# Patient Record
Sex: MALE | Race: WHITE | Employment: FULL TIME | URBAN - METROPOLITAN AREA
[De-identification: names, ages, dates, MRNs, and addresses within clinical notes are randomized per-mention and may not be internally consistent; named-entity substitution may affect disease eponyms.]

---

## 2017-02-07 ENCOUNTER — OFFICE VISIT (OUTPATIENT)
Dept: FAMILY MEDICINE | Facility: CLINIC | Age: 57
End: 2017-02-07
Payer: COMMERCIAL

## 2017-02-07 ENCOUNTER — MYC MEDICAL ADVICE (OUTPATIENT)
Dept: FAMILY MEDICINE | Facility: CLINIC | Age: 57
End: 2017-02-07

## 2017-02-07 VITALS
SYSTOLIC BLOOD PRESSURE: 138 MMHG | HEIGHT: 67 IN | HEART RATE: 76 BPM | BODY MASS INDEX: 21.35 KG/M2 | WEIGHT: 136 LBS | DIASTOLIC BLOOD PRESSURE: 76 MMHG | TEMPERATURE: 98.3 F

## 2017-02-07 DIAGNOSIS — G89.29 CHRONIC NECK AND BACK PAIN: Primary | ICD-10-CM

## 2017-02-07 DIAGNOSIS — M54.2 CHRONIC NECK AND BACK PAIN: Primary | ICD-10-CM

## 2017-02-07 DIAGNOSIS — M54.12 CERVICAL RADICULOPATHY: ICD-10-CM

## 2017-02-07 DIAGNOSIS — Z23 NEED FOR PROPHYLACTIC VACCINATION WITH TETANUS-DIPHTHERIA (TD): ICD-10-CM

## 2017-02-07 DIAGNOSIS — M54.9 CHRONIC NECK AND BACK PAIN: Primary | ICD-10-CM

## 2017-02-07 DIAGNOSIS — Z23 NEED FOR PROPHYLACTIC VACCINATION AND INOCULATION AGAINST INFLUENZA: ICD-10-CM

## 2017-02-07 PROCEDURE — 90686 IIV4 VACC NO PRSV 0.5 ML IM: CPT | Performed by: PHYSICIAN ASSISTANT

## 2017-02-07 PROCEDURE — 90472 IMMUNIZATION ADMIN EACH ADD: CPT | Performed by: PHYSICIAN ASSISTANT

## 2017-02-07 PROCEDURE — 90471 IMMUNIZATION ADMIN: CPT | Performed by: PHYSICIAN ASSISTANT

## 2017-02-07 PROCEDURE — 99213 OFFICE O/P EST LOW 20 MIN: CPT | Mod: 25 | Performed by: PHYSICIAN ASSISTANT

## 2017-02-07 PROCEDURE — 90715 TDAP VACCINE 7 YRS/> IM: CPT | Performed by: PHYSICIAN ASSISTANT

## 2017-02-07 NOTE — PATIENT INSTRUCTIONS
[place] [phone]  -- For colonoscopy.     [facility]   Discharged by : Yoon GE CMA ([facility])    Paper scripts provided to patient : none      If you have any questions regarding your visit please contact your care team:     Team Gold Clinic Hours Telephone Number   Dr. Liana Moulton, LIN   7am-7pm Monday - Thursday   7am-5pm Fridays  [phone]   (Appointment scheduling available 24/7)   RN Line   [phone] option 2       For a Price Quote for your services, please call our Consumer Price Line at [phone].     What options do I have for visits at the clinic other than the traditional office visit?     To expand how we care for you, many of our providers are utilizing electronic visits (e-visits) and telephone visits, when medically appropriate, for interactions with their patients rather than a visit in the clinic. We also offer nurse visits for many medical concerns. Just like any other service, we will bill your insurance company for this type of visit based on time spent on the phone with your provider. Not all insurance companies cover these visits. Please check with your medical insurance if this type of visit is covered. You will be responsible for any charges that are not paid by your insurance.   E-visits via [email or web address]: generally incur a $35.00 fee.     Telephone visits:   Time spent on the phone: *charged based on time that is spent on the phone in increments of 10 minutes. Estimated cost:   5-10 mins $30.00   11-20 mins. $59.00   21-30 mins. $85.00     Use Mobentot (secure email communication and access to your chart) to send your primary care provider a message or make an appointment. Ask someone on your Team how to sign up for [email or web address].     As always, Thank you for trusting us with your health care needs!      [facility]:    Scheduling Appointments  [facility]  Call:  [phone]    [facility]  Call: [phone]    [facility]  Call: [phone]      WHERE TO GO FOR CARE?    Clinic    Make an appointment if you:       Are sick (cold, cough, flu, sore throat, earache or in pain).       Have a small injury (sprain, small cut, burn or broken bone).       Need a physical exam, Pap smear, vaccine or prescription refill.       Have questions about your health or medicines.    To reach us:      Call [phone] ([phone]). Open 24 hours every day. (For counseling services, call [phone].)    Log into HealthSpot at [employer]. (Visit [employer] to create an account.) Hospital emergency room    An emergency is a serious or life- threatening problem that must be treated right away.    Call 107 or get to the hospital if you have:      Very bad or sudden:            - Chest pain or pressure         - Bleeding         - Head or belly pain         - Dizziness or trouble seeing, walking or                          Speaking      Problems breathing      Blood in your vomit or you are coughing up blood      A major injury (knocked out, loss of a finger or limb, rape, broken bone protruding from skin)    A mental health crisis. (Or call the Mental Health Crisis line at 1-270.436.1757 or Suicide Prevention Hotline at 1-661.379.7602.)    Open 24 hours every day. You don't need an appointment.     Urgent care    Visit urgent care for sickness or small injuries when the clinic is closed. You don't need an appointment. To check hours or find an urgent care near you, visit [email or web address]. Online care    Get online care from [employer] for more than 70 common problems, like colds, allergies and infections. Open 24 hours every day at: [email or web address]   Need help deciding?    For advice about where to be seen, you may call your clinic and ask to speak with a nurse. We're here for you 24 hours every day.          If you are deaf or hard of hearing, please let us know. We provide many free services including sign language interpreters, oral interpreters, TTYs, telephone amplifiers, note takers and written materials.

## 2017-02-07 NOTE — MR AVS SNAPSHOT
After Visit Summary   2/7/2017    Truman Suero    MRN: [id number]           Patient Information     Date Of Birth          1960        Visit Information        Provider Department      2/7/2017 11:00 AM Tiffanie Moulton PA-C [facility]        Today's Diagnoses     Screen for colon cancer    -  1     Need for prophylactic vaccination with tetanus-diphtheria (TD)         Needs smoking cessation education         Need for prophylactic vaccination and inoculation against influenza         Chronic neck and back pain         Cervical radiculopathy           Care Instructions    [place] [phone]  -- For colonoscopy.     [facility]   Discharged by : Yoon GE CMA ([facility])    Paper scripts provided to patient : none      If you have any questions regarding your visit please contact your care team:     Team Gold Clinic Hours Telephone Number   Dr. Liana Moulton PA-C   7am-7pm Monday - Thursday   7am-5pm Fridays  [phone]   (Appointment scheduling available 24/7)   RN Line   [phone] option 2       For a Price Quote for your services, please call our Consumer Price Line at [phone].     What options do I have for visits at the clinic other than the traditional office visit?     To expand how we care for you, many of our providers are utilizing electronic visits (e-visits) and telephone visits, when medically appropriate, for interactions with their patients rather than a visit in the clinic. We also offer nurse visits for many medical concerns. Just like any other service, we will bill your insurance company for this type of visit based on time spent on the phone with your provider. Not all insurance companies cover these visits. Please check with your medical insurance if this type of visit is covered. You will be responsible for any charges that are not paid by your insurance.   E-visits via  [employer]: generally incur a $35.00 fee.     Telephone visits:   Time spent on the phone: *charged based on time that is spent on the phone in increments of 10 minutes. Estimated cost:   5-10 mins $30.00   11-20 mins. $59.00   21-30 mins. $85.00     Use [employer] (secure email communication and access to your chart) to send your primary care provider a message or make an appointment. Ask someone on your Team how to sign up for [email or web address].     As always, Thank you for trusting us with your health care needs!      [facility]:    Scheduling Appointments  Jac Malhotra [facility]  Call: [phone]    [facility]  Call: [phone]    [facility]  Call: [phone]      WHERE TO GO FOR CARE?    Clinic    Make an appointment if you:       Are sick (cold, cough, flu, sore throat, earache or in pain).       Have a small injury (sprain, small cut, burn or broken bone).       Need a physical exam, Pap smear, vaccine or prescription refill.       Have questions about your health or medicines.    To reach us:      Call [phone] ([phone]). Open 24 hours every day. (For counseling services, call [phone].)    Log into [employer] at [email or web address]. (Visit [email or web address] to create an account.) Hospital emergency room    An emergency is a serious or life- threatening problem that must be treated right away.    Call 051 or get to the hospital if you have:      Very bad or sudden:            - Chest pain or pressure         - Bleeding         - Head or belly pain         - Dizziness or trouble seeing, walking or                          Speaking      Problems breathing      Blood in your vomit or you are coughing up blood      A major injury (knocked out, loss of a finger or limb, rape, broken bone protruding from skin)    A mental health crisis. (Or call the Mental Health Crisis line at 1-554.147.4632 or Suicide Prevention Hotline  at 1-725.473.3727.)    Open 24 hours every day. You don't need an appointment.     Urgent care    Visit urgent care for sickness or small injuries when the clinic is closed. You don't need an appointment. To check hours or find an urgent care near you, visit [email or web address]. Online care    Get online care from [facility] for more than 70 common problems, like colds, allergies and infections. Open 24 hours every day at: [email or web address]   Need help deciding?    For advice about where to be seen, you may call your clinic and ask to speak with a nurse. We're here for you 24 hours every day.         If you are deaf or hard of hearing, please let us know. We provide many free services including sign language interpreters, oral interpreters, TTYs, telephone amplifiers, note takers and written materials.                       Follow-ups after your visit        Additional Services     ORTHO  REFERRAL       [facility] is referring you to the Orthopedic  Services at [facility].       The  Representative will assist you in the coordination of your Orthopedic and Musculoskeletal Care as prescribed by your physician.    The  Representative will call you within 1 business day to help schedule your appointment, or you may contact the  Representative at:    All areas ~ [phone]     Type of Referral : Spine: Cervical / Thoracic: Cervical / Thoracic Spine Surgeon  (see note from 12/8/16)      Timeframe requested: Routine    Coverage of these services is subject to the terms and limitations of your health insurance plan.  Please call member services at your health plan with any benefit or coverage questions.      If X-rays, CT or MRI's have been performed, please contact the facility where they were done to arrange for , prior to your scheduled appointment.  Please bring this referral request to your appointment and  present it to your specialist.            PAIN MANAGEMENT CENTER ([place]) REFERRAL       Your provider has referred you to the [facility].    Reason for Referral: Comprehensive Evaluation and Management    Please complete the following questions:    What is your diagnosis for the patient's pain? DDD of cervical spine    Do you have any specific questions for the pain specialist? No    Are there any red flags that may impact the assessment or management of the patient? None    **ANY DIAGNOSTIC TESTS THAT ARE NOT IN EPIC SHOULD BE SENT TO THE PAIN CENTER**    Please note the Pre-Op Pain Consult must be scheduled 2-3 weeks prior to the patient's surgery.  Patient's trying to schedule within 2 weeks of surgery may not be accommodated.     Pre-Op Pain Consults are only good for 30 days.    REGARDING OPIOID MEDICATIONS:  We will always address appropriateness of opioid pain medications, but we generally will not automatically take on a prescribing role. When we do take on prescribing of opioids for chronic pain, it is in collaboration with the referring physician for an intermediate period of time (months), with an expectation that the primary physician or provider will assume the prescribing role if medications are effective at stable doses with demonstrated compliance.  Therefore, please do not assume that your prescribing responsibilities end on the day of pain clinic consultation.  Is this agreeable to you? YES    For any questions, contact the [facility] at [phone].    Please be aware that coverage of these services is subject to the terms and limitations of your health insurance plan.  Call member services at your health plan with any benefit or coverage questions.      Please bring the following with you to your appointment:    (1) Any X-Rays, CTs or MRIs which have been performed.  Contact the facility where they were done to arrange for  prior to your  "scheduled appointment.    (2) List of current medications   (3) This referral request   (4) Any documents/labs given to you for this referral                  Who to contact     If you have questions or need follow up information about today's clinic visit or your schedule please contact [facility] directly at [phone].  Normal or non-critical lab and imaging results will be communicated to you by MyChart, letter or phone within 4 business days after the clinic has received the results. If you do not hear from us within 7 days, please contact the clinic through MobStachart or phone. If you have a critical or abnormal lab result, we will notify you by phone as soon as possible.  Submit refill requests through [employer] or call your pharmacy and they will forward the refill request to us. Please allow 3 business days for your refill to be completed.          Additional Information About Your Visit        MyChart Information     [employer] gives you secure access to your electronic health record. If you see a primary care provider, you can also send messages to your care team and make appointments. If you have questions, please call your primary care clinic.  If you do not have a primary care provider, please call [phone] and they will assist you.        Care EveryWhere ID     This is your Care EveryWhere ID. This could be used by other organizations to access your [place] medical records  [id number]        Your Vitals Were     Pulse Temperature Height BMI (Body Mass Index)          76 98.3  F (36.8  C) (Oral) 5' 7\" (1.702 m) 21.30 kg/m2         Blood Pressure from Last 3 Encounters:   02/07/17 138/76   12/08/16 144/90   11/30/16 134/70    Weight from Last 3 Encounters:   02/07/17 136 lb (61.689 kg)   12/08/16 139 lb (63.05 kg)   11/30/16 140 lb 9.6 oz (63.776 kg)              We Performed the Following     FLU VAC, SPLIT VIRUS IM > 3 YO (QUADRIVALENT) [60628]     ORTHO  REFERRAL     PAIN " MANAGEMENT CENTER ([place]) REFERRAL     TDAP (ADACEL AGES 11-64)     Vaccine Administration, Initial [78278]          Today's Medication Changes          These changes are accurate as of: 2/7/17 11:40 AM.  If you have any questions, ask your nurse or doctor.               Stop taking these medicines if you haven't already. Please contact your care team if you have questions.     cyclobenzaprine 10 MG tablet   Commonly known as:  FLEXERIL   Stopped by:  Tiffanie Moulton PA-C                    Primary Care Provider    Physician No Ref-Primary       No address on file        Thank you!     Thank you for choosing [facility]  for your care. Our goal is always to provide you with excellent care. Hearing back from our patients is one way we can continue to improve our services. Please take a few minutes to complete the written survey that you may receive in the mail after your visit with us. Thank you!             Your Updated Medication List - Protect others around you: Learn how to safely use, store and throw away your medicines at www.disposemymeds.org.          This list is accurate as of: 2/7/17 11:40 AM.  Always use your most recent med list.                   Brand Name Dispense Instructions for use    gabapentin 300 MG capsule    NEURONTIN    270 capsule    Take 1 capsule (300 mg) by mouth 3 times daily       ibuprofen 800 MG tablet    ADVIL/MOTRIN         traMADol 50 MG tablet    ULTRAM    15 tablet    1-2 tabs every 4-6 hours prn pain

## 2017-02-07 NOTE — NURSING NOTE
"Chief Complaint   Patient presents with     Pain     chronic neck and back pain follow up     Other     due for health maintenance     Flu Shot       Initial /76 mmHg  Pulse 76  Temp(Src) 98.3  F (36.8  C) (Oral)  Ht 5' 7\" (1.702 m)  Wt 136 lb (61.689 kg)  BMI 21.30 kg/m2 Estimated body mass index is 21.3 kg/(m^2) as calculated from the following:    Height as of this encounter: 5' 7\" (1.702 m).    Weight as of this encounter: 136 lb (61.689 kg).  Medication Reconciliation: complete   Shila Olson MA      "

## 2017-02-07 NOTE — NURSING NOTE
Patient is covered under this program for the following reason:  Not MNVFC Eligible: Insured - Has insurance that covers the cost of all vaccines (Not MnVFC elligible because insurance already covers all vaccines)        Screening Questionnaire for Adult Immunization   Are you sick today?   No    Do you have allergies to medications, food, a vaccine component or latex?   No    Have you ever had a serious reaction after receiving a vaccination?   No    Do you have a long-term health problem with heart disease, lung disease,  asthma, kidney disease, diabetes, anemia, metabolic or blood disease?   No    Do you have cancer, leukemia, AIDS, or any immune system problem?   No    Do you take cortisone, prednisone, other steroids, or anticancer drugs, or  have you had any x-ray (radiation) treatments?   No    Have you had a seizure, brain, or other nervous system problem?   No    During the past year, have you received a transfusion of blood or blood       products, or been given a medicine called immune (gamma) globulin?   No    For women: Are you pregnant or is there a chance you could become         pregnant during the next month?   No    Have you received any vaccinations in the past 4 weeks?   No     Immunization questionnaire answers were all negative.     Vaccination given by Yoon Lopze CMA ([facility])    Prior to injection verified patient identity using patient's name and date of birth.

## 2017-02-07 NOTE — PROGRESS NOTES
"  SUBJECTIVE:                                                    Truman Suero is a 56 year old male who presents to clinic today for the following health issues:        Chronic Pain Follow-Up       Type / Location of Pain: neck and back  Analgesia/pain control:       Recent changes:  Worse, miserable. Had nerve radio frequency, and was done at clinic he wasn't supposed to go to. Caused insurance issues. Certain things are a bit better and certain things are a bit worse.       Overall control: i\"just getting through the day\"  Activity level/function:      Daily activities:  \"pushing through\"    Work:  Does and doesn't, mentally go through the day. On feet all day  Adverse effects:  No  Adherance    Taking medication as directed?  Yes    Participating in other treatments: 12/8/16 saw neurosurgery   Risk Factors:    Sleep: not going well, does sleep cause he is tired    Mood/anxiety:  controlled    Recent family or social stressors:  none noted    Other aggravating factors: on feet at work, is a   No flowsheet data found.  No flowsheet data found.  Encounter-Level CSA:     There are no encounter-level csa.               Problems taking medications regularly: No    Medication side effects: none    Diet: regular (no restrictions)      -------------------------------------    Problem list, Medication list, Allergies, and Medical/Social/Surgical histories reviewed in [facility] and updated as appropriate.    ROS:  A pertinent ROS of the General  Musculoskeletal   Neurological  Integumentary systems is otherwise unremarkable.      OBJECTIVE:                                                    /76 mmHg  Pulse 76  Temp(Src) 98.3  F (36.8  C) (Oral)  Ht 5' 7\" (1.702 m)  Wt 136 lb (61.689 kg)  BMI 21.30 kg/m2 .  GENERAL:: healthy, alert and no distress  PSYCH: Alert and oriented times 3; speech- coherent , normal rate and volume; able to articulate logical thoughts, able to abstract reason, no tangential thoughts, no " hallucinations or delusions, affect- normal    Diagnostic test results:  Diagnostic Test Results:  none      ASSESSMENT/PLAN:                                                          ICD-10-CM    1. Chronic neck and back pain M54.2 ORTHO  REFERRAL    M54.9 PAIN MANAGEMENT CENTER ([place]) REFERRAL   2. Cervical radiculopathy M54.12 ORTHO  REFERRAL     PA[facility]EMENT CENTER ([place]) REFERRAL   3. Need for prophylactic vaccination with tetanus-diphtheria (TD) Z23 TDAP (ADACEL AGES 11-64)   4. Need for prophylactic vaccination and inoculation against influenza Z23 FLU VAC, SPLIT VIRUS IM > 3 YO (QUADRIVALENT) [35225]     Vaccine Administration, Initial [96655]       Unfortunately patient has not seen great improvement with interventions thus far. I recommend that her return to see Dr. Junior per recommendation of last note from Melanie Vincent, [facility] neurosurgery on 12/8/16. In addition, gave patient option of seeing pain management group to discuss multidisciplinary approach to his daily pain. Explained what all they would do at this visit. At this time he is not interested in more medication, he states he has tramadol at home still.     Follow up with Provider - PRN.      Patient plans to get his colonoscopy.      Tiffanie Moulton PA-C  [facility]     Injectable Influenza Immunization Documentation    1.  Is the person to be vaccinated sick today?  No    2. Does the person to be vaccinated have an allergy to eggs or to a component of the vaccine?  No    3. Has the person to be vaccinated today ever had a serious reaction to influenza vaccine in the past?  No    4. Has the person to be vaccinated ever had Guillain-[place] syndrome?  No     Form completed by patient

## 2017-02-08 ENCOUNTER — TELEPHONE (OUTPATIENT)
Dept: PALLIATIVE MEDICINE | Facility: CLINIC | Age: 57
End: 2017-02-08

## 2017-02-08 NOTE — TELEPHONE ENCOUNTER
Patient called back and I scheduled him for a phone visit on Monday 2/13/17.    Brittaney Ramirez

## 2017-02-08 NOTE — TELEPHONE ENCOUNTER
Pain Management Center Referral      1. Confirmed address with patient? Yes  2. Confirmed phone number with patient? Yes  3. Confirmed referring provider? Yes  4. Is the PCP the same as the referring provider? Yes  5. Has the patient been to any previous pain clinics? No  (If yes, send DACIA with welcome letter)  6. Which insurance are we to bill for this appointment?  [employer]    7. Informed pt of cancellation (48 hour) policy? Yes    REGARDING OPIOID MEDICATIONS: We will always address appropriateness of opioid pain medications, but we generally will not automatically take on a prescribing role. When we do take on prescribing of opioids for chronic pain, it is in collaboration with the referring physician for an intermediate period of time (months), with an expectation that the primary physician or provider will assume the prescribing role if medications are effective at stable doses with demonstrated compliance. Therefore, please do not assume that your prescribing responsibilities end on the day of pain clinic consultation.  7. Informed pt of prescribing policy? Yes      8. Referring Provider: ALEXEY Bro    9. Criteria for Triage Eval:   -Missed/Failed 1st DUAL appointment? N/A   -Medication Focused? N/A   -Mental Health Concerns? (e.g. Recent psych hospitalization/snap shot)? N/A   -Active substance abuse? N/A   -Patient behaviors (e.g. Offensive language/raised voice)? N/A

## 2017-02-08 NOTE — TELEPHONE ENCOUNTER
Left VM for patient to schedule a new evaluation.        Monalisa LUZ    [facility]

## 2017-02-08 NOTE — Clinical Note
February 8, 2017    Truman Suero  [address] [facility] [address]    Dear Katay Laughlin to the [facility].  We are located on the 2nd floor ([address]) of the [facility], located at [address] [place], [place] [address], [place] [address].    Your appointment at the [facility] has been scheduled on Tuesday MARCH 28, 2017 at 2:30 PM with Melanie Thomas NP.    At your first visit, you will meet your team of caregivers who will help you to develop pain management strategies that will last a lifetime. You will meet with our support staff to review your insurance information, and collect your co-payment if required by your insurance company. You will also meet with a medical pain specialist and care coordinator who will assess your pain and develop a plan of care for your successful pain rehabilitation. You should expect to spend 1-2 hours at your first visit with us. Usually, patients work with us for a period of 6-12 months, and eventually return to their primary doctor once their pain management has stabilized.      To help us make your visit go as smoothly as possible, please bring the following items with you on your visit:     Completed Pain Questionnaire enclosed in this packet.  If you do not bring the completed questionnaire, we may have to reschedule your appointment.  List of any medicines that you are currently taking or have been prescribed  Important NON-[place] medical information such as medical records or tests results (X-rays, or laboratory tests)  Your health insurance card  Financial resources to cover your co-payment or balance due at the time of service (cash, personal check, Visa, and MasterCard are acceptable methods of payment)     Due to the demand for new patient evaluations, you must notify the scheduling department 48 hours in advance if you are not able to keep this  appointment. Failure to do so could affect your ability to reschedule with our clinic. Please be aware that we will not prescribe any medications at your first visit.     Please call [phone] with any questions regarding your appointment. We look forward to meeting you and working to address your health care needs.     Sincerely,    [facility]

## 2017-02-13 ENCOUNTER — VIRTUAL VISIT (OUTPATIENT)
Dept: FAMILY MEDICINE | Facility: CLINIC | Age: 57
End: 2017-02-13
Payer: COMMERCIAL

## 2017-02-13 ENCOUNTER — TELEPHONE (OUTPATIENT)
Dept: FAMILY MEDICINE | Facility: CLINIC | Age: 57
End: 2017-02-13

## 2017-02-13 DIAGNOSIS — M54.2 CHRONIC NECK AND BACK PAIN: Primary | ICD-10-CM

## 2017-02-13 DIAGNOSIS — G89.4 CHRONIC PAIN SYNDROME: ICD-10-CM

## 2017-02-13 DIAGNOSIS — M54.12 CERVICAL RADICULOPATHY: ICD-10-CM

## 2017-02-13 DIAGNOSIS — G89.29 CHRONIC NECK AND BACK PAIN: Primary | ICD-10-CM

## 2017-02-13 DIAGNOSIS — M54.9 CHRONIC NECK AND BACK PAIN: Primary | ICD-10-CM

## 2017-02-13 PROCEDURE — 98967 PH1 ASSMT&MGMT NQHP 11-20: CPT | Performed by: PHYSICIAN ASSISTANT

## 2017-02-13 RX ORDER — GABAPENTIN 600 MG/1
600 TABLET ORAL 3 TIMES DAILY
Qty: 90 TABLET | Refills: 5 | Status: SHIPPED | OUTPATIENT
Start: 2017-02-13 | End: 2017-08-07

## 2017-02-13 RX ORDER — TRAMADOL HYDROCHLORIDE 50 MG/1
100 TABLET ORAL EVERY MORNING
Qty: 60 TABLET | Refills: 0 | Status: SHIPPED | OUTPATIENT
Start: 2017-02-13 | End: 2017-02-15

## 2017-02-13 NOTE — MR AVS SNAPSHOT
After Visit Summary   2/13/2017    Truman Suero    MRN: [id number]           Patient Information     Date Of Birth          1960        Visit Information        Provider Department      2/13/2017 2:00 PM Tiffanie Moulton PA-C [facility]        Today's Diagnoses     Chronic neck and back pain    -  1    Cervical radiculopathy        Chronic pain syndrome           Follow-ups after your visit        Your next 10 appointments already scheduled     Feb 14, 2017  1:20 PM CST   [facility] Visit with Melanie Zacarias NP   [facility]    [address] [place] [address]  [place] [address]   [phone]            Feb 15, 2017  3:00 PM CST   LAB with NE LAB   [facility] ([facility]    [address] [place] [id number] [phone]           Patient must bring picture ID.  Patient should be prepared to give a urine specimen  Please do not eat 10-12 hours before your appointment if you are coming in fasting for labs on lipids, cholesterol, or glucose (sugar).  Pregnant women should follow their Care Team instructions. Water with medications is okay. Do not drink coffee or other fluids.   If you have concerns about taking  your medications, please ask at office or if scheduling via [employer], send a message by clicking on Secure Messaging, Message Your Care Team.            Mar 28, 2017  2:30 PM CDT   New Visit with RODNEY Vargas CNP   [facility] [place] ([facility] [place])    [address] [address] [facility]  [place] [id number] [phone]              Future tests that were ordered for you today     Open Future Orders        Priority Expected Expires Ordered    Drug Screen Comprehensive , Urine with Reported Meds (Pain Care Package) Routine  2/20/2017 2/13/2017            Who to contact     If you have questions or need follow up information about today's clinic visit or your  schedule please contact [facility] directly at [phone].  Normal or non-critical lab and imaging results will be communicated to you by MyChart, letter or phone within 4 business days after the clinic has received the results. If you do not hear from us within 7 days, please contact the clinic through [employer] or phone. If you have a critical or abnormal lab result, we will notify you by phone as soon as possible.  Submit refill requests through [employer] or call your pharmacy and they will forward the refill request to us. Please allow 3 business days for your refill to be completed.          Additional Information About Your Visit        [employer] Information     [employer] gives you secure access to your electronic health record. If you see a primary care provider, you can also send messages to your care team and make appointments. If you have questions, please call your primary care clinic.  If you do not have a primary care provider, please call [phone] and they will assist you.        Care EveryWhere ID     This is your Care EveryWhere ID. This could be used by other organizations to access your [facility] medical records  [id number]         Blood Pressure from Last 3 Encounters:   02/07/17 138/76   12/08/16 144/90   11/30/16 134/70    Weight from Last 3 Encounters:   02/07/17 136 lb (61.7 kg)   12/08/16 139 lb (63 kg)   11/30/16 140 lb 9.6 oz (63.8 kg)                 Today's Medication Changes          These changes are accurate as of: 2/13/17  8:04 PM.  If you have any questions, ask your nurse or doctor.               These medicines have changed or have updated prescriptions.        Dose/Directions    * gabapentin 300 MG capsule   Commonly known as:  NEURONTIN   This may have changed:  Another medication with the same name was added. Make sure you understand how and when to take each.   Used for:  Cervical radiculopathy   Changed by:  Tiffanie Moulton PA-C        Dose:  300 mg   Take  1 capsule (300 mg) by mouth 3 times daily   Quantity:  270 capsule   Refills:  1       * gabapentin 600 MG tablet   Commonly known as:  NEURONTIN   This may have changed:  You were already taking a medication with the same name, and this prescription was added. Make sure you understand how and when to take each.   Used for:  Chronic neck and back pain, Cervical radiculopathy, Chronic pain syndrome   Changed by:  Tiffanie Moulton PA-C        Dose:  600 mg   Take 1 tablet (600 mg) by mouth 3 times daily   Quantity:  90 tablet   Refills:  5       * traMADol 50 MG tablet   Commonly known as:  ULTRAM   This may have changed:  Another medication with the same name was added. Make sure you understand how and when to take each.   Used for:  Sprain of lumbar region, initial encounter   Changed by:  Carole Suazo PA-C        1-2 tabs every 4-6 hours prn pain   Quantity:  15 tablet   Refills:  0       * traMADol 50 MG tablet   Commonly known as:  ULTRAM   This may have changed:  You were already taking a medication with the same name, and this prescription was added. Make sure you understand how and when to take each.   Used for:  Chronic neck and back pain, Cervical radiculopathy   Changed by:  Tiffanie Moulton PA-C        Dose:  100 mg   Take 2 tablets (100 mg) by mouth every morning maximum 2 tablet(s) per day   Quantity:  60 tablet   Refills:  0       * Notice:  This list has 4 medication(s) that are the same as other medications prescribed for you. Read the directions carefully, and ask your doctor or other care provider to review them with you.         Where to get your medicines      These medications were sent to [facility] [address] [place] [id number]     Phone:  [phone]     gabapentin 600 MG tablet         Some of these will need a paper prescription and others can be bought over the counter.  Ask your nurse if you have questions.     Bring  a paper prescription for each of these medications     traMADol 50 MG tablet                Primary Care Provider    Physician No Ref-Primary       No address on file        Thank you!     Thank you for choosing [facility]  for your care. Our goal is always to provide you with excellent care. Hearing back from our patients is one way we can continue to improve our services. Please take a few minutes to complete the written survey that you may receive in the mail after your visit with us. Thank you!             Your Updated Medication List - Protect others around you: Learn how to safely use, store and throw away your medicines at www.disposemymeds.org.          This list is accurate as of: 2/13/17  8:04 PM.  Always use your most recent med list.                   Brand Name Dispense Instructions for use    * gabapentin 300 MG capsule    NEURONTIN    270 capsule    Take 1 capsule (300 mg) by mouth 3 times daily       * gabapentin 600 MG tablet    NEURONTIN    90 tablet    Take 1 tablet (600 mg) by mouth 3 times daily       ibuprofen 800 MG tablet    ADVIL/MOTRIN         * traMADol 50 MG tablet    ULTRAM    15 tablet    1-2 tabs every 4-6 hours prn pain       * traMADol 50 MG tablet    ULTRAM    60 tablet    Take 2 tablets (100 mg) by mouth every morning maximum 2 tablet(s) per day       * Notice:  This list has 4 medication(s) that are the same as other medications prescribed for you. Read the directions carefully, and ask your doctor or other care provider to review them with you.

## 2017-02-13 NOTE — TELEPHONE ENCOUNTER
Tramadol Rx is in Tiffanie's completed folder. Patient is scheduled for Wednesday to come in for lab only appt to leave a urine. Per Tiffanie, once he leaves the urine sample we can give him the prescription.    Renae Martins MA

## 2017-02-13 NOTE — PROGRESS NOTES
"Truman Suero is a 56 year old male who is being evaluated via a telephone visit.      The patient has been notified of following:     \"This telephone visit will be conducted via a call between you and your physician/provider. We have found that certain health care needs can be provided without the need for a physical exam.  This service lets us provide the care you need with a short phone conversation.  If a prescription is necessary we can send it directly to your pharmacy.  If lab work is needed we can place an order for that and you can then stop by our lab to have the test done at a later time.    We will bill your insurance company for this service.  Please check with your medical insurance if this type of visit is covered. You may be responsible for the cost of this type of visit if insurance coverage is denied.  The typical cost is $30 (10min), $59(11-20min) and $85 (21-30min).  Most often these visits are shorter than 10 minutes.    If during the course of the call the physician/provider feels a telephone visit is not appropriate, you will not be charged for this service. \"     Consent has been obtained for this service by 1 care team member:yes. See the scanned image in the medical record.    Preferred patient phone number to be used for this call: [phone]     Truman Suero complains of  Pain, looking for options      Past Medical History   Diagnosis Date     Neck pain      MRI positive on cervical vertebrea.      Social History     Social History     Marital status:      Spouse name: N/A     Number of children: N/A     Years of education: N/A     Occupational History     Not on file.     Social History Main Topics     Smoking status: Current Every Day Smoker     Smokeless tobacco: Never Used     Alcohol use No     Drug use: No     Sexual activity: Yes     Partners: Female     Birth control/ protection: None     Other Topics Concern     Not on file     Social History Narrative " "    ALLERGIES  Review of patient's allergies indicates no known allergies.        Shila Olson MA   (MA signature)    Additional provider notes:  Patient has advanced cervical spondylosis seen on MRI on 16 (sc[facility] into [facility]). Patient has been doing injections and working with Dr. Mondragon  but has found minimal relief with this process. He most recently had nerve radio frequency and symptoms did not improve, in fact it feels they worsened.  He states mornings are the worst time for him, he states \"mornings are close to impossible\". He wakes and takes gabapentin (just started with 600 mg last week after visit) and 1 tramadol. This does not seem to help like it used to. In the beginning was giving him 75% relief with tramadol and now after an hour feels like it is back, so maybe 10-25% relief.   He takes advil and tylenol sometimes, but hasn't noticed a great deal of improvement with this.   He was also given Norco by Dr. Mondragon and states this did help a little more.     Dr. Mcintyre office -- orthopedic.     Assessment/Plan:    ICD-10-CM    1. Chronic neck and back pain M54.2 traMADol (ULTRAM) 50 MG tablet    M54.9 Drug Abuse Scr  Ur w Qnt Reflx   2. Cervical radiculopathy M54.12 traMADol (ULTRAM) 50 MG tablet     Drug Abuse Scr  Ur w Qnt Reflx      Discussed options for patient. He already had follow up scheduled with neurosurgery and pain clinic.  Recommend he start more regular use of Ibuprofen 600 mg up to 4 times daily. Increase gabapentin to 600 mg TID.  Tramadol 2 tablets in AM with ibuprofen and gabapentin. Use topical lidocaine.   Will come in for UDS and okay to  Rx at that time.   Follow up in 3 weeks if needed to see how pain is doing.     Total time spent on this phone visit with the patient = 19 minutes     I have reviewed the note as documented above.  This accurately captures the substance of my conversation with the patient,    Tiffanie Moulton, [facility], PA-C  [facility] " [place]

## 2017-02-14 ENCOUNTER — MYC MEDICAL ADVICE (OUTPATIENT)
Dept: FAMILY MEDICINE | Facility: CLINIC | Age: 57
End: 2017-02-14

## 2017-02-14 ENCOUNTER — OFFICE VISIT (OUTPATIENT)
Dept: NEUROSURGERY | Facility: CLINIC | Age: 57
End: 2017-02-14
Payer: COMMERCIAL

## 2017-02-14 VITALS
BODY MASS INDEX: 21.97 KG/M2 | HEART RATE: 71 BPM | DIASTOLIC BLOOD PRESSURE: 83 MMHG | SYSTOLIC BLOOD PRESSURE: 134 MMHG | HEIGHT: 67 IN | TEMPERATURE: 97.1 F | WEIGHT: 140 LBS | OXYGEN SATURATION: 95 %

## 2017-02-14 DIAGNOSIS — M50.30 DEGENERATIVE DISC DISEASE, CERVICAL: Primary | ICD-10-CM

## 2017-02-14 PROCEDURE — 99213 OFFICE O/P EST LOW 20 MIN: CPT | Performed by: NURSE PRACTITIONER

## 2017-02-14 NOTE — PROGRESS NOTES
[facility]  Neurosurgery followup:    HPI: Truman Suero is a 56 year old male with a 2-3 year history of neck pain with no precipitating cause.He was initially seen at our office on 12/8/16 for evaluation of his neck pain and a recommendation for cervical RF after undergoing diagnostic and confirmatory MBB which provided significant relief.   Since his last visit cervical RF was completed and although he feels some relief wo the upper back and shoulder region he continues to have daily neck pain.  He rates it 7.  He denies pain in the arms, however, notes numbness and tingling to the hands bilaterally.  He has a diagnosis of carpal tunnel.  He denies weakness to the upper extremities.    Exam:  Constitutional:  Alert, well nourished, NAD.  HEENT: Normocephalic, atraumatic.   Pulm:  Without shortness of breath   CV:  No pitting edema of BLE.     Neurological:  Awake  Alert  Oriented x 3  Motor exam:     Shoulder Abduction:  Right:  5/5    Left:  5/5  Biceps:                      Right:  5/5    Left:  5/5  Triceps:                     Right:  5/5    Left:  5/5  Wrist Extensors:       Right:  5/5    Left:  5/5  Wrist Flexors:           Right:  5/5    Left:  5/5  Intrinsics:                  Right:  5/5    Left:  5/5     Sensation intact throughout all U/E dermatomes    Decreased ROM of cervical spine with discomfort.  Tenderness throughout cervical paraspinous muscles.    A/P:   Cervical DDD  Cervicalgias    Considering Truman has ongoing pain in the neck and throughout the upper back, even after cervical RF, we discussed getting an updated cervical MRI.  We will follow up with the results.    Patient Instructions   Schedule cervical MRI  We will call you with results.        Melanie Zacarias [facility]  [facility]  [address] [address]    Tel [phone]  Pager [phone]

## 2017-02-14 NOTE — NURSING NOTE
"Truman Suero is a 56 year old male who presents for:  Chief Complaint   Patient presents with     Neurologic Problem     Neck and back pain        Initial Vitals:  There were no vitals taken for this visit. Estimated body mass index is 21.3 kg/(m^2) as calculated from the following:    Height as of 2/7/17: 5' 7\" (1.702 m).    Weight as of 2/7/17: 136 lb (61.7 kg).. There is no height or weight on file to calculate BSA. BP completed using cuff size: regular  Data Unavailable    Do you feel safe in your environment?  Yes  Do you need any refills today? No    Nursing Comments: chronic neck and back pain.  Patient rates his pain today as 7 neck and low back pain, numbness, tingling, weakness going into hands and feet      5 min. nursing intake time  Guerda Krishna CMA, AAS      Discharge plan: neck MRI  2 min. nursing discharge time  Guerda Krishna CMA, AAS       "

## 2017-02-14 NOTE — MR AVS SNAPSHOT
After Visit Summary   2/14/2017    Truman Suero    MRN: [id number]           Patient Information     Date Of Birth          1960        Visit Information        Provider Department      2/14/2017 1:20 PM Melanie Zacarias, NP [facility]        Today's Diagnoses     Degenerative disc disease, cervical    -  1      Care Instructions    Schedule cervical MRI  We will call you with results.        Follow-ups after your visit        Your next 10 appointments already scheduled     Feb 15, 2017  3:00 PM CST   [facility] with [facility] LAB   [facility]    [address] [place] [id number] [phone]           Patient must bring picture ID.  Patient should be prepared to give a urine specimen  Please do not eat 10-12 hours before your appointment if you are coming in fasting for labs on lipids, cholesterol, or glucose (sugar).  Pregnant women should follow their Care Team instructions. Water with medications is okay. Do not drink coffee or other fluids.   If you have concerns about taking  your medications, please ask at office or if scheduling via [employer], send a message by clicking on Secure Messaging, Message Your Care Team.            Mar 28, 2017  2:30 PM CDT   New Visit with RODNEY Vargas CNP   [facility] [place] ([facility] [place])    [id number] [place] [address]  [place] [id number] [phone]              Future tests that were ordered for you today     Open Future Orders        Priority Expected Expires Ordered    MR Cervical Spine w/o Contrast Routine  2/14/2018 2/14/2017    Drug Screen Comprehensive , Urine with Reported Meds (Pain Care Package) Routine  2/20/2017 2/13/2017            Who to contact     If you have questions or need follow up information about today's clinic visit or your schedule please contact [facility] directly at [phone].  Normal or non-critical  "lab and imaging results will be communicated to you by [employer], letter or phone within 4 business days after the clinic has received the results. If you do not hear from us within 7 days, please contact the clinic through [employer] or phone. If you have a critical or abnormal lab result, we will notify you by phone as soon as possible.  Submit refill requests through [employer] or call your pharmacy and they will forward the refill request to us. Please allow 3 business days for your refill to be completed.          Additional Information About Your Visit        [employer] Information     [employer] gives you secure access to your electronic health record. If you see a primary care provider, you can also send messages to your care team and make appointments. If you have questions, please call your primary care clinic.  If you do not have a primary care provider, please call [phone] and they will assist you.        Care EveryWhere ID     This is your Care EveryWhere ID. This could be used by other organizations to access your [facility] medical records  [id number]        Your Vitals Were     Pulse Temperature Height Pulse Oximetry BMI (Body Mass Index)       71 97.1  F (36.2  C) (Oral) 5' 7\" (1.702 m) 95% 21.93 kg/m2        Blood Pressure from Last 3 Encounters:   02/14/17 134/83   02/07/17 138/76   12/08/16 144/90    Weight from Last 3 Encounters:   02/14/17 140 lb (63.5 kg)   02/07/17 136 lb (61.7 kg)   12/08/16 139 lb (63 kg)               Primary Care Provider    Physician No Ref-Primary       No address on file        Thank you!     Thank you for choosing [facility]  for your care. Our goal is always to provide you with excellent care. Hearing back from our patients is one way we can continue to improve our services. Please take a few minutes to complete the written survey that you may receive in the mail after your visit with us. Thank you!             Your Updated Medication List - Protect others " around you: Learn how to safely use, store and throw away your medicines at www.disposemymeds.org.          This list is accurate as of: 2/14/17  2:18 PM.  Always use your most recent med list.                   Brand Name Dispense Instructions for use    * gabapentin 300 MG capsule    NEURONTIN    270 capsule    Take 1 capsule (300 mg) by mouth 3 times daily       * gabapentin 600 MG tablet    NEURONTIN    90 tablet    Take 1 tablet (600 mg) by mouth 3 times daily       ibuprofen 800 MG tablet    ADVIL/MOTRIN         * traMADol 50 MG tablet    ULTRAM    15 tablet    1-2 tabs every 4-6 hours prn pain       * traMADol 50 MG tablet    ULTRAM    60 tablet    Take 2 tablets (100 mg) by mouth every morning maximum 2 tablet(s) per day       * Notice:  This list has 4 medication(s) that are the same as other medications prescribed for you. Read the directions carefully, and ask your doctor or other care provider to review them with you.

## 2017-02-15 DIAGNOSIS — M54.12 CERVICAL RADICULOPATHY: ICD-10-CM

## 2017-02-15 DIAGNOSIS — G89.4 CHRONIC PAIN SYNDROME: ICD-10-CM

## 2017-02-15 DIAGNOSIS — M54.9 CHRONIC NECK AND BACK PAIN: ICD-10-CM

## 2017-02-15 DIAGNOSIS — M54.2 CHRONIC NECK AND BACK PAIN: ICD-10-CM

## 2017-02-15 DIAGNOSIS — G89.29 CHRONIC NECK AND BACK PAIN: ICD-10-CM

## 2017-02-15 PROCEDURE — 80307 DRUG TEST PRSMV CHEM ANLYZR: CPT | Mod: 90 | Performed by: PHYSICIAN ASSISTANT

## 2017-02-15 PROCEDURE — 99000 SPECIMEN HANDLING OFFICE-LAB: CPT | Performed by: PHYSICIAN ASSISTANT

## 2017-02-15 RX ORDER — TRAMADOL HYDROCHLORIDE 50 MG/1
100 TABLET ORAL EVERY MORNING
Qty: 60 TABLET | Refills: 0 | Status: SHIPPED | OUTPATIENT
Start: 2017-02-15 | End: 2017-03-17

## 2017-02-16 ENCOUNTER — MYC MEDICAL ADVICE (OUTPATIENT)
Dept: FAMILY MEDICINE | Facility: CLINIC | Age: 57
End: 2017-02-16

## 2017-02-19 LAB — PAIN DRUG SCR UR W RPTD MEDS: NORMAL

## 2017-02-22 ENCOUNTER — OFFICE VISIT (OUTPATIENT)
Dept: URGENT CARE | Facility: URGENT CARE | Age: 57
End: 2017-02-22
Payer: COMMERCIAL

## 2017-02-22 ENCOUNTER — RADIANT APPOINTMENT (OUTPATIENT)
Dept: MRI IMAGING | Facility: CLINIC | Age: 57
End: 2017-02-22
Attending: NURSE PRACTITIONER
Payer: COMMERCIAL

## 2017-02-22 VITALS
SYSTOLIC BLOOD PRESSURE: 156 MMHG | DIASTOLIC BLOOD PRESSURE: 92 MMHG | WEIGHT: 136.2 LBS | HEART RATE: 71 BPM | TEMPERATURE: 97.8 F | BODY MASS INDEX: 21.33 KG/M2 | OXYGEN SATURATION: 96 %

## 2017-02-22 DIAGNOSIS — M50.30 DEGENERATIVE DISC DISEASE, CERVICAL: ICD-10-CM

## 2017-02-22 DIAGNOSIS — L03.012 PARONYCHIA OF LEFT MIDDLE FINGER: Primary | ICD-10-CM

## 2017-02-22 PROCEDURE — 72141 MRI NECK SPINE W/O DYE: CPT | Mod: TC

## 2017-02-22 PROCEDURE — 99213 OFFICE O/P EST LOW 20 MIN: CPT | Performed by: FAMILY MEDICINE

## 2017-02-22 NOTE — MR AVS SNAPSHOT
After Visit Summary   2/22/2017    Truman Suero    MRN: [id number]           Patient Information     Date Of Birth          1960        Visit Information        Provider Department      2/22/2017 11:15 AM Jonathan Nicholas MD [facility]        Today's Diagnoses     Paronychia of left middle finger    -  1       Follow-ups after your visit        Your next 10 appointments already scheduled     Mar 28, 2017  2:30 PM CDT   New Visit with RODNEY Vargas CNP   [facility] ([facility])    [address] [place] [address] [phone]              Who to contact     If you have questions or need follow up information about today's clinic visit or your schedule please contact [facility] directly at [phone].  Normal or non-critical lab and imaging results will be communicated to you by MyChart, letter or phone within 4 business days after the clinic has received the results. If you do not hear from us within 7 days, please contact the clinic through MyChart or phone. If you have a critical or abnormal lab result, we will notify you by phone as soon as possible.  Submit refill requests through [employer] or call your pharmacy and they will forward the refill request to us. Please allow 3 business days for your refill to be completed.          Additional Information About Your Visit        MyChart Information     [employer] gives you secure access to your electronic health record. If you see a primary care provider, you can also send messages to your care team and make appointments. If you have questions, please call your primary care clinic.  If you do not have a primary care provider, please call [phone] and they will assist you.        Care EveryWhere ID     This is your Care EveryWhere ID. This could be used by other organizations to access your [facility] medical records  [id number]        Your  Vitals Were     Pulse Temperature Pulse Oximetry BMI (Body Mass Index)          71 97.8  F (36.6  C) (Oral) 96% 21.33 kg/m2         Blood Pressure from Last 3 Encounters:   02/22/17 (!) 156/92   02/14/17 134/83   02/07/17 138/76    Weight from Last 3 Encounters:   02/22/17 136 lb 3.2 oz (61.8 kg)   02/14/17 140 lb (63.5 kg)   02/07/17 136 lb (61.7 kg)              Today, you had the following     No orders found for display         Today's Medication Changes          These changes are accurate as of: 2/22/17 11:40 AM.  If you have any questions, ask your nurse or doctor.               Start taking these medicines.        Dose/Directions    amoxicillin-clavulanate 875-125 MG per tablet   Commonly known as:  AUGMENTIN   Used for:  Paronychia of left middle finger   Started by:  Jonathan Nicholas MD        Dose:  1 tablet   Take 1 tablet by mouth 2 times daily for 8 days   Quantity:  16 tablet   Refills:  0            Where to get your medicines      These medications were sent to [facility] [address] [place] [address]     Phone:  [phone]     amoxicillin-clavulanate 875-125 MG per tablet                Primary Care Provider    Physician No Ref-Primary       No address on file        Thank you!     Thank you for choosing [facility]  for your care. Our goal is always to provide you with excellent care. Hearing back from our patients is one way we can continue to improve our services. Please take a few minutes to complete the written survey that you may receive in the mail after your visit with us. Thank you!             Your Updated Medication List - Protect others around you: Learn how to safely use, store and throw away your medicines at www.disposemymeds.org.          This list is accurate as of: 2/22/17 11:40 AM.  Always use your most recent med list.                   Brand Name Dispense Instructions for use     amoxicillin-clavulanate 875-125 MG per tablet    AUGMENTIN    16 tablet    Take 1 tablet by mouth 2 times daily for 8 days       * gabapentin 300 MG capsule    NEURONTIN    270 capsule    Take 1 capsule (300 mg) by mouth 3 times daily       * gabapentin 600 MG tablet    NEURONTIN    90 tablet    Take 1 tablet (600 mg) by mouth 3 times daily       ibuprofen 800 MG tablet    ADVIL/MOTRIN         * traMADol 50 MG tablet    ULTRAM    15 tablet    1-2 tabs every 4-6 hours prn pain       * traMADol 50 MG tablet    ULTRAM    60 tablet    Take 2 tablets (100 mg) by mouth every morning maximum 2 tablet(s) per day       * Notice:  This list has 4 medication(s) that are the same as other medications prescribed for you. Read the directions carefully, and ask your doctor or other care provider to review them with you.

## 2017-02-22 NOTE — PROGRESS NOTES
Some of this note was populated by a medical assistant.      SUBJECTIVE:                                                    Truman Suero is a 56 year old male who presents to clinic today for the following health issues:      Finger      Duration: A week    Description (location/character/radiation): Left middle finger.     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 using the tips of his fingers regularly    Problem list and histories reviewed & adjusted, as indicated.  Additional history: as documented    Patient Active Problem List   Diagnosis     Chronic neck and back pain     Cervical radiculopathy     No past surgical history on file.    Social History   Substance Use Topics     Smoking status: Current Every Day Smoker     Smokeless tobacco: Never Used     Alcohol use No     Family History   Problem Relation Age of Onset     Prostate Cancer Father      Breast Cancer Maternal Grandmother      Prostate Cancer Other          Current Outpatient Prescriptions   Medication Sig Dispense Refill     traMADol (ULTRAM) 50 MG tablet Take 2 tablets (100 mg) by mouth every morning maximum 2 tablet(s) per day 60 tablet 0     gabapentin (NEURONTIN) 600 MG tablet Take 1 tablet (600 mg) by mouth 3 times daily 90 tablet 5     gabapentin (NEURONTIN) 300 MG capsule Take 1 capsule (300 mg) by mouth 3 times daily 270 capsule 1     traMADol (ULTRAM) 50 MG tablet 1-2 tabs every 4-6 hours prn pain 15 tablet 0     ibuprofen (ADVIL,MOTRIN) 800 MG tablet   0     No Known Allergies    ROS:  Constitutional, HEENT, cardiovascular, pulmonary, gi and gu systems are negative, except as otherwise noted.    OBJECTIVE:                                                    BP (!) 156/92  Pulse 71  Temp 97.8  F (36.6   C) (Oral)  Wt 136 lb 3.2 oz (61.8 kg)  SpO2 96%  BMI 21.33 kg/m2  Body mass index is 21.33 kg/(m^2).  GENERAL: healthy, alert and no distress  NECK: no adenopathy, no asymmetry, masses, or scars and thyroid normal to palpation  RESP: lungs clear to auscultation - no rales, rhonchi or wheezes  CV: regular rate and rhythm, normal S1 S2, no S3 or S4, no murmur, click or rub, no peripheral edema SKIN: left long finger noted mild paronychia along proximal cuticle. Noted minimal distal tuft swelling, compartment not appreciable tense and without abscess or visible fluctuance. Full ROM without proximal swelling.     Diagnostic Test Results:  none      ASSESSMENT/PLAN:                                                        ICD-10-CM    1. Paronychia of left middle finger L03.012 amoxicillin-clavulanate (AUGMENTIN) 875-125 MG per tablet        PLAN  Patient educational/instructional material provided including reasons for follow-up   The patient indicates understanding of these issues and agrees with the plan.  Jonathan Nicholas MD      [facility]

## 2017-03-09 ENCOUNTER — MYC MEDICAL ADVICE (OUTPATIENT)
Dept: FAMILY MEDICINE | Facility: CLINIC | Age: 57
End: 2017-03-09

## 2017-03-09 NOTE — TELEPHONE ENCOUNTER
Route for arthritis next steps. Reviewed chart- seen here 2/7 (denied seeing pain team), saw neurosurg 2/14 & had a MRI on 2/22.  Jaylene Paulino RN

## 2017-03-10 ENCOUNTER — E-VISIT (OUTPATIENT)
Dept: FAMILY MEDICINE | Facility: CLINIC | Age: 57
End: 2017-03-10

## 2017-03-10 DIAGNOSIS — M54.12 CERVICAL RADICULOPATHY: ICD-10-CM

## 2017-03-10 DIAGNOSIS — G89.29 CHRONIC NECK AND BACK PAIN: Primary | ICD-10-CM

## 2017-03-10 DIAGNOSIS — M54.9 CHRONIC NECK AND BACK PAIN: Primary | ICD-10-CM

## 2017-03-10 DIAGNOSIS — M54.2 CHRONIC NECK AND BACK PAIN: Primary | ICD-10-CM

## 2017-03-10 NOTE — TELEPHONE ENCOUNTER
Needs office visit or e-visit.   I don't have a specific arthritis specialist, but would be willing to talk about options, but would require more time.   Tiffanie Moulton, [facility], PA-C  [facility]

## 2017-03-17 ENCOUNTER — OFFICE VISIT (OUTPATIENT)
Dept: FAMILY MEDICINE | Facility: CLINIC | Age: 57
End: 2017-03-17
Payer: COMMERCIAL

## 2017-03-17 VITALS
HEIGHT: 67 IN | BODY MASS INDEX: 21.5 KG/M2 | HEART RATE: 72 BPM | DIASTOLIC BLOOD PRESSURE: 74 MMHG | SYSTOLIC BLOOD PRESSURE: 130 MMHG | TEMPERATURE: 98.2 F | WEIGHT: 137 LBS

## 2017-03-17 DIAGNOSIS — M54.12 CERVICAL RADICULOPATHY: ICD-10-CM

## 2017-03-17 DIAGNOSIS — Z72.0 TOBACCO ABUSE: ICD-10-CM

## 2017-03-17 DIAGNOSIS — M54.9 CHRONIC NECK AND BACK PAIN: Primary | ICD-10-CM

## 2017-03-17 DIAGNOSIS — G89.29 CHRONIC NECK AND BACK PAIN: Primary | ICD-10-CM

## 2017-03-17 DIAGNOSIS — M54.2 CHRONIC NECK AND BACK PAIN: Primary | ICD-10-CM

## 2017-03-17 PROCEDURE — 99214 OFFICE O/P EST MOD 30 MIN: CPT | Performed by: PHYSICIAN ASSISTANT

## 2017-03-17 RX ORDER — TRAMADOL HYDROCHLORIDE 50 MG/1
100 TABLET ORAL EVERY MORNING
Qty: 60 TABLET | Refills: 0 | Status: SHIPPED | OUTPATIENT
Start: 2017-03-17 | End: 2017-04-24

## 2017-03-17 RX ORDER — TRAMADOL HYDROCHLORIDE 50 MG/1
TABLET ORAL
Qty: 15 TABLET | Refills: 0 | Status: CANCELLED | OUTPATIENT
Start: 2017-03-17

## 2017-03-17 NOTE — NURSING NOTE
"Chief Complaint   Patient presents with     Pain     follow up     Referral     Refill Request     tramadol       Initial /74 (BP Location: Right arm, Patient Position: Chair, Cuff Size: Adult Regular)  Pulse 72  Temp 98.2  F (36.8  C) (Oral)  Ht 5' 7\" (1.702 m)  Wt 137 lb (62.1 kg)  BMI 21.46 kg/m2 Estimated body mass index is 21.46 kg/(m^2) as calculated from the following:    Height as of this encounter: 5' 7\" (1.702 m).    Weight as of this encounter: 137 lb (62.1 kg).  Medication Reconciliation: complete   Shila Olson MA      "

## 2017-03-17 NOTE — PROGRESS NOTES
"  SUBJECTIVE:                                                    Truman Suero is a 56 year old male who presents to clinic today for the following health issues:    Patient is requesting a refill of his tramadol. Reports that it helps quite a bit.    Chronic Pain Follow-Up       Type / Location of Pain: neck and back  Analgesia/pain control:       Recent changes:  same      Overall control: Tolerable with discomfort  Activity level/function:      Daily activities:  \"pushing through\"    Work:  \"Pushing through\"  Adverse effects:  No  Adherance    Taking medication as directed?  Yes    Participating in other treatments: no, wants to discuss this today, possible referrals  Risk Factors:    Sleep:  \"ok\"    Mood/anxiety:  No problems noted    Recent family or social stressors:  none noted    Other aggravating factors: working  No flowsheet data found.  No flowsheet data found.  Encounter-Level CSA:     There are no encounter-level csa.           Wakes up in the 3  AM, starts work at 4:30 AM  Pain in AM is at  8 or 9/ 10. Takes 2 tramadol and 3-4/10 after the tramadol and then sometimes needs another tramadol at 11 AM.   Generic Ibuprofen 200 mg throughout the day. 3, 200 mg throughout the day.  Takes the gabapentin 600 mg TID.     August was seen for Physical Therapy and was helpful. [employer]. Two times per week, maybe for 10-12 visits. Became difficult to get out there due to location.         -------------------------------------    Problem list, Medication list, Allergies, and Medical/Social/Surgical histories reviewed in [facility] and updated as appropriate.    ROS:  A pertinent ROS of the General  Musculoskeletal   Neurological  Psychological systems is otherwise unremarkable.      OBJECTIVE:                                                    /74 (BP Location: Right arm, Patient Position: Chair, Cuff Size: Adult Regular)  Pulse 72  Temp 98.2  F (36.8  C) (Oral)  Ht 5' 7\" (1.702 m)  Wt 137 lb (62.1 " kg)  BMI 21.46 kg/m2 .  GENERAL:: healthy, alert and no distress  PSYCH: Alert and oriented times 3; speech- coherent , normal rate and volume; able to articulate logical thoughts, able to abstract reason, no tangential thoughts, no hallucinations or delusions, affect- normal    Diagnostic test results:  Diagnostic Test Results:  none      ASSESSMENT/PLAN:                                                        1. Chronic neck and back pain  2. Cervical radiculopathy  Lengthy discussion regarding pain plan for patient. He is going to see the pain clinic later this month. I had him sign an agreement and he has done a UDS.   Encouraged patient to try 1 tramadol in the AM with Ibuprofen 600 mg with food.   Continue gabapentin as he is already taking.   Then repeat Ibuprofen midday if starting to feel pain return.  Use 2nd tramadol only if needed.   Start in Physical Therapy.   Patient is in agreement with this plan.    - traMADol (ULTRAM) 50 MG tablet; Take 2 tablets (100 mg) by mouth every morning maximum 2 tablet(s) per day  Dispense: 60 tablet; Refill: 0  - [facility]FERR[facility]    3. Tobacco abuse  Patient agrees to try chantix. Reviewed how to use  Side effects, risks and benefits of medication were discussed with patient.   - Tobacco Cessation - for Health Maintenance  - varenicline (CHANTIX STARTING MONTH PAK) 0.5 MG X 11 & 1 MG X 42 tablet; Take 0.5 mg tab daily for 3 days, then 0.5 mg tab twice daily for 4 days, then 1 mg twice daily.  Dispense: 53 tablet; Refill: 0    Follow up with Provider - 1 month if needed after pain visit.        Tiffanie Moulton PA-C  [facility]

## 2017-03-17 NOTE — MR AVS SNAPSHOT
"              After Visit Summary   3/17/2017    Truman Suero    MRN: [id number]           Patient Information     Date Of Birth          1960        Visit Information        Provider Department      3/17/2017 3:00 PM Tiffanie Moulton PA-C [facility]        Today's Diagnoses     Tobacco abuse    -  1    Sprain of lumbar region, initial encounter        Chronic neck and back pain        Cervical radiculopathy          Care Instructions      Varenicline Tartrate (Chantix):  Chantix tablets Days                Dosage  0.5 mg                     1 through 3 0.5 mg once daily  0.5 mg                      4 through 7 0.5 mg twice daily  1.0 mg                     Day 8 to end  1 mg twice daily    Renal dosing:  For severe renal impairment, start at a dose of 0.5 mg daily and then titrate as needed to a maximum dose of 0.5 mg twice daily.  For patients with end-stage renal disease (ESRD) undergoing hemodialysis, a maximum dose of 0.5 mg daily may be administered if tolerated well.    How to take:  You will start taking Chantix one week before your quit date while you are still smoking. At first you will use the Chantix starter pack. Once you are finished with that, you will be on a \"maintenance dose\" that will probably stay the same for the rest of your treatment.  After a week of slowly increasing your dose, you will take Chantix twice a day. Take each dose after eating and with a full glass of water. Taking Chantix with food and water decreases nausea. That is also why you begin on a lower dose and slowly increase it. Once you begin the twice a day dosage, your 2 doses should be at least 8-10 hours apart and at least 4-5 hours before bedtime.      Duration of therapy: 12 weeks.  An additional course of 12 weeks of treatment is recommended if the first 12 weeks were successful to improve long term abstinence.    Adverse reactions:  The most common adverse reaction associated with varenicline " treatment is nausea. Other common adverse effects include sleep disturbance, constipation, flatulence and vomiting.    There have been warnings from the FDA to be on the lookout for erratic behavior, suicidal ideation, or suicidal behavior.   There is one case report of a death, though it appears alcohol consumption may have played a part in that case.    Please report any behavior or mood changes as well as being aware of drowsiness.   Be cautious when operating motor vehicles or dangerous machinery until the medication's effect on you is clear.    HOW TO QUIT SMOKING  Smoking is one of the hardest habits to break. About half of all those who have ever smoked have been able to quit, and most of those (about 70%) who still smoke want to quit. Here are some of the best ways to stop smoking.     KEEP TRYING:  It takes most smokers about 8 tries before they are finally able to fully quit. So, the more often you try and fail, the better your chance of quitting the next time! So, don't give up!    GO COLD TURKEY:  Most ex-smokers quit cold turkey. Trying to cut back gradually doesn't seem to work as well, perhaps because it continues the smoking habit. Also, it is possible to fool yourself by inhaling more while smoking fewer cigarettes. This results in the same amount of nicotine in your body!    GET SUPPORT:  Support programs can make an important difference, especially for the heavy smoker. These groups offer lectures, methods to change your behavior and peer support. Call the free national Quitline for more information. 800-QUIT-NOW (881-708-3602). Low-cost or free programs are offered by many hospitals, local chapters of the American Lung Association (796-651-2191) and the American Cancer Society (721-767-1642). Support at home is important too. Non-smokers can help by offering praise and encouragement. If the smoker fails to quit, encourage them to try again!    OVER-THE-COUNTER MEDICINES:  For those who can't quit  on their own, Nicotine Replacement Therapy (NRT) may make quitting much easier. Certain aids such as the nicotine patch, gum and lozenge are available without a prescription. However, it is best to use these under the guidance of your doctor. The skin patch provides a steady supply of nicotine to the body. Nicotine gum and lozenge gives temporary bursts of low levels of nicotine. Both methods take the edge off the craving for cigarettes. WARNING: If you feel symptoms of nicotine overdose, such as nausea, vomiting, dizziness, weakness, or fast heartbeat, stop using these and see your doctor.    PRESCRIPTION MEDICINES:  After evaluating your smoking patterns and prior attempts at quitting, your doctor may offer a prescription medicine such as bupropion (Zyban, Wellbutrin), varenicline (Chantix, Champix), a niocotine inhaler or nasal spray. Each has its unique advantage and side effects which your doctor can review with you.    HEALTH BENEFITS OF QUITTING:  The benefits of quitting start right away and keep improving the longer you go without smokin minutes: blood pressure and pulse return to normal  8 hours: oxygen levels return to normal  2 days: ability to smell and taste begins to improve as damaged nerves start to regrow  2-3 weeks: circulation and lung function improves  1-9 months: decreased cough, congestion and shortness of breath; less tired  1 year: risk of heart attack decreases by half  5 years: risk of lung cancer decreases by half; risk of stroke becomes the same as a non-smoker  For information about how to quit smoking, visit the following links:  National Cancer [place] ,   Clearing the Air, Quit Smoking Today   - an online booklet. http://www.smokefree.gov/pubs/clearing_the_air.pdf  Smokefree.gov http://smokefree.gov/  QuitNet http://www.quitnet.com/    4723-4181 Rachel Mott, [address] [place] [address], [place] [address]. All rights reserved. This information is not intended as a substitute  for professional medical care. Always follow your healthcare professional's instructions.    The Benefits of Living Smoke Free  What do you want to gain from quitting? Check off some reasons to quit.  Health Benefits  ___ Reduce my risk of lung cancer, heart disease, chronic lung disease  ___ Have fewer wrinkles and softer skin  ___ Improve my sense of taste and smell  ___ For pregnant women--reduce the risk of having a miscarriage, stillbirth, premature birth, or low-birth-weight baby  Personal Benefits  ___ Feel more in control of my life  ___ Have better-smelling hair, breath, clothes, home, and car  ___ Save time by not having to take smoke breaks, buy cigarettes, or hunt for a light  ___ Have whiter teeth  Family Benefits  ___ Reduce my children s respiratory tract infections  ___ Set a good example for my children  ___ Reduce my family s cancer risk  Financial Benefits  ___ Save hundreds of dollars each year that would be spent on cigarettes  ___ Save money on medical bills  ___ Save on life, health, and car insurance premiums    Those Dollars Add Up!  Cigarettes are expensive, and getting more expensive all the time. Do you realize how much money you are spending on cigarettes per year? What is the average amount you spend on a pack of cigarettes? What is the average number of packs that you smoke per day? Using your answers to these questions, fill in this formula to help you find out:  ($ _____ per pack) ×  ( _____ number of packs per day) × (365 days) =  $ _____ yearly cost of smoking  Besides tobacco, there are other costs, including extra cleaning bills and replacement costs for clothing and furniture; medical expenses for smoking-related illnesses; and higher health, life, and car insurance premiums.    Cigars and Pipes Count Too!  Cigars and pipes are also dangerous. So are smokeless (chewing) tobacco and snuff. All of these products contain nicotine, a highly addictive substance that has harmful  effects on your body. Quitting smoking means giving up all tobacco products.      1604-8131 [facility], [address] [place] [address], [place] [address]. All rights reserved. This information is not intended as a substitute for professional medical care. Always follow your healthcare professional's instructions.    [facility]   Discharged by : Liana MINAYA Certified Medical Assistant (AAMA)March 17, 2017 4:05 PM    Paper scripts provided to patient :   traMADol (ULTRAM) 50 MG tablet 60 tablet 0 3/17/2017  No      Sig: Take 2 tablets (100 mg) by mouth every morning maximum 2 tablet(s) per day        If you have any questions regarding to your visit please contact your care team:   [facility] Clinic Hours Telephone Number   Dr. Liana Moulton, LIN   7am-7pm Monday - Thursday   7am-5pm Fridays  [phone]   (Appointment scheduling available 24/7)   RN Line   [phone] option 2     What options do I have for visits at the clinic other than the traditional office visit?   To expand how we care for you, many of our providers are utilizing electronic visits (e-visits) and telephone visits, when medically appropriate, for interactions with their patients rather than a visit in the clinic. We also offer nurse visits for many medical concerns. Just like any other service, we will bill your insurance company for this type of visit based on time spent on the phone with your provider. Not all insurance companies cover these visits. Please check with your medical insurance if this type of visit is covered. You will be responsible for any charges that are not paid by your insurance.   E-visits via OneID: generally incur a $35.00 fee.   Telephone visits:   Time spent on the phone: *charged based on time that is spent on the phone in increments of 10 minutes. Estimated cost:   5-10 mins $30.00   11-20 mins. $59.00   21-30 mins. $85.00   Use OneID (secure  email communication and access to your chart) to send your primary care provider a message or make an appointment. Ask someone on your Team how to sign up for [employer].   For a Price Quote for your services, please call our Consumer Price Line at [phone].   As always, Thank you for trusting us with your health care needs!                    [facility]:    Scheduling Appointments  [facility]  Call: [phone]    [facility]  Call: [phone]    [facility]  Call: [phone]    WHERE TO GO FOR CARE?    Clinic    Make an appointment if you:       Are sick (cold, cough, flu, sore throat, earache or in pain).       Have a small injury (sprain, small cut, burn or broken bone).       Need a physical exam, Pap smear, vaccine or prescription refill.       Have questions about your health or medicines.    To reach us:      Call [phone] ([phone]). Open 24 hours every day. (For counseling services, call [phone].)    Log into [employer] at [email or web address]. (Visit [email or web address] to create an account.) Hospital emergency room    An emergency is a serious or life- threatening problem that must be treated right away.    Call 182 or get to the hospital if you have:      Very bad or sudden:            - Chest pain or pressure         - Bleeding         - Head or belly pain         - Dizziness or trouble seeing, walking or                          Speaking      Problems breathing      Blood in your vomit or you are coughing up blood      A major injury (knocked out, loss of a finger or limb, rape, broken bone protruding from skin)    A mental health crisis. (Or call the Mental Health Crisis line at 1-189.717.7730 or Suicide Prevention Hotline at 1-505.476.9157.)    Open 24 hours every day. You don't need an appointment.     Urgent care    Visit urgent care for sickness or small injuries when the clinic is  closed. You don't need an appointment. To check hours or find an urgent care near you, visit [email or web address]. Online care    Get online care from [facility] for more than 70 common problems, like colds, allergies and infections. Open 24 hours every day at: [email or web address]   Need help deciding?    For advice about where to be seen, you may call your clinic and ask to speak with a nurse. We're here for you 24 hours every day.         If you are deaf or hard of hearing, please let us know. We provide many free services including sign language interpreters, oral interpreters, TTYs, telephone amplifiers, note takers and written materials.               Follow-ups after your visit        Additional Services     [facility] PT, HAND, AND CHIROPRACTIC REFERRAL       **This order will print in the [facility] Scheduling Office**    Physical Therapy, Hand Therapy and Chiropractic Care are available through:    *[facility]  *[facility]  *[facility]    Call one number to schedule at any of the above locations: [phone].    Your provider has referred you to: Physical Therapy at [facility] or [facility]    Indication/Reason for Referral: Neck Pain  Onset of Illness: 1 year  Therapy Orders: Evaluate and Treat  Special Programs: None  Special Request: None    Chandler Arteaga      Additional Comments for the Therapist or Chiropractor:     Please be aware that coverage of these services is subject to the terms and limitations of your health insurance plan.  Call member services at your health plan with any benefit or coverage questions.      Please bring the following to your appointment:    *Your personal calendar for scheduling future appointments  *Comfortable clothing                  Your next 10 appointments already scheduled     Mar 28, 2017  2:30 PM CDT   New Visit with RODNEY Vargas CNP   [facility] ([facility])    [address] [facility]  "[place] [id number] [phone]              Who to contact     If you have questions or need follow up information about today's clinic visit or your schedule please contact [facility] directly at [phone].  Normal or non-critical lab and imaging results will be communicated to you by MyChart, letter or phone within 4 business days after the clinic has received the results. If you do not hear from us within 7 days, please contact the clinic through [employer] or phone. If you have a critical or abnormal lab result, we will notify you by phone as soon as possible.  Submit refill requests through [employer] or call your pharmacy and they will forward the refill request to us. Please allow 3 business days for your refill to be completed.          Additional Information About Your Visit        [employer] Information     [employer] gives you secure access to your electronic health record. If you see a primary care provider, you can also send messages to your care team and make appointments. If you have questions, please call your primary care clinic.  If you do not have a primary care provider, please call [phone] and they will assist you.        Care EveryWhere ID     This is your Care EveryWhere ID. This could be used by other organizations to access your [place] medical records  [id number]        Your Vitals Were     Pulse Temperature Height BMI (Body Mass Index)          72 98.2  F (36.8  C) (Oral) 5' 7\" (1.702 m) 21.46 kg/m2         Blood Pressure from Last 3 Encounters:   03/17/17 130/74   02/22/17 (!) 156/92   02/14/17 134/83    Weight from Last 3 Encounters:   03/17/17 137 lb (62.1 kg)   02/22/17 136 lb 3.2 oz (61.8 kg)   02/14/17 140 lb (63.5 kg)              We Performed the Following     [facility] PT, HAND, AND CHIROPRACTIC REFERRAL     Tobacco Cessation - for Health Maintenance          Today's Medication Changes          These changes are accurate as of: 3/17/17  4:05 PM.  If " you have any questions, ask your nurse or doctor.               Start taking these medicines.        Dose/Directions    varenicline 0.5 MG X 11 & 1 MG X 42 tablet   Commonly known as:  CHANTIX STARTING MONTH PAK   Used for:  Tobacco abuse   Started by:  Tiffanie Moulton PA-C        Take 0.5 mg tab daily for 3 days, then 0.5 mg tab twice daily for 4 days, then 1 mg twice daily.   Quantity:  53 tablet   Refills:  0            Where to get your medicines      These medications were sent to [facility] [address] [place], [place] [address] [facility]  [address] [place] [address]     Phone:  [phone]     varenicline 0.5 MG X 11 & 1 MG X 42 tablet         Some of these will need a paper prescription and others can be bought over the counter.  Ask your nurse if you have questions.     Bring a paper prescription for each of these medications     traMADol 50 MG tablet                Primary Care Provider Office Phone # Fax #    Tiffanie Moulton PA-C [phone]       [facility] [address] [place] [address]        Thank you!     Thank you for choosing [facility]  for your care. Our goal is always to provide you with excellent care. Hearing back from our patients is one way we can continue to improve our services. Please take a few minutes to complete the written survey that you may receive in the mail after your visit with us. Thank you!             Your Updated Medication List - Protect others around you: Learn how to safely use, store and throw away your medicines at www.disposemymeds.org.          This list is accurate as of: 3/17/17  4:05 PM.  Always use your most recent med list.                   Brand Name Dispense Instructions for use    * gabapentin 300 MG capsule    NEURONTIN    270 capsule    Take 1 capsule (300 mg) by mouth 3 times daily       * gabapentin 600 MG tablet    NEURONTIN    90 tablet    Take 1 tablet (600 mg) by mouth  3 times daily       ibuprofen 800 MG tablet    ADVIL/MOTRIN         * traMADol 50 MG tablet    ULTRAM    15 tablet    1-2 tabs every 4-6 hours prn pain       * traMADol 50 MG tablet    ULTRAM    60 tablet    Take 2 tablets (100 mg) by mouth every morning maximum 2 tablet(s) per day       varenicline 0.5 MG X 11 & 1 MG X 42 tablet    CHANTIX STARTING MONTH GIO    53 tablet    Take 0.5 mg tab daily for 3 days, then 0.5 mg tab twice daily for 4 days, then 1 mg twice daily.       * Notice:  This list has 4 medication(s) that are the same as other medications prescribed for you. Read the directions carefully, and ask your doctor or other care provider to review them with you.

## 2017-03-17 NOTE — PATIENT INSTRUCTIONS
"  Varenicline Tartrate (Chantix):  Chantix tablets Days                Dosage  0.5 mg                     1 through 3 0.5 mg once daily  0.5 mg                      4 through 7 0.5 mg twice daily  1.0 mg                     Day 8 to end  1 mg twice daily    Renal dosing:  For severe renal impairment, start at a dose of 0.5 mg daily and then titrate as needed to a maximum dose of 0.5 mg twice daily.  For patients with end-stage renal disease (ESRD) undergoing hemodialysis, a maximum dose of 0.5 mg daily may be administered if tolerated well.    How to take:  You will start taking Chantix one week before your quit date while you are still smoking. At first you will use the Chantix starter pack. Once you are finished with that, you will be on a \"maintenance dose\" that will probably stay the same for the rest of your treatment.  After a week of slowly increasing your dose, you will take Chantix twice a day. Take each dose after eating and with a full glass of water. Taking Chantix with food and water decreases nausea. That is also why you begin on a lower dose and slowly increase it. Once you begin the twice a day dosage, your 2 doses should be at least 8-10 hours apart and at least 4-5 hours before bedtime.      Duration of therapy: 12 weeks.  An additional course of 12 weeks of treatment is recommended if the first 12 weeks were successful to improve long term abstinence.    Adverse reactions:  The most common adverse reaction associated with varenicline treatment is nausea. Other common adverse effects include sleep disturbance, constipation, flatulence and vomiting.    There have been warnings from the FDA to be on the lookout for erratic behavior, suicidal ideation, or suicidal behavior.   There is one case report of a death, though it appears alcohol consumption may have played a part in that case.    Please report any behavior or mood changes as well as being aware of drowsiness.   Be cautious when operating " motor vehicles or dangerous machinery until the medication's effect on you is clear.    HOW TO QUIT SMOKING  Smoking is one of the hardest habits to break. About half of all those who have ever smoked have been able to quit, and most of those (about 70%) who still smoke want to quit. Here are some of the best ways to stop smoking.     KEEP TRYING:  It takes most smokers about 8 tries before they are finally able to fully quit. So, the more often you try and fail, the better your chance of quitting the next time! So, don't give up!    GO COLD TURKEY:  Most ex-smokers quit cold turkey. Trying to cut back gradually doesn't seem to work as well, perhaps because it continues the smoking habit. Also, it is possible to fool yourself by inhaling more while smoking fewer cigarettes. This results in the same amount of nicotine in your body!    GET SUPPORT:  Support programs can make an important difference, especially for the heavy smoker. These groups offer lectures, methods to change your behavior and peer support. Call the free national Quitline for more information. 800-QUIT-NOW (876-905-0008). Low-cost or free programs are offered by many hospitals, local chapters of the American Lung Association (249-960-2666) and the American Cancer Society (499-232-7673). Support at home is important too. Non-smokers can help by offering praise and encouragement. If the smoker fails to quit, encourage them to try again!    OVER-THE-COUNTER MEDICINES:  For those who can't quit on their own, Nicotine Replacement Therapy (NRT) may make quitting much easier. Certain aids such as the nicotine patch, gum and lozenge are available without a prescription. However, it is best to use these under the guidance of your doctor. The skin patch provides a steady supply of nicotine to the body. Nicotine gum and lozenge gives temporary bursts of low levels of nicotine. Both methods take the edge off the craving for cigarettes. WARNING: If you feel  symptoms of nicotine overdose, such as nausea, vomiting, dizziness, weakness, or fast heartbeat, stop using these and see your doctor.    PRESCRIPTION MEDICINES:  After evaluating your smoking patterns and prior attempts at quitting, your doctor may offer a prescription medicine such as bupropion (Zyban, Wellbutrin), varenicline (Chantix, Champix), a niocotine inhaler or nasal spray. Each has its unique advantage and side effects which your doctor can review with you.    HEALTH BENEFITS OF QUITTING:  The benefits of quitting start right away and keep improving the longer you go without smokin minutes: blood pressure and pulse return to normal  8 hours: oxygen levels return to normal  2 days: ability to smell and taste begins to improve as damaged nerves start to regrow  2-3 weeks: circulation and lung function improves  1-9 months: decreased cough, congestion and shortness of breath; less tired  1 year: risk of heart attack decreases by half  5 years: risk of lung cancer decreases by half; risk of stroke becomes the same as a non-smoker  For information about how to quit smoking, visit the following links:  [employer] ,   Clearing the Air, Quit Smoking Today   - an online booklet. http://www.smokefree.gov/pubs/clearing_the_air.pdf  Smokefree.gov http://smokefree.gov/  QuitNet http://www.quitnet.com/    0201-3025 Rachel Tiera, [address] [place] [address]. All rights reserved. This information is not intended as a substitute for professional medical care. Always follow your healthcare professional's instructions.    The Benefits of Living Smoke Free  What do you want to gain from quitting? Check off some reasons to quit.  Health Benefits  ___ Reduce my risk of lung cancer, heart disease, chronic lung disease  ___ Have fewer wrinkles and softer skin  ___ Improve my sense of taste and smell  ___ For pregnant women--reduce the risk of having a miscarriage, stillbirth, premature  birth, or low-birth-weight baby  Personal Benefits  ___ Feel more in control of my life  ___ Have better-smelling hair, breath, clothes, home, and car  ___ Save time by not having to take smoke breaks, buy cigarettes, or hunt for a light  ___ Have whiter teeth  Family Benefits  ___ Reduce my children s respiratory tract infections  ___ Set a good example for my children  ___ Reduce my family s cancer risk  Financial Benefits  ___ Save hundreds of dollars each year that would be spent on cigarettes  ___ Save money on medical bills  ___ Save on life, health, and car insurance premiums    Those Dollars Add Up!  Cigarettes are expensive, and getting more expensive all the time. Do you realize how much money you are spending on cigarettes per year? What is the average amount you spend on a pack of cigarettes? What is the average number of packs that you smoke per day? Using your answers to these questions, fill in this formula to help you find out:  ($ _____ per pack) ×  ( _____ number of packs per day) × (365 days) =  $ _____ yearly cost of smoking  Besides tobacco, there are other costs, including extra cleaning bills and replacement costs for clothing and furniture; medical expenses for smoking-related illnesses; and higher health, life, and car insurance premiums.    Cigars and Pipes Count Too!  Cigars and pipes are also dangerous. So are smokeless (chewing) tobacco and snuff. All of these products contain nicotine, a highly addictive substance that has harmful effects on your body. Quitting smoking means giving up all tobacco products.      6515-7286 [facility], [address] [place] [address]. All rights reserved. This information is not intended as a substitute for professional medical care. Always follow your healthcare professional's instructions.    [facility]   Discharged by : Liana MINAYA, Certified Medical Assistant (AAMA)March 17, 2017 4:05 PM    Paper scripts provided to patient :    traMADol (ULTRAM) 50 MG tablet 60 tablet 0 3/17/2017  No      Sig: Take 2 tablets (100 mg) by mouth every morning maximum 2 tablet(s) per day        If you have any questions regarding to your visit please contact your care team:   [facility] Hours Telephone Number   Dr. Liana Moulton, LIN   7am-7pm Monday - Thursday   7am-5pm Fridays  [phone]   (Appointment scheduling available 24/7)   RN Line   [phone] option 2     What options do I have for visits at the clinic other than the traditional office visit?   To expand how we care for you, many of our providers are utilizing electronic visits (e-visits) and telephone visits, when medically appropriate, for interactions with their patients rather than a visit in the clinic. We also offer nurse visits for many medical concerns. Just like any other service, we will bill your insurance company for this type of visit based on time spent on the phone with your provider. Not all insurance companies cover these visits. Please check with your medical insurance if this type of visit is covered. You will be responsible for any charges that are not paid by your insurance.   E-visits via [employer]: generally incur a $35.00 fee.   Telephone visits:   Time spent on the phone: *charged based on time that is spent on the phone in increments of 10 minutes. Estimated cost:   5-10 mins $30.00   11-20 mins. $59.00   21-30 mins. $85.00   Use [employer] (secure email communication and access to your chart) to send your primary care provider a message or make an appointment. Ask someone on your Team how to sign up for [employer].   For a Price Quote for your services, please call our Consumer Price Line at [phone].   As always, Thank you for trusting us with your health care needs!                    [facility]:    Scheduling Appointments  [facility]  Call:  [phone]    [facility]  Call: [phone]    [facility]  Call: [phone]    WHERE TO GO FOR CARE?    Clinic    Make an appointment if you:       Are sick (cold, cough, flu, sore throat, earache or in pain).       Have a small injury (sprain, small cut, burn or broken bone).       Need a physical exam, Pap smear, vaccine or prescription refill.       Have questions about your health or medicines.    To reach us:      Call [phone] ([phone]). Open 24 hours every day. (For counseling services, call [phone].)    Log into [employer] at [employer]. (Visit [email or web address] to create an account.) Hospital emergency room    An emergency is a serious or life- threatening problem that must be treated right away.    Call 464 or get to the hospital if you have:      Very bad or sudden:            - Chest pain or pressure         - Bleeding         - Head or belly pain         - Dizziness or trouble seeing, walking or                          Speaking      Problems breathing      Blood in your vomit or you are coughing up blood      A major injury (knocked out, loss of a finger or limb, rape, broken bone protruding from skin)    A mental health crisis. (Or call the Mental Health Crisis line at 1-374.985.2025 or Suicide Prevention Hotline at 1-515.205.3334.)    Open 24 hours every day. You don't need an appointment.     Urgent care    Visit urgent care for sickness or small injuries when the clinic is closed. You don't need an appointment. To check hours or find an urgent care near you, visit [email or web address]. Online care    Get online care from [employer] for more than 70 common problems, like colds, allergies and infections. Open 24 hours every day at: [email or web address]   Need help deciding?    For advice about where to be seen, you may call your clinic and ask to speak with a nurse. We're here for you 24 hours every day.          If you are deaf or hard of hearing, please let us know. We provide many free services including sign language interpreters, oral interpreters, TTYs, telephone amplifiers, note takers and written materials.

## 2017-03-17 NOTE — LETTER
[facility]    03/17/17    Patient: Truman Suero  YOB: 1960  Medical Record Number: [id number]                                                                  Controlled Substance Agreement  I understand that my care provider has prescribed controlled substances (narcotics, tranquilizers, and/or stimulants) to help manage my condition(s).  I am taking this medicine to help me function or work.  I know that this is strong medicine.  It could have serious side effects and even cause a dependency on the drug.  If I stop these medicines suddenly, I could have severe withdrawal symptoms.    The risks, benefits, and side effects of these medication(s) were explained to me.  I agree that:  1. I will take part in other treatments as advised by my provider.  This may be psychiatry or counseling, physical therapy, behavioral therapy, group treatment, or a referral to a pain clinic.  I will reduce or stop my medicine when my provider tells me to do so.   2. I will take my medicines as prescribed.  I will not change the dose or schedule unless my provider tells me to.  There will be no refills if I  run out early.   I may be contacted at any time without warning and asked to complete a drug test or pill count.   3. I will keep all my appointments at the clinic.  If I miss appointments or fail to follow instructions, my provider may stop my medicine.  4. I will not ask other providers to prescribe controlled substances. And I will not accept controlled substances from other people. If I need another prescribed controlled substance for a new reason, I will notify my provider within one business day.  5. If I enroll in the [place] Medical Marijuana program, I will tell my provider.  I will also sign an agreement to share my medical records with my provider.  6. I will use one pharmacy to fill all of my controlled substance prescriptions.  If my prescription is mailed to my pharmacy, it may take 5  to 7 days for my medicine to be ready. Asher Cintron  7. I understand that my provider, clinic care team, and pharmacy can track controlled substance prescriptions from other providers through a central database (prescription monitoring program).  8. I will bring in my list of medications (or my medicine bottles) each time I come to the clinic.  REV-  04/2016                                                                                                                                            Page 1 of 2      [facility]    03/17/17    Patient: Truman Suero  YOB: 1960  Medical Record Number: [id number]    9. Refills of controlled substances will be made only during office hours.  It is up to me to make sure that I do not run out of my medicines on weekends or holidays.    10. I am responsible for my prescriptions.  If the medicine is lost or stolen, it will not be replaced.   I also agree not to share these medicines with anyone.  11. I agree to not use ANY illegal or recreational drugs.  This includes marijuana, cocaine, bath salts or other drugs.  I agree not to use alcohol unless my provider says I may.  I agree to give urine samples whenever asked.  If I fail to give a urine sample, the provider may stop my medicine.     12. I will tell my nurse or provider right away if I become pregnant or have a new medical problem treated outside of [facility].  13. I understand that this medicine can affect my thinking and judgment.  It may be unsafe for me to drive, use machinery and do dangerous tasks.  I will not do any of these things until I know how the medicine affects me.  If my dose changes, I will wait to see how it affects me.  I will contact my provider if I have concerns about medicine side effects.  I understand that if I do not follow any of the conditions above, my prescriptions or treatment may be stopped.    I agree that my provider, clinic care team, and pharmacy  may work with any city, state or federal law enforcement agency that investigates the misuse, sale, or other diversion of my controlled medicine. I will allow my provider to discuss my care with or share a copy of this agreement with any other treating provider, pharmacy or emergency room where I receive care.  I agree to give up (waive) any right of privacy or confidentiality with respect to these authorizations.   I have read this agreement and have asked questions about anything I did not understand.   ___________________________________    ___________________________  Patient Signature                                                           Date and Time  ___________________________________     ____________________________  Witness                                                                            Date and Time  ___________________________________  Tiffanie Moulton PA-C  REV-  04/2016                                                                                                                                                                 Page 2 of 2

## 2017-03-18 ENCOUNTER — MYC MEDICAL ADVICE (OUTPATIENT)
Dept: FAMILY MEDICINE | Facility: CLINIC | Age: 57
End: 2017-03-18

## 2017-03-18 DIAGNOSIS — G89.29 CHRONIC NECK AND BACK PAIN: Primary | ICD-10-CM

## 2017-03-18 DIAGNOSIS — M54.2 CHRONIC NECK AND BACK PAIN: Primary | ICD-10-CM

## 2017-03-18 DIAGNOSIS — M54.9 CHRONIC NECK AND BACK PAIN: Primary | ICD-10-CM

## 2017-03-20 NOTE — TELEPHONE ENCOUNTER
Will route to provider to advise.  See message below.  I see an ortho  referral in Epic but did you want to do a separate spine referral?     Faiza Mcmahon RN   March 20, 2017 3:55 PM  [facility] Triage   [phone]

## 2017-03-22 ENCOUNTER — THERAPY VISIT (OUTPATIENT)
Dept: PHYSICAL THERAPY | Facility: CLINIC | Age: 57
End: 2017-03-22
Payer: COMMERCIAL

## 2017-03-22 DIAGNOSIS — M54.9 CHRONIC NECK AND BACK PAIN: Primary | ICD-10-CM

## 2017-03-22 DIAGNOSIS — M54.2 CHRONIC NECK AND BACK PAIN: Primary | ICD-10-CM

## 2017-03-22 DIAGNOSIS — M54.12 CERVICAL RADICULOPATHY: ICD-10-CM

## 2017-03-22 DIAGNOSIS — G89.29 CHRONIC NECK AND BACK PAIN: Primary | ICD-10-CM

## 2017-03-22 PROCEDURE — 97161 PT EVAL LOW COMPLEX 20 MIN: CPT | Mod: GP | Performed by: PHYSICAL THERAPIST

## 2017-03-22 PROCEDURE — 97112 NEUROMUSCULAR REEDUCATION: CPT | Mod: GP | Performed by: PHYSICAL THERAPIST

## 2017-03-22 NOTE — PROGRESS NOTES
"Physical Therapy Initial Examination/Evaluation    Precautions/Restrictions/MD instructions  ET neck pain     Therapist Impression:   Palmer is a friendly and motivated 56 yr old pasta  who has had a 3 yr hx of cervical pain and L>R radicular sx.  Scans have shown spondylolisthesis and bone spurs.  Has had 2 epidurals without relief, nerve ablation with some success and PT with MedX machines with some success.  Also sees chiropractor and has [employer] home cervical traction unit at home which he uses twice a day.  Palmer has a core strengthening program he does daily.  Full ext and flexion cervical ROM, limited SB and rotation to 50% B.  He reports L side \"feels tighter\" and is L handed. Good extension mobility through thoracic spine, rib flare noted with all extension positions.  Corrects with verbal cues.  Able to engage transverse abdominis well but decreased endurance which leads to rib flare.     Subjective:  DOI/onset:  3 year hx neck pain and radiating sx B  DOS:   Acute Injury or Gradual Onset?: gradual  Mechanism of Injury: cumulative  Related PMH:  Previous Treatment:nerve ablation with moderate success, PT with some success, 2 epidurals without relief  Imaging: MRI stenosis and cervical bone spurs  Chief Complaint/Functional Limitations: prolonged positions , rotating to look over shoulder in car  Pain: 8/10 Location: L>R pain and radic (L handed) Frequency: constant Described as: ache, sharp Alleviated by: stretching, home traction, change of position  Progression of Symptoms:  Staying same  Time of day when pain is worse: AM  Sleeping: does not wake   Occupation:   Job duties:  Standing, bending, lifting  Current HEP/exercise regimen: none  Patient's goals are decrease and manage pain    Other pertinent PMH/Red Flags: smoker, dizziness, weakness, night pain and numbness  Barriers at home/work: none  Pertinent Surgical History: cervical nerve block/ablation  Medications: tramadol, " ibuprofen  General health as reported by patient: fair  Return to MD: prn    HPI                    Objective:    Standing Alignment:    Cervical/thoracic deviations alignment: mild FHP and rounded shoulders.                    Flexibility/Screens:   Positive screens:  CervicalNegative screens: Thoracic or Shoulder   Upper Extremity:    Decreased left upper extremity flexibility at:  Pectoralis Minor      Spine:  Decreased left spine flexibility:  Sternocleidomastoid; Scalenes; Upper Trap and Levator    Decreased right spine flexibility:  Sternocleidomastoid; Scalenes; Upper Trap and Levator                  Cervical/Thoracic Evaluation  Arom wnl cervical: repeated flex/extension WNL, B rot and SB to 50% no radicular sx.   Arom wnl thoracic: mild limit B rotation, WNL flexion/extension t spine.    Strength: mild scapular dyskinesis and decreased scap stability endurance, decr abdominal mm endurace leading to rib flare with trunk extension postures   Headaches: none  Cervical Myotomes:  normal                  DTR's:  not assessed          Cervical Dermatomes:  Cervical dermatomes: normal today but radicular sx come and go L>R also + for carpal tunnel B.                    Cervical Palpation:  not assessed      Functional Tests:  Core strength and proprioception spine wnl: standing and seated extension of trunk leads to decr abdominal mm engagement/rib flaring.    Cervical Stability/Joint Clearing:  not assessed        Cord Sign:  not assessed                                            General     ROS    Assessment/Plan:      Patient is a 56 year old male with cervical complaints.    Patient has the following significant findings with corresponding treatment plan.                Diagnosis 1:  Cervicalgia with radiculopathy  Pain -  hot/cold therapy, mechanical traction, manual therapy, self management, education, directional preference exercise and home program  Decreased ROM/flexibility - manual therapy and  therapeutic exercise  Decreased joint mobility - manual therapy and therapeutic exercise  Decreased strength - therapeutic exercise and therapeutic activities  Impaired muscle performance - neuro re-education  Decreased function - therapeutic activities  Impaired posture - neuro re-education    Therapy Evaluation Codes:   1) History comprised of:   Personal factors that impact the plan of care:      None.    Comorbidity factors that impact the plan of care are:      Dizziness, Numbness/tingling, Pain at night/rest and Smoking.     Medications impacting care: Anti-inflammatory and Pain.  2) Examination of Body Systems comprised of:   Body structures and functions that impact the plan of care:      Cervical spine and Thoracic Spine.   Activity limitations that impact the plan of care are:      Bending, Lifting, Reading/Computer work and Working.  3) Clinical presentation characteristics are:   Stable/Uncomplicated.  4) Decision-Making    Low complexity using standardized patient assessment instrument and/or measureable assessment of functional outcome.  Cumulative Therapy Evaluation is: Low complexity.    Previous and current functional limitations:  (See Goal Flow Sheet for this information)    Short term and Long term goals: (See Goal Flow Sheet for this information)     Communication ability:  Patient appears to be able to clearly communicate and understand verbal and written communication and follow directions correctly.  Treatment Explanation - The following has been discussed with the patient:   RX ordered/plan of care  Anticipated outcomes  Possible risks and side effects  This patient would benefit from PT intervention to resume normal activities.   Rehab potential is good.    Frequency:  1 X week, once daily  Duration:  for 4 weeks tapering to 1 X a month over 8 weeks  Discharge Plan:  Achieve all LTG.  Independent in home treatment program.  Reach maximal therapeutic benefit.    Please refer to the daily  flowsheet for treatment today, total treatment time and time spent performing 1:1 timed codes.

## 2017-03-28 ENCOUNTER — RADIANT APPOINTMENT (OUTPATIENT)
Dept: GENERAL RADIOLOGY | Facility: CLINIC | Age: 57
End: 2017-03-28
Attending: ORTHOPAEDIC SURGERY
Payer: COMMERCIAL

## 2017-03-28 ENCOUNTER — MYC MEDICAL ADVICE (OUTPATIENT)
Dept: FAMILY MEDICINE | Facility: CLINIC | Age: 57
End: 2017-03-28

## 2017-03-28 ENCOUNTER — OFFICE VISIT (OUTPATIENT)
Dept: PALLIATIVE MEDICINE | Facility: CLINIC | Age: 57
End: 2017-03-28
Payer: COMMERCIAL

## 2017-03-28 VITALS
BODY MASS INDEX: 21.46 KG/M2 | HEART RATE: 67 BPM | SYSTOLIC BLOOD PRESSURE: 150 MMHG | DIASTOLIC BLOOD PRESSURE: 86 MMHG | WEIGHT: 137 LBS

## 2017-03-28 DIAGNOSIS — M50.30 DDD (DEGENERATIVE DISC DISEASE), CERVICAL: ICD-10-CM

## 2017-03-28 DIAGNOSIS — M47.892 OTHER SPONDYLOSIS, CERVICAL REGION: ICD-10-CM

## 2017-03-28 DIAGNOSIS — M79.18 MYOFASCIAL PAIN: ICD-10-CM

## 2017-03-28 DIAGNOSIS — M54.2 CHRONIC NECK PAIN: Primary | ICD-10-CM

## 2017-03-28 DIAGNOSIS — M54.50 CHRONIC BILATERAL LOW BACK PAIN WITHOUT SCIATICA: ICD-10-CM

## 2017-03-28 DIAGNOSIS — M54.2 CERVICALGIA: ICD-10-CM

## 2017-03-28 DIAGNOSIS — M47.812 CERVICAL SPONDYLOSIS WITHOUT MYELOPATHY: ICD-10-CM

## 2017-03-28 DIAGNOSIS — M50.321 OTHER CERVICAL DISC DEGENERATION AT C4-C5 LEVEL: ICD-10-CM

## 2017-03-28 DIAGNOSIS — G89.29 CHRONIC BILATERAL LOW BACK PAIN WITHOUT SCIATICA: ICD-10-CM

## 2017-03-28 DIAGNOSIS — G89.29 CHRONIC NECK PAIN: Primary | ICD-10-CM

## 2017-03-28 DIAGNOSIS — M54.10 RADICULOPATHY, UNSPECIFIED SPINAL REGION: ICD-10-CM

## 2017-03-28 PROCEDURE — 72050 X-RAY EXAM NECK SPINE 4/5VWS: CPT

## 2017-03-28 PROCEDURE — 99244 OFF/OP CNSLTJ NEW/EST MOD 40: CPT | Performed by: NURSE PRACTITIONER

## 2017-03-28 RX ORDER — METHOCARBAMOL 500 MG/1
500-1000 TABLET, FILM COATED ORAL 3 TIMES DAILY PRN
Qty: 150 TABLET | Refills: 1 | Status: SHIPPED | OUTPATIENT
Start: 2017-03-28 | End: 2017-05-25

## 2017-03-28 ASSESSMENT — PAIN SCALES - GENERAL: PAINLEVEL: SEVERE PAIN (7)

## 2017-03-28 NOTE — PROGRESS NOTES
"  [facility] Consultation    Date of visit: 3/28/2017    Reason for consultation:    Truman Suero is a 56 year old male who is seen in consultation today at the request of his provider, Tiffanie Moulton PA-C re: patient's cervical spine DDD    Primary Care Provider is Tiffanie Moulton.  Pain medications are being prescribed by Tiffanie Moulton PA-C     Please see the [facility] health questionnaire which the patient completed and reviewed with me in detail.    Chief Complaint:  Chronic neck and low back pain  Chief Complaint   Patient presents with     Pain       Pain history:  Truman Suero is a 56 year old male who first started having problems with pain as follows:     -Chronic neck pain this is worst  He has had ongoing neck pain for about 2-3 years. He started by seeing a chiropractor for issues in his arms (numbness and tingling in his arms). He has been to a neurologist and has been diagnosed with carpal tunnel syndrome bilaterally and he does wear night braces. He has decided against surgery for this at the present time.   He has been told he has spinal stenosis. He did have cervical RFA done at [facility] in December 2016. He is unsure if was really effective.   Pain is located posterior neck and will occasionally have left upper shoulder girdle pain. He notes he has numbness in his hands and below his knees bilaterally Left > right. This will get better once he is up and about during the day.   Ice packs are helpful.     -Chronic low back pain  He has had low back pain for 2-3 years. He does stay active at home. Works full time at [employer] in [place] [facility] as a . He does not generally have pain that radiates into his legs. He has mostly axial low back pain and lateral hip pain bilaterally.           Pain rating: intensity ranges from 4/10 to 10/10, and Averages 8/10 on a 0-10 scale.    Describes pain as \"varies from aches, sharp and shooting, his pain is " "very intense in the morning when he rises.\"  Pain is constant.    Home self care includes: medications, mentally try to re-focus attention, deep breathing and stretching    Aggravating factors include: standing, looking down or sideways, sitting too long, pretty much any physical activity    Relieving factors include: lying down flat on his back    Any bowel or bladder incontinence: none    Current pain-related medication treatments include:  -Tramadol 50mg take 1 tablet twice daily (helpful)  -ibuprofen 600mg PRN (helpful)  -gabapentin 600mg TID (takes 600mg in the morning consistently, he forgets the other 2 doses consistently)    Other pertinent medications:  None    Previous medication treatments included:  OPIATES: Tramdol (somewhat helpful)  NSAIDS: ibuprofen (helpful), Aleve (helpful)  MUSCLE RELAXANTS: none  ANTI-MIGRAINE MEDS: none  ANTI-DEPRESSANTS: none  SLEEP AIDS: none  ANTI-CONVULSANTS: gabapentin (helpful)  TOPICALS: lidocaine ointment (somewhat helpful)  Other meds: Tylenol (not helpful)      Other treatments have included:  Truman Suero has not been seen at a pain clinic in the past.    PT: tried, somewhat helpful  Chiropractic: helpful  Acupuncture: none  TENs Unit: none    Injections: cervical radiofrequency nerve ablation at [facility] in December 2016 (did get good relief, got 70% relief of his typical neck pain)    Past Medical History:  Past Medical History:   Diagnosis Date     Neck pain     MRI positive on cervical vertebrea.     Past Surgical History:  No past surgical history on file.  Medications:  Current Outpatient Prescriptions   Medication Sig Dispense Refill     methocarbamol (ROBAXIN) 500 MG tablet Take 1-2 tablets (500-1,000 mg) by mouth 3 times daily as needed for muscle spasms Start with lower dose and caution sedation 150 tablet 1     gabapentin (NEURONTIN) 600 MG tablet Take 1 tablet (600 mg) by mouth 3 times daily 90 tablet 5     gabapentin (NEURONTIN) 300 MG capsule Take " 1 capsule (300 mg) by mouth 3 times daily 270 capsule 1     traMADol (ULTRAM) 50 MG tablet 1-2 tabs every 4-6 hours prn pain 15 tablet 0     ibuprofen (ADVIL,MOTRIN) 800 MG tablet   0     traMADol (ULTRAM) 50 MG tablet Take 2 tablets (100 mg) by mouth every morning maximum 2 tablet(s) per day 60 tablet 0     varenicline (CHANTIX STARTING MONTH PAK) 0.5 MG X 11 & 1 MG X 42 tablet Take 0.5 mg tab daily for 3 days, then 0.5 mg tab twice daily for 4 days, then 1 mg twice daily. (Patient not taking: Reported on 3/28/2017) 53 tablet 0     Allergies:   No Known Allergies  Social History:  Home situation: , has 4 kids, 2 at home, one in college and one launched.   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 He used to work a sobriety program, used to go to   Drug use: none  History of chemical dependency treatment: no formal treatment, did AA    Family history:  Family History   Problem Relation Age of Onset     Prostate Cancer Father      Breast Cancer Maternal Grandmother      Prostate Cancer Other          Review of Systems:  Skin: itching  Eyes: Headache, dizziness  Ears/Nose/Throat: Sinus infections, earache, tinnitus  Respiratory: Shortness of breath  Cardiovascular: negative  Gastrointestinal: negative  Genitourinary: frequency  Musculoskeletal: back pain, neck pain, joint pain and joint stiffness  Neurologic: weakness, numbness and tingling  Psychiatric: excessive stress, anxiety and depression  Hematologic/Lymphatic/Immunologic: negative  Endocrine: negative    Physical Exam:  Vitals:    03/28/17 1426   BP: 150/86   Pulse: 67   Weight: 62.1 kg (137 lb)     Exam:  Constitutional: healthy, alert and no distress  Head: normocephalic. Atraumatic.   Eyes: no redness or jaundice noted   ENT: oropharnx normal.  MMM.  Neck supple.    Cardiovascular: RRR no m/g/r   Respiratory: clear to auscultation A/P.  Respirations using unlabored. Patient able to speak in full sense is without cough or shortness of breath noted  Gastrointestinal: soft, non-tender, normoactive bowel sounds   : deferred  Skin: no suspicious lesions or rashes  Psychiatric: mentation appears normal and affect normal/bright    Musculoskeletal exam:  Gait/Station/Posture: Forward head, rounded shoulders.  Normal gait. Patient able to rise on his toes and heels. Patient able to perform tandem gait    Cervical spine:    Flex:  40 degrees   Ext: 20 degrees   Rotation to right: 80 degrees   Rotation to left: 80 degrees       Thoracic spine:  Normal     Lumbar spine:    Flex:  40 degrees   Ext: 20 degrees   Rotation/ext to right: pain free   Rotation/ext to left: pain free   SI joints: non-tender   Greater trochanters: non-tender    Myofascial tenderness:  Upper shoulder girdle  Straight leg exam: negative  Claudio's maneuver: negative    Neurologic exam:  CN:  Cranial nerves 2-12 are  Grossly normal  Motor:  5/5 UE and LE strength.1+  Reflexes:     Biceps:     R:  1+/4 L: 1+/4   Brachioradialis   R:  1+/4 L: 1+/4      Patella:  R:  1+/4 L: 1+/4   Achilles:  R:  1+/4 L: 1+/4  Other reflexes:  Toes downgoing. Garrido's negativ   Sensory:  (upper and lower extremities):   Light touch: normal    Vibration: normal    Pin prick: normal    Allodynia: absent    Dysethesia: absent    Hyperalgesia: absent     Diagnostic tests:  XR CERVICAL SPINE G/E 4 VW 3/28/2017 2:00 PM     COMPARISON: MRI dated 2/22/2017     HISTORY: Radiculopathy.         IMPRESSION: Vertebral heights are preserved without evidence of  fracture. Moderate disc space loss at C3-4, C4-5, C5-6 and C6-7. Trace  anterolisthesis at C2-3 on flexion position images, this corrects on  neutral and extension position images. No other listhesis is noted on  neutral, flexion or extension position images. No prevertebral soft  tissue swelling.      MR CERVICAL SPINE WITHOUT CONTRAST 2/22/2017 9:59  AM     HISTORY: Bilateral neck and arm pain for three years.     COMPARISON: None.     TECHNIQUE: Routine MR cervical spine extended through T2.     FINDINGS: There is some degenerative bone marrow signal change C3-C6.  No malignant or destructive lesions. Normal alignment through T2.  Reversed cervical adenosis C3-C6.     The cervical and upper thoracic spinal cord appear intrinsically  normal. The craniocervical junction region is normal. No paraspinous  soft tissue abnormality.     Findings by level as follows:     C2-C3: Negative. No disc protrusion. No central or lateral stenosis.     C3-C4: Mild disc space narrowing. Mild diffuse annular bulge. Small  uncinate spur on the right. No significant central or left-sided  stenosis. Mild right-sided foraminal stenosis.     C4-C5: Moderate degenerative narrowing of the interspace. Mild ventral  disc osteophyte complex with minimal central stenosis. Small uncinate  spurs bilaterally with mild bilateral foraminal stenosis.     C6-C7: Moderate disc space narrowing. Mild broad-based disc osteophyte  complex with minimal central stenosis. Minimal uncinate spurs with  mild bilateral foraminal stenosis.     C6-C7: Moderate disc space narrowing. Mild ventral disc osteophyte  complex. No significant central or lateral stenosis.     C7-T1: No disc protrusion. No central or lateral stenosis. Moderate  bilateral facet joint disease.         IMPRESSION:  1. Degenerative changes as described C3-C4 through C7-T1. There is  only mild central and foraminal stenosis as described.  2. No intrinsic spinal cord abnormality through T2.        Other testing (labs, diagnostics):  7/3/2016  Cr. 0.69  Est GFR >90      Screening tools:    PHQ-9 score today is 13. Patient DENIES any suicidal ideation.     DIRE Score for ongoing opioid management is calculated as follows:    Diagnosis = 2    Intractability = 2    Risk: Psych = 2  Chem Hlth = 2  Reliability = 2  Social = 3    Efficacy = 2    Total  DIRE Score = 15 (14 or higher predicts good candidate for ongoing opioid management; 13 or lower predicts poor candidate for opioid management)     Assessment:  1. Chronic neck pain  2. Cervical DDD  3. Cervical spondylosis (pain worse with extension/rotation indicating facetogenic component to pain)  4. Axial low back pain  5. Myofascial pain/muscle spasms  6. Remote history of ETOH overuse, attended AA for awhile. Sober for 10 years  7. PMHx includes: neck pain  8. PSHx includes: none listed        Plan:  Diagnosis reviewed, treatment option addressed, and risk/benefits discussed.  Self-care instructions given.  I am recommending a multidisciplinary treatment plan to help this patient better manage his pain.      1. Physical Therapy: continue outside PHYSICAL THERAPY, already attending  2. Clinical Health Psychologist to address issues of relaxation, behavioral change, coping style, and other factors important to improvement: coping well at present. Consider in future if needed  3. Introductory groups: not ordered, works full time odd hours  4. Diagnostic Studies: none at present.   5. Medication Management:   1. Slowly increase gabapentin to 600mg TID (he is only taking this once per day)  2. Start methocarbamol as needed for muscle spasms  3. I will take over his Tramadol, he is to call 7 days before he needs a refill and then will need to come in to meet with nurses and sign opiate agreement  6. Further procedures recommended: none at present  7. Acupuncture: consider, he is to call me if covered by insurance  8. Urine toxicology screen today: none needed, done in February 2017 and as expected   9. Recommendations/follow-up for PCP:  See above  10. Release of information: may need DACIA from [facility] in future for lumbar imaging  11. Follow up: 8 weeks    Total time spent was 70 minutes, and more than 50% of face to face time was spent in counseling and/or coordination of care regarding principles of  multidisciplinary care, medication management      Melanie STEVENS, RN CNP, FNP  [facility]

## 2017-03-28 NOTE — LETTER
[address] [id number] [phone]      April 4, 2017      Truman Suero  [facility] [address]              Dear Truman,    In order to ensure we are providing the best quality care, we have reviewed your chart and see that you are due for:    1 Colonoscopy    Please call the clinic at your earliest convenience to schedule an appointment with [facility] [phone].    Thank you for trusting us with your health care.    Sincerely,    Tiffanie Moulton PA-C/luís

## 2017-03-28 NOTE — MR AVS SNAPSHOT
After Visit Summary   3/28/2017    Truman Suero    MRN: [id number]           Patient Information     Date Of Birth          1960        Visit Information        Provider Department      3/28/2017 2:30 PM Melanie Thomas APRN [facility]        Today's Diagnoses     Chronic neck pain    -  1    DDD (degenerative disc disease), cervical        Cervical spondylosis without myelopathy        Chronic bilateral low back pain without sciatica        Myofascial pain          Care Instructions    PLAN:  1. Continue your home activity  2. Continue outside PHYSICAL THERAPY  3. Medications:   1. Slowly increase the gabapentin to 600mg three times daily  2. Start methocarbamol for muscle spasms as needed  3. I will take over your Tramadol, you had a recent UDS. Call me shantel 7 days prior to needing a refill. You will need to see our nurses and sign an opiate agreement with me.   4. Procedures: none at present  5. Consider acupuncture, call me if you need a referral   6. Follow-up with me in 8 weeks.        ----------------------------------------------------------------  Nurse Triage line:  [phone]   Call this number with any questions or concerns. You may leave a detailed message anytime. Calls are typically returned Monday through Friday between 8 AM and 4:30 PM. We usually get back to you within 2 business days depending on the issue/request.       Medication refills:    For non-narcotic medications, call your pharmacy directly to request a refill. The pharmacy will contact the [facility] Management Center for authorization. Please allow 3-4 days for these refills to be processed.     For narcotic refills, call the nurse triage line or send a [employer] message. Please contact us 7-10 days before your refill is due. The message MUST include the name of the specific medication(s) requested and how you would like to receive the prescription(s). The options are as follows:    Pain Clinic  staff can mail the prescription to your pharmacy. Please tell us the name of the pharmacy.    You may pick the prescription up at the Pain Clinic (tell us the location) or during a clinic visit with your pain provider    Pain Clinic staff can deliver the prescription to the [facility] pharmacy in the clinic building. Please tell us the location.      Scheduling number: [phone].  Call this number to schedule or change appointments.    We believe regular attendance is key to your success in our program.    Any time you are unable to keep your appointment we ask that you call us at least 24 hours in advance to let us know. This will allow us to offer the appointment time to another patient.             Follow-ups after your visit        Your next 10 appointments already scheduled     Mar 31, 2017  3:20 PM CDT   [facility] with Dali Avila PT   [facility]  [facility] ([facility])    [address] [place] [address] [address]   [place] [address]   [phone]            Apr 04, 2017  1:40 PM CDT   New Visit with Melanie Zacarias NP   [facility] ([facility])    [address] [place] [address]   [phone]              Who to contact     If you have questions or need follow up information about today's clinic visit or your schedule please contact [facility] directly at [phone].  Normal or non-critical lab and imaging results will be communicated to you by MyChart, letter or phone within 4 business days after the clinic has received the results. If you do not hear from us within 7 days, please contact the clinic through MyChart or phone. If you have a critical or abnormal lab result, we will notify you by phone as soon as possible.  Submit refill requests through [employer] or call your pharmacy and they will forward the refill request to us. Please allow 3 business days for your refill to be completed.          Additional  Information About Your Visit        [email or web address] Information     [employer] gives you secure access to your electronic health record. If you see a primary care provider, you can also send messages to your care team and make appointments. If you have questions, please call your primary care clinic.  If you do not have a primary care provider, please call [phone] and they will assist you.        Care EveryWhere ID     This is your Care EveryWhere ID. This could be used by other organizations to access your [facility] medical records  [id number]        Your Vitals Were     Pulse BMI (Body Mass Index)                67 21.46 kg/m2           Blood Pressure from Last 3 Encounters:   03/28/17 150/86   03/17/17 130/74   02/22/17 (!) 156/92    Weight from Last 3 Encounters:   03/28/17 62.1 kg (137 lb)   03/17/17 62.1 kg (137 lb)   02/22/17 61.8 kg (136 lb 3.2 oz)              Today, you had the following     No orders found for display         Today's Medication Changes          These changes are accurate as of: 3/28/17  3:39 PM.  If you have any questions, ask your nurse or doctor.               Start taking these medicines.        Dose/Directions    methocarbamol 500 MG tablet   Commonly known as:  ROBAXIN   Used for:  Chronic neck pain, DDD (degenerative disc disease), cervical, Cervical spondylosis without myelopathy, Chronic bilateral low back pain without sciatica, Myofascial pain   Started by:  Melanie Thomas APRN CNP        Dose:  500-1000 mg   Take 1-2 tablets (500-1,000 mg) by mouth 3 times daily as needed for muscle spasms Start with lower dose and caution sedation   Quantity:  150 tablet   Refills:  1            Where to get your medicines      These medications were sent to [facility] [address] [place] [address]     Phone:  [phone]     methocarbamol 500 MG tablet                Primary Care Provider Office Phone # Fax #    Tiffanie Moulton,  [phone]       [facility] [address] [place] [address]        Thank you!     Thank you for choosing [facility]  for your care. Our goal is always to provide you with excellent care. Hearing back from our patients is one way we can continue to improve our services. Please take a few minutes to complete the written survey that you may receive in the mail after your visit with us. Thank you!             Your Updated Medication List - Protect others around you: Learn how to safely use, store and throw away your medicines at www.disposemymeds.org.          This list is accurate as of: 3/28/17  3:39 PM.  Always use your most recent med list.                   Brand Name Dispense Instructions for use    * gabapentin 300 MG capsule    NEURONTIN    270 capsule    Take 1 capsule (300 mg) by mouth 3 times daily       * gabapentin 600 MG tablet    NEURONTIN    90 tablet    Take 1 tablet (600 mg) by mouth 3 times daily       ibuprofen 800 MG tablet    ADVIL/MOTRIN         methocarbamol 500 MG tablet    ROBAXIN    150 tablet    Take 1-2 tablets (500-1,000 mg) by mouth 3 times daily as needed for muscle spasms Start with lower dose and caution sedation       * traMADol 50 MG tablet    ULTRAM    15 tablet    1-2 tabs every 4-6 hours prn pain       * traMADol 50 MG tablet    ULTRAM    60 tablet    Take 2 tablets (100 mg) by mouth every morning maximum 2 tablet(s) per day       varenicline 0.5 MG X 11 & 1 MG X 42 tablet    CHANTIX STARTING MONTH GIO    53 tablet    Take 0.5 mg tab daily for 3 days, then 0.5 mg tab twice daily for 4 days, then 1 mg twice daily.       * Notice:  This list has 4 medication(s) that are the same as other medications prescribed for you. Read the directions carefully, and ask your doctor or other care provider to review them with you.

## 2017-03-28 NOTE — PATIENT INSTRUCTIONS
PLAN:  1. Continue your home activity  2. Continue outside PHYSICAL THERAPY  3. Medications:   1. Slowly increase the gabapentin to 600mg three times daily  2. Start methocarbamol for muscle spasms as needed  3. I will take over your Tramadol, you had a recent UDS. Call me shantel 7 days prior to needing a refill. You will need to see our nurses and sign an opiate agreement with me.   4. Procedures: none at present  5. Consider acupuncture, call me if you need a referral   6. Follow-up with me in 8 weeks.        ----------------------------------------------------------------  Nurse Triage line:  [phone]   Call this number with any questions or concerns. You may leave a detailed message anytime. Calls are typically returned Monday through Friday between 8 AM and 4:30 PM. We usually get back to you within 2 business days depending on the issue/request.       Medication refills:    For non-narcotic medications, call your pharmacy directly to request a refill. The pharmacy will contact the [facility] for authorization. Please allow 3-4 days for these refills to be processed.     For narcotic refills, call the nurse triage line or send a [employer] message. Please contact us 7-10 days before your refill is due. The message MUST include the name of the specific medication(s) requested and how you would like to receive the prescription(s). The options are as follows:    Pain Clinic staff can mail the prescription to your pharmacy. Please tell us the name of the pharmacy.    You may pick the prescription up at the Pain Clinic (tell us the location) or during a clinic visit with your pain provider    Pain Clinic staff can deliver the prescription to the [facility] pharmacy in the clinic building. Please tell us the location.      Scheduling number: [phone].  Call this number to schedule or change appointments.    We believe regular attendance is key to your success in our program.    Any time you are unable  to keep your appointment we ask that you call us at least 24 hours in advance to let us know. This will allow us to offer the appointment time to another patient.

## 2017-03-28 NOTE — NURSING NOTE
"Chief Complaint   Patient presents with     Pain       Initial /86  Pulse 67  Wt 62.1 kg (137 lb)  BMI 21.46 kg/m2 Estimated body mass index is 21.46 kg/(m^2) as calculated from the following:    Height as of 3/17/17: 1.702 m (5' 7\").    Weight as of this encounter: 62.1 kg (137 lb).  Medication Reconciliation: benedict Pahm CMA (AAMA)        "

## 2017-03-29 ASSESSMENT — PATIENT HEALTH QUESTIONNAIRE - PHQ9: SUM OF ALL RESPONSES TO PHQ QUESTIONS 1-9: 13

## 2017-03-30 ENCOUNTER — MYC MEDICAL ADVICE (OUTPATIENT)
Dept: PALLIATIVE MEDICINE | Facility: CLINIC | Age: 57
End: 2017-03-30

## 2017-03-31 ENCOUNTER — THERAPY VISIT (OUTPATIENT)
Dept: PHYSICAL THERAPY | Facility: CLINIC | Age: 57
End: 2017-03-31
Payer: COMMERCIAL

## 2017-03-31 DIAGNOSIS — M54.12 CERVICAL RADICULOPATHY: ICD-10-CM

## 2017-03-31 DIAGNOSIS — M54.9 CHRONIC NECK AND BACK PAIN: ICD-10-CM

## 2017-03-31 DIAGNOSIS — M54.2 CHRONIC NECK AND BACK PAIN: ICD-10-CM

## 2017-03-31 DIAGNOSIS — G89.29 CHRONIC NECK AND BACK PAIN: ICD-10-CM

## 2017-03-31 PROCEDURE — 97110 THERAPEUTIC EXERCISES: CPT | Mod: GP | Performed by: PHYSICAL THERAPIST

## 2017-03-31 PROCEDURE — 97112 NEUROMUSCULAR REEDUCATION: CPT | Mod: GP | Performed by: PHYSICAL THERAPIST

## 2017-03-31 NOTE — TELEPHONE ENCOUNTER
My chart message from pt:    Yes, thank-you. Just wanted to make sure to communicate that with you. Want to stay within the rules/guidelines for medication.    Closing encounter.     Jessika Flannery RN, [facility]  Pain Clinic Care Coordinator

## 2017-03-31 NOTE — TELEPHONE ENCOUNTER
Truman    It is OK that you use the Tramadol refill you have and contact me when you have about one week left. Does that make sense?    Thanks.  Melanie STEVENS RN CNP, MELISSAP  [facility]      I sent the patient the above [employer] message.   Melanie STEVENS RN CNP, MELISSAP  [facility] [place]

## 2017-04-12 ENCOUNTER — MYC MEDICAL ADVICE (OUTPATIENT)
Dept: PALLIATIVE MEDICINE | Facility: CLINIC | Age: 57
End: 2017-04-12

## 2017-04-12 DIAGNOSIS — M54.2 CHRONIC NECK PAIN: Primary | ICD-10-CM

## 2017-04-12 DIAGNOSIS — G89.29 CHRONIC NECK PAIN: Primary | ICD-10-CM

## 2017-04-12 DIAGNOSIS — M50.30 DDD (DEGENERATIVE DISC DISEASE), CERVICAL: ICD-10-CM

## 2017-04-12 NOTE — TELEPHONE ENCOUNTER
My chart message from pt:    Melanie- At our first visit you mentioned the chiropractor that works in your building. You were recommending possibly seeing him, my question is: Do I need a referral for that.     Thank-you     Palmer Chakraborty    Yes you do an order which I can place right now for you. His name his Dr. Hitesh Cuba. You can call [phone] to schedule.     Jessika Flannery RN, [facility]  Pain Clinic Care Coordinator

## 2017-04-14 NOTE — TELEPHONE ENCOUNTER
Does the patient want to see Hitesh Cuba DC for chiropractic or acupuncture or for both?    Thanks.  Melanie STEVENS, RN CNP, FNP  [facility] [place]

## 2017-04-17 NOTE — TELEPHONE ENCOUNTER
My chart message from pt:    Melanie- I'm sorry forgot to respond to last message. Thank-you for the referral, Yes I made an appointment with Dr. Cuba for next Tues. 4/25. Am looking forward to any help he may be able to suggest. I know [employer]  Does Not cover acupuncture,  but am going to talk to him about cost and any other methods he may offer.     Regards   Palmer Suero     Sending to provider for FYI.     Jessika Flannery RN, [facility]  Pain Clinic Care Coordinator

## 2017-04-17 NOTE — TELEPHONE ENCOUNTER
Melanie Laughlin wants to know if you want to see Dr. Cuba for chiropractic or acupuncture or both?  Maria Del Carmen white rn

## 2017-04-18 NOTE — TELEPHONE ENCOUNTER
Information noted. Thank you.  Melanie STEVENS RN CNP, FNP  [facility] [place]

## 2017-04-19 ENCOUNTER — THERAPY VISIT (OUTPATIENT)
Dept: PHYSICAL THERAPY | Facility: CLINIC | Age: 57
End: 2017-04-19
Payer: COMMERCIAL

## 2017-04-19 DIAGNOSIS — M54.2 CHRONIC NECK AND BACK PAIN: ICD-10-CM

## 2017-04-19 DIAGNOSIS — M54.12 CERVICAL RADICULOPATHY: ICD-10-CM

## 2017-04-19 DIAGNOSIS — M54.9 CHRONIC NECK AND BACK PAIN: ICD-10-CM

## 2017-04-19 DIAGNOSIS — G89.29 CHRONIC NECK AND BACK PAIN: ICD-10-CM

## 2017-04-19 PROCEDURE — 97112 NEUROMUSCULAR REEDUCATION: CPT | Mod: GP | Performed by: PHYSICAL THERAPIST

## 2017-04-19 PROCEDURE — 97110 THERAPEUTIC EXERCISES: CPT | Mod: GP | Performed by: PHYSICAL THERAPIST

## 2017-04-19 PROCEDURE — 97140 MANUAL THERAPY 1/> REGIONS: CPT | Mod: GP | Performed by: PHYSICAL THERAPIST

## 2017-04-24 ENCOUNTER — MYC MEDICAL ADVICE (OUTPATIENT)
Dept: PALLIATIVE MEDICINE | Facility: CLINIC | Age: 57
End: 2017-04-24

## 2017-04-24 DIAGNOSIS — G89.29 CHRONIC NECK AND BACK PAIN: ICD-10-CM

## 2017-04-24 DIAGNOSIS — M54.9 CHRONIC NECK AND BACK PAIN: ICD-10-CM

## 2017-04-24 DIAGNOSIS — M54.2 CHRONIC NECK AND BACK PAIN: ICD-10-CM

## 2017-04-24 DIAGNOSIS — M54.12 CERVICAL RADICULOPATHY: ICD-10-CM

## 2017-04-24 NOTE — TELEPHONE ENCOUNTER
Anctut sent to pt:  Created: 4/24/2017 1:17 PM        Hi Palmer.  We did get your message from 4/22/17 and are processing the refill for you.  Sure you can come in after your appointment with Dr. Cuba.  I put you in for a nurse visit tomorrow at 240pm.    Let me know if you have any questions.    Estelita Wetzel RN-BSN  [place] [facility]       Medication refill information reviewed.     Per MPMP:      Tramadol 60 tabs:  1/27/17, 3/3/17 & 4/8/17 prescribed by Dr. Mary Kate Rizzo.    Tramadol 60 tabs:  2/16/17,  & 3/20/17 prescribed by Tiffanie Whaley PA-C.  Per Melanie Thomas, NP's eval notes:  Current pain-related medication treatments include:  -Tramadol 50mg take 1 tablet twice daily (helpful)    Called pt and left message asking to call back/TimePadhart how many tramadol tabs he is taking per day.    Due date:  TBD.  Pt should have enough med remaining.    Weaning instructions:  N/A    Prescriptions prepped for review.     Will wait pt call back before processing further.    Estelita Wetzel RN-BSN  [facility]

## 2017-04-24 NOTE — TELEPHONE ENCOUNTER
Per patient Shraddhahart message:  Created: 4/24/2017 4:09 PM      Dr. Tiffanie Moulton from the [facility], then after meeting with Melanie, she said she would take over the prescription.  Prior to that [facility] had prescribed for the Nerve Burn procedure.

## 2017-04-24 NOTE — TELEPHONE ENCOUNTER
Alejandrina    Created: 4/24/2017 3:50 PM      Estelita- I received your message and left message on the nurse line. Will double message you. I average 3-4 tabs per day. 3 on a good day, the most I have ever taken is 5. If that doesn't do the trick then I just go to bed                  Estelita Wetzel RN-BSN  [facility]

## 2017-04-24 NOTE — TELEPHONE ENCOUNTER
Alejandrina sent to pt:  Created: 4/24/2017 4:01 PM        Who is prescribing your tramadol now?    Estelita Wetzel RN-BSN  [facility]

## 2017-04-24 NOTE — TELEPHONE ENCOUNTER
There are some red flags.  Pt never mentions that he is receiving med from ALEXEY Pantoja from [facility].  He informed primary care provider and Melanie that he was only taking up to 2 tabs/day.    Routed to Melanie Thomas NP to review.    Estelita Wetzel RN-BSN  [facility]

## 2017-04-24 NOTE — TELEPHONE ENCOUNTER
VM left on 4/24/2017 at 3:26pm    Reason for call: Other   Patient called regarding (reason for call): returning call  Additional comments: States message is for Estelita. Patient returned a call about Tramadol refill request. Stated Estelita had inquired how many tabs per day - patient stated he limits himself to 4. If clinic has any questions, please call the number listed below.    Phone Number Pt can be reached at: Cell number on file:    Telephone Information:   Mobile [phone]     [facility]    [place] [facility] [place]

## 2017-04-24 NOTE — TELEPHONE ENCOUNTER
4/22 521pm    Patient LM stating Melanie Martha had told him she would take over his Tramadol. Needs to sign Contract. Has an SAVITA appt 4/25. Can he do it then? [phone] or [phone]    Aidee Croft    Pain Management Clinic

## 2017-04-24 NOTE — TELEPHONE ENCOUNTER
See the other mychart encounter from today for more information.    Estelita Wetzel RN-BSN  [facility]

## 2017-04-24 NOTE — TELEPHONE ENCOUNTER
Medication refill information reviewed. Nurse visit scheduled for 2:40 on 4/25 after SAVITA appt.     Due date for tramadol is anytime after 5/8 per last  date     Prescription prepped for review.     Will route to provider.     CAMILLE Artis, RN-BC  Patient Care Supervisor/Care Coordinator  [place] [facility] [place]

## 2017-04-24 NOTE — TELEPHONE ENCOUNTER
Received call from patient requesting refill(s) of traMADol (ULTRAM) 50 MG tablet    Last picked up from pharmacy on 04/08/17    Pt last seen by prescribing provider on 03/28/17  Next appt scheduled for 05/25/17    Last urine drug screen date 02/15/17  Current opioid agreement on file (completed within the last year) Yes Date of opioid agreement: 03/17/17 - With another provider    Processing (pick one and delete the others):  Patient would like to sign Narcotics agreement when he is in clinic for SAVITA appt on 04/25/17 (1:30).  Will need nurse visit.    Fax to [facility] pharmacy     Will route to nursing pool for review and preparation of prescription(s).

## 2017-04-25 ENCOUNTER — THERAPY VISIT (OUTPATIENT)
Dept: CHIROPRACTIC MEDICINE | Facility: CLINIC | Age: 57
End: 2017-04-25
Payer: COMMERCIAL

## 2017-04-25 ENCOUNTER — MYC MEDICAL ADVICE (OUTPATIENT)
Dept: PALLIATIVE MEDICINE | Facility: CLINIC | Age: 57
End: 2017-04-25

## 2017-04-25 ENCOUNTER — ALLIED HEALTH/NURSE VISIT (OUTPATIENT)
Dept: PALLIATIVE MEDICINE | Facility: CLINIC | Age: 57
End: 2017-04-25
Payer: COMMERCIAL

## 2017-04-25 DIAGNOSIS — M99.02 THORACIC SEGMENT DYSFUNCTION: ICD-10-CM

## 2017-04-25 DIAGNOSIS — M54.2 CERVICALGIA: ICD-10-CM

## 2017-04-25 DIAGNOSIS — Z79.891 ENCOUNTER FOR LONG-TERM OPIATE ANALGESIC USE: Primary | ICD-10-CM

## 2017-04-25 DIAGNOSIS — M62.838 SPASM OF MUSCLE: ICD-10-CM

## 2017-04-25 DIAGNOSIS — M99.01 CERVICAL SEGMENT DYSFUNCTION: Primary | ICD-10-CM

## 2017-04-25 PROCEDURE — 98940 CHIROPRACT MANJ 1-2 REGIONS: CPT | Mod: AT | Performed by: CHIROPRACTOR

## 2017-04-25 PROCEDURE — 97813 ACUP 1/> W/ESTIM 1ST 15 MIN: CPT | Performed by: CHIROPRACTOR

## 2017-04-25 PROCEDURE — 99207 ZZC NO CHARGE NURSE ONLY: CPT

## 2017-04-25 PROCEDURE — 99203 OFFICE O/P NEW LOW 30 MIN: CPT | Mod: 25 | Performed by: CHIROPRACTOR

## 2017-04-25 NOTE — TELEPHONE ENCOUNTER
See the 4/24/17 telephone encounter for more information.    Estelita Wetzel RN-BSN  [facility]

## 2017-04-25 NOTE — TELEPHONE ENCOUNTER
Pt was here for nurse visit.    Did NOT have him sign the opioid agreement.  Will have Rosalina review first.    Spoke w/ pt about his refills.  He states that his primary care provider and Rosalina knew he was getting tramadol from [facility] and his primary care provider.  He states he had an radiofrequency ablation at [facility].  He says he told Rosalina that he had a remaining refill of the tramadol from [facility] and Rosalina told him to fill it and just let her know when he was due for a fill.  He states he take up to 4 tabs/day but has taken 5.      He will mychart the # of tramadol tabs he has remaining.  Will have Rosalina review.      Pt is aware that he would have to come back to sign an opioid agreemnt if she decides to prescribe for him.  He is okay with this plan stating he has an upcoming appointment on 5/2/17 here and could do it then.    Will wait pt call back w/ # of tabs remaining then will prep refill for Rosalina to review.    Estelita Wetzel RN-BSN  [facility]

## 2017-04-25 NOTE — PROGRESS NOTES
Chiropractic Clinic Visit    PCP: Tiffanie Moulton    Truman Suero is a 56 year old male who is seen  in consultation at the request of  Melanie STEVENS presenting with chronic neck pain . Patient reports that the onset was 3+ years ago.  There was no trauma.  The onset was insidious and gradually intensified over time.  The pain is located around the mid spine region and is worse after use.When asked, patient denies:, falling, slipping, bending and reaching or sleeping awkwardly. The pain is currently rated at a 7 ouf o10 and is described as a dull ache with periods of sharpness depending on movement and position.  Truman is a  and is constantly looking down to do his work.      Injury: nonoe    Location of Pain: bilaterally mid cervical at the following level(s) C4 , C5  and C6   Duration of Pain: 3+ year(s)  Rating of Pain at worst: 9/10  Rating of Pain Currently: 7/10  Symptoms are better with: Rest and and lying flat  Symptoms are worse with: work  Additional Features:        Health History  as reported by the patient:    How does the patient rate their own health:   Fair    Current or past medical history:   Calf pain, swelling or warmth, Numbness/tingling, Pain at night/rest, Persistent feve/chils, Smoking and Weakness    Medical allergies  None    Past Traumas/Surgeries  None    Family History  The family history includes Breast Cancer in his maternal grandmother; Prostate Cancer in his father and another family member.    Medications:  Muscle relaxants and Pain    Occupation:      Primary job tasks:   Lifting/carrying, Prolonged standing and Repetitive tasks    Barriers as home/work:   none    Additional health Issues:           Review of Systems  Musculoskeletal: as above  Remainder of review of systems is negative including constitutional, CV, pulmonary, GI, Skin and Neurologic except as noted in HPI or medical history.    Past Medical History:   Diagnosis Date     Neck pain     MRI  positive on cervical vertebrea.     No past surgical history on file.    Objective  There were no vitals taken for this visit.    GENERAL APPEARANCE: healthy, alert and no distress   GAIT: NORMAL  SKIN: no suspicious lesions or rashes  NEURO: Normal strength and tone, mentation intact and speech normal  PSYCH:  mentation appears normal and affect normal/bright    Truman was asked to complete the Neck Disability Index, today in the office. NDI Disability score: 36%; pain severity scale: 7/10..    Cervical Spine Exam    Range of Motion:         forward flexion full no pain         extension mildly limited with pain        lateral rotation moderately limited with pain        lateral flexion moderately limited with pain    Inspection:         No visible deformity        normal lordotic curvature maintained    Tender:        upper border of trapezius    Non-Tender:        remainder of cervical spine area    Muscle strength:       C4 (shoulder shrug)  symmetric 5/5       C5 (shoulder abduction) symmetric 5/5       C6 (elbow flexion) symmetric 5/5       C7 (elbow extension) symmetric 5/5       C8 (finger abduction, thumb flexion) symmetric 5/5    Reflexes:        C5 (biceps) symmetric normal    Sensation:       grossly intact througout bilateral upper extremities    Special Tests:       positive (+) Spurling  Malik's- positive, Distraction - positive and Shoulder depression - Right negative and Left negative    Lymphatics:        no edema noted in the upper extremities       Segmental spinal dysfunction/restrictions found at:  :  Occiput  C1 Left rotation restricted  C5 Right rotation restricted  C6 Right rotation restricted  C7 Right rotation restricted  T1 Left rotation restricted  T2 Left rotation restricted  T3 Left rotation restricted.      The following soft tissue hypotonicities were observed:Traps: ache, dull pain and stiff, referred pain: no    Trigger points were found in:Traps    Muscle spasm found  in:Traps      Radiology:    MR cervical spine 2/22/17  Vertebral heights are preserved without evidence of  fracture. Moderate disc space loss at C3-4, C4-5, C5-6 and C6-7. Trace  anterolisthesis at C2-3 on flexion position images, this corrects on  neutral and extension position images. No other listhesis is noted on  neutral, flexion or extension position images. No prevertebral soft  tissue swelling.    Assessment:    1. Cervical segment dysfunction    2. Cervicalgia    3. Thoracic segment dysfunction    4. Spasm of muscle        RX ordered/plan of care  Anticipated outcomes  Possible risks and side effects    After discussing the risk and benefits of care, patient consented to treatment    Patient's condition:  Patient had restrictions pre-manipulation    Treatment effectiveness:  Post manipulation there is better intersegmental movement and Patient claims to feel looser post manipulation    Plan:    Procedures:    Evaluation and Management:  47020 Moderate level exam 30 min    CMT:  81310 Chiropractic manipulative treatment 3-4 regions performed   Occiput: Activator, R post, , Prone  Cervical: Activator, C1 , C6, C7 , Prone  Thoracic: Activator, T1, T2, Prone    Modalities:  62393: Acupuncture with estim for 15 minutes: Points: Matias Points in cervical spine  Ahsi point in Traps  For 15 minutes    Therapeutic procedures:  None    Response to Treatment  Reduction in symptoms as reported by patient    Prognosis: Good      Treatment plan and goals:  Goals:  Being work without pain for 4 hours    Frequency of care  Duration of care is estimated to be 6 weeks, from the initial treatment.  It is estimated that the patient will need a total of 6 visits to resolve this episode.  For the initial therapeutic trial of care, the frequency is recommended at 1 X week, once daily.  A reevaluation would be clinically appropriate in 6 visits, to determine progress and further course of care.    In-Office  Treatment  Evaluation  Spinal Chiropractic Manipulative Therapy:    Acupuncture:        Recommendations:    Instructions:ice 20 minutes every other hour as needed    Follow-up:  Return to care in one week.     Disclaimer: This note consists of symbols derived from keyboarding, dictation and/or voice recognition software. As a result, there may be errors in the script that have gone undetected. Please consider this when interpreting information found in this chart.

## 2017-04-25 NOTE — TELEPHONE ENCOUNTER
Per patient Dahlia message:  Created: 4/25/2017 3:39 PM        Estelita- Did a tramadol count, there are 37 left from the refill prescription. I know I refilled that before the previous had run-out. So there is no rush, as I said just thought would sign agreement that Dr Navarro mentioned as long as I was out there    Zac Chakraborty        Will process this refill tomorrow.    Estelita Wetzel RN-BSN  [facility]

## 2017-04-26 RX ORDER — TRAMADOL HYDROCHLORIDE 50 MG/1
TABLET ORAL
Qty: 120 TABLET | Refills: 0 | Status: SHIPPED | OUTPATIENT
Start: 2017-04-26 | End: 2017-05-25

## 2017-04-26 NOTE — TELEPHONE ENCOUNTER
Signed Rx given to [place] [facility]. Patient needs nurse visit.  Even if able to do UDS at appt next week, his appt is not with the pain clinic.

## 2017-04-26 NOTE — TELEPHONE ENCOUNTER
Called pt and L/M and stated have a prescription for him. Now we can make a nurse visit to sign the contract. Asked him to call the scheduling line to make the appointment.     Script placed in trigger point cart.     Please leave encounter open under RX response- do not close or done until after nurse visit is done and completed.     Jessika Flannery RN, [facility]  Pain Clinic Care Coordinator

## 2017-04-26 NOTE — TELEPHONE ENCOUNTER
Signed Prescriptions:                        Disp   Refills    traMADol (ULTRAM) 50 MG tablet             120 ta*0        Sig: May take 1-2 tablets every 6 hours as needed for           pain, max of 4 tabs per day. OK to fill on           5/3/2017 and start on 5/4/2017 to last 30 days.           Reduce as able.  Authorizing Provider: MELANIE BENSON    Signed script placed in touchdown bin at [facility].  Needs nurse visit for opiate agreement. May be able to do this at his appt next week?    Melanie Benson APRN CNP FNP RN  [facility]

## 2017-04-27 ENCOUNTER — MYC MEDICAL ADVICE (OUTPATIENT)
Dept: PALLIATIVE MEDICINE | Facility: CLINIC | Age: 57
End: 2017-04-27

## 2017-04-27 NOTE — TELEPHONE ENCOUNTER
Alejandrina sent to pt:  Xavier Chakraborty   That works for us. You are scheduled on 5/2/17 around 150 pm for the nurse visit.    Keep encounter open for nurse visit.    Estelita Wetzel, RN-BSN  [facility]

## 2017-04-27 NOTE — TELEPHONE ENCOUNTER
See the 4/24/17 refill encounter for more information.    Estelita Wetzel RN-BSN  [facility]

## 2017-04-27 NOTE — TELEPHONE ENCOUNTER
Per patient Dahlia message:  Created: 4/27/2017 12:58 PM      I received a message from Jessika last night regarding setting appointment to sign agreement and pick-up prescription. I left message on nurse line to see if next Tues 5/2 would be alright?. I have another appointment with Dr. Cuba at 2:15, so I could come a little early to see nurse if that works.    Thank-you  Palmer

## 2017-04-28 ENCOUNTER — THERAPY VISIT (OUTPATIENT)
Dept: PHYSICAL THERAPY | Facility: CLINIC | Age: 57
End: 2017-04-28
Payer: COMMERCIAL

## 2017-04-28 DIAGNOSIS — M54.2 CHRONIC NECK AND BACK PAIN: ICD-10-CM

## 2017-04-28 DIAGNOSIS — M54.9 CHRONIC NECK AND BACK PAIN: ICD-10-CM

## 2017-04-28 DIAGNOSIS — G89.29 CHRONIC NECK AND BACK PAIN: ICD-10-CM

## 2017-04-28 DIAGNOSIS — M54.12 CERVICAL RADICULOPATHY: ICD-10-CM

## 2017-04-28 PROCEDURE — 97140 MANUAL THERAPY 1/> REGIONS: CPT | Mod: GP | Performed by: PHYSICAL THERAPIST

## 2017-04-28 PROCEDURE — 97110 THERAPEUTIC EXERCISES: CPT | Mod: GP | Performed by: PHYSICAL THERAPIST

## 2017-05-02 ENCOUNTER — ALLIED HEALTH/NURSE VISIT (OUTPATIENT)
Dept: PALLIATIVE MEDICINE | Facility: CLINIC | Age: 57
End: 2017-05-02
Payer: COMMERCIAL

## 2017-05-02 ENCOUNTER — THERAPY VISIT (OUTPATIENT)
Dept: CHIROPRACTIC MEDICINE | Facility: CLINIC | Age: 57
End: 2017-05-02
Payer: COMMERCIAL

## 2017-05-02 DIAGNOSIS — M99.02 THORACIC SEGMENT DYSFUNCTION: ICD-10-CM

## 2017-05-02 DIAGNOSIS — Z79.891 ENCOUNTER FOR LONG-TERM OPIATE ANALGESIC USE: Primary | ICD-10-CM

## 2017-05-02 DIAGNOSIS — M54.2 CERVICALGIA: ICD-10-CM

## 2017-05-02 DIAGNOSIS — M99.01 CERVICAL SEGMENT DYSFUNCTION: Primary | ICD-10-CM

## 2017-05-02 DIAGNOSIS — M62.838 SPASM OF MUSCLE: ICD-10-CM

## 2017-05-02 PROCEDURE — 97813 ACUP 1/> W/ESTIM 1ST 15 MIN: CPT | Performed by: CHIROPRACTOR

## 2017-05-02 PROCEDURE — 99207 ZZC NO CHARGE NURSE ONLY: CPT

## 2017-05-02 PROCEDURE — 98940 CHIROPRACT MANJ 1-2 REGIONS: CPT | Mod: AT | Performed by: CHIROPRACTOR

## 2017-05-02 NOTE — MR AVS SNAPSHOT
After Visit Summary   5/2/2017    Truman Suero    MRN: [id number]           Patient Information     Date Of Birth          1960        Visit Information        Provider Department      5/2/2017 1:50 PM Nurse,  Pain [facility]        Today's Diagnoses     Encounter for long-term opiate analgesic use    -  1       Follow-ups after your visit        Your next 10 appointments already scheduled     May 02, 2017  2:15 PM CDT   [facility] Chiropractor with Hitesh Cuba DC   [facility] [place] [facility] ([facility] [place])    [address]  [place] [address]   [phone]            May 10, 2017  2:40 PM CDT   [facility] Spine with Dali Avila PT   [facility] ([facility] [place])    [address]  [place] [address]   [phone]            May 25, 2017  3:30 PM CDT   Return Visit with RODNEY Vargas CNP   [facility] ([facility])    [address] [place] [address] [phone]              Who to contact     If you have questions or need follow up information about today's clinic visit or your schedule please contact [facility] directly at [phone].  Normal or non-critical lab and imaging results will be communicated to you by [employer], letter or phone within 4 business days after the clinic has received the results. If you do not hear from us within 7 days, please contact the clinic through MyChart or phone. If you have a critical or abnormal lab result, we will notify you by phone as soon as possible.  Submit refill requests through iYogi or call your pharmacy and they will forward the refill request to us. Please allow 3 business days for your refill to be completed.          Additional Information About Your Visit        [facility] [employer] Information     iYogi gives you secure access to your electronic health record. If you see a primary  care provider, you can also send messages to your care team and make appointments. If you have questions, please call your primary care clinic.  If you do not have a primary care provider, please call [phone] and they will assist you.        Care EveryWhere ID     This is your Care EveryWhere ID. This could be used by other organizations to access your [place] medical records  [id number]         Blood Pressure from Last 3 Encounters:   03/28/17 150/86   03/17/17 130/74   02/22/17 (!) 156/92    Weight from Last 3 Encounters:   03/28/17 62.1 kg (137 lb)   03/17/17 62.1 kg (137 lb)   02/22/17 61.8 kg (136 lb 3.2 oz)              Today, you had the following     No orders found for display       Primary Care Provider Office Phone # Fax #    Tiffanie Moulton PA-C [phone]       [address] [address]        Thank you!     Thank you for choosing [facility]  for your care. Our goal is always to provide you with excellent care. Hearing back from our patients is one way we can continue to improve our services. Please take a few minutes to complete the written survey that you may receive in the mail after your visit with us. Thank you!             Your Updated Medication List - Protect others around you: Learn how to safely use, store and throw away your medicines at www.disposemymeds.org.          This list is accurate as of: 5/2/17  2:08 PM.  Always use your most recent med list.                   Brand Name Dispense Instructions for use    * gabapentin 300 MG capsule    NEURONTIN    270 capsule    Take 1 capsule (300 mg) by mouth 3 times daily       * gabapentin 600 MG tablet    NEURONTIN    90 tablet    Take 1 tablet (600 mg) by mouth 3 times daily       ibuprofen 800 MG tablet    ADVIL/MOTRIN         methocarbamol 500 MG tablet    ROBAXIN    150 tablet    Take 1-2 tablets (500-1,000 mg) by mouth 3 times daily as needed for muscle  spasms Start with lower dose and caution sedation       traMADol 50 MG tablet    ULTRAM    120 tablet    May take 1-2 tablets every 6 hours as needed for pain, max of 4 tabs per day. OK to fill on 5/3/2017 and start on 5/4/2017 to last 30 days. Reduce as able.       varenicline 0.5 MG X 11 & 1 MG X 42 tablet    CHANTIX STARTING MONTH GIO    53 tablet    Take 0.5 mg tab daily for 3 days, then 0.5 mg tab twice daily for 4 days, then 1 mg twice daily.       * Notice:  This list has 2 medication(s) that are the same as other medications prescribed for you. Read the directions carefully, and ask your doctor or other care provider to review them with you.

## 2017-05-02 NOTE — LETTER
[facility]    05/02/17    Patient: Truman Suero  YOB: 1960  Medical Record Number: [id number]                                                                  Controlled Substance Agreement  I understand that my care provider has prescribed controlled substances (narcotics, tranquilizers, and/or stimulants) to help manage my condition(s).  I am taking this medicine to help me function or work.  I know that this is strong medicine.  It could have serious side effects and even cause a dependency on the drug.  If I stop these medicines suddenly, I could have severe withdrawal symptoms.    The risks, benefits, and side effects of these medication(s) were explained to me.  I agree that:  1. I will take part in other treatments as advised by my provider.  This may be psychiatry or counseling, physical therapy, behavioral therapy, group treatment, or a referral to a pain clinic.  I will reduce or stop my medicine when my provider tells me to do so.   2. I will take my medicines as prescribed.  I will not change the dose or schedule unless my provider tells me to.  There will be no refills if I  run out early.   I may be contacted at any time without warning and asked to complete a drug test or pill count.   3. I will keep all my appointments at the clinic.  If I miss appointments or fail to follow instructions, my provider may stop my medicine.  4. I will not ask other providers to prescribe controlled substances. And I will not accept controlled substances from other people. If I need another prescribed controlled substance for a new reason, I will notify my provider within one business day.  5. If I enroll in the [place] Medical Marijuana program, I will tell my provider.  I will also sign an agreement to share my medical records with my provider.  6. I will use one pharmacy to fill all of my controlled substance prescriptions.  If my prescription is mailed to my pharmacy, it  may take 5 to 7 days for my medicine to be ready.  7. I understand that my provider, clinic care team, and pharmacy can track controlled substance prescriptions from other providers through a central database (prescription monitoring program).  8. I will bring in my list of medications (or my medicine bottles) each time I come to the clinic.  REV- 04/2016                                                                                                                                            Page 1 of 2      [facility]    05/02/17    Patient: Truman Suero  YOB: 1960  Medical Record Number: [id number]    9. Refills of controlled substances will be made only during office hours.  It is up to me to make sure that I do not run out of my medicines on weekends or holidays.    10. I am responsible for my prescriptions.  If the medicine is lost or stolen, it will not be replaced.   I also agree not to share these medicines with anyone.  11. I agree to not use ANY illegal or recreational drugs.  This includes marijuana, cocaine, bath salts or other drugs.  I agree not to use alcohol unless my provider says I may.  I agree to give urine samples whenever asked.  If I fail to give a urine sample, the provider may stop my medicine.     12. I will tell my nurse or provider right away if I become pregnant or have a new medical problem treated outside of [facility].  13. I understand that this medicine can affect my thinking and judgment.  It may be unsafe for me to drive, use machinery and do dangerous tasks.  I will not do any of these things until I know how the medicine affects me.  If my dose changes, I will wait to see how it affects me.  I will contact my provider if I have concerns about medicine side effects.  I understand that if I do not follow any of the conditions above, my prescriptions or treatment may be stopped.    I agree that my provider, clinic care team, and  pharmacy may work with any city, state or federal law enforcement agency that investigates the misuse, sale, or other diversion of my controlled medicine. I will allow my provider to discuss my care with or share a copy of this agreement with any other treating provider, pharmacy or emergency room where I receive care.  I agree to give up (waive) any right of privacy or confidentiality with respect to these authorizations.   I have read this agreement and have asked questions about anything I did not understand.   ___________________________________    ___________________________  Patient Signature                                                           Date and Time  ___________________________________     ____________________________  Witness                                                                            Date and Time  ___________________________________  NewcomerEv Navarro  REV-  04/2016                                                                                                                                                                 Page 2 of 2  Opioid Pain Medicines (also known as Narcotics)  What You Need to Know      What are opioids?   Opioids are pain medicines that must be prescribed by a doctor. Examples are:     morphine (MS Contin, Alyce)    oxycodone (Oxycontin)    oxycodone and acetaminophen (Percocet)    hydrocodone and acetaminophen (Vicodin, Norco)     fentanyl patch (Duragesic)     hydromorphone (Dilaudid)     methadone     What do opioids do well?   Opioids are best for short-term pain after a surgery or injury. They also work well for cancer pain. Unlike other pain medicines, they do not cause liver or kidney failure or ulcers. They may help some people with long-lasting (chronic) pain.     What do opioids NOT do well?   Opioids never get rid of pain entirely, and they do not work well for most patients with chronic pain. Opioids do not reduce swelling, one of the causes of  pain. They also don t work well for nerve pain.     Side effects  Talk to your doctor before you start or decide to keep taking one of these medicines. Side effects include:    Lowers your breathing rate enough that it could cause death    Death due to taking more than the prescribed dose    Serious lifelong opioid use      Dependence is not the same as addiction. Addiction is when people keep using a substance that harms their body, their mind or their relations with others. If you have a history of drug or alcohol abuse, taking opioids can cause a relapse.  Over time, opioids don t work as well. Most people will need higher and higher doses. The higher the dose, the more serious the side effects. We don t know the long-term effects of opioids.   People who have used opioids for a long time have a lower quality of life, worse depression, higher levels of pain and more visits to doctors.  Overdose from prescription drugs is the second leading cause of death in the [place] The risk of overdose rises when opioids are taken with other drugs such as:    Medicines used for anxiety and panic attacks (such as lorazepam, alprazolam, clonazepam    Other sedatives    Alcohol    Illegal drugs such as heroin  Never share your opioids with others. Be sure to store opioids in a secure place, locked if possible.Young children can easily swallow them and overdose.     Are there other ways to manage pain?  Ways to help reduce pain:    Exercise every day.    Treat health problems that may be causing pain.    Treat mental health problems like depression and anxiety.     Worse depression symptoms; Less pleasure in things you usually enjoy    Feeling tired or sluggish    Slower thoughts or cloudy thinking    Being more sensitive to pain over time; Pain is harder to control.    Trouble sleeping or restless sleep    Changes in hormone levels (for example, less testosterone).     Changes in sex drive or ability to have sex    Long lasting  nausea and constipation    Trouble breathing while asleep; This is worse with lung problems like COPD or sleep apnea.    Unsafe driving    Getting sick more often    Itching    Feeling dizzy    Dry mouth    Sweating    Trouble emptying the bladder (peeing). This is worse if you have an enlarged prostate or get urinary tract infections (UTIs).    What else should I know about opioids?  When someone takes opioids for too long or too often, they become dependent. This means that if you stop or reduce the medicine too quickly, you will have withdrawal symptoms.          Practice good sleep habits.  Try to go to bed and get up at the same time every day.    Stop smoking.  Tobacco use can make pain worse.    Do things that you enjoy.    Find a way to work through pain without drugs.  Try deep breathing, meditation, visual imagery and aromatherapy.    Ask your doctor to help you create a plan to manage your pain.

## 2017-05-02 NOTE — NURSING NOTE
Reviewed opioid agreement with patient and the patient stated understanding.  Patient signed agreement and a copy was given to pt.    Pt states that he has 4 tabs left and understands that the new supply is scheduled to start on 5/4/17.      CAMILLE Artis, RN-BC  Patient Care Supervisor/Care Coordinator  [facility]

## 2017-05-02 NOTE — PROGRESS NOTES
Visit #:  2 of 6, based on treatment plan    Subjective:  Truman Suero is a 56 year old male who is seen in f/u up for:        Cervical segment dysfunction  Cervicalgia  Thoracic segment dysfunction  Spasm of muscle.     Since last visit on 4/25/2017,  Truman Suero reports the following changes: Pain immediately after last treatment: 3/10 and their pain level today 7/10.  Truman reports that he had relief for about a day after last visit and then the pain started to come back and now he feels about the same.    Area of chief complaint:  Cervical :  Symptoms are graded at 7/10. The quality is described as stiff, achey.  Motion has remained about the same, no improvement. Patient feels that they have not improved and feel the same.        Objective:  The following was observed:    P: pain elicited on palpation, upper traps    A: static palpation demonstrates intersegmental asymmetry , cervical and thoracic spine    R: motion palpation notes restricted motion, :  C6 Right rotation restricted  C7 Right rotation restricted  T1 Left rotation restricted  T2 Left rotation restricted    T: hypertonicity at: Traps Bilaterally      Assessment:    Segmental spinal dysfunction/restrictions found at:  C6   C7   T1   T2     Diagnoses:      1. Cervical segment dysfunction    2. Cervicalgia    3. Thoracic segment dysfunction    4. Spasm of muscle        Patient's condition:  Patient had restrictions pre-manipulation    Treatment effectiveness:  Post manipulation there is better intersegmental movement and Patient claims to feel looser post manipulation      Procedures:  CMT:  51595 Chiropractic manipulative treatment 1-2 regions performed   Cervical: Activator, C6, C7 , Prone  Thoracic: Activator, T1, T2, Prone    Modalities:  71802: Acupuncture with estim for 15 minutes: Points: [place]so[place]ina Points in cervical spine  Ahsi point in upper traps  For 15 minutes    Therapeutic procedures:  None      Prognosis: Good    Progress  towards Goals: Patient is making progress towards the goal     Response to Treatment:   Reduction in symptoms as reported by patient      Recommendations:    Instructions:ice 20 minutes every other hour as needed    Follow-up:  Return to care in one week

## 2017-05-03 NOTE — TELEPHONE ENCOUNTER
Chart review shows patient attended nurse only appointment as scheduled and completed all requirements.    Mini Manuel, MSN, RN-BC  Care Coordinator  [facility]

## 2017-05-09 ENCOUNTER — THERAPY VISIT (OUTPATIENT)
Dept: CHIROPRACTIC MEDICINE | Facility: CLINIC | Age: 57
End: 2017-05-09
Payer: COMMERCIAL

## 2017-05-09 DIAGNOSIS — M62.838 SPASM OF MUSCLE: ICD-10-CM

## 2017-05-09 DIAGNOSIS — M99.02 THORACIC SEGMENT DYSFUNCTION: ICD-10-CM

## 2017-05-09 DIAGNOSIS — M54.2 CERVICALGIA: ICD-10-CM

## 2017-05-09 DIAGNOSIS — M99.01 CERVICAL SEGMENT DYSFUNCTION: Primary | ICD-10-CM

## 2017-05-09 PROCEDURE — 97813 ACUP 1/> W/ESTIM 1ST 15 MIN: CPT | Performed by: CHIROPRACTOR

## 2017-05-09 PROCEDURE — 98940 CHIROPRACT MANJ 1-2 REGIONS: CPT | Mod: AT | Performed by: CHIROPRACTOR

## 2017-05-09 NOTE — PROGRESS NOTES
Visit #:  3 of 6, based on treatment plan    Subjective:  Truman Suero is a 56 year old male who is seen in f/u up for:        Cervical segment dysfunction  Cervicalgia  Thoracic segment dysfunction  Spasm of muscle.     Since last visit on 4/25/2017,  Truman Suero reports the following changes: Pain immediately after last treatment: 3/10 and their pain level today 9/10.  Truman reports that he had relief for about a day and a half after last visit and then the pain started to come back.  Today has been a very busy and stressful day and he is feeling very sore..    Area of chief complaint:  Cervical :  Symptoms are graded at 9/10. The quality is described as stiff, achey.  Motion has remained about the same, no improvement. Patient feels that they have not improved and feel the same.        Objective:  The following was observed:    P: pain elicited on palpation, upper traps    A: static palpation demonstrates intersegmental asymmetry , cervical and thoracic spine    R: motion palpation notes restricted motion, :  C6 Right rotation restricted  C7 Right rotation restricted  T1 Left rotation restricted  T2 Left rotation restricted    T: hypertonicity at: Traps Bilaterally      Assessment:    Segmental spinal dysfunction/restrictions found at:  C6   C7   T1   T2     Diagnoses:      1. Cervical segment dysfunction    2. Cervicalgia    3. Thoracic segment dysfunction    4. Spasm of muscle        Patient's condition:  Patient had restrictions pre-manipulation    Treatment effectiveness:  Post manipulation there is better intersegmental movement and Patient claims to feel looser post manipulation      Procedures:  CMT:  69732 Chiropractic manipulative treatment 1-2 regions performed   Cervical: Activator, C6, C7 , Prone  Thoracic: Activator, T1, T2, Prone    Modalities:  59398: Acupuncture with estim for 15 minutes: Points: Matias Points in cervical spine  Ahsi point in upper traps  For 15  minutes    Therapeutic procedures:  None      Prognosis: Good    Progress towards Goals: Patient is making progress towards the goal     Response to Treatment:   Reduction in symptoms as reported by patient      Recommendations:    Instructions:ice 20 minutes every other hour as needed    Follow-up:  Return to care in one week

## 2017-05-10 ENCOUNTER — THERAPY VISIT (OUTPATIENT)
Dept: PHYSICAL THERAPY | Facility: CLINIC | Age: 57
End: 2017-05-10
Payer: COMMERCIAL

## 2017-05-10 DIAGNOSIS — M54.9 CHRONIC NECK AND BACK PAIN: ICD-10-CM

## 2017-05-10 DIAGNOSIS — M54.2 CHRONIC NECK AND BACK PAIN: ICD-10-CM

## 2017-05-10 DIAGNOSIS — G89.29 CHRONIC NECK AND BACK PAIN: ICD-10-CM

## 2017-05-10 DIAGNOSIS — M54.12 CERVICAL RADICULOPATHY: ICD-10-CM

## 2017-05-10 PROCEDURE — 97110 THERAPEUTIC EXERCISES: CPT | Mod: GP | Performed by: PHYSICAL THERAPIST

## 2017-05-17 ENCOUNTER — THERAPY VISIT (OUTPATIENT)
Dept: PHYSICAL THERAPY | Facility: CLINIC | Age: 57
End: 2017-05-17
Payer: COMMERCIAL

## 2017-05-17 DIAGNOSIS — M54.2 CHRONIC NECK AND BACK PAIN: ICD-10-CM

## 2017-05-17 DIAGNOSIS — G89.29 CHRONIC NECK AND BACK PAIN: ICD-10-CM

## 2017-05-17 DIAGNOSIS — M54.12 CERVICAL RADICULOPATHY: ICD-10-CM

## 2017-05-17 DIAGNOSIS — M54.9 CHRONIC NECK AND BACK PAIN: ICD-10-CM

## 2017-05-17 PROCEDURE — 97112 NEUROMUSCULAR REEDUCATION: CPT | Mod: GP | Performed by: PHYSICAL THERAPIST

## 2017-05-17 PROCEDURE — 97110 THERAPEUTIC EXERCISES: CPT | Mod: GP | Performed by: PHYSICAL THERAPIST

## 2017-05-17 NOTE — PROGRESS NOTES
"Subjective:    HPI                    Objective:    System    Physical Exam    General     ROS    Assessment/Plan:      PROGRESS  REPORT    Progress reporting period is from 05/09/17 to 05/17/17.       SUBJECTIVE  Subjective changes noted by patient:  Patient feels overall he has progressed 60-70% with therapy.   Patient reports \"mornings are still awful\"  and by the end of the day after work he is in more pain as well.  Diligent with HEP, performing ini the morning, while at work and in the evenings.     Current Pain level: 6/10.     Initial Pain level: 8/10 (3-8/10 range).   Changes in function:  Yes (See Goal flowsheet attached for changes in current functional level)  Adverse reaction to treatment or activity: None    OBJECTIVE  Changes noted in objective findings:  Yes, minimal improvement in cervical AROM  C/S AROM:  flex: wnl (ERP), ext: wnl (-),  rot (B): min loss (stiff), SB (B):  wnl (-).    Pelvic alignment standing:  wnl,  supine:  (L) ASIS down, (L) PSIS high      ASSESSMENT/PLAN  Updated problem list and treatment plan: Diagnosis 1:  Cervicalgia  Pain -  manual therapy, self management, education, directional preference exercise and home program  Decreased ROM/flexibility - manual therapy, therapeutic exercise and home program  Decreased function - therapeutic activities and home program  STG/LTGs have been met or progress has been made towards goals:  Yes (See Goal flow sheet completed today.)  Assessment of Progress: The patient's condition has potential to improve.  Patient is meeting short term goals and is progressing towards long term goals.  Self Management Plans:  Patient has been instructed in a home treatment program.  Patient is independent in a home treatment program.  Patient  has been instructed in self management of symptoms.  Patient is independent in self management of symptoms.  I have re-evaluated this patient and find that the nature, scope, duration and intensity of the therapy is " appropriate for the medical condition of the patient.  Truman continues to require the following intervention to meet STG and LTG's:  PT    Recommendations:  This patient would benefit from continued therapy.     Frequency:  1 X a month, once daily  Duration:  for 2 months        Please refer to the daily flowsheet for treatment today, total treatment time and time spent performing 1:1 timed codes.

## 2017-05-22 ENCOUNTER — TELEPHONE (OUTPATIENT)
Dept: FAMILY MEDICINE | Facility: CLINIC | Age: 57
End: 2017-05-22

## 2017-05-22 NOTE — TELEPHONE ENCOUNTER
Forms received from [employer] for Tiffanie Moulton PA-C.  Forms placed in provider 'sign me' folder.  Please fax forms to [phone] after completion.    Brittaney Ramirez  Patient Representative

## 2017-05-23 ENCOUNTER — THERAPY VISIT (OUTPATIENT)
Dept: CHIROPRACTIC MEDICINE | Facility: CLINIC | Age: 57
End: 2017-05-23
Payer: COMMERCIAL

## 2017-05-23 DIAGNOSIS — M62.838 SPASM OF MUSCLE: ICD-10-CM

## 2017-05-23 DIAGNOSIS — M99.02 THORACIC SEGMENT DYSFUNCTION: ICD-10-CM

## 2017-05-23 DIAGNOSIS — M99.01 CERVICAL SEGMENT DYSFUNCTION: Primary | ICD-10-CM

## 2017-05-23 DIAGNOSIS — M54.2 CERVICALGIA: ICD-10-CM

## 2017-05-23 PROCEDURE — 97810 ACUP 1/> WO ESTIM 1ST 15 MIN: CPT | Performed by: CHIROPRACTOR

## 2017-05-23 PROCEDURE — 98940 CHIROPRACT MANJ 1-2 REGIONS: CPT | Mod: AT | Performed by: CHIROPRACTOR

## 2017-05-23 NOTE — PROGRESS NOTES
Visit #:  3 of 6, based on treatment plan    Subjective:  Truman Suero is a 56 year old male who is seen in f/u up for:        Cervical segment dysfunction  Cervicalgia  Thoracic segment dysfunction  Spasm of muscle.     Since last visit on 5/9/2017,  Truman Suero reports the following changes: Pain immediately after last treatment: 3/10 and their pain level today 5/10.  Truman reports that his neck feels about the same.  He has been going to pt and has received some stretches that are semingly helping him out.  Through he still feels stiff overall he is doing a little better.    Area of chief complaint:  Cervical :  Symptoms are graded at 5/10. The quality is described as stiff, achey.  Motion has remained about the same, no improvement. Patient feels that they have not improved and feel the same.        Objective:  The following was observed:    P: pain elicited on palpation, upper traps    A: static palpation demonstrates intersegmental asymmetry , cervical and thoracic spine    R: motion palpation notes restricted motion, :  C6 Right rotation restricted  C7 Right rotation restricted  T1 Left rotation restricted  T2 Left rotation restricted    T: hypertonicity at: Traps Bilaterally      Assessment:    Segmental spinal dysfunction/restrictions found at:  C6   C7   T1   T2     Diagnoses:      1. Cervical segment dysfunction    2. Cervicalgia    3. Thoracic segment dysfunction    4. Spasm of muscle        Patient's condition:  Patient had restrictions pre-manipulation    Treatment effectiveness:  Post manipulation there is better intersegmental movement and Patient claims to feel looser post manipulation      Procedures:  CMT:  84042 Chiropractic manipulative treatment 1-2 regions performed   Cervical: Activator, C6, C7 , Prone  Thoracic: Activator, T1, T2, Prone    Modalities:  18933: Acupuncture without estim for 15 minutes: Points: Matias Points in cervical spine  Ahsi point in upper traps  For 15  minutes    Therapeutic procedures:  None      Prognosis: Good    Progress towards Goals: Patient is making progress towards the goal     Response to Treatment:   Reduction in symptoms as reported by patient      Recommendations:    Instructions:ice 20 minutes every other hour as needed    Follow-up:  Return to care in one week

## 2017-05-25 ENCOUNTER — OFFICE VISIT (OUTPATIENT)
Dept: PALLIATIVE MEDICINE | Facility: CLINIC | Age: 57
End: 2017-05-25
Payer: COMMERCIAL

## 2017-05-25 VITALS
WEIGHT: 140 LBS | DIASTOLIC BLOOD PRESSURE: 84 MMHG | BODY MASS INDEX: 21.93 KG/M2 | SYSTOLIC BLOOD PRESSURE: 150 MMHG | HEART RATE: 62 BPM

## 2017-05-25 DIAGNOSIS — M54.9 CHRONIC NECK AND BACK PAIN: ICD-10-CM

## 2017-05-25 DIAGNOSIS — G89.29 CHRONIC NECK AND BACK PAIN: ICD-10-CM

## 2017-05-25 DIAGNOSIS — M47.812 CERVICAL SPONDYLOSIS WITHOUT MYELOPATHY: Primary | ICD-10-CM

## 2017-05-25 DIAGNOSIS — M54.50 CHRONIC BILATERAL LOW BACK PAIN WITHOUT SCIATICA: ICD-10-CM

## 2017-05-25 DIAGNOSIS — M54.2 CHRONIC NECK AND BACK PAIN: ICD-10-CM

## 2017-05-25 DIAGNOSIS — M79.18 MYOFASCIAL PAIN: ICD-10-CM

## 2017-05-25 DIAGNOSIS — M50.30 DDD (DEGENERATIVE DISC DISEASE), CERVICAL: ICD-10-CM

## 2017-05-25 DIAGNOSIS — M54.2 CHRONIC NECK PAIN: ICD-10-CM

## 2017-05-25 DIAGNOSIS — G89.29 CHRONIC BILATERAL LOW BACK PAIN WITHOUT SCIATICA: ICD-10-CM

## 2017-05-25 DIAGNOSIS — G89.29 CHRONIC NECK PAIN: ICD-10-CM

## 2017-05-25 DIAGNOSIS — M54.12 CERVICAL RADICULOPATHY: ICD-10-CM

## 2017-05-25 PROCEDURE — 99214 OFFICE O/P EST MOD 30 MIN: CPT | Performed by: NURSE PRACTITIONER

## 2017-05-25 RX ORDER — TRAMADOL HYDROCHLORIDE 100 MG/1
100 TABLET, EXTENDED RELEASE ORAL DAILY
Qty: 30 TABLET | Refills: 0 | Status: SHIPPED | OUTPATIENT
Start: 2017-05-25 | End: 2017-07-05

## 2017-05-25 RX ORDER — TRAMADOL HYDROCHLORIDE 50 MG/1
TABLET ORAL
Qty: 120 TABLET | Refills: 0 | Status: SHIPPED | OUTPATIENT
Start: 2017-05-25 | End: 2017-07-05

## 2017-05-25 RX ORDER — METHOCARBAMOL 500 MG/1
500-1000 TABLET, FILM COATED ORAL 3 TIMES DAILY PRN
Qty: 150 TABLET | Refills: 1 | Status: SHIPPED | OUTPATIENT
Start: 2017-05-25 | End: 2017-10-12

## 2017-05-25 ASSESSMENT — PAIN SCALES - GENERAL: PAINLEVEL: EXTREME PAIN (8)

## 2017-05-25 NOTE — NURSING NOTE
"Chief Complaint   Patient presents with     Pain       Initial /84  Pulse 62  Wt 63.5 kg (140 lb)  BMI 21.93 kg/m2 Estimated body mass index is 21.93 kg/(m^2) as calculated from the following:    Height as of 3/17/17: 1.702 m (5' 7\").    Weight as of this encounter: 63.5 kg (140 lb).  Medication Reconciliation: benedict Pham CMA (AAMA)      "

## 2017-05-25 NOTE — PROGRESS NOTES
[facility]    5/25/2017    Chief complaint: neck and back pain    Interval history:  Truman Suero is a 56 year old male is known to me for   Chronic neck pain  Cervical DDD  Cervical spondylosis (pain worse with extension/rotation indicating facetogenic component to pain)  Axial low back pain  Myofascial pain/muscle spasms  Remote history of ETOH overuse, attended AA for awhile. Sober for 10 years  ---PMHx includes: neck pain  ---PSHx includes: none listed      Recommendations/plan at the last visit on 3/28/2017 included:  1. Physical Therapy: continue outside PHYSICAL THERAPY, already attending  2. Clinical Health Psychologist to address issues of relaxation, behavioral change, coping style, and other factors important to improvement: coping well at present. Consider in future if needed  3. Introductory groups: not ordered, works full time odd hours  4. Diagnostic Studies: none at present.   5. Medication Management:   1. Slowly increase gabapentin to 600mg TID (he is only taking this once per day)  2. Start methocarbamol as needed for muscle spasms  3. I will take over his Tramadol, he is to call 7 days before he needs a refill and then will need to come in to meet with nurses and sign opiate agreement  6. Further procedures recommended: none at present  7. Acupuncture: consider, he is to call me if covered by insurance  8. Urine toxicology screen today: none needed, done in February 2017 and as expected   9. Recommendations/follow-up for PCP:  See above  10. Release of information: may need DAC[facility] from [facility] in future for lumbar imaging  11. Follow up: 8 weeks        Since his last visit, Truman Suero reports:  - neck pain remains the most bothersome  -also has ongoing axial low back pain  -he is working with Dr. Hitesh Cuba for acupuncture  -he is also doing PHYSICAL THERAPY   - interested in injections to see if we can impact his neck pain    At this point, the patient's participation  "with our multidisciplinary team includes:  The patient has been compliant with the program.  Pain Group - not ordered  PT - attending non-pain management PHYSICAL THERAPY   Health Psych - not ordered  Acupuncture: working with Dr. Bereket LAY      Pain scores:  Pain intensity on average is 8 on a scale of 0-10.    Range is 4-10/10.   Pain right now is 8/10.  Pain is described as \"aching, numb, unbearable, burning, shooting, exhausting, throbbing, sharp, stabbing, miserable, nagging.\"    Pain is constant in nature    Current pain relevant medications:   -Tramadol 50mg take 1 tablet twice daily (helpful)  -ibuprofen 600mg PRN (helpful)  -gabapentin 600mg TID (trying to take TID)  -methocarbamol 500-1000mg TID PRN muscle spasms (somewhat helpful)      Other pertinent medications:  None    Previous Medications:  OPIATES: Tramdol (somewhat helpful)  NSAIDS: ibuprofen (helpful), Aleve (helpful)  MUSCLE RELAXANTS: none  ANTI-MIGRAINE MEDS: none  ANTI-DEPRESSANTS: none  SLEEP AIDS: none  ANTI-CONVULSANTS: gabapentin (helpful)  TOPICALS: lidocaine ointment (somewhat helpful)  Other meds: Tylenol (not helpful)        Other treatments have included:  Truman REI Suero has not been seen at a pain clinic in the past.    PT: tried, somewhat helpful  Chiropractic: helpful  Acupuncture: none  TENs Unit: none     Injections: cervical radiofrequency nerve ablation at [facility] in December 2016 (did get good relief, got 70% relief of his typical neck pain)           THE 4 A's OF OPIOID MAINTENANCE ANALGESIA    Analgesia: tramadol helps some but does not last very long, using short acting only    Activity: ADLS and PHYSICAL THERAPY exercises    Adverse effects: none    Adherence to Rx protocol: yes      Side Effects: none  Patient is using the medication as prescribed: yes    Medications:  Current Outpatient Prescriptions   Medication Sig Dispense Refill     traMADol (ULTRAM) 50 MG tablet May take 1-2 tablets every 6 hours as needed " for pain, max of 4 tabs per day. OK to fill on 5/3/2017 and start on 5/4/2017 to last 30 days. Reduce as able. 120 tablet 0     methocarbamol (ROBAXIN) 500 MG tablet Take 1-2 tablets (500-1,000 mg) by mouth 3 times daily as needed for muscle spasms Start with lower dose and caution sedation 150 tablet 1     gabapentin (NEURONTIN) 600 MG tablet Take 1 tablet (600 mg) by mouth 3 times daily 90 tablet 5     ibuprofen (ADVIL,MOTRIN) 800 MG tablet   0     varenicline (CHANTIX STARTING MONTH GIO) 0.5 MG X 11 & 1 MG X 42 tablet Take 0.5 mg tab daily for 3 days, then 0.5 mg tab twice daily for 4 days, then 1 mg twice daily. (Patient not taking: Reported on 3/28/2017) 53 tablet 0     gabapentin (NEURONTIN) 300 MG capsule Take 1 capsule (300 mg) by mouth 3 times daily (Patient not taking: Reported on 5/25/2017) 270 capsule 1       Medical History: any changes in medical history since they were last seen? none    Social History:   Home situation: , has 4 kids, 2 at home, one in college and one launched.   Occupation/Schooling:  full time in [employer]  Tobacco use: smoking, planning on quitting smoking  Alcohol use: sober for nearly 10 years. He used to work a sobriety program, used to go to   Drug use: none  History of chemical dependency treatment: no formal treatment, did AA    Review of Systems:  ROS is positive as per HPI as well as for chills, fatigue, headaches, shortness of breath, nausea, joint pain, stiffness, back pain and neck pain, weakness, numbness/tingling, anxiety, stress and is negative for fevers, chills, sweats, or constipation, diarrhea.    Physical Exam:  Vital signs: Blood pressure 150/84, pulse 62, weight 63.5 kg (140 lb).    Behavioral observations:  Awake, alert, cooperative    Gait:  normal    Musculoskeletal exam:    Cervical flexion to 40 degrees  Cervical extension to 20 degrees  Cervical extension/rotation to the right is painful  Cervical extension/rotation to the left is  painful  Cervical rotation to the right to 80 degrees  Cervical rotation to the left to 80 degrees  Strength grossly equal throughout    Neuro exam:  SILT in all extremities     Skin/vascular/autonomic:  No suspicious lesions on exposed skin.      Other:  NA    IMAGING:  MR CERVICAL SPINE WITHOUT CONTRAST  2/22/2017 9:59 AM     HISTORY:  Bilateral neck and arm pain for three years.     COMPARISON: None.     TECHNIQUE: Routine MR cervical spine extended through T2.     FINDINGS: There is some degenerative bone marrow signal change C3-C6.  No malignant or destructive lesions. Normal alignment through T2.  Reversed cervical adenosis C3-C6.     The cervical and upper thoracic spinal cord appear intrinsically  normal. The craniocervical junction region is normal. No paraspinous  soft tissue abnormality.     Findings by level as follows:     C2-C3: Negative. No disc protrusion. No central or lateral stenosis.     C3-C4: Mild disc space narrowing. Mild diffuse annular bulge. Small  uncinate spur on the right. No significant central or left-sided  stenosis. Mild right-sided foraminal stenosis.     C4-C5: Moderate degenerative narrowing of the interspace. Mild ventral  disc osteophyte complex with minimal central stenosis. Small uncinate  spurs bilaterally with mild bilateral foraminal stenosis.     C6-C7: Moderate disc space narrowing. Mild broad-based disc osteophyte  complex with minimal central stenosis. Minimal uncinate spurs with  mild bilateral foraminal stenosis.     C6-C7: Moderate disc space narrowing. Mild ventral disc osteophyte  complex. No significant central or lateral stenosis.     C7-T1: No disc protrusion. No central or lateral stenosis. Moderate  bilateral facet joint disease.         IMPRESSION:  1. Degenerative changes as described C3-C4 through C7-T1. There is  only mild central and foraminal stenosis as described.  2. No intrinsic spinal cord abnormality through T2    [place] Prescription Monitoring  Program:  reviewed    DIRE Score for selecting candidates for long term opioid analgesia for chronic pain:  Diagnosis  2  Intractablility  2  Risk    Psychological health  2    Chemical health  2    Reliability  2    Social support  3  Efficacy  2    Total DIRE Score = 15. Note that   7-13 predicts poor outcome (compliance and efficacy) from opioid prescribing; 14-21 predicts good outcome (compliance and efficacy)  from opioid prescribing.      Assessment:   1. Cervical spondylosis (pain worse with extension/rotation indicating facetogenic component to pain)  2. Cervical DDD  3. Axial low back pain  4. Chronic neck and back pain  5. Cervical radiculopathy  6. Myofascial pain/muscle spasms  7. Remote history of ETOH overuse, attended AA for awhile. Sober for 10 years  8. PMHx includes: neck pain  9. PSHx includes: none listed      Plan:  1. Physical Therapy: none at present  2. Patient is to continue his home exercise program and exercises learned in PHYSICAL THERAPY  3. Clinical Health Psychologist: not at present  4. Diagnostic Studies:  none  5. Medication Management:    1. Continue Tramadol short-acting  2. Add long-acting Tramadol 100mg once daily, start initially at bedtime to see if this helps in the morning  3. Continue methocarbamol  4. I agree with ongoing gabapentin, take regularly for best results  6. Further procedures recommended: schedule cervical facet joint steroid injections, our office to contact patient to schedule  7. Recommendations to PCP: see above  8. Follow up: 8 weeks    Total time spent face to face was 35 minutes and more than 50% of face to face time was spent in counseling and/or coordination of care regarding the diagnosis and recommendations above.      Melanie STEVENS, RN CNP, FNP  [facility] [place]

## 2017-05-25 NOTE — PATIENT INSTRUCTIONS
PLAN:  1. Continue home activities and doing exercises learned in PHYSICAL THERAPY  2. Medications:   1. Continue Tramadol short acting  2. Add long-acting Tramadol 100mg once daily, start initially at bedtime to see if this helps in the morning  3. Continue methocarbamol  4. I agree with ongoing gabapentin, take regularly for best results  3. Procedures: schedule cervical facet joint steroid injections, our office will call you to schedule  4. Follow-up with me in 8 weeks.       ----------------------------------------------------------------  Nurse Triage line:  [phone]   Call this number with any questions or concerns. You may leave a detailed message anytime. Calls are typically returned Monday through Friday between 8 AM and 4:30 PM. We usually get back to you within 2 business days depending on the issue/request.       Medication refills:    For non-narcotic medications, call your pharmacy directly to request a refill. The pharmacy will contact the [facility] for authorization. Please allow 3-4 days for these refills to be processed.     For narcotic refills, call the nurse triage line or send a [employer] message. Please contact us 7-10 days before your refill is due. The message MUST include the name of the specific medication(s) requested and how you would like to receive the prescription(s). The options are as follows:    Pain Clinic staff can mail the prescription to your pharmacy. Please tell us the name of the pharmacy.    You may pick the prescription up at the Pain Clinic (tell us the location) or during a clinic visit with your pain provider    Pain Clinic staff can deliver the prescription to the [place] pharmacy in the clinic building. Please tell us the location.      Scheduling number: [phone].  Call this number to schedule or change appointments.    We believe regular attendance is key to your success in our program.    Any time you are unable to keep your appointment we ask  that you call us at least 24 hours in advance to let us know. This will allow us to offer the appointment time to another patient.

## 2017-05-25 NOTE — MR AVS SNAPSHOT
After Visit Summary   5/25/2017    Truman Suero    MRN: [id number]           Patient Information     Date Of Birth          1960        Visit Information        Provider Department      5/25/2017 3:30 PM Melanie Thomas APRN [facility]        Today's Diagnoses     Cervical spondylosis without myelopathy    -  1    DDD (degenerative disc disease), cervical        Chronic bilateral low back pain without sciatica        Chronic neck and back pain        Cervical radiculopathy        Chronic neck pain        Myofascial pain          Care Instructions    PLAN:  1. Continue home activities and doing exercises learned in PHYSICAL THERAPY  2. Medications:   1. Continue Tramadol short acting  2. Add long-acting Tramadol 100mg once daily, start initially at bedtime to see if this helps in the morning  3. Continue methocarbamol  4. I agree with ongoing gabapentin, take regularly for best results  3. Procedures: schedule cervical facet joint steroid injections, our office will call you to schedule  4. Follow-up with me in 8 weeks.       ----------------------------------------------------------------  Nurse Triage line:  [phone]   Call this number with any questions or concerns. You may leave a detailed message anytime. Calls are typically returned Monday through Friday between 8 AM and 4:30 PM. We usually get back to you within 2 business days depending on the issue/request.       Medication refills:    For non-narcotic medications, call your pharmacy directly to request a refill. The pharmacy will contact the [facility] for authorization. Please allow 3-4 days for these refills to be processed.     For narcotic refills, call the nurse triage line or send a [employer] message. Please contact us 7-10 days before your refill is due. The message MUST include the name of the specific medication(s) requested and how you would like to receive the prescription(s). The options  are as follows:    Pain Clinic staff can mail the prescription to your pharmacy. Please tell us the name of the pharmacy.    You may pick the prescription up at the Pain Clinic (tell us the location) or during a clinic visit with your pain provider    Pain Clinic staff can deliver the prescription to the [place] pharmacy in the clinic building. Please tell us the location.      Scheduling number: [phone].  Call this number to schedule or change appointments.    We believe regular attendance is key to your success in our program.    Any time you are unable to keep your appointment we ask that you call us at least 24 hours in advance to let us know. This will allow us to offer the appointment time to another patient.               Follow-ups after your visit        Additional Services     PAIN INJECTION EVAL/TREAT/FOLLOW UP                 Your next 10 appointments already scheduled     May 30, 2017  2:00 PM CDT   [facility] Acupuncture Follow Up with ALIREZA Olivier [facility] [place] [facility] ([facility] [place])    [address]  [place] [address]   [phone]            Jun 07, 2017  2:00 PM CDT   [facility] Spine with Dali Avila PT   [facility] ([facility]    [address]  [facility] [id number] [phone]              Who to contact     If you have questions or need follow up information about today's clinic visit or your schedule please contact [facility] directly at [phone].  Normal or non-critical lab and imaging results will be communicated to you by MyChart, letter or phone within 4 business days after the clinic has received the results. If you do not hear from us within 7 days, please contact the clinic through MyChart or phone. If you have a critical or abnormal lab result, we will notify you by phone as soon as possible.  Submit refill requests through [employer] or call your pharmacy and they  will forward the refill request to us. Please allow 3 business days for your refill to be completed.          Additional Information About Your Visit        [employer] Information     [employer] gives you secure access to your electronic health record. If you see a primary care provider, you can also send messages to your care team and make appointments. If you have questions, please call your primary care clinic.  If you do not have a primary care provider, please call [phone] and they will assist you.        Care EveryWhere ID     This is your Care EveryWhere ID. This could be used by other organizations to access your [place] medical records  [id number]        Your Vitals Were     Pulse BMI (Body Mass Index)                62 21.93 kg/m2           Blood Pressure from Last 3 Encounters:   05/25/17 150/84   03/28/17 150/86   03/17/17 130/74    Weight from Last 3 Encounters:   05/25/17 63.5 kg (140 lb)   03/28/17 62.1 kg (137 lb)   03/17/17 62.1 kg (137 lb)              We Performed the Following     PAIN INJECTION EVAL/TREAT/FOLLOW UP          Today's Medication Changes          These changes are accurate as of: 5/25/17  4:26 PM.  If you have any questions, ask your nurse or doctor.               These medicines have changed or have updated prescriptions.        Dose/Directions    * traMADol 50 MG tablet   Commonly known as:  ULTRAM   This may have changed:  additional instructions   Used for:  Chronic neck and back pain, Cervical radiculopathy        May take 1-2 tablets every 6 hours as needed for pain, max of 4 tabs per day. OK to fill on 6/1/2017 and start on 6/3/2017 to last 30 days. Reduce as able.   Quantity:  120 tablet   Refills:  0       * traMADol 100 MG 24 hr tablet   Commonly known as:  ULTRAM-ER   This may have changed:  You were already taking a medication with the same name, and this prescription was added. Make sure you understand how and when to take each.   Used for:  Cervical spondylosis  without myelopathy, DDD (degenerative disc disease), cervical, Chronic bilateral low back pain without sciatica, Chronic neck and back pain        Dose:  100 mg   Take 1 tablet (100 mg) by mouth daily Take one tablet daily. First try this at bedtime to see if it helps you in the morning, if not, then switch and try taking it during the day. It lasts for 12 hours. Fill on 6/1/2017 and start on 6/3/2017 and should last 30 days   Quantity:  30 tablet   Refills:  0       * Notice:  This list has 2 medication(s) that are the same as other medications prescribed for you. Read the directions carefully, and ask your doctor or other care provider to review them with you.         Where to get your medicines      These medications were sent to [facility] [address] [place], [place] [address] [address] [address]     Phone:  [phone]     methocarbamol 500 MG tablet         Some of these will need a paper prescription and others can be bought over the counter.  Ask your nurse if you have questions.     Bring a paper prescription for each of these medications     traMADol 100 MG 24 hr tablet    traMADol 50 MG tablet                Primary Care Provider Office Phone # Fax #    Tiffanie Moulton PA-C [phone]       [address] [address]        Thank you!     Thank you for choosing [facility]  for your care. Our goal is always to provide you with excellent care. Hearing back from our patients is one way we can continue to improve our services. Please take a few minutes to complete the written survey that you may receive in the mail after your visit with us. Thank you!             Your Updated Medication List - Protect others around you: Learn how to safely use, store and throw away your medicines at www.disposemymeds.org.          This list is accurate as of: 5/25/17  4:26 PM.  Always use your most recent med list.                    Brand Name Dispense Instructions for use    gabapentin 600 MG tablet    NEURONTIN    90 tablet    Take 1 tablet (600 mg) by mouth 3 times daily       ibuprofen 800 MG tablet    ADVIL/MOTRIN         methocarbamol 500 MG tablet    ROBAXIN    150 tablet    Take 1-2 tablets (500-1,000 mg) by mouth 3 times daily as needed for muscle spasms Start with lower dose and caution sedation       * traMADol 50 MG tablet    ULTRAM    120 tablet    May take 1-2 tablets every 6 hours as needed for pain, max of 4 tabs per day. OK to fill on 6/1/2017 and start on 6/3/2017 to last 30 days. Reduce as able.       * traMADol 100 MG 24 hr tablet    ULTRAM-ER    30 tablet    Take 1 tablet (100 mg) by mouth daily Take one tablet daily. First try this at bedtime to see if it helps you in the morning, if not, then switch and try taking it during the day. It lasts for 12 hours. Fill on 6/1/2017 and start on 6/3/2017 and should last 30 days       varenicline 0.5 MG X 11 & 1 MG X 42 tablet    CHANTIX STARTING MONTH GIO    53 tablet    Take 0.5 mg tab daily for 3 days, then 0.5 mg tab twice daily for 4 days, then 1 mg twice daily.       * Notice:  This list has 2 medication(s) that are the same as other medications prescribed for you. Read the directions carefully, and ask your doctor or other care provider to review them with you.

## 2017-05-26 ENCOUNTER — TELEPHONE (OUTPATIENT)
Dept: PALLIATIVE MEDICINE | Facility: CLINIC | Age: 57
End: 2017-05-26

## 2017-05-26 NOTE — TELEPHONE ENCOUNTER
Called patient to schedule Cervical Facet Joint Injection. CATHERINE Croft    [facility] Management Clinic

## 2017-05-30 NOTE — TELEPHONE ENCOUNTER
Pre-screening Questions for Radiology Injections:    Injection to be done at which interventional clinic site? [facility]    Procedure ordered by Melanie Thomas    Procedure ordered? Thoracic Facet Joint Injection    What insurance would patient like us to bill for this procedure? medica      Worker's comp-Any injection DO NOT SCHEDULE and route to Monalisa Dominguez.      [employer] insurance - For SI joint injections, DO NOT SCHEDULE and route Monalisa Dominguez.      [employer]- MBB's must be scheduled at LEAST two weeks apart      [employer] - Any injection besides hip/shoulder/knee joint DO NOT SCHEDULE and route to Monalisa Dominguez. She will obtain PA and call pt back to schedule procedure or notify pt of denial.     HP [employer]-PA REQUIRED FOR NON-DEMARCUS OR Joint injections    Any chance of pregnancy? Not Applicable   If YES, do NOT schedule and route to RN pool    Is an  needed? No     Patient has a drive home? (mandatory) YES:     Is patient taking any blood thinners (plavix, coumadin, jantoven, warfarin, heparin, pradaxa or dabigatran )? No   If hold needed, do NOT schedule, route to RN pool     Is patient taking any aspirin products? No     If more than 325mg/day do NOT schedule; route to RN pool     For CERVICAL procedures, hold all aspirin products for 6 days.      Does the patient have a bleeding or clotting disorder? No     If yes, okay to schedule AND route to RN nurse pool    **For any patients with platelet count <100, must be forwarded to provider**    Is patient diabetic?  No  If YES, have them bring their glucometer.    Does patient have an active infection or treated for one within the past week? No     Is patient currently taking any antibiotics?  No     For patients on chronic, preventative, or prophylactic antibiotics, procedures may be scheduled.     For patients on antibiotics for active or recent infection:    Silvana Melgar [place], [place]-antibiotic  course must have been completed for 4 days    Nerissa Ovalles-antibiotic course must have been completed for 7 days    Is patient currently taking any steroid medications? (i.e. Prednisone, Medrol)  No     For patients on steroid medications:    Silvana Melgar [place], [place]-steroid course must have been completed for 4 days    Nerissa Ovalles-steroid course must have been completed for 7 days    Reviewed with patient:  If you are started on any steroids or antibiotics between now and your appointment, you must contact us because it may affect our ability to perform your procedure.  Yes    Is patient actively being treated for cancer or immunocompromised, including the spleen having been removed? No    If YES, do NOT schedule and route to RN pool     **For Dr. Escobar patients without spleens should have the chart sent to her**    Are you able to get on and off an exam table with minimal or no assistance? Yes  If NO, do NOT schedule and route to RN pool    Are you able to roll over and lay on your stomach with minimal or no assistance? Yes  If NO, do NOT schedule and route to RN pool     Any allergies to contrast dye, iodine, shellfish, or numbing and steroid medications? No  If YES, route to RN pool AND add allergy information to appointment notes    Allergies: Review of patient's allergies indicates no known allergies.        Has the patient had a flu shot or any other vaccinations within 7 days before or after the procedure.  No       Does patient have an MRI/CT?  YES:   (SI joint, hip injections, lumbar sympathetic blocks, and stellate ganglion blocks do not require an MRI)    Was the MRI done w/in the last 3 years?  Yes    Was MRI done at [facility]? Yes      If not, where was it done? N/A       If MRI was not done at [facility], [facility] or [facility] do NOT schedule and route to nursing.  If pt has an imaging disc, the injection may be scheduled but pt has to bring disc to appt. If they show  up w/out disc the injection cannot be done    Reminders (please tell patient if applicable):       Instructed pt to arrive 30 minutes early for IV start if this is for a cervical procedure, ALL sympathetic (stellate ganglion, hypogastric, or lumbar sympathetic block) and all sedation procedures (RFA, spinal cord stimulation trials).  YES:     -IVs are not routinely placed for Pina and [place]azi cervical case       If NPO for sedation, informed patient that it is okay to take medications with sips of water (except if they are to hold blood thinners).  Not Applicable   *DO take blood pressure medication if it is prescribed*      If this is for a cervical DEMARCUS, informed patient that aspirin needs to be held for 6 days.   Not Applicable      For all patients not having spinal cord stimulator (SCS) trials or radiofrequency ablations (RFAs), informed patient:  IV sedation is not provided for this procedure.  If you feel that an oral anti-anxiety medication is needed, you can discuss this further with your referring provider or primary care provider.  The Pain Clinic provider will discuss specifics of what the procedure includes at your appointment.  Most procedures last 10-20 minutes.  We use numbing medications to help with any discomfort during the procedure.  Not Applicable      Do not schedule procedures requiring IV placement in the first appointment of the day or first appointment after lunch.       For patients 85 or older we recommend having an adult stay w/ them for the remainder of the day.       Does the patient have any questions?  NO  Arely Kemp  [facility]

## 2017-06-07 ENCOUNTER — THERAPY VISIT (OUTPATIENT)
Dept: PHYSICAL THERAPY | Facility: CLINIC | Age: 57
End: 2017-06-07
Payer: COMMERCIAL

## 2017-06-07 DIAGNOSIS — M54.12 CERVICAL RADICULOPATHY: ICD-10-CM

## 2017-06-07 DIAGNOSIS — G89.29 CHRONIC NECK AND BACK PAIN: ICD-10-CM

## 2017-06-07 DIAGNOSIS — M54.2 CHRONIC NECK AND BACK PAIN: ICD-10-CM

## 2017-06-07 DIAGNOSIS — M54.9 CHRONIC NECK AND BACK PAIN: ICD-10-CM

## 2017-06-07 PROCEDURE — 97112 NEUROMUSCULAR REEDUCATION: CPT | Mod: GP | Performed by: PHYSICAL THERAPIST

## 2017-06-07 PROCEDURE — 97110 THERAPEUTIC EXERCISES: CPT | Mod: GP | Performed by: PHYSICAL THERAPIST

## 2017-06-07 PROCEDURE — 97140 MANUAL THERAPY 1/> REGIONS: CPT | Mod: GP | Performed by: PHYSICAL THERAPIST

## 2017-06-07 NOTE — TELEPHONE ENCOUNTER
Medication Detail      Disp Refills Start End JM   gabapentin (NEURONTIN) 600 MG tablet 90 tablet 5 2/13/2017  --   Sig: Take 1 tablet (600 mg) by mouth 3 times daily   Class: E-Prescribe   Route: Oral   Order: [id number]       Last Office Visit with [facility] or [facility] prescribing provider: 3/17/2017  Future Office visit:       Routing refill request to provider for review/approval because:  Drug not on the [facility], [facility] or [facility] refill protocol or controlled substance

## 2017-06-08 NOTE — TELEPHONE ENCOUNTER
This refill indicates that he should be good with refills until August.  Why is this being requested early?

## 2017-06-09 RX ORDER — GABAPENTIN 300 MG/1
CAPSULE ORAL
Qty: 270 CAPSULE | Refills: 0 | OUTPATIENT
Start: 2017-06-09

## 2017-06-29 ENCOUNTER — RADIANT APPOINTMENT (OUTPATIENT)
Dept: RADIOLOGY | Facility: CLINIC | Age: 57
End: 2017-06-29
Attending: ANESTHESIOLOGY

## 2017-06-29 ENCOUNTER — RADIOLOGY INJECTION OFFICE VISIT (OUTPATIENT)
Dept: PALLIATIVE MEDICINE | Facility: CLINIC | Age: 57
End: 2017-06-29
Payer: COMMERCIAL

## 2017-06-29 VITALS — HEART RATE: 57 BPM | OXYGEN SATURATION: 98 % | SYSTOLIC BLOOD PRESSURE: 151 MMHG | DIASTOLIC BLOOD PRESSURE: 91 MMHG

## 2017-06-29 DIAGNOSIS — M47.812 CERVICAL FACET JOINT SYNDROME: Primary | ICD-10-CM

## 2017-06-29 DIAGNOSIS — M47.812 CERVICAL SPONDYLOSIS WITHOUT MYELOPATHY: ICD-10-CM

## 2017-06-29 DIAGNOSIS — M47.812 SPONDYLOSIS OF CERVICAL REGION WITHOUT MYELOPATHY OR RADICULOPATHY: ICD-10-CM

## 2017-06-29 PROCEDURE — 64491 INJ PARAVERT F JNT C/T 2 LEV: CPT | Mod: RT | Performed by: ANESTHESIOLOGY

## 2017-06-29 PROCEDURE — 64490 INJ PARAVERT F JNT C/T 1 LEV: CPT | Mod: RT | Performed by: ANESTHESIOLOGY

## 2017-06-29 ASSESSMENT — PAIN SCALES - GENERAL
PAINLEVEL: MILD PAIN (3)
PAINLEVEL: SEVERE PAIN (7)

## 2017-06-29 NOTE — PATIENT INSTRUCTIONS
[facility]   Procedure Discharge Instructions    Today you saw:  Dr. Terrance Harding     You had an:  Cervical  facet joint injection      Medications used:  Lidocaine   Lidocaine 2%  Depo-medrol  Omnipaque           Be cautious when walking. Numbness and/or weakness in the lower extremities may occur up to 6-8 hours after the procedure due to effect of the local anesthetic    Do not drive for 6 hours. The effect of the local anesthetic could slow your reflexes.     You may resume your regular activities after 24 hours    Avoid strenuous activity for the first 24 hours    You may shower, however avoid swimming, tub baths or hot tubs for 24 hours following your procedure    You may have a mild to moderate increase in pain for several days following the injection.    It may take up to 14 days for the steroid medication to start working although you may feel the effect as early as a few days after the procedure.       You may use ice packs for 10-15 minutes, 3 to 4 times a day at the injection site for comfort    Do not use heat to painful areas for 6 to 8 hours. This will give the local anesthetic time to wear off and prevent you from accidentally burning your skin.     You may use anti-inflammatory medications (such as Ibuprofen or Aleve or Advil) or Tylenol for pain control if necessary    If you were fasting, you may resume your normal diet and medications after the procedure    If you have diabetes, check your blood sugar more frequently than usual as your blood sugar may be higher than normal for 10-14 days following a steroid injection. Contact your doctor who manages your diabetes if your blood sugar is higher than usual    If you experience any of the following, call the pain center nursing line during work hours at [phone] or the after hours provider line at [phone]:  -Fever over 100 degree F  -Swelling, bleeding, redness, drainage, warmth at the injection site  -Progressive  weakness or numbness in your arms  -Unusual headache that is not relieved by Tylenol  -Unusual new onset of pain that is not improving      Phone #s:  Appointment line: [phone];  Nurse line: [phone]

## 2017-06-29 NOTE — NURSING NOTE
Discharge Information    IV Discontiued Time:  1654 catheter intact    Amount of Fluid Infused:  NA    Discharge Criteria = When patient returns to baseline or as per MD order    Consciousness:  Pt is fully awake    Circulation:  BP +/- 20% of pre-procedure level    Respiration:  Patient is able to breathe deeply    O2 Sat:  Patient is able to maintain O2 Sat >92% on room air    Activity:  Moves 4 extremities on command    Ambulation:  Patient is able to stand and walk or stand and pivot into wheelchair    Dressing:  Clean/dry or No Dressing    Notes:   Discharge instructions and AVS given to patient    Patient meets criteria for discharge?  YES    Admitted to PCU?  No    Responsible adult present to accompany patient home?  Yes    Signature/Title:    Lianne Flannery RN Care Coordinator  [facility]

## 2017-06-29 NOTE — PROGRESS NOTES
Pre procedure Diagnosis: cervical facet arthropathy, cervical spondylosis   Post procedure Diagnosis: Same  Procedure performed: cervical facet joint injections at C4-5 and C5-6 on the right, fluoroscopically guided, contrast controlled  Indication:  Therapeutic for pain  Anesthesia: none  Complication:  none  Operators: Terrance Harding MD    Indications:   Truman Suero is a 56 year old male was sent by RODNEY Carpenter for cervical facet procedures.  The patient has a history of chronic neck pain that is worse on the right without upper extremity pain.  Exam shows tenderness along the right articular pillars and paraspinal muscles and reproduction of pain with extension and lateral rotation of the cervical spine to the right.  They have tried conservative treatment including physical therapy, cervical traction, medications, and previous interventional procedures.    Options/alternatives, benefits and risks were discussed with the patient including bleeding, infection, flared pain, tissue trauma, exposure to radiation, reaction to medications including seizure, spinal cord injury, paralysis, weakness, numbness and headache.     Questions were answered to his satisfaction and he wishes to proceed. Voluntary informed consent was obtained and signed.     Vitals were reviewed: Yes  BP (!) 151/91  Pulse 57  SpO2 98%  Allergies were reviewed:  Yes   Medications were reviewed:  Yes   Pre-procedure pain score: 7/10    Procedure:  After obtaining signed informed consent, the patient was brought into the procedure suite and was placed in a prone position on the procedure table.   A Pause for the Cause was performed.  The patient was prepped and draped in the usual sterile fashion.     Under AP fluoroscopic guidance the C4-5 and C5-6 facet joints on the right side were identified, and the C-arm was angled slightly caudally to open the joint space. A total of 2 ml of 1% lidocaine was injected at the needle entry  points and needle tracts. Then 27 gauge 3.5 inch spinal needles were inserted and advanced under AP and Lateral fluoroscopic guidance into the C4-5 and C5-6 facet joints with positive pain reproduction.  Then,  Omnipaque 300 contrast dye was injected after negative aspiration for heme and CSF in each joint, confirming appropriate placement.  A total of 0.5 ml of Omnipaque was used.  Omnipaque wasted:  9.5 ml.    The injection was then accomplished using a solution containing 2 ml of 2% Lidocaine mixed with 40 mg of depomedrol, divided between the two joints. The needles were flushed with lidocaine as they were removed.     Hemostasis was achieved, the area was cleaned, and bandaids were placed when appropriate.  The patient tolerated the procedure well, and was taken to the recovery room.    Images were saved to PACS.    Post-procedure pain score: 3/10  Follow-up includes:   -f/u phone call in one week  -f/u with referring provider    Terrance Harding MD  [facility]

## 2017-06-29 NOTE — MR AVS SNAPSHOT
After Visit Summary   6/29/2017    Truman Suero    MRN: [id number]           Patient Information     Date Of Birth          1960        Visit Information        Provider Department      6/29/2017 3:45 PM Terrance Fine MD [facility]        Care Instructions    [facility]   Procedure Discharge Instructions    Today you saw:  Dr. Terrance Harding     You had an:  Cervical  facet joint injection      Medications used:  Lidocaine   Lidocaine 2%  Depo-medrol  Omnipaque           Be cautious when walking. Numbness and/or weakness in the lower extremities may occur up to 6-8 hours after the procedure due to effect of the local anesthetic    Do not drive for 6 hours. The effect of the local anesthetic could slow your reflexes.     You may resume your regular activities after 24 hours    Avoid strenuous activity for the first 24 hours    You may shower, however avoid swimming, tub baths or hot tubs for 24 hours following your procedure    You may have a mild to moderate increase in pain for several days following the injection.    It may take up to 14 days for the steroid medication to start working although you may feel the effect as early as a few days after the procedure.       You may use ice packs for 10-15 minutes, 3 to 4 times a day at the injection site for comfort    Do not use heat to painful areas for 6 to 8 hours. This will give the local anesthetic time to wear off and prevent you from accidentally burning your skin.     You may use anti-inflammatory medications (such as Ibuprofen or Aleve or Advil) or Tylenol for pain control if necessary    If you were fasting, you may resume your normal diet and medications after the procedure    If you have diabetes, check your blood sugar more frequently than usual as your blood sugar may be higher than normal for 10-14 days following a steroid injection. Contact your doctor who manages your diabetes if your  blood sugar is higher than usual    If you experience any of the following, call the pain center nursing line during work hours at [phone] or the after hours provider line at [phone]:  -Fever over 100 degree F  -Swelling, bleeding, redness, drainage, warmth at the injection site  -Progressive weakness or numbness in your arms  -Unusual headache that is not relieved by Tylenol  -Unusual new onset of pain that is not improving      Phone #s:  Appointment line: [phone];  Nurse line: [phone]              Follow-ups after your visit        Your next 10 appointments already scheduled     Jul 14, 2017  2:00 PM CDT   [facility] with Dali Avila PT   [facility] ([facility])    [address]   [place] [address] [phone]              Who to contact     If you have questions or need follow up information about today's clinic visit or your schedule please contact [facility] directly at [phone].  Normal or non-critical lab and imaging results will be communicated to you by [employer], letter or phone within 4 business days after the clinic has received the results. If you do not hear from us within 7 days, please contact the clinic through [employer] or phone. If you have a critical or abnormal lab result, we will notify you by phone as soon as possible.  Submit refill requests through [employer] or call your pharmacy and they will forward the refill request to us. Please allow 3 business days for your refill to be completed.          Additional Information About Your Visit        [email or web address] Information     [employer] gives you secure access to your electronic health record. If you see a primary care provider, you can also send messages to your care team and make appointments. If you have questions, please call your primary care clinic.  If you do not have a primary care provider, please call [phone] and they will assist you.         Care EveryWhere ID     This is your Care EveryWhere ID. This could be used by other organizations to access your [facility] medical records  [id number]        Your Vitals Were     Pulse                   65            Blood Pressure from Last 3 Encounters:   06/29/17 145/77   05/25/17 150/84   03/28/17 150/86    Weight from Last 3 Encounters:   05/25/17 63.5 kg (140 lb)   03/28/17 62.1 kg (137 lb)   03/17/17 62.1 kg (137 lb)              Today, you had the following     No orders found for display       Primary Care Provider Office Phone # Fax #    Tiffanie Moulton PA-C [phone]       [address] [address]        Equal Access to Services     ELIEZER VALENTE : Hadii cece talley hadasho Soomaali, waaxda luqadaha, qaybta kaalmada adeegyada, waxkiel tongin hayjaleel garduno . So [facility] [phone].    ATENCIÓN: Si habla español, tiene a simmons disposición servicios gratuitos de asistencia lingüística. Llame al [phone].    We comply with applicable federal civil rights laws and [place] laws. We do not discriminate on the basis of race, color, national origin, age, disability sex, sexual orientation or gender identity.            Thank you!     Thank you for choosing [facility]  for your care. Our goal is always to provide you with excellent care. Hearing back from our patients is one way we can continue to improve our services. Please take a few minutes to complete the written survey that you may receive in the mail after your visit with us. Thank you!             Your Updated Medication List - Protect others around you: Learn how to safely use, store and throw away your medicines at www.disposemymeds.org.          This list is accurate as of: 6/29/17  4:44 PM.  Always use your most recent med list.                   Brand Name Dispense Instructions for use Diagnosis    gabapentin 600 MG tablet    NEURONTIN    90 tablet    Take 1  tablet (600 mg) by mouth 3 times daily    Chronic neck and back pain, Cervical radiculopathy, Chronic pain syndrome       ibuprofen 800 MG tablet    ADVIL/MOTRIN          methocarbamol 500 MG tablet    ROBAXIN    150 tablet    Take 1-2 tablets (500-1,000 mg) by mouth 3 times daily as needed for muscle spasms Start with lower dose and caution sedation    Chronic neck pain, DDD (degenerative disc disease), cervical, Cervical spondylosis without myelopathy, Chronic bilateral low back pain without sciatica, Myofascial pain       * traMADol 50 MG tablet    ULTRAM    120 tablet    May take 1-2 tablets every 6 hours as needed for pain, max of 4 tabs per day. OK to fill on 6/1/2017 and start on 6/3/2017 to last 30 days. Reduce as able.    Chronic neck and back pain, Cervical radiculopathy       * traMADol 100 MG 24 hr tablet    ULTRAM-ER    30 tablet    Take 1 tablet (100 mg) by mouth daily Take one tablet daily. First try this at bedtime to see if it helps you in the morning, if not, then switch and try taking it during the day. It lasts for 12 hours. Fill on 6/1/2017 and start on 6/3/2017 and should last 30 days    Cervical spondylosis without myelopathy, DDD (degenerative disc disease), cervical, Chronic bilateral low back pain without sciatica, Chronic neck and back pain       varenicline 0.5 MG X 11 & 1 MG X 42 tablet    CHANTIX STARTING MONTH PAK    53 tablet    Take 0.5 mg tab daily for 3 days, then 0.5 mg tab twice daily for 4 days, then 1 mg twice daily.    Tobacco abuse       * Notice:  This list has 2 medication(s) that are the same as other medications prescribed for you. Read the directions carefully, and ask your doctor or other care provider to review them with you.

## 2017-06-29 NOTE — NURSING NOTE
"Chief Complaint   Patient presents with     Pain     Neck pain        Initial /77  Pulse 65 Estimated body mass index is 21.93 kg/(m^2) as calculated from the following:    Height as of 3/17/17: 1.702 m (5' 7\").    Weight as of 5/25/17: 63.5 kg (140 lb).  Medication Reconciliation: complete     Injection intake:    If this procedure is requiring IV sedation has patient been NPO for 6  Hours? NA    Is patient on coumadin, plavix or other prescribed blood thinner?   No    If patient is on coumadin was it held for 5 days?   NA    If patient is on plavix was it held for 7 days?    NA     Does patient take aspirin?  No    If this is for a cervical procedure and patient is on aspirin has it been held for 6 days?   NA    Any allergies to contrast dye, iodine, steroid and/or numbing medications?  NO    Is patient currently taking antibiotics or have an active infection?  NO    Does patient have a ? Yes       Is patient pregnant or breastfeeding?  Not applicable     Are the vital signs normal?  Yes    "

## 2017-07-05 ENCOUNTER — MYC MEDICAL ADVICE (OUTPATIENT)
Dept: FAMILY MEDICINE | Facility: CLINIC | Age: 57
End: 2017-07-05

## 2017-07-05 ENCOUNTER — MYC REFILL (OUTPATIENT)
Dept: PALLIATIVE MEDICINE | Facility: CLINIC | Age: 57
End: 2017-07-05

## 2017-07-05 DIAGNOSIS — M54.9 CHRONIC NECK AND BACK PAIN: ICD-10-CM

## 2017-07-05 DIAGNOSIS — G89.29 CHRONIC NECK AND BACK PAIN: ICD-10-CM

## 2017-07-05 DIAGNOSIS — M54.2 CHRONIC NECK AND BACK PAIN: ICD-10-CM

## 2017-07-05 DIAGNOSIS — G89.29 CHRONIC BILATERAL LOW BACK PAIN WITHOUT SCIATICA: ICD-10-CM

## 2017-07-05 DIAGNOSIS — Z12.11 SCREEN FOR COLON CANCER: Primary | ICD-10-CM

## 2017-07-05 DIAGNOSIS — M47.812 CERVICAL SPONDYLOSIS WITHOUT MYELOPATHY: ICD-10-CM

## 2017-07-05 DIAGNOSIS — M54.50 CHRONIC BILATERAL LOW BACK PAIN WITHOUT SCIATICA: ICD-10-CM

## 2017-07-05 DIAGNOSIS — M54.12 CERVICAL RADICULOPATHY: ICD-10-CM

## 2017-07-05 DIAGNOSIS — M50.30 DDD (DEGENERATIVE DISC DISEASE), CERVICAL: ICD-10-CM

## 2017-07-05 RX ORDER — TRAMADOL HYDROCHLORIDE 100 MG/1
100 TABLET, EXTENDED RELEASE ORAL DAILY
Qty: 30 TABLET | Refills: 0 | Status: SHIPPED | OUTPATIENT
Start: 2017-07-05 | End: 2017-08-07

## 2017-07-05 RX ORDER — TRAMADOL HYDROCHLORIDE 50 MG/1
TABLET ORAL
Qty: 120 TABLET | Refills: 0 | Status: SHIPPED | OUTPATIENT
Start: 2017-07-05 | End: 2017-08-07

## 2017-07-05 NOTE — TELEPHONE ENCOUNTER
Signed Prescriptions:                        Disp   Refills    traMADol (ULTRAM) 50 MG tablet             120 ta*0        Sig: May take 1-2 tablets every 6 hours as needed for           pain, max of 4 tabs per day. To last 30 days.           Reduce as able. OK to fill on 7.6/2017 and to           last 30 days until 8/5/2017  Authorizing Provider: MELANIE BENSON    traMADol (ULTRAM-ER) 100 MG 24 hr tablet   30 tab*0        Sig: Take 1 tablet (100 mg) by mouth daily Take one tablet           daily. First try this at bedtime to see if it           helps you in the morning, if not, then switch and           try taking it during the day. It lasts for 12           hours. OK to fill on 7/6/2017 and to last 30 days           until 8/5/2017  Authorizing Provider: MELANIE BENSON    Signed script placed in locked in top drawer of trigger point cart at [facility]    Melanie STEVENS RN CNP, FNP  [facility]

## 2017-07-05 NOTE — TELEPHONE ENCOUNTER
Routed to the nursing pool and to the MA pool to process refill(s).    Estelita Wetzel, RN-BSN  [facility]

## 2017-07-05 NOTE — TELEPHONE ENCOUNTER
Medication refill information reviewed.     Last due for both meds:  OK to fill on 6/1/2017 and start on 6/3/2017 to last 30 days. Reduce as able.    Due date:  7/3/17    Weaning instructions:  N/A    Prescriptions prepped for review.     Estelita Wetzel RN-BSN  [facility]

## 2017-07-05 NOTE — TELEPHONE ENCOUNTER
Received call from patient requesting refill(s) of traMADol (ULTRAM) 50 MG tablet  Last picked up from pharmacy on 6/1/17    traMADol (ULTRAM-ER) 100 MG 24 hr tablet  Last picked up from pharmacy on 6/1/17    Pt last seen by prescribing provider on 5/25/17  Next appt scheduled for 8/7/17    Last urine drug screen date 2/15/17  Current opioid agreement on file (completed within the last year) Yes Date of opioid agreement: 5/17/17    Processing (pick one)     Fax it to [facility] [address] [place] [address]    Will route to nursing [place] for review and preparation of prescription(s).

## 2017-07-05 NOTE — TELEPHONE ENCOUNTER
Order printed, please fax to [facility].  Notify pt he will be contacted by them to schedule.  Thanks  Lavinia Rucker, MPAS, PA-C

## 2017-07-05 NOTE — TELEPHONE ENCOUNTER
Referral in chart over 1 year old. Routing to covering pool to sign referral that is generated.   Lorna Last RN

## 2017-07-05 NOTE — TELEPHONE ENCOUNTER
Message from [employer]:  Original authorizing provider: RODNEY Vargas CNP would like a refill of the following medications:  traMADol (ULTRAM) 50 MG tablet [RODNEY Vargas CNP]  traMADol (ULTRAM-ER) 100 MG 24 hr tablet [RODNEY Vargas CNP]    Preferred pharmacy: [address]    Comment:  Sesar GAVIRIA was not able to schedule an appointment with you until 8/7. Should have called sooner but forgot. I know I will be out of medication before that so am submitting refill request. Palmer

## 2017-07-06 ENCOUNTER — TELEPHONE (OUTPATIENT)
Dept: PALLIATIVE MEDICINE | Facility: CLINIC | Age: 57
End: 2017-07-06

## 2017-07-06 NOTE — TELEPHONE ENCOUNTER
[facility] RightFax confirmed. Voicemail left for patient.

## 2017-07-06 NOTE — TELEPHONE ENCOUNTER
Patient had a cervical facet joing injection on 6/29/17.  Called patient for an update.    Unable to leave voicemail.    Karley Porras RT (R)

## 2017-07-07 ENCOUNTER — MYC MEDICAL ADVICE (OUTPATIENT)
Dept: PALLIATIVE MEDICINE | Facility: CLINIC | Age: 57
End: 2017-07-07

## 2017-07-07 NOTE — TELEPHONE ENCOUNTER
VM left today at: 1:58 pm    Patient was in pharmacy today and they have no records of below mediations being faxed. Please advise.    Ph. [phone]     Karen JORDAN    [facility]

## 2017-07-10 ENCOUNTER — MYC MEDICAL ADVICE (OUTPATIENT)
Dept: PALLIATIVE MEDICINE | Facility: CLINIC | Age: 57
End: 2017-07-10

## 2017-07-10 NOTE — TELEPHONE ENCOUNTER
My chart message from pt:    The [facility] said they have no record of the prescription faxed over. I received message that Melanie had approved and it had been sent over to [facility]. No big deal just wondering if it has to be re-sent or I could come out and get it at some point. Thank-you   Palmer Suero   I     Called both prescriptions into pharmacy directly. Left on voice mail.     Truman    I called both of those in to the pharmacy again this morning so they should have them now.     Jessika Flannery RN, [facility]  Pain Clinic Care Coordinator

## 2017-07-10 NOTE — TELEPHONE ENCOUNTER
My chart message from pt:    Thank you for follow up with pharmacy, for the record I picked up the prescription today 7/10/17     Palmer Suero    Noted.     Jessika Flannery RN, [facility]  Pain Clinic Care Coordinator

## 2017-07-14 ENCOUNTER — THERAPY VISIT (OUTPATIENT)
Dept: PHYSICAL THERAPY | Facility: CLINIC | Age: 57
End: 2017-07-14
Payer: COMMERCIAL

## 2017-07-14 DIAGNOSIS — G89.29 CHRONIC NECK AND BACK PAIN: ICD-10-CM

## 2017-07-14 DIAGNOSIS — M54.12 CERVICAL RADICULOPATHY: ICD-10-CM

## 2017-07-14 DIAGNOSIS — M54.2 CHRONIC NECK AND BACK PAIN: ICD-10-CM

## 2017-07-14 DIAGNOSIS — M54.9 CHRONIC NECK AND BACK PAIN: ICD-10-CM

## 2017-07-14 PROCEDURE — 97140 MANUAL THERAPY 1/> REGIONS: CPT | Mod: GP | Performed by: PHYSICAL THERAPIST

## 2017-07-14 PROCEDURE — 97530 THERAPEUTIC ACTIVITIES: CPT | Mod: GP | Performed by: PHYSICAL THERAPIST

## 2017-07-14 PROCEDURE — 97110 THERAPEUTIC EXERCISES: CPT | Mod: GP | Performed by: PHYSICAL THERAPIST

## 2017-07-14 NOTE — PROGRESS NOTES
Subjective:    HPI                    Objective:    System    Physical Exam    General     ROS    Assessment/Plan:      DISCHARGE REPORT    Progress reporting period is from 05/17/17 to 07/14/17.       SUBJECTIVE  Subjective changes noted by patient:  Patient states he saw MD two weeks ago and had another injection - no relief.  Also still seeing Chiro and does not feel any change.  Patient states he does get relief from PT exercises and stretches and that it helps him get out of bed in the morning.  Continues to feel out of alignment however. States he does his neck stretch at work frequently - however, not able to hold it as long as needed     Current Pain level: 6/10.     Initial Pain level: 8/10 (3-8/10 range).   Changes in function:  None  Adverse reaction to treatment or activity: None    OBJECTIVE  Changes noted in objective findings:  Yes; improved pelvic alignment but no change in ROM or tightness with cervical ROM.  c/s AROM:  flex: wnl (-), ext: wnl (tight), rot (B); wnl (tight), SB (B): wnl (-)     Correct pelvic alignment today     ASSESSMENT/PLAN  Updated problem list and treatment plan: Diagnosis 1:  Cervicalgia  Pain -  self management and home program  Decreased ROM/flexibility - home program  Decreased function - home program  STG/LTGs have been met or progress has been made towards goals:  None  Assessment of Progress: The patient's condition is unchanged.  Self Management Plans:  Patient has been instructed in a home treatment program.  Patient is independent in a home treatment program.  Patient  has been instructed in self management of symptoms.  Patient is independent in self management of symptoms.  I have re-evaluated this patient and find that the nature, scope, duration and intensity of the therapy is appropriate for the medical condition of the patient.  Truman continues to require the following intervention to meet STG and LTG's:  PT intervention is no longer required to meet  STG/LTG.    Recommendations:  This patient is ready to be discharged from therapy and continue their home treatment program.  He is going to seek other forms of treatment due to plateau in PT.     Please refer to the daily flowsheet for treatment today, total treatment time and time spent performing 1:1 timed codes.

## 2017-08-02 ENCOUNTER — TRANSFERRED RECORDS (OUTPATIENT)
Dept: HEALTH INFORMATION MANAGEMENT | Facility: CLINIC | Age: 57
End: 2017-08-02

## 2017-08-07 ENCOUNTER — OFFICE VISIT (OUTPATIENT)
Dept: PALLIATIVE MEDICINE | Facility: CLINIC | Age: 57
End: 2017-08-07
Payer: COMMERCIAL

## 2017-08-07 VITALS
OXYGEN SATURATION: 97 % | RESPIRATION RATE: 16 BRPM | BODY MASS INDEX: 21.46 KG/M2 | SYSTOLIC BLOOD PRESSURE: 139 MMHG | WEIGHT: 137 LBS | DIASTOLIC BLOOD PRESSURE: 77 MMHG | HEART RATE: 60 BPM

## 2017-08-07 DIAGNOSIS — M54.12 CERVICAL RADICULOPATHY: ICD-10-CM

## 2017-08-07 DIAGNOSIS — M79.18 MYOFASCIAL PAIN: ICD-10-CM

## 2017-08-07 DIAGNOSIS — G89.4 CHRONIC PAIN SYNDROME: ICD-10-CM

## 2017-08-07 DIAGNOSIS — M47.812 CERVICAL SPONDYLOSIS WITHOUT MYELOPATHY: ICD-10-CM

## 2017-08-07 DIAGNOSIS — M54.9 CHRONIC NECK AND BACK PAIN: Primary | ICD-10-CM

## 2017-08-07 DIAGNOSIS — G89.29 CHRONIC NECK AND BACK PAIN: Primary | ICD-10-CM

## 2017-08-07 DIAGNOSIS — M50.30 DDD (DEGENERATIVE DISC DISEASE), CERVICAL: ICD-10-CM

## 2017-08-07 DIAGNOSIS — G89.29 CHRONIC BILATERAL LOW BACK PAIN WITHOUT SCIATICA: ICD-10-CM

## 2017-08-07 DIAGNOSIS — M54.50 CHRONIC BILATERAL LOW BACK PAIN WITHOUT SCIATICA: ICD-10-CM

## 2017-08-07 DIAGNOSIS — M54.2 CHRONIC NECK AND BACK PAIN: Primary | ICD-10-CM

## 2017-08-07 PROCEDURE — 99214 OFFICE O/P EST MOD 30 MIN: CPT | Performed by: NURSE PRACTITIONER

## 2017-08-07 RX ORDER — TRAMADOL HYDROCHLORIDE 50 MG/1
TABLET ORAL
Qty: 120 TABLET | Refills: 0 | Status: SHIPPED | OUTPATIENT
Start: 2017-08-07 | End: 2017-09-25

## 2017-08-07 RX ORDER — TRAMADOL HYDROCHLORIDE 200 MG/1
200 TABLET, EXTENDED RELEASE ORAL DAILY
Qty: 30 TABLET | Refills: 0 | Status: SHIPPED | OUTPATIENT
Start: 2017-08-07 | End: 2017-09-11

## 2017-08-07 RX ORDER — GABAPENTIN 600 MG/1
600 TABLET ORAL 3 TIMES DAILY
Qty: 90 TABLET | Refills: 5 | Status: SHIPPED | OUTPATIENT
Start: 2017-08-07 | End: 2017-10-02

## 2017-08-07 ASSESSMENT — PAIN SCALES - GENERAL: PAINLEVEL: EXTREME PAIN (8)

## 2017-08-07 NOTE — PATIENT INSTRUCTIONS
PLAN:  1. Continue your home activities and exercises learned in PHYSICAL THERAPY   2. Medications:   1. Continue gabapentin  2. Increase Tramadol ER to 200mg/day  3. Continue Tramadol 50mg max of 4 per day, reduce as able.   3. Procedures: none  4. Sign DACIA for chart notes and EMG results from [facility] Nicole  5. Follow-up with me in October. ]    ----------------------------------------------------------------  Nurse Triage line:  [phone]   Call this number with any questions or concerns. You may leave a detailed message anytime. Calls are typically returned Monday through Friday between 8 AM and 4:30 PM. We usually get back to you within 2 business days depending on the issue/request.       Medication refills:    For non-narcotic medications, call your pharmacy directly to request a refill. The pharmacy will contact the [facility] for authorization. Please allow 3-4 days for these refills to be processed.     For narcotic refills, call the nurse triage line or send a [employer] message. Please contact us 7-10 days before your refill is due. The message MUST include the name of the specific medication(s) requested and how you would like to receive the prescription(s). The options are as follows:    Pain Clinic staff can mail the prescription to your pharmacy. Please tell us the name of the pharmacy.    You may pick the prescription up at the Pain Clinic (tell us the location) or during a clinic visit with your pain provider    Pain Clinic staff can deliver the prescription to the [facility] pharmacy in the clinic building. Please tell us the location.      Scheduling number: [phone].  Call this number to schedule or change appointments.    We believe regular attendance is key to your success in our program.    Any time you are unable to keep your appointment we ask that you call us at least 24 hours in advance to let us know. This will allow us to offer the appointment time to another patient.

## 2017-08-07 NOTE — NURSING NOTE
"Chief Complaint   Patient presents with     Pain       Initial /77 (BP Location: Right arm, Patient Position: Chair, Cuff Size: Adult Regular)  Pulse 60  Resp 16  Wt 62.1 kg (137 lb)  SpO2 97%  BMI 21.46 kg/m2 Estimated body mass index is 21.46 kg/(m^2) as calculated from the following:    Height as of 3/17/17: 1.702 m (5' 7\").    Weight as of this encounter: 62.1 kg (137 lb).  Medication Reconciliation: complete     Kailyn Hope MA  [facility] Center      "

## 2017-08-07 NOTE — MR AVS SNAPSHOT
After Visit Summary   8/7/2017    Truman Suero    MRN: [id number]           Patient Information     Date Of Birth          1960        Visit Information        Provider Department      8/7/2017 2:00 PM Melanie Thomas APRN CNP [facility]        Today's Diagnoses     Chronic neck and back pain        Cervical radiculopathy        Chronic pain syndrome        Cervical spondylosis without myelopathy        DDD (degenerative disc disease), cervical        Chronic bilateral low back pain without sciatica          Care Instructions    PLAN:  1. Continue your home activities and exercises learned in PHYSICAL THERAPY   2. Medications:   1. Continue gabapentin  2. Increase Tramadol ER to 200mg/day  3. Continue Tramadol 50mg max of 4 per day, reduce as able.   3. Procedures: none  4. Sign DACIA for chart notes and EMG results from [facility] Nicole  5. Follow-up with me in October. ]    ----------------------------------------------------------------  Nurse Triage line:  [phone]   Call this number with any questions or concerns. You may leave a detailed message anytime. Calls are typically returned Monday through Friday between 8 AM and 4:30 PM. We usually get back to you within 2 business days depending on the issue/request.       Medication refills:    For non-narcotic medications, call your pharmacy directly to request a refill. The pharmacy will contact the [facility] for authorization. Please allow 3-4 days for these refills to be processed.     For narcotic refills, call the nurse triage line or send a [employer] message. Please contact us 7-10 days before your refill is due. The message MUST include the name of the specific medication(s) requested and how you would like to receive the prescription(s). The options are as follows:    Pain Clinic staff can mail the prescription to your pharmacy. Please tell us the name of the pharmacy.    You may pick the  prescription up at the Pain Clinic (tell us the location) or during a clinic visit with your pain provider    Pain Clinic staff can deliver the prescription to the [facility] pharmacy in the clinic building. Please tell us the location.      Scheduling number: [phone].  Call this number to schedule or change appointments.    We believe regular attendance is key to your success in our program.    Any time you are unable to keep your appointment we ask that you call us at least 24 hours in advance to let us know. This will allow us to offer the appointment time to another patient.               Follow-ups after your visit        Who to contact     If you have questions or need follow up information about today's clinic visit or your schedule please contact [facility] directly at [phone].  Normal or non-critical lab and imaging results will be communicated to you by [employer], letter or phone within 4 business days after the clinic has received the results. If you do not hear from us within 7 days, please contact the clinic through [employer] or phone. If you have a critical or abnormal lab result, we will notify you by phone as soon as possible.  Submit refill requests through "[employer] or call your pharmacy and they will forward the refill request to us. Please allow 3 business days for your refill to be completed.          Additional Information About Your Visit        "[employer] Information     "[employer] gives you secure access to your electronic health record. If you see a primary care provider, you can also send messages to your care team and make appointments. If you have questions, please call your primary care clinic.  If you do not have a primary care provider, please call [phone] and they will assist you.        Care EveryWhere ID     This is your Care EveryWhere ID. This could be used by other organizations to access your [facility] medical records  [id number]        Your Vitals Were      Pulse Respirations Pulse Oximetry BMI (Body Mass Index)          60 16 97% 21.46 kg/m2         Blood Pressure from Last 3 Encounters:   08/07/17 139/77   06/29/17 (!) 151/91   05/25/17 150/84    Weight from Last 3 Encounters:   08/07/17 62.1 kg (137 lb)   05/25/17 63.5 kg (140 lb)   03/28/17 62.1 kg (137 lb)              Today, you had the following     No orders found for display         Today's Medication Changes          These changes are accurate as of: 8/7/17  2:40 PM.  If you have any questions, ask your nurse or doctor.               These medicines have changed or have updated prescriptions.        Dose/Directions    * traMADol 200 MG ER-tablet   Commonly known as:  ULTRAM-ER   This may have changed:    - medication strength  - how much to take  - additional instructions   Used for:  Cervical spondylosis without myelopathy, DDD (degenerative disc disease), cervical, Chronic bilateral low back pain without sciatica, Chronic neck and back pain   Changed by:  Melanie Thomas APRN CNP        Dose:  200 mg   Take 1 tablet (200 mg) by mouth daily OK to fill and begin on 8/7/2017 to last 30 days.   Quantity:  30 tablet   Refills:  0       * traMADol 50 MG tablet   Commonly known as:  ULTRAM   This may have changed:  additional instructions   Used for:  Chronic neck and back pain, Cervical radiculopathy   Changed by:  Melanie Thomas APRN CNP        May take 1-2 tablets every 6 hours as needed for pain, max of 4 tabs per day. To last 30 days. Reduce as able. OK to fill and begin on 8/7/2017 to last 30 days   Quantity:  120 tablet   Refills:  0       * Notice:  This list has 2 medication(s) that are the same as other medications prescribed for you. Read the directions carefully, and ask your doctor or other care provider to review them with you.         Where to get your medicines      These medications were sent to [facility] [address] [place] [address] [address] [place] [address]  [place] [address]     Phone:  [phone]     gabapentin 600 MG tablet         Some of these will need a paper prescription and others can be bought over the counter.  Ask your nurse if you have questions.     Bring a paper prescription for each of these medications     traMADol 200 MG ER-tablet    traMADol 50 MG tablet                Primary Care Provider Office Phone # Fax #    Tiffanie Moulton PA-C [phone]       [address] [address]        Equal Access to Services     ELIEZER VALENTE : Hadii aad ku hadasho Soomaali, waaxda luqadaha, qaybta kaalmada adeegyada, waxkiel martinez hayjaleel garduno . So [facility] [phone].    ATENCIÓN: Si habla español, tiene a simmons disposición servicios gratuitos de asistencia lingüística. [place] al [phone].    We comply with applicable federal civil rights laws and [place] laws. We do not discriminate on the basis of race, color, national origin, age, disability sex, sexual orientation or gender identity.            Thank you!     Thank you for choosing [facility]  for your care. Our goal is always to provide you with excellent care. Hearing back from our patients is one way we can continue to improve our services. Please take a few minutes to complete the written survey that you may receive in the mail after your visit with us. Thank you!             Your Updated Medication List - Protect others around you: Learn how to safely use, store and throw away your medicines at www.disposemymeds.org.          This list is accurate as of: 8/7/17  2:40 PM.  Always use your most recent med list.                   Brand Name Dispense Instructions for use Diagnosis    gabapentin 600 MG tablet    NEURONTIN    90 tablet    Take 1 tablet (600 mg) by mouth 3 times daily    Chronic neck and back pain, Cervical radiculopathy, Chronic pain syndrome       ibuprofen 800 MG tablet    ADVIL/MOTRIN           methocarbamol 500 MG tablet    ROBAXIN    150 tablet    Take 1-2 tablets (500-1,000 mg) by mouth 3 times daily as needed for muscle spasms Start with lower dose and caution sedation    Chronic neck pain, DDD (degenerative disc disease), cervical, Cervical spondylosis without myelopathy, Chronic bilateral low back pain without sciatica, Myofascial pain       * traMADol 200 MG ER-tablet    ULTRAM-ER    30 tablet    Take 1 tablet (200 mg) by mouth daily OK to fill and begin on 8/7/2017 to last 30 days.    Cervical spondylosis without myelopathy, DDD (degenerative disc disease), cervical, Chronic bilateral low back pain without sciatica, Chronic neck and back pain       * traMADol 50 MG tablet    ULTRAM    120 tablet    May take 1-2 tablets every 6 hours as needed for pain, max of 4 tabs per day. To last 30 days. Reduce as able. OK to fill and begin on 8/7/2017 to last 30 days    Chronic neck and back pain, Cervical radiculopathy       varenicline 0.5 MG X 11 & 1 MG X 42 tablet    CHANTIX STARTING MONTH GIO    53 tablet    Take 0.5 mg tab daily for 3 days, then 0.5 mg tab twice daily for 4 days, then 1 mg twice daily.    Tobacco abuse       * Notice:  This list has 2 medication(s) that are the same as other medications prescribed for you. Read the directions carefully, and ask your doctor or other care provider to review them with you.

## 2017-08-07 NOTE — PROGRESS NOTES
[facility]    8/7/2017    Chief complaint: neck and back pain    Interval history:  Truman Suero is a 56 year old male is known to me for   Chronic neck pain  Cervical DDD  Cervical spondylosis (pain worse with extension/rotation indicating facetogenic component to pain)  Axial low back pain  Myofascial pain/muscle spasms  Remote history of ETOH overuse, attended AA for awhile. Sober for 10 years  ---PMHx includes: neck pain  ---PSHx includes: none listed      Recommendations/plan at the last visit on 5/25/2017 included:  1. Physical Therapy: none at present  2. Patient is to continue his home exercise program and exercises learned in PHYSICAL THERAPY  3. Clinical Health Psychologist: not at present  4. Diagnostic Studies:  none  5. Medication Management:    1. Continue Tramadol short-acting  2. Add long-acting Tramadol 100mg once daily, start initially at bedtime to see if this helps in the morning  3. Continue methocarbamol  4. I agree with ongoing gabapentin, take regularly for best results  6. Further procedures recommended: schedule cervical facet joint steroid injections, our office to contact patient to schedule  7. Recommendations to PCP: see above  8. Follow up: 8 weeks      Since his last visit, Truman Suero reports:  -cervical facet joint injections were not helpful  -continued posterior neck pain, has weakness in the hands.   Has had EMG of the arms done at [facility] about a year ago, symptoms stable since then, was told he has carpal tunnel syndrome. He does wear wrist braces at night.   -his Primary Care Provider is not returning after maternity leave, need new Primary Care Provider  -wants to work on smoking cessation    At this point, the patient's participation with our multidisciplinary team includes:  The patient has been compliant with the program.  Pain Group - not ordered  PT - attending non-pain management PHYSICAL THERAPY   Health Psych - not  "ordered  Acupuncture: working with Dr. Bereket LAY      Pain scores:  Pain intensity on average is 8 on a scale of 0-10.    Range is 5-10/10.   Pain right now is 9/10.  Pain is described as \"aching, numb, unbearable, burning, shooting, exhausting, throbbing, sharp, stabbing, miserable, nagging.\"    Pain is constant in nature    Current pain relevant medications:   -Tramadol 50mg take 1 tablet twice daily (helpful)  -ibuprofen 600mg PRN (helpful)  -gabapentin 600mg TID (trying to take TID)  -methocarbamol 500-1000mg TID PRN muscle spasms (somewhat helpful)      Other pertinent medications:  None    Previous Medications:  OPIATES: Tramdol (somewhat helpful)  NSAIDS: ibuprofen (helpful), Aleve (helpful)  MUSCLE RELAXANTS: none  ANTI-MIGRAINE MEDS: none  ANTI-DEPRESSANTS: none  SLEEP AIDS: none  ANTI-CONVULSANTS: gabapentin (helpful)  TOPICALS: lidocaine ointment (somewhat helpful)  Other meds: Tylenol (not helpful)        Other treatments have included:  Truman Suero has not been seen at a pain clinic in the past.    PT: tried, somewhat helpful  Chiropractic: helpful  Acupuncture: none  TENs Unit: none     Injections: cervical radiofrequency nerve ablation at [facility] in December 2016 (did get good relief, got 70% relief of his typical neck pain)  -6/29/2017 Cervical facet joint steroid injections at C4-5 and C5-6 on the right with Dr. Nicole Harding (not helpful)           THE 4 A's OF OPIOID MAINTENANCE ANALGESIA    Analgesia: tramadol helps some but does not last very long, using short acting only    Activity: ADLS and PHYSICAL THERAPY exercises    Adverse effects: none    Adherence to Rx protocol: yes      Side Effects: none  Patient is using the medication as prescribed: yes    Medications:  Current Outpatient Prescriptions   Medication Sig Dispense Refill     traMADol (ULTRAM) 50 MG tablet May take 1-2 tablets every 6 hours as needed for pain, max of 4 tabs per day. To last 30 days. Reduce as able. OK to fill " on 7.6/2017 and to last 30 days until 8/5/2017 120 tablet 0     traMADol (ULTRAM-ER) 100 MG 24 hr tablet Take 1 tablet (100 mg) by mouth daily Take one tablet daily. First try this at bedtime to see if it helps you in the morning, if not, then switch and try taking it during the day. It lasts for 12 hours. OK to fill on 7/6/2017 and to last 30 days until 8/5/2017 30 tablet 0     methocarbamol (ROBAXIN) 500 MG tablet Take 1-2 tablets (500-1,000 mg) by mouth 3 times daily as needed for muscle spasms Start with lower dose and caution sedation 150 tablet 1     varenicline (CHANTIX STARTING MONTH GIO) 0.5 MG X 11 & 1 MG X 42 tablet Take 0.5 mg tab daily for 3 days, then 0.5 mg tab twice daily for 4 days, then 1 mg twice daily. (Patient not taking: Reported on 6/29/2017) 53 tablet 0     gabapentin (NEURONTIN) 600 MG tablet Take 1 tablet (600 mg) by mouth 3 times daily 90 tablet 5     ibuprofen (ADVIL,MOTRIN) 800 MG tablet   0       Medical History: any changes in medical history since they were last seen? None    Social History:   Home situation: , has 4 kids, 2 at home, one in college and one launched.   Occupation/Schooling:  full time in [employer]  Tobacco use: smoking, planning on quitting smoking  Alcohol use: sober for nearly 10 years. He used to work a sobriety program, used to go to AA  Drug use: none  History of chemical dependency treatment: no formal treatment, did AA    Review of Systems:  ROS is positive as per HPI as well as for fatigue, headache, dizziness, easy bruising, shortness of breath, nausea, joint pain, stiffness, back pain, neck pain, weakness, numbness and tingling, depression, anxiety, stress and is negative for fevers, chills, sweats, or constipation, diarrhea.    Physical Exam:  Vital signs: Blood pressure 139/77, pulse 60, resp. rate 16, weight 62.1 kg (137 lb), SpO2 97 %.    Behavioral observations:  Awake, alert, cooperative    Gait:  normal    Musculoskeletal exam:     Moves well in exam room  Strength grossly equal throughout    Neuro exam:  SILT in all extremities     Skin/vascular/autonomic:  No suspicious lesions on exposed skin.      Other:  NA    IMAGING:  MR CERVICAL SPINE WITHOUT CONTRAST  2/22/2017 9:59 AM     HISTORY:  Bilateral neck and arm pain for three years.     COMPARISON: None.     TECHNIQUE: Routine MR cervical spine extended through T2.     FINDINGS: There is some degenerative bone marrow signal change C3-C6.  No malignant or destructive lesions. Normal alignment through T2.  Reversed cervical adenosis C3-C6.     The cervical and upper thoracic spinal cord appear intrinsically  normal. The craniocervical junction region is normal. No paraspinous  soft tissue abnormality.     Findings by level as follows:     C2-C3: Negative. No disc protrusion. No central or lateral stenosis.     C3-C4: Mild disc space narrowing. Mild diffuse annular bulge. Small  uncinate spur on the right. No significant central or left-sided  stenosis. Mild right-sided foraminal stenosis.     C4-C5: Moderate degenerative narrowing of the interspace. Mild ventral  disc osteophyte complex with minimal central stenosis. Small uncinate  spurs bilaterally with mild bilateral foraminal stenosis.     C6-C7: Moderate disc space narrowing. Mild broad-based disc osteophyte  complex with minimal central stenosis. Minimal uncinate spurs with  mild bilateral foraminal stenosis.     C6-C7: Moderate disc space narrowing. Mild ventral disc osteophyte  complex. No significant central or lateral stenosis.     C7-T1: No disc protrusion. No central or lateral stenosis. Moderate  bilateral facet joint disease.         IMPRESSION:  1. Degenerative changes as described C3-C4 through C7-T1. There is  only mild central and foraminal stenosis as described.  2. No intrinsic spinal cord abnormality through T2    [place] Prescription Monitoring Program:  Reviewed    DIRE Score for selecting candidates for long term  opioid analgesia for chronic pain:  Diagnosis  2  Intractablility  2  Risk    Psychological health  2    Chemical health  2    Reliability  2    Social support  3  Efficacy  2    Total DIRE Score = 15. Note that   7-13 predicts poor outcome (compliance and efficacy) from opioid prescribing; 14-21 predicts good outcome (compliance and efficacy)  from opioid prescribing.      Assessment:   1. Chronic neck and back pain  2. Cervical radicular pain  3. Cervical spondylosis (pain worse with extension/rotation indicating facetogenic component to pain)  4. Cervical DDD  5. Axial low back pain  6. Myofascial pain/muscle spasms  7. Chronic pain syndrome  8. Remote history of ETOH overuse, attended AA for awhile. Sober for 10 years  9. PMHx includes: neck pain  10. PSHx includes: none listed      Plan:   1. Physical Therapy: none at present  2. Patient is to continue his home exercise program and exercises learned in PHYSICAL THERAPY  3. Clinical Health Psychologist: not at present  4. Diagnostic Studies:  none  5. Medication Management:    1. Continue Tramadol short-acting  2. Increase Tramadol ER to 200mg/day  3. Continue methocarbamol  4. I agree with ongoing gabapentin, take regularly for best results  6. Further procedures recommended: none  7. Sign DACIA for chart notes and EMG from [facility]  8. Recommendations to PCP: see above  9. Follow up: 8 weeks    Total time spent face to face was 30 minutes and more than 50% of face to face time was spent in counseling and/or coordination of care regarding the diagnosis and recommendations above.      Melanie STEVENS, RN CNP, FNP  [facility]

## 2017-08-15 ENCOUNTER — MYC MEDICAL ADVICE (OUTPATIENT)
Dept: PALLIATIVE MEDICINE | Facility: CLINIC | Age: 57
End: 2017-08-15

## 2017-08-16 ENCOUNTER — OFFICE VISIT (OUTPATIENT)
Dept: FAMILY MEDICINE | Facility: CLINIC | Age: 57
End: 2017-08-16
Payer: COMMERCIAL

## 2017-08-16 VITALS
OXYGEN SATURATION: 96 % | HEIGHT: 66 IN | TEMPERATURE: 98.3 F | WEIGHT: 138 LBS | SYSTOLIC BLOOD PRESSURE: 131 MMHG | HEART RATE: 61 BPM | BODY MASS INDEX: 22.18 KG/M2 | DIASTOLIC BLOOD PRESSURE: 72 MMHG

## 2017-08-16 DIAGNOSIS — Z13.220 LIPID SCREENING: ICD-10-CM

## 2017-08-16 DIAGNOSIS — Z13.1 SCREENING FOR DIABETES MELLITUS: ICD-10-CM

## 2017-08-16 DIAGNOSIS — Z00.01 ENCOUNTER FOR ROUTINE ADULT HEALTH EXAMINATION WITH ABNORMAL FINDINGS: Primary | ICD-10-CM

## 2017-08-16 DIAGNOSIS — Z72.0 TOBACCO ABUSE: ICD-10-CM

## 2017-08-16 DIAGNOSIS — Z87.891 HISTORY OF TOBACCO USE: ICD-10-CM

## 2017-08-16 PROCEDURE — G0296 VISIT TO DETERM LDCT ELIG: HCPCS | Performed by: FAMILY MEDICINE

## 2017-08-16 PROCEDURE — 99396 PREV VISIT EST AGE 40-64: CPT | Performed by: FAMILY MEDICINE

## 2017-08-16 NOTE — PATIENT INSTRUCTIONS
Follow up for CT  Follow up for fasting labs  Preventive Health Recommendations  Male Ages 50 - 64    Yearly exam:             See your health care provider every year in order to  o   Review health changes.   o   Discuss preventive care.    o   Review your medicines if your doctor has prescribed any.     Have a cholesterol test every 5 years, or more frequently if you are at risk for high cholesterol/heart disease.     Have a diabetes test (fasting glucose) every three years. If you are at risk for diabetes, you should have this test more often.     Have a colonoscopy at age 50, or have a yearly FIT test (stool test). These exams will check for colon cancer.      Talk with your health care provider about whether or not a prostate cancer screening test (PSA) is right for you.    You should be tested each year for STDs (sexually transmitted diseases), if you re at risk.     Shots: Get a flu shot each year. Get a tetanus shot every 10 years.     Nutrition:    Eat at least 5 servings of fruits and vegetables daily.     Eat whole-grain bread, whole-wheat pasta and brown rice instead of white grains and rice.     Talk to your provider about Calcium and Vitamin D.     Lifestyle    Exercise for at least 150 minutes a week (30 minutes a day, 5 days a week). This will help you control your weight and prevent disease.     Limit alcohol to one drink per day.     No smoking.     Wear sunscreen to prevent skin cancer.     See your dentist every six months for an exam and cleaning.     See your eye doctor every 1 to 2 years.    Lung Cancer Screening   Frequently Asked Questions  If you are at high-risk for lung cancer, getting screened with low-dose computed tomography (LDCT) every year can help save your life. This handout offers answers to some of the most common questions about lung cancer screening. If you have other questions, please call [phone] ([phone]).     What is it?  Lung cancer screening uses special  X-ray technology to create an image of your lung tissue. The exam is quick and easy and takes less than 10 seconds. We don t give you any medicine or use any needles. You can eat before and after the exam. You don t need to change your clothes as long as the clothing on your chest doesn t contain metal. But, you do need to be able to hold your breath for at least 6 seconds during the exam.    What is the goal of lung cancer screening?  The goal of lung cancer screening is to save lives. Many times, lung cancer is not found until a person starts having physical symptoms. Lung cancer screening can help detect lung cancer in the earliest stages when it may be easier to treat.    Who should be screened for lung cancer?  We suggest lung cancer screening for anyone who is at high-risk for lung cancer. You are in the high-risk group if you:      are between the ages of 55 and 79, and    have smoked at least 1 pack of cigarettes a day for 30 or more years, and    still smoke or have quit within the past 15 years.    However, if you have a new cough or shortness of breath, you should talk to your doctor before being screened.    Some national lung health advocacy groups also recommend screening for people ages 50 to 79 who have smoked an average of 1 pack of cigarettes a day for 20 years. They must also have at least 1 other risk factor for lung cancer, not including exposure to secondhand smoke. Other risk factors are having had cancer in the past, emphysema, pulmonary fibrosis, COPD, a family history of lung cancer, or exposure to certain materials such as arsenic, asbestos, beryllium, cadmium, chromium, diesel fumes, nickel, radon or silica. Your care team can help you know if you have one of these risk factors.     Why does it matter if I have symptoms?  Certain symptoms can be a sign that you have a condition in your lungs that should be checked and treated by your doctor. These symptoms include fever, chest pain, a new  or changing cough, shortness of breath that you have never felt before, coughing up blood or unexplained weight loss. Having any of these symptoms can greatly affect the results of lung cancer screening.       Should all smokers get an LDCT lung cancer screening exam?  It depends. Lung cancer screening is for a very specific group of men and women who have a history of heavy smoking over a long period of time (see  Who should be screened for lung cancer  above).  I am in the high-risk group, but have been diagnosed with cancer in the past. Is LDCT lung cancer screening right for me?  In some cases, you should not have LDCT lung screening, such as when your doctor is already following your cancer with CT scan studies. Your doctor will help you decide if LDCT lung screening is right for you.  Do I need to have a screening exam every year?  Yes. If you are in the high-risk group described earlier, you should get an LDCT lung cancer screening exam every year until you are 79, or are no longer willing or able to undergo screening and possible procedures to diagnose and treat lung cancer.  How effective is LDCT at preventing death from lung cancer?  Studies have shown that LDCT lung cancer screening can lower the risk of death from lung cancer by 20 percent in people who are at high-risk.  What are the risks?  There are some risks and limitations of LDCT lung cancer screening. We want to make sure you understand the risks and benefits, so please let us know if you have any questions. Your doctor may want to talk with you more about these risks.    Radiation exposure: As with any exam that uses radiation, there is a very small increased risk of cancer. The amount of radiation in LDCT is small--about the same amount a person would get from a mammogram. Your doctor orders the exam when he or she feels the potential benefits outweigh the risks.    False negatives: No test is perfect, including LDCT. It is possible that you  may have a medical condition, including lung cancer, that is not found during your exam. This is called a false negative result.    False positives and more testing: LDCT very often finds something in the lung that could be cancer, but in fact is not. This is called a false positive result. False positive tests often cause anxiety. To make sure these findings are not cancer, you may need to have more tests. These tests will be done only if you give us permission. Sometimes patients need a treatment that can have side effects, such as a biopsy. For more information on false positives, see  What can I expect from the results?     Findings not related to lung cancer: Your LDCT exam also takes pictures of areas of your body next to your lungs. In a very small number of cases, the CT scan will show an abnormal finding in one of these areas, such as your kidneys, adrenal glands, liver or thyroid. This finding may not be serious, but you may need more tests. Your doctor can help you decide what other tests you may need, if any.  What can I expect from the results?  About 1 out of 4 LDCT exams will find something that may need more tests. Most of the time, these findings are lung nodules. Lung nodules are very small collections of tissue in the lung. These nodules are very common, and the vast majority--more than 97 percent--are not cancer (benign). Most are normal lymph nodes or small areas of scarring from past infections.  But, if a small lung nodule is found to be cancer, the cancer can be cured more than 90 percent of the time. To know if the nodule is cancer, we may need to get more images before your next yearly screening exam. If the nodule has suspicious features (for example, it is large, has an odd shape or grows over time), we will refer you to a specialist for further testing.  Will my doctor also get the results?  Yes. Your doctor will get a copy of your results.  Is it okay to keep smoking now that there s a  cancer screening exam?  No. Tobacco is one of the strongest cancer-causing agents. It causes not only lung cancer, but other cancers and cardiovascular (heart) diseases as well. The damage caused by smoking builds over time. This means that the longer you smoke, the higher your risk of disease. While it is never too late to quit, the sooner you quit, the better.  Where can I find help to quit smoking?  The best way to prevent lung cancer is to stop smoking. If you have already quit smoking, congratulations and keep it up! For help on quitting smoking, please call [employer] at 7-221-457-YNNE ([phone]) or the American Cancer Society at 1-557.112.8468 to find local resources near you.  One-on-one health coaching:  If you d prefer to work individually with a health care provider on tobacco cessation, we offer:      Medication Therapy Management:  Our specially trained pharmacists work closely with you and your doctor to help you quit smoking.  Call [phone] or [phone] (toll free).     Can Do: Health coaching offered by [facility].  [email or web address]    [facility]   Discharged by : Shila Olson MA    Paper scripts provided to patient : no     If you have any questions regarding your visit please contact your care team:     [facility] Hours Telephone Number   [address] Dr.   7am-7pm Monday - Thursday   7am-5pm Fridays  [phone]   (Appointment scheduling available 24/7)   RN Line   [phone] option 2       For a Price Quote for your services, please call our Consumer Price Line at [phone].     What options do I have for visits at the clinic other than the traditional office visit?     To expand how we care for you, many of our providers are utilizing electronic visits (e-visits) and telephone visits, when medically appropriate, for interactions with their patients  rather than a visit in the clinic. We also offer nurse visits for many medical concerns. Just like any other service, we will bill your insurance company for this type of visit based on time spent on the phone with your provider. Not all insurance companies cover these visits. Please check with your medical insurance if this type of visit is covered. You will be responsible for any charges that are not paid by your insurance.   E-visits via [employer]: generally incur a $35.00 fee.     Telephone visits:   Time spent on the phone: *charged based on time that is spent on the phone in increments of 10 minutes. Estimated cost:   5-10 mins $30.00   11-20 mins. $59.00   21-30 mins. $85.00     Use [employer] (secure email communication and access to your chart) to send your primary care provider a message or make an appointment. Ask someone on your Team how to sign up for [employer].     As always, Thank you for trusting us with your health care needs!      [facility]:    Scheduling Appointments  [facility]  Call: [phone]    [facility]  Call: [phone]    [facility]  Call: [phone]      WHERE TO GO FOR CARE?    Clinic    Make an appointment if you:       Are sick (cold, cough, flu, sore throat, earache or in pain).       Have a small injury (sprain, small cut, burn or broken bone).       Need a physical exam, Pap smear, vaccine or prescription refill.       Have questions about your health or medicines.    To reach us:      Call [phone] ([phone]). Open 24 hours every day. (For counseling services, call [phone].)    Log into [facility] at [email or web address]. (Visit [email or web address] to create an account.) Hospital emergency room    An emergency is a serious or life- threatening problem that must be treated right away.    Call [phone] or get to the hospital if you have:      Very bad or sudden:            -  Chest pain or pressure         - Bleeding         - Head or belly pain         - Dizziness or trouble seeing, walking or                          Speaking      Problems breathing      Blood in your vomit or you are coughing up blood      A major injury (knocked out, loss of a finger or limb, rape, broken bone protruding from skin)    A mental health crisis. (Or call the Mental Health Crisis line at 1-633.767.9605 or Suicide Prevention Hotline at 1-707.864.4360.)    Open 24 hours every day. You don't need an appointment.     Urgent care    Visit urgent care for sickness or small injuries when the clinic is closed. You don't need an appointment. To check hours or find an urgent care near you, visit [email or web address]. Online care    Get online care from [facility] for more than 70 common problems, like colds, allergies and infections. Open 24 hours every day at:   [email or web address]   Need help deciding?    For advice about where to be seen, you may call your clinic and ask to speak with a nurse. We're here for you 24 hours every day.         If you are deaf or hard of hearing, please let us know. We provide many free services including sign language interpreters, oral interpreters, TTYs, telephone amplifiers, note takers and written materials.

## 2017-08-16 NOTE — MR AVS SNAPSHOT
After Visit Summary   8/16/2017    Truman Suero    MRN: [id number]           Patient Information     Date Of Birth          1960        Visit Information        Provider Department      8/16/2017 2:40 PM Humberto Trinidad DO [facility]        Today's Diagnoses     Tobacco abuse    -  1    History of tobacco use          Care Instructions    Follow up for CT  Follow up for fasting labs  Preventive Health Recommendations  Male Ages 50 - 64    Yearly exam:             See your health care provider every year in order to  o   Review health changes.   o   Discuss preventive care.    o   Review your medicines if your doctor has prescribed any.     Have a cholesterol test every 5 years, or more frequently if you are at risk for high cholesterol/heart disease.     Have a diabetes test (fasting glucose) every three years. If you are at risk for diabetes, you should have this test more often.     Have a colonoscopy at age 50, or have a yearly FIT test (stool test). These exams will check for colon cancer.      Talk with your health care provider about whether or not a prostate cancer screening test (PSA) is right for you.    You should be tested each year for STDs (sexually transmitted diseases), if you re at risk.     Shots: Get a flu shot each year. Get a tetanus shot every 10 years.     Nutrition:    Eat at least 5 servings of fruits and vegetables daily.     Eat whole-grain bread, whole-wheat pasta and brown rice instead of white grains and rice.     Talk to your provider about Calcium and Vitamin D.     Lifestyle    Exercise for at least 150 minutes a week (30 minutes a day, 5 days a week). This will help you control your weight and prevent disease.     Limit alcohol to one drink per day.     No smoking.     Wear sunscreen to prevent skin cancer.     See your dentist every six months for an exam and cleaning.     See your eye doctor every 1 to 2 years.    Lung Cancer  Screening   Frequently Asked Questions  If you are at high-risk for lung cancer, getting screened with low-dose computed tomography (LDCT) every year can help save your life. This handout offers answers to some of the most common questions about lung cancer screening. If you have other questions, please call [phone] ([phone]).     What is it?  Lung cancer screening uses special X-ray technology to create an image of your lung tissue. The exam is quick and easy and takes less than 10 seconds. We don t give you any medicine or use any needles. You can eat before and after the exam. You don t need to change your clothes as long as the clothing on your chest doesn t contain metal. But, you do need to be able to hold your breath for at least 6 seconds during the exam.    What is the goal of lung cancer screening?  The goal of lung cancer screening is to save lives. Many times, lung cancer is not found until a person starts having physical symptoms. Lung cancer screening can help detect lung cancer in the earliest stages when it may be easier to treat.    Who should be screened for lung cancer?  We suggest lung cancer screening for anyone who is at high-risk for lung cancer. You are in the high-risk group if you:      are between the ages of 55 and 79, and    have smoked at least 1 pack of cigarettes a day for 30 or more years, and    still smoke or have quit within the past 15 years.    However, if you have a new cough or shortness of breath, you should talk to your doctor before being screened.    Some national lung health advocacy groups also recommend screening for people ages 50 to 79 who have smoked an average of 1 pack of cigarettes a day for 20 years. They must also have at least 1 other risk factor for lung cancer, not including exposure to secondhand smoke. Other risk factors are having had cancer in the past, emphysema, pulmonary fibrosis, COPD, a family history of lung cancer, or exposure to  certain materials such as arsenic, asbestos, beryllium, cadmium, chromium, diesel fumes, nickel, radon or silica. Your care team can help you know if you have one of these risk factors.     Why does it matter if I have symptoms?  Certain symptoms can be a sign that you have a condition in your lungs that should be checked and treated by your doctor. These symptoms include fever, chest pain, a new or changing cough, shortness of breath that you have never felt before, coughing up blood or unexplained weight loss. Having any of these symptoms can greatly affect the results of lung cancer screening.       Should all smokers get an LDCT lung cancer screening exam?  It depends. Lung cancer screening is for a very specific group of men and women who have a history of heavy smoking over a long period of time (see  Who should be screened for lung cancer  above).  I am in the high-risk group, but have been diagnosed with cancer in the past. Is LDCT lung cancer screening right for me?  In some cases, you should not have LDCT lung screening, such as when your doctor is already following your cancer with CT scan studies. Your doctor will help you decide if LDCT lung screening is right for you.  Do I need to have a screening exam every year?  Yes. If you are in the high-risk group described earlier, you should get an LDCT lung cancer screening exam every year until you are 79, or are no longer willing or able to undergo screening and possible procedures to diagnose and treat lung cancer.  How effective is LDCT at preventing death from lung cancer?  Studies have shown that LDCT lung cancer screening can lower the risk of death from lung cancer by 20 percent in people who are at high-risk.  What are the risks?  There are some risks and limitations of LDCT lung cancer screening. We want to make sure you understand the risks and benefits, so please let us know if you have any questions. Your doctor may want to talk with you more  about these risks.    Radiation exposure: As with any exam that uses radiation, there is a very small increased risk of cancer. The amount of radiation in LDCT is small--about the same amount a person would get from a mammogram. Your doctor orders the exam when he or she feels the potential benefits outweigh the risks.    False negatives: No test is perfect, including LDCT. It is possible that you may have a medical condition, including lung cancer, that is not found during your exam. This is called a false negative result.    False positives and more testing: LDCT very often finds something in the lung that could be cancer, but in fact is not. This is called a false positive result. False positive tests often cause anxiety. To make sure these findings are not cancer, you may need to have more tests. These tests will be done only if you give us permission. Sometimes patients need a treatment that can have side effects, such as a biopsy. For more information on false positives, see  What can I expect from the results?     Findings not related to lung cancer: Your LDCT exam also takes pictures of areas of your body next to your lungs. In a very small number of cases, the CT scan will show an abnormal finding in one of these areas, such as your kidneys, adrenal glands, liver or thyroid. This finding may not be serious, but you may need more tests. Your doctor can help you decide what other tests you may need, if any.  What can I expect from the results?  About 1 out of 4 LDCT exams will find something that may need more tests. Most of the time, these findings are lung nodules. Lung nodules are very small collections of tissue in the lung. These nodules are very common, and the vast majority--more than 97 percent--are not cancer (benign). Most are normal lymph nodes or small areas of scarring from past infections.  But, if a small lung nodule is found to be cancer, the cancer can be cured more than 90 percent of the  time. To know if the nodule is cancer, we may need to get more images before your next yearly screening exam. If the nodule has suspicious features (for example, it is large, has an odd shape or grows over time), we will refer you to a specialist for further testing.  Will my doctor also get the results?  Yes. Your doctor will get a copy of your results.  Is it okay to keep smoking now that there s a cancer screening exam?  No. Tobacco is one of the strongest cancer-causing agents. It causes not only lung cancer, but other cancers and cardiovascular (heart) diseases as well. The damage caused by smoking builds over time. This means that the longer you smoke, the higher your risk of disease. While it is never too late to quit, the sooner you quit, the better.  Where can I find help to quit smoking?  The best way to prevent lung cancer is to stop smoking. If you have already quit smoking, congratulations and keep it up! For help on quitting smoking, please call [employer] at 0-279-307-ONMO ([phone]) or the American Cancer Society at 1-326.652.3206 to find local resources near you.  One-on-one health coaching:  If you d prefer to work individually with a health care provider on tobacco cessation, we offer:      Medication Therapy Management:  Our specially trained pharmacists work closely with you and your doctor to help you quit smoking.  Call [phone] or [phone] (toll free).     Can Do: Health coaching offered by [facility].  [email or web address]    [facility]   Discharged by : Shila Olson MA    Paper scripts provided to patient : no     If you have any questions regarding your visit please contact your care team:     [facility] Hours Telephone Number   [address] Dr.   7am-7pm Monday - Thursday   7am-5pm Fridays  [phone]   (Appointment scheduling available 24/7)   RN Line    [phone] option 2       For a Price Quote for your services, please call our Consumer Price Line at [phone].     What options do I have for visits at the clinic other than the traditional office visit?     To expand how we care for you, many of our providers are utilizing electronic visits (e-visits) and telephone visits, when medically appropriate, for interactions with their patients rather than a visit in the clinic. We also offer nurse visits for many medical concerns. Just like any other service, we will bill your insurance company for this type of visit based on time spent on the phone with your provider. Not all insurance companies cover these visits. Please check with your medical insurance if this type of visit is covered. You will be responsible for any charges that are not paid by your insurance.   E-visits via [employer]: generally incur a $35.00 fee.     Telephone visits:   Time spent on the phone: *charged based on time that is spent on the phone in increments of 10 minutes. Estimated cost:   5-10 mins $30.00   11-20 mins. $59.00   21-30 mins. $85.00     Use [employer] (secure email communication and access to your chart) to send your primary care provider a message or make an appointment. Ask someone on your Team how to sign up for [employer].     As always, Thank you for trusting us with your health care needs!      [facility]:    Scheduling Appointments  [facility]  Call: [phone]    [facility]  Call: [phone]    [facility]  Call: [phone]      WHERE TO GO FOR CARE?    Clinic    Make an appointment if you:       Are sick (cold, cough, flu, sore throat, earache or in pain).       Have a small injury (sprain, small cut, burn or broken bone).       Need a physical exam, Pap smear, vaccine or prescription refill.       Have questions about your health or medicines.    To reach us:      Call  [phone] ([phone]). Open 24 hours every day. (For counseling services, call [phone].)    Log into [employer] at [email or web address]. (Visit [email or web address] to create an account.) Hospital emergency room    An emergency is a serious or life- threatening problem that must be treated right away.    Call 911 or get to the hospital if you have:      Very bad or sudden:            - Chest pain or pressure         - Bleeding         - Head or belly pain         - Dizziness or trouble seeing, walking or                          Speaking      Problems breathing      Blood in your vomit or you are coughing up blood      A major injury (knocked out, loss of a finger or limb, rape, broken bone protruding from skin)    A mental health crisis. (Or call the Mental Health Crisis line at 1-492.692.8760 or Suicide Prevention Hotline at 1-643.906.3848.)    Open 24 hours every day. You don't need an appointment.     Urgent care    Visit urgent care for sickness or small injuries when the clinic is closed. You don't need an appointment. To check hours or find an urgent care near you, visit [email or web address]. Online care    Get online care from [facility] for more than 70 common problems, like colds, allergies and infections. Open 24 hours every day at:   [email or web address]   Need help deciding?    For advice about where to be seen, you may call your clinic and ask to speak with a nurse. We're here for you 24 hours every day.         If you are deaf or hard of hearing, please let us know. We provide many free services including sign language interpreters, oral interpreters, TTYs, telephone amplifiers, note takers and written materials.                         Follow-ups after your visit        Your next 10 appointments already scheduled     Oct 02, 2017  3:00 PM CDT   Return Visit with RODNEY Vargas CNP   [facility] ([facility])    [address] [place] [address] [address]  [facility]  [id number] [phone]              Future tests that were ordered for you today     Open Future Orders        Priority Expected Expires Ordered    CT Chest Lung Cancer Scrn Low Dose wo Routine  8/16/2018 8/16/2017            Who to contact     If you have questions or need follow up information about today's clinic visit or your schedule please contact [facility] directly at [phone].  Normal or non-critical lab and imaging results will be communicated to you by [employer], letter or phone within 4 business days after the clinic has received the results. If you do not hear from us within 7 days, please contact the clinic through [employer] or phone. If you have a critical or abnormal lab result, we will notify you by phone as soon as possible.  Submit refill requests through [email or web address] or call your pharmacy and they will forward the refill request to us. Please allow 3 business days for your refill to be completed.          Additional Information About Your Visit        Go [employer] and [employer] Information     [email or web address] gives you secure access to your electronic health record. If you see a primary care provider, you can also send messages to your care team and make appointments. If you have questions, please call your primary care clinic.  If you do not have a primary care provider, please call [phone] and they will assist you.        Care EveryWhere ID     This is your Care EveryWhere ID. This could be used by other organizations to access your [facility] medical records  [id number]        Your Vitals Were     Pulse Temperature Height Pulse Oximetry BMI (Body Mass Index)       61 98.3  F (36.8  C) (Oral) 5' 6.46\" (1.688 m) 96% 21.97 kg/m2        Blood Pressure from Last 3 Encounters:   08/16/17 131/72   08/07/17 139/77   06/29/17 (!) 151/91    Weight from Last 3 Encounters:   08/16/17 138 lb (62.6 kg)   08/07/17 137 lb (62.1 kg)   05/25/17 140 lb (63.5 kg)              We Performed the Following     Okay for " Smoking Cessation Study (PLUTO) to Contact Patient     Prof fee: Shared Decisionmaking for Lung Cancer Screening        Primary Care Provider Office Phone # Fax #    [facility] [phone]       [address] [facility] [address]        Equal Access to Services     ELIEZER VALENTE : Hadii aad ku haddylanella Soomaali, waaxda luqadaha, qaybta kaalmada adeegyada, waxkiel oakleydainalize carter. So [facility] [phone].    ATENCIÓN: Si habla español, tiene a simmons disposición servicios gratuitos de asistencia lingüística. Caseydeshawn al [phone].    We comply with applicable federal civil rights laws and [place] laws. We do not discriminate on the basis of race, color, national origin, age, disability sex, sexual orientation or gender identity.            Thank you!     Thank you for choosing [facility]  for your care. Our goal is always to provide you with excellent care. Hearing back from our patients is one way we can continue to improve our services. Please take a few minutes to complete the written survey that you may receive in the mail after your visit with us. Thank you!             Your Updated Medication List - Protect others around you: Learn how to safely use, store and throw away your medicines at www.disposemymeds.org.          This list is accurate as of: 8/16/17  3:29 PM.  Always use your most recent med list.                   Brand Name Dispense Instructions for use Diagnosis    gabapentin 600 MG tablet    NEURONTIN    90 tablet    Take 1 tablet (600 mg) by mouth 3 times daily    Chronic neck and back pain, Cervical radiculopathy, Chronic pain syndrome       ibuprofen 800 MG tablet    ADVIL/MOTRIN          methocarbamol 500 MG tablet    ROBAXIN    150 tablet    Take 1-2 tablets (500-1,000 mg) by mouth 3 times daily as needed for muscle spasms Start with lower dose and caution sedation    Chronic neck pain, DDD (degenerative disc disease),  cervical, Cervical spondylosis without myelopathy, Chronic bilateral low back pain without sciatica, Myofascial pain       * traMADol 200 MG ER-tablet    ULTRAM-ER    30 tablet    Take 1 tablet (200 mg) by mouth daily OK to fill and begin on 8/7/2017 to last 30 days.    Cervical spondylosis without myelopathy, DDD (degenerative disc disease), cervical, Chronic bilateral low back pain without sciatica, Chronic neck and back pain       * traMADol 50 MG tablet    ULTRAM    120 tablet    May take 1-2 tablets every 6 hours as needed for pain, max of 4 tabs per day. To last 30 days. Reduce as able. OK to fill and begin on 8/7/2017 to last 30 days    Chronic neck and back pain, Cervical radiculopathy       varenicline 0.5 MG X 11 & 1 MG X 42 tablet    CHANTIX STARTING MONTH GIO    53 tablet    Take 0.5 mg tab daily for 3 days, then 0.5 mg tab twice daily for 4 days, then 1 mg twice daily.    Tobacco abuse       * Notice:  This list has 2 medication(s) that are the same as other medications prescribed for you. Read the directions carefully, and ask your doctor or other care provider to review them with you.
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
## 2017-08-16 NOTE — NURSING NOTE
"Chief Complaint   Patient presents with     Physical     lung cancer sceening       Initial /72  Pulse 61  Temp 98.3  F (36.8  C) (Oral)  Ht 5' 6.46\" (1.688 m)  Wt 138 lb (62.6 kg)  SpO2 96%  BMI 21.97 kg/m2 Estimated body mass index is 21.97 kg/(m^2) as calculated from the following:    Height as of this encounter: 5' 6.46\" (1.688 m).    Weight as of this encounter: 138 lb (62.6 kg).  Medication Reconciliation: complete     Richard Proctor      "

## 2017-08-18 ENCOUNTER — MYC MEDICAL ADVICE (OUTPATIENT)
Dept: PALLIATIVE MEDICINE | Facility: CLINIC | Age: 57
End: 2017-08-18

## 2017-08-18 DIAGNOSIS — M54.12 CERVICAL RADICULOPATHY: Primary | ICD-10-CM

## 2017-08-21 NOTE — TELEPHONE ENCOUNTER
My chart message from pt:    Dr. Navarro- Just touching base again. Just curious if you can recommend/refer  anyone or anything. I have to do something at this point as day to day life is getting unbearable. The simple things just seem to aggravate. Any advice would be appreciated. Thank you for your time     Palmer Suero     Previous my chart message from pt:    Melanie- Iknow it was just a week since our visit, but need your advice/help.  Guess I just need a plan. Last few days have been worse than I have ever experienced. Not sure if aggravated at work/sleep or whatever, but neck around throat area severe to point of can't look down. Feels like someone constantly stabbing me. Starting to lose feeling in more areas than before in hips and lower legs.trying to self stretch etc. but cannot seem to get it to open up. Any thoughts/ideas that may help would be appreciated. Thank you for your time  Palmer Nguyen somehow your previous message to Melanie was not caught. Melanie is on vacation all week but we do need to know more about this feeling that you are loosing. Where is it and how long does it last? We can send this to her covering providers and they can help with a referral.     Jessika Flannery RN, [facility]  Pain Clinic Care Coordinator

## 2017-08-21 NOTE — TELEPHONE ENCOUNTER
Per patient Pharnex[employer]art message:  From: Truman Suero      Created: 8/21/2017 4:59 PM      Thanks for update- pain will start in by throat area every time I look down, feels like someone is stabbing me with a knife. It tends to radiate down by back and into hip/back area. The numbness/loss of feeling tends to follow the area of pain.  So can be throat,arms and mid to lower back and hips. Occasionally will spread to legs and feet. Can last for a few minutes or  hours at a time.  Can make simple things like tying shoes difficult. I know this is kind of  a general description but it is getting a little more severe day by day. Hope this helps    Thanks  Palmer Suero        Routed to the nursing pool to triage.    Estelita Wetzel, RN-BSN  [facility]

## 2017-08-23 DIAGNOSIS — Z72.0 TOBACCO ABUSE: ICD-10-CM

## 2017-08-23 DIAGNOSIS — Z00.01 ENCOUNTER FOR ROUTINE ADULT HEALTH EXAMINATION WITH ABNORMAL FINDINGS: ICD-10-CM

## 2017-08-23 DIAGNOSIS — Z13.1 SCREENING FOR DIABETES MELLITUS: ICD-10-CM

## 2017-08-23 DIAGNOSIS — Z13.220 LIPID SCREENING: ICD-10-CM

## 2017-08-23 LAB — HGB BLD-MCNC: 13.8 G/DL (ref 13.3–17.7)

## 2017-08-23 PROCEDURE — 85018 HEMOGLOBIN: CPT | Performed by: FAMILY MEDICINE

## 2017-08-23 PROCEDURE — 80053 COMPREHEN METABOLIC PANEL: CPT | Performed by: FAMILY MEDICINE

## 2017-08-23 PROCEDURE — 36415 COLL VENOUS BLD VENIPUNCTURE: CPT | Performed by: FAMILY MEDICINE

## 2017-08-23 PROCEDURE — 80061 LIPID PANEL: CPT | Performed by: FAMILY MEDICINE

## 2017-08-23 NOTE — LETTER
[facility]  [address] [place] [id number] [phone]                                                                                                September 5, 2017    Truman MINAYA Pio  [address] [address]        Dear Mr. Suero,    Your recent lab results were within normal limits.      You may contact the clinic at [phone] if you have any questions or concerns about this request.      Sincerely,      Humberto Trinidad DO/hl    Results for orders placed or performed in visit on 08/23/17   Lipid panel reflex to direct LDL   Result Value Ref Range    Cholesterol 165 <200 mg/dL    Triglycerides 65 <150 mg/dL    HDL Cholesterol 55 >39 mg/dL    LDL Cholesterol Calculated 97 <100 mg/dL    Non HDL Cholesterol 110 <130 mg/dL   Comprehensive metabolic panel   Result Value Ref Range    Sodium 141 133 - 144 mmol/L    Potassium 4.4 3.4 - 5.3 mmol/L    Chloride 107 94 - 109 mmol/L    Carbon Dioxide 26 20 - 32 mmol/L    Anion Gap 8 3 - 14 mmol/L    Glucose 69 (L) 70 - 99 mg/dL    Urea Nitrogen 18 7 - 30 mg/dL    Creatinine 0.82 0.66 - 1.25 mg/dL    GFR Estimate >90 >60 mL/min/1.7m2    GFR Estimate If Black >90 >60 mL/min/1.7m2    Calcium 9.4 8.5 - 10.1 mg/dL    Bilirubin Total 0.4 0.2 - 1.3 mg/dL    Albumin 4.1 3.4 - 5.0 g/dL    Protein Total 7.1 6.8 - 8.8 g/dL    Alkaline Phosphatase 60 40 - 150 U/L    ALT 29 0 - 70 U/L    AST 24 0 - 45 U/L   Hemoglobin   Result Value Ref Range    Hemoglobin 13.8 13.3 - 17.7 g/dL

## 2017-08-24 ENCOUNTER — MYC MEDICAL ADVICE (OUTPATIENT)
Dept: PALLIATIVE MEDICINE | Facility: CLINIC | Age: 57
End: 2017-08-24

## 2017-08-24 LAB
ALBUMIN SERPL-MCNC: 4.1 G/DL (ref 3.4–5)
ALP SERPL-CCNC: 60 U/L (ref 40–150)
ALT SERPL W P-5'-P-CCNC: 29 U/L (ref 0–70)
ANION GAP SERPL CALCULATED.3IONS-SCNC: 8 MMOL/L (ref 3–14)
AST SERPL W P-5'-P-CCNC: 24 U/L (ref 0–45)
BILIRUB SERPL-MCNC: 0.4 MG/DL (ref 0.2–1.3)
BUN SERPL-MCNC: 18 MG/DL (ref 7–30)
CALCIUM SERPL-MCNC: 9.4 MG/DL (ref 8.5–10.1)
CHLORIDE SERPL-SCNC: 107 MMOL/L (ref 94–109)
CHOLEST SERPL-MCNC: 165 MG/DL
CO2 SERPL-SCNC: 26 MMOL/L (ref 20–32)
CREAT SERPL-MCNC: 0.82 MG/DL (ref 0.66–1.25)
GFR SERPL CREATININE-BSD FRML MDRD: >90 ML/MIN/1.7M2
GLUCOSE SERPL-MCNC: 69 MG/DL (ref 70–99)
HDLC SERPL-MCNC: 55 MG/DL
LDLC SERPL CALC-MCNC: 97 MG/DL
NONHDLC SERPL-MCNC: 110 MG/DL
POTASSIUM SERPL-SCNC: 4.4 MMOL/L (ref 3.4–5.3)
PROT SERPL-MCNC: 7.1 G/DL (ref 6.8–8.8)
SODIUM SERPL-SCNC: 141 MMOL/L (ref 133–144)
TRIGL SERPL-MCNC: 65 MG/DL

## 2017-08-24 NOTE — TELEPHONE ENCOUNTER
My chart message from pt:    Jessika- Got both messages. Called back but  could not find any record of you needing to talk to me so she said she would leave you a message.  Thanks,     Palmer Meraz my chart message sent by patient:    I know  Melanie is on vacation and until I hear any follow up on condition, do you think it would be ok to take 1 more gabapentin per day?. Right now the dosage is 1 600 mg 3 times a day. Just wondering if that may help. It is getting to the exhausting phase     Thanks   Palmer Suero     Called and spoke to pt at length. Gave him the phone number for imaging and he stated that he was good with that plan being at [place]. He liked the increase in the Gabapentin and went over that in depth. Went over signs that he would have that would bring him to the ER and he stated that he understood. He took the opening on 9/11/2017 with Melanie at [place] and stated he had no other questions.     Jessika Flannery RN, [facility]  Pain Clinic Care Coordinator

## 2017-08-24 NOTE — TELEPHONE ENCOUNTER
Look under call from 8/18/2017 all documentation is under there.     Jessika Flannery, RN, [facility]  Pain Clinic Care Coordinator

## 2017-08-24 NOTE — TELEPHONE ENCOUNTER
Contact Attempt      Outreach attempted x 1 on home and cell phone.  Left message on voicemail with call back information and requested return call. Also sent my chart message.     Plan:  Will await for CB.     Jessika Flannery RN, [facility]  Pain Clinic Care Coordinator

## 2017-08-24 NOTE — TELEPHONE ENCOUNTER
Last MRI Cervical spine was on 2/22/2017. Mo MRI Lumbar spine.    Pt currently on Gabapentin, Tramadol and Robaxin. Will send to provider pool to determine next course of action.     Jessika Flannery RN, [facility]  Pain Clinic Care Coordinator

## 2017-08-24 NOTE — TELEPHONE ENCOUNTER
Patient returning call. No documentation of call from Nursing staff.     Aidee Croft    Pain Management Clinic

## 2017-08-24 NOTE — TELEPHONE ENCOUNTER
"Given worsening of symptoms and no explanation to this level of symptoms on MRI from February, it appears he needs another MRI.  Patient lives in [place], so order placed for MRI at [place] location.  Please ask about claustrophobia, as question was left to \"no\" as this was put on last imaging.  He should call imaging department at [phone] to set up his imaging appointment. Let me know if he wants that in [place] instead but not sure how quickly they have openings.    He is on gabapentin 600mg TID. Noted inconsistent use at last office visit.  First step is making sure he is taking this TID as directed.  If he is, and no concerns for side effects (not mentioned in note), then he could increase to 900mg TID (1.5 of the 600mg tabs) TID, would recommend starting with nighttime dose and increasing others as able.    He needs to get in for another appointment for evaluation, exam for signs of myelopathy.  If red flags like loss of bowel/bladder function or significant changes in weakness/numbness, then he should go to ER for more urgent evaluation.    First opening I see for Melanie Thomas NP is 9/11, and it is at [place].  When she returns next week, she could look at her schedule and see if any opportunities for adding him on.      He is at max dose of the tramadol.    Mariaelena Osorio MD  [facility]    "

## 2017-08-25 ENCOUNTER — MYC MEDICAL ADVICE (OUTPATIENT)
Dept: PALLIATIVE MEDICINE | Facility: CLINIC | Age: 57
End: 2017-08-25

## 2017-08-25 NOTE — TELEPHONE ENCOUNTER
My chart message from pt:    Jessika- Sorry to bother you, but dumb question: Can I split the 600 mg Gabapentin in half to get 900mg dosage?     Palmer Chakraborty    Yes sorry, you can split the 600 mg in half to get to the 900 mg dosage.     Jessika Flannery RN, [facility]  Pain Clinic Care Coordinator

## 2017-08-28 ENCOUNTER — MYC MEDICAL ADVICE (OUTPATIENT)
Dept: PALLIATIVE MEDICINE | Facility: CLINIC | Age: 57
End: 2017-08-28

## 2017-08-28 NOTE — TELEPHONE ENCOUNTER
My chart message from pt:    Jessika/Melanie:              Per our phone conversation, imaging clinic needs an order/referral for MRI. Not sure when Melanie returns if you want her input. Thanks have a great weekend     Palmer Osorio placed the order and I called over to the scheduling number and they confirmed the order is in there. Let me know if you have any further problems.     Jessika Flannery RN, [facility]  Pain Clinic Care Coordinator

## 2017-09-01 ENCOUNTER — MYC MEDICAL ADVICE (OUTPATIENT)
Dept: PALLIATIVE MEDICINE | Facility: CLINIC | Age: 57
End: 2017-09-01

## 2017-09-01 NOTE — TELEPHONE ENCOUNTER
My chart message from pt:    Dr. Navarro and /or Staff-        Just thought of quick question about recommended increased dosage of Gabapentin. Does that have to be documented in the system as I will be using more than the original prescription called for.     Thanks     Palmer Chakraborty    Already done so when you need an early refill just let us know.     Jessika Flannery RN, [facility]  Pain Clinic Care Coordinator

## 2017-09-05 ENCOUNTER — HOSPITAL ENCOUNTER (OUTPATIENT)
Dept: MRI IMAGING | Facility: CLINIC | Age: 57
Discharge: HOME | End: 2017-09-05
Attending: PSYCHIATRY & NEUROLOGY | Admitting: PSYCHIATRY & NEUROLOGY
Payer: COMMERCIAL

## 2017-09-05 DIAGNOSIS — M54.12 CERVICAL RADICULOPATHY: ICD-10-CM

## 2017-09-05 PROCEDURE — 72141 MRI NECK SPINE W/O DYE: CPT

## 2017-09-06 ENCOUNTER — MYC MEDICAL ADVICE (OUTPATIENT)
Dept: FAMILY MEDICINE | Facility: CLINIC | Age: 57
End: 2017-09-06

## 2017-09-08 ENCOUNTER — MYC MEDICAL ADVICE (OUTPATIENT)
Dept: FAMILY MEDICINE | Facility: CLINIC | Age: 57
End: 2017-09-08

## 2017-09-11 ENCOUNTER — OFFICE VISIT (OUTPATIENT)
Dept: PALLIATIVE MEDICINE | Facility: CLINIC | Age: 57
End: 2017-09-11
Payer: COMMERCIAL

## 2017-09-11 ENCOUNTER — MYC MEDICAL ADVICE (OUTPATIENT)
Dept: PALLIATIVE MEDICINE | Facility: CLINIC | Age: 57
End: 2017-09-11

## 2017-09-11 VITALS — SYSTOLIC BLOOD PRESSURE: 122 MMHG | DIASTOLIC BLOOD PRESSURE: 80 MMHG | HEART RATE: 70 BPM | RESPIRATION RATE: 16 BRPM

## 2017-09-11 DIAGNOSIS — G89.29 CHRONIC NECK AND BACK PAIN: ICD-10-CM

## 2017-09-11 DIAGNOSIS — M47.812 CERVICAL SPONDYLOSIS WITHOUT MYELOPATHY: ICD-10-CM

## 2017-09-11 DIAGNOSIS — M50.30 DDD (DEGENERATIVE DISC DISEASE), CERVICAL: Primary | ICD-10-CM

## 2017-09-11 DIAGNOSIS — M54.9 CHRONIC NECK AND BACK PAIN: ICD-10-CM

## 2017-09-11 DIAGNOSIS — M54.2 CHRONIC NECK AND BACK PAIN: ICD-10-CM

## 2017-09-11 DIAGNOSIS — G89.29 CHRONIC NECK PAIN: ICD-10-CM

## 2017-09-11 DIAGNOSIS — M79.18 MYOFASCIAL PAIN: ICD-10-CM

## 2017-09-11 DIAGNOSIS — M54.2 CHRONIC NECK PAIN: ICD-10-CM

## 2017-09-11 DIAGNOSIS — M54.50 CHRONIC BILATERAL LOW BACK PAIN WITHOUT SCIATICA: ICD-10-CM

## 2017-09-11 DIAGNOSIS — G89.29 CHRONIC BILATERAL LOW BACK PAIN WITHOUT SCIATICA: ICD-10-CM

## 2017-09-11 DIAGNOSIS — M50.30 DDD (DEGENERATIVE DISC DISEASE), CERVICAL: ICD-10-CM

## 2017-09-11 DIAGNOSIS — M48.02 CERVICAL STENOSIS OF SPINAL CANAL: Primary | ICD-10-CM

## 2017-09-11 DIAGNOSIS — M48.02 CERVICAL STENOSIS OF SPINAL CANAL: ICD-10-CM

## 2017-09-11 PROCEDURE — 99214 OFFICE O/P EST MOD 30 MIN: CPT | Performed by: NURSE PRACTITIONER

## 2017-09-11 RX ORDER — TRAMADOL HYDROCHLORIDE 200 MG/1
200 TABLET, EXTENDED RELEASE ORAL DAILY
Qty: 30 TABLET | Refills: 0 | Status: SHIPPED | OUTPATIENT
Start: 2017-09-11 | End: 2017-10-02

## 2017-09-11 RX ORDER — GABAPENTIN 600 MG/1
900 TABLET ORAL 3 TIMES DAILY
Qty: 135 TABLET | Refills: 1 | Status: SHIPPED | OUTPATIENT
Start: 2017-09-11 | End: 2017-11-06

## 2017-09-11 ASSESSMENT — PAIN SCALES - GENERAL: PAINLEVEL: EXTREME PAIN (8)

## 2017-09-11 NOTE — TELEPHONE ENCOUNTER
Dr. Trinidad,    Does he need to schedule for here or is there anything we need to do?    Liana MINAYA, Certified Medical Assistant (AAMA)September 11, 2017 7:19 AM

## 2017-09-11 NOTE — MR AVS SNAPSHOT
After Visit Summary   9/11/2017    Truman Suero    MRN: [id number]           Patient Information     Date Of Birth          1960        Visit Information        Provider Department      9/11/2017 11:30 AM Melanie Thomas APRN [facility]        Today's Diagnoses     DDD (degenerative disc disease), cervical    -  1    Cervical stenosis of spinal canal        Cervical spondylosis without myelopathy        Chronic neck and back pain        Chronic bilateral low back pain without sciatica        Myofascial pain          Care Instructions    PLAN:  1. Continue home activities as able, pace yourself  2. Medications:   1. Continue Tramadol ER once daily  2. Continue Tramadol IR as needed  3. Continue gabapentin 900mg three times daily  4. Continue methocarbamol as needed  3. Procedures: none  4. Check with your insurance to see which neurosurgeons are covered for you to see. I particularly like [facility] [place]. I will place an order for you to be seen for a 2nd opinion  5. Follow-up with me as scheduled.      ----------------------------------------------------------------  Nurse Triage line:  [phone]   Call this number with any questions or concerns. You may leave a detailed message anytime. Calls are typically returned Monday through Friday between 8 AM and 4:30 PM. We usually get back to you within 2 business days depending on the issue/request.       Medication refills:    For non-narcotic medications, call your pharmacy directly to request a refill. The pharmacy will contact the [facility] for authorization. Please allow 3-4 days for these refills to be processed.     For narcotic refills, call the nurse triage line or send a GoBeMe message. Please contact us 7-10 days before your refill is due. The message MUST include the name of the specific medication(s) requested and how you would like to receive the prescription(s). The  options are as follows:    Pain Clinic staff can mail the prescription to your pharmacy. Please tell us the name of the pharmacy.    You may pick the prescription up at the Pain Clinic (tell us the location) or during a clinic visit with your pain provider    Pain Clinic staff can deliver the prescription to the [facility] pharmacy in the clinic building. Please tell us the location.      Scheduling number: [phone].  Call this number to schedule or change appointments.    We believe regular attendance is key to your success in our program.    Any time you are unable to keep your appointment we ask that you call us at least 24 hours in advance to let us know. This will allow us to offer the appointment time to another patient.               Follow-ups after your visit        Your next 10 appointments already scheduled     Oct 02, 2017  3:00 PM CDT   Return Visit with RODNEY Vargas CNP   [facility] [place] ([facility])    [address] [place] [address] [address]  [facility] [id number] [phone]              Who to contact     If you have questions or need follow up information about today's clinic visit or your schedule please contact [facility] directly at [phone].  Normal or non-critical lab and imaging results will be communicated to you by MyChart, letter or phone within 4 business days after the clinic has received the results. If you do not hear from us within 7 days, please contact the clinic through MyChart or phone. If you have a critical or abnormal lab result, we will notify you by phone as soon as possible.  Submit refill requests through [employer] or call your pharmacy and they will forward the refill request to us. Please allow 3 business days for your refill to be completed.          Additional Information About Your Visit        MyChart Information     [employer] gives you secure access to your electronic health record. If you see a primary care  provider, you can also send messages to your care team and make appointments. If you have questions, please call your primary care clinic.  If you do not have a primary care provider, please call [phone] and they will assist you.        Care EveryWhere ID     This is your Care EveryWhere ID. This could be used by other organizations to access your [facility] medical records  [id number]        Your Vitals Were     Pulse Respirations                70 16           Blood Pressure from Last 3 Encounters:   09/11/17 122/80   08/16/17 131/72   08/07/17 139/77    Weight from Last 3 Encounters:   08/16/17 62.6 kg (138 lb)   08/07/17 62.1 kg (137 lb)   05/25/17 63.5 kg (140 lb)              Today, you had the following     No orders found for display         Today's Medication Changes          These changes are accurate as of: 9/11/17 12:32 PM.  If you have any questions, ask your nurse or doctor.               These medicines have changed or have updated prescriptions.        Dose/Directions    * gabapentin 600 MG tablet   Commonly known as:  NEURONTIN   This may have changed:  Another medication with the same name was added. Make sure you understand how and when to take each.   Used for:  Chronic neck and back pain, Cervical radiculopathy, Chronic pain syndrome   Changed by:  Melanie Thomas APRN CNP        Dose:  600 mg   Take 1 tablet (600 mg) by mouth 3 times daily   Quantity:  90 tablet   Refills:  5       * gabapentin 600 MG tablet   Commonly known as:  NEURONTIN   This may have changed:  You were already taking a medication with the same name, and this prescription was added. Make sure you understand how and when to take each.   Used for:  Cervical spondylosis without myelopathy, DDD (degenerative disc disease), cervical, Chronic bilateral low back pain without sciatica, Chronic neck and back pain, Cervical stenosis of spinal canal   Changed by:  Melanie Thomas APRN CNP        Dose:  900 mg   Take 1.5  tablets (900 mg) by mouth 3 times daily   Quantity:  135 tablet   Refills:  1       * traMADol 50 MG tablet   Commonly known as:  ULTRAM   This may have changed:  Another medication with the same name was changed. Make sure you understand how and when to take each.   Used for:  Chronic neck and back pain, Cervical radiculopathy   Changed by:  Melanie Thomas APRN CNP        May take 1-2 tablets every 6 hours as needed for pain, max of 4 tabs per day. To last 30 days. Reduce as able. OK to fill and begin on 8/7/2017 to last 30 days   Quantity:  120 tablet   Refills:  0       * traMADol 200 MG ER-tablet   Commonly known as:  ULTRAM-ER   This may have changed:  additional instructions   Used for:  Cervical spondylosis without myelopathy, DDD (degenerative disc disease), cervical, Chronic bilateral low back pain without sciatica, Chronic neck and back pain   Changed by:  Melanie Thomas APRN CNP        Dose:  200 mg   Take 1 tablet (200 mg) by mouth daily OK to fill and begin on 9/11/2017 to last 30 days.   Quantity:  30 tablet   Refills:  0       * Notice:  This list has 4 medication(s) that are the same as other medications prescribed for you. Read the directions carefully, and ask your doctor or other care provider to review them with you.         Where to get your medicines      These medications were sent to [facility] [address] [place] [id number]     Phone:  [phone]     gabapentin 600 MG tablet         Some of these will need a paper prescription and others can be bought over the counter.  Ask your nurse if you have questions.     Bring a paper prescription for each of these medications     traMADol 200 MG ER-tablet                Primary Care Provider Office Phone # Fax #    Avinger [facility] [phone] [address] [facility] [address]        Equal Access to Services     ELIEZER GOODMAN: Colt  cece Maldonado, wasvetlanada luqadaha, qaybta kaalmada abelardojay, waxkiel michelle oakleydainalize carter. So [facility] [phone].    ATENCIÓN: Si habla elizabeth, tiene a simmons disposición servicios gratuitos de asistencia lingüística. Roosevelt al [phone].    We comply with applicable federal civil rights laws and [place] laws. We do not discriminate on the basis of race, color, national origin, age, disability sex, sexual orientation or gender identity.            Thank you!     Thank you for choosing [facility]  for your care. Our goal is always to provide you with excellent care. Hearing back from our patients is one way we can continue to improve our services. Please take a few minutes to complete the written survey that you may receive in the mail after your visit with us. Thank you!             Your Updated Medication List - Protect others around you: Learn how to safely use, store and throw away your medicines at www.disposemymeds.org.          This list is accurate as of: 9/11/17 12:32 PM.  Always use your most recent med list.                   Brand Name Dispense Instructions for use Diagnosis    * gabapentin 600 MG tablet    NEURONTIN    90 tablet    Take 1 tablet (600 mg) by mouth 3 times daily    Chronic neck and back pain, Cervical radiculopathy, Chronic pain syndrome       * gabapentin 600 MG tablet    NEURONTIN    135 tablet    Take 1.5 tablets (900 mg) by mouth 3 times daily    Cervical spondylosis without myelopathy, DDD (degenerative disc disease), cervical, Chronic bilateral low back pain without sciatica, Chronic neck and back pain, Cervical stenosis of spinal canal       ibuprofen 800 MG tablet    ADVIL/MOTRIN          methocarbamol 500 MG tablet    ROBAXIN    150 tablet    Take 1-2 tablets (500-1,000 mg) by mouth 3 times daily as needed for muscle spasms Start with lower dose and caution sedation    Chronic neck pain, DDD (degenerative disc disease), cervical, Cervical  spondylosis without myelopathy, Chronic bilateral low back pain without sciatica, Myofascial pain       * traMADol 50 MG tablet    ULTRAM    120 tablet    May take 1-2 tablets every 6 hours as needed for pain, max of 4 tabs per day. To last 30 days. Reduce as able. OK to fill and begin on 8/7/2017 to last 30 days    Chronic neck and back pain, Cervical radiculopathy       * traMADol 200 MG ER-tablet    ULTRAM-ER    30 tablet    Take 1 tablet (200 mg) by mouth daily OK to fill and begin on 9/11/2017 to last 30 days.    Cervical spondylosis without myelopathy, DDD (degenerative disc disease), cervical, Chronic bilateral low back pain without sciatica, Chronic neck and back pain       varenicline 0.5 MG X 11 & 1 MG X 42 tablet    CHANTIX STARTING MONTH GIO    53 tablet    Take 0.5 mg tab daily for 3 days, then 0.5 mg tab twice daily for 4 days, then 1 mg twice daily.    Tobacco abuse       * Notice:  This list has 4 medication(s) that are the same as other medications prescribed for you. Read the directions carefully, and ask your doctor or other care provider to review them with you.

## 2017-09-11 NOTE — PATIENT INSTRUCTIONS
PLAN:  1. Continue home activities as able, pace yourself  2. Medications:   1. Continue Tramadol ER once daily  2. Continue Tramadol IR as needed  3. Continue gabapentin 900mg three times daily  4. Continue methocarbamol as needed  3. Procedures: none  4. Check with your insurance to see which neurosurgeons are covered for you to see. I particularly like [facility] [place]. I will place an order for you to be seen for a 2nd opinion  5. Follow-up with me as scheduled.      ----------------------------------------------------------------  Nurse Triage line:  [phone]   Call this number with any questions or concerns. You may leave a detailed message anytime. Calls are typically returned Monday through Friday between 8 AM and 4:30 PM. We usually get back to you within 2 business days depending on the issue/request.       Medication refills:    For non-narcotic medications, call your pharmacy directly to request a refill. The pharmacy will contact the [facility] for authorization. Please allow 3-4 days for these refills to be processed.     For narcotic refills, call the nurse triage line or send a A[employer] message. Please contact us 7-10 days before your refill is due. The message MUST include the name of the specific medication(s) requested and how you would like to receive the prescription(s). The options are as follows:    Pain Clinic staff can mail the prescription to your pharmacy. Please tell us the name of the pharmacy.    You may pick the prescription up at the Pain Clinic (tell us the location) or during a clinic visit with your pain provider    Pain Clinic staff can deliver the prescription to the [facility] pharmacy in the clinic building. Please tell us the location.      Scheduling number: [phone].  Call this number to schedule or change appointments.    We believe regular attendance is key to your success in our program.    Any time you are unable to keep your  appointment we ask that you call us at least 24 hours in advance to let us know. This will allow us to offer the appointment time to another patient.

## 2017-09-11 NOTE — PROGRESS NOTES
[facility]    9/11/2017    Chief complaint: neck and lower back and lateral hip pain    Interval history:  Truman Suero is a 57 year old male is known to me for   Chronic neck pain  Cervical DDD  Cervical spondylosis (pain worse with extension/rotation indicating facetogenic component to pain)  Axial low back pain  Myofascial pain/muscle spasms  Remote history of ETOH overuse, attended AA for awhile. Sober for 10 years  ---PMHx includes: neck pain  ---PSHx includes: none listed      Recommendations/plan at the last visit on 8/7/2017 included:  1. Physical Therapy: none at present  2. Patient is to continue his home exercise program and exercises learned in PHYSICAL THERAPY  3. Clinical Health Psychologist: not at present  4. Diagnostic Studies:  none  5. Medication Management:    1. Continue Tramadol short-acting  2. Increase Tramadol ER to 200mg/day  3. Continue methocarbamol  4. I agree with ongoing gabapentin, take regularly for best results  6. Further procedures recommended: none  7. Sign DACIA [facility]  8. Recommendations to PCP: see above  9. Follow up: 8 weeks    Since his last visit, Truman Suero reports:  -struggling with neck pain, worse in certain positions. Has anterior neck pain and sometimes feels swallowing feels weird.   -wants to see neurosurgery  -has not had relief with facet joint injections with our clinic. Has had previous ISMAEL done at [facility] twice in the past without relief.  Does also have ongoing bilateral hand pain.    At this point, the patient's participation with our multidisciplinary team includes:  The patient has been compliant with the program.  Pain Group - not ordered  PT - attending non-pain management PHYSICAL THERAPY   Health Psych - not ordered  Acupuncture: working with Dr. Bereket LAY      Pain scores:  Pain intensity on average is 8 on a scale of 0-10.    Range is 5-10/10.   Pain right now is 8/10.  Pain is  "described as \"aching, numb, unbearable, tiring, sharp, stabbing, gnawing, miserable, nagging.\"    Pain is constant in nature    Current pain relevant medications:   -Tramadol 200mg ER in the evening (helpful)  -Tramadol 50mg take 1 tablet  Q 6 hours PRN (using 1-2 tabs per day)  -ibuprofen 600mg PRN (helpful)  -gabapentin 900mg TID (somewhat helpful)  -methocarbamol 500-1000mg TID PRN muscle spasms (somewhat helpful)      Other pertinent medications:  None    Previous Medications:  OPIATES: Tramdol (somewhat helpful)  NSAIDS: ibuprofen (helpful), Aleve (helpful)  MUSCLE RELAXANTS: none  ANTI-MIGRAINE MEDS: none  ANTI-DEPRESSANTS: none  SLEEP AIDS: none  ANTI-CONVULSANTS: gabapentin (helpful)  TOPICALS: lidocaine ointment (somewhat helpful)  Other meds: Tylenol (not helpful)        Other treatments have included:  Truman REI Suero has not been seen at a pain clinic in the past.    PT: tried, somewhat helpful  Chiropractic: helpful  Acupuncture: none  TENs Unit: none     Injections: cervical radiofrequency nerve ablation at [facility] in December 2016 (did get good relief, got 70% relief of his typical neck pain)  -6/29/2017 Cervical facet joint steroid injections at C4-5 and C5-6 on the right with Dr. Nicole Harding (not helpful)           THE 4 A's OF OPIOID MAINTENANCE ANALGESIA    Analgesia: long acting Tramadol with some short acting tramadol does give some relief    Activity: ADLS and PHYSICAL THERAPY exercises    Adverse effects: none    Adherence to Rx protocol: yes      Side Effects: none  Patient is using the medication as prescribed: yes    Medications:  Current Outpatient Prescriptions   Medication Sig Dispense Refill     traMADol (ULTRAM-ER) 200 MG ER-tablet Take 1 tablet (200 mg) by mouth daily OK to fill and begin on 9/11/2017 to last 30 days. 30 tablet 0     gabapentin (NEURONTIN) 600 MG tablet Take 1.5 tablets (900 mg) by mouth 3 times daily 135 tablet 1     gabapentin (NEURONTIN) 600 MG tablet Take 1 " tablet (600 mg) by mouth 3 times daily 90 tablet 5     traMADol (ULTRAM) 50 MG tablet May take 1-2 tablets every 6 hours as needed for pain, max of 4 tabs per day. To last 30 days. Reduce as able. OK to fill and begin on 8/7/2017 to last 30 days 120 tablet 0     methocarbamol (ROBAXIN) 500 MG tablet Take 1-2 tablets (500-1,000 mg) by mouth 3 times daily as needed for muscle spasms Start with lower dose and caution sedation 150 tablet 1     ibuprofen (ADVIL,MOTRIN) 800 MG tablet   0     varenicline (CHANTIX STARTING MONTH PAK) 0.5 MG X 11 & 1 MG X 42 tablet Take 0.5 mg tab daily for 3 days, then 0.5 mg tab twice daily for 4 days, then 1 mg twice daily. (Patient not taking: Reported on 9/11/2017) 53 tablet 0       Medical History: any changes in medical history since they were last seen? None    Social History:   Home situation: , has 4 kids, 2 at home, one in college and one launched.   Occupation/Schooling:  full time in [employer]  Tobacco use: smoking, planning on quitting smoking  Alcohol use: sober for nearly 10 years. He used to work a sobriety program, used to go to AA  Drug use: none  History of chemical dependency treatment: no formal treatment, did AA    Review of Systems:  ROS is positive as per HPI as well as for chills, dizziness, sinus infection, tinnitus, easy bruising, shortness of breath, nausea, joint pain, back pain, neck pain, weakness, numbness and tingling in the arms, anxiety, stress and is negative for fevers, sweats, or constipation, diarrhea.    Physical Exam:  Vital signs: Blood pressure 122/80, pulse 70, resp. rate 16.    Behavioral observations:  Awake, alert, cooperative    Gait:  normal    Musculoskeletal exam:    Moves well in exam room  Strength grossly equal throughout    Neuro exam:  SILT in all extremities     Skin/vascular/autonomic:  No suspicious lesions on exposed skin.      Other:  NA    IMAGING:    MR CERVICAL SPINE WITHOUT CONTRAST 9/5/2017 2:32 PM       HISTORY: Neck pain, worsening numbness in arms and lower extremities.  Radiculopathy, cervical region.     TECHNIQUE: Multiplanar multisequence images were obtained through the  cervical spine without contrast.     COMPARISON: 2/22/2017.     FINDINGS: Sagittal images demonstrate some minimal gentle cervical  kyphosis, otherwise normal posterior alignment. There is no evidence  for craniovertebral or cervicomedullary junction abnormality. The  cervical cord is minimally indented anteriorly at C4-C5 and C5-C6,  otherwise is normal in morphology and signal characteristics. Disc  space narrowing and discogenic marrow changes are present C3-C4,  C4-C5, C5-C6 and C6-C7.     C2-C3: Minimal facet hypertrophy. No stenosis.     C3-C4: Broad-based posterior osteophyte formation is present causing  some mild bilateral neural foraminal stenosis, but no central canal  stenosis.     C4-C5: Broad-based posterior osteophyte formation and mild facet  hypertrophy is present causing some mild to moderate central canal  stenosis, mild cord deformity, and mild-to-moderate bilateral neural  foraminal stenosis.     C5-C6: Broad-based posterior osteophyte formation and disc bulging is  present along with some facet hypertrophy. There is moderate central  canal stenosis and moderate bilateral neural foraminal stenosis.  Findings have progressed since the prior exam.     C6-C7: Broad-based disc bulging is present along with some minimal  posterior osteophyte formation. There is borderline to mild central  canal stenosis, but no neural foraminal stenosis.     C7-T1: Moderate facet hypertrophy. No significant stenosis.         IMPRESSION: Moderate multilevel degenerative disc and facet disease  with some progression at C5-C6 where there is moderate central canal  and bilateral neural foraminal stenosis along with cord deformity.          MR CERVICAL SPINE WITHOUT CONTRAST  2/22/2017 9:59 AM     HISTORY:  Bilateral neck and arm pain for  three years.     COMPARISON: None.     TECHNIQUE: Routine MR cervical spine extended through T2.     FINDINGS: There is some degenerative bone marrow signal change C3-C6.  No malignant or destructive lesions. Normal alignment through T2.  Reversed cervical adenosis C3-C6.     The cervical and upper thoracic spinal cord appear intrinsically  normal. The craniocervical junction region is normal. No paraspinous  soft tissue abnormality.     Findings by level as follows:     C2-C3: Negative. No disc protrusion. No central or lateral stenosis.     C3-C4: Mild disc space narrowing. Mild diffuse annular bulge. Small  uncinate spur on the right. No significant central or left-sided  stenosis. Mild right-sided foraminal stenosis.     C4-C5: Moderate degenerative narrowing of the interspace. Mild ventral  disc osteophyte complex with minimal central stenosis. Small uncinate  spurs bilaterally with mild bilateral foraminal stenosis.     C6-C7: Moderate disc space narrowing. Mild broad-based disc osteophyte  complex with minimal central stenosis. Minimal uncinate spurs with  mild bilateral foraminal stenosis.     C6-C7: Moderate disc space narrowing. Mild ventral disc osteophyte  complex. No significant central or lateral stenosis.     C7-T1: No disc protrusion. No central or lateral stenosis. Moderate  bilateral facet joint disease.         IMPRESSION:  1. Degenerative changes as described C3-C4 through C7-T1. There is  only mild central and foraminal stenosis as described.  2. No intrinsic spinal cord abnormality through T2    [place] Prescription Monitoring Program:  Reviewed, as expected.     DIRE Score for selecting candidates for long term opioid analgesia for chronic pain:  Diagnosis  2  Intractablility  2  Risk    Psychological health  2    Chemical health  2    Reliability  2    Social support  3  Efficacy  2    Total DIRE Score = 15. Note that   7-13 predicts poor outcome (compliance and efficacy) from opioid  prescribing; 14-21 predicts good outcome (compliance and efficacy)  from opioid prescribing.      Assessment:   1. Cervical DDD  2. Cervical spinal stenosis  3. Cervical spondylosis (pain worse with extension/rotation indicating facetogenic component to pain)  4. Axial low back pain  5. Myofascial pain/muscle spasms  6. Chronic pain syndrome  7. Remote history of ETOH overuse, attended AA for awhile. Sober for 10 years  8. PMHx includes: neck pain  9. PSHx includes: none listed      Plan:   1. Physical Therapy: none at present- has done PHYSICAL THERAPY   2. Patient is to continue his home exercise program and exercises learned in PHYSICAL THERAPY  3. Clinical Health Psychologist: not at present  4. Diagnostic Studies:  none  5. Medication Management:    1. Continue Tramadol short-acting  2. Increase Tramadol ER to 200mg/day  3. Continue methocarbamol  4. I agree with ongoing gabapentin, take regularly for best results  6. Further procedures recommended: none  7. Patient to check with his insurance with which neurosurgeons are covered by his insurer. He is potentially interested in seeing [facility] in [place]. I will place a referral once I know where he would like to be seen for a 2nd opininon  8. Recommendations to PCP: see above  9. Follow up: as scheduled    Total time spent face to face was 35 minutes and more than 50% of face to face time was spent in counseling and/or coordination of care regarding the diagnosis and recommendations above.      Melanie STEVENS, RN CNP, FNP  [facility]

## 2017-09-12 ENCOUNTER — MYC MEDICAL ADVICE (OUTPATIENT)
Dept: FAMILY MEDICINE | Facility: CLINIC | Age: 57
End: 2017-09-12

## 2017-09-12 ENCOUNTER — MYC MEDICAL ADVICE (OUTPATIENT)
Dept: PALLIATIVE MEDICINE | Facility: CLINIC | Age: 57
End: 2017-09-12

## 2017-09-12 NOTE — TELEPHONE ENCOUNTER
Per patient myChart message:  From: Truman Suero      Created: 9/11/2017 1:55 PM      Dr. Navarro- Long time no talk. I spoke with [employer] per our converation and [facility] is within my network. She said I just need the referral on record and then name of Dr. I have appointment with. Thanks for that recommendation. Other quick question: Would it help to take a few more Motrin, etc. I know they can help with inflammation. Just trying to be proactive. Like i said the more active I am the more it seems to get aggravated. Thanks for your time    Palmer Suero

## 2017-09-12 NOTE — TELEPHONE ENCOUNTER
See the 9/11/17 mychart encounter for more information.    Estelita Wetzel RN-BSN  [facility]

## 2017-09-12 NOTE — TELEPHONE ENCOUNTER
Signed order.   Placed in bin by MA touchdown.    Melanie STEVENS RN CNP, FNP  [facility] [place]

## 2017-09-12 NOTE — TELEPHONE ENCOUNTER
Dr. Trinidad,     Can you find your incomplete note and close.    Liana MINAYA, Certified Medical Assistant (AAMA)September 14, 2017 1:43 PM

## 2017-09-12 NOTE — TELEPHONE ENCOUNTER
Per Melanie Thomas NP's office visit AVS from yesterday:  1. Check with your insurance to see which neurosurgeons are covered for you to see. I particularly like [facility] in [place]. I will place an order for you to be seen for a 2nd opinion  --------------------------  Routed to Melanie Thomas NP to review referral.  It is prepped to review an sign.    Address pt's questions about the motrin.    Estelita Wetzel, RN-BSN  [facility]

## 2017-09-12 NOTE — TELEPHONE ENCOUNTER
Reviewed previous encounter. Here are the lung screening findings: centrilobular emphysema. Mild aortic dilation of the ascending aorta measuring 3.9 cm. Mild atherosclerotic calcifications of the coronary arteries.  Lipids & labs are normal.   Huddled & route to PCP.     Jaylene Paulino RN

## 2017-09-12 NOTE — TELEPHONE ENCOUNTER
Called to discuss results, left message on his machine    There are some atherosclerosis changes on arteries which can be managed conservatively  I would recommend stopping smoking asap to reduce risk and start low dose Asprin  He also has some findings of COPD(emphysema)-best thing is to stop smoking then manage symptoms such as shortness of breath , wheezes.   Follow up in 3-6 months to discuss options and  rechecking symptoms.  He also may need US of aorta due to smoking history   Humberto Trinidad D.O.

## 2017-09-12 NOTE — TELEPHONE ENCOUNTER
Per patient [employer] message:    Created: 9/12/2017 1:15 PM      Just checking to see if my past message went thru to Melanie regarding referral to Neur- Surgeon. [employer] rachel.        [employer] sent to pt:  Xavier Chakraborty.  Yes your message has been received.  Melanie Thomas NP needs to review.  We will let you know her plan for you.    Take care,    Estelita Wetzel RN-BSN  [facility]

## 2017-09-13 ENCOUNTER — MYC MEDICAL ADVICE (OUTPATIENT)
Dept: FAMILY MEDICINE | Facility: CLINIC | Age: 57
End: 2017-09-13

## 2017-09-13 NOTE — TELEPHONE ENCOUNTER
Message left at 0926 by Gege from [facility]. She states that they received the referral and demographics sheet from us, but needs more information before they can schedule the patient. Please send most recent radiology report from within the last 6 months and recent clinic notes. After they receive that information they will pass on the referral to the first available doctor unless there is someone specific you want the patient to see.  Gege phone - [phone]  -- FAX - [phone]     Marleny Rojas    [facility]

## 2017-09-13 NOTE — TELEPHONE ENCOUNTER
[employer] message sent to patient with Dr. Trinidad's recommendations. Will keep chart open in case he replies.  Adelina Art RN

## 2017-09-14 ENCOUNTER — MYC MEDICAL ADVICE (OUTPATIENT)
Dept: PALLIATIVE MEDICINE | Facility: CLINIC | Age: 57
End: 2017-09-14

## 2017-09-14 NOTE — TELEPHONE ENCOUNTER
Per patient myChart message:  From: Estelita Wetzel RN        Created: 9/14/2017 4:44 PM       Xavier Chakraborty.  The referral has been faxed.  Looks like we received a message from them that they require more information before proceeding-records and such.  We will let you know once this is done.    Estelita Wetzel RN-BSN  [facility]

## 2017-09-14 NOTE — TELEPHONE ENCOUNTER
Per patient myChart message:  From: Truman Suero        Created: 9/14/2017 12:30 PM       Hi- Just wondering if Dr. Navarro has had a chance to process referral to [facility], per our conversation as appt 9/11. Sorry, not trying to be a pest, but I know sometimes those s are back-logged and I need to do something in the near future. Condition is making day to day life miserable. Thank you for your time    Palmer Suero

## 2017-09-15 NOTE — TELEPHONE ENCOUNTER
"Most recent visit note from 9/11 still in process of being completed. Cervical MRI reports from 2/2017 and 9/2017 printed along with the visit note from 8/7/17.  Once 9/11 appointment notes are finished, will fax to [facility].   Printed notes placed in \"pending forms\" basket.      Message sent to patient:  Elmer Chakraborty,     In regard to the Neurosurgery referral, Melanie needs to complete the last visit note and then we will fax that to [facility].  Once they receive this information, they will call you to set up an appointment.      Thank you for your patience,     CAMILLE Artis, RN-BC  Patient Care Supervisor/Care Coordinator  [facility]    Will route to Melanie for FYI.    CAMILLE Artis, RN-BC  Patient Care Supervisor/Care Coordinator  [facility]        "

## 2017-09-20 NOTE — TELEPHONE ENCOUNTER
Note is completed. Please fax to [facility] as appropriate.   Melanie STEVENS, TIMBO CNP, FNP  [facility] [place]

## 2017-09-21 ENCOUNTER — MYC REFILL (OUTPATIENT)
Dept: PALLIATIVE MEDICINE | Facility: CLINIC | Age: 57
End: 2017-09-21

## 2017-09-21 ENCOUNTER — MYC REFILL (OUTPATIENT)
Dept: FAMILY MEDICINE | Facility: CLINIC | Age: 57
End: 2017-09-21

## 2017-09-21 DIAGNOSIS — G89.29 CHRONIC NECK AND BACK PAIN: ICD-10-CM

## 2017-09-21 DIAGNOSIS — M54.2 CHRONIC NECK AND BACK PAIN: ICD-10-CM

## 2017-09-21 DIAGNOSIS — M54.9 CHRONIC NECK AND BACK PAIN: ICD-10-CM

## 2017-09-21 DIAGNOSIS — Z72.0 TOBACCO ABUSE: ICD-10-CM

## 2017-09-21 DIAGNOSIS — M54.12 CERVICAL RADICULOPATHY: ICD-10-CM

## 2017-09-21 RX ORDER — TRAMADOL HYDROCHLORIDE 50 MG/1
TABLET ORAL
Qty: 120 TABLET | Refills: 0 | Status: CANCELLED | OUTPATIENT
Start: 2017-09-21

## 2017-09-21 NOTE — TELEPHONE ENCOUNTER
Message from [employer]:  Original authorizing provider: RODNEY Vargas CNP would like a refill of the following medications:  traMADol (ULTRAM) 50 MG tablet [RODNEY Vargas CNP]    Preferred pharmacy: [facility] [address] [place] [address]    Comment:  Still have about 2 weeks left, but know this takes awhile

## 2017-09-21 NOTE — TELEPHONE ENCOUNTER
Routed to the nursing pool and to the MA pool to process refill(s).    Estelita Wetzel, RN-BSN  [facility]

## 2017-09-21 NOTE — TELEPHONE ENCOUNTER
Message from [employer]:  Original authorizing provider: LIN Parson would like a refill of the following medications:  varenicline (CHANTIX STARTING MONTH PAK) 0.5 MG X 11 & 1 MG X 42 tablet [Tiffanie Moulton PA-C]    Preferred pharmacy: [facility] [address] [place] [address]    Comment:  Would like to try something. Need to stop smoking

## 2017-09-21 NOTE — TELEPHONE ENCOUNTER
varenicline (CHANTIX STARTING MONTH PAK) 0.5 MG X 11 & 1 MG X 42 tablet       Last Written Prescription Date: 3/17/17  Last Fill Quantity: 53, # refills: 0  Last Office Visit with [facility], P or [facility] prescribing provider: 3/17/17   Next 5 appointments (look out 90 days)     Oct 02, 2017  3:00 PM CDT   Return Visit with RODNEY Vargas CNP   [facility]    [address]  [facility] [address]  [facility] [address] [phone]                   BP Readings from Last 3 Encounters:   09/11/17 122/80   08/16/17 131/72   08/07/17 139/77

## 2017-09-21 NOTE — TELEPHONE ENCOUNTER
[employer] message from patient on 9/21 at 1144:    Thank you Shanita-   Still have not heard from them  ([facility])- Is it worth calling them or continue to wait to hear from them     Zac Suero  ------------------  Message sent to patient.       Xavier Chakraborty,     I am glad that you checked in with us and your timing is good!  Melanie finished the note last night and I will have all of the information faxed this afternoon.  I would wait a few more days for a call from [facility].  Hopefully you will be scheduled soon.     Thank you for your patience!    CAMILLE Artis, RN-BC  Patient Care Supervisor/Care Coordinator  [facility] [place]  -------------  Requested notes faxed to [facility] and received confirmation of successful transmission.     CAMILLE Artis, RN-BC  Patient Care Supervisor/Care Coordinator  [facility]

## 2017-09-22 NOTE — TELEPHONE ENCOUNTER
Medication refill information reviewed. Pt requested tramadol 50 mg, not 200 mg tabs.      Called [facility] pharmacy. Last  date of tramadol 50 mg is: 8/7/17    Due date for tramadol 50 mg is anytime after 9/6/17     Message sent to pt:    Xavier Chakraborty,     In review of your chart, the directions for the tramadol 50 mg was:  May take 1-2 tablets every 6 hours as needed for pain, max of 4 tabs per day. Reduce as able. OK to fill and begin on 8/7/2017 to last 30 days. It has been almost 6 weeks since this medication has been filled so I suspect that you have been able to reduce your dose of this medication, and that the increase in the long acting tramadol has been helpful.  How many of these tablets are you taking each day on average of the tramadol 50 mg tabs?     We look forward to hearing from you.     CAMILLE Artis, RN-BC  Patient Care Supervisor/Care Coordinator  [facility]    RX NOT PREPPED. Will await response from pt.     CAMILLE Artis, RN-BC  Patient Care Supervisor/Care Coordinator  [facility]

## 2017-09-22 NOTE — TELEPHONE ENCOUNTER
Received call from patient requesting refill(s) of traMADol (ULTRAM-ER) 200 MG ER-tablet    Last picked up from pharmacy on 9/11/17    Pt last seen by prescribing provider on 9/11/17  Next appt scheduled for 10/2/17    Last urine drug screen date 2/15/17  Current opioid agreement on file (completed within the last year) Yes Date of opioid agreement: 5/17/17    Processing (pick one)    Fax it to [facility] @ [phone].     Will route to nursing pool for review and preparation of prescription(s).

## 2017-09-25 NOTE — TELEPHONE ENCOUNTER
Will route to last provider seen, he had a physical in August. Are you willing to prescribe the Chantix?    Arsen Dutta RN

## 2017-09-25 NOTE — TELEPHONE ENCOUNTER
Per patient myChart message:  From: Truman Suero      Created: 9/25/2017 12:11 PM      Estelita- Maybe 3/day would be ok, then see where that leaves me at the end of the month. Then possibly 60/month would be ok    Palmer

## 2017-09-25 NOTE — TELEPHONE ENCOUNTER
Per patient myChart message:  From: Truman Suero      Created: 9/25/2017 1:20 PM      as of today 24 remaining        If pt is taking 3 tabs/day he has 8 days of tramadol remaining.  Routed to Melanie Thomas NP to review.    Estelita Wetzel RN-BSN  [facility]

## 2017-09-25 NOTE — TELEPHONE ENCOUNTER
Mychart to pt:  Good to hear.  So would you want to decrease to 60 tabs/month of the tramadol 50mg tabs then or do you feel a max of 3/day would be more ideal?    Will await pt response before further processing.    Estelita Wetzel, RN-BSN  [facility]

## 2017-09-25 NOTE — TELEPHONE ENCOUNTER
Alejandrina sent to pt:  So sorry I forgot to ask;  how many tabs do you have remaining?    Estelita Wetzel RN-BSN  [facility]

## 2017-09-25 NOTE — TELEPHONE ENCOUNTER
Per patient myChart message:  From: Truman Suero      Created: 9/22/2017 4:42 PM      Shanita- Average of 1-2 per day. 2 would be max. Usually 1 in the morning to get to work. Sometimes another in the late morning/early afternoon if work is demanding that day. Unfortunately, my job requires all the physical activities that aggravate.

## 2017-09-26 ENCOUNTER — TELEPHONE (OUTPATIENT)
Dept: PALLIATIVE MEDICINE | Facility: CLINIC | Age: 57
End: 2017-09-26

## 2017-09-26 ENCOUNTER — MYC MEDICAL ADVICE (OUTPATIENT)
Dept: PALLIATIVE MEDICINE | Facility: CLINIC | Age: 57
End: 2017-09-26

## 2017-09-26 DIAGNOSIS — M47.892 OTHER OSTEOARTHRITIS OF SPINE, CERVICAL REGION: Primary | ICD-10-CM

## 2017-09-26 RX ORDER — TRAMADOL HYDROCHLORIDE 50 MG/1
TABLET ORAL
Qty: 90 TABLET | Refills: 0 | Status: SHIPPED | OUTPATIENT
Start: 2017-09-26 | End: 2017-11-01

## 2017-09-26 NOTE — TELEPHONE ENCOUNTER
Dr Trinidad,  Can you please place referral for [facility] for a consultation regarding his recent MRI results and options going forward. He was referred to another [facility] but they turned out to not be covered by his plan and he has a managed care plan so referrals must be placed by primary which is [facility]. After placing please have your nurse mail him a copy and call him with the number to call and set his appointment.                   Ramandeep Almeida Referral Dept.

## 2017-09-26 NOTE — TELEPHONE ENCOUNTER
Signed Prescriptions:                        Disp   Refills    traMADol (ULTRAM) 50 MG tablet             90 tab*0        Sig: May take 1-2 tablets every 6 hours as needed for           pain, max of 3 tabs per day. To last 30 days.           Reduce as able.  Dispense on/after 10/2/17.  To           start on 10/3/17  Authorizing Provider: MELANIE BENSON    Signed script placed in touchdown bin at [facility].    Melanie Benson APRN CNP FNP RN  [facility]

## 2017-09-26 NOTE — TELEPHONE ENCOUNTER
Pt has an appointment tomorrow with neurosurgery.    Per Melanie Thomas, NP's 9/11/17 clinic notes:  Since his last visit, Truman Suero reports:  -struggling with neck pain, worse in certain positions. Has anterior neck pain and sometimes feels swallowing feels weird.   -wants to see neurosurgery  -has not had relief with facet joint injections with our clinic. Has had previous ISMAEL done at [facility] twice in the past without relief.  Does also have ongoing bilateral hand pain.    Injections: cervical radiofrequency nerve ablation at [facility] in December 2016 (did get good relief, got 70% relief of his typical neck pain)  -6/29/2017 Cervical facet joint steroid injections at C4-5 and C5-6 on the right with Dr. Nicole Harding (not helpful)     Called Ramandeep back and left message to call the shared line in [place].    Estelita Wetzel, RN-BSN  [facility]

## 2017-09-26 NOTE — TELEPHONE ENCOUNTER
Ramandeep called back.    Pt's insurance is not covered at the neurosurgical associates.    He needs to be redirected to a covered provider.    His primary care provider, Dr. Venegas should make this referral for him.    Pt to call Ramandeep to discuss further.    Called pt and left message informing him that his appointment with neurosurgery which is scheduled for tomorrow is not covered by his insurance  Informed him to call that clinic and look into it.  Advised him to call back if he needs a referral elsewhere.    Estelita Wetzel RN-BSN  [facility]

## 2017-09-26 NOTE — TELEPHONE ENCOUNTER
Left voice message. Script for Tramadol was signed and faxed to [facility]. RightFax confirmed.       Mike Bergeron MA

## 2017-09-26 NOTE — TELEPHONE ENCOUNTER
Ramandeep from [facility] calling, she is needing to speak to care team regarding referrals and orders needed for patient. Please call her back today as patient has an appointment tomorrow. Ramandeep's number is [phone].      Monalisa LUZ    [facility]

## 2017-09-27 ENCOUNTER — MYC MEDICAL ADVICE (OUTPATIENT)
Dept: FAMILY MEDICINE | Facility: CLINIC | Age: 57
End: 2017-09-27

## 2017-09-27 DIAGNOSIS — M47.892 OTHER OSTEOARTHRITIS OF SPINE, CERVICAL REGION: ICD-10-CM

## 2017-09-27 DIAGNOSIS — M50.30 DDD (DEGENERATIVE DISC DISEASE), CERVICAL: Primary | ICD-10-CM

## 2017-09-27 NOTE — TELEPHONE ENCOUNTER
Patient is requesting a referral to [facility] for a surgery consult as requested by the Pain Clinic.   Please see MyChart message below.  Thank you,    Adelina Art RN

## 2017-09-27 NOTE — TELEPHONE ENCOUNTER
Should I refer to neurosurgery in [place]?  Or kelly she have another specific location he would go to?  Humberto Trinidad D.O.

## 2017-09-27 NOTE — TELEPHONE ENCOUNTER
Wm has agreered to go to see [facility] at [facility] so if you could place referral order and have your nurse call him with number and then mail him the referral.    Thanks    Ramandeep Almeida Referral Dept

## 2017-09-29 ENCOUNTER — MYC MEDICAL ADVICE (OUTPATIENT)
Dept: PALLIATIVE MEDICINE | Facility: CLINIC | Age: 57
End: 2017-09-29

## 2017-09-29 DIAGNOSIS — M48.02 CERVICAL STENOSIS OF SPINAL CANAL: Primary | ICD-10-CM

## 2017-09-29 DIAGNOSIS — R26.9 GAIT DISTURBANCE: ICD-10-CM

## 2017-09-29 NOTE — TELEPHONE ENCOUNTER
"Please pend this order as I can not find this referral in [facility], we have neurosurgery teams not \"spine and Brain\".  I have placed a referral to neurosurgery, again I have not seen him for this problem, I am assuming it is for neck pain.   Please clarify the diagnosis and exact location as I am not finding this referral in epic  thanks'    "

## 2017-10-02 ENCOUNTER — OFFICE VISIT (OUTPATIENT)
Dept: PALLIATIVE MEDICINE | Facility: CLINIC | Age: 57
End: 2017-10-02
Payer: COMMERCIAL

## 2017-10-02 VITALS
SYSTOLIC BLOOD PRESSURE: 132 MMHG | HEART RATE: 93 BPM | DIASTOLIC BLOOD PRESSURE: 80 MMHG | OXYGEN SATURATION: 98 % | RESPIRATION RATE: 16 BRPM

## 2017-10-02 DIAGNOSIS — M47.812 CERVICAL SPONDYLOSIS WITHOUT MYELOPATHY: ICD-10-CM

## 2017-10-02 DIAGNOSIS — M50.30 DDD (DEGENERATIVE DISC DISEASE), CERVICAL: ICD-10-CM

## 2017-10-02 DIAGNOSIS — M54.50 CHRONIC BILATERAL LOW BACK PAIN WITHOUT SCIATICA: ICD-10-CM

## 2017-10-02 DIAGNOSIS — G89.29 CHRONIC BILATERAL LOW BACK PAIN WITHOUT SCIATICA: ICD-10-CM

## 2017-10-02 DIAGNOSIS — M48.02 CERVICAL STENOSIS OF SPINAL CANAL: Primary | ICD-10-CM

## 2017-10-02 DIAGNOSIS — M79.18 MYOFASCIAL PAIN: ICD-10-CM

## 2017-10-02 PROCEDURE — 99214 OFFICE O/P EST MOD 30 MIN: CPT | Performed by: NURSE PRACTITIONER

## 2017-10-02 RX ORDER — TRAMADOL HYDROCHLORIDE 200 MG/1
200 TABLET, EXTENDED RELEASE ORAL DAILY
Qty: 23 TABLET | Refills: 0 | Status: SHIPPED | OUTPATIENT
Start: 2017-10-02 | End: 2017-11-01

## 2017-10-02 ASSESSMENT — PAIN SCALES - GENERAL: PAINLEVEL: EXTREME PAIN (8)

## 2017-10-02 NOTE — PROGRESS NOTES
[facility]    10/2/2017    Chief complaint: neck and lower back and lateral hip pain    Interval history:  Truman Suero is a 57 year old male is known to me for   Chronic neck pain  Cervical DDD  Cervical spondylosis (pain worse with extension/rotation indicating facetogenic component to pain)  Axial low back pain  Myofascial pain/muscle spasms  Remote history of ETOH overuse, attended AA for awhile. Sober for 10 years  ---PMHx includes: neck pain  ---PSHx includes: none listed      Recommendations/plan at the last visit on 9/11/2017 included:  1. Physical Therapy: none at present- has done PHYSICAL THERAPY   2. Patient is to continue his home exercise program and exercises learned in PHYSICAL THERAPY  3. Clinical Health Psychologist: not at present  4. Diagnostic Studies:  none  5. Medication Management:    1. Continue Tramadol short-acting  2. Increase Tramadol ER to 200mg/day  3. Continue methocarbamol  4. I agree with ongoing gabapentin, take regularly for best results  6. Further procedures recommended: none  7. Patient to check with his insurance with which neurosurgeons are covered by his insurer. He is potentially interested in seeing [facility] in [place]. I will place a referral once I know where he would like to be seen for a 2nd opininon  8. Recommendations to PCP: see above  9. Follow up: as scheduled      Since his last visit, Truman Suero reports:  -having ongoing neck pain, has numbness and tingling in both arms and hands. He sometimes has numbness in his hips and legs.   No bowel or bladder symptoms.   He does feel that he has some unsteadiness on his feet from time to time.     At this point, the patient's participation with our multidisciplinary team includes:  The patient has been compliant with the program.  Pain Group - not ordered  PT - attending non-pain management PHYSICAL THERAPY   Health Psych - not ordered  Acupuncture: working with   "[place] [facility]      Pain scores:  Pain intensity on average is 7-8 on a scale of 0-10.    Range is 4-10/10.   Pain right now is 9/10.  Pain is described as \"numb, unbearable, burning, tiring, shooting, exhausting, sharp, stabbing, gnawing, miserable, nagging  .\"    Pain is constant in nature    Current pain relevant medications:   -Tramadol 200mg ER in the evening (helpful)  -Tramadol 50mg take 1 tablet  Q 6 hours PRN (using 1-2 tabs per day)  -ibuprofen 600mg PRN (helpful)  -gabapentin 900mg TID (somewhat helpful)  -methocarbamol 500-1000mg TID PRN muscle spasms (somewhat helpful)      Other pertinent medications:  None    Previous Medications:  OPIATES: Tramdol (somewhat helpful)  NSAIDS: ibuprofen (helpful), Aleve (helpful)  MUSCLE RELAXANTS: none  ANTI-MIGRAINE MEDS: none  ANTI-DEPRESSANTS: none  SLEEP AIDS: none  ANTI-CONVULSANTS: gabapentin (helpful)  TOPICALS: lidocaine ointment (somewhat helpful)  Other meds: Tylenol (not helpful)        Other treatments have included:  Truman Suero has not been seen at a pain clinic in the past.    PT: tried, somewhat helpful  Chiropractic: helpful  Acupuncture: none  TENs Unit: none     Injections: cervical radiofrequency nerve ablation at [facility] in December 2016 (did get good relief, got 70% relief of his typical neck pain)  -6/29/2017 Cervical facet joint steroid injections at C4-5 and C5-6 on the right with Dr. Nicole Harding (not helpful)           THE 4 A's OF OPIOID MAINTENANCE ANALGESIA    Analgesia: long acting Tramadol with some short acting tramadol does give some relief    Activity: ADLS and PHYSICAL THERAPY exercises    Adverse effects: none    Adherence to Rx protocol: yes      Side Effects: none  Patient is using the medication as prescribed: yes    Medications:  Current Outpatient Prescriptions   Medication Sig Dispense Refill     varenicline (CHANTIX STARTING MONTH PAK) 0.5 MG X 11 & 1 MG X 42 tablet Take 0.5 mg tab daily for 3 days, then 0.5 mg tab twice " daily for 4 days, then 1 mg twice daily. 53 tablet 0     traMADol (ULTRAM) 50 MG tablet May take 1-2 tablets every 6 hours as needed for pain, max of 3 tabs per day. To last 30 days. Reduce as able.  Dispense on/after 10/2/17.  To start on 10/3/17 90 tablet 0     traMADol (ULTRAM-ER) 200 MG ER-tablet Take 1 tablet (200 mg) by mouth daily OK to fill and begin on 9/11/2017 to last 30 days. 30 tablet 0     gabapentin (NEURONTIN) 600 MG tablet Take 1.5 tablets (900 mg) by mouth 3 times daily 135 tablet 1     [DISCONTINUED] gabapentin (NEURONTIN) 600 MG tablet Take 1 tablet (600 mg) by mouth 3 times daily 90 tablet 5     methocarbamol (ROBAXIN) 500 MG tablet Take 1-2 tablets (500-1,000 mg) by mouth 3 times daily as needed for muscle spasms Start with lower dose and caution sedation 150 tablet 1     ibuprofen (ADVIL,MOTRIN) 800 MG tablet   0       Medical History: any changes in medical history since they were last seen? None    Social History:   Home situation: , has 4 kids, 2 at home, one in college and one launched.   Occupation/Schooling:  full time in [employer]  Tobacco use: smoking, planning on quitting smoking  Alcohol use: sober for nearly 10 years. He used to work a sobriety program, used to go to   Drug use: none  History of chemical dependency treatment: no formal treatment, did AA    Review of Systems:  ROS is positive as per HPI as well as for fatigue, headache, dizziness, easy bruising, shortness of breath, nausea, joint pain, numbness and tingling, depression, anxiety, stress and is negative for fevers, sweats, or constipation, diarrhea.    Physical Exam:  Vital signs: Blood pressure 132/80, pulse 93, resp. rate 16, SpO2 98 %.    Behavioral observations:  Awake, alert, cooperative    Gait:  normal    Musculoskeletal exam:    Cervical flexion to about 35-50 degrees  Cervical extension to about 30 degrees  Cervical rotation to the right is 90 degrees  Cervical rotation to the left is 90  degrees  Full power with bilateral shoulder abduction, elbow flexion, elbow extension, wrist extension, , and finger abduction.      Neuro exam:  SILT in all extremities     Skin/vascular/autonomic:  No suspicious lesions on exposed skin.      Other:  NA    IMAGING:    MR CERVICAL SPINE WITHOUT CONTRAST 9/5/2017 2:32 PM      HISTORY: Neck pain, worsening numbness in arms and lower extremities.  Radiculopathy, cervical region.     TECHNIQUE: Multiplanar multisequence images were obtained through the  cervical spine without contrast.     COMPARISON: 2/22/2017.     FINDINGS: Sagittal images demonstrate some minimal gentle cervical  kyphosis, otherwise normal posterior alignment. There is no evidence  for craniovertebral or cervicomedullary junction abnormality. The  cervical cord is minimally indented anteriorly at C4-C5 and C5-C6,  otherwise is normal in morphology and signal characteristics. Disc  space narrowing and discogenic marrow changes are present C3-C4,  C4-C5, C5-C6 and C6-C7.     C2-C3: Minimal facet hypertrophy. No stenosis.     C3-C4: Broad-based posterior osteophyte formation is present causing  some mild bilateral neural foraminal stenosis, but no central canal  stenosis.     C4-C5: Broad-based posterior osteophyte formation and mild facet  hypertrophy is present causing some mild to moderate central canal  stenosis, mild cord deformity, and mild-to-moderate bilateral neural  foraminal stenosis.     C5-C6: Broad-based posterior osteophyte formation and disc bulging is  present along with some facet hypertrophy. There is moderate central  canal stenosis and moderate bilateral neural foraminal stenosis.  Findings have progressed since the prior exam.     C6-C7: Broad-based disc bulging is present along with some minimal  posterior osteophyte formation. There is borderline to mild central  canal stenosis, but no neural foraminal stenosis.     C7-T1: Moderate facet hypertrophy. No significant  stenosis.         IMPRESSION: Moderate multilevel degenerative disc and facet disease  with some progression at C5-C6 where there is moderate central canal  and bilateral neural foraminal stenosis along with cord deformity.          MR CERVICAL SPINE WITHOUT CONTRAST  2/22/2017 9:59 AM     HISTORY:  Bilateral neck and arm pain for three years.     COMPARISON: None.     TECHNIQUE: Routine MR cervical spine extended through T2.     FINDINGS: There is some degenerative bone marrow signal change C3-C6.  No malignant or destructive lesions. Normal alignment through T2.  Reversed cervical adenosis C3-C6.     The cervical and upper thoracic spinal cord appear intrinsically  normal. The craniocervical junction region is normal. No paraspinous  soft tissue abnormality.     Findings by level as follows:     C2-C3: Negative. No disc protrusion. No central or lateral stenosis.     C3-C4: Mild disc space narrowing. Mild diffuse annular bulge. Small  uncinate spur on the right. No significant central or left-sided  stenosis. Mild right-sided foraminal stenosis.     C4-C5: Moderate degenerative narrowing of the interspace. Mild ventral  disc osteophyte complex with minimal central stenosis. Small uncinate  spurs bilaterally with mild bilateral foraminal stenosis.     C6-C7: Moderate disc space narrowing. Mild broad-based disc osteophyte  complex with minimal central stenosis. Minimal uncinate spurs with  mild bilateral foraminal stenosis.     C6-C7: Moderate disc space narrowing. Mild ventral disc osteophyte  complex. No significant central or lateral stenosis.     C7-T1: No disc protrusion. No central or lateral stenosis. Moderate  bilateral facet joint disease.         IMPRESSION:  1. Degenerative changes as described C3-C4 through C7-T1. There is  only mild central and foraminal stenosis as described.  2. No intrinsic spinal cord abnormality through T2    [place] Prescription Monitoring Program:  Reviewed, as expected (there  are 2 scripts for tramadol 200mg ER filled on 9/11/2017-only one script generated by myself but pharmacy filled incorrectly, this was caught by the patient and he took the medication back and discussed issue with pharmacy and they issued correct tabs)     DIRE Score for selecting candidates for long term opioid analgesia for chronic pain:  Diagnosis  2  Intractablility  2  Risk    Psychological health  2    Chemical health  2    Reliability  2    Social support  3  Efficacy  2    Total DIRE Score = 15. Note that   7-13 predicts poor outcome (compliance and efficacy) from opioid prescribing; 14-21 predicts good outcome (compliance and efficacy)  from opioid prescribing.      Assessment:   1. Cervical spinal stenosis with bilateral numbness in the hands and occasionally unsteady gait  2. Cervical DDD  3. Cervical spondylosis (pain worse with extension/rotation indicating facetogenic component to pain)  4. Axial low back pain  5. Myofascial pain/muscle spasms  6. Chronic pain syndrome  7. Remote history of ETOH overuse, attended AA for awhile. Sober for 10 years  8. PMHx includes: neck pain  9. PSHx includes: none listed      Plan:   1. Physical Therapy: none at present- has done PHYSICAL THERAPY   2. Patient is to continue his home exercise program and exercises learned in PHYSICAL THERAPY  3. Clinical Health Psychologist: not at present  4. Diagnostic Studies:  none  5. Medication Management:    1. Continue Tramadol ER  2. Continue Tramadol immediate release  3. Continue gabapentin and methocarbamol  6. Further procedures recommended: none  7. I have reached out to Dr. Trinidad for her to place an order for patient to see [facility] [place] [facility] for a 2nd opinion. Patient has failed conservative treatments including Cervical epidural steroid injections, cervical radiofrequency ablations, and Physical therapy. He has moderate cervical spinal stenosis with cord deformity on most recent MRI. Patient also reports occasional  unsteadiness with his gait  8. Discussed smoking cessation, patient is working on this.   9. Recommendations to PCP: see above  10. Follow up: 8 weeks    Total time spent face to face was 35 minutes and more than 50% of face to face time was spent in counseling and/or coordination of care regarding the diagnosis and recommendations above.      Melanie STEVENS, RN CNP, FNP  [facility] [place]

## 2017-10-02 NOTE — TELEPHONE ENCOUNTER
Please see referral requested by Melanie Thomas on 9/12/2017. She is not his primary clinic provider so his insurance will not accept request and location referred to.  Please direct him to provider  for services needed so he will be covered by his plan and notify Truman.  Thank You  Ramandeep PURCELL Referral Dept

## 2017-10-02 NOTE — TELEPHONE ENCOUNTER
tramadol script was faxed to [facility] on 9/26/17.    Dispense on/after 10/2/17.  To start on 10/3/17    Called [facility].  They did not receive the fax.  Was then on hold for 10 minutes.  Hung up.  The tramadol was faxed to pharmacy.    Pt informed via Bplatshart.    Will keep encounter open for a couple of days in anticipation of patient call back.     Estelita Wetzel RN-BSN  [facility]

## 2017-10-02 NOTE — PATIENT INSTRUCTIONS
PLAN:  1. Continue home activities and exercises learned in PHYSICAL THERAPY as you are  2. Medications:   1. Continue gabapentin  2. Continue Tramadol ER (this is a 23 day script so this and Tramadol immediate release are due on same day in the future)  3. Continue Tramadol 50mg immediate release  3. Procedures: none  4. I have reached out to Dr. Trinidad to have [facility]  5. Follow-up with me in 8 weeks.       ----------------------------------------------------------------  Nurse Triage line:  [phone]   Call this number with any questions or concerns. You may leave a detailed message anytime. Calls are typically returned Monday through Friday between 8 AM and 4:30 PM. We usually get back to you within 2 business days depending on the issue/request.       Medication refills:    For non-narcotic medications, call your pharmacy directly to request a refill. The pharmacy will contact the [facility] Center for authorization. Please allow 3-4 days for these refills to be processed.     For narcotic refills, call the nurse triage line or send a iZotope message. Please contact us 7-10 days before your refill is due. The message MUST include the name of the specific medication(s) requested and how you would like to receive the prescription(s). The options are as follows:    Pain Clinic staff can mail the prescription to your pharmacy. Please tell us the name of the pharmacy.    You may pick the prescription up at the Pain Clinic (tell us the location) or during a clinic visit with your pain provider    Pain Clinic staff can deliver the prescription to the [facility] pharmacy in the clinic building. Please tell us the location.      Scheduling number: [phone].  Call this number to schedule or change appointments.    We believe regular attendance is key to your success in our program.    Any time you are unable to keep your appointment we ask that you call us at least 24 hours in advance  to let us know. This will allow us to offer the appointment time to another patient.

## 2017-10-02 NOTE — MR AVS SNAPSHOT
After Visit Summary   10/2/2017    Truman Suero    MRN: [id number]           Patient Information     Date Of Birth          1960        Visit Information        Provider Department      10/2/2017 3:00 PM Melanie Thomas APRN [facility]        Today's Diagnoses     Cervical spondylosis without myelopathy        DDD (degenerative disc disease), cervical        Chronic bilateral low back pain without sciatica        Chronic neck and back pain          Care Instructions    PLAN:  1. Continue home activities and exercises learned in PHYSICAL THERAPY as you are  2. Medications:   1. Continue gabapentin  2. Continue Tramadol ER (this is a 23 day script so this and Tramadol immediate release are due on same day in the future)  3. Continue Tramadol 50mg immediate release  3. Procedures: none  4. I have reached out to Dr. Trinidad to have her refer you to [facility]  5. Follow-up with me in 8 weeks.       ----------------------------------------------------------------  Nurse Triage line:  [phone]   Call this number with any questions or concerns. You may leave a detailed message anytime. Calls are typically returned Monday through Friday between 8 AM and 4:30 PM. We usually get back to you within 2 business days depending on the issue/request.       Medication refills:    For non-narcotic medications, call your pharmacy directly to request a refill. The pharmacy will contact the Pain Management Center for authorization. Please allow 3-4 days for these refills to be processed.     For narcotic refills, call the nurse triage line or send a [employer] message. Please contact us 7-10 days before your refill is due. The message MUST include the name of the specific medication(s) requested and how you would like to receive the prescription(s). The options are as follows:    Pain Clinic staff can mail the prescription to your pharmacy. Please tell us the name of the  pharmacy.    You may pick the prescription up at the Pain Clinic (tell us the location) or during a clinic visit with your pain provider    Pain Clinic staff can deliver the prescription to the [place] pharmacy in the clinic building. Please tell us the location.      Scheduling number: [phone].  Call this number to schedule or change appointments.    We believe regular attendance is key to your success in our program.    Any time you are unable to keep your appointment we ask that you call us at least 24 hours in advance to let us know. This will allow us to offer the appointment time to another patient.               Follow-ups after your visit        Your next 10 appointments already scheduled     Oct 10, 2017  1:00 PM CDT   New Visit with RODNEY Jang CNP   [facility] ([facility])    [address] [place] [id number] [phone]              Who to contact     If you have questions or need follow up information about today's clinic visit or your schedule please contact [facility] directly at [phone].  Normal or non-critical lab and imaging results will be communicated to you by SkillSlatehart, letter or phone within 4 business days after the clinic has received the results. If you do not hear from us within 7 days, please contact the clinic through [employer] or phone. If you have a critical or abnormal lab result, we will notify you by phone as soon as possible.  Submit refill requests through Shnergle or call your pharmacy and they will forward the refill request to us. Please allow 3 business days for your refill to be completed.          Additional Information About Your Visit        SkillSlateharNext Caller Information     Shnergle gives you secure access to your electronic health record. If you see a primary care provider, you can also send messages to your care team and make appointments. If you have questions, please  call your primary care clinic.  If you do not have a primary care provider, please call [phone] and they will assist you.        Care EveryWhere ID     This is your Care EveryWhere ID. This could be used by other organizations to access your [facility] medical records  [id number]        Your Vitals Were     Pulse Respirations Pulse Oximetry             93 16 98%          Blood Pressure from Last 3 Encounters:   10/02/17 132/80   09/11/17 122/80   08/16/17 131/72    Weight from Last 3 Encounters:   08/16/17 62.6 kg (138 lb)   08/07/17 62.1 kg (137 lb)   05/25/17 63.5 kg (140 lb)              Today, you had the following     No orders found for display         Today's Medication Changes          These changes are accurate as of: 10/2/17  3:46 PM.  If you have any questions, ask your nurse or doctor.               These medicines have changed or have updated prescriptions.        Dose/Directions    * traMADol 50 MG tablet   Commonly known as:  ULTRAM   This may have changed:  Another medication with the same name was changed. Make sure you understand how and when to take each.   Used for:  Chronic neck and back pain, Cervical radiculopathy   Changed by:  Melanie Thomas APRN CNP        May take 1-2 tablets every 6 hours as needed for pain, max of 3 tabs per day. To last 30 days. Reduce as able.  Dispense on/after 10/2/17.  To start on 10/3/17   Quantity:  90 tablet   Refills:  0       * traMADol 200 MG ER-tablet   Commonly known as:  ULTRAM-ER   This may have changed:  additional instructions   Used for:  Cervical spondylosis without myelopathy, DDD (degenerative disc disease), cervical, Chronic bilateral low back pain without sciatica, Chronic neck and back pain   Changed by:  Melanie Thomas APRN CNP        Dose:  200 mg   Take 1 tablet (200 mg) by mouth daily OK to fill and begin on 10/9/2017 and start on 10/11/2017 to until 11/2/2017 (23 day script so Tramadol ER and tramadol immediate release are  due on the same day in the future)   Quantity:  23 tablet   Refills:  0       * Notice:  This list has 2 medication(s) that are the same as other medications prescribed for you. Read the directions carefully, and ask your doctor or other care provider to review them with you.         Where to get your medicines      Some of these will need a paper prescription and others can be bought over the counter.  Ask your nurse if you have questions.     Bring a paper prescription for each of these medications     traMADol 200 MG ER-tablet                Primary Care Provider Office Phone # Fax #    Farhana [facility] [phone]       [address] [facility] [address]        Equal Access to Services     ELIEZER VALENTE : Colt Maldonado, bakari manzo, deanna buttsmaadrianna unger, mariela carter. So [facility] [phone].    ATENCIÓN: Si habla español, tiene a simmons disposición servicios gratuitos de asistencia lingüística. [facility] [phone].    We comply with applicable federal civil rights laws and [place] laws. We do not discriminate on the basis of race, color, national origin, age, disability, sex, sexual orientation, or gender identity.            Thank you!     Thank you for choosing [facility] [place]  for your care. Our goal is always to provide you with excellent care. Hearing back from our patients is one way we can continue to improve our services. Please take a few minutes to complete the written survey that you may receive in the mail after your visit with us. Thank you!             Your Updated Medication List - Protect others around you: Learn how to safely use, store and throw away your medicines at www.disposemymeds.org.          This list is accurate as of: 10/2/17  3:46 PM.  Always use your most recent med list.                   Brand Name Dispense Instructions for use Diagnosis    gabapentin 600 MG tablet    NEURONTIN     135 tablet    Take 1.5 tablets (900 mg) by mouth 3 times daily    Cervical spondylosis without myelopathy, DDD (degenerative disc disease), cervical, Chronic bilateral low back pain without sciatica, Chronic neck and back pain, Cervical stenosis of spinal canal       ibuprofen 800 MG tablet    ADVIL/MOTRIN          methocarbamol 500 MG tablet    ROBAXIN    150 tablet    Take 1-2 tablets (500-1,000 mg) by mouth 3 times daily as needed for muscle spasms Start with lower dose and caution sedation    Chronic neck pain, DDD (degenerative disc disease), cervical, Cervical spondylosis without myelopathy, Chronic bilateral low back pain without sciatica, Myofascial pain       * traMADol 50 MG tablet    ULTRAM    90 tablet    May take 1-2 tablets every 6 hours as needed for pain, max of 3 tabs per day. To last 30 days. Reduce as able.  Dispense on/after 10/2/17.  To start on 10/3/17    Chronic neck and back pain, Cervical radiculopathy       * traMADol 200 MG ER-tablet    ULTRAM-ER    23 tablet    Take 1 tablet (200 mg) by mouth daily OK to fill and begin on 10/9/2017 and start on 10/11/2017 to until 11/2/2017 (23 day script so Tramadol ER and tramadol immediate release are due on the same day in the future)    Cervical spondylosis without myelopathy, DDD (degenerative disc disease), cervical, Chronic bilateral low back pain without sciatica, Chronic neck and back pain       varenicline 0.5 MG X 11 & 1 MG X 42 tablet    CHANTIX STARTING MONTH GIO    53 tablet    Take 0.5 mg tab daily for 3 days, then 0.5 mg tab twice daily for 4 days, then 1 mg twice daily.    Tobacco abuse       * Notice:  This list has 2 medication(s) that are the same as other medications prescribed for you. Read the directions carefully, and ask your doctor or other care provider to review them with you.

## 2017-10-02 NOTE — TELEPHONE ENCOUNTER
Per patient [employer] message:  From: Truman Suero      Created: 9/29/2017 1:52 PM      Hi- I received a phone message Wednesday that Dr. Navarro has signed and faxed over prescription for the Tramadol. Went over to [facility] today and they do not have any record of it. Quite sure this is not your fault. This is the 3rd time have had issues getting medications from them. The last prescription Dr. Navarro gave me took 5 days to fill as they gave me the wrong pills to start with. I told them I would check with you just to make them happy. Thanks for your time    Palmer Suero

## 2017-10-02 NOTE — NURSING NOTE
"Chief Complaint   Patient presents with     Pain       Initial /80 (BP Location: Right arm, Patient Position: Chair, Cuff Size: Adult Small)  Pulse 93  Resp 16  SpO2 98% Estimated body mass index is 21.97 kg/(m^2) as calculated from the following:    Height as of 8/16/17: 1.688 m (5' 6.46\").    Weight as of 8/16/17: 62.6 kg (138 lb).  Medication Reconciliation: complete       Kailyn Hope MA  [facility]      "

## 2017-10-03 PROBLEM — M79.18 MYOFASCIAL PAIN: Status: ACTIVE | Noted: 2017-10-03

## 2017-10-03 PROBLEM — M48.02 CERVICAL STENOSIS OF SPINAL CANAL: Status: ACTIVE | Noted: 2017-10-03

## 2017-10-03 PROBLEM — M50.30 DDD (DEGENERATIVE DISC DISEASE), CERVICAL: Status: ACTIVE | Noted: 2017-10-03

## 2017-10-03 PROBLEM — M47.812 CERVICAL SPONDYLOSIS WITHOUT MYELOPATHY: Status: ACTIVE | Noted: 2017-10-03

## 2017-10-03 NOTE — TELEPHONE ENCOUNTER
Humberto Trinidad DO Physician Signed    Date of Service: 10/03/2017 1:53 PM Creation Time: 10/03/2017 1:53 PM    Addend Delete  Bookmark Copy               Melanie Thomas APRN CNP Dessie, Hellina Tegagne, DO                            He has been seen by the neurosurgery group that you referred him to previously. I was not impressed at all.     Please refer him to [facility] re: cervical spinal stenosis with odd feeling in throat and occasional gait issues. He has failed conservative treatments including Cervical epidural steroid injections. He has cervical spinal stenosis with cord deformity on MRI.     Thanks.   Melanie STEVENS, RN CNP, P   [facility]        Referral placed per patient and pain clinic recommendation,  Humberto Trinidad D.O.

## 2017-10-03 NOTE — TELEPHONE ENCOUNTER
Martha, RODNEY Robertson CNP, Humberto Lucas, DO                     He has been seen by the neurosurgery group that you referred him to previously. I was not impressed at all.     Please refer him to [facility] re: cervical spinal stenosis with odd feeling in throat and occasional gait issues. He has failed conservative treatments including Cervical epidural steroid injections. He has cervical spinal stenosis with cord deformity on MRI.     Thanks.   Melanie STEVENS, RN CNP, P   [facility]      Referral placed per patient and pain clinic recommendation,  Humberto Trinidad D.O.

## 2017-10-05 ENCOUNTER — MYC MEDICAL ADVICE (OUTPATIENT)
Dept: PALLIATIVE MEDICINE | Facility: CLINIC | Age: 57
End: 2017-10-05

## 2017-10-05 NOTE — TELEPHONE ENCOUNTER
Alejandrina message from patient on 10/5 at 1338:    Dr Navarro- Have not heard from Dr. Trinidad or the [facility] neuro, so as we discussed Monday I will keep the appt  Tues with Dr. Servin at the [facility]. Any thoughts otherwise let me know. Always thanks for help     Palmer   ---------------  Will route to Melanie Thomas CNP as NIKKI.    Shanita Briones, ANALISAN, RN-BC  Patient Care Supervisor/Care Coordinator  [facility]

## 2017-10-10 ENCOUNTER — OFFICE VISIT (OUTPATIENT)
Dept: NEUROSURGERY | Facility: CLINIC | Age: 57
End: 2017-10-10
Attending: FAMILY MEDICINE
Payer: COMMERCIAL

## 2017-10-10 ENCOUNTER — MYC MEDICAL ADVICE (OUTPATIENT)
Dept: FAMILY MEDICINE | Facility: CLINIC | Age: 57
End: 2017-10-10

## 2017-10-10 ENCOUNTER — MYC MEDICAL ADVICE (OUTPATIENT)
Dept: PALLIATIVE MEDICINE | Facility: CLINIC | Age: 57
End: 2017-10-10

## 2017-10-10 VITALS
HEIGHT: 66 IN | SYSTOLIC BLOOD PRESSURE: 136 MMHG | OXYGEN SATURATION: 99 % | HEART RATE: 53 BPM | DIASTOLIC BLOOD PRESSURE: 88 MMHG | BODY MASS INDEX: 22.98 KG/M2 | WEIGHT: 143 LBS

## 2017-10-10 DIAGNOSIS — M54.12 CERVICAL RADICULAR PAIN: Primary | ICD-10-CM

## 2017-10-10 PROCEDURE — 99211 OFF/OP EST MAY X REQ PHY/QHP: CPT | Performed by: NURSE PRACTITIONER

## 2017-10-10 PROCEDURE — 99214 OFFICE O/P EST MOD 30 MIN: CPT | Performed by: NURSE PRACTITIONER

## 2017-10-10 ASSESSMENT — PAIN SCALES - GENERAL: PAINLEVEL: EXTREME PAIN (9)

## 2017-10-10 NOTE — NURSING NOTE
"Truman Suero is a 57 year old male who presents for:  Chief Complaint   Patient presents with     Neurologic Problem     Cervical DDD, Neck pain the past year just getting worse, N/T in the hands and legs, muscle spasm shoulders and goes up to his head, stabbing feeling on his throat        Initial Vitals:  /88 (BP Location: Right arm, Patient Position: Sitting, Cuff Size: Adult Regular)  Pulse 53  Ht 5' 6.34\" (1.685 m)  Wt 143 lb (64.9 kg)  SpO2 99%  BMI 22.85 kg/m2 Estimated body mass index is 22.85 kg/(m^2) as calculated from the following:    Height as of this encounter: 5' 6.34\" (1.685 m).    Weight as of this encounter: 143 lb (64.9 kg).. Body surface area is 1.74 meters squared. BP completed using cuff size: regular  Extreme Pain (9)    Do you feel safe in your environment?  Yes  Do you need any refills today? No    Nursing Comments: Cervical DDD, Neck pain the past year just getting worse, N/T in the hands and legs, muscle spasm shoulders and goes up to his head, stabbing feeling on his throat.        5 min. nursing intake time  Graciela Aly MA       Discharge plan: 1.  EMG of both arms with Dr. Perez. I will call you with results.       2.  Please contact the clinic if pain persists at [phone].  2 min. nursing discharge time  Graciela Aly MA        "

## 2017-10-10 NOTE — PROGRESS NOTES
[facility]  Neurosurgery followup:    HPI: Mr. Suero is a 57 year old male that returns to discuss his ongoing cervical radicular neck pain. He has had injections in the past which only helped temporarily.  He had a RF and injections in June of 2017.  He states that his pain is progressing at this time. He notes severe neck pain with radicular arm pain.  He notes numbness and tingling to his legs as well.  He is currently working as a .  He is left handed as well.      Exam:  Constitutional:  Alert, well nourished, NAD.  HEENT: Normocephalic, atraumatic.   Pulm:  Without shortness of breath   CV:  No pitting edema of BLE.     Neurological:  Awake  Alert  Oriented x 3  Motor exam:     Shoulder Abduction:  Right:  5/5    Left:  5/5  Biceps:                      Right:  5/5    Left:  5/5  Triceps:                     Right:  5/5    Left:  5/5  Wrist Extensors:       Right:  5/5    Left:  5/5  Wrist Flexors:           Right:  5/5    Left:  5/5  Intrinsics:                  Right:  5/5    Left:  5/5     Able to spontaneously move U/E bilaterally  Sensation intact throughout all U/E dermatomes    Imaging:  IMPRESSION: Moderate multilevel degenerative disc and facet disease  with some progression at C5-C6 where there is moderate central canal  and bilateral neural foraminal stenosis along with cord deformity      IMPRESSION: Vertebral heights are preserved without evidence of  fracture. Moderate disc space loss at C3-4, C4-5, C5-6 and C6-7. Trace  anterolisthesis at C2-3 on flexion position images, this corrects on  neutral and extension position images. No other listhesis is noted on  neutral, flexion or extension position images. No prevertebral soft  tissue swelling.    A/P: Mr. Suero is a 57 year old male that returns to discuss his ongoing cervical radicular neck pain. He has had injections in the past which only helped temporarily.  He had a RF and injections in June of 2017.  He states that his pain  is progressing at this time. He notes severe neck pain with radicular arm pain.  He notes numbness and tingling to his legs as well.  He is currently working as a .  He is left handed as well.  The pts cervical MRI shows degeneration with moderate central canal stenosis.  At this time we will have him obtain an updated EMG and we will contact him.        Patient Instructions   1.  EMG of both arms with Dr. Perez. I will call you with results.       2.  Please contact the clinic if pain persists at [phone].        [facility]a [facility]  Spine and Brain Clinic  [address] [address]    Tel [phone]  Pager [phone]

## 2017-10-10 NOTE — PATIENT INSTRUCTIONS
1.  EMG of both arms with Dr. Perez. I will call you with results.       2.  Please contact the clinic if pain persists at [phone].

## 2017-10-11 NOTE — TELEPHONE ENCOUNTER
My chart message from pt:    Dr Navarro- Have not heard from Dr. Trinidad or the [facility] yet. Did have appointment with Dr Augusta Gale. Was quick visit and her recommendation is for EMG of arms with a Dr. Perez.  Any thoughts?     Palmer    According to the notes Dr. Trinidad put the referral in back on 9/27/2017. I would call [facility]: [facility] [phone]   [email or web address]     I will send the rest of the note to Melanie for her thoughts.     Jessika Flannery, RN, [facility]  Pain Clinic Care Coordinator

## 2017-10-12 ENCOUNTER — MYC REFILL (OUTPATIENT)
Dept: PALLIATIVE MEDICINE | Facility: CLINIC | Age: 57
End: 2017-10-12

## 2017-10-12 ENCOUNTER — TELEPHONE (OUTPATIENT)
Dept: PALLIATIVE MEDICINE | Facility: CLINIC | Age: 57
End: 2017-10-12

## 2017-10-12 DIAGNOSIS — M47.812 CERVICAL SPONDYLOSIS WITHOUT MYELOPATHY: ICD-10-CM

## 2017-10-12 DIAGNOSIS — M79.18 MYOFASCIAL PAIN: ICD-10-CM

## 2017-10-12 DIAGNOSIS — M50.30 DDD (DEGENERATIVE DISC DISEASE), CERVICAL: ICD-10-CM

## 2017-10-12 DIAGNOSIS — G89.29 CHRONIC BILATERAL LOW BACK PAIN WITHOUT SCIATICA: ICD-10-CM

## 2017-10-12 DIAGNOSIS — M54.2 CHRONIC NECK PAIN: ICD-10-CM

## 2017-10-12 DIAGNOSIS — M54.50 CHRONIC BILATERAL LOW BACK PAIN WITHOUT SCIATICA: ICD-10-CM

## 2017-10-12 DIAGNOSIS — G89.29 CHRONIC NECK PAIN: ICD-10-CM

## 2017-10-12 NOTE — TELEPHONE ENCOUNTER
My chart message from pt:    Thanks- Dr. Navarro was requesting Dr. Trinidad submit a different referral than that one. I have already had apt with [facility] and they said just get another EMG. Dr. Navarro wanted me to see [facility]. I have sent Dr Trinidad a couple of messages and have not got response, just checking if she had communicated with Dr. Melanie Chakraborty    Looks like Dr. Trinidad did this yesterday correct?    Jessika Flannery RN, [facility]  Pain Clinic Care Coordinator

## 2017-10-12 NOTE — TELEPHONE ENCOUNTER
Message from [employer]:  Original authorizing provider: RODNEY Vargas CNP would like a refill of the following medications:  methocarbamol (ROBAXIN) 500 MG tablet [RODNEY Vargas CNP]    Preferred pharmacy: [facility] [address] [place] [address]    Comment:  I have 14 left, but it takes my pharmacy about a week to process a prescription

## 2017-10-12 NOTE — TELEPHONE ENCOUNTER
This is being addressed in another open encounter as well- closing this encounter.     Jessika Flannery RN, [facility]  Pain Clinic Care Coordinator

## 2017-10-12 NOTE — TELEPHONE ENCOUNTER
Jessika- Yes, I got a message yesterday afternoon that she had submitted the referral. Thank you as always for your help     Palmer

## 2017-10-12 NOTE — TELEPHONE ENCOUNTER
VM left today at: 1:27 pm      [facility]ena [facility] calling.   Pt was referred on 9/13.  Requesting most recent office notes, cervical MRI done in the last 6 months, and a demographic page.   Cannot move forward with referral until info has been sent, this is second request.     FAX#: [phone]  PH: [phone]    Karen JORDAN    [facility] [place]

## 2017-10-13 RX ORDER — METHOCARBAMOL 500 MG/1
500-1000 TABLET, FILM COATED ORAL 3 TIMES DAILY PRN
Qty: 150 TABLET | Refills: 1 | Status: SHIPPED | OUTPATIENT
Start: 2017-10-13 | End: 2018-01-31

## 2017-10-13 NOTE — TELEPHONE ENCOUNTER
Recent OV notes, cervical MRI results, and demographic page faxed to Mariaelena at [facility].    Marleny Rojas    [facility]

## 2017-10-13 NOTE — TELEPHONE ENCOUNTER
Signed Prescriptions:                        Disp   Refills    methocarbamol (ROBAXIN) 500 MG tablet      150 ta*1        Sig: Take 1-2 tablets (500-1,000 mg) by mouth 3 times           daily as needed for muscle spasms Start with           lower dose and caution sedation  Authorizing Provider: CANDACE BENSON, RN CNP, FNP  [facility] [place]

## 2017-10-13 NOTE — TELEPHONE ENCOUNTER
Received fax request from [facility] pharmacy requesting refill(s) for methocarbamol (ROBAXIN) 500 MG tablet  Last refilled on 8/26/17    Pt last seen on 10/2/17  Next appt scheduled for 11/6/17    Kailyn Hope MA  [facility]      Will facilitate refill.

## 2017-10-18 ASSESSMENT — ENCOUNTER SYMPTOMS
HEADACHES: 1
DECREASED APPETITE: 0
VOMITING: 0
RESPIRATORY PAIN: 0
FATIGUE: 1
HALLUCINATIONS: 0
ALTERED TEMPERATURE REGULATION: 0
TASTE DISTURBANCE: 0
SHORTNESS OF BREATH: 1
HEARTBURN: 1
ORTHOPNEA: 0
HEMOPTYSIS: 0
PALPITATIONS: 1
ARTHRALGIAS: 1
EXERCISE INTOLERANCE: 1
NECK PAIN: 1
SINUS PAIN: 1
MEMORY LOSS: 0
HYPOTENSION: 0
NAUSEA: 1
NAIL CHANGES: 0
WEAKNESS: 1
BOWEL INCONTINENCE: 0
ABDOMINAL PAIN: 1
COUGH DISTURBING SLEEP: 0
NERVOUS/ANXIOUS: 1
RECTAL BLEEDING: 0
WHEEZING: 1
STIFFNESS: 1
TACHYCARDIA: 1
INSOMNIA: 0
MUSCLE CRAMPS: 1
POLYPHAGIA: 0
HEMATURIA: 0
CLAUDICATION: 1
SPEECH CHANGE: 0
JOINT SWELLING: 0
DYSPNEA ON EXERTION: 0
SYNCOPE: 0
BLOATING: 0
COUGH: 1
HYPERTENSION: 1
LOSS OF CONSCIOUSNESS: 0
NECK MASS: 0
DIARRHEA: 0
HOARSE VOICE: 1
BRUISES/BLEEDS EASILY: 1
CONSTIPATION: 0
CHILLS: 1
RECTAL PAIN: 0
INCREASED ENERGY: 1
SKIN CHANGES: 0
POSTURAL DYSPNEA: 0
JAUNDICE: 0
SORE THROAT: 0
LEG SWELLING: 0
DYSURIA: 0
PARALYSIS: 0
DIFFICULTY URINATING: 1
NUMBNESS: 1
BACK PAIN: 1
DISTURBANCES IN COORDINATION: 1
TREMORS: 0
SWOLLEN GLANDS: 0
SEIZURES: 0
TROUBLE SWALLOWING: 1
SMELL DISTURBANCE: 0
SINUS CONGESTION: 1
PANIC: 0
POLYDIPSIA: 0
DECREASED CONCENTRATION: 0
MYALGIAS: 1
MUSCLE WEAKNESS: 1
LIGHT-HEADEDNESS: 1
SPUTUM PRODUCTION: 1
FLANK PAIN: 0
FEVER: 0
POOR WOUND HEALING: 0
DEPRESSION: 1
WEIGHT GAIN: 0
DIZZINESS: 1
WEIGHT LOSS: 0
TINGLING: 1
BLOOD IN STOOL: 0
SLEEP DISTURBANCES DUE TO BREATHING: 0
SNORES LOUDLY: 0
LEG PAIN: 1

## 2017-10-20 ENCOUNTER — MYC MEDICAL ADVICE (OUTPATIENT)
Dept: PALLIATIVE MEDICINE | Facility: CLINIC | Age: 57
End: 2017-10-20

## 2017-10-20 NOTE — TELEPHONE ENCOUNTER
My chart message from pt:    Dr. Navarro/Staff- It is your favorite patient with a question for Dr. Navarro- The appt. I had with Dr. Baljinder Price a couple of weeks ago was very brief and her main suggestion was more EMG tests. The EMG DrEv's office has left numerous messages to set up an appointment. I trust your opinion and wonder if it is worth the time and money. [facility] per your suggestion     Palmer    Sending to provider.     Jessika Flannery RN, [facility]  Pain Clinic Care Coordinator

## 2017-10-24 NOTE — TELEPHONE ENCOUNTER
Xavier Chakraborty-    I think I would wait to see the [facility] neurosurgery clinic first before scheduling more EMG studies. You can ask them to weigh in on if they think it is necessary and then we can proceed from there. Hang in there!    Thanks.  Melanie STEVENS, RN CNP, MELISSAP  [facility]    I sent the above Clickslide message to the patient.  Melanie STEVENS RN CNP, MELISSAP  [facility] [place]

## 2017-11-01 ENCOUNTER — MYC REFILL (OUTPATIENT)
Dept: PALLIATIVE MEDICINE | Facility: CLINIC | Age: 57
End: 2017-11-01

## 2017-11-01 ENCOUNTER — OFFICE VISIT (OUTPATIENT)
Dept: NEUROSURGERY | Facility: CLINIC | Age: 57
End: 2017-11-01

## 2017-11-01 VITALS — HEIGHT: 66 IN | HEART RATE: 71 BPM | SYSTOLIC BLOOD PRESSURE: 136 MMHG | DIASTOLIC BLOOD PRESSURE: 88 MMHG

## 2017-11-01 DIAGNOSIS — M54.9 CHRONIC NECK AND BACK PAIN: ICD-10-CM

## 2017-11-01 DIAGNOSIS — M54.2 CHRONIC NECK AND BACK PAIN: ICD-10-CM

## 2017-11-01 DIAGNOSIS — M54.12 CERVICAL RADICULAR PAIN: Primary | ICD-10-CM

## 2017-11-01 DIAGNOSIS — G89.29 CHRONIC NECK AND BACK PAIN: ICD-10-CM

## 2017-11-01 DIAGNOSIS — M54.50 CHRONIC BILATERAL LOW BACK PAIN WITHOUT SCIATICA: ICD-10-CM

## 2017-11-01 DIAGNOSIS — G89.29 CHRONIC BILATERAL LOW BACK PAIN WITHOUT SCIATICA: ICD-10-CM

## 2017-11-01 DIAGNOSIS — M47.812 CERVICAL SPONDYLOSIS WITHOUT MYELOPATHY: ICD-10-CM

## 2017-11-01 DIAGNOSIS — M50.30 DEGENERATIVE DISC DISEASE, CERVICAL: ICD-10-CM

## 2017-11-01 DIAGNOSIS — M54.12 CERVICAL RADICULOPATHY: ICD-10-CM

## 2017-11-01 DIAGNOSIS — M50.30 DDD (DEGENERATIVE DISC DISEASE), CERVICAL: ICD-10-CM

## 2017-11-01 ASSESSMENT — PAIN SCALES - GENERAL: PAINLEVEL: SEVERE PAIN (7)

## 2017-11-01 NOTE — MR AVS SNAPSHOT
After Visit Summary   11/1/2017    Truman Suero    MRN: [id number]           Patient Information     Date Of Birth          1960        Visit Information        Provider Department      11/1/2017 4:00 PM Madeline Gordon PA-C [facility]        Today's Diagnoses     Cervical radicular pain    -  1    Degenerative disc disease, cervical           Follow-ups after your visit        Your next 10 appointments already scheduled     Nov 06, 2017  3:00 PM CST   Return Visit with RODNEY Vargas CNP   [facility] ([facility])    [address] [place] [address] [address]  [facility] [address] [phone]            Nov 07, 2017  1:45 PM CST   (Arrive by 1:30 PM)   EMG with Ke Peña MD   [facility] ([facility])    [address] [address] [phone]           Do not use lotions or creams on the area to be tested. If you are on blood thinners (Warfarin, Coumadin, Lovenox, etc), please contact your primary care physician to check if it is safe to stop them 3 days prior to testing. If you have anxiety, please check with your referring physician to obtain anti-anxiety medication for the procedure.              Who to contact     Please call your clinic at [phone] to:    Ask questions about your health    Make or cancel appointments    Discuss your medicines    Learn about your test results    Speak to your doctor   If you have compliments or concerns about an experience at your clinic, or if you wish to file a complaint, please contact [facility] Patient Relations at [phone] or email us at [email or web address]         Additional Information About Your Visit        MyChart Information     [employer] gives you secure access to your electronic health record. If you see a primary care provider, you can also send messages to your care team and  "make appointments. If you have questions, please call your primary care clinic.  If you do not have a primary care provider, please call [phone] and they will assist you.      [employer] is an electronic gateway that provides easy, online access to your medical records. With [employer], you can request a clinic appointment, read your test results, renew a prescription or communicate with your care team.     To access your existing account, please contact your [facility] or call [phone] for assistance.        Care EveryWhere ID     This is your Care EveryWhere ID. This could be used by other organizations to access your [place] medical records  [id number]        Your Vitals Were     Pulse Height                71 1.676 m (5' 6\")           Blood Pressure from Last 3 Encounters:   11/01/17 136/88   10/10/17 136/88   10/02/17 132/80    Weight from Last 3 Encounters:   10/10/17 64.9 kg (143 lb)   08/16/17 62.6 kg (138 lb)   08/07/17 62.1 kg (137 lb)              Today, you had the following     No orders found for display       Primary Care Provider Office Phone # Fax #    Humberto Trinidad [phone]       [address] [place] [address]        Equal Access to Services     ELIEZER VALENTE AH: Hadii cece ku hadasho Soomaali, waaxda luqadaha, qaybta kaalmada adeegyada, mariela tongin hayjaleel carter. So [facility] [phone].    ATENCIÓN: Si habla español, tiene a simmons disposición servicios gratuitos de asistencia lingüística. Llame al [phone].    We comply with applicable federal civil rights laws and [place] laws. We do not discriminate on the basis of race, color, national origin, age, disability, sex, sexual orientation, or gender identity.            Thank you!     Thank you for choosing [facility]  for your care. Our goal is always to provide you with excellent care. Hearing back from our patients is one way we can continue to " improve our services. Please take a few minutes to complete the written survey that you may receive in the mail after your visit with us. Thank you!             Your Updated Medication List - Protect others around you: Learn how to safely use, store and throw away your medicines at www.disposemymeds.org.          This list is accurate as of: 11/1/17 11:59 PM.  Always use your most recent med list.                   Brand Name Dispense Instructions for use Diagnosis    gabapentin 600 MG tablet    NEURONTIN    135 tablet    Take 1.5 tablets (900 mg) by mouth 3 times daily    Cervical spondylosis without myelopathy, DDD (degenerative disc disease), cervical, Chronic bilateral low back pain without sciatica, Chronic neck and back pain, Cervical stenosis of spinal canal       ibuprofen 800 MG tablet    ADVIL/MOTRIN          methocarbamol 500 MG tablet    ROBAXIN    150 tablet    Take 1-2 tablets (500-1,000 mg) by mouth 3 times daily as needed for muscle spasms Start with lower dose and caution sedation    Chronic neck pain, DDD (degenerative disc disease), cervical, Cervical spondylosis without myelopathy, Chronic bilateral low back pain without sciatica, Myofascial pain       * traMADol 50 MG tablet    ULTRAM    90 tablet    May take 1-2 tablets every 6 hours as needed for pain, max of 3 tabs per day. To last 30 days. Reduce as able.  Dispense on/after 10/2/17.  To start on 10/3/17    Chronic neck and back pain, Cervical radiculopathy       * traMADol 200 MG ER-tablet    ULTRAM-ER    23 tablet    Take 1 tablet (200 mg) by mouth daily OK to fill and begin on 10/9/2017 and start on 10/11/2017 to until 11/2/2017 (23 day script so Tramadol ER and tramadol immediate release are due on the same day in the future)    Cervical spondylosis without myelopathy, DDD (degenerative disc disease), cervical, Chronic bilateral low back pain without sciatica       varenicline 0.5 MG X 11 & 1 MG X 42 tablet    CHANTIX STARTING MONTH GIO     53 tablet    Take 0.5 mg tab daily for 3 days, then 0.5 mg tab twice daily for 4 days, then 1 mg twice daily.    Tobacco abuse       * Notice:  This list has 2 medication(s) that are the same as other medications prescribed for you. Read the directions carefully, and ask your doctor or other care provider to review them with you.

## 2017-11-01 NOTE — NURSING NOTE
Chief Complaint   Patient presents with     Consult     Cervical Stenosis of spinal canal gait disturbance     Ezekiel Arce, ROBBIN

## 2017-11-01 NOTE — TELEPHONE ENCOUNTER
Message from Shakilat:  Original authorizing provider: RODNEY Vargas CNP would like a refill of the following medications:  traMADol (ULTRAM) 50 MG tablet [RODNEY Vargas CNP]  traMADol (ULTRAM-ER) 200 MG ER-tablet [RODNEY Vargas CNP]    Preferred pharmacy: [facility] [address] [place] [address]    Comment:  Have a couple days left so wanted to submit referral to start process

## 2017-11-01 NOTE — PROGRESS NOTES
Neurosurgery Clinic Consult  Date of Visit: 11/1/2017  Referred for:   Referring Provider: Melanie Thomas CNP      Dear Ms Thomas,    We were happy to see Truman Suero, a pleasant 57 year old year old male for neck pain.    HPI: As you may recall is nice gentleman has had cervical pain for many years. He works as a , and has had pain in the back of the neck ongoing, and exacerbated by having to keep his head down much of the time for years. In the past he has been treated at [facility], in fact as recently as a year ago, where he was treated with bilateral medial branch blocks from C3 to C7, and with a positive response to that, (please see treatment notes dated 11/15/16) had radiofrequency ablations which gave him many months of very good relief of neck pain. As an aside he also has low back pain, and had bilateral SI joint injections at around this same time with a 50% therapeutic, and 75% diagnostic benefit.    Since that time he has discovered that [place] is not in his current insurance network, his pain is progressing, the ablations have worn off, and he now has left arm numbness, some left arm pain, mostly trapezius type pain. An numbness and tingling in his legs. On his exams he has been noted to have good strength, 5/5. And in your notes there is reference to an EMG from 2016 at [facility] which indicated he had carpal tunnel syndrome. I been unable to locate that EMG in the system. Recently he had another injection for cervical facet joint, in this case C4/5 and C5/6 right-sided joints on 6/29/17. Pre-and post pain scores were 6/3. Because his symptoms have been persistent he was referred to neurosurgery. He was first seen at [facility]. Their notes indicate a request for updated EMGs. He was then referred for a second opinion here. His imaging has been updated. A current symptom list is attached below.    Symptoms  [facility] -  Patient Entered Questionnaire/Answers 10/18/2017    What number best describes your pain right now:  0 = No pain  to  10 = Worst pain imaginable 9   How would you describe the pain? burning, sharp, numbness, cutting, dull, aching, throbbing   Which of the following worsen your pain? standing, sitting, walking, exercise, coughing / sneezing   Which of the following improve or reduce your pain?  lying down, medication, relaxation   What number best describes your average pain for the past week:  0 = No pain  to  10 = Worst pain imaginable 8   What number best describes your LOWEST pain in past 24 hours:  0 = No pain  to  10 = Worst pain imaginable 7   What number best describes your WORST pain in past 24 hours:  0 = No pain  to  10 = Worst pain imaginable 10   When is your pain worst? Constant   What non-medicine treatments have you already had for your pain? pain clinic, physical therapy, acupuncture, chiropractic care, spine injections (shots), other nerve blocks   Have you tried treating your pain with medication?  Yes   Are you currently taking medications for your pain? Yes     *  PAIN: Location:  Posterior axial neck, bilateral trapezius to shoulders.  Exacerbated by:  Looking down, moving around, long work day,  Alleviated by:   Rest, lying down, meds  *  NUMBNESS: Location:  Mostly thumb and first finger left hand, especially in a.m. However all the fingers of both hands at other times. If he flexes his neck down all the fingers go numb except the thumbs. If he looks up all the fingers feel normal. Tingling in the backs of the legs after a long day at work. His legs are not affected by moving his head around.  *  QUALITY OF NUMBNESS:  Tingling,   *  WEAKNESS:  No  *  BOWEL/BLADDER FUNCTION:  Intact.    *  OTHER SYMPTOMS:  None (Coordination, Balance, Vision, Hearing, Scent, Speech.)  He presents for evaluation, and treatment recommendations with the understanding that we are a surgical team.    Current Outpatient Prescriptions:      methocarbamol (ROBAXIN)  500 MG tablet, Take 1-2 tablets (500-1,000 mg) by mouth 3 times daily as needed for muscle spasms Start with lower dose and caution sedation, Disp: 150 tablet, Rfl: 1     traMADol (ULTRAM-ER) 200 MG ER-tablet, Take 1 tablet (200 mg) by mouth daily OK to fill and begin on 10/9/2017 and start on 10/11/2017 to until 11/2/2017 (23 day script so Tramadol ER and tramadol immediate release are due on the same day in the future), Disp: 23 tablet, Rfl: 0     varenicline (CHANTIX STARTING MONTH PAK) 0.5 MG X 11 & 1 MG X 42 tablet, Take 0.5 mg tab daily for 3 days, then 0.5 mg tab twice daily for 4 days, then 1 mg twice daily., Disp: 53 tablet, Rfl: 0     traMADol (ULTRAM) 50 MG tablet, May take 1-2 tablets every 6 hours as needed for pain, max of 3 tabs per day. To last 30 days. Reduce as able.  Dispense on/after 10/2/17.  To start on 10/3/17, Disp: 90 tablet, Rfl: 0     gabapentin (NEURONTIN) 600 MG tablet, Take 1.5 tablets (900 mg) by mouth 3 times daily, Disp: 135 tablet, Rfl: 1     ibuprofen (ADVIL,MOTRIN) 800 MG tablet, , Disp: , Rfl: 0    No Known Allergies    Past Medical History:   Diagnosis Date     Neck pain     MRI positive on cervical vertebrea.       History reviewed. No pertinent surgical history.    Family History   Problem Relation Age of Onset     Prostate Cancer Father      Breast Cancer Maternal Grandmother      Prostate Cancer Other        Social History     Social History     Marital status:      Spouse name: N/A     Number of children: N/A     Years of education: N/A     Occupational History     Not on file.     Social History Main Topics     Smoking status: Current Every Day Smoker     Smokeless tobacco: Never Used      Comment: Smoking survery given 3/17/17     Alcohol use No     Drug use: No     Sexual activity: Yes     Partners: Female     Birth control/ protection: None     Other Topics Concern     Not on file     Social History Narrative     Problem list and 13 point review of systems: is  "reviewed in Epic and is negative with the exception of those symptoms associated with HPI and PMH.      OBJECTIVE:   /88  Pulse 71  Ht 1.676 m (5' 6\")    Imaging:  These are the pertinent radiologist's findings from:  MRI cervical 9/5/17 WO @ [facility] .   C3-C4: Broad-based posterior osteophyte formation is present causing  some mild bilateral neural foraminal stenosis, but no central canal  stenosis.     C4-C5: Broad-based posterior osteophyte formation and mild facet  hypertrophy is present causing some mild to moderate central canal  stenosis, mild cord deformity, and mild-to-moderate bilateral neural  foraminal stenosis.     C5-C6: Broad-based posterior osteophyte formation and disc bulging is  present along with some facet hypertrophy. There is moderate central  canal stenosis and moderate bilateral neural foraminal stenosis.  Findings have progressed since the prior exam.     C6-C7: Broad-based disc bulging is present along with some minimal  posterior osteophyte formation. There is borderline to mild central  canal stenosis, but no neural foraminal stenosis    See the full report in EPIC/Media tab.  I personally reviewed the images with the patient.      Exam:  Pertinent positives:  Negative Phalen's and Tinel's at the wrists. Question possible L'Hermitte's, all fingers go numb with flexion of the neck.  *   Well developed, well nourished male found seated comfortably in exam room chair.  He is able to sit and rise independently.  He is unaccompanied.    *  Mental Status  A&O X3.  Bright, alert, affable, interactive. Language fluid, fund of knowledge intact. Good historian.  Mood and affect congruent and WNL.  *  Cranial Nerves  II: Able to read printed forms, VF full to gross confrontation.  III: IV, VI:  PERRLA, EOMI, No nystagmus, no ptosis.  V: Sensation intact in bilateral V1, V2, and V3. Jaw clench symmetric.    VII:  Intact to voice bilaterally.  IX:  Pushes tongue against bilateral cheeks.  X:  " Palate elevates, uvula midline, phonation intact.  XI: Elevates shoulders, head turn intact.  XII: Tongue midline. No fasciculations.  *  Cervical Spine:  DTR's at Biceps, Triceps, and Brachioradialis 2/4 and symmetric.  Sensation intact.    No Garrido's. No Phalen's.  No Tinel's. No fasciculations.   Muscle bulk and tone WNL.  Upper Extremity Strength.              RIGHT                LEFT     Deltoid              5/5                   5/5       Biceps              5/5                   5/5        Triceps              5/5                   5/5       Wrist Extensor              5/5                   5/5                     5/5                    5/5       Interossei              5/5                   5/5       EPL              5/5                    5/5       Pinch              5/5                  5/5          *  Lumbar Spine:    Able to heel and toe stand/walk.   DTR's Patellar and Achilles 1/4 and symmetric.   No fasciculations.  Muscle bulk and tone WNL.  No Clonus.  Sensation intact.    Lower Extremity Strength                   RIGHT                   LEFT     Iliopsoas                    5/5                      5/5       Quad                    5/5                        5/5       Hamstring                      5/5                        5/5         Gastrocs                    5/5                        5/5       Tib. Anterior                     5/5                        5/5       EHL                      5/5                        5/5       Babinski                 Down                                      Down                   *  Structural Exam  Inspection of the spine reveals no scoliosis, exaggerated kyphosis or lordosis.Head is a little forward of the pelvis in the sagittal plane.    Cervical spine ROM , quite good actually. No spasming. No tenderness to palpation.  No Spurling's, all fingers both hands tingle with full flexion of the neck. Thumbs are spared.  Lumbar ROM full.   Gait narrow,  smooth, no scissoring. No antalgia.     ASSESSMENT/PLAN:  1. Cervical radicular pain    2. Degenerative disc disease, cervical      He has multiple levels of rather pronounced advanced degenerative disease in the cervical spine, more advanced than I would've expected for age.  Symptom wise he has Left hand tingling, particularly in the left thumb and first finger, in a C6 distribution which fits with either his C5/6 stenosis, or median neuropathy. There are verbal reports of EMGs done at [facility] and indicating carpal tunnel syndrome.  He has morning cramping in the thumb and first finger of the left hand, but he does not have a positive Tinel's or Phalen's. Instead all of the fingers except the thumb tingle on his Phalen's test, all of the fingers except the thumb tingle with flexion of the neck, all of the fingers improved in sensation with extension of the neck, indicating to me that the central stenosis at C5/6 may be tightening down with motion.    He has tingling in the legs after a long day at work, but I find no long tract signs on his exam, so I'm not putting much stock in that at the moment.    Overall I think his exam is suggestive of either left median neuropathy or C5/6 stenosis, but I could not reliably say which one he has. I do believe EMGs should be repeated at this point.  Hand tingling is present bilaterally. So I am asking for EMGs to be done in both arms.    Fortunately he does not have a lot of arm pain, and I get no weakness on his exam at this point.    Regarding neck pain which is his primary presenting symptom; he does respond to facet injections, and that may be a reasonable temporizing measure for some period of time.  I'd like to find out if his arm symptoms are radicular. If they are and surgery is proposed in some of his neck pain will be addressed simultaneously. If not, and his arm symptoms are all median nerve then I would propose continuing with another complete set of rhizotomies in  the posterior cervical spine, since he did very well with that the last time around having several months of neck pain relief.    I suspect he is going to come to neurosurgical intervention eventually.     No lumbar spine images are submitted for review.     I answered all of their questions, which indicated good understanding of the situation.  They are willing to move forward with the recommendations. I supplied them with business cards and telephone numbers and urged them to call should they have questions, comments, or concerns. It's been a pleasure participating in the care of this nice patient. We thank you for your confidence in the [facility], Department of Neurosurgery.      Best Regards,    Madeline Gordon PA-C  [facility]  Department of Neurosurgery  Phone: [phone]  Fax: [phone]        Total time: 60 minutes with more than 30 minutes spent in direct face to face contact reviewing films, providing education, counseling, non-operative therapies,and indications for surgery as well as further follow up.    This note was generated using voice recognition software. While edited for content some inaccurate phrasing may be found.

## 2017-11-01 NOTE — LETTER
11/1/2017       RE: Truman Suero  [address] [facility] [address]     Dear Colleague,    Thank you for referring your patient, Truman Suero, to the [facility] NEUROSURGERY at [facility]. Please see a copy of my visit note below.      Neurosurgery Clinic Consult  Date of Visit: 11/1/2017  Referred for:   Referring Provider: Melanie Thomas CNP      Dear Ms Thomas,    We were happy to see Truman Suero, a pleasant 57 year old year old male for neck pain.    HPI: As you may recall is nice gentleman has had cervical pain for many years. He works as a , and has had pain in the back of the neck ongoing, and exacerbated by having to keep his head down much of the time for years. In the past he has been treated at [facility], in fact as recently as a year ago, where he was treated with bilateral medial branch blocks from C3 to C7, and with a positive response to that, (please see treatment notes dated 11/15/16) had radiofrequency ablations which gave him many months of very good relief of neck pain. As an aside he also has low back pain, and had bilateral SI joint injections at around this same time with a 50% therapeutic, and 75% diagnostic benefit.    Since that time he has discovered that [facility] is not in his current insurance network, his pain is progressing, the ablations have worn off, and he now has left arm numbness, some left arm pain, mostly trapezius type pain. An numbness and tingling in his legs. On his exams he has been noted to have good strength, 5/5. And in your notes there is reference to an EMG from 2016 at [facility] which indicated he had carpal tunnel syndrome. I been unable to locate that EMG in the system. Recently he had another injection for cervical facet joint, in this case C4/5 and C5/6 right-sided joints on 6/29/17. Pre-and post pain scores were 6/3. Because his symptoms have been persistent he was referred to neurosurgery. He  was first seen at [facility]. Their notes indicate a request for updated EMGs. He was then referred for a second opinion here. His imaging has been updated. A current symptom list is attached below.    Symptoms  [facility] -  Patient Entered Questionnaire/Answers 10/18/2017   What number best describes your pain right now:  0 = No pain  to  10 = Worst pain imaginable 9   How would you describe the pain? burning, sharp, numbness, cutting, dull, aching, throbbing   Which of the following worsen your pain? standing, sitting, walking, exercise, coughing / sneezing   Which of the following improve or reduce your pain?  lying down, medication, relaxation   What number best describes your average pain for the past week:  0 = No pain  to  10 = Worst pain imaginable 8   What number best describes your LOWEST pain in past 24 hours:  0 = No pain  to  10 = Worst pain imaginable 7   What number best describes your WORST pain in past 24 hours:  0 = No pain  to  10 = Worst pain imaginable 10   When is your pain worst? Constant   What non-medicine treatments have you already had for your pain? pain clinic, physical therapy, acupuncture, chiropractic care, spine injections (shots), other nerve blocks   Have you tried treating your pain with medication?  Yes   Are you currently taking medications for your pain? Yes     *  PAIN: Location:  Posterior axial neck, bilateral trapezius to shoulders.  Exacerbated by:  Looking down, moving around, long work day,  Alleviated by:   Rest, lying down, meds  *  NUMBNESS: Location:  Mostly thumb and first finger left hand, especially in a.m. However all the fingers of both hands at other times. If he flexes his neck down all the fingers go numb except the thumbs. If he looks up all the fingers feel normal. Tingling in the backs of the legs after a long day at work. His legs are not affected by moving his head around.  *  QUALITY OF NUMBNESS:  Tingling,   *  WEAKNESS:  No  *   BOWEL/BLADDER FUNCTION:  Intact.    *  OTHER SYMPTOMS:  None (Coordination, Balance, Vision, Hearing, Scent, Speech.)  He presents for evaluation, and treatment recommendations with the understanding that we are a surgical team.    Current Outpatient Prescriptions:      methocarbamol (ROBAXIN) 500 MG tablet, Take 1-2 tablets (500-1,000 mg) by mouth 3 times daily as needed for muscle spasms Start with lower dose and caution sedation, Disp: 150 tablet, Rfl: 1     traMADol (ULTRAM-ER) 200 MG ER-tablet, Take 1 tablet (200 mg) by mouth daily OK to fill and begin on 10/9/2017 and start on 10/11/2017 to until 11/2/2017 (23 day script so Tramadol ER and tramadol immediate release are due on the same day in the future), Disp: 23 tablet, Rfl: 0     varenicline (CHANTIX STARTING MONTH PAK) 0.5 MG X 11 & 1 MG X 42 tablet, Take 0.5 mg tab daily for 3 days, then 0.5 mg tab twice daily for 4 days, then 1 mg twice daily., Disp: 53 tablet, Rfl: 0     traMADol (ULTRAM) 50 MG tablet, May take 1-2 tablets every 6 hours as needed for pain, max of 3 tabs per day. To last 30 days. Reduce as able.  Dispense on/after 10/2/17.  To start on 10/3/17, Disp: 90 tablet, Rfl: 0     gabapentin (NEURONTIN) 600 MG tablet, Take 1.5 tablets (900 mg) by mouth 3 times daily, Disp: 135 tablet, Rfl: 1     ibuprofen (ADVIL,MOTRIN) 800 MG tablet, , Disp: , Rfl: 0    No Known Allergies    Past Medical History:   Diagnosis Date     Neck pain     MRI positive on cervical vertebrea.       History reviewed. No pertinent surgical history.    Family History   Problem Relation Age of Onset     Prostate Cancer Father      Breast Cancer Maternal Grandmother      Prostate Cancer Other        Social History     Social History     Marital status:      Spouse name: N/A     Number of children: N/A     Years of education: N/A     Occupational History     Not on file.     Social History Main Topics     Smoking status: Current Every Day Smoker     Smokeless tobacco:  "Never Used      Comment: Smoking survery given 3/17/17     Alcohol use No     Drug use: No     Sexual activity: Yes     Partners: Female     Birth control/ protection: None     Other Topics Concern     Not on file     Social History Narrative     Problem list and 13 point review of systems: is reviewed in Epic and is negative with the exception of those symptoms associated with HPI and PMH.      OBJECTIVE:   /88  Pulse 71  Ht 1.676 m (5' 6\")    Imaging:  These are the pertinent radiologist's findings from:  MRI cervical 9/5/17 WO @ [facility] .   C3-C4: Broad-based posterior osteophyte formation is present causing  some mild bilateral neural foraminal stenosis, but no central canal  stenosis.     C4-C5: Broad-based posterior osteophyte formation and mild facet  hypertrophy is present causing some mild to moderate central canal  stenosis, mild cord deformity, and mild-to-moderate bilateral neural  foraminal stenosis.     C5-C6: Broad-based posterior osteophyte formation and disc bulging is  present along with some facet hypertrophy. There is moderate central  canal stenosis and moderate bilateral neural foraminal stenosis.  Findings have progressed since the prior exam.     C6-C7: Broad-based disc bulging is present along with some minimal  posterior osteophyte formation. There is borderline to mild central  canal stenosis, but no neural foraminal stenosis    See the full report in EPIC/Media tab.  I personally reviewed the images with the patient.      Exam:  Pertinent positives:  Negative Phalen's and Tinel's at the wrists. Question possible L'Hermitte's, all fingers go numb with flexion of the neck.  *   Well developed, well nourished male found seated comfortably in exam room chair.  He is able to sit and rise independently.  He is unaccompanied.    *  Mental Status  A&O X3.  Bright, alert, affable, interactive. Language fluid, fund of knowledge intact. Good historian.  Mood and affect congruent and WNL.  *  " Cranial Nerves  II: Able to read printed forms, VF full to gross confrontation.  III: IV, VI:  PERRLA, EOMI, No nystagmus, no ptosis.  V: Sensation intact in bilateral V1, V2, and V3. Jaw clench symmetric.    VII:  Intact to voice bilaterally.  IX:  Pushes tongue against bilateral cheeks.  X:  Palate elevates, uvula midline, phonation intact.  XI: Elevates shoulders, head turn intact.  XII: Tongue midline. No fasciculations.  *  Cervical Spine:  DTR's at Biceps, Triceps, and Brachioradialis 2/4 and symmetric.  Sensation intact.    No Garrido's. No Phalen's.  No Tinel's. No fasciculations.   Muscle bulk and tone WNL.  Upper Extremity Strength.              RIGHT                LEFT     Deltoid              5/5                   5/5       Biceps              5/5                   5/5        Triceps              5/5                   5/5       Wrist Extensor              5/5                   5/5                     5/5                    5/5       Interossei              5/5                   5/5       EPL              5/5                    5/5       Pinch              5/5                  5/5          *  Lumbar Spine:    Able to heel and toe stand/walk.   DTR's Patellar and Achilles 1/4 and symmetric.   No fasciculations.  Muscle bulk and tone WNL.  No Clonus.  Sensation intact.    Lower Extremity Strength                   RIGHT                   LEFT     Iliopsoas                    5/5                      5/5       Quad                    5/5                        5/5       Hamstring                      5/5                        5/5         Gastrocs                    5/5                        5/5       Tib. Anterior                     5/5                        5/5       EHL                      5/5                        5/5       Babinski                 Down                                      Down                   *  Structural Exam  Inspection of the spine reveals no scoliosis, exaggerated  kyphosis or lordosis.Head is a little forward of the pelvis in the sagittal plane.    Cervical spine ROM , quite good actually. No spasming. No tenderness to palpation.  No Spurling's, all fingers both hands tingle with full flexion of the neck. Thumbs are spared.  Lumbar ROM full.   Gait narrow, smooth, no scissoring. No antalgia.     ASSESSMENT/PLAN:  1. Cervical radicular pain    2. Degenerative disc disease, cervical      He has multiple levels of rather pronounced advanced degenerative disease in the cervical spine, more advanced than I would've expected for age.  Symptom wise he has Left hand tingling, particularly in the left thumb and first finger, in a C6 distribution which fits with either his C5/6 stenosis, or median neuropathy. There are verbal reports of EMGs done at [facility] and indicating carpal tunnel syndrome.  He has morning cramping in the thumb and first finger of the left hand, but he does not have a positive Tinel's or Phalen's. Instead all of the fingers except the thumb tingle on his Phalen's test, all of the fingers except the thumb tingle with flexion of the neck, all of the fingers improved in sensation with extension of the neck, indicating to me that the central stenosis at C5/6 may be tightening down with motion.    He has tingling in the legs after a long day at work, but I find no long tract signs on his exam, so I'm not putting much stock in that at the moment.    Overall I think his exam is suggestive of either left median neuropathy or C5/6 stenosis, but I could not reliably say which one he has. I do believe EMGs should be repeated at this point.  Hand tingling is present bilaterally. So I am asking for EMGs to be done in both arms.    Fortunately he does not have a lot of arm pain, and I get no weakness on his exam at this point.    Regarding neck pain which is his primary presenting symptom; he does respond to facet injections, and that may be a reasonable temporizing measure for  some period of time.  I'd like to find out if his arm symptoms are radicular. If they are and surgery is proposed in some of his neck pain will be addressed simultaneously. If not, and his arm symptoms are all median nerve then I would propose continuing with another complete set of rhizotomies in the posterior cervical spine, since he did very well with that the last time around having several months of neck pain relief.    I suspect he is going to come to neurosurgical intervention eventually.     No lumbar spine images are submitted for review.     I answered all of their questions, which indicated good understanding of the situation.  They are willing to move forward with the recommendations. I supplied them with business cards and telephone numbers and urged them to call should they have questions, comments, or concerns. It's been a pleasure participating in the care of this nice patient. We thank you for your confidence in the [facility], Department of Neurosurgery.      Best Regards,    Madeline Gordon PA-C  [facility]  Department of Neurosurgery  Phone: [phone]  Fax: [phone]        Total time: 60 minutes with more than 30 minutes spent in direct face to face contact reviewing films, providing education, counseling, non-operative therapies,and indications for surgery as well as further follow up.    This note was generated using voice recognition software. While edited for content some inaccurate phrasing may be found.    Again, thank you for allowing me to participate in the care of your patient.      Sincerely,    Madeline Gordon PA-C

## 2017-11-03 RX ORDER — TRAMADOL HYDROCHLORIDE 50 MG/1
TABLET ORAL
Qty: 90 TABLET | Refills: 0 | Status: SHIPPED | OUTPATIENT
Start: 2017-11-03 | End: 2017-11-28

## 2017-11-03 RX ORDER — TRAMADOL HYDROCHLORIDE 200 MG/1
200 TABLET, EXTENDED RELEASE ORAL DAILY
Qty: 30 TABLET | Refills: 0 | Status: SHIPPED | OUTPATIENT
Start: 2017-11-03 | End: 2017-11-28

## 2017-11-03 NOTE — TELEPHONE ENCOUNTER
Signed Prescriptions:                        Disp   Refills    traMADol (ULTRAM) 50 MG tablet             90 tab*0        Sig: May take 1-2 tablets every 6 hours as needed for           pain, max of 3 tabs per day. To last 30 days.           Reduce as able.  Okay to fill and begin on           11/3/2017  Authorizing Provider: MELANIE BENSON    traMADol (ULTRAM-ER) 200 MG ER-tablet      30 tab*0        Sig: Take 1 tablet (200 mg) by mouth daily OK to fill and           begin on 11/3/2017 to last 30 days  Authorizing Provider: MELANIE BENSON    Signed script placed in touchdown bin at [facility].    Melanie Benson APRN CNP FNP RN  [facility]

## 2017-11-03 NOTE — TELEPHONE ENCOUNTER
Received call from patient requesting refill(s) of utram ER and tramadol     Last picked up from pharmacy on: Pharmacy closed at this time  Last due:  Tramadol ER:  OK to fill and begin on 10/9/2017 and start on 10/11/2017 to until 11/2/2017 (23 day script so Tramadol ER and tramadol immediate release are due on the same day in the future)  tramadol iR:    Due date for both meds:  anytime    Pt last seen on 10/2/17  Next appt scheduled for 11/6/17    Last urine drug screen 2/2017  Current opioid agreement on file Yes Date: 5/2017    Processing (pick one):      Fax to [facility] pharmacy     Estelita Wetzel RN-BSN  [facility]

## 2017-11-03 NOTE — TELEPHONE ENCOUNTER
Script for Tramadol was signed and faxed to [facility] pharmacy. Rightfax confirmed. Left voice message.      Mike Bergeron MA

## 2017-11-06 ENCOUNTER — OFFICE VISIT (OUTPATIENT)
Dept: PALLIATIVE MEDICINE | Facility: CLINIC | Age: 57
End: 2017-11-06
Payer: COMMERCIAL

## 2017-11-06 VITALS
WEIGHT: 148.6 LBS | BODY MASS INDEX: 23.98 KG/M2 | HEART RATE: 63 BPM | DIASTOLIC BLOOD PRESSURE: 95 MMHG | SYSTOLIC BLOOD PRESSURE: 137 MMHG | OXYGEN SATURATION: 100 %

## 2017-11-06 DIAGNOSIS — M79.18 MYOFASCIAL PAIN: ICD-10-CM

## 2017-11-06 DIAGNOSIS — G89.29 CHRONIC NECK AND BACK PAIN: ICD-10-CM

## 2017-11-06 DIAGNOSIS — G89.4 CHRONIC PAIN SYNDROME: ICD-10-CM

## 2017-11-06 DIAGNOSIS — M54.9 CHRONIC NECK AND BACK PAIN: ICD-10-CM

## 2017-11-06 DIAGNOSIS — M54.2 CHRONIC NECK AND BACK PAIN: ICD-10-CM

## 2017-11-06 DIAGNOSIS — G89.29 CHRONIC BILATERAL LOW BACK PAIN WITHOUT SCIATICA: ICD-10-CM

## 2017-11-06 DIAGNOSIS — M50.30 DDD (DEGENERATIVE DISC DISEASE), CERVICAL: ICD-10-CM

## 2017-11-06 DIAGNOSIS — M48.02 CERVICAL STENOSIS OF SPINAL CANAL: ICD-10-CM

## 2017-11-06 DIAGNOSIS — M47.812 CERVICAL SPONDYLOSIS WITHOUT MYELOPATHY: Primary | ICD-10-CM

## 2017-11-06 DIAGNOSIS — M54.50 CHRONIC BILATERAL LOW BACK PAIN WITHOUT SCIATICA: ICD-10-CM

## 2017-11-06 PROCEDURE — 99215 OFFICE O/P EST HI 40 MIN: CPT | Performed by: NURSE PRACTITIONER

## 2017-11-06 RX ORDER — GABAPENTIN 600 MG/1
900 TABLET ORAL 3 TIMES DAILY
Qty: 135 TABLET | Refills: 3 | Status: SHIPPED | OUTPATIENT
Start: 2017-11-06 | End: 2018-03-08

## 2017-11-06 RX ORDER — TOPIRAMATE 25 MG/1
TABLET, FILM COATED ORAL
Qty: 120 TABLET | Refills: 0 | Status: SHIPPED | OUTPATIENT
Start: 2017-11-06 | End: 2018-01-08

## 2017-11-06 ASSESSMENT — PAIN SCALES - GENERAL: PAINLEVEL: EXTREME PAIN (9)

## 2017-11-06 NOTE — PATIENT INSTRUCTIONS
PLAN:  1. Continue home activities, pace yourself  2. Schedule with Kentrell Castañeda in HEALTH PSYCHOLOGY   3. Medications:   1. Continue Tramadol ER   2. Continue Tramadol immediate release  3. Continue gabapentin and methocarbamol  4. Start Topamax as directed  4. Procedures: none right now, will review the EMG. May recommend repeat cervical RFA  5. Follow-up with me in January 2018.

## 2017-11-06 NOTE — MR AVS SNAPSHOT
After Visit Summary   11/6/2017    Truman Suero    MRN: [id number]           Patient Information     Date Of Birth          1960        Visit Information        Provider Department      11/6/2017 3:00 PM Melanie Thomas APRN [facility]        Today's Diagnoses     Cervical spondylosis without myelopathy        DDD (degenerative disc disease), cervical        Chronic bilateral low back pain without sciatica        Chronic neck and back pain        Cervical stenosis of spinal canal          Care Instructions    PLAN:  1. Continue home activities, pace yourself  2. Schedule with Kentrell Castañeda in HEALTH PSYCHOLOGY   3. Medications:   1. Continue Tramadol ER   2. Continue Tramadol immediate release  3. Continue gabapentin and methocarbamol  4. Start Topamax as directed  4. Procedures: none right now, will review the EMG. May recommend repeat cervical RFA  5. Follow-up with me in January 2018.             Follow-ups after your visit        Additional Services     PAIN PHD EVAL/TREAT/FOLLOW UP                 Your next 10 appointments already scheduled     Nov 07, 2017  1:45 PM CST   (Arrive by 1:30 PM)   EMG with Ke Peña MD   [facility] ([facility])    [address] [place] [id number] [phone]           Do not use lotions or creams on the area to be tested. If you are on blood thinners (Warfarin, Coumadin, Lovenox, etc), please contact your primary care physician to check if it is safe to stop them 3 days prior to testing. If you have anxiety, please check with your referring physician to obtain anti-anxiety medication for the procedure.            Nov 17, 2017  1:00 PM CST   (Arrive by 12:45 PM)   Return Visit with Madeline Gordon PA-C   [facility] ([facility])    [address] [place] [id number] [phone]               Who to contact     If you have questions or need follow up information about today's clinic visit or your schedule please contact [facility] directly at [phone].  Normal or non-critical lab and imaging results will be communicated to you by [employer], letter or phone within 4 business days after the clinic has received the results. If you do not hear from us within 7 days, please contact the clinic through [employer] or phone. If you have a critical or abnormal lab result, we will notify you by phone as soon as possible.  Submit refill requests through [employer] or call your pharmacy and they will forward the refill request to us. Please allow 3 business days for your refill to be completed.          Additional Information About Your Visit        [employer] Information     [employer] gives you secure access to your electronic health record. If you see a primary care provider, you can also send messages to your care team and make appointments. If you have questions, please call your primary care clinic.  If you do not have a primary care provider, please call [phone] and they will assist you.        Care EveryWhere ID     This is your Care EveryWhere ID. This could be used by other organizations to access your [facility] medical records  [id number]        Your Vitals Were     Pulse Pulse Oximetry BMI (Body Mass Index)             63 100% 23.98 kg/m2          Blood Pressure from Last 3 Encounters:   11/06/17 (!) 137/95   11/01/17 136/88   10/10/17 136/88    Weight from Last 3 Encounters:   11/06/17 67.4 kg (148 lb 9.6 oz)   10/10/17 64.9 kg (143 lb)   08/16/17 62.6 kg (138 lb)              We Performed the Following     PAIN PHD EVAL/TREAT/FOLLOW UP          Today's Medication Changes          These changes are accurate as of: 11/6/17  4:03 PM.  If you have any questions, ask your nurse or doctor.               Start taking these medicines.        Dose/Directions    topiramate 25 MG tablet    Commonly known as:  TOPAMAX   Used for:  Cervical spondylosis without myelopathy, DDD (degenerative disc disease), cervical, Chronic bilateral low back pain without sciatica, Chronic neck and back pain, Cervical stenosis of spinal canal        Take 25mg at bedtime for 7 days, then take 25mg twice daily for 7 days, then take 25mg in the morning and 50mg (2 tabs) at bedtime for 7 days, then take 50mg twice daily. Drink plenty of water and no alcohol. If side effects, then reduce to last tolerable dosage.   Quantity:  120 tablet   Refills:  0            Where to get your medicines      These medications were sent to [facility] [address] [facility] [address] [facility]  [address] [facility] [address]     Phone:  [phone]     gabapentin 600 MG tablet         Some of these will need a paper prescription and others can be bought over the counter.  Ask your nurse if you have questions.     Bring a paper prescription for each of these medications     topiramate 25 MG tablet                Primary Care Provider Office Phone # Fax #    Humberto Boltonmichaeljessica DO [facility] [phone]       [address] [place] [address]        Equal Access to Services     ELIEZER VALENTE AH: Hadii cece talley hadasho Soomaali, waaxda luqadaha, qaybta kaalmada adeegyada, mariela carter. So [facility] [phone].    ATENCIÓN: Si habla español, tiene a simmons disposición servicios gratuitos de asistencia lingüística. Llame al [phone].    We comply with applicable federal civil rights laws and [place] laws. We do not discriminate on the basis of race, color, national origin, age, disability, sex, sexual orientation, or gender identity.            Thank you!     Thank you for choosing [facility]  for your care. Our goal is always to provide you with excellent care. Hearing back from our patients is one way we can continue to improve our services. Please take a few  minutes to complete the written survey that you may receive in the mail after your visit with us. Thank you!             Your Updated Medication List - Protect others around you: Learn how to safely use, store and throw away your medicines at www.disposemymeds.org.          This list is accurate as of: 11/6/17  4:03 PM.  Always use your most recent med list.                   Brand Name Dispense Instructions for use Diagnosis    gabapentin 600 MG tablet    NEURONTIN    135 tablet    Take 1.5 tablets (900 mg) by mouth 3 times daily    Cervical spondylosis without myelopathy, DDD (degenerative disc disease), cervical, Chronic bilateral low back pain without sciatica, Chronic neck and back pain, Cervical stenosis of spinal canal       ibuprofen 800 MG tablet    ADVIL/MOTRIN          methocarbamol 500 MG tablet    ROBAXIN    150 tablet    Take 1-2 tablets (500-1,000 mg) by mouth 3 times daily as needed for muscle spasms Start with lower dose and caution sedation    Chronic neck pain, DDD (degenerative disc disease), cervical, Cervical spondylosis without myelopathy, Chronic bilateral low back pain without sciatica, Myofascial pain       topiramate 25 MG tablet    TOPAMAX    120 tablet    Take 25mg at bedtime for 7 days, then take 25mg twice daily for 7 days, then take 25mg in the morning and 50mg (2 tabs) at bedtime for 7 days, then take 50mg twice daily. Drink plenty of water and no alcohol. If side effects, then reduce to last tolerable dosage.    Cervical spondylosis without myelopathy, DDD (degenerative disc disease), cervical, Chronic bilateral low back pain without sciatica, Chronic neck and back pain, Cervical stenosis of spinal canal       * traMADol 50 MG tablet    ULTRAM    90 tablet    May take 1-2 tablets every 6 hours as needed for pain, max of 3 tabs per day. To last 30 days. Reduce as able.  Okay to fill and begin on 11/3/2017    Chronic neck and back pain, Cervical radiculopathy       * traMADol 200 MG  ER-tablet    ULTRAM-ER    30 tablet    Take 1 tablet (200 mg) by mouth daily OK to fill and begin on 11/3/2017 to last 30 days    Cervical spondylosis without myelopathy, DDD (degenerative disc disease), cervical, Chronic bilateral low back pain without sciatica       varenicline 0.5 MG X 11 & 1 MG X 42 tablet    CHANTIX STARTING MONTH GIO    53 tablet    Take 0.5 mg tab daily for 3 days, then 0.5 mg tab twice daily for 4 days, then 1 mg twice daily.    Tobacco abuse       * Notice:  This list has 2 medication(s) that are the same as other medications prescribed for you. Read the directions carefully, and ask your doctor or other care provider to review them with you.

## 2017-11-06 NOTE — PROGRESS NOTES
[facility]    11/6/2017      Chief complaint: neck and lower back     Interval history:  Truman Suero is a 57 year old male is known to me for   Chronic neck pain  Cervical DDD  Cervical spondylosis (pain worse with extension/rotation indicating facetogenic component to pain)  Axial low back pain  Myofascial pain/muscle spasms  Remote history of ETOH overuse, attended AA for awhile. Sober for 10 years  ---PMHx includes: neck pain  ---PSHx includes: none listed      Recommendations/plan at the last visit on 10/2/2017 included:  1. Physical Therapy: none at present- has done PHYSICAL THERAPY   2. Patient is to continue his home exercise program and exercises learned in PHYSICAL THERAPY  3. Clinical Health Psychologist: not at present  4. Diagnostic Studies:  none  5. Medication Management:    1. Continue Tramadol ER  2. Continue Tramadol immediate release  3. Continue gabapentin and methocarbamol  6. Further procedures recommended: none  7. I have reached out to Dr. Trinidad for her to place an order for patient to see [facility] for a 2nd opinion. Patient has failed conservative treatments including Cervical epidural steroid injections, cervical radiofrequency ablations, and Physical therapy. He has moderate cervical spinal stenosis with cord deformity on most recent MRI. Patient also reports occasional unsteadiness with his gait  8. Discussed smoking cessation, patient is working on this.   9. Recommendations to PCP: see above  10. Follow up: 8 weeks      Since his last visit, Truman Suero reports:  -recently saw Ms. Gordon in neurosurgery, he will be having a EMG tomorrow  -will wait until EMG results and then determine where to go, will likely recommend repeat Cervical RFA      At this point, the patient's participation with our multidisciplinary team includes:  The patient has been compliant with the program.  Pain Group - not ordered  PT - attending non-pain management PHYSICAL  "THERAPY   Health Psych - not ordered  Acupuncture: working with Dr. Bereket LAY      Pain scores:  Pain intensity on average is 9 on a scale of 0-10.    Range is 7-10/10.   Pain right now is 9/10.  Pain is described as \"aching, numb, unbearable, burning, shooting, throbbing, penetrating, sharp, stabbing, gnawing, miserable, nagging.\"    Pain is constant in nature    Current pain relevant medications:   -Tramadol 200mg ER in the evening (helpful)  -Tramadol 50mg take 1 tablet  Q 6 hours PRN (using 1-2 tabs per day)  -ibuprofen 600mg PRN (helpful)  -gabapentin 900mg TID (somewhat helpful)  -methocarbamol 500-1000mg TID PRN muscle spasms (somewhat helpful)      Other pertinent medications:  None    Previous Medications:  OPIATES: Tramdol (somewhat helpful)  NSAIDS: ibuprofen (helpful), Aleve (helpful)  MUSCLE RELAXANTS: none  ANTI-MIGRAINE MEDS: none  ANTI-DEPRESSANTS: none  SLEEP AIDS: none  ANTI-CONVULSANTS: gabapentin (helpful)  TOPICALS: lidocaine ointment (somewhat helpful)  Other meds: Tylenol (not helpful)        Other treatments have included:  Truman Suero has not been seen at a pain clinic in the past.    PT: tried, somewhat helpful  Chiropractic: helpful  Acupuncture: none  TENs Unit: none     Injections: cervical radiofrequency nerve ablation at [facility] in December 2016 (did get good relief, got 70% relief of his typical neck pain)  -6/29/2017 Cervical facet joint steroid injections at C4-5 and C5-6 on the right with Dr. Nicole Harding (not helpful)           THE 4 A's OF OPIOID MAINTENANCE ANALGESIA    Analgesia: long acting Tramadol with some short acting tramadol does give some relief    Activity: ADLS and PHYSICAL THERAPY exercises. Working full time as a     Adverse effects: none    Adherence to Rx protocol: yes      Side Effects: none  Patient is using the medication as prescribed: yes    Medications:  Current Outpatient Prescriptions   Medication Sig Dispense Refill     gabapentin (NEURONTIN) " 600 MG tablet Take 1.5 tablets (900 mg) by mouth 3 times daily 135 tablet 3     topiramate (TOPAMAX) 25 MG tablet Take 25mg at bedtime for 7 days, then take 25mg twice daily for 7 days, then take 25mg in the morning and 50mg (2 tabs) at bedtime for 7 days, then take 50mg twice daily. Drink plenty of water and no alcohol. If side effects, then reduce to last tolerable dosage. 120 tablet 0     traMADol (ULTRAM) 50 MG tablet May take 1-2 tablets every 6 hours as needed for pain, max of 3 tabs per day. To last 30 days. Reduce as able.  Okay to fill and begin on 11/3/2017 90 tablet 0     traMADol (ULTRAM-ER) 200 MG ER-tablet Take 1 tablet (200 mg) by mouth daily OK to fill and begin on 11/3/2017 to last 30 days 30 tablet 0     methocarbamol (ROBAXIN) 500 MG tablet Take 1-2 tablets (500-1,000 mg) by mouth 3 times daily as needed for muscle spasms Start with lower dose and caution sedation 150 tablet 1     varenicline (CHANTIX STARTING MONTH PAK) 0.5 MG X 11 & 1 MG X 42 tablet Take 0.5 mg tab daily for 3 days, then 0.5 mg tab twice daily for 4 days, then 1 mg twice daily. 53 tablet 0     ibuprofen (ADVIL,MOTRIN) 800 MG tablet   0     [DISCONTINUED] gabapentin (NEURONTIN) 600 MG tablet Take 1.5 tablets (900 mg) by mouth 3 times daily 135 tablet 1       Medical History: any changes in medical history since they were last seen? None    Social History:   Home situation: , has 4 kids, 2 at home, one in college and one launched.   Occupation/Schooling:  full time in [employer]  Tobacco use: smoking, planning on quitting smoking  Alcohol use: sober for nearly 10 years. He used to work a sobriety program, used to go to   Drug use: none  History of chemical dependency treatment: no formal treatment, did AA    Review of Systems:  ROS is positive as per HPI as well as for fatigue, headaches, tinnitus, wheezing, shortness of breath, joint pain, arthritis, back pain, neck pain, weakness, numbness and tingling,  depression, anxiety, stress and is negative for fevers, sweats, or constipation, diarrhea.    Physical Exam:  Vital signs: Blood pressure (!) 137/95, pulse 63, weight 67.4 kg (148 lb 9.6 oz), SpO2 100 %.    Behavioral observations:  Awake, alert, cooperative    Gait:  normal    Musculoskeletal exam:    Moves well in exam room  Strength is grossly equal    Neuro exam:  SILT in all extremities     Skin/vascular/autonomic:  No suspicious lesions on exposed skin.      Other:  NA    IMAGING:    MR CERVICAL SPINE WITHOUT CONTRAST 9/5/2017 2:32 PM      HISTORY: Neck pain, worsening numbness in arms and lower extremities.  Radiculopathy, cervical region.     TECHNIQUE: Multiplanar multisequence images were obtained through the  cervical spine without contrast.     COMPARISON: 2/22/2017.     FINDINGS: Sagittal images demonstrate some minimal gentle cervical  kyphosis, otherwise normal posterior alignment. There is no evidence  for craniovertebral or cervicomedullary junction abnormality. The  cervical cord is minimally indented anteriorly at C4-C5 and C5-C6,  otherwise is normal in morphology and signal characteristics. Disc  space narrowing and discogenic marrow changes are present C3-C4,  C4-C5, C5-C6 and C6-C7.     C2-C3: Minimal facet hypertrophy. No stenosis.     C3-C4: Broad-based posterior osteophyte formation is present causing  some mild bilateral neural foraminal stenosis, but no central canal  stenosis.     C4-C5: Broad-based posterior osteophyte formation and mild facet  hypertrophy is present causing some mild to moderate central canal  stenosis, mild cord deformity, and mild-to-moderate bilateral neural  foraminal stenosis.     C5-C6: Broad-based posterior osteophyte formation and disc bulging is  present along with some facet hypertrophy. There is moderate central  canal stenosis and moderate bilateral neural foraminal stenosis.  Findings have progressed since the prior exam.     C6-C7: Broad-based disc  bulging is present along with some minimal  posterior osteophyte formation. There is borderline to mild central  canal stenosis, but no neural foraminal stenosis.     C7-T1: Moderate facet hypertrophy. No significant stenosis.         IMPRESSION: Moderate multilevel degenerative disc and facet disease  with some progression at C5-C6 where there is moderate central canal  and bilateral neural foraminal stenosis along with cord deformity.          MR CERVICAL SPINE WITHOUT CONTRAST  2/22/2017 9:59 AM     HISTORY:  Bilateral neck and arm pain for three years.     COMPARISON: None.     TECHNIQUE: Routine MR cervical spine extended through T2.     FINDINGS: There is some degenerative bone marrow signal change C3-C6.  No malignant or destructive lesions. Normal alignment through T2.  Reversed cervical adenosis C3-C6.     The cervical and upper thoracic spinal cord appear intrinsically  normal. The craniocervical junction region is normal. No paraspinous  soft tissue abnormality.     Findings by level as follows:     C2-C3: Negative. No disc protrusion. No central or lateral stenosis.     C3-C4: Mild disc space narrowing. Mild diffuse annular bulge. Small  uncinate spur on the right. No significant central or left-sided  stenosis. Mild right-sided foraminal stenosis.     C4-C5: Moderate degenerative narrowing of the interspace. Mild ventral  disc osteophyte complex with minimal central stenosis. Small uncinate  spurs bilaterally with mild bilateral foraminal stenosis.     C6-C7: Moderate disc space narrowing. Mild broad-based disc osteophyte  complex with minimal central stenosis. Minimal uncinate spurs with  mild bilateral foraminal stenosis.     C6-C7: Moderate disc space narrowing. Mild ventral disc osteophyte  complex. No significant central or lateral stenosis.     C7-T1: No disc protrusion. No central or lateral stenosis. Moderate  bilateral facet joint disease.         IMPRESSION:  1. Degenerative changes as  described C3-C4 through C7-T1. There is  only mild central and foraminal stenosis as described.  2. No intrinsic spinal cord abnormality through T2    [place] Prescription Monitoring Program:  Reviewed, as expected       DIRE Score for selecting candidates for long term opioid analgesia for chronic pain:  Diagnosis  2  Intractablility  2  Risk    Psychological health  2    Chemical health  2    Reliability  2    Social support  3  Efficacy  2    Total DIRE Score = 15. Note that   7-13 predicts poor outcome (compliance and efficacy) from opioid prescribing; 14-21 predicts good outcome (compliance and efficacy)  from opioid prescribing.      Assessment:   1. Cervical spondylosis  2. Cervical DDD  3. Cervical spinal stenosis with bilateral numbness in the hands and occasionally unsteady gait  4. Axial low back pain  5. Myofascial pain/muscle spasms  6. Chronic pain syndrome  7. Remote history of ETOH overuse, attended AA for awhile. Sober for 10 years  8. PMHx includes: neck pain  9. PSHx includes: none listed      Plan:   1. Physical Therapy: none at present- has done PHYSICAL THERAPY   2. Patient is to continue his home exercise program and exercises learned in PHYSICAL THERAPY  3. Clinical Health Psychologist: referral to see Kentrell Castañeda in HEALTH PSYCHOLOGY   4. Diagnostic Studies:  none  5. Medication Management:    1. Continue Tramadol ER  2. Continue Tramadol immediate release 50mg tabs  3. Continue gabapentin  4. Continue methocarbamol  5. Start low dose Topamax as directed  6. Further procedures recommended: none at present, I will also review upcoming EMG results, will likely recommend repeat Cervical RFA  7. Discussed smoking cessation, patient is working on this.   8. Recommendations to PCP: see above  9. Follow up: January 2018    Total time spent face to face was 40 minutes and more than 50% of face to face time was spent in counseling and/or coordination of care regarding the diagnosis and  recommendations above.      Melanie STEVENS, RN CNP, FNP  [facility] [place]

## 2017-11-07 ENCOUNTER — OFFICE VISIT (OUTPATIENT)
Dept: NEUROLOGY | Facility: CLINIC | Age: 57
End: 2017-11-07

## 2017-11-07 DIAGNOSIS — G56.03 BILATERAL CARPAL TUNNEL SYNDROME: ICD-10-CM

## 2017-11-07 DIAGNOSIS — M54.12 C6 RADICULOPATHY: Primary | ICD-10-CM

## 2017-11-07 NOTE — PROGRESS NOTES
[facility]  Electrodiagnostic Laboratory    Nerve Conduction & EMG Report          Patient: Truman Suero  YOB: 1960  Patient ID: [id number]  Age: 57 Years 1 Months  Gender: Male      Referring Provider: Madeline Gordon PA-C    History & Examination:    57 year old man with neck pain and bilateral numbness/tingling mostly at digits 1-3 of both hands. Query upper extremity entrapment neuropathies vs cervical radiculopathies.    Techniques: Motor and sensory conduction studies were done with surface recording electrodes. Temperature was monitored and recorded throughout the study. Upper extremities were maintained at a temperature of 32 degrees Centigrade or higher. EMG was done with a concentric needle electrode.     Results:    Bilateral median antidromic sensory NCSs showed attenuated conduction velocities and normal SNAP amplitudes. Bilateral ulnar and the left radial antidromic sensory NCSs were normal. Left median (recorded from APB) and bilateral ulnar (recorded from ADM) motor NCSs were normal. Right median motor NCS showed mildly prolonged distal latency, normal CMAP amplitudes and conduction velocity. Bilateral median and ulnar second lumbrical/palmar interossei comparison motor NCSs showed prolonged median and normal ulnar motor distal latencies. Left median F-wave latencies were normal.   EMG of bilateral pronators teres showed 2+ positive waves and mildly (right) or moderately (left) reduced recruitment of large, long duration, stable MUPs. EMG of left biceps, brachioradialis, and triceps, showed no abnormal spontaneous activity, and mildly reduced recruitment of either long duration, polyphasic (bices, triceps), or normal appearing (brachioradialis) MUPs. EMG of right triceps showed a few large, long duration, stable MUPs, with normal recruitment patterns, and no abnormal spontaneous activity. EMG of right cervical paraspinal muscles (about C6 level) showed myokymia and 1+  fibs/PSWs. EMG of bilateral FDI, deltoids, right biceps brachii, right EDC and right brachioradialis muscles was normal.     Interpretation:    Abnormal study. There is electrodiagnostic evidence of 1) Mild left, and moderate right, median neuropathies at the wrist, as seen in carpal tunnel syndrome, and 2) Bilateral C6 and possibly C7 radiculopathies.    EMG Physician:    Ke Peña MD           Sensory NCS      Nerve / Sites Rec. Site Onset Peak NP Amp Ref. PP Amp Dist Kirill Ref. Temp     ms ms  V  V  V cm m/s m/s  C   L MEDIAN - Dig II Anti      Wrist Dig II 3.39 4.38 12.0 10.0 24.6 14 41.4 48.0 31.6   R MEDIAN - Dig II Anti      Wrist Dig II 3.59 4.58 10.1 10.0 15.8 14 39.0 48.0 32.5   L ULNAR - Dig V Anti      Wrist Dig V 2.40 3.18 13.7 8.0 17.7 12.5 52.2 48.0 32   R ULNAR - Dig V Anti      Wrist Dig V 2.50 3.33 10.3 8.0 13.9 12.5 50.0 48.0 32.4   L RADIAL - Snuff      Forearm Snuff 1.93 2.45 20.2 15.0 26.4 12.5 64.9 48.0 31.7       Motor NCS      Nerve / Sites Rec. Site Lat Ref. Amp Ref. Rel Amp Dist Kirill Ref. Dur. Area Temp.     ms ms mV mV % cm m/s m/s ms %  C   L MEDIAN - APB      Wrist APB 4.27 4.40 10.7 5.0 100 8   6.82 100 31.5      Elbow APB 8.54  9.5  89.2 22 51.5 48.0 7.45 92.9 31.6   R MEDIAN - APB      Wrist APB 4.64 4.40 11.4 5.0 100 8   6.77 100 31.9      Elbow APB 8.54  11.0  96.6 22 56.3 48.0 7.24 106 32.1   L ULNAR - ADM      Wrist ADM 2.97 3.50 13.0 5.0 100 8   8.28 100 31.7      B.Elbow ADM 6.25  11.6  89.2 20 61.0 48.0 7.71 96.1 31.7      A.Elbow ADM 7.86  10.8  83.6 10 61.9 48.0 7.24 88.4 31.9   R ULNAR - ADM      Wrist ADM 3.28 3.50 13.7 5.0 100 8   6.77 100 32.4      B.Elbow ADM 6.15  13.5  98.8 18 62.8 48.0 6.61 95.7 32.2      A.Elbow ADM 7.50  12.7  92.7 9 66.5 48.0 7.03 89.9 32.2   L MEDIAN - II Lumb      Median II Lumb 4.32  0.8  100 10   6.51 100 29.9      Ulnar Palm Int 3.18  1.7  218 10   5.68 165 29.9   R MEDIAN - II Lumb      Median II Lumb 5.10  1.0  100 10   8.18 100  32.5      Ulnar Palm Int 3.44  2.1  205 10   5.83 198 32.5       F  Wave      Nerve Min F Lat Max F Lat Mean FLat Temp.    ms ms ms  C   L MEDIAN 24.79 36.67 27.91 31.7       EMG Summary Table     Spontaneous MUAP Recruitment    IA Fib Fasc H.F. Amp Dur. PPP Pattern   R. FIRST D INTEROSS Normal None None None N N None Normal   R. PRON TERES Normal 2+ None None 1+ 1+ None Mild Reduced   R. BICEPS Normal None None None N N None Normal   R. TRICEPS Normal None None None 1+ 1+ None Normal   R. DELTOID Normal None None None N N None Normal   R. EXT DIG COMM Normal None None None N N None Normal   R. BRACHIORADIALIS Normal None None None N N None Normal   R. C6 PSP Normal 1+ None Myokymia N N None Normal   L. FIRST D INTEROSS Normal None None None N N None Normal   L. TRICEPS Normal None None None N 1+ None Mild Reduced   L. PRON TERES Normal 2+ None None 1+ 2+ None Moderate Reduced   L. BICEPS Normal None None None N 1+ 1+ Mild Reduced   L. DELTOID Normal None None None N N None Normal   L. BRACHIORADIALIS Normal None None None N N None Mild Reduced

## 2017-11-07 NOTE — LETTER
11/7/2017     RE: Truman Suero  [address] [facility] [address]     Dear Colleague,    Thank you for referring your patient, Truman Suero, to the [facility] EMG at [facility]. Please see a copy of my visit note below.        [facility]  Electrodiagnostic Laboratory    Nerve Conduction & EMG Report          Patient: Truman Suero  YOB: 1960  Patient ID: [id number]  Age: 57 Years 1 Months  Gender: Male      Referring Provider: Madeline Gordon PA-C    History & Examination:    57 year old man with neck pain and bilateral numbness/tingling mostly at digits 1-3 of both hands. Query upper extremity entrapment neuropathies vs cervical radiculopathies.    Techniques: Motor and sensory conduction studies were done with surface recording electrodes. Temperature was monitored and recorded throughout the study. Upper extremities were maintained at a temperature of 32 degrees Centigrade or higher. EMG was done with a concentric needle electrode.     Results:    Bilateral median antidromic sensory NCSs showed attenuated conduction velocities and normal SNAP amplitudes. Bilateral ulnar and the left radial antidromic sensory NCSs were normal. Left median (recorded from APB) and bilateral ulnar (recorded from ADM) motor NCSs were normal. Right median motor NCS showed mildly prolonged distal latency, normal CMAP amplitudes and conduction velocity. Bilateral median and ulnar second lumbrical/palmar interossei comparison motor NCSs showed prolonged median and normal ulnar motor distal latencies. Left median F-wave latencies were normal.   EMG of bilateral pronators teres showed 2+ positive waves and mildly (right) or moderately (left) reduced recruitment of large, long duration, stable MUPs. EMG of left biceps, brachioradialis, and triceps, showed no abnormal spontaneous activity, and mildly reduced recruitment of either long duration, polyphasic (bices,  triceps), or normal appearing (brachioradialis) MUPs. EMG of right triceps showed a few large, long duration, stable MUPs, with normal recruitment patterns, and no abnormal spontaneous activity. EMG of right cervical paraspinal muscles (about C6 level) showed myokymia and 1+ fibs/PSWs. EMG of bilateral FDI, deltoids, right biceps brachii, right EDC and right brachioradialis muscles was normal.     Interpretation:    Abnormal study. There is electrodiagnostic evidence of 1) Mild left, and moderate right, median neuropathies at the wrist, as seen in carpal tunnel syndrome, and 2) Bilateral C6 and possibly C7 radiculopathies.    EMG Physician:    Ke Peña MD           Sensory NCS      Nerve / Sites Rec. Site Onset Peak NP Amp Ref. PP Amp Dist Kirill Ref. Temp     ms ms  V  V  V cm m/s m/s  C   L MEDIAN - Dig II Anti      Wrist Dig II 3.39 4.38 12.0 10.0 24.6 14 41.4 48.0 31.6   R MEDIAN - Dig II Anti      Wrist Dig II 3.59 4.58 10.1 10.0 15.8 14 39.0 48.0 32.5   L ULNAR - Dig V Anti      Wrist Dig V 2.40 3.18 13.7 8.0 17.7 12.5 52.2 48.0 32   R ULNAR - Dig V Anti      Wrist Dig V 2.50 3.33 10.3 8.0 13.9 12.5 50.0 48.0 32.4   L RADIAL - Snuff      Forearm Snuff 1.93 2.45 20.2 15.0 26.4 12.5 64.9 48.0 31.7       Motor NCS      Nerve / Sites Rec. Site Lat Ref. Amp Ref. Rel Amp Dist Kirill Ref. Dur. Area Temp.     ms ms mV mV % cm m/s m/s ms %  C   L MEDIAN - APB      Wrist APB 4.27 4.40 10.7 5.0 100 8   6.82 100 31.5      Elbow APB 8.54  9.5  89.2 22 51.5 48.0 7.45 92.9 31.6   R MEDIAN - APB      Wrist APB 4.64 4.40 11.4 5.0 100 8   6.77 100 31.9      Elbow APB 8.54  11.0  96.6 22 56.3 48.0 7.24 106 32.1   L ULNAR - ADM      Wrist ADM 2.97 3.50 13.0 5.0 100 8   8.28 100 31.7      B.Elbow ADM 6.25  11.6  89.2 20 61.0 48.0 7.71 96.1 31.7      A.Elbow ADM 7.86  10.8  83.6 10 61.9 48.0 7.24 88.4 31.9   R ULNAR - ADM      Wrist ADM 3.28 3.50 13.7 5.0 100 8   6.77 100 32.4      B.Elbow ADM 6.15  13.5  98.8 18 62.8 48.0  6.61 95.7 32.2      A.Elbow ADM 7.50  12.7  92.7 9 66.5 48.0 7.03 89.9 32.2   L MEDIAN - II Lumb      Median II Lumb 4.32  0.8  100 10   6.51 100 29.9      Ulnar Palm Int 3.18  1.7  218 10   5.68 165 29.9   R MEDIAN - II Lumb      Median II Lumb 5.10  1.0  100 10   8.18 100 32.5      Ulnar Palm Int 3.44  2.1  205 10   5.83 198 32.5       F  Wave      Nerve Min F Lat Max F Lat Mean FLat Temp.    ms ms ms  C   L MEDIAN 24.79 36.67 27.91 31.7       EMG Summary Table     Spontaneous MUAP Recruitment    IA Fib Fasc H.F. Amp Dur. PPP Pattern   R. FIRST D INTEROSS Normal None None None N N None Normal   R. PRON TERES Normal 2+ None None 1+ 1+ None Mild Reduced   R. BICEPS Normal None None None N N None Normal   R. TRICEPS Normal None None None 1+ 1+ None Normal   R. DELTOID Normal None None None N N None Normal   R. EXT DIG COMM Normal None None None N N None Normal   R. BRACHIORADIALIS Normal None None None N N None Normal   R. C6 PSP Normal 1+ None Myokymia N N None Normal   L. FIRST D INTEROSS Normal None None None N N None Normal   L. TRICEPS Normal None None None N 1+ None Mild Reduced   L. PRON TERES Normal 2+ None None 1+ 2+ None Moderate Reduced   L. BICEPS Normal None None None N 1+ 1+ Mild Reduced   L. DELTOID Normal None None None N N None Normal   L. BRACHIORADIALIS Normal None None None N N None Mild Reduced                                Again, thank you for allowing me to participate in the care of your patient.      Sincerely,    Ke Peña MD

## 2017-11-07 NOTE — MR AVS SNAPSHOT
After Visit Summary   11/7/2017    Truman Suero    MRN: [id number]           Patient Information     Date Of Birth          1960        Visit Information        Provider Department      11/7/2017 1:45 PM Ke Peña MD [facility] EMG        Today's Diagnoses     C6 radiculopathy    -  1    Bilateral carpal tunnel syndrome           Follow-ups after your visit        Your next 10 appointments already scheduled     Nov 17, 2017  1:00 PM CST   (Arrive by 12:45 PM)   Return Visit with LIN Cantu [facility] ([facility])    [address]  3rd Floor  [facility] [id number] [phone]            Jan 08, 2018  3:00 PM CST   Return Visit with RODNEY Vargas [facility] ([facility])    [address] [place] [address]  [facility] [address] [phone]              Future tests that were ordered for you today     Open Future Orders        Priority Expected Expires Ordered    Needle EMG Each Extremity w/Paraspinal Area Complete (40274) Routine  11/7/2018 11/7/2017            Who to contact     Please call your clinic at [phone] to:    Ask questions about your health    Make or cancel appointments    Discuss your medicines    Learn about your test results    Speak to your doctor   If you have compliments or concerns about an experience at your clinic, or if you wish to file a complaint, please contact [facility] Patient Relations at [phone] or email us at [email or web address]         Additional Information About Your Visit        MyChart Information     [employer] gives you secure access to your electronic health record. If you see a primary care provider, you can also send messages to your care team and make appointments. If you have questions, please call your primary care clinic.  If you do not have a primary care provider, please call  [phone] and they will assist you.      [employer] is an electronic gateway that provides easy, online access to your medical records. With [employer], you can request a clinic appointment, read your test results, renew a prescription or communicate with your care team.     To access your existing account, please contact your [facility] or call [phone] for assistance.        Care EveryWhere ID     This is your Care EveryWhere ID. This could be used by other organizations to access your [place] medical records  [id number]         Blood Pressure from Last 3 Encounters:   11/06/17 (!) 137/95   11/01/17 136/88   10/10/17 136/88    Weight from Last 3 Encounters:   11/06/17 67.4 kg (148 lb 9.6 oz)   10/10/17 64.9 kg (143 lb)   08/16/17 62.6 kg (138 lb)              We Performed the Following     HC NCS MOTOR W OR W/O F-WAVE, 9 OR 10        Primary Care Provider Office Phone # Fax #    Humberto Lucas Amos, [phone]       [address] [facility] [address]        Equal Access to Services     ELIEZER VALENTE : Hadii aad ku hadasho Soomaali, waaxda luqadaha, qaybta kaalmada adeegyada, mariela carter. So [facility] [phone].    ATENCIÓN: Si habla español, tiene a simmons disposición servicios gratuitos de asistencia lingüística. Llame al [phone].    We comply with applicable federal civil rights laws and [place] laws. We do not discriminate on the basis of race, color, national origin, age, disability, sex, sexual orientation, or gender identity.            Thank you!     Thank you for choosing [facility]  for your care. Our goal is always to provide you with excellent care. Hearing back from our patients is one way we can continue to improve our services. Please take a few minutes to complete the written survey that you may receive in the mail after your visit with us. Thank you!             Your Updated Medication List - Protect  others around you: Learn how to safely use, store and throw away your medicines at www.disposemymeds.org.          This list is accurate as of: 11/7/17  3:34 PM.  Always use your most recent med list.                   Brand Name Dispense Instructions for use Diagnosis    gabapentin 600 MG tablet    NEURONTIN    135 tablet    Take 1.5 tablets (900 mg) by mouth 3 times daily    Cervical spondylosis without myelopathy, DDD (degenerative disc disease), cervical, Chronic bilateral low back pain without sciatica, Chronic neck and back pain, Cervical stenosis of spinal canal       ibuprofen 800 MG tablet    ADVIL/MOTRIN          methocarbamol 500 MG tablet    ROBAXIN    150 tablet    Take 1-2 tablets (500-1,000 mg) by mouth 3 times daily as needed for muscle spasms Start with lower dose and caution sedation    Chronic neck pain, DDD (degenerative disc disease), cervical, Cervical spondylosis without myelopathy, Chronic bilateral low back pain without sciatica, Myofascial pain       topiramate 25 MG tablet    TOPAMAX    120 tablet    Take 25mg at bedtime for 7 days, then take 25mg twice daily for 7 days, then take 25mg in the morning and 50mg (2 tabs) at bedtime for 7 days, then take 50mg twice daily. Drink plenty of water and no alcohol. If side effects, then reduce to last tolerable dosage.    Cervical spondylosis without myelopathy, DDD (degenerative disc disease), cervical, Chronic bilateral low back pain without sciatica, Chronic neck and back pain, Cervical stenosis of spinal canal       * traMADol 50 MG tablet    ULTRAM    90 tablet    May take 1-2 tablets every 6 hours as needed for pain, max of 3 tabs per day. To last 30 days. Reduce as able.  Okay to fill and begin on 11/3/2017    Chronic neck and back pain, Cervical radiculopathy       * traMADol 200 MG ER-tablet    ULTRAM-ER    30 tablet    Take 1 tablet (200 mg) by mouth daily OK to fill and begin on 11/3/2017 to last 30 days    Cervical spondylosis without  myelopathy, DDD (degenerative disc disease), cervical, Chronic bilateral low back pain without sciatica       varenicline 0.5 MG X 11 & 1 MG X 42 tablet    CHANTIX STARTING MONTH GIO    53 tablet    Take 0.5 mg tab daily for 3 days, then 0.5 mg tab twice daily for 4 days, then 1 mg twice daily.    Tobacco abuse       * Notice:  This list has 2 medication(s) that are the same as other medications prescribed for you. Read the directions carefully, and ask your doctor or other care provider to review them with you.

## 2017-11-08 ENCOUNTER — TELEPHONE (OUTPATIENT)
Dept: NEUROSURGERY | Facility: CLINIC | Age: 57
End: 2017-11-08

## 2017-11-08 NOTE — TELEPHONE ENCOUNTER
EMG shows bilateral carpel tunnel.  If pt has true radicular pain then return to see Dr. Richard Junior.  If mostly lower arm pain then refer for carpel tunnel release.

## 2017-11-08 NOTE — TELEPHONE ENCOUNTER
Pt returned my call.  Reviewed EMG. He feels his worse pain is in his cervical spine and numbness in his hands. His EMG shows carpel tunnel.  It was recommended that he pursue ortho for carpel tunnel.  If they do not feel this is cause of pain then recommend pt return to see Dr. Richard Junior. [facility]  He currently works as a .

## 2017-11-11 ENCOUNTER — MYC MEDICAL ADVICE (OUTPATIENT)
Dept: PALLIATIVE MEDICINE | Facility: CLINIC | Age: 57
End: 2017-11-11

## 2017-11-13 ENCOUNTER — MYC MEDICAL ADVICE (OUTPATIENT)
Dept: PALLIATIVE MEDICINE | Facility: CLINIC | Age: 57
End: 2017-11-13

## 2017-11-13 ENCOUNTER — MYC MEDICAL ADVICE (OUTPATIENT)
Dept: FAMILY MEDICINE | Facility: CLINIC | Age: 57
End: 2017-11-13

## 2017-11-13 DIAGNOSIS — M79.18 MYOFASCIAL PAIN: Primary | ICD-10-CM

## 2017-11-13 NOTE — TELEPHONE ENCOUNTER
Route for behavioral health referral. Seen 8/16 for CPE (including quitting smoking).  Jaylene Paulino RN

## 2017-11-13 NOTE — TELEPHONE ENCOUNTER
My chart message from pt:    Dr Navarro- Forgot to message you last week. How do I get ahold of Dr Kentrell Castañeda? I remember you said that he works out of a couple o different clinics. That sounds interesting to me. You were right about DR. Peña- Fantastic. Sooo Glad you were able to get [facility] into the [facility]. Thanks again     Palmer Chakraborty    Call the main line- [phone] they will be able to get him a message or schedule an appointment if needed.     Jessika Flannery, RN, [facility]  Pain Clinic Care Coordinator

## 2017-11-14 NOTE — TELEPHONE ENCOUNTER
My chart message from pt:    Jessika- Got your message, thank-you for the info. By now I know the insurance rules so I sent message to primary care clinic for a referral.     Palmer Chakraborty    You are welcome but there was no need for the order from your primary. Melanie put an order in on 11/6/2017. I hope you can schedule soon.     Jessika Flannery RN, [facility]  Pain Clinic Care Coordinator

## 2017-11-16 ENCOUNTER — VIRTUAL VISIT (OUTPATIENT)
Dept: FAMILY MEDICINE | Facility: OTHER | Age: 57
End: 2017-11-16

## 2017-11-16 NOTE — PROGRESS NOTES
"Date:   Clinician: Joe Burkett  Clinician NPI: [id number]  Patient: Turman Suero  Patient : 1960  Patient Address: [address] [place] [address]  Patient Phone: [phone]  Visit Protocol: URI  Patient Summary:  Truman is a 57 year old ( : 1960 ) male who initiated a Visit for cold, sinus infection, or influenza. When asked the question \"Please sign me up to receive news, health information and promotions from [employer].\", Truman responded \"No\".    Truman states his symptoms started gradually 2-3 weeks ago.   His symptoms consist of a sore throat, nasal congestion, tooth pain, a headache, malaise, chills, facial pain or pressure, rhinitis, and myalgia. Truman also feels feverish.   Symptom Details     Nasal secretions: The color of his mucus is green, yellow, and white.    Sore throat: Truman reports having moderate throat pain, does not have exudate on his tonsils, and is able to swallow liquids. The lymph nodes in his neck He is not sure if the lymph nodes in his neck are enlarged. He states that rashes have not appeared on the skin since the sore throat started.     Temperature: His current temperature is 96.5 degrees Fahrenheit.     Facial pain or pressure: The facial pain or pressure feels worse when bending over or leaning forward.     Headache: He states the headache is moderate.     Tooth pain: The tooth pain is not caused by a cavity, recent dental work, or other mouth problems.      Truman denies having wheezing, cough, dyspnea, and ear pain. He also denies taking antibiotic medication for the symptoms, double sickening, and having recent facial or sinus surgery in the past 60 days.   Within the past week, Truman has not been exposed to someone with strep throat. He has not recently been exposed to someone with influenza. Truman has not been in close contact with any high risk individuals.   Weight: 145 lbs   Truman smokes or uses smokeless " tobacco.   Additional information as reported by the patient (free text): I have been diagnosed with cervical stenosis and neck arthritis, hence the pain medications. The majority of the pain and pressure is on the left sinus and nostril. Always plugged and pressure seems to go u to eye. Have tried over the counter and home methods but nothing has helped. Has got worse over the last few days.   MEDICATIONS:  Ibuprofen (Advil, Motrin), pseudoephedrine (Sudafed, Dimetapp), and saline nose drops/spray (SeaMist, Ocean Nasal [place])   Patient free text response: gabapentin,tramadol,methocarbamol,topomol  , ALLERGIES:  NKDA   Clinician Response:  Dear Truman,  Based on the information you have provided, you likely have  acute bacterial sinusitis, otherwise known as a sinus infection.  I am prescribing amoxicillin-Pot Clavulanate [Augmentin] 875-125mg. Take one tablet by mouth two times a day for 10 days. There are no refills with this prescription.   Sinus pressure occurs when the tissues lining your sinuses become swollen and inflamed. Afrin nasal spray decreases the swelling to provide the quickest and most effective relief from sinus pressure.  Use oxymetazoline (Afrin or store brand) nasal spray. Spray once in each nostril twice per day for a maximum of 3 days. Using this medication more frequently or longer than recommended may cause nasal congestion to reoccur or worsen. This is an over-the-counter medication you can find at most pharmacies.   Unless your are allergic to the over-the-counter medication(s) below, I recommend using:   A sinus irrigation kit such as Sinus Rinse, Neti Pot, SinuCleanse (or store brand). Be sure to use sterile or previously boiled water to prevent unwanted infections.   Some people develop allergies to antibiotics. If you have significant swelling or difficulty breathing, stop the medication immediately and call 911 or go to an emergency room. If you notice a new rash, be seen at a clinic  or urgent care.  You will feel better faster if you take care of yourself by getting more rest and drinking plenty of liquids, especially water.  Remember to wash your hands often and stay home while you are sick to decrease the chance you will spread your infection to others.  Try the following to help with your throat pain and discomfort:     Use throat lozenges    Gargle with warm salt water (1/4 teaspoon of salt per 8 ounce glass of water)    Suck on frozen items such as popsicles or ice cubes     Call 911 or go to the emergency room if you feel that your throat is closing off, you suddenly develop a rash, you are unable to swallow fluids, you are drooling, or you are having difficulty breathing.  Finally, as your provider, I need you to know that becoming tobacco-free is the most important thing you can do to protect your current and future health.   Diagnosis: Acute bacterial sinusitis  Diagnosis ICD: J01.90  Additional Clinician Notes: The issues in your previous visit attempts should be follow-up with your provider.  As those issues could be more serious.  Prescription: amoxicillin-Pot Clavulanate (Augmentin) 875-125 mg oral tablet 20 tablets, 10 days supply. Take one tablet by mouth two times a day for 10 days. Refills: 0, Refill as needed: no, Allow substitutions: yes  Pharmacy: [id number] - [phone] - [address] [place] [address]

## 2017-11-17 ENCOUNTER — OFFICE VISIT (OUTPATIENT)
Dept: NEUROSURGERY | Facility: CLINIC | Age: 57
End: 2017-11-17

## 2017-11-17 VITALS
WEIGHT: 146 LBS | SYSTOLIC BLOOD PRESSURE: 143 MMHG | DIASTOLIC BLOOD PRESSURE: 95 MMHG | HEART RATE: 64 BPM | HEIGHT: 66 IN | BODY MASS INDEX: 23.46 KG/M2

## 2017-11-17 DIAGNOSIS — G56.10 MEDIAN NERVE NEUROPATHY, UNSPECIFIED LATERALITY: ICD-10-CM

## 2017-11-17 DIAGNOSIS — M54.12 CERVICAL RADICULAR PAIN: Primary | ICD-10-CM

## 2017-11-17 DIAGNOSIS — M50.30 DEGENERATIVE DISC DISEASE, CERVICAL: ICD-10-CM

## 2017-11-17 ASSESSMENT — PAIN SCALES - GENERAL: PAINLEVEL: NO PAIN (0)

## 2017-11-17 NOTE — LETTER
11/17/2017       RE: Truman Suero  [address] [facility] [address]     Dear Colleague,    Thank you for referring your patient, Truman Suero, to the [facility] NEUROSURGERY at [facility]. Please see a copy of my visit note below.      Neurosurgery Clinic Consult  Date of Visit: 11/1/2017  Referred for: Neck pain  Referring Provider: Melanie Thomas, CNP      Dear Ms Thomas,    We were happy to see Truman Suero, a pleasant 57 year old year old male for neck pain.    HPI: As you may recall is nice gentleman has had cervical pain for many years. He works as a , and has had pain in the back of the neck ongoing, and exacerbated by having to keep his head down much of the time for years. In the past he has been treated at [facility], in fact as recently as a year ago, where he was treated with bilateral medial branch blocks from C3 to C7, and with a positive response to that, (please see treatment notes dated 11/15/16) had radiofrequency ablations which gave him many months of very good relief of neck pain.    Since that time he has discovered that [facility] is not in his current insurance network, his neck pain is progressing, the ablations have worn off, and he now has left arm numbness radiating down to the thumb and first finger, some (minimal) left arm pain, mostly trapezius type pain and tingling in his legs. All his fingers except the thumb go numb when he looks down, but he has no clumsiness or difficulty with fine motor. On his exams he has been noted to have intact arm and hand strength, no myelopathy. No loss of bowel or bladder control. In your notes there is reference to an EMG from 2016 at [facility] which indicated he had carpal tunnel syndrome. I been unable to locate that EMG in our system. Recently he had another injection for cervical facet joint, in this case C4/5 and C5/6 right-sided joints on 6/29/17. Pre/post pain scores were 6/3  respectively. Because his symptoms have been persistent he was referred to neurosurgery.  His imaging has been updated.  He had multiple levels of cervical spondylosis, C5/6 especially. And this fit with his symptom of thumb and first finger tingling. However this appeared mostly in the morning which is more in keeping with carpal tunnel syndrome which he reports he had on an EMG previously. Left was worse than right although the right certainly is symptomatic. I requested repeat EMGs and a return visit.    Otherwise his symptoms were primarily neck pain, he has very little arm pain. Historically his neck pain has responded well to facet injections.    He is a daily smoker.  At last visit we discussed that elective surgical fusion is not offered to people using nicotine products because of high rates of fusion failure.         Current Outpatient Prescriptions:      AMOXICILLIN PO, , Disp: , Rfl:      gabapentin (NEURONTIN) 600 MG tablet, Take 1.5 tablets (900 mg) by mouth 3 times daily, Disp: 135 tablet, Rfl: 3     topiramate (TOPAMAX) 25 MG tablet, Take 25mg at bedtime for 7 days, then take 25mg twice daily for 7 days, then take 25mg in the morning and 50mg (2 tabs) at bedtime for 7 days, then take 50mg twice daily. Drink plenty of water and no alcohol. If side effects, then reduce to last tolerable dosage., Disp: 120 tablet, Rfl: 0     traMADol (ULTRAM) 50 MG tablet, May take 1-2 tablets every 6 hours as needed for pain, max of 3 tabs per day. To last 30 days. Reduce as able.  Okay to fill and begin on 11/3/2017, Disp: 90 tablet, Rfl: 0     traMADol (ULTRAM-ER) 200 MG ER-tablet, Take 1 tablet (200 mg) by mouth daily OK to fill and begin on 11/3/2017 to last 30 days, Disp: 30 tablet, Rfl: 0     methocarbamol (ROBAXIN) 500 MG tablet, Take 1-2 tablets (500-1,000 mg) by mouth 3 times daily as needed for muscle spasms Start with lower dose and caution sedation, Disp: 150 tablet, Rfl: 1     varenicline (CHANTIX STARTING  "MONTH GIO) 0.5 MG X 11 & 1 MG X 42 tablet, Take 0.5 mg tab daily for 3 days, then 0.5 mg tab twice daily for 4 days, then 1 mg twice daily., Disp: 53 tablet, Rfl: 0     ibuprofen (ADVIL,MOTRIN) 800 MG tablet, , Disp: , Rfl: 0    No Known Allergies    Problem list, past medical history, family, social: is reviewed in Epic.   OBJECTIVE:   BP (!) 143/95  Pulse 64  Ht 1.664 m (5' 5.5\")  Wt 66.2 kg (146 lb)  BMI 23.93 kg/m2    Imaging and other studies:  These are the pertinent radiologist's findings from:  MRI cervical 9/5/17 WO @ [facility] .   C3-C4: Broad-based posterior osteophyte formation is present causing  some mild bilateral neural foraminal stenosis, but no central canal  stenosis.     C4-C5: Broad-based posterior osteophyte formation and mild facet  hypertrophy is present causing some mild to moderate central canal  stenosis, mild cord deformity, and mild-to-moderate bilateral neural  foraminal stenosis.     C5-C6: Broad-based posterior osteophyte formation and disc bulging is  present along with some facet hypertrophy. There is moderate central  canal stenosis and moderate bilateral neural foraminal stenosis.  Findings have progressed since the prior exam.     C6-C7: Broad-based disc bulging is present along with some minimal  posterior osteophyte formation. There is borderline to mild central  canal stenosis, but no neural foraminal stenosis    EMGs 11/7/17  Abnormal study. There is electrodiagnostic evidence of 1) Mild left, and moderate right, median neuropathies at the wrist, as seen in carpal tunnel syndrome, and 2) Bilateral C6 and possibly C7 radiculopathies    See the full report in EPIC/Media tab.  I personally reviewed the images with the patient.      Exam:  Pertinent positives:  Negative Phalen's and Tinel's at the wrists. Question possible L'Hermitte's, all fingers go numb with flexion of the neck.  *   Well developed, well nourished male found seated comfortably in exam room chair.  He is able to " sit and rise independently.  He is unaccompanied.    *  Mental Status  A&O X3.  Bright, alert, affable, interactive. Language fluid, fund of knowledge intact. Good historian.  Mood and affect congruent and WNL.  *  Cranial Nerves  II: Able to read printed forms, VF full to gross confrontation.  III: IV, VI:  PERRLA, EOMI, No nystagmus, no ptosis.  V: Sensation intact in bilateral V1, V2, and V3. Jaw clench symmetric.    VII:  Intact to voice bilaterally.  IX:  Pushes tongue against bilateral cheeks.  X:  Palate elevates, uvula midline, phonation intact.  XI: Elevates shoulders, head turn intact.  XII: Tongue midline. No fasciculations.  *  Cervical Spine:  DTR's at Biceps, Triceps, and Brachioradialis 2/4 and symmetric.  Sensation intact.    No Garrido's.    Muscle bulk and tone WNL.  Upper Extremity Strength.              RIGHT                LEFT     Deltoid              5/5                   5/5       Biceps              5/5                   5/5        Triceps              5/5                   5/5       Wrist Extensor              5/5                   5/5                     5/5                    5/5       Interossei              5/5                   5/5       EPL              5/5                    5/5       Pinch              5/5                  5/5          *  Lumbar Spine:    Able to heel and toe stand/walk.   DTR's Patellar and Achilles 1/4 and symmetric.   No fasciculations.  Muscle bulk and tone WNL.  No Clonus.  Sensation intact.    Lower Extremity Strength                   RIGHT                   LEFT     Iliopsoas                    5/5                      5/5       Quad                    5/5                        5/5       Hamstring                      5/5                        5/5         Gastrocs                    5/5                        5/5       Tib. Anterior                     5/5                        5/5       EHL                      5/5                        5/5        Babinski                 Down                                      Down                   *  Structural Exam  Inspection of the spine reveals no scoliosis, exaggerated kyphosis or lordosis.Head is a little forward of the pelvis in the sagittal plane.    Cervical spine ROM , quite good actually. No spasming. No tenderness to palpation.  No Spurling's, all fingers both hands tingle with full flexion of the neck. Thumbs are spared.  Lumbar ROM full.   Gait narrow, smooth, no scissoring. No antalgia.     ASSESSMENT/PLAN:  1. Cervical radicular pain    2. Degenerative disc disease, cervical    3. Median nerve neuropathy, unspecified laterality      He has multiple levels of rather pronounced advanced degenerative disease in the cervical spine, more advanced than I would've expected for age.    The symptom for which he presented is neck pain. He has known arthropathy at multiple levels and his pain was reasonably well-controlled for several months with facet rhizotomies from C3 to C7.  More recently he had partial neck pain relief with injections of the facets at C3-4 and C 4-5.    The EMGs reveal bilateral cervical radiculopathy in the C6 and possible C7 distributions, as well as bilateral median neuropathy worse on the right than the left. His hand symptoms bother him very little and he can live with it.    Ultimately then if surgery were to be considered fusion would have to be the procedure of choice; artificial disks would continue to allow motion, thus doing nothing to mitigate his primary presenting complaint of neck pain. Since he is a smoker we again discussed that he must stop nicotine in order for fusion surgery to be considered    Additionally we discussed he has multiple levels of cervical spondylosis and fusion at only 1 or 2 levels may only partially address his neck pain. He is aware of this and aware that the surgeon will discuss how many levels he or she would propose to fuse for axial neck pain. He is  always a candidate for repeat facet rhizotomy    All of the fingers except the thumb tingle with flexion of the neck, all of the fingers improved in sensation with extension of the neck. There is a trace of listhesis present at C2-3 which moves on dynamic views, but he is not particularly tight at that level on MRI . He has tingling in the legs after a long day at work, but I find no long tract signs on his exam, so I'm not putting much stock in that at the moment.    I've referred him to a neurosurgeon. He like to have time to get a quit smoking plan in place. He thinks he'll probably want to see a surgeon around mid summer. That seems to work pretty well for a time line.  I answered his questions and made sure he has our contact information in case anything else comes up. It's been a pleasure participating in the care of this nice patient. We thank you for your confidence in the [facility], Department of Neurosurgery.      Best Regards,    Madeline Gordon PA-C  [facility]  Department of Neurosurgery  Phone: [phone]  Fax: [phone]        This note was generated using voice recognition software. While edited for content some inaccurate phrasing may be found.    Again, thank you for allowing me to participate in the care of your patient.      Sincerely,    Madeline Gordon PA-C

## 2017-11-17 NOTE — MR AVS SNAPSHOT
After Visit Summary   11/17/2017    Truman Suero    MRN: [id number]           Patient Information     Date Of Birth          1960        Visit Information        Provider Department      11/17/2017 1:00 PM Madeline Gordon PA-C [facility] Neurosurgery        Today's Diagnoses     Cervical radicular pain    -  1    Degenerative disc disease, cervical        Median nerve neuropathy, unspecified laterality           Follow-ups after your visit        Your next 10 appointments already scheduled     Jan 08, 2018  3:00 PM CST   Return Visit with RODNEY Vargas CNP   [facility]    [address]  [facility] [address]  [facility] [address] [phone]              Who to contact     Please call your clinic at [phone] to:    Ask questions about your health    Make or cancel appointments    Discuss your medicines    Learn about your test results    Speak to your doctor   If you have compliments or concerns about an experience at your clinic, or if you wish to file a complaint, please contact [facility] Patient Relations at [phone] or email us at [email or web address]         Additional Information About Your Visit        [employer] Information     [employer] gives you secure access to your electronic health record. If you see a primary care provider, you can also send messages to your care team and make appointments. If you have questions, please call your primary care clinic.  If you do not have a primary care provider, please call [phone] and they will assist you.      [employer] is an electronic gateway that provides easy, online access to your medical records. With [employer], you can request a clinic appointment, read your test results, renew a prescription or communicate with your care team.     To access your existing account, please contact your [facility] or call [phone]  "for assistance.        Care EveryWhere ID     This is your Care EveryWhere ID. This could be used by other organizations to access your [facility] medical records  [id number]        Your Vitals Were     Pulse Height BMI (Body Mass Index)             64 1.664 m (5' 5.5\") 23.93 kg/m2          Blood Pressure from Last 3 Encounters:   11/17/17 (!) 143/95   11/06/17 (!) 137/95   11/01/17 136/88    Weight from Last 3 Encounters:   11/17/17 66.2 kg (146 lb)   11/06/17 67.4 kg (148 lb 9.6 oz)   10/10/17 64.9 kg (143 lb)              Today, you had the following     No orders found for display       Primary Care Provider Office Phone # Fax #    Humberto Trinidad [phone]       [address] [facility] [address]        Equal Access to Services     ELIEZER VALENTE : Hadii cece drakeo Soalireza, waaxda luqadaha, qaybta kaalmada adeegyada, mariela garduno . So [facility] [phone].    ATENCIÓN: Si nikkyla español, tiene a simmons disposición servicios gratuitos de asistencia lingüística. Llame al [phone].    We comply with applicable federal civil rights laws and [place] laws. We do not discriminate on the basis of race, color, national origin, age, disability, sex, sexual orientation, or gender identity.            Thank you!     Thank you for choosing [facility]  for your care. Our goal is always to provide you with excellent care. Hearing back from our patients is one way we can continue to improve our services. Please take a few minutes to complete the written survey that you may receive in the mail after your visit with us. Thank you!             Your Updated Medication List - Protect others around you: Learn how to safely use, store and throw away your medicines at www.disposemymeds.org.          This list is accurate as of: 11/17/17  3:52 PM.  Always use your most recent med list.                   Brand Name Dispense Instructions for use Diagnosis    " AMOXICILLIN PO           gabapentin 600 MG tablet    NEURONTIN    135 tablet    Take 1.5 tablets (900 mg) by mouth 3 times daily    Cervical spondylosis without myelopathy, DDD (degenerative disc disease), cervical, Chronic bilateral low back pain without sciatica, Chronic neck and back pain, Cervical stenosis of spinal canal       ibuprofen 800 MG tablet    ADVIL/MOTRIN          methocarbamol 500 MG tablet    ROBAXIN    150 tablet    Take 1-2 tablets (500-1,000 mg) by mouth 3 times daily as needed for muscle spasms Start with lower dose and caution sedation    Chronic neck pain, DDD (degenerative disc disease), cervical, Cervical spondylosis without myelopathy, Chronic bilateral low back pain without sciatica, Myofascial pain       topiramate 25 MG tablet    TOPAMAX    120 tablet    Take 25mg at bedtime for 7 days, then take 25mg twice daily for 7 days, then take 25mg in the morning and 50mg (2 tabs) at bedtime for 7 days, then take 50mg twice daily. Drink plenty of water and no alcohol. If side effects, then reduce to last tolerable dosage.    Cervical spondylosis without myelopathy, DDD (degenerative disc disease), cervical, Chronic bilateral low back pain without sciatica, Chronic neck and back pain, Cervical stenosis of spinal canal       * traMADol 50 MG tablet    ULTRAM    90 tablet    May take 1-2 tablets every 6 hours as needed for pain, max of 3 tabs per day. To last 30 days. Reduce as able.  Okay to fill and begin on 11/3/2017    Chronic neck and back pain, Cervical radiculopathy       * traMADol 200 MG ER-tablet    ULTRAM-ER    30 tablet    Take 1 tablet (200 mg) by mouth daily OK to fill and begin on 11/3/2017 to last 30 days    Cervical spondylosis without myelopathy, DDD (degenerative disc disease), cervical, Chronic bilateral low back pain without sciatica       varenicline 0.5 MG X 11 & 1 MG X 42 tablet    CHANTIX STARTING MONTH GIO    53 tablet    Take 0.5 mg tab daily for 3 days, then 0.5 mg tab  twice daily for 4 days, then 1 mg twice daily.    Tobacco abuse       * Notice:  This list has 2 medication(s) that are the same as other medications prescribed for you. Read the directions carefully, and ask your doctor or other care provider to review them with you.

## 2017-11-17 NOTE — PROGRESS NOTES
Neurosurgery Clinic   Date of Visit: 11/17/2017  Referred for: Neck pain  Referring Provider: Melanie Thomas CNP      Dear Ms Thomas,    We were happy to see Truman Suero, a pleasant 57 year old year old male for neck pain.    HPI: As you may recall is nice gentleman has had cervical pain for many years. He works as a , and has had pain in the back of the neck ongoing, and exacerbated by having to keep his head down much of the time for years. In the past he has been treated at [facility], in fact as recently as a year ago, where he was treated with bilateral medial branch blocks from C3 to C7, and with a positive response to that, (please see treatment notes dated 11/15/16) had radiofrequency ablations which gave him many months of very good relief of neck pain.    Since that time he has discovered that [facility] is not in his current insurance network, his neck pain is progressing, the ablations have worn off, and he now has left arm numbness radiating down to the thumb and first finger, some (minimal) left arm pain, mostly trapezius type pain and tingling in his legs. All his fingers except the thumb go numb when he looks down, but he has no clumsiness or difficulty with fine motor. On his exams he has been noted to have intact arm and hand strength, no myelopathy. No loss of bowel or bladder control. In your notes there is reference to an EMG from 2016 at [facility] which indicated he had carpal tunnel syndrome. I been unable to locate that EMG in our system. Recently he had another injection for cervical facet joint, in this case C4/5 and C5/6 right-sided joints on 6/29/17. Pre/post pain scores were 6/3 respectively. Because his symptoms have been persistent he was referred to neurosurgery.  His imaging has been updated.  He had multiple levels of cervical spondylosis, C5/6 especially. And this fit with his symptom of thumb and first finger tingling. However this appeared mostly in the morning which  is more in keeping with carpal tunnel syndrome which he reports he had on an EMG previously. Left was worse than right although the right certainly is symptomatic. I requested repeat EMGs and a return visit.    Otherwise his symptoms were primarily neck pain, he has very little arm pain. Historically his neck pain has responded well to facet injections.    He is a daily smoker.  At last visit we discussed that elective surgical fusion is not offered to people using nicotine products because of high rates of fusion failure.         Current Outpatient Prescriptions:      AMOXICILLIN PO, , Disp: , Rfl:      gabapentin (NEURONTIN) 600 MG tablet, Take 1.5 tablets (900 mg) by mouth 3 times daily, Disp: 135 tablet, Rfl: 3     topiramate (TOPAMAX) 25 MG tablet, Take 25mg at bedtime for 7 days, then take 25mg twice daily for 7 days, then take 25mg in the morning and 50mg (2 tabs) at bedtime for 7 days, then take 50mg twice daily. Drink plenty of water and no alcohol. If side effects, then reduce to last tolerable dosage., Disp: 120 tablet, Rfl: 0     traMADol (ULTRAM) 50 MG tablet, May take 1-2 tablets every 6 hours as needed for pain, max of 3 tabs per day. To last 30 days. Reduce as able.  Okay to fill and begin on 11/3/2017, Disp: 90 tablet, Rfl: 0     traMADol (ULTRAM-ER) 200 MG ER-tablet, Take 1 tablet (200 mg) by mouth daily OK to fill and begin on 11/3/2017 to last 30 days, Disp: 30 tablet, Rfl: 0     methocarbamol (ROBAXIN) 500 MG tablet, Take 1-2 tablets (500-1,000 mg) by mouth 3 times daily as needed for muscle spasms Start with lower dose and caution sedation, Disp: 150 tablet, Rfl: 1     varenicline (CHANTIX STARTING MONTH PAK) 0.5 MG X 11 & 1 MG X 42 tablet, Take 0.5 mg tab daily for 3 days, then 0.5 mg tab twice daily for 4 days, then 1 mg twice daily., Disp: 53 tablet, Rfl: 0     ibuprofen (ADVIL,MOTRIN) 800 MG tablet, , Disp: , Rfl: 0    No Known Allergies    Problem list, past medical history, family,  "social: is reviewed in Epic.   OBJECTIVE:   BP (!) 143/95  Pulse 64  Ht 1.664 m (5' 5.5\")  Wt 66.2 kg (146 lb)  BMI 23.93 kg/m2    Imaging and other studies:  These are the pertinent radiologist's findings from:  MRI cervical 9/5/17 WO @ [facility] .   C3-C4: Broad-based posterior osteophyte formation is present causing  some mild bilateral neural foraminal stenosis, but no central canal  stenosis.     C4-C5: Broad-based posterior osteophyte formation and mild facet  hypertrophy is present causing some mild to moderate central canal  stenosis, mild cord deformity, and mild-to-moderate bilateral neural  foraminal stenosis.     C5-C6: Broad-based posterior osteophyte formation and disc bulging is  present along with some facet hypertrophy. There is moderate central  canal stenosis and moderate bilateral neural foraminal stenosis.  Findings have progressed since the prior exam.     C6-C7: Broad-based disc bulging is present along with some minimal  posterior osteophyte formation. There is borderline to mild central  canal stenosis, but no neural foraminal stenosis    EMGs 11/7/17  Abnormal study. There is electrodiagnostic evidence of 1) Mild left, and moderate right, median neuropathies at the wrist, as seen in carpal tunnel syndrome, and 2) Bilateral C6 and possibly C7 radiculopathies    See the full report in EPIC/Media tab.  I personally reviewed the images with the patient.      Exam:  Pertinent positives:  Negative Phalen's and Tinel's at the wrists. Question possible L'Hermitte's, all fingers go numb with flexion of the neck.  *   Well developed, well nourished male found seated comfortably in exam room chair.  He is able to sit and rise independently.  He is unaccompanied.    *  Mental Status  A&O X3.  Bright, alert, affable, interactive. Language fluid, fund of knowledge intact. Good historian.  Mood and affect congruent and WNL.  *  Cranial Nerves  II: Able to read printed forms, VF full to gross " confrontation.  III: IV, VI:  PERRLA, EOMI, No nystagmus, no ptosis.  V: Sensation intact in bilateral V1, V2, and V3. Jaw clench symmetric.    VII:  Intact to voice bilaterally.  IX:  Pushes tongue against bilateral cheeks.  X:  Palate elevates, uvula midline, phonation intact.  XI: Elevates shoulders, head turn intact.  XII: Tongue midline. No fasciculations.  *  Cervical Spine:  DTR's at Biceps, Triceps, and Brachioradialis 2/4 and symmetric.  Sensation intact.    No Garrido's.    Muscle bulk and tone WNL.  Upper Extremity Strength.              RIGHT                LEFT     Deltoid              5/5                   5/5       Biceps              5/5                   5/5        Triceps              5/5                   5/5       Wrist Extensor              5/5                   5/5                     5/5                    5/5       Interossei              5/5                   5/5       EPL              5/5                    5/5       Pinch              5/5                  5/5          *  Lumbar Spine:    Able to heel and toe stand/walk.   DTR's Patellar and Achilles 1/4 and symmetric.   No fasciculations.  Muscle bulk and tone WNL.  No Clonus.  Sensation intact.    Lower Extremity Strength                   RIGHT                   LEFT     Iliopsoas                    5/5                      5/5       Quad                    5/5                        5/5       Hamstring                      5/5                        5/5         Gastrocs                    5/5                        5/5       Tib. Anterior                     5/5                        5/5       EHL                      5/5                        5/5       Babinski                 Down                                      Down                   *  Structural Exam  Inspection of the spine reveals no scoliosis, exaggerated kyphosis or lordosis.Head is a little forward of the pelvis in the sagittal plane.    Cervical spine ROM , quite  good actually. No spasming. No tenderness to palpation.  No Spurling's, all fingers both hands tingle with full flexion of the neck. Thumbs are spared.  Lumbar ROM full.   Gait narrow, smooth, no scissoring. No antalgia.     ASSESSMENT/PLAN:  1. Cervical radicular pain    2. Degenerative disc disease, cervical    3. Median nerve neuropathy, unspecified laterality      He has multiple levels of rather pronounced advanced degenerative disease in the cervical spine, more advanced than I would've expected for age.    The symptom for which he presented is neck pain. He has known arthropathy at multiple levels and his pain was reasonably well-controlled for several months with facet rhizotomies from C3 to C7.  More recently he had partial neck pain relief with injections of the facets at C3-4 and C 4-5.    The EMGs reveal bilateral cervical radiculopathy in the C6 and possible C7 distributions, as well as bilateral median neuropathy worse on the right than the left. His hand symptoms bother him very little and he can live with it.    Ultimately then if surgery were to be considered fusion would have to be the procedure of choice; artificial disks would continue to allow motion, thus doing nothing to mitigate his primary presenting complaint of neck pain. Since he is a smoker we again discussed that he must stop nicotine in order for fusion surgery to be considered    Additionally we discussed he has multiple levels of cervical spondylosis and fusion at only 1 or 2 levels may only partially address his neck pain. He is aware of this and aware that the surgeon will discuss how many levels he or she would propose to fuse for axial neck pain. He is always a candidate for repeat facet rhizotomy    All of the fingers except the thumb tingle with flexion of the neck, all of the fingers improved in sensation with extension of the neck. There is a trace of listhesis present at C2-3 which moves on dynamic views, but he is not  particularly tight at that level on MRI . He has tingling in the legs after a long day at work, but I find no long tract signs on his exam, so I'm not putting much stock in that at the moment.    I've referred him to a neurosurgeon. He like to have time to get a quit smoking plan in place. He thinks he'll probably want to see a surgeon around mid summer. That seems to work pretty well for a time line.  I answered his questions and made sure he has our contact information in case anything else comes up. It's been a pleasure participating in the care of this nice patient. We thank you for your confidence in the [facility], Department of Neurosurgery.      Best Regards,    Madeline Gordon PA-C  [facility]  Department of Neurosurgery  Phone: [phone]  Fax: [phone]        This note was generated using voice recognition software. While edited for content some inaccurate phrasing may be found.

## 2017-11-27 ENCOUNTER — OFFICE VISIT (OUTPATIENT)
Dept: PALLIATIVE MEDICINE | Facility: CLINIC | Age: 57
End: 2017-11-27
Payer: COMMERCIAL

## 2017-11-27 DIAGNOSIS — G89.4 CHRONIC PAIN SYNDROME: Primary | ICD-10-CM

## 2017-11-27 DIAGNOSIS — M79.18 MYOFASCIAL PAIN: ICD-10-CM

## 2017-11-27 DIAGNOSIS — M54.2 CHRONIC NECK AND BACK PAIN: ICD-10-CM

## 2017-11-27 DIAGNOSIS — G89.29 CHRONIC NECK AND BACK PAIN: ICD-10-CM

## 2017-11-27 DIAGNOSIS — M50.30 DDD (DEGENERATIVE DISC DISEASE), CERVICAL: ICD-10-CM

## 2017-11-27 DIAGNOSIS — M54.9 CHRONIC NECK AND BACK PAIN: ICD-10-CM

## 2017-11-27 PROCEDURE — 96150 HC HEALTH & BEHAVIOR ASSESS INITIAL, EA 15MIN: CPT | Performed by: PSYCHOLOGIST

## 2017-11-28 ENCOUNTER — MYC REFILL (OUTPATIENT)
Dept: PALLIATIVE MEDICINE | Facility: CLINIC | Age: 57
End: 2017-11-28

## 2017-11-28 DIAGNOSIS — M54.50 CHRONIC BILATERAL LOW BACK PAIN WITHOUT SCIATICA: ICD-10-CM

## 2017-11-28 DIAGNOSIS — G89.29 CHRONIC NECK AND BACK PAIN: ICD-10-CM

## 2017-11-28 DIAGNOSIS — M47.812 CERVICAL SPONDYLOSIS WITHOUT MYELOPATHY: ICD-10-CM

## 2017-11-28 DIAGNOSIS — M54.12 CERVICAL RADICULOPATHY: ICD-10-CM

## 2017-11-28 DIAGNOSIS — M50.30 DDD (DEGENERATIVE DISC DISEASE), CERVICAL: ICD-10-CM

## 2017-11-28 DIAGNOSIS — M54.2 CHRONIC NECK AND BACK PAIN: ICD-10-CM

## 2017-11-28 DIAGNOSIS — G89.29 CHRONIC BILATERAL LOW BACK PAIN WITHOUT SCIATICA: ICD-10-CM

## 2017-11-28 DIAGNOSIS — M54.9 CHRONIC NECK AND BACK PAIN: ICD-10-CM

## 2017-11-28 NOTE — PROGRESS NOTES
[facility]     BEHAVIORAL DIAGNOSTIC ASSESSMENT      IDENTIFYING INFORMATION:IDENTIFYING INFORMATION: The patient is a 57 year old year old, , CaucasianCaucasian,  Individual, who was seen today for a behavioral assessment as part of a dual evaluation process at the [facility].              CONSULTING PAIN PHYSICIAN:  Melanie STEVENS CNP       REFERRAL SOURCE:  ALEXEY Bro       PRESENTING CONCERNS OF REFERRING PROVIDER:  Chronic pain    GOES BY:  Palmer    PRESENTING CONCERNS OF PATIENT:  Chronic pain, neck    CURRENT PAIN SYMPTOMS:  Please see  Melanie STEVENS CNP notes for more details of his pain symptoms.       DURATION:   3 years       DIAGNOSES:  Chronic pain syndrome, degenerative disc disease, cervical, chronic neck and back pain, myofascial pain       PRECIPITATING EVENT:   Patient believes neck pain as a result of an accumulation of work done and a kitchen environment for employment       LOCATION:   Neck       PAIN PATTERN:  Constant        PAIN PROGRESSION:  Pills with the day       PAIN LEVEL : Current: 8.  Range: 5 to 10.             PAIN QUALITY: aching, burning, squeezing and stabbing      FACTORS THAT AFFECT PAIN:       Increase pain:  looking down, turning head side to side, standing or sitting for extended periods of time       Decrease  pain:  Laying down, meditation, ice        PREVIOUS TREATMENT AT A PAIN CLINIC:  No      PT'S BELIEFS ABOUT THE CAUSE OF PAIN:  Degenerative disks      CURRENT MEDICATIONS:    Current Outpatient Prescriptions   Medication Sig Dispense Refill     gabapentin (NEURONTIN) 600 MG tablet Take 1.5 tablets (900 mg) by mouth 3 times daily 135 tablet 3     topiramate (TOPAMAX) 25 MG tablet Take 25mg at  bedtime for 7 days, then take 25mg twice daily for 7 days, then take 25mg in the morning and 50mg (2 tabs) at bedtime for 7 days, then take 50mg twice daily. Drink plenty of water and no alcohol. If side effects, then reduce to last tolerable dosage. 120 tablet 0     traMADol (ULTRAM) 50 MG tablet May take 1-2 tablets every 6 hours as needed for pain, max of 3 tabs per day. To last 30 days. Reduce as able.  Okay to fill and begin on 11/3/2017 90 tablet 0     traMADol (ULTRAM-ER) 200 MG ER-tablet Take 1 tablet (200 mg) by mouth daily OK to fill and begin on 11/3/2017 to last 30 days 30 tablet 0     methocarbamol (ROBAXIN) 500 MG tablet Take 1-2 tablets (500-1,000 mg) by mouth 3 times daily as needed for muscle spasms Start with lower dose and caution sedation 150 tablet 1     varenicline (CHANTIX STARTING MONTH PAK) 0.5 MG X 11 & 1 MG X 42 tablet Take 0.5 mg tab daily for 3 days, then 0.5 mg tab twice daily for 4 days, then 1 mg twice daily. 53 tablet 0     ibuprofen (ADVIL,MOTRIN) 800 MG tablet   0   .      ATTITUDES TOWARDS USING OPIOIDS:   Diseases and necessary part of his treatment       EMOTIONAL DISTRESS FROM PAIN:  High        LEVEL OF SELF-MANAGEMENT:  Moderate      METHODS OF COPING  work, sleep, attentional focus shifting      VIGILANCE TO PAIN:  Moderate.       LEVEL OF TENSION:  High       GUARDING RESPONSE:  Some      HEADACHES:  No      BRUXISM:  No      ABILITY TO RELAX: Low moderate      ABILITY TO PACE ACTIVITY:  Low moderate      OBEYS LIMITATIONS:  Yes    PSYCHOLOGY SYMPTOMS:     DEPRESSION: Denied          Total PHQ-9 = 6 (5-9 mild, 10-14 mod, 15-19 mod sev,          >20 Sev). Note: The full PHQ-9 has been scanned - see media tab.      ANXIETY: Denied          Total DONOVAN-7= 10{ (5-9 mild, 10-14 mod, 15-21 severe)          Note the full DONOVAN-7 has been scanned-see media tab    SLEEP        Previous sleep study or treatment::  No        Difficulty falling sleep:  No        Problems with  mid-awakenings:  No       Sleep phase concerns:  No       Non-restorative sleep:  No          Daytime Sleepiness:    No           Daytime Fatigue:  Some       Sleep affected by pain:  No           Anger/Irritability  no       Resentment or blame regarding cause of condition or treatment:  No       Resentment regarding other major life issues:   No            PROBLEMS AND IMPAIRMENTS DUE TO PAIN:        Walking:   Yes        Standing:    Yes      Sitting: Yes      Family: Relationships:  Feels as though he's been unable to do as much actively with his family      Occupational:  None though work as a main source of pain aggravation      Overall Quality of Life:  Good      Stress Level  moderate               Sources of stress:   Pain                 STRENGTHS INCLUDE:    Thoughtful, committed, willing to work hard        .    MENTAL HEALTH HISTORY:            Depression or Anxiety:   Denies      ADD:    Denies        OCD:   Denies       Bipolar Disorder:  Denies         Schizophrenia:   Denies        Post Traumatic Stress Disorder   denies             Past Mental Health Treatment: :  Denies      Current psychotropic medications:  Denies      Currently seeing psychiatrist or therapist:  Denies        MINI MENTAL STATUS EXAM  Assessment: Current Emotional / Mental Status    Appearance:   Appropriate   Eye Contact:   Good   Psychomotor Behavior: Normal   Attitude:   Cooperative   Orientation:   All  Speech Rate              Normal   Speech Volume:                     Normal   Mood:    Normal  Affect:    Appropriate    Thought Content:  Clear   Thought Form:  Coherent  Logical   Insight:               Fair         HEALTH BEHAVIORS:        Alcohol Use:   Reports no alcohol use for the past 9 years. Reports prior history of heavy alcohol use           Recreational drugs:   None, reports no past history of problems        Tobacco Use:  Yes, cigarettes, daily, 5-10 per day, also on nicotine patch      Caffeine Use  2 cups of  coffee per day    CAGE/ AID QUESTIONNAIRE:             1. Have you ever felt you should cut down on your drinking of drug use?    No            2. Have people annoyed you by criticizing your drinking or drug use  no            3. Have you ever felt bad or guilty about your drinking of drug use  no            4. Have you ever had a drink of used drugs the first thing in the morning to steady    your nerves or get rid of a hangover?  No                Total Score:  0  Patient appears to have scored his responses based on the fact that he has been without alcohol for 9 years.      PREVIOUS TREATMENT FOR:                Alcohol:  No             Illegal Drugs:   No          Prescription Drugs: No          Other addictive behaviors: No      CURRENT MEDICAL CONCERNS:   Review medical record        Past medical problems:   Past Medical History:   Diagnosis Date     Neck pain     MRI positive on cervical vertebrea.           History of Head Trauma:   No        Evidence of Cognitive Impairment:   No     OCCUPATIONAL AND EDUCATIONAL HISTORY:       Current Employment:   Unemployed as a         Job Satisfaction:    Good           Education Level:   High school        History:    None       Disability status:   None         SOCIAL HISTORY:       Relationship Status:          Relationship Satisfaction:   31 years       Length of time with spouse/partner:   Positive         Current living situation:   Lives with spouse, 2 children       Children:   4       Social Support:   Good    FAMILY HISTORY:       Family Status: The patient was raised in  [place], [place]       Mother: Living:  Yes        Father: Living:  Yes        Siblings:  One sister       Family History of Behavioral Health Problems:  No  Who:  Not applicable       Family History of Substance Abuse:  No Who:  Not applicable       General Upbringing and Family Relationships:  Okay       History of Abuse/Trauma/Neglect:  Denies When  not applicable   Type:  Not applicable           PATIENT'S GOALS:   Patient is curious as to whether or not self hypnosis could be helpful for him with his pain management      SUMMARY AND IMPRESSIONS:                                   1.  PAIN:   This patient was referred for consultation by Melanie STEVENS CNP. This patient was referred to pain services by Tiffanie BLACKBURN. Patient's primary complaint is pain in his cervical region on the posterior side. He has had pain symptoms for approximately 3 years. To this point they have begun to affect his social world, especially interactions with family members. He is giving consideration to whether or not he needs to change his employment given his pain and the consistency with which she needs to bend his neck as a part of his employment. T the patient reports his pain score is 8 on a scale of 0-10 patient reports that his average pain ranges between 5 and 10 on a scale of 0-10. Patient has not had pain services previously. The patient has had multiple treatment interventions previously and is considering whether or not he should have surgery. At this point he has been seen for consultation about the provider has informed him that he has to be tobacco free for 90 days prior to the surgery. At this point he has been unsuccessful with that area of concern.    Patient is able to perform all aspects of his daily functioning only had a reduced level.                 2.  MENTAL HEALTH:  Patient denies any concerns with either anxiety or depression. He has no history of participation in any form of mental health services. He denies current suicidal ideation or past history of attempts. He has never developed a plan. Today the patient endorses an item on the pH Q9 regarding thoughts of being better off dead. Patient reports his answer and this response was related to the difficulties created by his pain and his worries about the future going forward.                 3.  SLEEP:  Patient  reports he sleeps well                 4.  SUBSTANCES:  Patient denies any current use of substances. The patient admits to currently being a tobacco and caffeine consumer. Presently he smokes approximately 5-10 cigarettes per day additionally he is on a nicotine patch via a research project he is participating in for smokers through the [facility]. To this point he has been unsuccessful in reducing his nicotine intake. The patient reports he wants to be able to quit smoking but he is also highly ambivalent that he would not be able to cope without smoking. Patient was formally an alcohol problem drinker and has been abstinent from any alcohol use for over 9 years. The patient denies any past history of illicit drug use or any current concerns.                 5.  PSYCHOSOCIAL:   Patient is  for 31 years and has 4 children. He is employed full-time. He was raised in [facility] and has 1 sibling. Both of his parents are living and live in the area he was raised.            MOTIVATIONAL LEVEL FOR TREATMENT:  Patient's motivation for treatment is to consider self hypnosis as a means of managing his pain and his tobacco use. I have informed him clearly that I am not trained to help with tobacco cessation via hypnosis though my substance abuse credentials and background might be of helpful with other forms of planning if he wanted to incorporate an aspect of that as a part of his pain treatment. Today we discussed the pros and cons associated with hypnosis and some of the stereotypes to clarify what clinical hypnosis actually was about. The patient was given a handout to review on self hypnosis with the plan to remained in approximately 1 month's time. At that time we'll introduce basic relaxation and guided imagery skills and evaluate the patient's response and appropriateness for self hypnosis. Patient is strongly encouraged to develop a more definitive plan around his tobacco cessation.           APPROPRIATENESS FOR PAIN MANAGEMENT PROGRAM:  Fair          PROGNOSIS:  Fair        DSM5    Diagnoses: No DSM-V diagnoses. The patient's reporting for anxiety warrants monitoring.    PLAN:  The importance of pain treatment planning was briefly discussed with the patient.                Recommend Psychological Therapy:   Yes    Frequency:  Every other week    The patient participated in a health and behavioral evaluation (billed 67553).  The limits of confidentiality were detailed.   Time spent with Patient: One hour in direct patient contact for a psychological  pain evaluation.        Kentrell Barker MA, LP, LADC ...............  November 28, 2017  Behavioral Pain Specialist

## 2017-11-28 NOTE — TELEPHONE ENCOUNTER
Routed to the nursing pool and to the MA pool to process refill(s).    Estelita Wetzel, RN-BSN  [facility]

## 2017-11-28 NOTE — TELEPHONE ENCOUNTER
Message from [facility]:  Original authorizing provider: RODNEY Vargas CNP would like a refill of the following medications:  traMADol (ULTRAM) 50 MG tablet [RODNEY Vargas CNP]  traMADol (ULTRAM-ER) 200 MG ER-tablet [RODNEY Vargas CNP]    Preferred pharmacy: [facility] [address] [place] [address]    Comment:  Not out but wanted to start the process. I have 8 of the 200 ER left and 32 of 50mg left. Thank-you Palmer Suero

## 2017-11-29 ENCOUNTER — MYC MEDICAL ADVICE (OUTPATIENT)
Dept: PALLIATIVE MEDICINE | Facility: CLINIC | Age: 57
End: 2017-11-29

## 2017-11-29 RX ORDER — TRAMADOL HYDROCHLORIDE 200 MG/1
200 TABLET, EXTENDED RELEASE ORAL DAILY
Qty: 30 TABLET | Refills: 0 | Status: SHIPPED | OUTPATIENT
Start: 2017-11-29 | End: 2017-12-27

## 2017-11-29 RX ORDER — TRAMADOL HYDROCHLORIDE 50 MG/1
TABLET ORAL
Qty: 90 TABLET | Refills: 0 | Status: SHIPPED | OUTPATIENT
Start: 2017-11-29 | End: 2017-12-27

## 2017-11-29 NOTE — TELEPHONE ENCOUNTER
The signed tramadol Nitish and tramadol 50mg script was faxed to [facility].    Estelita Wetzel RN-BSN  [facility]

## 2017-11-29 NOTE — TELEPHONE ENCOUNTER
Signed Prescriptions:                        Disp   Refills    traMADol (ULTRAM) 50 MG tablet             90 tab*0        Sig: May take 1-2 tablets every 6 hours as needed for           pain, max of 3 tabs per day. To last 30 days.           Reduce as able.  Okay to fill on/after 12/1/17           and begin on 12/3/2017.  Authorizing Provider: MELANIE BENSON    traMADol (ULTRAM-ER) 200 MG ER-tablet      30 tab*0        Sig: Take 1 tablet (200 mg) by mouth daily OK to fill           on/after 12/1/17and begin on 12/3/2017 to last 30           days  Authorizing Provider: MELANIE BENSON    Signed script placed in touchdown bin at [facility].    Melanie Benson APRN CNP FNP RN  [facility]

## 2017-11-29 NOTE — TELEPHONE ENCOUNTER
Received call from patient requesting refill(s) of traMADol (ULTRAM) 50 MG tablet            traMADol (ULTRAM-ER) 200 MG ER-tablet     Last picked up from pharmacy on : both on 11/03/17    Pt last seen by prescribing provider on 10/02/17  Next appt scheduled for 01/08/18    Last urine drug screen date 02/15/17- note added to next appt.  Current opioid agreement on file (completed within the last year) Yes Date of opioid agreement: 05/17/17    Processing (pick one and delete the others):      Fax to [facility] pharmacy       Will route to nursing pool for review and preparation of prescription(s).

## 2017-11-29 NOTE — TELEPHONE ENCOUNTER
Medication refill information reviewed.     Last due for both meds:  Okay to fill and begin on 11/3/2017    Due date:  12/3/17    Weaning instructions:  N/A    Prescriptions prepped for review.     Estelita Wetzel RN-BSN  [facility]

## 2017-12-11 ENCOUNTER — MYC MEDICAL ADVICE (OUTPATIENT)
Dept: PALLIATIVE MEDICINE | Facility: CLINIC | Age: 57
End: 2017-12-11

## 2017-12-11 NOTE — TELEPHONE ENCOUNTER
"My chart message from pt:    Curiosity question. October 13 Dr. Navarro  prescribed the methocarbamol. The dosage is 1-2 tabs 3 times daily. I use as needed. The prescription was 150 tablets and had 1 refill. I had 8 left so called in prescription to auto line and said would refill. When went to [facility] pharmacy to fill they said can't be filled until after December 16. Guess I don't under stand the math.  just said \"We can't fill it\". Just wondering if there are restrictions on this medicine as would use more than that in 2 months if used by prescribed amount     Thanks     Palmer Laughlin    I would go back to your pharmacy and ask to speak with the pharmacist directly as your math is correct and we have nothing to do with the dispensing of medications.     Jessika Flannery RN, [facility]  Pain Clinic Care Coordinator   "

## 2017-12-12 ENCOUNTER — MYC MEDICAL ADVICE (OUTPATIENT)
Dept: PALLIATIVE MEDICINE | Facility: CLINIC | Age: 57
End: 2017-12-12

## 2017-12-12 NOTE — TELEPHONE ENCOUNTER
See yesterday's telephone encounter for more information.    Estelita Wetzel RN-BSN  [facility]

## 2017-12-12 NOTE — TELEPHONE ENCOUNTER
Per patient myChart message:    From: Truman Suero      Created: 12/12/2017 12:40 PM            Jessika- Thanks  for follow up. Took your advice and spoke to pharmacist. They somehow entered original one wrong and had it at 75 days instead of 25 days    Palmer

## 2017-12-18 ENCOUNTER — OFFICE VISIT (OUTPATIENT)
Dept: PALLIATIVE MEDICINE | Facility: CLINIC | Age: 57
End: 2017-12-18
Payer: COMMERCIAL

## 2017-12-18 DIAGNOSIS — G89.4 CHRONIC PAIN SYNDROME: ICD-10-CM

## 2017-12-18 DIAGNOSIS — M79.18 MYOFASCIAL PAIN: ICD-10-CM

## 2017-12-18 DIAGNOSIS — M50.30 DDD (DEGENERATIVE DISC DISEASE), CERVICAL: Primary | ICD-10-CM

## 2017-12-18 PROCEDURE — 96152 HC HEALTH AND BEHAVIOR INTERVENTION, INDIVIDUAL, EACH 15 MINUTES: CPT | Performed by: PSYCHOLOGIST

## 2017-12-18 NOTE — MR AVS SNAPSHOT
After Visit Summary   12/18/2017    Truman Suero    MRN: [id number]           Patient Information     Date Of Birth          1960        Visit Information        Provider Department      12/18/2017 4:30 PM Kentrell Castañeda LP [facility]        Today's Diagnoses     DDD (degenerative disc disease), cervical    -  1    Myofascial pain        Chronic pain syndrome           Follow-ups after your visit        Your next 10 appointments already scheduled     Dec 19, 2017  1:00 PM CST   MyChart Short with Humberto Trinidad DO   [facility]    [address] [address]   [phone]            Jan 08, 2018  3:00 PM CST   Return Visit with RODNEY Vargas CNP   [facility]    [address] [place] [id number] [phone]            Jan 08, 2018  3:30 PM CST   Return Visit with Kentrell Castañeda LP   [facility] ([facility])    [id number] [place] [id number] [phone]            Jan 11, 2018  8:45 AM CST   (Arrive by 8:30 AM)   New Patient Visit with Jud Harkins MD   [facility] ([facility])    [address] [place] [address] [phone]              Who to contact     If you have questions or need follow up information about today's clinic visit or your schedule please contact [facility] directly at [phone].  Normal or non-critical lab and imaging results will be communicated to you by MyChart, letter or phone within 4 business days after the clinic has received the results. If you do not hear from us within 7 days, please contact the clinic through MyChart or phone. If you have a critical or abnormal lab result, we will notify you by phone as soon as possible.  Submit refill requests  through [employer] or call your pharmacy and they will forward the refill request to us. Please allow 3 business days for your refill to be completed.          Additional Information About Your Visit        [employer] Information     [employer] gives you secure access to your electronic health record. If you see a primary care provider, you can also send messages to your care team and make appointments. If you have questions, please call your primary care clinic.  If you do not have a primary care provider, please call [phone] and they will assist you.        Care EveryWhere ID     This is your Care EveryWhere ID. This could be used by other organizations to access your [place] medical records  [id number]         Blood Pressure from Last 3 Encounters:   11/17/17 (!) 143/95   11/06/17 (!) 137/95   11/01/17 136/88    Weight from Last 3 Encounters:   11/17/17 66.2 kg (146 lb)   11/06/17 67.4 kg (148 lb 9.6 oz)   10/10/17 64.9 kg (143 lb)              We Performed the Following     HEALTH & BEHAVIOR ASSESS, INITIAL, EA 15MIN PHD        Primary Care Provider Office Phone # Fax #    Humberto Lucas AmosDO [place] [phone]       [address] [place] [address]        Equal Access to Services     ELIEZER VALENTE : Hadii cece ku hadasho Soomaali, waaxda luqadaha, qaybta kaalmada adeegyada, waxay michelle carter. So [facility] [phone].    ATENCIÓN: Si habla español, tiene a simmons disposición servicios gratuitos de asistencia lingüística. Llame al [phone].    We comply with applicable federal civil rights laws and [place] laws. We do not discriminate on the basis of race, color, national origin, age, disability, sex, sexual orientation, or gender identity.            Thank you!     Thank you for choosing [facility]  for your care. Our goal is always to provide you with excellent care. Hearing back from our patients is one way we can continue to improve our services. Please  take a few minutes to complete the written survey that you may receive in the mail after your visit with us. Thank you!             Your Updated Medication List - Protect others around you: Learn how to safely use, store and throw away your medicines at www.disposemymeds.org.          This list is accurate as of: 12/18/17  6:02 PM.  Always use your most recent med list.                   Brand Name Dispense Instructions for use Diagnosis    gabapentin 600 MG tablet    NEURONTIN    135 tablet    Take 1.5 tablets (900 mg) by mouth 3 times daily    Cervical spondylosis without myelopathy, DDD (degenerative disc disease), cervical, Chronic bilateral low back pain without sciatica, Chronic neck and back pain, Cervical stenosis of spinal canal       ibuprofen 800 MG tablet    ADVIL/MOTRIN          methocarbamol 500 MG tablet    ROBAXIN    150 tablet    Take 1-2 tablets (500-1,000 mg) by mouth 3 times daily as needed for muscle spasms Start with lower dose and caution sedation    Chronic neck pain, DDD (degenerative disc disease), cervical, Cervical spondylosis without myelopathy, Chronic bilateral low back pain without sciatica, Myofascial pain       topiramate 25 MG tablet    TOPAMAX    120 tablet    Take 25mg at bedtime for 7 days, then take 25mg twice daily for 7 days, then take 25mg in the morning and 50mg (2 tabs) at bedtime for 7 days, then take 50mg twice daily. Drink plenty of water and no alcohol. If side effects, then reduce to last tolerable dosage.    Cervical spondylosis without myelopathy, DDD (degenerative disc disease), cervical, Chronic bilateral low back pain without sciatica, Chronic neck and back pain, Cervical stenosis of spinal canal       * traMADol 50 MG tablet    ULTRAM    90 tablet    May take 1-2 tablets every 6 hours as needed for pain, max of 3 tabs per day. To last 30 days. Reduce as able.  Okay to fill on/after 12/1/17 and begin on 12/3/2017.    Chronic neck and back pain, Cervical  radiculopathy       * traMADol 200 MG ER-tablet    ULTRAM-ER    30 tablet    Take 1 tablet (200 mg) by mouth daily OK to fill on/after 12/1/17and begin on 12/3/2017 to last 30 days    Cervical spondylosis without myelopathy, DDD (degenerative disc disease), cervical, Chronic bilateral low back pain without sciatica       varenicline 0.5 MG X 11 & 1 MG X 42 tablet    CHANTIX STARTING MONTH GIO    53 tablet    Take 0.5 mg tab daily for 3 days, then 0.5 mg tab twice daily for 4 days, then 1 mg twice daily.    Tobacco abuse       * Notice:  This list has 2 medication(s) that are the same as other medications prescribed for you. Read the directions carefully, and ask your doctor or other care provider to review them with you.

## 2017-12-18 NOTE — PROGRESS NOTES
[facility]   [facility]  Behavioral Medicine Visit    Patient Name: Truman Suero    YOB: 1960   Medical Record Number: [id number]  Date: 12/18/2017                SUBJECTIVE: Today the focus of our session was on establishing and practicing tools for self hypnosis. The patient had a guided imagery exercise attending to his persistent nagging pains in his neck, his occasional sharp sensation in the front throat area and his muscle spasms on the right and left side. The patient also described nagging pain in his back and hip area.     The patient was able to be calmed quite relaxed and respond to suggestions associated with being in a weightless environment and having no distress on his body as well as being able to envision a  passing through his vertebra helping them remain aligned. The patient reports his pain level went from a level of 6 to a pain level of 0. Patient appeared enthused by his positive response and motivated to continue to work further on it. He agrees to return to his handout on self hypnosis and begin to do the assigned work around writing an initial set of prescriptions for his own self-guided hypnosis. In addition the patient will bring with him a recording device so that we can create an audio recording of our next visit.          OBJECTIVE: Met with the patient on this date for an elective return appointment. This is our first visit post initial assessment. Today the patient reports the following: His level of pain is mildly worse, his mood is the same, his activity level has remained the same, his stress level has been mildly worse and his sleep has been same. The patient estimates that he is doing self-care 2-3 times per day and that since beginning at [facility] he has seen slight improvement in his overall progress.   Length of Visit: 60 minutes      Assessment: Current  Emotional / Mental Status    Appearance:   Appropriate   Eye Contact:   Fair   Psychomotor Behavior: Normal   Attitude:   Cooperative   Orientation:   All  Speech  Rate / Production:             Normal   Volume:              Normal   Mood:    Normal  Affect:    Appropriate   Thought Content:  Clear   Thought Form:  Coherent  Logical   Insight:    Fair     ASSESSMENT:   Chronic pain syndrome, degenerative disc disease, cervical, myofascial pain    Progress toward goals: fair.    Pain Status: improved    Emotional Status: unchanged              Medication / chemical use concerns:  None    PLAN:   Next Appointment: Truman Suero will schedule a follow-up appointment in 2-3 weeks.  Assignment: See above  Objectives / interventions for next session: See above    Kentrell Castañeda MA LP, [facility]  Behavioral Pain Specialist  [facility]

## 2017-12-19 ENCOUNTER — OFFICE VISIT (OUTPATIENT)
Dept: FAMILY MEDICINE | Facility: CLINIC | Age: 57
End: 2017-12-19
Payer: COMMERCIAL

## 2017-12-19 VITALS
RESPIRATION RATE: 18 BRPM | TEMPERATURE: 98.3 F | BODY MASS INDEX: 23.3 KG/M2 | SYSTOLIC BLOOD PRESSURE: 147 MMHG | OXYGEN SATURATION: 96 % | DIASTOLIC BLOOD PRESSURE: 81 MMHG | HEART RATE: 74 BPM | WEIGHT: 145 LBS | HEIGHT: 66 IN

## 2017-12-19 DIAGNOSIS — R09.81 NASAL CONGESTION: ICD-10-CM

## 2017-12-19 DIAGNOSIS — J01.11 ACUTE RECURRENT FRONTAL SINUSITIS: Primary | ICD-10-CM

## 2017-12-19 DIAGNOSIS — M50.30 DDD (DEGENERATIVE DISC DISEASE), CERVICAL: ICD-10-CM

## 2017-12-19 DIAGNOSIS — Z72.0 TOBACCO ABUSE: ICD-10-CM

## 2017-12-19 DIAGNOSIS — J34.89 SINUS PRESSURE: ICD-10-CM

## 2017-12-19 PROCEDURE — 99214 OFFICE O/P EST MOD 30 MIN: CPT | Performed by: FAMILY MEDICINE

## 2017-12-19 RX ORDER — FLUTICASONE PROPIONATE 50 MCG
1-2 SPRAY, SUSPENSION (ML) NASAL DAILY
Qty: 1 BOTTLE | Refills: 1 | Status: SHIPPED | OUTPATIENT
Start: 2017-12-19 | End: 2018-01-22

## 2017-12-19 ASSESSMENT — ANXIETY QUESTIONNAIRES
7. FEELING AFRAID AS IF SOMETHING AWFUL MIGHT HAPPEN: NOT AT ALL
2. NOT BEING ABLE TO STOP OR CONTROL WORRYING: SEVERAL DAYS
GAD7 TOTAL SCORE: 5
6. BECOMING EASILY ANNOYED OR IRRITABLE: SEVERAL DAYS
3. WORRYING TOO MUCH ABOUT DIFFERENT THINGS: SEVERAL DAYS
5. BEING SO RESTLESS THAT IT IS HARD TO SIT STILL: NOT AT ALL
1. FEELING NERVOUS, ANXIOUS, OR ON EDGE: SEVERAL DAYS

## 2017-12-19 ASSESSMENT — PATIENT HEALTH QUESTIONNAIRE - PHQ9
SUM OF ALL RESPONSES TO PHQ QUESTIONS 1-9: 2
5. POOR APPETITE OR OVEREATING: SEVERAL DAYS

## 2017-12-19 NOTE — MR AVS SNAPSHOT
After Visit Summary   12/19/2017    Truman Suero    MRN: [id number]           Patient Information     Date Of Birth          1960        Visit Information        Provider Department      12/19/2017 1:00 PM Humberto Trinidad DO [facility]        Today's Diagnoses     Acute recurrent frontal sinusitis    -  1    Nasal congestion        Sinus pressure          Care Instructions    Take antibiotics  flonase as advised  Follow up with ENT if not resolving  Humberto Trinidad D.O.            Follow-ups after your visit        Your next 10 appointments already scheduled     Jan 08, 2018  3:00 PM CST   Return Visit with RODNEY Vargas CNP   [facility])    [address]  [address]  [facility] [address] [phone]            Jan 08, 2018  3:30 PM CST   Return Visit with Kentrell Castañeda LP   [facility] ([facility])    [address] [facility] [address] [phone]            Jan 11, 2018  8:45 AM CST   (Arrive by 8:30 AM)   New Patient Visit with Jud Harkins MD   [facility]surgery ([facility])    [address]  3rd [address]  [facility] [address] [phone]              Who to contact     If you have questions or need follow up information about today's clinic visit or your schedule please contact [facility] directly at [phone].  Normal or non-critical lab and imaging results will be communicated to you by MyChart, letter or phone within 4 business days after the clinic has received the results. If you do not hear from us within 7 days, please contact the clinic through MyChart or phone. If you have a critical or abnormal lab result, we will notify you by phone as soon as possible.  Submit refill requests through [employer] or call your pharmacy and they will forward the refill request to us. Please  "allow 3 business days for your refill to be completed.          Additional Information About Your Visit        [employer] Information     [employer] gives you secure access to your electronic health record. If you see a primary care provider, you can also send messages to your care team and make appointments. If you have questions, please call your primary care clinic.  If you do not have a primary care provider, please call [phone] and they will assist you.        Care EveryWhere ID     This is your Care EveryWhere ID. This could be used by other organizations to access your [place] medical records  [id number]        Your Vitals Were     Pulse Temperature Respirations Height Pulse Oximetry BMI (Body Mass Index)    74 98.3  F (36.8  C) (Oral) 18 5' 5.5\" (1.664 m) 96% 23.76 kg/m2       Blood Pressure from Last 3 Encounters:   12/19/17 147/81   11/17/17 (!) 143/95   11/06/17 (!) 137/95    Weight from Last 3 Encounters:   12/19/17 145 lb (65.8 kg)   11/17/17 146 lb (66.2 kg)   11/06/17 148 lb 9.6 oz (67.4 kg)              Today, you had the following     No orders found for display         Today's Medication Changes          These changes are accurate as of: 12/19/17  1:35 PM.  If you have any questions, ask your nurse or doctor.               Start taking these medicines.        Dose/Directions    amoxicillin-clavulanate 875-125 MG per tablet   Commonly known as:  AUGMENTIN   Used for:  Sinus pressure, Acute recurrent frontal sinusitis   Started by:  Humberto Trinidad DO        Dose:  1 tablet   Take 1 tablet by mouth 2 times daily   Quantity:  20 tablet   Refills:  0       fluticasone 50 MCG/ACT spray   Commonly known as:  FLONASE   Used for:  Nasal congestion, Sinus pressure   Started by:  Humberto Trinidad DO        Dose:  1-2 spray   Spray 1-2 sprays into both nostrils daily   Quantity:  1 Bottle   Refills:  1            Where to get your medicines      These medications were sent to [facility] " [address] - [place], [place] - [address]  [address] [place] [address]     Phone:  [phone]     amoxicillin-clavulanate 875-125 MG per tablet    fluticasone 50 MCG/ACT spray                Primary Care Provider Office Phone # Fax #    Humberto Trinidad [phone] [address] [address]        Equal Access to Services     ELIEZER VALENTE : Hadii aad ku hadasho Soomaali, waaxda luqadaha, qaybta kaalmada adeegyada, waxay idiin hayaan adeeg kharaalize laluisa carter. So [facility] [phone].    ATENCIÓN: Si habla español, tiene a simmons disposición servicios gratuitos de asistencia lingüística. [facility] [phone].    We comply with applicable federal civil rights laws and [place] laws. We do not discriminate on the basis of race, color, national origin, age, disability, sex, sexual orientation, or gender identity.            Thank you!     Thank you for choosing [facility]  for your care. Our goal is always to provide you with excellent care. Hearing back from our patients is one way we can continue to improve our services. Please take a few minutes to complete the written survey that you may receive in the mail after your visit with us. Thank you!             Your Updated Medication List - Protect others around you: Learn how to safely use, store and throw away your medicines at www.disposemymeds.org.          This list is accurate as of: 12/19/17  1:35 PM.  Always use your most recent med list.                   Brand Name Dispense Instructions for use Diagnosis    amoxicillin-clavulanate 875-125 MG per tablet    AUGMENTIN    20 tablet    Take 1 tablet by mouth 2 times daily    Sinus pressure, Acute recurrent frontal sinusitis       fluticasone 50 MCG/ACT spray    FLONASE    1 Bottle    Spray 1-2 sprays into both nostrils daily    Nasal congestion, Sinus pressure       gabapentin 600 MG tablet    NEURONTIN    135 tablet    Take 1.5 tablets  (900 mg) by mouth 3 times daily    Cervical spondylosis without myelopathy, DDD (degenerative disc disease), cervical, Chronic bilateral low back pain without sciatica, Chronic neck and back pain, Cervical stenosis of spinal canal       ibuprofen 800 MG tablet    ADVIL/MOTRIN          methocarbamol 500 MG tablet    ROBAXIN    150 tablet    Take 1-2 tablets (500-1,000 mg) by mouth 3 times daily as needed for muscle spasms Start with lower dose and caution sedation    Chronic neck pain, DDD (degenerative disc disease), cervical, Cervical spondylosis without myelopathy, Chronic bilateral low back pain without sciatica, Myofascial pain       topiramate 25 MG tablet    TOPAMAX    120 tablet    Take 25mg at bedtime for 7 days, then take 25mg twice daily for 7 days, then take 25mg in the morning and 50mg (2 tabs) at bedtime for 7 days, then take 50mg twice daily. Drink plenty of water and no alcohol. If side effects, then reduce to last tolerable dosage.    Cervical spondylosis without myelopathy, DDD (degenerative disc disease), cervical, Chronic bilateral low back pain without sciatica, Chronic neck and back pain, Cervical stenosis of spinal canal       * traMADol 50 MG tablet    ULTRAM    90 tablet    May take 1-2 tablets every 6 hours as needed for pain, max of 3 tabs per day. To last 30 days. Reduce as able.  Okay to fill on/after 12/1/17 and begin on 12/3/2017.    Chronic neck and back pain, Cervical radiculopathy       * traMADol 200 MG ER-tablet    ULTRAM-ER    30 tablet    Take 1 tablet (200 mg) by mouth daily OK to fill on/after 12/1/17and begin on 12/3/2017 to last 30 days    Cervical spondylosis without myelopathy, DDD (degenerative disc disease), cervical, Chronic bilateral low back pain without sciatica       varenicline 0.5 MG X 11 & 1 MG X 42 tablet    CHANTIX STARTING MONTH GIO    53 tablet    Take 0.5 mg tab daily for 3 days, then 0.5 mg tab twice daily for 4 days, then 1 mg twice daily.    Tobacco abuse        * Notice:  This list has 2 medication(s) that are the same as other medications prescribed for you. Read the directions carefully, and ask your doctor or other care provider to review them with you.

## 2017-12-19 NOTE — PROGRESS NOTES
SUBJECTIVE:   Truman Suero is a 57 year old male who presents to clinic today for the following health issues:      ENT Symptoms             Symptoms: cc Present Absent Comment   Fever/Chills   x    Fatigue   x    Muscle Aches   x    Eye Irritation   x    Sneezing   x    Nasal Kevin/Drg  x     Sinus Pressure/Pain x      Loss of smell   x    Dental pain  x  A little    Sore Throat   x    Swollen Glands   x    Ear Pain/Fullness   x    Cough  x     Wheeze  x     Chest Pain   x    Shortness of breath  x     Rash   x    Other   x      Symptom duration:  3-4 months   Symptom severity:  moderate   Treatments tried:  sanjay carbone    Contacts:  none        He is with pain clinic and was referred to the [facility] for neurosurgery and doing emg and awaiting results, he has never had surgery, he may need surgery    Still smoking, hyppno therapist for tob cessation  chantix he did not start        Problem list and histories reviewed & adjusted, as indicated.  Additional history: as documented    Patient Active Problem List   Diagnosis     Chronic neck pain     Tobacco abuse     Cervical stenosis of spinal canal     DDD (degenerative disc disease), cervical     Cervical spondylosis without myelopathy     Chronic bilateral low back pain without sciatica     Myofascial pain     History reviewed. No pertinent surgical history.    Social History   Substance Use Topics     Smoking status: Current Every Day Smoker     Smokeless tobacco: Never Used      Comment: Smoking survery given 3/17/17     Alcohol use No     Family History   Problem Relation Age of Onset     Prostate Cancer Father      Breast Cancer Maternal Grandmother      Prostate Cancer Other              Reviewed and updated as needed this visit by clinical staffTobacco  Allergies  Meds  Med Hx  Surg Hx  Fam Hx  Soc Hx      Reviewed and updated as needed this visit by Provider         ROS:  Constitutional, HEENT-see HPI, cardiovascular, pulmonary, GI, ,  "musculoskeletal, neuro, skin, endocrine and psych systems are negative, except as otherwise noted.      OBJECTIVE:   /81  Pulse 74  Temp 98.3  F (36.8  C) (Oral)  Resp 18  Ht 5' 5.5\" (1.664 m)  Wt 145 lb (65.8 kg)  SpO2 96%  BMI 23.76 kg/m2  Body mass index is 23.76 kg/(m^2).  GENERAL: healthy, alert and no distress  EYES: Eyes grossly normal to inspection, PERRL and conjunctivae and sclerae normal  HENT: ear canals and TM's normal, nasal passage is blocked, inflamed inferior turbinate,  and mouth without ulcers or lesions, sinus tenderness  NECK: no adenopathy, no asymmetry, masses, or scars and thyroid normal to palpation  RESP: lungs clear to auscultation - no rales, rhonchi or wheezes  CV: regular rate and rhythm, normal S1 S2, no S3 or S4, no murmur, click or rub, no peripheral edema and peripheral pulses strong  ABDOMEN: soft, nontender, no hepatosplenomegaly, no masses and bowel sounds normal  MS: no gross musculoskeletal defects noted, no edema    Diagnostic Test Results:  No results found for this or any previous visit (from the past 24 hour(s)).    ASSESSMENT/PLAN:       ICD-10-CM    1. Acute recurrent frontal sinusitis J01.11 amoxicillin-clavulanate (AUGMENTIN) 875-125 MG per tablet   2. Nasal congestion R09.81 fluticasone (FLONASE) 50 MCG/ACT spray   3. Sinus pressure J34.89 amoxicillin-clavulanate (AUGMENTIN) 875-125 MG per tablet     fluticasone (FLONASE) 50 MCG/ACT spray   4. Tobacco abuse Z72.0    5. DDD (degenerative disc disease), cervical M50.30    sinus tenderness-treat with abs   Take antibiotics  flonase as advised  Tobacco abuse-cessation advised  Neck pain-per pain clinic and neurosurgery team  Follow up with ENT if not resolving    See Patient Instructions    Humberto Trinidad DO  [facility]"

## 2017-12-19 NOTE — PATIENT INSTRUCTIONS
Take antibiotics  flonase as advised  Follow up with ENT if not resolving  Dantea LEONEL Trinidad

## 2017-12-20 ASSESSMENT — ANXIETY QUESTIONNAIRES: GAD7 TOTAL SCORE: 5

## 2017-12-27 ENCOUNTER — MYC REFILL (OUTPATIENT)
Dept: PALLIATIVE MEDICINE | Facility: CLINIC | Age: 57
End: 2017-12-27

## 2017-12-27 DIAGNOSIS — G89.29 CHRONIC NECK AND BACK PAIN: ICD-10-CM

## 2017-12-27 DIAGNOSIS — M54.2 CHRONIC NECK AND BACK PAIN: ICD-10-CM

## 2017-12-27 DIAGNOSIS — M47.812 CERVICAL SPONDYLOSIS WITHOUT MYELOPATHY: ICD-10-CM

## 2017-12-27 DIAGNOSIS — M54.9 CHRONIC NECK AND BACK PAIN: ICD-10-CM

## 2017-12-27 DIAGNOSIS — G89.29 CHRONIC BILATERAL LOW BACK PAIN WITHOUT SCIATICA: ICD-10-CM

## 2017-12-27 DIAGNOSIS — M54.12 CERVICAL RADICULOPATHY: ICD-10-CM

## 2017-12-27 DIAGNOSIS — M50.30 DDD (DEGENERATIVE DISC DISEASE), CERVICAL: ICD-10-CM

## 2017-12-27 DIAGNOSIS — M54.50 CHRONIC BILATERAL LOW BACK PAIN WITHOUT SCIATICA: ICD-10-CM

## 2017-12-27 NOTE — TELEPHONE ENCOUNTER
Medication refill information reviewed.     Last due for both:   Okay to fill on/after 12/1/17 and begin on 12/3/2017.    Due date:  1/2/18    Weaning instructions:  N/A    Prescriptions prepped for review.     Estelita Wetzel RN-BSN  [facility]

## 2017-12-27 NOTE — TELEPHONE ENCOUNTER
Routed to the nursing pool and to the MA pool to process refill(s) for tramadol 200mg IR and tramadol 50mg.    Mychart sent to pt:  From: Estelita Wetzel RN        Created: 12/27/2017 12:02 PM       Do you need the tramadol 50mg tabs refilled also?    Estelita Wetzel RN-BSN  [facility] Wetzel RN-BSN  [facility]

## 2017-12-27 NOTE — TELEPHONE ENCOUNTER
Received call from patient requesting refill(s) of traMADol (ULTRAM) 50 MG tablet             traMADol (ULTRAM-ER) 200 MG ER-tablet    Last picked up from pharmacy on 12/03/17 for both    Pt last seen by prescribing provider on 11/06/17  Next appt scheduled for 01/08/17    Last urine drug screen date 02/15/17  Current opioid agreement on file (completed within the last year) Yes Date of opioid agreement: 05/02/17    Processing (pick one and delete the others):      Fax to [facility] pharmacy       Will route to nursing pool for review and preparation of prescription(s).

## 2017-12-27 NOTE — TELEPHONE ENCOUNTER
Message from [employer]:  Original authorizing provider: RODNEY Vargas CNP would like a refill of the following medications:  traMADol (ULTRAM-ER) 200 MG ER-tablet [RODNEY Vargas CNP]    Preferred pharmacy: [facility] [address] [place] [address]    Comment:  I have 8 left. Was not sure with the holidays upon us what the schedules are like so planning ahead Palmer

## 2017-12-28 ASSESSMENT — ENCOUNTER SYMPTOMS
BOWEL INCONTINENCE: 0
EXERCISE INTOLERANCE: 0
LEG PAIN: 1
POLYPHAGIA: 0
NAUSEA: 0
TROUBLE SWALLOWING: 1
PARALYSIS: 0
FATIGUE: 1
FEVER: 0
HEMOPTYSIS: 0
TASTE DISTURBANCE: 0
HEARTBURN: 1
DISTURBANCES IN COORDINATION: 0
ABDOMINAL PAIN: 0
DIARRHEA: 0
BACK PAIN: 1
ALTERED TEMPERATURE REGULATION: 0
CHILLS: 1
HOARSE VOICE: 1
PALPITATIONS: 1
SMELL DISTURBANCE: 0
NECK MASS: 0
WHEEZING: 1
LIGHT-HEADEDNESS: 1
POSTURAL DYSPNEA: 0
HEADACHES: 1
COUGH: 1
ARTHRALGIAS: 1
SLEEP DISTURBANCES DUE TO BREATHING: 0
BLOATING: 0
WEIGHT GAIN: 0
WEIGHT LOSS: 1
SINUS CONGESTION: 1
RECTAL PAIN: 0
NIGHT SWEATS: 0
DIZZINESS: 1
POLYDIPSIA: 1
TREMORS: 0
JAUNDICE: 0
NERVOUS/ANXIOUS: 0
MUSCLE WEAKNESS: 1
STIFFNESS: 1
PANIC: 0
NECK PAIN: 1
NUMBNESS: 1
BLOOD IN STOOL: 0
COUGH DISTURBING SLEEP: 0
DYSPNEA ON EXERTION: 1
WEAKNESS: 1
DEPRESSION: 1
SNORES LOUDLY: 0
SYNCOPE: 0
ORTHOPNEA: 0
INCREASED ENERGY: 0
MEMORY LOSS: 1
HYPERTENSION: 1
DECREASED CONCENTRATION: 0
JOINT SWELLING: 1
HYPOTENSION: 0
HALLUCINATIONS: 0
SORE THROAT: 0
LOSS OF CONSCIOUSNESS: 0
VOMITING: 0
SEIZURES: 0
SINUS PAIN: 1
CONSTIPATION: 0
TINGLING: 1
INSOMNIA: 0
DECREASED APPETITE: 0
SHORTNESS OF BREATH: 1
SPEECH CHANGE: 0
SPUTUM PRODUCTION: 1
MYALGIAS: 1

## 2017-12-29 ENCOUNTER — MYC MEDICAL ADVICE (OUTPATIENT)
Dept: FAMILY MEDICINE | Facility: CLINIC | Age: 57
End: 2017-12-29

## 2017-12-29 RX ORDER — TRAMADOL HYDROCHLORIDE 200 MG/1
200 TABLET, EXTENDED RELEASE ORAL DAILY
Qty: 30 TABLET | Refills: 0 | Status: SHIPPED | OUTPATIENT
Start: 2017-12-29 | End: 2018-01-31

## 2017-12-29 RX ORDER — TRAMADOL HYDROCHLORIDE 50 MG/1
TABLET ORAL
Qty: 90 TABLET | Refills: 0 | Status: SHIPPED | OUTPATIENT
Start: 2017-12-29 | End: 2018-01-31

## 2017-12-29 NOTE — TELEPHONE ENCOUNTER
Signed Rx's faxed to [facility] pharmacy. RightFax confirmed. Patient was informed via [employer].

## 2017-12-29 NOTE — TELEPHONE ENCOUNTER
Signed Prescriptions:                        Disp   Refills    traMADol (ULTRAM-ER) 200 MG ER-tablet      30 tab*0        Sig: Take 1 tablet (200 mg) by mouth daily OK to fill           on/after 12/31/17and begin on 1/2/2018 to last 30           days  Authorizing Provider: MELANIE BENSON    traMADol (ULTRAM) 50 MG tablet             90 tab*0        Sig: May take 1-2 tablets every 6 hours as needed for           pain, max of 3 tabs per day. To last 30 days.           Reduce as able.  Okay to fill on/after 12/31/17           and begin on 1/2/2018.  Authorizing Provider: MELANIE BENSON    Signed script placed in touchdown bin at [facility].    Melanie Benson APRN CNP FNP RN  [facility]

## 2017-12-31 ENCOUNTER — MYC MEDICAL ADVICE (OUTPATIENT)
Dept: FAMILY MEDICINE | Facility: CLINIC | Age: 57
End: 2017-12-31

## 2017-12-31 DIAGNOSIS — J34.89 SINUS PRESSURE: Primary | ICD-10-CM

## 2018-01-02 ENCOUNTER — MYC MEDICAL ADVICE (OUTPATIENT)
Dept: FAMILY MEDICINE | Facility: CLINIC | Age: 58
End: 2018-01-02

## 2018-01-08 ENCOUNTER — HOSPITAL ENCOUNTER (OUTPATIENT)
Facility: CLINIC | Age: 58
Setting detail: SPECIMEN
Discharge: HOME | End: 2018-01-08
Attending: NURSE PRACTITIONER | Admitting: NURSE PRACTITIONER
Payer: COMMERCIAL

## 2018-01-08 ENCOUNTER — OFFICE VISIT (OUTPATIENT)
Dept: PALLIATIVE MEDICINE | Facility: CLINIC | Age: 58
End: 2018-01-08
Payer: COMMERCIAL

## 2018-01-08 VITALS
WEIGHT: 141 LBS | SYSTOLIC BLOOD PRESSURE: 128 MMHG | BODY MASS INDEX: 23.11 KG/M2 | RESPIRATION RATE: 16 BRPM | HEART RATE: 56 BPM | OXYGEN SATURATION: 99 % | DIASTOLIC BLOOD PRESSURE: 88 MMHG

## 2018-01-08 DIAGNOSIS — G89.29 CHRONIC BILATERAL LOW BACK PAIN WITHOUT SCIATICA: ICD-10-CM

## 2018-01-08 DIAGNOSIS — M48.02 CERVICAL STENOSIS OF SPINAL CANAL: Primary | ICD-10-CM

## 2018-01-08 DIAGNOSIS — M54.50 CHRONIC BILATERAL LOW BACK PAIN WITHOUT SCIATICA: ICD-10-CM

## 2018-01-08 DIAGNOSIS — M79.18 MYOFASCIAL PAIN: ICD-10-CM

## 2018-01-08 DIAGNOSIS — M50.30 DDD (DEGENERATIVE DISC DISEASE), CERVICAL: ICD-10-CM

## 2018-01-08 DIAGNOSIS — Z79.891 ENCOUNTER FOR LONG-TERM OPIATE ANALGESIC USE: ICD-10-CM

## 2018-01-08 DIAGNOSIS — M47.812 CERVICAL SPONDYLOSIS WITHOUT MYELOPATHY: ICD-10-CM

## 2018-01-08 PROCEDURE — 99214 OFFICE O/P EST MOD 30 MIN: CPT | Performed by: NURSE PRACTITIONER

## 2018-01-08 PROCEDURE — 80307 DRUG TEST PRSMV CHEM ANLYZR: CPT | Performed by: NURSE PRACTITIONER

## 2018-01-08 RX ORDER — TOPIRAMATE 50 MG/1
TABLET, FILM COATED ORAL
Qty: 30 TABLET | Refills: 1 | Status: SHIPPED | OUTPATIENT
Start: 2018-01-08 | End: 2018-03-22

## 2018-01-08 ASSESSMENT — PAIN SCALES - GENERAL: PAINLEVEL: EXTREME PAIN (8)

## 2018-01-08 NOTE — PATIENT INSTRUCTIONS
PLAN  1. Medications:   1. Continue Tramadol short-acting and the long-acting ER  2. Continue Gabapentin  3. Start Topamax 50mg at bedtime.  2. Procedures: Ask surgeons if you have time to schedule cervical epidural steroid injection before surgery. If they do recommend injections call me and I will place the order.   3. Follow up with Raman Castañeda for health psychology.  4. Follow-up with me in 8 weeks  5. UDS today, last took Tramadol ER at noon and short-acting this morning  6. Re-sign opiate agreement today        ----------------------------------------------------------------  Nurse Triage line:  [phone]   Call this number with any questions or concerns. You may leave a detailed message anytime. Calls are typically returned Monday through Friday between 8 AM and 4:30 PM. We usually get back to you within 2 business days depending on the issue/request.       Medication refills:    For non-narcotic medications, call your pharmacy directly to request a refill. The pharmacy will contact the [facility] for authorization. Please allow 3-4 days for these refills to be processed.     For narcotic refills, call the nurse triage line or send a [email or web address] message. Please contact us 7-10 days before your refill is due. The message MUST include the name of the specific medication(s) requested and how you would like to receive the prescription(s). The options are as follows:    Pain Clinic staff can mail the prescription to your pharmacy. Please tell us the name of the pharmacy.    You may pick the prescription up at the Pain Clinic (tell us the location) or during a clinic visit with your pain provider    Pain Clinic staff can deliver the prescription to the [facility] pharmacy in the clinic building. Please tell us the location.      Scheduling number: [phone].  Call this number to schedule or change appointments.    We believe regular attendance is key to your success in our program.    Any time you  are unable to keep your appointment we ask that you call us at least 24 hours in advance to let us know. This will allow us to offer the appointment time to another patient.

## 2018-01-08 NOTE — MR AVS SNAPSHOT
After Visit Summary   1/8/2018    Truman Suero    MRN: [id number]           Patient Information     Date Of Birth          1960        Visit Information        Provider Department      1/8/2018 3:00 PM Melanie Thomas APRN [facility]        Today's Diagnoses     Cervical stenosis of spinal canal    -  1    DDD (degenerative disc disease), cervical        Cervical spondylosis without myelopathy        Chronic bilateral low back pain without sciatica        Myofascial pain        Chronic neck and back pain        Encounter for long-term opiate analgesic use          Care Instructions    PLAN  1. Medications:   1. Continue Tramadol short-acting and the long-acting ER  2. Continue Gabapentin  3. Start Topamax 50mg at bedtime.  2. Procedures: Ask surgeons if you have time to schedule cervical epidural steroid injection before surgery. If they do recommend injections call me and I will place the order.   3. Follow up with Raman Castañeda for health psychology.  4. Follow-up with me in 8 weeks  5. UDS today, last took Tramadol ER at noon and short-acting this morning  6. Re-sign opiate agreement today        ----------------------------------------------------------------  Nurse Triage line:  [phone]   Call this number with any questions or concerns. You may leave a detailed message anytime. Calls are typically returned Monday through Friday between 8 AM and 4:30 PM. We usually get back to you within 2 business days depending on the issue/request.       Medication refills:    For non-narcotic medications, call your pharmacy directly to request a refill. The pharmacy will contact the [facility] for authorization. Please allow 3-4 days for these refills to be processed.     For narcotic refills, call the nurse triage line or send a [employer] message. Please contact us 7-10 days before your refill is due. The message MUST include the name of the specific  medication(s) requested and how you would like to receive the prescription(s). The options are as follows:    Pain Clinic staff can mail the prescription to your pharmacy. Please tell us the name of the pharmacy.    You may pick the prescription up at the Pain Clinic (tell us the location) or during a clinic visit with your pain provider    Pain Clinic staff can deliver the prescription to the [facility] pharmacy in the clinic building. Please tell us the location.      Scheduling number: [phone].  Call this number to schedule or change appointments.    We believe regular attendance is key to your success in our program.    Any time you are unable to keep your appointment we ask that you call us at least 24 hours in advance to let us know. This will allow us to offer the appointment time to another patient.               Follow-ups after your visit        Your next 10 appointments already scheduled     Jan 09, 2018  4:30 PM CST   Return Visit with Kentrell Castañeda LP   [facility] ([facility])    [address] [address] [phone]            Jan 11, 2018  8:45 AM CST   (Arrive by 8:30 AM)   New Patient Visit with Jud Harkins MD   [facility] ([facility])    [address] [address] [phone]              Who to contact     If you have questions or need follow up information about today's clinic visit or your schedule please contact [facility] directly at [phone].  Normal or non-critical lab and imaging results will be communicated to you by MyChart, letter or phone within 4 business days after the clinic has received the results. If you do not hear from us within 7 days, please contact the clinic through MyChart or phone. If you have a critical or abnormal lab result, we will notify you by phone as soon as possible.  Submit refill requests  through [employer] or call your pharmacy and they will forward the refill request to us. Please allow 3 business days for your refill to be completed.          Additional Information About Your Visit        [employer] Information     [employer] gives you secure access to your electronic health record. If you see a primary care provider, you can also send messages to your care team and make appointments. If you have questions, please call your primary care clinic.  If you do not have a primary care provider, please call [phone] and they will assist you.        Care EveryWhere ID     This is your Care EveryWhere ID. This could be used by other organizations to access your [facility] medical records  [id number]        Your Vitals Were     Pulse Respirations Pulse Oximetry BMI (Body Mass Index)          56 16 99% 23.11 kg/m2         Blood Pressure from Last 3 Encounters:   01/08/18 128/88   12/19/17 147/81   11/17/17 (!) 143/95    Weight from Last 3 Encounters:   01/08/18 64 kg (141 lb)   12/19/17 65.8 kg (145 lb)   11/17/17 66.2 kg (146 lb)              We Performed the Following     Drug Screen Comprehensive , Urine with Reported Meds (Pain Care Package)          Today's Medication Changes          These changes are accurate as of: 1/8/18  3:37 PM.  If you have any questions, ask your nurse or doctor.               These medicines have changed or have updated prescriptions.        Dose/Directions    topiramate 50 MG tablet   Commonly known as:  TOPAMAX   This may have changed:    - medication strength  - additional instructions   Used for:  Cervical spondylosis without myelopathy, DDD (degenerative disc disease), cervical, Chronic bilateral low back pain without sciatica, Chronic neck and back pain, Cervical stenosis of spinal canal        Take 50mg at bedtime.  Drink plenty of water and no alcohol. If side effects, then reduce to last tolerable dosage.   Quantity:  30 tablet   Refills:  1            Where to get your  medicines      These medications [facility] [address] [facility] [place] [place] [facility] [address]  [address] [facility] [address]     Phone:  [phone]     topiramate 50 MG tablet                Primary Care Provider Office Phone # Fax #    Humberto Trinidad [phone]       [address] [facility] [address]        Equal Access to Services     ELIEZER VALENTE : Hadii aad ku hadasho Soomaali, waaxda luqadaha, qaybta kaalmada adeegyada, waxay idiin hayaan adeeg kharash laluisa carter. [facility] [phone].    ATENCIÓN: Si bob johnson, tiene a simmons disposición servicios gratuitos de asistencia lingüística. [facility] [phone].    We comply with applicable federal civil rights laws and [place] laws. We do not discriminate on the basis of race, color, national origin, age, disability, sex, sexual orientation, or gender identity.            Thank you!     Thank you for choosing [facility]  for your care. Our goal is always to provide you with excellent care. Hearing back from our patients is one way we can continue to improve our services. Please take a few minutes to complete the written survey that you may receive in the mail after your visit with us. Thank you!             Your Updated Medication List - Protect others around you: Learn how to safely use, store and throw away your medicines at www.disposemymeds.org.          This list is accurate as of: 1/8/18  3:37 PM.  Always use your most recent med list.                   Brand Name Dispense Instructions for use Diagnosis    amoxicillin-clavulanate 875-125 MG per tablet    AUGMENTIN    20 tablet    Take 1 tablet by mouth 2 times daily    Sinus pressure, Acute recurrent frontal sinusitis       fluticasone 50 MCG/ACT spray    FLONASE    1 Bottle    Spray 1-2 sprays into both nostrils daily    Nasal congestion, Sinus pressure       gabapentin 600 MG tablet    NEURONTIN    135 tablet    Take 1.5  tablets (900 mg) by mouth 3 times daily    Cervical spondylosis without myelopathy, DDD (degenerative disc disease), cervical, Chronic bilateral low back pain without sciatica, Chronic neck and back pain, Cervical stenosis of spinal canal       ibuprofen 800 MG tablet    ADVIL/MOTRIN          methocarbamol 500 MG tablet    ROBAXIN    150 tablet    Take 1-2 tablets (500-1,000 mg) by mouth 3 times daily as needed for muscle spasms Start with lower dose and caution sedation    Chronic neck pain, DDD (degenerative disc disease), cervical, Cervical spondylosis without myelopathy, Chronic bilateral low back pain without sciatica, Myofascial pain       topiramate 50 MG tablet    TOPAMAX    30 tablet    Take 50mg at bedtime.  Drink plenty of water and no alcohol. If side effects, then reduce to last tolerable dosage.    Cervical spondylosis without myelopathy, DDD (degenerative disc disease), cervical, Chronic bilateral low back pain without sciatica, Chronic neck and back pain, Cervical stenosis of spinal canal       * traMADol 200 MG ER-tablet    ULTRAM-ER    30 tablet    Take 1 tablet (200 mg) by mouth daily OK to fill on/after 12/31/17and begin on 1/2/2018 to last 30 days    Cervical spondylosis without myelopathy, DDD (degenerative disc disease), cervical, Chronic bilateral low back pain without sciatica       * traMADol 50 MG tablet    ULTRAM    90 tablet    May take 1-2 tablets every 6 hours as needed for pain, max of 3 tabs per day. To last 30 days. Reduce as able.  Okay to fill on/after 12/31/17 and begin on 1/2/2018.    Chronic neck and back pain, Cervical radiculopathy       varenicline 0.5 MG X 11 & 1 MG X 42 tablet    CHANTIX STARTING MONTH PAK    53 tablet    Take 0.5 mg tab daily for 3 days, then 0.5 mg tab twice daily for 4 days, then 1 mg twice daily.    Tobacco abuse       * Notice:  This list has 2 medication(s) that are the same as other medications prescribed for you. Read the directions carefully, and  ask your doctor or other care provider to review them with you.

## 2018-01-08 NOTE — LETTER
[facility]    01/08/18    Patient: Truman Suero  YOB: 1960  Medical Record Number: [id number]                                                                  Controlled Substance Agreement  I understand that my care provider has prescribed controlled substances (narcotics, tranquilizers, and/or stimulants) to help manage my condition(s).  I am taking this medicine to help me function or work.  I know that this is strong medicine.  It could have serious side effects and even cause a dependency on the drug.  If I stop these medicines suddenly, I could have severe withdrawal symptoms.    The risks, benefits, and side effects of these medication(s) were explained to me.  I agree that:  1. I will take part in other treatments as advised by my provider.  This may be psychiatry or counseling, physical therapy, behavioral therapy, group treatment, or a referral to a pain clinic.  I will reduce or stop my medicine when my provider tells me to do so.   2. I will take my medicines as prescribed.  I will not change the dose or schedule unless my provider tells me to.  There will be no refills if I  run out early.   I may be contacted at any time without warning and asked to complete a drug test or pill count.   3. I will keep all my appointments at the clinic.  If I miss appointments or fail to follow instructions, my provider may stop my medicine.  4. I will not ask other providers to prescribe controlled substances. And I will not accept controlled substances from other people. If I need another prescribed controlled substance for a new reason, I will notify my provider within one business day.  5. If I enroll in the [place] Medical Marijuana program, I will tell my provider.  I will also sign an agreement to share my medical records with my provider.  6. I will use one pharmacy to fill all of my controlled substance prescriptions.  If my prescription is mailed to my pharmacy, it may  take 5 to 7 days for my medicine to be ready.  7. I understand that my provider, clinic care team, and pharmacy can track controlled substance prescriptions from other providers through a central database (prescription monitoring program).  8. I will bring in my list of medications (or my medicine bottles) each time I come to the clinic.  REV- 04/2016                                                                                                                                            Page 1 of 2      [facility]    01/08/18    Patient: Truman Suero  YOB: 1960  Medical Record Number: [id number]    9. Refills of controlled substances will be made only during office hours.  It is up to me to make sure that I do not run out of my medicines on weekends or holidays.    10. I am responsible for my prescriptions.  If the medicine is lost or stolen, it will not be replaced.   I also agree not to share these medicines with anyone.  11. I agree to not use ANY illegal or recreational drugs.  This includes marijuana, cocaine, bath salts or other drugs.  I agree not to use alcohol unless my provider says I may.  I agree to give urine samples whenever asked.  If I fail to give a urine sample, the provider may stop my medicine.     12. I will tell my nurse or provider right away if I become pregnant or have a new medical problem treated outside of [facility].  13. I understand that this medicine can affect my thinking and judgment.  It may be unsafe for me to drive, use machinery and do dangerous tasks.  I will not do any of these things until I know how the medicine affects me.  If my dose changes, I will wait to see how it affects me.  I will contact my provider if I have concerns about medicine side effects.  I understand that if I do not follow any of the conditions above, my prescriptions or treatment may be stopped.    I agree that my provider, clinic care team, and pharmacy may  work with any city, state or federal law enforcement agency that investigates the misuse, sale, or other diversion of my controlled medicine. I will allow my provider to discuss my care with or share a copy of this agreement with any other treating provider, pharmacy or emergency room where I receive care.  I agree to give up (waive) any right of privacy or confidentiality with respect to these authorizations.   I have read this agreement and have asked questions about anything I did not understand.   ___________________________________    ___________________________  Patient Signature                                                           Date and Time  ___________________________________     ____________________________  Witness                                                                            Date and Time  ___________________________________  RODNEY Vargas CNP  REV-  04/2016                                                                                                                                                                 Page 2 of 2

## 2018-01-08 NOTE — NURSING NOTE
"Chief Complaint   Patient presents with     Pain       Initial /88 (BP Location: Right arm, Patient Position: Chair, Cuff Size: Adult Small)  Pulse 56  Resp 16  Wt 64 kg (141 lb)  SpO2 99%  BMI 23.11 kg/m2 Estimated body mass index is 23.11 kg/(m^2) as calculated from the following:    Height as of 12/19/17: 1.664 m (5' 5.5\").    Weight as of this encounter: 64 kg (141 lb).  Medication Reconciliation: complete       Kailyn Hope MA  [facility]      "

## 2018-01-08 NOTE — PROGRESS NOTES
[facility]    1/8/2018      Chief complaint: neck and lower back     Interval history:  Truman Suero is a 57 year old male is known to me for   Chronic neck pain  Cervical DDD  Cervical spondylosis (pain worse with extension/rotation indicating facetogenic component to pain)  Axial low back pain  Myofascial pain/muscle spasms  Remote history of ETOH overuse, attended AA for awhile. Sober for 10 years  ---PMHx includes: neck pain  ---PSHx includes: none listed      Recommendations/plan at the last visit on 11/6/2017 included:  1. Physical Therapy: none at present- has done PHYSICAL THERAPY   2. Patient is to continue his home exercise program and exercises learned in PHYSICAL THERAPY  3. Clinical Health Psychologist: referral to see Kentrell Castañeda in HEALTH PSYCHOLOGY   4. Diagnostic Studies:  none  5. Medication Management:    1. Continue Tramadol ER  2. Continue Tramadol immediate release 50mg tabs  3. Continue gabapentin  4. Continue methocarbamol  5. Start low dose Topamax as directed  6. Further procedures recommended: none at present, I will also review upcoming EMG results, will likely recommend repeat Cervical RFA  7. Discussed smoking cessation, patient is working on this.   8. Recommendations to PCP: see above  9. Follow up: January 2018      Since his last visit, Truman Suero reports:  -The pt had a recent  UPPER EXTREMITY EMG and he has been recommended to have cervical  spinal fusion surgery. He must be nicotine free prior to surgery. He is working hard on smoking cessation.   -The pt reports tingling pain in both arms that improves throughout the day.  -EMG also shows bilateral carpal tunnel syndrome.      At this point, the patient's participation with our multidisciplinary team includes:  The patient has been compliant with the program.  Pain Group - not ordered  PT - attending non-pain management PHYSICAL THERAPY   Health Psych - not ordered  Acupuncture: working with  "Dr. Cuba DC      Pain scores:  Pain intensity on average is 7 on a scale of 0-10.    Range is 5-10/10.   Pain right now is 8/10.  Pain is described as \"aching, unbearable, burning, tiring, shooting, exhausting, throbbing, penetrating, sharp, stabbing, miserable, nagging.\"    Pain is constant in nature    Current pain relevant medications:   -Tramadol 200mg ER in the evening (helpful)  -Tramadol 50mg take 1 tablet  Q 6 hours PRN (using 1-2 tabs per day)  -ibuprofen 600mg PRN (helpful)  -gabapentin 900mg TID (somewhat helpful)  -methocarbamol 500-1000mg TID PRN muscle spasms (somewhat helpful)      Other pertinent medications:  None    Previous Medications:  OPIATES: Tramdol (somewhat helpful)  NSAIDS: ibuprofen (helpful), Aleve (helpful)  MUSCLE RELAXANTS: none  ANTI-MIGRAINE MEDS: none  ANTI-DEPRESSANTS: none  SLEEP AIDS: none  ANTI-CONVULSANTS: gabapentin (helpful)  TOPICALS: lidocaine ointment (somewhat helpful)  Other meds: Tylenol (not helpful)        Other treatments have included:  Truman Suero has not been seen at a pain clinic in the past.    PT: tried, somewhat helpful  Chiropractic: helpful  Acupuncture: none  TENs Unit: none     Injections:   cervical radiofrequency nerve ablation at [facility] in December 2016 (did get good relief, got 70% relief of his typical neck pain)  -6/29/2017 Cervical facet joint steroid injections at C4-5 and C5-6 on the right with Dr. Nicole Hardign (not helpful)           THE 4 A's OF OPIOID MAINTENANCE ANALGESIA    Analgesia: long acting Tramadol with some short acting tramadol does give some relief    Activity: ADLS and PHYSICAL THERAPY exercises. Working full time as a     Adverse effects: none    Adherence to Rx protocol: yes      Side Effects: none  Patient is using the medication as prescribed: yes    Medications:  Current Outpatient Prescriptions   Medication Sig Dispense Refill     topiramate (TOPAMAX) 50 MG tablet Take 50mg at bedtime.  Drink plenty of water " and no alcohol. If side effects, then reduce to last tolerable dosage. 30 tablet 1     traMADol (ULTRAM-ER) 200 MG ER-tablet Take 1 tablet (200 mg) by mouth daily OK to fill on/after 12/31/17and begin on 1/2/2018 to last 30 days 30 tablet 0     traMADol (ULTRAM) 50 MG tablet May take 1-2 tablets every 6 hours as needed for pain, max of 3 tabs per day. To last 30 days. Reduce as able.  Okay to fill on/after 12/31/17 and begin on 1/2/2018. 90 tablet 0     amoxicillin-clavulanate (AUGMENTIN) 875-125 MG per tablet Take 1 tablet by mouth 2 times daily 20 tablet 0     fluticasone (FLONASE) 50 MCG/ACT spray Spray 1-2 sprays into both nostrils daily 1 Bottle 1     gabapentin (NEURONTIN) 600 MG tablet Take 1.5 tablets (900 mg) by mouth 3 times daily 135 tablet 3     [DISCONTINUED] topiramate (TOPAMAX) 25 MG tablet Take 25mg at bedtime for 7 days, then take 25mg twice daily for 7 days, then take 25mg in the morning and 50mg (2 tabs) at bedtime for 7 days, then take 50mg twice daily. Drink plenty of water and no alcohol. If side effects, then reduce to last tolerable dosage. 120 tablet 0     methocarbamol (ROBAXIN) 500 MG tablet Take 1-2 tablets (500-1,000 mg) by mouth 3 times daily as needed for muscle spasms Start with lower dose and caution sedation 150 tablet 1     varenicline (CHANTIX STARTING MONTH PAK) 0.5 MG X 11 & 1 MG X 42 tablet Take 0.5 mg tab daily for 3 days, then 0.5 mg tab twice daily for 4 days, then 1 mg twice daily. (Patient not taking: Reported on 12/19/2017) 53 tablet 0     ibuprofen (ADVIL,MOTRIN) 800 MG tablet   0       Medical History: any changes in medical history since they were last seen? None    Social History:   Home situation: , has 4 kids, 2 at home, one in college and one launched.   Occupation/Schooling:  full time in [employer]  Tobacco use: smoking, planning on quitting smoking  Alcohol use: sober for nearly 10 years. He used to work a sobriety program, used to go to  [employer]  Drug use: none  History of chemical dependency treatment: no formal treatment, did AA    Review of Systems:  ROS is positive as per HPI as well as for headache,sinus infection, wheezing, joint pain, weakness, numbness/tingling, depression, anxiety, stress and is negative for fevers, sweats, or constipation, diarrhea.    This document serves as a record of the services and decisions personally performed and made by Melanie STEVENS. It was created on her behalf by Moncho Tariq, a trained medical scribe. The creation of this document is based on the provider's statements to the medical scribe.  Moncho Tariq 3:18 PM January 8, 2018    Physical Exam:  Vital signs: Blood pressure 128/88, pulse 56, resp. rate 16, weight 64 kg (141 lb), SpO2 99 %.    Behavioral observations:  Awake, alert, cooperative    Gait:  normal    Musculoskeletal exam:    Moves well in exam room  Strength is grossly equal    Neuro exam:  SILT in all extremities     Skin/vascular/autonomic:  No suspicious lesions on exposed skin.      Other:  NA    IMAGING:    MR CERVICAL SPINE WITHOUT CONTRAST 9/5/2017 2:32 PM      HISTORY: Neck pain, worsening numbness in arms and lower extremities.  Radiculopathy, cervical region.     TECHNIQUE: Multiplanar multisequence images were obtained through the  cervical spine without contrast.     COMPARISON: 2/22/2017.     FINDINGS: Sagittal images demonstrate some minimal gentle cervical  kyphosis, otherwise normal posterior alignment. There is no evidence  for craniovertebral or cervicomedullary junction abnormality. The  cervical cord is minimally indented anteriorly at C4-C5 and C5-C6,  otherwise is normal in morphology and signal characteristics. Disc  space narrowing and discogenic marrow changes are present C3-C4,  C4-C5, C5-C6 and C6-C7.     C2-C3: Minimal facet hypertrophy. No stenosis.     C3-C4: Broad-based posterior osteophyte formation is present causing  some mild bilateral neural foraminal stenosis,  but no central canal  stenosis.     C4-C5: Broad-based posterior osteophyte formation and mild facet  hypertrophy is present causing some mild to moderate central canal  stenosis, mild cord deformity, and mild-to-moderate bilateral neural  foraminal stenosis.     C5-C6: Broad-based posterior osteophyte formation and disc bulging is  present along with some facet hypertrophy. There is moderate central  canal stenosis and moderate bilateral neural foraminal stenosis.  Findings have progressed since the prior exam.     C6-C7: Broad-based disc bulging is present along with some minimal  posterior osteophyte formation. There is borderline to mild central  canal stenosis, but no neural foraminal stenosis.     C7-T1: Moderate facet hypertrophy. No significant stenosis.         IMPRESSION: Moderate multilevel degenerative disc and facet disease  with some progression at C5-C6 where there is moderate central canal  and bilateral neural foraminal stenosis along with cord deformity.          MR CERVICAL SPINE WITHOUT CONTRAST  2/22/2017 9:59 AM     HISTORY:  Bilateral neck and arm pain for three years.     COMPARISON: None.     TECHNIQUE: Routine MR cervical spine extended through T2.     FINDINGS: There is some degenerative bone marrow signal change C3-C6.  No malignant or destructive lesions. Normal alignment through T2.  Reversed cervical adenosis C3-C6.     The cervical and upper thoracic spinal cord appear intrinsically  normal. The craniocervical junction region is normal. No paraspinous  soft tissue abnormality.     Findings by level as follows:     C2-C3: Negative. No disc protrusion. No central or lateral stenosis.     C3-C4: Mild disc space narrowing. Mild diffuse annular bulge. Small  uncinate spur on the right. No significant central or left-sided  stenosis. Mild right-sided foraminal stenosis.     C4-C5: Moderate degenerative narrowing of the interspace. Mild ventral  disc osteophyte complex with minimal central  stenosis. Small uncinate  spurs bilaterally with mild bilateral foraminal stenosis.     C6-C7: Moderate disc space narrowing. Mild broad-based disc osteophyte  complex with minimal central stenosis. Minimal uncinate spurs with  mild bilateral foraminal stenosis.     C6-C7: Moderate disc space narrowing. Mild ventral disc osteophyte  complex. No significant central or lateral stenosis.     C7-T1: No disc protrusion. No central or lateral stenosis. Moderate  bilateral facet joint disease.         IMPRESSION:  1. Degenerative changes as described C3-C4 through C7-T1. There is  only mild central and foraminal stenosis as described.  2. No intrinsic spinal cord abnormality through T2    [place] Prescription Monitoring Program:  Reviewed, as expected       DIRE Score for selecting candidates for long term opioid analgesia for chronic pain:  Diagnosis  2  Intractablility  2  Risk    Psychological health  2    Chemical health  2    Reliability  2    Social support  3  Efficacy  2    Total DIRE Score = 15. Note that   7-13 predicts poor outcome (compliance and efficacy) from opioid prescribing; 14-21 predicts good outcome (compliance and efficacy)  from opioid prescribing.      Assessment:   1. Cervical spinal stenosis with bilateral numbness in the hands and occasionally unsteady gait  2. Cervical DDD  3. Cervical spondylosis  4. Axial low back pain  5. Myofascial pain/muscle spasms  6. Chronic pain syndrome  7. Remote history of ETOH overuse, attended AA for awhile. Sober for 10 years  8. PMHx includes: neck pain  9. PSHx includes: none listed        Plan:   1. Physical Therapy: not at present time  2. Patient is to continue his home exercise program and exercises learned in PHYSICAL THERAPY  3. Clinical Health Psychologist: continue work with  Kentrell Castañeda in HEALTH PSYCHOLOGY   4. Diagnostic Studies:  None  5. Medication Management:    1. Continue Tramadol ER  2. Continue Tramadol immediate release 50mg  tabs  3. Continue gabapentin  4. Continue methocarbamol  5. Re-start Topamax as directed, 50mg at bedtime  6. Further procedures recommended: patient to discuss possible ISMAEL with his neurosurgical team. Patient is to call me if they are OK with proceeding and I will place an order for such  7. UDS today, states last took Tramadol ER at noon and short-acting Tramadol this morning  8. Re-signed opiate agreement today  9. Recommendations to PCP: see above  10. Follow up: 8 weeks      Total time spent face to face was 35 minutes and more than 50% of face to face time was spent in counseling and/or coordination of care regarding the diagnosis and recommendations above.    The information in this document, created by the medical scribe for me, accurately reflects the services I personally performed and the decisions made by me. I have reviewed and approved this document for accuracy.  January 8, 2018 10:38 PM    Melanie STEVENS RN CNP, FNP  [facility]

## 2018-01-09 ENCOUNTER — OFFICE VISIT (OUTPATIENT)
Dept: PALLIATIVE MEDICINE | Facility: CLINIC | Age: 58
End: 2018-01-09
Payer: COMMERCIAL

## 2018-01-09 DIAGNOSIS — G89.29 CHRONIC NECK PAIN: ICD-10-CM

## 2018-01-09 DIAGNOSIS — G89.4 CHRONIC PAIN SYNDROME: ICD-10-CM

## 2018-01-09 DIAGNOSIS — M50.30 DDD (DEGENERATIVE DISC DISEASE), CERVICAL: Primary | ICD-10-CM

## 2018-01-09 DIAGNOSIS — M54.12 CERVICAL RADICULOPATHY: ICD-10-CM

## 2018-01-09 DIAGNOSIS — M54.2 CHRONIC NECK PAIN: ICD-10-CM

## 2018-01-09 DIAGNOSIS — G89.29 CHRONIC BILATERAL LOW BACK PAIN WITHOUT SCIATICA: ICD-10-CM

## 2018-01-09 DIAGNOSIS — M54.50 CHRONIC BILATERAL LOW BACK PAIN WITHOUT SCIATICA: ICD-10-CM

## 2018-01-09 PROCEDURE — 96152 HC HEALTH AND BEHAVIOR INTERVENTION, INDIVIDUAL, EACH 15 MINUTES: CPT | Performed by: PSYCHOLOGIST

## 2018-01-09 NOTE — MR AVS SNAPSHOT
After Visit Summary   1/9/2018    Truman Suero    MRN: [id number]           Patient Information     Date Of Birth          1960        Visit Information        Provider Department      1/9/2018 4:30 PM Kentrell Castañeda LP [facility]        Today's Diagnoses     DDD (degenerative disc disease), cervical    -  1    Chronic pain syndrome        Cervical radiculopathy        Chronic bilateral low back pain without sciatica        Chronic neck pain           Follow-ups after your visit        Your next 10 appointments already scheduled     Jan 16, 2018  1:45 PM CST   [facility]    [address] [place] [id number]   [phone]            Jan 23, 2018  3:30 PM CST   Return Visit with Kentrell Castañeda LP   [facility] ([facility])    [id number] [place] [id number] [phone]            Mar 05, 2018  2:00 PM CST   Return Visit with RODNEY Vargas CNP   [facility])    [address] [place] [id number] [phone]              Who to contact     If you have questions or need follow up information about today's clinic visit or your schedule please contact [facility] directly at [phone].  Normal or non-critical lab and imaging results will be communicated to you by MyChart, letter or phone within 4 business days after the clinic has received the results. If you do not hear from us within 7 days, please contact the clinic through MyChart or phone. If you have a critical or abnormal lab result, we will notify you by phone as soon as possible.  Submit refill requests through [employer] or call your pharmacy and they will forward the refill request to us. Please allow 3 business days for your refill to be completed.          Additional Information About  Your Visit        y primehart Information     [employer] gives you secure access to your electronic health record. If you see a primary care provider, you can also send messages to your care team and make appointments. If you have questions, please call your primary care clinic.  If you do not have a primary care provider, please call [phone] and they will assist you.        Care EveryWhere ID     This is your Care EveryWhere ID. This could be used by other organizations to access your [place] medical records  [id number]         Blood Pressure from Last 3 Encounters:   01/11/18 135/86   01/08/18 128/88   12/19/17 147/81    Weight from Last 3 Encounters:   01/11/18 64 kg (141 lb)   01/08/18 64 kg (141 lb)   12/19/17 65.8 kg (145 lb)              We Performed the Following     HEALTH & BEHAVIOR ASSESS, INITIAL, EA 15MIN PHD        Primary Care Provider Office Phone # Fax #    Humberto TrinidadDO [phone]       [address] [place] [id number]        Equal Access to Services     [facility]: Hadii aad ku hadasho Soomaali, waaxda luqadaha, qaybta kaalmada adeegyada, waxkiel garduno . So [facility] [phone].    ATENCIÓN: Si habla español, tiene a simmons disposición servicios gratuitos de asistencia lingüística. Llame al [phone].    We comply with applicable federal civil rights laws and [place] laws. We do not discriminate on the basis of race, color, national origin, age, disability, sex, sexual orientation, or gender identity.            Thank you!     Thank you for choosing [facility]  for your care. Our goal is always to provide you with excellent care. Hearing back from our patients is one way we can continue to improve our services. Please take a few minutes to complete the written survey that you may receive in the mail after your visit with us. Thank you!             Your Updated Medication List - Protect others around you: Learn how to  safely use, store and throw away your medicines at www.disposemymeds.org.          This list is accurate as of: 1/9/18 11:59 PM.  Always use your most recent med list.                   Brand Name Dispense Instructions for use Diagnosis    amoxicillin-clavulanate 875-125 MG per tablet    AUGMENTIN    20 tablet    Take 1 tablet by mouth 2 times daily    Sinus pressure, Acute recurrent frontal sinusitis       fluticasone 50 MCG/ACT spray    FLONASE    1 Bottle    Spray 1-2 sprays into both nostrils daily    Nasal congestion, Sinus pressure       gabapentin 600 MG tablet    NEURONTIN    135 tablet    Take 1.5 tablets (900 mg) by mouth 3 times daily    Cervical spondylosis without myelopathy, DDD (degenerative disc disease), cervical, Chronic bilateral low back pain without sciatica, Chronic neck and back pain, Cervical stenosis of spinal canal       ibuprofen 800 MG tablet    ADVIL/MOTRIN          methocarbamol 500 MG tablet    ROBAXIN    150 tablet    Take 1-2 tablets (500-1,000 mg) by mouth 3 times daily as needed for muscle spasms Start with lower dose and caution sedation    Chronic neck pain, DDD (degenerative disc disease), cervical, Cervical spondylosis without myelopathy, Chronic bilateral low back pain without sciatica, Myofascial pain       topiramate 50 MG tablet    TOPAMAX    30 tablet    Take 50mg at bedtime.  Drink plenty of water and no alcohol. If side effects, then reduce to last tolerable dosage.    Cervical spondylosis without myelopathy, DDD (degenerative disc disease), cervical, Chronic bilateral low back pain without sciatica, Cervical stenosis of spinal canal       * traMADol 200 MG ER-tablet    ULTRAM-ER    30 tablet    Take 1 tablet (200 mg) by mouth daily OK to fill on/after 12/31/17and begin on 1/2/2018 to last 30 days    Cervical spondylosis without myelopathy, DDD (degenerative disc disease), cervical, Chronic bilateral low back pain without sciatica       * traMADol 50 MG tablet    ULTRAM     90 tablet    May take 1-2 tablets every 6 hours as needed for pain, max of 3 tabs per day. To last 30 days. Reduce as able.  Okay to fill on/after 12/31/17 and begin on 1/2/2018.    Chronic neck and back pain, Cervical radiculopathy       varenicline 0.5 MG X 11 & 1 MG X 42 tablet    CHANTIX STARTING MONTH GIO    53 tablet    Take 0.5 mg tab daily for 3 days, then 0.5 mg tab twice daily for 4 days, then 1 mg twice daily.    Tobacco abuse       * Notice:  This list has 2 medication(s) that are the same as other medications prescribed for you. Read the directions carefully, and ask your doctor or other care provider to review them with you.

## 2018-01-11 ENCOUNTER — MYC MEDICAL ADVICE (OUTPATIENT)
Dept: PALLIATIVE MEDICINE | Facility: CLINIC | Age: 58
End: 2018-01-11

## 2018-01-11 ENCOUNTER — OFFICE VISIT (OUTPATIENT)
Dept: NEUROSURGERY | Facility: CLINIC | Age: 58
End: 2018-01-11
Payer: COMMERCIAL

## 2018-01-11 VITALS
BODY MASS INDEX: 22.66 KG/M2 | WEIGHT: 141 LBS | HEIGHT: 66 IN | DIASTOLIC BLOOD PRESSURE: 86 MMHG | SYSTOLIC BLOOD PRESSURE: 135 MMHG | HEART RATE: 63 BPM

## 2018-01-11 DIAGNOSIS — M48.02 CERVICAL STENOSIS OF SPINAL CANAL: Primary | ICD-10-CM

## 2018-01-11 DIAGNOSIS — M54.12 CERVICAL RADICULAR PAIN: Primary | ICD-10-CM

## 2018-01-11 ASSESSMENT — PAIN SCALES - GENERAL: PAINLEVEL: NO PAIN (0)

## 2018-01-11 NOTE — PROGRESS NOTES
[facility]   [facility] [place]  Behavioral Medicine Visit    Patient Name: Truman Suero    YOB: 1960   Medical Record Number: [id number]  Date: 1/11/2018                SUBJECTIVE: The patient reports that he practice the relaxation strategies discussed in the previous meeting and found them to be somewhat helpful.  He admits that he did not utilize all of his opportunities to practice.  Today the patient reports he is continuously thinking about cigarette smoking and quitting.  He thinks that if he were to successfully quit for 60 days which is the expectation of his surgeon then he could proceed with having the surgical procedure done for his neck.    When discussing the patient's cigarette use pattern it is apparent that he looks naively at the steps necessary to make a quit attempt.  He has obtained nicotine replacement therapy but done little else to provide assistance to his goal of quitting smoking at least long enough so he can have the surgical procedure.  The patient is ambivalent about quitting smoking to a high degree and we discussed the possibility of him working on a reasons for/reasons against quitting smoking.  The patient and I also discussed some of the behavioral steps that might be of assistance to him were he to decide to make an attempt.  Patient reports that he has not gone a day without smoking for almost 40 years.    Due to time constraints and our discussion regarding the above we spent no time on the previous weeks focus of relaxation other than to discuss his experience.  We discussed returning to this practice at his follow-up appointment.          OBJECTIVE:    Met with the patient on this date for an elective return appointment.  Today the patient reports the following: This is our second visit postinitial assessment.  His pain level is mildly worse, his mood remains the same, his  activity level has been the same, his stress level remains the same and his sleep remains the same.  Today the patient reports that he is practicing self-care 2-3 times per day.   since the beginning of services with [facility] the patient reports slight improvement.         Length of Visit: 60 minutes      Assessment: Current Emotional / Mental Status    Appearance:   Appropriate   Eye Contact:   Good   Psychomotor Behavior: Normal   Attitude:   Cooperative   Orientation:   All  Speech  Rate / Production:             Normal   Volume:              Normal   Mood:    Anxious   Affect:    Worrisome   Thought Content:  Clear   Thought Form:  Coherent  Logical   Insight:    Fair     ASSESSMENT:   Degenerative disc disease, cervical, chronic pain syndrome, cervical radiculopathy, chronic bilateral low back pain without sciatica, chronic neck pain    Progress toward goals: fair.    Pain Status: worsened    Emotional Status: unchanged              Medication / chemical use concerns: None with the exception of tobacco, see above    PLAN:   Next Appointment: Truman Suero will schedule a follow-up appointment in 2 weeks.  Assignment: Focus on practicing relaxation exercises, consider making proton list for a cigarette smoking quit attempt.  Objectives / interventions for next session: See above    Kentrell Castañeda MA, LP, LADC  Behavioral Pain Specialist  [facility]

## 2018-01-11 NOTE — MR AVS SNAPSHOT
After Visit Summary   1/11/2018    Truman Suero    MRN: [id number]           Patient Information     Date Of Birth          1960        Visit Information        Provider Department      1/11/2018 8:45 AM Jud Harkins MD [facility] Neurosurgery        Today's Diagnoses     Cervical radicular pain    -  1       Follow-ups after your visit        Your next 10 appointments already scheduled     Jan 16, 2018  1:45 PM CST   New Visit with Sheldon Shultz MD   [facility]    [address] [facility] [address] [phone]            Jan 23, 2018  3:30 PM CST   Return Visit with Kentrell Castañeda LP   [facility] ([facility])    [id number] [place] [id number] [phone]            Mar 05, 2018  2:00 PM CST   Return Visit with RODNEY Vargas CNP   [facility] ([facility])    [address] [place] [address] [phone]              Who to contact     Please call your clinic at [phone] to:    Ask questions about your health    Make or cancel appointments    Discuss your medicines    Learn about your test results    Speak to your doctor   If you have compliments or concerns about an experience at your clinic, or if you wish to file a complaint, please contact [facility] Patient Relations at [phone] or email us at [email or web address]         Additional Information About Your Visit        MyChart Information     [employer] gives you secure access to your electronic health record. If you see a primary care provider, you can also send messages to your care team and make appointments. If you have questions, please call your primary care clinic.  If you do not have a primary care provider, please call [phone] and they will assist you.      [employer] is an electronic gateway that  "provides easy, online access to your medical records. With [employer], you can request a clinic appointment, read your test results, renew a prescription or communicate with your care team.     To access your existing account, please contact your [facility] or call [phone] for assistance.        Care EveryWhere ID     This is your Care EveryWhere ID. This could be used by other organizations to access your [facility] medical records  [id number]        Your Vitals Were     Pulse Height BMI (Body Mass Index)             63 1.664 m (5' 5.5\") 23.11 kg/m2          Blood Pressure from Last 3 Encounters:   01/11/18 135/86   01/08/18 128/88   12/19/17 147/81    Weight from Last 3 Encounters:   01/11/18 64 kg (141 lb)   01/08/18 64 kg (141 lb)   12/19/17 65.8 kg (145 lb)              Today, you had the following     No orders found for display       Primary Care Provider Office Phone # Fax #    Humberto Boltonmichaeljessica DO [place] [phone]       [facility] [address]        Equal Access to Services     [facility] California[facility]ACKY : Hadii aad ku hadasho Soomaali, waaxda luqadaha, qaybta kaalmada adeegyada, mariela garduno . So [facility] [phone].    ATENCIÓN: Si habla español, tiene a simmons disposición servicios gratuitos de asistencia lingüística. Llame al [phone].    We comply with applicable federal civil rights laws and [place] laws. We do not discriminate on the basis of race, color, national origin, age, disability, sex, sexual orientation, or gender identity.            Thank you!     Thank you for choosing [facility]  for your care. Our goal is always to provide you with excellent care. Hearing back from our patients is one way we can continue to improve our services. Please take a few minutes to complete the written survey that you may receive in the mail after your visit with us. Thank you!             Your Updated Medication List - " Protect others around you: Learn how to safely use, store and throw away your medicines at www.disposemymeds.org.          This list is accurate as of: 1/11/18 11:59 PM.  Always use your most recent med list.                   Brand Name Dispense Instructions for use Diagnosis    amoxicillin-clavulanate 875-125 MG per tablet    AUGMENTIN    20 tablet    Take 1 tablet by mouth 2 times daily    Sinus pressure, Acute recurrent frontal sinusitis       fluticasone 50 MCG/ACT spray    FLONASE    1 Bottle    Spray 1-2 sprays into both nostrils daily    Nasal congestion, Sinus pressure       gabapentin 600 MG tablet    NEURONTIN    135 tablet    Take 1.5 tablets (900 mg) by mouth 3 times daily    Cervical spondylosis without myelopathy, DDD (degenerative disc disease), cervical, Chronic bilateral low back pain without sciatica, Chronic neck and back pain, Cervical stenosis of spinal canal       ibuprofen 800 MG tablet    ADVIL/MOTRIN          methocarbamol 500 MG tablet    ROBAXIN    150 tablet    Take 1-2 tablets (500-1,000 mg) by mouth 3 times daily as needed for muscle spasms Start with lower dose and caution sedation    Chronic neck pain, DDD (degenerative disc disease), cervical, Cervical spondylosis without myelopathy, Chronic bilateral low back pain without sciatica, Myofascial pain       topiramate 50 MG tablet    TOPAMAX    30 tablet    Take 50mg at bedtime.  Drink plenty of water and no alcohol. If side effects, then reduce to last tolerable dosage.    Cervical spondylosis without myelopathy, DDD (degenerative disc disease), cervical, Chronic bilateral low back pain without sciatica, Cervical stenosis of spinal canal       * traMADol 200 MG ER-tablet    ULTRAM-ER    30 tablet    Take 1 tablet (200 mg) by mouth daily OK to fill on/after 12/31/17and begin on 1/2/2018 to last 30 days    Cervical spondylosis without myelopathy, DDD (degenerative disc disease), cervical, Chronic bilateral low back pain without sciatica        * traMADol 50 MG tablet    ULTRAM    90 tablet    May take 1-2 tablets every 6 hours as needed for pain, max of 3 tabs per day. To last 30 days. Reduce as able.  Okay to fill on/after 12/31/17 and begin on 1/2/2018.    Chronic neck and back pain, Cervical radiculopathy       varenicline 0.5 MG X 11 & 1 MG X 42 tablet    CHANTIX STARTING MONTH GIO    53 tablet    Take 0.5 mg tab daily for 3 days, then 0.5 mg tab twice daily for 4 days, then 1 mg twice daily.    Tobacco abuse       * Notice:  This list has 2 medication(s) that are the same as other medications prescribed for you. Read the directions carefully, and ask your doctor or other care provider to review them with you.

## 2018-01-11 NOTE — LETTER
1/11/2018       RE: Truman Suero  [address] [facility] [address]     Dear Colleague,    Thank you for referring your patient, Truman Suero, to the [facility] NEUROSURGERY at [facility]. Please see a copy of my visit note below.    Neurosurgery Clinic     01/11/18    HPI:   Truman Suero is a 57 year old male who presents in clinic for cervical pain for many years.    He has undergone a number of treatments which include rhizotomies, radiofrequency ablations, and epidural steroid injections. He has had relief with these interventions, but these effects have not been durable.     He has tried physical therapy, chiropractors, and is currently on a regimen of tramadol, gabapentin, methocarbamol, and some NSAIDs. He hopes to return to  as he did have some benefit. The patient does state that he has some difficulty with  and coordination occasionally. The patient has intermittent tingling in his hand, which he cannot localize to specific fingers.     EMG conducted on 11/7/2017 by Dr. Peña demonstrated mild-moderate bilateral carpal tunnel syndrome as well as bilateral C6 and possible C7 radiculopathy.     The patient does state that he has a history of carpal tunnel syndrome bilaterally. However, his neck symptoms are of greater concern to him. The patient denies any bowel or bladder symptoms. Patient denies any issues with gait. He has no lower extremity symptoms.    SurgHx: none    PMH: none     SocHx: He is attempting to quit smoking. He is down to one quarter pack per day. Works as a .     Medications:  Current Outpatient Prescriptions   Medication     topiramate (TOPAMAX) 50 MG tablet     traMADol (ULTRAM-ER) 200 MG ER-tablet     traMADol (ULTRAM) 50 MG tablet     amoxicillin-clavulanate (AUGMENTIN) 875-125 MG per tablet     fluticasone (FLONASE) 50 MCG/ACT spray     gabapentin (NEURONTIN) 600 MG tablet     methocarbamol (ROBAXIN) 500 MG  "tablet     varenicline (CHANTIX STARTING MONTH PAK) 0.5 MG X 11 & 1 MG X 42 tablet     ibuprofen (ADVIL,MOTRIN) 800 MG tablet     No current facility-administered medications for this visit.      FamHx: breast cancer in females of his family     ROS: as detailed below     Physical exam:  /86  Pulse 63  Ht 1.664 m (5' 5.5\")  Wt 64 kg (141 lb)  BMI 23.11 kg/m2    General: appears comfortable, in no acute distress   HEENT: normocephalic/atraumatic. Negative spurling's.   NEURO:  Mental: Alert and oriented x 3.   CN: PERRL bilaterally. EOMI. Face symmetric. No aphasia or dysarthria.   Motor:    Deltoid Triceps Biceps Wrist Ext Wrist Flex    R 5 5 5 5 5 5   L 5 5 5 5 5 5    Hip Flex. Knee Ext. Knee Flex. Dorsiflex. Plantarflex.    R 5 5 5 5 5    L 5 5 5 5 5      Sensory: Intact to light touch bilaterally in upper and lower extremities   Reflexes: No Garrido's reflex. 1+ bilaterally in the biceps, brachioradialis, and triceps tendons   Gait: intact normal walking. Intact tandem gait.     Imaging:    MR c-spine 9/5/17: multilevel degenerative disc disease. Disease most severe at C5-6 with central canal stenosis and bilateral neural foraminal stenosis    Studies:  EMG 11/7/2017 (Dr. Peña): mild-moderate bilateral carpal tunnel syndrome as well as bilateral C6 and possible C7 radiculopathy    Assessment:  Truman Suero is a 57 year old male who presents in clinic for symptoms of neck pain for many years with radiculopathy based on diagnostic EMG which showed a C6 and C7 radiculopathy. We discussed that he would likely need an anterior cervical discectomy and fusion in the future based on the degree of his degenerative disc disease. However, we discussed that the patient's smoking history will be prohibitive. With respect to his carpal tunnel, we did discuss that carpal tunnel release could be considered if he so chooses to proceed. We did discuss that treatment with fusion for neck pain is 50% " "effective. His finger tingling may represent early myelopathy but there is no imaging or exam finding to support this.      Plan:   --would offer future anterior cervical disectomy and fusion should patient quit smoking; specific level(s) to be re-assessed upon clinical exam and possibly with re-imaging   --smoking cessation     Fisher \"Bahman\" MD [facility]   Neurosurgery, PGY-1        Again, thank you for allowing me to participate in the care of your patient.      Sincerely,    Jud Harkins MD      "

## 2018-01-11 NOTE — PROGRESS NOTES
Neurosurgery Clinic     01/11/18    HPI:   Truman Suero is a 57 year old male who presents in clinic for cervical pain for many years.    He has undergone a number of treatments which include rhizotomies, radiofrequency ablations, and epidural steroid injections. He has had relief with these interventions, but these effects have not been durable.     He has tried physical therapy, chiropractors, and is currently on a regimen of tramadol, gabapentin, methocarbamol, and some NSAIDs. He hopes to return to  as he did have some benefit. The patient does state that he has some difficulty with  and coordination occasionally. The patient has intermittent tingling in his hand, which he cannot localize to specific fingers.     EMG conducted on 11/7/2017 by Dr. Peña demonstrated mild-moderate bilateral carpal tunnel syndrome as well as bilateral C6 and possible C7 radiculopathy.     The patient does state that he has a history of carpal tunnel syndrome bilaterally. However, his neck symptoms are of greater concern to him. The patient denies any bowel or bladder symptoms. Patient denies any issues with gait. He has no lower extremity symptoms.    SurgHx: none    PMH: none     SocHx: He is attempting to quit smoking. He is down to one quarter pack per day. Works as a .     Medications:  Current Outpatient Prescriptions   Medication     topiramate (TOPAMAX) 50 MG tablet     traMADol (ULTRAM-ER) 200 MG ER-tablet     traMADol (ULTRAM) 50 MG tablet     amoxicillin-clavulanate (AUGMENTIN) 875-125 MG per tablet     fluticasone (FLONASE) 50 MCG/ACT spray     gabapentin (NEURONTIN) 600 MG tablet     methocarbamol (ROBAXIN) 500 MG tablet     varenicline (CHANTIX STARTING MONTH PAK) 0.5 MG X 11 & 1 MG X 42 tablet     ibuprofen (ADVIL,MOTRIN) 800 MG tablet     No current facility-administered medications for this visit.      FamHx: breast cancer in females of his family     ROS: as detailed below     Physical exam:  BP  "135/86  Pulse 63  Ht 1.664 m (5' 5.5\")  Wt 64 kg (141 lb)  BMI 23.11 kg/m2    General: appears comfortable, in no acute distress   HEENT: normocephalic/atraumatic. Negative spurling's.   NEURO:  Mental: Alert and oriented x 3.   CN: PERRL bilaterally. EOMI. Face symmetric. No aphasia or dysarthria.   Motor:    Deltoid Triceps Biceps Wrist Ext Wrist Flex    R 5 5 5 5 5 5   L 5 5 5 5 5 5    Hip Flex. Knee Ext. Knee Flex. Dorsiflex. Plantarflex.    R 5 5 5 5 5    L 5 5 5 5 5      Sensory: Intact to light touch bilaterally in upper and lower extremities   Reflexes: No Garrido's reflex. 1+ bilaterally in the biceps, brachioradialis, and triceps tendons   Gait: intact normal walking. Intact tandem gait.     Imaging:    MR c-spine 9/5/17: multilevel degenerative disc disease. Disease most severe at C5-6 with central canal stenosis and bilateral neural foraminal stenosis    Studies:  EMG 11/7/2017 (Dr. Peña): mild-moderate bilateral carpal tunnel syndrome as well as bilateral C6 and possible C7 radiculopathy    Assessment:  Truman Suero is a 57 year old male who presents in clinic for symptoms of neck pain for many years with radiculopathy based on diagnostic EMG which showed a C6 and C7 radiculopathy. We discussed that he would likely need an anterior cervical discectomy and fusion in the future based on the degree of his degenerative disc disease. However, we discussed that the patient's smoking history will be prohibitive. With respect to his carpal tunnel, we did discuss that carpal tunnel release could be considered if he so chooses to proceed. We did discuss that treatment with fusion for neck pain is 50% effective. His finger tingling may represent early myelopathy but there is no imaging or exam finding to support this.      Plan:   --would offer future anterior cervical disectomy and fusion should patient quit smoking; specific level(s) to be re-assessed upon clinical exam and possibly with " "re-imaging   --smoking cessation     Fisher \"Bahman\" MD [facility]   Neurosurgery, PGY-1    Answers for HPI/ROS submitted by the patient on 12/28/2017   General Symptoms: Yes  Skin Symptoms: No  HENT Symptoms: Yes  EYE SYMPTOMS: No  HEART SYMPTOMS: Yes  LUNG SYMPTOMS: Yes  INTESTINAL SYMPTOMS: Yes  URINARY SYMPTOMS: No  REPRODUCTIVE SYMPTOMS: No  SKELETAL SYMPTOMS: Yes  BLOOD SYMPTOMS: No  NERVOUS SYSTEM SYMPTOMS: Yes  MENTAL HEALTH SYMPTOMS: Yes  Fever: No  Loss of appetite: No  Weight loss: Yes  Weight gain: No  Fatigue: Yes  Night sweats: No  Chills: Yes  Excessive hunger: No  Excessive thirst: Yes  Feeling hot or cold when others believe the temperature is normal: No  Loss of height: No  Post-operative complications: No  Surgical site pain: No  Hallucinations: No  Change in or Loss of Energy: No  Hyperactivity: No  Confusion: No  Ear pain: No  Ear discharge: No  Hearing loss: No  Tinnitus: No  Nosebleeds: No  Congestion: Yes  Sinus pain: Yes  Trouble swallowing: Yes   Voice hoarseness: Yes  Mouth sores: No  Sore throat: No  Tooth pain: Yes  Gum tenderness: Yes  Bleeding gums: No  Change in taste: No  Change in sense of smell: No  Dry mouth: Yes  Hearing aid used: No  Neck lump: No  Cough: Yes  Sputum or phlegm: Yes  Coughing up blood: No  Difficulty breating or shortness of breath: Yes  Snoring: No  Wheezing: Yes  Difficulty breathing on exertion: Yes  Nighttime Cough: No  Difficulty breathing when lying flat: No  Chest pain or pressure: No  Fast or irregular heartbeat: Yes  Pain in legs with walking: Yes  Trouble breathing while lying down: No  Fingers or toes appear blue: No  High blood pressure: Yes  Low blood pressure: No  Fainting: No  Murmurs: No  Pacemaker: No  Varicose veins: No  Edema or swelling: No  Wake up at night with shortness of breath: No  Light-headedness: Yes  Exercise intolerance: No  Heart burn or indigestion: Yes  Nausea: No  Vomiting: No  Abdominal pain: No  Bloating: No  Constipation: No  Diarrhea: " No  Blood in stool: No  Black stools: No  Rectal or Anal pain: No  Fecal incontinence: No  Yellowing of skin or eyes: No  Vomit with blood: No  Change in stools: No  Back pain: Yes  Muscle aches: Yes  Neck pain: Yes  Swollen joints: Yes  Joint pain: Yes  Bone pain: Yes  Muscle weakness: Yes  Joint stiffness: Yes  Bone fracture: No  Trouble with coordination: No  Dizziness or trouble with balance: Yes  Fainting or black-out spells: No  Memory loss: Yes  Headache: Yes  Seizures: No  Speech problems: No  Tingling: Yes  Tremor: No  Weakness: Yes  Difficulty walking: Yes  Paralysis: No  Numbness: Yes  Nervous or Anxious: No  Depression: Yes  Trouble sleeping: No  Trouble thinking or concentrating: No  Mood changes: Yes  Panic attacks: No

## 2018-01-12 NOTE — TELEPHONE ENCOUNTER
Per Melanie Thomas, RODNEY CNP's 1/8/18 AVS:  1. Further procedures recommended: patient to discuss possible ISMAEL with his neurosurgical team. Patient is to call me if they are OK with proceeding and I will place an order for such  ----------------  Pt saw Dr. Harkins yesterday.  Notes are not done. Nothing stating an injection is okay.     Routed to provider.    Estelita Wetzel, RN-BSN  [facility]

## 2018-01-12 NOTE — TELEPHONE ENCOUNTER
Per patient Dahlia message:  From: Truman Suero      Created: 1/11/2018 10:49 AM      Dr Navarro- I had the appointment with the surgeon today. I spoke with her about injections as we talked at last Monday's appointment. She said it cannot hurt anything. No promise that it will help anything but cannot hurt either. Until I am two months nicotine free they won't touch me. As you know I am willing to try anything that promotes relief. Physical therapy is ok also. Not sure, but again think these may have to come from primary for Insurance purposes. What should I do?    [place]

## 2018-01-16 ENCOUNTER — MYC MEDICAL ADVICE (OUTPATIENT)
Dept: PALLIATIVE MEDICINE | Facility: CLINIC | Age: 58
End: 2018-01-16

## 2018-01-16 ENCOUNTER — OFFICE VISIT (OUTPATIENT)
Dept: OTOLARYNGOLOGY | Facility: CLINIC | Age: 58
End: 2018-01-16
Payer: COMMERCIAL

## 2018-01-16 VITALS — WEIGHT: 142.8 LBS | BODY MASS INDEX: 22.95 KG/M2 | HEIGHT: 66 IN | TEMPERATURE: 97.5 F

## 2018-01-16 DIAGNOSIS — J30.2 CHRONIC SEASONAL ALLERGIC RHINITIS DUE TO OTHER ALLERGEN: Primary | ICD-10-CM

## 2018-01-16 LAB — PAIN DRUG SCR UR W RPTD MEDS: NORMAL

## 2018-01-16 PROCEDURE — 99243 OFF/OP CNSLTJ NEW/EST LOW 30: CPT | Performed by: OTOLARYNGOLOGY

## 2018-01-16 NOTE — MR AVS SNAPSHOT
After Visit Summary   1/16/2018    Truman Suero    MRN: [id number]           Patient Information     Date Of Birth          1960        Visit Information        Provider Department      1/16/2018 1:45 PM Sheldon Shultz MD [facility]        Today's Diagnoses     Chronic seasonal allergic rhinitis due to other allergen    -  1      Care Instructions    General Scheduling Information  To schedule your CT/MRI scan, please contact [facility] at [phone] [address]  [place] [address]    To schedule your Surgery, please contact our Specialty Schedulers at [phone]    ENT Clinic Locations Clinic Hours Telephone Number     [facility]  [address]  [place] [address]   Tuesday:       8:00am -- 4:00pm    Wednesday:  8:00am - 4:00pm   To schedule an appointment with   Dr. Shultz,   please contact our   Specialty Scheduling Department at:     [phone]       [facility]  [address] [place] [address]   Friday:          8:00am - 4:00pm         Urgent Care Locations Clinic Hours Telephone Numbers     [facility]  [address] [place] [address]     Monday-Friday:     11:00pm - 9:00pm    Saturday-Sunday:  9:00am - 5:00pm   [phone]     [facility]  [address] [place] [address]     Monday-Friday:      5:00pm - 9:00pm     Saturday-Sunday:  9:00am - 5:00pm   [phone]                 Follow-ups after your visit        Additional Services     ALLERGY/ASTHMA ADULT REFERRAL       Your provider has referred you to: [facility]: [facility] [phone]  [email or web address]    Please be aware that coverage of these services is subject to the terms and limitations of your health insurance plan.  Call member services at your health plan with any benefit or coverage questions.      Please bring the following with you to your appointment:    (1) Any X-Rays, CTs  or MRIs which have been performed.  Contact the facility where they were done to arrange for  prior to your scheduled appointment.    (2) List of current medications  (3) This referral request   (4) Any documents/labs given to you for this referral                  Your next 10 appointments already scheduled     Jan 23, 2018  3:30 PM CST   Return Visit with Kentrell Castañeda LP   [facility] [place] ([facility] [place])    [address] [address] [phone]            Mar 05, 2018  2:00 PM CST   Return Visit with RODNEY Vargas CNP   [facility] ([facility])    [address]  [facility] [address] [phone]              Who to contact     If you have questions or need follow up information about today's clinic visit or your schedule please contact [facility] directly at [phone].  Normal or non-critical lab and imaging results will be communicated to you by DeepDyvehart, letter or phone within 4 business days after the clinic has received the results. If you do not hear from us within 7 days, please contact the clinic through DeepDyvehart or phone. If you have a critical or abnormal lab result, we will notify you by phone as soon as possible.  Submit refill requests through Fashfix or call your pharmacy and they will forward the refill request to us. Please allow 3 business days for your refill to be completed.          Additional Information About Your Visit        DeepDyvehart Information     Fashfix gives you secure access to your electronic health record. If you see a primary care provider, you can also send messages to your care team and make appointments. If you have questions, please call your primary care clinic.  If you do not have a primary care provider, please call [phone] and they will assist you.        Care EveryWhere ID     This is your Care EveryWhere ID. This could be used by  "other organizations to access your [facility] medical records  [id number]        Your Vitals Were     Temperature Height BMI (Body Mass Index)             97.5  F (36.4  C) (Oral) 1.664 m (5' 5.5\") 23.4 kg/m2          Blood Pressure from Last 3 Encounters:   01/11/18 135/86   01/08/18 128/88   12/19/17 147/81    Weight from Last 3 Encounters:   01/16/18 64.8 kg (142 lb 12.8 oz)   01/11/18 64 kg (141 lb)   01/08/18 64 kg (141 lb)              We Performed the Following     ALLERGY/ASTHMA ADULT REFERRAL        Primary Care Provider Office Phone # Fax #    Humberto Trinidad [phone]       [address] [place] [address]        Equal Access to Services     ELIEZER VALENTE : Hadii aad ku hadasho Soalireza, waaxda luqadaha, qaybta kaalmada adeaustinyaadrianna, mariela garduno . So [facility] [phone].    ATENCIÓN: Si habla español, tiene a simmons disposición servicios gratuitos de asistencia lingüística. Caseyame al [phone].    We comply with applicable federal civil rights laws and [place] laws. We do not discriminate on the basis of race, color, national origin, age, disability, sex, sexual orientation, or gender identity.            Thank you!     Thank you for choosing [facility]  for your care. Our goal is always to provide you with excellent care. Hearing back from our patients is one way we can continue to improve our services. Please take a few minutes to complete the written survey that you may receive in the mail after your visit with us. Thank you!             Your Updated Medication List - Protect others around you: Learn how to safely use, store and throw away your medicines at www.disposemymeds.org.          This list is accurate as of: 1/16/18  2:23 PM.  Always use your most recent med list.                   Brand Name Dispense Instructions for use Diagnosis    amoxicillin-clavulanate 875-125 MG per tablet    AUGMENTIN    20 tablet    Take 1 " tablet by mouth 2 times daily    Sinus pressure, Acute recurrent frontal sinusitis       fluticasone 50 MCG/ACT spray    FLONASE    1 Bottle    Spray 1-2 sprays into both nostrils daily    Nasal congestion, Sinus pressure       gabapentin 600 MG tablet    NEURONTIN    135 tablet    Take 1.5 tablets (900 mg) by mouth 3 times daily    Cervical spondylosis without myelopathy, DDD (degenerative disc disease), cervical, Chronic bilateral low back pain without sciatica, Chronic neck and back pain, Cervical stenosis of spinal canal       ibuprofen 800 MG tablet    ADVIL/MOTRIN          methocarbamol 500 MG tablet    ROBAXIN    150 tablet    Take 1-2 tablets (500-1,000 mg) by mouth 3 times daily as needed for muscle spasms Start with lower dose and caution sedation    Chronic neck pain, DDD (degenerative disc disease), cervical, Cervical spondylosis without myelopathy, Chronic bilateral low back pain without sciatica, Myofascial pain       topiramate 50 MG tablet    TOPAMAX    30 tablet    Take 50mg at bedtime.  Drink plenty of water and no alcohol. If side effects, then reduce to last tolerable dosage.    Cervical spondylosis without myelopathy, DDD (degenerative disc disease), cervical, Chronic bilateral low back pain without sciatica, Cervical stenosis of spinal canal       * traMADol 200 MG ER-tablet    ULTRAM-ER    30 tablet    Take 1 tablet (200 mg) by mouth daily OK to fill on/after 12/31/17and begin on 1/2/2018 to last 30 days    Cervical spondylosis without myelopathy, DDD (degenerative disc disease), cervical, Chronic bilateral low back pain without sciatica       * traMADol 50 MG tablet    ULTRAM    90 tablet    May take 1-2 tablets every 6 hours as needed for pain, max of 3 tabs per day. To last 30 days. Reduce as able.  Okay to fill on/after 12/31/17 and begin on 1/2/2018.    Chronic neck and back pain, Cervical radiculopathy       varenicline 0.5 MG X 11 & 1 MG X 42 tablet    CHANTIX STARTING MONTH PAK    53  tablet    Take 0.5 mg tab daily for 3 days, then 0.5 mg tab twice daily for 4 days, then 1 mg twice daily.    Tobacco abuse       * Notice:  This list has 2 medication(s) that are the same as other medications prescribed for you. Read the directions carefully, and ask your doctor or other care provider to review them with you.

## 2018-01-16 NOTE — PROGRESS NOTES
I am seeing this patient in consultation for nasal obstruction at the request of the provider Dr. Humberto Trinidad.      Chief Complaint - Nasal obstruction    History of Present Illness - Truman Suero is a 57 year old male who presents for evaluation of nasal obstruction. The patient describes symptoms of nasal obstruction and congestion for the past 6 months. The patient notes symptoms most predominately on the left side. Not much for drainage. The patient notes some spring allergies, but not much for symptoms. The patient feels antibiotics and flonase haven't helped breathing. No history of nasal trauma. No prior history of nasal surgery, sinus surgery. Current smoker. Cats as pets in the house.  as an occupation. Mild epistaxis.    Past Medical History -   Patient Active Problem List   Diagnosis     Chronic neck pain     Tobacco abuse     Cervical stenosis of spinal canal     DDD (degenerative disc disease), cervical     Cervical spondylosis without myelopathy     Chronic bilateral low back pain without sciatica     Myofascial pain       Current Medications -   Current Outpatient Prescriptions:      topiramate (TOPAMAX) 50 MG tablet, Take 50mg at bedtime.  Drink plenty of water and no alcohol. If side effects, then reduce to last tolerable dosage., Disp: 30 tablet, Rfl: 1     traMADol (ULTRAM-ER) 200 MG ER-tablet, Take 1 tablet (200 mg) by mouth daily OK to fill on/after 12/31/17and begin on 1/2/2018 to last 30 days, Disp: 30 tablet, Rfl: 0     traMADol (ULTRAM) 50 MG tablet, May take 1-2 tablets every 6 hours as needed for pain, max of 3 tabs per day. To last 30 days. Reduce as able.  Okay to fill on/after 12/31/17 and begin on 1/2/2018., Disp: 90 tablet, Rfl: 0     fluticasone (FLONASE) 50 MCG/ACT spray, Spray 1-2 sprays into both nostrils daily, Disp: 1 Bottle, Rfl: 1     gabapentin (NEURONTIN) 600 MG tablet, Take 1.5 tablets (900 mg) by mouth 3 times daily, Disp: 135 tablet, Rfl: 3     methocarbamol  "(ROBAXIN) 500 MG tablet, Take 1-2 tablets (500-1,000 mg) by mouth 3 times daily as needed for muscle spasms Start with lower dose and caution sedation, Disp: 150 tablet, Rfl: 1     ibuprofen (ADVIL,MOTRIN) 800 MG tablet, , Disp: , Rfl: 0     amoxicillin-clavulanate (AUGMENTIN) 875-125 MG per tablet, Take 1 tablet by mouth 2 times daily (Patient not taking: Reported on 1/16/2018), Disp: 20 tablet, Rfl: 0     varenicline (CHANTIX STARTING MONTH PAK) 0.5 MG X 11 & 1 MG X 42 tablet, Take 0.5 mg tab daily for 3 days, then 0.5 mg tab twice daily for 4 days, then 1 mg twice daily. (Patient not taking: Reported on 12/19/2017), Disp: 53 tablet, Rfl: 0    Allergies - No Known Allergies    Social History -   Social History     Social History     Marital status:      Spouse name: N/A     Number of children: N/A     Years of education: N/A     Social History Main Topics     Smoking status: Current Every Day Smoker     Smokeless tobacco: Never Used      Comment: Smoking survery given 3/17/17     Alcohol use No     Drug use: No     Sexual activity: Yes     Partners: Female     Birth control/ protection: None     Other Topics Concern     None     Social History Narrative       Family History -   Family History   Problem Relation Age of Onset     Prostate Cancer Father      Breast Cancer Maternal Grandmother      Prostate Cancer Other        Review of Systems - As per HPI and PMHx, otherwise 10+ comprehensive system review is negative.    Physical Exam  Temp 97.5  F (36.4  C) (Oral)  Ht 1.664 m (5' 5.5\")  Wt 64.8 kg (142 lb 12.8 oz)  BMI 23.4 kg/m2  General - The patient is in no distress. Alert and oriented to person and place, answers questions and cooperates with examination appropriately.   Neurologic - CN II-XII are grossly intact. No focal neurologic deficits.   Voice and Breathing - The patient was breathing comfortably without the use of accessory muscles. There was no wheezing, stridor, or stertor.  The patients " voice was clear and strong.  Eyes - Extraocular movements intact. Sclera were not icteric or injected, conjunctiva were pink and moist.  Mouth - Examination of the oral cavity showed pink, healthy oral mucosa. No lesions or ulcerations noted.  The tongue was mobile and midline, and the dentition were in good condition.    Throat - The walls of the oropharynx were smooth, pink, moist, symmetric, and had no lesions or ulcerations.  The tonsillar pillars and soft palate were symmetric.  The uvula was midline on elevation.  Nose - External contour is symmetric, no gross deflection or scars.  Nasal mucosa is pink and moist with clear mucus. However the turbinates are hypertrophic. The septum was deviated.  No polyps, masses, or purulence noted on examination.  Neck -  Palpation of the occipital, submental, submandibular, internal jugular chain, and supraclavicular nodes did not demonstrate any abnormal lymph nodes or masses. No parotid masses. Palpation of the thyroid was soft and smooth, with no nodules or goiter appreciated.  The trachea was mobile and midline.      A/P - Truman Suero is a 57 year old male with nasal obstruction due to turbinate hypertrophy and septal deviation. I recommend evaluation by allergy and allergy testing. He has cats and smokes, these maybe playing a role. If medical management fails the next steps may include nasal surgery in the form of septoplasty with inferior turbinate reduction and/or out-fracture. He will return if no symptoms improvement.     Sheldon Shultz MD  Otolaryngology  [facility]

## 2018-01-16 NOTE — LETTER
1/16/2018         RE: Truman Suero  [address] [address]        Dear Colleague,    Thank you for referring your patient, Truman Suero, to the [facility]. Please see a copy of my visit note below.    I am seeing this patient in consultation for nasal obstruction at the request of the provider Dr. Humberto Trinidad.      Chief Complaint - Nasal obstruction    History of Present Illness - Truman Suero is a 57 year old male who presents for evaluation of nasal obstruction. The patient describes symptoms of nasal obstruction and congestion for the past 6 months. The patient notes symptoms most predominately on the left side. Not much for drainage. The patient notes some spring allergies, but not much for symptoms. The patient feels antibiotics and flonase haven't helped breathing. No history of nasal trauma. No prior history of nasal surgery, sinus surgery. Current smoker. Cats as pets in the house.  as an occupation. Mild epistaxis.    Past Medical History -   Patient Active Problem List   Diagnosis     Chronic neck pain     Tobacco abuse     Cervical stenosis of spinal canal     DDD (degenerative disc disease), cervical     Cervical spondylosis without myelopathy     Chronic bilateral low back pain without sciatica     Myofascial pain       Current Medications -   Current Outpatient Prescriptions:      topiramate (TOPAMAX) 50 MG tablet, Take 50mg at bedtime.  Drink plenty of water and no alcohol. If side effects, then reduce to last tolerable dosage., Disp: 30 tablet, Rfl: 1     traMADol (ULTRAM-ER) 200 MG ER-tablet, Take 1 tablet (200 mg) by mouth daily OK to fill on/after 12/31/17and begin on 1/2/2018 to last 30 days, Disp: 30 tablet, Rfl: 0     traMADol (ULTRAM) 50 MG tablet, May take 1-2 tablets every 6 hours as needed for pain, max of 3 tabs per day. To last 30 days. Reduce as able.  Okay to fill on/after 12/31/17 and begin on 1/2/2018., Disp: 90 tablet, Rfl:  "0     fluticasone (FLONASE) 50 MCG/ACT spray, Spray 1-2 sprays into both nostrils daily, Disp: 1 Bottle, Rfl: 1     gabapentin (NEURONTIN) 600 MG tablet, Take 1.5 tablets (900 mg) by mouth 3 times daily, Disp: 135 tablet, Rfl: 3     methocarbamol (ROBAXIN) 500 MG tablet, Take 1-2 tablets (500-1,000 mg) by mouth 3 times daily as needed for muscle spasms Start with lower dose and caution sedation, Disp: 150 tablet, Rfl: 1     ibuprofen (ADVIL,MOTRIN) 800 MG tablet, , Disp: , Rfl: 0     amoxicillin-clavulanate (AUGMENTIN) 875-125 MG per tablet, Take 1 tablet by mouth 2 times daily (Patient not taking: Reported on 1/16/2018), Disp: 20 tablet, Rfl: 0     varenicline (CHANTIX STARTING MONTH PAK) 0.5 MG X 11 & 1 MG X 42 tablet, Take 0.5 mg tab daily for 3 days, then 0.5 mg tab twice daily for 4 days, then 1 mg twice daily. (Patient not taking: Reported on 12/19/2017), Disp: 53 tablet, Rfl: 0    Allergies - No Known Allergies    Social History -   Social History     Social History     Marital status:      Spouse name: N/A     Number of children: N/A     Years of education: N/A     Social History Main Topics     Smoking status: Current Every Day Smoker     Smokeless tobacco: Never Used      Comment: Smoking survery given 3/17/17     Alcohol use No     Drug use: No     Sexual activity: Yes     Partners: Female     Birth control/ protection: None     Other Topics Concern     None     Social History Narrative       Family History -   Family History   Problem Relation Age of Onset     Prostate Cancer Father      Breast Cancer Maternal Grandmother      Prostate Cancer Other        Review of Systems - As per HPI and PMHx, otherwise 10+ comprehensive system review is negative.    Physical Exam  Temp 97.5  F (36.4  C) (Oral)  Ht 1.664 m (5' 5.5\")  Wt 64.8 kg (142 lb 12.8 oz)  BMI 23.4 kg/m2  General - The patient is in no distress. Alert and oriented to person and place, answers questions and cooperates with examination " appropriately.   Neurologic - CN II-XII are grossly intact. No focal neurologic deficits.   Voice and Breathing - The patient was breathing comfortably without the use of accessory muscles. There was no wheezing, stridor, or stertor.  The patients voice was clear and strong.  Eyes - Extraocular movements intact. Sclera were not icteric or injected, conjunctiva were pink and moist.  Mouth - Examination of the oral cavity showed pink, healthy oral mucosa. No lesions or ulcerations noted.  The tongue was mobile and midline, and the dentition were in good condition.    Throat - The walls of the oropharynx were smooth, pink, moist, symmetric, and had no lesions or ulcerations.  The tonsillar pillars and soft palate were symmetric.  The uvula was midline on elevation.  Nose - External contour is symmetric, no gross deflection or scars.  Nasal mucosa is pink and moist with clear mucus. However the turbinates are hypertrophic. The septum was deviated.  No polyps, masses, or purulence noted on examination.  Neck -  Palpation of the occipital, submental, submandibular, internal jugular chain, and supraclavicular nodes did not demonstrate any abnormal lymph nodes or masses. No parotid masses. Palpation of the thyroid was soft and smooth, with no nodules or goiter appreciated.  The trachea was mobile and midline.      A/P - Truman Suero is a 57 year old male with nasal obstruction due to turbinate hypertrophy and septal deviation. I recommend evaluation by allergy and allergy testing. He has cats and smokes, these maybe playing a role. If medical management fails the next steps may include nasal surgery in the form of septoplasty with inferior turbinate reduction and/or out-fracture. He will return if no symptoms improvement.     Sheldon Shultz MD  Otolaryngology  [facility]      Again, thank you for allowing me to participate in the care of your patient.        Sincerely,        Sheldon Shultz MD

## 2018-01-16 NOTE — PATIENT INSTRUCTIONS
General Scheduling Information  To schedule your CT/MRI scan, please contact [place] [facility] at [phone]   [address] [place] [address]    To schedule your Surgery, please contact our Specialty Schedulers at [phone]    ENT Clinic Locations Clinic Hours Telephone Number     [facility]  [address] [place] [address]   Tuesday:       8:00am -- 4:00pm    Wednesday:  8:00am - 4:00pm   To schedule an appointment with   Dr. Shultz,   please contact our   Specialty Scheduling Department at:     [phone]       [facility]  [address] [place] [address]   Friday:          8:00am - 4:00pm         Urgent Care Locations Clinic Hours Telephone Numbers     [facility]  [address] [place] [address]     Monday-Friday:     11:00pm - 9:00pm    Saturday-Sunday:  9:00am - 5:00pm   [phone]     [facility]  [address] [place] [address]     Monday-Friday:      5:00pm - 9:00pm     Saturday-Sunday:  9:00am - 5:00pm   [phone]

## 2018-01-17 NOTE — TELEPHONE ENCOUNTER
Placed in already open encounter.     Jessika Flannery RN, [facility]  Pain Clinic Care Coordinator

## 2018-01-17 NOTE — TELEPHONE ENCOUNTER
Second my chart sent by pt:    Dr Navarro- Not sure if you got last message, but surgeon gave the ok on the injections you had recommended. Just guessing will have to get Dr. Trinidad involved for ins. purposes. Just a heads up as well. Had to make a earlier appt. My employer wants to pursue this as worker's comp. Guess I didn't think they would. She said after 20 years they would like to help get me better and transition other type of job.     [place]    Sending to provider to respond.     Jessika Flannery, RN, [facility]  Pain Clinic Care Coordinator

## 2018-01-17 NOTE — TELEPHONE ENCOUNTER
My chart message from pt:    Dr Navarro- Not sure if you got last message, but surgeon gave the ok on the injections you had recommended. Just guessing will have to get Dr. Trinidad involved for ins. purposes. Just a heads up as well. Had to make a earlier appt. My employer wants to pursue this as worker's comp. Guess I didn't think they would. She said after 20 years they would like to help get me better and transition other type of job.     [place]

## 2018-01-18 ENCOUNTER — MYC MEDICAL ADVICE (OUTPATIENT)
Dept: FAMILY MEDICINE | Facility: CLINIC | Age: 58
End: 2018-01-18

## 2018-01-18 DIAGNOSIS — R09.81 NASAL CONGESTION: ICD-10-CM

## 2018-01-18 DIAGNOSIS — J34.89 NASAL OBSTRUCTION: Primary | ICD-10-CM

## 2018-01-19 NOTE — TELEPHONE ENCOUNTER
Xavier Chakraborty-    Would you like me to place an order for a cervical epidural steroid injection to see if that would give you any relief? Continue your work on smoking cessation.    Thanks.  Melanie STEVENS RN CNP, [facility]ELISS[facility]  [facility]    I have sent the above Ajungot message to the patient.     Melanie STEVENS RN CNP, [facility]  [facility]

## 2018-01-22 ENCOUNTER — OFFICE VISIT (OUTPATIENT)
Dept: ALLERGY | Facility: CLINIC | Age: 58
End: 2018-01-22
Payer: COMMERCIAL

## 2018-01-22 VITALS
HEIGHT: 65 IN | OXYGEN SATURATION: 96 % | DIASTOLIC BLOOD PRESSURE: 81 MMHG | WEIGHT: 142.2 LBS | BODY MASS INDEX: 23.69 KG/M2 | SYSTOLIC BLOOD PRESSURE: 137 MMHG | HEART RATE: 60 BPM | TEMPERATURE: 97.8 F

## 2018-01-22 DIAGNOSIS — H10.13 ALLERGIC CONJUNCTIVITIS OF BOTH EYES: ICD-10-CM

## 2018-01-22 DIAGNOSIS — J30.89 ALLERGIC RHINITIS DUE TO DUST MITE: ICD-10-CM

## 2018-01-22 DIAGNOSIS — J30.81 CHRONIC ALLERGIC RHINITIS DUE TO ANIMAL HAIR AND DANDER: Primary | ICD-10-CM

## 2018-01-22 DIAGNOSIS — J30.1 CHRONIC SEASONAL ALLERGIC RHINITIS DUE TO POLLEN: ICD-10-CM

## 2018-01-22 PROCEDURE — 95004 PERQ TESTS W/ALRGNC XTRCS: CPT | Performed by: ALLERGY & IMMUNOLOGY

## 2018-01-22 PROCEDURE — 99243 OFF/OP CNSLTJ NEW/EST LOW 30: CPT | Mod: 25 | Performed by: ALLERGY & IMMUNOLOGY

## 2018-01-22 RX ORDER — FLUTICASONE PROPIONATE 50 MCG
2 SPRAY, SUSPENSION (ML) NASAL 2 TIMES DAILY
Qty: 2 BOTTLE | Refills: 11 | Status: SHIPPED | OUTPATIENT
Start: 2018-01-22 | End: 2019-02-19

## 2018-01-22 NOTE — MR AVS SNAPSHOT
After Visit Summary   1/22/2018    Truman Suero    MRN: [id number]           Patient Information     Date Of Birth          1960        Visit Information        Provider Department      1/22/2018 2:20 PM Dontrell Tilley MD [facility]        Today's Diagnoses     Chronic allergic rhinitis due to animal hair and dander    -  1    Chronic seasonal allergic rhinitis due to pollen        Allergic rhinitis due to dust mite        Allergic conjunctivitis of both eyes          Care Instructions    If you have any questions regarding your allergies, asthma, or what we discussed during your visit today please call the allergy clinic or contact us via Drik.    [facility] Allergy: [phone]    I recommend that you start taking medications on a daily basis:  1. Zyrtec (cetirizine) 10mg - Take 1 tablet daily  2. Flonase (fluticasone) nasal spray - 2 sprays in each nostril once or twice daily  3. Use the neti pot once a day - do this about 30 minutes before using the flonase  4. Use the saline nasal spray as needed  5. Follow-up in about 3 months      Allergy shots may be a good treatment option for you. Contact your insurance company to see if these are covered. The diagnosis codes that they may ask for are J30.1, J30.81, J30.89, and H10.13 (allergic rhinitis due to animals, dust mite, pollens, and allergic conjunctivitis)    If you were to get allergy shots you would need 2 separate injections (1 in each arm) and a total of 40mL of serum to get started.    You can also ask your insurance company if they cover accelerated building schedules. The code for this is 24395 (rapid desensitization)      Controlling Allergens: Dust Mites  Constant exposure to allergens means constant allergy symptoms. That s why controlling or avoiding the allergens that cause your symptoms is an important part of your treatment. If you are allergic to dust mites, the tips below can help to lessen your  exposure to dust mites.     Wash all bedding in hot water.   Dust mite allergy  Dust mites are a common cause of nasal allergies. These mites are tiny organisms that live in bedding, upholstered furniture, and carpet. They live in warm, humid conditions. House-dust mites are almost impossible to get rid of. But you can keep them under control.  Make changes to your home  Some furniture, like sofas and chairs, hold dust mites. To lessen the problem:    Choose nonfabric upholstery, like leather or vinyl.    Replace horizontal blinds with pull-down shades or vertical blinds.    Use washable curtains instead of heavy drapes.    Have as little carpeting as possible.    Cover your mattress, box spring, and pillows in allergy-proof casings.  Housecleaning  Here are some tips:    Wash sheets, blankets, and mattress pads every 1 to 2 weeks in hot water (at least 130 F).    Remove stuffed animals and other things that collect dust, such as wall hangings, knickknacks, and books--especially in the bedroom.    Dust your home every week with a damp cloth. Vacuum once a week. Use HEPA (high efficiency particulate air) filters or double-ply bags in the vacuum . Or, use a vacuum designed to lessen allergens.    If someone else can t dust and vacuum for you, wearing a filter mask may help.  Reduce indoor humidity  Dust mites need moist air to live. Use a dehumidifier to reduce air moisture. Don t use humidifiers, or vaporizers.  Talk with your healthcare provider about other ways to reduce dust in your home. Ask about medicines that can help with your allergy symptoms.  Date Last Reviewed: 9/1/2016 2000-2017 [employer]. [address] [place] [address], [place] [address]. All rights reserved. This information is not intended as a substitute for professional medical care. Always follow your healthcare professional's instructions.        Controlling Allergens: Dust Mites in the Bedroom    Many people with asthma are  allergic to dust mites. Dust mites are tiny bugs that live in warm, damp places. They are too small to see, but they live in mattresses, pillows, upholstered furniture, and house dust. Dust mite allergy can cause asthma flare-ups. If you have this allergy, there are many steps you can take to control dust mites at home.  Take these steps to control dust mites in your bed and bedroom:  1. Keep all clothing in a closet, with the door shut.  2. Make sure the room is not too humid. It should be below 50% humidity.  3. Choose wood, leather, or vinyl for furniture instead of upholstery.  4. Wash all bedding, pillows, and stuffed toys in hot water (130 F) every week. Remove any items that cannot be washed.  5. Use special covers on pillows, mattresses, and box springs. These covers are made for people with allergies.  6. If you can, replace carpeting with tile or hardwood dakota. Use washable throw rugs, or don't use rugs at all.  7. Dust furniture with a damp cloth at least once a week.  8. Use an air conditioner or a dehumidifier to reduce humidity and filter the air. Clean the filter regularly.  9. Use pull-down shades or vertical window blinds that can be easily cleaned. Use these instead of curtains or drapes.  10. Use filters over heater vents.  Date Last Reviewed: 10/1/2016    4334-4592 [employer] ? [employer]. [address] [place] [address]. All rights reserved. This information is not intended as a substitute for professional medical care. Always follow your healthcare professional's instructions.                Follow-ups after your visit        Follow-up notes from your care team     Return in about 3 months (around 4/22/2018).      Your next 10 appointments already scheduled     Jan 23, 2018  3:30 PM CST   Return Visit with Kentrell Castañeda LP   [facility] [place] ([facility])    [address] [address]   [phone]            Jan 31, 2018   "1:30 PM CST   Return Visit with RODNEY Vargas CNP   [facility] [place] ([facility] [place])    [address]  [place] [address] [phone]              Who to contact     If you have questions or need follow up information about today's clinic visit or your schedule please contact [facility] JOSE MIGUEL directly at [phone].  Normal or non-critical lab and imaging results will be communicated to you by [email or web address], letter or phone within 4 business days after the clinic has received the results. If you do not hear from us within 7 days, please contact the clinic through [employer] or phone. If you have a critical or abnormal lab result, we will notify you by phone as soon as possible.  Submit refill requests through [employer] or call your pharmacy and they will forward the refill request to us. Please allow 3 business days for your refill to be completed.          Additional Information About Your Visit        [email or web address] Information     [employer] gives you secure access to your electronic health record. If you see a primary care provider, you can also send messages to your care team and make appointments. If you have questions, please call your primary care clinic.  If you do not have a primary care provider, please call [phone] and they will assist you.        Care EveryWhere ID     This is your Care EveryWhere ID. This could be used by other organizations to access your [place] medical records  [id number]        Your Vitals Were     Pulse Temperature Height Pulse Oximetry BMI (Body Mass Index)       60 97.8  F (36.6  C) (Oral) 1.657 m (5' 5.25\") 96% 23.48 kg/m2        Blood Pressure from Last 3 Encounters:   01/22/18 137/81   01/11/18 135/86   01/08/18 128/88    Weight from Last 3 Encounters:   01/22/18 64.5 kg (142 lb 3.2 oz)   01/16/18 64.8 kg (142 lb 12.8 oz)   01/11/18 64 kg (141 lb)              We Performed the Following     ALLERGY SKIN TESTS,ALLERGENS     "      Today's Medication Changes          These changes are accurate as of: 1/22/18  3:34 PM.  If you have any questions, ask your nurse or doctor.               These medicines have changed or have updated prescriptions.        Dose/Directions    fluticasone 50 MCG/ACT spray   Commonly known as:  FLONASE   This may have changed:    - how much to take  - when to take this   Used for:  Chronic allergic rhinitis due to animal hair and dander, Chronic seasonal allergic rhinitis due to pollen, Allergic rhinitis due to dust mite, Allergic conjunctivitis of both eyes   Changed by:  Dontrell Tilley MD        Dose:  2 spray   Spray 2 sprays into both nostrils 2 times daily   Quantity:  2 Bottle   Refills:  11            Where to get your medicines      These medications were sent to [facility] [address] [place] [address]     Phone:  [phone]     fluticasone 50 MCG/ACT spray                Primary Care Provider Office Phone # Fax #    Humberto Trinidad  [phone]       [address] [place] [address]        Equal Access to Services     [facility] AH: Hadii cece talley hadtoni Soalireza, waaxda luqadaha, qaybta kaalmada cornel, mariela garduno . So [facility] [phone].    ATENCIÓN: Si habla español, tiene a simmons disposición servicios gratuitos de asistencia lingüística. [facility] [phone].    We comply with applicable federal civil rights laws and [place] laws. We do not discriminate on the basis of race, color, national origin, age, disability, sex, sexual orientation, or gender identity.            Thank you!     Thank you for choosing [facility]  for your care. Our goal is always to provide you with excellent care. Hearing back from our patients is one way we can continue to improve our services. Please take a few minutes to complete the written survey that you may receive in the mail  after your visit with us. Thank you!             Your Updated Medication List - Protect others around you: Learn how to safely use, store and throw away your medicines at www.disposemymeds.org.          This list is accurate as of: 1/22/18  3:34 PM.  Always use your most recent med list.                   Brand Name Dispense Instructions for use Diagnosis    fluticasone 50 MCG/ACT spray    FLONASE    2 Bottle    Spray 2 sprays into both nostrils 2 times daily    Chronic allergic rhinitis due to animal hair and dander, Chronic seasonal allergic rhinitis due to pollen, Allergic rhinitis due to dust mite, Allergic conjunctivitis of both eyes       gabapentin 600 MG tablet    NEURONTIN    135 tablet    Take 1.5 tablets (900 mg) by mouth 3 times daily    Cervical spondylosis without myelopathy, DDD (degenerative disc disease), cervical, Chronic bilateral low back pain without sciatica, Chronic neck and back pain, Cervical stenosis of spinal canal       ibuprofen 800 MG tablet    ADVIL/MOTRIN          methocarbamol 500 MG tablet    ROBAXIN    150 tablet    Take 1-2 tablets (500-1,000 mg) by mouth 3 times daily as needed for muscle spasms Start with lower dose and caution sedation    Chronic neck pain, DDD (degenerative disc disease), cervical, Cervical spondylosis without myelopathy, Chronic bilateral low back pain without sciatica, Myofascial pain       topiramate 50 MG tablet    TOPAMAX    30 tablet    Take 50mg at bedtime.  Drink plenty of water and no alcohol. If side effects, then reduce to last tolerable dosage.    Cervical spondylosis without myelopathy, DDD (degenerative disc disease), cervical, Chronic bilateral low back pain without sciatica, Cervical stenosis of spinal canal       * traMADol 200 MG ER-tablet    ULTRAM-ER    30 tablet    Take 1 tablet (200 mg) by mouth daily OK to fill on/after 12/31/17and begin on 1/2/2018 to last 30 days    Cervical spondylosis without myelopathy, DDD (degenerative disc  disease), cervical, Chronic bilateral low back pain without sciatica       * traMADol 50 MG tablet    ULTRAM    90 tablet    May take 1-2 tablets every 6 hours as needed for pain, max of 3 tabs per day. To last 30 days. Reduce as able.  Okay to fill on/after 12/31/17 and begin on 1/2/2018.    Chronic neck and back pain, Cervical radiculopathy       varenicline 0.5 MG X 11 & 1 MG X 42 tablet    CHANTIX STARTING MONTH PAK    53 tablet    Take 0.5 mg tab daily for 3 days, then 0.5 mg tab twice daily for 4 days, then 1 mg twice daily.    Tobacco abuse       * Notice:  This list has 2 medication(s) that are the same as other medications prescribed for you. Read the directions carefully, and ask your doctor or other care provider to review them with you.

## 2018-01-22 NOTE — PATIENT INSTRUCTIONS
If you have any questions regarding your allergies, asthma, or what we discussed during your visit today please call the allergy clinic or contact us via [employer].    [facility] All[facility]: [phone]    I recommend that you start taking medications on a daily basis:  1. Zyrtec (cetirizine) 10mg - Take 1 tablet daily  2. Flonase (fluticasone) nasal spray - 2 sprays in each nostril once or twice daily  3. Use the neti pot once a day - do this about 30 minutes before using the flonase  4. Use the saline nasal spray as needed  5. Follow-up in about 3 months      Allergy shots may be a good treatment option for you. Contact your insurance company to see if these are covered. The diagnosis codes that they may ask for are J30.1, J30.81, J30.89, and H10.13 (allergic rhinitis due to animals, dust mite, pollens, and allergic conjunctivitis)    If you were to get allergy shots you would need 2 separate injections (1 in each arm) and a total of 40mL of serum to get started.    You can also ask your insurance company if they cover accelerated building schedules. The code for this is 61375 (rapid desensitization)      Controlling Allergens: Dust Mites  Constant exposure to allergens means constant allergy symptoms. That s why controlling or avoiding the allergens that cause your symptoms is an important part of your treatment. If you are allergic to dust mites, the tips below can help to lessen your exposure to dust mites.     Wash all bedding in hot water.   Dust mite allergy  Dust mites are a common cause of nasal allergies. These mites are tiny organisms that live in bedding, upholstered furniture, and carpet. They live in warm, humid conditions. House-dust mites are almost impossible to get rid of. But you can keep them under control.  Make changes to your home  Some furniture, like sofas and chairs, hold dust mites. To lessen the problem:    Choose nonfabric upholstery, like leather or vinyl.    Replace horizontal  blinds with pull-down shades or vertical blinds.    Use washable curtains instead of heavy drapes.    Have as little carpeting as possible.    Cover your mattress, box spring, and pillows in allergy-proof casings.  Housecleaning  Here are some tips:    Wash sheets, blankets, and mattress pads every 1 to 2 weeks in hot water (at least 130 F).    Remove stuffed animals and other things that collect dust, such as wall hangings, knickknacks, and books--especially in the bedroom.    Dust your home every week with a damp cloth. Vacuum once a week. Use HEPA (high efficiency particulate air) filters or double-ply bags in the vacuum . Or, use a vacuum designed to lessen allergens.    If someone else can t dust and vacuum for you, wearing a filter mask may help.  Reduce indoor humidity  Dust mites need moist air to live. Use a dehumidifier to reduce air moisture. Don t use humidifiers, or vaporizers.  Talk with your healthcare provider about other ways to reduce dust in your home. Ask about medicines that can help with your allergy symptoms.  Date Last Reviewed: 9/1/2016 2000-2017 [employer]. [address] [place] [address]. All rights reserved. This information is not intended as a substitute for professional medical care. Always follow your healthcare professional's instructions.        Controlling Allergens: Dust Mites in the Bedroom    Many people with asthma are allergic to dust mites. Dust mites are tiny bugs that live in warm, damp places. They are too small to see, but they live in mattresses, pillows, upholstered furniture, and house dust. Dust mite allergy can cause asthma flare-ups. If you have this allergy, there are many steps you can take to control dust mites at home.  Take these steps to control dust mites in your bed and bedroom:  1. Keep all clothing in a closet, with the door shut.  2. Make sure the room is not too humid. It should be below 50% humidity.  3. Choose wood,  leather, or vinyl for furniture instead of upholstery.  4. Wash all bedding, pillows, and stuffed toys in hot water (130 F) every week. Remove any items that cannot be washed.  5. Use special covers on pillows, mattresses, and box springs. These covers are made for people with allergies.  6. If you can, replace carpeting with tile or hardwood dakota. Use washable throw rugs, or don't use rugs at all.  7. Dust furniture with a damp cloth at least once a week.  8. Use an air conditioner or a dehumidifier to reduce humidity and filter the air. Clean the filter regularly.  9. Use pull-down shades or vertical window blinds that can be easily cleaned. Use these instead of curtains or drapes.  10. Use filters over heater vents.  Date Last Reviewed: 10/1/2016    1806-6859 [employer]. [address] [place] [address], [place] [address]. All rights reserved. This information is not intended as a substitute for professional medical care. Always follow your healthcare professional's instructions.

## 2018-01-22 NOTE — NURSING NOTE
"Chief Complaint   Patient presents with     Consult     chronic seasonal allergies.  Left side inability to breath started with Primary thought it was an infection, referred to Dr Shultz stated he has a deviated septum. Dr Shultz thought it was a good idea to have allergy work up.       Initial /81 (BP Location: Left arm, Patient Position: Sitting, Cuff Size: Adult Regular)  Pulse 60  Temp 97.8  F (36.6  C) (Oral)  Ht 1.657 m (5' 5.25\")  Wt 64.5 kg (142 lb 3.2 oz)  SpO2 96%  BMI 23.48 kg/m2 Estimated body mass index is 23.48 kg/(m^2) as calculated from the following:    Height as of this encounter: 1.657 m (5' 5.25\").    Weight as of this encounter: 64.5 kg (142 lb 3.2 oz).  Medication Reconciliation: complete     Rama Gan, CMA        "

## 2018-01-22 NOTE — LETTER
1/22/2018         RE: Truman Suero  [address] [address]        Dear Colleague,    Thank you for referring your patient, Truman Suero, to the [facility]. Please see a copy of my visit note below.    Dear Sheldon Shultz MD    Thank you for referring your patient Truman Suero to the Allergy/Immunology Clinic. Truman Suero was seen in the Allergy Clinic at [facility]. The following are my recommendations regarding his Allergic Rhinitis Due to Animals, Allergic Rhinitis Due to Pollen, Allergic Rhinitis Due to Dust Mites and Allergic Conjunctivitis    1. Continue fluticasone nasal spray, 2 sprays in each nostril twice daily - appropriate nasal spray technique reviewed  2. Begin cetirizine 10mg daily  3. Use sinus irrigation rinse/neti pot daily  4. Recommend consideration of allergen immunotherapy treatment  5. Counseling provided regarding allergen avoidance measures to animals and dust mite  6. Follow-up in 3 months      Truman Suero is a 57 year old White male being seen today in consultation for nasal congestion. He states that for years he has had difficulty breathing through his nose but it acutely worsened in the past month. Palmer feels it is more difficult to breathe through the left side of his nose than the right. He denies associated sneezing, rhinorrhea, post-nasal drainage, throat clearing, cough, or shortness of breath. His sense of taste and smell have been normal. He saw his PCP and was treated for a sinus infection with augmentin and flonase but his symptoms did not resolve. Last week Palmer was seen in the ENT clinic and noted to have a deviated septum. Surgical repair was discussed but he elected to proceed with allergy evaluation first. Palmer states that he does occasionally get itchy eyes and skin after being around his cats but does not otherwise not seasonal allergy symptoms. He has not previously been tested for  allergies in the past. He is a current smoker and is not sure if his symptoms are due to his ongoing tobacco use, allergies, or his deviated septum.    Over the last month Palmer has been using flonase and recently increased to 2 sprays in each nostril daily. In the past he has occasionally taken antihistamines to help with skin or eye itching but has not taken medications on a consistent basis. He has also used saline nasal spray and recently purchased a neti pot and finds this to be helpful.    Palmer has 2 pet cats and 2 pet dogs. The dogs sleep in his bed and the cats also spend time in the bed and bedroom. He feels the cats bother him more than the dogs and will develop itchy eyes or skin when around them.      Past Medical History:   Diagnosis Date     Neck pain     MRI positive on cervical vertebrea.     Family History   Problem Relation Age of Onset     Prostate Cancer Father      Breast Cancer Maternal Grandmother      Prostate Cancer Other      History reviewed. No pertinent surgical history.    ENVIRONMENTAL HISTORY: [place] The home is heated with a forced air. They does have central air conditioning. The patient's bedroom is furnished with Indoor plants and hard dakota in bedroom.  Pets inside the house include 2 cat(s), and Dog(s). There is not history of cockroach or mice infestation. There is/are 1 smokers in the house.  The house does not have a damp basement.     SOCIAL HISTORY:   Truman is employed as . He has missed 0 days of work. He lives with his spouse and son.  His spouse works as a Human Resource Executive.    REVIEW OF SYSTEMS:  General: negative for weight gain. negative for weight loss. positive  for changes in sleep.   Eyes: positive  for itching. positive  for redness. negative for tearing/watering.  Ears: positive  for fullness. negative for hearing loss. negative for dizziness.   Nose: positive  for snoring.negative for changes in  smell. positive  for drainage.   Throat: positive  for hoarseness. positive  for sore throat. positive  for trouble swallowing.   Lungs: positive  for shortness of breath.positive  for wheezing. positive  for sputum production.   Cardiovascular: negative for chest pain. negative for swelling of ankles. negative for fast or irregular heartbeat.   Gastrointestinal: negative for nausea. positive  for heartburn. positive  for acid reflux.   Musculoskeletal: positive  for joint pain. positive  for joint stiffness. negative for joint swelling.   Neurologic: negative for seizures. negative for fainting. negative for weakness.   Psychiatric: negative for changes in mood. negative for anxiety.   Endocrine: positive  for cold intolerance. negative for heat intolerance. negative for tremors.   Hematologic: positive  for easy bruising. negative for easy bleeding.  Integumentary: positive  for rash. negative for scaling. negative for nail changes.       Current Outpatient Prescriptions:      topiramate (TOPAMAX) 50 MG tablet, Take 50mg at bedtime.  Drink plenty of water and no alcohol. If side effects, then reduce to last tolerable dosage., Disp: 30 tablet, Rfl: 1     traMADol (ULTRAM-ER) 200 MG ER-tablet, Take 1 tablet (200 mg) by mouth daily OK to fill on/after 12/31/17and begin on 1/2/2018 to last 30 days, Disp: 30 tablet, Rfl: 0     traMADol (ULTRAM) 50 MG tablet, May take 1-2 tablets every 6 hours as needed for pain, max of 3 tabs per day. To last 30 days. Reduce as able.  Okay to fill on/after 12/31/17 and begin on 1/2/2018., Disp: 90 tablet, Rfl: 0     fluticasone (FLONASE) 50 MCG/ACT spray, Spray 1-2 sprays into both nostrils daily, Disp: 1 Bottle, Rfl: 1     gabapentin (NEURONTIN) 600 MG tablet, Take 1.5 tablets (900 mg) by mouth 3 times daily, Disp: 135 tablet, Rfl: 3     methocarbamol (ROBAXIN) 500 MG tablet, Take 1-2 tablets (500-1,000 mg) by mouth 3 times daily as needed for muscle spasms Start with lower dose and  "caution sedation, Disp: 150 tablet, Rfl: 1     ibuprofen (ADVIL,MOTRIN) 800 MG tablet, , Disp: , Rfl: 0     amoxicillin-clavulanate (AUGMENTIN) 875-125 MG per tablet, Take 1 tablet by mouth 2 times daily (Patient not taking: Reported on 1/16/2018), Disp: 20 tablet, Rfl: 0     varenicline (CHANTIX STARTING MONTH PAK) 0.5 MG X 11 & 1 MG X 42 tablet, Take 0.5 mg tab daily for 3 days, then 0.5 mg tab twice daily for 4 days, then 1 mg twice daily. (Patient not taking: Reported on 12/19/2017), Disp: 53 tablet, Rfl: 0  Immunization History   Administered Date(s) Administered     Influenza Vaccine IM 3yrs+ 4 Valent IIV4 02/07/2017     TD (ADULT, 7+) 12/18/2006     TDAP Vaccine (Adacel) 02/07/2017     No Known Allergies      EXAM:   /81 (BP Location: Left arm, Patient Position: Sitting, Cuff Size: Adult Regular)  Pulse 60  Temp 97.8  F (36.6  C) (Oral)  Ht 1.657 m (5' 5.25\")  Wt 64.5 kg (142 lb 3.2 oz)  SpO2 96%  BMI 23.48 kg/m2  GENERAL APPEARANCE: alert, cooperative and not in distress  SKIN: no rashes, no lesions  HEAD: atraumatic, normocephalic  EYES: lids and lashes normal, conjunctivae and sclerae clear, pupils equal, round, reactive to light, EOM full and intact  ENT: no scars or lesions, nasal exam showed no discharge, swelling or lesions noted and nasal septal deviation, otoscopy showed external auditory canals clear, tympanic membranes normal, tongue midline and normal, soft palate, uvula, and tonsils normal  NECK: no asymmetry, masses, or scars, supple without significant adenopathy  LUNGS: unlabored respirations, no intercostal retractions or accessory muscle use, clear to auscultation without rales or wheezes  HEART: regular rate and rhythm without murmurs and normal S1 and S2  MUSCULOSKELETAL: no musculoskeletal defects are noted  NEURO: no focal deficits noted  PSYCH: does not appear depressed or anxious    WORKUP: Skin testing    Skin prick testing was performed to our adult aeroallergen panel. He " had positive reactions to cat, dog, dust mite (dp), Kyler grass, grass mix, oak, and nettle. All others were negative with appropriate responses to controls.    ASSESSMENT/PLAN:  Truman Suero is a 57 year old male here for evaluation of possible allergies. Skin prick testing was positive for sensitization to cat, dog, dust mite and a few pollens. Truman was counseled regarding allergen avoidance measures, medication management and allergen immunotherapy treatment. Given his likely continued exposure to cats and dogs he would be a good candidate for immunotherapy. We discussed the risks, benefits, and anticipated duration of treatment. Truman would like to begin medication management and contact his insurance company before pursuing immunotherapy treatment.    1. Continue fluticasone nasal spray, 2 sprays in each nostril twice daily - appropriate nasal spray technique reviewed  2. Begin cetirizine 10mg daily  3. Use sinus irrigation rinse/neti pot daily  4. Recommend consideration of allergen immunotherapy treatment  5. Counseling provided regarding allergen avoidance measures to animals and dust mite  6. Follow-up in 3 months      Dontrell Tilley MD  Allergy/Immunology  [place], [place]      Chart documentation done in part with Dragon Voice Recognition Software. Although reviewed after completion, some word and grammatical errors may remain.      Again, thank you for allowing me to participate in the care of your patient.        Sincerely,        Dontrell Tilley MD

## 2018-01-22 NOTE — PROGRESS NOTES
Dear Sheldon Shultz MD    Thank you for referring your patient Truman Suero to the Allergy/Immunology Clinic. Truman Suero was seen in the Allergy Clinic at [facility]. The following are my recommendations regarding his Allergic Rhinitis Due to Animals, Allergic Rhinitis Due to Pollen, Allergic Rhinitis Due to Dust Mites and Allergic Conjunctivitis    1. Continue fluticasone nasal spray, 2 sprays in each nostril twice daily - appropriate nasal spray technique reviewed  2. Begin cetirizine 10mg daily  3. Use sinus irrigation rinse/neti pot daily  4. Recommend consideration of allergen immunotherapy treatment  5. Counseling provided regarding allergen avoidance measures to animals and dust mite  6. Follow-up in 3 months      Truman Suero is a 57 year old White male being seen today in consultation for nasal congestion. He states that for years he has had difficulty breathing through his nose but it acutely worsened in the past month. Palmer feels it is more difficult to breathe through the left side of his nose than the right. He denies associated sneezing, rhinorrhea, post-nasal drainage, throat clearing, cough, or shortness of breath. His sense of taste and smell have been normal. He saw his PCP and was treated for a sinus infection with augmentin and flonase but his symptoms did not resolve. Last week Palmer was seen in the ENT clinic and noted to have a deviated septum. Surgical repair was discussed but he elected to proceed with allergy evaluation first. Palmer states that he does occasionally get itchy eyes and skin after being around his cats but does not otherwise not seasonal allergy symptoms. He has not previously been tested for allergies in the past. He is a current smoker and is not sure if his symptoms are due to his ongoing tobacco use, allergies, or his deviated septum.    Over the last month Palmer has been using flonase and recently increased to 2 sprays in each nostril  daily. In the past he has occasionally taken antihistamines to help with skin or eye itching but has not taken medications on a consistent basis. He has also used saline nasal spray and recently purchased a neti pot and finds this to be helpful.    Palmer has 2 pet cats and 2 pet dogs. The dogs sleep in his bed and the cats also spend time in the bed and bedroom. He feels the cats bother him more than the dogs and will develop itchy eyes or skin when around them.      Past Medical History:   Diagnosis Date     Neck pain     MRI positive on cervical vertebrea.     Family History   Problem Relation Age of Onset     Prostate Cancer Father      Breast Cancer Maternal Grandmother      Prostate Cancer Other      History reviewed. No pertinent surgical history.    ENVIRONMENTAL HISTORY: [place] The home is heated with a forced air. They does have central air conditioning. The patient's bedroom is furnished with Indoor plants and hard dakota in bedroom.  Pets inside the house include 2 cat(s), and Dog(s). There is not history of cockroach or mice infestation. There is/are 1 smokers in the house.  The house does not have a damp basement.     SOCIAL HISTORY:   Truman is employed as . He has missed 0 days of work. He lives with his spouse and son.  His spouse works as a Human Resource Executive.    REVIEW OF SYSTEMS:  General: negative for weight gain. negative for weight loss. positive  for changes in sleep.   Eyes: positive  for itching. positive  for redness. negative for tearing/watering.  Ears: positive  for fullness. negative for hearing loss. negative for dizziness.   Nose: positive  for snoring.negative for changes in smell. positive  for drainage.   Throat: positive  for hoarseness. positive  for sore throat. positive  for trouble swallowing.   Lungs: positive  for shortness of breath.positive  for wheezing. positive  for sputum production.   Cardiovascular: negative  for chest pain. negative for swelling of ankles. negative for fast or irregular heartbeat.   Gastrointestinal: negative for nausea. positive  for heartburn. positive  for acid reflux.   Musculoskeletal: positive  for joint pain. positive  for joint stiffness. negative for joint swelling.   Neurologic: negative for seizures. negative for fainting. negative for weakness.   Psychiatric: negative for changes in mood. negative for anxiety.   Endocrine: positive  for cold intolerance. negative for heat intolerance. negative for tremors.   Hematologic: positive  for easy bruising. negative for easy bleeding.  Integumentary: positive  for rash. negative for scaling. negative for nail changes.       Current Outpatient Prescriptions:      topiramate (TOPAMAX) 50 MG tablet, Take 50mg at bedtime.  Drink plenty of water and no alcohol. If side effects, then reduce to last tolerable dosage., Disp: 30 tablet, Rfl: 1     traMADol (ULTRAM-ER) 200 MG ER-tablet, Take 1 tablet (200 mg) by mouth daily OK to fill on/after 12/31/17and begin on 1/2/2018 to last 30 days, Disp: 30 tablet, Rfl: 0     traMADol (ULTRAM) 50 MG tablet, May take 1-2 tablets every 6 hours as needed for pain, max of 3 tabs per day. To last 30 days. Reduce as able.  Okay to fill on/after 12/31/17 and begin on 1/2/2018., Disp: 90 tablet, Rfl: 0     fluticasone (FLONASE) 50 MCG/ACT spray, Spray 1-2 sprays into both nostrils daily, Disp: 1 Bottle, Rfl: 1     gabapentin (NEURONTIN) 600 MG tablet, Take 1.5 tablets (900 mg) by mouth 3 times daily, Disp: 135 tablet, Rfl: 3     methocarbamol (ROBAXIN) 500 MG tablet, Take 1-2 tablets (500-1,000 mg) by mouth 3 times daily as needed for muscle spasms Start with lower dose and caution sedation, Disp: 150 tablet, Rfl: 1     ibuprofen (ADVIL,MOTRIN) 800 MG tablet, , Disp: , Rfl: 0     amoxicillin-clavulanate (AUGMENTIN) 875-125 MG per tablet, Take 1 tablet by mouth 2 times daily (Patient not taking: Reported on 1/16/2018), Disp:  "20 tablet, Rfl: 0     varenicline (CHANTIX STARTING MONTH GIO) 0.5 MG X 11 & 1 MG X 42 tablet, Take 0.5 mg tab daily for 3 days, then 0.5 mg tab twice daily for 4 days, then 1 mg twice daily. (Patient not taking: Reported on 12/19/2017), Disp: 53 tablet, Rfl: 0  Immunization History   Administered Date(s) Administered     Influenza Vaccine IM 3yrs+ 4 Valent IIV4 02/07/2017     TD (ADULT, 7+) 12/18/2006     TDAP Vaccine (Adacel) 02/07/2017     No Known Allergies      EXAM:   /81 (BP Location: Left arm, Patient Position: Sitting, Cuff Size: Adult Regular)  Pulse 60  Temp 97.8  F (36.6  C) (Oral)  Ht 1.657 m (5' 5.25\")  Wt 64.5 kg (142 lb 3.2 oz)  SpO2 96%  BMI 23.48 kg/m2  GENERAL APPEARANCE: alert, cooperative and not in distress  SKIN: no rashes, no lesions  HEAD: atraumatic, normocephalic  EYES: lids and lashes normal, conjunctivae and sclerae clear, pupils equal, round, reactive to light, EOM full and intact  ENT: no scars or lesions, nasal exam showed no discharge, swelling or lesions noted and nasal septal deviation, otoscopy showed external auditory canals clear, tympanic membranes normal, tongue midline and normal, soft palate, uvula, and tonsils normal  NECK: no asymmetry, masses, or scars, supple without significant adenopathy  LUNGS: unlabored respirations, no intercostal retractions or accessory muscle use, clear to auscultation without rales or wheezes  HEART: regular rate and rhythm without murmurs and normal S1 and S2  MUSCULOSKELETAL: no musculoskeletal defects are noted  NEURO: no focal deficits noted  PSYCH: does not appear depressed or anxious    WORKUP: Skin testing    Skin prick testing was performed to our adult aeroallergen panel. He had positive reactions to cat, dog, dust mite (dp), Kyler grass, grass mix, oak, and nettle. All others were negative with appropriate responses to controls.    ASSESSMENT/PLAN:  Truman Suero is a 57 year old male here for evaluation of possible " allergies. Skin prick testing was positive for sensitization to cat, dog, dust mite and a few pollens. Truman was counseled regarding allergen avoidance measures, medication management and allergen immunotherapy treatment. Given his likely continued exposure to cats and dogs he would be a good candidate for immunotherapy. We discussed the risks, benefits, and anticipated duration of treatment. Truman would like to begin medication management and contact his insurance company before pursuing immunotherapy treatment.    1. Continue fluticasone nasal spray, 2 sprays in each nostril twice daily - appropriate nasal spray technique reviewed  2. Begin cetirizine 10mg daily  3. Use sinus irrigation rinse/neti pot daily  4. Recommend consideration of allergen immunotherapy treatment  5. Counseling provided regarding allergen avoidance measures to animals and dust mite  6. Follow-up in 3 months      Dontrell Tilley MD  Allergy/Immunology  [place], [place]      Chart documentation done in part with Dragon Voice Recognition Software. Although reviewed after completion, some word and grammatical errors may remain.

## 2018-01-22 NOTE — TELEPHONE ENCOUNTER
My chart response from pt:    I would be willing to give it a try. I'm guessing Dr. Trinidad will have to be involved, as [facility] wants primary care  to refer also. Just a heads up as well. My employer wants to work on worker's comp. issues and work restriction as well. I will discuss at next appt. That came as complete surprise to me. As always thanks.     Palmer    Sending to provider to place order for injections.     Jessika Flannery RN, [facility]  Pain Clinic Care Coordinator

## 2018-01-23 ENCOUNTER — MYC MEDICAL ADVICE (OUTPATIENT)
Dept: PALLIATIVE MEDICINE | Facility: CLINIC | Age: 58
End: 2018-01-23

## 2018-01-23 ENCOUNTER — MYC MEDICAL ADVICE (OUTPATIENT)
Dept: FAMILY MEDICINE | Facility: CLINIC | Age: 58
End: 2018-01-23

## 2018-01-23 ENCOUNTER — TELEPHONE (OUTPATIENT)
Dept: PALLIATIVE MEDICINE | Facility: CLINIC | Age: 58
End: 2018-01-23

## 2018-01-23 NOTE — TELEPHONE ENCOUNTER
Aidee Croft  Signed    Date of Service: 01/23/2018 12:36 PM Creation Time: 01/23/2018 12:36 PM    Addend Delete  Bookmark Copy       Called patient to schedule ISMAEL. CATHERINE Croft    Pain Management Clinic

## 2018-01-23 NOTE — TELEPHONE ENCOUNTER
Per patient myChart message:  From: Truman Suero      Created: 1/23/2018 1:16 PM      Dr Navarro: Thanks you for submitting order for injection, they called to schedule appt.  As you know with my version of [facility] Dr. Trinidad, (primary) will have submit referral also. Do you have Dr name? I know it will be at the [facility]    Thanks  Palmer

## 2018-01-23 NOTE — TELEPHONE ENCOUNTER
See the 1/11/18 mychart encounter for more information.    Estelita Wetzel RN-BSN  [facility]

## 2018-01-23 NOTE — TELEPHONE ENCOUNTER
Signed order for provider- pt can schedule.     Jessika Flannery RN, [facility]  Pain Clinic Care Coordinator

## 2018-01-23 NOTE — TELEPHONE ENCOUNTER
raphael sent to pt:    From: Estelita Wetzel RN        Created: 1/23/2018 1:30 PM       Xavier Chakraborty.  We have 2 physicians here that do neck injections.  Dr. Hugo Mcgraw.    Dr. Mcgraw was the provider that did your last neck injections (cervical facet joint injection)      Estelita Wetzel RN-BSN  [facility]

## 2018-01-24 NOTE — TELEPHONE ENCOUNTER
Information noted. Looks like Primary Care Provider has to place the order?  Melanie STEVENS, RN CNP, FNP  [facility] [place]

## 2018-01-30 ENCOUNTER — OFFICE VISIT (OUTPATIENT)
Dept: PALLIATIVE MEDICINE | Facility: CLINIC | Age: 58
End: 2018-01-30
Payer: COMMERCIAL

## 2018-01-30 DIAGNOSIS — M54.2 CHRONIC NECK AND BACK PAIN: ICD-10-CM

## 2018-01-30 DIAGNOSIS — G89.29 CHRONIC NECK AND BACK PAIN: ICD-10-CM

## 2018-01-30 DIAGNOSIS — M50.30 DDD (DEGENERATIVE DISC DISEASE), CERVICAL: ICD-10-CM

## 2018-01-30 DIAGNOSIS — G89.4 CHRONIC PAIN SYNDROME: Primary | ICD-10-CM

## 2018-01-30 DIAGNOSIS — M54.9 CHRONIC NECK AND BACK PAIN: ICD-10-CM

## 2018-01-30 PROCEDURE — 96152 HC HEALTH AND BEHAVIOR INTERVENTION, INDIVIDUAL, EACH 15 MINUTES: CPT | Performed by: PSYCHOLOGIST

## 2018-01-30 NOTE — MR AVS SNAPSHOT
After Visit Summary   1/30/2018    Truman Suero    MRN: [id number]           Patient Information     Date Of Birth          1960        Visit Information        Provider Department      1/30/2018 2:30 PM Kentrell Castañeda, KADE [facility]        Today's Diagnoses     Chronic pain syndrome    -  1    DDD (degenerative disc disease), cervical        Chronic neck and back pain           Follow-ups after your visit        Your next 10 appointments already scheduled     Jan 31, 2018  1:30 PM CST   Return Visit with RODNEY Vargas CNP   [facility] ([facility])    [address] [id number] [phone]            Feb 13, 2018  2:30 PM CST   Return Visit with Kentrell Castañeda LP   [facility] ([facility])    [address] [id number] [phone]              Who to contact     If you have questions or need follow up information about today's clinic visit or your schedule please contact [facility] directly at [phone].  Normal or non-critical lab and imaging results will be communicated to you by PICS Auditinghart, letter or phone within 4 business days after the clinic has received the results. If you do not hear from us within 7 days, please contact the clinic through PICS Auditinghart or phone. If you have a critical or abnormal lab result, we will notify you by phone as soon as possible.  Submit refill requests through [email or web address] or call your pharmacy and they will forward the refill request to us. Please allow 3 business days for your refill to be completed.          Additional Information About Your Visit        PICS AuditingharVaprema Information     [email or web address] gives you secure access to your electronic health record. If you see a primary care provider, you can also send messages to your care team and make appointments. If you have questions, please call your primary care clinic.   If you do not have a primary care provider, please call [phone] and they will assist you.        Care EveryWhere ID     This is your Care EveryWhere ID. This could be used by other organizations to access your [place] medical records  [id number]         Blood Pressure from Last 3 Encounters:   01/22/18 137/81   01/11/18 135/86   01/08/18 128/88    Weight from Last 3 Encounters:   01/22/18 64.5 kg (142 lb 3.2 oz)   01/16/18 64.8 kg (142 lb 12.8 oz)   01/11/18 64 kg (141 lb)              We Performed the Following     HEALTH & BEHAVIOR ASSESS, INITIAL, EA 15MIN PHD        Primary Care Provider Office Phone # Fax #    Humberto Trinidad  [phone]       [facility] [place] [address]        Equal Access to Services     [facility] : Hadii aad ku hadasho Soomaali, waaxda luqadaha, qaybta kaalmada adeegyada, mariela martinez hayjaleel garduno . So [facility] [phone].    ATENCIÓN: Si habla español, tiene a simmons disposición servicios gratuitos de asistencia lingüística. Llame al [phone].    We comply with applicable federal civil rights laws and [place] laws. We do not discriminate on the basis of race, color, national origin, age, disability, sex, sexual orientation, or gender identity.            Thank you!     Thank you for choosing [facility]  for your care. Our goal is always to provide you with excellent care. Hearing back from our patients is one way we can continue to improve our services. Please take a few minutes to complete the written survey that you may receive in the mail after your visit with us. Thank you!             Your Updated Medication List - Protect others around you: Learn how to safely use, store and throw away your medicines at www.disposemymeds.org.          This list is accurate as of 1/30/18  5:00 PM.  Always use your most recent med list.                   Brand Name Dispense Instructions for use Diagnosis    fluticasone 50  MCG/ACT spray    FLONASE    2 Bottle    Spray 2 sprays into both nostrils 2 times daily    Chronic allergic rhinitis due to animal hair and dander, Chronic seasonal allergic rhinitis due to pollen, Allergic rhinitis due to dust mite, Allergic conjunctivitis of both eyes       gabapentin 600 MG tablet    NEURONTIN    135 tablet    Take 1.5 tablets (900 mg) by mouth 3 times daily    Cervical spondylosis without myelopathy, DDD (degenerative disc disease), cervical, Chronic bilateral low back pain without sciatica, Chronic neck and back pain, Cervical stenosis of spinal canal       ibuprofen 800 MG tablet    ADVIL/MOTRIN          methocarbamol 500 MG tablet    ROBAXIN    150 tablet    Take 1-2 tablets (500-1,000 mg) by mouth 3 times daily as needed for muscle spasms Start with lower dose and caution sedation    Chronic neck pain, DDD (degenerative disc disease), cervical, Cervical spondylosis without myelopathy, Chronic bilateral low back pain without sciatica, Myofascial pain       topiramate 50 MG tablet    TOPAMAX    30 tablet    Take 50mg at bedtime.  Drink plenty of water and no alcohol. If side effects, then reduce to last tolerable dosage.    Cervical spondylosis without myelopathy, DDD (degenerative disc disease), cervical, Chronic bilateral low back pain without sciatica, Cervical stenosis of spinal canal       * traMADol 200 MG ER-tablet    ULTRAM-ER    30 tablet    Take 1 tablet (200 mg) by mouth daily OK to fill on/after 12/31/17and begin on 1/2/2018 to last 30 days    Cervical spondylosis without myelopathy, DDD (degenerative disc disease), cervical, Chronic bilateral low back pain without sciatica       * traMADol 50 MG tablet    ULTRAM    90 tablet    May take 1-2 tablets every 6 hours as needed for pain, max of 3 tabs per day. To last 30 days. Reduce as able.  Okay to fill on/after 12/31/17 and begin on 1/2/2018.    Chronic neck and back pain, Cervical radiculopathy       varenicline 0.5 MG X 11 & 1 MG  X 42 tablet    CHANTIX STARTING MONTH GIO    53 tablet    Take 0.5 mg tab daily for 3 days, then 0.5 mg tab twice daily for 4 days, then 1 mg twice daily.    Tobacco abuse       * Notice:  This list has 2 medication(s) that are the same as other medications prescribed for you. Read the directions carefully, and ask your doctor or other care provider to review them with you.

## 2018-01-30 NOTE — PROGRESS NOTES
[facility]   [facility] [place]  Behavioral Medicine Visit    Patient Name: Truman uSero    YOB: 1960   Medical Record Number: [id number]  Date: 1/30/2018                SUBJECTIVE: Patient is given the choice to discuss steps he might take to move towards his goal of reducing or quitting smoking and subsequently being able to have the neck procedure that he wants to have versus working on relaxation skills initiated intermitting 2 prior appointments ago.  Patient opts for the former rather than the latter.  Today's session was spent with motivational interviewing exercises including creation of a pro-convalesced.  The patient was able to come up with a few ideas why he wanted to continue to smoke and many many more ideas about why he wanted to stop.  Due to time restraint I asked him to continue the list with his spouse as it appears as though he could continue to generate ideas about why he might want to discontinue smoking.    During the remainder of the visit today we talked about ways that the patient would know he was working on quitting smoking besides stopping to put the cigarette in his mouth.  Those include coming up with markers that help him know where he is set in his process of change.  For example the patient reports that he is 2-1/2 on a scale of 0-10 towards his willingness to try and make a smoking quit.  When asked why he is not a lower score the patient reports that he has been giving smoking cessation considerable amount of consideration and in talking about it with his wife.  So he knows that it is something he has made some willingness to work on.          OBJECTIVE: Met with the patient on this date for an elective return appointment.  Today is our third meeting post initial assessment.  Today the patient reports the following: His pain remains the same, his mood remains the same, his  activity level remains the same, his stress level is mildly worse and his sleep remains the same.  Patient reports that he continues to do some form of self-care 2-3 times per day.  Patient reports since beginning services at [facility] his overall progress is slightly improved.     Length of Visit: 60 minutes      Assessment: Current Emotional / Mental Status    Appearance:   Appropriate   Eye Contact:   Good   Psychomotor Behavior: Normal   Attitude:   Cooperative   Orientation:   All  Speech  Rate / Production:             Normal   Volume:              Normal   Mood:    Anxious   Affect:    Appropriate  Worrisome   Thought Content:  Clear   Thought Form:  Coherent  Logical   Insight:    Fair     ASSESSMENT:   Chronic pain syndrome, degenerative disc disease, cervical, chronic neck and back pain    Progress toward goals: fair.    Pain Status: unchanged    Emotional Status: unchanged              Medication / chemical use concerns: None    PLAN:   Next Appointment: Truman Suero will schedule a follow-up appointment in 2 weeks.  Assignment: Complete proton list with spouse; continue to identify examples of behaviors that are indicative of an attempt to move toward a quit.  Objectives / interventions for next session: See above    Kentrell Castañeda MA, LP, LADC  Behavioral Pain Specialist  [facility] [address]

## 2018-01-31 ENCOUNTER — TELEPHONE (OUTPATIENT)
Dept: PALLIATIVE MEDICINE | Facility: CLINIC | Age: 58
End: 2018-01-31

## 2018-01-31 ENCOUNTER — OFFICE VISIT (OUTPATIENT)
Dept: PALLIATIVE MEDICINE | Facility: CLINIC | Age: 58
End: 2018-01-31
Payer: COMMERCIAL

## 2018-01-31 VITALS
HEART RATE: 66 BPM | SYSTOLIC BLOOD PRESSURE: 167 MMHG | BODY MASS INDEX: 24.16 KG/M2 | DIASTOLIC BLOOD PRESSURE: 83 MMHG | HEIGHT: 65 IN | WEIGHT: 145 LBS

## 2018-01-31 DIAGNOSIS — G89.29 CHRONIC NECK PAIN: Primary | ICD-10-CM

## 2018-01-31 DIAGNOSIS — M54.2 CHRONIC NECK AND BACK PAIN: ICD-10-CM

## 2018-01-31 DIAGNOSIS — M54.50 CHRONIC BILATERAL LOW BACK PAIN WITHOUT SCIATICA: ICD-10-CM

## 2018-01-31 DIAGNOSIS — M47.812 CERVICAL SPONDYLOSIS WITHOUT MYELOPATHY: ICD-10-CM

## 2018-01-31 DIAGNOSIS — G89.29 CHRONIC NECK AND BACK PAIN: ICD-10-CM

## 2018-01-31 DIAGNOSIS — G89.29 CHRONIC BILATERAL LOW BACK PAIN WITHOUT SCIATICA: ICD-10-CM

## 2018-01-31 DIAGNOSIS — M50.30 DDD (DEGENERATIVE DISC DISEASE), CERVICAL: ICD-10-CM

## 2018-01-31 DIAGNOSIS — M50.30 DDD (DEGENERATIVE DISC DISEASE), CERVICAL: Primary | ICD-10-CM

## 2018-01-31 DIAGNOSIS — M54.2 CHRONIC NECK PAIN: Primary | ICD-10-CM

## 2018-01-31 DIAGNOSIS — M79.18 MYOFASCIAL PAIN: ICD-10-CM

## 2018-01-31 DIAGNOSIS — M54.12 CERVICAL RADICULOPATHY: ICD-10-CM

## 2018-01-31 DIAGNOSIS — M48.02 CERVICAL STENOSIS OF SPINAL CANAL: ICD-10-CM

## 2018-01-31 DIAGNOSIS — M54.9 CHRONIC NECK AND BACK PAIN: ICD-10-CM

## 2018-01-31 PROCEDURE — 99214 OFFICE O/P EST MOD 30 MIN: CPT | Performed by: NURSE PRACTITIONER

## 2018-01-31 RX ORDER — TRAMADOL HYDROCHLORIDE 50 MG/1
TABLET ORAL
Qty: 90 TABLET | Refills: 0 | Status: SHIPPED | OUTPATIENT
Start: 2018-01-31 | End: 2018-03-02

## 2018-01-31 RX ORDER — TRAMADOL HYDROCHLORIDE 200 MG/1
200 TABLET, EXTENDED RELEASE ORAL DAILY
Qty: 30 TABLET | Refills: 0 | Status: SHIPPED | OUTPATIENT
Start: 2018-01-31 | End: 2018-03-02

## 2018-01-31 RX ORDER — METHOCARBAMOL 750 MG/1
750-1500 TABLET, FILM COATED ORAL 3 TIMES DAILY PRN
Qty: 130 TABLET | Refills: 1 | Status: SHIPPED | OUTPATIENT
Start: 2018-01-31 | End: 2018-04-20

## 2018-01-31 ASSESSMENT — PAIN SCALES - GENERAL: PAINLEVEL: EXTREME PAIN (8)

## 2018-01-31 NOTE — NURSING NOTE
"Chief Complaint   Patient presents with     Pain     Neck pain        Initial /83  Pulse 66  Ht 1.651 m (5' 5\")  Wt 65.8 kg (145 lb)  BMI 24.13 kg/m2 Estimated body mass index is 24.13 kg/(m^2) as calculated from the following:    Height as of this encounter: 1.651 m (5' 5\").    Weight as of this encounter: 65.8 kg (145 lb).  Medication Reconciliation: complete     Mike Bergeron MA      "

## 2018-01-31 NOTE — PATIENT INSTRUCTIONS
PLAN:   1. Medications:   1. Increase methocarbamol to 750 mg tabs, take 1-2 tabs (750-1500 mg) three times daily as needed. Caution sedation.   2. Continue Tramadol  mg in the evening  3. Continue Tramadol immediate release 50 mg, max of 4 tabs daily  4. Continue gabapentin 900 mg three times daily   5. Continue Topamax 50 mg at bedtime   2. Procedures: None at present   3. Follow-up with me in 10-12 weeks        ----------------------------------------------------------------  Nurse Triage line:  [phone]   Call this number with any questions or concerns. You may leave a detailed message anytime. Calls are typically returned Monday through Friday between 8 AM and 4:30 PM. We usually get back to you within 2 business days depending on the issue/request.       Medication refills:    For non-narcotic medications, call your pharmacy directly to request a refill. The pharmacy will contact the [facility] for authorization. Please allow 3-4 days for these refills to be processed.     For narcotic refills, call the nurse triage line or send a [employer] message. Please contact us 7-10 days before your refill is due. The message MUST include the name of the specific medication(s) requested and how you would like to receive the prescription(s). The options are as follows:    Pain Clinic staff can mail the prescription to your pharmacy. Please tell us the name of the pharmacy.    You may pick the prescription up at the Pain Clinic (tell us the location) or during a clinic visit with your pain provider    Pain Clinic staff can deliver the prescription to the [place] pharmacy in the clinic building. Please tell us the location.      Scheduling number: [phone].  Call this number to schedule or change appointments.    We believe regular attendance is key to your success in our program.    Any time you are unable to keep your appointment we ask that you call us at least 24 hours in advance to let us know.  This will allow us to offer the appointment time to another patient.

## 2018-01-31 NOTE — TELEPHONE ENCOUNTER
Dr. Trinidad-  Please place order for a Cervical Epidural steroid injection. You can review my order placed on 1/11/2018 to copy my instructions. Palmer's insurance requires that you write the order for the injection or he has to foot the bill.    Thanks for your attention to this matter.    Melanie STEVENS, RN CNP, FNP  [facility] [place]

## 2018-01-31 NOTE — MR AVS SNAPSHOT
After Visit Summary   1/31/2018    Truman Suero    MRN: [id number]           Patient Information     Date Of Birth          1960        Visit Information        Provider Department      1/31/2018 1:30 PM Melanie Thomas APRN [facility]        Today's Diagnoses     Chronic neck pain        DDD (degenerative disc disease), cervical        Cervical spondylosis without myelopathy        Chronic bilateral low back pain without sciatica        Myofascial pain        Chronic neck and back pain        Cervical radiculopathy          Care Instructions    PLAN:   1. Medications:   1. Increase methocarbamol to 750 mg tabs, take 1-2 tabs (750-1500 mg) three times daily as needed. Caution sedation.   2. Continue Tramadol  mg in the evening  3. Continue Tramadol immediate release 50 mg, max of 4 tabs daily  4. Continue gabapentin 900 mg three times daily   5. Continue Topamax 50 mg at bedtime   2. Procedures: None at present   3. Follow-up with me in 10-12 weeks        ----------------------------------------------------------------  Nurse Triage line:  [phone]   Call this number with any questions or concerns. You may leave a detailed message anytime. Calls are typically returned Monday through Friday between 8 AM and 4:30 PM. We usually get back to you within 2 business days depending on the issue/request.       Medication refills:    For non-narcotic medications, call your pharmacy directly to request a refill. The pharmacy will contact the [facility] Management [facility] for authorization. Please allow 3-4 days for these refills to be processed.     For narcotic refills, call the nurse triage line or send a [employer] message. Please contact us 7-10 days before your refill is due. The message MUST include the name of the specific medication(s) requested and how you would like to receive the prescription(s). The options are as follows:    Pain Clinic staff can mail the prescription to  your pharmacy. Please tell us the name of the pharmacy.    You may pick the prescription up at the Pain Clinic (tell us the location) or during a clinic visit with your pain provider    Pain Clinic staff can deliver the prescription to the [place] pharmacy in the clinic building. Please tell us the location.      Scheduling number: [phone].  Call this number to schedule or change appointments.    We believe regular attendance is key to your success in our program.    Any time you are unable to keep your appointment we ask that you call us at least 24 hours in advance to let us know. This will allow us to offer the appointment time to another patient.               Follow-ups after your visit        Your next 10 appointments already scheduled     Feb 13, 2018  2:30 PM CST   Return Visit with Kentrell Castañeda LP   [facility] [place] ([facility] [place])    [address]  [place] [id number] [phone]              Who to contact     If you have questions or need follow up information about today's clinic visit or your schedule please contact [facility] directly at [phone].  Normal or non-critical lab and imaging results will be communicated to you by MyChart, letter or phone within 4 business days after the clinic has received the results. If you do not hear from us within 7 days, please contact the clinic through KeyedIn Solutionshart or phone. If you have a critical or abnormal lab result, we will notify you by phone as soon as possible.  Submit refill requests through Sensorion or call your pharmacy and they will forward the refill request to us. Please allow 3 business days for your refill to be completed.          Additional Information About Your Visit        [employer] Information     S[employer]ion gives you secure access to your electronic health record. If you see a primary care provider, you can also send messages to your care team and make appointments. If you have  "questions, please call your primary care clinic.  If you do not have a primary care provider, please call [phone] and they will assist you.        Care EveryWhere ID     This is your Care EveryWhere ID. This could be used by other organizations to access your [place] medical records  [id number]        Your Vitals Were     Pulse Height BMI (Body Mass Index)             66 1.651 m (5' 5\") 24.13 kg/m2          Blood Pressure from Last 3 Encounters:   01/31/18 167/83   01/22/18 137/81   01/11/18 135/86    Weight from Last 3 Encounters:   01/31/18 65.8 kg (145 lb)   01/22/18 64.5 kg (142 lb 3.2 oz)   01/16/18 64.8 kg (142 lb 12.8 oz)              Today, you had the following     No orders found for display         Today's Medication Changes          These changes are accurate as of 1/31/18  2:20 PM.  If you have any questions, ask your nurse or doctor.               These medicines have changed or have updated prescriptions.        Dose/Directions    methocarbamol 750 MG tablet   Commonly known as:  ROBAXIN   This may have changed:    - medication strength  - how much to take  - additional instructions   Used for:  Chronic neck pain, DDD (degenerative disc disease), cervical, Cervical spondylosis without myelopathy, Chronic bilateral low back pain without sciatica, Myofascial pain   Changed by:  Melanie Thomas APRN CNP        Dose:  750-1500 mg   Take 1-2 tablets (750-1,500 mg) by mouth 3 times daily as needed for muscle spasms caution sedation   Quantity:  130 tablet   Refills:  1       * traMADol 200 MG ER-tablet   Commonly known as:  ULTRAM-ER   This may have changed:  additional instructions   Used for:  Cervical spondylosis without myelopathy, DDD (degenerative disc disease), cervical, Chronic bilateral low back pain without sciatica   Changed by:  Melanie Thomas APRN CNP        Dose:  200 mg   Take 1 tablet (200 mg) by mouth daily OK to fill on/after 1/31/2018 and begin on 2/1/2018 to last 30 " days   Quantity:  30 tablet   Refills:  0       * traMADol 50 MG tablet   Commonly known as:  ULTRAM   This may have changed:  additional instructions   Used for:  Chronic neck and back pain, Cervical radiculopathy   Changed by:  Melanie Thomas APRN CNP        May take 1-2 tablets every 6 hours as needed for pain, max of 3 tabs per day. To last 30 days. Reduce as able.  Okay to fill on/after 1/31/2018 and start on 2/1/2018   Quantity:  90 tablet   Refills:  0       * Notice:  This list has 2 medication(s) that are the same as other medications prescribed for you. Read the directions carefully, and ask your doctor or other care provider to review them with you.         Where to get your medicines      These medications were sent to [facility] [address] [place] [address]     Phone:  [phone]     methocarbamol 750 MG tablet         Some of these will need a paper prescription and others can be bought over the counter.  Ask your nurse if you have questions.     Bring a paper prescription for each of these medications     traMADol 200 MG ER-tablet    traMADol 50 MG tablet                Primary Care Provider Office Phone # Fax #    Humberto Trinidad [phone]       [address] [place] [address]        Equal Access to Services     ELIEZER VALENTE AH: Hadii cece talley hadasho Soomaali, waaxda luqadaha, qaybta kaalmada adeegyada, waxay michelle carter. So [facility] [phone].    ATENCIÓN: Si habla español, tiene a simmons disposición servicios gratuitos de asistencia lingüística. Llame al [phone].    We comply with applicable federal civil rights laws and [place] laws. We do not discriminate on the basis of race, color, national origin, age, disability, sex, sexual orientation, or gender identity.            Thank you!     Thank you for choosing [facility]  for your care. Our goal is  always to provide you with excellent care. Hearing back from our patients is one way we can continue to improve our services. Please take a few minutes to complete the written survey that you may receive in the mail after your visit with us. Thank you!             Your Updated Medication List - Protect others around you: Learn how to safely use, store and throw away your medicines at www.disposemymeds.org.          This list is accurate as of 1/31/18  2:20 PM.  Always use your most recent med list.                   Brand Name Dispense Instructions for use Diagnosis    fluticasone 50 MCG/ACT spray    FLONASE    2 Bottle    Spray 2 sprays into both nostrils 2 times daily    Chronic allergic rhinitis due to animal hair and dander, Chronic seasonal allergic rhinitis due to pollen, Allergic rhinitis due to dust mite, Allergic conjunctivitis of both eyes       gabapentin 600 MG tablet    NEURONTIN    135 tablet    Take 1.5 tablets (900 mg) by mouth 3 times daily    Cervical spondylosis without myelopathy, DDD (degenerative disc disease), cervical, Chronic bilateral low back pain without sciatica, Chronic neck and back pain, Cervical stenosis of spinal canal       ibuprofen 800 MG tablet    ADVIL/MOTRIN          methocarbamol 750 MG tablet    ROBAXIN    130 tablet    Take 1-2 tablets (750-1,500 mg) by mouth 3 times daily as needed for muscle spasms caution sedation    Chronic neck pain, DDD (degenerative disc disease), cervical, Cervical spondylosis without myelopathy, Chronic bilateral low back pain without sciatica, Myofascial pain       topiramate 50 MG tablet    TOPAMAX    30 tablet    Take 50mg at bedtime.  Drink plenty of water and no alcohol. If side effects, then reduce to last tolerable dosage.    Cervical spondylosis without myelopathy, DDD (degenerative disc disease), cervical, Chronic bilateral low back pain without sciatica, Cervical stenosis of spinal canal       * traMADol 200 MG ER-tablet    ULTRAM-ER     30 tablet    Take 1 tablet (200 mg) by mouth daily OK to fill on/after 1/31/2018 and begin on 2/1/2018 to last 30 days    Cervical spondylosis without myelopathy, DDD (degenerative disc disease), cervical, Chronic bilateral low back pain without sciatica       * traMADol 50 MG tablet    ULTRAM    90 tablet    May take 1-2 tablets every 6 hours as needed for pain, max of 3 tabs per day. To last 30 days. Reduce as able.  Okay to fill on/after 1/31/2018 and start on 2/1/2018    Chronic neck and back pain, Cervical radiculopathy       varenicline 0.5 MG X 11 & 1 MG X 42 tablet    CHANTIX STARTING MONTH GIO    53 tablet    Take 0.5 mg tab daily for 3 days, then 0.5 mg tab twice daily for 4 days, then 1 mg twice daily.    Tobacco abuse       * Notice:  This list has 2 medication(s) that are the same as other medications prescribed for you. Read the directions carefully, and ask your doctor or other care provider to review them with you.

## 2018-01-31 NOTE — PROGRESS NOTES
[facility]    1/31/2018       Chief complaint: neck and lower back  Neck pain is most bothersome    Interval history:  Truman Suero is a 57 year old male is known to me for   Chronic neck pain  Cervical DDD  Cervical spondylosis (pain worse with extension/rotation indicating facetogenic component to pain)  Axial low back pain  Myofascial pain/muscle spasms  Remote history of ETOH overuse, attended AA for awhile. Sober for 10 years  ---PMHx includes: neck pain  ---PSHx includes: none listed      Recommendations/plan at the last visit on 1/8/2018 included:  1. Physical Therapy: not at present time  2. Patient is to continue his home exercise program and exercises learned in PHYSICAL THERAPY  3. Clinical Health Psychologist: continue work with  Kentrell Castañeda in HEALTH PSYCHOLOGY   4. Diagnostic Studies:  None  5. Medication Management:    1. Continue Tramadol ER  2. Continue Tramadol immediate release 50mg tabs  3. Continue gabapentin  4. Continue methocarbamol  5. Re-start Topamax as directed, 50mg at bedtime  6. Further procedures recommended: patient to discuss possible ISMAEL with his neurosurgical team. Patient is to call me if they are OK with proceeding and I will place an order for such  7. UDS today, states last took Tramadol ER at noon and short-acting Tramadol this morning  8. Re-signed opiate agreement today  9. Recommendations to PCP: see above  10. Follow up: 8 weeks      Since his last visit, Truman Suero reports:  -he has noticed improvement in pain with restart of Topamax.   -takes up to 4 tabs of Tramadol immediate release on bad days (very occasionally), but generally taking 2-3 tabs.   -working with Kentrell Castañeda on tobacco cessation; he wears nicotine patches at work as he is not allowed to smoke, when he gets home he smokes 5-6 cigarettes   -his toes turn white, are cold and tingly on the right foot at the end of the day for the last 1-2 weeks  -he would like to  "proceed with a ISMAEL, but his insurance requires his Primary Care Provider to write the order for interventional options.   -his work has offered to potentially have this be a workman's compensation claim, he is looking into this. Wondering about work restrictions. I explained we do not do work restrictions unless patient's have a Functional Capacity Exam done. Patient verbalized understanding.     At this point, the patient's participation with our multidisciplinary team includes:  The patient has been compliant with the program.  Pain Group - not ordered  PT - attending non-pain management PHYSICAL THERAPY   Health Psych - has seen Kentrell Castañeda x4  Acupuncture: working with Dr. Bereket LAY      Pain scores:  Pain intensity on average is 8 on a scale of 0-10.    Range is 5-10/10.   Pain right now is 8/10.  Pain is described as \"aching, numb, unbearable, burning, tiring, shooting, exhausting, throbbing, penetrating, sharp, stabbing, miserable, nagging.\"    Pain is constant in nature    Current pain relevant medications:   -Tramadol 200mg ER in the evening (helpful)  -Tramadol 50mg take 1 tablet  Q 6 hours PRN (using 2-3 tabs per day, occasionally using 4 tabs with exacerbated pain)  -ibuprofen 600mg PRN (helpful)  -gabapentin 900mg TID (somewhat helpful)  -methocarbamol 500-1000mg TID PRN muscle spasms (takes 1000 mg BID, somewhat helpful)  -Topamax 50 mg HS (helpful)    Other pertinent medications:  None    Previous Medications:  OPIATES: Tramdol (somewhat helpful)  NSAIDS: ibuprofen (helpful), Aleve (helpful)  MUSCLE RELAXANTS: none  ANTI-MIGRAINE MEDS: none  ANTI-DEPRESSANTS: none  SLEEP AIDS: none  ANTI-CONVULSANTS: gabapentin (helpful)  TOPICALS: lidocaine ointment (somewhat helpful)  Other meds: Tylenol (not helpful)        Other treatments have included:  Truman Suero has not been seen at a pain clinic in the past.    PT: tried, somewhat helpful  Chiropractic: helpful  Acupuncture: none  TENs Unit: " none     Injections:   cervical radiofrequency nerve ablation at [facility] in December 2016 (did get good relief, got 70% relief of his typical neck pain)  -6/29/2017 Cervical facet joint steroid injections at C4-5 and C5-6 on the right with Dr. Nicole Harding (not helpful)           THE 4 A's OF OPIOID MAINTENANCE ANALGESIA    Analgesia: long acting Tramadol with some short acting tramadol does give some relief    Activity: ADLS and PHYSICAL THERAPY exercises. Working full time as a     Adverse effects: none    Adherence to Rx protocol: yes      Side Effects: none  Patient is using the medication as prescribed: yes    Medications:  Current Outpatient Prescriptions   Medication Sig Dispense Refill     fluticasone (FLONASE) 50 MCG/ACT spray Spray 2 sprays into both nostrils 2 times daily 2 Bottle 11     topiramate (TOPAMAX) 50 MG tablet Take 50mg at bedtime.  Drink plenty of water and no alcohol. If side effects, then reduce to last tolerable dosage. 30 tablet 1     traMADol (ULTRAM-ER) 200 MG ER-tablet Take 1 tablet (200 mg) by mouth daily OK to fill on/after 12/31/17and begin on 1/2/2018 to last 30 days 30 tablet 0     traMADol (ULTRAM) 50 MG tablet May take 1-2 tablets every 6 hours as needed for pain, max of 3 tabs per day. To last 30 days. Reduce as able.  Okay to fill on/after 12/31/17 and begin on 1/2/2018. 90 tablet 0     gabapentin (NEURONTIN) 600 MG tablet Take 1.5 tablets (900 mg) by mouth 3 times daily 135 tablet 3     methocarbamol (ROBAXIN) 500 MG tablet Take 1-2 tablets (500-1,000 mg) by mouth 3 times daily as needed for muscle spasms Start with lower dose and caution sedation 150 tablet 1     ibuprofen (ADVIL,MOTRIN) 800 MG tablet   0     varenicline (CHANTIX STARTING MONTH PAK) 0.5 MG X 11 & 1 MG X 42 tablet Take 0.5 mg tab daily for 3 days, then 0.5 mg tab twice daily for 4 days, then 1 mg twice daily. (Patient not taking: Reported on 12/19/2017) 53 tablet 0       Medical History: any changes in  "medical history since they were last seen? None    Social History:   Home situation: , has 4 kids, 2 at home, one in college and one launched.   Occupation/Schooling:  full time in [employer]  Tobacco use: smoking, planning on quitting smoking  Alcohol use: sober for nearly 10 years. He used to work a sobriety program, used to go to AA  Drug use: none  History of chemical dependency treatment: no formal treatment, did AA    Review of Systems:  ROS is positive as per HPI as well as for fatigue,  headache,sinus infection, allergies, itching, wheezing, shortness of breath, peripheral edema, joint pain, arthritis, stiffness, back pain, neck pain, weakness, numbness/tingling, stress and is negative for fevers, sweats, or constipation, diarrhea.    This document serves as a record of the services and decisions personally performed and made by Melanie STEVENS. It was created on her behalf by Alena Lorenz, a trained medical scribe. The creation of this document is based on the provider's statements to the medical scribe.  Alena Lorenz 1:51 PM January 31, 2018    Physical Exam:  Vital signs: /83  Pulse 66  Ht 1.651 m (5' 5\")  Wt 65.8 kg (145 lb)  BMI 24.13 kg/m2     Behavioral observations:  Awake, alert, cooperative    Gait:  normal    Musculoskeletal exam:    Moves well in exam room  Strength is grossly equal    Neuro exam:  SILT in all extremities     Skin/vascular/autonomic:  No suspicious lesions on exposed skin.      Other:  NA    IMAGING:    MR CERVICAL SPINE WITHOUT CONTRAST 9/5/2017 2:32 PM      HISTORY: Neck pain, worsening numbness in arms and lower extremities.  Radiculopathy, cervical region.     TECHNIQUE: Multiplanar multisequence images were obtained through the  cervical spine without contrast.     COMPARISON: 2/22/2017.     FINDINGS: Sagittal images demonstrate some minimal gentle cervical  kyphosis, otherwise normal posterior alignment. There is no evidence  for " craniovertebral or cervicomedullary junction abnormality. The  cervical cord is minimally indented anteriorly at C4-C5 and C5-C6,  otherwise is normal in morphology and signal characteristics. Disc  space narrowing and discogenic marrow changes are present C3-C4,  C4-C5, C5-C6 and C6-C7.     C2-C3: Minimal facet hypertrophy. No stenosis.     C3-C4: Broad-based posterior osteophyte formation is present causing  some mild bilateral neural foraminal stenosis, but no central canal  stenosis.     C4-C5: Broad-based posterior osteophyte formation and mild facet  hypertrophy is present causing some mild to moderate central canal  stenosis, mild cord deformity, and mild-to-moderate bilateral neural  foraminal stenosis.     C5-C6: Broad-based posterior osteophyte formation and disc bulging is  present along with some facet hypertrophy. There is moderate central  canal stenosis and moderate bilateral neural foraminal stenosis.  Findings have progressed since the prior exam.     C6-C7: Broad-based disc bulging is present along with some minimal  posterior osteophyte formation. There is borderline to mild central  canal stenosis, but no neural foraminal stenosis.     C7-T1: Moderate facet hypertrophy. No significant stenosis.         IMPRESSION: Moderate multilevel degenerative disc and facet disease  with some progression at C5-C6 where there is moderate central canal  and bilateral neural foraminal stenosis along with cord deformity.          MR CERVICAL SPINE WITHOUT CONTRAST  2/22/2017 9:59 AM     HISTORY:  Bilateral neck and arm pain for three years.     COMPARISON: None.     TECHNIQUE: Routine MR cervical spine extended through T2.     FINDINGS: There is some degenerative bone marrow signal change C3-C6.  No malignant or destructive lesions. Normal alignment through T2.  Reversed cervical adenosis C3-C6.     The cervical and upper thoracic spinal cord appear intrinsically  normal. The craniocervical junction region is  normal. No paraspinous  soft tissue abnormality.     Findings by level as follows:     C2-C3: Negative. No disc protrusion. No central or lateral stenosis.     C3-C4: Mild disc space narrowing. Mild diffuse annular bulge. Small  uncinate spur on the right. No significant central or left-sided  stenosis. Mild right-sided foraminal stenosis.     C4-C5: Moderate degenerative narrowing of the interspace. Mild ventral  disc osteophyte complex with minimal central stenosis. Small uncinate  spurs bilaterally with mild bilateral foraminal stenosis.     C6-C7: Moderate disc space narrowing. Mild broad-based disc osteophyte  complex with minimal central stenosis. Minimal uncinate spurs with  mild bilateral foraminal stenosis.     C6-C7: Moderate disc space narrowing. Mild ventral disc osteophyte  complex. No significant central or lateral stenosis.     C7-T1: No disc protrusion. No central or lateral stenosis. Moderate  bilateral facet joint disease.         IMPRESSION:  1. Degenerative changes as described C3-C4 through C7-T1. There is  only mild central and foraminal stenosis as described.  2. No intrinsic spinal cord abnormality through T2    [place] Prescription Monitoring Program:  Reviewed, as expected       DIRE Score for selecting candidates for long term opioid analgesia for chronic pain:  Diagnosis  2  Intractablility  2  Risk    Psychological health  2    Chemical health  2    Reliability  2    Social support  3  Efficacy  2    Total DIRE Score = 15. Note that   7-13 predicts poor outcome (compliance and efficacy) from opioid prescribing; 14-21 predicts good outcome (compliance and efficacy)  from opioid prescribing.      Assessment:   1. Chronic neck pain  2. Cervical DDD  3. Cervical spinal stenosis with bilateral numbness in the hands and occasionally unsteady gait  4. Cervical spondylosis  5. Axial low back pain  6. Myofascial pain/muscle spasms  7. Cervical radiculopathy  8. Chronic pain syndrome  9. Remote  history of ETOH overuse, attended AA for awhile. Sober for 10 years  10. PMHx includes: neck pain  11. PSHx includes: none listed        Plan:   1. Physical Therapy: not at present time  2. Patient is to continue his home exercise program and exercises learned in PHYSICAL THERAPY  3. Clinical Health Psychologist: continue work with  Kentrell Castañeda in health psychology  4. Diagnostic Studies:  None  5. Medication Management:    1. Continue Tramadol ER   2. Continue Tramadol immediate release 50mg tabs, max of 4 tabs daily   3. Continue gabapentin 900 mg TID  4. Continue Topamax 50 mg at bedtime  5. Increase methocarbamol to 750-1500 mg TID PRN muscle spasms. Caution sedation.   6. Further procedures recommended: cervical epidural steroid injection our office will contact patient   7. Monitor tingling in toes of right foot, if not improving follow up with PCP   8. Recommendations to PCP: see above  9. Follow up: 10-12 weeks      Total time spent face to face was 35 minutes and more than 50% of face to face time was spent in counseling and/or coordination of care regarding the diagnosis and recommendations above.    The information in this document, created by the medical scribe for me, accurately reflects the services I personally performed and the decisions made by me. I have reviewed and approved this document for accuracy prior to leaving the patient care area.  January 31, 2018 5:22 PM    Melanie STEVENS, RN CNP, FNP  [facility]

## 2018-01-31 NOTE — TELEPHONE ENCOUNTER
I could not see your complete order, but did my best at ordered an order, if this is not write please see if you can place it in this encounter and I can sign it.  Thanks  Humberto Trinidad D.O.

## 2018-02-02 ENCOUNTER — MYC MEDICAL ADVICE (OUTPATIENT)
Dept: PALLIATIVE MEDICINE | Facility: CLINIC | Age: 58
End: 2018-02-02

## 2018-02-02 NOTE — TELEPHONE ENCOUNTER
My chart message from pt:    Dr. Navarro- Have not heard anything back regarding referral for Cervical Injection. Not sure if Dr. Trinidad contacted you directly or not.  I took your advice and made appt to have toe looked at. If you have not heard from her, guess I can mention to her in couple weeks. Thanks     Palmer Chakraborty    I know the order from Dr. Trinidad is in there cause I put it in there for her myself (a nurse can do that)- her name is on it. So go ahead and call the  to schedule.     Jessika Flannery RN, [facility]  Pain Clinic Care Coordinator

## 2018-02-05 NOTE — TELEPHONE ENCOUNTER
My chart message from pt:    Jessika- Thank you for doing that. I will call and set up appointment. I also received a my chart message with test results Dr Navarro ordered at one of our visits. Assume everything is ok.     Palmer    Will send to provider to review test results.     Jessika Flannery RN, [facility]  Pain Clinic Care Coordinator

## 2018-02-05 NOTE — TELEPHONE ENCOUNTER
Xavier Chakraborty-    Your Urine Drug Screen results were as expected. No issues.     Thanks.  Melanie STEVENS RN CNP, FNP  [facility]    I have sent the above Pannat message to the patient.     Melanie STEVENS RN CNP, FNP  [facility]

## 2018-02-07 ENCOUNTER — TELEPHONE (OUTPATIENT)
Dept: PALLIATIVE MEDICINE | Facility: CLINIC | Age: 58
End: 2018-02-07

## 2018-02-07 NOTE — TELEPHONE ENCOUNTER
Pre-screening Questions for Radiology Injections:    Injection to be done at which interventional clinic site? [facility] [place]    Procedure ordered by Humberto Trinidad     Procedure ordered? Cervical Epidural Steroid Injection    What insurance would patient like us to bill for this procedure? MEDICA      Worker's comp or MVA (motor vehicle accident) -Any injection DO NOT SCHEDULE and route to Aidee Croft.      [employer] insurance - For SI joint injections, DO NOT SCHEDULE and route Monalisa Dominguez.      [employer]- MBB's must be scheduled at LEAST two weeks apart      [employer] - Any injection besides hip/shoulder/knee joint DO NOT SCHEDULE and route to Monalisa Dominguez. She will obtain PA and call pt back to schedule procedure or notify pt of denial.       [employer]-PA REQUIRED FOR NON-DEMARCUS OR Joint injections    Any chance of pregnancy? Not Applicable   If YES, do NOT schedule and route to RN pool    Is an  needed? No     Patient has a drive home? (mandatory) YES:     Is patient taking any blood thinners (plavix, coumadin, jantoven, warfarin, heparin, pradaxa or dabigatran )? No   If hold needed, do NOT schedule, route to RN pool     Is patient taking any aspirin products? No     If more than 325mg/day do NOT schedule; route to RN pool     For CERVICAL procedures, hold all aspirin products for 6 days.      Does the patient have a bleeding or clotting disorder? No     If YES, okay to schedule AND route to RN nurse pool    **For any patients with platelet count <100, must be forwarded to provider**    Is patient diabetic?  No  If YES, have them bring their glucometer.    Does patient have an active infection or treated for one within the past week? No     Is patient currently taking any antibiotics?  No     For patients on chronic, preventative, or prophylactic antibiotics, procedures may be scheduled.     For patients on antibiotics for active or recent infection:    Nerissa Rivera  Jo Harding Burton, Snitzer-antibiotic course must have been completed for 4 days    Dr. Curiel-antibiotic course must have been completed for 7 days    Is patient currently taking any steroid medications? (i.e. Prednisone, Medrol)  No     For patients on steroid medications:    Jo Melgar Burton, Snitzer-steroid course must have been completed for 4 days    -steroid course must have been completed for 7 days    Reviewed with patient:  If you are started on any steroids or antibiotics between now and your appointment, you must contact us because it may affect our ability to perform your procedure.  Yes    Is patient actively being treated for cancer or immunocompromised? No  If YES, do NOT schedule and route to RN pool     Are you able to get on and off an exam table with minimal or no assistance? Yes  If NO, do NOT schedule and route to RN pool    Are you able to roll over and lay on your stomach with minimal or no assistance? Yes  If NO, do NOT schedule and route to RN pool     Any allergies to contrast dye, iodine, shellfish, or numbing and steroid medications? No  If YES, route to RN pool AND add allergy information to appointment notes    Allergies: Review of patient's allergies indicates no known allergies.      Has the patient had a flu shot or any other vaccinations within 7 days before or after the procedure.  No     Does patient have an MRI/CT?  YES:   (SI joint, hip injections, lumbar sympathetic blocks, and stellate ganglion blocks do not require an MRI)    Was the MRI done w/in the last 3 years?  Yes    Was MRI done at [place]? Yes      If not, where was it done? N/A       If MRI was not done at [place], [facility] or [facility] do NOT schedule and route to nursing.  If pt has an imaging disc, the injection may be scheduled but pt has to bring disc to appt. If they show up w/out disc the injection cannot be done    Reminders (please tell patient if applicable):       Instructed pt  to arrive 30 minutes early for IV start if this is for a cervical procedure, ALL sympathetic (stellate ganglion, hypogastric, or lumbar sympathetic block) and all sedation procedures (RFA, spinal cord stimulation trials).  YES:    -IVs are not routinely placed for Dr. Perales cervical cases   -Dr. Corrigan: IVs for cervical ESIs and cervical TBDs (not CMBBs/facet inj)      If NPO for sedation, informed patient that it is okay to take medications with sips of water (except if they are to hold blood thinners).  YES:    *DO take blood pressure medication if it is prescribed*      If this is for a cervical DEMARCUS, informed patient that aspirin needs to be held for 6 days.   Not Applicable      For all patients not having spinal cord stimulator (SCS) trials or radiofrequency ablations (RFAs), informed patient:    IV sedation is not provided for this procedure.  If you feel that an oral anti-anxiety medication is needed, you can discuss this further with your referring provider or primary care provider.  The Pain Clinic provider will discuss specifics of what the procedure includes at your appointment.  Most procedures last 10-20 minutes.  We use numbing medications to help with any discomfort during the procedure.  Not Applicable      Do not schedule procedures requiring IV placement in the first appointment of the day or first appointment after lunch.       For patients 85 or older we recommend having an adult stay w/ them for the remainder of the day.       Does the patient have any questions?    Monalisa Dominguez  [facility]

## 2018-02-12 ENCOUNTER — OFFICE VISIT (OUTPATIENT)
Dept: PALLIATIVE MEDICINE | Facility: CLINIC | Age: 58
End: 2018-02-12
Payer: COMMERCIAL

## 2018-02-12 DIAGNOSIS — M50.30 DDD (DEGENERATIVE DISC DISEASE), CERVICAL: Primary | ICD-10-CM

## 2018-02-12 DIAGNOSIS — G89.4 CHRONIC PAIN SYNDROME: ICD-10-CM

## 2018-02-12 PROCEDURE — 96152 HC HEALTH AND BEHAVIOR INTERVENTION, INDIVIDUAL, EACH 15 MINUTES: CPT | Performed by: PSYCHOLOGIST

## 2018-02-12 NOTE — MR AVS SNAPSHOT
After Visit Summary   2/12/2018    Truman Suero    MRN: [id number]           Patient Information     Date Of Birth          1960        Visit Information        Provider Department      2/12/2018 2:30 PM Kentrell Castañeda LP [facility]        Today's Diagnoses     DDD (degenerative disc disease), cervical    -  1    Chronic pain syndrome           Follow-ups after your visit        Your next 10 appointments already scheduled     Feb 13, 2018  2:00 PM CST   MyChart Short with Humberto Trinidad DO   [facility]    [address] [place] [id number] [phone]            Mar 08, 2018  2:45 PM CST   Radiology Injections with Hugo Corrigan MD   [facility] ([facility])    [id number] [place] [address]  [place] [id number] [phone]              Who to contact     If you have questions or need follow up information about today's clinic visit or your schedule please contact [facility] directly at [phone].  Normal or non-critical lab and imaging results will be communicated to you by MyChart, letter or phone within 4 business days after the clinic has received the results. If you do not hear from us within 7 days, please contact the clinic through bencheehart or phone. If you have a critical or abnormal lab result, we will notify you by phone as soon as possible.  Submit refill requests through JournalDoc or call your pharmacy and they will forward the refill request to us. Please allow 3 business days for your refill to be completed.          Additional Information About Your Visit        MyChart Information     JournalDoc gives you secure access to your electronic health record. If you see a primary care provider, you can also send messages to your care team and make appointments. If you have questions, please call your primary care clinic.  If you do not  have a primary care provider, please call [phone] and they will assist you.        Care EveryWhere ID     This is your Care EveryWhere ID. This could be used by other organizations to access your [place] medical records  [id number]         Blood Pressure from Last 3 Encounters:   01/31/18 167/83   01/22/18 137/81   01/11/18 135/86    Weight from Last 3 Encounters:   01/31/18 65.8 kg (145 lb)   01/22/18 64.5 kg (142 lb 3.2 oz)   01/16/18 64.8 kg (142 lb 12.8 oz)              We Performed the Following     HEALTH & BEHAVIOR ASSESS, INITIAL, EA 15MIN PHD        Primary Care Provider Office Phone # Fax #    Humberto Trinidad [phone]       [facility] [address]        Equal Access to Services     ELIEZER VALENTE : Hadii aad ku hadasho Soomaali, waaxda luqadaha, qaybta kaalmada adeegyada, mariela martinez hayjaleel garduno . So [facility] [phone].    ATENCIÓN: Si habla español, tiene a simmons disposición servicios gratuitos de asistencia lingüística. Llame al [phone].    We comply with applicable federal civil rights laws and [place] laws. We do not discriminate on the basis of race, color, national origin, age, disability, sex, sexual orientation, or gender identity.            Thank you!     Thank you for choosing [facility]  for your care. Our goal is always to provide you with excellent care. Hearing back from our patients is one way we can continue to improve our services. Please take a few minutes to complete the written survey that you may receive in the mail after your visit with us. Thank you!             Your Updated Medication List - Protect others around you: Learn how to safely use, store and throw away your medicines at www.disposemymeds.org.          This list is accurate as of 2/12/18  4:49 PM.  Always use your most recent med list.                   Brand Name Dispense Instructions for use Diagnosis    fluticasone 50 MCG/ACT spray     FLONASE    2 Bottle    Spray 2 sprays into both nostrils 2 times daily    Chronic allergic rhinitis due to animal hair and dander, Chronic seasonal allergic rhinitis due to pollen, Allergic rhinitis due to dust mite, Allergic conjunctivitis of both eyes       gabapentin 600 MG tablet    NEURONTIN    135 tablet    Take 1.5 tablets (900 mg) by mouth 3 times daily    Cervical spondylosis without myelopathy, DDD (degenerative disc disease), cervical, Chronic bilateral low back pain without sciatica, Chronic neck and back pain, Cervical stenosis of spinal canal       ibuprofen 800 MG tablet    ADVIL/MOTRIN          methocarbamol 750 MG tablet    ROBAXIN    130 tablet    Take 1-2 tablets (750-1,500 mg) by mouth 3 times daily as needed for muscle spasms caution sedation    Chronic neck pain, DDD (degenerative disc disease), cervical, Cervical spondylosis without myelopathy, Chronic bilateral low back pain without sciatica, Myofascial pain       topiramate 50 MG tablet    TOPAMAX    30 tablet    Take 50mg at bedtime.  Drink plenty of water and no alcohol. If side effects, then reduce to last tolerable dosage.    Cervical spondylosis without myelopathy, DDD (degenerative disc disease), cervical, Chronic bilateral low back pain without sciatica, Cervical stenosis of spinal canal       * traMADol 200 MG ER-tablet    ULTRAM-ER    30 tablet    Take 1 tablet (200 mg) by mouth daily OK to fill on/after 1/31/2018 and begin on 2/1/2018 to last 30 days    Cervical spondylosis without myelopathy, DDD (degenerative disc disease), cervical, Chronic bilateral low back pain without sciatica       * traMADol 50 MG tablet    ULTRAM    90 tablet    May take 1-2 tablets every 6 hours as needed for pain, max of 3 tabs per day. To last 30 days. Reduce as able.  Okay to fill on/after 1/31/2018 and start on 2/1/2018    Chronic neck and back pain, Cervical radiculopathy       varenicline 0.5 MG X 11 & 1 MG X 42 tablet    CHANTIX STARTING MONTH  GIO    53 tablet    Take 0.5 mg tab daily for 3 days, then 0.5 mg tab twice daily for 4 days, then 1 mg twice daily.    Tobacco abuse       * Notice:  This list has 2 medication(s) that are the same as other medications prescribed for you. Read the directions carefully, and ask your doctor or other care provider to review them with you.

## 2018-02-12 NOTE — PROGRESS NOTES
"                                  [facility]   [facility] [place]  Behavioral Medicine Visit    Patient Name: Truman Suero    YOB: 1960   Medical Record Number: [id number]  Date: 2/12/2018                SUBJECTIVE: The patient reports that work has been busy including his needing to work 7 days in a row to address the demand for the pending holiday.  He reports that this extra work has resulted in extra pain and the extra pain has increased his stress level and that generally speaking though \"not awful\" things have been more difficult.  Patient reports that he did very limited amounts of effort towards the focus of our prior session which included discussion of making some shifts in his behavioral patterns associated with smoking.  The patient did report doing them and that it was helpful in delaying his decision to smoke but noted that regardless of the length of delay he would still smoke.  We discussed the patient's difficulty in the context of him being able to see where he has come up short rather than what he has done or created new.    For the second part of our visit today we did an extended relaxation exercise.  The patient reports some difficulty getting into the activity due to his sense of feeling overall tension.  He notes that once he did become relaxed however he was more able to participate in the process.  It appears that this particular instance was less effective than the initial time we tried this activity 2 prior sessions ago.  Patient reported he is interested in continuing along the lines of relaxation and self hypnosis.  He will review the packet and again consideration of the script for self hypnosis.          OBJECTIVE: Met with the patient on this date for an elective return appointment.  This is our fourth visit post initial assessment.  Today the patient reports the following:  his level of pain remains the same, his " mood remains the same, his activity level has been mildly increased, his stress level has been mildly worse and his sleep has remained the same.  Patient reports today that he is performing self-care techniques 2-3 times per day.  Since beginning services with [facility] the patient reports slight improvement.   Length of Visit: 60 minutes      Assessment: Current Emotional / Mental Status    Appearance:   Appropriate   Eye Contact:   Good   Psychomotor Behavior: Normal   Attitude:   Cooperative   Orientation:   All  Speech  Rate / Production:             Normal   Volume:              Normal   Mood:    Normal  Affect:    Appropriate   Thought Content:  Clear   Thought Form:  Coherent  Logical   Insight:    Fair     ASSESSMENT:   Degenerative disc disease, cervical, chronic pain syndrome    Progress toward goals: fair.    Pain Status: unchanged    Emotional Status: unchanged              Medication / chemical use concerns: None    PLAN:   Next Appointment: Truman Suero will schedule a follow-up appointment in 3 weeks.  Assignment: Continue to work on relaxation/self hypnosis skills.  Work on audio recording  Objectives / interventions for next session: Continue to work on basic nervous system modulation    Kentrell Castañeda MA, LP, [facility]  Behavioral Pain Specialist  [facility]

## 2018-02-13 ENCOUNTER — OFFICE VISIT (OUTPATIENT)
Dept: FAMILY MEDICINE | Facility: CLINIC | Age: 58
End: 2018-02-13
Payer: COMMERCIAL

## 2018-02-13 ENCOUNTER — TELEPHONE (OUTPATIENT)
Dept: OTHER | Facility: CLINIC | Age: 58
End: 2018-02-13

## 2018-02-13 VITALS
BODY MASS INDEX: 24.16 KG/M2 | WEIGHT: 145 LBS | HEIGHT: 65 IN | TEMPERATURE: 98.1 F | SYSTOLIC BLOOD PRESSURE: 138 MMHG | DIASTOLIC BLOOD PRESSURE: 84 MMHG | HEART RATE: 78 BPM

## 2018-02-13 DIAGNOSIS — Z23 NEED FOR PROPHYLACTIC VACCINATION AND INOCULATION AGAINST INFLUENZA: ICD-10-CM

## 2018-02-13 DIAGNOSIS — I25.10 MILD CAD: ICD-10-CM

## 2018-02-13 DIAGNOSIS — M79.661 PAIN OF RIGHT LOWER LEG: ICD-10-CM

## 2018-02-13 DIAGNOSIS — I77.810 ASCENDING AORTA DILATATION (H): Primary | ICD-10-CM

## 2018-02-13 DIAGNOSIS — F43.22 ADJUSTMENT DISORDER WITH ANXIOUS MOOD: ICD-10-CM

## 2018-02-13 DIAGNOSIS — Z72.0 TOBACCO ABUSE: ICD-10-CM

## 2018-02-13 PROCEDURE — 99214 OFFICE O/P EST MOD 30 MIN: CPT | Mod: 25 | Performed by: FAMILY MEDICINE

## 2018-02-13 PROCEDURE — 90471 IMMUNIZATION ADMIN: CPT | Performed by: FAMILY MEDICINE

## 2018-02-13 PROCEDURE — 90686 IIV4 VACC NO PRSV 0.5 ML IM: CPT | Performed by: FAMILY MEDICINE

## 2018-02-13 RX ORDER — BUPROPION HYDROCHLORIDE 150 MG/1
TABLET, EXTENDED RELEASE ORAL
Qty: 60 TABLET | Refills: 0 | Status: SHIPPED | OUTPATIENT
Start: 2018-02-13 | End: 2018-03-16

## 2018-02-13 NOTE — MR AVS SNAPSHOT
After Visit Summary   2/13/2018    Truman Suero    MRN: [id number]           Patient Information     Date Of Birth          1960        Visit Information        Provider Department      2/13/2018 2:00 PM Humberto Trinidad DO [facility]        Today's Diagnoses     Need for prophylactic vaccination and inoculation against influenza    -  1    Ascending aorta dilatation (H)        Mild CAD        Tobacco abuse        Pain of right lower leg        Adjustment disorder with anxious mood          Care Instructions    Follow up with vascular specialist  Take wellbutrin and follow up in 2 -3 weeks  Humberto Trinidad D.O.      [facility]   Discharged by : Yoon GE CMA (AAMA)    Paper scripts provided to patient : none      If you have any questions regarding your visit please contact your care team:     Team Gold                Clinic Hours Telephone Number     Dr. Liana Lora 7am-7pm  Monday - Thursday   7am-5pm  Fridays  [phone]   (Appointment scheduling available 24/7)     RN Line  [phone] option 2     Urgent Care - [place] [facility] - 11am-9pm Monday-Friday Saturday-Sunday- 9am-5pm     [place] -   5pm-9pm Monday-Friday Saturday-Sunday- 9am-5pm    [phone] - [place]    [phone] - [place]       For a Price Quote for your services, please call our Consumer Price Line at [phone].     What options do I have for visits at the clinic other than the traditional office visit?     To expand how we care for you, many of our providers are utilizing electronic visits (e-visits) and telephone visits, when medically appropriate, for interactions with their patients rather than a visit in the clinic. We also offer nurse visits for many medical concerns. Just like any other service, we will bill your insurance company for this  type of visit based on time spent on the phone with your provider. Not all insurance companies cover these visits. Please check with your medical insurance if this type of visit is covered. You will be responsible for any charges that are not paid by your insurance.   E-visits via Carbon[employer]: generally incur a $35.00 fee.     Telephone visits:   Time spent on the phone: *charged based on time that is spent on the phone in increments of 10 minutes. Estimated cost:   5-10 mins $30.00   11-20 mins. $59.00   21-30 mins. $85.00     Use 3BaysOver (secure email communication and access to your chart) to send your primary care provider a message or make an appointment. Ask someone on your Team how to sign up for 3BaysOver.     As always, Thank you for trusting us with your health care needs!      [facility]:    Scheduling Appointments  Jac Malhotra [facility] [place] [facility]  Call: [phone]    [facility]  Call: [phone]    [facility]  Call: [phone]    For Gastroenterology referrals   [facility]   Clinics and Surgery Center, 4th Floor   [address] [place] [address]   Appointments: [phone]    WHERE TO GO FOR CARE?  Clinic    Make an appointment if you:       Are sick (cold, cough, flu, sore throat, earache or in pain).       Have a small injury (sprain, small cut, burn or broken bone).       Need a physical exam, Pap smear, vaccine or prescription refill.       Have questions about your health or medicines.    To reach us:      Call [phone] ([phone]). Open 24 hours every day. (For counseling services, call [phone].)    Log into 3BaysOver at [email or web address]. (Visit [email or web address] to create an account.) Hospital emergency room    An emergency is a serious or life- threatening problem that must be treated right away.    Call 560 or get to the hospital if you have:      Very bad or sudden:             - Chest pain or pressure         - Bleeding         - Head or belly pain         - Dizziness or trouble seeing, walking or                          Speaking      Problems breathing      Blood in your vomit or you are coughing up blood      A major injury (knocked out, loss of a finger or limb, rape, broken bone protruding from skin)    A mental health crisis. (Or call the Mental Health Crisis line at 1-805.150.6270 or Suicide Prevention Hotline at 1-414.584.5034.)    Open 24 hours every day. You don't need an appointment.     Urgent care    Visit urgent care for sickness or small injuries when the clinic is closed. You don't need an appointment. To check hours or find an urgent care near you, visit [email or web address]. Online care    Get online care from OnCAdcast for more than 70 common problems, like colds, allergies and infections. Open 24 hours every day at:   [email or web address]   Need help deciding?    For advice about where to be seen, you may call your clinic and ask to speak with a nurse. We're here for you 24 hours every day.         If you are deaf or hard of hearing, please let us know. We provide many free services including sign language interpreters, oral interpreters, TTYs, telephone amplifiers, note takers and written materials.                   Follow-ups after your visit        Additional Services     VASCULAR REFERRAL       Your provider has referred you to the V[facility] Health Center at [facility].    Reason for Referral: Vascular Medicine    Urgency of Referral: Next available    A referral has been sent to our Vascular  Services. If you do not receive a call from them within 24-48 hours you may reach them at [phone].    Please be aware that coverage of these services is subject to the terms and limitations of your health insurance plan.  Call member services at your health plan with any benefit or coverage questions.      Please bring the following with you to your appointment:  (1) Any  X-Rays, CTs or MRIs which have been performed.  Contact the facility where they were done to arrange for  prior to your scheduled appointment.  Any new CT, MRI or other procedures ordered by your specialist must be performed at a [place] facility or coordinated by your clinic's referral office.    (2) List of current medications   (3) This referral request   (4) Any documents/labs given to you for this referral                  Your next 10 appointments already scheduled     Mar 08, 2018  2:45 PM CST   Radiology Injections with Hugo Corrigan MD   [facility] [place] ([facility])    [address]  [place] [address] [phone]              Who to contact     If you have questions or need follow up information about today's clinic visit or your schedule please contact [facility] directly at [phone].  Normal or non-critical lab and imaging results will be communicated to you by MyChart, letter or phone within 4 business days after the clinic has received the results. If you do not hear from us within 7 days, please contact the clinic through MyChart or phone. If you have a critical or abnormal lab result, we will notify you by phone as soon as possible.  Submit refill requests through Windcentrale or call your pharmacy and they will forward the refill request to us. Please allow 3 business days for your refill to be completed.          Additional Information About Your Visit        MyChart Information     Windcentrale gives you secure access to your electronic health record. If you see a primary care provider, you can also send messages to your care team and make appointments. If you have questions, please call your primary care clinic.  If you do not have a primary care provider, please call [phone] and they will assist you.        Care EveryWhere ID     This is your Care EveryWhere ID. This could be used by other organizations to  "access your [place] medical records  [id number]        Your Vitals Were     Pulse Temperature Height BMI (Body Mass Index)          78 98.1  F (36.7  C) (Oral) 5' 5\" (1.651 m) 24.13 kg/m2         Blood Pressure from Last 3 Encounters:   02/13/18 138/84   01/31/18 167/83   01/22/18 137/81    Weight from Last 3 Encounters:   02/13/18 145 lb (65.8 kg)   01/31/18 145 lb (65.8 kg)   01/22/18 142 lb 3.2 oz (64.5 kg)              We Performed the Following     FLU VAC, SPLIT VIRUS IM > 3 YO (QUADRIVALENT) [84020]     Vaccine Administration, Initial [07504]     VASCULAR REFERRAL          Today's Medication Changes          These changes are accurate as of 2/13/18  2:45 PM.  If you have any questions, ask your nurse or doctor.               Start taking these medicines.        Dose/Directions    buPROPion 150 MG 12 hr tablet   Commonly known as:  WELLBUTRIN SR   Used for:  Tobacco abuse, Adjustment disorder with anxious mood   Started by:  Humberto Trinidad DO        Take 1 tablet once daily and increase to 1 tablet twice daily after 4 to 7 days   Quantity:  60 tablet   Refills:  0         Stop taking these medicines if you haven't already. Please contact your care team if you have questions.     varenicline 0.5 MG X 11 & 1 MG X 42 tablet   Commonly known as:  CHANTIX STARTING MONTH GIO   Stopped by:  Humberto Trinidad DO                Where to get your medicines      These medications were sent to [facility] [address] [place] [address]     Phone:  [phone]     buPROPion 150 MG 12 hr tablet                Primary Care Provider Office Phone # Fax #    Humberto Trinidad [phone] [address] [facility] [address]        Equal Access to Services     ELIEZER VALENTE AH: Colt Maldonado, bakari manzo, mariela huynh. So [facility] " [phone].    ATENCIÓN: Si bob johnson, tiene a simmons disposición servicios gratuitos de asistencia lingüística. [place] [phone].    We comply with applicable federal civil rights laws and [place] laws. We do not discriminate on the basis of race, color, national origin, age, disability, sex, sexual orientation, or gender identity.            Thank you!     Thank you for choosing [facility]  for your care. Our goal is always to provide you with excellent care. Hearing back from our patients is one way we can continue to improve our services. Please take a few minutes to complete the written survey that you may receive in the mail after your visit with us. Thank you!             Your Updated Medication List - Protect others around you: Learn how to safely use, store and throw away your medicines at www.disposemymeds.org.          This list is accurate as of 2/13/18  2:45 PM.  Always use your most recent med list.                   Brand Name Dispense Instructions for use Diagnosis    buPROPion 150 MG 12 hr tablet    WELLBUTRIN SR    60 tablet    Take 1 tablet once daily and increase to 1 tablet twice daily after 4 to 7 days    Tobacco abuse, Adjustment disorder with anxious mood       fluticasone 50 MCG/ACT spray    FLONASE    2 Bottle    Spray 2 sprays into both nostrils 2 times daily    Chronic allergic rhinitis due to animal hair and dander, Chronic seasonal allergic rhinitis due to pollen, Allergic rhinitis due to dust mite, Allergic conjunctivitis of both eyes       gabapentin 600 MG tablet    NEURONTIN    135 tablet    Take 1.5 tablets (900 mg) by mouth 3 times daily    Cervical spondylosis without myelopathy, DDD (degenerative disc disease), cervical, Chronic bilateral low back pain without sciatica, Chronic neck and back pain, Cervical stenosis of spinal canal       ibuprofen 800 MG tablet    ADVIL/MOTRIN          methocarbamol 750 MG tablet    ROBAXIN    130 tablet    Take 1-2  tablets (750-1,500 mg) by mouth 3 times daily as needed for muscle spasms caution sedation    Chronic neck pain, DDD (degenerative disc disease), cervical, Cervical spondylosis without myelopathy, Chronic bilateral low back pain without sciatica, Myofascial pain       topiramate 50 MG tablet    TOPAMAX    30 tablet    Take 50mg at bedtime.  Drink plenty of water and no alcohol. If side effects, then reduce to last tolerable dosage.    Cervical spondylosis without myelopathy, DDD (degenerative disc disease), cervical, Chronic bilateral low back pain without sciatica, Cervical stenosis of spinal canal       * traMADol 200 MG ER-tablet    ULTRAM-ER    30 tablet    Take 1 tablet (200 mg) by mouth daily OK to fill on/after 1/31/2018 and begin on 2/1/2018 to last 30 days    Cervical spondylosis without myelopathy, DDD (degenerative disc disease), cervical, Chronic bilateral low back pain without sciatica       * traMADol 50 MG tablet    ULTRAM    90 tablet    May take 1-2 tablets every 6 hours as needed for pain, max of 3 tabs per day. To last 30 days. Reduce as able.  Okay to fill on/after 1/31/2018 and start on 2/1/2018    Chronic neck and back pain, Cervical radiculopathy       * Notice:  This list has 2 medication(s) that are the same as other medications prescribed for you. Read the directions carefully, and ask your doctor or other care provider to review them with you.

## 2018-02-13 NOTE — TELEPHONE ENCOUNTER
"Pt referred to [facility] by Humberto Trinidad DO for hx of pain of right lower extremity/PAD.     Hx of ascending aorta dilatation, mild CAD, Tobacco abuse.    2-13-18 Dr. Trinidad notes  \"right foot. - Toes are turning white after periods of standing and losing feeling in feet.  May need a referral today.      Chronic leg pain  Right toe sometimes turns white  Numb feeling off and on   With walking hip leg, pain and with rest it gets better     chantix did not work, has not tried   No tobacco for 2 months  Cervical fusion-surgeon told him he may needs to be off  5-8 cifg a day now, 1 pack a day begfore\"    Pt needs to be scheduled for THA with exercise and consult with vascular medicine (unless its with Dr. Trejo, then no imaging prior).  Will route to scheduling to coordinate an appointment at next available.    Tressa Flynn, ANALISAN, RN    "

## 2018-02-13 NOTE — PROGRESS NOTES
"  SUBJECTIVE:   Truman Suero is a 57 year old male who presents to clinic today for the following health issues:    right foot. - Toes are turning white after periods of standing and losing feeling in feet.  May need a referral today.     Chronic leg pain  Right toe sometimes turns white  Numb feeling off and on   With walking hip leg, pain and with rest it gets better    chantix did not work, has not tried   No tobacco for 2 months  Cervical fusion-surgeon told him he may needs to be off  5-8 cifg a day now, 1 pack a day begfore      Problem list and histories reviewed & adjusted, as indicated.  Additional history: as documented    Patient Active Problem List   Diagnosis     Chronic neck pain     Tobacco abuse     Cervical stenosis of spinal canal     DDD (degenerative disc disease), cervical     Cervical spondylosis without myelopathy     Chronic bilateral low back pain without sciatica     Myofascial pain     Mild CAD     Ascending aorta dilatation (H)     History reviewed. No pertinent surgical history.    Social History   Substance Use Topics     Smoking status: Current Every Day Smoker     Smokeless tobacco: Never Used      Comment: tried Chantix, did not work well 2.13.18      Alcohol use No     Family History   Problem Relation Age of Onset     Prostate Cancer Father      Breast Cancer Maternal Grandmother      Prostate Cancer Other            Reviewed and updated as needed this visit by clinical staff  Tobacco  Allergies  Meds  Med Hx  Surg Hx  Fam Hx  Soc Hx      Reviewed and updated as needed this visit by Provider         ROS:  Constitutional, HEENT, cardiovascular, pulmonary, GI, , musculoskeletal, neuro, skin, endocrine and psych systems are negative, except as otherwise noted.    OBJECTIVE:     /84  Pulse 78  Temp 98.1  F (36.7  C) (Oral)  Ht 5' 5\" (1.651 m)  Wt 145 lb (65.8 kg)  BMI 24.13 kg/m2  Body mass index is 24.13 kg/(m^2).  GENERAL: healthy, alert and no distress  NECK: " no adenopathy, no asymmetry, masses, or scars and thyroid normal to palpation  RESP: lungs clear to auscultation - no rales, rhonchi or wheezes  CV: regular rate and rhythm, normal S1 S2, no S3 or S4, no murmur, click or rub, no peripheral edema and peripheral pulses strong  ABDOMEN: soft, nontender, no hepatosplenomegaly, no masses and bowel sounds normal  MS: no gross musculoskeletal defects noted, no edema    Diagnostic Test Results:  none     ASSESSMENT/PLAN:       ICD-10-CM    1. Ascending aorta dilatation (H) I77.810 VASCULAR REFERRAL   2. Mild CAD I25.10 VASCULAR REFERRAL   3. Tobacco abuse Z72.0 VASCULAR REFERRAL     buPROPion (WELLBUTRIN SR) 150 MG 12 hr tablet   4. Pain of right lower leg M79.661 VASCULAR REFERRAL   5. Need for prophylactic vaccination and inoculation against influenza Z23 FLU VAC, SPLIT VIRUS IM > 3 YO (QUADRIVALENT) [22273]     Vaccine Administration, Initial [39125]   6. Adjustment disorder with anxious mood F43.22 buPROPion (WELLBUTRIN SR) 150 MG 12 hr tablet   mild cad/aortic dilation/leg pain--Follow up with vascular specialist  Adjustment disorder/anxiety-Take wellbutrin and follow up in 2 -3 weeks  Tobacco use-advised cessation-advised ewllbutrin, did not well with chantix  See Patient Instructions    Humberto Trinidad DO  [facility]      Injectable Influenza Immunization Documentation    1.  Is the person to be vaccinated sick today?   No    2. Does the person to be vaccinated have an allergy to a component   of the vaccine?   No  Egg Allergy Algorithm Link    3. Has the person to be vaccinated ever had a serious reaction   to influenza vaccine in the past?   No    4. Has the person to be vaccinated ever had Guillain-Barré syndrome?   No    Form completed by patient

## 2018-02-13 NOTE — NURSING NOTE
"Chief Complaint   Patient presents with     Foot Problems     right foot     Flu Shot       Initial /84  Pulse 78  Temp 98.1  F (36.7  C) (Oral)  Ht 5' 5\" (1.651 m)  Wt 145 lb (65.8 kg)  BMI 24.13 kg/m2 Estimated body mass index is 24.13 kg/(m^2) as calculated from the following:    Height as of this encounter: 5' 5\" (1.651 m).    Weight as of this encounter: 145 lb (65.8 kg).  Medication Reconciliation: complete   Shila Olson MA    "

## 2018-02-13 NOTE — PATIENT INSTRUCTIONS
Follow up with vascular specialist  Take wellbutrin and follow up in 2 -3 weeks  Humberto Trinidad D.O.      [facility]   Discharged by : Yoon GE CMA ([facility])    Paper scripts provided to patient : none      If you have any questions regarding your visit please contact your care team:     Team Gold                Clinic Hours Telephone Number     Dr. Liana Lora 7am-7pm  Monday - Thursday   7am-5pm  Fridays  [phone]   (Appointment scheduling available 24/7)     RN Line  [phone] option 2     Urgent [place] - [place] - 11am-9pm Monday-Friday Saturday-Sunday- 9am-5pm     [place] -   5pm-9pm Monday-Friday Saturday-Sunday- 9am-5pm    [phone] - [place]    [phone] - [place]       For a Price Quote for your services, please call our Consumer Price Line at [phone].     What options do I have for visits at the clinic other than the traditional office visit?     To expand how we care for you, many of our providers are utilizing electronic visits (e-visits) and telephone visits, when medically appropriate, for interactions with their patients rather than a visit in the clinic. We also offer nurse visits for many medical concerns. Just like any other service, we will bill your insurance company for this type of visit based on time spent on the phone with your provider. Not all insurance companies cover these visits. Please check with your medical insurance if this type of visit is covered. You will be responsible for any charges that are not paid by your insurance.   E-visits via [employer]: generally incur a $35.00 fee.     Telephone visits:   Time spent on the phone: *charged based on time that is spent on the phone in increments of 10 minutes. Estimated cost:   5-10 mins $30.00   11-20 mins. $59.00   21-30 mins. $85.00     Use [facility] (secure email  communication and access to your chart) to send your primary care provider a message or make an appointment. Ask someone on your Team how to sign up for [employer].     As always, Thank you for trusting us with your health care needs!      [facility]:    Scheduling Appointments  [place] [place]  Call: [phone]    [place] [facility]  Call: [phone]    [facility]  Call: [phone]    For Gastroenterology referrals   [facility]   Clinics and Surgery Center, 4th Floor   [address] [place] [address]   Appointments: [phone]    WHERE TO GO FOR CARE?  Clinic    Make an appointment if you:       Are sick (cold, cough, flu, sore throat, earache or in pain).       Have a small injury (sprain, small cut, burn or broken bone).       Need a physical exam, Pap smear, vaccine or prescription refill.       Have questions about your health or medicines.    To reach us:      Call [phone] ([phone]). Open 24 hours every day. (For counseling services, call [phone].)    Log into [employer] at [email or web address]. (Visit [email or web address] to create an account.) Hospital emergency room    An emergency is a serious or life- threatening problem that must be treated right away.    Call [phone] or get to the hospital if you have:      Very bad or sudden:            - Chest pain or pressure         - Bleeding         - Head or belly pain         - Dizziness or trouble seeing, walking or                          Speaking      Problems breathing      Blood in your vomit or you are coughing up blood      A major injury (knocked out, loss of a finger or limb, rape, broken bone protruding from skin)    A mental health crisis. (Or call the Mental Health Crisis line at 1-662.581.8509 or Suicide Prevention Hotline at 1-556.632.5238.)    Open 24 hours every day. You don't need an appointment.     Urgent care    Visit  urgent care for sickness or small injuries when the clinic is closed. You don't need an appointment. To check hours or find an urgent care near you, visit [email or web address]. Online care    Get online care from [facility] for more than 70 common problems, like colds, allergies and infections. Open 24 hours every day at:   [email or web address]   Need help deciding?    For advice about where to be seen, you may call your clinic and ask to speak with a nurse. We're here for you 24 hours every day.         If you are deaf or hard of hearing, please let us know. We provide many free services including sign language interpreters, oral interpreters, TTYs, telephone amplifiers, note takers and written materials.

## 2018-02-14 NOTE — TELEPHONE ENCOUNTER
Called and left a message for the patient to call us back to get scheduled for a consult with vascular medicine and US THA (unless scheduled with Dr. Trejo then no imaging prior).

## 2018-02-23 ENCOUNTER — MYC MEDICAL ADVICE (OUTPATIENT)
Dept: PALLIATIVE MEDICINE | Facility: CLINIC | Age: 58
End: 2018-02-23

## 2018-02-23 NOTE — TELEPHONE ENCOUNTER
Thanks for the update, Palmer.  I agree with ceasing smoking. You CAN do it!!!    Best-  Melanie STEVENS RN CNP, MELISSAP  [facility]    I have sent the above myChart message to the patient.     Melanie STEVENS RN CNP, MELISSAP  [facility]

## 2018-02-26 ENCOUNTER — MYC MEDICAL ADVICE (OUTPATIENT)
Dept: PALLIATIVE MEDICINE | Facility: CLINIC | Age: 58
End: 2018-02-26

## 2018-02-26 ENCOUNTER — OFFICE VISIT (OUTPATIENT)
Dept: OTHER | Facility: CLINIC | Age: 58
End: 2018-02-26
Attending: INTERNAL MEDICINE
Payer: COMMERCIAL

## 2018-02-26 ENCOUNTER — MYC MEDICAL ADVICE (OUTPATIENT)
Dept: FAMILY MEDICINE | Facility: CLINIC | Age: 58
End: 2018-02-26

## 2018-02-26 VITALS
BODY MASS INDEX: 22.07 KG/M2 | SYSTOLIC BLOOD PRESSURE: 135 MMHG | HEIGHT: 67 IN | WEIGHT: 140.6 LBS | OXYGEN SATURATION: 98 % | HEART RATE: 63 BPM | DIASTOLIC BLOOD PRESSURE: 96 MMHG

## 2018-02-26 DIAGNOSIS — M77.9 TENDONITIS: ICD-10-CM

## 2018-02-26 DIAGNOSIS — I10 BENIGN ESSENTIAL HYPERTENSION: Primary | ICD-10-CM

## 2018-02-26 DIAGNOSIS — I87.2 VENOUS (PERIPHERAL) INSUFFICIENCY: ICD-10-CM

## 2018-02-26 DIAGNOSIS — I73.9 PAD (PERIPHERAL ARTERY DISEASE) (H): Primary | ICD-10-CM

## 2018-02-26 DIAGNOSIS — I73.9 PAD (PERIPHERAL ARTERY DISEASE) (H): ICD-10-CM

## 2018-02-26 PROCEDURE — G0463 HOSPITAL OUTPT CLINIC VISIT: HCPCS

## 2018-02-26 PROCEDURE — 99244 OFF/OP CNSLTJ NEW/EST MOD 40: CPT | Mod: ZP | Performed by: INTERNAL MEDICINE

## 2018-02-26 NOTE — PROGRESS NOTES
Vascular Medicine Consultation     Chief Complaint   Bilateral leg pain    Date of Admission:  (Not on file)    Truman Suero is a 57 year old male who was admitted on (Not on file). I was asked to see the patient for bilateral leg pain.    Code Status    Diane    Reason for Consult   Reason for consult: I was asked by PCP to evaluate this patient for bilateral leg pain.    Primary Care Physician   Humberto Trinidad      History is obtained from the patient    History of Present Illness   Truman Suero is a 57 year old male who presents with bilateral leg pain, is not clear the nature of the leg pain but the patient did mention that he has leg pain starting at the hip area and both buttocks and upper thighs posteriorly in addition to calf pain pain is related to exercise, when he walks pain is worse if he rest pain is better, he described the pain as aching pain 5/10, nonradiating affecting the calf muscles occasionally with cold right feet and skin discoloration, pain can be worse at the end of the day and may be accompanied sometimes with restless leg, no history of open sores, ulcers, spontaneous bleeding or spontaneous ulceration  Patient denies history of claudication but he did mention that he has night pain and sometimes rest pain  Patient has been a smoker for the past 30 years, his blood pressure is on the high side and he does complain of trigger left thumb and index finger    Past Medical History   I have reviewed this patient's medical history and updated it with pertinent information if needed.   Past Medical History:   Diagnosis Date     Neck pain     MRI positive on cervical vertebrea.       Past Surgical History   I have reviewed this patient's surgical history and updated it with pertinent information if needed.  History reviewed. No pertinent surgical history.    Prior to Admission Medications   Cannot display prior to admission medications because the patient has not been admitted in  this contact.     Allergies   No Known Allergies    Social History   I have reviewed this patient's social history and updated it with pertinent information if needed. Truman Suero  reports that he has been smoking.  He has never used smokeless tobacco. He reports that he does not drink alcohol or use illicit drugs.    Family History   I have reviewed this patient's family history and updated it with pertinent information if needed.   Family History   Problem Relation Age of Onset     Prostate Cancer Father      Breast Cancer Maternal Grandmother      Prostate Cancer Other        Review of Systems   The 10 point Review of Systems is negative other than noted in the HPI or here.     Physical Exam       BP: (!) 135/96 Pulse: 63     SpO2: 98 %      Vital Signs with Ranges  Pulse:  [63] 63  BP: (135-136)/(95-96) 135/96  SpO2:  [98 %] 98 %  140 lbs 9.6 oz    Constitutional: awake, alert, cooperative, no apparent distress, and appears stated age  Eyes: Lids and lashes normal, pupils equal, round and reactive to light, extra ocular muscles intact, sclera clear, conjunctiva normal  ENT: normocepalic, without obvious abnormality, oropharynx pink and moist  Hematologic / Lymphatic: no lymphadenopathy  Respiratory: No increased work of breathing, good air exchange, clear to auscultation bilaterally, no crackles or wheezing  Cardiovascular: regular rate and rhythm, normal S1 and S2 and no murmur noted  GI: Normal bowel sounds, soft, non-distended, non-tender  Skin: no redness, warmth, or swelling, no rashes and no lesions  Musculoskeletal: There is no redness, warmth, or swelling of the joints.  Full range of motion noted.  Motor strength is 5 out of 5 all extremities bilaterally.  Tone is normal.  Neurologic: Awake, alert, oriented to name, place and time.  Cranial nerves II-XII are grossly intact.  Motor is 5 out of 5 bilaterally.    Neuropsychiatric:  Normal affect, memory, insight.  Pulses: Good and appreciate femoral,  popliteal, DP and PT pulses bilaterally  Monophasic Doppler signal of DP and PT pulses bilaterally  . No carotid bruits appreciated.     Data   Most Recent 3 CBC's:  Recent Labs   Lab Test  08/23/17   1404  07/03/16   1151  06/13/16   1339   WBC   --   7.8  9.8   HGB  13.8  15.3  14.6   MCV   --   90  93   PLT   --   252  242     Most Recent 3 BMP's:  Recent Labs   Lab Test  08/23/17   1404  07/03/16   1151  06/13/16   1339   NA  141  142  140   POTASSIUM  4.4  3.8  4.2   CHLORIDE  107  111*  110*   CO2  26  26  27   BUN  18  13  13   CR  0.82  0.69  0.83   ANIONGAP  8  5  3   SRAVAN  9.4  8.9  8.9   GLC  69*  94  73     Most Recent 2 LFT's:  Recent Labs   Lab Test  08/23/17   1404  06/13/16   1339   AST  24  13   ALT  29  28   ALKPHOS  60  52   BILITOTAL  0.4  0.4     Most Recent 3 INR's:No lab results found.  Most Recent 3 BNP's:No lab results found.  Most Recent D-dimer:No lab results found.  Most Recent Cholesterol Panel:  Recent Labs   Lab Test  08/23/17   1404   CHOL  165   LDL  97   HDL  55   TRIG  65     Most Recent Hemoglobin A1c:No lab results found.  Most Recent Urinalysis:No lab results found.  Most Recent ABG:No lab results found.  Most Recent ESR & CRP:No lab results found.      Assessment & Plan    Nature of the pain is not clearly evident, pain can be secondary to ischemia and/or venous insufficiency especially in the presence of long-standing history of smoking for 30 years, the inability to appreciate femoral popliteal DP and PT pulses bilaterally, monophasic Doppler signal, in addition to signs and symptoms of venous insufficiency  Pain can be secondary to PAD, venous insufficiency,  Will obtain 1-venous incompetency testing  2-THA with exercise  3-will monitor blood pressure most probably patient will need blood pressure medication  4-patient has evidence of Tenosynovitis of both left thumb and index finger causing trigger finger symptoms patient might need local steroid injections  No diagnosis  found.    Summary: We'll see the patient once test results are available  Please cc  primary care physician    Devonte Trejo MD

## 2018-02-26 NOTE — TELEPHONE ENCOUNTER
My chart message from pt:    Dr Navarro-  I had a vascular appointment that Dr. Trinidad had ordered concerning some issues with leg and foot. I was seen by Dr. Trejo at [facility]. He has ordered more test for later this week. His one concern was blood pressure. He wanted to prescribe some medication but asked that I contact my Doctors to check their opinions. I have also messaged Dr. Trinidad. As always thank you     Palmer Suero     Per Dr Trejo's notes: 3-will monitor blood pressure most probably patient will need blood pressure medication    Sending to provider to respond.     Jessika Flannery RN, [facility]  Pain Clinic Care Coordinator

## 2018-02-26 NOTE — NURSING NOTE
"Chief Complaint   Patient presents with     Consult     New consult for leg pain, ref by Dr. Trinidad, records in Epic       Initial BP (!) 135/96 (BP Location: Left arm, Patient Position: Chair, Cuff Size: Adult Large)  Pulse 63  Ht 5' 7\" (1.702 m)  Wt 140 lb 9.6 oz (63.8 kg)  SpO2 98%  BMI 22.02 kg/m2 Estimated body mass index is 22.02 kg/(m^2) as calculated from the following:    Height as of this encounter: 5' 7\" (1.702 m).    Weight as of this encounter: 140 lb 9.6 oz (63.8 kg).  Medication Reconciliation: complete    Tennille Batres CMA on 2/26/2018 at 10:05 AM    "

## 2018-02-27 ENCOUNTER — MYC MEDICAL ADVICE (OUTPATIENT)
Dept: FAMILY MEDICINE | Facility: CLINIC | Age: 58
End: 2018-02-27

## 2018-02-27 PROBLEM — I73.9 PAD (PERIPHERAL ARTERY DISEASE) (H): Status: ACTIVE | Noted: 2018-02-27

## 2018-02-27 NOTE — TELEPHONE ENCOUNTER
Xavier Chakraborty-    I am fine if you need to be placed on blood pressure medication. I would leave this decision to your Primary Care Provider (Dr. Trinidad) and your vascular surgeon.   Thanks for keeping me in the loop.    Best-  Melanie STEVENS, TIMBO KNOTT, MELISSAP  [facility]    I have sent the above Benbriat message to the patient.     Melanie STEVENS RN CNP, MELISSAP  [facility] [place]

## 2018-02-27 NOTE — TELEPHONE ENCOUNTER
If he wants me to prescribe med for blood pressure, it would mean a visit with us.  Reviewed his charting and he states to monitor and may be add bp meds  Lets have you buy bp machine and have you monitor your pressures for a week and follow up.  I think Beta blocker would be a good option  Hope this helps  Take care  Humberto Trinidad D.O.

## 2018-02-27 NOTE — TELEPHONE ENCOUNTER
Route for a referral per patient request for two tests that vascular ordered per insurance requirement.  Jaylene Paulino RN

## 2018-02-28 ENCOUNTER — OFFICE VISIT (OUTPATIENT)
Dept: VASCULAR SURGERY | Facility: CLINIC | Age: 58
End: 2018-02-28
Payer: COMMERCIAL

## 2018-02-28 ENCOUNTER — HOSPITAL ENCOUNTER (OUTPATIENT)
Dept: ULTRASOUND IMAGING | Facility: CLINIC | Age: 58
Discharge: HOME | End: 2018-02-28
Attending: FAMILY MEDICINE | Admitting: FAMILY MEDICINE
Payer: COMMERCIAL

## 2018-02-28 ENCOUNTER — MYC REFILL (OUTPATIENT)
Dept: PALLIATIVE MEDICINE | Facility: CLINIC | Age: 58
End: 2018-02-28

## 2018-02-28 DIAGNOSIS — I73.9 PAD (PERIPHERAL ARTERY DISEASE) (H): ICD-10-CM

## 2018-02-28 DIAGNOSIS — M54.9 CHRONIC NECK AND BACK PAIN: ICD-10-CM

## 2018-02-28 DIAGNOSIS — M54.2 CHRONIC NECK AND BACK PAIN: ICD-10-CM

## 2018-02-28 DIAGNOSIS — M54.50 CHRONIC BILATERAL LOW BACK PAIN WITHOUT SCIATICA: ICD-10-CM

## 2018-02-28 DIAGNOSIS — G89.29 CHRONIC NECK AND BACK PAIN: ICD-10-CM

## 2018-02-28 DIAGNOSIS — G89.29 CHRONIC BILATERAL LOW BACK PAIN WITHOUT SCIATICA: ICD-10-CM

## 2018-02-28 DIAGNOSIS — M50.30 DDD (DEGENERATIVE DISC DISEASE), CERVICAL: ICD-10-CM

## 2018-02-28 DIAGNOSIS — M54.12 CERVICAL RADICULOPATHY: ICD-10-CM

## 2018-02-28 DIAGNOSIS — M47.812 CERVICAL SPONDYLOSIS WITHOUT MYELOPATHY: ICD-10-CM

## 2018-02-28 PROCEDURE — 93924 LWR XTR VASC STDY BILAT: CPT

## 2018-02-28 PROCEDURE — 93970 EXTREMITY STUDY: CPT | Performed by: SURGERY

## 2018-02-28 RX ORDER — TRAMADOL HYDROCHLORIDE 50 MG/1
TABLET ORAL
Qty: 90 TABLET | Refills: 0 | Status: CANCELLED | OUTPATIENT
Start: 2018-02-28

## 2018-02-28 RX ORDER — TRAMADOL HYDROCHLORIDE 200 MG/1
200 TABLET, EXTENDED RELEASE ORAL DAILY
Qty: 30 TABLET | Refills: 0 | Status: CANCELLED | OUTPATIENT
Start: 2018-02-28

## 2018-03-01 NOTE — TELEPHONE ENCOUNTER
Routed to the nursing pool and to the MA pool to process refill(s).    Estelita Wetzel, RN-BSN  [facility]

## 2018-03-01 NOTE — TELEPHONE ENCOUNTER
Received call from patient requesting refill(s) of traMADol (ULTRAM-ER) 200 MG ER-tablet             traMADol (ULTRAM) 50 MG tablet    Last picked up from pharmacy on 01/31/18 for both    Pt last seen by prescribing provider on 01/31/18  Next appt scheduled for : no office visit scheduled - injection only    Last urine drug screen date 01/08/18  Current opioid agreement on file (completed within the last year) Yes Date of opioid agreement: 01/08/18    Processing (pick one and delete the others):      Fax to [facility] pharmacy     Will route to nursing pool for review and preparation of prescription(s).

## 2018-03-01 NOTE — TELEPHONE ENCOUNTER
Message from [employer]:  Original authorizing provider: RODNEY Vargas CNP would like a refill of the following medications:  traMADol (ULTRAM-ER) 200 MG ER-tablet [RODNEY Vargas CNP]  traMADol (ULTRAM) 50 MG tablet [RODNEY Vargas CNP]    Preferred pharmacy: [facility] [address] [place] [address]    Comment:  Have about a week left. so just wanted to start the paper work. Thanks Palmer

## 2018-03-02 ENCOUNTER — TRANSFERRED RECORDS (OUTPATIENT)
Dept: OTHER | Facility: CLINIC | Age: 58
End: 2018-03-02

## 2018-03-02 RX ORDER — TRAMADOL HYDROCHLORIDE 200 MG/1
200 TABLET, EXTENDED RELEASE ORAL DAILY
Qty: 30 TABLET | Refills: 0 | Status: SHIPPED | OUTPATIENT
Start: 2018-03-02 | End: 2018-03-29

## 2018-03-02 RX ORDER — TRAMADOL HYDROCHLORIDE 50 MG/1
TABLET ORAL
Qty: 90 TABLET | Refills: 0 | Status: SHIPPED | OUTPATIENT
Start: 2018-03-02 | End: 2018-03-29

## 2018-03-02 NOTE — TELEPHONE ENCOUNTER
Script was signed and faxed to [facility] pharmacy. Left voice message.      Mike Bergeron MA

## 2018-03-02 NOTE — TELEPHONE ENCOUNTER
Message sent to pt:    Palmer,     I see that a voice mail message was left about your prescriptions being completed but I wanted to send this [place] message as well since this is how you have been corresponding with us. The prescriptions have been faxed to [facility] pharmacy.  You may fill them as early as 3/4 with an estimated start date of 3/6 which is based on your report of having about a week left of the medication.     Take careShanita BSN, RN-BC  Patient Care Supervisor/Care Coordinator  [facility] [place]

## 2018-03-02 NOTE — TELEPHONE ENCOUNTER
Signed Prescriptions:                        Disp   Refills    traMADol (ULTRAM-ER) 200 MG ER-tablet      30 tab*0        Sig: Take 1 tablet (200 mg) by mouth daily Okay to fill           on/after 3/4/2018 and start on/after 3/6/2018; to           last 30 days  Authorizing Provider: MELANIE BENSON    traMADol (ULTRAM) 50 MG tablet             90 tab*0        Sig: May take 1-2 tablets every 6 hours as needed for           pain, max of 3 tabs per day. Reduce as able. Okay           to fill on/after 3/4/2018 and start on/after           3/6/2018; To last 30 days.  Authorizing Provider: MELANIE BENSON    Signed script placed in touchdown bin at [facility].    Melanie Benson APRN CNP FNP RN  [facility]

## 2018-03-02 NOTE — TELEPHONE ENCOUNTER
Medication refill information reviewed.     Due date for tramadol 200 mg and tramadol 50 mg is anytime after 3/3/18;  As of 2/28, pt reported that he has about a week left. Will prep Rx for start date of 3/6/18.     Prescriptions prepped for review.     Will route to provider.    Message sent to pt:    Elmer Chakraborty,     We are getting your prescriptions ready and will let you know when they have been processed.  When you last saw Melanie Thomas CNP on 1/31/18, she wanted you to follow up in 10-12 weeks. Because she typically books out quite far, I would suggest that you call [phone]) to get an appointment scheduled sooner than later in order to stay on track with the timing of a return appointment.      Take care,     ANALISA ArtisN, RN-BC  Patient Care Supervisor/Care Coordinator  [facility]

## 2018-03-05 ENCOUNTER — MYC MEDICAL ADVICE (OUTPATIENT)
Dept: PALLIATIVE MEDICINE | Facility: CLINIC | Age: 58
End: 2018-03-05

## 2018-03-06 NOTE — TELEPHONE ENCOUNTER
My chart message from pt:    Not sure if this matters or not, but want to keep things documented as far as the medications go. My pharmacy has a very difficult time getting prescription requests correct. Your office faxed over the renewal on Friday 3/2/ I went over Today 3/5 and they had not filled it yet. It was in cyber space somewhere and they would process it she said. I came back later and they had only filled half. They had not seen the request for the 50mg tablets. That she said would they could do later in the day. Due to the weather I don't feel like running back and forth so those I will not  until tomorrow 3/6. I may discuss changing pharmacies at our next appt. This happens at every visit. Thanks     Palmer Frank for the update Palmer    It does sound like it may be time for another pharmacy.     Jessika Flannery RN, [facility]  Pain Clinic Care Coordinator

## 2018-03-08 ENCOUNTER — RADIOLOGY INJECTION OFFICE VISIT (OUTPATIENT)
Dept: PALLIATIVE MEDICINE | Facility: CLINIC | Age: 58
End: 2018-03-08
Payer: COMMERCIAL

## 2018-03-08 ENCOUNTER — RADIANT APPOINTMENT (OUTPATIENT)
Dept: RADIOLOGY | Facility: CLINIC | Age: 58
End: 2018-03-08
Attending: PAIN MEDICINE
Payer: COMMERCIAL

## 2018-03-08 VITALS — DIASTOLIC BLOOD PRESSURE: 90 MMHG | HEART RATE: 62 BPM | OXYGEN SATURATION: 98 % | SYSTOLIC BLOOD PRESSURE: 137 MMHG

## 2018-03-08 DIAGNOSIS — M54.9 CHRONIC NECK AND BACK PAIN: ICD-10-CM

## 2018-03-08 DIAGNOSIS — G89.29 CHRONIC BILATERAL LOW BACK PAIN WITHOUT SCIATICA: ICD-10-CM

## 2018-03-08 DIAGNOSIS — M54.2 CHRONIC NECK AND BACK PAIN: ICD-10-CM

## 2018-03-08 DIAGNOSIS — M48.02 CERVICAL STENOSIS OF SPINAL CANAL: ICD-10-CM

## 2018-03-08 DIAGNOSIS — M50.30 DDD (DEGENERATIVE DISC DISEASE), CERVICAL: ICD-10-CM

## 2018-03-08 DIAGNOSIS — G89.29 CHRONIC NECK AND BACK PAIN: ICD-10-CM

## 2018-03-08 DIAGNOSIS — M54.12 CERVICAL RADICULOPATHY: Primary | ICD-10-CM

## 2018-03-08 DIAGNOSIS — M54.50 CHRONIC BILATERAL LOW BACK PAIN WITHOUT SCIATICA: ICD-10-CM

## 2018-03-08 DIAGNOSIS — M47.812 CERVICAL SPONDYLOSIS WITHOUT MYELOPATHY: ICD-10-CM

## 2018-03-08 PROCEDURE — 62321 NJX INTERLAMINAR CRV/THRC: CPT | Performed by: PAIN MEDICINE

## 2018-03-08 ASSESSMENT — PAIN SCALES - GENERAL: PAINLEVEL: EXTREME PAIN (9)

## 2018-03-08 NOTE — MR AVS SNAPSHOT
After Visit Summary   3/8/2018    Truman Suero    MRN: [id number]           Patient Information     Date Of Birth          1960        Visit Information        Provider Department      3/8/2018 2:45 PM Hugo Corrigan MD [facility]        Care Instructions    [facility]   Procedure Discharge Instructions    Today you saw: Dr. Hugo Corrigan    You had an:  Epidural steroid injection   -cervical  Medications used:  Lidocaine   Bupivacaine   Dexamethasone Omnipaque    Normal saline          Be cautious when walking. Numbness and/or weakness in the lower extremities may occur for up to 6-8 hours after the procedure due to effect of the local anesthetic    Do not drive for 6 hours. The effect of the local anesthetic could slow your reflexes.     You may resume your regular activities after 24 hours    Avoid strenuous activity for the first 24 hours    You may shower, however avoid swimming, tub baths or hot tubs for 24 hours following your procedure    You may have a mild to moderate increase in pain for several days following the injection.    It may take up to 14 days for the steroid medication to start working although you may feel the effect as early as a few days after the procedure.       You may use ice packs for 10-15 minutes, 3 to 4 times a day at the injection site for comfort    Do not use heat to painful areas for 6 to 8 hours. This will give the local anesthetic time to wear off and prevent you from accidentally burning your skin.     You may use anti-inflammatory medications (such as Ibuprofen or Aleve or Advil) or Tylenol for pain control if necessary    If you were fasting, you may resume your normal diet and medications after the procedure    If you have diabetes, check your blood sugar more frequently than usual as your blood sugar may be higher than normal for 10-14 days following a steroid injection. Contact your doctor who manages  your diabetes if your blood sugar is higher than usual    If you experience any of the following, call the pain center nursing line during work hours at [phone] or the after hours provider line at [phone]:  -Fever over 100 degree F  -Swelling, bleeding, redness, drainage, warmth at the injection site  -Progressive weakness or numbness in your legs or arms  -Loss of bowel or bladder function  -Unusual headache that is not relieved by Tylenol or other pain reliever  -Unusual new onset of pain that is not improving      Phone #s:  Appointment line: [phone];  Nurse line: [phone]              Follow-ups after your visit        Your next 10 appointments already scheduled     Mar 08, 2018  2:45 PM CST   Radiology Injections with Hugo Corrigan MD   [facility] [place] ([facility] [place])    [address] [facility]  [place] [id number] [phone]            Mar 08, 2018  2:45 PM CST   XR CERVICAL/THORACIC EPIDURAL INJECTION with BEPAIN1   [facility] ([facility] [place])    [address] [facility]  [address]  [place] [address] [phone]           For nerve root injection, please send or bring copies of any MRIs or other scans you have had.  Bring a list of your current medicines to your exam. (Include vitamins, minerals and over-the-counter medicines.) Leave your valuables at home.  Plan to have someone drive you home afterward.  Stop taking the following medicines (but talk to your doctor first):   If you take blood thinners, you may need to stop taking them a few days before treatment. Talk to your doctor before stopping these medicines.Stop taking Coumadin (warfarin) 3 days before treatment. Restart the day after treatment.   If you take Plavix, Ticlid, Pletal or Persantine, please ask your doctor if you should stop these medicines. You may need extra tests on the morning of your scan.   If you take Xarelto (Rivaroxaban), you may need to  stop taking it 24 hours before treatment. Talk to your doctor before stopping this medicine. Restart the day after treatment.  You may take your other medicines as normal.  Stop all food and drink (including water) 3 hours before your test or treatment.  Please tell the doctor:   If you are allergic to X-ray dye (contrast fluid).   If you may be pregnant.  Injections take about 30 to 45 minutes. Most people spend up to 2 hours in the clinic or hospital.  Please call the Imaging Department at your exam site with any questions.            Mar 21, 2018  1:30 PM CDT   Return Visit with Devonte Trejo MD   [facility] ([facility])    [address]  [place] [address]   [phone]            Apr 20, 2018  2:00 PM CDT   Return Visit with RODNEY Vargas CNP   [facility] ([facility])    [address] [facility] [address] [phone]              Who to contact     If you have questions or need follow up information about today's clinic visit or your schedule please contact [facility] directly at [phone].  Normal or non-critical lab and imaging results will be communicated to you by Vital Systemshart, letter or phone within 4 business days after the clinic has received the results. If you do not hear from us within 7 days, please contact the clinic through Vital Systemshart or phone. If you have a critical or abnormal lab result, we will notify you by phone as soon as possible.  Submit refill requests through Exiles or call your pharmacy and they will forward the refill request to us. Please allow 3 business days for your refill to be completed.          Additional Information About Your Visit        Exiles Information     Exiles gives you secure access to your electronic health record. If you see a primary care provider, you can also send messages to your care team and  make appointments. If you have questions, please call your primary care clinic.  If you do not have a primary care provider, please call [phone] and they will assist you.        Care EveryWhere ID     This is your Care EveryWhere ID. This could be used by other organizations to access your [facility] medical records  [id number]        Your Vitals Were     Pulse                   67            Blood Pressure from Last 3 Encounters:   03/08/18 (!) 151/91   02/26/18 (!) 135/96   02/13/18 138/84    Weight from Last 3 Encounters:   02/26/18 63.8 kg (140 lb 9.6 oz)   02/13/18 65.8 kg (145 lb)   01/31/18 65.8 kg (145 lb)              Today, you had the following     No orders found for display       Primary Care Provider Office Phone # Fax Tristan Trinidad [phone]       [address] [facility] [address]        Equal Access to Services     ELIEZER VALENTE : Hadii aad ku hadasho Soomaali, waaxda luqadaha, qaybta kaalmada adeegyada, waxay idiin haymariuszn mark garduno . So [facility] [phone].    ATENCIÓN: Si habla español, tiene a simmons disposición servicios gratuitos de asistencia lingüística. Llame al [phone].    We comply with applicable federal civil rights laws and [place] laws. We do not discriminate on the basis of race, color, national origin, age, disability, sex, sexual orientation, or gender identity.            Thank you!     Thank you for choosing [facility]  for your care. Our goal is always to provide you with excellent care. Hearing back from our patients is one way we can continue to improve our services. Please take a few minutes to complete the written survey that you may receive in the mail after your visit with us. Thank you!             Your Updated Medication List - Protect others around you: Learn how to safely use, store and throw away your medicines at www.disposemymeds.org.          This list is accurate as of 3/8/18  2:33 PM.  Always  use your most recent med list.                   Brand Name Dispense Instructions for use Diagnosis    buPROPion 150 MG 12 hr tablet    WELLBUTRIN SR    60 tablet    Take 1 tablet once daily and increase to 1 tablet twice daily after 4 to 7 days    Tobacco abuse, Adjustment disorder with anxious mood       fluticasone 50 MCG/ACT spray    FLONASE    2 Bottle    Spray 2 sprays into both nostrils 2 times daily    Chronic allergic rhinitis due to animal hair and dander, Chronic seasonal allergic rhinitis due to pollen, Allergic rhinitis due to dust mite, Allergic conjunctivitis of both eyes       gabapentin 600 MG tablet    NEURONTIN    135 tablet    Take 1.5 tablets (900 mg) by mouth 3 times daily    Cervical spondylosis without myelopathy, DDD (degenerative disc disease), cervical, Chronic bilateral low back pain without sciatica, Chronic neck and back pain, Cervical stenosis of spinal canal       ibuprofen 800 MG tablet    ADVIL/MOTRIN          methocarbamol 750 MG tablet    ROBAXIN    130 tablet    Take 1-2 tablets (750-1,500 mg) by mouth 3 times daily as needed for muscle spasms caution sedation    Chronic neck pain, DDD (degenerative disc disease), cervical, Cervical spondylosis without myelopathy, Chronic bilateral low back pain without sciatica, Myofascial pain       topiramate 50 MG tablet    TOPAMAX    30 tablet    Take 50mg at bedtime.  Drink plenty of water and no alcohol. If side effects, then reduce to last tolerable dosage.    Cervical spondylosis without myelopathy, DDD (degenerative disc disease), cervical, Chronic bilateral low back pain without sciatica, Cervical stenosis of spinal canal       * traMADol 200 MG ER-tablet    ULTRAM-ER    30 tablet    Take 1 tablet (200 mg) by mouth daily Okay to fill on/after 3/4/2018 and start on/after 3/6/2018; to last 30 days    Cervical spondylosis without myelopathy, DDD (degenerative disc disease), cervical, Chronic bilateral low back pain without sciatica        * traMADol 50 MG tablet    ULTRAM    90 tablet    May take 1-2 tablets every 6 hours as needed for pain, max of 3 tabs per day. Reduce as able. Okay to fill on/after 3/4/2018 and start on/after 3/6/2018; To last 30 days.    Chronic neck and back pain, Cervical radiculopathy       * Notice:  This list has 2 medication(s) that are the same as other medications prescribed for you. Read the directions carefully, and ask your doctor or other care provider to review them with you.

## 2018-03-08 NOTE — PATIENT INSTRUCTIONS
[facility]   Procedure Discharge Instructions    Today you saw: Dr. Hugo Corrigan    You had an:  Epidural steroid injection   -cervical  Medications used:  Lidocaine   Bupivacaine   Dexamethasone Omnipaque    Normal saline          Be cautious when walking. Numbness and/or weakness in the lower extremities may occur for up to 6-8 hours after the procedure due to effect of the local anesthetic    Do not drive for 6 hours. The effect of the local anesthetic could slow your reflexes.     You may resume your regular activities after 24 hours    Avoid strenuous activity for the first 24 hours    You may shower, however avoid swimming, tub baths or hot tubs for 24 hours following your procedure    You may have a mild to moderate increase in pain for several days following the injection.    It may take up to 14 days for the steroid medication to start working although you may feel the effect as early as a few days after the procedure.       You may use ice packs for 10-15 minutes, 3 to 4 times a day at the injection site for comfort    Do not use heat to painful areas for 6 to 8 hours. This will give the local anesthetic time to wear off and prevent you from accidentally burning your skin.     You may use anti-inflammatory medications (such as Ibuprofen or Aleve or Advil) or Tylenol for pain control if necessary    If you were fasting, you may resume your normal diet and medications after the procedure    If you have diabetes, check your blood sugar more frequently than usual as your blood sugar may be higher than normal for 10-14 days following a steroid injection. Contact your doctor who manages your diabetes if your blood sugar is higher than usual    If you experience any of the following, call the pain center nursing line during work hours at [phone] or the after hours provider line at [phone]:  -Fever over 100 degree F  -Swelling, bleeding, redness, drainage, warmth at the injection  site  -Progressive weakness or numbness in your legs or arms  -Loss of bowel or bladder function  -Unusual headache that is not relieved by Tylenol or other pain reliever  -Unusual new onset of pain that is not improving      Phone #s:  Appointment line: [phone];  Nurse line: [phone]

## 2018-03-08 NOTE — PROGRESS NOTES
[facility] - Procedure Note    Date of Visit: 3/8/2018    Procedure performed: C7-T1 interlaminar epidural steroid injection with fluoroscopic guidance  Diagnosis: Cervical spondylosis; Cervical radiculitis/radiculopathy  : Hugo Corrigan MD  Anesthesia: none    Indications: Truman Suero is a 57 year old male who is seen for cervical epidural steroid injection. The patient describes  bilateral neck pain radiating radiating to his shoulder . The patient has been exhibiting symptoms consistent with cervical intraspinal inflammation  and radiculopathy. Symptoms have been persistent, disabling, and intermittently severe. The patient reports minimal improvement with conservative treatment, including  PT/meds.  Of note patient has had a cervical MBB in the past  Cervical MRI   C2-C3: Negative. No disc protrusion. No central or lateral stenosis.     C3-C4: Mild disc space narrowing. Mild diffuse annular bulge. Small  uncinate spur on the right. No significant central or left-sided  stenosis. Mild right-sided foraminal stenosis.     C4-C5: Moderate degenerative narrowing of the interspace. Mild ventral  disc osteophyte complex with minimal central stenosis. Small uncinate  spurs bilaterally with mild bilateral foraminal stenosis.     C6-C7: Moderate disc space narrowing. Mild broad-based disc osteophyte  complex with minimal central stenosis. Minimal uncinate spurs with  mild bilateral foraminal stenosis.     C6-C7: Moderate disc space narrowing. Mild ventral disc osteophyte  complex. No significant central or lateral stenosis.     C7-T1: No disc protrusion. No central or lateral stenosis. Moderate  bilateral facet joint disease.         IMPRESSION:  1. Degenerative changes as described C3-C4 through C7-T1. There is  only mild central and foraminal stenosis as described.  2. No intrinsic spinal cord abnormality through T2.  Allergies:    No Known Allergies     Vitals:  BP (!) 151/91   Pulse 67    Review of Systems: The patient denies recent fever, chills, illness, use of antibiotics or anticoagulants. All other 10-point review of systems negative.     Procedure: The procedure and risks were explained, and informed written consent was obtained from the patient. Risks include but are not limited to: infection, bleeding, increased pain, and damage to soft tissue, nerve, muscle, and vasculature structures. After getting informed consent, patient was brought into the procedure suite and was placed in a prone position on the procedure table. A Pause for the Cause was performed. Patient was prepped and draped in sterile fashion.     The C7-T1 interspace was identified with use of fluoroscopy in AP view. A 25-gauge, 1.5 inch needle was used to anesthetize the skin and subcutaneous tissue entry site with a total of 2 ml of 1% lidocaine. Under fluoroscopic visualization, a 22-gauge, 3.5 inch Tuohy epidural needle was slowly advanced towards the epidural space a few millimeters midline of midline. The latter part of the needle advancement was guided with fluoroscopy in the lateral view. The epidural space was identified using loss of resistance technique. After negative aspiration for heme and cerebrospinal fluid, a total of 1 mL of non-ionic contrast was injected to confirm needle placement. 9 mL of contrast was wasted. Epidurogram confirmed spread within the posterior epidural space. 2 ml of 10mg/ml of dexamethasone and 1 ml of preservative free 1% lidocaine was injected. The needle was removed.  Images were saved to PACS.    The patient tolerated the procedure well, and there was no evidence of procedural complications. No new sensory or motor deficits were noted following the procedure. The patient was stable and able to ambulate on discharge home. Post-procedure instructions were provided.     Pre-procedure pain score: 9/10 in the neck, 9/10 in the arm  Post-procedure pain scor is more than 1 e: 7/10  in the neck, 7/10 in the arm    Assessment/Plan: Truman Suero is a 57 year old male s/p cervical interlaminar epidural steroid injection today for cervical spondylosis and radiculitis/radiculopathy.     1. Following today's procedure, the patient was advised to contact the [facility] for any of the following:   Fever, chills, or night sweats   New onset of pain, numbness, or weakness   Any questions/concerns regarding the procedure  If unable to contact the Pain Center, the patient was instructed to go to a local Emergency Room for any complications.   2. The patient will receive a follow-up call in 1 week.   3. Follow-up with referring provider in 2 weeks for post-procedure evaluation.    [facility]

## 2018-03-08 NOTE — NURSING NOTE
"Chief Complaint   Patient presents with     Pain     Neck pain        Initial BP (!) 151/91  Pulse 67 Estimated body mass index is 22.02 kg/(m^2) as calculated from the following:    Height as of 2/26/18: 1.702 m (5' 7\").    Weight as of 2/26/18: 63.8 kg (140 lb 9.6 oz).  Medication Reconciliation: complete     Pre-procedure Intake    Have you been fasting? Yes    If yes, for how long? 2 hrs     Are you taking a prescribed blood thinner such as coumadin, Plavix, Xarelto?    No    If yes, when did you take your last dose? No     Do you take aspirin?  No    If cervical procedure, have you held aspirin for 6 days?   NA    Do you have any allergies to contrast dye, iodine, steroid and/or numbing medications?  NO    Are you currently taking antibiotics or have an active infection?  NO    Have you had a fever/elevated temperature within the past week? NO    Are you currently taking oral steroids? NO    Do you have a ? Yes       Are you pregnant or breastfeeding?  Not Applicable    Are the vital signs normal?  No     Mike Bergeron MA        '    "

## 2018-03-08 NOTE — NURSING NOTE
Discharge Information    IV Discontiued Time:  1450 catheter intact    Amount of Fluid Infused:  Saline locked    Discharge Criteria = When patient returns to baseline or as per MD order    Consciousness:  Pt is fully awake    Circulation:  BP +/- 20% of pre-procedure level    Respiration:  Patient is able to breathe deeply    O2 Sat:  Patient is able to maintain O2 Sat >92% on room air    Activity:  Moves 4 extremities on command    Ambulation:  Patient is able to stand and walk or stand and pivot into wheelchair    Dressing:  Clean/dry or No Dressing    Notes:   Discharge instructions and AVS given to patient    Patient meets criteria for discharge?  YES    Admitted to PCU?  No    Responsible adult present to accompany patient home?  Yes    Signature/Title:    leslie peter RN Care Coordinator  [facility]

## 2018-03-08 NOTE — TELEPHONE ENCOUNTER
Pending Prescriptions:                       Disp   Refills    gabapentin (NEURONTIN) 600 MG tablet      135 ta*3            Sig: Take 1.5 tablets (900 mg) by mouth 3 times daily    Last refill: 11/6/17   Last seen: 2/12/18  Next appointment: 6/8/18    Mike Bergeron MA

## 2018-03-12 ENCOUNTER — MYC REFILL (OUTPATIENT)
Dept: PALLIATIVE MEDICINE | Facility: CLINIC | Age: 58
End: 2018-03-12

## 2018-03-12 DIAGNOSIS — G89.29 CHRONIC BILATERAL LOW BACK PAIN WITHOUT SCIATICA: ICD-10-CM

## 2018-03-12 DIAGNOSIS — G89.29 CHRONIC NECK AND BACK PAIN: ICD-10-CM

## 2018-03-12 DIAGNOSIS — M54.2 CHRONIC NECK AND BACK PAIN: ICD-10-CM

## 2018-03-12 DIAGNOSIS — M48.02 CERVICAL STENOSIS OF SPINAL CANAL: ICD-10-CM

## 2018-03-12 DIAGNOSIS — M54.50 CHRONIC BILATERAL LOW BACK PAIN WITHOUT SCIATICA: ICD-10-CM

## 2018-03-12 DIAGNOSIS — M47.812 CERVICAL SPONDYLOSIS WITHOUT MYELOPATHY: ICD-10-CM

## 2018-03-12 DIAGNOSIS — M54.9 CHRONIC NECK AND BACK PAIN: ICD-10-CM

## 2018-03-12 DIAGNOSIS — M50.30 DDD (DEGENERATIVE DISC DISEASE), CERVICAL: ICD-10-CM

## 2018-03-12 RX ORDER — GABAPENTIN 600 MG/1
900 TABLET ORAL 3 TIMES DAILY
Qty: 135 TABLET | Refills: 3 | Status: SHIPPED | OUTPATIENT
Start: 2018-03-12 | End: 2018-06-29

## 2018-03-12 RX ORDER — GABAPENTIN 600 MG/1
900 TABLET ORAL 3 TIMES DAILY
Qty: 135 TABLET | Refills: 3 | Status: CANCELLED | OUTPATIENT
Start: 2018-03-12

## 2018-03-12 NOTE — TELEPHONE ENCOUNTER
Routing to provider pool to review refill request    Mychart below from patient.   Original authorizing provider: RODNEY Vargas CNP would like a refill of the following medications:   gabapentin (NEURONTIN) 600 MG tablet [RODNEY Vargas CNP]     Preferred pharmacy: [facility] [address] [place] [address]     Comment:   I called this in to pharmacy system last Wed. I never know with them if their system follows thru. It just says that it has not been filled. So I just wanted to follow up and see if they have sent in a request.     Thanks   Palmer

## 2018-03-12 NOTE — TELEPHONE ENCOUNTER
Mychart below sent to patient. Copied documentation into already open refill encounter that is now routed to provider pool for sign off.     Hello,     I received your message regarding the Gabapentin refill. It seems that previous request may have been missed.  I have forwarded your request to the providers that are covering your pain provider during her time off. This should be taken care of shortly. Thank you and I apologize for the confusion.     Jagruti Breen  RN-BSN Care Coordinator  [facility]

## 2018-03-12 NOTE — TELEPHONE ENCOUNTER
Script signed  Note sent to patient      Your script was signed.  If you have been out of medication, we would probably want to start you at a lower dose, so let us know if you need recommendations on how to restart.    Mariaelena Gillis MD  [facility]     Signed Prescriptions:                        Disp   Refills    gabapentin (NEURONTIN) 600 MG tablet       135 ta*3        Sig: Take 1.5 tablets (900 mg) by mouth 3 times daily  Authorizing Provider: JENNIFER GILLIS MD  [facility]

## 2018-03-12 NOTE — TELEPHONE ENCOUNTER
Message from [place]:  Original authorizing provider: RODNEY Vargas CNP would like a refill of the following medications:  gabapentin (NEURONTIN) 600 MG tablet [RODNEY Vargas CNP]    Preferred pharmacy: [facility] [address] [place] [address]    Comment:  I called this in to pharmacy system last Wed. I never know with them if their system follows thru. It just says that it has not been filled. So I just wanted to follow up and see if they have sent in a request. Thanks Palmer

## 2018-03-14 ENCOUNTER — E-VISIT (OUTPATIENT)
Dept: FAMILY MEDICINE | Facility: CLINIC | Age: 58
End: 2018-03-14
Payer: COMMERCIAL

## 2018-03-14 ENCOUNTER — MYC REFILL (OUTPATIENT)
Dept: FAMILY MEDICINE | Facility: CLINIC | Age: 58
End: 2018-03-14

## 2018-03-14 DIAGNOSIS — F43.22 ADJUSTMENT DISORDER WITH ANXIOUS MOOD: ICD-10-CM

## 2018-03-14 DIAGNOSIS — Z72.0 TOBACCO ABUSE: ICD-10-CM

## 2018-03-14 PROCEDURE — 99444 ZZC PHYSICIAN ONLINE EVALUATION & MANAGEMENT SERVICE: CPT | Performed by: FAMILY MEDICINE

## 2018-03-14 RX ORDER — BUPROPION HYDROCHLORIDE 150 MG/1
TABLET, EXTENDED RELEASE ORAL
Qty: 60 TABLET | Refills: 0 | Status: CANCELLED | OUTPATIENT
Start: 2018-03-14

## 2018-03-16 ENCOUNTER — MYC MEDICAL ADVICE (OUTPATIENT)
Dept: FAMILY MEDICINE | Facility: CLINIC | Age: 58
End: 2018-03-16

## 2018-03-16 ENCOUNTER — TELEPHONE (OUTPATIENT)
Dept: PALLIATIVE MEDICINE | Facility: CLINIC | Age: 58
End: 2018-03-16

## 2018-03-16 RX ORDER — BUPROPION HYDROCHLORIDE 150 MG/1
TABLET, EXTENDED RELEASE ORAL
Qty: 60 TABLET | Refills: 0 | Status: SHIPPED | OUTPATIENT
Start: 2018-03-16 | End: 2018-04-11

## 2018-03-16 NOTE — TELEPHONE ENCOUNTER
Route MyChart f/u question from e-visit on 3/14/18 to PCP. Please advise.  MyChart sent.      Ary Hayes RN

## 2018-03-16 NOTE — TELEPHONE ENCOUNTER
Patient had a cervical injection on 3/8/18.  Called patient for an update.      Left message that we were calling for an update about how s/he was doing after the injection.  LM that if s/he has any problems or questions to call the clinic at [phone].    Karley Porras RT (R)

## 2018-03-21 ENCOUNTER — OFFICE VISIT (OUTPATIENT)
Dept: OTHER | Facility: CLINIC | Age: 58
End: 2018-03-21
Attending: INTERNAL MEDICINE
Payer: COMMERCIAL

## 2018-03-21 VITALS
BODY MASS INDEX: 22.87 KG/M2 | HEART RATE: 66 BPM | DIASTOLIC BLOOD PRESSURE: 97 MMHG | SYSTOLIC BLOOD PRESSURE: 139 MMHG | WEIGHT: 146 LBS

## 2018-03-21 DIAGNOSIS — M54.50 CHRONIC BILATERAL LOW BACK PAIN WITHOUT SCIATICA: Primary | ICD-10-CM

## 2018-03-21 DIAGNOSIS — G89.29 CHRONIC BILATERAL LOW BACK PAIN WITHOUT SCIATICA: Primary | ICD-10-CM

## 2018-03-21 PROCEDURE — G0463 HOSPITAL OUTPT CLINIC VISIT: HCPCS

## 2018-03-21 PROCEDURE — 99214 OFFICE O/P EST MOD 30 MIN: CPT | Mod: 25 | Performed by: INTERNAL MEDICINE

## 2018-03-21 NOTE — NURSING NOTE
"Chief Complaint   Patient presents with     RECHECK     ultrasounds       Initial BP (!) 139/97 (BP Location: Left arm, Patient Position: Chair, Cuff Size: Adult Large)  Pulse 66  Wt 146 lb (66.2 kg)  BMI 22.87 kg/m2 Estimated body mass index is 22.87 kg/(m^2) as calculated from the following:    Height as of 2/26/18: 5' 7\" (1.702 m).    Weight as of this encounter: 146 lb (66.2 kg).  Medication Reconciliation: complete     Alysa Hernandez MA    "

## 2018-03-21 NOTE — MR AVS SNAPSHOT
After Visit Summary   3/21/2018    Truman Suero    MRN: [id number]           Patient Information     Date Of Birth          1960        Visit Information        Provider Department      3/21/2018 1:30 PM Devonte Trejo MD [facility] [place]      Today's Diagnoses     Chronic bilateral low back pain without sciatica    -  1       Follow-ups after your visit        Additional Services     Follow-Up with Vascular Medicine         These are internal orders to be used by  [facility] [[email or web address]] providers only.                  Your next 10 appointments already scheduled     Apr 20, 2018  2:00 PM CDT   Return Visit with RODNEY Vargas CNP   [facility] ([facility])    [address] [address] [phone]              Future tests that were ordered for you today     Open Future Orders        Priority Expected Expires Ordered    MR Lumbar Spine w/o Contrast Routine 3/21/2018 3/21/2019 3/21/2018    Follow-Up with Vascular Medicine Routine 4/18/2018 3/21/2019 3/21/2018            Who to contact     If you have questions or need follow up information about today's clinic visit or your schedule please contact [facility] directly at [phone].  Normal or non-critical lab and imaging results will be communicated to you by MyChart, letter or phone within 4 business days after the clinic has received the results. If you do not hear from us within 7 days, please contact the clinic through MyChart or phone. If you have a critical or abnormal lab result, we will notify you by phone as soon as possible.  Submit refill requests through [employer] or call your pharmacy and they will forward the refill request to us. Please allow 3 business days for your refill to be completed.          Additional Information About Your Visit        MyChart Information      [employer] gives you secure access to your electronic health record. If you see a primary care provider, you can also send messages to your care team and make appointments. If you have questions, please call your primary care clinic.  If you do not have a primary care provider, please call [phone] and they will assist you.        Care EveryWhere ID     This is your Care EveryWhere ID. This could be used by other organizations to access your [facility] medical records  [id number]        Your Vitals Were     Pulse BMI (Body Mass Index)                66 22.87 kg/m2           Blood Pressure from Last 3 Encounters:   03/21/18 (!) 139/97   03/08/18 137/90   02/26/18 (!) 135/96    Weight from Last 3 Encounters:   03/21/18 146 lb (66.2 kg)   02/26/18 140 lb 9.6 oz (63.8 kg)   02/13/18 145 lb (65.8 kg)               Primary Care Provider Office Phone # Fax #    Humberto Trinidad  [phone]       [address] [place] [id number]        Equal Access to Services     ELIEZER VALENTE AH: Hadii aad ku hadasho Soomaali, waaxda luqadaha, qaybta kaalmada adeaustinyada, mariela carter. So [facility] [phone].    ATENCIÓN: Si habla español, tiene a simmons disposición servicios gratuitos de asistencia lingüística. Llame al [phone].    We comply with applicable federal civil rights laws and [place] laws. We do not discriminate on the basis of race, color, national origin, age, disability, sex, sexual orientation, or gender identity.            Thank you!     Thank you for choosing [facility] [place]  for your care. Our goal is always to provide you with excellent care. Hearing back from our patients is one way we can continue to improve our services. Please take a few minutes to complete the written survey that you may receive in the mail after your visit with us. Thank you!             Your Updated Medication List - Protect others around you: Learn how to safely  use, store and throw away your medicines at www.disposemymeds.org.          This list is accurate as of 3/21/18  2:23 PM.  Always use your most recent med list.                   Brand Name Dispense Instructions for use Diagnosis    buPROPion 150 MG 12 hr tablet    WELLBUTRIN SR    60 tablet    Take 1 tablet once daily and increase to 1 tablet twice daily after 4 to 7 days    Tobacco abuse, Adjustment disorder with anxious mood       fluticasone 50 MCG/ACT spray    FLONASE    2 Bottle    Spray 2 sprays into both nostrils 2 times daily    Chronic allergic rhinitis due to animal hair and dander, Chronic seasonal allergic rhinitis due to pollen, Allergic rhinitis due to dust mite, Allergic conjunctivitis of both eyes       gabapentin 600 MG tablet    NEURONTIN    135 tablet    Take 1.5 tablets (900 mg) by mouth 3 times daily    Cervical spondylosis without myelopathy, DDD (degenerative disc disease), cervical, Chronic bilateral low back pain without sciatica, Chronic neck and back pain, Cervical stenosis of spinal canal       ibuprofen 800 MG tablet    ADVIL/MOTRIN          methocarbamol 750 MG tablet    ROBAXIN    130 tablet    Take 1-2 tablets (750-1,500 mg) by mouth 3 times daily as needed for muscle spasms caution sedation    Chronic neck pain, DDD (degenerative disc disease), cervical, Cervical spondylosis without myelopathy, Chronic bilateral low back pain without sciatica, Myofascial pain       topiramate 50 MG tablet    TOPAMAX    30 tablet    Take 50mg at bedtime.  Drink plenty of water and no alcohol. If side effects, then reduce to last tolerable dosage.    Cervical spondylosis without myelopathy, DDD (degenerative disc disease), cervical, Chronic bilateral low back pain without sciatica, Cervical stenosis of spinal canal       * traMADol 200 MG ER-tablet    ULTRAM-ER    30 tablet    Take 1 tablet (200 mg) by mouth daily Okay to fill on/after 3/4/2018 and start on/after 3/6/2018; to last 30 days    Cervical  spondylosis without myelopathy, DDD (degenerative disc disease), cervical, Chronic bilateral low back pain without sciatica       * traMADol 50 MG tablet    ULTRAM    90 tablet    May take 1-2 tablets every 6 hours as needed for pain, max of 3 tabs per day. Reduce as able. Okay to fill on/after 3/4/2018 and start on/after 3/6/2018; To last 30 days.    Chronic neck and back pain, Cervical radiculopathy       * Notice:  This list has 2 medication(s) that are the same as other medications prescribed for you. Read the directions carefully, and ask your doctor or other care provider to review them with you.

## 2018-03-21 NOTE — PROGRESS NOTES
Vascular Medicine Progress Note     Truman Suero is a 57 year old male who was admitted on (Not on file).  Follow-up on THA with exercise and venous incompetency testing    Interval History   Patient is still having pain in both lower lower extremities right lower extremity more than left, pain is described as dull aching pain/burning and it is at the dorsum of the right foot mainly at the L5 dermatome, pain is present all the time has no relation to exercise and it is worse when the patient is standing  Pain is present all the time  THA with exercise was normal resting, right leg was 0.89 left leg was 0.95 post exercise so it is nothing really significant post exercise and normal during rest  Doppler venous insufficiency study showed no evidence of venous incompetency in both superficial and deep system, no evidence of DVT    Physical Exam       BP: (!) 139/97 Pulse: 66            Vitals:    03/21/18 1341   Weight: 146 lb (66.2 kg)     Vital Signs with Ranges  Pulse:  [66] 66  BP: (139)/(97) 139/97  [unfilled]    Constitutional: awake, alert, cooperative, no apparent distress, and appears stated age  Eyes: Lids and lashes normal, pupils equal, round and reactive to light, extra ocular muscles intact, sclera clear, conjunctiva normal  ENT: normocepalic, without obvious abnormality, oropharynx pink and moist  Hematologic / Lymphatic: no lymphadenopathy  Respiratory: No increased work of breathing, good air exchange, clear to auscultation bilaterally, no crackles or wheezing  Cardiovascular: regular rate and rhythm, normal S1 and S2 and no murmur noted  GI: Normal bowel sounds, soft, non-distended, non-tender  Skin: no redness, warmth, or swelling, no rashes and no lesions  Musculoskeletal: There is no redness, warmth, or swelling of the joints.  Full range of motion noted.  Motor strength is 5 out of 5 all extremities bilaterally.  Tone is normal.  Neurologic: Awake, alert, oriented to name, place and  time.  Cranial nerves II-XII are grossly intact.  Motor is 5 out of 5 bilaterally.    Neuropsychiatric:  Normal affect, memory, insight.  Pulses: Intact pulses. No carotid bruits appreciated.     Medications         Data   No results found for this or any previous visit (from the past 24 hour(s)).    Assessment & Plan   (M54.5,  G89.29) Chronic bilateral low back pain without sciatica  (primary encounter diagnosis)  Comment: Pain is most probably secondary to radicular pain  Plan: MR Lumbar Spine w/o Contrast, Follow-Up with         Vascular Medicine                Summary: We will see the patient once test results are available    Devonte Trejo MD

## 2018-03-22 ENCOUNTER — MYC REFILL (OUTPATIENT)
Dept: PALLIATIVE MEDICINE | Facility: CLINIC | Age: 58
End: 2018-03-22

## 2018-03-22 DIAGNOSIS — G89.29 CHRONIC BILATERAL LOW BACK PAIN WITHOUT SCIATICA: ICD-10-CM

## 2018-03-22 DIAGNOSIS — M50.30 DDD (DEGENERATIVE DISC DISEASE), CERVICAL: ICD-10-CM

## 2018-03-22 DIAGNOSIS — M47.812 CERVICAL SPONDYLOSIS WITHOUT MYELOPATHY: ICD-10-CM

## 2018-03-22 DIAGNOSIS — M48.02 CERVICAL STENOSIS OF SPINAL CANAL: ICD-10-CM

## 2018-03-22 DIAGNOSIS — M54.50 CHRONIC BILATERAL LOW BACK PAIN WITHOUT SCIATICA: ICD-10-CM

## 2018-03-22 RX ORDER — TOPIRAMATE 50 MG/1
TABLET, FILM COATED ORAL
Qty: 30 TABLET | Refills: 1 | Status: SHIPPED | OUTPATIENT
Start: 2018-03-22 | End: 2018-06-29

## 2018-03-22 NOTE — TELEPHONE ENCOUNTER
Signed Prescriptions:                        Disp   Refills    topiramate (TOPAMAX) 50 MG tablet          30 tab*1        Sig: Take 50mg at bedtime.  Drink plenty of water and no           alcohol. If side effects, then reduce to last           tolerable dosage.  Authorizing Provider: CANDACE BENSON, RN CNP, FNP  [facility]

## 2018-03-22 NOTE — TELEPHONE ENCOUNTER
Received MyChart request from patient requesting refill(s) for topiramate (TOPAMAX) 50 MG tablet      Pt last seen on 01/31/18 - has had injection since  Next appt scheduled for 04/20/18    Will facilitate refill.

## 2018-03-22 NOTE — TELEPHONE ENCOUNTER
Sending to DONTE arteaga.     Jessika Flannery RN, [facility]  Pain Clinic Care Coordinator

## 2018-03-22 NOTE — TELEPHONE ENCOUNTER
Message from Club Pointhart:  Original authorizing provider: RODNEY Vargas CNP would like a refill of the following medications:  topiramate (TOPAMAX) 50 MG tablet [RODNEY Vargas CNP]    Preferred pharmacy: [facility] [address] [place] [address]    Comment:  I have 6 left Thanks Palmer

## 2018-03-23 ENCOUNTER — MYC MEDICAL ADVICE (OUTPATIENT)
Dept: PALLIATIVE MEDICINE | Facility: CLINIC | Age: 58
End: 2018-03-23

## 2018-03-23 NOTE — TELEPHONE ENCOUNTER
Per patient myChart message:  From: Truman Suero      Created: 3/23/2018 11:54 AM      Just checking to see if original message went thru concerning renewal renewal request. Thanks    Palmer

## 2018-03-23 NOTE — TELEPHONE ENCOUNTER
It was sent to your pharmacy yesterday please contact them thank you.     Jessika Flannery, RN, [facility]  Pain Clinic Care Coordinator     ----- Message -----     From: Palmer Suero     Sent: 3/23/2018  2:03 PM CDT       To: Melanie PEREZ  Subject: RE: Question about medications    Estelita- I put a renewal request for topiramate- (not sure if that's how it is spelled). I have 5 left so I was just starting the process. Thanks  If you see or talk to Dr. Navarro tell her I'm on day 4 of no nicotine.

## 2018-03-23 NOTE — TELEPHONE ENCOUNTER
Kandist sent to pt:  From: Estelita Wetzel RN        Created: 3/23/2018 1:48 PM       Xavier Chakraborty.  What medication are you referring to?  More information is needed.    Estelita Wetzel RN-BSN  [facility]

## 2018-03-29 ENCOUNTER — MYC REFILL (OUTPATIENT)
Dept: PALLIATIVE MEDICINE | Facility: CLINIC | Age: 58
End: 2018-03-29

## 2018-03-29 DIAGNOSIS — M54.9 CHRONIC NECK AND BACK PAIN: ICD-10-CM

## 2018-03-29 DIAGNOSIS — M54.12 CERVICAL RADICULOPATHY: ICD-10-CM

## 2018-03-29 DIAGNOSIS — M50.30 DDD (DEGENERATIVE DISC DISEASE), CERVICAL: ICD-10-CM

## 2018-03-29 DIAGNOSIS — G89.29 CHRONIC NECK AND BACK PAIN: ICD-10-CM

## 2018-03-29 DIAGNOSIS — M54.2 CHRONIC NECK AND BACK PAIN: ICD-10-CM

## 2018-03-29 DIAGNOSIS — M54.50 CHRONIC BILATERAL LOW BACK PAIN WITHOUT SCIATICA: ICD-10-CM

## 2018-03-29 DIAGNOSIS — G89.29 CHRONIC BILATERAL LOW BACK PAIN WITHOUT SCIATICA: ICD-10-CM

## 2018-03-29 DIAGNOSIS — M47.812 CERVICAL SPONDYLOSIS WITHOUT MYELOPATHY: ICD-10-CM

## 2018-03-29 NOTE — TELEPHONE ENCOUNTER
Message from Vinitahart:  Original authorizing provider: RODNEY Vargas CNP would like a refill of the following medications:  traMADol (ULTRAM-ER) 200 MG ER-tablet [RODNEY Vargas CNP]  traMADol (ULTRAM) 50 MG tablet [RODNEY Vargas CNP]    Preferred pharmacy: [facility] [address] [place] [address]    Comment:  8 left

## 2018-03-29 NOTE — TELEPHONE ENCOUNTER
Sending to DONTE arteaga.     Jessika Flannery RN, [facility]  Pain Clinic Care Coordinator

## 2018-03-30 RX ORDER — TRAMADOL HYDROCHLORIDE 50 MG/1
TABLET ORAL
Qty: 90 TABLET | Refills: 0 | Status: SHIPPED | OUTPATIENT
Start: 2018-03-30 | End: 2018-04-20

## 2018-03-30 RX ORDER — TRAMADOL HYDROCHLORIDE 200 MG/1
200 TABLET, EXTENDED RELEASE ORAL DAILY
Qty: 30 TABLET | Refills: 0 | Status: SHIPPED | OUTPATIENT
Start: 2018-03-30 | End: 2018-04-20

## 2018-03-30 NOTE — TELEPHONE ENCOUNTER
Rx faxed to:   Tramadol     [facility] - [place], [place] - [address]  [address] [address]  Phone: [phone] Fax: [phone]      Fax confirmation received. Hardcopy destroyed.     Linda TOURE  [facility]

## 2018-03-30 NOTE — TELEPHONE ENCOUNTER
Patient requesting refill(s) of traMADol (ULTRAM-ER) 200 MG ER-tablet   Last picked up from pharmacy on 03/05/18     traMADol (ULTRAM) 50 MG tablet  Pt last seen by prescribing provider on 03/05/18  Next appt scheduled for 4/11/18     checked in the past 6 months? Yes If no, print current report and give to RN    Last urine drug screen date 1/8/18  Current opioid agreement on file (completed within the last year) Yes Date of opioid agreement: 1/8/18    Processing (pick one)     Fax to [facility]     Will route to nursing pool for review and preparation of prescription(s).

## 2018-03-30 NOTE — TELEPHONE ENCOUNTER
Signed Prescriptions:                        Disp   Refills    traMADol (ULTRAM-ER) 200 MG ER-tablet      30 tab*0        Sig: Take 1 tablet (200 mg) by mouth daily Okay to fill           on/after 4/3/2018 and start on/after 4/5/2018; to           last 30 days  Authorizing Provider: FATMATA ROMERO    traMADol (ULTRAM) 50 MG tablet             90 tab*0        Sig: May take 1-2 tablets every 6 hours as needed for           pain, max of 3 tabs per day. Reduce as able. Okay           to fill on/after 4/3/2018 and start on/after           4/5/2018; To last 30 days.  Authorizing Provider: FATMATA ROMERO    Prescriptions signed and placed in MA tower for further facilitation.     RODNEY Torres [facility] [place]

## 2018-03-30 NOTE — TELEPHONE ENCOUNTER
Medication refill information reviewed.     Due date for Tramadol and Tramadol ER is 4/5/2018     Prescriptions prepped for review.     Will route to provider.     Jessika Flannery RN, [facility]  Pain Clinic Care Coordinator

## 2018-04-11 ENCOUNTER — MYC REFILL (OUTPATIENT)
Dept: FAMILY MEDICINE | Facility: CLINIC | Age: 58
End: 2018-04-11

## 2018-04-11 ENCOUNTER — RADIANT APPOINTMENT (OUTPATIENT)
Dept: MRI IMAGING | Facility: CLINIC | Age: 58
End: 2018-04-11
Attending: FAMILY MEDICINE
Payer: COMMERCIAL

## 2018-04-11 DIAGNOSIS — F43.22 ADJUSTMENT DISORDER WITH ANXIOUS MOOD: ICD-10-CM

## 2018-04-11 DIAGNOSIS — Z72.0 TOBACCO ABUSE: ICD-10-CM

## 2018-04-11 DIAGNOSIS — G89.29 CHRONIC BILATERAL LOW BACK PAIN WITHOUT SCIATICA: ICD-10-CM

## 2018-04-11 DIAGNOSIS — M54.50 CHRONIC BILATERAL LOW BACK PAIN WITHOUT SCIATICA: ICD-10-CM

## 2018-04-11 PROCEDURE — 72148 MRI LUMBAR SPINE W/O DYE: CPT | Mod: TC

## 2018-04-13 RX ORDER — BUPROPION HYDROCHLORIDE 150 MG/1
TABLET, EXTENDED RELEASE ORAL
Qty: 60 TABLET | Refills: 0 | Status: SHIPPED | OUTPATIENT
Start: 2018-04-13 | End: 2018-04-30

## 2018-04-16 ENCOUNTER — MYC MEDICAL ADVICE (OUTPATIENT)
Dept: OTHER | Facility: CLINIC | Age: 58
End: 2018-04-16

## 2018-04-16 DIAGNOSIS — M48.062 SPINAL STENOSIS OF LUMBAR REGION WITH NEUROGENIC CLAUDICATION: Primary | ICD-10-CM

## 2018-04-16 NOTE — TELEPHONE ENCOUNTER
Patient sending Informa[employer]art message and would like to know results, OK to release them to patient?  Would you like to have patient referred to Ortho for Lumbar spine?  Please advise  Lance Wiggins, RN, BSN

## 2018-04-20 ENCOUNTER — OFFICE VISIT (OUTPATIENT)
Dept: PALLIATIVE MEDICINE | Facility: CLINIC | Age: 58
End: 2018-04-20
Payer: COMMERCIAL

## 2018-04-20 VITALS
BODY MASS INDEX: 22.87 KG/M2 | SYSTOLIC BLOOD PRESSURE: 153 MMHG | HEART RATE: 70 BPM | WEIGHT: 146 LBS | DIASTOLIC BLOOD PRESSURE: 96 MMHG

## 2018-04-20 DIAGNOSIS — G89.29 CHRONIC NECK PAIN: Primary | ICD-10-CM

## 2018-04-20 DIAGNOSIS — M54.50 CHRONIC BILATERAL LOW BACK PAIN WITHOUT SCIATICA: ICD-10-CM

## 2018-04-20 DIAGNOSIS — M79.18 MYOFASCIAL PAIN: ICD-10-CM

## 2018-04-20 DIAGNOSIS — M47.812 CERVICAL SPONDYLOSIS WITHOUT MYELOPATHY: ICD-10-CM

## 2018-04-20 DIAGNOSIS — G89.29 CHRONIC BILATERAL LOW BACK PAIN WITHOUT SCIATICA: ICD-10-CM

## 2018-04-20 DIAGNOSIS — M48.02 SPINAL STENOSIS OF CERVICAL REGION: ICD-10-CM

## 2018-04-20 DIAGNOSIS — M54.12 CERVICAL RADICULOPATHY: ICD-10-CM

## 2018-04-20 DIAGNOSIS — M50.30 DDD (DEGENERATIVE DISC DISEASE), CERVICAL: ICD-10-CM

## 2018-04-20 DIAGNOSIS — M54.2 CHRONIC NECK PAIN: Primary | ICD-10-CM

## 2018-04-20 PROCEDURE — 99214 OFFICE O/P EST MOD 30 MIN: CPT | Performed by: NURSE PRACTITIONER

## 2018-04-20 RX ORDER — METHOCARBAMOL 750 MG/1
750-1500 TABLET, FILM COATED ORAL 3 TIMES DAILY PRN
Qty: 130 TABLET | Refills: 1 | Status: SHIPPED | OUTPATIENT
Start: 2018-04-20 | End: 2018-06-29

## 2018-04-20 RX ORDER — TRAMADOL HYDROCHLORIDE 50 MG/1
TABLET ORAL
Qty: 90 TABLET | Refills: 0 | Status: SHIPPED | OUTPATIENT
Start: 2018-04-20 | End: 2018-06-29

## 2018-04-20 RX ORDER — TRAMADOL HYDROCHLORIDE 200 MG/1
200 TABLET, EXTENDED RELEASE ORAL DAILY
Qty: 30 TABLET | Refills: 0 | Status: SHIPPED | OUTPATIENT
Start: 2018-04-20 | End: 2018-05-30

## 2018-04-20 ASSESSMENT — PAIN SCALES - GENERAL: PAINLEVEL: EXTREME PAIN (9)

## 2018-04-20 NOTE — PATIENT INSTRUCTIONS
PLAN:  1. I recommend that you make a follow up appt with Dr. Harkins in May since you have ceased smoking  2. Agree with you seeing medspine for your low back, you may benefit from a lumbar epidural steroid injection  3. Medications:   1. Continue Tramdol ER   2. Continue Tramadol as needed  3. Continue gabapentin  4. Continue Topamax  4. Procedures: none  5. Follow-up with me in 10-12 weeks.   ----------------------------------------------------------------  Nurse Triage line:  [phone]   Call this number with any questions or concerns. You may leave a detailed message anytime. Calls are typically returned Monday through Friday between 8 AM and 4:30 PM. We usually get back to you within 2 business days depending on the issue/request.       Medication refills:    For non-narcotic medications, call your pharmacy directly to request a refill. The pharmacy will contact the Pain Management Center for authorization. Please allow 3-4 days for these refills to be processed.     For narcotic refills, call the nurse triage line or send a [employer] message. Please contact us 7-10 days before your refill is due. The message MUST include the name of the specific medication(s) requested and how you would like to receive the prescription(s). The options are as follows:    Pain Clinic staff can mail the prescription to your pharmacy. Please tell us the name of the pharmacy.    You may pick the prescription up at the Pain Clinic (tell us the location) or during a clinic visit with your pain provider    Pain Clinic staff can deliver the prescription to the [place] pharmacy in the clinic building. Please tell us the location.      Scheduling number: [phone].  Call this number to schedule or change appointments.    We believe regular attendance is key to your success in our program.    Any time you are unable to keep your appointment we ask that you call us at least 24 hours in advance to let us know. This will allow us to offer  the appointment time to another patient.

## 2018-04-20 NOTE — PROGRESS NOTES
[facility]    4/20/2018       Chief complaint: neck and lower back  Neck pain is most bothersome    Interval history:  Truman Suero is a 57 year old male is known to me for   Chronic neck pain  Cervical DDD  Cervical spondylosis (pain worse with extension/rotation indicating facetogenic component to pain)  Axial low back pain  Myofascial pain/muscle spasms  Remote history of ETOH overuse, attended AA for awhile. Sober for 10 years  ---PMHx includes: neck pain  ---PSHx includes: none listed      Recommendations/plan at the last visit on 1/31/2018 included:  1. Physical Therapy: not at present time  2. Patient is to continue his home exercise program and exercises learned in PHYSICAL THERAPY  3. Clinical Health Psychologist: continue work with  Kentrell Castañeda in health psychology  4. Diagnostic Studies:  None  5. Medication Management:    1. Continue Tramadol ER   2. Continue Tramadol immediate release 50mg tabs, max of 4 tabs daily   3. Continue gabapentin 900 mg TID  4. Continue Topamax 50 mg at bedtime  5. Increase methocarbamol to 750-1500 mg TID PRN muscle spasms. Caution sedation.   6. Further procedures recommended: cervical epidural steroid injection our office will contact patient   7. Monitor tingling in toes of right foot, if not improving follow up with PCP   8. Recommendations to PCP: see above  9. Follow up: 10-12 weeks      Since his last visit, Truman Suero reports:  -he quit smoking on 3/20/2018  -he is continuing to have neck pain, ISMAEL in March was not beneficial  -has seen vascular recently, has had an updated lumbar MRI scan and has been recommended to see Medical Spine provider next week. He has degenerative changes in his low back that may explain his low back and leg pain/weakness right >>> Left   He is working toward likely cervical surgery with Dr. Jud Harkins at the [facility]    At this point, the patient's participation with our multidisciplinary team includes:  The  "patient has been compliant with the program.  Pain Group - not ordered  PT - attending non-pain management PHYSICAL THERAPY   Health Psych - has seen Kentrell Sierra Dony x4  Acupuncture: working with Dr. Bereket LAY      Pain scores:  Pain intensity on average is 8 on a scale of 0-10.    Range is 8-10/10.   Pain right now is 10/10.  Pain is described as \"aching, numb, unbearable, burning, tiring, shooting, exhausting, throbbing, penetrating, sharp, stabbing, miserable, nagging.\"    Pain is constant in nature    Current pain relevant medications:   -Tramadol 200mg ER in the evening (helpful)  -Tramadol 50mg take 1 tablet  Q 6 hours PRN (using 2-3 tabs per day, occasionally using 4 tabs with exacerbated pain)  -ibuprofen 600mg PRN (helpful)  -gabapentin 900mg TID (somewhat helpful)  -methocarbamol 500-1000mg TID PRN muscle spasms (takes 1000 mg BID, somewhat helpful)  -Topamax 50 mg HS (helpful)    Other pertinent medications:  None    Previous Medications:  OPIATES: Tramdol (somewhat helpful)  NSAIDS: ibuprofen (helpful), Aleve (helpful)  MUSCLE RELAXANTS: none  ANTI-MIGRAINE MEDS: none  ANTI-DEPRESSANTS: none  SLEEP AIDS: none  ANTI-CONVULSANTS: gabapentin (helpful)  TOPICALS: lidocaine ointment (somewhat helpful)  Other meds: Tylenol (not helpful)        Other treatments have included:  Truman Suero has not been seen at a pain clinic in the past.    PT: tried, somewhat helpful  Chiropractic: helpful  Acupuncture: none  TENs Unit: none     Injections:   cervical radiofrequency nerve ablation at [facility] in December 2016 (did get good relief, got 70% relief of his typical neck pain)  -6/29/2017 Cervical facet joint steroid injections at C4-5 and C5-6 on the right with Dr. Nicole Harding (not helpful)  -3/8/2018 C7-T1 interlaminar DEMARCUS with Dr. Hugo Corrigan (not helpful)           THE 4 A's OF OPIOID MAINTENANCE ANALGESIA    Analgesia: long acting Tramadol with some short acting tramadol does give some " relief    Activity: ADLS and PHYSICAL THERAPY exercises. Working full time as a     Adverse effects: none    Adherence to Rx protocol: yes      Side Effects: none  Patient is using the medication as prescribed: yes    Medications:  Current Outpatient Prescriptions   Medication Sig Dispense Refill     buPROPion (WELLBUTRIN SR) 150 MG 12 hr tablet Take 1 tablet once daily and increase to 1 tablet twice daily after 4 to 7 days 60 tablet 0     fluticasone (FLONASE) 50 MCG/ACT spray Spray 2 sprays into both nostrils 2 times daily 2 Bottle 11     gabapentin (NEURONTIN) 600 MG tablet Take 1.5 tablets (900 mg) by mouth 3 times daily 135 tablet 3     ibuprofen (ADVIL,MOTRIN) 800 MG tablet   0     methocarbamol (ROBAXIN) 750 MG tablet Take 1-2 tablets (750-1,500 mg) by mouth 3 times daily as needed for muscle spasms caution sedation 130 tablet 1     topiramate (TOPAMAX) 50 MG tablet Take 50mg at bedtime.  Drink plenty of water and no alcohol. If side effects, then reduce to last tolerable dosage. 30 tablet 1     traMADol (ULTRAM) 50 MG tablet May take 1-2 tablets every 6 hours as needed for pain, max of 3 tabs per day. Reduce as able. Okay to fill on/after 4/3/2018 and start on/after 4/5/2018; To last 30 days. 90 tablet 0     traMADol (ULTRAM-ER) 200 MG ER-tablet Take 1 tablet (200 mg) by mouth daily Okay to fill on/after 4/3/2018 and start on/after 4/5/2018; to last 30 days 30 tablet 0       Medical History: any changes in medical history since they were last seen? None    Social History:   Home situation: , has 4 kids, 2 at home, one in college and one launched.   Occupation/Schooling:  full time in [employer]  Tobacco use: smoking, planning on quitting smoking  Alcohol use: sober for nearly 10 years. He used to work a sobriety program, used to go to   Drug use: none  History of chemical dependency treatment: no formal treatment, did AA    Is patient a current smoker or tobacco user?  QUIT on  3/20/2018  If yes, was cessation counseling offered?  no        Review of Systems:  ROS is positive as per HPI as well as for headache, ringing in ears, easy bruising, HTN, joint pain, arthritis, stiffness, back pain, neck pain, frequency, urgency, weakness, numbness, anxiety, stress, and is negative for fevers, sweats, or constipation, diarrhea.    This document serves as a record of the services and decisions personally performed and made by Melanie STEVENS. It was created on her behalf by Moncho Tariq, a trained medical scribe. The creation of this document is based on the provider's statements to the medical scribe.  Moncho Tariq 2:30 PM April 20, 2018      Physical Exam:  Vital signs: BP (!) 153/96  Pulse 70  Wt 66.2 kg (146 lb)  BMI 22.87 kg/m2     Behavioral observations:  Awake, alert, cooperative    Gait:  normal    Musculoskeletal exam:    Moves well in exam room  Strength is grossly equal    Neuro exam:  SILT in all extremities     Skin/vascular/autonomic:  No suspicious lesions on exposed skin.      Other:  NA    Is the patient hypertensive today? yes  Hypertensive on recheck of BP?   yes  If yes, was patient recommended to see Primary Care Provider in follow up for management of HTN?  yes      IMAGING:    MR CERVICAL SPINE WITHOUT CONTRAST 9/5/2017 2:32 PM      HISTORY: Neck pain, worsening numbness in arms and lower extremities.  Radiculopathy, cervical region.     TECHNIQUE: Multiplanar multisequence images were obtained through the  cervical spine without contrast.     COMPARISON: 2/22/2017.     FINDINGS: Sagittal images demonstrate some minimal gentle cervical  kyphosis, otherwise normal posterior alignment. There is no evidence  for craniovertebral or cervicomedullary junction abnormality. The  cervical cord is minimally indented anteriorly at C4-C5 and C5-C6,  otherwise is normal in morphology and signal characteristics. Disc  space narrowing and discogenic marrow changes are present  C3-C4,  C4-C5, C5-C6 and C6-C7.     C2-C3: Minimal facet hypertrophy. No stenosis.     C3-C4: Broad-based posterior osteophyte formation is present causing  some mild bilateral neural foraminal stenosis, but no central canal  stenosis.     C4-C5: Broad-based posterior osteophyte formation and mild facet  hypertrophy is present causing some mild to moderate central canal  stenosis, mild cord deformity, and mild-to-moderate bilateral neural  foraminal stenosis.     C5-C6: Broad-based posterior osteophyte formation and disc bulging is  present along with some facet hypertrophy. There is moderate central  canal stenosis and moderate bilateral neural foraminal stenosis.  Findings have progressed since the prior exam.     C6-C7: Broad-based disc bulging is present along with some minimal  posterior osteophyte formation. There is borderline to mild central  canal stenosis, but no neural foraminal stenosis.     C7-T1: Moderate facet hypertrophy. No significant stenosis.         IMPRESSION: Moderate multilevel degenerative disc and facet disease  with some progression at C5-C6 where there is moderate central canal  and bilateral neural foraminal stenosis along with cord deformity.          MR CERVICAL SPINE WITHOUT CONTRAST  2/22/2017 9:59 AM     HISTORY:  Bilateral neck and arm pain for three years.     COMPARISON: None.     TECHNIQUE: Routine MR cervical spine extended through T2.     FINDINGS: There is some degenerative bone marrow signal change C3-C6.  No malignant or destructive lesions. Normal alignment through T2.  Reversed cervical adenosis C3-C6.     The cervical and upper thoracic spinal cord appear intrinsically  normal. The craniocervical junction region is normal. No paraspinous  soft tissue abnormality.     Findings by level as follows:     C2-C3: Negative. No disc protrusion. No central or lateral stenosis.     C3-C4: Mild disc space narrowing. Mild diffuse annular bulge. Small  uncinate spur on the right. No  significant central or left-sided  stenosis. Mild right-sided foraminal stenosis.     C4-C5: Moderate degenerative narrowing of the interspace. Mild ventral  disc osteophyte complex with minimal central stenosis. Small uncinate  spurs bilaterally with mild bilateral foraminal stenosis.     C6-C7: Moderate disc space narrowing. Mild broad-based disc osteophyte  complex with minimal central stenosis. Minimal uncinate spurs with  mild bilateral foraminal stenosis.     C6-C7: Moderate disc space narrowing. Mild ventral disc osteophyte  complex. No significant central or lateral stenosis.     C7-T1: No disc protrusion. No central or lateral stenosis. Moderate  bilateral facet joint disease.         IMPRESSION:  1. Degenerative changes as described C3-C4 through C7-T1. There is  only mild central and foraminal stenosis as described.  2. No intrinsic spinal cord abnormality through T2    [place] Prescription Monitoring Program:  Reviewed, as expected       DIRE Score for selecting candidates for long term opioid analgesia for chronic pain:  Diagnosis  2  Intractablility  2  Risk    Psychological health  2    Chemical health  2    Reliability  2    Social support  3  Efficacy  2    Total DIRE Score = 15. Note that   7-13 predicts poor outcome (compliance and efficacy) from opioid prescribing; 14-21 predicts good outcome (compliance and efficacy)  from opioid prescribing.      Assessment:   1. Chronic neck pain  2. Cervical DDD  3. Cervical spinal stenosis with bilateral numbness in the hands and occasionally unsteady gait  4. Cervical spondylosis  5. Cervical radiculopathy  6. Axial low back pain  7. Myofascial pain/muscle spasms  8. Chronic pain syndrome  9. Remote history of ETOH overuse, attended AA for awhile. Sober for 10 years  10. PMHx includes: neck pain  11. PSHx includes: none listed        Plan:   1. Physical Therapy: not at present time  2. Patient is to continue his home exercise program and exercises learned  in PHYSICAL THERAPY  3. Clinical Health Psychologist: continue work with  Kentrell Castañeda in health psychology  4. Diagnostic Studies:  None  5. Medication Management:    1. Continue Tramadol ER 200mg QD  2. Continue Tramadol immediate release 50mg tabs, max of 4 tabs daily   3. Continue gabapentin 900 mg TID  4. Continue Topamax 50 mg at bedtime  5. Continue Methocarbamol 750-1500 mg TID PRN   6. Further procedures recommended: none, patient may benefit from LESI  7. The patient will follow-up with [facility] for further evaluation of low back pain. The patient may benefit from an epidural steroid injection.  8. I recommend that the patient make a follow up appointment with Dr. Harkins in May since he quit smoking.  9. Recommendations to PCP: see above  10. Follow up: 10-12 weeks      Total time spent face to face was 35 minutes and more than 50% of face to face time was spent in counseling and/or coordination of care regarding the diagnosis and recommendations above.    The information in this document, created by the medical scribe for me, accurately reflects the services I personally performed and the decisions made by me. I have reviewed and approved this document for accuracy.      Melanie STEVENS, TIMBO CNP, FNP  [facility] [place]

## 2018-04-20 NOTE — MR AVS SNAPSHOT
After Visit Summary   4/20/2018    Truman Suero    MRN: [id number]           Patient Information     Date Of Birth          1960        Visit Information        Provider Department      4/20/2018 2:00 PM Melanie Thomas APRN [facility]        Today's Diagnoses     Chronic neck pain        DDD (degenerative disc disease), cervical        Cervical spondylosis without myelopathy        Chronic bilateral low back pain without sciatica        Myofascial pain        Chronic neck and back pain        Cervical radiculopathy          Care Instructions    PLAN:  1. I recommend that you make a follow up appt with Dr. Harkins in May since you have ceased smoking  2. Agree with you seeing [facility] for your low back, you may benefit from a lumbar epidural steroid injection  3. Medications:   1. Continue Tramdol ER   2. Continue Tramadol as needed  3. Continue gabapentin  4. Continue Topamax  4. Procedures: none  5. Follow-up with me in 10-12 weeks.   ----------------------------------------------------------------  Nurse Triage line:  [phone]   Call this number with any questions or concerns. You may leave a detailed message anytime. Calls are typically returned Monday through Friday between 8 AM and 4:30 PM. We usually get back to you within 2 business days depending on the issue/request.       Medication refills:    For non-narcotic medications, call your pharmacy directly to request a refill. The pharmacy will contact the [facility] for authorization. Please allow 3-4 days for these refills to be processed.     For narcotic refills, call the nurse triage line or send a ProHatch message. Please contact us 7-10 days before your refill is due. The message MUST include the name of the specific medication(s) requested and how you would like to receive the prescription(s). The options are as follows:    Pain Clinic staff can mail the prescription to your pharmacy. Please tell  us the name of the pharmacy.    You may pick the prescription up at the Pain Clinic (tell us the location) or during a clinic visit with your pain provider    Pain Clinic staff can deliver the prescription to the [facility] in the clinic building. Please tell us the location.      Scheduling number: [phone].  Call this number to schedule or change appointments.    We believe regular attendance is key to your success in our program.    Any time you are unable to keep your appointment we ask that you call us at least 24 hours in advance to let us know. This will allow us to offer the appointment time to another patient.               Follow-ups after your visit        Your next 10 appointments already scheduled     Apr 24, 2018  3:40 PM CDT   MEDKIEL LY with Annette Lopes PA-C   [facility] [place] ([facility] [place])    [address]  [place] [address]   [phone]            Apr 30, 2018  1:20 PM CDT   Emigdio Moses with Humberto Trinidad,    [facility]    [address] [place] [address] [phone]              Who to contact     If you have questions or need follow up information about today's clinic visit or your schedule please contact [facility] [place] directly at [phone].  Normal or non-critical lab and imaging results will be communicated to you by MyChart, letter or phone within 4 business days after the clinic has received the results. If you do not hear from us within 7 days, please contact the clinic through MyChart or phone. If you have a critical or abnormal lab result, we will notify you by phone as soon as possible.  Submit refill requests through Simbionixt or call your pharmacy and they will forward the refill request to us. Please allow 3 business days for your refill to be completed.          Additional Information About Your  Visit        [employer] Information     [employer] gives you secure access to your electronic health record. If you see a primary care provider, you can also send messages to your care team and make appointments. If you have questions, please call your primary care clinic.  If you do not have a primary care provider, please call [phone] and they will assist you.        Care EveryWhere ID     This is your Care EveryWhere ID. This could be used by other organizations to access your [facility] medical records  [id number]        Your Vitals Were     Pulse BMI (Body Mass Index)                70 22.87 kg/m2           Blood Pressure from Last 3 Encounters:   04/20/18 (!) 153/96   03/21/18 (!) 139/97   03/08/18 137/90    Weight from Last 3 Encounters:   04/20/18 66.2 kg (146 lb)   03/21/18 66.2 kg (146 lb)   02/26/18 63.8 kg (140 lb 9.6 oz)              Today, you had the following     No orders found for display         Today's Medication Changes          These changes are accurate as of 4/20/18  3:00 PM.  If you have any questions, ask your nurse or doctor.               These medicines have changed or have updated prescriptions.        Dose/Directions    * traMADol 50 MG tablet   Commonly known as:  ULTRAM   This may have changed:  additional instructions   Used for:  Chronic neck and back pain, Cervical radiculopathy   Changed by:  Melanie Thomas APRN CNP        May take 1-2 tablets every 6 hours as needed for pain, max of 3 tabs per day. Reduce as able. Okay to fill on/after 5/3/2018 and start on/after 5/5/2018; To last 30 days.   Quantity:  90 tablet   Refills:  0       * traMADol 200 MG ER-tablet   Commonly known as:  ULTRAM-ER   This may have changed:  additional instructions   Used for:  Cervical spondylosis without myelopathy, DDD (degenerative disc disease), cervical, Chronic bilateral low back pain without sciatica   Changed by:  Melanie Thomas APRN CNP        Dose:  200 mg   Take 1 tablet (200 mg)  by mouth daily Okay to fill on/after 5/3/2018 and start on/after 5/5/2018; to last 30 days   Quantity:  30 tablet   Refills:  0       * Notice:  This list has 2 medication(s) that are the same as other medications prescribed for you. Read the directions carefully, and ask your doctor or other care provider to review them with you.         Where to get your medicines      These medications were sent to [facility] [address] [place], [place] [address] [facility]  [address] [place] [address] [facility] [address]     Phone:  [phone]     methocarbamol 750 MG tablet         Some of these will need a paper prescription and others can be bought over the counter.  Ask your nurse if you have questions.     Bring a paper prescription for each of these medications     traMADol 200 MG ER-tablet    traMADol 50 MG tablet               Information about OPIOIDS     PRESCRIPTION OPIOIDS: WHAT YOU NEED TO KNOW   You have a prescription for an opioid (narcotic) pain medicine. Opioids can cause addiction. If you have a history of chemical dependency of any type, you are at a higher risk of becoming addicted to opioids. Only take this medicine after all other options have been tried. Take it for as short a time and as few doses as possible.     Do not:    Drive. If you drive while taking these medicines, you could be arrested for driving under the influence (DUI).    Operate heavy machinery    Do any other dangerous activities while taking these medicines.     Drink any alcohol while taking these medicines.      Take with any other medicines that contain acetaminophen. Read all labels carefully. Look for the word  acetaminophen  or  Tylenol.  Ask your pharmacist if you have questions or are unsure.    Store your pills in a secure place, locked if possible. We will not replace any lost or stolen medicine. If you don t finish your medicine, please throw away (dispose) as directed by your pharmacist. The [place] Pollution Control Agency  has more information about safe disposal: https://www.pca.[email or web address].[place]/living-green/managing-unwanted-medications    All opioids tend to cause constipation. Drink plenty of water and eat foods that have a lot of fiber, such as fruits, vegetables, prune juice, apple juice and high-fiber cereal. Take a laxative (Miralax, milk of magnesia, Colace, Senna) if you don t move your bowels at least every other day.          Primary Care Provider Office Phone # Fax #    Humberto Trinidad [phone]       [address] [address]        Equal Access to Services     ELIEZER VALENTE : Colt Maldonado, wahans manzo, qachuy kaalmaadrianna unger, mariela carter. So [facility] [phone].    ATENCIÓN: Si habla español, tiene a simmons disposición servicios gratuitos de asistencia lingüística. Llame al [phone].    We comply with applicable federal civil rights laws and [place] laws. We do not discriminate on the basis of race, color, national origin, age, disability, sex, sexual orientation, or gender identity.            Thank you!     Thank you for choosing [facility]  for your care. Our goal is always to provide you with excellent care. Hearing back from our patients is one way we can continue to improve our services. Please take a few minutes to complete the written survey that you may receive in the mail after your visit with us. Thank you!             Your Updated Medication List - Protect others around you: Learn how to safely use, store and throw away your medicines at www.disposemymeds.org.          This list is accurate as of 4/20/18  3:00 PM.  Always use your most recent med list.                   Brand Name Dispense Instructions for use Diagnosis    buPROPion 150 MG 12 hr tablet    WELLBUTRIN SR    60 tablet    Take 1 tablet once daily and increase to 1 tablet twice daily after 4 to 7 days    Tobacco abuse, Adjustment disorder with  anxious mood       fluticasone 50 MCG/ACT spray    FLONASE    2 Bottle    Spray 2 sprays into both nostrils 2 times daily    Chronic allergic rhinitis due to animal hair and dander, Chronic seasonal allergic rhinitis due to pollen, Allergic rhinitis due to dust mite, Allergic conjunctivitis of both eyes       gabapentin 600 MG tablet    NEURONTIN    135 tablet    Take 1.5 tablets (900 mg) by mouth 3 times daily    Cervical spondylosis without myelopathy, DDD (degenerative disc disease), cervical, Chronic bilateral low back pain without sciatica, Chronic neck and back pain, Cervical stenosis of spinal canal       ibuprofen 800 MG tablet    ADVIL/MOTRIN          methocarbamol 750 MG tablet    ROBAXIN    130 tablet    Take 1-2 tablets (750-1,500 mg) by mouth 3 times daily as needed for muscle spasms caution sedation    Chronic neck pain, DDD (degenerative disc disease), cervical, Cervical spondylosis without myelopathy, Chronic bilateral low back pain without sciatica, Myofascial pain       topiramate 50 MG tablet    TOPAMAX    30 tablet    Take 50mg at bedtime.  Drink plenty of water and no alcohol. If side effects, then reduce to last tolerable dosage.    Cervical spondylosis without myelopathy, DDD (degenerative disc disease), cervical, Chronic bilateral low back pain without sciatica, Cervical stenosis of spinal canal       * traMADol 50 MG tablet    ULTRAM    90 tablet    May take 1-2 tablets every 6 hours as needed for pain, max of 3 tabs per day. Reduce as able. Okay to fill on/after 5/3/2018 and start on/after 5/5/2018; To last 30 days.    Chronic neck and back pain, Cervical radiculopathy       * traMADol 200 MG ER-tablet    ULTRAM-ER    30 tablet    Take 1 tablet (200 mg) by mouth daily Okay to fill on/after 5/3/2018 and start on/after 5/5/2018; to last 30 days    Cervical spondylosis without myelopathy, DDD (degenerative disc disease), cervical, Chronic bilateral low back pain without sciatica       *  Notice:  This list has 2 medication(s) that are the same as other medications prescribed for you. Read the directions carefully, and ask your doctor or other care provider to review them with you.

## 2018-04-30 ENCOUNTER — OFFICE VISIT (OUTPATIENT)
Dept: FAMILY MEDICINE | Facility: CLINIC | Age: 58
End: 2018-04-30
Payer: COMMERCIAL

## 2018-04-30 VITALS
WEIGHT: 146 LBS | RESPIRATION RATE: 16 BRPM | BODY MASS INDEX: 22.91 KG/M2 | HEART RATE: 68 BPM | TEMPERATURE: 97.6 F | DIASTOLIC BLOOD PRESSURE: 80 MMHG | SYSTOLIC BLOOD PRESSURE: 130 MMHG | OXYGEN SATURATION: 97 % | HEIGHT: 67 IN

## 2018-04-30 DIAGNOSIS — Z12.5 SPECIAL SCREENING FOR MALIGNANT NEOPLASM OF PROSTATE: ICD-10-CM

## 2018-04-30 DIAGNOSIS — Z72.0 TOBACCO ABUSE: Primary | ICD-10-CM

## 2018-04-30 DIAGNOSIS — M50.30 DDD (DEGENERATIVE DISC DISEASE), CERVICAL: ICD-10-CM

## 2018-04-30 DIAGNOSIS — F43.22 ADJUSTMENT DISORDER WITH ANXIOUS MOOD: ICD-10-CM

## 2018-04-30 DIAGNOSIS — I25.10 MILD CAD: ICD-10-CM

## 2018-04-30 DIAGNOSIS — M54.50 CHRONIC BILATERAL LOW BACK PAIN WITHOUT SCIATICA: ICD-10-CM

## 2018-04-30 DIAGNOSIS — I77.810 ASCENDING AORTA DILATATION (H): ICD-10-CM

## 2018-04-30 DIAGNOSIS — Z11.4 SCREENING FOR HUMAN IMMUNODEFICIENCY VIRUS: ICD-10-CM

## 2018-04-30 DIAGNOSIS — G89.29 CHRONIC BILATERAL LOW BACK PAIN WITHOUT SCIATICA: ICD-10-CM

## 2018-04-30 DIAGNOSIS — K57.32 DIVERTICULITIS OF COLON: ICD-10-CM

## 2018-04-30 PROCEDURE — 87389 HIV-1 AG W/HIV-1&-2 AB AG IA: CPT | Performed by: FAMILY MEDICINE

## 2018-04-30 PROCEDURE — 99214 OFFICE O/P EST MOD 30 MIN: CPT | Performed by: FAMILY MEDICINE

## 2018-04-30 PROCEDURE — 36415 COLL VENOUS BLD VENIPUNCTURE: CPT | Performed by: FAMILY MEDICINE

## 2018-04-30 PROCEDURE — G0103 PSA SCREENING: HCPCS | Performed by: FAMILY MEDICINE

## 2018-04-30 RX ORDER — BUPROPION HYDROCHLORIDE 150 MG/1
TABLET, EXTENDED RELEASE ORAL
Qty: 60 TABLET | Refills: 0 | Status: SHIPPED | OUTPATIENT
Start: 2018-04-30 | End: 2018-06-10

## 2018-04-30 ASSESSMENT — PAIN SCALES - GENERAL: PAINLEVEL: NO PAIN (0)

## 2018-04-30 NOTE — MR AVS SNAPSHOT
After Visit Summary   4/30/2018    Truman Suero    MRN: [id number]           Patient Information     Date Of Birth          1960        Visit Information        Provider Department      4/30/2018 1:20 PM Humberto Trinidad DO [facility]        Today's Diagnoses     Tobacco abuse    -  1    Adjustment disorder with anxious mood        Ascending aorta dilatation (H)        Diverticulitis of colon        Chronic bilateral low back pain without sciatica        DDD (degenerative disc disease), cervical        Mild CAD        Special screening for malignant neoplasm of prostate        Screening for human immunodeficiency virus          Care Instructions    Continue taking Wellbutrin. In 3 to 6 months we may begin to wean down.     Labs today. I will notify patient of results when I receive them.     ECHO referral made for ascending aortic check.     Follow up in September for annual exam.     HIV screening today.   [facility]   Discharged by : Shila Olson MA    Paper scripts provided to patient : no     If you have any questions regarding your visit please contact your care team:     Team Gold                Clinic Hours Telephone Number     Dr. Liana Cortes NP 7am-7pm  Monday - Thursday   7am-5pm  Fridays  [phone]   (Appointment scheduling available 24/7)     RN Line  [phone] option 2     Urgent Care - [place] - 11am-9pm Monday-Friday Saturday-Sunday- 9am-5pm     [place] -   5pm-9pm Monday-Friday Saturday-Sunday- 9am-5pm    [phone] - [place]    [phone] - [place]       For a Price Quote for your services, please call our Consumer Price Line at [phone].     What options do I have for visits at the clinic other than the traditional office visit?     To expand how we care for you, many of our providers are  utilizing electronic visits (e-visits) and telephone visits, when medically appropriate, for interactions with their patients rather than a visit in the clinic. We also offer nurse visits for many medical concerns. Just like any other service, we will bill your insurance company for this type of visit based on time spent on the phone with your provider. Not all insurance companies cover these visits. Please check with your medical insurance if this type of visit is covered. You will be responsible for any charges that are not paid by your insurance.   E-visits via [employer]: generally incur a $35.00 fee.     Telephone visits:  Time spent on the phone: *charged based on time that is spent on the phone in increments of 10 minutes. Estimated cost:   5-10 mins $30.00   11-20 mins. $59.00   21-30 mins. $85.00       Use [employer] (secure email communication and access to your chart) to send your primary care provider a message or make an appointment. Ask someone on your Team how to sign up for [employer].     As always, Thank you for trusting us with your health care needs!      [facility]:    Scheduling Appointments  [facility]  Call: [phone]    [facility]  Call: [phone]    [facility]  Call: [phone]    For Gastroenterology referrals   [facility]   Cl[facility] and Surgery Center, 4th Floor   [address] [place] [address]   Appointments: [phone]    WHERE TO GO FOR CARE?  Clinic    Make an appointment if you:       Are sick (cold, cough, flu, sore throat, earache or in pain).       Have a small injury (sprain, small cut, burn or broken bone).       Need a physical exam, Pap smear, vaccine or prescription refill.       Have questions about your health or medicines.    To reach us:      Call [phone] ([phone]). Open 24 hours every day. (For counseling services, call  [phone].)    Log into XO1 at [email or web address]. (Visit [email or web address] to create an account.) Hospital emergency room    An emergency is a serious or life- threatening problem that must be treated right away.    Call 911 or get to the hospital if you have:      Very bad or sudden:            - Chest pain or pressure         - Bleeding         - Head or belly pain         - Dizziness or trouble seeing, walking or                          Speaking      Problems breathing      Blood in your vomit or you are coughing up blood      A major injury (knocked out, loss of a finger or limb, rape, broken bone protruding from skin)    A mental health crisis. (Or call the Mental Health Crisis line at 1-597.417.3391 or Suicide Prevention Hotline at 1-150.405.1718.)    Open 24 hours every day. You don't need an appointment.     Urgent care    Visit urgent care for sickness or small injuries when the clinic is closed. You don't need an appointment. To check hours or find an urgent care near you, visit [email or web address]. Online care    Get online care from [employer] for more than 70 common problems, like colds, allergies and infections. Open 24 hours every day at:   [email or web address]   Need help deciding?    For advice about where to be seen, you may call your clinic and ask to speak with a nurse. We're here for you 24 hours every day.         If you are deaf or hard of hearing, please let us know. We provide many free services including sign language interpreters, oral interpreters, TTYs, telephone amplifiers, note takers and written materials.                   Follow-ups after your visit        Your next 10 appointments already scheduled     May 01, 2018 12:20 PM CDT   GUERLINE LY with Van Junior,    [facility] [place] ([facility] [place])    [address] [address]  [place] [address]   [phone]            Jun 29, 2018  1:30 PM CDT   Return Visit with  "RODNEY Vargas CNP   [facility] ([facility])    [address]  [place] [address] [phone]              Future tests that were ordered for you today     Open Future Orders        Priority Expected Expires Ordered    Echocardiogram Complete Routine  4/30/2019 4/30/2018            Who to contact     If you have questions or need follow up information about today's clinic visit or your schedule please contact [facility] directly at [phone].  Normal or non-critical lab and imaging results will be communicated to you by [employer] Microelectronicshart, letter or phone within 4 business days after the clinic has received the results. If you do not hear from us within 7 days, please contact the clinic through Glassbeamt or phone. If you have a critical or abnormal lab result, we will notify you by phone as soon as possible.  Submit refill requests through WellDoc or call your pharmacy and they will forward the refill request to us. Please allow 3 business days for your refill to be completed.          Additional Information About Your Visit        [employer] Information     [employer] gives you secure access to your electronic health record. If you see a primary care provider, you can also send messages to your care team and make appointments. If you have questions, please call your primary care clinic.  If you do not have a primary care provider, please call [phone] and they will assist you.        Care EveryWhere ID     This is your Care EveryWhere ID. This could be used by other organizations to access your [facility] medical records  [id number]        Your Vitals Were     Pulse Temperature Respirations Height Pulse Oximetry BMI (Body Mass Index)    68 97.6  F (36.4  C) (Oral) 16 5' 7\" (1.702 m) 97% 22.87 kg/m2       Blood Pressure from Last 3 Encounters:   04/30/18 130/80   04/20/18 (!) 153/96   03/21/18 (!) 139/97    Weight from Last 3 Encounters: "   04/30/18 146 lb (66.2 kg)   04/20/18 146 lb (66.2 kg)   03/21/18 146 lb (66.2 kg)              We Performed the Following     HIV Antigen Antibody Combo     PSA, screen          Where to get your medicines      These [facility] [address] [facility] [place] [facility] [address]  [address] [place] [address]     Phone:  [phone]     buPROPion 150 MG 12 hr tablet          Primary Care Provider Office Phone # Fax #    Humberto Trinidad [phone]       [address] [facility] [address]        Equal Access to Services     ELIEZER VALENTE : Colt Maldonado, wahans manzo, qachuy kaalmada cornel, mariela carter. So [facility] [phone].    ATENCIÓN: Si habla español, tiene a simmons disposición servicios gratuitos de asistencia lingüística. Llame al [phone].    We comply with applicable federal civil rights laws and [place] laws. We do not discriminate on the basis of race, color, national origin, age, disability, sex, sexual orientation, or gender identity.            Thank you!     Thank you for choosing [facility]  for your care. Our goal is always to provide you with excellent care. Hearing back from our patients is one way we can continue to improve our services. Please take a few minutes to complete the written survey that you may receive in the mail after your visit with us. Thank you!             Your Updated Medication List - Protect others around you: Learn how to safely use, store and throw away your medicines at www.disposemymeds.org.          This list is accurate as of 4/30/18  2:00 PM.  Always use your most recent med list.                   Brand Name Dispense Instructions for use Diagnosis    buPROPion 150 MG 12 hr tablet    WELLBUTRIN SR    60 tablet    Take 1 tablet once daily and increase to 1 tablet twice daily after 4 to 7 days    Tobacco abuse, Adjustment disorder  with anxious mood       fluticasone 50 MCG/ACT spray    FLONASE    2 Bottle    Spray 2 sprays into both nostrils 2 times daily    Chronic allergic rhinitis due to animal hair and dander, Chronic seasonal allergic rhinitis due to pollen, Allergic rhinitis due to dust mite, Allergic conjunctivitis of both eyes       gabapentin 600 MG tablet    NEURONTIN    135 tablet    Take 1.5 tablets (900 mg) by mouth 3 times daily    Cervical spondylosis without myelopathy, DDD (degenerative disc disease), cervical, Chronic bilateral low back pain without sciatica, Chronic neck and back pain, Cervical stenosis of spinal canal       ibuprofen 800 MG tablet    ADVIL/MOTRIN          methocarbamol 750 MG tablet    ROBAXIN    130 tablet    Take 1-2 tablets (750-1,500 mg) by mouth 3 times daily as needed for muscle spasms caution sedation    Chronic neck pain, DDD (degenerative disc disease), cervical, Cervical spondylosis without myelopathy, Chronic bilateral low back pain without sciatica, Myofascial pain       topiramate 50 MG tablet    TOPAMAX    30 tablet    Take 50mg at bedtime.  Drink plenty of water and no alcohol. If side effects, then reduce to last tolerable dosage.    Cervical spondylosis without myelopathy, DDD (degenerative disc disease), cervical, Chronic bilateral low back pain without sciatica, Cervical stenosis of spinal canal       * traMADol 50 MG tablet    ULTRAM    90 tablet    May take 1-2 tablets every 6 hours as needed for pain, max of 3 tabs per day. Reduce as able. Okay to fill on/after 5/3/2018 and start on/after 5/5/2018; To last 30 days.    Cervical radiculopathy       * traMADol 200 MG ER-tablet    ULTRAM-ER    30 tablet    Take 1 tablet (200 mg) by mouth daily Okay to fill on/after 5/3/2018 and start on/after 5/5/2018; to last 30 days    Cervical spondylosis without myelopathy, DDD (degenerative disc disease), cervical, Chronic bilateral low back pain without sciatica       * Notice:  This list has 2  medication(s) that are the same as other medications prescribed for you. Read the directions carefully, and ask your doctor or other care provider to review them with you.

## 2018-04-30 NOTE — PATIENT INSTRUCTIONS
Continue taking Wellbutrin. In 3 to 6 months we may begin to wean down.     Labs today. I will notify patient of results when I receive them.     ECHO referral made for ascending aortic check.     Follow up in September for annual exam.     HIV screening today.   [facility]   Discharged by : Shila Olson MA    Paper scripts provided to patient : no     If you have any questions regarding your visit please contact your care team:     Team Gold                Clinic Hours Telephone Number     Dr. Liana Cortes, NP 7am-7pm  Monday - Thursday   7am-5pm  Fridays  [phone]   (Appointment scheduling available 24/7)     RN Line  [phone] option 2     Urgent Care - [place] - 11am-9pm Monday-Friday Saturday-Sunday- 9am-5pm     [place] -   5pm-9pm Monday-Friday Saturday-Sunday- 9am-5pm    [phone] - [place]    [phone] - [place]       For a Price Quote for your services, please call our Consumer Price Line at [phone].     What options do I have for visits at the clinic other than the traditional office visit?     To expand how we care for you, many of our providers are utilizing electronic visits (e-visits) and telephone visits, when medically appropriate, for interactions with their patients rather than a visit in the clinic. We also offer nurse visits for many medical concerns. Just like any other service, we will bill your insurance company for this type of visit based on time spent on the phone with your provider. Not all insurance companies cover these visits. Please check with your medical insurance if this type of visit is covered. You will be responsible for any charges that are not paid by your insurance.   E-visits via [employer]: generally incur a $35.00 fee.     Telephone visits:  Time spent on the phone: *charged based on time that is spent on the  phone in increments of 10 minutes. Estimated cost:   5-10 mins $30.00   11-20 mins. $59.00   21-30 mins. $85.00       Use [employer] (secure email communication and access to your chart) to send your primary care provider a message or make an appointment. Ask someone on your Team how to sign up for Customcells.     As always, Thank you for trusting us with your health care needs!      [facility]:    Scheduling Appointments  [place]  Call: [phone]    [facility]  Call: [phone]    [facility]  Call: [phone]    For Gastroenterology referrals   [facility]   Clinics and Surgery [place], 4th Floor   [address] [place] [address]   Appointments: [phone]    WHERE TO GO FOR CARE?  Clinic    Make an appointment if you:       Are sick (cold, cough, flu, sore throat, earache or in pain).       Have a small injury (sprain, small cut, burn or broken bone).       Need a physical exam, Pap smear, vaccine or prescription refill.       Have questions about your health or medicines.    To reach us:      Call [phone] ([phone]). Open 24 hours every day. (For counseling services, call [phone].)    Log into Customcells at [email or web address]. (Visit [email or web address] to create an account.) Hospital emergency room    An emergency is a serious or life- threatening problem that must be treated right away.    Call [phone] or get to the hospital if you have:      Very bad or sudden:            - Chest pain or pressure         - Bleeding         - Head or belly pain         - Dizziness or trouble seeing, walking or                          Speaking      Problems breathing      Blood in your vomit or you are coughing up blood      A major injury (knocked out, loss of a finger or limb, rape, broken bone protruding from skin)    A mental health crisis. (Or call the Mental Health Crisis line at  1-242.228.2232 or Suicide Prevention Hotline at 1-373.918.8958.)    Open 24 hours every day. You don't need an appointment.     Urgent care    Visit urgent care for sickness or small injuries when the clinic is closed. You don't need an appointment. To check hours or find an urgent care near you, visit [email or web address]. Online care    Get online care from [facility] for more than 70 common problems, like colds, allergies and infections. Open 24 hours every day at:   [email or web address]   Need help deciding?    For advice about where to be seen, you may call your clinic and ask to speak with a nurse. We're here for you 24 hours every day.         If you are deaf or hard of hearing, please let us know. We provide many free services including sign language interpreters, oral interpreters, TTYs, telephone amplifiers, note takers and written materials.

## 2018-04-30 NOTE — NURSING NOTE
"Chief Complaint   Patient presents with     RECHECK     per MD      Blood Draw     for Prostate       Initial /80 (BP Location: Right arm, Patient Position: Chair, Cuff Size: Adult Regular)  Pulse 68  Temp 97.6  F (36.4  C) (Oral)  Resp 16  Ht 5' 7\" (1.702 m)  Wt 146 lb (66.2 kg)  SpO2 97%  BMI 22.87 kg/m2 Estimated body mass index is 22.87 kg/(m^2) as calculated from the following:    Height as of this encounter: 5' 7\" (1.702 m).    Weight as of this encounter: 146 lb (66.2 kg).  Medication Reconciliation: complete     Heather Cabrera CMA        "

## 2018-05-01 ENCOUNTER — OFFICE VISIT (OUTPATIENT)
Dept: ORTHOPEDICS | Facility: CLINIC | Age: 58
End: 2018-05-01
Payer: COMMERCIAL

## 2018-05-01 VITALS
BODY MASS INDEX: 22.91 KG/M2 | DIASTOLIC BLOOD PRESSURE: 82 MMHG | SYSTOLIC BLOOD PRESSURE: 128 MMHG | WEIGHT: 146 LBS | HEIGHT: 67 IN

## 2018-05-01 DIAGNOSIS — G89.29 CHRONIC BILATERAL LOW BACK PAIN WITH BILATERAL SCIATICA: Primary | ICD-10-CM

## 2018-05-01 DIAGNOSIS — M54.42 CHRONIC BILATERAL LOW BACK PAIN WITH BILATERAL SCIATICA: Primary | ICD-10-CM

## 2018-05-01 DIAGNOSIS — M54.41 CHRONIC BILATERAL LOW BACK PAIN WITH BILATERAL SCIATICA: Primary | ICD-10-CM

## 2018-05-01 DIAGNOSIS — M51.369 LUMBAR DEGENERATIVE DISC DISEASE: ICD-10-CM

## 2018-05-01 LAB
HIV 1+2 AB+HIV1 P24 AG SERPL QL IA: NONREACTIVE
PSA SERPL-ACNC: 1.07 UG/L (ref 0–4)

## 2018-05-01 PROCEDURE — 99203 OFFICE O/P NEW LOW 30 MIN: CPT | Performed by: PEDIATRICS

## 2018-05-01 NOTE — MR AVS SNAPSHOT
After Visit Summary   5/1/2018    Truman Suero    MRN: [id number]           Patient Information     Date Of Birth          1960        Visit Information        Provider Department      5/1/2018 12:20 PM Van Junior,  [facility] [place]        Today's Diagnoses     Chronic bilateral low back pain with bilateral sciatica    -  1      Care Instructions    Ice or heat for soreness as needed     Referred to physical therapy     Schedule corticosteroid injection               Follow-ups after your visit        Additional Services     [facility] PT, HAND, AND CHIROPRACTIC REFERRAL       **This order will print in the [facility] Scheduling Office**    Physical Therapy, Hand Therapy and Chiropractic Care are available through:    *[facility]  *[facility]  *[facility]    Call one number to schedule at any of the above locations: [phone].    Your provider has referred you to: Physical Therapy at [facility] or [facility]    Indication/Reason for Referral: Low Back Pain  Onset of Illness:   Therapy Orders: Evaluate and Treat  Special Programs: None  Special Request: None    Chandler Arteaga      Additional Comments for the Therapist or Chiropractor: Review HEP, additional visits per PT recommendation      Please be aware that coverage of these services is subject to the terms and limitations of your health insurance plan.  Call member services at your health plan with any benefit or coverage questions.      Please bring the following to your appointment:    *Your personal calendar for scheduling future appointments  *Comfortable clothing            PAIN MANAGEMENT REFERRAL       Your provider has referred you to: [facility]: [facility] -    Reason for Referral: Procedure Order TBD by pain provider - Location: possible [facility] with DDD and lumbar radiating symptoms - document symptoms in note      What is your diagnosis for the  patient's pain? Bilateral low back      For any questions, contact the [facility] at [phone].     **ANY DIAGNOSTIC TESTS THAT ARE NOT IN EPIC SHOULD BE SENT TO THE PAIN CENTER**    REGARDING OPIOID MEDICATIONS:  The discussion of opioids management, appropriateness of therapy, and dosing will be discussed in patients being seen for evaluation.  The pain management clinics are not long-term prescribing clinics, with transition of prescribing of medications ultimately going back to the referring provider/PCP.  If prescribing is taken over at the pain clinic, it is in actively involved patients whom are appropriate for opioids, urine drug screening is completed, and long-term prescribing plan has been determined.  Therefore, we will not be automatically taking over prescribing at the patient's first visit.  Is this agreeable to you? Agrees, but patient is currently already an established pain management patient, medications are prescribed thru pain managment     Please be aware that coverage of these services is subject to the terms and limitations of your health insurance plan.  Call member services at your health plan with any benefit or coverage questions.      Please bring the following with you to your appointment:    (1) Any X-Rays, CTs or MRIs which have been performed.  Contact the facility where they were done to arrange for  prior to your scheduled appointment.    (2) List of current medications   (3) This referral request   (4) Any documents/labs given to you for this referral                  Follow-up notes from your care team     Return in about 4 weeks (around 5/29/2018).      Your next 10 appointments already scheduled     Jun 29, 2018  1:30 PM CDT   Return Visit with RODNEY Vargas CNP   [facility] [place] ([facility] [place])    [address] [address] [facility]  [place] [id number] [phone]              Future tests that were ordered for  "you today     Open Future Orders        Priority Expected Expires Ordered    Echocardiogram Complete Routine  4/30/2019 4/30/2018            Who to contact     If you have questions or need follow up information about today's clinic visit or your schedule please contact [facility] directly at [phone].  Normal or non-critical lab and imaging results will be communicated to you by MyChart, letter or phone within 4 business days after the clinic has received the results. If you do not hear from us within 7 days, please contact the clinic through Pipefishhart or phone. If you have a critical or abnormal lab result, we will notify you by phone as soon as possible.  Submit refill requests through [employer] or call your pharmacy and they will forward the refill request to us. Please allow 3 business days for your refill to be completed.          Additional Information About Your Visit        MyChart Information     [employer] gives you secure access to your electronic health record. If you see a primary care provider, you can also send messages to your care team and make appointments. If you have questions, please call your primary care clinic.  If you do not have a primary care provider, please call [phone] and they will assist you.        Care EveryWhere ID     This is your Care EveryWhere ID. This could be used by other organizations to access your [facility] medical records  [id number]        Your Vitals Were     Height BMI (Body Mass Index)                5' 7\" (1.702 m) 22.87 kg/m2           Blood Pressure from Last 3 Encounters:   05/01/18 128/82   04/30/18 130/80   04/20/18 (!) 153/96    Weight from Last 3 Encounters:   05/01/18 146 lb (66.2 kg)   04/30/18 146 lb (66.2 kg)   04/20/18 146 lb (66.2 kg)              We Performed the Following     [facility] PT, HAND, AND CHIROPRACTIC REFERRAL     PAIN MANAGEMENT REFERRAL        Primary Care Provider Office Phone # Fax #    Humberto Lucas " DO [place] [phone]       [address] [facility] [address]        Equal Access to Services     ELIEZER VALENTE : Hadii aad ku haddylanella Maldonado, wasvetlanada victorinogriseldaha, breanneta kaerlindada cornel, mariela alyciain hayaan abelardoaustin constantino laJayjaleel carter. So [facility] [phone].    ATENCIÓN: Si habla español, tiene a simmons disposición servicios gratuitos de asistencia lingüística. Llame al [phone].    We comply with applicable federal civil rights laws and [place] laws. We do not discriminate on the basis of race, color, national origin, age, disability, sex, sexual orientation, or gender identity.            Thank you!     Thank you for choosing [facility] [place]  for your care. Our goal is always to provide you with excellent care. Hearing back from our patients is one way we can continue to improve our services. Please take a few minutes to complete the written survey that you may receive in the mail after your visit with us. Thank you!             Your Updated Medication List - Protect others around you: Learn how to safely use, store and throw away your medicines at www.disposemymeds.org.          This list is accurate as of 5/1/18  1:56 PM.  Always use your most recent med list.                   Brand Name Dispense Instructions for use Diagnosis    buPROPion 150 MG 12 hr tablet    WELLBUTRIN SR    60 tablet    Take 1 tablet once daily and increase to 1 tablet twice daily after 4 to 7 days    Tobacco abuse, Adjustment disorder with anxious mood       fluticasone 50 MCG/ACT spray    FLONASE    2 Bottle    Spray 2 sprays into both nostrils 2 times daily    Chronic allergic rhinitis due to animal hair and dander, Chronic seasonal allergic rhinitis due to pollen, Allergic rhinitis due to dust mite, Allergic conjunctivitis of both eyes       gabapentin 600 MG tablet    NEURONTIN    135 tablet    Take 1.5 tablets (900 mg) by mouth 3 times daily    Cervical spondylosis without myelopathy,  DDD (degenerative disc disease), cervical, Chronic bilateral low back pain without sciatica, Chronic neck and back pain, Cervical stenosis of spinal canal       ibuprofen 800 MG tablet    ADVIL/MOTRIN          methocarbamol 750 MG tablet    ROBAXIN    130 tablet    Take 1-2 tablets (750-1,500 mg) by mouth 3 times daily as needed for muscle spasms caution sedation    Chronic neck pain, DDD (degenerative disc disease), cervical, Cervical spondylosis without myelopathy, Chronic bilateral low back pain without sciatica, Myofascial pain       topiramate 50 MG tablet    TOPAMAX    30 tablet    Take 50mg at bedtime.  Drink plenty of water and no alcohol. If side effects, then reduce to last tolerable dosage.    Cervical spondylosis without myelopathy, DDD (degenerative disc disease), cervical, Chronic bilateral low back pain without sciatica, Cervical stenosis of spinal canal       * traMADol 50 MG tablet    ULTRAM    90 tablet    May take 1-2 tablets every 6 hours as needed for pain, max of 3 tabs per day. Reduce as able. Okay to fill on/after 5/3/2018 and start on/after 5/5/2018; To last 30 days.    Cervical radiculopathy       * traMADol 200 MG ER-tablet    ULTRAM-ER    30 tablet    Take 1 tablet (200 mg) by mouth daily Okay to fill on/after 5/3/2018 and start on/after 5/5/2018; to last 30 days    Cervical spondylosis without myelopathy, DDD (degenerative disc disease), cervical, Chronic bilateral low back pain without sciatica       * Notice:  This list has 2 medication(s) that are the same as other medications prescribed for you. Read the directions carefully, and ask your doctor or other care provider to review them with you.

## 2018-05-01 NOTE — PATIENT INSTRUCTIONS
Ice or heat for soreness as needed     Referred to physical therapy     Schedule corticosteroid injection

## 2018-05-01 NOTE — PROGRESS NOTES
"Sports Medicine Clinic Visit    PCP: Humberto Trinidad    Truman Suero is a 57 year old male who is seen  in consultation at the request of  Devonte Trejo M.D. presenting with low back pain.  Patient states he has pain thru his while spine.  No known injury.  Feels taht due to his job as a  he has some extra wear and tear on his spine.  Does see Melanie Thomas for his neck and there has been discussion of a fusion.    No treatment for his back in the past.  Did see a vascular surgeon for some color change in his lower leg.  Did have some testing, including a lumbar MRI.  Pain travels down his legs, right>Left. Pain begins mostly in his low back posterior hip and down the back of his legs.  Denies any groin pain.  Noticed that when his neck has flared, he has an increase in low back pain due to his posture.   They have addressed his low back (and neck) during PT and this has been helpful.    May have had an injection thru [facility] 10/2016 (chart review shows bilateral SI joint injections). States that he was unable to walk after the procedure.   Denies any issues with bowel or bladder control.     **  Low back: pain prominent in the lower lumbar area. Radiating symptoms worse in the right lower extremity compared to left, with symptoms into the right foot. Patient states that he always feels \"crooked\" in his low back. No relief with position change. Pain with prolonged walking.  Patient states that he did have a MBB or a nerve burn.     Has tried Medx for both the low back and cervical spine. Has tried chiropractic care. Also taking muscle relaxant.     Injury: ongoing     Location of Pain: bilateral low back and legs   Duration of Pain: 3-4 year(s)  Rating of Pain at worst: 10/10  Rating of Pain Currently: 7/10  Symptoms are better with: physical therapy, Tramadol, gabapentin, methocarbamol   Symptoms are worse with: standing, sitting, walking  Additional Features:   Positive: " "paresthesias and numbness   Negative: swelling, bruising, popping, grinding, catching, locking, instability, pain in other joints and systemic symptoms  Other evaluation and/or treatments so far consists of: PT, medications   Prior History of related problems: ongoing     Social History: irma Laughlin was asked to complete the Oswestry Low Back Disability Index  today in the office.  Disability score: 40%.     Review of Systems  Musculoskeletal: as above  Remainder of review of systems is negative including constitutional, CV, pulmonary, GI, Skin and Neurologic except as noted in HPI or medical history.    This document serves as a record of the services and decisions personally performed and made by Van Junior DO, CAQ. It was created on his behalf by Hugo Coats, a trained medical scribe. The creation of this document is based the provider's statements to the medical scribe.  Hugo Coats May 1, 2018, 1:21 PM      Past Medical History:   Diagnosis Date     Neck pain     MRI positive on cervical vertebrea.     No past surgical history on file.  Family History   Problem Relation Age of Onset     Prostate Cancer Father      Breast Cancer Maternal Grandmother      Prostate Cancer Other      Social History     Social History     Marital status:      Spouse name: N/A     Number of children: N/A     Years of education: N/A     Occupational History     Not on file.     Social History Main Topics     Smoking status: Former Smoker     Start date: 3/20/2018     Quit date: 3/20/2018     Smokeless tobacco: Never Used      Comment: tried Chantix, did not work well 2.13.18      Alcohol use No     Drug use: No     Sexual activity: Yes     Partners: Female     Birth control/ protection: None     Other Topics Concern     Not on file     Social History Narrative       Objective  /82  Ht 5' 7\" (1.702 m)  Wt 146 lb (66.2 kg)  BMI 22.87 kg/m2    GENERAL APPEARANCE: healthy, alert and no distress "   GAIT: NORMAL  SKIN: no suspicious lesions or rashes  NEURO: Normal strength and tone, mentation intact and speech normal  PSYCH:  mentation appears normal and affect normal/bright  HEENT: no scleral icterus  CV: no extremity edema   RESP: nonlabored breathing    Low back exam:    Inspection:       no visible deformity in the low back       normal skin       normal vascular       normal lymphatic    ROM:       full flexion, hands to feet        full extension increased pain from the cervical spine down to the lumbar area       Lateral flexion to the right causes pain on the right, no pain with full left flexion        Pelvic shift to right causes increased pain to right, stretching on left with left motion     Tender:       paraspinal muscles, right        SI joint bilaterally     Non Tender:       remainder of lumbar spine    Strength:       hip flexion 5/5       knee extension 5/5       ankle dorsiflexion 5/5       ankle plantarflexion 5/5       dorsiflexion of the great toe 5/5       Knee flexion 5/5       No pain with above      Reflexes:       patellar (L3, L4) symmetric normal       achilles tendons (S1) symmetric normal    Sensation:      grossly intact throughout lower extremities    Skin:       well perfused       capillary refill brisk    Special tests:       straight leg raise left neg (-)         straight leg raise right positive (+), some tingling in the lower leg        positive (+) SAMARA on the right, normal left         slump test neg (-) bilaterally (some tingling after motion)    Radiology:  Visualized MRI of the low back from 4/11/2018, and reviewed images and report with patient.  MRI demonstrates multilevel degenerative change, including degenerative disc disease and facet arthrosis.    Results for orders placed or performed in visit on 04/11/18   MR Lumbar Spine w/o Contrast    Narrative    MR LUMBAR SPINE WITHOUT CONTRAST  4/11/2018 2:14 PM    HISTORY:  Rule out spinal stenosis. Chronic  bilateral low back pain  without sciatica.    TECHNIQUE: Sagittal T1 and T2, sagittal IR, and transverse proton  density and T2-weighted pulse sequences.    FINDINGS: Five lumbar vertebrae are assumed. Degenerative loss of disc  signal and moderate loss of disc height is noted at each level from  L2-L3 through L4-L5. Mild apophyseal joint degenerative arthrosis is  noted on the right at L4-L5 without periarticular marrow edema or  degenerative spondylolisthesis. Vertebral body heights are normal. The  conus medullaris is unremarkable in appearance on the sagittal images.      L1-L2: Normal.    L2-L3: Diffuse annular bulge and mild bilateral foraminal stenosis,  right greater than left. No central stenosis.    L3-L4: Lateral annular bulging or foraminal disc protrusion with  moderate left foraminal stenosis. Mild right foraminal stenosis is  also noted. No central stenosis.    L4-L5: Annular bulge and lateral endplate spurring with moderate right  and mild left foraminal stenosis. No central stenosis.    L5-S1: Normal.      Impression    IMPRESSION:  1. Mild to moderate degenerative disc disease from L2-L3 through  L4-L5.  2. Multilevel foraminal stenosis, greatest on the right at L4-L5 and  on the left at L3-L4.  3. Mild apophyseal joint degenerative arthrosis on the right at L4-L5  without periarticular marrow edema.    DARRYL COLEMAN MD     Assessment:  1. Chronic bilateral low back pain with bilateral sciatica    2. Lumbar degenerative disc disease        Plan:  Discussed the assessment with the patient.    Pertinent imaging of the area reviewed with the patient.    Discussed:  *Symptom Treatment  *Activity Modification   *Imaging - EMG  *Rehab - update referral   *Injection - corticosteroid injection: SI joint or DEMARCUS   *Acupuncture, Massage Therapy, or Chiropractic care     Topical Treatments: Ice or Heat as needed   Over the counter medication: Patient's preferred OTC medication as directed on  packaging.  Activity Modification: as discussed   Rehab: updated referral to Physical Therapy  Referred to Pain Management for corticosteroid injection: TBD, anticipate DEMARCUS.  He is particularly interested in considering injection, would anticipate DEMARCUS with degenerative disc disease and radiating symptoms.  Follow up: 3-4 weeks, sooner if needed.  Questions answered. The patient indicates understanding of these issues and agrees with the plan.     Van Junior DO, CAQ    CC: Devonte Trejo      Disclaimer: This note consists of symbols derived from keyboarding, dictation and/or voice recognition software. As a result, there may be errors in the script that have gone undetected. Please consider this when interpreting information found in this chart.    The information in this document, created by the medical scribe for me, accurately reflects the services I personally performed and the decisions made by me. I have reviewed and approved this document for accuracy.   Van Junior DO, CAQ

## 2018-05-02 ENCOUNTER — TELEPHONE (OUTPATIENT)
Dept: PALLIATIVE MEDICINE | Facility: CLINIC | Age: 58
End: 2018-05-02

## 2018-05-02 NOTE — TELEPHONE ENCOUNTER
Pre-screening questions for MBB Injections:    Injection to be done at which interventional clinic site? [facility] [place]   If [place], instruct patient to arrive 30 minutes before the scheduled appointment time.     Procedure ordered by Van Heredia     Procedure ordered? Lumbar TBD    What insurance would patient like us to bill for this procedure? [employer]      [employer] comp- Any injection DO NOT SCHEDULE and route to Aidee Croft.      [employer] insurance - If scheduling an SI joint injection DO NOT SCHEDULE and route to Monalisa Dominguez.     [employer]- MBB's must be scheduled at LEAST two weeks apart      [employer] - Any injection besides hip/shoulder/knee joint DO NOT SCHEDULE and route to Monalisa Alberto. She will obtain PA and call pt back to schedule procedure or notify pt of denial.      [employer]- PA required for all MBB's    Any chance of pregnancy? Not Applicable   If YES, do NOT schedule and route to RN pool    Is an  needed? No     Patient has a drive home? (mandatory) Yes     Is patient taking any blood thinners (plavix, coumadin, jantoven, warfarin, heparin, pradaxa or dabigatran )? No    If hold needed, do NOT schedule, route to RN pool     Is patient taking any aspirin products? No     If more than 325mg/day do NOT schedule; route to RN pool     For CERVICAL procedures, hold all aspirin products for 6 days.      Does the patient have a bleeding or clotting disorder? No    If YES, okay to schedule AND route to RN nurse pool    **For any patients with platelet count <100, must be forwarded to provider**    Is patient diabetic? no If YES, have them bring their glucometer.    Does patient have an active infection or treated for one within the past week? No    Is patient currently taking any antibiotics?  No    For patients on chronic, preventative, or prophylacti antibiotics, procedures can be scheduled.     For patients on antibiotics for active or recent  infection:    Silvana Melgar [place], [place], Snitzer-antibiotic course must have been completed for 4 days     Is patient currently taking any steroid medications? (i.e. Prednisone, Medrol)  No     For patients on steroid medications:    Jo Melgar [place], Snitzer-steroid course must have been completed for 4 days    Review with patient:  If you are started on any steroids or antibiotics between now and your appointment, you must contact us because it may affect our ability to perform your procedure     Is patient actively being treated for cancer or immunocompromised? No   If YES, do NOT schedule and route to RN    Are you able to get on and off an exam table with minimal or no assistance? Yes   If NO, do NOT schedule and route to RN    Are you able to roll over and lay on your stomach with minimal or no assistance? Yes   If NO, do NOT schedule and route to RN    Any allergies to contrast dye, iodine, shellfish, or numbing and steroid medications? No  (If so, inform nursing and note in scheduling comments.)    Allergies: Review of patient's allergies indicates no known allergies.     Has the patient had a flu shot or any other vaccinations within 7 days before or after the procedure.  No     Does patient have an MRI/CT?  YES:   (SI joint, hip injections, lumbar sympathetic blocks, and stellate ganglion blocks do not require an MRI)    Was the MRI done w/in the last 3 years?  Yes    Was MRI done at [place]? Yes      If not, where was it done? N/A       If MRI was not done at [place], [facility] or [facility] do NOT schedule and route to nursing.  If pt has an imaging disc, the injection may be scheduled but pt has to bring disc to appt. If they show up w/out disc the injection cannot be done    **Must be scheduled with elapsed time interval of at least 2 weeks and not more than 6 months between the First MBB and the Second MBB**       Medial Branch Block Pre-Procedure Instructions    It is  okay to take long acting pain medications (if you are on them) the day of the procedure but try not to take any short acting medications unless absolutely necessary.         Long acting meds would include: Gabapentin (Neurontin), MS Contin, Oxycontin        Short acting meds would include:  Percocet, Oxycodone, Vicodin, Ibuprofen     The day of the procedure, you should try to do things that provoke your pain, since the injection is being done to see if it will relieve your pain .     If your pain level is a 4 out of 10 or less on the day of the procedure, please call [phone] to reschedule.    Reminders (please tell patient if applicable):      Instructed pt to arrive 30 minutes early for IV start if this is for a cervical procedure, ALL sympathetic (stellate ganglion, hypogastric, or lumbar sympathetic block) and all sedation procedures (RFA, spinal cord stimulation trials).         -IVs are not routinely placed for Dr. Perales cervical cases        -Dr. Corrigan: no IV needed for CMBB       If NPO for sedation, it is okay to take medications with sips of water (except if they are to hold blood thinners).    *DO take blood pressure medication if it is prescribed*      If this is for a cervical MBB aspirin needs to be held for 6 days.           Do not schedule procedures requiring IV placement in the first appointment of the day or first appointment after lunch. Do NOT schedule at 0745, 0815 or 1245.            For patients 85 or older we recommend having an adult stay w/ them for the remainder of the day.      Does the patient have any questions? no

## 2018-05-02 NOTE — PROGRESS NOTES
All your results are essentially celina. Please contact me if you have any questions.  Take care,  Humberto Trinidad D.O.

## 2018-05-07 ENCOUNTER — RADIANT APPOINTMENT (OUTPATIENT)
Dept: CARDIOLOGY | Facility: CLINIC | Age: 58
End: 2018-05-07
Attending: FAMILY MEDICINE
Payer: COMMERCIAL

## 2018-05-07 DIAGNOSIS — I77.810 ASCENDING AORTA DILATATION (H): ICD-10-CM

## 2018-05-07 PROCEDURE — 93306 TTE W/DOPPLER COMPLETE: CPT | Performed by: INTERNAL MEDICINE

## 2018-05-08 NOTE — NURSING NOTE
Signed Rx's for Tramadol 50 mg and 200 mg faxed to [facility] pharmacy, [place] RightFax confirmed.

## 2018-05-16 ENCOUNTER — THERAPY VISIT (OUTPATIENT)
Dept: PHYSICAL THERAPY | Facility: CLINIC | Age: 58
End: 2018-05-16
Payer: COMMERCIAL

## 2018-05-16 DIAGNOSIS — G89.29 CHRONIC BILATERAL LOW BACK PAIN WITH RIGHT-SIDED SCIATICA: Primary | ICD-10-CM

## 2018-05-16 DIAGNOSIS — M54.41 CHRONIC BILATERAL LOW BACK PAIN WITH RIGHT-SIDED SCIATICA: Primary | ICD-10-CM

## 2018-05-16 PROCEDURE — 97140 MANUAL THERAPY 1/> REGIONS: CPT | Mod: GP | Performed by: PHYSICAL THERAPIST

## 2018-05-16 PROCEDURE — 97110 THERAPEUTIC EXERCISES: CPT | Mod: GP | Performed by: PHYSICAL THERAPIST

## 2018-05-16 PROCEDURE — 97161 PT EVAL LOW COMPLEX 20 MIN: CPT | Mod: GP | Performed by: PHYSICAL THERAPIST

## 2018-05-16 NOTE — MR AVS SNAPSHOT
After Visit Summary   5/16/2018    Truman Suero    MRN: [id number]           Patient Information     Date Of Birth          1960        Visit Information        Provider Department      5/16/2018 2:10 PM Nicho Mota, PT [facility]        Today's Diagnoses     Chronic bilateral low back pain with right-sided sciatica    -  1       Follow-ups after your visit        Your next 10 appointments already scheduled     May 23, 2018  8:45 AM CDT   Radiology Injections with Jessica Osorio MD   [facility] ([facility])    [address] [facility] [address]   [phone]            May 30, 2018  2:10 PM CDT   SAVITA Spine with Nicho Mota PT   [facility] ([facility])    [address] [facility] [address] [phone]            Jun 29, 2018  1:30 PM CDT   Return Visit with RODNEY Vargas CNP   [facility] ([facility])    [address] [facility] [id number] [phone]              Who to contact     If you have questions or need follow up information about today's clinic visit or your schedule please contact [facility] directly at [phone].  Normal or non-critical lab and imaging results will be communicated to you by MyChart, letter or phone within 4 business days after the clinic has received the results. If you do not hear from us within 7 days, please contact the clinic through MyChart or phone. If you have a critical or abnormal lab result, we will notify you by phone as soon as possible.  Submit refill requests through [employer] or call your pharmacy and they will forward the refill request to us. Please allow 3 business days for your refill to be completed.          Additional Information About Your Visit        [employer]  Information     [employer] gives you secure access to your electronic health record. If you see a primary care provider, you can also send messages to your care team and make appointments. If you have questions, please call your primary care clinic.  If you do not have a primary care provider, please call [phone] and they will assist you.        Care EveryWhere ID     This is your Care EveryWhere ID. This could be used by other organizations to access your [place] medical records  [id number]         Blood Pressure from Last 3 Encounters:   05/01/18 128/82   04/30/18 130/80   04/20/18 (!) 153/96    Weight from Last 3 Encounters:   05/01/18 66.2 kg (146 lb)   04/30/18 66.2 kg (146 lb)   04/20/18 66.2 kg (146 lb)              We Performed the Following     HC PT EVAL, LOW COMPLEXITY     SAVITA INITIAL EVAL REPORT     MANUAL THER TECH,1+REGIONS,EA 15 MIN     THERAPEUTIC EXERCISES        Primary Care Provider Office Phone # Fax Tristan Boltonmichaeljessica DO Amos [phone]       [facility] [address]        Equal Access to Services     ELIEZER VALENTE : Hadii aad ku hadasho Soomaali, waaxda luqadaha, qaybta kaalmada adeegyada, mariela garduno . So [facility] [phone].    ATENCIÓN: Si habla español, tiene a simmons disposición servicios gratuitos de asistencia lingüística. Llame al [phone].    We comply with applicable federal civil rights laws and [place] laws. We do not discriminate on the basis of race, color, national origin, age, disability, sex, sexual orientation, or gender identity.            Thank you!     Thank you for choosing [facility]  for your care. Our goal is always to provide you with excellent care. Hearing back from our patients is one way we can continue to improve our services. Please take a few minutes to complete the written survey that you may receive in the mail after your visit with us. Thank  you!             Your Updated Medication List - Protect others around you: Learn how to safely use, store and throw away your medicines at www.disposemymeds.org.          This list is accurate as of 5/16/18  3:02 PM.  Always use your most recent med list.                   Brand Name Dispense Instructions for use Diagnosis    buPROPion 150 MG 12 hr tablet    WELLBUTRIN SR    60 tablet    Take 1 tablet once daily and increase to 1 tablet twice daily after 4 to 7 days    Tobacco abuse, Adjustment disorder with anxious mood       fluticasone 50 MCG/ACT spray    FLONASE    2 Bottle    Spray 2 sprays into both nostrils 2 times daily    Chronic allergic rhinitis due to animal hair and dander, Chronic seasonal allergic rhinitis due to pollen, Allergic rhinitis due to dust mite, Allergic conjunctivitis of both eyes       gabapentin 600 MG tablet    NEURONTIN    135 tablet    Take 1.5 tablets (900 mg) by mouth 3 times daily    Cervical spondylosis without myelopathy, DDD (degenerative disc disease), cervical, Chronic bilateral low back pain without sciatica, Chronic neck and back pain, Cervical stenosis of spinal canal       ibuprofen 800 MG tablet    ADVIL/MOTRIN          methocarbamol 750 MG tablet    ROBAXIN    130 tablet    Take 1-2 tablets (750-1,500 mg) by mouth 3 times daily as needed for muscle spasms caution sedation    Chronic neck pain, DDD (degenerative disc disease), cervical, Cervical spondylosis without myelopathy, Chronic bilateral low back pain without sciatica, Myofascial pain       topiramate 50 MG tablet    TOPAMAX    30 tablet    Take 50mg at bedtime.  Drink plenty of water and no alcohol. If side effects, then reduce to last tolerable dosage.    Cervical spondylosis without myelopathy, DDD (degenerative disc disease), cervical, Chronic bilateral low back pain without sciatica, Cervical stenosis of spinal canal       * traMADol 50 MG tablet    ULTRAM    90 tablet    May take 1-2 tablets every 6 hours as  needed for pain, max of 3 tabs per day. Reduce as able. Okay to fill on/after 5/3/2018 and start on/after 5/5/2018; To last 30 days.    Cervical radiculopathy       * traMADol 200 MG ER-tablet    ULTRAM-ER    30 tablet    Take 1 tablet (200 mg) by mouth daily Okay to fill on/after 5/3/2018 and start on/after 5/5/2018; to last 30 days    Cervical spondylosis without myelopathy, DDD (degenerative disc disease), cervical, Chronic bilateral low back pain without sciatica       * Notice:  This list has 2 medication(s) that are the same as other medications prescribed for you. Read the directions carefully, and ask your doctor or other care provider to review them with you.

## 2018-05-16 NOTE — PROGRESS NOTES
[facility] Initial Evaluation  Subjective:  Patient is a 57 year old male presenting with rehab back hpi. The history is provided by the patient.   Truman Suero is a 57 year old male with a lumbar condition.  Condition occurred with:  Degenerative joint disease and insidious onset.  Condition occurred: for unknown reasons.  This is a chronic condition  3 - 5 years overall.  MD referral 5/1/2018..    Patient reports pain:  Lumbar spine right, lumbar spine left, central lumbar spine and lower lumbar spine (R > L ).  Radiates to:  Thigh right, lower leg right, gluteals right, foot right and knee right.  Pain is described as aching and shooting and is constant and reported as 8/10.  Associated symptoms:  Loss of motion/stiffness, loss of motion, loss of strength and tingling. Pain is worse in the P.M..  Symptoms are exacerbated by standing and walking (stand x 1 hour;  sit x 30 min) and relieved by rest.  Since onset symptoms are unchanged.  Special tests:  MRI (leg US - negative;  Degeneration in LB).  Previous treatment includes physical therapy (injection next week).  There was mild improvement following previous treatment.  General health as reported by patient is good.  Pertinent medical history includes:  Chemical dependency, high blood pressure, emphysema and smoking.  Medical allergies: no.  Other surgeries include:  No.  Current medications:  Muscle relaxants and pain medication (nerve medication).  Current occupation is ..  Patient is working in normal job without restrictions.  Primary job tasks include:  Prolonged standing, repetitive tasks, operating a machine and lifting.    Barriers include:  None as reported by the patient.    Red flags:  None as reported by the patient.                        Objective:  System         Lumbar/SI Evaluation  ROM:    AROM Lumbar:   Flexion:          Decreased 75%  Ext:                    Decreased 75%   Side Bend:        Left:  Decreased 25%     Right:  Decreased 25%  Rotation:           Left:     Right:   Side Glide:        Left:     Right:           Lumbar Myotomes:  normal            Lumbar DTR's:  normal          Neural Tension/Mobility:  Neural tension wnl lumbar: tight HS bilat.        Lumbar Palpation:    Tenderness present at Left:    Quadratus Lumborum and Erector Spinae  Tenderness present at Right: Quadratus Lumborum and Erector Spinae      Spinal Segmental Conclusions:     Level: Hypo noted at L5, S1, L4, L3, L2 and L1                                                   General     ROS    Assessment/Plan:    Patient is a 57 year old male with lumbar complaints.    Patient has the following significant findings with corresponding treatment plan.                Diagnosis 1:  LBP  Pain -  manual therapy, self management, education, directional preference exercise and home program  Decreased ROM/flexibility - manual therapy and therapeutic exercise  Decreased strength - therapeutic exercise and therapeutic activities  Decreased function - therapeutic activities    Therapy Evaluation Codes:   1) History comprised of:   Personal factors that impact the plan of care:      None.    Comorbidity factors that impact the plan of care are:      Chemical Dependency, Emphysema, High blood pressure, Numbness/tingling, Pain at night/rest and Smoking.     Medications impacting care: Muscle relaxant and Pain.  2) Examination of Body Systems comprised of:   Body structures and functions that impact the plan of care:      Lumbar spine.   Activity limitations that impact the plan of care are:      Sitting, Standing and Working.  3) Clinical presentation characteristics are:   Stable/Uncomplicated.  4) Decision-Making    Low complexity using standardized patient assessment instrument and/or measureable assessment of functional outcome.  Cumulative Therapy Evaluation is: Low complexity.    Previous and current functional limitations:  (See Goal Flow Sheet for this  information)    Short term and Long term goals: (See Goal Flow Sheet for this information)     Communication ability:  Patient appears to be able to clearly communicate and understand verbal and written communication and follow directions correctly.  Treatment Explanation - The following has been discussed with the patient:   RX ordered/plan of care  Anticipated outcomes  Possible risks and side effects  This patient would benefit from PT intervention to resume normal activities.   Rehab potential is good.    Frequency:  1 X week, once daily  Duration:  for 8 weeks  Discharge Plan:  Achieve all LTG.  Independent in home treatment program.  Reach maximal therapeutic benefit.    Please refer to the daily flowsheet for treatment today, total treatment time and time spent performing 1:1 timed codes.

## 2018-05-17 ENCOUNTER — MYC MEDICAL ADVICE (OUTPATIENT)
Dept: PALLIATIVE MEDICINE | Facility: CLINIC | Age: 58
End: 2018-05-17

## 2018-05-18 NOTE — TELEPHONE ENCOUNTER
Per patient Shraddhahart message:  From: Truman Suero        Created: 5/17/2018 4:19 PM       Dr Navarro- I went in to my [facility] to pick-up a refill of the Gabapentin. Paid and left without looking. When I got home there was the Gabapentin but also refill of Tramadol 50mg. Not sure how that got in system. Has different prescription number than last one but same origination date (4/20) fill date is yesterday 5/16/18. is the 90 count  50mg. Should I keep this and skip next refill or return it. Don't want to get the system out of sync if that is a problem.  FYI- Moving forward with seeing Neuro Surg    Thanks   Palmer        Routed to nursing to triage.    Estelita Wetzel, RN-BSN  [facility]

## 2018-05-21 NOTE — TELEPHONE ENCOUNTER
Spoke to pharmacy and the prescription was dropped off by the patient on 4/20/18 and filled on 5/4/18. An identical copy of the prescription was faxed from the [facility] to [facility] on 5/8/18 and filled again by the pharmacy on 5/17/18.    Patient will not need to start this second prescription until 6/4/18.    Next fill dates are to fill on 7/2/18 to start on 7/4/18. A med note has been placed with this information.    ANALISA WangN, RN  Care Coordinator  [facility]

## 2018-05-23 ENCOUNTER — RADIANT APPOINTMENT (OUTPATIENT)
Dept: RADIOLOGY | Facility: CLINIC | Age: 58
End: 2018-05-23
Attending: PSYCHIATRY & NEUROLOGY
Payer: COMMERCIAL

## 2018-05-23 ENCOUNTER — RADIOLOGY INJECTION OFFICE VISIT (OUTPATIENT)
Dept: PALLIATIVE MEDICINE | Facility: CLINIC | Age: 58
End: 2018-05-23
Attending: PEDIATRICS
Payer: COMMERCIAL

## 2018-05-23 VITALS — OXYGEN SATURATION: 97 % | DIASTOLIC BLOOD PRESSURE: 92 MMHG | HEART RATE: 53 BPM | SYSTOLIC BLOOD PRESSURE: 139 MMHG

## 2018-05-23 DIAGNOSIS — M54.16 LUMBAR RADICULOPATHY: ICD-10-CM

## 2018-05-23 DIAGNOSIS — M54.16 LUMBAR RADICULOPATHY: Primary | ICD-10-CM

## 2018-05-23 PROCEDURE — 64483 NJX AA&/STRD TFRM EPI L/S 1: CPT | Mod: RT | Performed by: PSYCHIATRY & NEUROLOGY

## 2018-05-23 ASSESSMENT — PAIN SCALES - GENERAL: PAINLEVEL: SEVERE PAIN (7)

## 2018-05-23 NOTE — NURSING NOTE
Discharge Information    IV Discontiued Time:  NA    Amount of Fluid Infused:  NA    Discharge Criteria = When patient returns to baseline or as per MD order    Consciousness:  Pt is fully awake    Circulation:  BP +/- 20% of pre-procedure level    Respiration:  Patient is able to breathe deeply    O2 Sat:  Patient is able to maintain O2 Sat >92% on room air    Activity:  Moves 4 extremities on command    Ambulation:  Patient is able to stand and walk or stand and pivot into wheelchair    Dressing:  Clean/dry or No Dressing    Notes:   Discharge instructions and AVS given to patient    Patient meets criteria for discharge?  YES    Admitted to PCU?  No    Responsible adult present to accompany patient home?  Yes    Signature/Title:    leslie peter RN Care Coordinator  [facility]

## 2018-05-23 NOTE — PATIENT INSTRUCTIONS
[facility]   Procedure Discharge Instructions    Today you saw: Dr. Jessica Osorio     You had an:  Epidural steroid injection -lumbar     Medications used:  Lidocaine   Bupivacaine   Dexamethasone Omnipaque  Ropivicaine             Be cautious when walking. Numbness and/or weakness in the lower extremities may occur for up to 6-8 hours after the procedure due to effect of the local anesthetic    Do not drive for 6 hours. The effect of the local anesthetic could slow your reflexes.     You may resume your regular activities after 24 hours    Avoid strenuous activity for the first 24 hours    You may shower, however avoid swimming, tub baths or hot tubs for 24 hours following your procedure    You may have a mild to moderate increase in pain for several days following the injection.    It may take up to 14 days for the steroid medication to start working although you may feel the effect as early as a few days after the procedure.       You may use ice packs for 10-15 minutes, 3 to 4 times a day at the injection site for comfort    Do not use heat to painful areas for 6 to 8 hours. This will give the local anesthetic time to wear off and prevent you from accidentally burning your skin.     You may use anti-inflammatory medications (such as Ibuprofen or Aleve or Advil) or Tylenol for pain control if necessary    If you were fasting, you may resume your normal diet and medications after the procedure    If you have diabetes, check your blood sugar more frequently than usual as your blood sugar may be higher than normal for 10-14 days following a steroid injection. Contact your doctor who manages your diabetes if your blood sugar is higher than usual    If you experience any of the following, call the Pain Clinic during work hours at [phone] or the Provider Line after hours at [phone]:  -Fever over 100 degree F  -Swelling, bleeding, redness, drainage, warmth at the injection  site  -Progressive weakness or numbness in your legs or arms  -Loss of bowel or bladder function  -Unusual headache that is not relieved by Tylenol or other pain reliever  -Unusual new onset of pain that is not improving

## 2018-05-23 NOTE — MR AVS SNAPSHOT
After Visit Summary   5/23/2018    Truman Suero    MRN: [id number]           Patient Information     Date Of Birth          1960        Visit Information        Provider Department      5/23/2018 8:45 AM Jessica Osorio MD [facility]        Today's Diagnoses     Cervical stenosis of spinal canal        DDD (degenerative disc disease), cervical        Cervical spondylosis without myelopathy        Chronic bilateral low back pain without sciatica        Myofascial pain          Care Instructions    [facility]   Procedure Discharge Instructions    Today you saw: Dr. Jessica Osorio     You had an:  Epidural steroid injection -lumbar     Medications used:  Lidocaine   Bupivacaine   Dexamethasone Omnipaque  Ropivicaine             Be cautious when walking. Numbness and/or weakness in the lower extremities may occur for up to 6-8 hours after the procedure due to effect of the local anesthetic    Do not drive for 6 hours. The effect of the local anesthetic could slow your reflexes.     You may resume your regular activities after 24 hours    Avoid strenuous activity for the first 24 hours    You may shower, however avoid swimming, tub baths or hot tubs for 24 hours following your procedure    You may have a mild to moderate increase in pain for several days following the injection.    It may take up to 14 days for the steroid medication to start working although you may feel the effect as early as a few days after the procedure.       You may use ice packs for 10-15 minutes, 3 to 4 times a day at the injection site for comfort    Do not use heat to painful areas for 6 to 8 hours. This will give the local anesthetic time to wear off and prevent you from accidentally burning your skin.     You may use anti-inflammatory medications (such as Ibuprofen or Aleve or Advil) or Tylenol for pain control if necessary    If you were fasting, you may resume your normal diet  and medications after the procedure    If you have diabetes, check your blood sugar more frequently than usual as your blood sugar may be higher than normal for 10-14 days following a steroid injection. Contact your doctor who manages your diabetes if your blood sugar is higher than usual    If you experience any of the following, call the Pain Clinic during work hours at [phone] or the Provider Line after hours at [phone]:  -Fever over 100 degree F  -Swelling, bleeding, redness, drainage, warmth at the injection site  -Progressive weakness or numbness in your legs or arms  -Loss of bowel or bladder function  -Unusual headache that is not relieved by Tylenol or other pain reliever  -Unusual new onset of pain that is not improving                Follow-ups after your visit        Your next 10 appointments already scheduled     May 30, 2018  2:10 PM CDT   [facility] Spine with Nicho Mota PT   [facility]    [address] [facility] [address] [phone]            May 31, 2018  2:00 PM CDT   (Arrive by 1:45 PM)   Return Visit with Jud Harkins MD   [facility] ([facility])    [address] [place] [address] [phone]            Jun 29, 2018  1:30 PM CDT   Return Visit with RODNEY Vargas CNP   [facility] ([facility])    [address] [facility] [address] [phone]              Who to contact     If you have questions or need follow up information about today's clinic visit or your schedule please contact [facility] directly at [phone].  Normal or non-critical lab and imaging results will be communicated to you by MyChart, letter or phone within 4 business days after the clinic has received the results. If you do not hear from us within 7 days, please contact the clinic through  [employer] or phone. If you have a critical or abnormal lab result, we will notify you by phone as soon as possible.  Submit refill requests through [employer] or call your pharmacy and they will forward the refill request to us. Please allow 3 business days for your refill to be completed.          Additional Information About Your Visit        [employer] Information     [employer] gives you secure access to your electronic health record. If you see a primary care provider, you can also send messages to your care team and make appointments. If you have questions, please call your primary care clinic.  If you do not have a primary care provider, please call [phone] and they will assist you.        Care EveryWhere ID     This is your Care EveryWhere ID. This could be used by other organizations to access your [facility] medical records  [id number]        Your Vitals Were     Pulse Pulse Oximetry                53 97%           Blood Pressure from Last 3 Encounters:   05/23/18 (!) 139/92   05/01/18 128/82   04/30/18 130/80    Weight from Last 3 Encounters:   05/01/18 66.2 kg (146 lb)   04/30/18 66.2 kg (146 lb)   04/20/18 66.2 kg (146 lb)              Today, you had the following     No orders found for display       Primary Care Provider Office Phone # Fax #    Humberto Trinidad  [phone]       [facility] [facility] [address]        Equal Access to Services     [facility] : Hadii cece talley hadasho Sogermanali, waaxda luqadaha, qaybta kaalmada adeaustinyada, mariela garduno . So [facility] [phone].    ATENCIÓN: Si habla español, tiene a simmons disposición servicios gratuitos de asistencia lingüística. Llame al [phone].    We comply with applicable federal civil rights laws and [place] laws. We do not discriminate on the basis of race, color, national origin, age, disability, sex, sexual orientation, or gender identity.            Thank you!     Thank you for choosing  [facility]  [facility] your [facility]. Our goal is always to provide you with excellent care. Hearing back from our patients is one way we can continue to improve our services. Please take a few minutes to complete the written survey that you may receive in the mail after your visit with us. Thank you!             Your Updated Medication List - Protect others around you: Learn how to safely use, store and throw away your medicines at www.disposemymeds.org.          This list is accurate as of 5/23/18  9:09 AM.  Always use your most recent med list.                   Brand Name Dispense Instructions for use Diagnosis    buPROPion 150 MG 12 hr tablet    WELLBUTRIN SR    60 tablet    Take 1 tablet once daily and increase to 1 tablet twice daily after 4 to 7 days    Tobacco abuse, Adjustment disorder with anxious mood       fluticasone 50 MCG/ACT spray    FLONASE    2 Bottle    Spray 2 sprays into both nostrils 2 times daily    Chronic allergic rhinitis due to animal hair and dander, Chronic seasonal allergic rhinitis due to pollen, Allergic rhinitis due to dust mite, Allergic conjunctivitis of both eyes       gabapentin 600 MG tablet    NEURONTIN    135 tablet    Take 1.5 tablets (900 mg) by mouth 3 times daily    Cervical spondylosis without myelopathy, DDD (degenerative disc disease), cervical, Chronic bilateral low back pain without sciatica, Chronic neck and back pain, Cervical stenosis of spinal canal       ibuprofen 800 MG tablet    ADVIL/MOTRIN          methocarbamol 750 MG tablet    ROBAXIN    130 tablet    Take 1-2 tablets (750-1,500 mg) by mouth 3 times daily as needed for muscle spasms caution sedation    Chronic neck pain, DDD (degenerative disc disease), cervical, Cervical spondylosis without myelopathy, Chronic bilateral low back pain without sciatica, Myofascial pain       topiramate 50 MG tablet    TOPAMAX    30 tablet    Take 50mg at bedtime.  Drink plenty of water and no alcohol. If side effects,  then reduce to last tolerable dosage.    Cervical spondylosis without myelopathy, DDD (degenerative disc disease), cervical, Chronic bilateral low back pain without sciatica, Cervical stenosis of spinal canal       * traMADol 50 MG tablet    ULTRAM    90 tablet    May take 1-2 tablets every 6 hours as needed for pain, max of 3 tabs per day. Reduce as able. Okay to fill on/after 5/3/2018 and start on/after 5/5/2018; To last 30 days.    Cervical radiculopathy       * traMADol 200 MG ER-tablet    ULTRAM-ER    30 tablet    Take 1 tablet (200 mg) by mouth daily Okay to fill on/after 5/3/2018 and start on/after 5/5/2018; to last 30 days    Cervical spondylosis without myelopathy, DDD (degenerative disc disease), cervical, Chronic bilateral low back pain without sciatica       * Notice:  This list has 2 medication(s) that are the same as other medications prescribed for you. Read the directions carefully, and ask your doctor or other care provider to review them with you.

## 2018-05-23 NOTE — NURSING NOTE
Pre-procedure Intake    Have you been fasting? NA    If yes, for how long? No     Are you taking a prescribed blood thinner such as coumadin, Plavix, Xarelto?    No    If yes, when did you take your last dose? No     Do you take aspirin?  No    If cervical procedure, have you held aspirin for 6 days?   NA    Do you have any allergies to contrast dye, iodine, steroid and/or numbing medications?  NO    Are you currently taking antibiotics or have an active infection?  NO    Have you had a fever/elevated temperature within the past week? NO    Are you currently taking oral steroids? NO    Do you have a ? Yes       Are you pregnant or breastfeeding?  Not Applicable    Are the vital signs normal?  Yes    Mike Bergeron MA

## 2018-05-23 NOTE — PROGRESS NOTES
Pre procedure Diagnosis: lumbar radiculopathy    Post procedure Diagnosis: Same  Procedure performed: right L4-5 transforaminal epidural steroid injection   Anesthesia: none  Complications: none  Operators: Mariaelena Osorio MD     Indications:   Truman Suero is a 57 year old male was sent by Dr. Junior for lumbar injection.  They have a history of low back pain, going more into the right hip and thigh.  He had pain in the right foot with whitening of the foot.  He had vascular workup and did not find a cause, but he also had improvement with stopping smoking.  Exam shows pain with extension  and extension/rotation but clear as cause of symptoms. Negative straight leg, Mild SI joint tenderness.  They have tried conservative treatment including medications.    MRI was done on 4/11/18 which showed   1. Mild to moderate degenerative disc disease from L2-L3 through  L4-L5.  2. Multilevel foraminal stenosis, greatest on the right at L4-L5 and  on the left at L3-L4.  3. Mild apophyseal joint degenerative arthrosis on the right at L4-L5  without periarticular marrow edema.    Options/alternatives, benefits and risks were discussed with the patient including bleeding, infection, tissue trauma, numbness, weakness, paralysis, spinal cord injury, radiation exposure, headache and reaction to medications.   Questions were answered to his satisfaction and he agrees to proceed. Voluntary informed consent was obtained and signed.     Vitals were reviewed: Yes  Allergies were reviewed:  Yes   Medications were reviewed:  Yes   Pre-procedure pain score: 7/10    Procedure:  After getting informed consent, patient was brought into the procedure suite and was placed in a prone position on the procedure table.   A Pause for the Cause was performed.  Patient was prepped and draped in sterile fashion.     After identifying the right L4-5 neuroforamen, the C-arm was rotated to a right lateral oblique angle.  A total of 3ml of Lidocaine 1%  was used to anesthetize the skin and the needle track at a skin entry site coaxial with the fluoroscopy beam, and overriding the superior aspect of the neuroforamen.  A 22 gauge 5 inch spinal needle was advanced under intermittent fluoroscopy until it entered the foramen superiorly.    The position was then inspected from anteroposterior and lateral views, and the needle adjusted appropriately.  A total of 2.5ml of Omnipaque-300 was injected, confirming appropriate position, with spread into the nerve root sheath and the epidural space, with no intravascular uptake.   7.5ml was wasted.    1.5 ml of 0.5% bupivacaine with 10mg of dexamethasone  was injected.  The needle was flushed with lidocaine and removed.    During the procedure, there was not a paresthesia.     Hemostasis was achieved, the area was cleaned, and bandaids were placed when appropriate.  The patient tolerated the procedure well, and was taken to the recovery room.    Images were saved to PACS.    Post-procedure pain score: 7/10  Follow-up includes:   -f/u phone call in one week  -f/u with referring provider  -if not fully helpful, consider medial branch block in the back.  Would need to do PT in last 6 months to have radiofrequency ablation approved.    Mariaelena Osorio MD  [facility]

## 2018-05-30 ENCOUNTER — MYC REFILL (OUTPATIENT)
Dept: PALLIATIVE MEDICINE | Facility: CLINIC | Age: 58
End: 2018-05-30

## 2018-05-30 ENCOUNTER — THERAPY VISIT (OUTPATIENT)
Dept: PHYSICAL THERAPY | Facility: CLINIC | Age: 58
End: 2018-05-30
Payer: COMMERCIAL

## 2018-05-30 DIAGNOSIS — G89.29 CHRONIC BILATERAL LOW BACK PAIN WITH RIGHT-SIDED SCIATICA: ICD-10-CM

## 2018-05-30 DIAGNOSIS — M50.30 DDD (DEGENERATIVE DISC DISEASE), CERVICAL: ICD-10-CM

## 2018-05-30 DIAGNOSIS — M47.812 CERVICAL SPONDYLOSIS WITHOUT MYELOPATHY: ICD-10-CM

## 2018-05-30 DIAGNOSIS — M54.50 CHRONIC BILATERAL LOW BACK PAIN WITHOUT SCIATICA: ICD-10-CM

## 2018-05-30 DIAGNOSIS — G89.29 CHRONIC BILATERAL LOW BACK PAIN WITHOUT SCIATICA: ICD-10-CM

## 2018-05-30 DIAGNOSIS — M54.41 CHRONIC BILATERAL LOW BACK PAIN WITH RIGHT-SIDED SCIATICA: ICD-10-CM

## 2018-05-30 PROCEDURE — 97140 MANUAL THERAPY 1/> REGIONS: CPT | Mod: GP | Performed by: PHYSICAL THERAPIST

## 2018-05-30 PROCEDURE — 97110 THERAPEUTIC EXERCISES: CPT | Mod: GP | Performed by: PHYSICAL THERAPIST

## 2018-05-31 ENCOUNTER — OFFICE VISIT (OUTPATIENT)
Dept: NEUROSURGERY | Facility: CLINIC | Age: 58
End: 2018-05-31
Payer: COMMERCIAL

## 2018-05-31 ENCOUNTER — MYC MEDICAL ADVICE (OUTPATIENT)
Dept: PALLIATIVE MEDICINE | Facility: CLINIC | Age: 58
End: 2018-05-31

## 2018-05-31 VITALS
HEART RATE: 74 BPM | DIASTOLIC BLOOD PRESSURE: 90 MMHG | SYSTOLIC BLOOD PRESSURE: 142 MMHG | BODY MASS INDEX: 23.07 KG/M2 | WEIGHT: 147 LBS | HEIGHT: 67 IN

## 2018-05-31 DIAGNOSIS — M54.12 CERVICAL RADICULAR PAIN: Primary | ICD-10-CM

## 2018-05-31 NOTE — MR AVS SNAPSHOT
After Visit Summary   5/31/2018    Truman Suero    MRN: [id number]           Patient Information     Date Of Birth          1960        Visit Information        Provider Department      5/31/2018 2:00 PM Jud Harkins MD [facility] Neurosurgery        Today's Diagnoses     Cervical radicular pain    -  1       Follow-ups after your visit        Your next 10 appointments already scheduled     Jun 07, 2018  8:50 AM CDT   SAVITA Spine with Nicho Mota PT   [facility] ([facility])    [address] [facility] [address] [phone]            Jun 29, 2018  1:30 PM CDT   Return Visit with RODNEY Vargas CNP   [facility] ([facility])    [address] [facility] [address] [phone]              Who to contact     Please call your clinic at [phone] to:    Ask questions about your health    Make or cancel appointments    Discuss your medicines    Learn about your test results    Speak to your doctor            Additional Information About Your Visit        MyChart Information     Etece gives you secure access to your electronic health record. If you see a primary care provider, you can also send messages to your care team and make appointments. If you have questions, please call your primary care clinic.  If you do not have a primary care provider, please call [phone] and they will assist you.      Etece is an electronic gateway that provides easy, online access to your medical records. With Etece, you can request a clinic appointment, read your test results, renew a prescription or communicate with your care team.     To access your existing account, please contact your [facility] or call [phone] for assistance.        Care EveryWhere ID     This is your Care EveryWhere ID. This could be used by other organizations to  "access your [facility] medical records  [id number]        Your Vitals Were     Pulse Height BMI (Body Mass Index)             74 1.702 m (5' 7\") 23.02 kg/m2          Blood Pressure from Last 3 Encounters:   05/31/18 142/90   05/23/18 (!) 139/92   05/01/18 128/82    Weight from Last 3 Encounters:   05/31/18 66.7 kg (147 lb)   05/01/18 66.2 kg (146 lb)   04/30/18 66.2 kg (146 lb)              We Performed the Following     Samira-Operative Worksheet        Primary Care Provider Office Phone # Fax #    Humberto Trinidad,  [phone]       [address] [place] [address]        Equal Access to Services     ELIEZER VLAENTE : Colt hough Soalireza, waaxda luqadaha, qaybta kaalmada adeegyaadrianna, mariela garduno . So [facility] [phone].    ATENCIÓN: Si habla español, tiene a simmons disposición servicios gratuitos de asistencia lingüística. Llame al [phone].    We comply with applicable federal civil rights laws and [place] laws. We do not discriminate on the basis of race, color, national origin, age, disability, sex, sexual orientation, or gender identity.            Thank you!     Thank you for choosing [facility]  for your care. Our goal is always to provide you with excellent care. Hearing back from our patients is one way we can continue to improve our services. Please take a few minutes to complete the written survey that you may receive in the mail after your visit with us. Thank you!             Your Updated Medication List - Protect others around you: Learn how to safely use, store and throw away your medicines at www.disposemymeds.org.          This list is accurate as of 5/31/18 11:59 PM.  Always use your most recent med list.                   Brand Name Dispense Instructions for use Diagnosis    buPROPion 150 MG 12 hr tablet    WELLBUTRIN SR    60 tablet    Take 1 tablet once daily and increase to 1 tablet twice daily after 4 to 7 days    " Tobacco abuse, Adjustment disorder with anxious mood       fluticasone 50 MCG/ACT spray    FLONASE    2 Bottle    Spray 2 sprays into both nostrils 2 times daily    Chronic allergic rhinitis due to animal hair and dander, Chronic seasonal allergic rhinitis due to pollen, Allergic rhinitis due to dust mite, Allergic conjunctivitis of both eyes       gabapentin 600 MG tablet    NEURONTIN    135 tablet    Take 1.5 tablets (900 mg) by mouth 3 times daily    Cervical spondylosis without myelopathy, DDD (degenerative disc disease), cervical, Chronic bilateral low back pain without sciatica, Chronic neck and back pain, Cervical stenosis of spinal canal       ibuprofen 800 MG tablet    ADVIL/MOTRIN          methocarbamol 750 MG tablet    ROBAXIN    130 tablet    Take 1-2 tablets (750-1,500 mg) by mouth 3 times daily as needed for muscle spasms caution sedation    Chronic neck pain, DDD (degenerative disc disease), cervical, Cervical spondylosis without myelopathy, Chronic bilateral low back pain without sciatica, Myofascial pain       topiramate 50 MG tablet    TOPAMAX    30 tablet    Take 50mg at bedtime.  Drink plenty of water and no alcohol. If side effects, then reduce to last tolerable dosage.    Cervical spondylosis without myelopathy, DDD (degenerative disc disease), cervical, Chronic bilateral low back pain without sciatica, Cervical stenosis of spinal canal       * traMADol 50 MG tablet    ULTRAM    90 tablet    May take 1-2 tablets every 6 hours as needed for pain, max of 3 tabs per day. Reduce as able. Okay to fill on/after 5/3/2018 and start on/after 5/5/2018; To last 30 days.    Cervical radiculopathy       * traMADol 200 MG ER-tablet    ULTRAM-ER    30 tablet    Take 1 tablet (200 mg) by mouth daily Okay to fill on/after 5/3/2018 and start on/after 5/5/2018; to last 30 days    Cervical spondylosis without myelopathy, DDD (degenerative disc disease), cervical, Chronic bilateral low back pain without sciatica        * Notice:  This list has 2 medication(s) that are the same as other medications prescribed for you. Read the directions carefully, and ask your doctor or other care provider to review them with you.

## 2018-05-31 NOTE — TELEPHONE ENCOUNTER
Message from Savisionhart:  Original authorizing provider: RODNEY Vargas CNP would like a refill of the following medications:  traMADol (ULTRAM-ER) 200 MG ER-tablet [RODNEY Vargas CNP]    Preferred pharmacy: [facility] [address] [place] [address]    Comment:  I do not need the 50mg that was sent in early so already have this months prescription. See the previous notes Thanks Palmer

## 2018-05-31 NOTE — LETTER
5/31/2018     RE: Truman Suero  [address] [facility] [facility] [address]     Dear Colleague,    Thank you for referring your patient, Truman Suero, to the [facility] NEUROSURGERY at [facility]. Please see a copy of my visit note below.    Service Date: 05/31/2018      CHIEF COMPLAINT:  Neck pain.      HISTORY OF PRESENT ILLNESS:  Mr. Suero is a 57-year-old gentleman who was seen previously by Dr. Harkins in 01/2018 for neck pain and bilateral arm and hand numbness.  At the time, he had EMGs done by Dr. Peña in 11/2017 which revealed bilateral C6 radiculopathy, carpal tunnel,  and possible radiculopathy at C7. He had an MRI in 09/2017 which revealed multilevel stenosis mainly at C5-C6, left worse than right.  At the time, he was offered surgery if he stops smoking.  He comes back today for surgical reconsideration.  He reports that he has stopped smoking since.  His symptoms have overall been the same since January. He also complains of back pain for which he had an MRI of the lumbar spine. He works as a .     PAST MEDICAL HISTORY:   Past Medical History:   Diagnosis Date     Neck pain     MRI positive on cervical vertebrea.     PAST SURGICAL HISTORY:  No significant surgeries      PHYSICAL EXAMINATION: The patient is awake, alert and oriented x3. His cranial nerves are grossly intact.  He has 5/5 in bilateral finger abduction, hand , biceps, triceps and deltoids. He has 5/5 in bilateral hip flexion, extension, knee flexion and extension and plantar and ankle dorsi and plantarflexion. He has no Nir.  His reflexes are physiologic and symmetric. He has no clonus.  His gait is within normal limits.      IMAGING: MRI of the cervical spine done in 09/2017 reveals multilevel stenosis mainly at C3-C4, C4-C5, C5-C6 and C6-C7.  The most significant level is at C5-C6, left worse than right foraminal stenosis.      ASSESSMENT: Mr. Suero is a 57-year-old gentleman  presenting with significant neck pain and bilateral hand numbness.     PLAN:  I staffed this patient with Dr. Montero.  We discussed imaging and clinical findings.  We believe that the most significant level is at the C5-C6 level.  We offered a C5-C6 ACDF.  We discussed that the surgery may not really help his neck pain, but it would help decompress his C6 nerve roots.  If the surgery does not provide significant relief of his symptoms then we could do potentially C3-C7 posterior fusion.  However, because of the nature of his work as a , we discussed that the posterior fusion surgery would limit his neck motion and his ability to work.  At this time, we will consider the smaller surgery C5-C6 ACDF on the right side. The patient agrees with the plan.  He would like to proceed.  This will be scheduled in the next few weeks.      I have seen this patient with the resident and formulated a plan and agree with this note.    RAYNA MONTERO MD       As dictated by RADHA WEEKS MD       D: 2018   T: 2018   MT: RICHARDSON    Name:     ANISH ZHONG   MRN:      [id number]        Account:      [id number]   :      1960           Service Date: 2018   Document: [id number]

## 2018-06-01 RX ORDER — TRAMADOL HYDROCHLORIDE 200 MG/1
200 TABLET, EXTENDED RELEASE ORAL DAILY
Qty: 30 TABLET | Refills: 0 | Status: SHIPPED | OUTPATIENT
Start: 2018-06-01 | End: 2018-06-29

## 2018-06-01 NOTE — PROGRESS NOTES
Service Date: 05/31/2018      CHIEF COMPLAINT:  Neck pain.      HISTORY OF PRESENT ILLNESS:  Mr. Suero is a 57-year-old gentleman who was seen previously by Dr. Harkins in 01/2018 for neck pain and bilateral arm and hand numbness.  At the time, he had EMGs done by Dr. Peña in 11/2017 which revealed bilateral C6 radiculopathy, carpal tunnel,  and possible radiculopathy at C7. He had an MRI in 09/2017 which revealed multilevel stenosis mainly at C5-C6, left worse than right.  At the time, he was offered surgery if he stops smoking.  He comes back today for surgical reconsideration.  He reports that he has stopped smoking since.  His symptoms have overall been the same since January. He also complains of back pain for which he had an MRI of the lumbar spine. He works as a .     PAST MEDICAL HISTORY:   Past Medical History:   Diagnosis Date     Neck pain     MRI positive on cervical vertebrea.     PAST SURGICAL HISTORY:  No significant surgeries      PHYSICAL EXAMINATION: The patient is awake, alert and oriented x3. His cranial nerves are grossly intact.  He has 5/5 in bilateral finger abduction, hand , biceps, triceps and deltoids. He has 5/5 in bilateral hip flexion, extension, knee flexion and extension and plantar and ankle dorsi and plantarflexion. He has no Nir.  His reflexes are physiologic and symmetric. He has no clonus.  His gait is within normal limits.      IMAGING: MRI of the cervical spine done in 09/2017 reveals multilevel stenosis mainly at C3-C4, C4-C5, C5-C6 and C6-C7.  The most significant level is at C5-C6, left worse than right foraminal stenosis.      ASSESSMENT: Mr. Suero is a 57-year-old gentleman presenting with significant neck pain and bilateral hand numbness.     PLAN:  I staffed this patient with Dr. Harkins.  We discussed imaging and clinical findings.  We believe that the most significant level is at the C5-C6 level.  We offered a C5-C6 ACDF.  We discussed that the surgery  may not really help his neck pain, but it would help decompress his C6 nerve roots.  If the surgery does not provide significant relief of his symptoms then we could do potentially C3-C7 posterior fusion.  However, because of the nature of his work as a , we discussed that the posterior fusion surgery would limit his neck motion and his ability to work.  At this time, we will consider the smaller surgery C5-C6 ACDF on the right side. The patient agrees with the plan.  He would like to proceed.  This will be scheduled in the next few weeks.      I have seen this patient with the resident and formulated a plan and agree with this note.  RAYNA MONTERO MD       As dictated by RADHA WEEKS MD            D: 2018   T: 2018   MT: RICHARDSON      Name:     ANISH ZHONG   MRN:      [id number]        Account:      [id number]   :      1960           Service Date: 2018      Document: [id number]

## 2018-06-01 NOTE — TELEPHONE ENCOUNTER
Received call from patient requesting refill(s) of Tramadol     Last picked up from pharmacy on   5/4/18.  [facility]   [address] [facility] [address]  Phone: [phone] Fax: [phone]    Pt last seen by prescribing provider on 4/20/2018  Next appt scheduled for 6/29/2018     checked in the past 6 months? Yes If no, print current report and give to RN    Last urine drug screen date 1/8/2018  Current opioid agreement on file (completed within the last year) Yes Date of opioid agreement: 1/8/2018    Processing (pick one and delete the others):      Fax to   [facility]   [facility] [facility] [address]  Phone: [phone] Fax: [phone]    Will route to nursing pool for review and preparation of prescription(s).

## 2018-06-01 NOTE — TELEPHONE ENCOUNTER
Medication refill information reviewed.     Last due:  Okay to fill on/after 5/3/2018 and start on/after 5/5/2018; to last 30 days    Due date:  6/4/18    Weaning instructions:  N/A    Prescriptions prepped for review.     Estelita Wetzel, RN-BSN  [facility]

## 2018-06-01 NOTE — TELEPHONE ENCOUNTER
Signed Prescriptions:                        Disp   Refills    traMADol (ULTRAM-ER) 200 MG ER-tablet      30 tab*0        Sig: Take 1 tablet (200 mg) by mouth daily Okay to fill           on/after 6/2/18/2018 and start on/after 6/4/2018;           to last 30 days  Authorizing Provider: MELANIE BENSON    Signed script placed in touchdown bin at [facility].    Melanie Benson APRN CNP FNP RN  [facility]

## 2018-06-04 NOTE — TELEPHONE ENCOUNTER
Left voice mail. Signed Rx Faxed to [facility]. RightFax confirmed.     Mike Bergeron MA

## 2018-06-07 ENCOUNTER — THERAPY VISIT (OUTPATIENT)
Dept: PHYSICAL THERAPY | Facility: CLINIC | Age: 58
End: 2018-06-07
Payer: COMMERCIAL

## 2018-06-07 DIAGNOSIS — M54.41 CHRONIC BILATERAL LOW BACK PAIN WITH RIGHT-SIDED SCIATICA: ICD-10-CM

## 2018-06-07 DIAGNOSIS — G89.29 CHRONIC BILATERAL LOW BACK PAIN WITH RIGHT-SIDED SCIATICA: ICD-10-CM

## 2018-06-07 PROCEDURE — 97140 MANUAL THERAPY 1/> REGIONS: CPT | Mod: GP | Performed by: PHYSICAL THERAPIST

## 2018-06-07 PROCEDURE — 97110 THERAPEUTIC EXERCISES: CPT | Mod: GP | Performed by: PHYSICAL THERAPIST

## 2018-06-10 ENCOUNTER — MYC REFILL (OUTPATIENT)
Dept: FAMILY MEDICINE | Facility: CLINIC | Age: 58
End: 2018-06-10

## 2018-06-10 DIAGNOSIS — F43.22 ADJUSTMENT DISORDER WITH ANXIOUS MOOD: ICD-10-CM

## 2018-06-10 DIAGNOSIS — Z72.0 TOBACCO ABUSE: ICD-10-CM

## 2018-06-11 RX ORDER — BUPROPION HYDROCHLORIDE 150 MG/1
TABLET, EXTENDED RELEASE ORAL
Qty: 60 TABLET | Refills: 0 | Status: SHIPPED | OUTPATIENT
Start: 2018-06-11 | End: 2018-07-18

## 2018-06-11 NOTE — TELEPHONE ENCOUNTER
Prescription approved per [facility] Refill Protocol.  Mya Bowman,Clinic Rn  [facility]

## 2018-06-22 ENCOUNTER — THERAPY VISIT (OUTPATIENT)
Dept: PHYSICAL THERAPY | Facility: CLINIC | Age: 58
End: 2018-06-22
Payer: COMMERCIAL

## 2018-06-22 DIAGNOSIS — G89.29 CHRONIC BILATERAL LOW BACK PAIN WITH RIGHT-SIDED SCIATICA: ICD-10-CM

## 2018-06-22 DIAGNOSIS — M54.41 CHRONIC BILATERAL LOW BACK PAIN WITH RIGHT-SIDED SCIATICA: ICD-10-CM

## 2018-06-22 PROCEDURE — 97140 MANUAL THERAPY 1/> REGIONS: CPT | Mod: GP | Performed by: PHYSICAL THERAPIST

## 2018-06-22 PROCEDURE — 97110 THERAPEUTIC EXERCISES: CPT | Mod: GP | Performed by: PHYSICAL THERAPIST

## 2018-06-22 NOTE — MR AVS SNAPSHOT
After Visit Summary   6/22/2018    Truman Suero    MRN: [id number]           Patient Information     Date Of Birth          1960        Visit Information        Provider Department      6/22/2018 1:20 PM Nicho Mota, PT [facility]        Today's Diagnoses     Chronic bilateral low back pain with right-sided sciatica           Follow-ups after your visit        Your next 10 appointments already scheduled     Jun 29, 2018  1:30 PM CDT   Return Visit with RODNEY Vargas [facility] ([facility])    [address] [facility] [address]   [phone]            Jul 06, 2018  1:20 PM CDT   SAVITA Spine with Nicho Mota PT   [facility] ([facility])    [address] [facility] [address] [phone]            Jul 13, 2018  1:20 PM CDT   SAVITA Spine with Nicho Mota PT   [facility] ([facility])    [address] [facility] [address] [phone]              Who to contact     If you have questions or need follow up information about today's clinic visit or your schedule please contact [facility] directly at [phone].  Normal or non-critical lab and imaging results will be communicated to you by MyChart, letter or phone within 4 business days after the clinic has received the results. If you do not hear from us within 7 days, please contact the clinic through MyChart or phone. If you have a critical or abnormal lab result, we will notify you by phone as soon as possible.  Submit refill requests through [employer] or call your pharmacy and they will forward the refill request to us. Please allow 3 business days for your refill to be completed.          Additional Information About Your Visit        MyChart Information      [employer] gives you secure access to your electronic health record. If you see a primary care provider, you can also send messages to your care team and make appointments. If you have questions, please call your primary care clinic.  If you do not have a primary care provider, please call [phone] and they will assist you.        Care EveryWhere ID     This is your Care EveryWhere ID. This could be used by other organizations to access your [facility] medical records  [id number]         Blood Pressure from Last 3 Encounters:   05/31/18 142/90   05/23/18 (!) 139/92   05/01/18 128/82    Weight from Last 3 Encounters:   05/31/18 66.7 kg (147 lb)   05/01/18 66.2 kg (146 lb)   04/30/18 66.2 kg (146 lb)              We Performed the Following     MANUAL THER TECH,1+REGIONS,EA 15 MIN     THERAPEUTIC EXERCISES        Primary Care Provider Office Phone # Fax #    Humberto Trinidad  [phone]       [address] [place] [id number]        Equal Access to Services     BRYN VALENTE : Hadii aad ku hadasho Soomaali, waaxda luqadaha, qaybta kaalmada adeegyaadrianna, mariela garduno . So [facility] [phone].    ATENCIÓN: Si habla español, tiene a simmons disposición servicios gratLovelace Medical Centeros de asistencia lingüística. [place] al [phone].    We comply with applicable federal civil rights laws and [place] laws. We do not discriminate on the basis of race, color, national origin, age, disability, sex, sexual orientation, or gender identity.            Thank you!     Thank you for choosing [facility]  for your care. Our goal is always to provide you with excellent care. Hearing back from our patients is one way we can continue to improve our services. Please take a few minutes to complete the written survey that you may receive in the mail after your visit with us. Thank you!             Your Updated Medication List - Protect others around you:  Learn how to safely use, store and throw away your medicines at www.disposemymeds.org.          This list is accurate as of 6/22/18 11:59 PM.  Always use your most recent med list.                   Brand Name Dispense Instructions for use Diagnosis    buPROPion 150 MG 12 hr tablet    WELLBUTRIN SR    60 tablet    Take 1 tablet once daily and increase to 1 tablet twice daily after 4 to 7 days    Tobacco abuse, Adjustment disorder with anxious mood       fluticasone 50 MCG/ACT spray    FLONASE    2 Bottle    Spray 2 sprays into both nostrils 2 times daily    Chronic allergic rhinitis due to animal hair and dander, Chronic seasonal allergic rhinitis due to pollen, Allergic rhinitis due to dust mite, Allergic conjunctivitis of both eyes       gabapentin 600 MG tablet    NEURONTIN    135 tablet    Take 1.5 tablets (900 mg) by mouth 3 times daily    Cervical spondylosis without myelopathy, DDD (degenerative disc disease), cervical, Chronic bilateral low back pain without sciatica, Chronic neck and back pain, Cervical stenosis of spinal canal       ibuprofen 800 MG tablet    ADVIL/MOTRIN          methocarbamol 750 MG tablet    ROBAXIN    130 tablet    Take 1-2 tablets (750-1,500 mg) by mouth 3 times daily as needed for muscle spasms caution sedation    Chronic neck pain, DDD (degenerative disc disease), cervical, Cervical spondylosis without myelopathy, Chronic bilateral low back pain without sciatica, Myofascial pain       topiramate 50 MG tablet    TOPAMAX    30 tablet    Take 50mg at bedtime.  Drink plenty of water and no alcohol. If side effects, then reduce to last tolerable dosage.    Cervical spondylosis without myelopathy, DDD (degenerative disc disease), cervical, Chronic bilateral low back pain without sciatica, Cervical stenosis of spinal canal       * traMADol 50 MG tablet    ULTRAM    90 tablet    May take 1-2 tablets every 6 hours as needed for pain, max of 3 tabs per day. Reduce as able. Okay to fill  on/after 5/3/2018 and start on/after 5/5/2018; To last 30 days.    Cervical radiculopathy       * traMADol 200 MG ER-tablet    ULTRAM-ER    30 tablet    Take 1 tablet (200 mg) by mouth daily Okay to fill on/after 6/2/18/2018 and start on/after 6/4/2018; to last 30 days    Cervical spondylosis without myelopathy, DDD (degenerative disc disease), cervical, Chronic bilateral low back pain without sciatica       * Notice:  This list has 2 medication(s) that are the same as other medications prescribed for you. Read the directions carefully, and ask your doctor or other care provider to review them with you.

## 2018-06-25 ENCOUNTER — MYC MEDICAL ADVICE (OUTPATIENT)
Dept: PALLIATIVE MEDICINE | Facility: CLINIC | Age: 58
End: 2018-06-25

## 2018-06-26 NOTE — TELEPHONE ENCOUNTER
Per patient [employer] message:  From: Truman Suero      Created: 6/25/2018 1:47 PM      Dr Modesta Navarro I just wanted to give you a heads up. We have our regular appt this Friday, so I have some new stuff to throw at you. We talked a few visits ago that my bosses suggested worker's comp. You mentioned that you were not worker's comp Dr. since then and with surgery in the mix, We have got the ball rolling so to speak. Just wanted to let you know I just may have some generic stuff for you as far as records,who to request if needed etc etc. As always thanks and look forward to visit.  Palmer

## 2018-06-26 NOTE — TELEPHONE ENCOUNTER
Alejandrina sent to pt:  From: Estelita Wetzel RN        Created: 6/26/2018 8:36 AM       Xavier Chakraborty.  This information will be sent on to Melanie to review.    See you Friday    Estelita Wetzel RN-BSN  [facility] shin for RODNEY Carpenter CNP.    Estelita Wetzel RN-BSN  [facility]

## 2018-06-29 ENCOUNTER — OFFICE VISIT (OUTPATIENT)
Dept: PALLIATIVE MEDICINE | Facility: CLINIC | Age: 58
End: 2018-06-29
Payer: OTHER MISCELLANEOUS

## 2018-06-29 VITALS
BODY MASS INDEX: 23.65 KG/M2 | WEIGHT: 151 LBS | SYSTOLIC BLOOD PRESSURE: 141 MMHG | HEART RATE: 74 BPM | DIASTOLIC BLOOD PRESSURE: 89 MMHG

## 2018-06-29 DIAGNOSIS — M54.9 CHRONIC NECK AND BACK PAIN: ICD-10-CM

## 2018-06-29 DIAGNOSIS — M54.2 CHRONIC NECK AND BACK PAIN: ICD-10-CM

## 2018-06-29 DIAGNOSIS — G57.03 PIRIFORMIS SYNDROME OF BOTH SIDES: ICD-10-CM

## 2018-06-29 DIAGNOSIS — M51.369 DDD (DEGENERATIVE DISC DISEASE), LUMBAR: ICD-10-CM

## 2018-06-29 DIAGNOSIS — M79.18 MYOFASCIAL PAIN: ICD-10-CM

## 2018-06-29 DIAGNOSIS — M48.02 CERVICAL STENOSIS OF SPINAL CANAL: ICD-10-CM

## 2018-06-29 DIAGNOSIS — M47.817 LUMBOSACRAL SPONDYLOSIS WITHOUT MYELOPATHY: ICD-10-CM

## 2018-06-29 DIAGNOSIS — M54.12 CERVICAL RADICULOPATHY: ICD-10-CM

## 2018-06-29 DIAGNOSIS — G89.29 CHRONIC NECK PAIN: ICD-10-CM

## 2018-06-29 DIAGNOSIS — G89.29 CHRONIC NECK AND BACK PAIN: ICD-10-CM

## 2018-06-29 DIAGNOSIS — M47.812 CERVICAL SPONDYLOSIS WITHOUT MYELOPATHY: ICD-10-CM

## 2018-06-29 DIAGNOSIS — M50.30 DDD (DEGENERATIVE DISC DISEASE), CERVICAL: ICD-10-CM

## 2018-06-29 DIAGNOSIS — M54.2 CHRONIC NECK PAIN: ICD-10-CM

## 2018-06-29 DIAGNOSIS — M53.3 SACROILIAC JOINT DYSFUNCTION OF BOTH SIDES: Primary | ICD-10-CM

## 2018-06-29 PROCEDURE — 99214 OFFICE O/P EST MOD 30 MIN: CPT | Performed by: NURSE PRACTITIONER

## 2018-06-29 RX ORDER — GABAPENTIN 600 MG/1
900 TABLET ORAL 3 TIMES DAILY
Qty: 135 TABLET | Refills: 3 | Status: SHIPPED | OUTPATIENT
Start: 2018-06-29 | End: 2018-11-16

## 2018-06-29 RX ORDER — TRAMADOL HYDROCHLORIDE 50 MG/1
TABLET ORAL
Qty: 90 TABLET | Refills: 0 | Status: SHIPPED | OUTPATIENT
Start: 2018-06-29 | End: 2018-07-30

## 2018-06-29 RX ORDER — TOPIRAMATE 50 MG/1
TABLET, FILM COATED ORAL
Qty: 30 TABLET | Refills: 2 | Status: SHIPPED | OUTPATIENT
Start: 2018-06-29 | End: 2018-09-05

## 2018-06-29 RX ORDER — METHOCARBAMOL 750 MG/1
750-1500 TABLET, FILM COATED ORAL 3 TIMES DAILY PRN
Qty: 130 TABLET | Refills: 2 | Status: ON HOLD | OUTPATIENT
Start: 2018-06-29 | End: 2018-10-30

## 2018-06-29 RX ORDER — TRAMADOL HYDROCHLORIDE 200 MG/1
200 TABLET, EXTENDED RELEASE ORAL DAILY
Qty: 30 TABLET | Refills: 0 | Status: SHIPPED | OUTPATIENT
Start: 2018-06-29 | End: 2018-07-30

## 2018-06-29 ASSESSMENT — PAIN SCALES - GENERAL: PAINLEVEL: EXTREME PAIN (8)

## 2018-06-29 NOTE — MR AVS SNAPSHOT
After Visit Summary   6/29/2018    Truman Suero    MRN: [id number]           Patient Information     Date Of Birth          1960        Visit Information        Provider Department      6/29/2018 1:30 PM Melanie Thomas APRN [facility]        Today's Diagnoses     Sacroiliac joint dysfunction of both sides    -  1    Piriformis syndrome of both sides        Lumbosacral spondylosis without myelopathy        DDD (degenerative disc disease), lumbar        Chronic neck pain        Cervical radiculopathy        DDD (degenerative disc disease), cervical        Myofascial pain        Cervical spondylosis without myelopathy        Chronic bilateral low back pain without sciatica        Cervical stenosis of spinal canal        Chronic neck and back pain          Care Instructions    PLAN  1. Medications:   1. Continue Tramadol ER 200mg take once per day.  2. Continue Tramadol immediate release 50mg take every 6 hours as needed, max of 4 tablets.  3. Continue Gabapentin 900mg take 3 times daily.  4. Continue Topamax 50mg at bedtime.  5. Continue Methocarbamol 500-1000mg take 3 times daily as needed for muscle spasms.  2. Procedures: Schedule bilateral SI joint injections, the office will contact you with further instructions. If the SI joint injections don't work, we can consider facet joint injections.  3. Follow-up with me in 8-12 weeks        ----------------------------------------------------------------  Nurse Triage line:  [phone]   Call this number with any questions or concerns. You may leave a detailed message anytime. Calls are typically returned Monday through Friday between 8 AM and 4:30 PM. We usually get back to you within 2 business days depending on the issue/request.       Medication refills:    For non-narcotic medications, call your pharmacy directly to request a refill. The pharmacy will contact the [facility] for authorization. Please allow 3-4  days for these refills to be processed.     For narcotic refills, call the nurse triage line or send a [employer] message. Please contact us 7-10 days before your refill is due. The message MUST include the name of the specific medication(s) requested and how you would like to receive the prescription(s). The options are as follows:    Pain Clinic staff can mail the prescription to your pharmacy. Please tell us the name of the pharmacy.    You may pick the prescription up at the Pain Clinic (tell us the location) or during a clinic visit with your pain provider    Pain Clinic staff can deliver the prescription to the [place] pharmacy in the clinic building. Please tell us the location.      Scheduling number: [phone].  Call this number to schedule or change appointments.    We believe regular attendance is key to your success in our program.    Any time you are unable to keep your appointment we ask that you call us at least 24 hours in advance to let us know. This will allow us to offer the appointment time to another patient.               Follow-ups after your visit        Additional Services     PAIN INJECTION EVAL/TREAT/FOLLOW UP                 Your next 10 appointments already scheduled     Jul 13, 2018  1:20 PM CDT   [facility] Nicho Mota [facility]   [facility] ([facility])    [address] [place]. \[facility].\"" [id number] [phone]              Who to contact     If you have questions or need follow up information about today's clinic visit or your schedule please contact [facility] directly at [phone].  Normal or non-critical lab and imaging results will be communicated to you by MyChart, letter or phone within 4 business days after the clinic has received the results. If you do not hear from us within 7 days, please contact the clinic through MyChart or phone. If you have a critical or abnormal lab result, we will notify  you by phone as soon as possible.  Submit refill requests through [employer] or call your pharmacy and they will forward the refill request to us. Please allow 3 business days for your refill to be completed.          Additional Information About Your Visit        [employer] Information     [employer] gives you secure access to your electronic health record. If you see a primary care provider, you can also send messages to your care team and make appointments. If you have questions, please call your primary care clinic.  If you do not have a primary care provider, please call [phone] and they will assist you.        Care EveryWhere ID     This is your Care EveryWhere ID. This could be used by other organizations to access your [place] medical records  [id number]        Your Vitals Were     Pulse BMI (Body Mass Index)                74 23.65 kg/m2           Blood Pressure from Last 3 Encounters:   06/29/18 141/89   05/31/18 142/90   05/23/18 (!) 139/92    Weight from Last 3 Encounters:   06/29/18 68.5 kg (151 lb)   05/31/18 66.7 kg (147 lb)   05/01/18 66.2 kg (146 lb)              We Performed the Following     PAIN INJECTION EVAL/TREAT/FOLLOW UP          Today's Medication Changes          These changes are accurate as of 6/29/18  1:57 PM.  If you have any questions, ask your nurse or doctor.               These medicines have changed or have updated prescriptions.        Dose/Directions    * traMADol 200 MG ER-tablet   Commonly known as:  ULTRAM-ER   This may have changed:  additional instructions   Used for:  Cervical spondylosis without myelopathy, DDD (degenerative disc disease), cervical, Chronic bilateral low back pain without sciatica   Changed by:  Melanie Thomas, RODNEY CNP        Dose:  200 mg   Take 1 tablet (200 mg) by mouth daily Okay to fill on/after 6/29/18/2018 and start on/after 7/3/2018; to last 30 days   Quantity:  30 tablet   Refills:  0       * traMADol 50 MG tablet   Commonly known as:   ULTRAM   This may have changed:  additional instructions   Used for:  Cervical radiculopathy   Changed by:  Melanie Thomas APRN CNP        May take 1-2 tablets every 6 hours as needed for pain, max of 3 tabs per day. Reduce as able. Okay to fill on/after 6/29/2018 and start on/after 7/3/2018; To last 30 days.   Quantity:  90 tablet   Refills:  0       * Notice:  This list has 2 medication(s) that are the same as other medications prescribed for you. Read the directions carefully, and ask your doctor or other care provider to review them with you.         Where to get your medicines      These medications were sent to [facility] [address] [place], [place] [address] [facility]  [address] [place] [address]     Phone:  [phone]     gabapentin 600 MG tablet    methocarbamol 750 MG tablet    topiramate 50 MG tablet         Some of these will need a paper prescription and others can be bought over the counter.  Ask your nurse if you have questions.     Bring a paper prescription for each of these medications     traMADol 200 MG ER-tablet    traMADol 50 MG tablet               Information about OPIOIDS     PRESCRIPTION OPIOIDS: WHAT YOU NEED TO KNOW   We gave you an opioid (narcotic) pain medicine. It is important to manage your pain, but opioids are not always the best choice. You should first try all the other options your care team gave you. Take this medicine for as short a time (and as few doses) as possible.     These medicines have risks:    DO NOT drive when on new or higher doses of pain medicine. These medicines can affect your alertness and reaction times, and you could be arrested for driving under the influence (DUI). If you need to use opioids long-term, talk to your care team about driving.    DO NOT operate heave machinery    DO NOT do any other dangerous activities while taking these medicines.     DO NOT drink any alcohol while taking these medicines.      If the opioid  prescribed includes acetaminophen, DO NOT take with any other medicines that contain acetaminophen. Read all labels carefully. Look for the word  acetaminophen  or  Tylenol.  Ask your pharmacist if you have questions or are unsure.    You can get addicted to pain medicines, especially if you have a history of addiction (chemical, alcohol or substance dependence). Talk to your care team about ways to reduce this risk.    Store your pills in a secure place, locked if possible. We will not replace any lost or stolen medicine. If you don t finish your medicine, please throw away (dispose) as directed by your pharmacist. The [place] [employer]ution [employer] has more information about safe disposal: https://www.pca.[facility].us/living-green/managing-unwanted-medications.     All opioids tend to cause constipation. Drink plenty of water and eat foods that have a lot of fiber, such as fruits, vegetables, prune juice, apple juice and high-fiber cereal. Take a laxative (Miralax, milk of magnesia, Colace, Senna) if you don t move your bowels at least every other day.          Primary Care Provider Office Phone # Fax #    Humberto Trinidad  [phone]       [address] [place], [place] [id number]        Equal Access to Services     ELIEZER VALENTE : Hadii aad ku hadasho Sogermanali, waaxda luqadaha, qaybta kaalmada adeegyada, mariela carter. So [facility] [phone].    ATENCIÓN: Si habla español, tiene a simmons disposición servicios gratuitos de asistencia lingüística. Llame al [phone].    We comply with applicable federal civil rights laws and [place] laws. We do not discriminate on the basis of race, color, national origin, age, disability, sex, sexual orientation, or gender identity.            Thank you!     Thank you for choosing [facility]  for your care. Our goal is always to provide you with excellent care. Hearing back from our patients is one way we can  continue to improve our services. Please take a few minutes to complete the written survey that you may receive in the mail after your visit with us. Thank you!             Your Updated Medication List - Protect others around you: Learn how to safely use, store and throw away your medicines at www.disposemymeds.org.          This list is accurate as of 6/29/18  1:57 PM.  Always use your most recent med list.                   Brand Name Dispense Instructions for use Diagnosis    buPROPion 150 MG 12 hr tablet    WELLBUTRIN SR    60 tablet    Take 1 tablet once daily and increase to 1 tablet twice daily after 4 to 7 days    Tobacco abuse, Adjustment disorder with anxious mood       fluticasone 50 MCG/ACT spray    FLONASE    2 Bottle    Spray 2 sprays into both nostrils 2 times daily    Chronic allergic rhinitis due to animal hair and dander, Chronic seasonal allergic rhinitis due to pollen, Allergic rhinitis due to dust mite, Allergic conjunctivitis of both eyes       gabapentin 600 MG tablet    NEURONTIN    135 tablet    Take 1.5 tablets (900 mg) by mouth 3 times daily    Cervical spondylosis without myelopathy, DDD (degenerative disc disease), cervical, Chronic bilateral low back pain without sciatica, Chronic neck and back pain, Cervical stenosis of spinal canal       ibuprofen 800 MG tablet    ADVIL/MOTRIN          methocarbamol 750 MG tablet    ROBAXIN    130 tablet    Take 1-2 tablets (750-1,500 mg) by mouth 3 times daily as needed for muscle spasms caution sedation    Chronic neck pain, DDD (degenerative disc disease), cervical, Cervical spondylosis without myelopathy, Chronic bilateral low back pain without sciatica, Myofascial pain       topiramate 50 MG tablet    TOPAMAX    30 tablet    Take 50mg at bedtime.  Drink plenty of water and no alcohol. If side effects, then reduce to last tolerable dosage.    Cervical spondylosis without myelopathy, DDD (degenerative disc disease), cervical, Chronic bilateral low  back pain without sciatica, Cervical stenosis of spinal canal       * traMADol 200 MG ER-tablet    ULTRAM-ER    30 tablet    Take 1 tablet (200 mg) by mouth daily Okay to fill on/after 6/29/18/2018 and start on/after 7/3/2018; to last 30 days    Cervical spondylosis without myelopathy, DDD (degenerative disc disease), cervical, Chronic bilateral low back pain without sciatica       * traMADol 50 MG tablet    ULTRAM    90 tablet    May take 1-2 tablets every 6 hours as needed for pain, max of 3 tabs per day. Reduce as able. Okay to fill on/after 6/29/2018 and start on/after 7/3/2018; To last 30 days.    Cervical radiculopathy       * Notice:  This list has 2 medication(s) that are the same as other medications prescribed for you. Read the directions carefully, and ask your doctor or other care provider to review them with you.

## 2018-06-29 NOTE — PATIENT INSTRUCTIONS
PLAN  1. Medications:   1. Continue Tramadol ER 200mg take once per day.  2. Continue Tramadol immediate release 50mg take every 6 hours as needed, max of 4 tablets/day.  3. Continue Gabapentin 900mg take 3 times daily.  4. Continue Topamax 50mg at bedtime.  5. Continue Methocarbamol 500-1000mg take 3 times daily as needed for muscle spasms.  2. Procedures: Schedule bilateral SI joint injections, the office will contact you with further instructions. If the SI joint injections don't work, we can consider facet joint injections.  3. Follow-up with me in 8-12 weeks        ----------------------------------------------------------------  Nurse Triage line:  [phone]   Call this number with any questions or concerns. You may leave a detailed message anytime. Calls are typically returned Monday through Friday between 8 AM and 4:30 PM. We usually get back to you within 2 business days depending on the issue/request.       Medication refills:    For non-narcotic medications, call your pharmacy directly to request a refill. The pharmacy will contact the [facility] for authorization. Please allow 3-4 days for these refills to be processed.     For narcotic refills, call the nurse triage line or send a [employer] message. Please contact us 7-10 days before your refill is due. The message MUST include the name of the specific medication(s) requested and how you would like to receive the prescription(s). The options are as follows:    Pain Clinic staff can mail the prescription to your pharmacy. Please tell us the name of the pharmacy.    You may pick the prescription up at the Pain Clinic (tell us the location) or during a clinic visit with your pain provider    Pain Clinic staff can deliver the prescription to the [place] pharmacy in the clinic building. Please tell us the location.      Scheduling number: [phone].  Call this number to schedule or change appointments.    We believe regular attendance is key  to your success in our program.    Any time you are unable to keep your appointment we ask that you call us at least 24 hours in advance to let us know. This will allow us to offer the appointment time to another patient.

## 2018-06-29 NOTE — PROGRESS NOTES
[facility]    6/29/2018       Chief complaint: neck and lower back  Neck pain is most bothersome    Interval history:  Truman Suero is a 57 year old male is known to me for   Chronic neck pain  Cervical DDD  Cervical spondylosis (pain worse with extension/rotation indicating facetogenic component to pain)  Axial low back pain  Myofascial pain/muscle spasms  Remote history of ETOH overuse, attended AA for awhile. Sober for 10 years  ---PMHx includes: neck pain  ---PSHx includes: none listed      Recommendations/plan at the last visit on 4/20/2018 included:  1. Physical Therapy: not at present time  2. Patient is to continue his home exercise program and exercises learned in PHYSICAL THERAPY  3. Clinical Health Psychologist: continue work with  Kentrell Castañeda in health psychology  4. Diagnostic Studies:  None  5. Medication Management:    1. Continue Tramadol ER 200mg QD  2. Continue Tramadol immediate release 50mg tabs, max of 4 tabs daily   3. Continue gabapentin 900 mg TID  4. Continue Topamax 50 mg at bedtime  5. Continue Methocarbamol 750-1500 mg TID PRN   6. Further procedures recommended: none, patient may benefit from LESI  7. The patient will follow-up with [facility] for further evaluation of low back pain. The patient may benefit from an epidural steroid injection.  8. I recommend that the patient make a follow up appointment with Dr. Harkins in May since he quit smoking.  9. Recommendations to PCP: see above  10. Follow up: 10-12 weeks      Since his last visit, Truman Suero reports:  -The patient has recently filed for workers compensation re: his neck and low back pain.   It is highly likely that his chronic neck and low back pain are related to his long term job as a .   -His neck pain is about the same as the last visit, he reports that the pain is constant. Also, he has lower back pain that is located in his hips. This pain is aggravated by walking.  -His previous  "injection on 5/23/2018 was not helpful for lower back pain, however, the injection did alleviate leg pain.    At this point, the patient's participation with our multidisciplinary team includes:  The patient has been compliant with the program.  Pain Group - not ordered  PT - attending non-pain management PHYSICAL THERAPY   Health Psych - has seen Kentrlel Rigo Mathisve x4  Acupuncture: working with Dr. Bereket LAY      Pain scores:  Pain intensity on average is 8 on a scale of 0-10.    Range is 6-10/10.   Pain right now is 9/10.  Pain is described as \"aching, numb, unbearable, burning, tiring, shooting, exhausting, throbbing, penetrating, sharp, tender, stabbing, gnawing, miserable, nagging.\"    Pain is constant in nature    Current pain relevant medications:   -Tramadol 200mg ER in the evening (helpful)  -Tramadol 50mg take 1 tablet  Q 6 hours PRN (using 2-3 tabs per day, occasionally using 4 tabs with exacerbated pain)  -ibuprofen 600mg PRN (helpful)  -gabapentin 900mg TID (somewhat helpful)  -methocarbamol 500-1000mg TID PRN muscle spasms (takes 1000 mg BID, somewhat helpful)  -Topamax 50 mg HS (helpful)    Other pertinent medications:  None    Previous Medications:  OPIATES: Tramdol (somewhat helpful)  NSAIDS: ibuprofen (helpful), Aleve (helpful)  MUSCLE RELAXANTS: none  ANTI-MIGRAINE MEDS: none  ANTI-DEPRESSANTS: none  SLEEP AIDS: none  ANTI-CONVULSANTS: gabapentin (helpful)  TOPICALS: lidocaine ointment (somewhat helpful)  Other meds: Tylenol (not helpful)        Other treatments have included:  Truman MINAYA Steviedavid has not been seen at a pain clinic in the past.    PT: tried, somewhat helpful  Chiropractic: helpful  Acupuncture: none  TENs Unit: none     Injections:   cervical radiofrequency nerve ablation at [facility] in December 2016 (did get good relief, got 70% relief of his typical neck pain)  -6/29/2017 Cervical facet joint steroid injections at C4-5 and C5-6 on the right with Dr. Nicole Hardign (not " helpful)  -3/8/2018 C7-T1 interlaminar DEMARCUS with Dr. Hugo Corrigan (not helpful)  -5/23/2018 right L4-5 transforaminal epidural steroid injection with Dr. Osorio (not helpful for back pain but did help leg pain)        THE 4 A's OF OPIOID MAINTENANCE ANALGESIA    Analgesia: long acting Tramadol with some short acting tramadol does give some relief    Activity: ADLS and PHYSICAL THERAPY exercises. Working full time as a     Adverse effects: none    Adherence to Rx protocol: yes      Side Effects: none  Patient is using the medication as prescribed: yes    Medications:  Current Outpatient Prescriptions   Medication Sig Dispense Refill     buPROPion (WELLBUTRIN SR) 150 MG 12 hr tablet Take 1 tablet once daily and increase to 1 tablet twice daily after 4 to 7 days 60 tablet 0     fluticasone (FLONASE) 50 MCG/ACT spray Spray 2 sprays into both nostrils 2 times daily 2 Bottle 11     gabapentin (NEURONTIN) 600 MG tablet Take 1.5 tablets (900 mg) by mouth 3 times daily 135 tablet 3     ibuprofen (ADVIL,MOTRIN) 800 MG tablet   0     methocarbamol (ROBAXIN) 750 MG tablet Take 1-2 tablets (750-1,500 mg) by mouth 3 times daily as needed for muscle spasms caution sedation 130 tablet 1     topiramate (TOPAMAX) 50 MG tablet Take 50mg at bedtime.  Drink plenty of water and no alcohol. If side effects, then reduce to last tolerable dosage. 30 tablet 1     traMADol (ULTRAM) 50 MG tablet May take 1-2 tablets every 6 hours as needed for pain, max of 3 tabs per day. Reduce as able. Okay to fill on/after 5/3/2018 and start on/after 5/5/2018; To last 30 days. 90 tablet 0     traMADol (ULTRAM-ER) 200 MG ER-tablet Take 1 tablet (200 mg) by mouth daily Okay to fill on/after 6/2/18/2018 and start on/after 6/4/2018; to last 30 days 30 tablet 0       Medical History: any changes in medical history since they were last seen? None    Social History:   Home situation: , has 4 kids, 2 at home, one in college and one launched.    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 He used to work a sobriety program, used to go to   Drug use: none  History of chemical dependency treatment: no formal treatment, did AA    Is patient a current smoker or tobacco user?  QUIT on 3/20/2018  If yes, was cessation counseling offered?  no        Review of Systems:  ROS is positive as per HPI as well as for headache, joint pain, stiffness, back pain, neck pain, weakness, numbness/tingling, anxiety, stress, and is negative for fevers, sweats, or constipation, diarrhea.    This document serves as a record of the services and decisions personally performed and made by Melanie STEVENS. It was created on her behalf by Moncho Tariq, a trained medical scribe. The creation of this document is based on the provider's statements to the medical scribe.  Moncho Tariq 1:30 PM June 29, 2018        Physical Exam:  Vital signs: /89  Pulse 74  Wt 68.5 kg (151 lb)  BMI 23.65 kg/m2     Behavioral observations:  Awake, alert, cooperative    Gait:  normal    Musculoskeletal exam:    Moves well in exam room  Strength is grossly equal    Lumbar flexion to 110 degrees  Lumbar extension to 30 degrees painful  Lumbar extension/rotation to the right is painful  Lumbar extension/rotation to the left is somewhat painful  SI joints are tender right sided  Piriforms are tender R>L  Greater trochanters are none tender  Strength grossly equal throughout  Claudio's test positive on the right      Neuro exam:  SILT in all extremities     Skin/vascular/autonomic:  No suspicious lesions on exposed skin.      Other:  NA    Is the patient hypertensive today? yes  Hypertensive on recheck of BP?   yes  If yes, was patient recommended to see Primary Care Provider in follow up for management of HTN?  yes      IMAGING:    MR CERVICAL SPINE WITHOUT CONTRAST 9/5/2017 2:32 PM       HISTORY: Neck pain, worsening numbness in arms and lower extremities.  Radiculopathy, cervical region.     TECHNIQUE: Multiplanar multisequence images were obtained through the  cervical spine without contrast.     COMPARISON: 2/22/2017.     FINDINGS: Sagittal images demonstrate some minimal gentle cervical  kyphosis, otherwise normal posterior alignment. There is no evidence  for craniovertebral or cervicomedullary junction abnormality. The  cervical cord is minimally indented anteriorly at C4-C5 and C5-C6,  otherwise is normal in morphology and signal characteristics. Disc  space narrowing and discogenic marrow changes are present C3-C4,  C4-C5, C5-C6 and C6-C7.     C2-C3: Minimal facet hypertrophy. No stenosis.     C3-C4: Broad-based posterior osteophyte formation is present causing  some mild bilateral neural foraminal stenosis, but no central canal  stenosis.     C4-C5: Broad-based posterior osteophyte formation and mild facet  hypertrophy is present causing some mild to moderate central canal  stenosis, mild cord deformity, and mild-to-moderate bilateral neural  foraminal stenosis.     C5-C6: Broad-based posterior osteophyte formation and disc bulging is  present along with some facet hypertrophy. There is moderate central  canal stenosis and moderate bilateral neural foraminal stenosis.  Findings have progressed since the prior exam.     C6-C7: Broad-based disc bulging is present along with some minimal  posterior osteophyte formation. There is borderline to mild central  canal stenosis, but no neural foraminal stenosis.     C7-T1: Moderate facet hypertrophy. No significant stenosis.         IMPRESSION: Moderate multilevel degenerative disc and facet disease  with some progression at C5-C6 where there is moderate central canal  and bilateral neural foraminal stenosis along with cord deformity.          MR CERVICAL SPINE WITHOUT CONTRAST  2/22/2017 9:59 AM     HISTORY:  Bilateral neck and arm pain for  three years.     COMPARISON: None.     TECHNIQUE: Routine MR cervical spine extended through T2.     FINDINGS: There is some degenerative bone marrow signal change C3-C6.  No malignant or destructive lesions. Normal alignment through T2.  Reversed cervical adenosis C3-C6.     The cervical and upper thoracic spinal cord appear intrinsically  normal. The craniocervical junction region is normal. No paraspinous  soft tissue abnormality.     Findings by level as follows:     C2-C3: Negative. No disc protrusion. No central or lateral stenosis.     C3-C4: Mild disc space narrowing. Mild diffuse annular bulge. Small  uncinate spur on the right. No significant central or left-sided  stenosis. Mild right-sided foraminal stenosis.     C4-C5: Moderate degenerative narrowing of the interspace. Mild ventral  disc osteophyte complex with minimal central stenosis. Small uncinate  spurs bilaterally with mild bilateral foraminal stenosis.     C6-C7: Moderate disc space narrowing. Mild broad-based disc osteophyte  complex with minimal central stenosis. Minimal uncinate spurs with  mild bilateral foraminal stenosis.     C6-C7: Moderate disc space narrowing. Mild ventral disc osteophyte  complex. No significant central or lateral stenosis.     C7-T1: No disc protrusion. No central or lateral stenosis. Moderate  bilateral facet joint disease.         IMPRESSION:  1. Degenerative changes as described C3-C4 through C7-T1. There is  only mild central and foraminal stenosis as described.  2. No intrinsic spinal cord abnormality through T2    [place] Prescription Monitoring Program:  Reviewed, as expected       DIRE Score for selecting candidates for long term opioid analgesia for chronic pain:  Diagnosis  2  Intractablility  2  Risk    Psychological health  2    Chemical health  2    Reliability  2    Social support  3  Efficacy  2    Total DIRE Score = 15. Note that   7-13 predicts poor outcome (compliance and efficacy) from opioid  prescribing; 14-21 predicts good outcome (compliance and efficacy)  from opioid prescribing.      Assessment:   1. Bilateral SI joint dysfunction  2. Bilateral piriformis syndrome  3. Lumbar spondylosis  4. Lumbar DDD  5. Chronic neck pain   6. Cervical DDD  7. Cervical spinal stenosis with bilateral numbness in the hands and occasionally unsteady gait   8. Cervical radiculopathy  9. Cervical spondylosis  10. Chronic bilateral low back pain without sciatica  11. Myofascial pain/muscle spasms  12. Chronic neck and back pain  13. Remote history of ETOH overuse, attended AA for awhile. Sober for 10 years  14. PMHx includes: neck pain  15. PSHx includes: none listed        Plan:   1. Physical Therapy: not at present time  2. Patient is to continue his home exercise program and exercises learned in PHYSICAL THERAPY  3. Clinical Health Psychologist: continue work with  Kentrell Castañeda in health psychology  4. Diagnostic Studies:  None  5. Medication Management:    1. Continue Tramadol ER 200mg QD  2. Continue Tramadol immediate release 50mg tabs, max of 4 tabs daily   3. Continue gabapentin 900 mg TID  4. Continue Topamax 50 mg at bedtime  5. Continue Methocarbamol 750-1500 mg TID PRN   6. Further procedures recommended: pt will schedule bilateral SI joint injections, office to contact patient. If not effective, then consider for lumbar facet joint injections  7. Recommendations to PCP: see above  8. Follow up: 10-12 weeks      Total time spent face to face was 35 minutes and more than 50% of face to face time was spent in counseling and/or coordination of care regarding the diagnosis and recommendations above.    The information in this document, created by the medical scribe for me, accurately reflects the services I personally performed and the decisions made by me. I have reviewed and approved this document for accuracy.    Melanie STEVENS, RN CNP, FNP  [facility] [place]

## 2018-06-30 ENCOUNTER — MYC MEDICAL ADVICE (OUTPATIENT)
Dept: PALLIATIVE MEDICINE | Facility: CLINIC | Age: 58
End: 2018-06-30

## 2018-06-30 DIAGNOSIS — M79.18 MYOFASCIAL PAIN: Primary | ICD-10-CM

## 2018-07-02 NOTE — TELEPHONE ENCOUNTER
Per patient myChart message:  From: Truman Suero      Created: 6/30/2018 3:20 PM      Dr. Navarro- My [facility]oved [facility] pharmacy informed me that their supplier for the methocarbamol is on back order, and could possibly be a month before they get any in stock. They suggested possibly reaching out to  to see if there are any possible short term substitutions. If not it certainly is not the end of the world. Hope your grad party and blueberries went well    Palmer

## 2018-07-02 NOTE — TELEPHONE ENCOUNTER
Pt has tried flexeril in the past.     He is on topamax and tramadol.      Routed to RODNEY Carpenter CNP to review.  Pharmacy:  [facility]

## 2018-07-02 NOTE — TELEPHONE ENCOUNTER
Xavier Chakraborty--  It is true, there is a shortage on methocarbamol.  It appears you have tried Flexeril (cyclobenzaprine) in the past. Was that helpful? Did it make you sleepy? If you had side effects with that one, there are a few other options as well.     Yes, the party and the blueberries were all fine, thanks for checking!    Melanie STEVENS RN CNP, MELISSAP  [facility]    I have sent the above [employer] message to the patient.     Melanie STEVENS RN CNP, MELISSAP  [facility]

## 2018-07-03 RX ORDER — CYCLOBENZAPRINE HCL 10 MG
5-10 TABLET ORAL 3 TIMES DAILY PRN
Qty: 45 TABLET | Refills: 1 | Status: SHIPPED | OUTPATIENT
Start: 2018-07-03 | End: 2018-09-05

## 2018-07-03 NOTE — TELEPHONE ENCOUNTER
Alejandrina message from patient on 7/2 at 1228:      Dr Navarro- I don't recall any adverse issues, but you are the Dr.  if you think something different is worth a try I am willing to try     Palmer   -------------  Will route to Melanie Thomas CNP for review.     Shanita Briones, ANALISAN, RN-BC  Patient Care Supervisor/Care Coordinator  [facility] [place]

## 2018-07-03 NOTE — TELEPHONE ENCOUNTER
Xavier Chakraborty-    Let's have you re-try the cyclobenzaprine. It can cause sleepiness, so start with lower dose first. I will give you 45 tabs to try. You can use 5-10mg (so one-half to one tablet) up to three times daily.    I sent the script to your pharmacy.    Melanie STEVENS RN CNP, ABRAM  [facility]    I have sent the above [employer] message to the patient.     Melanie STEVENS RN CNP, MELISSAP  [facility] [place]

## 2018-07-05 ENCOUNTER — TELEPHONE (OUTPATIENT)
Dept: PALLIATIVE MEDICINE | Facility: CLINIC | Age: 58
End: 2018-07-05

## 2018-07-05 NOTE — TELEPHONE ENCOUNTER
Alejandrina sent to pt:  From: Truman Suero      Created: 7/3/2018 12:12 PM      Dr Navarro- Sounds good. I still have about dozen or so of the metho left so will use those when done. Thank-you. Have a great independence day celebration. How long should I give office to contact about injections? or should I just call them. Thanks again. Just an FYI also. The workers comp person was telling me I should inquire if  feels that job is worsening condition.     WB

## 2018-07-05 NOTE — TELEPHONE ENCOUNTER
Alejandrina sent to pt:  From: Estelita Wetzel RN        Created: 7/5/2018 9:47 AM       Xavier Chakraborty.  You can go ahead and call the appointment line ([phone]) to schedule the sacroiliac joint injection or you can wait and you will be called.  Due to the holiday it may take them longer to reach out.    Estelita Wetzel RN-BSN  [facility]

## 2018-07-05 NOTE — TELEPHONE ENCOUNTER
SI joint injection requires a PA per     Routing to submit PA        Monalisa LUZ    [facility]

## 2018-07-06 NOTE — TELEPHONE ENCOUNTER
PA pending additional information - office note to be completed and signed by Melanie LUZ    [facility]

## 2018-07-09 NOTE — TELEPHONE ENCOUNTER
Routed to Monalisa.    Estelita Wetzel, RN-BSN  [facility] [place]

## 2018-07-09 NOTE — TELEPHONE ENCOUNTER
Pre-screening Questions for Radiology Injections:    Injection to be done at which interventional clinic site? [facility] [place]   [facility], instruct patient to arrive 30 minutes before the scheduled appointment time.     Procedure ordered by Melanie Thomas    Procedure ordered?  SI Joint Injection    What insurance would patient like us to bill for this procedure? PAK      Worker's comp or MVA (motor vehicle accident) -Any injection DO NOT SCHEDULE and route to Karen Mancilla.      PAK insurance - For SI joint injections, DO NOT SCHEDULE and route Monalisa Dominguez. [employer] F[employer] NO PA REQUIRED EFFECTIVE 11/1/2017      [employer]- MBB's must be scheduled at LEAST two weeks apart      [employer] - Any injection besides hip/shoulder/knee joint DO NOT SCHEDULE and route to Monalisa Dominguez. She will obtain PA and call pt back to schedule procedure or notify pt of denial.       [employer]-Route to Monalisa for review    Any chance of pregnancy? NO   If YES, do NOT schedule and route to RN pool    Is an  needed? No     Patient has a drive home? (mandatory) YES: INFORMED    Is patient taking any blood thinners (plavix, coumadin, jantoven, warfarin, heparin, pradaxa or dabigatran )? No   If hold needed, do NOT schedule, route to RN pool     Is patient taking any aspirin products? No     If more than 325mg/day do NOT schedule; route to RN pool     For CERVICAL procedures, hold all aspirin products for 6 days.      Does the patient have a bleeding or clotting disorder? No     If YES, okay to schedule AND route to RN nurse pool    **For any patients with platelet count <100, must be forwarded to provider**    Is patient diabetic?  No  If YES, have them bring their glucometer.    Does patient have an active infection or treated for one within the past week? No     Is patient currently taking any antibiotics?  No     For patients on chronic, preventative, or prophylactic  antibiotics, procedures may be scheduled.     For patients on antibiotics for active or recent infection:    Jo Melgar Burton, [place]itzer-antibiotic course must have been completed for 4 days    Is patient currently taking any steroid medications? (i.e. Prednisone, Medrol)  No     For patients on steroid medications:    Jo Melgar Burton, [place]itzer-steroid course must have been completed for 4 days    Reviewed with patient:  If you are started on any steroids or antibiotics between now and your appointment, you must contact us because it may affect our ability to perform your procedure.  Yes    Is patient actively being treated for cancer or immunocompromised? No  If YES, do NOT schedule and route to RN pool     Are you able to get on and off an exam table with minimal or no assistance? Yes  If NO, do NOT schedule and route to RN pool    Are you able to roll over and lay on your stomach with minimal or no assistance? Yes  If NO, do NOT schedule and route to RN pool     Any allergies to contrast dye, iodine, shellfish, or numbing and steroid medications? No  If YES, route to RN pool AND add allergy information to appointment notes    Allergies: Review of patient's allergies indicates no known allergies.      Has the patient had a flu shot or any other vaccinations within 7 days before or after the procedure.  No     Does patient have an MRI/CT?  Not Applicable  (SI joint, hip injections, lumbar sympathetic blocks, and stellate ganglion blocks do not require an MRI)    Was the MRI done w/in the last 3 years?  NA    Was MRI done at [place]? No      If not, where was it done? N/A       If MRI was not done at [facility], [facility] do NOT schedule and route to nursing.  If pt has an imaging disc, the injection may be scheduled but pt has to bring disc to appt. If they show up w/out disc the injection cannot be done    Reminders (please tell patient if applicable):       Instructed pt  to arrive 30 minutes early for IV start if this is for a cervical procedure, ALL sympathetic (stellate ganglion, hypogastric, or lumbar sympathetic block) and all sedation procedures (RFA, spinal cord stimulation trials).  Not Applicable   -IVs are not routinely placed for Dr. Perales cervical cases   -Dr. Corrigan: IVs for cervical ESIs and cervical TBDs (not CMBBs/facet inj)      If NPO for sedation, informed patient that it is okay to take medications with sips of water (except if they are to hold blood thinners).  Not Applicable   *DO take blood pressure medication if it is prescribed*      If this is for a cervical DEMARCUS, informed patient that aspirin needs to be held for 6 days.   Not Applicable      For all patients not having spinal cord stimulator (SCS) trials or radiofrequency ablations (RFAs), informed patient:    IV sedation is not provided for this procedure.  If you feel that an oral anti-anxiety medication is needed, you can discuss this further with your referring provider or primary care provider.  The Pain Clinic provider will discuss specifics of what the procedure includes at your appointment.  Most procedures last 10-20 minutes.  We use numbing medications to help with any discomfort during the procedure.  Not Applicable      Do not schedule procedures requiring IV placement in the first appointment of the day or first appointment after lunch. Do NOT schedule at 0745, 0815 or 1245. N/A      For patients 85 or older we recommend having an adult stay w/ them for the remainder of the day.   N/A    Does the patient have any questions?  NO  Marleny Rojas  [facility]

## 2018-07-10 NOTE — TELEPHONE ENCOUNTER
Faxed completed PA form and supporting documentation for SI JOINT INJ to [employer].  Right fax confirmation          Monalisa LUZ    [facility]

## 2018-07-17 NOTE — TELEPHONE ENCOUNTER
Noted that PA was approved and Pt notified.    Van Oquendo, RN  Care Coordinator   [facility] [place]

## 2018-07-17 NOTE — TELEPHONE ENCOUNTER
PA approved.  Effective date: 7/10/18-1/9/19  PA reference #: [id number]  Pt. notified:   Yes   Pt. informed directly. Left VM for patient to schedule      Monalisa LUZ    [facility] [facility]

## 2018-07-18 ENCOUNTER — MYC REFILL (OUTPATIENT)
Dept: FAMILY MEDICINE | Facility: CLINIC | Age: 58
End: 2018-07-18

## 2018-07-18 DIAGNOSIS — F43.22 ADJUSTMENT DISORDER WITH ANXIOUS MOOD: ICD-10-CM

## 2018-07-18 DIAGNOSIS — Z72.0 TOBACCO ABUSE: ICD-10-CM

## 2018-07-18 NOTE — TELEPHONE ENCOUNTER
Message from [employer]:  Original authorizing provider: DO Palmer Cohn would like a refill of the following medications:  buPROPion (WELLBUTRIN SR) 150 MG 12 hr tablet [Humberto Trinidad DO]    Preferred pharmacy: [facility] [address]    Comment:

## 2018-07-19 RX ORDER — BUPROPION HYDROCHLORIDE 150 MG/1
TABLET, EXTENDED RELEASE ORAL
Qty: 60 TABLET | Refills: 0 | Status: SHIPPED | OUTPATIENT
Start: 2018-07-19 | End: 2018-08-30

## 2018-07-19 NOTE — TELEPHONE ENCOUNTER
Left my chart message this was ment short term use.   Mya Bowman,Clinic Rn  [place]

## 2018-07-20 ENCOUNTER — TELEPHONE (OUTPATIENT)
Dept: PALLIATIVE MEDICINE | Facility: CLINIC | Age: 58
End: 2018-07-20

## 2018-07-20 NOTE — TELEPHONE ENCOUNTER
Pharmacy called.  They got there shipment.  Will disregard call.    Van Oquendo, RN  Care Coordinator   [facility] [place]

## 2018-07-20 NOTE — TELEPHONE ENCOUNTER
Patient should have a refill for Flexeril 5-10mg TID PRN muscle spasms available. That would be my alternative at present time.     Melanie STEVENS RN CNP, FNP  [facility] [place]

## 2018-07-20 NOTE — TELEPHONE ENCOUNTER
Received fax from [facility] pharmacy. Robaxin-750mg is on backorder.  Asking for an alternative, or possibly 500mg?

## 2018-07-30 ENCOUNTER — MYC REFILL (OUTPATIENT)
Dept: PALLIATIVE MEDICINE | Facility: CLINIC | Age: 58
End: 2018-07-30

## 2018-07-30 DIAGNOSIS — M47.812 CERVICAL SPONDYLOSIS WITHOUT MYELOPATHY: ICD-10-CM

## 2018-07-30 DIAGNOSIS — M54.12 CERVICAL RADICULOPATHY: ICD-10-CM

## 2018-07-30 DIAGNOSIS — M50.30 DDD (DEGENERATIVE DISC DISEASE), CERVICAL: ICD-10-CM

## 2018-07-31 NOTE — TELEPHONE ENCOUNTER
Received call from patient requesting refill(s) of traMADol (ULTRAM) 50 MG tablet and traMADol (ULTRAM-ER) 200 MG ER-tablet    Last picked up from pharmacy on 50mg - 06/29/18   200mg - 07/04/18    Pt last seen by prescribing provider on 06/29/18  Next appt scheduled for 09/05/18 - does have injection prior     checked in the past 6 months? Yes If no, print current report and give to RN    Last urine drug screen date 01/08/18  Current opioid agreement on file (completed within the last year) Yes Date of opioid agreement: 01/08/18    Processing (pick one and delete the others):      Fax to [facility] pharmacy       Will route to nursing pool for review and preparation of prescription(s).

## 2018-07-31 NOTE — TELEPHONE ENCOUNTER
Message from [employer]:  Original authorizing provider: RODNEY Vargas CNP    Palmer MINAYAEv Suero would like a refill of the following medications:  traMADol (ULTRAM-ER) 200 MG ER-tablet [RODNEY Vargas CNP]  traMADol (ULTRAM) 50 MG tablet [RODNEY Vargas CNP]    Preferred pharmacy: [facility] [address] [place] [address]    Comment:  I have 7 left, just planning ahead. My [facility] tends to lose script in cyberspace and when they do find they may be out, so giving some time.

## 2018-07-31 NOTE — TELEPHONE ENCOUNTER
Spoke to patient. Confirmed that he generally does take max of 3, he reports that current quantity is sufficient for 30 days. Routing to provider to review. Patient requests MA call him once faxed as [facility] pharmacy has lost scripts

## 2018-07-31 NOTE — TELEPHONE ENCOUNTER
"Routing to provider to review script prepped per below.   Did call patient to clarify how he is taking this, AVS states 4x daily and script sig states max 3 daily.  Current script reordered and prepped for max 3. Left message for him to call triage or scheduling to connect with an RN to clarify before this is signed.     Per 06/29/18 OV:  1. Medication Management:    1. Continue Tramadol ER 200mg QD  2. Continue Tramadol immediate release 50mg tabs, max of 4 tabs daily     Last Tramadol script written for #90, \"May take 1-2 tablets every 6 hours as needed for pain, max of 3 tabs per day. Reduce as able. Okay to fill on/after 6/29/2018 and start on/after 7/3/2018; To last 30 days\"    Tramadol 50mg tab,#90, Refill:No  Sig:May take 1-2 tablets every 6 hours as needed for pain, max of 3 tabs per day. Reduce as able. Okay to fill on/after 07/31/2018 and start on/after 08/02/2018; To last 30 days.  Last picked up 06/29/18 with start on 07/03/18  Due:08/02/18      Tramadol ER 200mg, #30, Refill:No  Sig:Okay to fill on/after 08/01/18 and start on/after 8/3/2018; to last 30 days  Last picked up 07/04/18 with start 07/03/18  Due:08/03/18  "

## 2018-08-01 RX ORDER — TRAMADOL HYDROCHLORIDE 50 MG/1
TABLET ORAL
Qty: 90 TABLET | Refills: 0 | Status: SHIPPED | OUTPATIENT
Start: 2018-08-01 | End: 2018-08-29

## 2018-08-01 RX ORDER — TRAMADOL HYDROCHLORIDE 200 MG/1
200 TABLET, EXTENDED RELEASE ORAL DAILY
Qty: 30 TABLET | Refills: 0 | Status: SHIPPED | OUTPATIENT
Start: 2018-08-03 | End: 2018-08-29

## 2018-08-01 NOTE — TELEPHONE ENCOUNTER
Signed Prescriptions:                        Disp   Refills    traMADol (ULTRAM-ER) 200 MG ER-tablet      30 tab*0        Sig: Take 1 tablet (200 mg) by mouth daily Okay to fill           on/after 08/01/18 and start on/after 8/3/2018; to           last 30 days  Authorizing Provider: MELANIE BENSON    traMADol (ULTRAM) 50 MG tablet             90 tab*0        Sig: May take 1-2 tablets every 6 hours as needed for           pain, max of 3 tabs per day. Reduce as able. Okay           to fill on/after 07/31/2018 and start on/after           08/02/2018; To last 30 days.  Authorizing Provider: MELANIE BENSON    [place]  reviewed, no concerning fills.   OA and UDS are up to date.     Signed script placed in touchdown bin at [facility].    Melanie Benson APRN CNP FNP RN  [facility]

## 2018-08-01 NOTE — TELEPHONE ENCOUNTER
Signed Rx faxed to [facility] pharmacy. RightFax confirmed. Left voice message.       Mike Bergeron MA

## 2018-08-07 ENCOUNTER — RADIANT APPOINTMENT (OUTPATIENT)
Dept: RADIOLOGY | Facility: CLINIC | Age: 58
End: 2018-08-07
Attending: PAIN MEDICINE
Payer: OTHER MISCELLANEOUS

## 2018-08-07 ENCOUNTER — RADIOLOGY INJECTION OFFICE VISIT (OUTPATIENT)
Dept: PALLIATIVE MEDICINE | Facility: CLINIC | Age: 58
End: 2018-08-07
Payer: OTHER MISCELLANEOUS

## 2018-08-07 VITALS — DIASTOLIC BLOOD PRESSURE: 90 MMHG | SYSTOLIC BLOOD PRESSURE: 147 MMHG | HEART RATE: 67 BPM

## 2018-08-07 DIAGNOSIS — M54.16 LUMBAR RADICULOPATHY: Primary | ICD-10-CM

## 2018-08-07 DIAGNOSIS — M53.3 SI (SACROILIAC) JOINT DYSFUNCTION: ICD-10-CM

## 2018-08-07 PROCEDURE — 27096 INJECT SACROILIAC JOINT: CPT | Mod: 50 | Performed by: PAIN MEDICINE

## 2018-08-07 ASSESSMENT — PAIN SCALES - GENERAL: PAINLEVEL: EXTREME PAIN (8)

## 2018-08-07 NOTE — PATIENT INSTRUCTIONS
[facility]   Procedure Discharge Instructions    Today you saw:    Dr. Hugo Corrigan    You had an:    sacro-iliac joint injection        Medications used:  Lidocaine   Bupivacaine   Dexamethasone  Omnipaque Normal saline            Be cautious when walking. Numbness and/or weakness in the lower extremities may occur for up to 6-8 hours after the procedure due to effect of the local anesthetic    Do not drive for 6 hours. The effect of the local anesthetic could slow your reflexes.     You may resume your regular activities after 24 hours    Avoid strenuous activity for the first 24 hours    You may shower, however avoid swimming, tub baths or hot tubs for 24 hours following your procedure    You may have a mild to moderate increase in pain for several days following the injection.    It may take up to 14 days for the steroid medication to start working although you may feel the effect as early as a few days after the procedure.       You may use ice packs for 10-15 minutes, 3 to 4 times a day at the injection site for comfort    Do not use heat to painful areas for 6 to 8 hours. This will give the local anesthetic time to wear off and prevent you from accidentally burning your skin.     You may use anti-inflammatory medications (such as Ibuprofen or Aleve or Advil) or Tylenol for pain control if necessary    If you experience any of the following, call the Pain Clinic during work hours at [phone] or the Provider Line after hours at [phone]:  -Fever over 100 degree F  -Swelling, bleeding, redness, drainage, warmth at the injection site  -Progressive weakness or numbness in your legs  -Unusual new onset of pain that is not improving

## 2018-08-07 NOTE — NURSING NOTE
Discharge Information    IV Discontiued Time:  NA    Amount of Fluid Infused:  NA    Discharge Criteria = When patient returns to baseline or as per MD order    Consciousness:  Pt is fully awake    Circulation:  BP +/- 20% of pre-procedure level    Respiration:  Patient is able to breathe deeply    O2 Sat:  Patient is able to maintain O2 Sat >92% on room air    Activity:  Moves 4 extremities on command    Ambulation:  Patient is able to stand and walk or stand and pivot into wheelchair    Dressing:  Clean/dry or No Dressing    Notes:   Discharge instructions and AVS given to patient    Patient meets criteria for discharge?  YES    Admitted to PCU?  No    Responsible adult present to accompany patient home?  Yes    Signature/Title:    Van Oquendo RN Care Coordinator  [facility] [place]

## 2018-08-07 NOTE — NURSING NOTE
Pre-procedure Intake    Have you been fasting? NA    If yes, for how long? NA    Are you taking a prescribed blood thinner such as coumadin, Plavix, Xarelto?    No    If yes, when did you take your last dose? NA    Do you take aspirin?  No    If cervical procedure, have you held aspirin for 6 days?   NA    Do you have any allergies to contrast dye, iodine, steroid and/or numbing medications?  NO    Are you currently taking antibiotics or have an active infection?  NO    Have you had a fever/elevated temperature within the past week? NO    Are you currently taking oral steroids? NO    Do you have a ? Yes       Are you pregnant or breastfeeding?  Not Applicable    Are the vital signs normal?  No: Elevated BP

## 2018-08-07 NOTE — NURSING NOTE
Discharge Information    IV Discontiued Time:  NA    Amount of Fluid Infused:  NA    Discharge Criteria = When patient returns to baseline or as per MD order    Consciousness:  Pt is fully awake    Circulation:  BP +/- 20% of pre-procedure level    Respiration:  Patient is able to breathe deeply    O2 Sat:  Patient is able to maintain O2 Sat >92% on room air    Activity:  Moves 4 extremities on command    Ambulation:  Patient is able to stand and walk or stand and pivot into wheelchair    Dressing:  Clean/dry or No Dressing    Notes:   Discharge instructions and AVS given to patient    Patient meets criteria for discharge?  YES    Admitted to PCU?  No    Responsible adult present to accompany patient home?  Yes    Signature/Title:    leslie peter RN Care Coordinator  [facility] [place]

## 2018-08-07 NOTE — PROGRESS NOTES
Pre procedure Diagnosis: SI joint dysfunction    Post procedure Diagnosis: Same  Procedure performed: Bilateral SI joint injection  Anesthesia: none  Complications: None  Operators: Hugo Corrigan MD     Indications:   Truman Suero is a 57 year old male. The patient has a history of bilateral buttocks pain.  He denies any radiation to his lower extremity. Other conservative treatments prior to injection include medications/physical therapy.    Options/alternatives, benefits and risks were discussed with the patient including bleeding, infection, tissue trauma, exposure to radiation, reaction to medications including seizure, nerve injury, weakness, and numbness.  Questions were answered to his satisfaction and he agrees to proceed. Voluntary informed consent was obtained and signed.     Vitals were reviewed: Yes  Allergies were reviewed:  Yes   Medications were reviewed:  Yes   Pre-procedure pain score: 9/10    Procedure:  After obtaining signed informed consent, the patient was brought into the procedure suite and was placed in a prone position on the procedure table.   A Pause for the Cause was performed.  The patient was prepped and draped in the usual sterile fashion.     After identifying the bilateral SI joint, the C-arm was rotated obliquely to obtain the best view of the inferior angle of the joint.  Then 5 ml of Lidocaine 1%  was used to anesthetize the skin at a skin entry site coaxial with the fluoroscopy beam at this location.  A 22 gauge 3.5 inch needle was advanced under intermittent fluoroscopy until it was felt to enter the SI joint.  Aspiration was negative.    A total of 1ml of Omnipaque-300 was injected, confirming appropriate position, with spread into the intraarticular space, with no intravascular uptake noted.  9ml of Omnipaque was wasted. Location was verified in lateral view.    2 ml of 0.5% bupivacaine with 40mg of Kenalog was injected.  The needle was removed.     Hemostasis was  achieved, the area was cleaned, and bandaids were placed when appropriate.  The patient tolerated the procedure well, and was taken to the recovery room.  Images were saved to PACS.    Post-procedure pain score: 7/10  Follow-up includes:   -f/u phone call in one week  -f/u with referring Physician     Hguo Corrigan MD  [facility]    Bilateral

## 2018-08-07 NOTE — MR AVS SNAPSHOT
After Visit Summary   8/7/2018    Truman Suero    MRN: [id number]           Patient Information     Date Of Birth          1960        Visit Information        Provider Department      8/7/2018 8:15 AM Hugo Corrigan MD [facility] [place]        Care Instructions    [facility]   Procedure Discharge Instructions    Today you saw:    Dr. Terrance Harding      You had an:    sacro-iliac joint injection        Medications used:  Lidocaine   Bupivacaine   Dexamethasone  Omnipaque Normal saline            Be cautious when walking. Numbness and/or weakness in the lower extremities may occur for up to 6-8 hours after the procedure due to effect of the local anesthetic    Do not drive for 6 hours. The effect of the local anesthetic could slow your reflexes.     You may resume your regular activities after 24 hours    Avoid strenuous activity for the first 24 hours    You may shower, however avoid swimming, tub baths or hot tubs for 24 hours following your procedure    You may have a mild to moderate increase in pain for several days following the injection.    It may take up to 14 days for the steroid medication to start working although you may feel the effect as early as a few days after the procedure.       You may use ice packs for 10-15 minutes, 3 to 4 times a day at the injection site for comfort    Do not use heat to painful areas for 6 to 8 hours. This will give the local anesthetic time to wear off and prevent you from accidentally burning your skin.     You may use anti-inflammatory medications (such as Ibuprofen or Aleve or Advil) or Tylenol for pain control if necessary    If you experience any of the following, call the Pain Clinic during work hours at [phone] or the Provider Line after hours at [phone]:  -Fever over 100 degree F  -Swelling, bleeding, redness, drainage, warmth at the injection site  -Progressive weakness or numbness in your  legs  -Unusual new onset of pain that is not improving                Follow-ups after your visit        Your next 10 appointments already scheduled     Aug 07, 2018  8:15 AM CDT   Radiology Injections with Hugo Corrigan MD   [facility] ([facility])    [address]  [place] [address] [phone]            Aug 07, 2018  8:15 AM CDT   XR SACROILIAC THERAPEUTIC INJECTION BILATERAL with BEPAIN1   [facility] [place] [facility] ([facility])    [address]  [place] [id number] [phone]           Stop drinking 1 hour before the exam.  You may take your medicines as usual, except for blood thinners (Coumadin, Plavix, Ticlid, Persantine, Aggrenox, Pletal, Effient, Brilliant). Talk to your doctor if you take these.  Tell your doctor if:   You have ever had an allergic reaction to X-ray dye (contrast fluid).   There is a chance you may be pregnant.  Please bring a list of your current medicines to your exam. Include vitamins, minerals and over-the-counter medicines.  Please call the Imaging Department at your exam site with any questions.            Sep 05, 2018  9:00 AM CDT   Return Visit with RODNEY Vargas CNP   [facility] [place] ([facility])    [address]  [place] [id number] [phone]              Who to contact     If you have questions or need follow up information about today's clinic visit or your schedule please contact [facility] directly at [phone].  Normal or non-critical lab and imaging results will be communicated to you by MyChart, letter or phone within 4 business days after the clinic has received the results. If you do not hear from us within 7 days, please contact the clinic through MyChart or phone. If you have a critical or abnormal lab result, we will notify you by phone as soon as possible.  Submit refill requests through  [employer] or call your pharmacy and they will forward the refill request to us. Please allow 3 business days for your refill to be completed.          Additional Information About Your Visit        MyChart Information     [employer] gives you secure access to your electronic health record. If you see a primary care provider, you can also send messages to your care team and make appointments. If you have questions, please call your primary care clinic.  If you do not have a primary care provider, please call [phone] and they will assist you.        Care EveryWhere ID     This is your Care EveryWhere ID. This could be used by other organizations to access your [place] medical records  [id number]         Blood Pressure from Last 3 Encounters:   06/29/18 141/89   05/31/18 142/90   05/23/18 (!) 139/92    Weight from Last 3 Encounters:   06/29/18 68.5 kg (151 lb)   05/31/18 66.7 kg (147 lb)   05/01/18 66.2 kg (146 lb)              Today, you had the following     No orders found for display       Primary Care Provider Office Phone # Fax #    Humberto Trinidad [phone]       [facility] [address]        Equal Access to Services     ELIEZER VALENTE : Hadii aad ku hadasho Soomaali, waaxda luqadaha, qaybta kaalmada adeegyada, mariela carter. So [facility] [phone].    ATENCIÓN: Si habla español, tiene a simmons disposición servicios gratuitos de asistencia lingüística. Llame al [phone].    We comply with applicable federal civil rights laws and [place] laws. We do not discriminate on the basis of race, color, national origin, age, disability, sex, sexual orientation, or gender identity.            Thank you!     Thank you for choosing [facility]  for your care. Our goal is always to provide you with excellent care. Hearing back from our patients is one way we can continue to improve our services. Please take a few minutes to complete the  written survey that you may receive in the mail after your visit with us. Thank you!             Your Updated Medication List - Protect others around you: Learn how to safely use, store and throw away your medicines at www.disposemymeds.org.          This list is accurate as of 8/7/18  8:09 AM.  Always use your most recent med list.                   Brand Name Dispense Instructions for use Diagnosis    buPROPion 150 MG 12 hr tablet    WELLBUTRIN SR    60 tablet    Take 1 tablet once daily and increase to 1 tablet twice daily after 4 to 7 days    Tobacco abuse, Adjustment disorder with anxious mood       cyclobenzaprine 10 MG tablet    FLEXERIL    45 tablet    Take 0.5-1 tablets (5-10 mg) by mouth 3 times daily as needed for muscle spasms Caution sedation    Myofascial pain       fluticasone 50 MCG/ACT spray    FLONASE    2 Bottle    Spray 2 sprays into both nostrils 2 times daily    Chronic allergic rhinitis due to animal hair and dander, Chronic seasonal allergic rhinitis due to pollen, Allergic rhinitis due to dust mite, Allergic conjunctivitis of both eyes       gabapentin 600 MG tablet    NEURONTIN    135 tablet    Take 1.5 tablets (900 mg) by mouth 3 times daily    Cervical spondylosis without myelopathy, DDD (degenerative disc disease), cervical, Chronic neck and back pain, Cervical stenosis of spinal canal       ibuprofen 800 MG tablet    ADVIL/MOTRIN          methocarbamol 750 MG tablet    ROBAXIN    130 tablet    Take 1-2 tablets (750-1,500 mg) by mouth 3 times daily as needed for muscle spasms caution sedation    Chronic neck pain, DDD (degenerative disc disease), cervical, Cervical spondylosis without myelopathy, Myofascial pain       topiramate 50 MG tablet    TOPAMAX    30 tablet    Take 50mg at bedtime.  Drink plenty of water and no alcohol. If side effects, then reduce to last tolerable dosage.    Cervical spondylosis without myelopathy, DDD (degenerative disc disease), cervical, Cervical stenosis of  spinal canal       * traMADol 50 MG tablet    ULTRAM    90 tablet    May take 1-2 tablets every 6 hours as needed for pain, max of 3 tabs per day. Reduce as able. Okay to fill on/after 07/31/2018 and start on/after 08/02/2018; To last 30 days.    Cervical radiculopathy       * traMADol 200 MG ER-tablet    ULTRAM-ER    30 tablet    Take 1 tablet (200 mg) by mouth daily Okay to fill on/after 08/01/18 and start on/after 8/3/2018; to last 30 days    Cervical spondylosis without myelopathy, DDD (degenerative disc disease), cervical       * Notice:  This list has 2 medication(s) that are the same as other medications prescribed for you. Read the directions carefully, and ask your doctor or other care provider to review them with you.

## 2018-08-10 DIAGNOSIS — M54.12 CERVICAL RADICULOPATHY: Primary | ICD-10-CM

## 2018-08-26 ENCOUNTER — MYC MEDICAL ADVICE (OUTPATIENT)
Dept: PALLIATIVE MEDICINE | Facility: CLINIC | Age: 58
End: 2018-08-26

## 2018-08-27 NOTE — TELEPHONE ENCOUNTER
Per patient myChart message:    From: Truman Suero      Created: 8/27/2018 3:51 PM        *-*-*This message has not been handled.*-*-*    Ok got it thanks as always. I am not a big fan of  ibuprofen anyway. Too much bad press on that. I will let you know how that goes. Otherwise I will be seeing you 9/5, look forward to that    Palmer

## 2018-08-27 NOTE — TELEPHONE ENCOUNTER
Per patient myChart message:  From: Truman Suero      Created: 8/26/2018 11:36 AM      Dr Navarro Hope this finds you well.  I know we have appointment is couple of weeks but wanted to pick your brain sooner. Lately, especially during the day at work, I have been getting bit stronger headaches than I have ever had in the past. Didn't think much about it, but getting more frequent and annoying. Question: The topiramate dosage is 1 at night. Can a person take 1-2 during the day. I don't want to mess around with the dosage if that is issue. Just the ibupro- alleve etc. just not cutting it all day long. Take care. P.s. I have the surgery scheduled end of October    Regards  Palmer        Routed to provider.    Estelita Wetzel, RN-BSN  [facility]

## 2018-08-27 NOTE — TELEPHONE ENCOUNTER
Xavier Chakraborty-    Thanks for reaching out. Regarding Topamax dosing, yes, you can dose this medication twice per day, about 10-12 hours apart.   Try taking Topamax 50mg twice daily and see if that doesn't help your headaches. Also, really try NOT to use ibuprofen , Aleve every day for headaches, as doing so can actually make headaches worse and cause rebound headaches. Try to use the ibuprofen/Aleve no more than 2-3 times per week if using for headaches...    Let me know if you need a refill of the topamax and if you decide to take it twice daily.     Thanks.  Melanie STEVENS RN CNP, P  [facility]    I have sent the above getupp message to the patient.     Melanie STEVENS RN CNP, P  [facility]

## 2018-08-29 ENCOUNTER — MYC REFILL (OUTPATIENT)
Dept: PALLIATIVE MEDICINE | Facility: CLINIC | Age: 58
End: 2018-08-29

## 2018-08-29 DIAGNOSIS — M47.812 CERVICAL SPONDYLOSIS WITHOUT MYELOPATHY: ICD-10-CM

## 2018-08-29 DIAGNOSIS — M50.30 DDD (DEGENERATIVE DISC DISEASE), CERVICAL: ICD-10-CM

## 2018-08-29 DIAGNOSIS — M54.12 CERVICAL RADICULOPATHY: ICD-10-CM

## 2018-08-29 RX ORDER — TRAMADOL HYDROCHLORIDE 200 MG/1
200 TABLET, EXTENDED RELEASE ORAL DAILY
Qty: 30 TABLET | Refills: 0 | Status: SHIPPED | OUTPATIENT
Start: 2018-08-29 | End: 2018-09-27

## 2018-08-29 RX ORDER — TRAMADOL HYDROCHLORIDE 50 MG/1
TABLET ORAL
Qty: 90 TABLET | Refills: 0 | Status: SHIPPED | OUTPATIENT
Start: 2018-08-29 | End: 2018-09-27

## 2018-08-29 NOTE — TELEPHONE ENCOUNTER
Message from [employer]:  Original authorizing provider: RODNEY Vargas CNP would like a refill of the following medications:  traMADol (ULTRAM-ER) 200 MG ER-tablet [RODNEY Vargas CNP]  traMADol (ULTRAM) 50 MG tablet [RODNEY Vargas CNP]    Preferred pharmacy: [facility] [address] [place] [address]    Comment:  I have 6 left, so wanted to start the process Thanks Palmer

## 2018-08-29 NOTE — TELEPHONE ENCOUNTER
Received call from patient requesting refill(s) of traMADol (ULTRAM) 50 MG tablet  And  traMADol (ULTRAM-ER) 200 MG ER-tablet    Last picked up from pharmacy on : both on 08/01/18    Pt last seen by prescribing provider on 06/29/18, has had injection since  Next appt scheduled for 09/05/18     checked in the past 6 months? Yes If no, print current report and give to RN    Last urine drug screen date 01/08/18  Current opioid agreement on file (completed within the last year) Yes Date of opioid agreement: 01/08/18    Processing (pick one and delete the others):      Fax to [facility] pharmacy       Will route to nursing pool for review and preparation of prescription(s).

## 2018-08-29 NOTE — TELEPHONE ENCOUNTER
Medication refill information reviewed.     Last due  Tramadol ER: Okay to fill on/after 08/01/18 and start on/after 8/3/2018  Tramadol:  Okay to fill on/after 07/31/2018 and start on/after 08/02/2018    Due date:    Tramadol ER: 9/2/18  Tramadol:  9/1/18    Weaning instructions:  N/A    Prescriptions prepped for review.     Estelita Wetzel RN-BSN  [facility]

## 2018-08-29 NOTE — TELEPHONE ENCOUNTER
Routed to the nursing pool and to the MA pool to process refill(s).    Estelita Wetzel, RN-BSN  [facility]

## 2018-08-29 NOTE — TELEPHONE ENCOUNTER
Signed Prescriptions:                        Disp   Refills    traMADol (ULTRAM-ER) 200 MG ER-tablet      30 tab*0        Sig: Take 1 tablet (200 mg) by mouth daily Okay to fill           on/after 08/31/18 and start on/after 9/2/2018; to           last 30 days  Authorizing Provider: MELANIE BENSON    traMADol (ULTRAM) 50 MG tablet             90 tab*0        Sig: May take 1-2 tablets every 6 hours as needed for           pain, max of 3 tabs per day. Reduce as able. Okay           to fill on/after 08/31/2018 and start on/after           09/1/2018; To last 30 days.  Authorizing Provider: MELANIE BENSON    Reviewed [place]  08/29/18--no concerning fills    Signed script placed in locked in top drawer of trigger point cart at [facility]    Melanie STEVENS, RN CNP, FNP  [facility] [place]

## 2018-08-30 ENCOUNTER — MYC REFILL (OUTPATIENT)
Dept: FAMILY MEDICINE | Facility: CLINIC | Age: 58
End: 2018-08-30

## 2018-08-30 DIAGNOSIS — F43.22 ADJUSTMENT DISORDER WITH ANXIOUS MOOD: ICD-10-CM

## 2018-08-30 DIAGNOSIS — Z72.0 TOBACCO ABUSE: ICD-10-CM

## 2018-08-30 NOTE — TELEPHONE ENCOUNTER
Message from [employer]:  Original authorizing provider: DO Palmer Cohn would like a refill of the following medications:  buPROPion (WELLBUTRIN SR) 150 MG 12 hr tablet [Humberto Trinidad DO]    Preferred pharmacy: [facility] [address]    Comment:  I have appointment scheduled 10/2 for physical. Have surgery scheduled 10/29

## 2018-08-30 NOTE — TELEPHONE ENCOUNTER
Signed Rx's faxed to [facility] [place] RightFax confirmed. Patient was informed via MyChart.

## 2018-08-31 RX ORDER — BUPROPION HYDROCHLORIDE 150 MG/1
TABLET, EXTENDED RELEASE ORAL
Qty: 60 TABLET | Refills: 0 | Status: SHIPPED | OUTPATIENT
Start: 2018-08-31 | End: 2018-10-01

## 2018-08-31 NOTE — TELEPHONE ENCOUNTER
"Requested Prescriptions   Pending Prescriptions Disp Refills     buPROPion (WELLBUTRIN SR) 150 MG 12 hr tablet  Last Written Prescription Date:  07/19/18  Last Fill Quantity: 60,  # refills: 0   Last office visit: 4/30/2018 with prescribing provider:  Amos   Future Office Visit:   Next 5 appointments (look out 90 days)     Sep 05, 2018  9:00 AM CDT   Return Visit with RODNEY Vargas CNP   [facility] ([facility])    [address]  [place] [address]   [phone]            Oct 02, 2018  2:00 PM CDT   Pre-Op physical with Humberto Trinidad DO   [facility] ([facility])    [address] [place] [id number] [phone]                    60 tablet 0     Sig: Take 1 tablet once daily and increase to 1 tablet twice daily after 4 to 7 days    SSRIs Protocol Passed    8/30/2018  5:51 PM       Passed - Recent (12 mo) or future (30 days) visit within the authorizing provider's specialty    Patient had office visit in the last 12 months or has a visit in the next 30 days with authorizing provider or within the authorizing provider's specialty.  See \"Patient Info\" tab in inbasket, or \"Choose Columns\" in Meds & Orders section of the refill encounter.           Passed - Medication is Bupropion    If the medication is Bupropion (Wellbutrin), and the patient is taking for smoking cessation; OK to refill.         Passed - Patient is age 18 or older          "

## 2018-08-31 NOTE — TELEPHONE ENCOUNTER
"Pooja given, as last note from provider on 4/30/18 states patient would take for 3-6 months before weaning, had refilled in July, and patient has upcoming appointment in about a month.  Note as follows:    \"1. Tobacco abuse  Patient will continue taking as directed for tobacco abuse in the past.  In 3 to 6 months we may begin to wean down.   - buPROPion (WELLBUTRIN SR) 150 MG 12 hr tablet; Take 1 tablet once daily and increase to 1 tablet twice daily after 4 to 7 days  Dispense: 60 tablet; Refill: 0\"      MyChart sent.   Ary Hayes RN    "

## 2018-09-05 ENCOUNTER — OFFICE VISIT (OUTPATIENT)
Dept: PALLIATIVE MEDICINE | Facility: CLINIC | Age: 58
End: 2018-09-05
Payer: OTHER MISCELLANEOUS

## 2018-09-05 VITALS
DIASTOLIC BLOOD PRESSURE: 90 MMHG | SYSTOLIC BLOOD PRESSURE: 136 MMHG | BODY MASS INDEX: 23.81 KG/M2 | HEART RATE: 67 BPM | WEIGHT: 152 LBS

## 2018-09-05 DIAGNOSIS — M54.59 LUMBAR FACET JOINT PAIN: ICD-10-CM

## 2018-09-05 DIAGNOSIS — M50.30 DDD (DEGENERATIVE DISC DISEASE), CERVICAL: ICD-10-CM

## 2018-09-05 DIAGNOSIS — G89.29 CHRONIC BILATERAL LOW BACK PAIN WITHOUT SCIATICA: Primary | ICD-10-CM

## 2018-09-05 DIAGNOSIS — M48.02 CERVICAL STENOSIS OF SPINAL CANAL: ICD-10-CM

## 2018-09-05 DIAGNOSIS — M47.812 CERVICAL SPONDYLOSIS WITHOUT MYELOPATHY: ICD-10-CM

## 2018-09-05 DIAGNOSIS — M54.50 CHRONIC BILATERAL LOW BACK PAIN WITHOUT SCIATICA: Primary | ICD-10-CM

## 2018-09-05 DIAGNOSIS — M79.18 MYOFASCIAL PAIN: ICD-10-CM

## 2018-09-05 PROCEDURE — 99214 OFFICE O/P EST MOD 30 MIN: CPT | Performed by: NURSE PRACTITIONER

## 2018-09-05 RX ORDER — CYCLOBENZAPRINE HCL 10 MG
5-10 TABLET ORAL 3 TIMES DAILY PRN
Qty: 45 TABLET | Refills: 1 | Status: ON HOLD | OUTPATIENT
Start: 2018-09-05 | End: 2018-10-30

## 2018-09-05 RX ORDER — TOPIRAMATE 50 MG/1
TABLET, FILM COATED ORAL
Qty: 30 TABLET | Refills: 2 | Status: SHIPPED | OUTPATIENT
Start: 2018-09-05 | End: 2018-09-19

## 2018-09-05 ASSESSMENT — PAIN SCALES - GENERAL: PAINLEVEL: EXTREME PAIN (8)

## 2018-09-05 NOTE — PATIENT INSTRUCTIONS
"PLAN:  1. Continue home exercises   2. Medications:   1. Continue Tramadol  mg once daily   2. Continue Tramadol immediate release 50 mg, max of 4 tabs per day   3. Continue gabapentin 900 mg three times daily   4. Continue Topamax 50 mg at bedtime   5. Continue methocarbamol 750-1500 mg three times daily as needed for muscle spasms   3. Procedures: Lumbar facet joint injections vs medial branch blocks (\"test injections\")/radiofrequency nerve ablation (\"treatment phase\"). I am leaning towards lumbar facet steroid joint injection, but I have sent a message to Dr Harkins to ensure this is indicated prior to neck surgery. I will send you a [employer] message once I hear back from Dr Harkins.   4. Follow-up with me in 12 weeks        ----------------------------------------------------------------  Clinic Number:  [phone]   Call this number with any questions about your care and for scheduling assistance. Calls are returned Monday through Friday between 8 AM and 4:30 PM. We usually get back to you within 2 business days depending on the issue/request.       Medication refills:    For non-narcotic medications, call your pharmacy directly to request a refill. The pharmacy will contact the [facility] for authorization. Please allow 3-4 days for these refills to be processed.     For narcotic refills, call the nurse triage line or send a [employer] message. Please contact us 7-10 days before your refill is due. The message MUST include the name of the specific medication(s) requested and how you would like to receive the prescription(s). The options are as follows:    Pain Clinic staff can mail the prescription to your pharmacy. Please tell us the name of the pharmacy.    You may pick the prescription up at the Pain Clinic (tell us the location) or during a clinic visit with your pain provider    Pain Clinic staff can deliver the prescription to the [place] pharmacy in the clinic building. Please tell us the " location.      We believe regular attendance is key to your success in our program.    Any time you are unable to keep your appointment we ask that you call us at least 24 hours in advance to let us know. This will allow us to offer the appointment time to another patient.

## 2018-09-05 NOTE — PROGRESS NOTES
[facility]    [id number]       Chief complaint: neck and lower back  Neck pain is most bothersome    Interval history:  Truman Suero is a 57 year old male is known to me for   Chronic neck pain  Cervical DDD  Cervical spondylosis (pain worse with extension/rotation indicating facetogenic component to pain)  Axial low back pain  Myofascial pain/muscle spasms  Remote history of ETOH overuse, attended AA for awhile. Sober for 10 years  ---PMHx includes: neck pain  ---PSHx includes: none listed      Recommendations/plan at the last visit on 6/29/2018 included:  1. Physical Therapy: not at present time  2. Patient is to continue his home exercise program and exercises learned in PHYSICAL THERAPY  3. Clinical Health Psychologist: continue work with  Kentrell Castañeda in health psychology  4. Diagnostic Studies:  None  5. Medication Management:    1. Continue Tramadol ER 200mg QD  2. Continue Tramadol immediate release 50mg tabs, max of 4 tabs daily   3. Continue gabapentin 900 mg TID  4. Continue Topamax 50 mg at bedtime  5. Continue Methocarbamol 750-1500 mg TID PRN   6. Further procedures recommended: pt will schedule bilateral SI joint injections, office to contact patient. If not effective, then consider for lumbar facet joint injections  7. Recommendations to PCP: see above  8. Follow up: 10-12 weeks      Since his last visit, Truman Suero reports:  -Low back pain remains, no radiculopathy to lower extremities.   -Bilateral SI joint injection was more helpful than previous injections have been.   -Pain improved with lying down and exacerbated with standing, walking and sitting for extended time periods.  -Scheduled for right anterior C5-6 discectomy and fusion on 10/29/18 with Dr Harkins.   -He remains tobacco free. He feels much better following tobacco cessation.     At this point, the patient's participation with our multidisciplinary team includes:  The patient has been compliant with the  "program.  Pain Group - not ordered  PT - attending non-pain management PHYSICAL THERAPY   Health Psych - has seen Kentrell Castañeda x4  Acupuncture: working with Dr. Bereket LAY      Pain scores:  Pain intensity on average is 9 on a scale of 0-10.    Range is 8-10/10.   Pain right now is 8/10.  Pain is described as \"aching, numb, unbearable, burning, tiring, shooting, exhausting, throbbing, penetrating, sharp, tender, stabbing, gnawing, miserable and nagging\"    Pain is constant in nature    Current pain relevant medications:   -Tramadol 200mg ER in the evening (helpful)  -Tramadol 50mg take 1 tablet  Q 6 hours PRN (using 2-3 tabs per day, occasionally using 4 tabs with exacerbated pain)  -ibuprofen 600mg PRN (helpful)  -gabapentin 900mg TID (somewhat helpful)  -methocarbamol 500-1000mg TID PRN muscle spasms (takes 1000 mg BID, somewhat helpful)  -Topamax 50 mg HS (helpful)    Other pertinent medications:  None    Previous Medications:  OPIATES: Tramdol (somewhat helpful)  NSAIDS: ibuprofen (helpful), Aleve (helpful)  MUSCLE RELAXANTS: none  ANTI-MIGRAINE MEDS: none  ANTI-DEPRESSANTS: none  SLEEP AIDS: none  ANTI-CONVULSANTS: gabapentin (helpful)  TOPICALS: lidocaine ointment (somewhat helpful)  Other meds: Tylenol (not helpful)        Other treatments have included:  Truman Suero has not been seen at a pain clinic in the past.    PT: tried, somewhat helpful  Chiropractic: helpful  Acupuncture: none  TENs Unit: none     Injections:   cervical radiofrequency nerve ablation at [facility] in December 2016 (did get good relief, got 70% relief of his typical neck pain)  -6/29/2017 Cervical facet joint steroid injections at C4-5 and C5-6 on the right with Dr. Nicole Harding (not helpful)  -3/8/2018 C7-T1 interlaminar DEMARCUS with Dr. Hugo Corrigan (not helpful)  -5/23/2018 right L4-5 transforaminal epidural steroid injection with Dr. Osorio (not helpful for back pain but did help leg pain)  -8/7/2018 bilateral SI joint " injection with Dr Hugo Corrigan (helpful for one month)        THE 4 A's OF OPIOID MAINTENANCE ANALGESIA    Analgesia: long acting Tramadol with some short acting tramadol does give some relief    Activity: ADLS and PHYSICAL THERAPY exercises. Working full time as a     Adverse effects: none    Adherence to Rx protocol: yes      Side Effects: none  Patient is using the medication as prescribed: yes    Medications:  Current Outpatient Prescriptions   Medication Sig Dispense Refill     buPROPion (WELLBUTRIN SR) 150 MG 12 hr tablet Take 1 tablet once daily and increase to 1 tablet twice daily after 4 to 7 days 60 tablet 0     cyclobenzaprine (FLEXERIL) 10 MG tablet Take 0.5-1 tablets (5-10 mg) by mouth 3 times daily as needed for muscle spasms Caution sedation 45 tablet 1     fluticasone (FLONASE) 50 MCG/ACT spray Spray 2 sprays into both nostrils 2 times daily 2 Bottle 11     gabapentin (NEURONTIN) 600 MG tablet Take 1.5 tablets (900 mg) by mouth 3 times daily 135 tablet 3     ibuprofen (ADVIL,MOTRIN) 800 MG tablet   0     methocarbamol (ROBAXIN) 750 MG tablet Take 1-2 tablets (750-1,500 mg) by mouth 3 times daily as needed for muscle spasms caution sedation 130 tablet 2     topiramate (TOPAMAX) 50 MG tablet Take 50mg at bedtime.  Drink plenty of water and no alcohol. If side effects, then reduce to last tolerable dosage. 30 tablet 2     traMADol (ULTRAM) 50 MG tablet May take 1-2 tablets every 6 hours as needed for pain, max of 3 tabs per day. Reduce as able. Okay to fill on/after 08/31/2018 and start on/after 09/1/2018; To last 30 days. 90 tablet 0     traMADol (ULTRAM-ER) 200 MG ER-tablet Take 1 tablet (200 mg) by mouth daily Okay to fill on/after 08/31/18 and start on/after 9/2/2018; to last 30 days 30 tablet 0       Medical History: any changes in medical history since they were last seen? None    Social History:   Home situation: , has 4 kids, 2 at home, one in college and one launched.    Occupation/Schooling:  full time in [employer]  Tobacco use: former smoke, quit on 3/20/2018  Alcohol use: sober for nearly 10 years. He used to work a sobriety program, used to go to   Drug use: none  History of chemical dependency treatment: no formal treatment, did AA    Is patient a current smoker or tobacco user?  QUIT on 3/20/2018  If yes, was cessation counseling offered?  no        Review of Systems:  ROS is positive as per HPI as well as for headache, joint pain, stiffness, back pain, neck pain, weakness, numbness/tingling, anxiety, stress, and is negative for fevers, sweats, or constipation, diarrhea.    This document serves as a record of the services and decisions personally performed and made by Melanie STEVENS. It was created on her behalf by Alena Lorenz, a trained medical scribe. The creation of this document is based on the provider's statements to the medical scribe.  Alena Lorenz 9:12 AM September 5, 2018      Physical Exam:  Vital signs: /90  Pulse 67  Wt 68.9 kg (152 lb)  BMI 23.81 kg/m2     Behavioral observations:  Awake, alert, cooperative    Gait:  normal    Musculoskeletal exam:    Moves well in exam room  Lumbar flexion to 90 degrees  Lumbar extension to 30 degrees, painful  Lumbar extension/rotation is painful bilaterally, R>L  Mild tenderness over SI joint bilaterally   Mild tenderness over piriformis bilaterally  Greater trochanters are nontender  Strength grossly equal throughout    Neuro exam:  SILT in all extremities     Skin/vascular/autonomic:  No suspicious lesions on exposed skin.      Other:  NA    Is the patient hypertensive today? yes  Hypertensive on recheck of BP?   n/a  If yes, was patient recommended to see Primary Care Provider in follow up for management of HTN?  yes      IMAGING:    MR CERVICAL SPINE WITHOUT CONTRAST 9/5/2017 2:32 PM      HISTORY: Neck pain, worsening numbness in arms and lower extremities.  Radiculopathy, cervical  region.     TECHNIQUE: Multiplanar multisequence images were obtained through the  cervical spine without contrast.     COMPARISON: 2/22/2017.     FINDINGS: Sagittal images demonstrate some minimal gentle cervical  kyphosis, otherwise normal posterior alignment. There is no evidence  for craniovertebral or cervicomedullary junction abnormality. The  cervical cord is minimally indented anteriorly at C4-C5 and C5-C6,  otherwise is normal in morphology and signal characteristics. Disc  space narrowing and discogenic marrow changes are present C3-C4,  C4-C5, C5-C6 and C6-C7.     C2-C3: Minimal facet hypertrophy. No stenosis.     C3-C4: Broad-based posterior osteophyte formation is present causing  some mild bilateral neural foraminal stenosis, but no central canal  stenosis.     C4-C5: Broad-based posterior osteophyte formation and mild facet  hypertrophy is present causing some mild to moderate central canal  stenosis, mild cord deformity, and mild-to-moderate bilateral neural  foraminal stenosis.     C5-C6: Broad-based posterior osteophyte formation and disc bulging is  present along with some facet hypertrophy. There is moderate central  canal stenosis and moderate bilateral neural foraminal stenosis.  Findings have progressed since the prior exam.     C6-C7: Broad-based disc bulging is present along with some minimal  posterior osteophyte formation. There is borderline to mild central  canal stenosis, but no neural foraminal stenosis.     C7-T1: Moderate facet hypertrophy. No significant stenosis.         IMPRESSION: Moderate multilevel degenerative disc and facet disease  with some progression at C5-C6 where there is moderate central canal  and bilateral neural foraminal stenosis along with cord deformity.          MR CERVICAL SPINE WITHOUT CONTRAST  2/22/2017 9:59 AM     HISTORY:  Bilateral neck and arm pain for three years.     COMPARISON: None.     TECHNIQUE: Routine MR cervical spine extended through  T2.     FINDINGS: There is some degenerative bone marrow signal change C3-C6.  No malignant or destructive lesions. Normal alignment through T2.  Reversed cervical adenosis C3-C6.     The cervical and upper thoracic spinal cord appear intrinsically  normal. The craniocervical junction region is normal. No paraspinous  soft tissue abnormality.     Findings by level as follows:     C2-C3: Negative. No disc protrusion. No central or lateral stenosis.     C3-C4: Mild disc space narrowing. Mild diffuse annular bulge. Small  uncinate spur on the right. No significant central or left-sided  stenosis. Mild right-sided foraminal stenosis.     C4-C5: Moderate degenerative narrowing of the interspace. Mild ventral  disc osteophyte complex with minimal central stenosis. Small uncinate  spurs bilaterally with mild bilateral foraminal stenosis.     C6-C7: Moderate disc space narrowing. Mild broad-based disc osteophyte  complex with minimal central stenosis. Minimal uncinate spurs with  mild bilateral foraminal stenosis.     C6-C7: Moderate disc space narrowing. Mild ventral disc osteophyte  complex. No significant central or lateral stenosis.     C7-T1: No disc protrusion. No central or lateral stenosis. Moderate  bilateral facet joint disease.         IMPRESSION:  1. Degenerative changes as described C3-C4 through C7-T1. There is  only mild central and foraminal stenosis as described.  2. No intrinsic spinal cord abnormality through T2    [place] Prescription Monitoring Program:  Reviewed, as expected       DIRE Score for selecting candidates for long term opioid analgesia for chronic pain:  Diagnosis  2  Intractablility  2  Risk    Psychological health  2    Chemical health  2    Reliability  2    Social support  3  Efficacy  2    Total DIRE Score = 15. Note that   7-13 predicts poor outcome (compliance and efficacy) from opioid prescribing; 14-21 predicts good outcome (compliance and efficacy)  from opioid  prescribing.      Assessment:   1. Chronic low back pain  2. Lumbar facet joint pain  3. Myofascial pain/muscle spasms  4. Cervical spondylosis  5. Cervical DDD  6. Cervical spinal stenosis  7. Remote history of ETOH overuse, attended AA for awhile. Sober for 10 years  8. PMHx includes: neck pain  9. PSHx includes: none listed        Plan:   1. Physical Therapy: not at present time  2. Patient is to continue his home exercise program and exercises learned in PHYSICAL THERAPY  3. Clinical Health Psychologist: continue work with  Kentrell Castañeda in health psychology  4. Diagnostic Studies:  None  5. Medication Management:    1. Continue Tramadol ER 200mg QD  2. Continue Tramadol immediate release 50mg tabs, max of 4 tabs daily   3. Continue gabapentin 900 mg TID  4. Continue Topamax 50 mg at bedtime  5. Continue Methocarbamol 750-1500 mg TID PRN   6. Further procedures recommended: Discussed lumbar MBB/RFA vs lumbar facet joint injection. Consulting with Dr Harkins re: steroid injections prior to upcoming neck surgery.   7. Recommendations to PCP: see above  8. Follow up: 12 weeks      Total time spent face to face was 35 minutes and more than 50% of face to face time was spent in counseling and/or coordination of care regarding the diagnosis and recommendations above.    The information in this document, created by the medical scribe for me, accurately reflects the services I personally performed and the decisions made by me. I have reviewed and approved this document for accuracy.    Melanie STEVENS, RN CNP, FNP  [facility]

## 2018-09-05 NOTE — MR AVS SNAPSHOT
"              After Visit Summary   9/5/2018    Truman Suero    MRN: [id number]           Patient Information     Date Of Birth          1960        Visit Information        Provider Department      9/5/2018 9:00 AM Melanie Thomas APRN [facility]        Today's Diagnoses     Myofascial pain        Cervical spondylosis without myelopathy        DDD (degenerative disc disease), cervical        Cervical stenosis of spinal canal          Care Instructions    PLAN:  1. Continue home exercises   2. Medications:   1. Continue Tramadol  mg once daily   2. Continue Tramadol immediate release 50 mg, max of 4 tabs per day   3. Continue gabapentin 900 mg three times daily   4. Continue Topamax 50 mg at bedtime   5. Continue methocarbamol 750-1500 mg three times daily as needed for muscle spasms   3. Procedures: Lumbar facet joint injections vs medial branch blocks (\"test injections\")/radiofrequency nerve ablation (\"treatment phase\"). I am leaning towards lumbar facet steroid joint injection, but I have sent a message to Dr Harkins to ensure this is indicated prior to neck surgery. I will send you a [employer] message once I hear back from Dr Harkins.   4. Follow-up with me in 12 weeks        ----------------------------------------------------------------  Clinic Number:  [phone]   Call this number with any questions about your care and for scheduling assistance. Calls are returned Monday through Friday between 8 AM and 4:30 PM. We usually get back to you within 2 business days depending on the issue/request.       Medication refills:    For non-narcotic medications, call your pharmacy directly to request a refill. The pharmacy will contact the [facility] for authorization. Please allow 3-4 days for these refills to be processed.     For narcotic refills, call the nurse triage line or send a [employer] message. Please contact us 7-10 days before your refill is due. The message MUST " include the name of the specific medication(s) requested and how you would like to receive the prescription(s). The options are as follows:    Pain Clinic staff can mail the prescription to your pharmacy. Please tell us the name of the pharmacy.    You may pick the prescription up at the Pain Clinic (tell us the location) or during a clinic visit with your pain provider    Pain Clinic staff can deliver the prescription to the [place] pharmacy in the clinic building. Please tell us the location.      We believe regular attendance is key to your success in our program.    Any time you are unable to keep your appointment we ask that you call us at least 24 hours in advance to let us know. This will allow us to offer the appointment time to another patient.               Follow-ups after your visit        Your next 10 appointments already scheduled     Oct 02, 2018  2:00 PM CDT   Pre-Op physical with Humberto Trinidad DO   [facility]    [address] [place] [address]   [phone]            Oct 22, 2018 10:00 AM CDT   (Arrive by 9:45 AM)   PAC EVALUATION with RODNEY Valenzuela   [facility] [place] ([facility])    [address] [place] [address]   [phone]            Oct 22, 2018 11:00 AM CDT   (Arrive by 10:45 AM)   PAC RN ASSESSMENT with Dar Pac Rn   [facility] [place] ([facility])    [address] [place] [address]   [phone]            Oct 22, 2018 11:20 AM CDT   (Arrive by 11:05 AM)   PAC Anesthesia Consult with Dar Pac Anesthesiologist   [facility] [place] ([facility])    [address] [place] [address]   [phone]            Oct 29, 2018   Procedure with Jud Harkins MD   [facility], [place], Same Day  Surgery (--)    [address]  [place] [address]   [phone]            Nov 12, 2018 10:30 AM CST   (Arrive by 10:15 AM)   Return Visit with LIN Cantu [facility] ([facility])    [address] [facility]  3rd Floor  [facility] [address] [phone]              Who to contact     If you have questions or need follow up information about today's clinic visit or your schedule please contact [facility] directly at [phone].  Normal or non-critical lab and imaging results will be communicated to you by MyChart, letter or phone within 4 business days after the clinic has received the results. If you do not hear from us within 7 days, please contact the clinic through [employer] or phone. If you have a critical or abnormal lab result, we will notify you by phone as soon as possible.  Submit refill requests through [employer] or call your pharmacy and they will forward the refill request to us. Please allow 3 business days for your refill to be completed.          Additional Information About Your Visit        MyChart Information     [employer] gives you secure access to your electronic health record. If you see a primary care provider, you can also send messages to your care team and make appointments. If you have questions, please call your primary care clinic.  If you do not have a primary care provider, please call [phone] and they will assist you.        Care EveryWhere ID     This is your Care EveryWhere ID. This could be used by other organizations to access your [facility] records  [id number]        Your Vitals Were     Pulse BMI (Body Mass Index)                67 23.81 kg/m2           Blood Pressure from Last 3 Encounters:   09/05/18 136/90   08/07/18 147/90   06/29/18 141/89    Weight from Last 3 Encounters:   09/05/18 68.9 kg (152 lb)   06/29/18 68.5 kg (151 lb)   05/31/18 66.7 kg (147 lb)              Today, you had the following      No orders found for display         Where to get your medicines      These medications [facility] [address] [facility] [place] [facility] [address] [facility]  [address] [facility] [address]     Phone:  [phone]     cyclobenzaprine 10 MG tablet    topiramate 50 MG tablet          Primary Care Provider Office Phone # Fax #    Humberto Trinidad [phone]       [address] [place] [address]        Equal Access to Services     ELIEZER VALENTE : Hadii aad ku hadasho Soomaali, waaxda luqadaha, qaybta kaalmada adeegyada, waxay idiin hayaan adeeg khkhushboo garduno . So [facility] [phone].    ATENCIÓN: Si habla español, tiene a simmons disposición servicios gratuitos de asistencia lingüística. Llame al [phone].    We comply with applicable federal civil rights laws and [place] laws. We do not discriminate on the basis of race, color, national origin, age, disability, sex, sexual orientation, or gender identity.            Thank you!     Thank you for choosing [facility]  for your care. Our goal is always to provide you with excellent care. Hearing back from our patients is one way we can continue to improve our services. Please take a few minutes to complete the written survey that you may receive in the mail after your visit with us. Thank you!             Your Updated Medication List - Protect others around you: Learn how to safely use, store and throw away your medicines at www.disposemymeds.org.          This list is accurate as of 9/5/18  9:41 AM.  Always use your most recent med list.                   Brand Name Dispense Instructions for use Diagnosis    buPROPion 150 MG 12 hr tablet    WELLBUTRIN SR    60 tablet    Take 1 tablet once daily and increase to 1 tablet twice daily after 4 to 7 days    Tobacco abuse, Adjustment disorder with anxious mood       cyclobenzaprine 10 MG tablet    FLEXERIL    45 tablet    Take 0.5-1 tablets (5-10 mg) by mouth 3  times daily as needed for muscle spasms Caution sedation    Myofascial pain       fluticasone 50 MCG/ACT spray    FLONASE    2 Bottle    Spray 2 sprays into both nostrils 2 times daily    Chronic allergic rhinitis due to animal hair and dander, Chronic seasonal allergic rhinitis due to pollen, Allergic rhinitis due to dust mite, Allergic conjunctivitis of both eyes       gabapentin 600 MG tablet    NEURONTIN    135 tablet    Take 1.5 tablets (900 mg) by mouth 3 times daily    Cervical spondylosis without myelopathy, DDD (degenerative disc disease), cervical, Chronic neck and back pain, Cervical stenosis of spinal canal       ibuprofen 800 MG tablet    ADVIL/MOTRIN          methocarbamol 750 MG tablet    ROBAXIN    130 tablet    Take 1-2 tablets (750-1,500 mg) by mouth 3 times daily as needed for muscle spasms caution sedation    Chronic neck pain, DDD (degenerative disc disease), cervical, Cervical spondylosis without myelopathy, Myofascial pain       topiramate 50 MG tablet    TOPAMAX    30 tablet    Take 50mg at bedtime.  Drink plenty of water and no alcohol. If side effects, then reduce to last tolerable dosage.    Cervical spondylosis without myelopathy, DDD (degenerative disc disease), cervical, Cervical stenosis of spinal canal       * traMADol 200 MG ER-tablet    ULTRAM-ER    30 tablet    Take 1 tablet (200 mg) by mouth daily Okay to fill on/after 08/31/18 and start on/after 9/2/2018; to last 30 days    Cervical spondylosis without myelopathy, DDD (degenerative disc disease), cervical       * traMADol 50 MG tablet    ULTRAM    90 tablet    May take 1-2 tablets every 6 hours as needed for pain, max of 3 tabs per day. Reduce as able. Okay to fill on/after 08/31/2018 and start on/after 09/1/2018; To last 30 days.    Cervical radiculopathy       * Notice:  This list has 2 medication(s) that are the same as other medications prescribed for you. Read the directions carefully, and ask your doctor or other care  provider to review them with you.

## 2018-09-20 ENCOUNTER — TELEPHONE (OUTPATIENT)
Dept: PALLIATIVE MEDICINE | Facility: CLINIC | Age: 58
End: 2018-09-20

## 2018-09-20 NOTE — TELEPHONE ENCOUNTER
Pre-screening Questions for Radiology Injections:    Injection to be done at which interventional clinic site? [facility] [place]    Instruct patient to arrive as directed prior to the scheduled appointment time:    [place]: 30 minutes before      Procedure ordered by Melanie Thomas    Procedure ordered? Lumbar Facet Joint Injection    What insurance would patient like us to bill for this procedure? [employer]      Worker's comp or MVA (motor vehicle accident) -Any injection DO NOT SCHEDULE and route to Karen Mancilla.      [employer] insurance - For SI joint injections, DO NOT SCHEDULE and route Monalisa Dominguez. [employer] NO PA REQUIRED EFFECTIVE 11/1/2017      HEALTH [employer]- MBB's must be scheduled at LEAST two weeks apart      [employer] - Any injection besides hip/shoulder/knee joint DO NOT SCHEDULE and route to Monalisa Dominguez. She will obtain PA and call pt back to schedule procedure or notify pt of denial.        [employer]-Route to Monalisa for review    Any chance of pregnancy? NO   If YES, do NOT schedule and route to RN pool    Is an  needed? No     Patient has a drive home? (mandatory) YES: informed    Is patient taking any blood thinners (plavix, coumadin, jantoven, warfarin, heparin, pradaxa or dabigatran )? No   If hold needed, do NOT schedule, route to RN pool     Is patient taking any aspirin products? No     If more than 325mg/day do NOT schedule; route to RN pool     For CERVICAL procedures, hold all aspirin products for 6 days.      Does the patient have a bleeding or clotting disorder? No     If YES, okay to schedule AND route to RN nurse pool    **For any patients with platelet count <100, must be forwarded to provider**    Is patient diabetic?  No  If YES, have them bring their glucometer.    Does patient have an active infection or treated for one within the past week? No     Is patient currently taking any antibiotics?  No     For patients on chronic,  preventative, or prophylactic antibiotics, procedures may be scheduled.     For patients on antibiotics for active or recent infection:    Jo Melgar Burton, [place]-antibiotic course must have been completed for 4 days    Is patient currently taking any steroid medications? (i.e. Prednisone, Medrol)  No     For patients on steroid medications:    Jo Melgar Burton, [place]-steroid course must have been completed for 4 days    Reviewed with patient:  If you are started on any steroids or antibiotics between now and your appointment, you must contact us because it may affect our ability to perform your procedure.  Yes    Is patient actively being treated for cancer or immunocompromised? No  If YES, do NOT schedule and route to RN pool     Are you able to get on and off an exam table with minimal or no assistance? Yes  If NO, do NOT schedule and route to RN pool    Are you able to roll over and lay on your stomach with minimal or no assistance? Yes  If NO, do NOT schedule and route to RN pool     Any allergies to contrast dye, iodine, shellfish, or numbing and steroid medications? No  If YES, route to RN pool AND add allergy information to appointment notes    Allergies: Review of patient's allergies indicates no known allergies.      Has the patient had a flu shot or any other vaccinations within 7 days before or after the procedure.  No     Does patient have an MRI/CT?  YES: informed  (SI joint, hip injections, lumbar sympathetic blocks, and stellate ganglion blocks do not require an MRI)    Was the MRI done w/in the last 3 years?  Yes    Was MRI done at [facility]? Yes      If not, where was it done? N/A       If MRI was not done at [facility], [facility] or [facility] do NOT schedule and route to nursing.  If pt has an imaging disc, the injection may be scheduled but pt has to bring disc to appt. If they show up w/out disc the injection cannot be done    Reminders (please tell patient if  applicable):       Instructed pt to arrive 30 minutes early for IV start if this is for a cervical procedure, ALL sympathetic (stellate ganglion, hypogastric, or lumbar sympathetic block) and all sedation procedures (RFA, spinal cord stimulation trials).  Not Applicable   -IVs are not routinely placed for Dr. Perales cervical cases   -Dr. Corrigan: IVs for cervical ESIs and cervical TBDs (not CMBBs/facet inj)      If NPO for sedation, informed patient that it is okay to take medications with sips of water (except if they are to hold blood thinners).  Not Applicable   *DO take blood pressure medication if it is prescribed*      If this is for a cervical DEMARCUS, informed patient that aspirin needs to be held for 6 days.   Not Applicable      For all patients not having spinal cord stimulator (SCS) trials or radiofrequency ablations (RFAs), informed patient:    IV sedation is not provided for this procedure.  If you feel that an oral anti-anxiety medication is needed, you can discuss this further with your referring provider or primary care provider.  The Pain Clinic provider will discuss specifics of what the procedure includes at your appointment.  Most procedures last 10-20 minutes.  We use numbing medications to help with any discomfort during the procedure.  Not Applicable      Do not schedule procedures requiring IV placement in the first appointment of the day or first appointment after lunch. Do NOT schedule at 0745, 0815 or 1245.       For patients 85 or older we recommend having an adult stay w/ them for the remainder of the day.       Does the patient have any questions?  Not Applicable  Ariadne Blankenship  [facility]

## 2018-09-27 ENCOUNTER — MYC REFILL (OUTPATIENT)
Dept: PALLIATIVE MEDICINE | Facility: CLINIC | Age: 58
End: 2018-09-27

## 2018-09-27 DIAGNOSIS — M54.12 CERVICAL RADICULOPATHY: ICD-10-CM

## 2018-09-27 DIAGNOSIS — M50.30 DDD (DEGENERATIVE DISC DISEASE), CERVICAL: ICD-10-CM

## 2018-09-27 DIAGNOSIS — M47.812 CERVICAL SPONDYLOSIS WITHOUT MYELOPATHY: ICD-10-CM

## 2018-09-28 RX ORDER — TRAMADOL HYDROCHLORIDE 200 MG/1
200 TABLET, EXTENDED RELEASE ORAL DAILY
Qty: 30 TABLET | Refills: 0 | Status: SHIPPED | OUTPATIENT
Start: 2018-09-28 | End: 2018-11-28

## 2018-09-28 RX ORDER — TRAMADOL HYDROCHLORIDE 50 MG/1
TABLET ORAL
Qty: 90 TABLET | Refills: 0 | Status: ON HOLD | OUTPATIENT
Start: 2018-09-28 | End: 2018-10-30

## 2018-09-28 NOTE — TELEPHONE ENCOUNTER
Message from [employer]:  Original authorizing provider: RODNEY Vargas CNP would like a refill of the following medications:  traMADol (ULTRAM-ER) 200 MG ER-tablet [RODNEY Vargas CNP]  traMADol (ULTRAM) 50 MG tablet [RODNEY Vargas CNP]    Preferred pharmacy: [facility] [address] [place] [address]    Comment:  I have 6 left, so just submitting paper work Palmer

## 2018-09-28 NOTE — TELEPHONE ENCOUNTER
Signed Prescriptions:                        Disp   Refills    traMADol (ULTRAM-ER) 200 MG ER-tablet      30 tab*0        Sig: Take 1 tablet (200 mg) by mouth daily Okay to fill           on/after 9/29/18 and start on/after 10/2/2018; to           last 30 days  Authorizing Provider: MELANIE BENSON    traMADol (ULTRAM) 50 MG tablet             90 tab*0        Sig: May take 1-2 tablets every 6 hours as needed for           pain, max of 3 tabs per day. Reduce as able. Okay           to fill on/after 9/29/2018 and start on/after           10/1/2018; To last 30 days.  Authorizing Provider: MELANIE BENSON    Reviewed [place]  September 28, 2018- no concerning fills.    Signed script placed in touchdown bin at [facility].    Melanie Benson APRN CNP FNP RN  [facility]

## 2018-09-28 NOTE — TELEPHONE ENCOUNTER
Medication refill information reviewed.     Due date for traMADol (ULTRAM) 50 MG tablet and traMADol (ULTRAM-ER) 200 MG ER-tablet is 10/1/18     Prescriptions prepped for review.     Will route to provider.     Fax to [facility] pharmacy     Van Oquendo, RN  Care Coordinator   [facility] [place]

## 2018-09-28 NOTE — TELEPHONE ENCOUNTER
Signed Rx for Tramadol was signed and faxed to [facility]. RightFax confirmed. Left voicemail.    Mike Bergeron MA

## 2018-09-28 NOTE — TELEPHONE ENCOUNTER
Received call from patient requesting refill(s) of  traMADol (ULTRAM) 50 MG tablet and traMADol (ULTRAM-ER) 200 MG ER-tablet    Last picked up from pharmacy on 08/31/18 for both    Pt last seen by prescribing provider on 09/05/18  Next appt scheduled for 11/28/18 - has injection earlier date     checked in the past 6 months? Yes If no, print current report and give to RN    Last urine drug screen date 01/08/18  Current opioid agreement on file (completed within the last year) Yes Date of opioid agreement: 01/08/18    Processing (pick one and delete the others):      Fax to [facility] pharmacy       Will route to nursing pool for review and preparation of prescription(s).

## 2018-09-28 NOTE — TELEPHONE ENCOUNTER
Will route to MA pool for assistance with gathering opioid refill information.    Shanita Briones, BSN, RN-BC  Patient Care Supervisor/Care Coordinator  [facility] [place]

## 2018-09-30 ENCOUNTER — MYC MEDICAL ADVICE (OUTPATIENT)
Dept: PALLIATIVE MEDICINE | Facility: CLINIC | Age: 58
End: 2018-09-30

## 2018-10-01 ENCOUNTER — MYC REFILL (OUTPATIENT)
Dept: FAMILY MEDICINE | Facility: CLINIC | Age: 58
End: 2018-10-01

## 2018-10-01 DIAGNOSIS — F43.22 ADJUSTMENT DISORDER WITH ANXIOUS MOOD: ICD-10-CM

## 2018-10-01 DIAGNOSIS — Z72.0 TOBACCO ABUSE: ICD-10-CM

## 2018-10-01 RX ORDER — BUPROPION HYDROCHLORIDE 150 MG/1
TABLET, EXTENDED RELEASE ORAL
Qty: 60 TABLET | Refills: 0 | Status: SHIPPED | OUTPATIENT
Start: 2018-10-01 | End: 2018-11-13

## 2018-10-01 NOTE — TELEPHONE ENCOUNTER
Rx re faxed to pharmacy, verified fax transmission.      Writer called pharmacy to verifiy they have received the Rx.    Writer called pt and notified.      Van Oquendo, RN  Care Coordinator   [facility] [place]

## 2018-10-01 NOTE — TELEPHONE ENCOUNTER
"Requested Prescriptions   Pending Prescriptions Disp Refills     buPROPion (WELLBUTRIN SR) 150 MG 12 hr tablet  Last Written Prescription Date:  8/31/2018  Last Fill Quantity: 60 tablet,  # refills: 0   Last office visit: 4/30/2018 with prescribing provider:  CHAD Trinidad   Future Office Visit:   Next 5 appointments (look out 90 days)     Oct 02, 2018  2:00 PM CDT   Pre-Op physical with Humberto Trinidad DO   [facility]    [address] [facility] [address]   [phone]            Nov 28, 2018  1:00 PM CST   Return Visit with RODNEY Vargas CNP   [facility] ([facility])    [address] [place], [place] [address] [place] [address] [phone]                  60 tablet 0     Sig: Take 1 tablet once daily and increase to 1 tablet twice daily after 4 to 7 days    SSRIs Protocol Passed    10/1/2018 12:05 PM    PHQ-9 SCORE 3/28/2017 12/19/2017   Total Score 13 2     DONOVAN-7 SCORE 12/19/2017   Total Score 5          Passed - Recent (12 mo) or future (30 days) visit within the authorizing provider's specialty    Patient had office visit in the last 12 months or has a visit in the next 30 days with authorizing provider or within the authorizing provider's specialty.  See \"Patient Info\" tab in inbasket, or \"Choose Columns\" in Meds & Orders section of the refill encounter.           Passed - Medication is Bupropion    If the medication is Bupropion (Wellbutrin), and the patient is taking for smoking cessation; OK to refill.         Passed - Patient is age 18 or older          "

## 2018-10-01 NOTE — TELEPHONE ENCOUNTER
Message from [employer]:  Original authorizing provider: DO Palmer Cohn would like a refill of the following medications:  buPROPion (WELLBUTRIN SR) 150 MG 12 hr tablet [Humberto Trinidad DO]    Preferred pharmacy: [facility] [address]    Comment:  I have an appointment with Dr. Trinidad on 10/2, can discuss with her at that time- Thanks Palmer

## 2018-10-01 NOTE — TELEPHONE ENCOUNTER
Will forward to MA pool.      Can we resend the Tramadol 200mg ER tab.    Thanks,    Van Oquendo, RN  Care Coordinator   [facility] [place]

## 2018-10-02 ENCOUNTER — OFFICE VISIT (OUTPATIENT)
Dept: FAMILY MEDICINE | Facility: CLINIC | Age: 58
End: 2018-10-02
Payer: OTHER MISCELLANEOUS

## 2018-10-02 ENCOUNTER — PATIENT OUTREACH (OUTPATIENT)
Dept: NEUROSURGERY | Facility: CLINIC | Age: 58
End: 2018-10-02

## 2018-10-02 VITALS
DIASTOLIC BLOOD PRESSURE: 82 MMHG | SYSTOLIC BLOOD PRESSURE: 136 MMHG | HEART RATE: 73 BPM | TEMPERATURE: 98.2 F | WEIGHT: 152.9 LBS | OXYGEN SATURATION: 96 % | HEIGHT: 67 IN | RESPIRATION RATE: 16 BRPM | BODY MASS INDEX: 24 KG/M2

## 2018-10-02 DIAGNOSIS — M54.50 CHRONIC BILATERAL LOW BACK PAIN WITHOUT SCIATICA: ICD-10-CM

## 2018-10-02 DIAGNOSIS — G89.29 CHRONIC BILATERAL LOW BACK PAIN WITHOUT SCIATICA: ICD-10-CM

## 2018-10-02 DIAGNOSIS — I25.10 MILD CAD: ICD-10-CM

## 2018-10-02 DIAGNOSIS — M50.30 DDD (DEGENERATIVE DISC DISEASE), CERVICAL: ICD-10-CM

## 2018-10-02 DIAGNOSIS — I77.810 ASCENDING AORTA DILATATION (H): ICD-10-CM

## 2018-10-02 DIAGNOSIS — Z01.818 PREOP GENERAL PHYSICAL EXAM: Primary | ICD-10-CM

## 2018-10-02 LAB — HGB BLD-MCNC: 14.2 G/DL (ref 13.3–17.7)

## 2018-10-02 PROCEDURE — 85018 HEMOGLOBIN: CPT | Performed by: FAMILY MEDICINE

## 2018-10-02 PROCEDURE — 80048 BASIC METABOLIC PNL TOTAL CA: CPT | Performed by: FAMILY MEDICINE

## 2018-10-02 PROCEDURE — 36415 COLL VENOUS BLD VENIPUNCTURE: CPT | Performed by: FAMILY MEDICINE

## 2018-10-02 PROCEDURE — 99214 OFFICE O/P EST MOD 30 MIN: CPT | Performed by: FAMILY MEDICINE

## 2018-10-02 PROCEDURE — 93000 ELECTROCARDIOGRAM COMPLETE: CPT | Performed by: FAMILY MEDICINE

## 2018-10-02 RX ORDER — METOPROLOL SUCCINATE 25 MG/1
25 TABLET, EXTENDED RELEASE ORAL DAILY
Qty: 30 TABLET | Refills: 1 | Status: SHIPPED | OUTPATIENT
Start: 2018-10-02 | End: 2019-12-09

## 2018-10-02 ASSESSMENT — PAIN SCALES - GENERAL: PAINLEVEL: EXTREME PAIN (8)

## 2018-10-02 NOTE — PATIENT INSTRUCTIONS
Normal EKG,  Start toprol  Please recheck bp at pharmacy and here in one week to make sure it is not too low with bp med  Before Your Surgery      Call your surgeon if there is any change in your health. This includes signs of a cold or flu (such as a sore throat, runny nose, cough, rash or fever).    Do not smoke, drink alcohol or take over the counter medicine (unless your surgeon or primary care doctor tells you to) for the 24 hours before and after surgery.    If you take prescribed drugs: Follow your doctor s orders about which medicines to take and which to stop until after surgery.    Eating and drinking prior to surgery: follow the instructions from your surgeon    Take a shower or bath the night before surgery. Use the soap your surgeon gave you to gently clean your skin. If you do not have soap from your surgeon, use your regular soap. Do not shave or scrub the surgery site.  Wear clean pajamas and have clean sheets on your bed.

## 2018-10-02 NOTE — MR AVS SNAPSHOT
After Visit Summary   10/2/2018    Truman Suero    MRN: [id number]           Patient Information     Date Of Birth          1960        Visit Information        Provider Department      10/2/2018 2:00 PM Humberto Trinidad DO [facility]        Today's Diagnoses     Preop general physical exam    -  1    Mild CAD        Ascending aorta dilatation (H)        Chronic bilateral low back pain without sciatica        DDD (degenerative disc disease), cervical          Care Instructions    Normal EKG,  Start toprol  Please recheck bp at pharmacy and here in one week to make sure it is not too low with bp med  Before Your Surgery      Call your surgeon if there is any change in your health. This includes signs of a cold or flu (such as a sore throat, runny nose, cough, rash or fever).    Do not smoke, drink alcohol or take over the counter medicine (unless your surgeon or primary care doctor tells you to) for the 24 hours before and after surgery.    If you take prescribed drugs: Follow your doctor s orders about which medicines to take and which to stop until after surgery.    Eating and drinking prior to surgery: follow the instructions from your surgeon    Take a shower or bath the night before surgery. Use the soap your surgeon gave you to gently clean your skin. If you do not have soap from your surgeon, use your regular soap. Do not shave or scrub the surgery site.  Wear clean pajamas and have clean sheets on your bed.           Follow-ups after your visit        Your next 10 appointments already scheduled     Oct 11, 2018  3:15 PM CDT   Radiology Injections with Hugo Corrigan MD   [facility] ([facility])    [address] [facility] [facility] [address]   [phone]            Oct 22, 2018 10:00 AM CDT   (Arrive by 9:45 AM)   PAC EVALUATION with RODNEY Valenzuela [facility] ([facility]  [facility] [place])    [address] [place] [address]   [phone]            Oct 22, 2018 11:00 AM CDT   (Arrive by 10:45 AM)   PAC RN ASSESSMENT with Dar Pac Rn   [facility] [place] ([facility])    [address] [place] [address]   [phone]            Oct 22, 2018 11:20 AM CDT   (Arrive by 11:05 AM)   PAC Anesthesia Consult with Dar Pac Anesthesiologist   [facility] [place] ([facility])    [address] [place] [address]   [phone]            Oct 29, 2018   Procedure with Jud Harkins MD   [facility] [place], Same Day Surgery (--)    [address] [facility] [place] [address]   [phone]            Nov 12, 2018 10:30 AM CST   (Arrive by 10:15 AM)   Return Visit with Madeline Gordon PA-C   [facility] Neurosurgery ([facility])    [address] [place] [address]   [phone]            Nov 28, 2018  1:00 PM CST   Return Visit with RODNEY Vargas [facility] ([place] [facility])    [address] [facility] [address] [id number] [phone]              Who to contact     If you have questions or need follow up information about today's clinic visit or your schedule please contact [facility] directly at [phone].  Normal or non-critical lab and imaging results will be communicated to you by MyChart, letter or phone within 4 business days after the clinic has received the results. If you do not hear from us within 7 days, please contact the clinic through MyChart or phone. If you have a critical or abnormal lab result, we will notify you by phone as soon as possible.  Submit refill requests through [employer] or call your pharmacy and they will forward the refill request to us. Please allow 3 business days for your  "refill to be completed.          Additional Information About Your Visit        [employer] Information     [employer] gives you secure access to your electronic health record. If you see a primary care provider, you can also send messages to your care team and make appointments. If you have questions, please call your primary care clinic.  If you do not have a primary care provider, please call [phone] and they will assist you.        Care EveryWhere ID     This is your Care EveryWhere ID. This could be used by other organizations to access your [facility] medical records  [id number]        Your Vitals Were     Pulse Temperature Respirations Height Pulse Oximetry BMI (Body Mass Index)    73 98.2  F (36.8  C) 16 5' 7\" (1.702 m) 96% 23.95 kg/m2       Blood Pressure from Last 3 Encounters:   10/02/18 136/82   09/05/18 136/90   08/07/18 147/90    Weight from Last 3 Encounters:   10/02/18 152 lb 14.4 oz (69.4 kg)   09/05/18 152 lb (68.9 kg)   06/29/18 151 lb (68.5 kg)              We Performed the Following     Basic metabolic panel     EKG 12-lead complete w/read - Clinics     Hemoglobin          Today's Medication Changes          These changes are accurate as of 10/2/18  2:49 PM.  If you have any questions, ask your nurse or doctor.               Start taking these medicines.        Dose/Directions    metoprolol succinate 25 MG 24 hr tablet   Commonly known as:  TOPROL-XL   Used for:  Preop general physical exam, Mild CAD, Ascending aorta dilatation (H)   Started by:  Humberto Trinidad DO        Dose:  25 mg   Take 1 tablet (25 mg) by mouth daily   Quantity:  30 tablet   Refills:  1            Where to get your medicines      These medications were sent to [facility] [address] [place] [address] [address] [place] [address]     Phone:  [phone]     metoprolol succinate 25 MG 24 hr tablet                Primary Care Provider Office Phone # Fax #    Humberto Trinidad" [phone]       [address] [facility] [address]        Equal Access to Services     ELIEZER VALENTE : Hadii aad ku haddylanella Joel, wasvetlanada luqlisa, qaybta kaalmada cornel, mariela alyciain hayaaurvashi zhouaustin constantino laureen carter. So [facility] [phone].    ATENCIÓN: Si habla español, tiene a simmons disposición servicios gratuitos de asistencia lingüística. Llame al [phone].    We comply with applicable federal civil rights laws and [place] laws. We do not discriminate on the basis of race, color, national origin, age, disability, sex, sexual orientation, or gender identity.            Thank you!     Thank you for choosing [facility]  for your care. Our goal is always to provide you with excellent care. Hearing back from our patients is one way we can continue to improve our services. Please take a few minutes to complete the written survey that you may receive in the mail after your visit with us. Thank you!             Your Updated Medication List - Protect others around you: Learn how to safely use, store and throw away your medicines at www.disposemymeds.org.          This list is accurate as of 10/2/18  2:49 PM.  Always use your most recent med list.                   Brand Name Dispense Instructions for use Diagnosis    buPROPion 150 MG 12 hr tablet    WELLBUTRIN SR    60 tablet    Take 1 tablet once daily and increase to 1 tablet twice daily after 4 to 7 days    Tobacco abuse, Adjustment disorder with anxious mood       cyclobenzaprine 10 MG tablet    FLEXERIL    45 tablet    Take 0.5-1 tablets (5-10 mg) by mouth 3 times daily as needed for muscle spasms Caution sedation    Myofascial pain       fluticasone 50 MCG/ACT spray    FLONASE    2 Bottle    Spray 2 sprays into both nostrils 2 times daily    Chronic allergic rhinitis due to animal hair and dander, Chronic seasonal allergic rhinitis due to pollen, Allergic rhinitis due to dust mite, Allergic conjunctivitis of both  eyes       gabapentin 600 MG tablet    NEURONTIN    135 tablet    Take 1.5 tablets (900 mg) by mouth 3 times daily    Cervical spondylosis without myelopathy, DDD (degenerative disc disease), cervical, Chronic neck and back pain, Cervical stenosis of spinal canal       ibuprofen 800 MG tablet    ADVIL/MOTRIN          methocarbamol 750 MG tablet    ROBAXIN    130 tablet    Take 1-2 tablets (750-1,500 mg) by mouth 3 times daily as needed for muscle spasms caution sedation    Chronic neck pain, DDD (degenerative disc disease), cervical, Cervical spondylosis without myelopathy, Myofascial pain       metoprolol succinate 25 MG 24 hr tablet    TOPROL-XL    30 tablet    Take 1 tablet (25 mg) by mouth daily    Preop general physical exam, Mild CAD, Ascending aorta dilatation (H)       topiramate 50 MG tablet    TOPAMAX    60 tablet    Take 1 tablet (50 mg) by mouth 2 times daily Drink plenty of water and no alcohol. If side effects, then reduce to last tolerable dosage.    Cervical spondylosis without myelopathy, DDD (degenerative disc disease), cervical, Cervical stenosis of spinal canal       * traMADol 200 MG ER-tablet    ULTRAM-ER    30 tablet    Take 1 tablet (200 mg) by mouth daily Okay to fill on/after 9/29/18 and start on/after 10/2/2018; to last 30 days    Cervical spondylosis without myelopathy, DDD (degenerative disc disease), cervical       * traMADol 50 MG tablet    ULTRAM    90 tablet    May take 1-2 tablets every 6 hours as needed for pain, max of 3 tabs per day. Reduce as able. Okay to fill on/after 9/29/2018 and start on/after 10/1/2018; To last 30 days.    Cervical radiculopathy       * Notice:  This list has 2 medication(s) that are the same as other medications prescribed for you. Read the directions carefully, and ask your doctor or other care provider to review them with you.

## 2018-10-02 NOTE — LETTER
[facility]  [address] [place] [address] [phone]                                                                                                October 5, 2018    Truman REI Suero  [facility] [address]        Dear Mr. Suero,    The results of your recent lab tests were within normal limits. Enclosed is a copy of these results.  If you have any further questions or problems, please contact our office.    Results for orders placed or performed in visit on 10/02/18   Hemoglobin   Result Value Ref Range    Hemoglobin 14.2 13.3 - 17.7 g/dL   Basic metabolic panel   Result Value Ref Range    Sodium 141 133 - 144 mmol/L    Potassium 4.5 3.4 - 5.3 mmol/L    Chloride 110 (H) 94 - 109 mmol/L    Carbon Dioxide 24 20 - 32 mmol/L    Anion Gap 7 3 - 14 mmol/L    Glucose 71 70 - 99 mg/dL    Urea Nitrogen 12 7 - 30 mg/dL    Creatinine 1.07 0.66 - 1.25 mg/dL    GFR Estimate 71 >60 mL/min/1.7m2    GFR Estimate If Black 86 >60 mL/min/1.7m2    Calcium 9.2 8.5 - 10.1 mg/dL       Sincerely,      Humberto Trinidad, DO/luís

## 2018-10-02 NOTE — PROGRESS NOTES
[address] [id number] [phone]  Dept: [phone]    PRE-OP EVALUATION:  Today's date: 10/2/2018    Truman Suero (: 1960) presents for pre-operative evaluation assessment as requested by Dr. Jud Harkins .  He requires evaluation and anesthesia risk assessment prior to undergoing surgery/procedure for treatment of Neck pain, disc replacement and fusion   .    Fax number for surgical facility:  Being done at [facility]   Primary Physician: Humberto Trinidad  Type of Anesthesia Anticipated: General    Patient has a Health Care Directive or Living Will:  NO    Preop Questions 2018   Who is doing your surgery? Dr Harkins   What are you having done? Neck   Date of Surgery/Procedure: 10/29/18   Facility or Hospital where procedure/surgery will be performed: [facility]   1.  Do you have a history of Heart attack, stroke, stent, coronary bypass surgery, or other heart surgery? No   2.  Do you ever have any pain or discomfort in your chest? YES - discomfort from bad poster     3.  Do you have a history of  Heart Failure? No   4.   Are you troubled by shortness of breath when:  walking on a level surface, or up a slight hill, or at night? No   5.  Do you currently have a cold, bronchitis or other respiratory infection? No   6.  Do you have a cough, shortness of breath, or wheezing? No   7.  Do you sometimes get pains in the calves of your legs when you walk? YES - on feet and get tried    8. Do you or anyone in your family have previous history of blood clots? No   9.  Do you or does anyone in your family have a serious bleeding problem such as prolonged bleeding following surgeries or cuts? No   10. Have you ever had problems with anemia or been told to take iron pills? No   11. Have you had any abnormal blood loss such as black, tarry or bloody stools? No   12. Have you ever had a blood transfusion? No   13. Have you or any of your  relatives ever had problems with anesthesia? No   14. Do you have sleep apnea, excessive snoring or daytime drowsiness? No   15. Do you have any prosthetic heart valves? No   16. Do you have prosthetic joints? No         HPI:     HPI related to upcoming procedure: patient with history of back and neck pain, he is going to get spinal fusion with discectomy c5-6. He has been in pain and has been limited in his activity and it has been progressively worse.  He have done  physical therapy , injections with pain clinic, oral meds, acupuncture, massage without relief.    No recent illness, no fever, bikes a lot , cardiovascular active, helps his back as well, no palpitations, no chest pain    Smoking for 38 years and has quit for last 6 months.    MEDICAL HISTORY:     Patient Active Problem List    Diagnosis Date Noted     Chronic bilateral low back pain with right-sided sciatica 05/16/2018     Priority: Medium     Diverticulitis of colon 04/30/2018     Priority: Medium     PAD (peripheral artery disease) (H) 02/27/2018     Priority: Medium     Mild CAD 02/13/2018     Priority: Medium     Ascending aorta dilatation (H) 02/13/2018     Priority: Medium     Cervical stenosis of spinal canal 10/03/2017     Priority: Medium     DDD (degenerative disc disease), cervical 10/03/2017     Priority: Medium     Cervical spondylosis without myelopathy 10/03/2017     Priority: Medium     Chronic bilateral low back pain without sciatica 10/03/2017     Priority: Medium     Myofascial pain 10/03/2017     Priority: Medium     Tobacco abuse 08/16/2017     Priority: Medium     Chronic neck pain 11/30/2016     Priority: Medium      Past Medical History:   Diagnosis Date     Neck pain     MRI positive on cervical vertebrea.     No past surgical history on file.  Current Outpatient Prescriptions   Medication Sig Dispense Refill     cyclobenzaprine (FLEXERIL) 10 MG tablet Take 0.5-1 tablets (5-10 mg) by mouth 3 times daily as needed for muscle  spasms Caution sedation 45 tablet 1     fluticasone (FLONASE) 50 MCG/ACT spray Spray 2 sprays into both nostrils 2 times daily 2 Bottle 11     gabapentin (NEURONTIN) 600 MG tablet Take 1.5 tablets (900 mg) by mouth 3 times daily 135 tablet 3     ibuprofen (ADVIL,MOTRIN) 800 MG tablet   0     methocarbamol (ROBAXIN) 750 MG tablet Take 1-2 tablets (750-1,500 mg) by mouth 3 times daily as needed for muscle spasms caution sedation 130 tablet 2     metoprolol succinate (TOPROL-XL) 25 MG 24 hr tablet Take 1 tablet (25 mg) by mouth daily 30 tablet 1     topiramate (TOPAMAX) 50 MG tablet Take 1 tablet (50 mg) by mouth 2 times daily Drink plenty of water and no alcohol. If side effects, then reduce to last tolerable dosage. 60 tablet 2     traMADol (ULTRAM) 50 MG tablet May take 1-2 tablets every 6 hours as needed for pain, max of 3 tabs per day. Reduce as able. Okay to fill on/after 9/29/2018 and start on/after 10/1/2018; To last 30 days. 90 tablet 0     traMADol (ULTRAM-ER) 200 MG ER-tablet Take 1 tablet (200 mg) by mouth daily Okay to fill on/after 9/29/18 and start on/after 10/2/2018; to last 30 days 30 tablet 0     buPROPion (WELLBUTRIN SR) 150 MG 12 hr tablet Take 1 tablet once daily and increase to 1 tablet twice daily after 4 to 7 days 60 tablet 0     OTC products: None, except as noted above    No Known Allergies   Latex Allergy: NO    Social History   Substance Use Topics     Smoking status: Former Smoker     Start date: 3/20/2018     Quit date: 3/20/2018     Smokeless tobacco: Never Used      Comment: tried Chantix, did not work well 2.13.18      Alcohol use No     History   Drug Use No       REVIEW OF SYSTEMS:   CONSTITUTIONAL: NEGATIVE for fever, chills, change in weight  ENT/MOUTH: NEGATIVE for ear, mouth and throat problems  RESP: NEGATIVE for significant cough or SOB  CV: NEGATIVE for chest pain, palpitations or peripheral edema    EXAM:   /82 (BP Location: Right arm, Patient Position: Chair, Cuff Size:  "Adult Large)  Pulse 73  Temp 98.2  F (36.8  C)  Resp 16  Ht 5' 7\" (1.702 m)  Wt 152 lb 14.4 oz (69.4 kg)  SpO2 96%  BMI 23.95 kg/m2  GENERAL APPEARANCE: healthy, alert and no distress  HENT: ear canals and TM's normal and nose and mouth without ulcers or lesions  RESP: lungs clear to auscultation - no rales, rhonchi or wheezes  CV: regular rate and rhythm, normal S1 S2, no S3 or S4 and no murmur, click or rub   ABDOMEN: soft, nontender, no HSM or masses and bowel sounds normal  NEURO: Normal strength and tone, sensory exam grossly normal, mentation intact and speech normal    DIAGNOSTICS:   EKG: appears normal, NSR, normal axis, normal intervals, no acute ST/T changes c/w ischemia, no LVH by voltage criteria, unchanged from previous tracings    Recent Labs   Lab Test  08/23/17   1404  07/03/16   1151  06/13/16   1339   HGB  13.8  15.3  14.6   PLT   --   252  242   NA  141  142  140   POTASSIUM  4.4  3.8  4.2   CR  0.82  0.69  0.83        IMPRESSION:   Reason for surgery/procedure: chronic neck/back pain  Diagnosis/reason for consult: preoperative clearance    The proposed surgical procedure is considered INTERMEDIATE risk.    REVISED CARDIAC RISK INDEX  The patient has the following serious cardiovascular risks for perioperative complications such as (MI, PE, VFib and 3  AV Block):  No serious cardiac risks  INTERPRETATION: 0 risks: Class I (very low risk - 0.4% complication rate)    The patient has the following additional risks for perioperative complications:  No identified additional risks  The 10-year ASCVD risk score (Jordyn ALIREZA Jr, et al., 2013) is: 7.2%    Values used to calculate the score:      Age: 58 years      Sex: Male      Is Non- : No      Diabetic: No      Tobacco smoker: No      Systolic Blood Pressure: 136 mmHg      Is BP treated: Yes      HDL Cholesterol: 55 mg/dL      Total Cholesterol: 165 mg/dL      ICD-10-CM    1. Preop general physical exam Z01.818 EKG 12-lead " complete w/read - Clinics     metoprolol succinate (TOPROL-XL) 25 MG 24 hr tablet     Hemoglobin     Basic metabolic panel   2. Mild CAD I25.10 metoprolol succinate (TOPROL-XL) 25 MG 24 hr tablet     Hemoglobin     Basic metabolic panel   3. Ascending aorta dilatation (H) I77.810 metoprolol succinate (TOPROL-XL) 25 MG 24 hr tablet   4. Chronic bilateral low back pain without sciatica M54.5     G89.29    5. DDD (degenerative disc disease), cervical M50.30      Stable chronic diseases  Mildly elevated bp's reviewed and due to his other risk factors( I.e mild cad, history of tobacco use and aorta dilation(stable) low dose BB.  Risks and benefits reviewed.  Recheck bp here in one week, check bp's at home as well.       RECOMMENDATIONS:     --Consult hospital rounder / IM to assist post-op medical management    Cardiovascular Risk  Performs 4 METs exercise without symptoms (Light housework (dusting, washing dishes), Climb a flight of stairs, Walk on level ground at 15 minutes per mile (4 miles/hour) and Bicycling at 8.5 minutes per mile (7 miles/hour)) .   Patient is already on a Beta Blocker. Continue Betablocker therapy after surgery, using Beta blocker order set as necessary for NPO status.      --Patient is to take all scheduled medications on the day of surgery EXCEPT for modifications listed below.    APPROVAL GIVEN to proceed with proposed procedure, without further diagnostic evaluation       Signed Electronically by: Humberto Trinidad DO    Copy of this evaluation report is provided to requesting physician.    [place] Preop Guidelines    Revised Cardiac Risk Index

## 2018-10-02 NOTE — PROGRESS NOTES
Phone call to patient in response to voice mail message requesting call back in regards to pre-op instructions.   No answer at given number.  Left VM with contact info and instructions to return call at patient's convenience.

## 2018-10-03 ENCOUNTER — PATIENT OUTREACH (OUTPATIENT)
Dept: NEUROSURGERY | Facility: CLINIC | Age: 58
End: 2018-10-03

## 2018-10-03 LAB
ANION GAP SERPL CALCULATED.3IONS-SCNC: 7 MMOL/L (ref 3–14)
BUN SERPL-MCNC: 12 MG/DL (ref 7–30)
CALCIUM SERPL-MCNC: 9.2 MG/DL (ref 8.5–10.1)
CHLORIDE SERPL-SCNC: 110 MMOL/L (ref 94–109)
CO2 SERPL-SCNC: 24 MMOL/L (ref 20–32)
CREAT SERPL-MCNC: 1.07 MG/DL (ref 0.66–1.25)
GFR SERPL CREATININE-BSD FRML MDRD: 71 ML/MIN/1.7M2
GLUCOSE SERPL-MCNC: 71 MG/DL (ref 70–99)
POTASSIUM SERPL-SCNC: 4.5 MMOL/L (ref 3.4–5.3)
SODIUM SERPL-SCNC: 141 MMOL/L (ref 133–144)

## 2018-10-03 NOTE — PROGRESS NOTES
Telephone Pre-op Teaching    Procedure: Right C5/6 anterior cervical discectomy and fusion    Planned Surgery Date: 10/29/2018  Surgeon: Shahana  PAC(if applicable): 10/22/2018                    Pre-op folder with specific/ written instructions mailed to patient on: 8/10/2018 for review.    Follow-up call to discuss pre-op instructions/routine and requirements to include:  surgical procedure, post-op recovery and expectations, need for H&P/PAC visit, NPO prior to OR, pre-op antibacterial showers, pain control and importance of follow-up visits.  Surgery scheduling will coordinate OR time/date and update patient as appropriate.  3C will call with more instructions 24-48 hour pre-op.  Ample time was provided for patient questions and in-depth discussion of topics of heightened interest.  Reviewed medication list and provided instructions regarding what medications to stop prior to surgery: Ibuprofen - hold one day prior to DOS.   Antibacterial soap solution will be provided at PAC visit as well as specific instructions for use pre-op.  Patient verbalized understanding of instructions.  Approximately 15 minutes spent on the telephone with patient discussing and reviewing specific instructions.      Patient was provided triage contact number for questions or concerns that may arise prior to surgery.

## 2018-10-11 ENCOUNTER — RADIANT APPOINTMENT (OUTPATIENT)
Dept: RADIOLOGY | Facility: CLINIC | Age: 58
End: 2018-10-11
Attending: PAIN MEDICINE
Payer: COMMERCIAL

## 2018-10-11 ENCOUNTER — RADIOLOGY INJECTION OFFICE VISIT (OUTPATIENT)
Dept: PALLIATIVE MEDICINE | Facility: CLINIC | Age: 58
End: 2018-10-11
Attending: NURSE PRACTITIONER
Payer: OTHER MISCELLANEOUS

## 2018-10-11 VITALS — HEART RATE: 72 BPM | SYSTOLIC BLOOD PRESSURE: 142 MMHG | OXYGEN SATURATION: 99 % | DIASTOLIC BLOOD PRESSURE: 90 MMHG

## 2018-10-11 DIAGNOSIS — M47.817 LUMBOSACRAL SPONDYLOSIS WITHOUT MYELOPATHY: Primary | ICD-10-CM

## 2018-10-11 DIAGNOSIS — M48.02 CERVICAL STENOSIS OF SPINAL CANAL: ICD-10-CM

## 2018-10-11 DIAGNOSIS — M54.50 CHRONIC BILATERAL LOW BACK PAIN WITHOUT SCIATICA: ICD-10-CM

## 2018-10-11 DIAGNOSIS — M47.816 SPONDYLOSIS OF LUMBAR REGION WITHOUT MYELOPATHY OR RADICULOPATHY: ICD-10-CM

## 2018-10-11 DIAGNOSIS — M79.18 MYOFASCIAL PAIN: ICD-10-CM

## 2018-10-11 DIAGNOSIS — M47.812 CERVICAL SPONDYLOSIS WITHOUT MYELOPATHY: ICD-10-CM

## 2018-10-11 DIAGNOSIS — M50.30 DDD (DEGENERATIVE DISC DISEASE), CERVICAL: ICD-10-CM

## 2018-10-11 DIAGNOSIS — G89.29 CHRONIC BILATERAL LOW BACK PAIN WITHOUT SCIATICA: ICD-10-CM

## 2018-10-11 PROCEDURE — 64494 INJ PARAVERT F JNT L/S 2 LEV: CPT | Mod: 50 | Performed by: PAIN MEDICINE

## 2018-10-11 PROCEDURE — 64493 INJ PARAVERT F JNT L/S 1 LEV: CPT | Mod: 50 | Performed by: PAIN MEDICINE

## 2018-10-11 ASSESSMENT — PAIN SCALES - GENERAL: PAINLEVEL: WORST PAIN (10)

## 2018-10-11 NOTE — NURSING NOTE
Discharge Information    IV Discontiued Time:  NA    Amount of Fluid Infused:  NA    Discharge Criteria = When patient returns to baseline or as per MD order    Consciousness:  Pt is fully awake    Circulation:  BP +/- 20% of pre-procedure level    Respiration:  Patient is able to breathe deeply    O2 Sat:  Patient is able to maintain O2 Sat >92% on room air    Activity:  Moves 4 extremities on command    Ambulation:  Patient is able to stand and walk or stand and pivot into wheelchair    Dressing:  Clean/dry or No Dressing    Notes:   Discharge instructions and AVS given to patient    Patient meets criteria for discharge?  YES    Admitted to PCU?  No    Responsible adult present to accompany patient home?  Yes    Signature/Title:    leslie peter RN Care Coordinator  [facility]

## 2018-10-11 NOTE — PROGRESS NOTES
Pre procedure Diagnosis: lumbar facet arthropathy, spondylosis   Post procedure Diagnosis: Same  Procedure performed: L3/L4 and L4/L5 facet joint injections BILATERALLY, fluoroscopically guided, contrast controlled  Indication:  Therapeutic for pain  Anesthesia: none  Complication:  None  Operators: Hugo Corrigan MD    Indications:   Truman Suero is a 58 year old male was sent by Melanie Thomas for bilateral lumbar facet joint injections.  The patient has a history of low back pain.  Exam shows lumbar paraspinal tenderness and reproduction of pain with extension and lateral rotation of the lumbar spine.  They have tried conservative treatment including medications, physical therapy, and other injections.    Options/alternatives, benefits and risks were discussed with the patient including bleeding, infection, flared pain, tissue trauma, exposure to radiation, reaction to medications including seizure, spinal cord injury, paralysis, weakness, numbness and headache.     Questions were answered to his satisfaction and he wishes to proceed. Voluntary informed consent was obtained and signed.     Vitals were reviewed: Yes  Allergies were reviewed:  Yes   Medications were reviewed:  Yes   Pre-procedure pain score: 10/10    Procedure:  After obtaining signed informed consent, the patient was brought into the procedure suite and was placed in a prone position on the procedure table.   A Pause for the Cause was performed.  The patient was prepped and draped in the usual sterile fashion.     Under AP fluoroscopic guidance of the RIGHT L3/L4 and L4/L5 facet joints on the LEFT side were identified, and the C-arm was rotated obliquely to the affected side to open the joint space. A total of 2 ml of 1% lidocaine was injected at the needle entry point and needle tract. Then a 22 gauge 3.5 inch spinal needle was inserted and advanced under fluoroscopic guidance targeting the superior articular pillar of each joint. Once the  needle made a contact with SAP, it was rotated and was then advanced into the joint.    AP fluoroscopic views were obtained to confirm the needle placement. Then,  Omnipaque 300 contrast dye was injected after negative aspiration for heme and CSF in each joint, confirming appropriate placement.  A total of 1ml of Omnipaque was used.      Then, each joint was injected with 1.5 ml of a combination of dexamethasone 10mg/mL and 0.25% bupivacaine 1 ml and the needles were flushed with local anesthetic as they were withdrawn.      The same technique was repeated on the LEFT side for the contralateral joints.  The same medications in the above amounts was used on the LEFT side.    Omnipaque wasted:  8 ml.    Hemostasis was achieved, the area was cleaned, and bandaids were placed when appropriate.  The patient tolerated the procedure well, and was taken to the recovery room.    Images were saved to PACS.    Post-procedure pain score: 8/10  Follow-up includes:   -f/u phone call in one week  -f/u with referring provider    Hugo Corrigan MD  [facility]

## 2018-10-11 NOTE — MR AVS SNAPSHOT
After Visit Summary   10/11/2018    Truman Suero    MRN: [id number]           Patient Information     Date Of Birth          1960        Visit Information        Provider Department      10/11/2018 3:15 PM Hugo Corrigan MD [facility]        Today's Diagnoses     Cervical stenosis of spinal canal        DDD (degenerative disc disease), cervical        Cervical spondylosis without myelopathy        Chronic bilateral low back pain without sciatica        Myofascial pain          Care Instructions    [facility]   Procedure Discharge Instructions    Today you saw: Dr. Hugo Corrigan      You had an:  lumbar  facet joint injection      Medications used:  Lidocaine   Bupivacaine   Dexamethasone omnipaque          Be cautious when walking. Numbness and/or weakness in the lower extremities may occur for up to 6-8 hours after the procedure due to effect of the local anesthetic    Do not drive for 6 hours. The effect of the local anesthetic could slow your reflexes.     You may resume your regular activities after 24 hours    Avoid strenuous activity for the first 24 hours    You may shower, however avoid swimming, tub baths or hot tubs for 24 hours following your procedure    You may have a mild to moderate increase in pain for several days following the injection.    It may take up to 14 days for the steroid medication to start working although you may feel the effect as early as a few days after the procedure.       You may use ice packs for 10-15 minutes, 3 to 4 times a day at the injection site for comfort    Do not use heat to painful areas for 6 to 8 hours. This will give the local anesthetic time to wear off and prevent you from accidentally burning your skin.     You may use anti-inflammatory medications (such as Ibuprofen or Aleve or Advil) or Tylenol for pain control if necessary    If you were fasting, you may resume your normal diet and  medications after the procedure    If you have diabetes, check your blood sugar more frequently than usual as your blood sugar may be higher than normal for 10-14 days following a steroid injection. Contact your doctor who manages your diabetes if your blood sugar is higher than usual    If you experience any of the following, call the Pain Clinic during work hours at [phone] or the Provider Line after hours at [phone]:  -Fever over 100 degree F  -Swelling, bleeding, redness, drainage, warmth at the injection site  -Progressive weakness or numbness in your legs or arms  -Loss of bowel or bladder function  -Unusual headache that is not relieved by Tylenol or other pain reliever  -Unusual new onset of pain that is not improving                Follow-ups after your visit        Your next 10 appointments already scheduled     Oct 22, 2018 10:00 AM CDT   (Arrive by 9:45 AM)   PAC EVALUATION with RODNEY Valenzuela   [facility] ([facility] [place])    [address]  [address] [place] [address]   [phone]            Oct 22, 2018 11:00 AM CDT   (Arrive by 10:45 AM)   PAC RN ASSESSMENT with  Pac Rn   [facility] ([facility] [place])    [address]  [address] [place] [address]   [phone]            Oct 22, 2018 11:20 AM CDT   (Arrive by 11:05 AM)   PAC Anesthesia Consult with  Pac Anesthesiologist   [facility] [place] ([facility] [place])    [address]  [address] [place] [address]   [phone]            Oct 29, 2018   Procedure with Jud Harkins MD   [facility] [place] Day [facility] (--)    [address] [address]   [phone]            Nov 12, 2018 10:30 AM CST   (Arrive by 10:15 AM)   Return Visit with Madeline Gordon PA-C   [facility] ([facility] [place])    [address] [facility] [place] [address] [phone]            Nov 28, 2018  1:00 PM CST   Return Visit with RODNEY Vargas CNP   [facility] [place] ([facility] [place])    [address]  [place] [id number] [phone]              Who to contact     If you have questions or need follow up information about today's clinic visit or your schedule please contact [facility] directly at [phone].  Normal or non-critical lab and imaging results will be communicated to you by [email or web address], letter or phone within 4 business days after the clinic has received the results. If you do not hear from us within 7 days, please contact the clinic through [email or web address] or phone. If you have a critical or abnormal lab result, we will notify you by phone as soon as possible.  Submit refill requests through [employer] or call your pharmacy and they will forward the refill request to us. Please allow 3 business days for your refill to be completed.          Additional Information About Your Visit        MyChart Information     [employer] gives you secure access to your electronic health record. If you see a primary care provider, you can also send messages to your care team and make appointments. If you have questions, please call your primary care clinic.  If you do not have a primary care provider, please call [phone] and they will assist you.        Care EveryWhere ID     This is your Care EveryWhere ID. This could be used by other organizations to access your [facility] medical records  [id number]        Your Vitals Were     Pulse                   73            Blood Pressure from Last 3 Encounters:   10/11/18 145/89   10/02/18 136/82   09/05/18 136/90    Weight from Last 3 Encounters:   10/02/18 69.4 kg (152 lb 14.4 oz)   09/05/18 68.9 kg (152 lb)   06/29/18 68.5 kg (151 lb)              Today, you had the following     No orders found for display       Primary  Care Provider Office Phone # Fax #    Humberto Trinidad [phone]       [facility] [facility] [address]        Equal Access to Services     ELIEZER VALENTE : Hadii aad ku haddylano Sogermanali, waaxda luqadaha, qaybta kaalmada adeegclaudeda, mariela constantino laureen carter. So [facility] [phone].    ATENCIÓN: Si habla español, tiene a simmons disposición servicios gratuitos de asistencia lingüística. Llame al [phone].    We comply with applicable federal civil rights laws and [place] laws. We do not discriminate on the basis of race, color, national origin, age, disability, sex, sexual orientation, or gender identity.            Thank you!     Thank you for choosing [facility]  for your care. Our goal is always to provide you with excellent care. Hearing back from our patients is one way we can continue to improve our services. Please take a few minutes to complete the written survey that you may receive in the mail after your visit with us. Thank you!             Your Updated Medication List - Protect others around you: Learn how to safely use, store and throw away your medicines at www.disposemymeds.org.          This list is accurate as of 10/11/18  3:38 PM.  Always use your most recent med list.                   Brand Name Dispense Instructions for use Diagnosis    buPROPion 150 MG 12 hr tablet    WELLBUTRIN SR    60 tablet    Take 1 tablet once daily and increase to 1 tablet twice daily after 4 to 7 days    Tobacco abuse, Adjustment disorder with anxious mood       cyclobenzaprine 10 MG tablet    FLEXERIL    45 tablet    Take 0.5-1 tablets (5-10 mg) by mouth 3 times daily as needed for muscle spasms Caution sedation    Myofascial pain       fluticasone 50 MCG/ACT spray    FLONASE    2 Bottle    Spray 2 sprays into both nostrils 2 times daily    Chronic allergic rhinitis due to animal hair and dander, Chronic seasonal allergic rhinitis due to pollen, Allergic  rhinitis due to dust mite, Allergic conjunctivitis of both eyes       gabapentin 600 MG tablet    NEURONTIN    135 tablet    Take 1.5 tablets (900 mg) by mouth 3 times daily    Cervical spondylosis without myelopathy, DDD (degenerative disc disease), cervical, Chronic neck and back pain, Cervical stenosis of spinal canal       ibuprofen 800 MG tablet    ADVIL/MOTRIN          methocarbamol 750 MG tablet    ROBAXIN    130 tablet    Take 1-2 tablets (750-1,500 mg) by mouth 3 times daily as needed for muscle spasms caution sedation    Chronic neck pain, DDD (degenerative disc disease), cervical, Cervical spondylosis without myelopathy, Myofascial pain       metoprolol succinate 25 MG 24 hr tablet    TOPROL-XL    30 tablet    Take 1 tablet (25 mg) by mouth daily    Preop general physical exam, Mild CAD, Ascending aorta dilatation (H)       topiramate 50 MG tablet    TOPAMAX    60 tablet    Take 1 tablet (50 mg) by mouth 2 times daily Drink plenty of water and no alcohol. If side effects, then reduce to last tolerable dosage.    Cervical spondylosis without myelopathy, DDD (degenerative disc disease), cervical, Cervical stenosis of spinal canal       * traMADol 200 MG ER-tablet    ULTRAM-ER    30 tablet    Take 1 tablet (200 mg) by mouth daily Okay to fill on/after 9/29/18 and start on/after 10/2/2018; to last 30 days    Cervical spondylosis without myelopathy, DDD (degenerative disc disease), cervical       * traMADol 50 MG tablet    ULTRAM    90 tablet    May take 1-2 tablets every 6 hours as needed for pain, max of 3 tabs per day. Reduce as able. Okay to fill on/after 9/29/2018 and start on/after 10/1/2018; To last 30 days.    Cervical radiculopathy       * Notice:  This list has 2 medication(s) that are the same as other medications prescribed for you. Read the directions carefully, and ask your doctor or other care provider to review them with you.

## 2018-10-11 NOTE — PATIENT INSTRUCTIONS
[facility]   Procedure Discharge Instructions    Today you saw: Dr. Hugo Corrigan      You had an:  lumbar  facet joint injection      Medications used:  Lidocaine   Bupivacaine   Dexamethasone omnipaque          Be cautious when walking. Numbness and/or weakness in the lower extremities may occur for up to 6-8 hours after the procedure due to effect of the local anesthetic    Do not drive for 6 hours. The effect of the local anesthetic could slow your reflexes.     You may resume your regular activities after 24 hours    Avoid strenuous activity for the first 24 hours    You may shower, however avoid swimming, tub baths or hot tubs for 24 hours following your procedure    You may have a mild to moderate increase in pain for several days following the injection.    It may take up to 14 days for the steroid medication to start working although you may feel the effect as early as a few days after the procedure.       You may use ice packs for 10-15 minutes, 3 to 4 times a day at the injection site for comfort    Do not use heat to painful areas for 6 to 8 hours. This will give the local anesthetic time to wear off and prevent you from accidentally burning your skin.     You may use anti-inflammatory medications (such as Ibuprofen or Aleve or Advil) or Tylenol for pain control if necessary    If you were fasting, you may resume your normal diet and medications after the procedure    If you have diabetes, check your blood sugar more frequently than usual as your blood sugar may be higher than normal for 10-14 days following a steroid injection. Contact your doctor who manages your diabetes if your blood sugar is higher than usual    If you experience any of the following, call the Pain Clinic during work hours at [phone] or the Provider Line after hours at [phone]:  -Fever over 100 degree F  -Swelling, bleeding, redness, drainage, warmth at the injection site  -Progressive weakness or  numbness in your legs or arms  -Loss of bowel or bladder function  -Unusual headache that is not relieved by Tylenol or other pain reliever  -Unusual new onset of pain that is not improving

## 2018-10-22 ENCOUNTER — APPOINTMENT (OUTPATIENT)
Dept: SURGERY | Facility: CLINIC | Age: 58
End: 2018-10-22
Payer: COMMERCIAL

## 2018-10-22 ENCOUNTER — ANESTHESIA EVENT (OUTPATIENT)
Dept: SURGERY | Facility: CLINIC | Age: 58
End: 2018-10-22
Payer: OTHER MISCELLANEOUS

## 2018-10-22 ENCOUNTER — OFFICE VISIT (OUTPATIENT)
Dept: SURGERY | Facility: CLINIC | Age: 58
End: 2018-10-22
Payer: COMMERCIAL

## 2018-10-22 VITALS
TEMPERATURE: 98 F | SYSTOLIC BLOOD PRESSURE: 150 MMHG | DIASTOLIC BLOOD PRESSURE: 96 MMHG | HEIGHT: 66 IN | WEIGHT: 153.9 LBS | OXYGEN SATURATION: 99 % | BODY MASS INDEX: 24.73 KG/M2 | RESPIRATION RATE: 18 BRPM | HEART RATE: 55 BPM

## 2018-10-22 DIAGNOSIS — Z01.818 PREOP EXAMINATION: Primary | ICD-10-CM

## 2018-10-22 ASSESSMENT — LIFESTYLE VARIABLES: TOBACCO_USE: 1

## 2018-10-22 NOTE — ANESTHESIA PREPROCEDURE EVALUATION
Anesthesia Evaluation     . Pt has had prior anesthetic. Type: MAC    No history of anesthetic complications          ROS/MED HX    ENT/Pulmonary:     (+)MBALE risk factors hypertension, tobacco use, Past use , . .    Neurologic: Comment: HAs    (+)other neuro Cervical stenosis    Cardiovascular: Comment: Ascending aorta dilatation 3.9 cm    (+) hypertension-range: 130-150/syst, Peripheral Vascular Disease-- Other, --. : . . . :. . Previous cardiac testing Echodate:5/7/18results:date: results:ECG reviewed date:10/2/18 results:SR date: results:         (-) taking anticoagulants/antiplatelets   METS/Exercise Tolerance:  >4 METS   Hematologic:  - neg hematologic  ROS       Musculoskeletal:   (+) , , other musculoskeletal- chronic back pain      GI/Hepatic:  - neg GI/hepatic ROS       Renal/Genitourinary:  - ROS Renal section negative       Endo:  - neg endo ROS       Psychiatric:  - neg psychiatric ROS       Infectious Disease:  - neg infectious disease ROS       Malignancy:      - no malignancy   Other:    (+) No chance of pregnancy C-spine cleared: N/A, H/O Chronic Pain,no other significant disability                    Physical Exam      Airway   Mallampati: I  TM distance: >3 FB  Neck ROM: full    Dental   (+) partials  Comment: Lower permanent bridge    Cardiovascular   Rhythm and rate: regular and normal      Pulmonary    breath sounds clear to auscultation    Other findings: For further details of assessment, testing, and physical exam please see H and P completed on same date.           PAC Discussion and Assessment    ASA Classification: 3  Case is suitable for: [place]  Anesthetic techniques and relevant risks discussed: GA  Invasive monitoring and risk discussed: No  Types:   Possibility and Risk of blood transfusion discussed: No  NPO instructions given:   Additional anesthetic preparation and risks discussed:   Needs early admission to pre-op area:   Other:     PAC Resident/NP Anesthesia  Assessment:  Truman Suero is a 58 year old male scheduled to undergo select anterior cervical discectomy and fusion C5/6, right with Dr. Harkins on 10/29/18. He has the following specific operative considerations:   - RCRI : No serious cardiac risks.   - VTE risk: 0.5%  - MABLE # of risks 3/8 = Intermediate risk  - Risk of PONV score = 2.  If > 2, anti-emetic intervention recommended.    No GA history. Colonoscopy only.     --Cervical radiculopathy with neck pain, bilateral arm and hand numbness. Above procedure now planned. Also has chronic low back pain. Flexeril and Methocarbamol prn. Will take Gabapentin and Tramadol on DOS. Will require careful positioning.    --HYPERTENSION. Metoprolol at HS. PAD.  Aortic dilatation 3.9 cm, followed. No cardiac symptoms or meds. EKG SR. Good exercise tolerance.    --Former smoker. 38 pack years. Quit in 3/2017.  Denies pulmonary symptoms. Will take Bupropion on DOS, weaning off.    --HAs. Will take Topamax on DOS.     Patient was reviewed by Dr Pruett.      Reviewed and Signed by PAC Mid-Level Provider/Resident  Mid-Level Provider/Resident: Luly STEVENS, CNS  Date: 10/22/18  Time: 9:43am    Attending Anesthesiologist Anesthesia Assessment:  58 year old for ACDF C5/6. Patient has no significant cardiac or pulmonary disease.    Patient/case discussed with SARABJIT. No need to see patient. Patient is appropriate for the planned procedure without further work-up or medical management.      Reviewed and Signed by PAC Anesthesiologist  Anesthesiologist: isaac  Date: 10/22/2018  Time:   Pass/Fail: Pass  Disposition:     PAC Pharmacist Assessment:  Pain Medication Clarification Note - PAC Pharmacist  Truman Suero was seen and interviewed during time of PAC Clinic appointment on October 22, 2018 in preparation for the planned procedure with Dr Harkins on 10/[facility] for Right Anterior Cervical 5-6 Discectomy And Fusion.      The current plan is for the procedure to be a same day  procedure with patient discharging home after the procedure.  The purpose of this note is to verify the patient's home pain regimen.  If the plan changes and the patient is to be admitted to inpatient status postoperatively please consult the inpatient pain management service for specific pain management recommendations (available at [phone] from 8 AM - 3 PM Mon - Fri and available via phone answering service 24/7 at [phone]).      Based on [place] Prescription Monitoring Profile and patient interview:      - OUTPATIENT MEDICATIONS (related to pain management):  -- Long-acting opioid:  Tramadol ER 200mg po daily  -- Short-acting opioid:  Tramadol 50-100mg po q6h prn, max of 3 tabs per day.  Patient usually takes 2 tabs per day.  -- Intrathecal pump: none  -- Oral adjuvant(s): Gabapentin 900mg po tid  -- Topicals: none  -- Bowel Regimen: none   -- Other relevant medications:  Cyclobenzaprine prn, methocarbamol prn     Average daily oral morphine equivalent (OME): 75 mg of OME/day      Verbal consent was given by patient to access pharmacy records and [place] Prescription Monitoring Profile: No      Pharmacist: Susana Ritter PharmD, MS  Date: October 22, 2018  Time:10:52a      Anesthesia Plan      History & Physical Review  History and physical reviewed and following examination; no interval change.    ASA Status:  2 .    NPO Status:  > 8 hours    Plan for General and ETT with Intravenous induction. Maintenance will be Other.    PONV prophylaxis:  Ondansetron (or other 5HT-3)  Additional equipment: 2nd IV      Postoperative Care  Postoperative pain management:  IV analgesics.      Consents  Anesthetic plan, risks, benefits and alternatives discussed with:  Patient..                          .

## 2018-10-22 NOTE — PATIENT INSTRUCTIONS
Preparing for Your Surgery      Name:  Truman Suero   MRN:  [id number]   :  1960   Today's Date:  10/22/2018     Arriving for surgery:  Surgery date:  10/29/18  Arrival time:  05:45 am  Please come to:       [facility] [address]  [address] [place]  [address]    -   parking is available in front of the hospital from 5:15 am to 8:00 pm    -  Stop at the Information Desk in the lobby    -   Inform the information person that you are here for surgery. An escort to 3c will be provided. If you would not like an escort, please proceed to 3C on the 3rd floor. [phone]     What can I eat or drink?  -  You may have solid food or milk products until 8 hours prior to your surgery.  -  You may have water, apple juice or 7up/Sprite until 2 hours prior to your surgery.    Which medicines can I take?  Stop Aspirin, vitamins and supplements one week prior to surgery.  Hold Ibuprofen and Naproxen for 24 hours prior to surgery.       -  Please take these medications the day of surgery:  Tylenol if needed, take all other scheduled medications normally taken in the morning    How do I prepare myself?  -  Take two showers: one the night before surgery; and one the morning of surgery.         Use Scrubcare or Hibiclens to wash from neck down.  You may use your own     shampoo and conditioner. No other hair products.   -  Do NOT use lotion, powder, deodorant, or antiperspirant the day of your surgery.  -  Do NOT wear any jewelry.    - Do not bring your own medications to the hospital, except for inhalers and eye   drops.  -  Bring your ID and insurance card.    Questions or Concerns:  -If you have questions or concerns regarding the day of surgery, please call [phone].     -If you are scheduled at the Ambulatory Surgery Center please call [phone].    -For questions after surgery please call your surgeons office.

## 2018-10-22 NOTE — MR AVS SNAPSHOT
After Visit Summary   10/22/2018    Truman Suero    MRN: [id number]           Patient Information     Date Of Birth          1960        Visit Information        Provider Department      10/22/2018 9:30 AM Pharmacist, [facility] Preoperative Assessment [place]        Today's Diagnoses     Preop examination    -  1       Follow-ups after your visit        Your next 10 appointments already scheduled     Oct 22, 2018 11:00 AM CDT   (Arrive by 10:45 AM)   PAC RN ASSESSMENT with Dar Pac Rn   [facility] Preoperative Assessment [place] ([facility])    [address] [place] [address]  4th [facility] [id number] [phone]            Oct 22, 2018 11:20 AM CDT   (Arrive by 11:05 AM)   PAC Anesthesia Consult with Dar Pac Anesthesiologist   [facility] Preoperative Assessment [place] ([facility])    [address] [place] [id number] [phone]            Oct 29, 2018   Procedure with Jud Harkins MD   [facility] [place], Same Day Surgery (--)    [address] [facility] [address] [phone]            Nov 12, 2018 10:30 AM CST   (Arrive by 10:15 AM)   Return Visit with Madeline Gordon PA-C   [facility] Neurosurgery ([facility])    [address] [place] [address] [phone]            Nov 28, 2018  1:00 PM CST   Return Visit with RODNEY Vargas [facility] [place] ([facility] [place])    [address] [facility]  [place] [id number] [phone]              Who to contact     Please call your clinic at [phone] to:    Ask questions about your health    Make or cancel appointments    Discuss your medicines    Learn about your test results    Speak to your doctor            Additional Information About Your Visit        MyChart Information     MyChart gives you secure access to your electronic health record. If you see a primary  care provider, you can also send messages to your care team and make appointments. If you have questions, please call your primary care clinic.  If you do not have a primary care provider, please call [phone] and they will assist you.      Star[employer] is an electronic gateway that provides easy, online access to your medical records. With Star[employer], you can request a clinic appointment, read your test results, renew a prescription or communicate with your care team.     To access your existing account, please contact your [facility] or call [phone] for assistance.        Care EveryWhere ID     This is your Care EveryWhere ID. This could be used by other organizations to access your [place] medical records  [id number]         Blood Pressure from Last 3 Encounters:   10/22/18 (!) 150/96   10/11/18 142/90   10/02/18 136/82    Weight from Last 3 Encounters:   10/22/18 69.8 kg (153 lb 14.4 oz)   10/02/18 69.4 kg (152 lb 14.4 oz)   09/05/18 68.9 kg (152 lb)              Today, you had the following     No orders found for display         Today's Medication Changes          These changes are accurate as of 10/22/18 10:53 AM.  If you have any questions, ask your nurse or doctor.               These medicines have changed or have updated prescriptions.        Dose/Directions    buPROPion 150 MG 12 hr tablet   Commonly known as:  WELLBUTRIN SR   This may have changed:    - how much to take  - how to take this  - when to take this  - additional instructions   Used for:  Tobacco abuse, Adjustment disorder with anxious mood        Take 1 tablet once daily and increase to 1 tablet twice daily after 4 to 7 days   Quantity:  60 tablet   Refills:  0       metoprolol succinate 25 MG 24 hr tablet   Commonly known as:  TOPROL-XL   This may have changed:  when to take this   Used for:  Preop general physical exam, Mild CAD, Ascending aorta dilatation (H)        Dose:  25 mg   Take 1 tablet (25 mg)  by mouth daily   Quantity:  30 tablet   Refills:  1         Stop taking these medicines if you haven't already. Please contact your care team if you have questions.     ibuprofen 800 MG tablet   Commonly known as:  ADVIL/MOTRIN   Stopped by:  Pharmacist, [facility]                    Primary Care Provider Office Phone # Fax Tristan Trinidad [phone]       [address] [place] [address]        Equal Access to Services     ELIEZER VALENTE : Hadii aad ku hadasho Soomaali, waaxda luqadaha, qaybta kaalmada adeegyada, waxay idiin hayaan adeeg vira garduno . So [facility] [phone].    ATENCIÓN: Si habla espheavenly, tiene a simmons disposición servicios gratuitos de asistencia lingüística. [facility] [phone].    We comply with applicable federal civil rights laws and [place] laws. We do not discriminate on the basis of race, color, national origin, age, disability, sex, sexual orientation, or gender identity.            Thank you!     Thank you for choosing [facility]OPER[facility]  for your care. Our goal is always to provide you with excellent care. Hearing back from our patients is one way we can continue to improve our services. Please take a few minutes to complete the written survey that you may receive in the mail after your visit with us. Thank you!             Your Updated Medication List - Protect others around you: Learn how to safely use, store and throw away your medicines at www.disposemymeds.org.          This list is accurate as of 10/22/18 10:53 AM.  Always use your most recent med list.                   Brand Name Dispense Instructions for use Diagnosis    buPROPion 150 MG 12 hr tablet    WELLBUTRIN SR    60 tablet    Take 1 tablet once daily and increase to 1 tablet twice daily after 4 to 7 days    Tobacco abuse, Adjustment disorder with anxious mood       cyclobenzaprine 10 MG tablet    FLEXERIL    45 tablet    Take 0.5-1 tablets (5-10 mg) by mouth 3  times daily as needed for muscle spasms Caution sedation    Myofascial pain       fluticasone 50 MCG/ACT spray    FLONASE    2 Bottle    Spray 2 sprays into both nostrils 2 times daily    Chronic allergic rhinitis due to animal hair and dander, Chronic seasonal allergic rhinitis due to pollen, Allergic rhinitis due to dust mite, Allergic conjunctivitis of both eyes       gabapentin 600 MG tablet    NEURONTIN    135 tablet    Take 1.5 tablets (900 mg) by mouth 3 times daily    Cervical spondylosis without myelopathy, DDD (degenerative disc disease), cervical, Chronic neck and back pain, Cervical stenosis of spinal canal       methocarbamol 750 MG tablet    ROBAXIN    130 tablet    Take 1-2 tablets (750-1,500 mg) by mouth 3 times daily as needed for muscle spasms caution sedation    Chronic neck pain, DDD (degenerative disc disease), cervical, Cervical spondylosis without myelopathy, Myofascial pain       metoprolol succinate 25 MG 24 hr tablet    TOPROL-XL    30 tablet    Take 1 tablet (25 mg) by mouth daily    Preop general physical exam, Mild CAD, Ascending aorta dilatation (H)       topiramate 50 MG tablet    TOPAMAX    60 tablet    Take 1 tablet (50 mg) by mouth 2 times daily Drink plenty of water and no alcohol. If side effects, then reduce to last tolerable dosage.    Cervical spondylosis without myelopathy, DDD (degenerative disc disease), cervical, Cervical stenosis of spinal canal       * traMADol 200 MG ER-tablet    ULTRAM-ER    30 tablet    Take 1 tablet (200 mg) by mouth daily Okay to fill on/after 9/29/18 and start on/after 10/2/2018; to last 30 days    Cervical spondylosis without myelopathy, DDD (degenerative disc disease), cervical       * traMADol 50 MG tablet    ULTRAM    90 tablet    May take 1-2 tablets every 6 hours as needed for pain, max of 3 tabs per day. Reduce as able. Okay to fill on/after 9/29/2018 and start on/after 10/1/2018; To last 30 days.    Cervical radiculopathy       * Notice:   This list has 2 medication(s) that are the same as other medications prescribed for you. Read the directions carefully, and ask your doctor or other care provider to review them with you.

## 2018-10-22 NOTE — MR AVS SNAPSHOT
After Visit Summary   10/22/2018    Truman Suero    MRN: [id number]           Patient Information     Date Of Birth          1960        Visit Information        Provider Department      10/22/2018 10:00 AM Luly Curiel APRN [facility]        Today's Diagnoses     Preop examination    -  1      Care Instructions    Preparing for Your Surgery      Name:  Truman Suero   MRN:  [id number]   :  1960   Today's Date:  10/22/2018     Arriving for surgery:  Surgery date:  10/29/18  Arrival time:  05:45 am  Please come to:       [facility] [address]  [address] [id number]    -   parking is available in front of the hospital from 5:15 am to 8:00 pm    -  Stop at the Information Desk in the lobby    -   Inform the information person that you are here for surgery. An escort to 3c will be provided. If you would not like an escort, please proceed to 3C on the 3rd floor. [phone]     What can I eat or drink?  -  You may have solid food or milk products until 8 hours prior to your surgery.  -  You may have water, apple juice or 7up/Sprite until 2 hours prior to your surgery.    Which medicines can I take?  Stop Aspirin, vitamins and supplements one week prior to surgery.  Hold Ibuprofen and Naproxen for 24 hours prior to surgery.       -  Please take these medications the day of surgery:  Tylenol if needed, take all other scheduled medications normally taken in the morning    How do I prepare myself?  -  Take two showers: one the night before surgery; and one the morning of surgery.         Use Scrubcare or Hibiclens to wash from neck down.  You may use your own     shampoo and conditioner. No other hair products.   -  Do NOT use lotion, powder, deodorant, or antiperspirant the day of your surgery.  -  Do NOT wear any jewelry.    - Do not bring your own medications to the hospital, except for inhalers  and eye   drops.  -  Bring your ID and insurance card.    Questions or Concerns:  -If you have questions or concerns regarding the day of surgery, please call [phone].     -If you are scheduled at the Ambulatory Surgery Center please call [phone].    -For questions after surgery please call your surgeons office.                     Follow-ups after your visit        Your next 10 appointments already scheduled     Oct 22, 2018 10:00 AM CDT   (Arrive by 9:45 AM)   PAC EVALUATION with RODNEY Valenzuela   [facility] Preoperative Assessment [place] ([facility])    [address] [place] [id number] [phone]            Oct 22, 2018 11:00 AM CDT   (Arrive by 10:45 AM)   PAC RN ASSESSMENT with  Pac Rn   [facility] Preoperative Assessment [place] ([facility])    [address] [place] [id number] [phone]            Oct 22, 2018 11:20 AM CDT   (Arrive by 11:05 AM)   PAC Anesthesia Consult with  Pac Anesthesiologist   [facility] Preoperative Assessment [place] ([facility])    [address] [place] [id number] [phone]            Oct 29, 2018   Procedure with Jud Harkins MD   [facility], [place], Same Day Surgery (--)    [address] [facility] [address] [phone]            Nov 12, 2018 10:30 AM CST   (Arrive by 10:15 AM)   Return Visit with Madeline Gordon PA-C   [facility] Neurosurgery ([facility])    [address] [place] [id number] [phone]            Nov 28, 2018  1:00 PM CST   Return Visit with RODNEY Vargas [facility] [place] ([place] [facility] [place])    [address] [facility]  [place] [address] [phone]              Who to contact     Please call your clinic at [phone] to:    Ask questions about your health    Make or cancel  "appointments    Discuss your medicines    Learn about your test results    Speak to your doctor            Additional Information About Your Visit        [email or web address] Information     [employer] gives you secure access to your electronic health record. If you see a primary care provider, you can also send messages to your care team and make appointments. If you have questions, please call your primary care clinic.  If you do not have a primary care provider, please call [phone] and they will assist you.      [employer] is an electronic gateway that provides easy, online access to your medical records. With [employer], you can request a clinic appointment, read your test results, renew a prescription or communicate with your care team.     To access your existing account, please contact your [facility] or call [phone] for assistance.        Care EveryWhere ID     This is your Care EveryWhere ID. This could be used by other organizations to access your [place] medical records  [id number]        Your Vitals Were     Pulse Temperature Respirations Height Pulse Oximetry BMI (Body Mass Index)    55 98  F (36.7  C) (Oral) 18 1.664 m (5' 5.5\") 99% 25.22 kg/m2       Blood Pressure from Last 3 Encounters:   10/22/18 (!) 150/96   10/11/18 142/90   10/02/18 136/82    Weight from Last 3 Encounters:   10/22/18 69.8 kg (153 lb 14.4 oz)   10/02/18 69.4 kg (152 lb 14.4 oz)   09/05/18 68.9 kg (152 lb)              Today, you had the following     No orders found for display         Today's Medication Changes          These changes are accurate as of 10/22/18  9:53 AM.  If you have any questions, ask your nurse or doctor.               Stop taking these medicines if you haven't already. Please contact your care team if you have questions.     ibuprofen 800 MG tablet   Commonly known as:  ADVIL/MOTRIN   Stopped by:  Pharmacist, [facility]                    Primary Care Provider Office Phone # Fax #    Helsamia " Shayy Trinidad -[phone]       [address] [facility] [address]        Equal Access to Services     ELIEZER VALENTE : Hadii aad ku haddylanella Maldonado, ishanadrianna gleasongriseldaha, deanna kacapri unger, mariela alyciain hayaaurvashi zhouaustin celedainalize carter. So [facility] [phone].    ATENCIÓN: Si habla español, tiene a simmons disposición servicios gratuitos de asistencia lingüística. Llame al [phone].    We comply with applicable federal civil rights laws and [place] laws. We do not discriminate on the basis of race, color, national origin, age, disability, sex, sexual orientation, or gender identity.            Thank you!     Thank you for choosing [facility] PREOPERATIVE ASSESSMENT [place]  for your care. Our goal is always to provide you with excellent care. Hearing back from our patients is one way we can continue to improve our services. Please take a few minutes to complete the written survey that you may receive in the mail after your visit with us. Thank you!             Your Updated Medication List - Protect others around you: Learn how to safely use, store and throw away your medicines at www.disposemymeds.org.          This list is accurate as of 10/22/18  9:53 AM.  Always use your most recent med list.                   Brand Name Dispense Instructions for use Diagnosis    buPROPion 150 MG 12 hr tablet    WELLBUTRIN SR    60 tablet    Take 1 tablet once daily and increase to 1 tablet twice daily after 4 to 7 days    Tobacco abuse, Adjustment disorder with anxious mood       cyclobenzaprine 10 MG tablet    FLEXERIL    45 tablet    Take 0.5-1 tablets (5-10 mg) by mouth 3 times daily as needed for muscle spasms Caution sedation    Myofascial pain       fluticasone 50 MCG/ACT spray    FLONASE    2 Bottle    Spray 2 sprays into both nostrils 2 times daily    Chronic allergic rhinitis due to animal hair and dander, Chronic seasonal allergic rhinitis due to pollen, Allergic rhinitis due to dust mite,  Allergic conjunctivitis of both eyes       gabapentin 600 MG tablet    NEURONTIN    135 tablet    Take 1.5 tablets (900 mg) by mouth 3 times daily    Cervical spondylosis without myelopathy, DDD (degenerative disc disease), cervical, Chronic neck and back pain, Cervical stenosis of spinal canal       methocarbamol 750 MG tablet    ROBAXIN    130 tablet    Take 1-2 tablets (750-1,500 mg) by mouth 3 times daily as needed for muscle spasms caution sedation    Chronic neck pain, DDD (degenerative disc disease), cervical, Cervical spondylosis without myelopathy, Myofascial pain       metoprolol succinate 25 MG 24 hr tablet    TOPROL-XL    30 tablet    Take 1 tablet (25 mg) by mouth daily    Preop general physical exam, Mild CAD, Ascending aorta dilatation (H)       topiramate 50 MG tablet    TOPAMAX    60 tablet    Take 1 tablet (50 mg) by mouth 2 times daily Drink plenty of water and no alcohol. If side effects, then reduce to last tolerable dosage.    Cervical spondylosis without myelopathy, DDD (degenerative disc disease), cervical, Cervical stenosis of spinal canal       * traMADol 200 MG ER-tablet    ULTRAM-ER    30 tablet    Take 1 tablet (200 mg) by mouth daily Okay to fill on/after 9/29/18 and start on/after 10/2/2018; to last 30 days    Cervical spondylosis without myelopathy, DDD (degenerative disc disease), cervical       * traMADol 50 MG tablet    ULTRAM    90 tablet    May take 1-2 tablets every 6 hours as needed for pain, max of 3 tabs per day. Reduce as able. Okay to fill on/after 9/29/2018 and start on/after 10/1/2018; To last 30 days.    Cervical radiculopathy       * Notice:  This list has 2 medication(s) that are the same as other medications prescribed for you. Read the directions carefully, and ask your doctor or other care provider to review them with you.

## 2018-10-22 NOTE — H&P
Pre-Operative H & P     CC:  Preoperative exam to assess for increased cardiopulmonary risk while undergoing surgery and anesthesia.    Date of Encounter: 10/22/2018  Primary Care Physician:  Humberto Trinidad  Reason for visit: Cervical radicular pain [M54.12]  - Primary     HPI  Truman Suero is a 58 year old male who presents for pre-operative H & P in preparation for select anterior cervical discectomy and fusion C5/6, right with Dr. Harkins on 10/29/18 at [facility]. History is obtained from the patient.   Patient who has been recently evaluated by Dr. Harkins for neck pain, bilateral arm and hand numbness which he has had for the past year. This is interfering with his work at a [employer]. His workup revealed bilateral radiculopathy and stenosis especially at C5-6. He was counseled for surgery, but was asked to quit smoking first. He has been able to do this and is now preparing for above procedure.  His history is otherwise significant for chronic low back pain, and ascending aorta dilatation, last echocardiogram 3.9 cm.    Past Medical History  Past Medical History:   Diagnosis Date     Ascending aorta dilatation (H) 2/13/2018     Cervical spondylosis without myelopathy 10/3/2017     Chronic bilateral low back pain with right-sided sciatica 5/16/2018     Hypertension      Mild CAD 2/13/2018     Neck pain     MRI positive on cervical vertebrea.     PAD (peripheral artery disease) (H) 2/27/2018     Tobacco abuse 8/16/2017       Past Surgical History  Past Surgical History:   Procedure Laterality Date     COLONOSCOPY         Hx of Blood transfusions/reactions: Denies.      Hx of abnormal bleeding or anti-platelet use: Denies.     Menstrual history: No LMP for male patient.    Steroid use in the last year: Denies.     Personal or FH with difficulty with Anesthesia:  Denies.     Prior to Admission Medications  Current Outpatient Prescriptions   Medication Sig Dispense  Refill     buPROPion (WELLBUTRIN SR) 150 MG 12 hr tablet Take 1 tablet once daily and increase to 1 tablet twice daily after 4 to 7 days (Patient taking differently: Take 150 mg by mouth daily ) 60 tablet 0     cyclobenzaprine (FLEXERIL) 10 MG tablet Take 0.5-1 tablets (5-10 mg) by mouth 3 times daily as needed for muscle spasms Caution sedation 45 tablet 1     fluticasone (FLONASE) 50 MCG/ACT spray Spray 2 sprays into both nostrils 2 times daily 2 Bottle 11     gabapentin (NEURONTIN) 600 MG tablet Take 1.5 tablets (900 mg) by mouth 3 times daily 135 tablet 3     methocarbamol (ROBAXIN) 750 MG tablet Take 1-2 tablets (750-1,500 mg) by mouth 3 times daily as needed for muscle spasms caution sedation 130 tablet 2     metoprolol succinate (TOPROL-XL) 25 MG 24 hr tablet Take 1 tablet (25 mg) by mouth daily (Patient taking differently: Take 25 mg by mouth At Bedtime ) 30 tablet 1     topiramate (TOPAMAX) 50 MG tablet Take 1 tablet (50 mg) by mouth 2 times daily Drink plenty of water and no alcohol. If side effects, then reduce to last tolerable dosage. 60 tablet 2     traMADol (ULTRAM) 50 MG tablet May take 1-2 tablets every 6 hours as needed for pain, max of 3 tabs per day. Reduce as able. Okay to fill on/after 9/29/2018 and start on/after 10/1/2018; To last 30 days. 90 tablet 0     traMADol (ULTRAM-ER) 200 MG ER-tablet Take 1 tablet (200 mg) by mouth daily Okay to fill on/after 9/29/18 and start on/after 10/2/2018; to last 30 days 30 tablet 0       Allergies  Allergies   Allergen Reactions     Pollen Extract      Ragweeds        Social History  Social History     Social History     Marital status:      Spouse name: N/A     Number of children: N/A     Years of education: N/A     Occupational History           Social History Main Topics     Smoking status: Former Smoker     Packs/day: 1.00     Years: 30.00     Types: Cigarettes     Quit date: 3/20/2018     Smokeless tobacco: Never Used     Alcohol use No       "Comment: Quit drinking 10 years ago     Drug use: No     Sexual activity: Yes     Partners: Female     Birth control/ protection: None     Other Topics Concern     Not on file     Social History Narrative       Family History  Family History   Problem Relation Age of Onset     Prostate Cancer Father      Breast Cancer Maternal Grandmother      Prostate Cancer Other        Review of Systems  ROS/MED HX    The complete review of systems is negative other than noted in the HPI or here.   ENT/Pulmonary:     (+)tobacco use, Past use , . .    Neurologic:     (+)other neuro Cervical stenosis    Cardiovascular: Comment: Ascending aorta dilatation 3.9 cm    (+) -Peripheral Vascular Disease-- Other, --. : . . . :. . Previous cardiac testing date:results:date: results:ECG reviewed date:10/2/18 results: date: results:         (-) taking anticoagulants/antiplatelets   METS/Exercise Tolerance:  >4   Hematologic:  - neg hematologic  ROS       Musculoskeletal:   (+) , , other musculoskeletal- chronic back pain      GI/Hepatic:  - neg GI/hepatic ROS       Renal/Genitourinary:  - ROS Renal section negative       Endo:  - neg endo ROS       Psychiatric:  - neg psychiatric ROS       Infectious Disease:  - neg infectious disease ROS       Malignancy:      - no malignancy   Other:    (+) No chance of pregnancy C-spine cleared: N/A, H/O Chronic Pain,no other significant disability      Physical Exam      Airway   Mallampati: II  TM distance: >3 FB  Neck ROM: limited    Temp: 98  F (36.7  C) Temp src: Oral BP: (!) 150/96 Pulse: 55   Resp: 18 SpO2: 99 %         153 lbs 14.4 oz  5' 5.5\"   Body mass index is 25.22 kg/(m^2).       Physical Exam  Constitutional: Awake, alert, cooperative, no apparent distress, and appears stated age.  Eyes: Pupils equal, round and reactive to light, extra ocular muscles intact, sclera clear, conjunctiva normal.  HENT: Normocephalic, oral pharynx with moist mucus membranes, good dentition. No goiter appreciated. "   Respiratory: Clear to auscultation bilaterally, no crackles or wheezing. No cough or obvious dyspnea.  Cardiovascular: Regular rate and rhythm, normal S1 and S2, and no murmur noted.  Carotids +2, no bruits. No edema. Palpable pulses to radial  DP and PT arteries.   GI: Normal bowel sounds, soft, non-distended, non-tender, no masses palpated, no hepatosplenomegaly.    Lymph/Hematologic: No cervical lymphadenopathy and no supraclavicular lymphadenopathy.  Genitourinary: Deferred.   Skin: Warm and dry.  No rashes at anticipated surgical site, but he does have a dry, pale pink rash scatter around his belt line. He reports this as chronic, it comes and goes,  and he has been putting lotion on it.    Musculoskeletal: Full ROM of neck. There is no redness, warmth, or swelling of the joints. Gross motor strength is normal.    Neurologic: Awake, alert, oriented to name, place and time. Cranial nerves II-XII are grossly intact. Gait is normal.   Neuropsychiatric: Mildly anxious, cooperative. Normal affect.     Labs: (personally reviewed)  10/2/18 Hgb 14.2  Last Comprehensive Metabolic Panel:  Sodium   Date Value Ref Range Status   10/02/2018 141 133 - 144 mmol/L Final     Potassium   Date Value Ref Range Status   10/02/2018 4.5 3.4 - 5.3 mmol/L Final     Chloride   Date Value Ref Range Status   10/02/2018 110 (H) 94 - 109 mmol/L Final     Carbon Dioxide   Date Value Ref Range Status   10/02/2018 24 20 - 32 mmol/L Final     Anion Gap   Date Value Ref Range Status   10/02/2018 7 3 - 14 mmol/L Final     Glucose   Date Value Ref Range Status   10/02/2018 71 70 - 99 mg/dL Final     Comment:     Non Fasting     Urea Nitrogen   Date Value Ref Range Status   10/02/2018 12 7 - 30 mg/dL Final     Creatinine   Date Value Ref Range Status   10/02/2018 1.07 0.66 - 1.25 mg/dL Final     GFR Estimate   Date Value Ref Range Status   10/02/2018 71 >60 mL/min/1.7m2 Final     Comment:     Non  GFR Calc     Calcium   Date Value  Ref Range Status   10/02/2018 9.2 8.5 - 10.1 mg/dL Final     EKG: Personally reviewed 10/2/18 Sinus rhythm  Cardiac echo: 5/7/18  Interpretation Summary  The aortic valve is tricuspid.  Ascending aorta 3.9 cm.  Sinuses of Valsalva 3.8 cm.  Previous study not available for comparison.  9/2017 MR Cervical spine  IMPRESSION: Moderate multilevel degenerative disc and facet disease  with some progression at C5-C6 where there is moderate central canal  and bilateral neural foraminal stenosis along with cord deformity  Imaging and cardiac testing reviewed by this provider    ASSESSMENT and PLAN  Truman Suero is a 58 year old male scheduled to undergo select anterior cervical discectomy and fusion C5/6, right with Dr. Harkins on 10/29/18. He has the following specific operative considerations:   - RCRI : No serious cardiac risks.   - Anesthesia considerations:  Refer to PAC assessment in anesthesia records  - VTE risk: 0.5%  - MABLE # of risks 3/8 = Intermediate risk  - Risk of PONV score = 2.  If > 2, anti-emetic intervention recommended.     --Cervical radiculopathy with neck pain, bilateral arm and hand numbness. Above procedure now planned. Also has chronic low back pain. Flexeril and Methocarbamol prn. Will take Gabapentin and Tramadol on DOS. Will require careful positioning.    --HYPERTENSION. Metoprolol at HS. PAD.  Aortic dilatation 3.9 cm, followed. No cardiac symptoms or meds. EKG SR. Good exercise tolerance.    --Former smoker. 38 pack years. Quit in 3/2017.  Denies pulmonary symptoms. Will take Bupropion on DOS, weaning off.    --HAs. Will take Topamax on DOS.   Arrival time, NPO, shower and medication instructions provided by nursing staff today. Preparing For Your Surgery handout given.  Patient was discussed with Dr Pruett.    RODNEY Valenzuela CNS  Preoperative Assessment Center  [facility]  Phone: [phone]  Fax: [phone]

## 2018-10-22 NOTE — PROGRESS NOTES
Preoperative Assessment Center medication history for October 22, 2018 is complete.    See Epic admission navigator for prior to admission medications.   Operating room staff will still need to confirm medications and last dose information on day of surgery.     Medication history interview sources:   Patient    Changes made to PTA medication list (reason)  Added: none  Deleted: Ibuoprofen  Changed: none    Additional medication history information (including reliability of information, actions taken by pharmacist): Patient takes both cyclobenzaprine and methocarbamol.  The patient's pharmacy was unable to get methocarbamol so patient received cyclobenzaprine.  Does not take both together.    -- No recent (within 30 days) course of antibiotics  -- No recent (within 30 days) chronic daily medications stopped   -- Patient declines being on any other prescription or over-the-counter medications    Prior to Admission medications    Medication Sig Last Dose Taking? Auth Provider   buPROPion (WELLBUTRIN SR) 150 MG 12 hr tablet Take 1 tablet once daily and increase to 1 tablet twice daily after 4 to 7 days  Patient taking differently: Take 150 mg by mouth daily  Taking Yes Humberto Trinidad DO   cyclobenzaprine (FLEXERIL) 10 MG tablet Take 0.5-1 tablets (5-10 mg) by mouth 3 times daily as needed for muscle spasms Caution sedation Taking Yes Melanie Thomas APRN CNP   fluticasone (FLONASE) 50 MCG/ACT spray Spray 2 sprays into both nostrils 2 times daily Taking Yes Dontrell Tilley MD   gabapentin (NEURONTIN) 600 MG tablet Take 1.5 tablets (900 mg) by mouth 3 times daily Taking Yes Melanie Thomas APRN CNP   methocarbamol (ROBAXIN) 750 MG tablet Take 1-2 tablets (750-1,500 mg) by mouth 3 times daily as needed for muscle spasms caution sedation Taking Yes Melanie Thomas APRN CNP   metoprolol succinate (TOPROL-XL) 25 MG 24 hr tablet Take 1 tablet (25 mg) by mouth daily  Patient taking differently: Take 25  mg by mouth At Bedtime  Taking Yes Humberto Trinidad DO   topiramate (TOPAMAX) 50 MG tablet Take 1 tablet (50 mg) by mouth 2 times daily Drink plenty of water and no alcohol. If side effects, then reduce to last tolerable dosage. Taking Yes Melanie Thomas APRN CNP   traMADol (ULTRAM) 50 MG tablet May take 1-2 tablets every 6 hours as needed for pain, max of 3 tabs per day. Reduce as able. Okay to fill on/after 9/29/2018 and start on/after 10/1/2018; To last 30 days. Taking Yes Melanie Thomas APRN CNP   traMADol (ULTRAM-ER) 200 MG ER-tablet Take 1 tablet (200 mg) by mouth daily Okay to fill on/after 9/29/18 and start on/after 10/2/2018; to last 30 days Taking Yes Melanie Thomas APRN CNP         Medication history completed by: Susana Ritter RPH

## 2018-10-22 NOTE — PHARMACY - PREOPERATIVE ASSESSMENT CENTER
Pain Medication Clarification Note - PAC Pharmacist  Truman MINAYA Steviedavid was seen and interviewed during time of PAC Clinic appointment on October 22, 2018 in preparation for the planned procedure with Dr Harkins on 10/29/19 at [facility] for Right Anterior Cervical 5-6 Discectomy And Fusion.     The current plan is for the procedure to be a same day procedure with patient discharging home after the procedure.  The purpose of this note is to verify the patient's home pain regimen.  If the plan changes and the patient is to be admitted to inpatient status postoperatively please consult the inpatient pain management service for specific pain management recommendations (available at [phone] from 8 AM - 3 PM Mon - Fri and available via phone answering service 24/7 at [phone]).     Based on [place] Prescription Monitoring Profile and patient interview:     - OUTPATIENT MEDICATIONS (related to pain management):  -- Long-acting opioid:  Tramadol ER 200mg po daily  -- Short-acting opioid:  Tramadol 50-100mg po q6h prn, max of 3 tabs per day.  Patient usually takes 2 tabs per day.  -- Intrathecal pump: none  -- Oral adjuvant(s): Gabapentin 900mg po tid  -- Topicals: none  -- Bowel Regimen: none   -- Other relevant medications:  Cyclobenzaprine prn, methocarbamol prn    Average daily oral morphine equivalent (OME): 75 mg of OME/day     Verbal consent was given by patient to access pharmacy records and [place] Prescription Monitoring Profile: No    If Truman Suero is admitted the inpatient pain service will follow up on patient after consult is placed and offer further recommendations for pain management.  If immediate assistance is needed please contact the Inpatient Pain Management Service: Call [phone] after hours, weekends and holidays or page [phone] from 8 AM - 3 PM Mon - Fri.    Susana Ritter, AnthonyD, MS  October 22, 2018  10:07 AM

## 2018-10-23 ENCOUNTER — MYC MEDICAL ADVICE (OUTPATIENT)
Dept: PALLIATIVE MEDICINE | Facility: CLINIC | Age: 58
End: 2018-10-23

## 2018-10-23 NOTE — TELEPHONE ENCOUNTER
Palmer-    Thanks for the update re: the back injections.     Good luck and speedy healing from upcoming neck surgery.    Melanie STEVENS RN CNP, MELISSAP  [facility]    I have sent the above myChart message to the patient.     Melanie STEVENS RN CNP, MELISSAP  [facility]

## 2018-10-23 NOTE — TELEPHONE ENCOUNTER
From: Truman Suero        Created: 10/23/2018 3:41 PM         *-*-*This message has not been handled.*-*-*    Dr Navarro- Before I forget. Just wanted to mention these last injections I had for the lower back have been helping up to this point. I wanted to message you as Monday is the big day for the neck.  Wanted to document the last injections with you in case we needed them for future reference. Back was pretty miserable prior, so possibly may need attention later.  We have an appointment the end of Nov. I will see you then. Guessing I will have to message you with some prescription renewal request prior.    Take Care  Vandana            Will forward to Melanie as FYI.    Van Oquendo, RN  Care Coordinator   [facility]

## 2018-10-27 ENCOUNTER — MYC REFILL (OUTPATIENT)
Dept: PALLIATIVE MEDICINE | Facility: CLINIC | Age: 58
End: 2018-10-27

## 2018-10-27 DIAGNOSIS — M47.812 CERVICAL SPONDYLOSIS WITHOUT MYELOPATHY: ICD-10-CM

## 2018-10-27 DIAGNOSIS — M54.12 CERVICAL RADICULOPATHY: ICD-10-CM

## 2018-10-27 DIAGNOSIS — M50.30 DDD (DEGENERATIVE DISC DISEASE), CERVICAL: ICD-10-CM

## 2018-10-27 RX ORDER — TRAMADOL HYDROCHLORIDE 50 MG/1
TABLET ORAL
Qty: 90 TABLET | Refills: 0 | Status: CANCELLED | OUTPATIENT
Start: 2018-10-27

## 2018-10-29 ENCOUNTER — APPOINTMENT (OUTPATIENT)
Dept: GENERAL RADIOLOGY | Facility: CLINIC | Age: 58
End: 2018-10-29
Attending: NEUROLOGICAL SURGERY
Payer: OTHER MISCELLANEOUS

## 2018-10-29 ENCOUNTER — ANESTHESIA (OUTPATIENT)
Dept: SURGERY | Facility: CLINIC | Age: 58
End: 2018-10-29
Payer: OTHER MISCELLANEOUS

## 2018-10-29 ENCOUNTER — APPOINTMENT (OUTPATIENT)
Dept: GENERAL RADIOLOGY | Facility: CLINIC | Age: 58
End: 2018-10-29
Attending: STUDENT IN AN ORGANIZED HEALTH CARE EDUCATION/TRAINING PROGRAM
Payer: OTHER MISCELLANEOUS

## 2018-10-29 ENCOUNTER — HOSPITAL ENCOUNTER (OUTPATIENT)
Facility: CLINIC | Age: 58
Discharge: HOME | End: 2018-10-30
Attending: NEUROLOGICAL SURGERY | Admitting: NEUROLOGICAL SURGERY
Payer: OTHER MISCELLANEOUS

## 2018-10-29 DIAGNOSIS — Z98.1 S/P CERVICAL SPINAL FUSION: Primary | ICD-10-CM

## 2018-10-29 LAB
GLUCOSE BLDC GLUCOMTR-MCNC: 97 MG/DL (ref 70–99)
POTASSIUM SERPL-SCNC: 4.1 MMOL/L (ref 3.4–5.3)

## 2018-10-29 PROCEDURE — 25000128 H RX IP 250 OP 636: Performed by: NURSE ANESTHETIST, CERTIFIED REGISTERED

## 2018-10-29 PROCEDURE — 25000125 ZZHC RX 250: Performed by: NURSE ANESTHETIST, CERTIFIED REGISTERED

## 2018-10-29 PROCEDURE — 95940 IONM IN OPERATNG ROOM 15 MIN: CPT | Performed by: NEUROLOGICAL SURGERY

## 2018-10-29 PROCEDURE — 40000986 XR CERVICAL SPINE 2/3 VWS

## 2018-10-29 PROCEDURE — 37000008 ZZH ANESTHESIA TECHNICAL FEE, 1ST 30 MIN: Performed by: NEUROLOGICAL SURGERY

## 2018-10-29 PROCEDURE — 25000132 ZZH RX MED GY IP 250 OP 250 PS 637: Performed by: STUDENT IN AN ORGANIZED HEALTH CARE EDUCATION/TRAINING PROGRAM

## 2018-10-29 PROCEDURE — 25000128 H RX IP 250 OP 636: Performed by: ANESTHESIOLOGY

## 2018-10-29 PROCEDURE — 25000128 H RX IP 250 OP 636: Performed by: STUDENT IN AN ORGANIZED HEALTH CARE EDUCATION/TRAINING PROGRAM

## 2018-10-29 PROCEDURE — 71000014 ZZH RECOVERY PHASE 1 LEVEL 2 FIRST HR: Performed by: NEUROLOGICAL SURGERY

## 2018-10-29 PROCEDURE — 27211024 ZZHC OR SUPPLY OTHER OPNP: Performed by: NEUROLOGICAL SURGERY

## 2018-10-29 PROCEDURE — 27211022 ZZHC OR IOM SUPPLIES OPNP: Performed by: NEUROLOGICAL SURGERY

## 2018-10-29 PROCEDURE — 36000070 ZZH SURGERY LEVEL 5 EA 15 ADDTL MIN - UMMC: Performed by: NEUROLOGICAL SURGERY

## 2018-10-29 PROCEDURE — 82962 GLUCOSE BLOOD TEST: CPT

## 2018-10-29 PROCEDURE — 40000170 ZZH STATISTIC PRE-PROCEDURE ASSESSMENT II: Performed by: NEUROLOGICAL SURGERY

## 2018-10-29 PROCEDURE — C1762 CONN TISS, HUMAN(INC FASCIA): HCPCS | Performed by: NEUROLOGICAL SURGERY

## 2018-10-29 PROCEDURE — 27210995 ZZH RX 272: Performed by: NEUROLOGICAL SURGERY

## 2018-10-29 PROCEDURE — 25000128 H RX IP 250 OP 636: Performed by: NEUROLOGICAL SURGERY

## 2018-10-29 PROCEDURE — 36415 COLL VENOUS BLD VENIPUNCTURE: CPT | Performed by: ANESTHESIOLOGY

## 2018-10-29 PROCEDURE — 27210794 ZZH OR GENERAL SUPPLY STERILE: Performed by: NEUROLOGICAL SURGERY

## 2018-10-29 PROCEDURE — 40000277 XR SURGERY CARM FLUORO LESS THAN 5 MIN W STILLS: Mod: TC

## 2018-10-29 PROCEDURE — C1713 ANCHOR/SCREW BN/BN,TIS/BN: HCPCS | Performed by: NEUROLOGICAL SURGERY

## 2018-10-29 PROCEDURE — 84132 ASSAY OF SERUM POTASSIUM: CPT | Performed by: ANESTHESIOLOGY

## 2018-10-29 PROCEDURE — 25000125 ZZHC RX 250: Performed by: NEUROLOGICAL SURGERY

## 2018-10-29 PROCEDURE — 25000566 ZZH SEVOFLURANE, EA 15 MIN: Performed by: NEUROLOGICAL SURGERY

## 2018-10-29 PROCEDURE — 36000072 ZZH SURGERY LEVEL 5 W FLUORO 1ST 30 MIN - UMMC: Performed by: NEUROLOGICAL SURGERY

## 2018-10-29 PROCEDURE — 37000009 ZZH ANESTHESIA TECHNICAL FEE, EACH ADDTL 15 MIN: Performed by: NEUROLOGICAL SURGERY

## 2018-10-29 DEVICE — GRAFT BONE SPACER LORDOTIC 07MM 017707: Type: IMPLANTABLE DEVICE | Site: SPINE CERVICAL | Status: FUNCTIONAL

## 2018-10-29 DEVICE — IMPLANTABLE DEVICE: Type: IMPLANTABLE DEVICE | Site: SPINE CERVICAL | Status: FUNCTIONAL

## 2018-10-29 DEVICE — GRAFT BONE PUTTY DBX 01ML 038010: Type: IMPLANTABLE DEVICE | Site: SPINE CERVICAL | Status: FUNCTIONAL

## 2018-10-29 RX ORDER — FENTANYL CITRATE 50 UG/ML
25-50 INJECTION, SOLUTION INTRAMUSCULAR; INTRAVENOUS
Status: DISCONTINUED | OUTPATIENT
Start: 2018-10-29 | End: 2018-10-29 | Stop reason: HOSPADM

## 2018-10-29 RX ORDER — FENTANYL CITRATE 50 UG/ML
INJECTION, SOLUTION INTRAMUSCULAR; INTRAVENOUS PRN
Status: DISCONTINUED | OUTPATIENT
Start: 2018-10-29 | End: 2018-10-29

## 2018-10-29 RX ORDER — ONDANSETRON 4 MG/1
4 TABLET, ORALLY DISINTEGRATING ORAL EVERY 30 MIN PRN
Status: DISCONTINUED | OUTPATIENT
Start: 2018-10-29 | End: 2018-10-29 | Stop reason: HOSPADM

## 2018-10-29 RX ORDER — PROPOFOL 10 MG/ML
INJECTION, EMULSION INTRAVENOUS CONTINUOUS PRN
Status: DISCONTINUED | OUTPATIENT
Start: 2018-10-29 | End: 2018-10-29

## 2018-10-29 RX ORDER — GABAPENTIN 300 MG/1
900 CAPSULE ORAL 3 TIMES DAILY
Status: DISCONTINUED | OUTPATIENT
Start: 2018-10-29 | End: 2018-10-30 | Stop reason: HOSPADM

## 2018-10-29 RX ORDER — PROPOFOL 10 MG/ML
INJECTION, EMULSION INTRAVENOUS PRN
Status: DISCONTINUED | OUTPATIENT
Start: 2018-10-29 | End: 2018-10-29

## 2018-10-29 RX ORDER — NALOXONE HYDROCHLORIDE 0.4 MG/ML
.1-.4 INJECTION, SOLUTION INTRAMUSCULAR; INTRAVENOUS; SUBCUTANEOUS
Status: DISCONTINUED | OUTPATIENT
Start: 2018-10-29 | End: 2018-10-30 | Stop reason: HOSPADM

## 2018-10-29 RX ORDER — SODIUM CHLORIDE, SODIUM LACTATE, POTASSIUM CHLORIDE, CALCIUM CHLORIDE 600; 310; 30; 20 MG/100ML; MG/100ML; MG/100ML; MG/100ML
INJECTION, SOLUTION INTRAVENOUS CONTINUOUS
Status: DISCONTINUED | OUTPATIENT
Start: 2018-10-29 | End: 2018-10-29 | Stop reason: HOSPADM

## 2018-10-29 RX ORDER — LABETALOL HYDROCHLORIDE 5 MG/ML
10 INJECTION, SOLUTION INTRAVENOUS
Status: COMPLETED | OUTPATIENT
Start: 2018-10-29 | End: 2018-10-29

## 2018-10-29 RX ORDER — ONDANSETRON 2 MG/ML
4 INJECTION INTRAMUSCULAR; INTRAVENOUS EVERY 30 MIN PRN
Status: DISCONTINUED | OUTPATIENT
Start: 2018-10-29 | End: 2018-10-29 | Stop reason: HOSPADM

## 2018-10-29 RX ORDER — ONDANSETRON 2 MG/ML
4-8 INJECTION INTRAMUSCULAR; INTRAVENOUS EVERY 6 HOURS PRN
Status: DISCONTINUED | OUTPATIENT
Start: 2018-10-29 | End: 2018-10-30 | Stop reason: HOSPADM

## 2018-10-29 RX ORDER — LIDOCAINE 40 MG/G
CREAM TOPICAL
Status: DISCONTINUED | OUTPATIENT
Start: 2018-10-29 | End: 2018-10-30 | Stop reason: HOSPADM

## 2018-10-29 RX ORDER — ONDANSETRON 4 MG/1
4-8 TABLET, ORALLY DISINTEGRATING ORAL EVERY 6 HOURS PRN
Status: DISCONTINUED | OUTPATIENT
Start: 2018-10-29 | End: 2018-10-30 | Stop reason: HOSPADM

## 2018-10-29 RX ORDER — TOPIRAMATE 50 MG/1
50 TABLET, FILM COATED ORAL 2 TIMES DAILY
Status: DISCONTINUED | OUTPATIENT
Start: 2018-10-29 | End: 2018-10-30 | Stop reason: HOSPADM

## 2018-10-29 RX ORDER — METOPROLOL SUCCINATE 25 MG/1
25 TABLET, EXTENDED RELEASE ORAL AT BEDTIME
Status: DISCONTINUED | OUTPATIENT
Start: 2018-10-29 | End: 2018-10-30 | Stop reason: HOSPADM

## 2018-10-29 RX ORDER — CYCLOBENZAPRINE HCL 5 MG
5-10 TABLET ORAL 3 TIMES DAILY PRN
Status: DISCONTINUED | OUTPATIENT
Start: 2018-10-29 | End: 2018-10-30 | Stop reason: HOSPADM

## 2018-10-29 RX ORDER — BUPROPION HYDROCHLORIDE 150 MG/1
150 TABLET, EXTENDED RELEASE ORAL DAILY
Status: DISCONTINUED | OUTPATIENT
Start: 2018-10-29 | End: 2018-10-30 | Stop reason: HOSPADM

## 2018-10-29 RX ORDER — CEFAZOLIN SODIUM 2 G/100ML
2 INJECTION, SOLUTION INTRAVENOUS
Status: COMPLETED | OUTPATIENT
Start: 2018-10-29 | End: 2018-10-29

## 2018-10-29 RX ORDER — HYDROMORPHONE HYDROCHLORIDE 1 MG/ML
.3-.5 INJECTION, SOLUTION INTRAMUSCULAR; INTRAVENOUS; SUBCUTANEOUS EVERY 10 MIN PRN
Status: DISCONTINUED | OUTPATIENT
Start: 2018-10-29 | End: 2018-10-29 | Stop reason: HOSPADM

## 2018-10-29 RX ORDER — MEPERIDINE HYDROCHLORIDE 50 MG/ML
12.5 INJECTION INTRAMUSCULAR; INTRAVENOUS; SUBCUTANEOUS
Status: DISCONTINUED | OUTPATIENT
Start: 2018-10-29 | End: 2018-10-29 | Stop reason: HOSPADM

## 2018-10-29 RX ORDER — HYDROMORPHONE HCL/0.9% NACL/PF 0.2MG/0.2
0.2 SYRINGE (ML) INTRAVENOUS
Status: DISCONTINUED | OUTPATIENT
Start: 2018-10-29 | End: 2018-10-30

## 2018-10-29 RX ORDER — OXYCODONE HYDROCHLORIDE 5 MG/1
5 TABLET ORAL
Status: DISCONTINUED | OUTPATIENT
Start: 2018-10-29 | End: 2018-10-30

## 2018-10-29 RX ORDER — CEFAZOLIN SODIUM 1 G/3ML
1 INJECTION, POWDER, FOR SOLUTION INTRAMUSCULAR; INTRAVENOUS SEE ADMIN INSTRUCTIONS
Status: DISCONTINUED | OUTPATIENT
Start: 2018-10-29 | End: 2018-10-29 | Stop reason: HOSPADM

## 2018-10-29 RX ORDER — LIDOCAINE 40 MG/G
CREAM TOPICAL
Status: DISCONTINUED | OUTPATIENT
Start: 2018-10-29 | End: 2018-10-29 | Stop reason: HOSPADM

## 2018-10-29 RX ORDER — METHOCARBAMOL 750 MG/1
750-1500 TABLET, FILM COATED ORAL 3 TIMES DAILY PRN
Status: DISCONTINUED | OUTPATIENT
Start: 2018-10-29 | End: 2018-10-30 | Stop reason: HOSPADM

## 2018-10-29 RX ORDER — ONDANSETRON 2 MG/ML
INJECTION INTRAMUSCULAR; INTRAVENOUS PRN
Status: DISCONTINUED | OUTPATIENT
Start: 2018-10-29 | End: 2018-10-29

## 2018-10-29 RX ORDER — SODIUM CHLORIDE, SODIUM LACTATE, POTASSIUM CHLORIDE, CALCIUM CHLORIDE 600; 310; 30; 20 MG/100ML; MG/100ML; MG/100ML; MG/100ML
INJECTION, SOLUTION INTRAVENOUS CONTINUOUS PRN
Status: DISCONTINUED | OUTPATIENT
Start: 2018-10-29 | End: 2018-10-29

## 2018-10-29 RX ORDER — LIDOCAINE HYDROCHLORIDE AND EPINEPHRINE 10; 10 MG/ML; UG/ML
INJECTION, SOLUTION INFILTRATION; PERINEURAL PRN
Status: DISCONTINUED | OUTPATIENT
Start: 2018-10-29 | End: 2018-10-29 | Stop reason: HOSPADM

## 2018-10-29 RX ORDER — LABETALOL HYDROCHLORIDE 5 MG/ML
INJECTION, SOLUTION INTRAVENOUS PRN
Status: DISCONTINUED | OUTPATIENT
Start: 2018-10-29 | End: 2018-10-29

## 2018-10-29 RX ORDER — TRAMADOL HYDROCHLORIDE 50 MG/1
50 TABLET ORAL EVERY 6 HOURS PRN
Status: DISCONTINUED | OUTPATIENT
Start: 2018-10-29 | End: 2018-10-30 | Stop reason: HOSPADM

## 2018-10-29 RX ORDER — NALOXONE HYDROCHLORIDE 0.4 MG/ML
.1-.4 INJECTION, SOLUTION INTRAMUSCULAR; INTRAVENOUS; SUBCUTANEOUS
Status: DISCONTINUED | OUTPATIENT
Start: 2018-10-29 | End: 2018-10-29 | Stop reason: HOSPADM

## 2018-10-29 RX ORDER — HYDRALAZINE HYDROCHLORIDE 20 MG/ML
2.5-5 INJECTION INTRAMUSCULAR; INTRAVENOUS EVERY 10 MIN PRN
Status: DISCONTINUED | OUTPATIENT
Start: 2018-10-29 | End: 2018-10-29 | Stop reason: HOSPADM

## 2018-10-29 RX ORDER — LIDOCAINE HYDROCHLORIDE 20 MG/ML
INJECTION, SOLUTION INFILTRATION; PERINEURAL PRN
Status: DISCONTINUED | OUTPATIENT
Start: 2018-10-29 | End: 2018-10-29

## 2018-10-29 RX ADMIN — METOPROLOL SUCCINATE 25 MG: 25 TABLET, EXTENDED RELEASE ORAL at 21:30

## 2018-10-29 RX ADMIN — FENTANYL CITRATE 50 MCG: 50 INJECTION, SOLUTION INTRAMUSCULAR; INTRAVENOUS at 11:00

## 2018-10-29 RX ADMIN — SUGAMMADEX 50 MG: 100 INJECTION, SOLUTION INTRAVENOUS at 08:54

## 2018-10-29 RX ADMIN — Medication 10 MG: at 12:37

## 2018-10-29 RX ADMIN — SODIUM CHLORIDE, POTASSIUM CHLORIDE, SODIUM LACTATE AND CALCIUM CHLORIDE: 600; 310; 30; 20 INJECTION, SOLUTION INTRAVENOUS at 08:20

## 2018-10-29 RX ADMIN — ROCURONIUM BROMIDE 40 MG: 10 INJECTION INTRAVENOUS at 08:11

## 2018-10-29 RX ADMIN — PROPOFOL 35 MCG/KG/MIN: 10 INJECTION, EMULSION INTRAVENOUS at 08:35

## 2018-10-29 RX ADMIN — FENTANYL CITRATE 50 MCG: 50 INJECTION, SOLUTION INTRAMUSCULAR; INTRAVENOUS at 09:13

## 2018-10-29 RX ADMIN — Medication 0.2 MG: at 12:48

## 2018-10-29 RX ADMIN — PROPOFOL 70 MG: 10 INJECTION, EMULSION INTRAVENOUS at 08:11

## 2018-10-29 RX ADMIN — SUGAMMADEX 50 MG: 100 INJECTION, SOLUTION INTRAVENOUS at 08:49

## 2018-10-29 RX ADMIN — METHOCARBAMOL 750 MG: 750 TABLET, FILM COATED ORAL at 23:29

## 2018-10-29 RX ADMIN — TOPIRAMATE 50 MG: 50 TABLET ORAL at 21:30

## 2018-10-29 RX ADMIN — ONDANSETRON 4 MG: 2 INJECTION INTRAMUSCULAR; INTRAVENOUS at 11:27

## 2018-10-29 RX ADMIN — OXYCODONE HYDROCHLORIDE 5 MG: 5 TABLET ORAL at 23:29

## 2018-10-29 RX ADMIN — GABAPENTIN 900 MG: 300 CAPSULE ORAL at 21:30

## 2018-10-29 RX ADMIN — Medication 0.3 MG: at 12:37

## 2018-10-29 RX ADMIN — FENTANYL CITRATE 50 MCG: 50 INJECTION, SOLUTION INTRAMUSCULAR; INTRAVENOUS at 12:05

## 2018-10-29 RX ADMIN — PROPOFOL 30 MG: 10 INJECTION, EMULSION INTRAVENOUS at 11:45

## 2018-10-29 RX ADMIN — Medication 0.5 MG: at 12:56

## 2018-10-29 RX ADMIN — OXYCODONE HYDROCHLORIDE 5 MG: 5 TABLET ORAL at 16:58

## 2018-10-29 RX ADMIN — MIDAZOLAM 2 MG: 1 INJECTION INTRAMUSCULAR; INTRAVENOUS at 07:52

## 2018-10-29 RX ADMIN — REMIFENTANIL HYDROCHLORIDE 0.2 MCG/KG/MIN: 1 INJECTION, POWDER, LYOPHILIZED, FOR SOLUTION INTRAVENOUS at 08:43

## 2018-10-29 RX ADMIN — LABETALOL HYDROCHLORIDE 5 MG: 5 INJECTION INTRAVENOUS at 11:59

## 2018-10-29 RX ADMIN — CEFAZOLIN SODIUM 2 G: 2 INJECTION, SOLUTION INTRAVENOUS at 08:50

## 2018-10-29 RX ADMIN — FENTANYL CITRATE 50 MCG: 50 INJECTION, SOLUTION INTRAMUSCULAR; INTRAVENOUS at 07:56

## 2018-10-29 RX ADMIN — LABETALOL HYDROCHLORIDE 10 MG: 5 INJECTION INTRAVENOUS at 12:06

## 2018-10-29 RX ADMIN — LIDOCAINE HYDROCHLORIDE 60 MG: 20 INJECTION, SOLUTION INFILTRATION; PERINEURAL at 08:11

## 2018-10-29 RX ADMIN — OXYCODONE HYDROCHLORIDE 5 MG: 5 TABLET ORAL at 14:04

## 2018-10-29 RX ADMIN — OXYCODONE HYDROCHLORIDE 5 MG: 5 TABLET ORAL at 20:26

## 2018-10-29 RX ADMIN — Medication 0.5 MG: at 13:32

## 2018-10-29 RX ADMIN — HYDRALAZINE HYDROCHLORIDE 5 MG: 20 INJECTION INTRAMUSCULAR; INTRAVENOUS at 12:55

## 2018-10-29 RX ADMIN — SODIUM CHLORIDE, POTASSIUM CHLORIDE, SODIUM LACTATE AND CALCIUM CHLORIDE: 600; 310; 30; 20 INJECTION, SOLUTION INTRAVENOUS at 07:45

## 2018-10-29 RX ADMIN — LABETALOL HYDROCHLORIDE 5 MG: 5 INJECTION INTRAVENOUS at 12:01

## 2018-10-29 RX ADMIN — CEFAZOLIN SODIUM 1 G: 2 INJECTION, SOLUTION INTRAVENOUS at 10:48

## 2018-10-29 ASSESSMENT — PAIN DESCRIPTION - DESCRIPTORS: DESCRIPTORS: SORE

## 2018-10-29 NOTE — ANESTHESIA POSTPROCEDURE EVALUATION
Anesthesia POST Procedure Evaluation    Patient: Truman Suero   MRN:     [id number] Gender:   male   Age:    58 year old :      1960        Preoperative Diagnosis: Cervical Radicular Pain    Procedure(s):  Right Anterior Cervical 5-6 Discectomy And Fusion   Postop Comments: No value filed.       Anesthesia Type:  Not documented    Reportable Event: NO     PAIN: Uncomplicated   Sign Out status: Comfortable, Well controlled pain     PONV: No PONV   Sign Out status:  No Nausea or Vomiting     Neuro/Psych: Uneventful perioperative course   Sign Out Status: Preoperative baseline; Age appropriate mentation     Airway/Resp.: Uneventful perioperative course   Sign Out Status: Non labored breathing, age appropriate RR; Resp. Status within EXPECTED Parameters     CV: Uneventful perioperative course   Sign Out status: Appropriate BP and perfusion indices; Appropriate HR/Rhythm     Disposition:   Sign Out in:  PACU  Disposition:  Phase II; Home  Recovery Course: Uneventful  Follow-Up: Not required           Last Anesthesia Record Vitals:  CRNA VITALS  10/29/2018 1133 - 10/29/2018 1233      10/29/2018             Pulse: 64    SpO2: 96 %          Last PACU/Preop Vitals:  Vitals:    10/29/18 0600 10/29/18 1205 10/29/18 1215   BP: (!) 136/100 (!) 168/110 (!) 169/110   Pulse: 68     Resp: 16 16 16   Temp: 36.6  C (97.9  F) 36.6  C (97.8  F)    SpO2: 98% 100% 98%         Electronically Signed By: Kentrell Castillo MD, 2018, 1:07 PM

## 2018-10-29 NOTE — ANESTHESIA CARE TRANSFER NOTE
Patient: Truman Suero    Procedure(s):  Right Anterior Cervical 5-6 Discectomy And Fusion    Diagnosis: Cervical Radicular Pain   Diagnosis Additional Information: No value filed.    Anesthesia Type:   General, ETT     Note:  Airway :Face Mask  Patient transferred to:PACU  Comments: Oropharynx suctioned. spont resp.  Extubated. Exchanging well. No visible swelling at neck.  To PACU. Report to RN at bedside. Pt awake and answering questions.  Handoff Report: Identifed the Patient, Identified the Reponsible Provider, Reviewed the pertinent medical history, Discussed the surgical course, Reviewed Intra-OP anesthesia mangement and issues during anesthesia, Set expectations for post-procedure period and Allowed opportunity for questions and acknowledgement of understanding      Vitals: (Last set prior to Anesthesia Care Transfer)    CRNA VITALS  10/29/2018 1133 - 10/29/2018 1213      10/29/2018             Pulse: 64    SpO2: 96 %                Electronically Signed By: RODNEY Álvarez CRNA  October 29, 2018  12:13 PM

## 2018-10-29 NOTE — OR NURSING
Writer spoke with neurosurgery resident Dr. Nair for clarification of orders. Orders in place for pt to go to observation unit. Writer asked Dr. Nair to also place transfer patient order. Dr. Nair stated she will place order. Awaiting transfer order at this time.

## 2018-10-29 NOTE — BRIEF OP NOTE
"   [facility], [place]    Brief Operative Note    Pre-operative diagnosis: Cervical Radiculopathy   Post-operative diagnosis Cervical radiculopathy   Procedure: Procedure(s):  Right Anterior Cervical 5-6 Discectomy And Fusion  Surgeon: Surgeon(s) and Role:     * Jud Harkins MD - Primary     * Phillip Adams MD - Resident - Assisting  Anesthesia: General   Estimated blood loss: 15 mL  Drains: None  Specimens: None   Findings:   None.  Complications: None.  Implants:   1) Cartup Commerce Advanced ACF Spacer Lordotic 7 mm Lot No. [id number] Part No [id number] Serial No [id number]  2) Depuy HOLLR Winder One Level Plate Item No [id number] Serial No [id number]  3) 4x Synthes Winder SFDR Screw 4.0 x 14 mm     Phillip \"Bahman\" MD Angie   Neurosurgery, PGY-2    Please contact neurosurgery resident on call with questions.    Dial * * [phone], enter [phone] when prompted.           "

## 2018-10-29 NOTE — IP AVS SNAPSHOT
MRN:[id number]                      After Visit Summary   10/29/2018    Truman Suero    MRN: [id number]           Thank you!     Thank you for choosing [facility] for your care. Our goal is always to provide you with excellent care. Hearing back from our patients is one way we can continue to improve our services. Please take a few minutes to complete the written survey that you may receive in the mail after you visit with us. Thank you!        Patient Information     Date Of Birth          1960        About your hospital stay     You were admitted on:  October 29, 2018 You last received care in the:  Unit 6D Observation [facility]    You were discharged on:  October 30, 2018        Reason for your hospital stay       You underwent cervical discectomy and fusion                  Who to Call     For medical emergencies, please call 911.  For non-urgent questions about your medical care, please call your primary care provider or clinic, [phone]  For questions related to your surgery, please call your surgery clinic        Attending Provider     Provider Jud Espinal MD Neurosurgery       Primary Care Provider Office Phone # Fax #    Humberto Tobyjessica DO [facility] [phone]      After Care Instructions     Activity       Your activity upon discharge:   Do not do any bending, twisting, strenuous exercise, or heavy lifting (greater than 10 pounds) for 4-6 weeks. Be careful and ask for assistance when walking or going up and down stairs. Avoid any activities that could result in trauma to the surgical wound. Do not drive within 3 months of having your last seizure or while using narcotics or other sedating medications, such as sleep aids, muscle relaxants, etc.            Diet       Follow this diet upon discharge: Orders Placed This Encounter      Regular Diet Adult            Discharge Instructions       You underwent surgery on your  cervical spine for  fusion by Jud Harkins MD, PhD.    - You will have follow in 2 weeks with either our nurse practitioner or your  primary care provider for a wound check, then at 6 weeks and 3 months with your surgeon. You will have follow up Xrays at 6 weeks and at 3 months you will have a CT scan prior to your appointment for the surgeon to review.      - If you have not heard from our clinic about your follow up visit by 3-4 days following your discharge, please call our clinic at [phone] to schedule an appointment with the Neurosurgery teams.     - Please avoid taking medications like Advil, Motrin, Ibuprofen, Celebrex, Aleve or aspirin for at least 3 months as these medications can slow the healing and fusion of your spine.       After discharge, your activity restrictions are:   -We encourage short frequent walks, increasing as tolerated.  - Avoid housework, vacuuming, laundry, leaf raking, lawn mowing and snow removal.   - No driving until you are seen in clinic and cleared by your neurosurgeon. You should not drive while on narcotic pain medication.   - No strenuous activity.  - No lifting more than 10 pounds until you are seen in clinic (a gallon of milk weighs approximately 8 pounds)  - You are ok to shower, but do not soak your incisions. Pat them dry if they get damp.   - Avoid coloring your hair or permanent styling until cleared by your surgeon  - No baths, hot tubs or pools for 4-6 weeks after surgery.   Cervical spine - You may have some restricted motion in your neck that prevents you from driving safely. Please exercise caution when deciding when to resume driving.       Wound cares after surgery  - You are ok to shower, but do not soak your incisions. Pat them dry if they get damp.   - Avoid coloring your hair or permanent styling until cleared by your surgeon  - No baths, hot tubs or pools for 4-6 weeks after surgery.       Call if you have any of the followin. Temperature greater than 101.5 F.   2. Any  redness, swelling or discharge from the wound.   3. Any new weakness, numbness or altered mental status.  4. Worsening pain that is not improving with the pain medications you were prescribed.     Call [phone] or after 5:00 pm or on weekends call [phone] and ask for the neurosurgery resident on call. Thank You.            No driving or operating machinery        Until not requiring narcotic medications            No lifting        No lifting over 10 lbs and no strenuous physical activity until seen in clinic            Wound care and dressings       Instructions to care for your wound at home:   You should remove your dressings and bandages on post-operative day #2 . You should then keep the wound undressed and open to air. You are allowed to take showers and get the wound wet starting on post-operative day #3  but you may not scrub or soak the wound or keep it submerged under water. If you do happen to get the wound wet, be sure to pat dry it rather than scrubbing it with a towel.                  Follow-up Appointments     [facility]ult [facility] Follow-up and recommended labs and tests       Follow up in 2 weeks for wound check     Appointments on [place] and/or [facility] (with [facility] or [facility] provider or service). Call [phone] if you haven't heard regarding these appointments within 7 days of discharge.                  Your next 10 appointments already scheduled     Nov 12, 2018 10:30 AM CST   (Arrive by 10:15 AM)   Return Visit with Madeline Gordon PA-C   [facility] Neurosurgery ([facility])    [address]  3rd [address]  [place] [address] [phone]            Nov 28, 2018  1:00 PM CST   Return Visit with RODNEY Vargas CNP   [facility] ([facility])    [address] [address] [phone]              Future tests that were ordered for you     XR Cervical Spine 2/3 Views       AP and  lateral in standing position                  Further instructions from your care team         Discharge Instructions for Cervical Disk Surgery  You had cervical disk surgery. This procedure can provide relief from neck and arm pain, numbness, and weakness. There are several types of cervical disk surgery. You and your healthcare provider decided on the type that was best for you. Below are tips to help speed your recovery from surgery.  Activity    Arrange your household to keep the items you need within reach.    Remove electrical cords, throw rugs, and anything else that may cause you to fall.    Follow your healthcare provider's instructions for wearing a cervical collar or brace. The neck collar or brace is important because it supports and correctly positions your neck after surgery. Be sure to follow instructions for its care, use, and the length of time you must wear it.    Don t drive until your healthcare provider says it s OK. This will most likely be when you can move your neck from side to side freely and without pain. Never drive while you are taking opioid pain medicine.    Walk as much as possible. You may also go up and down stairs as much as you can tolerate. Walking outside or walking on a treadmill at a slow speed with no incline is OK.    Don t lift anything heavier than 5 pounds.    Ask your healthcare provider when you can return to work.  Other home care    Take pain medicine exactly as directed. You should avoid taking NSAIDs, such as aspirin, ibuprofen, and naproxen, for 6 months because it blocks bone fusion.     Wait 5 to 7 days after your surgery to begin showering. Then shower as needed. You may be instructed to use a neck collar while you shower. If so, carefully remove it when you finish showering. Keep your neck correctly positioned as you gently pat dry your skin, the incision, and the neck collar. Then put the neck collar back on. Don t rub the incision, or apply creams or lotions  "to it.    Don t soak in bathtubs, hot tubs, or swimming pools until instructed by your healthcare provider.    Your incision may have been closed using sutures, staples, or strips of tape. If you have sutures or staples, they may need to be removed 2 to 3 weeks after surgery. You can allow strips of tape to fall off on their own.    If you smoke, quit. Smoking slows healing of bone and you may need more surgery. Enroll in a stop-smoking program to improve your chances of success.  Follow-up  Make a follow-up appointment.  Call 911  Call 911 right away if you have any of the following:    Chest pain    Shortness of breath    Trouble controlling your bowels or bladder    Painful calf that is warm to the touch and tender with pressure  When to call your healthcare provider  Call your healthcare provider right away if you have any of the following:    Drainage, redness, or warmth at the incision    Fever of 100.4 F (38 C) or higher, or as directed by your healthcare provider    Shaking chills    Weakness, numbness, or tingling in your arms or legs    Swelling of the foot, ankle, or calf that is not relieved by elevating your feet    Increased pain           Pending Results     Date and Time Order Name Status Description    10/29/2018 2132 XR Cervical Spine 2/3 Views In process             Statement of Approval     Ordered          10/30/18 0952  I have reviewed and agree with all the recommendations and orders detailed in this document.  EFFECTIVE NOW     Approved and electronically signed by:  Marleny Rowland APRN CNP             Admission Information     Date & Time Provider Department Dept. Phone    10/29/2018 Jud Harkins MD Unit 6D Observation [facility] [place] [phone]      Your Vitals Were     Blood Pressure Pulse Temperature Respirations Height Weight    170/116 (BP Location: Left arm) 73 97.8  F (36.6  C) (Oral) 16 1.665 m (5' 5.55\") 69.9 kg (154 lb 1.6 oz)    Pulse Oximetry BMI (Body Mass " Index)                96% 25.21 kg/m2          [employer] Information     [employer] gives you secure access to your electronic health record. If you see a primary care provider, you can also send messages to your care team and make appointments. If you have questions, please call your primary care clinic.  If you do not have a primary care provider, please call [phone] and they will assist you.        Care EveryWhere ID     This is your Care EveryWhere ID. This could be used by other organizations to access your [place] medical records  [id number]        Equal Access to Services     ELIEZER VALENTE : Hadcurtis talley haddylano Soomaali, waaxda luqadaha, qaybta kaalmada markyaadrianna, mariela garduno . So [facility] [phone].    ATENCIÓN: Si habla español, tiene a simmons disposición servicios gratuitos de asistencia lingüística. Llame al [phone].    We comply with applicable federal civil rights laws and [place] laws. We do not discriminate on the basis of race, color, national origin, age, disability, sex, sexual orientation, or gender identity.               Review of your medicines      UNREVIEWED medicines. Ask your doctor about these medicines        Dose / Directions    * traMADol 200 MG ER-tablet   Commonly known as:  ULTRAM-ER   This may have changed:    - Another medication with the same name was added. Make sure you understand how and when to take each.  - Another medication with the same name was removed. Continue taking this medication, and follow the directions you see here.   Used for:  Cervical spondylosis without myelopathy, DDD (degenerative disc disease), cervical   Ask about: Which instructions should I use?        Dose:  200 mg   Take 1 tablet (200 mg) by mouth daily Okay to fill on/after 9/29/18 and start on/after 10/2/2018; to last 30 days   Quantity:  30 tablet   Refills:  0       * traMADol 50 MG tablet   Commonly known as:  ULTRAM   Used for:  Cervical radiculopathy   Replaced  by:  traMADol 200 MG ER-tablet   Ask about: Which instructions should I use?        May take 1-2 tablets every 6 hours as needed for pain, max of 3 tabs per day. Reduce as able. Okay to fill on/after 9/29/2018 and start on/after 10/1/2018; To last 30 days.   Quantity:  90 tablet   Refills:  0       * traMADol 200 MG ER-tablet   Commonly known as:  ULTRAM-ER   This may have changed:  You were already taking a medication with the same name, and this prescription was added. Make sure you understand how and when to take each.   Used for:  Cervical spondylosis without myelopathy, DDD (degenerative disc disease), cervical   Replaces:  traMADol 50 MG tablet   Ask about: Which instructions should I use?        Dose:  200 mg   Take 1 tablet (200 mg) by mouth daily Okay to fill on/after 10/30/18 and start on/after 11/1/2018; to last 30 days   Quantity:  30 tablet   Refills:  0       * Notice:  This list has 3 medication(s) that are the same as other medications prescribed for you. Read the directions carefully, and ask your doctor or other care provider to review them with you.      START taking        Dose / Directions    oxyCODONE IR 5 MG tablet   Commonly known as:  ROXICODONE        Dose:  5-10 mg   Take 1-2 tablets (5-10 mg) by mouth every 3 hours as needed for moderate to severe pain   Quantity:  45 tablet   Refills:  0         CONTINUE these medicines which may have CHANGED, or have new prescriptions. If we are uncertain of the size of tablets/capsules you have at home, strength may be listed as something that might have changed.        Dose / Directions    buPROPion 150 MG 12 hr tablet   Commonly known as:  WELLBUTRIN SR   This may have changed:    - how much to take  - how to take this  - when to take this  - additional instructions   Used for:  Tobacco abuse, Adjustment disorder with anxious mood        Take 1 tablet once daily and increase to 1 tablet twice daily after 4 to 7 days   Quantity:  60 tablet   Refills:   0       cyclobenzaprine 5 MG tablet   Commonly known as:  FLEXERIL   This may have changed:    - medication strength  - additional instructions        Dose:  5-10 mg   Take 1-2 tablets (5-10 mg) by mouth 3 times daily as needed for muscle spasms   Quantity:  45 tablet   Refills:  0       metoprolol succinate 25 MG 24 hr tablet   Commonly known as:  TOPROL-XL   This may have changed:  when to take this   Used for:  Preop general physical exam, Mild CAD, Ascending aorta dilatation (H)        Dose:  25 mg   Take 1 tablet (25 mg) by mouth daily   Quantity:  30 tablet   Refills:  1         CONTINUE these medicines which have NOT CHANGED        Dose / Directions    fluticasone 50 MCG/ACT spray   Commonly known as:  FLONASE   Used for:  Chronic allergic rhinitis due to animal hair and dander, Chronic seasonal allergic rhinitis due to pollen, Allergic rhinitis due to dust mite, Allergic conjunctivitis of both eyes        Dose:  2 spray   Spray 2 sprays into both nostrils 2 times daily   Quantity:  2 Bottle   Refills:  11       gabapentin 600 MG tablet   Commonly known as:  NEURONTIN   Used for:  Cervical spondylosis without myelopathy, DDD (degenerative disc disease), cervical, Chronic neck and back pain, Cervical stenosis of spinal canal        Dose:  900 mg   Take 1.5 tablets (900 mg) by mouth 3 times daily   Quantity:  135 tablet   Refills:  3       topiramate 50 MG tablet   Commonly known as:  TOPAMAX   Used for:  Cervical spondylosis without myelopathy, DDD (degenerative disc disease), cervical, Cervical stenosis of spinal canal        Dose:  50 mg   Take 1 tablet (50 mg) by mouth 2 times daily Drink plenty of water and no alcohol. If side effects, then reduce to last tolerable dosage.   Quantity:  60 tablet   Refills:  2         STOP taking     methocarbamol 750 MG tablet   Commonly known as:  ROBAXIN                Where to get your medicines      These medications were sent to [facility] -  [place], [place] - [address]  [address] [address]     Phone:  [phone]     cyclobenzaprine 5 MG tablet         Some of these will need a paper prescription and others can be bought over the counter. Ask your nurse if you have questions.     Bring a paper prescription for each of these medications     oxyCODONE IR 5 MG tablet    traMADol 200 MG ER-tablet                Protect others around you: Learn how to safely use, store and throw away your medicines at www.disposemymeds.org.        Information about OPIOIDS     PRESCRIPTION OPIOIDS: WHAT YOU NEED TO KNOW   We gave you an opioid (narcotic) pain medicine. It is important to manage your pain, but opioids are not always the best choice. You should first try all the other options your care team gave you. Take this medicine for as short a time (and as few doses) as possible.    Some activities can increase your pain, such as bandage changes or therapy sessions. It may help to take your pain medicine 30 to 60 minutes before these activities. Reduce your stress by getting enough sleep, working on hobbies you enjoy and practicing relaxation or meditation. Talk to your care team about ways to manage your pain beyond prescription opioids.    These medicines have risks:    DO NOT drive when on new or higher doses of pain medicine. These medicines can affect your alertness and reaction times, and you could be arrested for driving under the influence (DUI). If you need to use opioids long-term, talk to your care team about driving.    DO NOT operate heavy machinery    DO NOT do any other dangerous activities while taking these medicines.    DO NOT drink any alcohol while taking these medicines.     If the opioid prescribed includes acetaminophen, DO NOT take with any other medicines that contain acetaminophen. Read all labels carefully. Look for the word  acetaminophen  or  Tylenol.  Ask your pharmacist if you have questions or are  unsure.    You can get addicted to pain medicines, especially if you have a history of addiction (chemical, alcohol or substance dependence). Talk to your care team about ways to reduce this risk.    All opioids tend to cause constipation. Drink plenty of water and eat foods that have a lot of fiber, such as fruits, vegetables, prune juice, apple juice and high-fiber cereal. Take a laxative (Miralax, milk of magnesia, Colace, Senna) if you don t move your bowels at least every other day. Other side effects include upset stomach, sleepiness, dizziness, throwing up, tolerance (needing more of the medicine to have the same effect), physical dependence and slowed breathing.    Store your pills in a secure place, locked if possible. We will not replace any lost or stolen medicine. If you don t finish your medicine, please throw away (dispose) as directed by your pharmacist. The [place] Pollution [employer] has more information about safe disposal: https://www.pca.[facility].[place].us/living-green/managing-unwanted-medications             Medication List: This is a list of all your medications and when to take them. Check marks below indicate your daily home schedule. Keep this list as a reference.      Medications           Morning Afternoon Evening Bedtime As Needed    buPROPion 150 MG 12 hr tablet   Commonly known as:  WELLBUTRIN SR   Take 1 tablet once daily and increase to 1 tablet twice daily after 4 to 7 days   Last time this was given:  150 mg on 10/30/2018  8:03 AM                                cyclobenzaprine 5 MG tablet   Commonly known as:  FLEXERIL   Take 1-2 tablets (5-10 mg) by mouth 3 times daily as needed for muscle spasms                                fluticasone 50 MCG/ACT spray   Commonly known as:  FLONASE   Spray 2 sprays into both nostrils 2 times daily                                gabapentin 600 MG tablet   Commonly known as:  NEURONTIN   Take 1.5 tablets (900 mg) by mouth 3 times daily                                 metoprolol succinate 25 MG 24 hr tablet   Commonly known as:  TOPROL-XL   Take 1 tablet (25 mg) by mouth daily   Last time this was given:  25 mg on 10/29/2018  9:30 PM                                oxyCODONE IR 5 MG tablet   Commonly known as:  ROXICODONE   Take 1-2 tablets (5-10 mg) by mouth every 3 hours as needed for moderate to severe pain   Last time this was given:  10 mg on 10/30/2018  9:52 AM                                topiramate 50 MG tablet   Commonly known as:  TOPAMAX   Take 1 tablet (50 mg) by mouth 2 times daily Drink plenty of water and no alcohol. If side effects, then reduce to last tolerable dosage.   Last time this was given:  50 mg on 10/30/2018  8:03 AM                                  ASK your doctor about these medications           Morning Afternoon Evening Bedtime As Needed    * traMADol 200 MG ER-tablet   Commonly known as:  ULTRAM-ER   Take 1 tablet (200 mg) by mouth daily Okay to fill on/after 9/29/18 and start on/after 10/2/2018; to last 30 days   Ask about: Which instructions should I use?                                * traMADol 50 MG tablet   Commonly known as:  ULTRAM   May take 1-2 tablets every 6 hours as needed for pain, max of 3 tabs per day. Reduce as able. Okay to fill on/after 9/29/2018 and start on/after 10/1/2018; To last 30 days.   Ask about: Which instructions should I use?                                * traMADol 200 MG ER-tablet   Commonly known as:  ULTRAM-ER   Take 1 tablet (200 mg) by mouth daily Okay to fill on/after 10/30/18 and start on/after 11/1/2018; to last 30 days   Ask about: Which instructions should I use?                                * Notice:  This list has 3 medication(s) that are the same as other medications prescribed for you. Read the directions carefully, and ask your doctor or other care provider to review them with you.

## 2018-10-29 NOTE — TELEPHONE ENCOUNTER
Routed to the nursing pool and to the MA pool to process refill(s).    Estelita Wetzel, RN-BSN  [facility]

## 2018-10-29 NOTE — OR NURSING
"Pt hypertensive at baseline. Dr. Castillo stated, \"I'm fine with his blood pressures being 150s/100s.\"   "

## 2018-10-29 NOTE — IP AVS SNAPSHOT
Unit [address]D Observation [address] [address]    Phone:  [phone]    Fax:  [phone]                                       After Visit Summary   10/29/2018    Truman Suero    MRN: [id number]           After Visit Summary Signature Page     I have received my discharge instructions, and my questions have been answered. I have discussed any challenges I see with this plan with the nurse or doctor.    ..........................................................................................................................................  Patient/Patient Representative Signature      ..........................................................................................................................................  Patient Representative Print Name and Relationship to Patient    ..................................................               ................................................  Date                                   Time    ..........................................................................................................................................  Reviewed by Signature/Title    ...................................................              ..............................................  Date                                               Time          22EPIC Rev 08/18

## 2018-10-29 NOTE — TELEPHONE ENCOUNTER
Medication refill information reviewed.     Last due:    Tramadol 200mg ER: Okay to fill on/after 9/29/18 and start on/after 10/2/2018; to last 30 days   Tramadol 50mg:  Reduce as able. Okay to fill on/after 9/29/2018 and start on/after 10/1/2018; To last 30 days.  Pt is having a spine fusion today.  He will most likely be on their med regimen for 6 weeks post-op therefore this refill may not be needed.    Due date:    Tramadol 200mg ER: 11/1/18  Tramadol 50mg:  10/31/18    Weaning instructions:  N/A    Prescriptions prepped for review.     Estelita Wetzel RN-BSN  [facility]

## 2018-10-29 NOTE — TELEPHONE ENCOUNTER
Message from [employer] [employer]:  Original authorizing provider: RODNEY Vargas CNP would like a refill of the following medications:  traMADol (ULTRAM-ER) 200 MG ER-tablet [RODNEY Vargas CNP]  traMADol (ULTRAM) 50 MG tablet [RODNEY Vargas CNP]    Preferred pharmacy: [facility] [address] [place] [address]    Comment:  Have 7 left, so just wanted to start the process.

## 2018-10-29 NOTE — PLAN OF CARE
Problem: Patient Care Overview  Goal: Plan of Care/Patient Progress Review  Outcome: No Change  ~ Patient able to ambulate as they were prior to admission or with assist devices provided by therapies during their stay.  - no  ~ Nurse to Notify Provider when Outpatient in a Bed discharge goals have been met and patient is ready for discharge.

## 2018-10-29 NOTE — TELEPHONE ENCOUNTER
Received call from patient requesting refill(s) of traMADol (ULTRAM) 50 MG tablet and traMADol (ULTRAM-ER) 200 MG ER-tablet    Last picked up from pharmacy on 09/30/18-50mg     10/02/18-200mg    Pt last seen by prescribing provider on 09/05/18 - has had injection since  Next appt scheduled for 11/28/18     checked in the past 6 months? Yes If no, print current report and give to RN    Last urine drug screen date 10/08/18  Current opioid agreement on file (completed within the last year) Yes Date of opioid agreement: 01/08/18    Processing (pick one and delete the others):      Fax to [facility] pharmacy       Will route to nursing pool for review and preparation of prescription(s).

## 2018-10-29 NOTE — PLAN OF CARE
Problem: Patient Care Overview  Goal: Plan of Care/Patient Progress Review  Outcome: No Change  ~ Patient able to ambulate as they were prior to admission or with assist devices provided by therapies during their stay. - yes  ~ Nurse to Notify Provider when Outpatient in a Bed discharge goals have been met and patient is ready for discharge.    ~ Adequate pain control using oral analgesics. - yes  ~ Tolerates oral intake. yes  ~ Cleared for discharge per provider. no  ~ Nurse to Notify Provider when Outpatient in a Bed discharge goals have been met and patient is ready for discharge.    ~ Vital signs at baseline. yes  ~ Able to tolerate oral intake. yes  ~ Nurse to Notify Provider when Outpatient in a Bed discharge goals have been met and patient is ready for discharge.    ~ Documented spontaneous urine output OR yes  ~ Placement of jarvis catheter after failed 2nd attempt at spontaneous voiding with documented retention of at least 150cc by bladder scan. no  ~ Nurse to Notify Provider when Outpatient in a Bed discharge goals have been met and patient is ready for discharge    ~ Return to baseline mental status. yes  ~ Hypercapnia, hypoventilation or hypoxia resolved for at least 2 hours without supplemental oxygen. no  ~ Deficits in sensation, mobility or coordination have resolved if spinal or regional anesthesia was used.   ~ Nurse to Notify Provider when Outpatient in a Bed discharge goals have been met and patient is ready for discharge.    Admit from PACU. A&O, VSS although BPs 140s-150s/90s, unchanged since admission. Tolerating water, awaiting dinner currently. Capnography in place. R anterior neck dressing CDI. CMS, neuros intact, baseline tingling in bilat fingers, reports slight improvement since surgery. Voiding spontaneously. Likely discontinue to home tomorrow

## 2018-10-30 VITALS
SYSTOLIC BLOOD PRESSURE: 170 MMHG | OXYGEN SATURATION: 96 % | BODY MASS INDEX: 24.77 KG/M2 | HEIGHT: 66 IN | RESPIRATION RATE: 16 BRPM | HEART RATE: 73 BPM | TEMPERATURE: 97.8 F | DIASTOLIC BLOOD PRESSURE: 116 MMHG | WEIGHT: 154.1 LBS

## 2018-10-30 LAB
ANION GAP SERPL CALCULATED.3IONS-SCNC: 10 MMOL/L (ref 3–14)
BUN SERPL-MCNC: 10 MG/DL (ref 7–30)
CALCIUM SERPL-MCNC: 8.8 MG/DL (ref 8.5–10.1)
CHLORIDE SERPL-SCNC: 107 MMOL/L (ref 94–109)
CO2 SERPL-SCNC: 22 MMOL/L (ref 20–32)
CREAT SERPL-MCNC: 0.91 MG/DL (ref 0.66–1.25)
ERYTHROCYTE [DISTWIDTH] IN BLOOD BY AUTOMATED COUNT: 13.5 % (ref 10–15)
GFR SERPL CREATININE-BSD FRML MDRD: 86 ML/MIN/1.7M2
GLUCOSE SERPL-MCNC: 111 MG/DL (ref 70–99)
HCT VFR BLD AUTO: 45 % (ref 40–53)
HGB BLD-MCNC: 14.8 G/DL (ref 13.3–17.7)
MAGNESIUM SERPL-MCNC: 2.1 MG/DL (ref 1.6–2.3)
MCH RBC QN AUTO: 30.8 PG (ref 26.5–33)
MCHC RBC AUTO-ENTMCNC: 32.9 G/DL (ref 31.5–36.5)
MCV RBC AUTO: 94 FL (ref 78–100)
PLATELET # BLD AUTO: 224 10E9/L (ref 150–450)
POTASSIUM SERPL-SCNC: 4 MMOL/L (ref 3.4–5.3)
RBC # BLD AUTO: 4.81 10E12/L (ref 4.4–5.9)
SODIUM SERPL-SCNC: 139 MMOL/L (ref 133–144)
WBC # BLD AUTO: 11.1 10E9/L (ref 4–11)

## 2018-10-30 PROCEDURE — 84132 ASSAY OF SERUM POTASSIUM: CPT | Performed by: STUDENT IN AN ORGANIZED HEALTH CARE EDUCATION/TRAINING PROGRAM

## 2018-10-30 PROCEDURE — 25000132 ZZH RX MED GY IP 250 OP 250 PS 637: Performed by: STUDENT IN AN ORGANIZED HEALTH CARE EDUCATION/TRAINING PROGRAM

## 2018-10-30 PROCEDURE — 85027 COMPLETE CBC AUTOMATED: CPT | Performed by: STUDENT IN AN ORGANIZED HEALTH CARE EDUCATION/TRAINING PROGRAM

## 2018-10-30 PROCEDURE — 36415 COLL VENOUS BLD VENIPUNCTURE: CPT | Performed by: STUDENT IN AN ORGANIZED HEALTH CARE EDUCATION/TRAINING PROGRAM

## 2018-10-30 PROCEDURE — 80048 BASIC METABOLIC PNL TOTAL CA: CPT | Performed by: STUDENT IN AN ORGANIZED HEALTH CARE EDUCATION/TRAINING PROGRAM

## 2018-10-30 PROCEDURE — 83735 ASSAY OF MAGNESIUM: CPT | Performed by: STUDENT IN AN ORGANIZED HEALTH CARE EDUCATION/TRAINING PROGRAM

## 2018-10-30 RX ORDER — CYCLOBENZAPRINE HCL 5 MG
5-10 TABLET ORAL 3 TIMES DAILY PRN
Qty: 45 TABLET | Refills: 0 | Status: SHIPPED | OUTPATIENT
Start: 2018-10-30 | End: 2019-02-06

## 2018-10-30 RX ORDER — TRAMADOL HYDROCHLORIDE 200 MG/1
200 TABLET, EXTENDED RELEASE ORAL DAILY
Qty: 30 TABLET | Refills: 0 | Status: SHIPPED | OUTPATIENT
Start: 2018-10-30 | End: 2018-11-28

## 2018-10-30 RX ORDER — HYDROMORPHONE HCL/0.9% NACL/PF 0.2MG/0.2
.2-.4 SYRINGE (ML) INTRAVENOUS
Status: DISCONTINUED | OUTPATIENT
Start: 2018-10-30 | End: 2018-10-30 | Stop reason: HOSPADM

## 2018-10-30 RX ORDER — OXYCODONE HYDROCHLORIDE 5 MG/1
5-10 TABLET ORAL
Status: DISCONTINUED | OUTPATIENT
Start: 2018-10-30 | End: 2018-10-30 | Stop reason: HOSPADM

## 2018-10-30 RX ORDER — OXYCODONE HYDROCHLORIDE 5 MG/1
5-10 TABLET ORAL
Qty: 45 TABLET | Refills: 0 | Status: SHIPPED | OUTPATIENT
Start: 2018-10-30 | End: 2018-11-28

## 2018-10-30 RX ADMIN — METHOCARBAMOL 750 MG: 750 TABLET, FILM COATED ORAL at 01:48

## 2018-10-30 RX ADMIN — BUPROPION HYDROCHLORIDE 150 MG: 150 TABLET, EXTENDED RELEASE ORAL at 08:03

## 2018-10-30 RX ADMIN — TOPIRAMATE 50 MG: 50 TABLET ORAL at 08:03

## 2018-10-30 RX ADMIN — GABAPENTIN 900 MG: 300 CAPSULE ORAL at 08:03

## 2018-10-30 RX ADMIN — OXYCODONE HYDROCHLORIDE 10 MG: 5 TABLET ORAL at 05:46

## 2018-10-30 RX ADMIN — OXYCODONE HYDROCHLORIDE 5 MG: 5 TABLET ORAL at 02:28

## 2018-10-30 RX ADMIN — OXYCODONE HYDROCHLORIDE 10 MG: 5 TABLET ORAL at 09:52

## 2018-10-30 RX ADMIN — OXYCODONE HYDROCHLORIDE 5 MG: 5 TABLET ORAL at 02:42

## 2018-10-30 NOTE — DISCHARGE INSTRUCTIONS
Discharge Instructions for Cervical Disk Surgery  You had cervical disk surgery. This procedure can provide relief from neck and arm pain, numbness, and weakness. There are several types of cervical disk surgery. You and your healthcare provider decided on the type that was best for you. Below are tips to help speed your recovery from surgery.  Activity    Arrange your household to keep the items you need within reach.    Remove electrical cords, throw rugs, and anything else that may cause you to fall.    Follow your healthcare provider's instructions for wearing a cervical collar or brace. The neck collar or brace is important because it supports and correctly positions your neck after surgery. Be sure to follow instructions for its care, use, and the length of time you must wear it.    Don t drive until your healthcare provider says it s OK. This will most likely be when you can move your neck from side to side freely and without pain. Never drive while you are taking opioid pain medicine.    Walk as much as possible. You may also go up and down stairs as much as you can tolerate. Walking outside or walking on a treadmill at a slow speed with no incline is OK.    Don t lift anything heavier than 5 pounds.    Ask your healthcare provider when you can return to work.  Other home care    Take pain medicine exactly as directed. You should avoid taking NSAIDs, such as aspirin, ibuprofen, and naproxen, for 6 months because it blocks bone fusion.     Wait 5 to 7 days after your surgery to begin showering. Then shower as needed. You may be instructed to use a neck collar while you shower. If so, carefully remove it when you finish showering. Keep your neck correctly positioned as you gently pat dry your skin, the incision, and the neck collar. Then put the neck collar back on. Don t rub the incision, or apply creams or lotions to it.    Don t soak in bathtubs, hot tubs, or swimming pools until instructed by your  healthcare provider.    Your incision may have been closed using sutures, staples, or strips of tape. If you have sutures or staples, they may need to be removed 2 to 3 weeks after surgery. You can allow strips of tape to fall off on their own.    If you smoke, quit. Smoking slows healing of bone and you may need more surgery. Enroll in a stop-smoking program to improve your chances of success.  Follow-up  Make a follow-up appointment.  Call 911  Call 911 right away if you have any of the following:    Chest pain    Shortness of breath    Trouble controlling your bowels or bladder    Painful calf that is warm to the touch and tender with pressure  When to call your healthcare provider  Call your healthcare provider right away if you have any of the following:    Drainage, redness, or warmth at the incision    Fever of 100.4 F (38 C) or higher, or as directed by your healthcare provider    Shaking chills    Weakness, numbness, or tingling in your arms or legs    Swelling of the foot, ankle, or calf that is not relieved by elevating your feet    Increased pain

## 2018-10-30 NOTE — PROGRESS NOTES
Pt alert and oriented for this nurse. Dressing has some small drainage but was still intact overnight. Pt's BP trended up but throughout shift pt was not reporting pain accurately. Ultimately the 10 mg of oxycodone was effective for patient's pain. Pt tolerating food and drink without any issues. Voiding with some burning but pt had jarvis during procedure and no blood is noted in urine. Able to make needs known. Call light within reach. Will continue to monitor.

## 2018-10-30 NOTE — TELEPHONE ENCOUNTER
"Left voicemail on patient cell phone stating, per Melanie \" IF HE IS PRESCRIBED A DIFFERENT LONG ACTING MEDICATION AFTER SURGERY, HE SHOULD NOT TAKE THE TRAMADOL ER.\" signed Rx faxed to [facility] pharmacy - [facility]. RightFax confirmed.     Mike Bergeron MA    "

## 2018-10-30 NOTE — PROGRESS NOTES
"S: complains of neck soreness    O:  Exam:  General: Awake;  Alert, In No Acute Distress  Pulm: Breathing Comfortably on room air   Mental status: Oriented x 3  Cranial Nerves: Cranial Nerves II-XII Intact Bilaterally  Strength:      Del Tr Bi WE WF Gr  R 5 5 5 5 5 5  L 5 5 5 5 5 5     HF KE KF DF PF EHL  R 5 5 5 5 5 5  L 5 5 5 5 5 5    Pronator Drift: Absent  Sensory: Intact to Light Touch  INCISION: covered by dermabond     Assessment:   #s/p cervical fusion, doing well    Plan:  C spine x-rays obtained and reviewed  Ok to discharge on 10/30     Phillip \"Bahman\" MD Angie   Neurosurgery, PGY-2    Please contact neurosurgery resident on call with questions.    Dial * * [phone], enter [phone] when prompted.     "

## 2018-10-30 NOTE — PROGRESS NOTES
"Operative report# [id number]    Fisher \"Bahman\" MD Angie   Neurosurgery, PGY-2    Please contact neurosurgery resident on call with questions.    Dial * * [phone], enter [phone] when prompted.       "

## 2018-10-30 NOTE — PLAN OF CARE
Problem: Patient Care Overview  Goal: Plan of Care/Patient Progress Review  Outcome: Adequate for Discharge Date Met: 10/30/18  List all Outpatient in a Bed discharge goals to be met before discharge home:   ~ Patient able to ambulate as they were prior to admission or with assist devices provided by therapies during their stay: YES  ~ Nurse to Notify Provider when Outpatient in a Bed discharge goals have been met and patient is ready for discharge.    AVSS, afebrile. Up ad medardo. Pt is stable and discharged home at 1050. Accompanied home by wife via wheelchair, sent all belongings with patient. Discharge instructions on incision care, meds and follow up appointments provided with patient verbalizing understanding. Will  meds from discharge pharmacy. PIV's removed.

## 2018-10-30 NOTE — DISCHARGE SUMMARY
[facility] Discharge Summary and Instructions    Truman Suero MRN# [id number]   Age: 58 year old YOB: 1960     Date of Admission:  10/29/2018  Date of Discharge::  10/30/2018 11:09 AM  Admitting Physician:  Jud Harkins MD  Discharge Physician:  Jud Harkins MD          Admission Diagnoses:   S/P cervical spinal fusion [Z98.1]          Discharge Diagnosis:   S/P cervical spinal fusion [Z98.1]          Procedures:   10/30/2018  Cervical 5 to 6 anterior cervical diskectomy and fusion.            Brief History of Illness:    Mr. Truman Suero is a 58-year-old gentleman with a history of neck pain and right sided arm pain, found to have bilateral cervical 6 radiculopathy based on EMG.  He was also found to have a corresponding disk protrusion at this level of C5-6.  For this reason, we discussed the risks, benefits and alternatives to completion of anterior cervical diskectomy and fusion.  After this discussion, he provided written and verbal consent to proceed.            Hospital Course:   Following surgery the patient was admitted for overnight observation.  The patient complained of posterior cervical pain, no arm pain.  The patient was ambulatory, tolerating diet without signs of dysphasia, voiding, passing flatus.  Patient remained medically and neurologically stable throughout his stay.    Post operative x-rays obtained and revealed proper positioning of surgical hardware. Patient's pain was controlled on oral analgesia. Patient is aware he is to avoid NSAIDs until Neurosurgery provider states its ok to take these medications.  Discharge instructions were discussed with patient who stated understanding.  Patient was able to discharge home with family POD #1.              Discharge Medications:     Current Discharge Medication List      START taking these medications    Details   oxyCODONE IR (ROXICODONE) 5 MG tablet Take 1-2 tablets (5-10 mg) by mouth every 3 hours as needed  for moderate to severe pain  Qty: 45 tablet, Refills: 0    Associated Diagnoses: S/P cervical spinal fusion      !! traMADol (ULTRAM-ER) 200 MG ER-tablet Take 1 tablet (200 mg) by mouth daily Okay to fill on/after 10/30/18 and start on/after 11/1/2018; to last 30 days  Qty: 30 tablet, Refills: 0    Associated Diagnoses: Cervical spondylosis without myelopathy; DDD (degenerative disc disease), cervical       !! - Potential duplicate medications found. Please discuss with provider.      CONTINUE these medications which have CHANGED    Details   cyclobenzaprine (FLEXERIL) 5 MG tablet Take 1-2 tablets (5-10 mg) by mouth 3 times daily as needed for muscle spasms  Qty: 45 tablet, Refills: 0    Associated Diagnoses: S/P cervical spinal fusion         CONTINUE these medications which have NOT CHANGED    Details   buPROPion (WELLBUTRIN SR) 150 MG 12 hr tablet Take 1 tablet once daily and increase to 1 tablet twice daily after 4 to 7 days  Qty: 60 tablet, Refills: 0    Associated Diagnoses: Tobacco abuse; Adjustment disorder with anxious mood      fluticasone (FLONASE) 50 MCG/ACT spray Spray 2 sprays into both nostrils 2 times daily  Qty: 2 Bottle, Refills: 11    Associated Diagnoses: Chronic allergic rhinitis due to animal hair and dander; Chronic seasonal allergic rhinitis due to pollen; Allergic rhinitis due to dust mite; Allergic conjunctivitis of both eyes      gabapentin (NEURONTIN) 600 MG tablet Take 1.5 tablets (900 mg) by mouth 3 times daily  Qty: 135 tablet, Refills: 3    Associated Diagnoses: Cervical spondylosis without myelopathy; DDD (degenerative disc disease), cervical; Chronic neck and back pain; Cervical stenosis of spinal canal      metoprolol succinate (TOPROL-XL) 25 MG 24 hr tablet Take 1 tablet (25 mg) by mouth daily  Qty: 30 tablet, Refills: 1    Associated Diagnoses: Preop general physical exam; Mild CAD; Ascending aorta dilatation (H)      topiramate (TOPAMAX) 50 MG tablet Take 1 tablet (50 mg) by  "mouth 2 times daily Drink plenty of water and no alcohol. If side effects, then reduce to last tolerable dosage.  Qty: 60 tablet, Refills: 2    Associated Diagnoses: Cervical spondylosis without myelopathy; DDD (degenerative disc disease), cervical; Cervical stenosis of spinal canal      !! traMADol (ULTRAM-ER) 200 MG ER-tablet Take 1 tablet (200 mg) by mouth daily Okay to fill on/after 9/29/18 and start on/after 10/2/2018; to last 30 days  Qty: 30 tablet, Refills: 0    Associated Diagnoses: Cervical spondylosis without myelopathy; DDD (degenerative disc disease), cervical       !! - Potential duplicate medications found. Please discuss with provider.      STOP taking these medications       methocarbamol (ROBAXIN) 750 MG tablet Comments:   Reason for Stopping:         traMADol (ULTRAM) 50 MG tablet Comments:   Reason for Stopping:           BP (!) 170/116 (BP Location: Left arm)  Pulse 73  Temp 97.8  F (36.6  C) (Oral)  Resp 16  Ht 1.665 m (5' 5.55\")  Wt 69.9 kg (154 lb 1.6 oz)  SpO2 96%  BMI 25.21 kg/m2  Exam:  General: Awake;  Alert, In No Acute Distress  Pulm: Breathing Comfortably on room air   Mental status: Oriented x 3  Cranial Nerves: Cranial Nerves II-XII Intact Bilaterally  Strength:                         Del                 Tr                   Bi                   WE                WF                 Gr  R                    5                    5                    5                    5                    5                    5  L                    5                    5                    5                    5                    5                    5                        HF                 KE                 KF                  DF                 PF                  EHL  R                    5                    5                    5                    5                    5                    5  L                    5                    5                    5                    5      "               5                    5     Pronator Drift: Absent  Sensory: Intact to Light Touch  INCISION: covered by dermabond             Discharge Instructions and Follow-Up:   Discharge diet: Regular   Discharge activity: You may advance activity as tolerated. No strenuous exercise or heay lifting greater than 10 lbs for 4 weeks or until seen and cleared in clinic.   Discharge follow-up: Follow-up with Madeline Gordon PA-C in 2 weeks   Wound care: Ok to shower,however no scrubbing of the wound and no soaking of the wound, meaning no bathtubs or swimming pools. Pat dry only. Leave wound open to air.  Sutures are not absorbable and need to be removed in 2 weeks. If patient still at rehab by this time, the sutures may be removed by the rehab physician if he or she considers that the wound has healed completely.     Please call if you have:  1. increased pain, redness, drainage, swelling at your incision  2. fevers > 101.5 F degrees  3. with any questions or concerns.  You may reach the Neurosurgery clinic at [phone] during regular work hours. ER at [phone].    and ask for the Neurosurgery Resident on call at [phone], during off hours or weekends.         Discharge Disposition:   Discharged to home        RODNEY Wei, CNP  Department of Neurosurgery  Pager: [phone]

## 2018-10-30 NOTE — TELEPHONE ENCOUNTER
Signed Prescriptions:                        Disp   Refills    traMADol (ULTRAM-ER) 200 MG ER-tablet      30 tab*0        Sig: Take 1 tablet (200 mg) by mouth daily Okay to fill           on/after 10/30/18 and start on/after 11/1/2018;           to last 30 days  Authorizing Provider: MELANIE BENSON    Reviewed [facility] October 30, 2018- no concerning fills.    IF HE IS PRESCRIBED A DIFFERENT LONG ACTING MEDICATION AFTER SURGERY, HE SHOULD NOT TAKE THE TRAMADOL ER    Signed script placed in touchdown bin at [facility].    Melanie Benson APRN CNP FNP RN  [facility]

## 2018-10-31 ENCOUNTER — MYC MEDICAL ADVICE (OUTPATIENT)
Dept: PALLIATIVE MEDICINE | Facility: CLINIC | Age: 58
End: 2018-10-31

## 2018-10-31 NOTE — OP NOTE
Procedure Date: 10/29/2018      PREOPERATIVE DIAGNOSIS:  Cervical 6 radiculopathy.      POSTOPERATIVE DIAGNOSIS:  Cervical 6 radiculopathy.      PROCEDURES PERFORMED:  Cervical 5 to 6 anterior cervical diskectomy and fusion.      STAFF SURGEON:  Jud Harkins MD, PhD      RESIDENT SURGEONS:  Phillip Adams MD      ANESTHESIA:  General endotracheal anesthesia.      ESTIMATED BLOOD LOSS:  20 mL.      IMPLANTS:     1) MTF [employer] Advanced ACF Spacer Lordotic 7 mm Lot No. [id number] Part No [id number] Serial No [id number]  2) [employer] [place] One Level Plate   3) 4x [employer] [place] SFDR Screw 4.0 x 14 mm      EXPLANTS:  None.      DRAINS:  None.      FINDINGS:  Good placement of a lordotic implant based on intraoperative x-ray.      INDICATIONS:  Mr. Truman Suero is a 58-year-old gentleman with a history of neck pain and right sided arm pain, found to have bilateral cervical 6 radiculopathy based on EMG.  He was also found to have a corresponding disk protrusion at this level of C5-6.  For this reason, we discussed the risks, benefits and alternatives to completion of anterior cervical diskectomy and fusion.  After this discussion, he provided written and verbal consent to proceed.      DESCRIPTION OF PROCEDURE:  Truman Suero was brought back to the operating room where 2 unique identifiers were used to verify  the patient.  General endotracheal anesthesia was obtained.  The patient was placed in a supine position.  Both his arms were tucked.  The patient then had a Contreras placed and gel roll placed under his shoulders for neck extension. Arms were tucked at the sides and all pressure points were padded.  Neuromonitoring was set up for recurrent laryngeal nerve monitoring as well as SSEP and MEPs. We then cleaned, prepared, and draped the right side of his neck in the usual sterile fashion and brought in the C-arm to localize to the level of C5-6.       After timeout, we infiltrated with 1% lidocaine with  epinephrine and then made an incision down through skin, fat and platysma. We dissected with blunt and sharp dissection as well as with bipolar cautery to identify the sternocleidomastoid. The sternocleidomastoid was retracted laterally.  The carotid and its pulse was palpable laterally.  Further dissection medially with bipolar cautery and sharp dissection provided access to the vertebral bodies.  C-arm was brought back into the field and used to verify the level of C5-C6 in our spinal timeout.  We used the monopolar cautery to dissect the longus colli muscles and displaced them laterally. A trimline retractor was placed. Distraction pins were placed into the vertebral bodies of C5 and C6.  Pin placement was verified by imaging.  Mild distraction was put on the disk space.      A #15 blade was used to cut into the disk space and pituitary was used to remove much of the disk material. We then went under the microscope where we completed the diskectomy with an upbiting curet, drill, #2 Kerrison and #3 Kerrison. Once we were down to the posterior longitudinal ligament, we very carefully lifted it up using a blunt nerve hook and removed it with a #2 Kerrison and a #1 Kerrison.  To prepare the endplates for fusion, we drilled the disk fragments away from the endplates.     After complete decompression bilaterally, we brought the C-arm back into the field and sized our parallel graft to 7 mm in size. This was carefully tamped down and then a plate was fit over the disk space. We then placed screws at the levels of C5 and C6 respesctively with two at each level. C-arm was used to verify placement laterally and AP. We then verified hemostasis with bipolar cautery.      We closed by reapproximating the platysma with 3-0 vicryl in running fashion, the subcutaneous tissue with 3-0 Vicryls and the skin with 4-0 Monocryl in a subcuticular fashion. The incision was cleaned, and then dressed with steri-strips and a sterile  Primapore.     All counts at the end of the procedure were correct x2.       Dr. Jud Harkins was present and scrubbed for the critical portions of the procedure.     As dictated by JOSH CHARLTON MD       D: 10/30/2018   T: 10/30/2018   MT: KELVIN      Name:     ANISH ZHONG   MRN:      [id number]        Account:        [id number]   :      1960           Procedure Date: 10/29/2018      Document: [id number]

## 2018-11-05 ENCOUNTER — MYC REFILL (OUTPATIENT)
Dept: PALLIATIVE MEDICINE | Facility: CLINIC | Age: 58
End: 2018-11-05

## 2018-11-05 ENCOUNTER — MYC MEDICAL ADVICE (OUTPATIENT)
Dept: PALLIATIVE MEDICINE | Facility: CLINIC | Age: 58
End: 2018-11-05

## 2018-11-05 DIAGNOSIS — M54.12 CERVICAL RADICULOPATHY: ICD-10-CM

## 2018-11-05 DIAGNOSIS — M48.02 CERVICAL STENOSIS OF SPINAL CANAL: ICD-10-CM

## 2018-11-05 DIAGNOSIS — M47.812 CERVICAL SPONDYLOSIS WITHOUT MYELOPATHY: ICD-10-CM

## 2018-11-05 DIAGNOSIS — M50.30 DDD (DEGENERATIVE DISC DISEASE), CERVICAL: ICD-10-CM

## 2018-11-05 RX ORDER — TRAMADOL HYDROCHLORIDE 50 MG/1
TABLET ORAL
Qty: 90 TABLET | Refills: 0 | Status: SHIPPED | OUTPATIENT
Start: 2018-11-05 | End: 2018-11-28

## 2018-11-05 RX ORDER — TOPIRAMATE 50 MG/1
50 TABLET, FILM COATED ORAL 2 TIMES DAILY
Qty: 60 TABLET | Refills: 2 | Status: CANCELLED | OUTPATIENT
Start: 2018-11-05

## 2018-11-05 RX ORDER — TRAMADOL HYDROCHLORIDE 200 MG/1
200 TABLET, EXTENDED RELEASE ORAL DAILY
Qty: 30 TABLET | Refills: 0 | Status: CANCELLED | OUTPATIENT
Start: 2018-11-05

## 2018-11-05 NOTE — TELEPHONE ENCOUNTER
Medication refill information reviewed.     Due date for Tramadol 50 mg tablet is anytime. Last filled on 9/30/18     Prescriptions prepped for review.     Will route to provider.

## 2018-11-05 NOTE — TELEPHONE ENCOUNTER
Message from [employer]:  Original authorizing provider: RODNEY Vargas CNP would like a refill of the following medications: Tramadol 50 mg    Preferred pharmacy: [facility] [address] [place] [address]    Comment:  Dr Sesar GAVIRIA was actually requesting renewal for the 50mg tabs, but they have been taken off the list. Dr. Harkins's nurse said it would be ok to take the 50 mg after the oxycodone are gone. So guess I'm not sure if there is a miscommunication or something

## 2018-11-05 NOTE — TELEPHONE ENCOUNTER
Signed Prescriptions:                        Disp   Refills    traMADol (ULTRAM) 50 MG tablet             90 tab*0        Sig: May take 1-2 tablets every 6 hours as needed for           pain, max of 3 tabs per day. Reduce as able. Okay           to fill on/after 11/5/2018 and start after           finished with oxycodone from surgeon to last 30           days.  Authorizing Provider: MELANIE BENSON    Reviewed [place]  November 5, 2018- no concerning fills.    He should not begin these while on short-acting oxycodone from surgeon post cervical fusion.     Signed [facility]    Melanie STEVENS, RN CNP, FNP  [facility]

## 2018-11-05 NOTE — TELEPHONE ENCOUNTER
Faxed Rx to the [facility] pharmacy. Pt was informed via vm, and relayed Martha's msg.     Kailyn Hope MA  [facility]

## 2018-11-05 NOTE — TELEPHONE ENCOUNTER
See the reneaefclaudia from today for more information.    Estelita Wetzel RN-BSN  [facility]

## 2018-11-05 NOTE — TELEPHONE ENCOUNTER
Patient requesting refill(s) of traMADol (ULTRAM-ER) 200 MG ER-tablet    Last picked up from pharmacy on 10/30/18    Pt last seen by prescribing provider on 9/5/18  Next appt scheduled for 11/28/18     checked in the past 6 months? Yes If no, print current report and give to RN    Last urine drug screen date 1/8/18  Current opioid agreement on file (completed within the last year) Yes Date of opioid agreement: 1/8/18    Processing (pick one and delete the others):      Fax it to [facility] [address] [place] [address]    Will route to nursing [place] for review and preparation of prescription(s).

## 2018-11-05 NOTE — TELEPHONE ENCOUNTER
Per patient myChart message:  From: Truman Suero        Created: 11/5/2018 3:24 PM        Dr. Navarro- got the message concerning the tramadol tabs. Thank you. Not to worry. The oxycodone were gone on Saturday. Still feel a bit like someone ran me over with a car. Dr. Harkins told my wife all went well so just have to go thru healing process

## 2018-11-12 ENCOUNTER — OFFICE VISIT (OUTPATIENT)
Dept: NEUROSURGERY | Facility: CLINIC | Age: 58
End: 2018-11-12
Payer: COMMERCIAL

## 2018-11-12 VITALS
TEMPERATURE: 97.5 F | BODY MASS INDEX: 23.95 KG/M2 | WEIGHT: 149 LBS | HEIGHT: 66 IN | OXYGEN SATURATION: 99 % | SYSTOLIC BLOOD PRESSURE: 135 MMHG | HEART RATE: 77 BPM | DIASTOLIC BLOOD PRESSURE: 95 MMHG

## 2018-11-12 DIAGNOSIS — M54.12 CERVICAL RADICULOPATHY: Primary | ICD-10-CM

## 2018-11-12 DIAGNOSIS — Z98.890 POST-OPERATIVE STATE: ICD-10-CM

## 2018-11-12 DIAGNOSIS — M50.30 DEGENERATIVE DISC DISEASE, CERVICAL: ICD-10-CM

## 2018-11-12 ASSESSMENT — PAIN SCALES - GENERAL: PAINLEVEL: SEVERE PAIN (7)

## 2018-11-12 NOTE — NURSING NOTE
Chief Complaint   Patient presents with     RECHECK     UMP RETURN 2WK POST-OP       Aruna Bautista, EMT

## 2018-11-12 NOTE — LETTER
"11/12/2018       RE: Truman Suero  [address] [facility] [address]     Dear Colleague,    Thank you for referring your patient, Truman Suero, to the [facility] NEUROSURGERY at [facility]. Please see a copy of my visit note below.      Neurosurgery clinic progress note  Date of visit: 11/12/2018      Attending: Shahana  Procedure: 10/29/18   Cervical 5 to 6 anterior cervical diskectomy and fusion.   IMPLANTS:     1) MTF [employer] Advanced ACF Spacer Lordotic 7 mm Lot No. 2310 Part No [id number] Serial No [id number]  2) [employer] Granjeno One Level Plate   3) 4x [employer]s Granjeno SFDR Screw 4.0 x 14 mm     INDICATIONS:  Mr. Truman Suero is a 58-year-old gentleman with a history of neck pain and right sided arm pain, found to have bilateral cervical 6 radiculopathy based on EMG.  He was also found to have a corresponding disk protrusion at this level of C5-6.  For this reason, we discussed the risks, benefits and alternatives to completion of anterior cervical diskectomy and fusion.  After this discussion, he provided written and verbal consent to proceed.       Clinic visit 5/31/18 \"we discussed that the surgery may not really help his neck pain, but it would help decompress his C6 nerve roots.  If the surgery does not provide significant relief of his symptoms then we could do potentially C3-C7 posterior fusion.  However, because of the nature of his work as a , we discussed that the posterior fusion surgery would limit his neck motion and his ability to work.  At this time, we will consider the smaller surgery C5-C6 ACDF on the right side. The patient agrees with the plan\"  HPI:  He is now 2 weeks postop, since surgery he has noticed a little improvement in his arm pain, not much change in his hands but he also has carpal tunnel syndrome, and not much change in his neck.  He has a little difficulty with swallowing, things get stuck, but no difficulty " with his voice, no whispering.  He was a little hoarse for the first few days.  He is off oxycodone, and takes only tramadol which is prescribed by his pain management provider, Melanie Thomas.     STATUS REPORT  WORK:          Is not back at work.  Position:   ACTIVITY:      Has been following activity restrictions.  Mostly, tried shoveling snow the other day.  MEDS:                   Pain   Tramadol, being prescribed by Melanie Thomas  COLLAR/BRACE PLAN:  NA  Patient Supplied Answers To the [facility] Pain Questionnaire  [facility] Pain -  Patient Entered Questionnaire/Answers 11/5/2018   What number best describes your pain right now:  0 = No pain  to  10 = Worst pain imaginable -   How would you describe the pain? burning, cramping, sharp, numbness, cutting, dull, aching   Which of the following worsen your pain? standing, sitting, walking, coughing / sneezing   Which of the following improve or reduce your pain?  lying down, medication, relaxation, thinking about something else   What number best describes your average pain for the past week:  0 = No pain  to  10 = Worst pain imaginable 10   What number best describes your LOWEST pain in past 24 hours:  0 = No pain  to  10 = Worst pain imaginable 6   What number best describes your WORST pain in past 24 hours:  0 = No pain  to  10 = Worst pain imaginable 10   When is your pain worst? Constant   What non-medicine treatments have you already had for your pain? pain clinic, physical therapy, relaxation training, acupuncture, chiropractic care, spine injections (shots), other nerve blocks, surgery, exercise   Have you tried treating your pain with medication?  Yes   Are you currently taking medications for your pain? Yes       Current Outpatient Prescriptions:      buPROPion (WELLBUTRIN SR) 150 MG 12 hr tablet, Take 1 tablet once daily and increase to 1 tablet twice daily after 4 to 7 days (Patient taking differently: Take 150 mg by mouth daily ), Disp: 60 tablet, Rfl: 0      "cyclobenzaprine (FLEXERIL) 5 MG tablet, Take 1-2 tablets (5-10 mg) by mouth 3 times daily as needed for muscle spasms, Disp: 45 tablet, Rfl: 0     fluticasone (FLONASE) 50 MCG/ACT spray, Spray 2 sprays into both nostrils 2 times daily, Disp: 2 Bottle, Rfl: 11     gabapentin (NEURONTIN) 600 MG tablet, Take 1.5 tablets (900 mg) by mouth 3 times daily, Disp: 135 tablet, Rfl: 3     metoprolol succinate (TOPROL-XL) 25 MG 24 hr tablet, Take 1 tablet (25 mg) by mouth daily (Patient taking differently: Take 25 mg by mouth At Bedtime ), Disp: 30 tablet, Rfl: 1     topiramate (TOPAMAX) 50 MG tablet, Take 1 tablet (50 mg) by mouth 2 times daily Drink plenty of water and no alcohol. If side effects, then reduce to last tolerable dosage., Disp: 60 tablet, Rfl: 2     traMADol (ULTRAM) 50 MG tablet, May take 1-2 tablets every 6 hours as needed for pain, max of 3 tabs per day. Reduce as able. Okay to fill on/after 11/5/2018 and start after finished with oxycodone from surgeon to last 30 days., Disp: 90 tablet, Rfl: 0     traMADol (ULTRAM-ER) 200 MG ER-tablet, Take 1 tablet (200 mg) by mouth daily Okay to fill on/after 10/30/18 and start on/after 11/1/2018; to last 30 days, Disp: 30 tablet, Rfl: 0     traMADol (ULTRAM-ER) 200 MG ER-tablet, Take 1 tablet (200 mg) by mouth daily Okay to fill on/after 9/29/18 and start on/after 10/2/2018; to last 30 days, Disp: 30 tablet, Rfl: 0     oxyCODONE IR (ROXICODONE) 5 MG tablet, Take 1-2 tablets (5-10 mg) by mouth every 3 hours as needed for moderate to severe pain (Patient not taking: Reported on 11/12/2018), Disp: 45 tablet, Rfl: 0  Allergies   Allergen Reactions     Pollen Extract      Ragweeds      PMH, FAM HIST, SOC HIST, PROBLEM LIST:  All reviewed in EPIC.    OBJECTIVE:  BP (!) 135/95 (BP Location: Left arm, Patient Position: Chair, Cuff Size: Adult Regular)  Pulse 77  Temp 97.5  F (36.4  C) (Oral)  Ht 1.676 m (5' 6\")  Wt 67.6 kg (149 lb)  SpO2 99%  BMI 24.05 " kg/m2    Imaging:  These are the pertinent findings from:      None new  Please see Epic for the bulk of the report.  I personally reviewed the images with the patient    EXAM:  Well developed well nourished male found seated comfortably in exam chair.  No apparent distress. He is accompanied.   A&O X3.  Mood and affect WNL. Language and fund of knowledge intact.  Is able to sit and rise independently.   Beautifully healed incision.  I removed tissue glue without difficulty.  There is some left so I provided him with some solvent to take off the residual when he gets home.    Exam at discharge:                         Del                 Tr                   Bi                   WE                WF                 Gr  R                    5                    5                    5                    5                    5                    5  L                    5                    5                    5                    5                    5                    5                        HF                 KE                 KF                  DF                 PF                  EHL  R                    5                    5                    5                    5                    5                    5  L                    5                    5                    5                    5                    5                    5  Exam today:  Upper Extremity Strength.                RIGHT                LEFT     Deltoid              5/5                   5/5       Biceps              5/5                   5/5        Triceps              5/5                   5/5       Wrist Extensor              5/5                   5/5                     5/5                    5/5       Interossei              5/5                   5/5       EPL              5/5                    5/5       Pinch              5/5                  5/5             Lower Extremity Strength                     RIGHT                    LEFT     Iliopsoas                    5/5                      5/5       Quad                    5/5                        5/5       Hamstring                      5/5                        5/5         Gastrocs                    5/5                        5/5       Tib. Anterior                     5/5                        5/5       EHL                      5/5                        5/5       Babinski                 Down                                      Down                       Assessment/Plan:  1. Cervical radiculopathy    2. Degenerative disc disease, cervical    3. Post-operative state        Truman Suero is doing well 2 weeks after his ACDF at C5/6.  He has multiple levels of spondylosis and his neck pain is not likely to be better this soon postop with only one level addressed even if it was the worst.  Regarding his arm symptoms we believe he will see some improvement, but how much is too early to tell.  I have asked him to stay on his gabapentin for now until we know how his nerve is going to respond.  It's still early days.  I have encouraged him to continue to stay off of the nicotine, continue to follow the activity restrictions, and I restricted him from return to work at this juncture.  I will not order PT as yet.  I will see him again in 4 weeks with x-rays at that visit.   The wound is healthy and healing without sign of infection.    Continue to avoid NSAIDs until 3 months post op.    *  All the patient's questions have been answered and they demonstrate good understanding of the above.   *  Truman Suero  has our contact information and is aware that he should call if he has questions comments or concerns.   We appreciate the opportunity to be of service in the care of this pleasant patient  Please do call if there is anything more we can do.    Madeline Gordon PA-C  [facility]  Department of Neurosurgery  Phone: [phone]  Fax: [phone]      Total time: 30  minutes: counseling time greater than 20 minutes for discussion of pain management, biomechanics of the spine, good spine health habits, importance of exercise, film review, medication use, further follow up and answering questions.      This note was generated using voice recognition software. While edited for content some inaccurate phrasing may be found.

## 2018-11-12 NOTE — MR AVS SNAPSHOT
After Visit Summary   11/12/2018    Truman Suero    MRN: [id number]           Patient Information     Date Of Birth          1960        Visit Information        Provider Department      11/12/2018 10:30 AM Madeline Gordon PA-C [facility] Neurosurgery        Today's Diagnoses     Cervical radiculopathy    -  1    Degenerative disc disease, cervical        Post-operative state           Follow-ups after your visit        Your next 10 appointments already scheduled     Nov 28, 2018  1:00 PM CST   Return Visit with RODNEY Vargas CNP   [facility] ([facility])    [address] [facility] [address]   [phone]            Dec 10, 2018  1:00 PM CST   (Arrive by 12:45 PM)   XR CERVICAL SPINE 2/3 VIEWS with UCXR1   [facility] ([facility])    [address] [id number] [phone]           How do I prepare for my exam? (Food and drink instructions) No Food and Drink Restrictions.  How do I prepare for my exam? (Other instructions) You do not need to do anything special for this exam.  What should I wear: Wear comfortable clothes.  How long does the exam take: Most scans take less than 5 minutes.  What should I bring: Bring a list of your medicines, including vitamins, minerals and over-the-counter drugs. It is safest to leave personal items at home.  Do I need a :  No  is needed.  What do I need to tell my doctor: Tell your doctor if there s any chance you are pregnant.  What should I do after the exam: No restrictions, You may resume normal activities.  What is this test: An image of a specific body part shown in shades of black and white.  Who should I call with questions: If you have any questions, please call the Imaging Department where you will have your exam. Directions, parking instructions, and other information is available on our website,  [email or web address].            Dec 10, 2018  1:30 PM CST   (Arrive by 1:15 PM)   Return Visit with Madeline Gordon PA-C   [facility] Neurosurgery ([facility])    [address] [place] [id number] [phone]            Jan 24, 2019  1:45 PM CST   (Arrive by 1:30 PM)   Return Visit with Jud Harkins MD   [facility] Neurosurgery ([facility])    [address] [place] [id number] [phone]              Future tests that were ordered for you today     Open Future Orders        Priority Expected Expires Ordered    X-ray Cervical spine 2-3 views (AP and lateral) [IMG56] Routine 11/12/2018 11/12/2019 11/12/2018            Who to contact     Please call your clinic at [phone] to:    Ask questions about your health    Make or cancel appointments    Discuss your medicines    Learn about your test results    Speak to your doctor            Additional Information About Your Visit        [employer] Information     [employer] gives you secure access to your electronic health record. If you see a primary care provider, you can also send messages to your care team and make appointments. If you have questions, please call your primary care clinic.  If you do not have a primary care provider, please call [phone] and they will assist you.      [employer] is an electronic gateway that provides easy, online access to your medical records. With [employer], you can request a clinic appointment, read your test results, renew a prescription or communicate with your care team.     To access your existing account, please contact your [facility] or call [phone] for assistance.        Care EveryWhere ID     This is your Care EveryWhere ID. This could be used by other organizations to access your [place] medical records  [id number]        Your Vitals Were     Pulse Temperature Height Pulse Oximetry  "BMI (Body Mass Index)       77 97.5  F (36.4  C) (Oral) 1.676 m (5' 6\") 99% 24.05 kg/m2        Blood Pressure from Last 3 Encounters:   11/12/18 (!) 135/95   10/30/18 (!) 170/116   10/22/18 (!) 150/96    Weight from Last 3 Encounters:   11/12/18 67.6 kg (149 lb)   10/29/18 69.9 kg (154 lb 1.6 oz)   10/22/18 69.8 kg (153 lb 14.4 oz)                 Today's Medication Changes          These changes are accurate as of 11/12/18 11:16 AM.  If you have any questions, ask your nurse or doctor.               These medicines have changed or have updated prescriptions.        Dose/Directions    buPROPion 150 MG 12 hr tablet   Commonly known as:  WELLBUTRIN SR   This may have changed:    - how much to take  - how to take this  - when to take this  - additional instructions   Used for:  Tobacco abuse, Adjustment disorder with anxious mood        Take 1 tablet once daily and increase to 1 tablet twice daily after 4 to 7 days   Quantity:  60 tablet   Refills:  0       metoprolol succinate 25 MG 24 hr tablet   Commonly known as:  TOPROL-XL   This may have changed:  when to take this   Used for:  Preop general physical exam, Mild CAD, Ascending aorta dilatation (H)        Dose:  25 mg   Take 1 tablet (25 mg) by mouth daily   Quantity:  30 tablet   Refills:  1                Primary Care Provider Office Phone # Fax #    Humberto Trinidad [phone]       [address] [place] [address]        Equal Access to Services     BRYN VALENTE AH: Hadcurtis hough Soalireza, waaxda luqadaha, qaybta kaalmada adeegyada, waxay idifrank carter. So [facility] [place] [phone].    ATENCIÓN: Si habla español, tiene a simmons disposición servicios gratuitos de asistencia lingüística. Llame al [phone].    We comply with applicable federal civil rights laws and [place] laws. We do not discriminate on the basis of race, color, national origin, age, disability, sex, sexual orientation, or gender " identity.            Thank you!     Thank you for choosing [facility] NEUROSURGERY  for your care. Our goal is always to provide you with excellent care. Hearing back from our patients is one way we can continue to improve our services. Please take a few minutes to complete the written survey that you may receive in the mail after your visit with us. Thank you!             Your Updated Medication List - Protect others around you: Learn how to safely use, store and throw away your medicines at www.disposemymeds.org.          This list is accurate as of 11/12/18 11:16 AM.  Always use your most recent med list.                   Brand Name Dispense Instructions for use Diagnosis    buPROPion 150 MG 12 hr tablet    WELLBUTRIN SR    60 tablet    Take 1 tablet once daily and increase to 1 tablet twice daily after 4 to 7 days    Tobacco abuse, Adjustment disorder with anxious mood       cyclobenzaprine 5 MG tablet    FLEXERIL    45 tablet    Take 1-2 tablets (5-10 mg) by mouth 3 times daily as needed for muscle spasms    S/P cervical spinal fusion       fluticasone 50 MCG/ACT spray    FLONASE    2 Bottle    Spray 2 sprays into both nostrils 2 times daily    Chronic allergic rhinitis due to animal hair and dander, Chronic seasonal allergic rhinitis due to pollen, Allergic rhinitis due to dust mite, Allergic conjunctivitis of both eyes       gabapentin 600 MG tablet    NEURONTIN    135 tablet    Take 1.5 tablets (900 mg) by mouth 3 times daily    Cervical spondylosis without myelopathy, DDD (degenerative disc disease), cervical, Chronic neck and back pain, Cervical stenosis of spinal canal       metoprolol succinate 25 MG 24 hr tablet    TOPROL-XL    30 tablet    Take 1 tablet (25 mg) by mouth daily    Preop general physical exam, Mild CAD, Ascending aorta dilatation (H)       oxyCODONE IR 5 MG tablet    ROXICODONE    45 tablet    Take 1-2 tablets (5-10 mg) by mouth every 3 hours as needed for moderate to severe pain    S/P  cervical spinal fusion       topiramate 50 MG tablet    TOPAMAX    60 tablet    Take 1 tablet (50 mg) by mouth 2 times daily Drink plenty of water and no alcohol. If side effects, then reduce to last tolerable dosage.    Cervical spondylosis without myelopathy, DDD (degenerative disc disease), cervical, Cervical stenosis of spinal canal       * traMADol 200 MG ER-tablet    ULTRAM-ER    30 tablet    Take 1 tablet (200 mg) by mouth daily Okay to fill on/after 9/29/18 and start on/after 10/2/2018; to last 30 days    Cervical spondylosis without myelopathy, DDD (degenerative disc disease), cervical       * traMADol 200 MG ER-tablet    ULTRAM-ER    30 tablet    Take 1 tablet (200 mg) by mouth daily Okay to fill on/after 10/30/18 and start on/after 11/1/2018; to last 30 days    Cervical spondylosis without myelopathy, DDD (degenerative disc disease), cervical       * traMADol 50 MG tablet    ULTRAM    90 tablet    May take 1-2 tablets every 6 hours as needed for pain, max of 3 tabs per day. Reduce as able. Okay to fill on/after 11/5/2018 and start after finished with oxycodone from surgeon to last 30 days.    Cervical radiculopathy       * Notice:  This list has 3 medication(s) that are the same as other medications prescribed for you. Read the directions carefully, and ask your doctor or other care provider to review them with you.

## 2018-11-12 NOTE — PROGRESS NOTES
"  Neurosurgery clinic progress note  Date of visit: 11/12/2018      Attending: Shahana  Procedure: 10/29/18   Cervical 5 to 6 anterior cervical diskectomy and fusion.   IMPLANTS:     1) [employer] Advanced ACF Spacer Lordotic 7 mm Lot No. [id number] Part No [id number] Serial No [id number]  2) Depuy [employer] [place] One Level Plate   3) 4x [employer] [place] SFDR Screw 4.0 x 14 mm     INDICATIONS:  Mr. Truman Suero is a 58-year-old gentleman with a history of neck pain and right sided arm pain, found to have bilateral cervical 6 radiculopathy based on EMG.  He was also found to have a corresponding disk protrusion at this level of C5-6.  For this reason, we discussed the risks, benefits and alternatives to completion of anterior cervical diskectomy and fusion.  After this discussion, he provided written and verbal consent to proceed.      Clinic visit 5/31/18 \"we discussed that the surgery may not really help his neck pain, but it would help decompress his C6 nerve roots.  If the surgery does not provide significant relief of his symptoms then we could do potentially C3-C7 posterior fusion.  However, because of the nature of his work as a , we discussed that the posterior fusion surgery would limit his neck motion and his ability to work.  At this time, we will consider the smaller surgery C5-C6 ACDF on the right side. The patient agrees with the plan\"  HPI:  He is now 2 weeks postop, since surgery he has noticed a little improvement in his arm pain, not much change in his hands but he also has carpal tunnel syndrome, and not much change in his neck.  He has a little difficulty with swallowing, things get stuck, but no difficulty with his voice, no whispering.  He was a little hoarse for the first few days.  He is off oxycodone, and takes only tramadol which is prescribed by his pain management provider, Melanie Thomas.     STATUS REPORT  WORK:          Is not back at work.  Position:   ACTIVITY:      Has been " following activity restrictions.  Mostly, tried shoveling snow the other day.  MEDS:                   Pain   Tramadol, being prescribed by Melanie Thomas  COLLAR/BRACE PLAN:  NA  Patient Supplied Answers To the [facility] Pain Questionnaire  [facility] Pain -  Patient Entered Questionnaire/Answers 11/5/2018   What number best describes your pain right now:  0 = No pain  to  10 = Worst pain imaginable -   How would you describe the pain? burning, cramping, sharp, numbness, cutting, dull, aching   Which of the following worsen your pain? standing, sitting, walking, coughing / sneezing   Which of the following improve or reduce your pain?  lying down, medication, relaxation, thinking about something else   What number best describes your average pain for the past week:  0 = No pain  to  10 = Worst pain imaginable 10   What number best describes your LOWEST pain in past 24 hours:  0 = No pain  to  10 = Worst pain imaginable 6   What number best describes your WORST pain in past 24 hours:  0 = No pain  to  10 = Worst pain imaginable 10   When is your pain worst? Constant   What non-medicine treatments have you already had for your pain? pain clinic, physical therapy, relaxation training, acupuncture, chiropractic care, spine injections (shots), other nerve blocks, surgery, exercise   Have you tried treating your pain with medication?  Yes   Are you currently taking medications for your pain? Yes       Current Outpatient Prescriptions:      buPROPion (WELLBUTRIN SR) 150 MG 12 hr tablet, Take 1 tablet once daily and increase to 1 tablet twice daily after 4 to 7 days (Patient taking differently: Take 150 mg by mouth daily ), Disp: 60 tablet, Rfl: 0     cyclobenzaprine (FLEXERIL) 5 MG tablet, Take 1-2 tablets (5-10 mg) by mouth 3 times daily as needed for muscle spasms, Disp: 45 tablet, Rfl: 0     fluticasone (FLONASE) 50 MCG/ACT spray, Spray 2 sprays into both nostrils 2 times daily, Disp: 2 Bottle, Rfl: 11     gabapentin (NEURONTIN) 600  "MG tablet, Take 1.5 tablets (900 mg) by mouth 3 times daily, Disp: 135 tablet, Rfl: 3     metoprolol succinate (TOPROL-XL) 25 MG 24 hr tablet, Take 1 tablet (25 mg) by mouth daily (Patient taking differently: Take 25 mg by mouth At Bedtime ), Disp: 30 tablet, Rfl: 1     topiramate (TOPAMAX) 50 MG tablet, Take 1 tablet (50 mg) by mouth 2 times daily Drink plenty of water and no alcohol. If side effects, then reduce to last tolerable dosage., Disp: 60 tablet, Rfl: 2     traMADol (ULTRAM) 50 MG tablet, May take 1-2 tablets every 6 hours as needed for pain, max of 3 tabs per day. Reduce as able. Okay to fill on/after 11/5/2018 and start after finished with oxycodone from surgeon to last 30 days., Disp: 90 tablet, Rfl: 0     traMADol (ULTRAM-ER) 200 MG ER-tablet, Take 1 tablet (200 mg) by mouth daily Okay to fill on/after 10/30/18 and start on/after 11/1/2018; to last 30 days, Disp: 30 tablet, Rfl: 0     traMADol (ULTRAM-ER) 200 MG ER-tablet, Take 1 tablet (200 mg) by mouth daily Okay to fill on/after 9/29/18 and start on/after 10/2/2018; to last 30 days, Disp: 30 tablet, Rfl: 0     oxyCODONE IR (ROXICODONE) 5 MG tablet, Take 1-2 tablets (5-10 mg) by mouth every 3 hours as needed for moderate to severe pain (Patient not taking: Reported on 11/12/2018), Disp: 45 tablet, Rfl: 0  Allergies   Allergen Reactions     Pollen Extract      Ragweeds      PMH, FAM HIST, SOC HIST, PROBLEM LIST:  All reviewed in EPIC.    OBJECTIVE:  BP (!) 135/95 (BP Location: Left arm, Patient Position: Chair, Cuff Size: Adult Regular)  Pulse 77  Temp 97.5  F (36.4  C) (Oral)  Ht 1.676 m (5' 6\")  Wt 67.6 kg (149 lb)  SpO2 99%  BMI 24.05 kg/m2    Imaging:  These are the pertinent findings from:      None new  Please see Epic for the bulk of the report.  I personally reviewed the images with the patient    EXAM:  Well developed well nourished male found seated comfortably in exam chair.  No apparent distress. He is accompanied.   A&O X3.  Mood " and affect WNL. Language and fund of knowledge intact.  Is able to sit and rise independently.   Beautifully healed incision.  I removed tissue glue without difficulty.  There is some left so I provided him with some solvent to take off the residual when he gets home.    Exam at discharge:                         Del                 Tr                   Bi                   WE                WF                 Gr  R                    5                    5                    5                    5                    5                    5  L                    5                    5                    5                    5                    5                    5                        HF                 KE                 KF                  DF                 PF                  EHL  R                    5                    5                    5                    5                    5                    5  L                    5                    5                    5                    5                    5                    5  Exam today:  Upper Extremity Strength.                RIGHT                LEFT     Deltoid              5/5                   5/5       Biceps              5/5                   5/5        Triceps              5/5                   5/5       Wrist Extensor              5/5                   5/5                     5/5                    5/5       Interossei              5/5                   5/5       EPL              5/5                    5/5       Pinch              5/5                  5/5             Lower Extremity Strength                     RIGHT                   LEFT     Iliopsoas                    5/5                      5/5       Quad                    5/5                        5/5       Hamstring                      5/5                        5/5         Gastrocs                    5/5                        5/5       Tib. Anterior                     5/5                         5/5       EHL                      5/5                        5/5       Babinski                 Down                                      Down                       Assessment/Plan:  1. Cervical radiculopathy    2. Degenerative disc disease, cervical    3. Post-operative state        Truman Suero is doing well 2 weeks after his ACDF at C5/6.  He has multiple levels of spondylosis and his neck pain is not likely to be better this soon postop with only one level addressed even if it was the worst.  Regarding his arm symptoms we believe he will see some improvement, but how much is too early to tell.  I have asked him to stay on his gabapentin for now until we know how his nerve is going to respond.  It's still early days.  I have encouraged him to continue to stay off of the nicotine, continue to follow the activity restrictions, and I restricted him from return to work at this juncture.  I will not order PT as yet.  I will see him again in 4 weeks with x-rays at that visit.   The wound is healthy and healing without sign of infection.    Continue to avoid NSAIDs until 3 months post op.    *  All the patient's questions have been answered and they demonstrate good understanding of the above.   *  Truman Suero  has our contact information and is aware that he should call if he has questions comments or concerns.   We appreciate the opportunity to be of service in the care of this pleasant patient  Please do call if there is anything more we can do.    Madeline Gordon PA-C  [facility]  Department of Neurosurgery  Phone: [phone]  Fax: [phone]      Total time: 30 minutes: counseling time greater than 20 minutes for discussion of pain management, biomechanics of the spine, good spine health habits, importance of exercise, film review, medication use, further follow up and answering questions.      This note was generated using voice recognition software. While edited for  content some inaccurate phrasing may be found.

## 2018-11-16 DIAGNOSIS — M54.9 CHRONIC NECK AND BACK PAIN: ICD-10-CM

## 2018-11-16 DIAGNOSIS — G89.29 CHRONIC NECK AND BACK PAIN: ICD-10-CM

## 2018-11-16 DIAGNOSIS — M47.812 CERVICAL SPONDYLOSIS WITHOUT MYELOPATHY: ICD-10-CM

## 2018-11-16 DIAGNOSIS — M50.30 DDD (DEGENERATIVE DISC DISEASE), CERVICAL: ICD-10-CM

## 2018-11-16 DIAGNOSIS — M48.02 CERVICAL STENOSIS OF SPINAL CANAL: ICD-10-CM

## 2018-11-16 DIAGNOSIS — M54.2 CHRONIC NECK AND BACK PAIN: ICD-10-CM

## 2018-11-16 RX ORDER — GABAPENTIN 600 MG/1
900 TABLET ORAL 3 TIMES DAILY
Qty: 135 TABLET | Refills: 3 | Status: SHIPPED | OUTPATIENT
Start: 2018-11-16 | End: 2019-03-15

## 2018-11-16 NOTE — TELEPHONE ENCOUNTER
Pending Prescriptions:                       Disp   Refills    gabapentin (NEURONTIN) 600 MG tablet      135 ta*3            Sig: Take 1.5 tablets (900 mg) by mouth 3 times daily    Last refill: 10/20/18  Last refill: 9/5/18  Next appointment: 11/28/18    Mike Bergeron MA

## 2018-11-16 NOTE — TELEPHONE ENCOUNTER
Signed Prescriptions:                        Disp   Refills    gabapentin (NEURONTIN) 600 MG tablet       135 ta*3        Sig: Take 1.5 tablets (900 mg) by mouth 3 times daily  Authorizing Provider: CANDACE BENSON RN CNP, FNP  [facility]

## 2018-11-28 ENCOUNTER — OFFICE VISIT (OUTPATIENT)
Dept: PALLIATIVE MEDICINE | Facility: CLINIC | Age: 58
End: 2018-11-28
Payer: COMMERCIAL

## 2018-11-28 VITALS
HEART RATE: 61 BPM | WEIGHT: 151 LBS | BODY MASS INDEX: 24.37 KG/M2 | SYSTOLIC BLOOD PRESSURE: 124 MMHG | DIASTOLIC BLOOD PRESSURE: 84 MMHG

## 2018-11-28 DIAGNOSIS — Z98.1 S/P CERVICAL SPINAL FUSION: ICD-10-CM

## 2018-11-28 DIAGNOSIS — M50.30 DDD (DEGENERATIVE DISC DISEASE), CERVICAL: ICD-10-CM

## 2018-11-28 DIAGNOSIS — M54.12 CERVICAL RADICULOPATHY: ICD-10-CM

## 2018-11-28 DIAGNOSIS — M79.18 MYOFASCIAL PAIN: ICD-10-CM

## 2018-11-28 DIAGNOSIS — M47.812 CERVICAL SPONDYLOSIS WITHOUT MYELOPATHY: ICD-10-CM

## 2018-11-28 DIAGNOSIS — M54.59 LUMBAR FACET JOINT PAIN: ICD-10-CM

## 2018-11-28 DIAGNOSIS — M47.817 LUMBOSACRAL SPONDYLOSIS WITHOUT MYELOPATHY: Primary | ICD-10-CM

## 2018-11-28 PROCEDURE — 99214 OFFICE O/P EST MOD 30 MIN: CPT | Performed by: NURSE PRACTITIONER

## 2018-11-28 RX ORDER — TRAMADOL HYDROCHLORIDE 50 MG/1
TABLET ORAL
Qty: 90 TABLET | Refills: 0 | Status: SHIPPED | OUTPATIENT
Start: 2018-11-28 | End: 2019-01-14

## 2018-11-28 RX ORDER — TRAMADOL HYDROCHLORIDE 200 MG/1
200 TABLET, EXTENDED RELEASE ORAL DAILY
Qty: 30 TABLET | Refills: 0 | Status: SHIPPED | OUTPATIENT
Start: 2018-11-28 | End: 2019-01-02

## 2018-11-28 ASSESSMENT — PAIN SCALES - GENERAL: PAINLEVEL: SEVERE PAIN (7)

## 2018-11-28 NOTE — PATIENT INSTRUCTIONS
PLAN:  1. PHYSICAL THERAPY deferred to neurosurgery  2. Medications:   1. Continue Tramadol 200mg ER once daily  2. Continue Tramadol 50mg every 6-8 hours as needed for pain, max of 3/day  3. Continue gabapentin  4. Continue Topamax  3. Procedures: I will check with my partners re: doing Lumbar medial branch blocks but I think this will be fine as no steroids are used and you are healing from Cervical Fusion  4. Follow-up with me in 10 weeks.       ----------------------------------------------------------------  Clinic Number:  [phone]   Call this number with any questions about your care and for scheduling assistance. Calls are returned Monday through Friday between 8 AM and 4:30 PM. We usually get back to you within 2 business days depending on the issue/request.       Medication refills:    For non-narcotic medications, call your pharmacy directly to request a refill. The pharmacy will contact the [facility] for authorization. Please allow 3-4 days for these refills to be processed.     For narcotic refills, call the clinic number or send a Mlog message. Please contact us 7-10 days before your refill is due. The message MUST include the name of the specific medication(s) requested and how you would like to receive the prescription(s). The options are as follows:    Pain Clinic staff can mail the prescription to your pharmacy. Please tell us the name of the pharmacy.    You may pick the prescription up at the Pain Clinic (tell us the location) or during a clinic visit with your pain provider    Pain Clinic staff can deliver the prescription to the [place] pharmacy in the clinic building. Please tell us the location.      We believe regular attendance is key to your success in our program.    Any time you are unable to keep your appointment we ask that you call us at least 24 hours in advance to let us know. This will allow us to offer the appointment time to another patient.

## 2018-11-28 NOTE — PROGRESS NOTES
[facility]  11/28/2018       Chief complaint: neck and low back pain    Interval history:  Truman Suero is a 58 year old male is known to me for   Chronic neck pain  Cervical DDD  Cervical spondylosis (pain worse with extension/rotation indicating facetogenic component to pain)  Axial low back pain  Myofascial pain/muscle spasms  Remote history of ETOH overuse, attended AA for awhile. Sober for 10 years  ---PMHx includes: neck pain  ---PSHx includes: none listed      Recommendations/plan at the last visit on 9/5/2018 included:  1. Physical Therapy: not at present time  2. Patient is to continue his home exercise program and exercises learned in PHYSICAL THERAPY  3. Clinical Health Psychologist: continue work with  Kentrell Castañeda in health psychology  4. Diagnostic Studies:  None  5. Medication Management:    1. Continue Tramadol ER 200mg QD  2. Continue Tramadol immediate release 50mg tabs, max of 4 tabs daily   3. Continue gabapentin 900 mg TID  4. Continue Topamax 50 mg at bedtime  5. Continue Methocarbamol 750-1500 mg TID PRN   6. Further procedures recommended: Discussed lumbar MBB/RFA vs lumbar facet joint injection. Consulting with Dr Harkins re: steroid injections prior to upcoming neck surgery.   7. Recommendations to PCP: see above  8. Follow up: 12 weeks    Since his last visit, Truman Suero reports:  -he is healing well from his anterior cervical fusion.  - he has ongoing low back pain, did feel that the 10/11/2018 lumbar facet joint injections were the most helpful injections he has had.   -discussed possible LMBBs, he would need to do PHYSICAL THERAPY prior to Lumbar RFA, currently not able to do so as on restrictions from neurosurg following cervical fusion    At this point, the patient's participation with our multidisciplinary team includes:  The patient has been compliant with the program.    PT - attending non-pain management PHYSICAL THERAPY   Health Psych - has seen  "Kentrell Castañeda x4  Acupuncture: working with Dr. Bereket LAY      Pain scores:  Pain intensity on average is 8 on a scale of 0-10.    Range is 5-10/10.   Pain right now is 8/10.  Pain is described as \"aching, tiring, exhausting, throbbing, penetrating, miserable, nagging\"    Pain is constant in nature    Current pain relevant medications:   -Tramadol 200mg ER in the evening (helpful)  -Tramadol 50mg take 1 tablet  Q 6 hours PRN (using 2-3 tabs per day, helpful)  -ibuprofen 600mg PRN (helpful)  -gabapentin 900mg TID (somewhat helpful)  -methocarbamol 500-1000mg TID PRN muscle spasms (takes 1000 mg BID, somewhat helpful)  -Topamax 50 mg HS (helpful)    Other pertinent medications:  None    Previous Medications:  OPIATES: Tramdol (somewhat helpful)  NSAIDS: ibuprofen (helpful), Aleve (helpful)  MUSCLE RELAXANTS: none  ANTI-MIGRAINE MEDS: none  ANTI-DEPRESSANTS: none  SLEEP AIDS: none  ANTI-CONVULSANTS: gabapentin (helpful)  TOPICALS: lidocaine ointment (somewhat helpful)  Other meds: Tylenol (not helpful)        Other treatments have included:  Truman Suero has not been seen at a pain clinic in the past.    PT: tried, somewhat helpful  Chiropractic: helpful  Acupuncture: none  TENs Unit: none     Injections:   cervical radiofrequency nerve ablation at [facility] in December 2016 (did get good relief, got 70% relief of his typical neck pain)  -6/29/2017 Cervical facet joint steroid injections at C4-5 and C5-6 on the right with Dr. Nicole Harding (not helpful)  -3/8/2018 C7-T1 interlaminar DEMARCUS with Dr. Hugo Corrigan (not helpful)  -5/23/2018 right L4-5 transforaminal epidural steroid injection with Dr. Osorio (not helpful for back pain but did help leg pain)  -8/7/2018 bilateral SI joint injection with Dr Hugo Corrigan (helpful for one month)  -10/11/2018 l3-4 and L4-5 facet joint injections bilaterally with Dr. Hugo Corrigan (this has been helpful, more helpful than any other lumbar injections thus " far)    Surgeries:  10/29/2018 right anterior cervical C5-6 discectomy and fusion by Dr. Jud Harkins (somewhat helpful)        THE 4 A's OF OPIOID MAINTENANCE ANALGESIA    Analgesia: long acting Tramadol with some short acting tramadol does give some relief    Activity: ADLs    Adverse effects: none    Adherence to Rx protocol: yes      Side Effects: none  Patient is using the medication as prescribed: yes    Medications:  Current Outpatient Prescriptions   Medication Sig Dispense Refill     buPROPion (WELLBUTRIN SR) 150 MG 12 hr tablet Take 1 tablet once daily and increase to 1 tablet twice daily after 4 to 7 days 60 tablet 0     fluticasone (FLONASE) 50 MCG/ACT spray Spray 2 sprays into both nostrils 2 times daily 2 Bottle 11     gabapentin (NEURONTIN) 600 MG tablet Take 1.5 tablets (900 mg) by mouth 3 times daily 135 tablet 3     metoprolol succinate (TOPROL-XL) 25 MG 24 hr tablet Take 1 tablet (25 mg) by mouth daily (Patient taking differently: Take 25 mg by mouth At Bedtime ) 30 tablet 1     topiramate (TOPAMAX) 50 MG tablet Take 1 tablet (50 mg) by mouth 2 times daily Drink plenty of water and no alcohol. If side effects, then reduce to last tolerable dosage. 60 tablet 2     traMADol (ULTRAM) 50 MG tablet May take 1-2 tablets every 6 hours as needed for pain, max of 3 tabs per day. Reduce as able. Okay to fill on 12/3/2018 and start on 12/5/2018  to last 30 days. 90 tablet 0     traMADol (ULTRAM-ER) 200 MG 24 hr tablet Take 1 tablet (200 mg) by mouth daily Okay to fill on/after 11/28/18 and start on/after 12/1/2018; to last 30 days 30 tablet 0     cyclobenzaprine (FLEXERIL) 5 MG tablet Take 1-2 tablets (5-10 mg) by mouth 3 times daily as needed for muscle spasms 45 tablet 0       Medical History: any changes in medical history since they were last seen? Cervical fusion, see above    Social History:   Home situation: , has 4 kids, 2 at home, one in college and one launched.   Occupation/Schooling:  full  time in [employer]  Tobacco use: former smoker, quit on 3/20/2018  Alcohol use: sober for nearly 10 years. He used to work a sobriety program, used to go to   Drug use: none  History of chemical dependency treatment: no formal treatment, did AA    Is patient a current smoker or tobacco user?  QUIT on 3/20/2018  If yes, was cessation counseling offered?  no        Review of Systems:  ROS is positive as per HPI as well as for fatigue, headache, itching, high blood pressure, joint pain, stiffness, back pain, neck pain, weakness, numbness and tingling, depression, anxiety and stress and is negative for fevers, sweats, or constipation, diarrhea.        Physical Exam:  Vital signs: /84  Pulse 61  Wt 68.5 kg (151 lb)  BMI 24.37 kg/m2     Behavioral observations:  Awake, alert, cooperative    Gait:  normal    Musculoskeletal exam:    Moves well in exam room  Strength grossly equal throughout  Lumbar flexion to 80 degrees  Lumbar extension to 20 degrees, painful  Lumbar extension/rotation to the right is painful  Lumbar extension/rotation to the left is painful  SI joints non-tender  Piriformis non-tender  GTs non-tender    Neuro exam:  SILT in all extremities     Skin/vascular/autonomic:  No suspicious lesions on exposed skin.      Other:  NA    Is the patient hypertensive today? no  Hypertensive on recheck of BP?   n/a  If yes, was patient recommended to see Primary Care Provider in follow up for management of HTN?  no      IMAGING:    MR CERVICAL SPINE WITHOUT CONTRAST 9/5/2017 2:32 PM      HISTORY: Neck pain, worsening numbness in arms and lower extremities.  Radiculopathy, cervical region.     TECHNIQUE: Multiplanar multisequence images were obtained through the  cervical spine without contrast.     COMPARISON: 2/22/2017.     FINDINGS: Sagittal images demonstrate some minimal gentle cervical  kyphosis, otherwise normal posterior alignment. There is no evidence  for craniovertebral or cervicomedullary  junction abnormality. The  cervical cord is minimally indented anteriorly at C4-C5 and C5-C6,  otherwise is normal in morphology and signal characteristics. Disc  space narrowing and discogenic marrow changes are present C3-C4,  C4-C5, C5-C6 and C6-C7.     C2-C3: Minimal facet hypertrophy. No stenosis.     C3-C4: Broad-based posterior osteophyte formation is present causing  some mild bilateral neural foraminal stenosis, but no central canal  stenosis.     C4-C5: Broad-based posterior osteophyte formation and mild facet  hypertrophy is present causing some mild to moderate central canal  stenosis, mild cord deformity, and mild-to-moderate bilateral neural  foraminal stenosis.     C5-C6: Broad-based posterior osteophyte formation and disc bulging is  present along with some facet hypertrophy. There is moderate central  canal stenosis and moderate bilateral neural foraminal stenosis.  Findings have progressed since the prior exam.     C6-C7: Broad-based disc bulging is present along with some minimal  posterior osteophyte formation. There is borderline to mild central  canal stenosis, but no neural foraminal stenosis.     C7-T1: Moderate facet hypertrophy. No significant stenosis.         IMPRESSION: Moderate multilevel degenerative disc and facet disease  with some progression at C5-C6 where there is moderate central canal  and bilateral neural foraminal stenosis along with cord deformity.          MR CERVICAL SPINE WITHOUT CONTRAST  2/22/2017 9:59 AM     HISTORY:  Bilateral neck and arm pain for three years.     COMPARISON: None.     TECHNIQUE: Routine MR cervical spine extended through T2.     FINDINGS: There is some degenerative bone marrow signal change C3-C6.  No malignant or destructive lesions. Normal alignment through T2.  Reversed cervical adenosis C3-C6.     The cervical and upper thoracic spinal cord appear intrinsically  normal. The craniocervical junction region is normal. No paraspinous  soft tissue  abnormality.     Findings by level as follows:     C2-C3: Negative. No disc protrusion. No central or lateral stenosis.     C3-C4: Mild disc space narrowing. Mild diffuse annular bulge. Small  uncinate spur on the right. No significant central or left-sided  stenosis. Mild right-sided foraminal stenosis.     C4-C5: Moderate degenerative narrowing of the interspace. Mild ventral  disc osteophyte complex with minimal central stenosis. Small uncinate  spurs bilaterally with mild bilateral foraminal stenosis.     C6-C7: Moderate disc space narrowing. Mild broad-based disc osteophyte  complex with minimal central stenosis. Minimal uncinate spurs with  mild bilateral foraminal stenosis.     C6-C7: Moderate disc space narrowing. Mild ventral disc osteophyte  complex. No significant central or lateral stenosis.     C7-T1: No disc protrusion. No central or lateral stenosis. Moderate  bilateral facet joint disease.         IMPRESSION:  1. Degenerative changes as described C3-C4 through C7-T1. There is  only mild central and foraminal stenosis as described.  2. No intrinsic spinal cord abnormality through T2    [place] Prescription Monitoring Program:  Reviewed [facility] on 11/28/2018, as expected. Short outside scripts for pain medication following surgery      DIRE Score for selecting candidates for long term opioid analgesia for chronic pain:  Diagnosis  2  Intractablility  2  Risk    Psychological health  2    Chemical health  2    Reliability  2    Social support  3  Efficacy  2    Total DIRE Score = 15. Note that   7-13 predicts poor outcome (compliance and efficacy) from opioid prescribing; 14-21 predicts good outcome (compliance and efficacy)  from opioid prescribing.      Assessment:   1. Lumbar spondylosis  2. Lumbar facet joint pain  3. S/p cervical fusion  4. Myofascial pain/muscle spasms  5. Cervical radiculopathy  6. Cervical spondylosis  7. Cervical DDD  8. Remote history of ETOH overuse, attended AA for awhile.  Sober for 10 years  9. PMHx includes: neck pain  10. PSHx includes: none listed        Plan:   1. Physical Therapy: not at present time--deferred to neurosurgery, patient healing from recent cervical fusion  2. Clinical Health Psychologist: continue work with  Kentrell Castañeda in health psychology  3. Diagnostic Studies:  None  4. Medication Management:    1. Continue tramadol ER 200mg once daily  2. Continue tramadol 50mg Q 6-8 hours PRN, max of 3/day  3. Continue gabapentin  4. Continue Topamax  5. Further procedures recommended: I will check with my partners, I think LMBBs would be OK since no steroids following cervical fusion, as long as patient can lay prone  6. Recommendations to PCP: see above  7. Follow up: 10 weeks      Total time spent face to face was 30 minutes and more than 50% of face to face time was spent in counseling and/or coordination of care regarding the diagnosis and recommendations above.    Melanie STEVENS, RN CNP, FNP  [facility]          Patient is a current opioid user with chronic pain. To follow patient safety pain recommendations, I have verified that Pain clinic has supported opioid use.

## 2018-11-28 NOTE — MR AVS SNAPSHOT
After Visit Summary   11/28/2018    Truman Suero    MRN: [id number]           Patient Information     Date Of Birth          1960        Visit Information        Provider Department      11/28/2018 1:00 PM Melanie Thomas APRN [facility]        Today's Diagnoses     Lumbosacral spondylosis without myelopathy    -  1    Lumbar facet joint pain        S/P cervical spinal fusion        Myofascial pain        Cervical radiculopathy        Cervical spondylosis without myelopathy        DDD (degenerative disc disease), cervical          Care Instructions    PLAN:  1. PHYSICAL THERAPY deferred to neurosurgery  2. Medications:   1. Continue Tramadol 200mg ER once daily  2. Continue Tramadol 50mg every 6-8 hours as needed for pain, max of 3/day  3. Continue gabapentin  4. Continue Topamax  3. Procedures: I will check with my partners re: doing Lumbar medial branch blocks but I think this will be fine as no steroids are used and you are healing from Cervical Fusion  4. Follow-up with me in 10 weeks.       ----------------------------------------------------------------  Clinic Number:  [phone]   Call this number with any questions about your care and for scheduling assistance. Calls are returned Monday through Friday between 8 AM and 4:30 PM. We usually get back to you within 2 business days depending on the issue/request.       Medication refills:    For non-narcotic medications, call your pharmacy directly to request a refill. The pharmacy will contact the [facility] for authorization. Please allow 3-4 days for these refills to be processed.     For narcotic refills, call the clinic number or send a [employer] message. Please contact us 7-10 days before your refill is due. The message MUST include the name of the specific medication(s) requested and how you would like to receive the prescription(s). The options are as follows:    Pain Clinic staff can mail the  prescription to your pharmacy. Please tell us the name of the pharmacy.    You may pick the prescription up at the Pain Clinic (tell us the location) or during a clinic visit with your pain provider    Pain Clinic staff can deliver the prescription to the [facility] pharmacy in the clinic building. Please tell us the location.      We believe regular attendance is key to your success in our program.    Any time you are unable to keep your appointment we ask that you call us at least 24 hours in advance to let us know. This will allow us to offer the appointment time to another patient.               Follow-ups after your visit        Your next 10 appointments already scheduled     Dec 10, 2018  1:00 PM CST   (Arrive by 12:45 PM)   XR CERVICAL SPINE 2/3 VIEWS with UCXR1   [facility] ([facility])    [address] [address] [phone]           How do I prepare for my exam? (Food and drink instructions) No Food and Drink Restrictions.  How do I prepare for my exam? (Other instructions) You do not need to do anything special for this exam.  What should I wear: Wear comfortable clothes.  How long does the exam take: Most scans take less than 5 minutes.  What should I bring: Bring a list of your medicines, including vitamins, minerals and over-the-counter drugs. It is safest to leave personal items at home.  Do I need a :  No  is needed.  What do I need to tell my doctor: Tell your doctor if there s any chance you are pregnant.  What should I do after the exam: No restrictions, You may resume normal activities.  What is this test: An image of a specific body part shown in shades of black and white.  Who should I call with questions: If you have any questions, please call the Imaging Department where you will have your exam. Directions, parking instructions, and other information is available on our website, [email or web address].             Dec 10, 2018  1:30 PM CST   (Arrive by 1:15 PM)   Return Visit with Madeline Gordon PA-C   [facility] ([facility])    [address] [facility]  [address] [facility] [address] [phone]            Jan 24, 2019  1:45 PM CST   (Arrive by 1:30 PM)   Return Visit with Jud Harkins MD   [facility] ([facility])    [address] [address] [phone]              Who to contact     If you have questions or need follow up information about today's clinic visit or your schedule please contact [facility] directly at [phone].  Normal or non-critical lab and imaging results will be communicated to you by MyChart, letter or phone within 4 business days after the clinic has received the results. If you do not hear from us within 7 days, please contact the clinic through MyChart or phone. If you have a critical or abnormal lab result, we will notify you by phone as soon as possible.  Submit refill requests through [employer] or call your pharmacy and they will forward the refill request to us. Please allow 3 business days for your refill to be completed.          Additional Information About Your Visit        Ref[employer] Information     [employer] gives you secure access to your electronic health record. If you see a primary care provider, you can also send messages to your care team and make appointments. If you have questions, please call your primary care clinic.  If you do not have a primary care provider, please call [phone] and they will assist you.        Care EveryWhere ID     This is your Care EveryWhere ID. This could be used by other organizations to access your [facility] medical records  [id number]        Your Vitals Were     Pulse BMI (Body Mass Index)                61 24.37 kg/m2           Blood Pressure from Last 3 Encounters:   11/28/18 124/84   11/12/18 (!) 135/95    10/30/18 (!) 170/116    Weight from Last 3 Encounters:   11/28/18 68.5 kg (151 lb)   11/12/18 67.6 kg (149 lb)   10/29/18 69.9 kg (154 lb 1.6 oz)              Today, you had the following     No orders found for display         Today's Medication Changes          These changes are accurate as of 11/28/18  1:41 PM.  If you have any questions, ask your nurse or doctor.               These medicines have changed or have updated prescriptions.        Dose/Directions    metoprolol succinate 25 MG 24 hr tablet   Commonly known as:  TOPROL-XL   This may have changed:  when to take this   Used for:  Preop general physical exam, Mild CAD, Ascending aorta dilatation (H)        Dose:  25 mg   Take 1 tablet (25 mg) by mouth daily   Quantity:  30 tablet   Refills:  1       * traMADol 50 MG tablet   Commonly known as:  ULTRAM   This may have changed:  additional instructions   Used for:  Cervical radiculopathy   Changed by:  Melanie Thomas APRN CNP        May take 1-2 tablets every 6 hours as needed for pain, max of 3 tabs per day. Reduce as able. Okay to fill on 12/3/2018 and start on 12/5/2018  to last 30 days.   Quantity:  90 tablet   Refills:  0       * traMADol 200 MG 24 hr tablet   Commonly known as:  ULTRAM-ER   This may have changed:  additional instructions   Used for:  Cervical spondylosis without myelopathy, DDD (degenerative disc disease), cervical   Changed by:  Melanie Thomas APRN CNP        Dose:  200 mg   Take 1 tablet (200 mg) by mouth daily Okay to fill on/after 11/28/18 and start on/after 12/1/2018; to last 30 days   Quantity:  30 tablet   Refills:  0       * Notice:  This list has 2 medication(s) that are the same as other medications prescribed for you. Read the directions carefully, and ask your doctor or other care provider to review them with you.         Where to get your medicines      Some of these will need a paper prescription and others can be bought over the counter.  Ask your nurse if  you have questions.     Bring a paper prescription for each of these medications     traMADol 200 MG 24 hr tablet    traMADol 50 MG tablet               Information about OPIOIDS     PRESCRIPTION OPIOIDS: WHAT YOU NEED TO KNOW   We gave you an opioid (narcotic) pain medicine. It is important to manage your pain, but opioids are not always the best choice. You should first try all the other options your care team gave you. Take this medicine for as short a time (and as few doses) as possible.    Some activities can increase your pain, such as bandage changes or therapy sessions. It may help to take your pain medicine 30 to 60 minutes before these activities. Reduce your stress by getting enough sleep, working on hobbies you enjoy and practicing relaxation or meditation. Talk to your care team about ways to manage your pain beyond prescription opioids.    These medicines have risks:    DO NOT drive when on new or higher doses of pain medicine. These medicines can affect your alertness and reaction times, and you could be arrested for driving under the influence (DUI). If you need to use opioids long-term, talk to your care team about driving.    DO NOT operate heavy machinery    DO NOT do any other dangerous activities while taking these medicines.    DO NOT drink any alcohol while taking these medicines.     If the opioid prescribed includes acetaminophen, DO NOT take with any other medicines that contain acetaminophen. Read all labels carefully. Look for the word  acetaminophen  or  Tylenol.  Ask your pharmacist if you have questions or are unsure.    You can get addicted to pain medicines, especially if you have a history of addiction (chemical, alcohol or substance dependence). Talk to your care team about ways to reduce this risk.    All opioids tend to cause constipation. Drink plenty of water and eat foods that have a lot of fiber, such as fruits, vegetables, prune juice, apple juice and high-fiber cereal. Take  a laxative (Miralax, milk of magnesia, Colace, Senna) if you don t move your bowels at least every other day. Other side effects include upset stomach, sleepiness, dizziness, throwing up, tolerance (needing more of the medicine to have the same effect), physical dependence and slowed breathing.    Store your pills in a secure place, locked if possible. We will not replace any lost or stolen medicine. If you don t finish your medicine, please throw away (dispose) as directed by your pharmacist. The [place] Pollution Control Agency has more information about safe disposal: [email or web address]         Primary Care Provider Office Phone # Fax #    Humberto Trinidad [phone]       [address] [place] [address]        Equal Access to Services     ELIEZER VALENTE : Colt Maldonado, waaxadrianna manzo, qabiancata alonsoalmaadrianna unger, mariela garduno . So [facility] [phone].    ATENCIÓN: Si habla español, tiene a simmons disposición servicios gratuitos de asistencia lingüística. Roosevelt al [phone].    We comply with applicable federal civil rights laws and [place] laws. We do not discriminate on the basis of race, color, national origin, age, disability, sex, sexual orientation, or gender identity.            Thank you!     Thank you for choosing [facility]  for your care. Our goal is always to provide you with excellent care. Hearing back from our patients is one way we can continue to improve our services. Please take a few minutes to complete the written survey that you may receive in the mail after your visit with us. Thank you!             Your Updated Medication List - Protect others around you: Learn how to safely use, store and throw away your medicines at www.disposemymeds.org.          This list is accurate as of 11/28/18  1:41 PM.  Always use your most recent med list.                    Brand Name Dispense Instructions for use Diagnosis    buPROPion 150 MG 12 hr tablet    WELLBUTRIN SR    60 tablet    Take 1 tablet once daily and increase to 1 tablet twice daily after 4 to 7 days    Tobacco abuse, Adjustment disorder with anxious mood       cyclobenzaprine 5 MG tablet    FLEXERIL    45 tablet    Take 1-2 tablets (5-10 mg) by mouth 3 times daily as needed for muscle spasms    S/P cervical spinal fusion       fluticasone 50 MCG/ACT nasal spray    FLONASE    2 Bottle    Spray 2 sprays into both nostrils 2 times daily    Chronic allergic rhinitis due to animal hair and dander, Chronic seasonal allergic rhinitis due to pollen, Allergic rhinitis due to dust mite, Allergic conjunctivitis of both eyes       gabapentin 600 MG tablet    NEURONTIN    135 tablet    Take 1.5 tablets (900 mg) by mouth 3 times daily    Cervical spondylosis without myelopathy, DDD (degenerative disc disease), cervical, Chronic neck and back pain, Cervical stenosis of spinal canal       metoprolol succinate 25 MG 24 hr tablet    TOPROL-XL    30 tablet    Take 1 tablet (25 mg) by mouth daily    Preop general physical exam, Mild CAD, Ascending aorta dilatation (H)       topiramate 50 MG tablet    TOPAMAX    60 tablet    Take 1 tablet (50 mg) by mouth 2 times daily Drink plenty of water and no alcohol. If side effects, then reduce to last tolerable dosage.    Cervical spondylosis without myelopathy, DDD (degenerative disc disease), cervical, Cervical stenosis of spinal canal       * traMADol 50 MG tablet    ULTRAM    90 tablet    May take 1-2 tablets every 6 hours as needed for pain, max of 3 tabs per day. Reduce as able. Okay to fill on 12/3/2018 and start on 12/5/2018  to last 30 days.    Cervical radiculopathy       * traMADol 200 MG 24 hr tablet    ULTRAM-ER    30 tablet    Take 1 tablet (200 mg) by mouth daily Okay to fill on/after 11/28/18 and start on/after 12/1/2018; to last 30 days    Cervical spondylosis without  myelopathy, DDD (degenerative disc disease), cervical       * Notice:  This list has 2 medication(s) that are the same as other medications prescribed for you. Read the directions carefully, and ask your doctor or other care provider to review them with you.

## 2018-12-10 ENCOUNTER — ANCILLARY PROCEDURE (OUTPATIENT)
Dept: GENERAL RADIOLOGY | Facility: CLINIC | Age: 58
End: 2018-12-10
Attending: PHYSICIAN ASSISTANT
Payer: OTHER MISCELLANEOUS

## 2018-12-10 ENCOUNTER — OFFICE VISIT (OUTPATIENT)
Dept: NEUROSURGERY | Facility: CLINIC | Age: 58
End: 2018-12-10
Payer: OTHER MISCELLANEOUS

## 2018-12-10 VITALS
DIASTOLIC BLOOD PRESSURE: 104 MMHG | HEART RATE: 85 BPM | SYSTOLIC BLOOD PRESSURE: 143 MMHG | WEIGHT: 148.9 LBS | HEIGHT: 66 IN | BODY MASS INDEX: 23.93 KG/M2

## 2018-12-10 DIAGNOSIS — M54.12 CERVICAL RADICULOPATHY: ICD-10-CM

## 2018-12-10 DIAGNOSIS — M47.12 CERVICAL SPONDYLOSIS WITH MYELOPATHY: Primary | ICD-10-CM

## 2018-12-10 DIAGNOSIS — Z98.890 POST-OPERATIVE STATE: ICD-10-CM

## 2018-12-10 DIAGNOSIS — M50.30 DEGENERATIVE DISC DISEASE, CERVICAL: ICD-10-CM

## 2018-12-10 ASSESSMENT — PAIN SCALES - GENERAL: PAINLEVEL: SEVERE PAIN (7)

## 2018-12-10 ASSESSMENT — MIFFLIN-ST. JEOR: SCORE: 1430.22

## 2018-12-10 NOTE — PROGRESS NOTES
"  Neurosurgery clinic progress note  Date of visit: 12/10/2018      Attending: Shahana  Procedure: 10/29/18   Cervical 5/6 anterior cervical diskectomy and fusion.   IMPLANTS:     1) [employer] Advanced ACF Spacer Lordotic 7 mm Lot No. [id number] Part No [id number] Serial No [id number]  2) Depuy Synthes Attalla One Level Plate   3) 4x Synthes Attalla SFDR Screw 4.0 x 14 mm     INDICATIONS:  Mr. Truman Suero is a 58-year-old gentleman with a history of neck pain and right sided arm pain, found to have bilateral cervical 6 radiculopathy based on EMG.  He was also found to have a corresponding disk protrusion at this level of C5-6.  For this reason, we discussed the risks, benefits and alternatives to completion of anterior cervical diskectomy and fusion.  After this discussion, he provided written and verbal consent to proceed.      Clinic visit 5/31/18 \"we discussed that the surgery may not really help his neck pain, but it would help decompress his C6 nerve roots.  If the surgery does not provide significant relief of his symptoms then we could do potentially C3-C7 posterior fusion.  However, because of the nature of his work as a , we discussed that the posterior fusion surgery would limit his neck motion and his ability to work.  At this time, we will consider the smaller surgery C5-C6 ACDF on the right side. The patient agrees with the plan\"  HPI:  He is now 6 weeks postop, since surgery he has noticed improvement in his arm pain, not much change in his hands but he also has carpal tunnel syndrome, and not much change in his neck. He notices if he has overdone things.   He has a little difficulty with swallowing, things get stuck, but this is improving. No difficulty with his voice, no whispering.  He is off oxycodone, and takes only tramadol which is prescribed by his pain management provider, Melanie Thomas.     STATUS REPORT  WORK:          Is not back at work. Will be off thru Holidays  Position:  Not " sure if he will be able to go back.  ACTIVITY:      Has been following activity restrictions.  Mostly, tried shoveling snow the other day.  MEDS:                   Pain   Tramadol, being prescribed by Melanie Thomas  COLLAR/BRACE PLAN:  NA  Patient Supplied Answers To the [facility] Pain Questionnaire  [facility] Pain -  Patient Entered Questionnaire/Answers 11/27/2018   What number best describes your pain right now:  0 = No pain  to  10 = Worst pain imaginable 9   How would you describe the pain? burning, cutting, dull, aching   Which of the following worsen your pain? standing, sitting, walking, exercise, coughing / sneezing   Which of the following improve or reduce your pain?  lying down, medication, relaxation, thinking about something else   What number best describes your average pain for the past week:  0 = No pain  to  10 = Worst pain imaginable 7   What number best describes your LOWEST pain in past 24 hours:  0 = No pain  to  10 = Worst pain imaginable 7   What number best describes your WORST pain in past 24 hours:  0 = No pain  to  10 = Worst pain imaginable 9   When is your pain worst? Constant   What non-medicine treatments have you already had for your pain? pain clinic, physical therapy, relaxation training, acupuncture, chiropractic care, spine injections (shots), other nerve blocks, counseling, surgery, exercise   Have you tried treating your pain with medication?  Yes   Are you currently taking medications for your pain? Yes       Current Outpatient Medications:      buPROPion (WELLBUTRIN SR) 150 MG 12 hr tablet, Take 1 tablet once daily and increase to 1 tablet twice daily after 4 to 7 days, Disp: 60 tablet, Rfl: 0     cyclobenzaprine (FLEXERIL) 5 MG tablet, Take 1-2 tablets (5-10 mg) by mouth 3 times daily as needed for muscle spasms, Disp: 45 tablet, Rfl: 0     fluticasone (FLONASE) 50 MCG/ACT spray, Spray 2 sprays into both nostrils 2 times daily, Disp: 2 Bottle, Rfl: 11     gabapentin (NEURONTIN) 600 MG  "tablet, Take 1.5 tablets (900 mg) by mouth 3 times daily, Disp: 135 tablet, Rfl: 3     metoprolol succinate (TOPROL-XL) 25 MG 24 hr tablet, Take 1 tablet (25 mg) by mouth daily (Patient taking differently: Take 25 mg by mouth At Bedtime ), Disp: 30 tablet, Rfl: 1     topiramate (TOPAMAX) 50 MG tablet, Take 1 tablet (50 mg) by mouth 2 times daily Drink plenty of water and no alcohol. If side effects, then reduce to last tolerable dosage., Disp: 60 tablet, Rfl: 2     traMADol (ULTRAM) 50 MG tablet, May take 1-2 tablets every 6 hours as needed for pain, max of 3 tabs per day. Reduce as able. Okay to fill on 12/3/2018 and start on 12/5/2018  to last 30 days., Disp: 90 tablet, Rfl: 0     traMADol (ULTRAM-ER) 200 MG 24 hr tablet, Take 1 tablet (200 mg) by mouth daily Okay to fill on/after 11/28/18 and start on/after 12/1/2018; to last 30 days, Disp: 30 tablet, Rfl: 0  Allergies   Allergen Reactions     Pollen Extract      Ragweeds      PMH, FAM HIST, SOC HIST, PROBLEM LIST:  All reviewed in EPIC.    OBJECTIVE:  BP (!) 143/104 (BP Location: Left arm, Patient Position: Chair, Cuff Size: Adult Regular)   Pulse 85   Ht 1.664 m (5' 5.5\")   Wt 67.5 kg (148 lb 14.4 oz)   BMI 24.40 kg/m      Imaging:  These are the pertinent findings from:      AP lateral cervical spine films taken today, 12/10/18 overall alignment of the spine is unchanged from time of surgery.  Hardware is intact.  Satisfactory appearance.  Radiologist's evaluation pending.  Please see Epic for the bulk of the report.  I personally reviewed the images with the patient    EXAM:  Well developed well nourished male found seated comfortably in exam chair.  No apparent distress. He is unaccompanied.   A&O X3.  Mood and affect WNL. Language and fund of knowledge intact.  Is able to sit and rise independently.   Beautifully healed incision.    Exam at discharge: "                         Del                 Tr                   Bi                   WE                WF                 Gr  R                    5                    5                    5                    5                    5                    5  L                    5                    5                    5                    5                    5                    5                        HF                 KE                 KF                  DF                 PF                  EHL  R                    5                    5                    5                    5                    5                    5  L                    5                    5                    5                    5                    5                    5  Exam today:  Upper Extremity Strength.                RIGHT                LEFT     Deltoid              5/5                   5/5       Biceps              5/5                   5/5        Triceps              5/5                   5/5       Wrist Extensor              5/5                   5/5                     5/5                    5/5       Interossei              5/5                   5/5       EPL              5/5                    5/5       Pinch              5/5                  5/5            Assessment/Plan:  1. Cervical spondylosis with myelopathy    2. Cervical radiculopathy    3. Post-operative state        Truman Suero is doing well 6 weeks after his ACDF at C5/6.  He has multiple levels of spondylosis.  So long as he does not have too busy a day, neck pain is still pretty severe even with activities of ordinary daily living, he did some cooking over Thanksgiving and his neck pain came back very quickly just with this one meal. I have asked him to stay on his gabapentin for now until we know how his nerve is going to respond.  It's still early days.  I have encouraged him to continue to stay off of the nicotine, continue to follow the  activity restrictions, and I restricted him from return to work at this juncture.     I think it is unreasonable to get him back to work when he is still has pain returning with ordinary daily activities.  I am going to put him in physical therapy to increase his stamina, and try to assess this a little better.  We will see him back in 6 weeks and Dr. Harkins's clinic.  I written orders for x-rays on that day.    *  All the patient's questions have been answered and they demonstrate good understanding of the above.   *  Truman REI Suero  has our contact information and is aware that he should call if he has questions comments or concerns.   We appreciate the opportunity to be of service in the care of this pleasant patient  Please do call if there is anything more we can do.    Madeline Gordon PA-C  [facility]  Department of Neurosurgery  Phone: [phone]  Fax: [phone]      Total time: 30 minutes: counseling time greater than 20 minutes for discussion of pain management, biomechanics of the spine, good spine health habits, importance of exercise, film review, medication use, further follow up and answering questions.      This note was generated using voice recognition software. While edited for content some inaccurate phrasing may be found.

## 2018-12-10 NOTE — LETTER
"12/10/2018       RE: Truman Suero  [address] [id number]     Dear Colleague,    Thank you for referring your patient, Truman Suero, to the [facility]UROS[facility]GERY at [facility]. Please see a copy of my visit note below.      Neurosurgery clinic progress note  Date of visit: 12/10/2018      Attending: Shahana  Procedure: 10/29/18   Cervical 5/6 anterior cervical diskectomy and fusion.   IMPLANTS:     1) MTF [employer] Tek Advanced ACF Spacer Lordotic 7 mm Lot No. 2310 Part No [id number] Serial No [id number]  2) FIGHTER Interactive [place] One Level Plate   3) 4x Synthes [place] SFDR Screw 4.0 x 14 mm     INDICATIONS:  Mr. Truman Suero is a 58-year-old gentleman with a history of neck pain and right sided arm pain, found to have bilateral cervical 6 radiculopathy based on EMG.  He was also found to have a corresponding disk protrusion at this level of C5-6.  For this reason, we discussed the risks, benefits and alternatives to completion of anterior cervical diskectomy and fusion.  After this discussion, he provided written and verbal consent to proceed.      Clinic visit 5/31/18 \"we discussed that the surgery may not really help his neck pain, but it would help decompress his C6 nerve roots.  If the surgery does not provide significant relief of his symptoms then we could do potentially C3-C7 posterior fusion.  However, because of the nature of his work as a , we discussed that the posterior fusion surgery would limit his neck motion and his ability to work.  At this time, we will consider the smaller surgery C5-C6 ACDF on the right side. The patient agrees with the plan\"  HPI:  He is now 6 weeks postop, since surgery he has noticed improvement in his arm pain, not much change in his hands but he also has carpal tunnel syndrome, and not much change in his neck. He notices if he has overdone things.   He has a little difficulty with swallowing, things get " stuck, but this is improving. No difficulty with his voice, no whispering.  He is off oxycodone, and takes only tramadol which is prescribed by his pain management provider, Melanie Thomas.     STATUS REPORT  WORK:          Is not back at work. Will be off thru Holidays  Position:  Not sure if he will be able to go back.  ACTIVITY:      Has been following activity restrictions.  Mostly, tried shoveling snow the other day.  MEDS:                   Pain   Tramadol, being prescribed by Melanie Thomas  COLLAR/BRACE PLAN:  NA  Patient Supplied Answers To the [facility] Pain Questionnaire  [facility] Pain -  Patient Entered Questionnaire/Answers 11/27/2018   What number best describes your pain right now:  0 = No pain  to  10 = Worst pain imaginable 9   How would you describe the pain? burning, cutting, dull, aching   Which of the following worsen your pain? standing, sitting, walking, exercise, coughing / sneezing   Which of the following improve or reduce your pain?  lying down, medication, relaxation, thinking about something else   What number best describes your average pain for the past week:  0 = No pain  to  10 = Worst pain imaginable 7   What number best describes your LOWEST pain in past 24 hours:  0 = No pain  to  10 = Worst pain imaginable 7   What number best describes your WORST pain in past 24 hours:  0 = No pain  to  10 = Worst pain imaginable 9   When is your pain worst? Constant   What non-medicine treatments have you already had for your pain? pain clinic, physical therapy, relaxation training, acupuncture, chiropractic care, spine injections (shots), other nerve blocks, counseling, surgery, exercise   Have you tried treating your pain with medication?  Yes   Are you currently taking medications for your pain? Yes       Current Outpatient Medications:      buPROPion (WELLBUTRIN SR) 150 MG 12 hr tablet, Take 1 tablet once daily and increase to 1 tablet twice daily after 4 to 7 days, Disp: 60 tablet, Rfl: 0      "cyclobenzaprine (FLEXERIL) 5 MG tablet, Take 1-2 tablets (5-10 mg) by mouth 3 times daily as needed for muscle spasms, Disp: 45 tablet, Rfl: 0     fluticasone (FLONASE) 50 MCG/ACT spray, Spray 2 sprays into both nostrils 2 times daily, Disp: 2 Bottle, Rfl: 11     gabapentin (NEURONTIN) 600 MG tablet, Take 1.5 tablets (900 mg) by mouth 3 times daily, Disp: 135 tablet, Rfl: 3     metoprolol succinate (TOPROL-XL) 25 MG 24 hr tablet, Take 1 tablet (25 mg) by mouth daily (Patient taking differently: Take 25 mg by mouth At Bedtime ), Disp: 30 tablet, Rfl: 1     topiramate (TOPAMAX) 50 MG tablet, Take 1 tablet (50 mg) by mouth 2 times daily Drink plenty of water and no alcohol. If side effects, then reduce to last tolerable dosage., Disp: 60 tablet, Rfl: 2     traMADol (ULTRAM) 50 MG tablet, May take 1-2 tablets every 6 hours as needed for pain, max of 3 tabs per day. Reduce as able. Okay to fill on 12/3/2018 and start on 12/5/2018  to last 30 days., Disp: 90 tablet, Rfl: 0     traMADol (ULTRAM-ER) 200 MG 24 hr tablet, Take 1 tablet (200 mg) by mouth daily Okay to fill on/after 11/28/18 and start on/after 12/1/2018; to last 30 days, Disp: 30 tablet, Rfl: 0  Allergies   Allergen Reactions     Pollen Extract      Ragweeds      PMH, FAM HIST, SOC HIST, PROBLEM LIST:  All reviewed in EPIC.    OBJECTIVE:  BP (!) 143/104 (BP Location: Left arm, Patient Position: Chair, Cuff Size: Adult Regular)   Pulse 85   Ht 1.664 m (5' 5.5\")   Wt 67.5 kg (148 lb 14.4 oz)   BMI 24.40 kg/m       Imaging:  These are the pertinent findings from:      AP lateral cervical spine films taken today, 12/10/18 overall alignment of the spine is unchanged from time of surgery.  Hardware is intact.  Satisfactory appearance.  Radiologist's evaluation pending.  Please see Epic for the bulk of the report.  I personally reviewed the images with the patient    EXAM:  Well developed well nourished male found seated comfortably in exam chair.  No apparent " distress. He is unaccompanied.   A&O X3.  Mood and affect WNL. Language and fund of knowledge intact.  Is able to sit and rise independently.   Beautifully healed incision.    Exam at discharge:                         Del                 Tr                   Bi                   WE                WF                 Gr  R                    5                    5                    5                    5                    5                    5  L                    5                    5                    5                    5                    5                    5                        HF                 KE                 KF                  DF                 PF                  EHL  R                    5                    5                    5                    5                    5                    5  L                    5                    5                    5                    5                    5                    5  Exam today:  Upper Extremity Strength.                RIGHT                LEFT     Deltoid              5/5                   5/5       Biceps              5/5                   5/5        Triceps              5/5                   5/5       Wrist Extensor              5/5                   5/5                     5/5                    5/5       Interossei              5/5                   5/5       EPL              5/5                    5/5       Pinch              5/5                  5/5            Assessment/Plan:  1. Cervical spondylosis with myelopathy    2. Cervical radiculopathy    3. Post-operative state      Truman Suero is doing well 6 weeks after his ACDF at C5/6.  He has multiple levels of spondylosis.  So long as he does not have too busy a day, neck pain is still pretty severe even with activities of ordinary daily living, he did some cooking over Thanksgiving and his neck pain came back very quickly just with this one meal. I have asked him to  stay on his gabapentin for now until we know how his nerve is going to respond.  It's still early days.  I have encouraged him to continue to stay off of the nicotine, continue to follow the activity restrictions, and I restricted him from return to work at this juncture.     I think it is unreasonable to get him back to work when he is still has pain returning with ordinary daily activities.  I am going to put him in physical therapy to increase his stamina, and try to assess this a little better.  We will see him back in 6 weeks and Dr. Harkins's clinic.  I written orders for x-rays on that day.    *  All the patient's questions have been answered and they demonstrate good understanding of the above.   *  Truman REI Suero  has our contact information and is aware that he should call if he has questions comments or concerns.   We appreciate the opportunity to be of service in the care of this pleasant patient  Please do call if there is anything more we can do.    Madeline Gordon PA-C  [facility]  Department of Neurosurgery  Phone: [phone]  Fax: [phone]    Total time: 30 minutes: counseling time greater than 20 minutes for discussion of pain management, biomechanics of the spine, good spine health habits, importance of exercise, film review, medication use, further follow up and answering questions.    This note was generated using voice recognition software. While edited for content some inaccurate phrasing may be found.

## 2018-12-14 DIAGNOSIS — M48.02 CERVICAL STENOSIS OF SPINAL CANAL: ICD-10-CM

## 2018-12-14 DIAGNOSIS — M47.812 CERVICAL SPONDYLOSIS WITHOUT MYELOPATHY: ICD-10-CM

## 2018-12-14 DIAGNOSIS — M50.30 DDD (DEGENERATIVE DISC DISEASE), CERVICAL: ICD-10-CM

## 2018-12-14 RX ORDER — TOPIRAMATE 50 MG/1
50 TABLET, FILM COATED ORAL 2 TIMES DAILY
Qty: 60 TABLET | Refills: 2 | Status: SHIPPED | OUTPATIENT
Start: 2018-12-14 | End: 2019-02-06

## 2018-12-14 NOTE — TELEPHONE ENCOUNTER
Received fax request from   [id number] - [place], [place] - [address] [facility]  [address] [place] [address]  Phone: [phone] Fax: [phone]     pharmacy requesting refill(s) for topiramate (TOPAMAX) 50 MG tablet    Last refilled on 11/13/2018    Pt last seen on 11/28/18  Next appt scheduled for 2/6/19    Will facilitate refill.

## 2018-12-14 NOTE — TELEPHONE ENCOUNTER
Signed Prescriptions:                        Disp   Refills    topiramate (TOPAMAX) 50 MG tablet          60 tab*2        Sig: Take 1 tablet (50 mg) by mouth 2 times daily Drink           plenty of water and no alcohol. If side effects,           then reduce to last tolerable dosage.  Authorizing Provider: CANDACE BENSON, RN CNP, FNP  [facility]

## 2018-12-17 ENCOUNTER — MYC MEDICAL ADVICE (OUTPATIENT)
Dept: PALLIATIVE MEDICINE | Facility: CLINIC | Age: 58
End: 2018-12-17

## 2018-12-17 DIAGNOSIS — M47.819 FACET ARTHROPATHY: Primary | ICD-10-CM

## 2018-12-17 NOTE — TELEPHONE ENCOUNTER
From: Palmer MINAYA Steviedavid      Created: 12/17/2018 8:51 AM        Dr. Navarro- Hope all is well and Happy Holidays. I have had follow ups with Dr. Gordon. Things progressing, just slowly. I am going to be starting PT after [place]. I hate to admit it, but I cannot remember what we discussed about the back injections. I know it was contingent about PT in there somewhere. Thanks        Plan from 11/28/18 OV pasted below. Routing to Melanie to review patient request for info.   ----------------------------------  Plan:   1. Physical Therapy: not at present time--deferred to neurosurgery, patient healing from recent cervical fusion  2. Clinical Health Psychologist: continue work with  Kentrell Castañeda in health psychology  3. Diagnostic Studies:  None  4. Medication Management:    1. Continue tramadol ER 200mg once daily  2. Continue tramadol 50mg Q 6-8 hours PRN, max of 3/day  3. Continue gabapentin  4. Continue Topamax  5. Further procedures recommended: I will check with my partners, I think LMBBs would be OK since no steroids following cervical fusion, as long as patient can lay prone  6. Recommendations to PCP: see above  7. Follow up: 10 weeks  -------------------------------------------

## 2018-12-20 NOTE — TELEPHONE ENCOUNTER
Injections ordered.    Routed to schedulers.    Estelita Wetzel, RN-BSN  [facility]

## 2018-12-20 NOTE — TELEPHONE ENCOUNTER
Pre-screening questions for MBB Injections:    Injection to be done at which interventional clinic site? [facility] [place]     Instruct patient to arrive as directed prior to the scheduled appointment time:    Wyyuliana and Rosangela: 30 minutes before        Procedure ordered by Dr. Thomas    Procedure ordered? Lumbar Medial Branch Block    What insurance would patient like us to bill for this procedure? [employer]      Worker's comp- Any injection DO NOT SCHEDULE and route to Monalisa Dominguez.      [employer] insurance - If scheduling an SI joint injection DO NOT SCHEDULE and route to Monalisa Dominguez.     [employer]- MBB's must be scheduled at LEAST two weeks apart      [employer] - Any injection besides hip/shoulder/knee joint DO NOT SCHEDULE and route to Monalisa Dominguez. She will obtain PA and call pt back to schedule procedure or notify pt of denial.      [employer]- PA required for all MBB's    Any chance of pregnancy? Not Applicable   If YES, do NOT schedule and route to RN pool    Is an  needed? No     Patient has a drive home? (mandatory) Yes     Is patient taking any blood thinners (plavix, coumadin, jantoven, warfarin, heparin, pradaxa or dabigatran )? No    If hold needed, do NOT schedule, route to RN pool     Is patient taking any aspirin products? No     If more than 325mg/day do NOT schedule; route to RN pool     For CERVICAL procedures, hold all aspirin products for 6 days.      Does the patient have a bleeding or clotting disorder? No    If YES, okay to schedule AND route to RN nurse pool    **For any patients with platelet count <100, must be forwarded to provider**    Is patient diabetic? NO If YES, have them bring their glucometer.    Does patient have an active infection or treated for one within the past week? No    Is patient currently taking any antibiotics?  No    For patients on chronic, preventative, or prophylacti antibiotics, procedures can be scheduled.     For patients  on antibiotics for active or recent infection:    Silvana Melgar [place], [place], Snitzer-antibiotic course must have been completed for 4 days     Is patient currently taking any steroid medications? (i.e. Prednisone, Medrol)  No     For patients on steroid medications:    Jo Melgar [place], Snitzer-steroid course must have been completed for 4 days    Review with patient:  If you are started on any steroids or antibiotics between now and your appointment, you must contact us because it may affect our ability to perform your procedure Yes    Is patient actively being treated for cancer or immunocompromised? No   If YES, do NOT schedule and route to RN    Are you able to get on and off an exam table with minimal or no assistance? Yes   If NO, do NOT schedule and route to RN    Are you able to roll over and lay on your stomach with minimal or no assistance? Yes   If NO, do NOT schedule and route to RN    Any allergies to contrast dye, iodine, shellfish, or numbing and steroid medications? No  (If so, inform nursing and note in scheduling comments.)    Allergies: Pollen extract and Ragweeds     Has the patient had a flu shot or any other vaccinations within 7 days before or after the procedure.  No     Does patient have an MRI/CT?  YES: mri  (SI joint, hip injections, lumbar sympathetic blocks, and stellate ganglion blocks do not require an MRI)    Was the MRI done w/in the last 3 years?  Yes    Was MRI done at [place]? Yes      If not, where was it done? N/A       If MRI was not done at [facility], [facility] or [facility] do NOT schedule and route to nursing.  If pt has an imaging disc, the injection may be scheduled but pt has to bring disc to appt. If they show up w/out disc the injection cannot be done    **Must be scheduled with elapsed time interval of at least 2 weeks and not more than 6 months between the First MBB and the Second MBB**       Medial Branch Block Pre-Procedure  Instructions    It is okay to take long acting pain medications (if you are on them) the day of the procedure but try not to take any short acting medications unless absolutely necessary. YEs        Long acting meds would include: Gabapentin (Neurontin), MS Contin, Oxycontin        Short acting meds would include:  Percocet, Oxycodone, Vicodin, Ibuprofen     The day of the procedure, you should try to do things that provoke your pain, since the injection is being done to see if it will relieve your pain . ok    If your pain level is a 4 out of 10 or less on the day of the procedure, please call [phone] to reschedule.  ok  Reminders (please tell patient if applicable):      Instructed pt to arrive 30 minutes early for IV start if this is for a cervical procedure, ALL sympathetic (stellate ganglion, hypogastric, or lumbar sympathetic block) and all sedation procedures (RFA, spinal cord stimulation trials). ok        -IVs are not routinely placed for Dr. Perales cervical cases        -Dr. Corrigan: no IV needed for CMBB       If NPO for sedation, it is okay to take medications with sips of water (except if they are to hold blood thinners). ok   *DO take blood pressure medication if it is prescribed*      If this is for a cervical MBB aspirin needs to be held for 6 days.  ok         Do not schedule procedures requiring IV placement in the first appointment of the day or first appointment after lunch. Do NOT schedule at 0745, 0815 or 1245.  okj          For patients 85 or older we recommend having an adult stay w/ them for the remainder of the day.      Does the patient have any questions? no

## 2018-12-20 NOTE — TELEPHONE ENCOUNTER
LM on vm for pt to schedule Lumbar MBB #1      Sumi LUCERO    [facility]

## 2018-12-20 NOTE — TELEPHONE ENCOUNTER
Alejandrina sent to pt:  From: Estelita Wetzel RN      Created: 12/20/2018 10:04 AM        In the meantime do you want us to order the lumbar medial branch block?    Estelita Wetzel RN-BSN  [facility]

## 2018-12-20 NOTE — TELEPHONE ENCOUNTER
Per patient myChart message:  From: Palmer Suero      Created: 12/20/2018 9:53 AM          Estelita- I will contact both of them and see if either of them would be interested in helping me out. This whole experience has been a real margaret.! Happy Holidays, You and Melanie's team have always been helpful.    Palmer

## 2018-12-20 NOTE — TELEPHONE ENCOUNTER
"Alejandrina sent to pt:  Sent: 12/19/2018  5:12 PM CST       To: Melanie PEREZ  Subject: RE: Updates about my health    Dr Navarro- As always thank you for your help- I do want to move forward with the injections but before you place the orders let me ask you ask your guidance. Now that I have PT for neck scheduled I'm guessing they will not \"double dip\" with the back at the same time since order was for the neck?- If not do I have to get order for back Plus worker's comp has stepped in also for neck PT since they were involved in the surgery. Not sure but back probably will be different issue.HAPPY HOLIDAYS as well    Palmer  "

## 2018-12-26 ENCOUNTER — MYC REFILL (OUTPATIENT)
Dept: PALLIATIVE MEDICINE | Facility: CLINIC | Age: 58
End: 2018-12-26

## 2018-12-26 DIAGNOSIS — M50.30 DDD (DEGENERATIVE DISC DISEASE), CERVICAL: ICD-10-CM

## 2018-12-26 DIAGNOSIS — M54.12 CERVICAL RADICULOPATHY: ICD-10-CM

## 2018-12-26 DIAGNOSIS — M47.812 CERVICAL SPONDYLOSIS WITHOUT MYELOPATHY: ICD-10-CM

## 2018-12-26 RX ORDER — TRAMADOL HYDROCHLORIDE 200 MG/1
200 TABLET, EXTENDED RELEASE ORAL DAILY
Qty: 30 TABLET | Refills: 0 | Status: CANCELLED | OUTPATIENT
Start: 2018-12-26

## 2018-12-26 NOTE — TELEPHONE ENCOUNTER
Received call from patient requesting refill(s) of traMADol (ULTRAM-ER) 200 MG 24 hr tablet    Last picked up from pharmacy on 12/2/18    Pt last seen by prescribing provider on 11/28/18  Next appt scheduled for 2/6/19     checked in the past 6 months? Yes If no, print current report and give to RN    Last urine drug screen date 1/8/18  Current opioid agreement on file (completed within the last year) Yes Date of opioid agreement: 1/8/18    Processing (pick one and delete the others):      Mail to [facility]  [address] [place] [id number]       Will route to nursing [place] for review and preparation of prescription(s).

## 2018-12-26 NOTE — TELEPHONE ENCOUNTER
Medication refill information reviewed.     Due date for traMADol (ULTRAM-ER) 200 MG 24 hr tablet is 1/4/18     Prescriptions prepped for review.     Will route to provider.     Mail to [facility]  [facility] [address]    Van Oquendo, RN  Care Coordinator   [facility]

## 2018-12-26 NOTE — TELEPHONE ENCOUNTER
I will sign on 1/2/2019 when I return to clinic  Melanie STEVENS RN CNP, FNP  [facility]

## 2018-12-27 ENCOUNTER — THERAPY VISIT (OUTPATIENT)
Dept: PHYSICAL THERAPY | Facility: CLINIC | Age: 58
End: 2018-12-27
Attending: PHYSICIAN ASSISTANT
Payer: OTHER MISCELLANEOUS

## 2018-12-27 ENCOUNTER — MYC REFILL (OUTPATIENT)
Dept: FAMILY MEDICINE | Facility: CLINIC | Age: 58
End: 2018-12-27

## 2018-12-27 DIAGNOSIS — M54.12 CERVICAL RADICULOPATHY: ICD-10-CM

## 2018-12-27 DIAGNOSIS — F43.22 ADJUSTMENT DISORDER WITH ANXIOUS MOOD: ICD-10-CM

## 2018-12-27 DIAGNOSIS — Z72.0 TOBACCO ABUSE: ICD-10-CM

## 2018-12-27 DIAGNOSIS — Z98.890 POST-OPERATIVE STATE: ICD-10-CM

## 2018-12-27 PROCEDURE — 97110 THERAPEUTIC EXERCISES: CPT | Mod: GP | Performed by: PHYSICAL THERAPIST

## 2018-12-27 PROCEDURE — 97161 PT EVAL LOW COMPLEX 20 MIN: CPT | Mod: GP | Performed by: PHYSICAL THERAPIST

## 2018-12-27 PROCEDURE — 97530 THERAPEUTIC ACTIVITIES: CPT | Mod: GP | Performed by: PHYSICAL THERAPIST

## 2018-12-27 NOTE — PROGRESS NOTES
[place] [facility] Initial Evaluation  Subjective:  Pt had CS ant fusion in October 2018.  He is not doing any prescribed exs but is doing some self ROM exs. He had been having neck and bilateral radicular pain for +5 years.  He attributes his problem to working as a .       The history is provided by the patient.   Truman Suero is a 58 year old male with a cervical spine condition.  Condition occurred with:  Degenerative joint disease.  Condition occurred: at work.  This is a chronic and new condition  October 28, 2018 CS fusion .    Patient reports pain:  Cervical right side, central cervical spine, other, lower cervical spine and cervical left side.  Radiates to:  Other, lower arm right, shoulder right, elbow left and upper arm left (bilat hand tingling/numbness , head aches, ).  Pain is described as aching and is intermittent (can have no pain in neutral ) and reported as 10/10, 8/10 and 7/10.  Associated symptoms:  Loss of motion/stiffness, headache, numbness and tingling. Pain is worse in the A.M..  Symptoms are exacerbated by other, looking up or down, carrying and lifting (bending forward ) and relieved by analgesics and rest.  Since onset symptoms are gradually improving.  Special tests:  X-ray and MRI.  Previous treatment includes physical therapy, chiropractic and surgery.  There was mild improvement following previous treatment.  General health as reported by patient is fair.  Pertinent medical history includes:  Chemical dependency, high blood pressure, numbness/tingling, smoking and migraines/headaches.      Current medications:  High blood pressure medication, muscle relaxants and pain medication.  Current occupation is  .            Red flags:  None as reported by the patient and significant weakness.                        Objective:  System              Cervical/Thoracic Evaluation    AROM:  AROM Cervical:    Flexion:          50% los   Extension:       NT , retrxn 50% loss    Rotation:         Left: 40% loss      Right: 30% loss   Side Bend:      Left:     Right:       Headaches: cervical  Cervical Myotomes:          C5 (Deltoid):  Left: 5      C6 (Biceps):  Left: 5      C7 (Triceps):  Left: 5    Right: 4  C8 (Thumb Ext): Left: 5    Right: 4  T1 (Intrinsics): Left: 5    Right: 4      Cervical Dermatomes:  normal                    Cervical Palpation:  not assessed      Functional Tests:  not assessed    Cervical Stability/Joint Clearing:    Left positive at:1st Rib; 1st Rib Expansion and TOS Screen  Right positive at:  1st Rib; 1st Rib Expansion and TOS Screen                                              General     ROS    Assessment/Plan:    Patient is a 58 year old male with cervical complaints.    Patient has the following significant findings with corresponding treatment plan.                Diagnosis 1:  S/p CS fusion   Pain -  hot/cold therapy, manual therapy, self management and home program  Decreased ROM/flexibility - manual therapy, therapeutic exercise and home program  Decreased joint mobility - manual therapy, therapeutic exercise and home program  Decreased strength - therapeutic exercise, therapeutic activities and home program  Decreased proprioception - neuro re-education, therapeutic activities and home program  Impaired muscle performance - neuro re-education and home program  Decreased function - therapeutic activities and home program  Impaired posture - neuro re-education and home program  Instability -  Therapeutic Activity  Therapeutic Exercise  home program    Therapy Evaluation Codes:   1) History comprised of:   Personal factors that impact the plan of care:      Coping style, Overall behavior pattern, Past/current experiences, Time since onset of symptoms and Work status.    Comorbidity factors that impact the plan of care are:      High blood pressure, Migraines/headaches, Smoking and Weakness.     Medications impacting care: Anti-inflammatory, Muscle  relaxant and Pain.  2) Examination of Body Systems comprised of:   Body structures and functions that impact the plan of care:      Cervical spine.   Activity limitations that impact the plan of care are:      Bathing, Cooking, Driving, Dressing, Lifting, Reading/Computer work, Working and Sleeping.  3) Clinical presentation characteristics are:   Stable/Uncomplicated.  4) Decision-Making    Low complexity using standardized patient assessment instrument and/or measureable assessment of functional outcome.  Cumulative Therapy Evaluation is: Low complexity.    Previous and current functional limitations:  (See Goal Flow Sheet for this information)    Short term and Long term goals: (See Goal Flow Sheet for this information)     Communication ability:  Patient appears to be able to clearly communicate and understand verbal and written communication and follow directions correctly.  Treatment Explanation - The following has been discussed with the patient:   RX ordered/plan of care  Anticipated outcomes  Possible risks and side effects  This patient would benefit from PT intervention to resume normal activities.   Rehab potential is good.    Frequency:  2 X week, once daily tapering as able to 1x per wk   Duration:  for 8 weeks  Discharge Plan:  Achieve all LTG.  Independent in home treatment program.  Reach maximal therapeutic benefit.    Please refer to the daily flowsheet for treatment today, total treatment time and time spent performing 1:1 timed codes.

## 2018-12-27 NOTE — LETTER
SAVITA BACH PT  [address]  [address]  [place] [address]  [phone]    2018    Re: Truman Suero   :   1960  MRN:  [id number]   REFERRING PHYSICIAN:   LIN Cantu FRIAC PT  Date of Initial Evaluation:  2018  Visits:  Rxs Used: 1  Reason for Referral:     Cervical radiculopathy  Post-operative state    EVALUATION SUMMARY    [place] [facility] Initial Evaluation  Subjective:  Pt had CS ant fusion in 2018.  He is not doing any prescribed exs but is doing some self ROM exs. He had been having neck and bilateral radicular pain for +5 years.  He attributes his problem to working as a .   The history is provided by the patient.   Truman Suero is a 58 year old male with a cervical spine condition.  Condition occurred with:  Degenerative joint disease.  Condition occurred: at work.  This is a chronic and new condition  2018 CS fusion .    Patient reports pain:  Cervical right side, central cervical spine, other, lower cervical spine and cervical left side.  Radiates to:  Other, lower arm right, shoulder right, elbow left and upper arm left (bilat hand tingling/numbness , head aches, ).  Pain is described as aching and is intermittent (can have no pain in neutral ) and reported as 10/10, 8/10 and 7/10.  Associated symptoms:  Loss of motion/stiffness, headache, numbness and tingling. Pain is worse in the A.M..  Symptoms are exacerbated by other, looking up or down, carrying and lifting (bending forward ) and relieved by analgesics and rest.  Since onset symptoms are gradually improving.  Special tests:  X-ray and MRI.  Previous treatment includes physical therapy, chiropractic and surgery.  There was mild improvement following previous treatment.  General health as reported by patient is fair.  Pertinent medical history includes:  Chemical dependency, high blood pressure, numbness/tingling, smoking and migraines/headaches.       Current medications:  High blood pressure medication, muscle relaxants and pain medication.  Current occupation is  .      Red flags:  None as reported by the patient and significant weakness.    Objective:  Cervical/Thoracic Evaluation  AROM:  AROM Cervical:  Flexion:          50% los   Extension:       NT , retrxn 50% loss   Rotation:         Left: 40% loss      Right: 30% loss   Side Bend:      Left:     Right:   Re: Truman Suero   :   1960    Headaches: cervical  Cervical Myotomes:    C5 (Deltoid):  Left: 5      C6 (Biceps):  Left: 5      C7 (Triceps):  Left: 5    Right: 4  C8 (Thumb Ext): Left: 5    Right: 4  T1 (Intrinsics): Left: 5    Right: 4    Cervical Dermatomes:  normal  Cervical Palpation:  not assessed  Functional Tests:  not assessed  Cervical Stability/Joint Clearing:    Left positive at:1st Rib; 1st Rib Expansion and TOS Screen  Right positive at:  1st Rib; 1st Rib Expansion and TOS Screen    Assessment/Plan:    Patient is a 58 year old male with cervical complaints.    Patient has the following significant findings with corresponding treatment plan.                Diagnosis 1:  S/p CS fusion   Pain -  hot/cold therapy, manual therapy, self management and home program  Decreased ROM/flexibility - manual therapy, therapeutic exercise and home program  Decreased joint mobility - manual therapy, therapeutic exercise and home program  Decreased strength - therapeutic exercise, therapeutic activities and home program  Decreased proprioception - neuro re-education, therapeutic activities and home program  Impaired muscle performance - neuro re-education and home program  Decreased function - therapeutic activities and home program  Impaired posture - neuro re-education and home program  Instability -  Therapeutic Activity  Therapeutic Exercise  home program    Therapy Evaluation Codes:   1) History comprised of:   Personal factors that impact the plan of care:      Coping style, Overall  behavior pattern, Past/current experiences, Time since onset of symptoms and Work status.    Comorbidity factors that impact the plan of care are:      High blood pressure, Migraines/headaches, Smoking and Weakness.     Medications impacting care: Anti-inflammatory, Muscle relaxant and Pain.  2) Examination of Body Systems comprised of:   Body structures and functions that impact the plan of care:      Cervical spine.   Activity limitations that impact the plan of care are:      Bathing, Cooking, Driving, Dressing, Lifting, Reading/Computer work, Working and Sleeping.  3) Clinical presentation characteristics are:   Stable/Uncomplicated.  4) Decision-Making    Low complexity using standardized patient assessment instrument and/or measureable assessment of functional outcome.  Re: Truman Suero   :   1960    Cumulative Therapy Evaluation is: Low complexity.    Previous and current functional limitations:  (See Goal Flow Sheet for this information)    Short term and Long term goals: (See Goal Flow Sheet for this information)     Communication ability:  Patient appears to be able to clearly communicate and understand verbal and written communication and follow directions correctly.  Treatment Explanation - The following has been discussed with the patient:   RX ordered/plan of care  Anticipated outcomes  Possible risks and side effects  This patient would benefit from PT intervention to resume normal activities.   Rehab potential is good.    Frequency:  2 X week, once daily tapering as able to 1x per wk   Duration:  for 8 weeks  Discharge Plan:  Achieve all LTG.  Independent in home treatment program.  Reach maximal therapeutic benefit.    Please refer to the daily flowsheet for treatment today, total treatment time and time spent performing 1:1 timed codes.         Thank you for your referral.    INQUIRIES  Therapist: Wesley Catherine, PT   SAVITA BACH PT  [address]  [address]  [place]  [address]  Phone: [phone]  Fax: [phone]

## 2018-12-28 ENCOUNTER — MYC MEDICAL ADVICE (OUTPATIENT)
Dept: FAMILY MEDICINE | Facility: CLINIC | Age: 58
End: 2018-12-28

## 2018-12-28 RX ORDER — BUPROPION HYDROCHLORIDE 150 MG/1
TABLET, EXTENDED RELEASE ORAL
Qty: 180 TABLET | Refills: 1 | Status: SHIPPED | OUTPATIENT
Start: 2018-12-28 | End: 2019-05-13

## 2018-12-28 RX ORDER — TRAMADOL HYDROCHLORIDE 50 MG/1
TABLET ORAL
Qty: 90 TABLET | Refills: 0 | Status: CANCELLED | OUTPATIENT
Start: 2018-12-28

## 2018-12-28 NOTE — TELEPHONE ENCOUNTER
Per Dr. Trinidad's visit note from April, plan was to start weaning off this in 6 months.  MyChart sent requesting more information and status update.  Will await response, then route to PCP.    Ary Hayes RN

## 2018-12-28 NOTE — TELEPHONE ENCOUNTER
Noted.  Another encounter already exists regarding this, so will copy response and close this encounter.  MyChart sent.    Ary Hayes RN

## 2018-12-28 NOTE — TELEPHONE ENCOUNTER
"Patient had responded with another MyChart, which is pasted below. RN had responded and will close encounter, as med has been refilled already.    Ary Hayes, RN      \"Ary- Thanks for checking back.        Our last visit I think was beginning of October. I had to have pre-op physical for Cervical fusion surgery.  We spoke briefly about it and Dr. Trinidad suggested same plan to continue and try and wean off. At the moment I am currently still not working, the recovery has been taking awhile. I have plenty of time on my hands so some times that is good sometimes not so good for former smokers. I have been trying to some days only take 1. Some days that seems to do just fine. Other days get to be a little long. Good thing is no one smokes any more, so little chance you are exposed to it.    Palmer\"  "

## 2018-12-28 NOTE — TELEPHONE ENCOUNTER
"LOV- 10/2. Anxiety. Prescription approved per [facility] Refill Protocol.    Jaylene Paulino RN    Requested Prescriptions   Pending Prescriptions Disp Refills     buPROPion (WELLBUTRIN SR) 150 MG 12 hr tablet 60 tablet 0     Sig: Take 1 tablet once daily and increase to 1 tablet twice daily after 4 to 7 days    SSRIs Protocol Passed - 12/27/2018 10:18 AM       Passed - Recent (12 mo) or future (30 days) visit within the authorizing provider's specialty    Patient had office visit in the last 12 months or has a visit in the next 30 days with authorizing provider or within the authorizing provider's specialty.  See \"Patient Info\" tab in inbasket, or \"Choose Columns\" in Meds & Orders section of the refill encounter.             Passed - Medication is Bupropion    If the medication is Bupropion (Wellbutrin), and the patient is taking for smoking cessation; OK to refill.         Passed - Patient is age 18 or older          "

## 2018-12-31 PROBLEM — M54.41 CHRONIC BILATERAL LOW BACK PAIN WITH RIGHT-SIDED SCIATICA: Status: RESOLVED | Noted: 2018-05-16 | Resolved: 2018-12-31

## 2018-12-31 PROBLEM — G89.29 CHRONIC BILATERAL LOW BACK PAIN WITH RIGHT-SIDED SCIATICA: Status: RESOLVED | Noted: 2018-05-16 | Resolved: 2018-12-31

## 2018-12-31 NOTE — PROGRESS NOTES
Subjective:  HPI                    Objective:  System    Physical Exam    General     ROS    Assessment/Plan:    DISCHARGE REPORT    Progress reporting period is from 5/16/2018 to 6/22/2018.     SUBJECTIVE  Subjective: Back gets worse during the week.     Current Pain level: 4/10   Initial Pain level: 8/10   Changes in function: Yes, see goal flow sheet for change in function   Adverse reactions: None;   ,     Patient has failed to return to therapy so current objective findings are unknown.  The subjective and objective information are from the last SOAP note on this patient.    OBJECTIVE  Objective: Moves without distress      ASSESSMENT/PLAN  Updated problem list and treatment plan:Diagnosis 1:  LBP  Pain -  manual therapy, self management, education, directional preference exercise and home program  Decreased ROM/flexibility - manual therapy and therapeutic exercise  Decreased strength - therapeutic exercise and therapeutic activities  Decreased function - therapeutic activities      STG/LTGs have been met or progress has been made towards goals:  unknown  Assessment of Progress: The patient has not returned to therapy. Current status is unknown.  Self Management Plans:  Patient has been instructed in a home treatment program.    Truman continues to require the following intervention to meet STG and LTG's: PT intervention is no longer required to meet STG/LTG.  The patient failed to complete scheduled/ordered appointments so current information is unknown.  We will discharge this patient from PT.    Recommendations:  Canceled last two visits and did not reschedule.  Discharge pt from PT.      Please refer to the daily flowsheet for treatment today, total treatment time and time spent performing 1:1 timed codes.

## 2019-01-01 ENCOUNTER — MYC MEDICAL ADVICE (OUTPATIENT)
Dept: PALLIATIVE MEDICINE | Facility: CLINIC | Age: 59
End: 2019-01-01

## 2019-01-02 RX ORDER — TRAMADOL HYDROCHLORIDE 200 MG/1
200 TABLET, EXTENDED RELEASE ORAL DAILY
Qty: 30 TABLET | Refills: 0 | Status: SHIPPED | OUTPATIENT
Start: 2019-01-02 | End: 2019-01-30

## 2019-01-02 NOTE — TELEPHONE ENCOUNTER
Signed Prescriptions:                        Disp   Refills    traMADol (ULTRAM-ER) 200 MG 24 hr tablet   30 tab*0        Sig: Take 1 tablet (200 mg) by mouth daily Okay to           fill/begin on 1/2/2019-to last 30 days  Authorizing Provider: MELANIE BENSON    Reviewed [place]  January 2, 2019- no concerning fills.    Signed script placed in touchdown bin at [facility].    Melanie Benson APRN CNP FNP RN  [facility]

## 2019-01-02 NOTE — TELEPHONE ENCOUNTER
Rx faxed to [facility]. RightFax confirmed. LVM.      Mike Bergeron MA

## 2019-01-03 ENCOUNTER — THERAPY VISIT (OUTPATIENT)
Dept: PHYSICAL THERAPY | Facility: CLINIC | Age: 59
End: 2019-01-03
Payer: OTHER MISCELLANEOUS

## 2019-01-03 DIAGNOSIS — M54.2 NECK PAIN: Primary | ICD-10-CM

## 2019-01-03 PROCEDURE — 97112 NEUROMUSCULAR REEDUCATION: CPT | Mod: GP | Performed by: PHYSICAL THERAPIST

## 2019-01-03 PROCEDURE — 97110 THERAPEUTIC EXERCISES: CPT | Mod: GP | Performed by: PHYSICAL THERAPIST

## 2019-01-03 PROCEDURE — 97140 MANUAL THERAPY 1/> REGIONS: CPT | Mod: GP | Performed by: PHYSICAL THERAPIST

## 2019-01-07 ENCOUNTER — THERAPY VISIT (OUTPATIENT)
Dept: PHYSICAL THERAPY | Facility: CLINIC | Age: 59
End: 2019-01-07
Payer: OTHER MISCELLANEOUS

## 2019-01-07 DIAGNOSIS — M54.2 NECK PAIN: Primary | ICD-10-CM

## 2019-01-07 PROCEDURE — 97112 NEUROMUSCULAR REEDUCATION: CPT | Mod: GP | Performed by: PHYSICAL THERAPIST

## 2019-01-07 PROCEDURE — 97140 MANUAL THERAPY 1/> REGIONS: CPT | Mod: GP | Performed by: PHYSICAL THERAPIST

## 2019-01-07 PROCEDURE — 97110 THERAPEUTIC EXERCISES: CPT | Mod: GP | Performed by: PHYSICAL THERAPIST

## 2019-01-10 ENCOUNTER — THERAPY VISIT (OUTPATIENT)
Dept: PHYSICAL THERAPY | Facility: CLINIC | Age: 59
End: 2019-01-10
Payer: OTHER MISCELLANEOUS

## 2019-01-10 DIAGNOSIS — G44.209 TENSION HEADACHE: ICD-10-CM

## 2019-01-10 DIAGNOSIS — M54.2 NECK PAIN: Primary | ICD-10-CM

## 2019-01-10 PROCEDURE — 97112 NEUROMUSCULAR REEDUCATION: CPT | Mod: GP | Performed by: PHYSICAL THERAPIST

## 2019-01-10 PROCEDURE — 97140 MANUAL THERAPY 1/> REGIONS: CPT | Mod: GP | Performed by: PHYSICAL THERAPIST

## 2019-01-10 PROCEDURE — 97110 THERAPEUTIC EXERCISES: CPT | Mod: GP | Performed by: PHYSICAL THERAPIST

## 2019-01-14 ENCOUNTER — MYC REFILL (OUTPATIENT)
Dept: PALLIATIVE MEDICINE | Facility: CLINIC | Age: 59
End: 2019-01-14

## 2019-01-14 ENCOUNTER — THERAPY VISIT (OUTPATIENT)
Dept: PHYSICAL THERAPY | Facility: CLINIC | Age: 59
End: 2019-01-14
Payer: OTHER MISCELLANEOUS

## 2019-01-14 DIAGNOSIS — M48.02 CERVICAL STENOSIS OF SPINAL CANAL: ICD-10-CM

## 2019-01-14 DIAGNOSIS — M47.812 CERVICAL SPONDYLOSIS WITHOUT MYELOPATHY: ICD-10-CM

## 2019-01-14 DIAGNOSIS — M50.30 DDD (DEGENERATIVE DISC DISEASE), CERVICAL: ICD-10-CM

## 2019-01-14 DIAGNOSIS — M54.12 CERVICAL RADICULOPATHY: ICD-10-CM

## 2019-01-14 DIAGNOSIS — M54.2 NECK PAIN: Primary | ICD-10-CM

## 2019-01-14 PROCEDURE — 97110 THERAPEUTIC EXERCISES: CPT | Mod: GP | Performed by: PHYSICAL THERAPIST

## 2019-01-14 RX ORDER — TRAMADOL HYDROCHLORIDE 50 MG/1
TABLET ORAL
Qty: 90 TABLET | Refills: 0 | Status: CANCELLED | OUTPATIENT
Start: 2019-01-14

## 2019-01-14 NOTE — TELEPHONE ENCOUNTER
Medication refill information reviewed.     Due date for traMADol 50 MG  tablet is 1/14/19     Prescriptions prepped for review.     Will route to provider.     E-prescribe to [facility] [place] [facility]    Van Oquendo, RN  Care Coordinator   [facility] [place]

## 2019-01-14 NOTE — TELEPHONE ENCOUNTER
Patient requesting refill(s) of traMADol (ULTRAM-ER) 200 MG 24 hr tablet    Last picked up from pharmacy on 01/02/19     Pt last seen by prescribing provider on 11/28/18  Next appt scheduled for 2/6/19     checked in the past 6 months? Yes If no, print current report and give to RN    Last urine drug screen date 1/8/18  Current opioid agreement on file (completed within the last year) Yes Date of opioid agreement: 1/8/18    Processing (pick one and delete the others):      E-prescribe to [facility] [address] [place], [place] Pharmacy    Will route to nursing pool for review and preparation of prescription(s).

## 2019-01-16 RX ORDER — TRAMADOL HYDROCHLORIDE 50 MG/1
50-100 TABLET ORAL EVERY 6 HOURS PRN
Qty: 90 TABLET | Refills: 0 | Status: SHIPPED | OUTPATIENT
Start: 2019-01-16 | End: 2019-02-06

## 2019-01-16 NOTE — TELEPHONE ENCOUNTER
Signed Prescriptions:                        Disp   Refills    traMADol (ULTRAM) 50 MG tablet             90 tab*0        Sig: Take 1-2 tablets ( mg) by mouth every 6 hours           as needed for severe pain max 3 tabs/day. fill           1/14/19; start 1/14/19  Authorizing Provider: MELANIE BENSON    Reviewed [facility] January 16, 2019- no concerning fills.    E-prescribed, please notify patient.     Melanie Benson APRN, RN CNP, FNP  [facility] [place]

## 2019-01-17 ENCOUNTER — THERAPY VISIT (OUTPATIENT)
Dept: PHYSICAL THERAPY | Facility: CLINIC | Age: 59
End: 2019-01-17
Payer: OTHER MISCELLANEOUS

## 2019-01-17 DIAGNOSIS — M54.2 NECK PAIN: Primary | ICD-10-CM

## 2019-01-17 PROCEDURE — 97110 THERAPEUTIC EXERCISES: CPT | Mod: GP

## 2019-01-17 PROCEDURE — 97140 MANUAL THERAPY 1/> REGIONS: CPT | Mod: GP

## 2019-01-17 NOTE — LETTER
SAVITA BACH PT  [address]  [address]  [place] [address]  [phone]    2019    Re: Truman Suero   :   1960  MRN:  [id number]   REFERRING PHYSICIAN:   MD SAVITA Jackson PT  Date of Initial Evaluation:  2019  Visits:  Rxs Used: 6  Reason for Referral:  Neck pain    EVALUATION SUMMARY      PROGRESS  REPORT  Progress reporting period is from 18 to 19.      SUBJECTIVE  Subjective: Pt reports yesterday getting a HA and still has one today. Continues to have good and bad days. Ex's are going well at home.    Current Pain level: 7/10       Changes in function:  Yes (See Goal flowsheet attached for changes in current functional level)     Adverse reaction to treatment or activity: None     OBJECTIVE  Objective: Cervical AROM WFL in all directions. Cueing needed w/ ex's to slow reps down     ASSESSMENT/PLAN  Updated problem list and treatment plan: Diagnosis 1:  S/p cervical fusion   Pain -  hot/cold therapy, manual therapy, self management and home program  Decreased ROM/flexibility - manual therapy, therapeutic exercise and home program  Decreased joint mobility - manual therapy and therapeutic exercise  Decreased strength - therapeutic exercise and therapeutic activities  Decreased proprioception - neuro re-education and therapeutic activities  Impaired muscle performance - neuro re-education  Decreased function - therapeutic activities  Impaired posture - neuro re-education and home program  STG/LTGs have been met:  Yes (See Goal flow sheet completed today.)  Progress toward STG/LTGs have been made:  Yes (See Goal flow sheet completed today.)  Assessment of Progress: The patient's condition is improving.  Self Management Plans:  Patient has been instructed in a home treatment program.  Patient  has been instructed in self management of symptoms.  I have re-evaluated this patient and find that the nature, scope, duration and intensity of the  therapy is appropriate for the medical condition of the patient.  Truman continues to require the following intervention to meet STG and LT's:  PT  Re: Truman Suero   :   1960    Recommendations:  This patient would benefit from continued therapy.     Frequency:  1 X week, once daily  Duration:  for 6 weeks      Please refer to the daily flowsheet for treatment today, total treatment time and time spent performing 1:1 timed codes.      Thank you for your referral.    INQUIRIES  Therapist: Wesley Catherine PT  SAVITA BACH PT  [address] [address]  [place] [address]  Phone: [phone]  Fax: [phone]

## 2019-01-17 NOTE — PROGRESS NOTES
Subjective:  HPI                    Objective:  System    Physical Exam    General     ROS    Assessment/Plan:    PROGRESS  REPORT    Progress reporting period is from 12/27/18 to 1/17/19.      SUBJECTIVE  Subjective: Pt reports yesterday getting a HA and still has one today. Continues to have good and bad days. Ex's are going well at home.    Current Pain level: 7/10       Changes in function:  Yes (See Goal flowsheet attached for changes in current functional level)     Adverse reaction to treatment or activity: None     OBJECTIVE  Objective: Cervical AROM WFL in all directions. Cueing needed w/ ex's to slow reps down

## 2019-01-17 NOTE — LETTER
SAVITA BACH PT  [address]  [address]  [place] [address]  [phone]    2019    Re: Truman Suero   :   1960  MRN:  [id number]   REFERRING PHYSICIAN:   LIN Cantu PT  2018Date of Initial Evaluation:    Visits:  Rxs Used: 6  Reason for Referral:  Neck pain    EVALUATION SUMMARY    PROGRESS  REPORT  Progress reporting period is from 18 to 19.      SUBJECTIVE  Subjective: Pt reports yesterday getting a HA and still has one today. Continues to have good and bad days. Ex's are going well at home.    Current Pain level: 7/10       Changes in function:  Yes (See Goal flowsheet attached for changes in current functional level)     Adverse reaction to treatment or activity: None     OBJECTIVE  Objective: Cervical AROM WFL in all directions. Cueing needed w/ ex's to slow reps down     ASSESSMENT/PLAN  Updated problem list and treatment plan: Diagnosis 1:  S/p cervical fusion   Pain -  hot/cold therapy, manual therapy, self management and home program  Decreased ROM/flexibility - manual therapy, therapeutic exercise and home program  Decreased joint mobility - manual therapy and therapeutic exercise  Decreased strength - therapeutic exercise and therapeutic activities  Decreased proprioception - neuro re-education and therapeutic activities  Impaired muscle performance - neuro re-education  Decreased function - therapeutic activities  Impaired posture - neuro re-education and home program  STG/LTGs have been met:  Yes (See Goal flow sheet completed today.)  Progress toward STG/LTGs have been made:  Yes (See Goal flow sheet completed today.)  Assessment of Progress: The patient's condition is improving.  Self Management Plans:  Patient has been instructed in a home treatment program.  Patient  has been instructed in self management of symptoms.  I have re-evaluated this patient and find that the nature, scope, duration and intensity of the therapy  is appropriate for the medical condition of the patient.  Truman continues to require the following intervention to meet STG and LT's:  PT  Re: Truman Suero   :   1960    Recommendations:  This patient would benefit from continued therapy.     Frequency:  1 X week, once daily  Duration:  for 6 weeks      Please refer to the daily flowsheet for treatment today, total treatment time and time spent performing 1:1 timed codes.      Thank you for your referral.    INQUIRIES  Therapist: Wesley Catherine PT  SAVITA BACH PT  [address]  [address]  [place] [address]  Phone: [phone]  Fax: [phone]

## 2019-01-21 NOTE — PROGRESS NOTES
Subjective:  HPI                    Objective:  System    Physical Exam    General     ROS    Assessment/Plan:    ASSESSMENT/PLAN  Updated problem list and treatment plan: Diagnosis 1:  S/p cervical fusion   Pain -  hot/cold therapy, manual therapy, self management and home program  Decreased ROM/flexibility - manual therapy, therapeutic exercise and home program  Decreased joint mobility - manual therapy and therapeutic exercise  Decreased strength - therapeutic exercise and therapeutic activities  Decreased proprioception - neuro re-education and therapeutic activities  Impaired muscle performance - neuro re-education  Decreased function - therapeutic activities  Impaired posture - neuro re-education and home program  STG/LTGs have been met:  Yes (See Goal flow sheet completed today.)  Progress toward STG/LTGs have been made:  Yes (See Goal flow sheet completed today.)  Assessment of Progress: The patient's condition is improving.  Self Management Plans:  Patient has been instructed in a home treatment program.  Patient  has been instructed in self management of symptoms.  I have re-evaluated this patient and find that the nature, scope, duration and intensity of the therapy is appropriate for the medical condition of the patient.  Truman continues to require the following intervention to meet STG and LT's:  PT    Recommendations:  This patient would benefit from continued therapy.     Frequency:  1 X week, once daily  Duration:  for 6 weeks        Please refer to the daily flowsheet for treatment today, total treatment time and time spent performing 1:1 timed codes.

## 2019-01-22 ENCOUNTER — MYC MEDICAL ADVICE (OUTPATIENT)
Dept: PALLIATIVE MEDICINE | Facility: CLINIC | Age: 59
End: 2019-01-22

## 2019-01-22 NOTE — TELEPHONE ENCOUNTER
"Per patient Shraddhahart message:  From: Palmer Suero      Created: 1/22/2019 8:21 AM        Dr Navarro-        I know at one of our appointments we talked about PT and back protocol. It seems to me that you mentioned something about Insurance Companies requiring PT on one's back before the nerve blocks. I have been going to the [facility] for the PT on my neck for the Worker's Comp post-op appointments. I had discussed \"off the record\" with the therapist about this and he had said when the time came to come back and see him. I just wanted to make sure I heard you correctly. Thanks  Palmer          "

## 2019-01-22 NOTE — TELEPHONE ENCOUNTER
Mychart sent to pt:  From: Estelita Wetzel RN      Created: 1/22/2019 9:09 AM        Xavier Chakraborty,  Looks like you did at least 4 visits of PT in 2018 for your low back pain.  That should be sufficient insurance-wise for the medial branch blocks and radiofrequency ablation procedures.    Estelita Wetzel RN-BSN  [facility]

## 2019-01-23 NOTE — TELEPHONE ENCOUNTER
From: Palmer Suero      Created: 1/23/2019 7:46 AM        Estelita- Thanks for updates. Just thought if necessary would go back to this clinic as this therapist said he could help fix if need be.      Palmer

## 2019-01-24 ENCOUNTER — ANCILLARY PROCEDURE (OUTPATIENT)
Dept: GENERAL RADIOLOGY | Facility: CLINIC | Age: 59
End: 2019-01-24
Attending: PHYSICIAN ASSISTANT
Payer: COMMERCIAL

## 2019-01-24 ENCOUNTER — OFFICE VISIT (OUTPATIENT)
Dept: NEUROSURGERY | Facility: CLINIC | Age: 59
End: 2019-01-24
Payer: OTHER MISCELLANEOUS

## 2019-01-24 VITALS
OXYGEN SATURATION: 96 % | WEIGHT: 152.4 LBS | DIASTOLIC BLOOD PRESSURE: 91 MMHG | HEART RATE: 51 BPM | HEIGHT: 66 IN | SYSTOLIC BLOOD PRESSURE: 139 MMHG | BODY MASS INDEX: 24.49 KG/M2

## 2019-01-24 DIAGNOSIS — M47.12 CERVICAL SPONDYLOSIS WITH MYELOPATHY: ICD-10-CM

## 2019-01-24 DIAGNOSIS — Z98.1 ARTHRODESIS STATUS: Primary | ICD-10-CM

## 2019-01-24 DIAGNOSIS — Z98.1 S/P CERVICAL SPINAL FUSION: ICD-10-CM

## 2019-01-24 ASSESSMENT — PAIN SCALES - GENERAL: PAINLEVEL: SEVERE PAIN (6)

## 2019-01-24 ASSESSMENT — MIFFLIN-ST. JEOR: SCORE: 1446.09

## 2019-01-24 NOTE — LETTER
2019       RE: Anish Suero  [address] [address]     Dear Colleague,    Thank you for referring your patient, Ansih Suero, to the [facility] NEUROSURGERY at [facility]. Please see a copy of my visit note below.    Service Date: 2019      Primary Care Physician      RE: Anish Suero   MRN: [id number]   : 1960      Dear Doctor:      Mr. Suero was seen in Neurosurgery Clinic today for a 3-month followup of a C5-C6 ACDF from the right side.  He has been doing very well.  His arm pain is resolved and his neck pain is much improved. He has full strength with no obvious deficits.  He had an x-ray today which demonstrates good placement of the instrumentation.  I plan to follow him in another 9 months with an x-ray.      He has been doing physical therapy.  He said this helps and he needs a requisition for more physical therapy which we will provide.  Also, he has not gone back to work yet and so we recommend he go back with gradually increasing duties.  We will write him a note to that effect.      It was my pleasure to see him in Neurosurgery Clinic today.      Sincerely,    Jud Harkins MD       D: 2019   T: 2019   MT: RICHARDSON      Name:     ANISH SUERO   MRN:      [id number]        Account:      [id number]   :      1960           Service Date: 2019      Document: [id number]

## 2019-01-24 NOTE — LETTER
Todays Date:  2019    Truman Suero  [address] [address]    Patient was seen in clinic today:  Yes    Patient:  Truman Suero  : 1960    Physician/Nurse:  Jud Harkins MD/Rashad Espinosa RN, MS    He may return to work on 2019.      Patient limitations:    May return to work full time part time with the following restrictions:           Not more than 4 hours/day x 1 week, then       Increase to 6 hours/day for additional week, then       Return to full time hours as tolerated.      Lifting is restricted to not more than 50 pounds.      The next clinic appointment is scheduled on TBD in 9 months time.    Patient should not drive or operate heavy equipment if taking narcotic pain medications.        Rashad Espinosa RN, MS   Department of Neurosurgery  [address] ([facility] [address])  [place]  [address]    Tele. [phone]

## 2019-01-24 NOTE — NURSING NOTE
Chief Complaint   Patient presents with     RECHECK     [employer] RETURN - 3 MONTH FOLLOW UP       Edouard Watson, EMT

## 2019-01-24 NOTE — PROGRESS NOTES
Service Date: 2019      Primary Care Physician      RE: Anish Suero   MRN: [id number]   : 1960      Dear Doctor:      Mr. Suero was seen in Neurosurgery Clinic today for a 3-month followup of a C5-C6 ACDF from the right side.  He has been doing very well.  His arm pain is resolved and his neck pain is much improved. He has full strength with no obvious deficits.  He had an x-ray today which demonstrates good placement of the instrumentation.  I plan to follow him in another 9 months with an x-ray.      He has been doing physical therapy.  He said this helps and he needs a requisition for more physical therapy which we will provide.  Also, he has not gone back to work yet and so we recommend he go back with gradually increasing duties.  We will write him a note to that effect.      It was my pleasure to see him in Neurosurgery Clinic today.      Sincerely,       MD SOUMYA Griffin MD             D: 2019   T: 2019   MT: RICHARDSON      Name:     ANISH SUERO   MRN:      [id number]        Account:      [id number]   :      1960           Service Date: 2019      Document: [id number]

## 2019-01-28 ENCOUNTER — RADIOLOGY INJECTION OFFICE VISIT (OUTPATIENT)
Dept: PALLIATIVE MEDICINE | Facility: CLINIC | Age: 59
End: 2019-01-28
Attending: NURSE PRACTITIONER
Payer: COMMERCIAL

## 2019-01-28 ENCOUNTER — ANCILLARY PROCEDURE (OUTPATIENT)
Dept: RADIOLOGY | Facility: CLINIC | Age: 59
End: 2019-01-28
Payer: COMMERCIAL

## 2019-01-28 VITALS — SYSTOLIC BLOOD PRESSURE: 121 MMHG | DIASTOLIC BLOOD PRESSURE: 90 MMHG | OXYGEN SATURATION: 98 % | HEART RATE: 63 BPM

## 2019-01-28 DIAGNOSIS — M47.819 FACET ARTHROPATHY: Primary | ICD-10-CM

## 2019-01-28 DIAGNOSIS — M47.816 SPONDYLOSIS OF LUMBAR REGION WITHOUT MYELOPATHY OR RADICULOPATHY: ICD-10-CM

## 2019-01-28 PROCEDURE — 64493 INJ PARAVERT F JNT L/S 1 LEV: CPT | Mod: 50 | Performed by: PSYCHIATRY & NEUROLOGY

## 2019-01-28 PROCEDURE — 64494 INJ PARAVERT F JNT L/S 2 LEV: CPT | Mod: 50 | Performed by: PSYCHIATRY & NEUROLOGY

## 2019-01-28 ASSESSMENT — PAIN SCALES - GENERAL: PAINLEVEL: SEVERE PAIN (7)

## 2019-01-28 NOTE — NURSING NOTE
Pre-procedure Intake    Have you been fasting? NA    If yes, for how long? NA    Are you taking a prescribed blood thinner such as coumadin, Plavix, Xarelto?    No    If yes, when did you take your last dose? NA    Do you take aspirin?  No    If cervical procedure, have you held aspirin for 6 days?   NA    Do you have any allergies to contrast dye, iodine, steroid and/or numbing medications?  NO    Are you currently taking antibiotics or have an active infection?  NO    Have you had a fever/elevated temperature within the past week? NO    Are you currently taking oral steroids? NO    Do you have a ? Yes       Are you pregnant or breastfeeding?  Not Applicable    Are the vital signs normal?  No: Slightly elevated BP      Latisha Pham CMA (AAMA)

## 2019-01-28 NOTE — NURSING NOTE
Discharge Information    IV Discontiued Time:  NA    Amount of Fluid Infused:  NA    Discharge Criteria = When patient returns to baseline or as per MD order    Consciousness:  Pt is fully awake    Circulation:  BP +/- 20% of pre-procedure level    Respiration:  Patient is able to breathe deeply    O2 Sat:  Patient is able to maintain O2 Sat >92% on room air    Activity:  Moves 4 extremities on command    Ambulation:  Patient is able to stand and walk or stand and pivot into wheelchair    Dressing:  Clean/dry or No Dressing    Notes:   Discharge instructions and AVS given to patient    Patient meets criteria for discharge?  YES    Admitted to PCU?  No    Responsible adult present to accompany patient home?  Yes    Signature/Title:    leslie peter RN Care Coordinator  [facility]

## 2019-01-28 NOTE — PROGRESS NOTES
Pre procedure Diagnosis: facet arthropathy   Post procedure Diagnosis: Same  Procedure performed: bilateral L3-5 medial branch blocks  Anesthesia: none  Complications: none- would recommend using contrast for radiofrequency ablation   Operators: Mariaelena Osorio MD and Juana Painter MD     Indications:   Truman Suero is a 58 year old male was sent by Melanie Thomas NP for medial branch block.  They have a history of low back pain.  Exam shows pain with extension/rotation in the bilateral lumbar region and they have tried conservative treatment including physical therapy and OTC medications.    Physical therapy last done:  1/14/19 for ~5 sessions    Options/alternatives, benefits and risks were discussed with the patient including bleeding, infection, tissue trauma, exposure to radiation, reaction to medications, spinal cord injury, weakness, numbness and paralysis.  Questions were answered to his satisfaction and he agrees to proceed. Voluntary informed consent was obtained and signed.     Vitals were reviewed: Yes  Allergies were reviewed:  Yes   Medications were reviewed:  Yes   Pre-procedure pain score: 7/10    Procedure:  After getting informed consent, patient was brought into the procedure suite and was placed in a prone position on the procedure table.   A Pause for the Cause was performed.  Patient was prepped and draped in sterile fashion.     Under AP fluoroscopic guidance the L3, L4, L5 vertebral bodies were identified. The C-arm was rotated to the oblique view to afford optimal visualization the pedicles.  Lidocaine 1% was used to anesthetize the skin at each level.  Under intermittent fluoroscopy, 25G 3.5inch Quinke spinal needles were positioned inferior and lateral to the intersection of the transverse process and pedicle at the Bilateral L4 & L5 levels, as well as the corresponding sacral alar notches. The needle positions were verified and optimized from the AP view.    The anatomic targets for  the L3 & L4 medial nerve and L5 dorsal ramus (which functionally incorporates the medial branch) were the  L4 & L5 transverse processes and sacral alar notch, with laterality as described above, resulting in blockade of the L4/5 and L5/S1 facet joints.    Omnipaque-300 was injected, and appropriate spread of contrast was noted without vascular uptake.  A total of 2.5ml of Omnipaque was used.  7.5ml was wasted. Bupivacaine 0.5% 0.5 ml was injected at each location.  The needles were removed.      Hemostasis was achieved, the area was cleaned, and bandaids were placed when appropriate.  The patient tolerated the procedure well, and was taken to the recovery room.    Images were saved to PACS.    The patient will continue to monitor progress, and they were given a pain diary to complete at home.  They will either fax or mail this back to us or bring it to their next appointment. We will determine the treatment plan after we review the diary.      Post-procedure pain score: 4/10  Follow-up includes:   -f/u phone call in one week  -will await diary for further planning.    Mariaelena Osorio MD  [facility]

## 2019-01-28 NOTE — PATIENT INSTRUCTIONS
[facility]   Medial Branch Block Discharge Instructions      Your procedure was performed by:  Dr. Mariaelena Osorio      Medications used include:  Lidocaine  Bupivicaine  Omnipaque  You will need to complete the Pain Scale Log form and return it to us as soon as possible.  Once we have received the form, we will review it and call you to determine the next steps.     The form can be faxed to [phone] or mailed to:   [facility]   [address]   [place] [address]      You may resume your regular medications    If you were holding your blood thinning medication, please restart taking it: N/A    You may resume your regular activities    Be cautious with walking as numbness and/or weakness in the lower extremities may occur for up to 6-8 hours due to the effects of the anesthetic.    Avoid driving for 6 hours. The local anesthetic could slow your reflexes.     You may shower, however no swimming or tub baths or hot tubs for 24 hours following your procedure.    Your pain will return after the numbing medications have worn off.  You may use your current pain medications as needed.    You may use anti-inflammatory medications (such as ibuprofen, aleve, or advil) or Tylenol for pain control if necessary.  Some people find it helpful to alternate ibuprofen and Tylenol every 3 hours for a couple of days.    You may use ice packs 10-15 minutes three to four times a day at the injection site for comfort.     Do not use heat to painful areas for 6 to 8 hours. This will give the local anesthetic time to wear off and prevent you from accidentally burning your skin.     If you experience any of the following, call the Pain Clinic during work hours at [phone] or the Provider Line after hours at [phone]:  -Fever over 100 degree F  -Swelling, bleeding, redness, drainage, warmth at the injection site  -Progressive weakness or numbness in your legs or arms  -If lumbar,  call if you have a loss of bowel or bladder function  -If cervical, call if you have any unusual headache that is not relieved by Tylenol  -Unusual new onset of pain that is not improving

## 2019-01-30 ENCOUNTER — MYC REFILL (OUTPATIENT)
Dept: PALLIATIVE MEDICINE | Facility: CLINIC | Age: 59
End: 2019-01-30

## 2019-01-30 DIAGNOSIS — M47.812 CERVICAL SPONDYLOSIS WITHOUT MYELOPATHY: ICD-10-CM

## 2019-01-30 DIAGNOSIS — M50.30 DDD (DEGENERATIVE DISC DISEASE), CERVICAL: ICD-10-CM

## 2019-01-30 RX ORDER — TRAMADOL HYDROCHLORIDE 200 MG/1
200 TABLET, EXTENDED RELEASE ORAL DAILY
Qty: 30 TABLET | Refills: 0 | Status: CANCELLED | OUTPATIENT
Start: 2019-01-30

## 2019-01-30 RX ORDER — TRAMADOL HYDROCHLORIDE 200 MG/1
200 TABLET, EXTENDED RELEASE ORAL DAILY
Qty: 30 TABLET | Refills: 0 | Status: SHIPPED | OUTPATIENT
Start: 2019-01-30 | End: 2019-03-01

## 2019-01-30 NOTE — TELEPHONE ENCOUNTER
Signed Prescriptions:                        Disp   Refills    traMADol (ULTRAM-ER) 200 MG 24 hr tablet   30 tab*0        Sig: Take 1 tablet (200 mg) by mouth daily Okay to           fill/begin on 2/1/2019-to last 30 days  Authorizing Provider: MELANIE BENSON    Reviewed [place]  January 30, 2019- no concerning fills.    E-prescribed, can  and begin on 2/1/19.  Please notify patient.     Melanie Benson APRN, RN CNP, FNP  [facility] [place]

## 2019-01-30 NOTE — TELEPHONE ENCOUNTER
Medication refill information reviewed.     Due date for traMADol (ULTRAM-ER) 200 MG 24 hr tablet    is 2/1/19     Prescriptions prepped for review.     Will route to provider.     E-prescribe to [facility] [address] [place]  pharmacy    Van Oquendo, RN  Care Coordinator   [facility]

## 2019-01-30 NOTE — TELEPHONE ENCOUNTER
Patient requesting refill(s) of traMADol (ULTRAM-ER) 200 MG 24 hr tablet   Last picked up from pharmacy on 01/02/19    Pt last seen by prescribing provider on 11/28/18  Next appt scheduled for 2/6/19     checked in the past 6 months? Yes If no, print current report and give to RN    Last urine drug screen date 01/08/18  Current opioid agreement on file (completed within the last year) No Date of opioid agreement: 01/08/18    Processing (pick one and delete the others):      E-prescribe to [facility] [address] [place], [place]  pharmacy    Will route to nursing pool for review and preparation of prescription(s).

## 2019-01-31 NOTE — TELEPHONE ENCOUNTER
E-prescription confirmation received from the pharmacy. Patient was informed via [employer].

## 2019-02-05 ENCOUNTER — MYC MEDICAL ADVICE (OUTPATIENT)
Dept: PALLIATIVE MEDICINE | Facility: CLINIC | Age: 59
End: 2019-02-05

## 2019-02-05 ENCOUNTER — PATIENT OUTREACH (OUTPATIENT)
Dept: NEUROSURGERY | Facility: CLINIC | Age: 59
End: 2019-02-05

## 2019-02-05 NOTE — TELEPHONE ENCOUNTER
Pt had a bilateralLumbar  medial branch block # 1 on 1/28/19.  The post medial branch block form was received.      According to pain diary pt would like to proceed with medial branch block #2.    Max relief from block is:    At rest:                 57%  Left fact load:       57%  Right facet load:  43%  Normal activity:    72%    Insurance:  [employer]  ???    Does pt have adequate relief insurance-wise to proceed to medial branch block #2?  TBD     Physical therapy:      Last done in?:  2018.     How many sessions?:  4.      Where was it  done?  [facility] .   Routed to [facility] to review. Does pt had enough relief insurance-wise to proceed to lumbar medial branch block #2?  If so please schedule.    Estelita Wetzel RN-BSN  [facility]

## 2019-02-05 NOTE — PROGRESS NOTES
Phone call to patient in response to call requesting extension of RTW restrictions.   Patient is a 59 y/o  about 3 months s/p C5-C6 ACDF end of Oct 2018.    Has returned to work on a 1/2 time basis and was scheduled to increase to 6 hrs/day beginning yesterday.  Because of the nature of his work he found extending to 6 hours was very uncomfortable.  After discussing with his employer and [employer], it was agreed to extend his return to full time hours as follows:  1.  Not more than 4 hours/days for an additional 2 weeks then;  2. Beginning 2/18/19, may increase to 6 hours/day fore 2 weeks then, 3. Beginning 3/4/19, may return to full time hours as tolerated.  Patient will continue with Physical Therapy as orders as he finds this s/w helpful.      Change in RTW limits e-mailed to patient per his request.  He will distribute to employer and [employer] in turn.

## 2019-02-06 ENCOUNTER — OFFICE VISIT (OUTPATIENT)
Dept: PALLIATIVE MEDICINE | Facility: CLINIC | Age: 59
End: 2019-02-06
Payer: OTHER MISCELLANEOUS

## 2019-02-06 VITALS
SYSTOLIC BLOOD PRESSURE: 149 MMHG | DIASTOLIC BLOOD PRESSURE: 90 MMHG | BODY MASS INDEX: 25.07 KG/M2 | HEART RATE: 66 BPM | WEIGHT: 153 LBS

## 2019-02-06 DIAGNOSIS — M54.12 CERVICAL RADICULOPATHY: ICD-10-CM

## 2019-02-06 DIAGNOSIS — M47.816 SPONDYLOSIS OF LUMBAR REGION WITHOUT MYELOPATHY OR RADICULOPATHY: ICD-10-CM

## 2019-02-06 DIAGNOSIS — Z98.1 S/P CERVICAL SPINAL FUSION: ICD-10-CM

## 2019-02-06 DIAGNOSIS — M48.02 CERVICAL STENOSIS OF SPINAL CANAL: ICD-10-CM

## 2019-02-06 DIAGNOSIS — M47.812 CERVICAL SPONDYLOSIS WITHOUT MYELOPATHY: ICD-10-CM

## 2019-02-06 DIAGNOSIS — M50.30 DDD (DEGENERATIVE DISC DISEASE), CERVICAL: ICD-10-CM

## 2019-02-06 DIAGNOSIS — M54.59 LUMBAR FACET JOINT PAIN: Primary | ICD-10-CM

## 2019-02-06 PROCEDURE — 99214 OFFICE O/P EST MOD 30 MIN: CPT | Performed by: NURSE PRACTITIONER

## 2019-02-06 RX ORDER — TOPIRAMATE 50 MG/1
50 TABLET, FILM COATED ORAL 2 TIMES DAILY
Qty: 60 TABLET | Refills: 2 | Status: SHIPPED | OUTPATIENT
Start: 2019-02-06 | End: 2019-05-15

## 2019-02-06 RX ORDER — CYCLOBENZAPRINE HCL 5 MG
5-10 TABLET ORAL 3 TIMES DAILY PRN
Qty: 45 TABLET | Refills: 0 | Status: SHIPPED | OUTPATIENT
Start: 2019-02-06 | End: 2019-04-08

## 2019-02-06 RX ORDER — TRAMADOL HYDROCHLORIDE 50 MG/1
50-100 TABLET ORAL EVERY 6 HOURS PRN
Qty: 54 TABLET | Refills: 0 | Status: SHIPPED | OUTPATIENT
Start: 2019-02-06 | End: 2019-03-01

## 2019-02-06 ASSESSMENT — PAIN SCALES - GENERAL: PAINLEVEL: SEVERE PAIN (6)

## 2019-02-06 NOTE — PROGRESS NOTES
[facility]  2/6/2019       Chief complaint: neck and low back pain    Interval history:  Truman Suero is a 58 year old male is known to me for   Chronic neck pain  Cervical DDD  Cervical spondylosis (pain worse with extension/rotation indicating facetogenic component to pain)  Axial low back pain  Myofascial pain/muscle spasms  Remote history of ETOH overuse, attended AA for awhile. Sober for 10 years  ---PMHx includes: neck pain  ---PSHx includes: none listed      Recommendations/plan at the last visit on 11/28/2018 included:  1. Physical Therapy: not at present time--deferred to neurosurgery, patient healing from recent cervical fusion  2. Clinical Health Psychologist: continue work with  Kentrell Castañeda in health psychology  3. Diagnostic Studies:  None  4. Medication Management:    1. Continue tramadol ER 200mg once daily  2. Continue tramadol 50mg Q 6-8 hours PRN, max of 3/day  3. Continue gabapentin  4. Continue Topamax  5. Further procedures recommended: I will check with my partners, I think LMBBs would be OK since no steroids following cervical fusion, as long as patient can lay prone  6. Recommendations to PCP: see above  7. Follow up: 10 weeks      Since his last visit, Truman Suero reports:  -Increased headaches since returning to work following his cervical fusion. In addition, completing his job without aggravating neck pain is difficult due to positioning required by his job duties. He has been working with PT on posture and exercises to alleviate the pain. He is looking for a new job as he realized current job is no longer a good fit due to the pain issues.     At this point, the patient's participation with our multidisciplinary team includes:  The patient has been compliant with the program.    PT - attended non-pain management PHYSICAL THERAPY   Health Psych - has seen Kentrell Castañeda x4  Acupuncture: worked with Dr. Bereket LAY      Pain scores:  Pain intensity on  "average is 7 on a scale of 0-10.    Range is 5-8/10.   Pain right now is 6/10.  Pain is described as \"aching, numb, tiring, shooting, exhausting, throbbing, stabbing, gnawing, miserable, and nagging\"    Pain is constant in nature    Current pain relevant medications:   -Tramadol 200mg ER in the evening (helpful)  -Tramadol 50mg take 1 tablet  Q 6 hours PRN (using 2-3 tabs per day, helpful)  -ibuprofen 600mg PRN (helpful)  -gabapentin 900mg TID (somewhat helpful)  -methocarbamol 500-1000mg TID PRN muscle spasms (takes 1000 mg BID, somewhat helpful)  -Topamax 50 mg BID (helpful)    Other pertinent medications:  None    Previous Medications:  OPIATES: Tramdol (somewhat helpful)  NSAIDS: ibuprofen (helpful), Aleve (helpful)  MUSCLE RELAXANTS: none  ANTI-MIGRAINE MEDS: none  ANTI-DEPRESSANTS: none  SLEEP AIDS: none  ANTI-CONVULSANTS: gabapentin (helpful)  TOPICALS: lidocaine ointment (somewhat helpful)  Other meds: Tylenol (not helpful)        Other treatments have included:  Truman Suero has not been seen at a pain clinic in the past.    PT: tried, somewhat helpful  Chiropractic: helpful  Acupuncture: none  TENs Unit: none     Injections:   cervical radiofrequency nerve ablation at [facility] in December 2016 (did get good relief, got 70% relief of his typical neck pain)  -6/29/2017 Cervical facet joint steroid injections at C4-5 and C5-6 on the right with Dr. Nicole Harding (not helpful)  -3/8/2018 C7-T1 interlaminar DEMARCUS with Dr. Hugo Corrigan (not helpful)  -5/23/2018 right L4-5 transforaminal epidural steroid injection with Dr. Osorio (not helpful for back pain but did help leg pain)  -8/7/2018 bilateral SI joint injection with Dr Hugo Corrigan (helpful for one month)  -10/11/2018 l3-4 and L4-5 facet joint injections bilaterally with Dr. Hugo Corrigan (this has been helpful, more helpful than any other lumbar injections thus far)  -1/28/2019 bilateral L3-5 medial branch blocks #1 with Dr. Osorio (somewhat " helpful)     Surgeries:  10/29/2018 right anterior cervical C5-6 discectomy and fusion by Dr. Jud Harkins (somewhat helpful)      THE 4 A's OF OPIOID MAINTENANCE ANALGESIA    Analgesia: long acting Tramadol with some short acting tramadol does give some relief    Activity: ADLs    Adverse effects: none    Adherence to Rx protocol: yes      Side Effects: none  Patient is using the medication as prescribed: yes    Medications:  Current Outpatient Medications   Medication Sig Dispense Refill     buPROPion (WELLBUTRIN SR) 150 MG 12 hr tablet Take 1 tablet once daily and increase to 1 tablet twice daily after 4 to 7 days 180 tablet 1     cyclobenzaprine (FLEXERIL) 5 MG tablet Take 1-2 tablets (5-10 mg) by mouth 3 times daily as needed for muscle spasms 45 tablet 0     fluticasone (FLONASE) 50 MCG/ACT spray Spray 2 sprays into both nostrils 2 times daily 2 Bottle 11     gabapentin (NEURONTIN) 600 MG tablet Take 1.5 tablets (900 mg) by mouth 3 times daily 135 tablet 3     metoprolol succinate (TOPROL-XL) 25 MG 24 hr tablet Take 1 tablet (25 mg) by mouth daily (Patient taking differently: Take 25 mg by mouth At Bedtime ) 30 tablet 1     topiramate (TOPAMAX) 50 MG tablet Take 1 tablet (50 mg) by mouth 2 times daily Drink plenty of water and no alcohol. If side effects, then reduce to last tolerable dosage. 60 tablet 2     traMADol (ULTRAM) 50 MG tablet Take 1-2 tablets ( mg) by mouth every 6 hours as needed for severe pain max 3 tabs/day. fill 2/11/19; start 2/13/19, 18 day script 54 tablet 0     traMADol (ULTRAM-ER) 200 MG 24 hr tablet Take 1 tablet (200 mg) by mouth daily Okay to fill/begin on 2/1/2019-to last 30 days 30 tablet 0       Medical History: any changes in medical history since they were last seen? none    Social History:   Home situation: , has 4 kids, 2 at home, one in college and one launched.   Occupation/Schooling:  full time in [employer]  Tobacco use: former smoker, quit on  3/20/2018  Alcohol use: sober for nearly 10 years. He used to work a sobriety program, used to go to   Drug use: none  History of chemical dependency treatment: no formal treatment, did AA    Is patient a current smoker or tobacco user?  QUIT on 3/20/2018  If yes, was cessation counseling offered?  no        Review of Systems:  ROS is positive as per HPI as well as for weight gain, headache, HTN, arthritis, neck pain, weakness, numbness/tingling, stress and is negative for fevers, sweats, or constipation, diarrhea.    This document serves as a record of the services and decisions personally performed and made by Melanie STEVENS. It was created on her behalf by Alena Lorenz, a trained medical scribe. The creation of this document is based on the provider's statements to the medical scribe.  Alena Lorenz 1:45 PM February 6, 2019      Physical Exam:  Vital signs: /90   Pulse 66   Wt 69.4 kg (153 lb)   BMI 25.07 kg/m       Behavioral observations:  Awake, alert, cooperative    Gait:  normal    Musculoskeletal exam:    Moves well in exam room  Strength grossly equal throughout    Neuro exam:  SILT in all extremities     Skin/vascular/autonomic:  No suspicious lesions on exposed skin.      Other:  NA    Is the patient hypertensive today? no  Hypertensive on recheck of BP?   n/a  If yes, was patient recommended to see Primary Care Provider in follow up for management of HTN?  no      IMAGING:    MR CERVICAL SPINE WITHOUT CONTRAST 9/5/2017 2:32 PM      HISTORY: Neck pain, worsening numbness in arms and lower extremities.  Radiculopathy, cervical region.     TECHNIQUE: Multiplanar multisequence images were obtained through the  cervical spine without contrast.     COMPARISON: 2/22/2017.     FINDINGS: Sagittal images demonstrate some minimal gentle cervical  kyphosis, otherwise normal posterior alignment. There is no evidence  for craniovertebral or cervicomedullary junction abnormality.  The  cervical cord is minimally indented anteriorly at C4-C5 and C5-C6,  otherwise is normal in morphology and signal characteristics. Disc  space narrowing and discogenic marrow changes are present C3-C4,  C4-C5, C5-C6 and C6-C7.     C2-C3: Minimal facet hypertrophy. No stenosis.     C3-C4: Broad-based posterior osteophyte formation is present causing  some mild bilateral neural foraminal stenosis, but no central canal  stenosis.     C4-C5: Broad-based posterior osteophyte formation and mild facet  hypertrophy is present causing some mild to moderate central canal  stenosis, mild cord deformity, and mild-to-moderate bilateral neural  foraminal stenosis.     C5-C6: Broad-based posterior osteophyte formation and disc bulging is  present along with some facet hypertrophy. There is moderate central  canal stenosis and moderate bilateral neural foraminal stenosis.  Findings have progressed since the prior exam.     C6-C7: Broad-based disc bulging is present along with some minimal  posterior osteophyte formation. There is borderline to mild central  canal stenosis, but no neural foraminal stenosis.     C7-T1: Moderate facet hypertrophy. No significant stenosis.         IMPRESSION: Moderate multilevel degenerative disc and facet disease  with some progression at C5-C6 where there is moderate central canal  and bilateral neural foraminal stenosis along with cord deformity.          MR CERVICAL SPINE WITHOUT CONTRAST  2/22/2017 9:59 AM     HISTORY:  Bilateral neck and arm pain for three years.     COMPARISON: None.     TECHNIQUE: Routine MR cervical spine extended through T2.     FINDINGS: There is some degenerative bone marrow signal change C3-C6.  No malignant or destructive lesions. Normal alignment through T2.  Reversed cervical adenosis C3-C6.     The cervical and upper thoracic spinal cord appear intrinsically  normal. The craniocervical junction region is normal. No paraspinous  soft tissue abnormality.     Findings  by level as follows:     C2-C3: Negative. No disc protrusion. No central or lateral stenosis.     C3-C4: Mild disc space narrowing. Mild diffuse annular bulge. Small  uncinate spur on the right. No significant central or left-sided  stenosis. Mild right-sided foraminal stenosis.     C4-C5: Moderate degenerative narrowing of the interspace. Mild ventral  disc osteophyte complex with minimal central stenosis. Small uncinate  spurs bilaterally with mild bilateral foraminal stenosis.     C6-C7: Moderate disc space narrowing. Mild broad-based disc osteophyte  complex with minimal central stenosis. Minimal uncinate spurs with  mild bilateral foraminal stenosis.     C6-C7: Moderate disc space narrowing. Mild ventral disc osteophyte  complex. No significant central or lateral stenosis.     C7-T1: No disc protrusion. No central or lateral stenosis. Moderate  bilateral facet joint disease.         IMPRESSION:  1. Degenerative changes as described C3-C4 through C7-T1. There is  only mild central and foraminal stenosis as described.  2. No intrinsic spinal cord abnormality through T2    [place] Prescription Monitoring Program:  Reviewed [facility] 2/6/2019, no concerning scripts      DIRE Score for selecting candidates for long term opioid analgesia for chronic pain:  Diagnosis  2  Intractablility  2  Risk    Psychological health  2    Chemical health  2    Reliability  2    Social support  3  Efficacy  2    Total DIRE Score = 15. Note that   7-13 predicts poor outcome (compliance and efficacy) from opioid prescribing; 14-21 predicts good outcome (compliance and efficacy)  from opioid prescribing.      Assessment:   1. Lumbar facet joint pain  2. Lumbar spondylosis  3. Cervical spondylosis  4. S/p cervical fusion  5. Cervical DDD  6. Cervical stenosis of spinal canal  7. Cervical radiculopathy  8. Myofascial pain/muscle spasms  9. Remote history of ETOH overuse, attended AA for awhile. Sober for >10 years  10. PMHx includes: neck  pain  11. PSHx includes: none listed        Plan:   1. Physical Therapy: not at present time--deferred to neurosurgery, patient healing from recent cervical fusion  2. Clinical Health Psychologist: continue work with  Kentrell Castañeda in health psychology  3. Diagnostic Studies:  None  4. Medication Management:    1. Continue tramadol ER 200mg once daily  2. Continue tramadol 50mg Q 6-8 hours PRN, max of 3/day. E-prescribed today. [place] script for 2/2019 to align Tramadol and Tramadol ER fill dates.   3. Continue gabapentin 900 mg TID   4. Continue Topamax 50 mg BID   5. Continue methocarbamol 500-1000 mg TID PRN muscle spasms   5. Further procedures recommended: I will check with insurance whether second LMBB is indicated due to only receiving some relief from LMBB #1  6. Recommendations to PCP: see above  7. Follow up: 10-12 weeks      Total time spent face to face was 30 minutes and more than 50% of face to face time was spent in counseling and/or coordination of care regarding the diagnosis and recommendations above.    The information in this document, created by the medical scribe for me, accurately reflects the services I personally performed and the decisions made by me. I have reviewed and approved this document for accuracy prior to leaving the patient care area.  February 6, 2019     Melanie STEVENS, RN CNP, FNP  [facility]

## 2019-02-06 NOTE — PATIENT INSTRUCTIONS
PLAN:  1. Medications:   1. Continue Tramadol  mg once daily   2. Continue Tramadol 50 mg every 6-8 hours as needed, max 3 tabs per day. Prim script this month to align both the Tramadol and Tramadol ER. E-prescribed today. OK to fill on 2/11/2019 and to start on 2/13/2019.   3. Continue methocarbamol 500-1000 mg three times daily as needed for muscle spasms   4. Continue gabapentin 900 mg three times daily   5. Continue Topamax 50 mg BID   2. Procedures: none at present. I will check if proceeding with second lumbar MBB is indicated.   3. Follow-up with me in 10-12 weeks         ----------------------------------------------------------------  Clinic Number:  [phone]   Call this number with any questions about your care and for scheduling assistance. Calls are returned Monday through Friday between 8 AM and 4:30 PM. We usually get back to you within 2 business days depending on the issue/request.       Medication refills:    For non-narcotic medications, call your pharmacy directly to request a refill. The pharmacy will contact the [facility] Management [facility] for authorization. Please allow 3-4 days for these refills to be processed.     For narcotic refills, call the clinic number or send a Oxtox message. Please contact us 7-10 days before your refill is due. The message MUST include the name of the specific medication(s) requested and how you would like to receive the prescription(s). The options are as follows:    Pain Clinic staff can mail the prescription to your pharmacy. Please tell us the name of the pharmacy.    You may pick the prescription up at the Pain Clinic (tell us the location) or during a clinic visit with your pain provider    Pain Clinic staff can deliver the prescription to the [place] pharmacy in the clinic building. Please tell us the location.      We believe regular attendance is key to your success in our program.    Any time you are unable to keep your appointment we ask that  you call us at least 24 hours in advance to let us know. This will allow us to offer the appointment time to another patient.

## 2019-02-07 NOTE — TELEPHONE ENCOUNTER
As the activity was >70%, let's do #2, but please advise we may need to do a 3rd based on results.    Mariaelena Osorio MD  [facility]

## 2019-02-07 NOTE — TELEPHONE ENCOUNTER
Routed to Dr. Osorio to review.    Estelita Wetzel, RN-BSN  [facility]

## 2019-02-07 NOTE — TELEPHONE ENCOUNTER
[employer]- RFA REQUIREMENTS      [facility] COMMERCIAL  o 3 months of documented pain and failed conservative treatment (exercise, meds, injections, etc.);    o A course of PT within the last 6 months (or an unfavorable evaluation);   o  2 successful workups resulting in >70% pain relief.      Routing to review      Monalisa LUZ    [facility]

## 2019-02-07 NOTE — TELEPHONE ENCOUNTER
Alejandrina sent to pt:  From: Estelita Wetzel RN      Created: 2/7/2019 2:42 PM        Hi Palmer.  We received your pain diary.  I had Dr. Osorio review and she recommends proceeding to the next block with keeping in mind that on this block you do need to have a better response.  If you do there would be a chance your insurance would require that you have a 3rd block as your response, insurance guideline-wise was not quite adequate.    If you are interested you can call the appointment line at [phone].  Let me know if you have any questions.    Estelita Wetzel, TIMBO-BSN  [facility]

## 2019-02-08 ENCOUNTER — THERAPY VISIT (OUTPATIENT)
Dept: PHYSICAL THERAPY | Facility: CLINIC | Age: 59
End: 2019-02-08
Payer: OTHER MISCELLANEOUS

## 2019-02-08 DIAGNOSIS — M54.2 NECK PAIN: Primary | ICD-10-CM

## 2019-02-08 DIAGNOSIS — G44.209 TENSION TYPE HEADACHE: ICD-10-CM

## 2019-02-08 PROCEDURE — 97110 THERAPEUTIC EXERCISES: CPT | Mod: GP | Performed by: PHYSICAL THERAPIST

## 2019-02-08 PROCEDURE — 97140 MANUAL THERAPY 1/> REGIONS: CPT | Mod: GP | Performed by: PHYSICAL THERAPIST

## 2019-02-13 ENCOUNTER — THERAPY VISIT (OUTPATIENT)
Dept: PHYSICAL THERAPY | Facility: CLINIC | Age: 59
End: 2019-02-13
Payer: OTHER MISCELLANEOUS

## 2019-02-13 DIAGNOSIS — G89.29 CHRONIC NECK PAIN: ICD-10-CM

## 2019-02-13 DIAGNOSIS — M54.2 CHRONIC NECK PAIN: ICD-10-CM

## 2019-02-13 DIAGNOSIS — G44.209 TENSION TYPE HEADACHE: ICD-10-CM

## 2019-02-13 PROCEDURE — 97140 MANUAL THERAPY 1/> REGIONS: CPT | Mod: GP

## 2019-02-13 PROCEDURE — 97110 THERAPEUTIC EXERCISES: CPT | Mod: GP

## 2019-02-15 NOTE — TELEPHONE ENCOUNTER
Message sent to pt:    Xavier Chakraborty,     It appears that we have not received a response back from the message that Estelita sent to you on 2/7 and I do not see that any injections have been scheduling, so I am sending it again.      We look forward to hearing from you!    We received your pain diary.  I had Dr. Osorio review and she recommends proceeding to the next block with keeping in mind that on this block you do need to have a better response.  If you do there would be a chance your insurance would require that you have a 3rd block as your response, insurance guideline-wise was not quite adequate.    If you are interested you can call the appointment line at [phone].  Let me know if you have any questions.    Take care,     ANALISA ArtisN, RN-BC  Patient Care Supervisor/Care Coordinator  [facility]

## 2019-02-19 DIAGNOSIS — H10.13 ALLERGIC CONJUNCTIVITIS OF BOTH EYES: ICD-10-CM

## 2019-02-19 DIAGNOSIS — J30.81 CHRONIC ALLERGIC RHINITIS DUE TO ANIMAL HAIR AND DANDER: ICD-10-CM

## 2019-02-19 DIAGNOSIS — J30.1 CHRONIC SEASONAL ALLERGIC RHINITIS DUE TO POLLEN: ICD-10-CM

## 2019-02-19 DIAGNOSIS — J30.89 ALLERGIC RHINITIS DUE TO DUST MITE: ICD-10-CM

## 2019-02-19 RX ORDER — FLUTICASONE PROPIONATE 50 MCG
2 SPRAY, SUSPENSION (ML) NASAL 2 TIMES DAILY
Qty: 2 BOTTLE | Refills: 0 | Status: SHIPPED | OUTPATIENT
Start: 2019-02-19 | End: 2019-02-26

## 2019-02-19 NOTE — TELEPHONE ENCOUNTER
Pending Prescriptions:                       Disp   Refills    fluticasone (FLONASE) 50 MCG/ACT nasal sp*2 Selena*11           Sig: Spray 2 sprays into both nostrils 2 times daily    Routing refill request to provider for review/approval because:  Patient needs to be seen because it has been more than 1 year since last office visit.    Yoon Rivera RN

## 2019-02-19 NOTE — TELEPHONE ENCOUNTER
[employer] messages from patient on 2/15 at 1726:    Thanks for follow thru. Also got reminder message from Shanita because I had completely forgotten to call to schedule. Sorry, I wrote on calendar to call Mon. 2/18, that will give me chance to check with my may to see when available to give ride. Thanks as always.     Palmer     ------------  Shanita- I apologize I completely forgot to follow thru. I wrote it on the calendar to call Monday so don't forget. That will give me chance to check with my ride providers to see when they are available too. Thanks for reminder.     Palmer

## 2019-02-19 NOTE — TELEPHONE ENCOUNTER
Left message to call clinic back regarding medication refill. [place] allergy number left in voice mail.    Princess Odell CMA

## 2019-02-19 NOTE — TELEPHONE ENCOUNTER
Pt calling clinic back and we scheduled clinic follow up for next week. Closing encounter.     Princess Odell CMA

## 2019-02-19 NOTE — TELEPHONE ENCOUNTER
Rx refilled for 30 day supply. Patient last seen in clinic over 1 year ago. He will need a follow-up visit for additional medication refills.

## 2019-02-19 NOTE — TELEPHONE ENCOUNTER
Patient scheduled for MBB#2 on 2/25/19.    ANALISA ArtisN, RN-BC  Patient Care Supervisor/Care Coordinator  [facility]

## 2019-02-21 ENCOUNTER — THERAPY VISIT (OUTPATIENT)
Dept: PHYSICAL THERAPY | Facility: CLINIC | Age: 59
End: 2019-02-21
Payer: OTHER MISCELLANEOUS

## 2019-02-21 DIAGNOSIS — G89.29 CHRONIC NECK PAIN: ICD-10-CM

## 2019-02-21 DIAGNOSIS — G44.209 TENSION TYPE HEADACHE: ICD-10-CM

## 2019-02-21 DIAGNOSIS — M54.2 CHRONIC NECK PAIN: ICD-10-CM

## 2019-02-21 PROCEDURE — 97140 MANUAL THERAPY 1/> REGIONS: CPT | Mod: GP | Performed by: PHYSICAL THERAPIST

## 2019-02-21 PROCEDURE — 97110 THERAPEUTIC EXERCISES: CPT | Mod: GP | Performed by: PHYSICAL THERAPIST

## 2019-02-25 ENCOUNTER — ANCILLARY PROCEDURE (OUTPATIENT)
Dept: RADIOLOGY | Facility: CLINIC | Age: 59
End: 2019-02-25
Attending: PSYCHIATRY & NEUROLOGY
Payer: COMMERCIAL

## 2019-02-25 ENCOUNTER — RADIOLOGY INJECTION OFFICE VISIT (OUTPATIENT)
Dept: PALLIATIVE MEDICINE | Facility: CLINIC | Age: 59
End: 2019-02-25
Payer: COMMERCIAL

## 2019-02-25 VITALS
SYSTOLIC BLOOD PRESSURE: 122 MMHG | OXYGEN SATURATION: 98 % | RESPIRATION RATE: 16 BRPM | HEART RATE: 60 BPM | DIASTOLIC BLOOD PRESSURE: 87 MMHG

## 2019-02-25 DIAGNOSIS — M54.50 CHRONIC BILATERAL LOW BACK PAIN WITHOUT SCIATICA: Primary | ICD-10-CM

## 2019-02-25 DIAGNOSIS — G89.29 CHRONIC BILATERAL LOW BACK PAIN WITHOUT SCIATICA: Primary | ICD-10-CM

## 2019-02-25 DIAGNOSIS — M47.819 FACET ARTHROPATHY: ICD-10-CM

## 2019-02-25 DIAGNOSIS — M47.816 SPONDYLOSIS OF LUMBAR REGION WITHOUT MYELOPATHY OR RADICULOPATHY: ICD-10-CM

## 2019-02-25 PROCEDURE — 64494 INJ PARAVERT F JNT L/S 2 LEV: CPT | Mod: 50 | Performed by: PSYCHIATRY & NEUROLOGY

## 2019-02-25 PROCEDURE — 64493 INJ PARAVERT F JNT L/S 1 LEV: CPT | Mod: 50 | Performed by: PSYCHIATRY & NEUROLOGY

## 2019-02-25 ASSESSMENT — PAIN SCALES - GENERAL: PAINLEVEL: SEVERE PAIN (7)

## 2019-02-25 NOTE — PATIENT INSTRUCTIONS
[facility]   Medial Branch Block Discharge Instructions      Your procedure was performed by:  Dr. Mariaelena Osorio      Medications used include:  Lidocaine  Bupivicaine  Omnipaque    You will need to complete the Pain Scale Log form and return it to us as soon as possible.  Once we have received the form, we will review it and call you to determine the next steps.     The form can be faxed to [phone] or mailed to:   [facility]   [id number]   [place] [address]      You may resume your regular medications    If you were holding your blood thinning medication, please restart taking it: N/A    You may resume your regular activities    Be cautious with walking as numbness and/or weakness in the lower extremities may occur for up to 6-8 hours due to the effects of the anesthetic.    Avoid driving for 6 hours. The local anesthetic could slow your reflexes.     You may shower, however no swimming or tub baths or hot tubs for 24 hours following your procedure.    Your pain will return after the numbing medications have worn off.  You may use your current pain medications as needed.    You may use anti-inflammatory medications (such as ibuprofen, aleve, or advil) or Tylenol for pain control if necessary.  Some people find it helpful to alternate ibuprofen and Tylenol every 3 hours for a couple of days.    You may use ice packs 10-15 minutes three to four times a day at the injection site for comfort.     Do not use heat to painful areas for 6 to 8 hours. This will give the local anesthetic time to wear off and prevent you from accidentally burning your skin.     If you experience any of the following, call the Pain Clinic during work hours at [phone] or the Provider Line after hours at [phone]:  -Fever over 100 degree F  -Swelling, bleeding, redness, drainage, warmth at the injection site  -Progressive weakness or numbness in your legs   -If lumbar, call  if you have a loss of bowel or bladder function  -Unusual new onset of pain that is not improving

## 2019-02-25 NOTE — NURSING NOTE
Pre-procedure Intake    Have you been fasting? NA    If yes, for how long? No     Are you taking a prescribed blood thinner such as coumadin, Plavix, Xarelto?    No    If yes, when did you take your last dose? NO     Do you take aspirin?  No    If cervical procedure, have you held aspirin for 6 days?   No     Do you have any allergies to contrast dye, iodine, steroid and/or numbing medications?  NO    Are you currently taking antibiotics or have an active infection?  NO    Have you had a fever/elevated temperature within the past week? NO    Are you currently taking oral steroids? NO    Do you have a ? Yes       Are you pregnant or breastfeeding?  Not Applicable    Are the vital signs normal?  Yes    Mike Bergeron MA

## 2019-02-25 NOTE — NURSING NOTE
Discharge Information    IV Discontiued Time:  NA    Amount of Fluid Infused:  NA    Discharge Criteria = When patient returns to baseline or as per MD order    Consciousness:  Pt is fully awake    Circulation:  BP +/- 20% of pre-procedure level    Respiration:  Patient is able to breathe deeply    O2 Sat:  Patient is able to maintain O2 Sat >92% on room air    Activity:  Moves 4 extremities on command    Ambulation:  Patient is able to stand and walk or stand and pivot into wheelchair    Dressing:  Clean/dry or No Dressing    Notes:   Discharge instructions and AVS given to patient    Patient meets criteria for discharge?  YES    Admitted to PCU?  No    Responsible adult present to accompany patient home?  Yes    Signature/Title:    Van Oquendo RN Care Coordinator  [facility]

## 2019-02-25 NOTE — PROGRESS NOTES
Pre procedure Diagnosis: facet arthropathy   Post procedure Diagnosis: Same  Procedure performed: bilateral L3-5 medial branch blocks #2  Anesthesia: none  Complications: none- would recommend using contrast for radiofrequency ablation   Operators: Mariaelena Osorio MD and Frank Gaines DO     Indications:   Truman Suero is a 58 year old male was sent by Melanie Thomas NP for medial branch block.  They have a history of low back pain.  Exam shows pain with extension/rotation in the bilateral lumbar region and they have tried conservative treatment including physical therapy and OTC medications.    He had improvement of 72% with activity with his last procedure.    Physical therapy last done:  1/14/19 for ~5 sessions    Options/alternatives, benefits and risks were discussed with the patient including bleeding, infection, tissue trauma, exposure to radiation, reaction to medications, spinal cord injury, weakness, numbness and paralysis.  Questions were answered to his satisfaction and he agrees to proceed. Voluntary informed consent was obtained and signed.     Vitals were reviewed: Yes  Allergies were reviewed:  Yes   Medications were reviewed:  Yes   Pre-procedure pain score: 8/10    Procedure:  After getting informed consent, patient was brought into the procedure suite and was placed in a prone position on the procedure table.   A Pause for the Cause was performed.  Patient was prepped and draped in sterile fashion.     Under AP fluoroscopic guidance the L3, L4, L5 vertebral bodies were identified. The C-arm was rotated to the oblique view to afford optimal visualization the pedicles.  Lidocaine 1% was used to anesthetize the skin at each level.  Under intermittent fluoroscopy, 25G 3.5inch Quinke spinal needles were positioned inferior and lateral to the intersection of the transverse process and pedicle at the Bilateral L4 & L5 levels, as well as the corresponding sacral alar notches. The needle positions were  verified and optimized from the AP view.    The anatomic targets for the L3 & L4 medial nerve and L5 dorsal ramus (which functionally incorporates the medial branch) were the  L4 & L5 transverse processes and sacral alar notch, with laterality as described above, resulting in blockade of the L4/5 and L5/S1 facet joints.    Omnipaque-300 was injected, and appropriate spread of contrast was noted without vascular uptake.  A total of 1ml of Omnipaque was used.  9ml was wasted.     Bupivacaine 0.5% 0.5 ml was injected at each location.  The needles were removed.      Hemostasis was achieved, the area was cleaned, and bandaids were placed when appropriate.  The patient tolerated the procedure well, and was taken to the recovery room.    Images were saved to PACS.    The patient will continue to monitor progress, and they were given a pain diary to complete at home.  They will either fax or mail this back to us or bring it to their next appointment. We will determine the treatment plan after we review the diary.      Post-procedure pain score: 4/10  Follow-up includes:   -f/u phone call in one week  -will await diary for further planning.    Mariaelena Osorio MD  [facility]

## 2019-02-26 ENCOUNTER — OFFICE VISIT (OUTPATIENT)
Dept: ALLERGY | Facility: CLINIC | Age: 59
End: 2019-02-26
Payer: COMMERCIAL

## 2019-02-26 VITALS
DIASTOLIC BLOOD PRESSURE: 89 MMHG | WEIGHT: 153 LBS | SYSTOLIC BLOOD PRESSURE: 137 MMHG | HEART RATE: 65 BPM | TEMPERATURE: 97 F | OXYGEN SATURATION: 96 % | BODY MASS INDEX: 25.07 KG/M2

## 2019-02-26 DIAGNOSIS — J30.81 CHRONIC ALLERGIC RHINITIS DUE TO ANIMAL HAIR AND DANDER: ICD-10-CM

## 2019-02-26 DIAGNOSIS — J30.1 CHRONIC SEASONAL ALLERGIC RHINITIS DUE TO POLLEN: ICD-10-CM

## 2019-02-26 DIAGNOSIS — J30.89 ALLERGIC RHINITIS DUE TO DUST MITE: ICD-10-CM

## 2019-02-26 DIAGNOSIS — H10.13 ALLERGIC CONJUNCTIVITIS OF BOTH EYES: ICD-10-CM

## 2019-02-26 PROCEDURE — 99213 OFFICE O/P EST LOW 20 MIN: CPT | Performed by: ALLERGY & IMMUNOLOGY

## 2019-02-26 RX ORDER — FLUTICASONE PROPIONATE 50 MCG
2 SPRAY, SUSPENSION (ML) NASAL 2 TIMES DAILY
Qty: 32 ML | Refills: 11 | Status: SHIPPED | OUTPATIENT
Start: 2019-02-26 | End: 2020-03-26

## 2019-02-26 NOTE — PATIENT INSTRUCTIONS
If you have any questions regarding your allergies, asthma, or what we discussed during your visit today please call the allergy clinic or contact us via [facility].    [facility]: [phone]      If/when you are ready to start allergy shots then you can call to schedule a nurse visit to sign the consent form and go over how to use the epinephrine injectors.    If you need the metoprolol for your blood pressure you can discuss with your primary care provider to either change it to a different medication if possible otherwise you will need to hold this medication and not take it for 24 hours before getting your allergy shots.      These are the billing and diagnosis codes that your insurance company may need to help you determine if allergy shots are covered and what potential out of pocked costs you may have. You may also want to contact the [facility] at [phone] for more information.    Regular Allergy Shots: 17986    Cluster Accelerated Build Code: 58253 - rapid desensitization. This could be a single unit or multiple units billed per day depending on the length of your visit.    Allergy Shot Serum: 31466 - the amount of serum in total varies depending on how many allergy shots you will need (2 injection is 40mL or 40 doses)    Diagnosis Codes:    Allergic Rhinitis Due to Dust Mite - J30.89  Chronic Allergic Rhinitis Due to Animals - J30.81  Chronic Seasonal Allergic Rhinitis Due to Pollens - J30.1  Allergic Conjunctivitis - H10.13    Patient Education   Allergy Shots (Immunotherapy)  What You Need to Know  What are allergy shots?  Allergy shots are used to treat allergic reactions. They are given in the upper arm.  These shots will slowly build your tolerance to the things you are allergic to (such as pollen, mold and animal dander). They reduce the body's allergic response.  What are the benefits of getting allergy shots?  Allergy shots may:    Relieve symptoms long  after treatment has ended    Allow you to take less allergy medicine, or stop taking it altogether    Prevent new allergies in the future    Keep children's allergies from getting worse and turning into asthma    Improve eczema caused by allergies.  The average patient sees a large improvement in his or her symptoms (up to 80%).  Who should have allergy shots?  Allergy shots are helpful when allergies cause:    Asthma    Itchy, watery eyes (allergic conjunctivitis)    Runny nose, sneezing, sore throat and other symptoms (allergic rhinitis)    Severe reaction to insect stings.  For children, it is best to wait until age 5 before starting allergy shots.  How will I feel during and after treatment?  You will have shots every 3 to 14 days for 4 to 8 months. We will increase the dose each time until we reach a level that works for you. After that, you will have the shots less often.     It may take another year for symptoms to improve. Many people improve much sooner.    You will likely have shots for another 3 to 5 years.  Allergy shots can reduce your symptoms a little or lot. Some people find that their allergies go away entirely.   Most people have long-lasting relief after treatment ends. You should see your doctor every year to find out if you need more shots.  What are the risks?  With each allergy shot, there is the risk of allergic reaction. Some reactions are mild. Others can be life threatening and must be treated at once.   Common reactions  It is common to have a mild reaction, such as redness, swelling, pain and itching in the area of the shot. This can occur right away or up to several hours after the shot. Sometimes the reaction moves into the upper arm. Call your doctor if:    The reaction area is more than 2 inches wide.    You still have the reaction the day after the shot.  Severe reactions  The reactions below are rare, but serious. If not treated, they can lead to very low blood pressure and even  death. If you have any of these, get help at once.     Hives include a rash, swelling and itching on more than one part of the body. They may occur within minutes to hours after a shot.    Swelling of any part of the body. For example, you may have swelling of the ears, tongue, lips, throat, intestines, hands or feet. Swelling may affect more than one body part. These reactions could occur within minutes to hours after the shot.    Trouble breathing. You may develop sneezing, runny nose, stuffy nose, cough or asthma symptoms. These reactions can occur within minutes to hours after the shot.    Anaphylactic shock is sudden, severe and may include symptoms such as hives, trouble breathing, stomach pain, feeling faint or dizzy and low blood pressure. It may cause a loss of consciousness and could lead to death. This reaction usually occurs within minutes of the shot but can happen a few hours later. It is very rare.  You might have a severe reaction after the first shot, or it may occur after a series of shots. If you have a reaction, we will treat you right away. In the future, we will change the dose of your allergy shots.  What happens after each shot?  You should wait in the doctor's office for 30 minutes after each shot. If you have a reaction, we may ask you to stay longer. If you cannot wait the 30 minutes, you should not have your allergy shot.  If you have a severe reaction after leaving the doctor's office, use your epinephrine auto-injector (Epi-pen) and go to the nearest emergency room. If treated promptly, severe reactions are rarely life threatening. We will never give an allergy shot unless medical help is nearby in case of emergency.   What else do I need to know?  If you start taking any new medicines  It is not safe to have these shots while taking certain medicines. If any doctor prescribes new medicine for you, please let us know. This includes:    Beta blockers, often used for heart  disease    Blood pressure medicine    Medicine for migraine headaches    Glaucoma medicine.  A note for women  If you get pregnant, tell the office staff at once.   Your doctor will decide how often you should receive shots during pregnancy. We will not increase your dose while you are pregnant.  If you will have shots at another clinic  If you will have your allergy shots at another clinic, you must provide the name and address of the doctor who will give the shots. We will ask you to fill out a form.  If you have any questions or concerns, please speak to your doctor or a member of our staff.   For informational purposes only. Not to replace the advice of your health care provider.   Copyright   2009 [facility]. All rights reserved. [employer] [id number] - REV 07/17.

## 2019-02-26 NOTE — PROGRESS NOTES
Truman Suero was seen in the Allergy Clinic at [facility]. The following are my recommendations regarding his Allergic Rhinitis Due to Animals, Allergic Rhinitis Due to Pollen, Allergic Rhinitis Due to Dust Mites and Allergic Conjunctivitis    1. Continue fluticasone nasal spray, 2 sprays in each nostril twice daily  2. Take 10mg of loratadine or cetirizine daily as needed  3. Recommend allergen immunotherapy treatment for long-term management of symptoms  4. Follow-up in 1 year, sooner if needed or if wanting to start immunotherapy      Truman Suero is a 58 year old American male who is seen today for follow-up of allergic rhinoconjunctivitis. He was last seen in 1/2018. He reports that he has had improved control of his symptoms with the addition of fluticasone nasal spray. He will occasionally take loratadine as well but has needed antihistamines less since he has been using the nasal steroid. Palmer was interested in immunotherapy at his last visit but has been waiting to start as his wife started a new job and they now have new medical insurance. He is planning to contact the insurance to review coverage before proceeding with treatment.      Past Medical History:   Diagnosis Date     Ascending aorta dilatation (H) 2/13/2018     Cervical spondylosis without myelopathy 10/3/2017     Chronic bilateral low back pain with right-sided sciatica 5/16/2018     Hypertension      Mild CAD 2/13/2018     Neck pain     MRI positive on cervical vertebrea.     PAD (peripheral artery disease) (H) 2/27/2018     Tobacco abuse 8/16/2017     Family History   Problem Relation Age of Onset     Prostate Cancer Father      Breast Cancer Maternal Grandmother      Prostate Cancer Other        REVIEW OF SYSTEMS:  General: negative for weight gain. negative for weight loss. negative for changes in sleep.   Eyes: negative for itching. negative for redness. negative for tearing/watering. negative for vision  changes  Ears: negative for fullness. negative for hearing loss. negative for dizziness.   Nose: negative for snoring.negative for changes in smell. negative for drainage.   Throat: negative for hoarseness. negative for sore throat. negative for trouble swallowing.   Lungs: negative for cough. negative for shortness of breath.negative for wheezing. negative for sputum production.   Cardiovascular: negative for chest pain. negative for swelling of ankles. negative for fast or irregular heartbeat.   Gastrointestinal: negative for nausea. negative for heartburn. negative for acid reflux.   Musculoskeletal: negative for joint pain. negative for joint stiffness. negative for joint swelling.   Neurologic: negative for seizures. negative for fainting. negative for weakness.   Psychiatric: negative for changes in mood. negative for anxiety.   Endocrine: negative for cold intolerance. negative for heat intolerance. negative for tremors.   Hematologic: negative for easy bruising. negative for easy bleeding.  Integumentary: negative for rash. negative for scaling. negative for nail changes.       Current Outpatient Medications:      buPROPion (WELLBUTRIN SR) 150 MG 12 hr tablet, Take 1 tablet once daily and increase to 1 tablet twice daily after 4 to 7 days, Disp: 180 tablet, Rfl: 1     cyclobenzaprine (FLEXERIL) 5 MG tablet, Take 1-2 tablets (5-10 mg) by mouth 3 times daily as needed for muscle spasms, Disp: 45 tablet, Rfl: 0     fluticasone (FLONASE) 50 MCG/ACT nasal spray, Spray 2 sprays into both nostrils 2 times daily Needs appointment for further refills, Disp: 2 Bottle, Rfl: 0     gabapentin (NEURONTIN) 600 MG tablet, Take 1.5 tablets (900 mg) by mouth 3 times daily, Disp: 135 tablet, Rfl: 3     metoprolol succinate (TOPROL-XL) 25 MG 24 hr tablet, Take 1 tablet (25 mg) by mouth daily (Patient taking differently: Take 25 mg by mouth At Bedtime ), Disp: 30 tablet, Rfl: 1     topiramate (TOPAMAX) 50 MG tablet, Take 1  tablet (50 mg) by mouth 2 times daily Drink plenty of water and no alcohol. If side effects, then reduce to last tolerable dosage., Disp: 60 tablet, Rfl: 2     traMADol (ULTRAM) 50 MG tablet, Take 1-2 tablets ( mg) by mouth every 6 hours as needed for severe pain max 3 tabs/day. fill 2/11/19; start 2/13/19, 18 day script, Disp: 54 tablet, Rfl: 0     traMADol (ULTRAM-ER) 200 MG 24 hr tablet, Take 1 tablet (200 mg) by mouth daily Okay to fill/begin on 2/1/2019-to last 30 days, Disp: 30 tablet, Rfl: 0    EXAM:   /89 (BP Location: Left arm, Patient Position: Sitting, Cuff Size: Adult Regular)   Pulse 65   Temp 97  F (36.1  C) (Oral)   Wt 69.4 kg (153 lb)   SpO2 96%   BMI 25.07 kg/m    GENERAL APPEARANCE: alert, cooperative and not in distress  SKIN: no rashes, no lesions  HEAD: atraumatic, normocephalic  ENT: no scars or lesions, tongue midline and normal, soft palate, uvula, and tonsils normal  NECK: no asymmetry, masses, or scars, supple without significant adenopathy  LUNGS: unlabored respirations, no intercostal retractions or accessory muscle use, clear to auscultation without rales or wheezes  HEART: regular rate and rhythm without murmurs and normal S1 and S2  MUSCULOSKELETAL: no musculoskeletal defects are noted  NEURO: no focal deficits noted  PSYCH: does not appear depressed or anxious      WORKUP:  None    ASSESSMENT/PLAN:  Truman Suero is a 58 year old male here for follow-up of allergic rhinoconjunctivitis. He has been using fluticasone nasal spray and feels this is working well. On occasion he will take loratadine as well. Palmer was counseled regarding continued medication management vs allergen immunotherapy treatment. We reviewed the risks, benefits, and anticipated duration of treatment. We also discussed traditional vs accelerated build schedules. He would like to contact his insurance company before proceeding with treatment.    1. Continue fluticasone nasal spray, 2 sprays in  each nostril twice daily  2. Take 10mg of loratadine or cetirizine daily as needed  3. Recommend allergen immunotherapy treatment for long-term management of symptoms  4. Follow-up in 1 year, sooner if needed or if wanting to start immunotherapy      Dontrell Tilley MD  Allergy/Immunology  [place], [place]      Chart documentation done in part with Dragon Voice Recognition Software. Although reviewed after completion, some word and grammatical errors may remain.

## 2019-02-26 NOTE — LETTER
2/26/2019         RE: Truman Suero  [address] [address]        Dear Colleague,    Thank you for referring your patient, Truman Suero, to the [facility]. Please see a copy of my visit note below.    Truman Suero was seen in the Allergy Clinic at [facility]. The following are my recommendations regarding his Allergic Rhinitis Due to Animals, Allergic Rhinitis Due to Pollen, Allergic Rhinitis Due to Dust Mites and Allergic Conjunctivitis    1. Continue fluticasone nasal spray, 2 sprays in each nostril twice daily  2. Take 10mg of loratadine or cetirizine daily as needed  3. Recommend allergen immunotherapy treatment for long-term management of symptoms  4. Follow-up in 1 year, sooner if needed or if wanting to start immunotherapy      Truman Suero is a 58 year old American male who is seen today for follow-up of allergic rhinoconjunctivitis. He was last seen in 1/2018. He reports that he has had improved control of his symptoms with the addition of fluticasone nasal spray. He will occasionally take loratadine as well but has needed antihistamines less since he has been using the nasal steroid. Palmer was interested in immunotherapy at his last visit but has been waiting to start as his wife started a new job and they now have new medical insurance. He is planning to contact the insurance to review coverage before proceeding with treatment.      Past Medical History:   Diagnosis Date     Ascending aorta dilatation (H) 2/13/2018     Cervical spondylosis without myelopathy 10/3/2017     Chronic bilateral low back pain with right-sided sciatica 5/16/2018     Hypertension      Mild CAD 2/13/2018     Neck pain     MRI positive on cervical vertebrea.     PAD (peripheral artery disease) (H) 2/27/2018     Tobacco abuse 8/16/2017     Family History   Problem Relation Age of Onset     Prostate Cancer Father      Breast Cancer Maternal Grandmother       Prostate Cancer Other        REVIEW OF SYSTEMS:  General: negative for weight gain. negative for weight loss. negative for changes in sleep.   Eyes: negative for itching. negative for redness. negative for tearing/watering. negative for vision changes  Ears: negative for fullness. negative for hearing loss. negative for dizziness.   Nose: negative for snoring.negative for changes in smell. negative for drainage.   Throat: negative for hoarseness. negative for sore throat. negative for trouble swallowing.   Lungs: negative for cough. negative for shortness of breath.negative for wheezing. negative for sputum production.   Cardiovascular: negative for chest pain. negative for swelling of ankles. negative for fast or irregular heartbeat.   Gastrointestinal: negative for nausea. negative for heartburn. negative for acid reflux.   Musculoskeletal: negative for joint pain. negative for joint stiffness. negative for joint swelling.   Neurologic: negative for seizures. negative for fainting. negative for weakness.   Psychiatric: negative for changes in mood. negative for anxiety.   Endocrine: negative for cold intolerance. negative for heat intolerance. negative for tremors.   Hematologic: negative for easy bruising. negative for easy bleeding.  Integumentary: negative for rash. negative for scaling. negative for nail changes.       Current Outpatient Medications:      buPROPion (WELLBUTRIN SR) 150 MG 12 hr tablet, Take 1 tablet once daily and increase to 1 tablet twice daily after 4 to 7 days, Disp: 180 tablet, Rfl: 1     cyclobenzaprine (FLEXERIL) 5 MG tablet, Take 1-2 tablets (5-10 mg) by mouth 3 times daily as needed for muscle spasms, Disp: 45 tablet, Rfl: 0     fluticasone (FLONASE) 50 MCG/ACT nasal spray, Spray 2 sprays into both nostrils 2 times daily Needs appointment for further refills, Disp: 2 Bottle, Rfl: 0     gabapentin (NEURONTIN) 600 MG tablet, Take 1.5 tablets (900 mg) by mouth 3 times daily, Disp: 135  tablet, Rfl: 3     metoprolol succinate (TOPROL-XL) 25 MG 24 hr tablet, Take 1 tablet (25 mg) by mouth daily (Patient taking differently: Take 25 mg by mouth At Bedtime ), Disp: 30 tablet, Rfl: 1     topiramate (TOPAMAX) 50 MG tablet, Take 1 tablet (50 mg) by mouth 2 times daily Drink plenty of water and no alcohol. If side effects, then reduce to last tolerable dosage., Disp: 60 tablet, Rfl: 2     traMADol (ULTRAM) 50 MG tablet, Take 1-2 tablets ( mg) by mouth every 6 hours as needed for severe pain max 3 tabs/day. fill 2/11/19; start 2/13/19, 18 day script, Disp: 54 tablet, Rfl: 0     traMADol (ULTRAM-ER) 200 MG 24 hr tablet, Take 1 tablet (200 mg) by mouth daily Okay to fill/begin on 2/1/2019-to last 30 days, Disp: 30 tablet, Rfl: 0    EXAM:   /89 (BP Location: Left arm, Patient Position: Sitting, Cuff Size: Adult Regular)   Pulse 65   Temp 97  F (36.1  C) (Oral)   Wt 69.4 kg (153 lb)   SpO2 96%   BMI 25.07 kg/m     GENERAL APPEARANCE: alert, cooperative and not in distress  SKIN: no rashes, no lesions  HEAD: atraumatic, normocephalic  ENT: no scars or lesions, tongue midline and normal, soft palate, uvula, and tonsils normal  NECK: no asymmetry, masses, or scars, supple without significant adenopathy  LUNGS: unlabored respirations, no intercostal retractions or accessory muscle use, clear to auscultation without rales or wheezes  HEART: regular rate and rhythm without murmurs and normal S1 and S2  MUSCULOSKELETAL: no musculoskeletal defects are noted  NEURO: no focal deficits noted  PSYCH: does not appear depressed or anxious      WORKUP:  None    ASSESSMENT/PLAN:  Truman Suero is a 58 year old male here for follow-up of allergic rhinoconjunctivitis. He has been using fluticasone nasal spray and feels this is working well. On occasion he will take loratadine as well. Palmer was counseled regarding continued medication management vs allergen immunotherapy treatment. We reviewed the risks,  benefits, and anticipated duration of treatment. We also discussed traditional vs accelerated build schedules. He would like to contact his insurance company before proceeding with treatment.    1. Continue fluticasone nasal spray, 2 sprays in each nostril twice daily  2. Take 10mg of loratadine or cetirizine daily as needed  3. Recommend allergen immunotherapy treatment for long-term management of symptoms  4. Follow-up in 1 year, sooner if needed or if wanting to start immunotherapy      Dontrell Tilley MD  Allergy/Immunology  [place], [place]      Chart documentation done in part with Dragon Voice Recognition Software. Although reviewed after completion, some word and grammatical errors may remain.      Again, thank you for allowing me to participate in the care of your patient.        Sincerely,        Dontrell Tilley MD

## 2019-02-28 ENCOUNTER — MYC REFILL (OUTPATIENT)
Dept: PALLIATIVE MEDICINE | Facility: CLINIC | Age: 59
End: 2019-02-28

## 2019-02-28 ENCOUNTER — THERAPY VISIT (OUTPATIENT)
Dept: PHYSICAL THERAPY | Facility: CLINIC | Age: 59
End: 2019-02-28
Payer: OTHER MISCELLANEOUS

## 2019-02-28 DIAGNOSIS — M47.812 CERVICAL SPONDYLOSIS WITHOUT MYELOPATHY: ICD-10-CM

## 2019-02-28 DIAGNOSIS — G89.29 CHRONIC NECK PAIN: ICD-10-CM

## 2019-02-28 DIAGNOSIS — M50.30 DDD (DEGENERATIVE DISC DISEASE), CERVICAL: ICD-10-CM

## 2019-02-28 DIAGNOSIS — M54.12 CERVICAL RADICULOPATHY: ICD-10-CM

## 2019-02-28 DIAGNOSIS — M54.2 CHRONIC NECK PAIN: ICD-10-CM

## 2019-02-28 PROCEDURE — 97110 THERAPEUTIC EXERCISES: CPT | Mod: GP

## 2019-02-28 PROCEDURE — 97530 THERAPEUTIC ACTIVITIES: CPT | Mod: GP

## 2019-02-28 RX ORDER — TRAMADOL HYDROCHLORIDE 200 MG/1
200 TABLET, EXTENDED RELEASE ORAL DAILY
Qty: 30 TABLET | Refills: 0 | Status: CANCELLED | OUTPATIENT
Start: 2019-02-28

## 2019-02-28 RX ORDER — TRAMADOL HYDROCHLORIDE 50 MG/1
50-100 TABLET ORAL EVERY 6 HOURS PRN
Qty: 54 TABLET | Refills: 0 | Status: CANCELLED | OUTPATIENT
Start: 2019-02-28

## 2019-02-28 NOTE — TELEPHONE ENCOUNTER
Request for tramadol 200 mg ER added to previous encounter that included tramadol 50 mg request.     ANALISA ArtisN, RN-BC  Patient Care Supervisor/Care Coordinator  [facility] [place]

## 2019-02-28 NOTE — TELEPHONE ENCOUNTER
Alejandrina message from patient on 2/28 at 12/18:    Truman REI Suero would like a refill of the following medications:     traMADol (ULTRAM) 50 MG tablet [RODNEY Vargas CNP]     traMADol (ULTRAM-ER) 200 MG 24 hr tablet [RODNEY Vargas CNP]    Preferred pharmacy: [facility] [address] [place] [address] [address] [place] [address]   ------------  Will route to MA pool for assistance with gathering opioid refill information.    Shanita Briones, ANALISAN, RN-BC  Patient Care Supervisor/Care Coordinator  [facility] [place]

## 2019-03-01 RX ORDER — TRAMADOL HYDROCHLORIDE 50 MG/1
50-100 TABLET ORAL EVERY 6 HOURS PRN
Qty: 90 TABLET | Refills: 0 | Status: SHIPPED | OUTPATIENT
Start: 2019-03-01 | End: 2019-04-18

## 2019-03-01 RX ORDER — TRAMADOL HYDROCHLORIDE 200 MG/1
200 TABLET, EXTENDED RELEASE ORAL DAILY
Qty: 30 TABLET | Refills: 0 | Status: SHIPPED | OUTPATIENT
Start: 2019-03-01 | End: 2019-03-01

## 2019-03-01 RX ORDER — TRAMADOL HYDROCHLORIDE 200 MG/1
TABLET, EXTENDED RELEASE ORAL
Qty: 30 TABLET | Refills: 0 | Status: SHIPPED | OUTPATIENT
Start: 2019-03-01 | End: 2019-04-01

## 2019-03-01 RX ORDER — TRAMADOL HYDROCHLORIDE 50 MG/1
50-100 TABLET ORAL EVERY 6 HOURS PRN
Qty: 90 TABLET | Refills: 0 | Status: SHIPPED | OUTPATIENT
Start: 2019-03-01 | End: 2019-03-01

## 2019-03-01 NOTE — TELEPHONE ENCOUNTER
Received call from patient requesting refill(s) of Tramadol 50 mg    Last picked up from pharmacy on 2/12/19    traMADol (ULTRAM-ER) 200 MG 24 hr tablet 2/2/19      Pt last seen by prescribing provider on 11/28/18  Next appt scheduled for 4/17/19     checked in the past 6 months? Yes If no, print current report and give to RN    Last urine drug screen date 1/8/18  Current opioid agreement on file (completed within the last year) Yes Date of opioid agreement: 1/8/18    Processing (pick one and delete the others):    [facility] pharmacy     Kailyn Hope MA  [facility]    Will route to nursing pool for review and preparation of prescription(s).

## 2019-03-01 NOTE — TELEPHONE ENCOUNTER
Message sent to pt:    Elmer Chakraborty,     We have processed your prescription requests and they have been e-prescribed to your pharmacy.  The Tramadol 200 mg ER is due to start on Sunday and the Tramadol 50 mg is not until 3/15.  Also, you are overdue for renewing the required protocols for safe prescribing of opioid medications so Melanie Thomas CNP would like you to schedule a nurse visit to get this completed within the next 10 days.  Please call the main clinic number at [phone] to schedule the nurse visit appointment.     Thank you,     CAMILLE Artis, RN-BC  Patient Care Supervisor/Care Coordinator  [facility] [place]  -----------  Will keep encounter open for a few days for return call/questions from patient and to follow up on scheduling of nurse visit appt.     CAMILLE Artis, RN-BC  Patient Care Supervisor/Care Coordinator  [facility] [place]

## 2019-03-01 NOTE — TELEPHONE ENCOUNTER
Signed Prescriptions:                        Disp   Refills    traMADol (ULTRAM) 50 MG tablet             90 tab*0        Sig: Take 1-2 tablets ( mg) by mouth every 6 hours           as needed for severe pain max 3 tabs/day. fill           3/1/19; start 3/2/19, 30 day  Authorizing Provider: FATMATA ROMERO    traMADol (ULTRAM-ER) 200 MG 24 hr tablet   30 tab*0        Sig: Take 1 tablet (200 mg) by mouth daily Okay to fill           3/1/19 begin on 3/3/2019-to last 30 days  Authorizing Provider: FATMATA ROMERO    Please call Palmer and set up a nurse visit to complete UDS and repeat OA in the next 10 days.     Prescription signed and placed in MA tower for further facilitation.    RODNEY Torres [facility] [place]

## 2019-03-01 NOTE — TELEPHONE ENCOUNTER
Agree with plan.   Thanks.  Melanie STEVENS RN CNP, FNP  [facility] [place]

## 2019-03-01 NOTE — TELEPHONE ENCOUNTER
Called pharmacy to check  date for medications    Tramadol 50 picked up on 2/11/19  Tramadol 200 on 2/1/19    Due dates:   Tramadol 200 mg ER 3/3/19  tramadol 50 mg 3/15   ---------  Due to long acting medication starting on 3/3, will ask Dr. Corrigan to e-prescribe medications.     Hard copies destroyed.     Will send pt [employer] message re: need for UDS and OA in 10 days.     ANALISA ArtisN, RN-BC  Patient Care Supervisor/Care Coordinator  [facility] [place]

## 2019-03-01 NOTE — TELEPHONE ENCOUNTER
Medication refill information reviewed.     Due date for traMADol (ULTRAM-ER) 200 MG 24 hr tablet 2/2/19 is 3/3/19     Prescriptions prepped for review.     Will route to provider.       [facility] pharmacy     Van Oquendo, RN  Care Coordinator   [facility] [place]

## 2019-03-04 NOTE — TELEPHONE ENCOUNTER
Nurse visit is scheduled for 3/5/19.    CAMILLE Wang, RN  Care Coordinator  [facility] [place]

## 2019-03-04 NOTE — TELEPHONE ENCOUNTER
Per patient myChart message:  From: Palmer Suero      Created: 3/1/2019 6:14 PM        No problem- I will take care of that, and call to make appointment    Palmer        Appointment not made yet.    Estelita Wetzel RN-BSN  [facility]

## 2019-03-05 ENCOUNTER — ALLIED HEALTH/NURSE VISIT (OUTPATIENT)
Dept: PALLIATIVE MEDICINE | Facility: CLINIC | Age: 59
End: 2019-03-05
Payer: COMMERCIAL

## 2019-03-05 ENCOUNTER — MYC MEDICAL ADVICE (OUTPATIENT)
Dept: PALLIATIVE MEDICINE | Facility: CLINIC | Age: 59
End: 2019-03-05

## 2019-03-05 DIAGNOSIS — Z79.891 ENCOUNTER FOR LONG-TERM OPIATE ANALGESIC USE: ICD-10-CM

## 2019-03-05 DIAGNOSIS — Z79.891 ENCOUNTER FOR LONG-TERM OPIATE ANALGESIC USE: Primary | ICD-10-CM

## 2019-03-05 PROCEDURE — 99000 SPECIMEN HANDLING OFFICE-LAB: CPT | Performed by: NURSE PRACTITIONER

## 2019-03-05 PROCEDURE — 80307 DRUG TEST PRSMV CHEM ANLYZR: CPT | Mod: 90 | Performed by: NURSE PRACTITIONER

## 2019-03-05 NOTE — TELEPHONE ENCOUNTER
Pt had a bilateral Lumbar  medial branch block # 2 on 2/25/19.  The post medial branch block form was  received.    Max % of pain relief from blocks:    bilateral Lumbar medial branch block #1:      Max relief from block is:    At rest:                 57%  Left fact load:       57%  Right facet load:  43%  Normal activity:    72%    Max relief from block #2 is:    At rest:                 57%  Left fact load:       63%  Right facet load:  43%  Normal activity:    57%    Insurance:  [employer]   Per intake question grid    Does pt have adequate relief insurance-wise to proceed to radiofrequency ablation?  No Needs >70%.     Physical therapy:                  Last done in?:  current    How many sessions?:  .      Where was it done?  [facility] [place]    Called pt and informed him/her that insurance would be checked and will be  called once we get a response.  Not Done    The post procedure form was placed in Dr. Osorio's bin for review.    Routed to Dr. Osorio.      Estelita Wetzel RN-BSN  [facility]

## 2019-03-05 NOTE — LETTER
[facility]  03/05/19    Patient: Truman Suero  YOB: 1960  Medical Record Number: [id number]                                                                  Opioid / Opioid Plus Controlled Substance Agreement    I understand that my care provider has prescribed an opioid (narcotic) controlled substance to help manage my condition(s). I am taking this medicine to help me function or work. I know this is strong medicine, and that it can cause serious side effects. Opioid medicine can be sedating, addicting and may cause a dependency on the drug. They can affect my ability to drive or think, and cause depression. They need to be taken exactly as prescribed. Combining opioids with certain medicines or chemicals (such as cocaine, sedatives and tranquilizers, sleeping pills, meth) can be dangerous or even fatal. Also, if I stop opioids suddenly, I may have severe withdrawal symptoms. Last, I understand that opioids do not work for all types of pain nor for all patients. If not helpful, I may be asked to stop them.        The risks, benefits, and side effects of these medicine(s) were explained to me. I agree that:    1. I will take part in other treatments as advised by my care team. This may be psychiatry or counseling, physical therapy, behavioral therapy, group treatment or a referral to a pain clinic. I will reduce or stop my medicine when my care team tells me to do so.  2. I will take my medicines as prescribed. I will not change the dose or schedule unless my care team tells me to. There will be no refills if I  run out early.   I may be contactedwithout warning and asked to complete a urine drug test or pill count at any time.   3. I will keep all my appointments, and understand this is part of the monitoring of opioids. My care team may require an office visit for EVERY opioid/controlled substance refill. If I miss appointments or don t follow instructions, my care team  may stop my medicine.  4. I will not ask other providers to prescribe controlled substances, and I will not accept controlled substances from other people. If I need another prescribed controlled substance for a new reason, I will tell my care team within 1 business day.  5. I will use one pharmacy to fill all of my controlled substance prescriptions, and it is up to me to make sure that I do not run out of my medicines on weekends or holidays. If my care team is willing to refill my opioid prescription without a visit, I must request refills only during office hours, refills may take up to 3 days to process, and it may take up to 5 to 7 days for my medicine to be mailed and ready at my pharmacy. Prescriptions will not be mailed anywhere except my pharmacy.        [id number]  Rev 12/18         Registration to scan to EHR                             Page 1 of 2               Controlled Substance Agreement Opioid        [facility]  03/05/19  Patient: Truman Suero  YOB: 1960  Medical Record Number: [id number]                                                                  6. I am responsible for my prescriptions. If the medicine/prescription is lost or stolen, it will not be replaced. I also agree not to share controlled substance medicines with anyone.  7. I agree to not use ANY illegal or recreational drugs. This includes marijuana, cocaine, bath salts or other drugs. I agree not to use alcohol unless my care team says I may.          I agree to give urine samples whenever asked. If I don t give a urine sample, the care team may stop my medicine.    8. If I enroll in the [place] Medical Marijuana program, I will tell my care team. I will also sign an agreement to share my medical records with my care team.   9. I will bring in my list of medicines (or my medicine bottles) each time I come to the clinic.   10. I will tell my care team right away if I become pregnant or have a  new medical problem treated outside of my regular clinic.  11. I understand that this medicine can affect my thinking and judgment. It may be unsafe for me to drive, use machinery and do dangerous tasks. I will not do any of these things until I know how the medicine affects me. If my dose changes, I will wait to see how it affects me. I will contact my care team if I have concerns about medicine side effects.    I understand that if I do not follow any of the conditions above, my prescriptions or treatment may be stopped.      I agree that my provider, clinic care team, and pharmacy may work with any city, state or federal law enforcement agency that investigates the misuse, sale, or other diversion of my controlled medicine. I will allow my provider to discuss my care with or share a copy of this agreement with any other treating provider, pharmacy or emergency room where I receive care. I agree to give up (waive) any right of privacy or confidentiality with respect to these consents.     I have read this agreement and have asked questions about anything I did not understand.      ________________________________________________________________________  Patient signature - Date/Time -  Truman Suero                                      ________________________________________________________________________  Witness signature                                                            ________________________________________________________________________  Provider signature -  PAIN NURSE      [id number]  Rev 12/18         Registration to scan to EHR                         Page 2 of 2                   Controlled Substance Agreement Opioid           Page 1 of 2  Opioid Pain Medicines (also known as Narcotics)  What You Need to Know    What are opioids?   Opioids are pain medicines that must be prescribed by a doctor.  They are also known as narcotics.    Examples are:     morphine (MS Contin, Alyce)    oxycodone  (Oxycontin)    oxycodone and acetaminophen (Percocet)    hydrocodone and acetaminophen (Vicodin, Norco)     fentanyl patch (Duragesic)     hydromorphone (Dilaudid)     methadone     What do opioids do well?   Opioids are best for short-term pain after a surgery or injury. They also work well for cancer pain. Unlike other pain medicines, they do not cause liver or kidney failure or ulcers. They may help some people with long-lasting (chronic) pain.     What do opioids NOT do well?   Opioids never get rid of pain entirely, and they do not work well for most patients with chronic pain. Opioids do not reduce swelling, one of the causes of pain. They also don t work well for nerve pain.                           For informational purposes only.  Not to replace the advice of your care provider.  Copyright 201 [facility]. All right reserved. [employer] 416639-Rmm 02/18.      Page 2 of 2    Risks and side effects   Talk to your doctor before you start or decide to keep taking one of these medicines. Side effects include:    Lowering your breathing rate enough to cause death    Overdose, including death, especially if taking higher than prescribed doses    Long-term opioid use    Worse depression symptoms; less pleasure in things you usually enjoy    Feeling tired or sluggish    Slower thoughts or cloudy thinking    Being more sensitive to pain over time; pain is harder to control    Trouble sleeping or restless sleep    Changes in hormone levels (for example, less testosterone)    Changes in sex drive or ability to have sex    Constipation    Unsafe driving    Itching and sweating    Feeling dizzy    Nausea, vomiting and dry mouth    What else should I know about opioids?  When someone takes opioids for too long or too often, they become dependent. This means that if you stop or reduce the medicine too quickly, you will have withdrawal symptoms.    Dependence is not the same as addiction. Addiction is when  people keep using a substance that harms their body, their mind or their relations with others. If you have a history of drug or alcohol abuse, taking opioids can cause a relapse.    Over time, opioids don t work as well. Most people will need higher and higher doses. The higher the dose, the more serious the side effects. We don t know the long-term effects of opioids.      Prescribed opioids aren't the best way to manage chronic pain    Other ways to manage pain include:      Ibuprofen or acetaminophen.  You should always try this first.      Treat health problems that may be causing pain.      acupuncture or massage, deep breathing, meditation, visual imagery, aromatherapy.      Use heat or ice at the pain site      Physical therapy and exercise      Stop smoking      See a counselor or therapist                                                  People who have used opioids for a long time may have a lower quality of life, worse depression, higher levels of pain and more visits to doctors.    Never share your opioids with others. Be sure to store opioids in a secure place, locked if possible.Young children can easily swallow them and overdose.     You can overdose on opioids.  Signs of overdose include decrease or loss of consciousness, slowed breathing, trouble waking and blue lips.  If someone is worried about overdose, they should call 911.    If you are at risk for overdose, you may get naloxone (Narcan, a medicine that reverses the effects of opioids.  If you overdose, a friend or family member can give you Narcan while waiting for the ambulance.  They need to know the signs of overdose and how to give Narcan.    While you're taking opioids:    Don't use alcohol or street drugs. Taking them together can cause death.    Don't take any of these medicines unless your doctor says its okay.  Taking these with opioids can cause death.    Benzodiazepines (such as lorazepam         or diazepam)    Muscle relaxers  (such as cyclobenzaprine)    sleeping pills    other opioids    Safe disposal of opioids  Find your area drug take-back program, your pharmacy mail-back program, buy a special disposal bag (such as Deterra) from your pharmacy or flush them down the toilet.  Use the guidelines at:  www.fda.gov/drugs/resourcesforyou

## 2019-03-05 NOTE — LETTER
[facility]  03/05/19    Patient: Truman Suero  YOB: 1960  Medical Record Number: [id number]                                                                  Opioid / Opioid Plus Controlled Substance Agreement    I understand that my care provider has prescribed an opioid (narcotic) controlled substance to help manage my condition(s). I am taking this medicine to help me function or work. I know this is strong medicine, and that it can cause serious side effects. Opioid medicine can be sedating, addicting and may cause a dependency on the drug. They can affect my ability to drive or think, and cause depression. They need to be taken exactly as prescribed. Combining opioids with certain medicines or chemicals (such as cocaine, sedatives and tranquilizers, sleeping pills, meth) can be dangerous or even fatal. Also, if I stop opioids suddenly, I may have severe withdrawal symptoms. Last, I understand that opioids do not work for all types of pain nor for all patients. If not helpful, I may be asked to stop them.      The risks, benefits, and side effects of these medicine(s) were explained to me. I agree that:    1. I will take part in other treatments as advised by my care team. This may be psychiatry or counseling, physical therapy, behavioral therapy, group treatment or a referral to a pain clinic. I will reduce or stop my medicine when my care team tells me to do so.  2. I will take my medicines as prescribed. I will not change the dose or schedule unless my care team tells me to. There will be no refills if I  run out early.   I may be contactedwithout warning and asked to complete a urine drug test or pill count at any time.   3. I will keep all my appointments, and understand this is part of the monitoring of opioids. My care team may require an office visit for EVERY opioid/controlled substance refill. If I miss appointments or don t follow instructions, my care team  may stop my medicine.  4. I will not ask other providers to prescribe controlled substances, and I will not accept controlled substances from other people. If I need another prescribed controlled substance for a new reason, I will tell my care team within 1 business day.  5. I will use one pharmacy to fill all of my controlled substance prescriptions, and it is up to me to make sure that I do not run out of my medicines on weekends or holidays. If my care team is willing to refill my opioid prescription without a visit, I must request refills only during office hours, refills may take up to 3 days to process, and it may take up to 5 to 7 days for my medicine to be mailed and ready at my pharmacy. Prescriptions will not be mailed anywhere except my pharmacy.        [id number]  Rev 12/18         Registration to scan to EHR                             Page 1 of 2               Controlled Substance Agreement Opioid        [facility]  03/05/19  Patient: Truman Suero  YOB: 1960  Medical Record Number: [id number]                                                                  6. I am responsible for my prescriptions. If the medicine/prescription is lost or stolen, it will not be replaced. I also agree not to share controlled substance medicines with anyone.  7. I agree to not use ANY illegal or recreational drugs. This includes marijuana, cocaine, bath salts or other drugs. I agree not to use alcohol unless my care team says I may.          I agree to give urine samples whenever asked. If I don t give a urine sample, the care team may stop my medicine.    8. If I enroll in the [place] Medical Marijuana program, I will tell my care team. I will also sign an agreement to share my medical records with my care team.   9. I will bring in my list of medicines (or my medicine bottles) each time I come to the clinic.   10. I will tell my care team right away if I become pregnant or have a  new medical problem treated outside of my regular clinic.  11. I understand that this medicine can affect my thinking and judgment. It may be unsafe for me to drive, use machinery and do dangerous tasks. I will not do any of these things until I know how the medicine affects me. If my dose changes, I will wait to see how it affects me. I will contact my care team if I have concerns about medicine side effects.    I understand that if I do not follow any of the conditions above, my prescriptions or treatment may be stopped.      I agree that my provider, clinic care team, and pharmacy may work with any city, state or federal law enforcement agency that investigates the misuse, sale, or other diversion of my controlled medicine. I will allow my provider to discuss my care with or share a copy of this agreement with any other treating provider, pharmacy or emergency room where I receive care. I agree to give up (waive) any right of privacy or confidentiality with respect to these consents.     I have read this agreement and have asked questions about anything I did not understand.      ________________________________________________________________________  Patient signature - Date/Time -  Truman Suero                                      ________________________________________________________________________  Witness signature                                                            ________________________________________________________________________  Provider signature -  PAIN NURSE      [id number]  Rev 12/18         Registration to scan to EHR                         Page 2 of 2                   Controlled Substance Agreement Opioid           Page 1 of 2  Opioid Pain Medicines (also known as Narcotics)  What You Need to Know    What are opioids?   Opioids are pain medicines that must be prescribed by a doctor.  They are also known as narcotics.    Examples are:     morphine (MS Contin, Alyce)    oxycodone  (Oxycontin)    oxycodone and acetaminophen (Percocet)    hydrocodone and acetaminophen (Vicodin, Norco)     fentanyl patch (Duragesic)     hydromorphone (Dilaudid)     methadone     What do opioids do well?   Opioids are best for short-term pain after a surgery or injury. They also work well for cancer pain. Unlike other pain medicines, they do not cause liver or kidney failure or ulcers. They may help some people with long-lasting (chronic) pain.     What do opioids NOT do well?   Opioids never get rid of pain entirely, and they do not work well for most patients with chronic pain. Opioids do not reduce swelling, one of the causes of pain. They also don t work well for nerve pain.                           For informational purposes only.  Not to replace the advice of your care provider.  Copyright [facility]. All right reserved. [employer] [id number] 02/18.      Page 2 of 2    Risks and side effects   Talk to your doctor before you start or decide to keep taking one of these medicines. Side effects include:    Lowering your breathing rate enough to cause death    Overdose, including death, especially if taking higher than prescribed doses    Long-term opioid use    Worse depression symptoms; less pleasure in things you usually enjoy    Feeling tired or sluggish    Slower thoughts or cloudy thinking    Being more sensitive to pain over time; pain is harder to control    Trouble sleeping or restless sleep    Changes in hormone levels (for example, less testosterone)    Changes in sex drive or ability to have sex    Constipation    Unsafe driving    Itching and sweating    Feeling dizzy    Nausea, vomiting and dry mouth    What else should I know about opioids?  When someone takes opioids for too long or too often, they become dependent. This means that if you stop or reduce the medicine too quickly, you will have withdrawal symptoms.    Dependence is not the same as addiction. Addiction is when  people keep using a substance that harms their body, their mind or their relations with others. If you have a history of drug or alcohol abuse, taking opioids can cause a relapse.    Over time, opioids don t work as well. Most people will need higher and higher doses. The higher the dose, the more serious the side effects. We don t know the long-term effects of opioids.      Prescribed opioids aren't the best way to manage chronic pain    Other ways to manage pain include:      Ibuprofen or acetaminophen.  You should always try this first.      Treat health problems that may be causing pain.      acupuncture or massage, deep breathing, meditation, visual imagery, aromatherapy.      Use heat or ice at the pain site      Physical therapy and exercise      Stop smoking      See a counselor or therapist                                                  People who have used opioids for a long time may have a lower quality of life, worse depression, higher levels of pain and more visits to doctors.    Never share your opioids with others. Be sure to store opioids in a secure place, locked if possible.Young children can easily swallow them and overdose.     You can overdose on opioids.  Signs of overdose include decrease or loss of consciousness, slowed breathing, trouble waking and blue lips.  If someone is worried about overdose, they should call 911.    If you are at risk for overdose, you may get naloxone (Narcan, a medicine that reverses the effects of opioids.  If you overdose, a friend or family member can give you Narcan while waiting for the ambulance.  They need to know the signs of overdose and how to give Narcan.    While you're taking opioids:    Don't use alcohol or street drugs. Taking them together can cause death.    Don't take any of these medicines unless your doctor says its okay.  Taking these with opioids can cause death.    Benzodiazepines (such as lorazepam         or diazepam)    Muscle relaxers  (such as cyclobenzaprine)    sleeping pills    other opioids    Safe disposal of opioids  Find your area drug take-back program, your pharmacy mail-back program, buy a special disposal bag (such as Deterra) from your pharmacy or flush them down the toilet.  Use the guidelines at:  www.fda.gov/drugs/resourcesforyou

## 2019-03-05 NOTE — NURSING NOTE
The opioid contract was reviewed and signed. Pt was given a copy. The signed one was placed in amy bin to sign.   Pt was sent down to lab for the urine drug screen.     Mike Bergeron MA

## 2019-03-06 NOTE — TELEPHONE ENCOUNTER
Please let him know that his pain relief is not enough for [employer] insurance, and he should follow up with Melanie Thomas NP for next plan of care.    Mariaelena Osorio MD  [facility]

## 2019-03-07 ENCOUNTER — THERAPY VISIT (OUTPATIENT)
Dept: PHYSICAL THERAPY | Facility: CLINIC | Age: 59
End: 2019-03-07
Payer: OTHER MISCELLANEOUS

## 2019-03-07 DIAGNOSIS — M54.2 CHRONIC NECK PAIN: ICD-10-CM

## 2019-03-07 DIAGNOSIS — G89.29 CHRONIC NECK PAIN: ICD-10-CM

## 2019-03-07 DIAGNOSIS — G44.209 TENSION TYPE HEADACHE: ICD-10-CM

## 2019-03-07 PROCEDURE — 97140 MANUAL THERAPY 1/> REGIONS: CPT | Mod: GP | Performed by: PHYSICAL THERAPIST

## 2019-03-07 PROCEDURE — 97110 THERAPEUTIC EXERCISES: CPT | Mod: GP | Performed by: PHYSICAL THERAPIST

## 2019-03-07 NOTE — TELEPHONE ENCOUNTER
Spoke with patient and provided information below. He plans on following up with Melanie Thomas at his next appointment to discuss. Will route to Melanie as an FYI.     ANALISA WangN, RN  Care Coordinator  [facility]

## 2019-03-07 NOTE — TELEPHONE ENCOUNTER
Outreach X1. Left a  requesting call back. Provided call back number.    ANALISA WangN, RN  Care Coordinator  [facility] [place]

## 2019-03-07 NOTE — TELEPHONE ENCOUNTER
Patient returning call, please call back          Monalisa LUZ    [facility]

## 2019-03-08 LAB — PAIN DRUG SCR UR W RPTD MEDS: NORMAL

## 2019-03-12 NOTE — TELEPHONE ENCOUNTER
Information noted.   Will discuss at upcoming appt.   Closing encounter.   Melanie STEVENS, RN CNP, FNP  [facility] [place]

## 2019-03-14 ENCOUNTER — THERAPY VISIT (OUTPATIENT)
Dept: PHYSICAL THERAPY | Facility: CLINIC | Age: 59
End: 2019-03-14
Payer: OTHER MISCELLANEOUS

## 2019-03-14 ENCOUNTER — MYC REFILL (OUTPATIENT)
Dept: PALLIATIVE MEDICINE | Facility: CLINIC | Age: 59
End: 2019-03-14

## 2019-03-14 DIAGNOSIS — M54.2 CHRONIC NECK PAIN: ICD-10-CM

## 2019-03-14 DIAGNOSIS — M54.9 CHRONIC NECK AND BACK PAIN: ICD-10-CM

## 2019-03-14 DIAGNOSIS — M54.2 CHRONIC NECK AND BACK PAIN: ICD-10-CM

## 2019-03-14 DIAGNOSIS — M47.812 CERVICAL SPONDYLOSIS WITHOUT MYELOPATHY: ICD-10-CM

## 2019-03-14 DIAGNOSIS — M50.30 DDD (DEGENERATIVE DISC DISEASE), CERVICAL: ICD-10-CM

## 2019-03-14 DIAGNOSIS — G44.209 TENSION TYPE HEADACHE: ICD-10-CM

## 2019-03-14 DIAGNOSIS — G89.29 CHRONIC NECK AND BACK PAIN: ICD-10-CM

## 2019-03-14 DIAGNOSIS — M48.02 CERVICAL STENOSIS OF SPINAL CANAL: ICD-10-CM

## 2019-03-14 DIAGNOSIS — G89.29 CHRONIC NECK PAIN: ICD-10-CM

## 2019-03-14 PROCEDURE — 97110 THERAPEUTIC EXERCISES: CPT | Mod: GP | Performed by: PHYSICAL THERAPIST

## 2019-03-14 PROCEDURE — 97530 THERAPEUTIC ACTIVITIES: CPT | Mod: GP | Performed by: PHYSICAL THERAPIST

## 2019-03-14 RX ORDER — GABAPENTIN 600 MG/1
900 TABLET ORAL 3 TIMES DAILY
Qty: 135 TABLET | Refills: 3 | Status: CANCELLED | OUTPATIENT
Start: 2019-03-14

## 2019-03-14 NOTE — TELEPHONE ENCOUNTER
Received request from patient requesting refill(s) for gabapentin (NEURONTIN) 600 MG tablet      Pt last seen on 02/06/19 - has had injection since  Next appt scheduled for 04/17/19    Will facilitate refill.

## 2019-03-14 NOTE — TELEPHONE ENCOUNTER
Alejandrina message from patient on 3/14 at 1126:      Truman MINAYA Pio would like a refill of the following medications:         gabapentin (NEURONTIN) 600 MG tablet [Melanie Thomas, APRN CNP]     Preferred pharmacy: [facility] [address] [place] [address] [address] [facility]   -------------  Will route to MA pool for assistance with gathering refill information.    OK prep Rx and send directly to provider if no issues.     ANALISA ArtisN, RN-BC  Patient Care Supervisor/Care Coordinator  [facility] [place]

## 2019-03-14 NOTE — PROGRESS NOTES
Subjective:  HPI                    Objective:  System    Physical Exam    General     ROS    Assessment/Plan:    PROGRESS  REPORT    Progress reporting period is from 12.27 to 3.14.19.     SUBJECTIVE  Subjective: less tingling in hands, still feel tired/ achey at end of shift, working half shift yet,    Current Pain level: 7/10(at end of shift, neck pain )       Changes in function: Yes, see goal flow sheet for change in function   Adverse reactions: None;   ,     The subjective and objective information are from the last SOAP note on this patient.    OBJECTIVE  Objective: functional AROM of CS, limited endurance/shows fatigue w/ incr'd reps, pt demos good compliance, numerous triggers of shoulders and cervical areas       ASSESSMENT/PLAN  Updated problem list and treatment plan: Diagnosis 1:  Post op cervical fusion    Pain -  manual therapy, self management and home program  Decreased ROM/flexibility - manual therapy, therapeutic exercise, therapeutic activity and home program  Decreased strength - therapeutic exercise and therapeutic activities  Impaired muscle performance - neuro re-education  Decreased function - therapeutic activities  STG/LTGs have been met or progress has been made towards goals:  Yes (See Goal flow sheet completed today.)  Assessment of Progress: The patient's condition is improving.  Patient is meeting short term goals and is progressing towards long term goals.  Self Management Plans:  Patient has been instructed in a home treatment program.  Patient  has been instructed in self management of symptoms.  I have re-evaluated this patient and find that the nature, scope, duration and intensity of the therapy is appropriate for the medical condition of the patient.  Truman continues to require the following intervention to meet STG and LTG's: PT      Recommendations:  This patient would benefit from continued therapy.     Frequency:  1 X week, once daily  Duration:  for 6  weeks        Please refer to the daily flowsheet for treatment today, total treatment time and time spent performing 1:1 timed codes.

## 2019-03-14 NOTE — LETTER
SA[facility] BACH PT  [address]  [address]  [place] [address]  [phone]    2019    Re: Truman Suero   :   1960  MRN:  [id number]   REFERRING PHYSICIAN:   JOSIE Jackson PT  Date of Initial Evaluation:  2018  Visits:  Rxs Used: 12  Reason for Referral:     Tension type headache  Chronic neck pain    EVALUATION SUMMARY    PROGRESS  REPORT  Progress reporting period is from  to 3.14.19.     SUBJECTIVE  Subjective: less tingling in hands, still feel tired/ achey at end of shift, working half shift yet,    Current Pain level: 7/10(at end of shift, neck pain )       Changes in function: Yes, see goal flow sheet for change in function   Adverse reactions: None;   ,     The subjective and objective information are from the last SOAP note on this patient.    OBJECTIVE  Objective: functional AROM of CS, limited endurance/shows fatigue w/ incr'd reps, pt demos good compliance, numerous triggers of shoulders and cervical areas       ASSESSMENT/PLAN  Updated problem list and treatment plan: Diagnosis 1:  Post op cervical fusion    Pain -  manual therapy, self management and home program  Decreased ROM/flexibility - manual therapy, therapeutic exercise, therapeutic activity and home program  Decreased strength - therapeutic exercise and therapeutic activities  Impaired muscle performance - neuro re-education  Decreased function - therapeutic activities  STG/LTGs have been met or progress has been made towards goals:  Yes (See Goal flow sheet completed today.)      Re: rTuman MINAYA Pio   :   1960    Assessment of Progress: The patient's condition is improving.  Patient is meeting short term goals and is progressing towards long term goals.  Self Management Plans:  Patient has been instructed in a home treatment program.  Patient  has been instructed in self management of symptoms.  I have re-evaluated this patient and find that the nature, scope, duration  and intensity of the therapy is appropriate for the medical condition of the patient.  Truman continues to require the following intervention to meet STG and LTG's: PT    Recommendations:  This patient would benefit from continued therapy.     Frequency:  1 X week, once daily  Duration:  for 6 weeks    Please refer to the daily flowsheet for treatment today, total treatment time and time spent performing 1:1 timed codes.      Thank you for your referral.    INQUIRIES  Therapist: Wesley Catherine PT   SAVITA BACH PT  [address] [address]  [place] [address]  Phone: [phone]  Fax: [phone]

## 2019-03-15 RX ORDER — GABAPENTIN 600 MG/1
900 TABLET ORAL 3 TIMES DAILY
Qty: 135 TABLET | Refills: 3 | Status: SHIPPED | OUTPATIENT
Start: 2019-03-15 | End: 2019-06-21

## 2019-03-15 NOTE — TELEPHONE ENCOUNTER
Signed Prescriptions:                        Disp   Refills    gabapentin (NEURONTIN) 600 MG tablet       135 ta*3        Sig: Take 1.5 tablets (900 mg) by mouth 3 times daily  Authorizing Provider: CANDACE BENSON RN CNP, FNP  [facility] [place]

## 2019-03-16 ENCOUNTER — MYC MEDICAL ADVICE (OUTPATIENT)
Dept: PALLIATIVE MEDICINE | Facility: CLINIC | Age: 59
End: 2019-03-16

## 2019-03-18 NOTE — TELEPHONE ENCOUNTER
Per patient Do[employer] message:  Sent: 3/16/2019  4:26 PM CDT       To: Melanie PEREZ  Subject: Do I need an appointment?    Dr Navarro- Hope this finds you well- Couple weeks ago Dr. Corrigan did refill for Tramadol (50mg) was to start 3/15. Remember we did partial to get both on same, but not his fault as we was not aware. When went to get Gabapentin refill asked about Tramadol as well as thought would be done. Today is 3/16. My [facility] said don't see in system. May just have to hit resubmit button. I'm sure you will recall this happens quite often with them. Thanks  Palmer

## 2019-03-19 NOTE — TELEPHONE ENCOUNTER
The last tramadol script:  traMADol (ULTRAM) 50 MG tablet (Discontinued) 54 tablet 0 2/6/2019 3/1/2019 No   Sig - Route: Take 1-2 tablets ( mg) by mouth every 6 hours as needed for severe pain max 3 tabs/day. fill 2/11/19; start 2/13/19, 18 day script - Oral   Sent to pharmacy as: traMADol (ULTRAM) 50 MG tablet      Notes to Pharmacy: Next script will be for 30 days and quantity of 90. Just doing this to get long and short-acting Tramadol due on same day     Unsure why pt isn't filling the 3/1/19 script for #90 tabs.  According to [facility] pt last picked up tramadol 50mg on 2/11/19 for #54 tabs.    Routed to RODNEY Carpenter CNP to clarfiy this plan.    Estelita Wetzel RN-BSN  [facility]

## 2019-03-19 NOTE — TELEPHONE ENCOUNTER
Per patient [employer] message:  From: Palmer Suero      Created: 3/18/2019 11:04 AM          Estelita- It is really a long story- Dr. Navarro way back at previous appt had prescribed partial 50mg Tram to keep the two in sync (Er and 50)- When I submitted at beginning of March for renewal Dr Navarro may have been gone and Dr Corrigan did the prescribing. When he sent them to [facility] he had the 50 mg start on 3/15 as he did not know the story of partial. The Er was filled beginning of month, but they have no record of other to be filled. It is listed as to be filled 3/15     Palmer

## 2019-03-20 NOTE — TELEPHONE ENCOUNTER
Please check with pharmacy to see if they still have the 3/1/2019 #90 script for Tramadol 50mg tabs.    If they have it, that one should be filled.     Thanks.  Melanie STEVENS RN CNP, FNP  [facility] [place]

## 2019-03-20 NOTE — TELEPHONE ENCOUNTER
Called Pt and LVM that Pt should call his pharmacy and fill Rx that hasn't been filled yet.    Van Oquendo, RN  Care Coordinator   [facility] [place]

## 2019-04-01 ENCOUNTER — MYC REFILL (OUTPATIENT)
Dept: PALLIATIVE MEDICINE | Facility: CLINIC | Age: 59
End: 2019-04-01

## 2019-04-01 DIAGNOSIS — M47.812 CERVICAL SPONDYLOSIS WITHOUT MYELOPATHY: ICD-10-CM

## 2019-04-01 DIAGNOSIS — M50.30 DDD (DEGENERATIVE DISC DISEASE), CERVICAL: ICD-10-CM

## 2019-04-01 RX ORDER — TRAMADOL HYDROCHLORIDE 200 MG/1
TABLET, EXTENDED RELEASE ORAL
Qty: 30 TABLET | Refills: 0 | Status: CANCELLED | OUTPATIENT
Start: 2019-04-01

## 2019-04-02 RX ORDER — TRAMADOL HYDROCHLORIDE 200 MG/1
TABLET, EXTENDED RELEASE ORAL
Qty: 30 TABLET | Refills: 0 | Status: SHIPPED | OUTPATIENT
Start: 2019-04-02 | End: 2019-04-18

## 2019-04-05 ENCOUNTER — THERAPY VISIT (OUTPATIENT)
Dept: PHYSICAL THERAPY | Facility: CLINIC | Age: 59
End: 2019-04-05
Payer: OTHER MISCELLANEOUS

## 2019-04-05 DIAGNOSIS — M54.2 CHRONIC NECK PAIN: ICD-10-CM

## 2019-04-05 DIAGNOSIS — G44.209 TENSION TYPE HEADACHE: ICD-10-CM

## 2019-04-05 DIAGNOSIS — G89.29 CHRONIC NECK PAIN: ICD-10-CM

## 2019-04-05 PROCEDURE — 97110 THERAPEUTIC EXERCISES: CPT | Mod: GP | Performed by: PHYSICAL THERAPIST

## 2019-04-05 PROCEDURE — 97140 MANUAL THERAPY 1/> REGIONS: CPT | Mod: GP | Performed by: PHYSICAL THERAPIST

## 2019-04-08 ENCOUNTER — MYC REFILL (OUTPATIENT)
Dept: PALLIATIVE MEDICINE | Facility: CLINIC | Age: 59
End: 2019-04-08

## 2019-04-08 DIAGNOSIS — Z98.1 S/P CERVICAL SPINAL FUSION: ICD-10-CM

## 2019-04-08 NOTE — TELEPHONE ENCOUNTER
Alejandrina message from patient on 4/8 at 1209        Truman Suero would like a refill of the following medications:         cyclobenzaprine (FLEXERIL) 5 MG tablet [Melanie Thomas, APRN CNP]     Preferred pharmacy: [facility] [address] [place] [address] [address] [facility]   ------------  Will route to MA pool for assistance with gathering refill information.    ANALISA rAtisN, RN-BC  Patient Care Supervisor/Care Coordinator  [facility] [place]

## 2019-04-09 NOTE — TELEPHONE ENCOUNTER
Per patient myChart message:  From: Palmer Suero      Created: 3/19/2019 11:54 AM        I guess just for the record to be safe- I want to document that [facility] never filled the 50mg Tram on 3/15. therefore I never picked it up. Since it is an opioid, want to be on the up and up.    Palmer           no

## 2019-04-11 ENCOUNTER — MYC REFILL (OUTPATIENT)
Dept: PALLIATIVE MEDICINE | Facility: CLINIC | Age: 59
End: 2019-04-11

## 2019-04-11 DIAGNOSIS — Z98.1 S/P CERVICAL SPINAL FUSION: ICD-10-CM

## 2019-04-11 RX ORDER — CYCLOBENZAPRINE HCL 5 MG
5-10 TABLET ORAL 3 TIMES DAILY PRN
Qty: 45 TABLET | Refills: 0 | Status: CANCELLED | OUTPATIENT
Start: 2019-04-11

## 2019-04-12 RX ORDER — CYCLOBENZAPRINE HCL 5 MG
5-10 TABLET ORAL 3 TIMES DAILY PRN
Qty: 45 TABLET | Refills: 0 | Status: SHIPPED | OUTPATIENT
Start: 2019-04-12 | End: 2019-04-18

## 2019-04-12 NOTE — TELEPHONE ENCOUNTER
Signed Prescriptions:                        Disp   Refills    cyclobenzaprine (FLEXERIL) 5 MG tablet     45 tab*0        Sig: Take 1-2 tablets (5-10 mg) by mouth 3 times daily as           needed for muscle spasms  Authorizing Provider: CANDACE BENSON, RN CNP, FNP  [facility] [place]

## 2019-04-12 NOTE — TELEPHONE ENCOUNTER
Processed previous request and sent to Melanie Thomas on 04/12/19. eSnips message sent to patient. Encounter closed.

## 2019-04-12 NOTE — TELEPHONE ENCOUNTER
Received MyChart request from patient requesting refill(s) for cyclobenzaprine (FLEXERIL) 5 MG tablet      Pt last seen on 02/06/19 - has had injection since  Next appt scheduled for 04/17/19    Will facilitate refill.

## 2019-04-15 ENCOUNTER — THERAPY VISIT (OUTPATIENT)
Dept: PHYSICAL THERAPY | Facility: CLINIC | Age: 59
End: 2019-04-15
Payer: OTHER MISCELLANEOUS

## 2019-04-15 DIAGNOSIS — M54.2 CHRONIC NECK PAIN: ICD-10-CM

## 2019-04-15 DIAGNOSIS — G44.209 TENSION TYPE HEADACHE: ICD-10-CM

## 2019-04-15 DIAGNOSIS — G89.29 CHRONIC NECK PAIN: ICD-10-CM

## 2019-04-15 PROCEDURE — 97110 THERAPEUTIC EXERCISES: CPT | Mod: GP

## 2019-04-15 PROCEDURE — 97140 MANUAL THERAPY 1/> REGIONS: CPT | Mod: GP

## 2019-04-17 NOTE — PATIENT INSTRUCTIONS
PLAN:  1. Medications:   1. Continue Tramadol  mg once daily, fill 4/30/19 and start 5/2/2019  2. Continue Tramadol 50 mg every 6-8 hours as needed, max 3 tabs per day. Fill/start today (short script and then again for a month on 4/30, start 5/2)  3. Continue gabapentin 900 mg three times daily   4. Continue Topamax 50 mg twice daily   5. Continue methocarbamol 500-1000 mg three times daily as needed for muscle spasms  2. Procedures: our office will call you to schedule lumbar facet joint steroid injections  3. Schedule with PHYSICAL THERAPY for your low back pain  4. Follow-up with me in 10-12 weeks        ----------------------------------------------------------------  Clinic Number:  [phone]   Call this number with any questions about your care and for scheduling assistance. Calls are returned Monday through Friday between 8 AM and 4:30 PM. We usually get back to you within 2 business days depending on the issue/request.       Medication refills:    For non-opioid medications, call your pharmacy directly to request a refill. The pharmacy will contact the [facility] for authorization. Please allow 3-4 days for these refills to be processed.     For opioid refills, call the clinic number or send a [employer]Star message. Please contact us 7-10 days before your refill is due. The message MUST include the name of the specific medication(s) requested and how you would like to receive the prescription(s). The options are as follows:    Pain Clinic staff can mail the prescription to your pharmacy. Please tell us the name of the pharmacy.    You may pick the prescription up at the Pain Clinic (tell us the location) or during a clinic visit with your pain provider    Pain Clinic staff can deliver the prescription to the [facility] pharmacy in the clinic building. Please tell us the location.      We believe regular attendance is key to your success in our program.    Any time you are unable to keep your  appointment we ask that you call us at least 24 hours in advance to let us know. This will allow us to offer the appointment time to another patient.

## 2019-04-17 NOTE — PROGRESS NOTES
[facility]  4/18/2019       Chief complaint: neck and low back pain    Interval history:  Truman Suero is a 58 year old male is known to me for   Chronic neck pain  Cervical DDD  Cervical spondylosis (pain worse with extension/rotation indicating facetogenic component to pain)  Axial low back pain  Myofascial pain/muscle spasms  Remote history of ETOH overuse, attended AA for awhile. Sober for 10 years  ---PMHx includes: neck pain  ---PSHx includes: none listed      Recommendations/plan at the last visit on 2/6/2019 included:  1. Physical Therapy: not at present time--deferred to neurosurgery, patient healing from recent cervical fusion  2. Clinical Health Psychologist: continue work with  Kentrell Castañeda in health psychology  3. Diagnostic Studies:  None  4. Medication Management:    1. Continue tramadol ER 200mg once daily  2. Continue tramadol 50mg Q 6-8 hours PRN, max of 3/day. E-prescribed today. [place] script for 2/2019 to align Tramadol and Tramadol ER fill dates.   3. Continue gabapentin 900 mg TID   4. Continue Topamax 50 mg BID   5. Continue methocarbamol 500-1000 mg TID PRN muscle spasms   5. Further procedures recommended: I will check with insurance whether second LMBB is indicated due to only receiving some relief from LMBB #1  6. Recommendations to PCP: see above  7. Follow up: 10-12 weeks      Since his last visit, Truman Suero reports:  -low back and neck still bother. Did not get good enough relief with LMBBs to proceed with lumbar RFA. Has gotten good relief from lumbar facet joint steroid injections, also wants to do PHYSICAL THERAPY for his low back with his current physical therapist  He is working.     At this point, the patient's participation with our multidisciplinary team includes:  The patient has been compliant with the program.    PT - attended non-pain management PHYSICAL THERAPY   Health Psych - has seen Kentrell Castañeda x4  Acupuncture: worked with   "[place] [facility]      Pain scores:  Pain intensity on average is 8 on a scale of 0-10.    Range is 6-9/10.   Pain right now is 8/10.  Pain is described as \"aching, numb, tiring, exhausting, throbbing, miserable, nagging\"    Pain is constant in nature    Current pain relevant medications:   -Tramadol 200mg ER in the evening (helpful)  -Tramadol 50mg take 1 tablet  Q 6 hours PRN (using 2-3 tabs per day, helpful)  -ibuprofen 600mg PRN (helpful)  -gabapentin 900mg TID (somewhat helpful)  -methocarbamol 500-1000mg TID PRN muscle spasms (takes 1000 mg BID, somewhat helpful)  -Topamax 50 mg BID (helpful)    Other pertinent medications:  None    Previous Medications:  OPIATES: Tramdol (somewhat helpful)  NSAIDS: ibuprofen (helpful), Aleve (helpful)  MUSCLE RELAXANTS: none  ANTI-MIGRAINE MEDS: none  ANTI-DEPRESSANTS: none  SLEEP AIDS: none  ANTI-CONVULSANTS: gabapentin (helpful)  TOPICALS: lidocaine ointment (somewhat helpful)  Other meds: Tylenol (not helpful)        Other treatments have included:  Truman Suero has not been seen at a pain clinic in the past.    PT: tried, somewhat helpful  Chiropractic: helpful  Acupuncture: none  TENs Unit: none     Injections:   cervical radiofrequency nerve ablation at [facility] in December 2016 (did get good relief, got 70% relief of his typical neck pain)  -6/29/2017 Cervical facet joint steroid injections at C4-5 and C5-6 on the right with Dr. Nicole Harding (not helpful)  -3/8/2018 C7-T1 interlaminar DEMARCUS with Dr. Hugo Corrigan (not helpful)  -5/23/2018 right L4-5 transforaminal epidural steroid injection with Dr. Osorio (not helpful for back pain but did help leg pain)  -8/7/2018 bilateral SI joint injection with Dr Hugo Corrigan (helpful for one month)  -10/11/2018 l3-4 and L4-5 facet joint injections bilaterally with Dr. Hugo Corrigan (this has been helpful, more helpful than any other lumbar injections thus far)  -1/28/2019 bilateral L3-5 medial branch blocks #1 with Dr." Jo (somewhat helpful)   -2/25/2019 LMBB #2 with Dr. Osorio (somewhat effective, but not enough to proceed to RFA)    Surgeries:  10/29/2018 right anterior cervical C5-6 discectomy and fusion by Dr. Jud Harkins (somewhat helpful)      UDS last completed on 3/5/2019  CSA last signed on 3/14/2019 with Melanie STEVENS, RN CNP, FNP  [facility]     THE 4 A's OF OPIOID MAINTENANCE ANALGESIA    Analgesia: long acting Tramadol with some short acting tramadol does give some relief    Activity: ADLs    Adverse effects: none    Adherence to Rx protocol: yes      Side Effects: none  Patient is using the medication as prescribed: yes    Medications:  Current Outpatient Medications   Medication Sig Dispense Refill     buPROPion (WELLBUTRIN SR) 150 MG 12 hr tablet Take 1 tablet once daily and increase to 1 tablet twice daily after 4 to 7 days 180 tablet 1     cyclobenzaprine (FLEXERIL) 5 MG tablet Take 1-2 tablets (5-10 mg) by mouth nightly as needed for muscle spasms Need 6 hours between this and methocarbamol 45 tablet 2     fluticasone (FLONASE) 50 MCG/ACT nasal spray Spray 2 sprays into both nostrils 2 times daily Needs appointment for further refills 32 mL 11     gabapentin (NEURONTIN) 600 MG tablet Take 1.5 tablets (900 mg) by mouth 3 times daily 135 tablet 3     methocarbamol (ROBAXIN) 500 MG tablet Take 1 tablet (500 mg) by mouth 3 times daily as needed for muscle spasms Should be 6 hours between this and cyclobenzaprine at night 90 tablet 2     metoprolol succinate (TOPROL-XL) 25 MG 24 hr tablet Take 1 tablet (25 mg) by mouth daily (Patient taking differently: Take 25 mg by mouth At Bedtime ) 30 tablet 1     topiramate (TOPAMAX) 50 MG tablet Take 1 tablet (50 mg) by mouth 2 times daily Drink plenty of water and no alcohol. If side effects, then reduce to last tolerable dosage. 60 tablet 2     traMADol (ULTRAM) 50 MG tablet Take 1-2 tablets ( mg) by mouth every 6 hours as needed for  severe pain Fill/begin 4/18/19 45 tablet 0     traMADol (ULTRAM) 50 MG tablet Take 1 tablet (50 mg) by mouth every 6 hours as needed for severe pain Max 3/day. Fill 4/30/19; start 5/2/19 90 tablet 0     traMADol (ULTRAM-ER) 200 MG 24 hr tablet Take  fill 4/30/19 to start 5/2/2019 30 tablet 0       Medical History: any changes in medical history since they were last seen? none    Social History:   Home situation: , has 4 kids, 2 at home, one in college and one launched.   Occupation/Schooling:  full time in [employer]  Tobacco use: former smoker, quit on 3/20/2018  Alcohol use: sober for nearly 10 years. He used to work a sobriety program, used to go to   Drug use: none  History of chemical dependency treatment: no formal treatment, did AA    Is patient a current smoker or tobacco user?  QUIT on 3/20/2018  If yes, was cessation counseling offered?  no        Review of Systems:  ROS is positive as per HPI as well as for joint pain, neck pain, back pain, weakness, numbness, tingling, anxiety, stress and is negative for fevers, sweats, or constipation, diarrhea.    Physical Exam:   Vital signs: /88   Pulse 71   Wt 69.9 kg (154 lb)   BMI 25.24 kg/m       Behavioral observations:  Awake, alert, cooperative    Gait:  normal    Musculoskeletal exam:    Moves well in exam room  Strength grossly equal throughout  Good lumbar flexion  Painful lumbar extension   Painful lumbar extension and rotation to the right and to the left    Neuro exam:  SILT in all extremities     Skin/vascular/autonomic:  No suspicious lesions on exposed skin.      Other:  NA    Is the patient hypertensive today? no  Hypertensive on recheck of BP?   na  If yes, was patient recommended to see Primary Care Provider in follow up for management of HTN?  na      IMAGING:    MR CERVICAL SPINE WITHOUT CONTRAST 9/5/2017 2:32 PM      HISTORY: Neck pain, worsening numbness in arms and lower extremities.  Radiculopathy, cervical  region.     TECHNIQUE: Multiplanar multisequence images were obtained through the  cervical spine without contrast.     COMPARISON: 2/22/2017.     FINDINGS: Sagittal images demonstrate some minimal gentle cervical  kyphosis, otherwise normal posterior alignment. There is no evidence  for craniovertebral or cervicomedullary junction abnormality. The  cervical cord is minimally indented anteriorly at C4-C5 and C5-C6,  otherwise is normal in morphology and signal characteristics. Disc  space narrowing and discogenic marrow changes are present C3-C4,  C4-C5, C5-C6 and C6-C7.     C2-C3: Minimal facet hypertrophy. No stenosis.     C3-C4: Broad-based posterior osteophyte formation is present causing  some mild bilateral neural foraminal stenosis, but no central canal  stenosis.     C4-C5: Broad-based posterior osteophyte formation and mild facet  hypertrophy is present causing some mild to moderate central canal  stenosis, mild cord deformity, and mild-to-moderate bilateral neural  foraminal stenosis.     C5-C6: Broad-based posterior osteophyte formation and disc bulging is  present along with some facet hypertrophy. There is moderate central  canal stenosis and moderate bilateral neural foraminal stenosis.  Findings have progressed since the prior exam.     C6-C7: Broad-based disc bulging is present along with some minimal  posterior osteophyte formation. There is borderline to mild central  canal stenosis, but no neural foraminal stenosis.     C7-T1: Moderate facet hypertrophy. No significant stenosis.         IMPRESSION: Moderate multilevel degenerative disc and facet disease  with some progression at C5-C6 where there is moderate central canal  and bilateral neural foraminal stenosis along with cord deformity.          MR CERVICAL SPINE WITHOUT CONTRAST  2/22/2017 9:59 AM     HISTORY:  Bilateral neck and arm pain for three years.     COMPARISON: None.     TECHNIQUE: Routine MR cervical spine extended through  T2.     FINDINGS: There is some degenerative bone marrow signal change C3-C6.  No malignant or destructive lesions. Normal alignment through T2.  Reversed cervical adenosis C3-C6.     The cervical and upper thoracic spinal cord appear intrinsically  normal. The craniocervical junction region is normal. No paraspinous  soft tissue abnormality.     Findings by level as follows:     C2-C3: Negative. No disc protrusion. No central or lateral stenosis.     C3-C4: Mild disc space narrowing. Mild diffuse annular bulge. Small  uncinate spur on the right. No significant central or left-sided  stenosis. Mild right-sided foraminal stenosis.     C4-C5: Moderate degenerative narrowing of the interspace. Mild ventral  disc osteophyte complex with minimal central stenosis. Small uncinate  spurs bilaterally with mild bilateral foraminal stenosis.     C6-C7: Moderate disc space narrowing. Mild broad-based disc osteophyte  complex with minimal central stenosis. Minimal uncinate spurs with  mild bilateral foraminal stenosis.     C6-C7: Moderate disc space narrowing. Mild ventral disc osteophyte  complex. No significant central or lateral stenosis.     C7-T1: No disc protrusion. No central or lateral stenosis. Moderate  bilateral facet joint disease.         IMPRESSION:  1. Degenerative changes as described C3-C4 through C7-T1. There is  only mild central and foraminal stenosis as described.  2. No intrinsic spinal cord abnormality through T2    [place] Prescription Monitoring Program:  Reviewed [facility] 4/18/2019, no concerning scripts      DIRE Score for selecting candidates for long term opioid analgesia for chronic pain:  Diagnosis  2  Intractablility  2  Risk    Psychological health  2    Chemical health  2    Reliability  2    Social support  3  Efficacy  2    Total DIRE Score = 15. Note that   7-13 predicts poor outcome (compliance and efficacy) from opioid prescribing; 14-21 predicts good outcome (compliance and efficacy)  from  opioid prescribing.      Assessment:   1. Lumbar facet joint pain  2. Lumbar spondylosis  3. Chronic low back pain, axial  4. Chronic neck pain  5. Cervical DDD  6. Cervical spondylosis  7. Myofascial pain/muscle spasms  8. S/p cervical fusion  9. Remote history of ETOH overuse, attended AA for awhile. Sober for >10 years  10. PMHx includes: neck pain  11. PSHx includes: none listed        Plan:   1. Physical Therapy: referred for low back  2. Clinical Health Psychologist: none  3. Diagnostic Studies:  None  4. Medication Management:    1. Continue tramadol ER 200mg once daily  2. Continue tramadol 50mg Q 6-8 hours PRN, max of 3/day. E-prescribed today.  3. Continue gabapentin 900 mg TID   4. Continue Topamax 50 mg BID   5. Continue methocarbamol 500-1000 mg TID PRN muscle spasms   5. Further procedures recommended: schedule lumbar facet joint steroid injections, our office to contact pt  6. Recommendations to PCP: see above  7. Follow up: 10-12 weeks      Total time spent face to face was 30 minutes and more than 50% of face to face time was spent in counseling and/or coordination of care regarding the diagnosis and recommendations above.    Melanie STEVENS, RN CNP, FNP  [facility]

## 2019-04-18 ENCOUNTER — OFFICE VISIT (OUTPATIENT)
Dept: PALLIATIVE MEDICINE | Facility: CLINIC | Age: 59
End: 2019-04-18
Payer: OTHER MISCELLANEOUS

## 2019-04-18 VITALS
WEIGHT: 154 LBS | BODY MASS INDEX: 25.24 KG/M2 | SYSTOLIC BLOOD PRESSURE: 130 MMHG | HEART RATE: 71 BPM | DIASTOLIC BLOOD PRESSURE: 88 MMHG

## 2019-04-18 DIAGNOSIS — G89.29 CHRONIC NECK PAIN: ICD-10-CM

## 2019-04-18 DIAGNOSIS — M79.18 MYOFASCIAL PAIN: ICD-10-CM

## 2019-04-18 DIAGNOSIS — G89.29 CHRONIC BILATERAL LOW BACK PAIN WITHOUT SCIATICA: ICD-10-CM

## 2019-04-18 DIAGNOSIS — M54.2 CHRONIC NECK PAIN: ICD-10-CM

## 2019-04-18 DIAGNOSIS — Z98.1 S/P CERVICAL SPINAL FUSION: ICD-10-CM

## 2019-04-18 DIAGNOSIS — M54.50 CHRONIC BILATERAL LOW BACK PAIN WITHOUT SCIATICA: ICD-10-CM

## 2019-04-18 DIAGNOSIS — M54.59 LUMBAR FACET JOINT PAIN: Primary | ICD-10-CM

## 2019-04-18 DIAGNOSIS — M50.30 DDD (DEGENERATIVE DISC DISEASE), CERVICAL: ICD-10-CM

## 2019-04-18 DIAGNOSIS — M47.816 SPONDYLOSIS OF LUMBAR REGION WITHOUT MYELOPATHY OR RADICULOPATHY: ICD-10-CM

## 2019-04-18 DIAGNOSIS — M47.812 CERVICAL SPONDYLOSIS WITHOUT MYELOPATHY: ICD-10-CM

## 2019-04-18 PROCEDURE — 99214 OFFICE O/P EST MOD 30 MIN: CPT | Performed by: NURSE PRACTITIONER

## 2019-04-18 RX ORDER — TRAMADOL HYDROCHLORIDE 200 MG/1
TABLET, EXTENDED RELEASE ORAL
Qty: 30 TABLET | Refills: 0 | Status: SHIPPED | OUTPATIENT
Start: 2019-04-18 | End: 2019-04-19

## 2019-04-18 RX ORDER — METHOCARBAMOL 500 MG/1
500 TABLET, FILM COATED ORAL 3 TIMES DAILY PRN
Qty: 90 TABLET | Refills: 2 | Status: SHIPPED | OUTPATIENT
Start: 2019-04-18 | End: 2020-01-22

## 2019-04-18 RX ORDER — TRAMADOL HYDROCHLORIDE 50 MG/1
50-100 TABLET ORAL EVERY 6 HOURS PRN
Qty: 45 TABLET | Refills: 0 | Status: SHIPPED | OUTPATIENT
Start: 2019-04-18 | End: 2019-05-31

## 2019-04-18 RX ORDER — CYCLOBENZAPRINE HCL 5 MG
5-10 TABLET ORAL
Qty: 45 TABLET | Refills: 2 | Status: SHIPPED | OUTPATIENT
Start: 2019-04-18 | End: 2020-04-14

## 2019-04-18 RX ORDER — TRAMADOL HYDROCHLORIDE 50 MG/1
50 TABLET ORAL EVERY 6 HOURS PRN
Qty: 90 TABLET | Refills: 0 | Status: SHIPPED | OUTPATIENT
Start: 2019-04-18 | End: 2019-05-31

## 2019-04-18 ASSESSMENT — PAIN SCALES - GENERAL: PAINLEVEL: SEVERE PAIN (7)

## 2019-04-19 ENCOUNTER — TELEPHONE (OUTPATIENT)
Dept: PALLIATIVE MEDICINE | Facility: CLINIC | Age: 59
End: 2019-04-19

## 2019-04-19 DIAGNOSIS — M47.812 CERVICAL SPONDYLOSIS WITHOUT MYELOPATHY: ICD-10-CM

## 2019-04-19 DIAGNOSIS — M50.30 DDD (DEGENERATIVE DISC DISEASE), CERVICAL: ICD-10-CM

## 2019-04-19 RX ORDER — TRAMADOL HYDROCHLORIDE 200 MG/1
200 TABLET, EXTENDED RELEASE ORAL EVERY 24 HOURS
Qty: 30 TABLET | Refills: 0 | Status: SHIPPED | OUTPATIENT
Start: 2019-04-19 | End: 2019-05-31

## 2019-04-19 NOTE — TELEPHONE ENCOUNTER
Received fax from pharmacy. No direction on Rx for traMADol (ULTRAM-ER) 200 MG 24 hr tablet. Routed to provider to re-do and send electronically.

## 2019-04-19 NOTE — TELEPHONE ENCOUNTER
Signed Prescriptions:                        Disp   Refills    traMADol (ULTRAM-ER) 200 MG 24 hr tablet   30 tab*0        Sig: Take 1 tablet (200 mg) by mouth every 24 hours fill           4/30/19 to start 5/2/2019  Authorizing Provider: CANDACE BENSON, RN CNP, FNP  [facility]

## 2019-04-22 ENCOUNTER — TELEPHONE (OUTPATIENT)
Dept: PALLIATIVE MEDICINE | Facility: CLINIC | Age: 59
End: 2019-04-22

## 2019-04-22 NOTE — TELEPHONE ENCOUNTER
Left VM for patient to schedule repeat bilateral lumbar facet joint injections          Monalisa LUZ    [facility]

## 2019-04-23 ENCOUNTER — THERAPY VISIT (OUTPATIENT)
Dept: PHYSICAL THERAPY | Facility: CLINIC | Age: 59
End: 2019-04-23
Payer: OTHER MISCELLANEOUS

## 2019-04-23 DIAGNOSIS — M54.2 CHRONIC NECK PAIN: ICD-10-CM

## 2019-04-23 DIAGNOSIS — G44.209 TENSION TYPE HEADACHE: ICD-10-CM

## 2019-04-23 DIAGNOSIS — G89.29 CHRONIC NECK PAIN: ICD-10-CM

## 2019-04-23 PROCEDURE — 97530 THERAPEUTIC ACTIVITIES: CPT | Mod: GP | Performed by: PHYSICAL THERAPIST

## 2019-04-23 PROCEDURE — 97110 THERAPEUTIC EXERCISES: CPT | Mod: GP | Performed by: PHYSICAL THERAPIST

## 2019-04-23 NOTE — TELEPHONE ENCOUNTER
Pre-screening Questions for Radiology Injections:    Injection to be done at which interventional clinic site? [facility] [place]    Instruct patient to arrive as directed prior to the scheduled appointment time:    [place] minutes before      [place]: 30 minutes before; if IV needed 1 hour before     Procedure ordered by Martha    Procedure ordered? Lumbar Facet Joint Injection    What insurance would patient like us to bill for this procedure? [employer]      Worker's comp or MVA (motor vehicle accident) -Any injection DO NOT SCHEDULE and route to Monalisa Dominguez.      [employer] insurance - For SI joint injections, DO NOT SCHEDULE and route Monalisa Dominguez.       [employer] - Any injection besides hip/shoulder/knee joint DO NOT SCHEDULE and route to Monalisa Dominguez. She will obtain PA and call pt back to schedule procedure or notify pt of denial.        [employer]-Route to Monalisa for review        IF SCHEDULING IN [place] AND NEEDS A PA, IT IS OKAY TO SCHEDULE. [place] HANDLES THEIR OWN PA'S AFTER THE PATIENT IS SCHEDULED. PLEASE SCHEDULE AT LEAST 1 WEEK OUT SO A PA CAN BE OBTAINED.      Any chance of pregnancy? NO   If YES, do NOT schedule and route to RN pool    Is an  needed? No     Patient has a drive home? (mandatory) YES: ok    Is patient taking any blood thinners (plavix, coumadin, jantoven, warfarin, heparin, pradaxa or dabigatran )? No   If hold needed, do NOT schedule, route to RN pool     Is patient taking any aspirin products (includes Excedrin and Fiorinal)? No     If more than 325mg/day do NOT schedule; route to RN pool     For CERVICAL procedures, hold all aspirin products for 6 days.     Tell pt that if aspirin product is not held for 6 days, the procedure WILL BE cancelled.      Does the patient have a bleeding or clotting disorder? No     If YES, okay to schedule AND route to RN nurse pool    For any patients with platelet count <100, must be forwarded to provider    Is  patient diabetic?  No  If YES, have them bring their glucometer.    Does patient have an active infection or treated for one within the past week? No     Is patient currently taking any antibiotics?  No     For patients on chronic, preventative, or prophylactic antibiotics, procedures may be scheduled.     For patients on antibiotics for active or recent infection:antibiotic course must have been completed for 4 days    Is patient currently taking any steroid medications? (i.e. Prednisone, Medrol)  No     For patients on steroid medications, course must have been completed for 4 days    Reviewed with patient:  If you are started on any steroids or antibiotics between now and your appointment, you must contact us because the procedure may need to be cancelled.  Yes    Is patient actively being treated for cancer or immunocompromised? No  If YES, do NOT schedule and route to RN pool     Are you able to get on and off an exam table with minimal or no assistance? Yes  If NO, do NOT schedule and route to RN pool    Are you able to roll over and lay on your stomach with minimal or no assistance? Yes  If NO, do NOT schedule and route to RN pool     Any allergies to contrast dye, iodine, shellfish, or numbing and steroid medications? No  If YES, route to RN pool AND add allergy information to appointment notes    Allergies: Patient has no known allergies.      Has the patient had a flu shot or any other vaccinations within 7 days before or after the procedure.  No     Does patient have an MRI/CT?  YES: MRI  (SI joint, hip injections, lumbar sympathetic blocks, and stellate ganglion blocks do not require an MRI)    Was the MRI done w/in the last 3 years?  Yes    Was MRI done at [place]? Yes      If not, where was it done? N/A       If MRI was not done at [place], [facility] or [facility] do NOT schedule and route to nursing.  If pt has an imaging disc, the injection may be scheduled but pt has to bring disc to appt. If they  show up w/out disc the injection cannot be done    Reminders (please tell patient if applicable):       Instructed pt to arrive 30 minutes early for IV start if this is for a cervical procedure, ALL sympathetic (stellate ganglion, hypogastric, or lumbar sympathetic block) and all sedation procedures (RFA, spinal cord stimulation trials).  Not Applicable   -IVs are not routinely placed for Dr. Perales cervical cases   -Dr. Corrigan: IVs for cervical ESIs and cervical TBDs (not CMBBs/facet inj)      If NPO for sedation, informed patient that it is okay to take medications with sips of water (except if they are to hold blood thinners).  Not Applicable   *DO take blood pressure medication if it is prescribed*      If this is for a cervical DEMARCUS, informed patient that aspirin needs to be held for 6 days.   Not Applicable      For all patients not having spinal cord stimulator (SCS) trials or radiofrequency ablations (RFAs), informed patient:    IV sedation is not provided for this procedure.  If you feel that an oral anti-anxiety medication is needed, you can discuss this further with your referring provider or primary care provider.  The Pain Clinic provider will discuss specifics of what the procedure includes at your appointment.  Most procedures last 10-20 minutes.  We use numbing medications to help with any discomfort during the procedure.  Not Applicable      Do not schedule procedures requiring IV placement in the first appointment of the day or first appointment after lunch. Do NOT schedule at 0745, 0815 or 1245.       For patients 85 or older we recommend having an adult stay w/ them for the remainder of the day.       Does the patient have any questions?  NO  Ariadne Blankenship  [facility]

## 2019-04-25 NOTE — TELEPHONE ENCOUNTER
[facility] LM at 1110 stating that they are unable to see the sig on the rx for Tramadol that was sent. Please call or send new rx. Phone #[phone]       Marleny Rojas    [facility]

## 2019-04-26 NOTE — TELEPHONE ENCOUNTER
"Called [facility] pharmacy. They can only see the word \"take\" on the directions.  Provided verbal order for prescription:    traMADol (ULTRAM-ER) 200 MG 24 hr tablet 30 tablet 0 4/19/2019  No   Sig - Route: Take 1 tablet (200 mg) by mouth every 24 hours fill 4/30/19 to start 5/2/2019 - Oral       ANALISA ArtisN, RN-BC  Patient Care Supervisor/Care Coordinator  [facility]    "

## 2019-05-03 ENCOUNTER — THERAPY VISIT (OUTPATIENT)
Dept: PHYSICAL THERAPY | Facility: CLINIC | Age: 59
End: 2019-05-03
Payer: OTHER MISCELLANEOUS

## 2019-05-03 DIAGNOSIS — M54.50 CHRONIC BILATERAL LOW BACK PAIN WITHOUT SCIATICA: ICD-10-CM

## 2019-05-03 DIAGNOSIS — G89.29 CHRONIC BILATERAL LOW BACK PAIN WITHOUT SCIATICA: ICD-10-CM

## 2019-05-03 DIAGNOSIS — M47.816 SPONDYLOSIS OF LUMBAR REGION WITHOUT MYELOPATHY OR RADICULOPATHY: ICD-10-CM

## 2019-05-03 DIAGNOSIS — M54.59 LUMBAR FACET JOINT PAIN: ICD-10-CM

## 2019-05-03 PROCEDURE — 97161 PT EVAL LOW COMPLEX 20 MIN: CPT | Mod: GP | Performed by: PHYSICAL THERAPIST

## 2019-05-03 PROCEDURE — 97110 THERAPEUTIC EXERCISES: CPT | Mod: GP | Performed by: PHYSICAL THERAPIST

## 2019-05-03 PROCEDURE — 97140 MANUAL THERAPY 1/> REGIONS: CPT | Mod: GP | Performed by: PHYSICAL THERAPIST

## 2019-05-03 NOTE — LETTER
SAVITA BACH PT  [address]  [address]  [place] [address]  [phone]    May 6, 2019    Re: Truman Suero   :   1960  MRN:  [id number]   REFERRING PHYSICIAN:   NIKOS Vargas PT  Date of Initial Evaluation:  2019  Visits:     Reason for Referral:     Lumbar facet joint pain  Spondylosis of lumbar region without myelopathy or radiculopathy  Chronic bilateral low back pain without sciatica    EVALUATION SUMMARY    [facility] Initial Evaluation  Subjective:  Pt reports chronic LBP, LE numbness/tingling bilat.  His pain has bothered him for years and he's had numerous injections.   The history is provided by the patient.   Truman Suero is a 58 year old male with a sacroiliac and lumbar condition.  Condition occurred with:  Degenerative joint disease.  Condition occurred: at home.  This is a chronic condition  4.19.    Patient reports pain:  Lower lumbar spine, SI joint right and SI joint left.  Radiates to:  Gluteals right and gluteals left.  Pain is described as stabbing, sharp and aching and is constant and reported as 8/10.  Associated symptoms:  Numbness, tingling and loss of motion/stiffness. Pain is the same all the time.  Symptoms are exacerbated by standing, walking, sitting and bending and relieved by nothing.  Since onset symptoms are gradually worsening.  Special tests:  MRI and x-ray.  Previous treatment includes other (injections ).    General health as reported by patient is fair.  Pertinent medical history includes:  Chemical dependency, high blood pressure, numbness/tingling, smoking and migraines/headaches.  Current medications:  Muscle relaxants, pain medication and high blood pressure medication.  Current occupation is .  Patient is working in normal job without restrictions.      Barriers include:  None as reported by the patient.  Red flags:  None as reported by the patient.    Objective:  Lumbar/SI  Evaluation  ROM:    AROM Lumbar:   Flexion:          Min loss   Ext:                    Min loss    Side Bend:        Left:     Right:   Rotation:           Left:  Nil     Right:  Nil   Side Glide:        Left:     Right:   Re: Truman Suero   :   1960        Lumbar Myotomes:  normal  Lumbar DTR's:  not assessed  Cord Signs:  not assessed  Lumbar Dermtomes:  not assessed  Neural Tension/Mobility:  Lumbar:  Normal      Lumbar Palpation:    Tenderness present at Left:    Quadratus Lumborum; Piriformis and Gluteus Medius  Tenderness present at Right: Quadratus Lumborum; Piriformis and Gluteus Medius    SI joint/Sacrum:      Left positive at:    Ilium Ventromedial  Right positive at:    Ilium Ventromedial  Sacral conclusion left:  Anterior inominate, locked and inflare  Sacral conclusion right:  Sacral torsion, upslip, posterior inominate, locked and outflare          Assessment/Plan:    Patient is a 58 year old male with lumbar complaints.    Patient has the following significant findings with corresponding treatment plan.                Diagnosis 1:  LBP w/ neuro  Pain -  mechanical traction, manual therapy, self management, directional preference exercise and home program  Decreased ROM/flexibility - manual therapy, therapeutic exercise and home program  Decreased joint mobility - manual therapy and therapeutic exercise  Decreased strength - therapeutic exercise and therapeutic activities  Decreased proprioception - neuro re-education and therapeutic activities  Impaired muscle performance - neuro re-education  Decreased function - therapeutic activities    Therapy Evaluation Codes:   1) History comprised of:   Personal factors that impact the plan of care:      Coping style, Overall behavior pattern and Past/current experiences.    Comorbidity factors that impact the plan of care are:      Chemical Dependency, High blood pressure, Numbness/tingling and Smoking.     Medications impacting care: High blood  pressure, Muscle relaxant and Pain.  2) Examination of Body Systems comprised of:   Body structures and functions that impact the plan of care:      Lumbar spine.   Activity limitations that impact the plan of care are:      Bending, Cooking, Lifting, Standing and Walking.  3) Clinical presentation characteristics are:   Stable/Uncomplicated.  4) Decision-Making    Low complexity using standardized patient assessment instrument and/or measureable assessment of functional outcome.  Cumulative Therapy Evaluation is: Low complexity.    Previous and current functional limitations:  (See Goal Flow Sheet for this information)    Short term and Long term goals: (See Goal Flow Sheet for this information)     Re: Truman Suero   :   1960    Communication ability:  Patient appears to be able to clearly communicate and understand verbal and written communication and follow directions correctly.  Treatment Explanation - The following has been discussed with the patient:   RX ordered/plan of care  Anticipated outcomes  Possible risks and side effects  This patient would benefit from PT intervention to resume normal activities.   Rehab potential is good.    Frequency:  1 X week, once daily  Duration:  for 8 weeks  Discharge Plan:  Achieve all LTG.  Independent in home treatment program.  Reach maximal therapeutic benefit.    Please refer to the daily flowsheet for treatment today, total treatment time and time spent performing 1:1 timed codes.       Thank you for your referral.    INQUIRIES  Therapist: Wesley Catherine PT   SAVITA BACH PT  [address]  [address]  [place] [address]  Phone: [phone]  Fax: [phone]

## 2019-05-03 NOTE — PROGRESS NOTES
[facility] Initial Evaluation  Subjective:  Pt reports chronic LBP, LE numbness/tingling bilat.  His pain has bothered him for years and he's had numerous injections.     The history is provided by the patient.   Truman Suero is a 58 year old male with a sacroiliac and lumbar condition.  Condition occurred with:  Degenerative joint disease.  Condition occurred: at home.  This is a chronic condition  4.18.19.    Patient reports pain:  Lower lumbar spine, SI joint right and SI joint left.  Radiates to:  Gluteals right and gluteals left.  Pain is described as stabbing, sharp and aching and is constant and reported as 8/10.  Associated symptoms:  Numbness, tingling and loss of motion/stiffness. Pain is the same all the time.  Symptoms are exacerbated by standing, walking, sitting and bending and relieved by nothing.  Since onset symptoms are gradually worsening.  Special tests:  MRI and x-ray.  Previous treatment includes other (injections ).    General health as reported by patient is fair.  Pertinent medical history includes:  Chemical dependency, high blood pressure, numbness/tingling, smoking and migraines/headaches.      Current medications:  Muscle relaxants, pain medication and high blood pressure medication.  Current occupation is .  Patient is working in normal job without restrictions.      Barriers include:  None as reported by the patient.    Red flags:  None as reported by the patient.                        Objective:  System         Lumbar/SI Evaluation  ROM:    AROM Lumbar:   Flexion:          Min loss   Ext:                    Min loss    Side Bend:        Left:     Right:   Rotation:           Left:  Nil     Right:  Nil   Side Glide:        Left:     Right:           Lumbar Myotomes:  normal            Lumbar DTR's:  not assessed      Cord Signs:  not assessed    Lumbar Dermtomes:  not assessed                Neural Tension/Mobility:  Lumbar:  Normal        Lumbar Palpation:     Tenderness present at Left:    Quadratus Lumborum; Piriformis and Gluteus Medius  Tenderness present at Right: Quadratus Lumborum; Piriformis and Gluteus Medius        SI joint/Sacrum:        Left positive at:    Ilium Ventromedial  Right positive at:    Ilium Ventromedial  Sacral conclusion left:  Anterior inominate, locked and inflare  Sacral conclusion right:  Sacral torsion, upslip, posterior inominate, locked and outflare                                             General     ROS    Assessment/Plan:    Patient is a 58 year old male with lumbar complaints.    Patient has the following significant findings with corresponding treatment plan.                Diagnosis 1:  LBP w/ neuro  Pain -  mechanical traction, manual therapy, self management, directional preference exercise and home program  Decreased ROM/flexibility - manual therapy, therapeutic exercise and home program  Decreased joint mobility - manual therapy and therapeutic exercise  Decreased strength - therapeutic exercise and therapeutic activities  Decreased proprioception - neuro re-education and therapeutic activities  Impaired muscle performance - neuro re-education  Decreased function - therapeutic activities    Therapy Evaluation Codes:   1) History comprised of:   Personal factors that impact the plan of care:      Coping style, Overall behavior pattern and Past/current experiences.    Comorbidity factors that impact the plan of care are:      Chemical Dependency, High blood pressure, Numbness/tingling and Smoking.     Medications impacting care: High blood pressure, Muscle relaxant and Pain.  2) Examination of Body Systems comprised of:   Body structures and functions that impact the plan of care:      Lumbar spine.   Activity limitations that impact the plan of care are:      Bending, Cooking, Lifting, Standing and Walking.  3) Clinical presentation characteristics are:   Stable/Uncomplicated.  4) Decision-Making    Low complexity using  standardized patient assessment instrument and/or measureable assessment of functional outcome.  Cumulative Therapy Evaluation is: Low complexity.    Previous and current functional limitations:  (See Goal Flow Sheet for this information)    Short term and Long term goals: (See Goal Flow Sheet for this information)     Communication ability:  Patient appears to be able to clearly communicate and understand verbal and written communication and follow directions correctly.  Treatment Explanation - The following has been discussed with the patient:   RX ordered/plan of care  Anticipated outcomes  Possible risks and side effects  This patient would benefit from PT intervention to resume normal activities.   Rehab potential is good.    Frequency:  1 X week, once daily  Duration:  for 8 weeks  Discharge Plan:  Achieve all LTG.  Independent in home treatment program.  Reach maximal therapeutic benefit.    Please refer to the daily flowsheet for treatment today, total treatment time and time spent performing 1:1 timed codes.

## 2019-05-06 ENCOUNTER — ANCILLARY PROCEDURE (OUTPATIENT)
Dept: RADIOLOGY | Facility: CLINIC | Age: 59
End: 2019-05-06
Attending: ANESTHESIOLOGY
Payer: COMMERCIAL

## 2019-05-06 ENCOUNTER — RADIOLOGY INJECTION OFFICE VISIT (OUTPATIENT)
Dept: PALLIATIVE MEDICINE | Facility: CLINIC | Age: 59
End: 2019-05-06
Attending: NURSE PRACTITIONER
Payer: COMMERCIAL

## 2019-05-06 VITALS
DIASTOLIC BLOOD PRESSURE: 105 MMHG | HEART RATE: 67 BPM | SYSTOLIC BLOOD PRESSURE: 144 MMHG | RESPIRATION RATE: 16 BRPM | OXYGEN SATURATION: 98 %

## 2019-05-06 DIAGNOSIS — M47.816 LUMBAR FACET ARTHROPATHY: ICD-10-CM

## 2019-05-06 DIAGNOSIS — M47.819 FACET ARTHROPATHY: Primary | ICD-10-CM

## 2019-05-06 PROCEDURE — 64494 INJ PARAVERT F JNT L/S 2 LEV: CPT | Mod: 50 | Performed by: PSYCHIATRY & NEUROLOGY

## 2019-05-06 PROCEDURE — 64493 INJ PARAVERT F JNT L/S 1 LEV: CPT | Mod: 50 | Performed by: PSYCHIATRY & NEUROLOGY

## 2019-05-06 ASSESSMENT — PAIN SCALES - GENERAL: PAINLEVEL: SEVERE PAIN (6)

## 2019-05-06 NOTE — PROGRESS NOTES
Pre procedure Diagnosis: facet arthropathy   Post procedure Diagnosis: Same  Procedure performed: bilateral L4/L5 and L5/S1 facet joint injections  Anesthesia: none  Complications: none  Operators: Mariaelena Osorio MD ; Rah Vincent MD (pain medicine fellow)     Indications:   Truman Suero is a 58 year old male was sent by Melanie Thomas NP for facet joint injections.  They have a history of lumbar spondylosis.  He has previously undergone bilateral facet joint injection and subsequent MBB, however his MBB failed to achieve sufficient pain relief to proceed with RFA.  Exam shows bilateral low lumbar paraspinal tenderness and exacerbation of pain with flexion and extension and they have tried conservative treatment including PT, medications, and prior injections.    Options/alternatives, benefits and risks were discussed with the patient including bleeding, infection, flared pain, tissue trauma, exposure to radiation, reaction to medications including seizure, spinal cord injury, paralysis, weakness, numbness and headache.   Questions were answered to his satisfaction and he agrees to proceed. Voluntary informed consent was obtained and signed.     Vitals were reviewed: Yes  Allergies were reviewed:  Yes  Medications were reviewed:  Yes   Pre-procedure pain score: 6/10    Procedure:  After getting informed consent, patient was brought into the procedure suite and was placed in a prone position on the procedure table.   A Pause for the Cause was performed.  Patient was prepped and draped in sterile fashion.     Under AP fluoroscopic guidance the L4/5 and L5/S1 facet joints on the right, then lef side were identified, and the C-arm was rotated obliquely to the affected side to open the joint space. A total of 6 ml of 1% lidocaine was injected at the needle entry point and needle tract. Then a 22 gauge 3.5 inch quincke type spinal needle was inserted and advanced under fluoroscopic guidance targeting the superior  articular pillar of each joint. Once the needle made a contact with SAP, it was rotated and was then advanced into the joint.    AP fluoroscopic views were obtained to confirm the needle placement. Then, contrast was injected after negative aspiration for heme and CSF in each joint, confirming appropriate placement.  A total of 2ml of Omnipaque was used, and 8ml was wasted.     The injection was then accomplished using a solution containing 2.5ml of 0.5% bupivacaine mixed with 10mg of dexamethasone, divided between the 4 joints. The needles were removed.     Hemostasis was achieved, the area was cleaned, and bandaids were placed when appropriate.  The patient tolerated the procedure well, and was taken to the recovery room.    Images were saved to PACS.    Post-procedure pain score: 2/10  Follow-up includes:   -f/u phone call in one week  -f/u with referring provider    Mariaelena Osorio MD  [facility]

## 2019-05-06 NOTE — NURSING NOTE
Pre-procedure Intake    Have you been fasting? NA    If yes, for how long? NA    Are you taking a prescribed blood thinner such as coumadin, Plavix, Xarelto?    No    If yes, when did you take your last dose? NA    Do you take aspirin?  No    If cervical procedure, have you held aspirin for 6 days?   NA    Do you have any allergies to contrast dye, iodine, steroid and/or numbing medications?  NO    Are you currently taking antibiotics or have an active infection?  NO    Have you had a fever/elevated temperature within the past week? NO    Are you currently taking oral steroids? NO    Do you have a ? Yes       Are you pregnant or breastfeeding?  Not Applicable    Are the vital signs normal?  Yes    Latisha Pham CMA ([facility])

## 2019-05-06 NOTE — PATIENT INSTRUCTIONS
[facility]   Procedure Discharge Instructions    Today you saw:  Dr. Jessica Osorio      You had an: facet joint injection     Medications used:  Lidocaine   Bupivacaine   Dexamethasone   Omnipaque            If you were holding your blood thinning medication, please restart taking it: N/A    Be cautious when walking. Numbness and/or weakness in the lower extremities may occur for up to 6-8 hours after the procedure due to effect of the local anesthetic    Do not drive for 6 hours. The effect of the local anesthetic could slow your reflexes.     You may resume your regular activities after 24 hours    Avoid strenuous activity for the first 24 hours    You may shower, however avoid swimming, tub baths or hot tubs for 24 hours following your procedure    You may have a mild to moderate increase in pain for several days following the injection.    It may take up to 14 days for the steroid medication to start working although you may feel the effect as early as a few days after the procedure.       You may use ice packs for 10-15 minutes, 3 to 4 times a day at the injection site for comfort    Do not use heat to painful areas for 6 to 8 hours. This will give the local anesthetic time to wear off and prevent you from accidentally burning your skin.     Unless you have been directed to avoid the use of anti-inflammatory medications (NSAIDS), you may use medications such as ibuprofen, Aleve or Tylenol for pain control if needed.     Possible side effects of steroids that you may experience include flushing, elevated blood pressure, increased appetite, mild headaches and restlessness.  All of these symptoms will get better with time.    If you experience any of the following, call the Pain Clinic during work hours at [phone] or the Provider Line after hours at [phone]:  -Fever over 100 degree F  -Swelling, bleeding, redness, drainage, warmth at the injection site  -Progressive weakness or  numbness in your legs   -Unusual new onset of pain that is not improving

## 2019-05-06 NOTE — NURSING NOTE
Discharge Information    IV Discontiued Time:  NA    Amount of Fluid Infused:  NA    Discharge Criteria = When patient returns to baseline or as per MD order    Consciousness:  Pt is fully awake    Circulation:  BP +/- 20% of pre-procedure level    Respiration:  Patient is able to breathe deeply    O2 Sat:  Patient is able to maintain O2 Sat >92% on room air    Activity:  Moves 4 extremities on command    Ambulation:  Patient is able to stand and walk or stand and pivot into wheelchair    Dressing:  Clean/dry or No Dressing    Notes:   Discharge instructions and AVS given to patient    Patient meets criteria for discharge?  YES    Admitted to PCU?  No    Responsible adult present to accompany patient home?  Yes    Signature/Title:    Van Oquendo RN Care Coordinator  [facility]

## 2019-05-07 ENCOUNTER — THERAPY VISIT (OUTPATIENT)
Dept: PHYSICAL THERAPY | Facility: CLINIC | Age: 59
End: 2019-05-07
Payer: COMMERCIAL

## 2019-05-07 DIAGNOSIS — M54.50 CHRONIC BILATERAL LOW BACK PAIN WITHOUT SCIATICA: Primary | ICD-10-CM

## 2019-05-07 DIAGNOSIS — G89.29 CHRONIC BILATERAL LOW BACK PAIN WITHOUT SCIATICA: Primary | ICD-10-CM

## 2019-05-07 PROCEDURE — 97140 MANUAL THERAPY 1/> REGIONS: CPT | Mod: GP | Performed by: PHYSICAL THERAPIST

## 2019-05-07 PROCEDURE — 97110 THERAPEUTIC EXERCISES: CPT | Mod: GP | Performed by: PHYSICAL THERAPIST

## 2019-05-13 ENCOUNTER — MYC REFILL (OUTPATIENT)
Dept: FAMILY MEDICINE | Facility: CLINIC | Age: 59
End: 2019-05-13

## 2019-05-13 DIAGNOSIS — F43.22 ADJUSTMENT DISORDER WITH ANXIOUS MOOD: ICD-10-CM

## 2019-05-13 DIAGNOSIS — Z72.0 TOBACCO ABUSE: ICD-10-CM

## 2019-05-13 NOTE — TELEPHONE ENCOUNTER
"Requested Prescriptions   Pending Prescriptions Disp Refills     buPROPion (WELLBUTRIN SR) 150 MG 12 hr tablet  Last Written Prescription Date:  12/28/2018  Last Fill Quantity: 180,  # refills: 1   Last office visit: 10/2/2018 with prescribing provider:  Amos   Future Office Visit:   Next 5 appointments (look out 90 days)    Jun 21, 2019  3:30 PM CDT  Return Visit with RODNEY Vargas CNP  [facility] ([facility]) [address] [address]  [phone]          180 tablet 1     Sig: Take 1 tablet once daily and increase to 1 tablet twice daily after 4 to 7 days       SSRIs Protocol Passed - 5/13/2019 12:57 PM        Passed - Recent (12 mo) or future (30 days) visit within the authorizing provider's specialty     Patient had office visit in the last 12 months or has a visit in the next 30 days with authorizing provider or within the authorizing provider's specialty.  See \"Patient Info\" tab in inbasket, or \"Choose Columns\" in Meds & Orders section of the refill encounter.              Passed - Medication is Bupropion     If the medication is Bupropion (Wellbutrin), and the patient is taking for smoking cessation; OK to refill.          Passed - Medication is active on med list        Passed - Patient is age 18 or older          "

## 2019-05-14 RX ORDER — BUPROPION HYDROCHLORIDE 150 MG/1
TABLET, EXTENDED RELEASE ORAL
Qty: 180 TABLET | Refills: 1 | Status: SHIPPED | OUTPATIENT
Start: 2019-05-14 | End: 2019-11-18

## 2019-05-14 NOTE — TELEPHONE ENCOUNTER
Prescription approved per [facility] Refill Protocol.  Ashleigh Young RN

## 2019-05-15 ENCOUNTER — MYC REFILL (OUTPATIENT)
Dept: PALLIATIVE MEDICINE | Facility: CLINIC | Age: 59
End: 2019-05-15

## 2019-05-15 ENCOUNTER — THERAPY VISIT (OUTPATIENT)
Dept: PHYSICAL THERAPY | Facility: CLINIC | Age: 59
End: 2019-05-15
Payer: COMMERCIAL

## 2019-05-15 DIAGNOSIS — G89.29 CHRONIC BILATERAL LOW BACK PAIN: ICD-10-CM

## 2019-05-15 DIAGNOSIS — M48.02 CERVICAL STENOSIS OF SPINAL CANAL: ICD-10-CM

## 2019-05-15 DIAGNOSIS — M50.30 DDD (DEGENERATIVE DISC DISEASE), CERVICAL: ICD-10-CM

## 2019-05-15 DIAGNOSIS — M54.50 CHRONIC BILATERAL LOW BACK PAIN: ICD-10-CM

## 2019-05-15 DIAGNOSIS — M47.812 CERVICAL SPONDYLOSIS WITHOUT MYELOPATHY: ICD-10-CM

## 2019-05-15 PROCEDURE — 97110 THERAPEUTIC EXERCISES: CPT | Mod: GP

## 2019-05-15 RX ORDER — TOPIRAMATE 50 MG/1
50 TABLET, FILM COATED ORAL 2 TIMES DAILY
Qty: 60 TABLET | Refills: 2 | Status: SHIPPED | OUTPATIENT
Start: 2019-05-15 | End: 2019-08-08

## 2019-05-15 RX ORDER — TOPIRAMATE 50 MG/1
50 TABLET, FILM COATED ORAL 2 TIMES DAILY
Qty: 60 TABLET | Refills: 2 | Status: CANCELLED | OUTPATIENT
Start: 2019-05-15

## 2019-05-15 NOTE — TELEPHONE ENCOUNTER
Received request from patient requesting refill(s) for topiramate (TOPAMAX) 50 MG tablet      Pt last seen on 04/18/19- has had injection since  Next appt scheduled for 06/21/19    Will facilitate refill.

## 2019-05-15 NOTE — TELEPHONE ENCOUNTER
Signed Prescriptions:                        Disp   Refills    topiramate (TOPAMAX) 50 MG tablet          60 tab*2        Sig: Take 1 tablet (50 mg) by mouth 2 times daily Drink           plenty of water and no alcohol. If side effects,           then reduce to last tolerable dosage.  Authorizing Provider: CANDACE BENSON, RN CNP, FNP  [facility]

## 2019-05-22 DIAGNOSIS — Z98.1 S/P CERVICAL SPINAL FUSION: Primary | ICD-10-CM

## 2019-05-29 ENCOUNTER — MYC REFILL (OUTPATIENT)
Dept: PALLIATIVE MEDICINE | Facility: CLINIC | Age: 59
End: 2019-05-29

## 2019-05-29 DIAGNOSIS — G89.29 CHRONIC NECK PAIN: ICD-10-CM

## 2019-05-29 DIAGNOSIS — M54.50 CHRONIC BILATERAL LOW BACK PAIN WITHOUT SCIATICA: ICD-10-CM

## 2019-05-29 DIAGNOSIS — G89.29 CHRONIC BILATERAL LOW BACK PAIN WITHOUT SCIATICA: ICD-10-CM

## 2019-05-29 DIAGNOSIS — M79.18 MYOFASCIAL PAIN: ICD-10-CM

## 2019-05-29 DIAGNOSIS — M50.30 DDD (DEGENERATIVE DISC DISEASE), CERVICAL: ICD-10-CM

## 2019-05-29 DIAGNOSIS — M54.2 CHRONIC NECK PAIN: ICD-10-CM

## 2019-05-29 DIAGNOSIS — M54.59 LUMBAR FACET JOINT PAIN: ICD-10-CM

## 2019-05-29 DIAGNOSIS — M47.816 SPONDYLOSIS OF LUMBAR REGION WITHOUT MYELOPATHY OR RADICULOPATHY: ICD-10-CM

## 2019-05-29 DIAGNOSIS — M47.812 CERVICAL SPONDYLOSIS WITHOUT MYELOPATHY: ICD-10-CM

## 2019-05-29 DIAGNOSIS — Z98.1 S/P CERVICAL SPINAL FUSION: ICD-10-CM

## 2019-05-29 RX ORDER — TRAMADOL HYDROCHLORIDE 200 MG/1
200 TABLET, EXTENDED RELEASE ORAL EVERY 24 HOURS
Qty: 30 TABLET | Refills: 0 | Status: CANCELLED | OUTPATIENT
Start: 2019-05-29

## 2019-05-29 RX ORDER — TRAMADOL HYDROCHLORIDE 50 MG/1
50 TABLET ORAL EVERY 6 HOURS PRN
Qty: 90 TABLET | Refills: 0 | Status: CANCELLED | OUTPATIENT
Start: 2019-05-29

## 2019-05-30 NOTE — TELEPHONE ENCOUNTER
Received call from patient requesting refill(s) of traMADol (ULTRAM) 50 MG tablet and traMADol (ULTRAM-ER) 200 MG 24 hr tablet    Last picked up from pharmacy on 50mg- 05/05/19         200mg- 04/30/19    Pt last seen by prescribing provider on 04/18/19 office visit - has had injection since  Next appt scheduled for 06/21/19     checked in the past 6 months? Yes If no, print current report and give to RN    Last urine drug screen date 03/05/19  Current opioid agreement on file (completed within the last year) Yes Date of opioid agreement: 03/05/19    Processing (pick one and delete the others):      E-prescribe to  Pharmacy-[facility] [address]       Will route to nursing [place] for review and preparation of prescription(s).

## 2019-05-31 RX ORDER — TRAMADOL HYDROCHLORIDE 50 MG/1
50 TABLET ORAL EVERY 6 HOURS PRN
Qty: 90 TABLET | Refills: 0 | Status: SHIPPED | OUTPATIENT
Start: 2019-05-31 | End: 2019-06-21

## 2019-05-31 RX ORDER — TRAMADOL HYDROCHLORIDE 200 MG/1
200 TABLET, EXTENDED RELEASE ORAL EVERY 24 HOURS
Qty: 30 TABLET | Refills: 0 | Status: SHIPPED | OUTPATIENT
Start: 2019-05-31 | End: 2019-06-21

## 2019-05-31 NOTE — TELEPHONE ENCOUNTER
E-prescription confirmation received from pharmacy. Patient was informed via [employer].

## 2019-05-31 NOTE — TELEPHONE ENCOUNTER
Medication refill information reviewed.     Due date for traMADol (ULTRAM) 50 MG tablet and traMADol (ULTRAM-ER) 200 MG 24 hr tablet is:  50mg- 6/1/19     200mg- 6/1/19      Prescriptions prepped for review.     Will route to provider.     E-prescribe to  Pharmacy-[facility] [address] - [place] - [address]      aVn Oquendo, RN  Care Coordinator   [facility]

## 2019-05-31 NOTE — TELEPHONE ENCOUNTER
Signed Prescriptions:                        Disp   Refills    traMADol (ULTRAM) 50 MG tablet             90 tab*0        Sig: Take 1 tablet (50 mg) by mouth every 6 hours as           needed for severe pain Max 3/day. Fill 5/30/19;           start 6/1/19  Authorizing Provider: MELANIE BENSON    traMADol (ULTRAM-ER) 200 MG 24 hr tablet   30 tab*0        Sig: Take 1 tablet (200 mg) by mouth every 24 hours fill           5/30/19 to start 6/1/2019  Authorizing Provider: MELANIE BENSON    Reviewed [place]  May 31, 2019- no concerning fills.    Melanie STEVENS, RN CNP, FNP  [facility] [place]

## 2019-06-03 ENCOUNTER — THERAPY VISIT (OUTPATIENT)
Dept: PHYSICAL THERAPY | Facility: CLINIC | Age: 59
End: 2019-06-03
Payer: OTHER MISCELLANEOUS

## 2019-06-03 DIAGNOSIS — G89.29 CHRONIC NECK PAIN: ICD-10-CM

## 2019-06-03 DIAGNOSIS — M54.2 CHRONIC NECK PAIN: ICD-10-CM

## 2019-06-03 DIAGNOSIS — M54.50 CHRONIC BILATERAL LOW BACK PAIN WITHOUT SCIATICA: ICD-10-CM

## 2019-06-03 DIAGNOSIS — G89.29 CHRONIC BILATERAL LOW BACK PAIN WITHOUT SCIATICA: ICD-10-CM

## 2019-06-03 DIAGNOSIS — G44.209 TENSION TYPE HEADACHE: ICD-10-CM

## 2019-06-03 PROCEDURE — 97140 MANUAL THERAPY 1/> REGIONS: CPT | Mod: GP | Performed by: PHYSICAL THERAPIST

## 2019-06-03 PROCEDURE — 97110 THERAPEUTIC EXERCISES: CPT | Mod: GP | Performed by: PHYSICAL THERAPIST

## 2019-06-13 ENCOUNTER — ANCILLARY PROCEDURE (OUTPATIENT)
Dept: GENERAL RADIOLOGY | Facility: CLINIC | Age: 59
End: 2019-06-13
Attending: NEUROLOGICAL SURGERY
Payer: COMMERCIAL

## 2019-06-13 DIAGNOSIS — Z98.1 S/P CERVICAL SPINAL FUSION: ICD-10-CM

## 2019-06-13 DIAGNOSIS — Z98.1 ARTHRODESIS STATUS: ICD-10-CM

## 2019-06-14 ENCOUNTER — THERAPY VISIT (OUTPATIENT)
Dept: PHYSICAL THERAPY | Facility: CLINIC | Age: 59
End: 2019-06-14
Payer: OTHER MISCELLANEOUS

## 2019-06-14 DIAGNOSIS — G89.29 CHRONIC BILATERAL LOW BACK PAIN WITHOUT SCIATICA: ICD-10-CM

## 2019-06-14 DIAGNOSIS — M54.50 CHRONIC BILATERAL LOW BACK PAIN WITHOUT SCIATICA: ICD-10-CM

## 2019-06-14 DIAGNOSIS — M54.2 CHRONIC NECK PAIN: ICD-10-CM

## 2019-06-14 DIAGNOSIS — G44.209 TENSION TYPE HEADACHE: ICD-10-CM

## 2019-06-14 DIAGNOSIS — G89.29 CHRONIC NECK PAIN: ICD-10-CM

## 2019-06-14 PROCEDURE — 97140 MANUAL THERAPY 1/> REGIONS: CPT | Mod: GP | Performed by: PHYSICAL THERAPIST

## 2019-06-14 PROCEDURE — 97110 THERAPEUTIC EXERCISES: CPT | Mod: GP | Performed by: PHYSICAL THERAPIST

## 2019-06-21 ENCOUNTER — OFFICE VISIT (OUTPATIENT)
Dept: PALLIATIVE MEDICINE | Facility: CLINIC | Age: 59
End: 2019-06-21
Payer: COMMERCIAL

## 2019-06-21 VITALS
WEIGHT: 152 LBS | HEART RATE: 69 BPM | SYSTOLIC BLOOD PRESSURE: 135 MMHG | BODY MASS INDEX: 23.81 KG/M2 | DIASTOLIC BLOOD PRESSURE: 89 MMHG

## 2019-06-21 DIAGNOSIS — M54.59 LUMBAR FACET JOINT PAIN: ICD-10-CM

## 2019-06-21 DIAGNOSIS — Z98.1 S/P CERVICAL SPINAL FUSION: ICD-10-CM

## 2019-06-21 DIAGNOSIS — M54.2 CHRONIC NECK PAIN: ICD-10-CM

## 2019-06-21 DIAGNOSIS — M50.30 DDD (DEGENERATIVE DISC DISEASE), CERVICAL: ICD-10-CM

## 2019-06-21 DIAGNOSIS — G89.29 CHRONIC BILATERAL LOW BACK PAIN WITHOUT SCIATICA: ICD-10-CM

## 2019-06-21 DIAGNOSIS — M47.816 SPONDYLOSIS OF LUMBAR REGION WITHOUT MYELOPATHY OR RADICULOPATHY: ICD-10-CM

## 2019-06-21 DIAGNOSIS — M79.18 MYOFASCIAL PAIN: ICD-10-CM

## 2019-06-21 DIAGNOSIS — M48.02 CERVICAL STENOSIS OF SPINAL CANAL: ICD-10-CM

## 2019-06-21 DIAGNOSIS — M47.812 CERVICAL SPONDYLOSIS WITHOUT MYELOPATHY: ICD-10-CM

## 2019-06-21 DIAGNOSIS — M54.50 CHRONIC BILATERAL LOW BACK PAIN WITHOUT SCIATICA: ICD-10-CM

## 2019-06-21 DIAGNOSIS — G89.29 CHRONIC NECK PAIN: ICD-10-CM

## 2019-06-21 DIAGNOSIS — G89.29 CHRONIC NECK AND BACK PAIN: ICD-10-CM

## 2019-06-21 DIAGNOSIS — M79.645 PAIN OF FINGER OF LEFT HAND: Primary | ICD-10-CM

## 2019-06-21 DIAGNOSIS — M54.2 CHRONIC NECK AND BACK PAIN: ICD-10-CM

## 2019-06-21 DIAGNOSIS — M54.9 CHRONIC NECK AND BACK PAIN: ICD-10-CM

## 2019-06-21 DIAGNOSIS — M54.12 CERVICAL RADICULOPATHY: ICD-10-CM

## 2019-06-21 PROCEDURE — 99214 OFFICE O/P EST MOD 30 MIN: CPT | Performed by: NURSE PRACTITIONER

## 2019-06-21 RX ORDER — TRAMADOL HYDROCHLORIDE 50 MG/1
50 TABLET ORAL EVERY 6 HOURS PRN
Qty: 90 TABLET | Refills: 0 | Status: SHIPPED | OUTPATIENT
Start: 2019-06-21 | End: 2019-07-30

## 2019-06-21 RX ORDER — GABAPENTIN 600 MG/1
900 TABLET ORAL 3 TIMES DAILY
Qty: 135 TABLET | Refills: 3 | Status: SHIPPED | OUTPATIENT
Start: 2019-06-21 | End: 2019-10-22

## 2019-06-21 RX ORDER — TRAMADOL HYDROCHLORIDE 200 MG/1
200 TABLET, EXTENDED RELEASE ORAL EVERY 24 HOURS
Qty: 30 TABLET | Refills: 0 | Status: SHIPPED | OUTPATIENT
Start: 2019-06-21 | End: 2019-07-30

## 2019-06-21 ASSESSMENT — PAIN SCALES - GENERAL: PAINLEVEL: SEVERE PAIN (7)

## 2019-06-21 NOTE — PROGRESS NOTES
[facility]  6/21/2019       Chief complaint: neck and low back pain    Interval history:  Truman Suero is a 58 year old male is known to me for   Chronic neck pain  Cervical DDD  Cervical spondylosis (pain worse with extension/rotation indicating facetogenic component to pain)  Axial low back pain  Myofascial pain/muscle spasms  Remote history of ETOH overuse, attended AA for awhile. Sober for 10 years  ---PMHx includes: neck pain  ---PSHx includes: none listed      Recommendations/plan at the last visit on 4/18/2019 included:  1. Physical Therapy: referred for low back  2. Clinical Health Psychologist: none  3. Diagnostic Studies:  None  4. Medication Management:    1. Continue tramadol ER 200mg once daily  2. Continue tramadol 50mg Q 6-8 hours PRN, max of 3/day. E-prescribed today.  3. Continue gabapentin 900 mg TID   4. Continue Topamax 50 mg BID   5. Continue methocarbamol 500-1000 mg TID PRN muscle spasms   5. Further procedures recommended: schedule lumbar facet joint steroid injections, our office to contact pt  6. Recommendations to PCP: see above  7. Follow up: 10-12 weeks      Since his last visit, Truman Suero reports:  -Low back pain is somewhat improved after the bilateral L4-5 and L5-S1 facet joint injections on 5/6/2019.   He finds that Physical therapy is helpful for low back pain.  -Overexertion at work exacerbates painful symptoms. He would probably use a stool if he could get one approved. However, sitting might make his job even more difficult as he works as a  and does a lot of chopping and cutting.  -Neck pain into the upper back. Pain over the right arm into the wrist and first two digits of the hand. Unsure if this pain originates in the neck or in the wrist due to arthritis.   -Previously diagnosed with carpal tunnel syndrome and had carpal tunnel release.      At this point, the patient's participation with our multidisciplinary team includes:  The patient has  "been compliant with the program.  PT - attended non-pain management PHYSICAL THERAPY   Health Psych - has seen Kentrell Sierra Dony x4  Acupuncture: worked with Dr. Bereket LAY      Pain scores:  Pain intensity on average is 8 on a scale of 0-10.    Range is 7-10/10.   Pain right now is 7/10.  Pain is described as \"aching, numb, unbearable, burning, tiring, exhausting, and miserable    Current pain relevant medications:   -Tramadol 200mg ER in the evening (helpful)  -Tramadol 50mg take 1 tablet  Q 6 hours PRN (using 2-3 tabs per day, helpful)  -ibuprofen 600mg PRN (helpful)  -gabapentin 900mg TID (somewhat helpful)  -methocarbamol 500-1000mg TID PRN muscle spasms (takes 1000 mg BID, somewhat helpful)  -Topamax 50 mg BID (helpful)    Other pertinent medications:  None    Previous Medications:  OPIATES: Tramdol (somewhat helpful)  NSAIDS: ibuprofen (helpful), Aleve (helpful)  MUSCLE RELAXANTS: none  ANTI-MIGRAINE MEDS: none  ANTI-DEPRESSANTS: none  SLEEP AIDS: none  ANTI-CONVULSANTS: gabapentin (helpful)  TOPICALS: lidocaine ointment (somewhat helpful)  Other meds: Tylenol (not helpful)        Other treatments have included:  Truman REI Suero has not been seen at a pain clinic in the past.    PT: tried, somewhat helpful  Chiropractic: helpful  Acupuncture: none  TENs Unit: none     Injections:   cervical radiofrequency nerve ablation at [facility] in December 2016 (did get good relief, got 70% relief of his typical neck pain)  -6/29/2017 Cervical facet joint steroid injections at C4-5 and C5-6 on the right with Dr. Nicole Harding (not helpful)  -3/8/2018 C7-T1 interlaminar DEMARCUS with Dr. Hugo Corrigan (not helpful)  -5/23/2018 right L4-5 transforaminal epidural steroid injection with Dr. Osorio (not helpful for back pain but did help leg pain)  -8/7/2018 bilateral SI joint injection with Dr Hugo Corrigan (helpful for one month)  -10/11/2018 l3-4 and L4-5 facet joint injections bilaterally with Dr. Hugo Corrigan " (this has been helpful, more helpful than any other lumbar injections thus far)  -1/28/2019 bilateral L3-5 medial branch blocks #1 with Dr. Osorio (somewhat helpful)   -2/25/2019 LMBB #2 with Dr. Osorio (somewhat effective, but not enough to proceed to RFA)  -5/6/2019 bilateral L4/L5 and L5/S1 facet joint injections with Dr. Osorio (helpful)    Surgeries:  10/29/2018 right anterior cervical C5-6 discectomy and fusion by Dr. Jud Harkins (somewhat helpful)      UDS last completed on 3/5/2019  CSA last signed on 3/14/2019 with Melanie STEVENS, RN CNP, FNP  [facility]     THE 4 A's OF OPIOID MAINTENANCE ANALGESIA    Analgesia: long acting Tramadol with some short acting tramadol does give some relief    Activity: ADLs    Adverse effects: none    Adherence to Rx protocol: yes      Side Effects: none  Patient is using the medication as prescribed: yes    Medications:  Current Outpatient Medications   Medication Sig Dispense Refill     buPROPion (WELLBUTRIN SR) 150 MG 12 hr tablet Take 1 tablet once daily and increase to 1 tablet twice daily after 4 to 7 days 180 tablet 1     cyclobenzaprine (FLEXERIL) 5 MG tablet Take 1-2 tablets (5-10 mg) by mouth nightly as needed for muscle spasms Need 6 hours between this and methocarbamol 45 tablet 2     fluticasone (FLONASE) 50 MCG/ACT nasal spray Spray 2 sprays into both nostrils 2 times daily Needs appointment for further refills 32 mL 11     gabapentin (NEURONTIN) 600 MG tablet Take 1.5 tablets (900 mg) by mouth 3 times daily 135 tablet 3     methocarbamol (ROBAXIN) 500 MG tablet Take 1 tablet (500 mg) by mouth 3 times daily as needed for muscle spasms Should be 6 hours between this and cyclobenzaprine at night 90 tablet 2     metoprolol succinate (TOPROL-XL) 25 MG 24 hr tablet Take 1 tablet (25 mg) by mouth daily (Patient taking differently: Take 25 mg by mouth At Bedtime ) 30 tablet 1     topiramate (TOPAMAX) 50 MG tablet Take 1 tablet (50 mg) by  mouth 2 times daily Drink plenty of water and no alcohol. If side effects, then reduce to last tolerable dosage. 60 tablet 2     traMADol (ULTRAM) 50 MG tablet Take 1 tablet (50 mg) by mouth every 6 hours as needed for severe pain Max 3/day. Fill 5/30/19; start 6/1/19 90 tablet 0     traMADol (ULTRAM-ER) 200 MG 24 hr tablet Take 1 tablet (200 mg) by mouth every 24 hours fill 5/30/19 to start 6/1/2019 30 tablet 0       Medical History: any changes in medical history since they were last seen? none    Social History:   Home situation: , has 4 kids, 2 at home, one in college and one launched.   Occupation/Schooling: [employer] full time in [employer]  Tobacco use: former smoker, quit on 3/20/2018  Alcohol use: sober for nearly 10 years. He used to work a sobriety program, used to go to   Drug use: none  History of chemical dependency treatment: no formal treatment, did AA    Is patient a current smoker or tobacco user?  QUIT on 3/20/2018  If yes, was cessation counseling offered?  no        Review of Systems:  ROS is positive as per HPI as well as for joint pain, neck pain, back pain, weakness, numbness, tingling, anxiety, stress and is negative for fevers, sweats, or constipation, diarrhea.    This document serves as a record of the services and decisions personally performed and made by Melanie STEVENS. It was created on her behalf by Moncho Tariq, a trained medical scribe. The creation of this document is based on the provider's statements to the medical scribe.  Moncho Tariq 3:43 PM June 21, 2019      Physical Exam:   Vital signs: /89   Pulse 69   Wt 68.9 kg (152 lb)   BMI 23.81 kg/m       Behavioral observations:  Awake, alert, cooperative    Gait:  normal    Musculoskeletal exam:    Moves well in exam room  Strength grossly equal throughout  Good lumbar flexion  Painful lumbar extension   Painful lumbar extension and rotation to the right and to the left    Neuro exam:  SILT in all  extremities     Skin/vascular/autonomic:  No suspicious lesions on exposed skin.      Other:  NA    Is the patient hypertensive today? No  Hypertensive on recheck of BP?   N/A  If yes, was patient recommended to see Primary Care Provider in follow up for management of HTN?  N/A        IMAGING:    MR CERVICAL SPINE WITHOUT CONTRAST 9/5/2017 2:32 PM      HISTORY: Neck pain, worsening numbness in arms and lower extremities.  Radiculopathy, cervical region.     TECHNIQUE: Multiplanar multisequence images were obtained through the  cervical spine without contrast.     COMPARISON: 2/22/2017.     FINDINGS: Sagittal images demonstrate some minimal gentle cervical  kyphosis, otherwise normal posterior alignment. There is no evidence  for craniovertebral or cervicomedullary junction abnormality. The  cervical cord is minimally indented anteriorly at C4-C5 and C5-C6,  otherwise is normal in morphology and signal characteristics. Disc  space narrowing and discogenic marrow changes are present C3-C4,  C4-C5, C5-C6 and C6-C7.     C2-C3: Minimal facet hypertrophy. No stenosis.     C3-C4: Broad-based posterior osteophyte formation is present causing  some mild bilateral neural foraminal stenosis, but no central canal  stenosis.     C4-C5: Broad-based posterior osteophyte formation and mild facet  hypertrophy is present causing some mild to moderate central canal  stenosis, mild cord deformity, and mild-to-moderate bilateral neural  foraminal stenosis.     C5-C6: Broad-based posterior osteophyte formation and disc bulging is  present along with some facet hypertrophy. There is moderate central  canal stenosis and moderate bilateral neural foraminal stenosis.  Findings have progressed since the prior exam.     C6-C7: Broad-based disc bulging is present along with some minimal  posterior osteophyte formation. There is borderline to mild central  canal stenosis, but no neural foraminal stenosis.     C7-T1: Moderate facet hypertrophy.  No significant stenosis.         IMPRESSION: Moderate multilevel degenerative disc and facet disease  with some progression at C5-C6 where there is moderate central canal  and bilateral neural foraminal stenosis along with cord deformity.          MR CERVICAL SPINE WITHOUT CONTRAST  2/22/2017 9:59 AM     HISTORY:  Bilateral neck and arm pain for three years.     COMPARISON: None.     TECHNIQUE: Routine MR cervical spine extended through T2.     FINDINGS: There is some degenerative bone marrow signal change C3-C6.  No malignant or destructive lesions. Normal alignment through T2.  Reversed cervical adenosis C3-C6.     The cervical and upper thoracic spinal cord appear intrinsically  normal. The craniocervical junction region is normal. No paraspinous  soft tissue abnormality.     Findings by level as follows:     C2-C3: Negative. No disc protrusion. No central or lateral stenosis.     C3-C4: Mild disc space narrowing. Mild diffuse annular bulge. Small  uncinate spur on the right. No significant central or left-sided  stenosis. Mild right-sided foraminal stenosis.     C4-C5: Moderate degenerative narrowing of the interspace. Mild ventral  disc osteophyte complex with minimal central stenosis. Small uncinate  spurs bilaterally with mild bilateral foraminal stenosis.     C6-C7: Moderate disc space narrowing. Mild broad-based disc osteophyte  complex with minimal central stenosis. Minimal uncinate spurs with  mild bilateral foraminal stenosis.     C6-C7: Moderate disc space narrowing. Mild ventral disc osteophyte  complex. No significant central or lateral stenosis.     C7-T1: No disc protrusion. No central or lateral stenosis. Moderate  bilateral facet joint disease.         IMPRESSION:  1. Degenerative changes as described C3-C4 through C7-T1. There is  only mild central and foraminal stenosis as described.  2. No intrinsic spinal cord abnormality through T2    [place] Pres[place] Monitoring [place]:  [place]ed [place]   6/21/2019, no concerning scripts      DIRE Score for selecting candidates for long term opioid analgesia for chronic pain:  Diagnosis  2  Intractablility  2  Risk    Psychological health  2    Chemical health  2    Reliability  2    Social support  3  Efficacy  2    Total DIRE Score = 15. Note that   7-13 predicts poor outcome (compliance and efficacy) from opioid prescribing; 14-21 predicts good outcome (compliance and efficacy)  from opioid prescribing.      Assessment:   1. Pain of finger of left hand  2. Chronic neck pain  3. Cervical radiculopathy   4. S/p cervical fusion surgery  5. Cervical spondylosis  6. Cervical DDD  7. Chronic neck and back pain  8. Cervical stenosis of spinal canal  9. Lumbar facet joint pain  10. Spondylosis of lumbar region without myelopathy or radiculopathy   11. Chronic bilateral low back pain without sciatica   12. Myofacial pain  13. Remote history of ETOH overuse, attended AA for awhile. Sober for >10 years  14. PMHx includes: neck pain  15. PSHx includes: none listed        Plan:   1. Physical Therapy: not at present  2. Clinical Health Psychologist: none  3. Diagnostic Studies:  None  4. Medication Management:    1. Continue tramadol ER 200mg once daily  2. Continue tramadol 50mg Q 6-8 hours PRN, max of 3/day. E-prescribed today.  3. Continue gabapentin 900 mg TID   4. Continue Topamax 50 mg BID   5. Continue methocarbamol 500-1000 mg TID PRN muscle spasms   6. Start Aspercreme with 4% Lidocaine PRN  5. Further procedures recommended: None at present  6. Pt to consider the purchase of a paraffin wax bath prn: left hand pain.  7. Patient to send me a [employer]! message after follow-up with Dr. Harkins his neurosurgeon  8. Recommendations to PCP: see above  9. Follow up: 8-10 weeks      Total time spent face to face was 27 minutes and more than 50% of face to face time was spent in counseling and/or coordination of care regarding the diagnosis and recommendations above.    The  information in this document, created by the medical scribe for me, accurately reflects the services I personally performed and the decisions made by me. I have reviewed and approved this document for accuracy prior to leaving the patient care area.  June 21, 2019       Melanie STEVENS RN CNP, FNP  [facility] [place]

## 2019-06-23 ASSESSMENT — ENCOUNTER SYMPTOMS
VOMITING: 0
INCREASED ENERGY: 0
FATIGUE: 1
MUSCLE CRAMPS: 0
JAUNDICE: 0
DECREASED CONCENTRATION: 0
ARTHRALGIAS: 1
RECTAL PAIN: 0
SINUS CONGESTION: 1
DEPRESSION: 1
PANIC: 0
TASTE DISTURBANCE: 0
BLOOD IN STOOL: 0
JOINT SWELLING: 1
STIFFNESS: 1
MYALGIAS: 1
DISTURBANCES IN COORDINATION: 0
NECK MASS: 0
NERVOUS/ANXIOUS: 1
NECK PAIN: 1
DECREASED APPETITE: 0
TROUBLE SWALLOWING: 1
MUSCLE WEAKNESS: 1
CONSTIPATION: 0
BACK PAIN: 1
SMELL DISTURBANCE: 0
ABDOMINAL PAIN: 0
HOARSE VOICE: 1
WEIGHT LOSS: 0
DIARRHEA: 0
LOSS OF CONSCIOUSNESS: 0
NAUSEA: 0
CHILLS: 0
SEIZURES: 0
FEVER: 0
BOWEL INCONTINENCE: 0
NUMBNESS: 1
TINGLING: 1
SORE THROAT: 1
DIZZINESS: 1
WEIGHT GAIN: 0
WEAKNESS: 1
TREMORS: 0
ALTERED TEMPERATURE REGULATION: 0
HEARTBURN: 1
HALLUCINATIONS: 0
BLOATING: 0
PARALYSIS: 0
POLYDIPSIA: 0
SINUS PAIN: 1
POLYPHAGIA: 0
SPEECH CHANGE: 0
NIGHT SWEATS: 1
MEMORY LOSS: 0
HEADACHES: 1

## 2019-06-27 ENCOUNTER — MYC MEDICAL ADVICE (OUTPATIENT)
Dept: PALLIATIVE MEDICINE | Facility: CLINIC | Age: 59
End: 2019-06-27

## 2019-06-27 ENCOUNTER — OFFICE VISIT (OUTPATIENT)
Dept: NEUROSURGERY | Facility: CLINIC | Age: 59
End: 2019-06-27
Payer: COMMERCIAL

## 2019-06-27 VITALS
BODY MASS INDEX: 24.23 KG/M2 | WEIGHT: 154.4 LBS | SYSTOLIC BLOOD PRESSURE: 171 MMHG | HEART RATE: 94 BPM | DIASTOLIC BLOOD PRESSURE: 98 MMHG | HEIGHT: 67 IN

## 2019-06-27 DIAGNOSIS — M79.601 PAIN IN BOTH UPPER EXTREMITIES: ICD-10-CM

## 2019-06-27 DIAGNOSIS — M79.602 PAIN IN BOTH UPPER EXTREMITIES: ICD-10-CM

## 2019-06-27 DIAGNOSIS — Z98.1 ARTHRODESIS STATUS: Primary | ICD-10-CM

## 2019-06-27 ASSESSMENT — MIFFLIN-ST. JEOR: SCORE: 1478.98

## 2019-06-27 ASSESSMENT — PAIN SCALES - GENERAL: PAINLEVEL: SEVERE PAIN (7)

## 2019-06-27 NOTE — LETTER
RE: Truman Suero  [address] [id number]     Dear Colleague,    Thank you for referring your patient, Truman Suero, to the [employer] [facility]ERY at [facility]. Please see a copy of my visit note below.    6/27/2019     Clinic Note    Reason for visit: 6 months follow-up     History of present illness:  57 y/o M s/p C5-C6 ACDF  in Oct 2018 presents with aggravated bilateral hand pain and numbness, which occasionally wakes him up at night. He also states that his left hand fatigues easily, which impair holding objects. His left hand is worse than the right side. The numbness and pain is primarily in his hand and extends to the proximal site of the wrist. He takes gabapentin and tramadol, with limited relieve.  He also reports right neck pain radiating into his shoulder. The pain is relieved by warmth. Turning his head is not making it better or worse.    He had a nerve conduction study of his upper extremities done in 2017 showing mild of the left and moderate of the right median neuropathy, as seen in carpal tunnel syndrome.    He denies weakness or numbness in his legs, urinary or stool incontinence.    Review of systems: 10 point ROS negative except for as detailed in HPI  Past Medical History:   Past Medical History:   Diagnosis Date     Ascending aorta dilatation (H) 2/13/2018     Cervical spondylosis without myelopathy 10/3/2017     Chronic bilateral low back pain with right-sided sciatica 5/16/2018     Hypertension      Mild CAD 2/13/2018     Neck pain     MRI positive on cervical vertebrea.     PAD (peripheral artery disease) (H) 2/27/2018     Tobacco abuse 8/16/2017       Surgical History:   Past Surgical History:   Procedure Laterality Date     COLONOSCOPY       DISCECTOMY, FUSION CERVICAL ANTERIOR ONE LEVEL, COMBINED Right 10/29/2018    Procedure: Right Anterior Cervical 5-6 Discectomy And Fusion;  Surgeon: Jud Harkins MD;   "Location: [facility] OR       Medications:  Current Outpatient Medications   Medication     buPROPion (WELLBUTRIN SR) 150 MG 12 hr tablet     cyclobenzaprine (FLEXERIL) 5 MG tablet     fluticasone (FLONASE) 50 MCG/ACT nasal spray     gabapentin (NEURONTIN) 600 MG tablet     methocarbamol (ROBAXIN) 500 MG tablet     metoprolol succinate (TOPROL-XL) 25 MG 24 hr tablet     topiramate (TOPAMAX) 50 MG tablet     traMADol (ULTRAM) 50 MG tablet     traMADol (ULTRAM-ER) 200 MG 24 hr tablet     No current facility-administered medications for this visit.        Physical exam:   BP (!) 171/98 (BP Location: Left arm, Patient Position: Chair, Cuff Size: Adult Regular)   Pulse 94   Ht 1.702 m (5' 7\")   Wt 70 kg (154 lb 6.4 oz)   BMI 24.18 kg/m       General: Awake and alert and in no acute distress.  Pulm: Breathing comfortably on room air  CN: Symmetric browlift, smile, tongue protrusion, palate elevation, and sternocleidomastoids. No dysarthria. Extraocular muscles are all intact. Pupils react bilaterally and equally  Coordination: Intact finger-nose-finger bilaterally. Symmetric rapid alternating movements in bilateral upper extremities   Gait: Intact tandem gait.  Phalen test positive    Motor:  Normal bulk / tone; no tremor, rigidity, or bradykinesia.  No muscle wasting or fasciculations  No Pronator Drift       Delt Bi Tri Hand Flexion/  Extension Iliopsoas Quadriceps Hamstrings Tibialis Anterior Gastroc     C5 C6 C7 C8/T1 L2 L3 L4-S1 L4 S1   R 5 5 5 4+ 5 5 5 5 5   L 5 5 5 4+ 5 5 5 5 5   Sensory:  intact to LT x 4 extremities.     Reflexes:       Bi Tri BR Alexandra Pat Ach     C5-6 C7-8 C6 [facility] L2-4 S1   R 2+ 2+ 2+ Norm 2+ 2+   L 2+ 2+ 2+ Norm 2+ 2+        Imaging:  XR cervical spine (06/13/2019)  1. Stable postsurgical changes of C5-6 anterior fusion instrumentation  with unchanged lucencies surrounding the C5 screws and slight backing  out the superior aspect of the surgical plate, possibly representing  hardware loosening.  2. " Advanced multilevel cervical spondylosis.  3. Probable carotid artery calcifications bilaterally.     Assessment:  59 y/o M s/p ACDF C5-C6 with potential radiographic signs of hardware loosening and aggravated carpal tunnel syndrome L>R.    Plan:  - Nerve conduction study on the left upper extremity   - recommended splint for wrist to wear at night  - bone growth stimulator for neck  - f/u in 3 months  - CT of neck in three months   - three months working restriction    Patient seen and discussed with MD Kingsley Brunson MD  Neurosurgery, PGY-1    Again, thank you for allowing me to participate in the care of your patient.      Sincerely,    Jud Harkins MD

## 2019-06-27 NOTE — PROGRESS NOTES
6/27/2019     Clinic Note    Reason for visit: 6 months follow-up     History of present illness:  57 y/o M s/p C5-C6 ACDF in Oct 2018 presents with aggravated bilateral hand pain and numbness, which occasionally wakes him up at night. He also states that his left hand fatigues easily, which impair holding objects. His left hand is worse than the right side. The numbness and pain is primarily in his hand and extends to the proximal site of the wrist. He takes gabapentin and tramadol, with limited relieve.  He also reports right neck pain radiating into his shoulder. The pain is relieved by warmth. Turning his head is not making it better or worse.    He had a nerve conduction study of his upper extremities done in 2017 showing mild of the left and moderate of the right median neuropathy, as seen in carpal tunnel syndrome.    He denies weakness or numbness in his legs, urinary or stool incontinence.      Review of systems: 10 point ROS negative except for as detailed in HPI  Past Medical History:   Past Medical History:   Diagnosis Date     Ascending aorta dilatation (H) 2/13/2018     Cervical spondylosis without myelopathy 10/3/2017     Chronic bilateral low back pain with right-sided sciatica 5/16/2018     Hypertension      Mild CAD 2/13/2018     Neck pain     MRI positive on cervical vertebrea.     PAD (peripheral artery disease) (H) 2/27/2018     Tobacco abuse 8/16/2017       Surgical History:   Past Surgical History:   Procedure Laterality Date     COLONOSCOPY       DISCECTOMY, FUSION CERVICAL ANTERIOR ONE LEVEL, COMBINED Right 10/29/2018    Procedure: Right Anterior Cervical 5-6 Discectomy And Fusion;  Surgeon: Jud Harkins MD;  Location:  OR       Medications:  Current Outpatient Medications   Medication     buPROPion (WELLBUTRIN SR) 150 MG 12 hr tablet     cyclobenzaprine (FLEXERIL) 5 MG tablet     fluticasone (FLONASE) 50 MCG/ACT nasal spray     gabapentin (NEURONTIN) 600 MG tablet     methocarbamol  "(ROBAXIN) 500 MG tablet     metoprolol succinate (TOPROL-XL) 25 MG 24 hr tablet     topiramate (TOPAMAX) 50 MG tablet     traMADol (ULTRAM) 50 MG tablet     traMADol (ULTRAM-ER) 200 MG 24 hr tablet     No current facility-administered medications for this visit.        Physical exam:   BP (!) 171/98 (BP Location: Left arm, Patient Position: Chair, Cuff Size: Adult Regular)   Pulse 94   Ht 1.702 m (5' 7\")   Wt 70 kg (154 lb 6.4 oz)   BMI 24.18 kg/m      General: Awake and alert and in no acute distress.  Pulm: Breathing comfortably on room air  CN: Symmetric browlift, smile, tongue protrusion, palate elevation, and sternocleidomastoids. No dysarthria. Extraocular muscles are all intact. Pupils react bilaterally and equally  Coordination: Intact finger-nose-finger bilaterally. Symmetric rapid alternating movements in bilateral upper extremities   Gait: Intact tandem gait.  Phalen test positive    Motor:  Normal bulk / tone; no tremor, rigidity, or bradykinesia.  No muscle wasting or fasciculations  No Pronator Drift       Delt Bi Tri Hand Flexion/  Extension Iliopsoas Quadriceps Hamstrings Tibialis Anterior Gastroc     C5 C6 C7 C8/T1 L2 L3 L4-S1 L4 S1   R 5 5 5 4+ 5 5 5 5 5   L 5 5 5 4+ 5 5 5 5 5   Sensory:  intact to LT x 4 extremities.     Reflexes:       Bi Tri BR Alexandra Pat Ach     C5-6 C7-8 C6 UMN L2-4 S1   R 2+ 2+ 2+ Norm 2+ 2+   L 2+ 2+ 2+ Norm 2+ 2+        Imaging:  XR cervical spine (06/13/2019)  1. Stable postsurgical changes of C5-6 anterior fusion instrumentation  with unchanged lucencies surrounding the C5 screws and slight backing  out the superior aspect of the surgical plate, possibly representing  hardware loosening.  2. Advanced multilevel cervical spondylosis.  3. Probable carotid artery calcifications bilaterally.       Assessment:  59 y/o M s/p ACDF C5-C6 with potential radiographic signs of hardware loosening and aggravated carpal tunnel syndrome L>R.    Plan:  - Nerve conduction study on the " left upper extremity   - recommended splint for wrist to wear at night  - bone growth stimulator for neck  - f/u in 3 months  - CT of neck in three months   - three months working restriction    Patient seen and discussed with Dr. Shahana MD  I have seen this patient with the resident and formulated a plan and agree with this note.  RAYNA Rico MD  Neurosurgery, PGY-1    Answers for HPI/ROS submitted by the patient on 6/23/2019   General Symptoms: Yes  Skin Symptoms: No  HENT Symptoms: Yes  EYE SYMPTOMS: No  HEART SYMPTOMS: No  LUNG SYMPTOMS: No  INTESTINAL SYMPTOMS: Yes  URINARY SYMPTOMS: No  REPRODUCTIVE SYMPTOMS: No  SKELETAL SYMPTOMS: Yes  BLOOD SYMPTOMS: No  NERVOUS SYSTEM SYMPTOMS: Yes  MENTAL HEALTH SYMPTOMS: Yes  Fever: No  Loss of appetite: No  Weight loss: No  Weight gain: No  Fatigue: Yes  Night sweats: Yes  Chills: No  Increased stress: Yes  Excessive hunger: No  Excessive thirst: No  Feeling hot or cold when others believe the temperature is normal: No  Loss of height: No  Post-operative complications: No  Surgical site pain: Yes  Hallucinations: No  Change in or Loss of Energy: No  Hyperactivity: No  Confusion: No  Ear pain: No  Ear discharge: No  Hearing loss: No  Tinnitus: Yes  Nosebleeds: No  Congestion: Yes  Sinus pain: Yes  Trouble swallowing: Yes   Voice hoarseness: Yes  Mouth sores: Yes  Sore throat: Yes  Tooth pain: No  Gum tenderness: No  Bleeding gums: No  Change in taste: No  Change in sense of smell: No  Dry mouth: Yes  Hearing aid used: No  Neck lump: No  Heart burn or indigestion: Yes  Nausea: No  Vomiting: No  Abdominal pain: No  Bloating: No  Constipation: No  Diarrhea: No  Blood in stool: No  Black stools: No  Rectal or Anal pain: No  Fecal incontinence: No  Yellowing of skin or eyes: No  Vomit with blood: No  Change in stools: No  Back pain: Yes  Muscle aches: Yes  Neck pain: Yes  Swollen joints: Yes  Joint pain: Yes  Bone pain: Yes  Muscle cramps: No  Muscle weakness:  Yes  Joint stiffness: Yes  Bone fracture: No  Trouble with coordination: No  Dizziness or trouble with balance: Yes  Fainting or black-out spells: No  Memory loss: No  Headache: Yes  Seizures: No  Speech problems: No  Tingling: Yes  Tremor: No  Weakness: Yes  Difficulty walking: Yes  Paralysis: No  Numbness: Yes  Nervous or Anxious: Yes  Depression: Yes  Trouble thinking or concentrating: No  Mood changes: No  Panic attacks: No

## 2019-06-27 NOTE — LETTER
Todays Date:  2019    Truman Suero  [address] [facility] [address]    Patient was seen in clinic today:  Yes    Patient:  Truman Suero  : 1960    Physician/Nurse: Jud Harkins MD/Rashad Espinosa RN, MS    Mr Suero was seen in clinic today by Dr Harkins.  He continues to move slowly through his recovery period.  It is recommended he continue his current work restrictions until re-evaluated in 3 months time.      Patient limitations:    May work part time hours with the following restrictions:         Not more than 4 hours per day, may advance as tolerated.        Lifting is restricted to not more than 50 pounds.       Pushing/pulling is restricted to not more than 50 pounds.      Patient should not drive or operate heavy equipment if taking narcotic pain medications.          NIC Griffin  Department of Neurosurgery  [address] ([facility] [address])  [place]  [address]    Tele. [phone]

## 2019-06-28 ENCOUNTER — THERAPY VISIT (OUTPATIENT)
Dept: PHYSICAL THERAPY | Facility: CLINIC | Age: 59
End: 2019-06-28
Payer: OTHER MISCELLANEOUS

## 2019-06-28 DIAGNOSIS — M54.50 CHRONIC BILATERAL LOW BACK PAIN WITHOUT SCIATICA: ICD-10-CM

## 2019-06-28 DIAGNOSIS — G89.29 CHRONIC NECK PAIN: ICD-10-CM

## 2019-06-28 DIAGNOSIS — G44.209 TENSION TYPE HEADACHE: ICD-10-CM

## 2019-06-28 DIAGNOSIS — M54.2 CHRONIC NECK PAIN: ICD-10-CM

## 2019-06-28 DIAGNOSIS — G89.29 CHRONIC BILATERAL LOW BACK PAIN WITHOUT SCIATICA: ICD-10-CM

## 2019-06-28 PROCEDURE — 97140 MANUAL THERAPY 1/> REGIONS: CPT | Mod: GP | Performed by: PHYSICAL THERAPIST

## 2019-06-28 PROCEDURE — 97110 THERAPEUTIC EXERCISES: CPT | Mod: GP | Performed by: PHYSICAL THERAPIST

## 2019-06-28 NOTE — TELEPHONE ENCOUNTER
Per patient myChart message:  From: Palmer Suero      Created: 6/27/2019 7:33 PM      Melanie- As we discussed after my last visit I was seen by Dr Harkins today to discuss pain issues with my hands. She has ordered a CT image in 3 months and EMG (again!!) at the end of July. We had talked about a test you may be interested in ordering too. I cannot remember what that was. I tried to stress the pain in finger and thumb locking up. I'm not sure if that was addressed    Palmer        Routed to provider    Estelita Wetzel, RN-BSN  [facility]

## 2019-07-02 NOTE — TELEPHONE ENCOUNTER
Xavier Chakraborty-    I was considering a cervical MRI, but if she is doing a CT scan of your neck, that is fine. I also agree with the EMG.    Thanks for the update.     Melanie STEVENS RN CNP, MELISSAP  [facility]    I have sent the above Baremetricshart message to the patient.     Melanie STEVENS RN CNP, MELISSAP  [facility]

## 2019-07-15 ENCOUNTER — MYC MEDICAL ADVICE (OUTPATIENT)
Dept: PALLIATIVE MEDICINE | Facility: CLINIC | Age: 59
End: 2019-07-15

## 2019-07-16 NOTE — TELEPHONE ENCOUNTER
"From: Palmer Suero      Created: 7/15/2019 5:08 PM        *-*-*This message has not been handled.*-*-*    Melanie- Hope this finds you well and surviving the heat. I have too much nordic blood in me to find this at all enjoyable. Funny thing is being in the kitchen you almost roseline get used to it. Anyway-- Quick question. Not sure if you get or have seen the test result they just sent to me from the last imaging from mid June. Dr Onesimo Barragan was the one that read the xray. Seems as though most things not changed since Jan. Except he does say there is \"now severe disc space narrowing in the spine c3-c7. Guess when you see the word severe it catches your attention. I value your opinion so just picking your brain. Dr. Harkins has prescribed a Bone Stimulation Collar to wear. They are also going to do another EMG (yuk) to address carpal symptoms. Any input would be appreciated as always.    Palmer Tinoco from pt.    Will forward to Melanie Oquendo RN  Care Coordinator   [facility]     "

## 2019-07-16 NOTE — TELEPHONE ENCOUNTER
"Xavier Chakraborty-    Yes, I just took a look at the report from the June 13, 2019 Cervical x-rays.  The x-ray shows us that there is disc space narrowing (meaning that the discs are not as \"fluffy\" as we typically expect) but we cannot actually see the discs on an x-ray. We know there are degenerative changes because there is less space between the vertebrae.   Sounds like they think that you might have a bit of hardware loosening, so that is likely why they are doing the bone stimulator, which is a good idea.   The EMG will give us a lot of information re: if you have ongoing carpal tunnel issues or if you have any radicular pain coming from the cervical spinal nerves in your neck.    Contact me once you get the EMG results. That will help us determine any need for further imaging.   Does that make sense?    Thanks Palmer. Stay cool in this heat!  Melanie STEVENS RN CNP, MELISSAP  [facility]    I have sent the above myChart message to the patient.     Melanie STEVENS RN CNP, MELISSAP  [facility]        "

## 2019-07-26 ENCOUNTER — OFFICE VISIT (OUTPATIENT)
Dept: PHYSICAL MEDICINE AND REHAB | Facility: CLINIC | Age: 59
End: 2019-07-26
Attending: NEUROLOGICAL SURGERY
Payer: COMMERCIAL

## 2019-07-26 DIAGNOSIS — G56.02 CARPAL TUNNEL SYNDROME OF LEFT WRIST: Primary | ICD-10-CM

## 2019-07-26 DIAGNOSIS — M79.601 PAIN IN BOTH UPPER EXTREMITIES: ICD-10-CM

## 2019-07-26 DIAGNOSIS — M79.602 PAIN IN BOTH UPPER EXTREMITIES: ICD-10-CM

## 2019-07-26 DIAGNOSIS — M54.12 CERVICAL RADICULOPATHY: ICD-10-CM

## 2019-07-26 NOTE — PROGRESS NOTES
[facility]  Physical Medicine and Rehabilitation          Nerve Conduction & EMG Report      Patient: Truman Suero YOB: 1960  Patient ID: [id number] Age: 57 Years 1 Months  Gender: Male        History & Examination: Patient is a 57 yo male who presents with worsening bilateral hand pain and numbness, worse on the left side. He is s/p C5-C6 ACDF and is followed by neurosurgery team. Previous EMG in 2017 showed 1) Mild left, and moderate right, median neuropathies at the wrist, as seen in carpal tunnel syndrome, and 2) Bilateral C6 and possibly C7 radiculopathies. Query peripheral neuropathy vs cervical radiculopathy.       Techniques: Motor and sensory conduction studies were done with surface recording electrodes. Temperature was monitored and recorded throughout the study. Upper extremities were maintained at a temperature of 32 degrees Centigrade or higher. EMG was done with a concentric needle electrode.       Results:    Left median antidromic sensory NCS showed reduced SNAP amplitude and attenuated conduction velocity.    Left ulnar antidromic sensory NCS was normal.     Left palmar median and ulnar orthodromic mixed NCSs showed prolonged median sensory peak latency and a difference between the median and ulnar sensory peak latencies of 1.31 ms (normal is <0.4 ms).          Left median motor NCS showed prolonged distal latency, normal distal CMAP amplitudes, and normal conduction velocity.     Left ulnar motor NCSs were normal.      EMG showed large and long-duration MUPs with mildly reduced recruitment in the left triceps and pronator teres muscles. Otherwise, EMG of selected muscles in the left upper extremity was normal as tabulated below.            Interpretation:  Abnormal study.     --There is electrodiagnostic evidence of a left moderate median mononeuropathy at the wrist as seen in carpal tunnel syndrome, with slight progression in findings compared to the previous EMG  in 2017.    --There is electrodiagnostic evidence of a left chronic C6 radiculopathy with no acute denervation, unchanged from the previous EMG in 2017.    --There is no electrodiagnostic evidence of a ulnar mononeuropathy in the left upper extremity.          EMG Physician:  Audelia Monreal MD  Physical Medicine & Rehabilitation           Sensory       Nerve / Sites Rec. Site Onset Peak Ref. NP Amp Ref. PP Amp Dist Kirill Ref. Temp     ms ms ms  V  V  V cm m/s m/s  C   L MEDIAN - Dig II Anti      Wrist Dig II 3.75 4.74  3.6 10.0 3.9 14 37.3 48.0 32.5   L ULNAR - Dig V Anti      Wrist Dig V 2.40 3.23  8.4 8.0 13.2 12.5 52.2 48.0 32.5   L MEDIAN - Ulnar - Palmar      Median Wrist 2.50 3.13 2.40 5.1  5.1 8 32.0  32.5      Ulnar Wrist 1.35 1.82 2.40 17.8  21.1 8 59.1  32.6       Motor NCS      Nerve / Sites Rec. Site Lat Ref. Amp Ref. Rel Amp Dist Kirill Ref. Dur. Area Temp.     ms ms mV mV % cm m/s m/s ms %  C   L MEDIAN - APB      Wrist APB 4.64 4.40 9.1 5.0 100 8   6.30 100 32.5      Elbow APB 9.06  8.6  94.9 23 52.0 48.0 7.55 104 32.5   L ULNAR - ADM      Wrist ADM 2.60 3.50 11.4 5.0 100 8   6.30 100 32.4      B.Elbow ADM 5.73  11.0  95.7 19 60.8 48.0 6.30 97.8 32.4      A.Elbow ADM 7.19  10.8  94 10 68.6 48.0 6.20 91.1 32.5       EMG Summary Table     Spontaneous MUAP Recruitment    IA Fib/PSW Fasc H.F. Amp Dur. PPP Pattern   L. DELTOID N None None None N N None Normal   L. BICEPS N None None None N N None Normal   L. TRICEPS N None None None 1+ 1+ None Mild Reduced   L. PRON TERES N None None None 1+ 1+ None Mild Reduced   L. FIRST D INTEROSS N None None None N N None Normal   L. ABD POLL BREVIS N None None None N N None Normal

## 2019-07-26 NOTE — LETTER
7/26/2019       RE: Truman Suero  [address] [facility] [address]     Dear Colleague,    Thank you for referring your patient, Truman Suero, to the [facility] PHYSICAL MEDICINE AND REHABILITATION at [facility]. Please see a copy of my visit note below.        [facility]  Physical Medicine and Rehabilitation          Nerve Conduction & EMG Report    Patient: Truman Suero YOB: 1960  Patient ID: [id number] Age: 57 Years 1 Months  Gender: Male      History & Examination: Patient is a 59 yo male who presents with worsening bilateral hand pain and numbness, worse on the left side. He is s/p C5-C6 ACDF and is followed by neurosurgery team. Previous EMG in 2017 showed 1) Mild left, and moderate right, median neuropathies at the wrist, as seen in carpal tunnel syndrome, and 2) Bilateral C6 and possibly C7 radiculopathies. Query peripheral neuropathy vs cervical radiculopathy.     Techniques: Motor and sensory conduction studies were done with surface recording electrodes. Temperature was monitored and recorded throughout the study. Upper extremities were maintained at a temperature of 32 degrees Centigrade or higher. EMG was done with a concentric needle electrode.    Results:    Left median antidromic sensory NCS showed reduced SNAP amplitude and attenuated conduction velocity.    Left ulnar antidromic sensory NCS was normal.     Left palmar median and ulnar orthodromic mixed NCSs showed prolonged median sensory peak latency and a difference between the median and ulnar sensory peak latencies of 1.31 ms (normal is <0.4 ms).      Left median motor NCS showed prolonged distal latency, normal distal CMAP amplitudes, and normal conduction velocity.     Left ulnar motor NCSs were normal.      EMG showed large and long-duration MUPs with mildly reduced recruitment in the left triceps and pronator teres muscles. Otherwise, EMG of selected muscles  in the left upper extremity was normal as tabulated below.    Interpretation:  Abnormal study.     --There is electrodiagnostic evidence of a left moderate median mononeuropathy at the wrist as seen in carpal tunnel syndrome, with slight progression in findings compared to the previous EMG in 2017.    --There is electrodiagnostic evidence of a left chronic C6 radiculopathy with no acute denervation, unchanged from the previous EMG in 2017.    --There is no electrodiagnostic evidence of a ulnar mononeuropathy in the left upper extremity.    EMG Physician:  Audelia Monreal MD  Physical Medicine & Rehabilitation      Sensory       Nerve / Sites Rec. Site Onset Peak Ref. NP Amp Ref. PP Amp Dist Kirill Ref. Temp     ms ms ms  V  V  V cm m/s m/s  C   L MEDIAN - Dig II Anti      Wrist Dig II 3.75 4.74  3.6 10.0 3.9 14 37.3 48.0 32.5   L ULNAR - Dig V Anti      Wrist Dig V 2.40 3.23  8.4 8.0 13.2 12.5 52.2 48.0 32.5   L MEDIAN - Ulnar - Palmar      Median Wrist 2.50 3.13 2.40 5.1  5.1 8 32.0  32.5      Ulnar Wrist 1.35 1.82 2.40 17.8  21.1 8 59.1  32.6       Motor NCS      Nerve / Sites Rec. Site Lat Ref. Amp Ref. Rel Amp Dist Kirill Ref. Dur. Area Temp.     ms ms mV mV % cm m/s m/s ms %  C   L MEDIAN - APB      Wrist APB 4.64 4.40 9.1 5.0 100 8   6.30 100 32.5      Elbow APB 9.06  8.6  94.9 23 52.0 48.0 7.55 104 32.5   L ULNAR - ADM      Wrist ADM 2.60 3.50 11.4 5.0 100 8   6.30 100 32.4      B.Elbow ADM 5.73  11.0  95.7 19 60.8 48.0 6.30 97.8 32.4      A.Elbow ADM 7.19  10.8  94 10 68.6 48.0 6.20 91.1 32.5       EMG Summary Table     Spontaneous MUAP Recruitment    IA Fib/PSW Fasc H.F. Amp Dur. PPP Pattern   L. DELTOID N None None None N N None Normal   L. BICEPS N None None None N N None Normal   L. TRICEPS N None None None 1+ 1+ None Mild Reduced   L. PRON TERES N None None None 1+ 1+ None Mild Reduced   L. FIRST D INTEROSS N None None None N N None Normal   L. ABD POLL BREVIS N None None None N N None Normal                    Again, thank you for allowing me to participate in the care of your patient.      Sincerely,    Audelia Monreal MD

## 2019-07-29 ENCOUNTER — MYC REFILL (OUTPATIENT)
Dept: PALLIATIVE MEDICINE | Facility: CLINIC | Age: 59
End: 2019-07-29

## 2019-07-29 DIAGNOSIS — M79.18 MYOFASCIAL PAIN: ICD-10-CM

## 2019-07-29 DIAGNOSIS — Z98.1 S/P CERVICAL SPINAL FUSION: ICD-10-CM

## 2019-07-29 DIAGNOSIS — M47.812 CERVICAL SPONDYLOSIS WITHOUT MYELOPATHY: ICD-10-CM

## 2019-07-29 DIAGNOSIS — G89.29 CHRONIC NECK PAIN: ICD-10-CM

## 2019-07-29 DIAGNOSIS — M54.50 CHRONIC BILATERAL LOW BACK PAIN WITHOUT SCIATICA: ICD-10-CM

## 2019-07-29 DIAGNOSIS — M54.2 CHRONIC NECK PAIN: ICD-10-CM

## 2019-07-29 DIAGNOSIS — G89.29 CHRONIC BILATERAL LOW BACK PAIN WITHOUT SCIATICA: ICD-10-CM

## 2019-07-29 DIAGNOSIS — M54.59 LUMBAR FACET JOINT PAIN: ICD-10-CM

## 2019-07-29 DIAGNOSIS — M47.816 SPONDYLOSIS OF LUMBAR REGION WITHOUT MYELOPATHY OR RADICULOPATHY: ICD-10-CM

## 2019-07-29 DIAGNOSIS — M50.30 DDD (DEGENERATIVE DISC DISEASE), CERVICAL: ICD-10-CM

## 2019-07-29 RX ORDER — TRAMADOL HYDROCHLORIDE 200 MG/1
200 TABLET, EXTENDED RELEASE ORAL EVERY 24 HOURS
Qty: 30 TABLET | Refills: 0 | Status: CANCELLED | OUTPATIENT
Start: 2019-07-29

## 2019-07-29 RX ORDER — TRAMADOL HYDROCHLORIDE 50 MG/1
50 TABLET ORAL EVERY 6 HOURS PRN
Qty: 90 TABLET | Refills: 0 | Status: CANCELLED | OUTPATIENT
Start: 2019-07-29

## 2019-07-30 ENCOUNTER — THERAPY VISIT (OUTPATIENT)
Dept: PHYSICAL THERAPY | Facility: CLINIC | Age: 59
End: 2019-07-30
Payer: COMMERCIAL

## 2019-07-30 DIAGNOSIS — M54.50 CHRONIC BILATERAL LOW BACK PAIN WITHOUT SCIATICA: ICD-10-CM

## 2019-07-30 DIAGNOSIS — G89.29 CHRONIC BILATERAL LOW BACK PAIN WITHOUT SCIATICA: ICD-10-CM

## 2019-07-30 DIAGNOSIS — M54.2 CHRONIC NECK PAIN: ICD-10-CM

## 2019-07-30 DIAGNOSIS — G89.29 CHRONIC NECK PAIN: ICD-10-CM

## 2019-07-30 PROCEDURE — 97140 MANUAL THERAPY 1/> REGIONS: CPT | Mod: GP | Performed by: PHYSICAL THERAPIST

## 2019-07-30 PROCEDURE — 97110 THERAPEUTIC EXERCISES: CPT | Mod: GP | Performed by: PHYSICAL THERAPIST

## 2019-07-30 RX ORDER — TRAMADOL HYDROCHLORIDE 50 MG/1
50 TABLET ORAL EVERY 6 HOURS PRN
Qty: 90 TABLET | Refills: 0 | Status: SHIPPED | OUTPATIENT
Start: 2019-07-30 | End: 2019-08-16

## 2019-07-30 RX ORDER — TRAMADOL HYDROCHLORIDE 200 MG/1
200 TABLET, EXTENDED RELEASE ORAL EVERY 24 HOURS
Qty: 30 TABLET | Refills: 0 | Status: SHIPPED | OUTPATIENT
Start: 2019-07-30 | End: 2019-08-16

## 2019-07-30 NOTE — TELEPHONE ENCOUNTER
Received call from patient requesting refill(s) of     traMADol (ULTRAM-ER) 200 MG 24 hr tablet   Last picked up from pharmacy on 07/01/19    traMADol (ULTRAM) 50 MG tablet  Last picked up from pharmacy on 07/01/19    Pt last seen by prescribing provider on 06/21/19    Next appt scheduled for 08/16/19     checked in the past 6 months? Yes If no, print current report and give to RN    Last urine drug screen date 03/05/19  Current opioid agreement on file (completed within the last year) Yes Date of opioid agreement: 03/05/19    Processing (pick one and delete the others):      E-prescribe to   [facility] [address] [place] [address]  Phone: [phone] Fax: [phone]    Will route to nursing pool for review and preparation of prescription(s).     Ekaterina Hatch [facility]  [place]

## 2019-07-30 NOTE — TELEPHONE ENCOUNTER
Left message on cell phone that Rx was E-Prepcribed to:       [facility] [address] - [place], [place] - [address] [facility]  [address] [facility] [address]  Phone: [phone] Fax: [phone]      Patient notified of dispense and start date.     Mini العلي [facility] [place]

## 2019-07-30 NOTE — TELEPHONE ENCOUNTER
Medication refill information reviewed.     Due date for traMADol (ULTRAM-ER) 200 MG 24 hr tablet and traMADol (ULTRAM) 50 MG tablet is 7/31/19     Prescriptions prepped for review.     Will route to provider.     E-prescribe to   [facility] [address] [facility], [place] - [address] [place] [id number]  Phone: [phone] Fax: [phone]    Van Oquendo, RN  Care Coordinator   [facility] [place]

## 2019-08-08 ENCOUNTER — MYC REFILL (OUTPATIENT)
Dept: PALLIATIVE MEDICINE | Facility: CLINIC | Age: 59
End: 2019-08-08

## 2019-08-08 DIAGNOSIS — M50.30 DDD (DEGENERATIVE DISC DISEASE), CERVICAL: ICD-10-CM

## 2019-08-08 DIAGNOSIS — M48.02 CERVICAL STENOSIS OF SPINAL CANAL: ICD-10-CM

## 2019-08-08 DIAGNOSIS — M47.812 CERVICAL SPONDYLOSIS WITHOUT MYELOPATHY: ICD-10-CM

## 2019-08-09 RX ORDER — TOPIRAMATE 50 MG/1
50 TABLET, FILM COATED ORAL 2 TIMES DAILY
Qty: 60 TABLET | Refills: 2 | Status: SHIPPED | OUTPATIENT
Start: 2019-08-09 | End: 2019-09-05

## 2019-08-09 NOTE — TELEPHONE ENCOUNTER
Received Mychart request from patient requesting refill(s) for topiramate (TOPAMAX) 50 MG tablet    Pt last seen on 06/21/19    Next appt scheduled for 08/16/19    Will facilitate refill.    Ekaterina Hatch [facility]  [place]

## 2019-08-09 NOTE — TELEPHONE ENCOUNTER
Signed Prescriptions:                        Disp   Refills    topiramate (TOPAMAX) 50 MG tablet          60 tab*2        Sig: Take 1 tablet (50 mg) by mouth 2 times daily Drink           plenty of water and no alcohol. If side effects,           then reduce to last tolerable dosage.  Authorizing Provider: CANDACE BENSON, RN CNP, FNP  [facility] [place]

## 2019-08-09 NOTE — TELEPHONE ENCOUNTER
Alejandrina message from patient on 8/8 at 1824    Truman Suero would like a refill of the following medications:         topiramate (TOPAMAX) 50 MG tablet [Melanie Thomas, APRN CNP]     Preferred pharmacy: [facility] [address] [place] [address], [place] - [address] [facility]   -------------  Will route to MA pool for assistance with gathering refill information.    ANALISA ArtisN, RN-BC  Patient Care Supervisor/Care Coordinator  [facility] [place]

## 2019-08-13 ENCOUNTER — THERAPY VISIT (OUTPATIENT)
Dept: PHYSICAL THERAPY | Facility: CLINIC | Age: 59
End: 2019-08-13
Payer: OTHER MISCELLANEOUS

## 2019-08-13 DIAGNOSIS — G89.29 CHRONIC NECK PAIN: ICD-10-CM

## 2019-08-13 DIAGNOSIS — G44.209 TENSION TYPE HEADACHE: ICD-10-CM

## 2019-08-13 DIAGNOSIS — G89.29 CHRONIC BILATERAL LOW BACK PAIN WITHOUT SCIATICA: ICD-10-CM

## 2019-08-13 DIAGNOSIS — M54.2 CHRONIC NECK PAIN: ICD-10-CM

## 2019-08-13 DIAGNOSIS — M54.50 CHRONIC BILATERAL LOW BACK PAIN WITHOUT SCIATICA: ICD-10-CM

## 2019-08-13 PROCEDURE — 97140 MANUAL THERAPY 1/> REGIONS: CPT | Mod: GP | Performed by: PHYSICAL THERAPIST

## 2019-08-13 PROCEDURE — 97110 THERAPEUTIC EXERCISES: CPT | Mod: GP | Performed by: PHYSICAL THERAPIST

## 2019-08-16 ENCOUNTER — OFFICE VISIT (OUTPATIENT)
Dept: PALLIATIVE MEDICINE | Facility: CLINIC | Age: 59
End: 2019-08-16
Payer: COMMERCIAL

## 2019-08-16 VITALS — HEART RATE: 61 BPM | DIASTOLIC BLOOD PRESSURE: 86 MMHG | SYSTOLIC BLOOD PRESSURE: 137 MMHG

## 2019-08-16 DIAGNOSIS — M54.59 LUMBAR FACET JOINT PAIN: ICD-10-CM

## 2019-08-16 DIAGNOSIS — M50.30 DDD (DEGENERATIVE DISC DISEASE), CERVICAL: ICD-10-CM

## 2019-08-16 DIAGNOSIS — M54.2 CHRONIC NECK PAIN: ICD-10-CM

## 2019-08-16 DIAGNOSIS — G89.29 CHRONIC NECK PAIN: ICD-10-CM

## 2019-08-16 DIAGNOSIS — M79.18 MYOFASCIAL PAIN: ICD-10-CM

## 2019-08-16 DIAGNOSIS — M47.816 SPONDYLOSIS OF LUMBAR REGION WITHOUT MYELOPATHY OR RADICULOPATHY: ICD-10-CM

## 2019-08-16 DIAGNOSIS — M54.50 CHRONIC BILATERAL LOW BACK PAIN WITHOUT SCIATICA: ICD-10-CM

## 2019-08-16 DIAGNOSIS — Z98.1 S/P CERVICAL SPINAL FUSION: ICD-10-CM

## 2019-08-16 DIAGNOSIS — M47.812 CERVICAL SPONDYLOSIS WITHOUT MYELOPATHY: ICD-10-CM

## 2019-08-16 DIAGNOSIS — G89.29 CHRONIC BILATERAL LOW BACK PAIN WITHOUT SCIATICA: ICD-10-CM

## 2019-08-16 PROCEDURE — 99214 OFFICE O/P EST MOD 30 MIN: CPT | Performed by: NURSE PRACTITIONER

## 2019-08-16 RX ORDER — TRAMADOL HYDROCHLORIDE 50 MG/1
50 TABLET ORAL EVERY 6 HOURS PRN
Qty: 90 TABLET | Refills: 0 | Status: SHIPPED | OUTPATIENT
Start: 2019-08-16 | End: 2019-09-26

## 2019-08-16 RX ORDER — TRAMADOL HYDROCHLORIDE 200 MG/1
200 TABLET, EXTENDED RELEASE ORAL EVERY 24 HOURS
Qty: 30 TABLET | Refills: 0 | Status: SHIPPED | OUTPATIENT
Start: 2019-08-16 | End: 2019-09-26

## 2019-08-16 ASSESSMENT — PAIN SCALES - GENERAL: PAINLEVEL: EXTREME PAIN (8)

## 2019-08-16 NOTE — PROGRESS NOTES
[facility]    8/16/2019       Chief complaint:  neck and low back pain    Interval history:  Truman Suero is a 58 year old male is known to me for   Chronic neck pain  Cervical DDD  Cervical spondylosis (pain worse with extension/rotation indicating facetogenic component to pain)  Axial low back pain  Myofascial pain/muscle spasms  Remote history of ETOH overuse, attended AA for awhile. Sober for 10 years  ---PMHx includes: neck pain  ---PSHx includes: none listed      Recommendations/plan at the last visit on 6/21/2019 included:  1. Physical Therapy: not at present  2. Clinical Health Psychologist: none  3. Diagnostic Studies:  None  4. Medication Management:    1. Continue tramadol ER 200mg once daily  2. Continue tramadol 50mg Q 6-8 hours PRN, max of 3/day. E-prescribed today.  3. Continue gabapentin 900 mg TID   4. Continue Topamax 50 mg BID   5. Continue methocarbamol 500-1000 mg TID PRN muscle spasms   6. Start Aspercreme with 4% Lidocaine PRN  5. Further procedures recommended: None at present  6. Pt to consider the purchase of a paraffin wax bath prn: left hand pain.  7. Patient to send me a [employer] message after follow-up with Dr. Harkins his neurosurgeon  8. Recommendations to PCP: see above  9. Follow up: 8-10 weeks      Since his last visit, Truman Suero reports:  -Posterior neck pain that radiates down the right shoulder into the right arm and left thumb, radicular pain is intermittent due to overexertion at work. His right arm becomes very tight and fatigued.   -Continues to complete home exercises that he learned in PT, wears wrist braces at night, and uses bone stimulator.  -Completed an updated EMG left sided, right side was done. Reviewed results with the patient.   -having more low back pain recently      At this point, the patient's participation with our multidisciplinary team includes:  The patient has been compliant with the program.  PT - attended non-pain management  "PHYSICAL THERAPY   Health Psych - has seen Kentrell Castañdea x4  Acupuncture: worked with Dr. Bereket LAY      Pain scores:  Pain intensity on average is 8 on a scale of 0-10.    Range is 7-10/10.   Pain right now is 8/10.  Pain is described as \"aching, unbearable, burning, tiring, exhausting, throbbing, penetrating, stabbing, gnawing, miserable, and nagging    Current pain relevant medications:   -Tramadol 200mg ER in the evening (helpful)  -Tramadol 50mg take 1 tablet  Q 6 hours PRN (using 2-3 tabs per day, helpful)  -ibuprofen 600mg PRN (helpful)  -gabapentin 900mg TID (somewhat helpful)  -methocarbamol 500-1000mg TID PRN muscle spasms (takes 1000 mg BID, somewhat helpful)  -Topamax 50 mg BID (helpful)    Other pertinent medications:  None    Previous Medications:  OPIATES: Tramdol (somewhat helpful)  NSAIDS: ibuprofen (helpful), Aleve (helpful)  MUSCLE RELAXANTS: none  ANTI-MIGRAINE MEDS: none  ANTI-DEPRESSANTS: none  SLEEP AIDS: none  ANTI-CONVULSANTS: gabapentin (helpful)  TOPICALS: lidocaine ointment (somewhat helpful)  Other meds: Tylenol (not helpful)        Other treatments have included:  Truman MINAYA Steviedavid has not been seen at a pain clinic in the past.    PT: tried, somewhat helpful  Chiropractic: helpful  Acupuncture: none  TENs Unit: none     Injections:   cervical radiofrequency nerve ablation at [facility] in December 2016 (did get good relief, got 70% relief of his typical neck pain)  -6/29/2017 Cervical facet joint steroid injections at C4-5 and C5-6 on the right with Dr. Nicole Harding (not helpful)  -3/8/2018 C7-T1 interlaminar DEMARCUS with Dr. Hugo Corrigan (not helpful)  -5/23/2018 right L4-5 transforaminal epidural steroid injection with Dr. Osorio (not helpful for back pain but did help leg pain)  -8/7/2018 bilateral SI joint injection with Dr Hugo Corrigan (helpful for one month)  -10/11/2018 l3-4 and L4-5 facet joint injections bilaterally with Dr. Hugo Corrigan (this has been helpful, " more helpful than any other lumbar injections thus far)  -1/28/2019 bilateral L3-5 medial branch blocks #1 with Dr. Osorio (somewhat helpful)   -2/25/2019 LMBB #2 with Dr. Osorio (somewhat effective, but not enough to proceed to RFA)  -5/6/2019 bilateral L4/L5 and L5/S1 facet joint injections with Dr. Osorio (helpful)    Surgeries:  10/29/2018 right anterior cervical C5-6 discectomy and fusion by Dr. Jud Harkins (somewhat helpful)      UDS last completed on 3/5/2019  CSA last signed on 3/14/2019 with Melanie STEVENS, RN CNP, FNP  [facility]     THE 4 A's OF OPIOID MAINTENANCE ANALGESIA    Analgesia: long acting Tramadol with some short acting tramadol does give some relief    Activity: ADLs. Works full time as a     Adverse effects: none    Adherence to Rx protocol: yes      Side Effects: none  Patient is using the medication as prescribed: yes    Medications:  Current Outpatient Medications   Medication Sig Dispense Refill     buPROPion (WELLBUTRIN SR) 150 MG 12 hr tablet Take 1 tablet once daily and increase to 1 tablet twice daily after 4 to 7 days 180 tablet 1     cyclobenzaprine (FLEXERIL) 5 MG tablet Take 1-2 tablets (5-10 mg) by mouth nightly as needed for muscle spasms Need 6 hours between this and methocarbamol 45 tablet 2     fluticasone (FLONASE) 50 MCG/ACT nasal spray Spray 2 sprays into both nostrils 2 times daily Needs appointment for further refills 32 mL 11     gabapentin (NEURONTIN) 600 MG tablet Take 1.5 tablets (900 mg) by mouth 3 times daily 135 tablet 3     methocarbamol (ROBAXIN) 500 MG tablet Take 1 tablet (500 mg) by mouth 3 times daily as needed for muscle spasms Should be 6 hours between this and cyclobenzaprine at night 90 tablet 2     metoprolol succinate (TOPROL-XL) 25 MG 24 hr tablet Take 1 tablet (25 mg) by mouth daily (Patient taking differently: Take 25 mg by mouth At Bedtime ) 30 tablet 1     topiramate (TOPAMAX) 50 MG tablet Take 1 tablet (50 mg)  by mouth 2 times daily Drink plenty of water and no alcohol. If side effects, then reduce to last tolerable dosage. 60 tablet 2     traMADol (ULTRAM) 50 MG tablet Take 1 tablet (50 mg) by mouth every 6 hours as needed for severe pain Max 3/day. Fill 7/29/19; start 7/31/19 90 tablet 0     traMADol (ULTRAM-ER) 200 MG 24 hr tablet Take 1 tablet (200 mg) by mouth every 24 hours fill 7/29/19 to start 7/31/2019 30 tablet 0     order for DME Cervical Bone Growth Stimulator      Please submit records for insurance review. 1 Device 0       Medical History: any changes in medical history since they were last seen? none    Social History:   Home situation: , has 4 kids, 2 at home, one in college and one launched.   Occupation/Schooling:  full time in [employer]  Tobacco use: former smoker, quit on 3/20/2018  Alcohol use: sober for nearly 10 years. He used to work a sobriety program, used to go to   Drug use: none  History of chemical dependency treatment: no formal treatment, did AA    Is patient a current smoker or tobacco user?  QUIT on 3/20/2018  If yes, was cessation counseling offered?  no        Review of Systems:  ROS is positive as per HPI as well as for joint pain, neck pain, back pain, weakness, numbness, tingling, anxiety, stress and is negative for fevers, sweats, or constipation, diarrhea.    This document serves as a record of the services and decisions personally performed and made by Melanie STEVENS. It was created on her behalf by Moncho Tariq, a trained medical scribe. The creation of this document is based on the provider's statements to the medical scribe.  Moncho Tariq 2:27 PM August 16, 2019        Physical Exam:   Vital signs: /86   Pulse 61      Behavioral observations:  Awake, alert, cooperative    Gait:  normal    Musculoskeletal exam:    Moves well in exam room  Strength grossly equal throughout  Good lumbar flexion  Painful lumbar extension   Painful lumbar extension  and rotation to the right and to the left    Neuro exam:  SILT in all extremities     Skin/vascular/autonomic:  No suspicious lesions on exposed skin.      Other:  NA    Is the patient hypertensive today? No  Hypertensive on recheck of BP?   N/A  If yes, was patient recommended to see Primary Care Provider in follow up for management of HTN?  N/A            IMAGING:    MR CERVICAL SPINE WITHOUT CONTRAST 9/5/2017 2:32 PM      HISTORY: Neck pain, worsening numbness in arms and lower extremities.  Radiculopathy, cervical region.     TECHNIQUE: Multiplanar multisequence images were obtained through the  cervical spine without contrast.     COMPARISON: 2/22/2017.     FINDINGS: Sagittal images demonstrate some minimal gentle cervical  kyphosis, otherwise normal posterior alignment. There is no evidence  for craniovertebral or cervicomedullary junction abnormality. The  cervical cord is minimally indented anteriorly at C4-C5 and C5-C6,  otherwise is normal in morphology and signal characteristics. Disc  space narrowing and discogenic marrow changes are present C3-C4,  C4-C5, C5-C6 and C6-C7.     C2-C3: Minimal facet hypertrophy. No stenosis.     C3-C4: Broad-based posterior osteophyte formation is present causing  some mild bilateral neural foraminal stenosis, but no central canal  stenosis.     C4-C5: Broad-based posterior osteophyte formation and mild facet  hypertrophy is present causing some mild to moderate central canal  stenosis, mild cord deformity, and mild-to-moderate bilateral neural  foraminal stenosis.     C5-C6: Broad-based posterior osteophyte formation and disc bulging is  present along with some facet hypertrophy. There is moderate central  canal stenosis and moderate bilateral neural foraminal stenosis.  Findings have progressed since the prior exam.     C6-C7: Broad-based disc bulging is present along with some minimal  posterior osteophyte formation. There is borderline to mild central  canal stenosis,  but no neural foraminal stenosis.     C7-T1: Moderate facet hypertrophy. No significant stenosis.         IMPRESSION: Moderate multilevel degenerative disc and facet disease  with some progression at C5-C6 where there is moderate central canal  and bilateral neural foraminal stenosis along with cord deformity.          MR CERVICAL SPINE WITHOUT CONTRAST  2/22/2017 9:59 AM     HISTORY:  Bilateral neck and arm pain for three years.     COMPARISON: None.     TECHNIQUE: Routine MR cervical spine extended through T2.     FINDINGS: There is some degenerative bone marrow signal change C3-C6.  No malignant or destructive lesions. Normal alignment through T2.  Reversed cervical adenosis C3-C6.     The cervical and upper thoracic spinal cord appear intrinsically  normal. The craniocervical junction region is normal. No paraspinous  soft tissue abnormality.     Findings by level as follows:     C2-C3: Negative. No disc protrusion. No central or lateral stenosis.     C3-C4: Mild disc space narrowing. Mild diffuse annular bulge. Small  uncinate spur on the right. No significant central or left-sided  stenosis. Mild right-sided foraminal stenosis.     C4-C5: Moderate degenerative narrowing of the interspace. Mild ventral  disc osteophyte complex with minimal central stenosis. Small uncinate  spurs bilaterally with mild bilateral foraminal stenosis.     C6-C7: Moderate disc space narrowing. Mild broad-based disc osteophyte  complex with minimal central stenosis. Minimal uncinate spurs with  mild bilateral foraminal stenosis.     C6-C7: Moderate disc space narrowing. Mild ventral disc osteophyte  complex. No significant central or lateral stenosis.     C7-T1: No disc protrusion. No central or lateral stenosis. Moderate  bilateral facet joint disease.         IMPRESSION:  1. Degenerative changes as described C3-C4 through C7-T1. There is  only mild central and foraminal stenosis as described.  2. No intrinsic spinal cord abnormality  through T2      Progress Notes              [facility]  Physical Medicine and Rehabilitation              Nerve Conduction & EMG Report        Patient: Truman Suero YOB: 1960  Patient ID:     [id number]                   Age:                    57 Years 1 Months  Gender:         Male         History & Examination: Patient is a 57 yo male who presents with worsening bilateral hand pain and numbness, worse on the left side. He is s/p C5-C6 ACDF and is followed by neurosurgery team. Previous EMG in 2017 showed 1) Mild left, and moderate right, median neuropathies at the wrist, as seen in carpal tunnel syndrome, and 2) Bilateral C6 and possibly C7 radiculopathies. Query peripheral neuropathy vs cervical radiculopathy.         Techniques: Motor and sensory conduction studies were done with surface recording electrodes. Temperature was monitored and recorded throughout the study. Upper extremities were maintained at a temperature of 32 degrees Centigrade or higher. EMG was done with a concentric needle electrode.        Results:    Left median antidromic sensory NCS showed reduced SNAP amplitude and attenuated conduction velocity.    Left ulnar antidromic sensory NCS was normal.     Left palmar median and ulnar orthodromic mixed NCSs showed prolonged median sensory peak latency and a difference between the median and ulnar sensory peak latencies of 1.31 ms (normal is <0.4 ms).            Left median motor NCS showed prolonged distal latency, normal distal CMAP amplitudes, and normal conduction velocity.     Left ulnar motor NCSs were normal.       EMG showed large and long-duration MUPs with mildly reduced recruitment in the left triceps and pronator teres muscles. Otherwise, EMG of selected muscles in the left upper extremity was normal as tabulated below.     Interpretation:  Abnormal study.      --There is electrodiagnostic evidence of a left moderate median mononeuropathy at the wrist as  seen in carpal tunnel syndrome, with slight progression in findings compared to the previous EMG in 2017.     --There is electrodiagnostic evidence of a left chronic C6 radiculopathy with no acute denervation, unchanged from the previous EMG in 2017.     --There is no electrodiagnostic evidence of a ulnar mononeuropathy in the left upper extremity.     EMG Physician:  Audelia Monreal MD  Physical Medicine & Rehabilitation             [place] Pres[place]:  [place]ed [place]  8/16/2019, no concerning scripts      DIRE Score for selecting candidates for long term opioid analgesia for chronic pain:  Diagnosis  2  Intractablility  2  Risk    Psychological health  2    Chemical health  2    Reliability  2    Social support  3  Efficacy  2    Total DIRE Score = 15. Note that   7-13 predicts poor outcome (compliance and efficacy) from opioid prescribing; 14-21 predicts good outcome (compliance and efficacy)  from opioid prescribing.      Assessment:   1. Lumbar facet joint pain  2. Spondylosis of lumbar spine  3. Chronic bilateral low back pain without sciatica   4. Chronic neck pain  5. Cervical DDD  6. Cervical spondylosis  7. Myofacial pain  8. S/p cervical fusion  9. Remote history of ETOH overuse, attended AA for awhile. Sober for >10 years  10. PMHx includes: neck pain  11. PSHx includes: none listed        Plan:    1. Physical Therapy: not at present  2. Clinical Health Psychologist: none  3. Diagnostic Studies:  None  4. Medication Management:    1. Continue tramadol ER 200mg once daily  2. Continue tramadol 50mg Q 6-8 hours PRN, max of 3/day. E-prescribed today.  3. Continue gabapentin 900 mg TID   4. Increase Topamax 50 mg in the AM and 100 mg in the PM  5. Continue methocarbamol 500-1000 mg TID PRN muscle spasms   6. Continue Aspercreme with 4% Lidocaine PRN  5. Further procedures recommended: I will send Dr. Harkins a staff message re: possible epidural steroid injections.  6. Recommendations to  PCP: see above  7. Follow up: 8-12 weeks      Total time spent face to face was 31 minutes and more than 50% of face to face time was spent in counseling and/or coordination of care regarding the diagnosis and recommendations above.    The information in this document, created by the medical scribe for me, accurately reflects the services I personally performed and the decisions made by me. I have reviewed and approved this document for accuracy prior to leaving the patient care area.  August 16, 2019         Melanie STEVENS, RN CNP, FNP  [facility] [place]

## 2019-08-16 NOTE — PATIENT INSTRUCTIONS
PLAN  1. Medications:   1. Continue Tramadol ER 200mg daily. E-prescribed OK to fill prescription on 8/21 and start on 8/27.  2. Continue Tramadol 50mg every 6-8 hours as needed, max of 3 tabs/day. E-prescribed OK to fill prescription on 8/21 and start on 8/27.  3. Continue Gabapentin 900mg take 3 times daily.  4. IncreaseTopamax 50mg in the morning and 100mg at night.  5. Continue Robaxin 500-1000mg take 3 times daily as needed.  6. Continue to use Aspercreme with 4% Lidocaine.  2. Procedures: I will send Dr. Harkins a staff message re: possible epidural steroid injections.  3. Follow-up with me in 8-12 weeks        ----------------------------------------------------------------  Clinic Number:  [phone]     Call with any questions about your care and for scheduling assistance.     Calls are returned Monday through Friday between 8 AM and 4:30 PM. We usually get back to you within 2 business days depending on the issue/request.    If we are prescribing your medications:    For opioid medication refills, call the clinic or send a TAKO message 7 days in advance.  Please include:    Name of requested medication    Name of the pharmacy.    For non-opioid medications, call your pharmacy directly to request a refill. Please allow 3-4 days to be processed.     Per [place] State Law:    All controlled substance prescriptions must be filled within 30 days of being written.      For those controlled substances allowing refills, pickup must occur within 30 days of last fill.      We believe regular attendance is key to your success in our program!      Any time you are unable to keep your appointment we ask that you call us at least 24 hours in advance to cancel.This will allow us to offer the appointment time to another patient.     Multiple missed appointments may lead to dismissal from the clinic.

## 2019-08-18 ENCOUNTER — MYC MEDICAL ADVICE (OUTPATIENT)
Dept: PALLIATIVE MEDICINE | Facility: CLINIC | Age: 59
End: 2019-08-18

## 2019-08-19 NOTE — TELEPHONE ENCOUNTER
From: Palmer Suero      Created: 8/18/2019 5:28 PM        *-*-*This message has not been handled.*-*-*    Melanie- We go to chatting at the appt last Friday and I completely forgot to ask about something else. Along with the hand issue I have roseline much the same problem in right foot. Gets cramped up and goes numb from time to time. Again acts up more often on days when on my feet all day. Any thoughts you have are always appreciated.    Palmer        Will forward to Melanie to review and advise     Van Oquendo, RN  Care Coordinator   [facility]

## 2019-08-22 NOTE — TELEPHONE ENCOUNTER
Per patient myChart message:    From: Palmer Suero      Created: 8/22/2019 1:12 PM        *-*-*This message has not been handled.*-*-*    Thanks Melanie- I'll play it by ear. Guess maybe see what Dr. Harkins says to you, assuming she responds. Thanks for the input.    Palmer shin for provider.    Estelita Wetzel RN-BSN  [facility]

## 2019-08-22 NOTE — TELEPHONE ENCOUNTER
Xavier Chakraborty-    I think that these symptoms (cramping and numbness in the right foot that happens intermittently) is likely from some neural foraminal narrowing (this is narrowing around where the spinal nerves exit the spine) that is worse on the right side than the left at the L4-5 level. It depends on how bothersome it is. If it is mild, I'd keep watching it. If it is bothering you more over time, we can do a repeat right L4-5 transforaminal epidural steroid injection. You had this injection first in May 2018 with Dr. Osorio and it helped your right leg pain, but not your back pain. Let me know how you prefer to proceed.    Have a GREAT day.    Melanie STEVENS RN CNP, MELISSAP  [facility]    I have sent the above So[employer] message to the patient.     Melanie STEVENS RN CNP, MELISSAP  [facility]

## 2019-08-27 ENCOUNTER — THERAPY VISIT (OUTPATIENT)
Dept: PHYSICAL THERAPY | Facility: CLINIC | Age: 59
End: 2019-08-27
Payer: OTHER MISCELLANEOUS

## 2019-08-27 DIAGNOSIS — M54.50 CHRONIC BILATERAL LOW BACK PAIN WITHOUT SCIATICA: ICD-10-CM

## 2019-08-27 DIAGNOSIS — M54.2 CHRONIC NECK PAIN: ICD-10-CM

## 2019-08-27 DIAGNOSIS — G44.209 TENSION TYPE HEADACHE: ICD-10-CM

## 2019-08-27 DIAGNOSIS — G89.29 CHRONIC BILATERAL LOW BACK PAIN WITHOUT SCIATICA: ICD-10-CM

## 2019-08-27 DIAGNOSIS — G89.29 CHRONIC NECK PAIN: ICD-10-CM

## 2019-08-27 PROCEDURE — 97140 MANUAL THERAPY 1/> REGIONS: CPT | Mod: GP | Performed by: PHYSICAL THERAPIST

## 2019-08-27 PROCEDURE — 97110 THERAPEUTIC EXERCISES: CPT | Mod: GP | Performed by: PHYSICAL THERAPIST

## 2019-08-28 ENCOUNTER — MYC REFILL (OUTPATIENT)
Dept: PALLIATIVE MEDICINE | Facility: CLINIC | Age: 59
End: 2019-08-28

## 2019-08-28 ENCOUNTER — MYC MEDICAL ADVICE (OUTPATIENT)
Dept: PALLIATIVE MEDICINE | Facility: CLINIC | Age: 59
End: 2019-08-28

## 2019-08-28 DIAGNOSIS — G89.29 CHRONIC BILATERAL LOW BACK PAIN WITHOUT SCIATICA: ICD-10-CM

## 2019-08-28 DIAGNOSIS — M54.2 CHRONIC NECK PAIN: ICD-10-CM

## 2019-08-28 DIAGNOSIS — G89.29 CHRONIC NECK PAIN: ICD-10-CM

## 2019-08-28 DIAGNOSIS — M50.30 DDD (DEGENERATIVE DISC DISEASE), CERVICAL: ICD-10-CM

## 2019-08-28 DIAGNOSIS — M47.816 SPONDYLOSIS OF LUMBAR REGION WITHOUT MYELOPATHY OR RADICULOPATHY: ICD-10-CM

## 2019-08-28 DIAGNOSIS — M54.50 CHRONIC BILATERAL LOW BACK PAIN WITHOUT SCIATICA: ICD-10-CM

## 2019-08-28 DIAGNOSIS — M54.59 LUMBAR FACET JOINT PAIN: ICD-10-CM

## 2019-08-28 DIAGNOSIS — M47.812 CERVICAL SPONDYLOSIS WITHOUT MYELOPATHY: ICD-10-CM

## 2019-08-28 DIAGNOSIS — Z98.1 S/P CERVICAL SPINAL FUSION: ICD-10-CM

## 2019-08-28 DIAGNOSIS — M79.18 MYOFASCIAL PAIN: ICD-10-CM

## 2019-08-28 RX ORDER — TRAMADOL HYDROCHLORIDE 200 MG/1
200 TABLET, EXTENDED RELEASE ORAL EVERY 24 HOURS
Qty: 30 TABLET | Refills: 0 | Status: CANCELLED | OUTPATIENT
Start: 2019-08-28

## 2019-08-28 RX ORDER — TRAMADOL HYDROCHLORIDE 50 MG/1
50 TABLET ORAL EVERY 6 HOURS PRN
Qty: 90 TABLET | Refills: 0 | Status: CANCELLED | OUTPATIENT
Start: 2019-08-28

## 2019-08-28 NOTE — TELEPHONE ENCOUNTER
Alejandrina message from patient on 8/28 at 1555:    Truman Suero would like a refill of the following medications:         traMADol (ULTRAM) 50 MG tablet [RODNEY Vargas CNP]       Patient Comment: Pharmacy states that ins. co is requesting that another prescription be submitted. Not sure if they are seeing this one         traMADol (ULTRAM-ER) 200 MG 24 hr tablet [RODNEY Vargas CNP]       Patient Comment: Pharmacy states that ins. co  requests another prescription.     Preferred pharmacy: [facility] [address] [place] [address]   ---------------    Will route to MA pool for assistance with gathering opioid refill information.    ANALISA ArtisN, RN-BC  Patient Care Supervisor/Care Coordinator  [facility]

## 2019-08-29 NOTE — TELEPHONE ENCOUNTER
Message sent to pt:    Elmer Chakraborty,     The prescriptions were already sent to your pharmacy. They have been filled and are waiting for you to pick them up. You may pick them up today with plan to start on 8/30.     Take care,     CAMILLE Artis, RN-BC  Patient Care Supervisor/Care Coordinator  [facility]

## 2019-08-29 NOTE — TELEPHONE ENCOUNTER
Received call from patient requesting refill(s) of traMADol (ULTRAM) 50 MG tablet and traMADol (ULTRAM-ER) 200 MG 24 hr tablet    Last picked up from pharmacy on 07/30/19 for both - pharmacy does have both Rx's filled and waiting for  (08/29/19)    Pt last seen by prescribing provider on 08/16/19  Next appt scheduled for 10/18/19     checked in the past 6 months? Yes If no, print current report and give to RN    Last urine drug screen date 03/05/19  Current opioid agreement on file (completed within the last year) Yes Date of opioid agreement: 03/05/19    Processing (pick one and delete the others):      E-prescribe to [facility] [address]       Will route to nursing [place] for review and preparation of prescription(s).

## 2019-08-29 NOTE — TELEPHONE ENCOUNTER
Alejandrina message from patient on 8/28 at 1559:      Melanie- I went today 8/29 to  the Tramadol prescription.  from [facility] could not find it in their system, and then when she did she said ins. co was denying it as they wanted a new prescription sent it. That's a new one. Not sure but I did submit renewal requests for both ER and 50mg. Don't know if that is they way to do it or just deal with original prescipt. Always an adventure with them. Thanks as always    Palmer  -----------  Prescriptions were written on 8/13 to be filled at the end of the month. Called [facility] pharmacy and spoke to the pharmacist. She said that the pt just picked the medications up today. She believes that the technician is new and didn't give the patient accurate information.     Shanita Briones, BSN, RN-BC  Patient Care Supervisor/Care Coordinator  [facility]

## 2019-09-04 ENCOUNTER — MYC REFILL (OUTPATIENT)
Dept: PALLIATIVE MEDICINE | Facility: CLINIC | Age: 59
End: 2019-09-04

## 2019-09-04 DIAGNOSIS — M50.30 DDD (DEGENERATIVE DISC DISEASE), CERVICAL: ICD-10-CM

## 2019-09-04 DIAGNOSIS — M48.02 CERVICAL STENOSIS OF SPINAL CANAL: ICD-10-CM

## 2019-09-04 DIAGNOSIS — M47.812 CERVICAL SPONDYLOSIS WITHOUT MYELOPATHY: ICD-10-CM

## 2019-09-04 RX ORDER — TOPIRAMATE 50 MG/1
50 TABLET, FILM COATED ORAL 2 TIMES DAILY
Qty: 60 TABLET | Refills: 2 | Status: CANCELLED | OUTPATIENT
Start: 2019-09-04

## 2019-09-05 NOTE — TELEPHONE ENCOUNTER
Received request from patient requesting refill(s) for topiramate (TOPAMAX) 50 MG tablet    Patient states that at last office visit an increase in dosage was agreed upon. Sent to nursing to prep correct amount  Plan:    1. Physical Therapy: not at present  2. Clinical Health Psychologist: none  3. Diagnostic Studies:  None  4. Medication Management:    1. Continue tramadol ER 200mg once daily  2. Continue tramadol 50mg Q 6-8 hours PRN, max of 3/day. E-prescribed today.  3. Continue gabapentin 900 mg TID   4. Increase Topamax 50 mg in the AM and 100 mg in the PM    Pt last seen on 08/16/19  Next appt scheduled for 10/18/19    Will facilitate refill.

## 2019-09-06 RX ORDER — TOPIRAMATE 50 MG/1
TABLET, FILM COATED ORAL
Qty: 90 TABLET | Refills: 2 | Status: SHIPPED | OUTPATIENT
Start: 2019-09-06 | End: 2019-12-17

## 2019-09-06 NOTE — TELEPHONE ENCOUNTER
Signed Prescriptions:                        Disp   Refills    topiramate (TOPAMAX) 50 MG tablet          90 tab*2        Sig: Take 50mg (1tablet) every morning and 100mg (2           tablets) every evening.  Drink plenty of water           and no alcohol.  Authorizing Provider: CANDACE BENSON, RN CNP, FNP  [facility] [place]

## 2019-09-20 ENCOUNTER — THERAPY VISIT (OUTPATIENT)
Dept: PHYSICAL THERAPY | Facility: CLINIC | Age: 59
End: 2019-09-20
Payer: OTHER MISCELLANEOUS

## 2019-09-20 DIAGNOSIS — G89.29 CHRONIC BILATERAL LOW BACK PAIN WITHOUT SCIATICA: ICD-10-CM

## 2019-09-20 DIAGNOSIS — M54.50 CHRONIC BILATERAL LOW BACK PAIN WITHOUT SCIATICA: ICD-10-CM

## 2019-09-20 PROCEDURE — 97110 THERAPEUTIC EXERCISES: CPT | Mod: GP | Performed by: PHYSICAL THERAPIST

## 2019-09-20 PROCEDURE — 97140 MANUAL THERAPY 1/> REGIONS: CPT | Mod: GP | Performed by: PHYSICAL THERAPIST

## 2019-09-26 ENCOUNTER — MYC REFILL (OUTPATIENT)
Dept: PALLIATIVE MEDICINE | Facility: CLINIC | Age: 59
End: 2019-09-26

## 2019-09-26 DIAGNOSIS — G89.29 CHRONIC NECK PAIN: ICD-10-CM

## 2019-09-26 DIAGNOSIS — M47.812 CERVICAL SPONDYLOSIS WITHOUT MYELOPATHY: ICD-10-CM

## 2019-09-26 DIAGNOSIS — M50.30 DDD (DEGENERATIVE DISC DISEASE), CERVICAL: ICD-10-CM

## 2019-09-26 DIAGNOSIS — M54.50 CHRONIC BILATERAL LOW BACK PAIN WITHOUT SCIATICA: ICD-10-CM

## 2019-09-26 DIAGNOSIS — Z98.1 S/P CERVICAL SPINAL FUSION: ICD-10-CM

## 2019-09-26 DIAGNOSIS — M79.18 MYOFASCIAL PAIN: ICD-10-CM

## 2019-09-26 DIAGNOSIS — G89.29 CHRONIC BILATERAL LOW BACK PAIN WITHOUT SCIATICA: ICD-10-CM

## 2019-09-26 DIAGNOSIS — M54.59 LUMBAR FACET JOINT PAIN: ICD-10-CM

## 2019-09-26 DIAGNOSIS — M54.2 CHRONIC NECK PAIN: ICD-10-CM

## 2019-09-26 DIAGNOSIS — M47.816 SPONDYLOSIS OF LUMBAR REGION WITHOUT MYELOPATHY OR RADICULOPATHY: ICD-10-CM

## 2019-09-26 RX ORDER — TRAMADOL HYDROCHLORIDE 200 MG/1
200 TABLET, EXTENDED RELEASE ORAL EVERY 24 HOURS
Qty: 30 TABLET | Refills: 0 | Status: CANCELLED | OUTPATIENT
Start: 2019-09-26

## 2019-09-26 RX ORDER — TRAMADOL HYDROCHLORIDE 50 MG/1
50 TABLET ORAL EVERY 6 HOURS PRN
Qty: 90 TABLET | Refills: 0 | Status: CANCELLED | OUTPATIENT
Start: 2019-09-26

## 2019-09-26 NOTE — TELEPHONE ENCOUNTER
Medication refill information reviewed.     Due date for traMADol (ULTRAM) 50 MG tablet is 9/29/19     Due date for traMADol (ULTRAM-ER) 200 MG 24 hr tablet is 9/29/19     Prescriptions prepped for review.     Will route to provider.     Van Oquendo, RN  Care Coordinator   [facility] [place]

## 2019-09-26 NOTE — TELEPHONE ENCOUNTER
Received call from patient requesting refill(s) of   traMADol (ULTRAM) 50 MG tablet    Last picked up from pharmacy on 8/28/19       traMADol (ULTRAM-ER) 200 MG 24 hr tablet   Last picked up from pharmacy on 8/28/19    Pt last seen by prescribing provider on 8/16/19  Next appt scheduled for 10/18/19     checked in the past 6 months? Yes If no, print current report and give to RN    Last urine drug screen date 3/5/19  Current opioid agreement on file (completed within the last year) Yes Date of opioid agreement: 3/5/19    Processing (pick one and delete the others):      E-prescribe to [facility] - NICOLE Cintron     Will route to nursing pool for review and preparation of prescription(s).

## 2019-09-27 RX ORDER — TRAMADOL HYDROCHLORIDE 50 MG/1
50 TABLET ORAL EVERY 6 HOURS PRN
Qty: 90 TABLET | Refills: 0 | Status: SHIPPED | OUTPATIENT
Start: 2019-09-27 | End: 2019-10-22

## 2019-09-27 RX ORDER — TRAMADOL HYDROCHLORIDE 200 MG/1
200 TABLET, EXTENDED RELEASE ORAL EVERY 24 HOURS
Qty: 30 TABLET | Refills: 0 | Status: SHIPPED | OUTPATIENT
Start: 2019-09-27 | End: 2019-10-22

## 2019-09-27 NOTE — TELEPHONE ENCOUNTER
Signed Prescriptions:                        Disp   Refills    traMADol (ULTRAM) 50 MG tablet             90 tab*0        Sig: Take 1 tablet (50 mg) by mouth every 6 hours as           needed for severe pain Max 3/day. Fill 9/27/19;           start 9/29/19  Authorizing Provider: MELANIE BENSON    traMADol (ULTRAM-ER) 200 MG 24 hr tablet   30 tab*0        Sig: Take 1 tablet (200 mg) by mouth every 24 hours fill           9/27/19 to start 9/29/2019  Authorizing Provider: MELANIE BENSON    Reviewed [place]  September 27, 2019- no concerning fills.    Melanie STEVENS, RN CNP, FNP  [facility] [place]

## 2019-10-03 ENCOUNTER — ANCILLARY PROCEDURE (OUTPATIENT)
Dept: CT IMAGING | Facility: CLINIC | Age: 59
End: 2019-10-03
Attending: NEUROLOGICAL SURGERY
Payer: COMMERCIAL

## 2019-10-03 ENCOUNTER — OFFICE VISIT (OUTPATIENT)
Dept: NEUROSURGERY | Facility: CLINIC | Age: 59
End: 2019-10-03
Payer: COMMERCIAL

## 2019-10-03 ENCOUNTER — MYC MEDICAL ADVICE (OUTPATIENT)
Dept: PALLIATIVE MEDICINE | Facility: CLINIC | Age: 59
End: 2019-10-03

## 2019-10-03 VITALS
DIASTOLIC BLOOD PRESSURE: 103 MMHG | OXYGEN SATURATION: 95 % | HEART RATE: 69 BPM | WEIGHT: 155.1 LBS | HEIGHT: 65 IN | SYSTOLIC BLOOD PRESSURE: 160 MMHG | BODY MASS INDEX: 25.84 KG/M2

## 2019-10-03 DIAGNOSIS — Z98.1 S/P CERVICAL SPINAL FUSION: Primary | ICD-10-CM

## 2019-10-03 DIAGNOSIS — Z98.1 ARTHRODESIS STATUS: ICD-10-CM

## 2019-10-03 ASSESSMENT — PAIN SCALES - GENERAL: PAINLEVEL: EXTREME PAIN (8)

## 2019-10-03 ASSESSMENT — MIFFLIN-ST. JEOR: SCORE: 1451.44

## 2019-10-03 NOTE — LETTER
Todays Date:  10/3/2019    Truman Suero  [address] [address]    Patient was seen in clinic today:  yes    Patient:  Truman Suero  : 1960    Physician/Nurse:  Jud Ojeda MD/Rashad Espinosa RN, RN, MS    Please continue current work restrictions until Work Ability/Functional Capacity Assessed is completed by Physical Therapy/Occupational Therapy       The next clinic appointment is scheduled on: None with Surgeon.    Patient should not drive or operate heavy equipment if taking narcotic pain medications.          Jud Harkins MD  Department of Neurosurgery  [address] ([facility] [address])  [place]  [address]    Tele. [phone]

## 2019-10-03 NOTE — PROGRESS NOTES
"10/3/2019  Neurosurgery Clinic Visit    History of present illness: Truman Suero is a 59 year old male s/p C5-6 ACDF on 10/29/18 who presents for his one year follow up. He states he still has neck pain but due to his work in the [employer] industry, he spends a large portion of time looking down. He states he still has his carpal tunnel syndrome symptoms in his left hand. He has been using his sling at night. He states his pain has not gotten better or worse. He was wanting to talk about a carpal tunnel release at this visit.    Physical exam:   BP (!) 160/103 (BP Location: Right arm, Patient Position: Sitting, Cuff Size: Adult Regular)   Pulse 69   Ht 1.661 m (5' 5.38\")   Wt 70.4 kg (155 lb 1.6 oz)   SpO2 95%   BMI 25.51 kg/m      General: Awake and alert and in no acute distress.  Pulm: Breathing comfortably on room air  CN: Symmetric browlift, smile, tongue protrusion, palate elevation, and sternocleidomastoids. No dysarthria. Extraocular muscles are all intact.  Motor: Good muscle bulk throughout. 5 out of 5 strength in bilateral upper and lower extremities  Sensation: Sensation grossly intact to light touch in all extremities.  Gait: Intact tandem gait.    Imaging:  CT scan shows his bones are fused, screws and plate are in place without acute pathology; cervical spine degeneration present    Assessment:  #59 year old male s/p C5-6 ACDF, doing well postoperatively, with carpal tunnel and neck pain, likely musculoskeletal in origin due to degeneration, who presents for his 1 year follow up    Plan:  -Talked to patient about carpal tunnel release surgery and he wanted to think about it due to the recovery time, and needing to take time off work for it.  -He may follow up as needed  -He may go to pain management for further management of his neck pain    Patient seen and discussed with Dr. Shahana MD  I have seen this patient with the resident and formulated a plan and agree with this note.  RAYNA Hope " MD Arun  Neurosurgery Resident PGY-1

## 2019-10-03 NOTE — LETTER
"10/3/2019       RE: Truman Suero  [address] [address]     Dear Colleague,    Thank you for referring your patient, Truman Suero, to the [facility]. Please see a copy of my visit note below.    10/3/2019  Neurosurgery Clinic Visit    History of present illness: Truman Suero is a 59 year old male s/p C5-6 ACDF on 10/29/18 who presents for his one year follow up. He states he still has neck pain but due to his work in the [employer] industry, he spends a large portion of time looking down. He states he still has his carpal tunnel syndrome symptoms in his left hand. He has been using his sling at night. He states his pain has not gotten better or worse. He was wanting to talk about a carpal tunnel release at this visit.    Physical exam:   BP (!) 160/103 (BP Location: Right arm, Patient Position: Sitting, Cuff Size: Adult Regular)   Pulse 69   Ht 1.661 m (5' 5.38\")   Wt 70.4 kg (155 lb 1.6 oz)   SpO2 95%   BMI 25.51 kg/m       General: Awake and alert and in no acute distress.  Pulm: Breathing comfortably on room air  CN: Symmetric browlift, smile, tongue protrusion, palate elevation, and sternocleidomastoids. No dysarthria. Extraocular muscles are all intact.  Motor: Good muscle bulk throughout. 5 out of 5 strength in bilateral upper and lower extremities  Sensation: Sensation grossly intact to light touch in all extremities.  Gait: Intact tandem gait.    Imaging:  CT scan shows his bones are fused, screws and plate are in place without acute pathology; cervical spine degeneration present    Assessment:  #59 year old male s/p C5-6 ACDF, doing well postoperatively, with carpal tunnel and neck pain, likely musculoskeletal in origin due to degeneration, who presents for his 1 year follow up    Plan:  -Talked to patient about carpal tunnel release surgery and he wanted to think about it due to the recovery time, and " needing to take time off work for it.  -He may follow up as needed  -He may go to pain management for further management of his neck pain    Patient seen and discussed with Dr. Shahana MD  I have seen this patient with the resident and formulated a plan and agree with this note.  RAYNA Dias MD  Neurosurgery Resident PGY-1    Again, thank you for allowing me to participate in the care of your patient.      Sincerely,    Jud Harkins MD

## 2019-10-04 NOTE — TELEPHONE ENCOUNTER
"Per patient Penn[employer] message:  From: Palmer Suero      Created: 10/3/2019 5:48 PM      Melanie- hope this finds you well. Just sending you some reading material. I had follow up today 10/3 with . Good news bad news deal. They did the CT scan so good news surgery is fine and fusion holding. Bad news she said there is severe degeneration that she cannot do anything about. I had been mentioning that I was having difficulty and pain issues with getting thru a day hunched over at work. The [employer] they had assigned me months ago had been in touch with me and as at this appointment. She inquired to Dr. Harkins about options, as I cannot get thru a day easily anymore. They discussed something called \"Functional Capacity Evaluation\" Dr. Harkins is done with me. See ya, she relased me from her care. The C was going to look into some clinics, but she also remembered that we had talked about you! I had mentioned way back that you knew my case better than anyone  as you had been my provider for years. She suggested picking your brain to see if you knew of any clinics you would  recommend. We have appt 10/18 so wanted to give you heads up. I may be leaning on you for more help with this, [facility] may call you as well. If any of this is an isssue please let me know. Do you have access to the CT scan? Just wondering how bad I am. I may have to change my will-kidding of course  as always- Thanks    Palmer Suero          "

## 2019-10-04 NOTE — TELEPHONE ENCOUNTER
Routed to RODNEY Carpenter CNP.    Here is the FCE places on our file:    [facility]:  [phone] - offered at various locations    [facility]:   [place] [phone]  [facility] [phone]  [place]  [phone]  (you will need to bring a copy of the order with you)    [facility]:  [facility]  [phone]  (you will need to bring a copy of the order with you)    Estelita Wetzel RN-BSN  [facility]

## 2019-10-04 NOTE — TELEPHONE ENCOUNTER
Xavier Chakraborty-    I did give your CT scan report a review. You have pretty significant degenerative changes in your neck, for sure.   I am anxious to see you in the clinic to evaluate you well. Depending on where the pain is, we MIGHT be able to consider you for a repeat cervical radiofrequency ablation. You had had this done at [facility] before you came to our clinic, but reported having over 70% relief of your typical neck pain in the back of the neck.     The places we know that do FCE evaluations are listed below:  Here is the FCE places on our file:     [facility]:  [phone] - offered at various locations     [facility]:   [place] [phone]  [facility] [phone]  [place]  [phone]  (you will need to bring a copy of the order with you)     [facility]:  [facility]  [phone]  (you will need to bring a copy of the order with you)    Those evaluations can be hard to get covered by your insurance company, but it is good if your QRC is supportive. Those tests give us an objective idea of what you can and cannot tolerate at a job.     And no. None of this is a bother. I'll let you know if it is.   Have a great weekend.  Melanie STEVENS RN CNP, ABRAM  [facility]    I have sent the above myChart message to the patient.     Melanie STEVENS RN CNP, ABRAM  [facility]

## 2019-10-10 ENCOUNTER — THERAPY VISIT (OUTPATIENT)
Dept: PHYSICAL THERAPY | Facility: CLINIC | Age: 59
End: 2019-10-10
Payer: OTHER MISCELLANEOUS

## 2019-10-10 DIAGNOSIS — M54.2 CHRONIC NECK PAIN: ICD-10-CM

## 2019-10-10 DIAGNOSIS — G44.209 TENSION TYPE HEADACHE: ICD-10-CM

## 2019-10-10 DIAGNOSIS — M54.50 CHRONIC BILATERAL LOW BACK PAIN WITHOUT SCIATICA: ICD-10-CM

## 2019-10-10 DIAGNOSIS — G89.29 CHRONIC BILATERAL LOW BACK PAIN WITHOUT SCIATICA: ICD-10-CM

## 2019-10-10 DIAGNOSIS — G89.29 CHRONIC NECK PAIN: ICD-10-CM

## 2019-10-10 PROCEDURE — 97110 THERAPEUTIC EXERCISES: CPT | Mod: GP | Performed by: PHYSICAL THERAPIST

## 2019-10-10 PROCEDURE — 97140 MANUAL THERAPY 1/> REGIONS: CPT | Mod: GP | Performed by: PHYSICAL THERAPIST

## 2019-10-16 ENCOUNTER — THERAPY VISIT (OUTPATIENT)
Dept: PHYSICAL THERAPY | Facility: CLINIC | Age: 59
End: 2019-10-16
Payer: COMMERCIAL

## 2019-10-16 DIAGNOSIS — G44.209 TENSION TYPE HEADACHE: ICD-10-CM

## 2019-10-16 DIAGNOSIS — Z98.1 S/P CERVICAL SPINAL FUSION: Primary | ICD-10-CM

## 2019-10-16 PROCEDURE — 97140 MANUAL THERAPY 1/> REGIONS: CPT | Mod: GP

## 2019-10-16 PROCEDURE — 97110 THERAPEUTIC EXERCISES: CPT | Mod: GP

## 2019-10-22 ENCOUNTER — OFFICE VISIT (OUTPATIENT)
Dept: PALLIATIVE MEDICINE | Facility: CLINIC | Age: 59
End: 2019-10-22
Payer: COMMERCIAL

## 2019-10-22 VITALS
WEIGHT: 154 LBS | BODY MASS INDEX: 25.33 KG/M2 | SYSTOLIC BLOOD PRESSURE: 149 MMHG | HEART RATE: 73 BPM | DIASTOLIC BLOOD PRESSURE: 97 MMHG

## 2019-10-22 DIAGNOSIS — M54.2 PAIN OF CERVICAL FACET JOINT: ICD-10-CM

## 2019-10-22 DIAGNOSIS — M54.2 CHRONIC NECK PAIN: ICD-10-CM

## 2019-10-22 DIAGNOSIS — M54.50 CHRONIC BILATERAL LOW BACK PAIN WITHOUT SCIATICA: ICD-10-CM

## 2019-10-22 DIAGNOSIS — M54.59 LUMBAR FACET JOINT PAIN: ICD-10-CM

## 2019-10-22 DIAGNOSIS — G89.29 CHRONIC NECK PAIN: ICD-10-CM

## 2019-10-22 DIAGNOSIS — G89.29 CHRONIC NECK AND BACK PAIN: ICD-10-CM

## 2019-10-22 DIAGNOSIS — M47.816 SPONDYLOSIS OF LUMBAR REGION WITHOUT MYELOPATHY OR RADICULOPATHY: ICD-10-CM

## 2019-10-22 DIAGNOSIS — M47.812 CERVICAL SPONDYLOSIS WITHOUT MYELOPATHY: ICD-10-CM

## 2019-10-22 DIAGNOSIS — G89.29 CHRONIC BILATERAL LOW BACK PAIN WITHOUT SCIATICA: ICD-10-CM

## 2019-10-22 DIAGNOSIS — M50.30 DDD (DEGENERATIVE DISC DISEASE), CERVICAL: ICD-10-CM

## 2019-10-22 DIAGNOSIS — M54.9 CHRONIC NECK AND BACK PAIN: ICD-10-CM

## 2019-10-22 DIAGNOSIS — M54.2 CERVICALGIA: ICD-10-CM

## 2019-10-22 DIAGNOSIS — Z98.1 S/P CERVICAL SPINAL FUSION: ICD-10-CM

## 2019-10-22 DIAGNOSIS — M48.02 CERVICAL STENOSIS OF SPINAL CANAL: ICD-10-CM

## 2019-10-22 DIAGNOSIS — M79.18 MYOFASCIAL PAIN: ICD-10-CM

## 2019-10-22 DIAGNOSIS — M54.12 CERVICAL RADICULOPATHY: Primary | ICD-10-CM

## 2019-10-22 DIAGNOSIS — M54.2 CHRONIC NECK AND BACK PAIN: ICD-10-CM

## 2019-10-22 PROCEDURE — 99214 OFFICE O/P EST MOD 30 MIN: CPT | Performed by: NURSE PRACTITIONER

## 2019-10-22 RX ORDER — TRAMADOL HYDROCHLORIDE 50 MG/1
50 TABLET ORAL EVERY 6 HOURS PRN
Qty: 90 TABLET | Refills: 0 | Status: SHIPPED | OUTPATIENT
Start: 2019-10-22 | End: 2019-11-27

## 2019-10-22 RX ORDER — GABAPENTIN 600 MG/1
900 TABLET ORAL 3 TIMES DAILY
Qty: 135 TABLET | Refills: 3 | Status: SHIPPED | OUTPATIENT
Start: 2019-10-22 | End: 2020-02-11

## 2019-10-22 RX ORDER — TRAMADOL HYDROCHLORIDE 200 MG/1
200 TABLET, EXTENDED RELEASE ORAL EVERY 24 HOURS
Qty: 30 TABLET | Refills: 0 | Status: SHIPPED | OUTPATIENT
Start: 2019-10-22 | End: 2019-11-27

## 2019-10-22 ASSESSMENT — PAIN SCALES - GENERAL: PAINLEVEL: EXTREME PAIN (9)
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
## 2019-10-22 NOTE — PATIENT INSTRUCTIONS
PLAN  1. Medications:   1. Continue Tramadol ER 200mg daily.  2. Continue Tramadol 50mg every 6-8 hours as needed for pain, max of 3 tabs/day.  3. Continue Gabapentin 900mg take 3 times daily.  4. Continue Topamax 50mg twice daily.  5. Continue Methocarbamol 500-1,000mg take 3 times daily as needed for muscle spasms.  6. Continue to use Aspercreme with 4% Lidocaine.  2. Procedures: Schedule a cervical epidural steroid injection, our office will contact you to get on the schedule.   1. Consider follow-up with Dr. Tejada for further evaluation for spinal cord stimulator, handout provided.   3. Follow-up with me in 8-12 weeks        ----------------------------------------------------------------  Clinic Number:  [phone]     Call with any questions about your care and for scheduling assistance.     Calls are returned Monday through Friday between 8 AM and 4:30 PM. We usually get back to you within 2 business days depending on the issue/request.    If we are prescribing your medications:    For opioid medication refills, call the clinic or send a [employer] message 7 days in advance.  Please include:    Name of requested medication    Name of the pharmacy.    For non-opioid medications, call your pharmacy directly to request a refill. Please allow 3-4 days to be processed.     Per [place] State Law:    All controlled substance prescriptions must be filled within 30 days of being written.      For those controlled substances allowing refills, pickup must occur within 30 days of last fill.      We believe regular attendance is key to your success in our program!      Any time you are unable to keep your appointment we ask that you call us at least 24 hours in advance to cancel.This will allow us to offer the appointment time to another patient.     Multiple missed appointments may lead to dismissal from the clinic.

## 2019-10-23 ENCOUNTER — THERAPY VISIT (OUTPATIENT)
Dept: PHYSICAL THERAPY | Facility: CLINIC | Age: 59
End: 2019-10-23
Payer: OTHER MISCELLANEOUS

## 2019-10-23 DIAGNOSIS — G89.29 CHRONIC NECK PAIN: ICD-10-CM

## 2019-10-23 DIAGNOSIS — G44.209 TENSION TYPE HEADACHE: ICD-10-CM

## 2019-10-23 DIAGNOSIS — M54.2 CHRONIC NECK PAIN: ICD-10-CM

## 2019-10-23 DIAGNOSIS — Z98.1 S/P CERVICAL SPINAL FUSION: ICD-10-CM

## 2019-10-23 PROCEDURE — 97140 MANUAL THERAPY 1/> REGIONS: CPT | Mod: GP

## 2019-10-23 PROCEDURE — 97110 THERAPEUTIC EXERCISES: CPT | Mod: GP

## 2019-10-24 ENCOUNTER — TELEPHONE (OUTPATIENT)
Dept: PALLIATIVE MEDICINE | Facility: CLINIC | Age: 59
End: 2019-10-24
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
## 2019-10-26 NOTE — TELEPHONE ENCOUNTER
PA pending additional information - Office note from 10/22 to be completed and signed by Melanie LUZ    [facility]

## 2019-10-28 ENCOUNTER — THERAPY VISIT (OUTPATIENT)
Dept: PHYSICAL THERAPY | Facility: CLINIC | Age: 59
End: 2019-10-28
Payer: COMMERCIAL

## 2019-10-28 DIAGNOSIS — M54.2 CHRONIC NECK PAIN: ICD-10-CM

## 2019-10-28 DIAGNOSIS — M54.50 CHRONIC BILATERAL LOW BACK PAIN WITHOUT SCIATICA: ICD-10-CM

## 2019-10-28 DIAGNOSIS — G89.29 CHRONIC NECK PAIN: ICD-10-CM

## 2019-10-28 DIAGNOSIS — G89.29 CHRONIC BILATERAL LOW BACK PAIN WITHOUT SCIATICA: ICD-10-CM

## 2019-10-28 DIAGNOSIS — G44.209 TENSION TYPE HEADACHE: ICD-10-CM

## 2019-10-28 DIAGNOSIS — Z98.1 S/P CERVICAL SPINAL FUSION: ICD-10-CM

## 2019-10-28 PROCEDURE — 97110 THERAPEUTIC EXERCISES: CPT | Mod: GP | Performed by: PHYSICAL THERAPIST

## 2019-10-28 PROCEDURE — 97140 MANUAL THERAPY 1/> REGIONS: CPT | Mod: GP | Performed by: PHYSICAL THERAPIST

## 2019-11-07 ENCOUNTER — THERAPY VISIT (OUTPATIENT)
Dept: PHYSICAL THERAPY | Facility: CLINIC | Age: 59
End: 2019-11-07
Payer: OTHER MISCELLANEOUS

## 2019-11-07 DIAGNOSIS — G44.209 TENSION TYPE HEADACHE: ICD-10-CM

## 2019-11-07 DIAGNOSIS — M54.50 CHRONIC BILATERAL LOW BACK PAIN WITHOUT SCIATICA: ICD-10-CM

## 2019-11-07 DIAGNOSIS — Z98.1 S/P CERVICAL SPINAL FUSION: ICD-10-CM

## 2019-11-07 DIAGNOSIS — M54.2 CHRONIC NECK PAIN: ICD-10-CM

## 2019-11-07 DIAGNOSIS — G89.29 CHRONIC BILATERAL LOW BACK PAIN WITHOUT SCIATICA: ICD-10-CM

## 2019-11-07 DIAGNOSIS — G89.29 CHRONIC NECK PAIN: ICD-10-CM

## 2019-11-07 PROCEDURE — 97110 THERAPEUTIC EXERCISES: CPT | Mod: GP | Performed by: PHYSICAL THERAPIST

## 2019-11-07 PROCEDURE — 97140 MANUAL THERAPY 1/> REGIONS: CPT | Mod: GP | Performed by: PHYSICAL THERAPIST

## 2019-11-07 NOTE — TELEPHONE ENCOUNTER
Note done  Melanie STEVENS, RN CNP, FNP  [facility]

## 2019-11-08 NOTE — Clinical Note
I will take over his Tramadol for awhile. Thanks for getting the UDS! See my notes please.  Melanie STEVENS RN CNP, FNP [facility] [place]  89/F admitted for LARS, sepsis, C diff colitis

## 2019-11-12 ENCOUNTER — MYC MEDICAL ADVICE (OUTPATIENT)
Dept: FAMILY MEDICINE | Facility: CLINIC | Age: 59
End: 2019-11-12

## 2019-11-12 ENCOUNTER — TRANSFERRED RECORDS (OUTPATIENT)
Dept: HEALTH INFORMATION MANAGEMENT | Facility: CLINIC | Age: 59
End: 2019-11-12

## 2019-11-12 ENCOUNTER — MEDICAL CORRESPONDENCE (OUTPATIENT)
Dept: HEALTH INFORMATION MANAGEMENT | Facility: CLINIC | Age: 59
End: 2019-11-12

## 2019-11-12 ENCOUNTER — MYC MEDICAL ADVICE (OUTPATIENT)
Dept: PALLIATIVE MEDICINE | Facility: CLINIC | Age: 59
End: 2019-11-12

## 2019-11-12 NOTE — TELEPHONE ENCOUNTER
Routing MyChart message to PCP to please advice.    Does he need to be seen within clinic?    Radha Figueroa RN

## 2019-11-13 NOTE — TELEPHONE ENCOUNTER
Called both cell and home numbers and left voicemail messages on both.    Letting patient know that Dr Trinidad had a cancellation this evening and she could see him this evening    Patient will need to be seen in regards to his [employer] message request.    Brittaney Ramirez

## 2019-11-13 NOTE — TELEPHONE ENCOUNTER
From: Palmer Suero      Created: 11/12/2019 3:46 PM        *-*-*This message has not been handled.*-*-*    Melanie- Just a quick question. At last visit we discussed cervical injection, workers comp is reviewing it also and adjusted contacted me. She is confused about what level is involved in the injection. She is also wondering which side. She asked as my history has been more issues on the left side. Of course I can't remember any of these answers so the more information you can give me the better and I can pass along. Thanks. Trying to get this done as quickly as I they will allow. Happy Holidays    Palmer        ______________________________________________________  Will forward to Melanie to review and advise    Van Oquenod, RN  Care Coordinator   [facility] [place]

## 2019-11-15 ENCOUNTER — TELEPHONE (OUTPATIENT)
Dept: PALLIATIVE MEDICINE | Facility: CLINIC | Age: 59
End: 2019-11-15

## 2019-11-15 NOTE — TELEPHONE ENCOUNTER
C calling to clarify what is being ordered for pt. She stated WC is questioning what is the correct order for pt.  The last office visit notes states Right ISMAEL however, the order places by Martha states Bilateral    5. Further procedures recommended:   1. Patient to schedule repeat right cervical epidural steroid injection, office to contact patient.    Type of Injection: cervical epidural steroid injection for bilateral C4-5 radicular symptoms.      Sumi LUCERO    [facility] [place]

## 2019-11-18 ENCOUNTER — MYC REFILL (OUTPATIENT)
Dept: FAMILY MEDICINE | Facility: CLINIC | Age: 59
End: 2019-11-18

## 2019-11-18 DIAGNOSIS — Z72.0 TOBACCO ABUSE: ICD-10-CM

## 2019-11-18 DIAGNOSIS — F43.22 ADJUSTMENT DISORDER WITH ANXIOUS MOOD: ICD-10-CM

## 2019-11-18 RX ORDER — BUPROPION HYDROCHLORIDE 150 MG/1
TABLET, EXTENDED RELEASE ORAL
Qty: 180 TABLET | Refills: 1 | Status: CANCELLED | OUTPATIENT
Start: 2019-11-18

## 2019-11-18 NOTE — TELEPHONE ENCOUNTER
This has been addressed.  See the telephone encounter 10/24/19 for more information.    Estelita Wetzel RN-BSN  [facility]

## 2019-11-18 NOTE — TELEPHONE ENCOUNTER
Need [facility] contact information.    Routed back to schedulers.    Estelita Wetzel RN-BSN  [facility]

## 2019-11-18 NOTE — TELEPHONE ENCOUNTER
Ariadne Blankenship   Telephone Encounter   Signed   Creation Time:  11/15/2019  8:56 AM         [facility] calling to clarify what is being ordered for pt. She stated WC is questioning what is the correct order for pt.  The last office visit notes states Right ISMAEL however, the order places by Carolaer states Bilateral     5. Further procedures recommended:   1. Patient to schedule repeat right cervical epidural steroid injection, office to contact patient.     Type of Injection: cervical epidural steroid injection for bilateral C4-5 radicular symptoms.     Mya Barahona [phone], Email [email or web address]     Ariadne LUCERO    [facility] [place]

## 2019-11-18 NOTE — TELEPHONE ENCOUNTER
Mya Barahona [phone], Email [email or web address]      Sumi LUCERO    [facility] [place]

## 2019-11-18 NOTE — TELEPHONE ENCOUNTER
Called Mya and informed her that the order is not for bilateral cervical epidural steroid injection. It says her radicular symtoms are bilateral.  Normally a interlaminar cervical epidural steroid injection is done.  She will call back on the shared line if a new order is needed.    Estelita Wetzel RN-BSN  [facility]

## 2019-11-19 NOTE — TELEPHONE ENCOUNTER
Incoming fax from ISMAEL STAUFFER has been approved.      They are requesting the following documentation following the injection:    Patients pain level:  1) Immediatly before    2) Immediatly after the procedure; and    3) One week post procedure          Okay to schedule      Monalisa LUZ    [facility]

## 2019-11-19 NOTE — TELEPHONE ENCOUNTER
Med used for smoking cessation.  I updated the sig to the steady state dose and routed altered Rx to provider to address.    Luh Edwards RN  [facility]

## 2019-11-19 NOTE — TELEPHONE ENCOUNTER
"Requested Prescriptions   Pending Prescriptions Disp Refills     buPROPion (WELLBUTRIN SR) 150 MG 12 hr tablet [Pharmacy Med Name: buPROPion HCl ER (SR) Oral Tablet Extended Release 12 Hour 150 MG]  Last Written Prescription Date:  5/14/2019  Last Fill Quantity: 180 tabs,  # refills: 1   Last office visit: 10/2/2018 with prescribing provider:  Amos   Future Office Visit:   Next 5 appointments (look out 90 days)    Dec 09, 2019  1:00 PM CST  MyChart Physical Adult with Humberto Trinidad, DO  [facility] ([facility]) [address] [facility] [address]  [phone]   Dec 17, 2019  2:00 PM CST  Return Visit with RODNEY Vargas CNP  [facility] ([facility]) [address] [place] [id number]  [phone]        60 tablet 0     Sig: Take 1 tablet once daily and increase to 1 tablet twice daily after 4 to 7 days       SSRIs Protocol Passed - 11/18/2019  3:39 PM        Passed - Recent (12 mo) or future (30 days) visit within the authorizing provider's specialty     Patient has had an office visit with the authorizing provider or a provider within the authorizing providers department within the previous 12 mos or has a future within next 30 days. See \"Patient Info\" tab in inbasket, or \"Choose Columns\" in Meds & Orders section of the refill encounter.              Passed - Medication is Bupropion     If the medication is Bupropion (Wellbutrin), and the patient is taking for smoking cessation; OK to refill.          Passed - Medication is active on med list        Passed - Patient is age 18 or older         "

## 2019-11-19 NOTE — TELEPHONE ENCOUNTER
Duplicate, I just routed this same med refill to Dr. Trinidad in other encounter.    Luh Edwards RN  [facility]

## 2019-11-19 NOTE — TELEPHONE ENCOUNTER
"Requested Prescriptions   Pending Prescriptions Disp Refills     buPROPion (WELLBUTRIN SR) 150 MG 12 hr tablet  Last Written Prescription Date:  5/14/2019  Last Fill Quantity: 180 tabs,  # refills: 1   Last office visit: 10/2/2018 with prescribing provider:  Amos   Future Office Visit:   Next 5 appointments (look out 90 days)    Dec 09, 2019  1:00 PM CST  MyChart Physical Adult with Humberto Trinidad,   [facility] ([facility]) [address] [facility] [address]  [phone]   Dec 17, 2019  2:00 PM CST  Return Visit with RODNEY Vargas CNP  [facility] ([facility]) [address] [place] [id number] [phone]        180 tablet 1     Sig: Take 1 tablet once daily and increase to 1 tablet twice daily after 4 to 7 days       SSRIs Protocol Passed - 11/19/2019  8:29 AM        Passed - Recent (12 mo) or future (30 days) visit within the authorizing provider's specialty     Patient has had an office visit with the authorizing provider or a provider within the authorizing providers department within the previous 12 mos or has a future within next 30 days. See \"Patient Info\" tab in inbasket, or \"Choose Columns\" in Meds & Orders section of the refill encounter.              Passed - Medication is Bupropion     If the medication is Bupropion (Wellbutrin), and the patient is taking for smoking cessation; OK to refill.          Passed - Medication is active on med list        Passed - Patient is age 18 or older         "

## 2019-11-21 ENCOUNTER — MYC REFILL (OUTPATIENT)
Dept: FAMILY MEDICINE | Facility: CLINIC | Age: 59
End: 2019-11-21

## 2019-11-21 DIAGNOSIS — F43.22 ADJUSTMENT DISORDER WITH ANXIOUS MOOD: ICD-10-CM

## 2019-11-21 DIAGNOSIS — Z72.0 TOBACCO ABUSE: ICD-10-CM

## 2019-11-21 RX ORDER — BUPROPION HYDROCHLORIDE 150 MG/1
TABLET, EXTENDED RELEASE ORAL
Qty: 180 TABLET | Refills: 1 | Status: CANCELLED | OUTPATIENT
Start: 2019-11-21

## 2019-11-22 ENCOUNTER — MYC MEDICAL ADVICE (OUTPATIENT)
Dept: PALLIATIVE MEDICINE | Facility: CLINIC | Age: 59
End: 2019-11-22

## 2019-11-22 RX ORDER — BUPROPION HYDROCHLORIDE 150 MG/1
150 TABLET, EXTENDED RELEASE ORAL 2 TIMES DAILY
Qty: 60 TABLET | Refills: 0 | Status: SHIPPED | OUTPATIENT
Start: 2019-11-22 | End: 2020-01-22

## 2019-11-22 NOTE — TELEPHONE ENCOUNTER
Rx was sent to pharmacy today.  I sent MyChart response to patient advising of this.    Luh Edwards RN  [facility]

## 2019-11-22 NOTE — TELEPHONE ENCOUNTER
ISMAEL scheduled 11/29.     ANALISA ArtisN, RN-BC  Patient Care Supervisor  [facility] [place]

## 2019-11-22 NOTE — TELEPHONE ENCOUNTER
"Requested Prescriptions   Pending Prescriptions Disp Refills     buPROPion (WELLBUTRIN SR) 150 MG 12 hr tablet  Last Written Prescription Date:  5/14/2019  Last Fill Quantity: 180 tablet,  # refills: 1   Last office visit: 10/2/2018 with prescribing provider:  CHAD Trinidad   Future Office Visit:   Next 5 appointments (look out 90 days)    Dec 09, 2019  1:00 PM CST  MyChart Physical Adult with Lizbetsamidiana Boltonmelvin Trinidad DO  [facility] [address] [facility] [address]  [phone]   Dec 17, 2019  2:00 PM CST  Return Visit with RODNEY Vargas CNP  [facility] ([facility]) [address] [place] [id number] [phone]        180 tablet 1     Sig: Take 1 tablet once daily and increase to 1 tablet twice daily after 4 to 7 days       SSRIs Protocol Passed - 11/22/2019  7:34 AM  PHQ-9 SCORE 3/28/2017 12/19/2017   PHQ-9 Total Score 13 2     DONOVAN-7 SCORE 12/19/2017   Total Score 5               Passed - Recent (12 mo) or future (30 days) visit within the authorizing provider's specialty     Patient has had an office visit with the authorizing provider or a provider within the authorizing providers department within the previous 12 mos or has a future within next 30 days. See \"Patient Info\" tab in inbasket, or \"Choose Columns\" in Meds & Orders section of the refill encounter.              Passed - Medication is Bupropion     If the medication is Bupropion (Wellbutrin), and the patient is taking for smoking cessation; OK to refill.          Passed - Medication is active on med list        Passed - Patient is age 18 or older        "

## 2019-11-25 NOTE — TELEPHONE ENCOUNTER
Per patient myChart message:  From: Palmer Suero      Created: 11/22/2019 4:01 PM      Dr Navarro- My [facility]- Mya sent me a message this am that she had received the FCE results and was going to fax them to your office to review. I have mentioned you as contact as Dr. Harkins had pretty much signed off on me at our last visit. I have not seen the report so have no idea what they have in store for me. I hope you don't mind being the contact, I had told Mya you have been as involved in my issues as anybody. We have appt in a few weeks so can talk more then just wanted to give you heads up. Have wonderful Thanksgiving.    Palmer      -------------  Dr. Harkins ordered the FCE.    The forms are in RODNEY Carpenter CNP's bin to review.    Estelita Wetzel RN-BSN  [facility]

## 2019-11-27 ENCOUNTER — MYC REFILL (OUTPATIENT)
Dept: PALLIATIVE MEDICINE | Facility: CLINIC | Age: 59
End: 2019-11-27

## 2019-11-27 DIAGNOSIS — M79.18 MYOFASCIAL PAIN: ICD-10-CM

## 2019-11-27 DIAGNOSIS — M54.2 CHRONIC NECK PAIN: ICD-10-CM

## 2019-11-27 DIAGNOSIS — M54.50 CHRONIC BILATERAL LOW BACK PAIN WITHOUT SCIATICA: ICD-10-CM

## 2019-11-27 DIAGNOSIS — G89.29 CHRONIC BILATERAL LOW BACK PAIN WITHOUT SCIATICA: ICD-10-CM

## 2019-11-27 DIAGNOSIS — M54.59 LUMBAR FACET JOINT PAIN: ICD-10-CM

## 2019-11-27 DIAGNOSIS — M47.812 CERVICAL SPONDYLOSIS WITHOUT MYELOPATHY: ICD-10-CM

## 2019-11-27 DIAGNOSIS — M50.30 DDD (DEGENERATIVE DISC DISEASE), CERVICAL: ICD-10-CM

## 2019-11-27 DIAGNOSIS — M47.816 SPONDYLOSIS OF LUMBAR REGION WITHOUT MYELOPATHY OR RADICULOPATHY: ICD-10-CM

## 2019-11-27 DIAGNOSIS — G89.29 CHRONIC NECK PAIN: ICD-10-CM

## 2019-11-27 DIAGNOSIS — Z98.1 S/P CERVICAL SPINAL FUSION: ICD-10-CM

## 2019-11-27 RX ORDER — TRAMADOL HYDROCHLORIDE 200 MG/1
200 TABLET, EXTENDED RELEASE ORAL EVERY 24 HOURS
Qty: 30 TABLET | Refills: 0 | Status: SHIPPED | OUTPATIENT
Start: 2019-11-27 | End: 2019-12-17

## 2019-11-27 RX ORDER — TRAMADOL HYDROCHLORIDE 50 MG/1
50 TABLET ORAL EVERY 6 HOURS PRN
Qty: 90 TABLET | Refills: 0 | Status: SHIPPED | OUTPATIENT
Start: 2019-11-27 | End: 2019-12-17

## 2019-11-27 NOTE — TELEPHONE ENCOUNTER
I am signing this on behalf of RODNEY Loja, who is out of the office. Please see her/his previous documentation regarding the appropriateness of these prescriptions.     Script Eprescribed to pharmacy    Will send this to MA team to notify patient.    Signed Prescriptions:                        Disp   Refills    traMADol (ULTRAM) 50 MG tablet             90 tab*0        Sig: Take 1 tablet (50 mg) by mouth every 6 hours as           needed for severe pain Max 3/day. Fill 11/26/19;           start 11/28/19  Authorizing Provider: RUDOLPH KEITA    traMADol (ULTRAM-ER) 200 MG 24 hr tablet   30 tab*0        Sig: Take 1 tablet (200 mg) by mouth every 24 hours fill           11/27/19 to start 11/28/2019  Authorizing Provider: RUDOLPH KEITA APRN, NP-C  [facility] [place]

## 2019-11-27 NOTE — TELEPHONE ENCOUNTER
[employer] message from patient on 11/27 at 1201:    Truman Suero would like a refill of the following medications:         traMADol (ULTRAM) 50 MG tablet [Melanie Thomas, APRN CNP]       Patient Comment: Have 4 ER left forgot about the holiday. Thanks-Palmer         traMADol (ULTRAM-ER) 200 MG 24 hr tablet [Melanie Thomas, APRN CNP]     Preferred pharmacy: [facility] [address] [place] [address]   -----------------    Received call from patient requesting refill(s) of tramadol 50 mg and tramadol 200 mg ER     Last dispensed from pharmacy on 10/29 for both medications  Due date 11/28/19    Pt last seen on 10/22/19  Next appt scheduled for 12/17/19     checked in the past 6 months? Yes If no, print current report and give to RN    Last urine drug screen 3/5/19  Current opioid agreement on file Yes Date: 3/5/19    Processing (pick one):      E-prescribe to [facility] pharmacy    Will facilitate refill.    Shanita Briones, ANALISAN, RN-BC  Patient Care Supervisor  [facility] [place]

## 2019-11-27 NOTE — TELEPHONE ENCOUNTER
Information noted.   Thanks.  Melanie STEVENS, TIMBO CNP, FNP  [facility]  [facility]

## 2019-11-29 ENCOUNTER — RADIOLOGY INJECTION OFFICE VISIT (OUTPATIENT)
Dept: PALLIATIVE MEDICINE | Facility: CLINIC | Age: 59
End: 2019-11-29
Payer: OTHER MISCELLANEOUS

## 2019-11-29 ENCOUNTER — ANCILLARY PROCEDURE (OUTPATIENT)
Dept: RADIOLOGY | Facility: CLINIC | Age: 59
End: 2019-11-29
Attending: PAIN MEDICINE
Payer: OTHER MISCELLANEOUS

## 2019-11-29 VITALS — OXYGEN SATURATION: 99 % | SYSTOLIC BLOOD PRESSURE: 144 MMHG | HEART RATE: 58 BPM | DIASTOLIC BLOOD PRESSURE: 90 MMHG

## 2019-11-29 DIAGNOSIS — M54.12 CERVICAL RADICULOPATHY: ICD-10-CM

## 2019-11-29 DIAGNOSIS — M54.12 CERVICAL RADICULOPATHY: Primary | ICD-10-CM

## 2019-11-29 PROCEDURE — 62321 NJX INTERLAMINAR CRV/THRC: CPT | Performed by: PAIN MEDICINE

## 2019-11-29 ASSESSMENT — PAIN SCALES - GENERAL: PAINLEVEL: SEVERE PAIN (7)

## 2019-11-29 NOTE — NURSING NOTE
Pre-procedure Intake    Have you been fasting? NA    If yes, for how long? NA    Are you taking a prescribed blood thinner such as coumadin, Plavix, Xarelto?    No    If yes, when did you take your last dose? NA    Do you take aspirin?  No    If cervical procedure, have you held aspirin for 6 days?   NA    Do you have any allergies to contrast dye, iodine, steroid and/or numbing medications?  NO    Are you currently taking antibiotics or have an active infection?  NO    Have you had a fever/elevated temperature within the past week? NO    Are you currently taking oral steroids? NO    Do you have a ? Yes       Are you pregnant or breastfeeding?  Not Applicable    Are the vital signs normal?  Yes      Latisha Pham CMA ([facility])

## 2019-11-29 NOTE — NURSING NOTE
Discharge Information    IV Discontiued Time:  1412 catheter intact    Amount of Fluid Infused:  Saline locked    Discharge Criteria = When patient returns to baseline or as per MD order    Consciousness:  Pt is fully awake    Circulation:  BP +/- 20% of pre-procedure level    Respiration:  Patient is able to breathe deeply    O2 Sat:  Patient is able to maintain O2 Sat >92% on room air    Activity:  Moves 4 extremities on command    Ambulation:  Patient is able to stand and walk or stand and pivot into wheelchair    Dressing:  Clean/dry or No Dressing    Notes:   Discharge instructions and AVS given to patient    Patient meets criteria for discharge?  YES    Admitted to PCU?  No    Responsible adult present to accompany patient home?  Yes    Signature/Title:    leslie peter RN  RN Care Coordinator  [facility] [place]

## 2019-11-29 NOTE — PATIENT INSTRUCTIONS
[facility]   Procedure Discharge Instructions    Today you saw:     Dr. Hugo Corrigan      You had an:  Epidural steroid injection  -cervical     Medications used:  Lidocaine   Bupivacaine   Dexamethasone Omnipaque  ormal saline            Be cautious. Numbness and/or weakness in the upper extremities may occur for up to 6-8 hours after the procedure due to effect of the local anesthetic    Do not drive for 6 hours. The effect of the local anesthetic could slow your reflexes.     You may resume your regular activities after 24 hours    Avoid strenuous activity for the first 24 hours    You may shower, however avoid swimming, tub baths or hot tubs for 24 hours following your procedure    You may have a mild to moderate increase in pain for several days following the injection.    It may take up to 14 days for the steroid medication to start working although you may feel the effect as early as a few days after the procedure.       You may use ice packs for 10-15 minutes, 3 to 4 times a day at the injection site for comfort    Do not use heat to painful areas for 6 to 8 hours. This will give the local anesthetic time to wear off and prevent you from accidentally burning your skin.     Unless you have been directed to avoid the use of anti-inflammatory medications (NSAIDS), you may use medications such as ibuprofen, Aleve or Tylenol for pain control if needed.     If you were fasting, you may resume your normal diet and medications after the procedure    If you have diabetes, check your blood sugar more frequently than usual as your blood sugar may be higher than normal for 10-14 days following a steroid injection. Contact your doctor who manages your diabetes if your blood sugar is higher than usual    Possible side effects of steroids that you may experience include flushing, elevated blood pressure, increased appetite, mild headaches and restlessness.  All of these symptoms will get  better with time.    If you experience any of the following, call the Pain Clinic during work hours (Mon-Friday 8-4:30 pm) at [phone] or the Provider Line after hours at [phone]:  -Fever over 100 degree F  -Swelling, bleeding, redness, drainage, warmth at the injection site  -Progressive weakness or numbness in your legs or arms  -Loss of bowel or bladder function  -Unusual headache that is not relieved by Tylenol or other pain reliever  -Unusual new onset of pain that is not improving

## 2019-11-29 NOTE — PROGRESS NOTES
[facility] - Procedure Note    Date of Visit: 11/29/2019    Procedure performed: C7-T1 interlaminar epidural steroid injection with fluoroscopic guidance  Diagnosis: Cervical spondylosis; Cervical radiculitis/radiculopathy  : Hugo Corrigan MD  Anesthesia: none    Indications: Truman Suero is a 59 year old male who is seen  for cervical epidural steroid injection. The patient describes neck pain radiating to his upper extremity. The patient has been exhibiting symptoms consistent with cervical intraspinal inflammation and radiculopathy. Symptoms have been persistent, disabling, and intermittently severe. The patient reports minimal improvement with conservative treatment, including meds/PT.    Cervical MRI   C2-3:  There is bilateral uncinate and facet hypertrophy without  significant spinal canal stenosis. Minimal bilateral foraminal  stenosis.     C3-4:  Posterior osteophyte with facet arthropathy bilaterally,  causing moderate left and right foraminal stenoses with mild spinal  canal stenosis.     C4-5:  Central disc osteophyte complex, with mild bilateral foraminal  stenoses and mild spinal canal stenosis.     C5-6:  Posterior disc as a complex, bilateral facet and uncinate  hypertrophy, causing mild to moderate bilateral foraminal and mild  spinal canal stenosis.     C6-7:  Posterior disc osteophyte complex, bilateral uncinate  hypertrophy and minimal bilateral facet hypertrophy, causing moderate  bilateral foraminal stenosis. Mild spinal canal stenosis     C7-T1: There is no focal abnormality.     No definite abnormality is noted of the visualized paraspinous  tissues.                                                                       Impression:    1. Multilevel moderate to severe degenerative disease, most prominent  findings are the bilateral foraminal stenoses at C6-7 and on the right  at C3-4 with mild multilevel spinal canal stenosis. Anterior C5-C6  fusion appears  stable without hardware loosening.  2. Mild grade 1 anterolisthesis of C2 on C3 again present.    Allergies:    No Known Allergies     Vitals:  BP (!) 163/91   Pulse 64     Review of Systems: The patient denies recent fever, chills, illness, use of antibiotics or anticoagulants. All other 10-point review of systems negative.     Procedure: The procedure and risks were explained, and informed written consent was obtained from the patient. Risks include but are not limited to: infection, bleeding, increased pain, and damage to soft tissue, nerve, muscle, and vasculature structures. After getting informed consent, patient was brought into the procedure suite and was placed in a prone position on the procedure table. A Pause for the Cause was performed. Patient was prepped and draped in sterile fashion.     The C7-T1 interspace was identified with use of fluoroscopy in AP view. A 25-gauge, 1.5 inch needle was used to anesthetize the skin and subcutaneous tissue entry site with a total of 2 ml of 1% lidocaine. Under fluoroscopic visualization, a 22-gauge, 3.5 inch Tuohy epidural needle was slowly advanced towards the epidural space a few millimeters right of midline. The latter part of the needle advancement was guided with fluoroscopy in the lateral view. The epidural space was identified using loss of resistance technique. After negative aspiration for heme and cerebrospinal fluid, a total of 1 mL of Omnipaque was injected to confirm needle placement. 9 mL of contrast was wasted. Epidurogram confirmed spread within the posterior epidural space. 2 ml of 10mg/ml of dexamethasone and 1 ml of preservative free 0.25% bupivacaine was injected. The needle was removed.  Images were saved to PACS.    The patient tolerated the procedure well, and there was no evidence of procedural complications. No new sensory or motor deficits were noted following the procedure. The patient was stable and able to ambulate on discharge home.  Post-procedure instructions were provided.     Pre-procedure pain score: 9/10 in the neck, 5/10 in the arm  Post-procedure pain score: 5/10 in the neck, 5/10 in the arm    Assessment/Plan: Truman Suero is a 59 year old male s/p cervical interlaminar epidural steroid injection today for cervical spondylosis and radiculitis/radiculopathy.     1. Following today's procedure, the patient was advised to contact the [facility] for any of the following:   Fever, chills, or night sweats   New onset of pain, numbness, or weakness   Any questions/concerns regarding the procedure  If unable to contact the Pain Center, the patient was instructed to go to a local Emergency Room for any complications.   2. The patient will receive a follow-up call in 1 week.   3. Follow-up with referring provider in 2 weeks for post-procedure evaluation.    Hugo Corrigan, [facility]

## 2019-12-02 ENCOUNTER — MYC MEDICAL ADVICE (OUTPATIENT)
Dept: PALLIATIVE MEDICINE | Facility: CLINIC | Age: 59
End: 2019-12-02

## 2019-12-02 ENCOUNTER — TELEPHONE (OUTPATIENT)
Dept: PALLIATIVE MEDICINE | Facility: CLINIC | Age: 59
End: 2019-12-02

## 2019-12-02 NOTE — TELEPHONE ENCOUNTER
Central Prior Authorization Team   Phone: [phone]      PA Initiation    Medication: traMADol (ULTRAM-ER) 200 MG 24 hr tablet - INITIATED  Insurance Company: [employer] Phone [phone] Fax [phone]  Pharmacy Filling the Rx: [facility] [address]  Filling Pharmacy Phone: [phone]  Filling Pharmacy Fax: [phone]  Start Date: 12/2/2019

## 2019-12-02 NOTE — TELEPHONE ENCOUNTER
Central Prior Authorization Team   Phone: [phone]      PA started; waiting for insurance to respond with clinical questions.

## 2019-12-02 NOTE — TELEPHONE ENCOUNTER
Prior Authorization Retail Medication Request    Medication/Dose: traMADol (ULTRAM-ER) 200 MG 24 hr tablet  ICD code (if different than what is on RX):    Previously Tried and Failed:    Rationale:      Insurance Name:  "[employer]   Insurance ID:  [id number]       Pharmacy Information (if different than what is on RX)    [facility] [address] [place] [id number] [address] [address]  Phone: [phone] Fax: [phone]    Resendiz: [id number]    Will route to the PA Pool.      Ekaterina LUCERO [facility]  [place]

## 2019-12-03 NOTE — TELEPHONE ENCOUNTER
Central Prior Authorization Team   Phone: [phone]      Prior Authorization Approval    Authorization Effective Date: 12/2/2019  Authorization Expiration Date: 12/2/2020  Medication: traMADol (ULTRAM-ER) 200 MG 24 hr tablet - APPROVED  Approved Dose/Quantity: 30 FOR 30  Reference #:     Insurance Company: [employer] - Phone [phone] Fax [phone]  Expected CoPay:       CoPay Card Available:      Foundation Assistance Needed:    Which Pharmacy is filling the prescription (Not needed for infusion/clinic administered): [facility] [address] [place] [address]  Pharmacy Notified: Yes  Patient Notified: Yes (**Instructed pharmacy to notify patient when script is ready to /ship.**)

## 2019-12-05 NOTE — TELEPHONE ENCOUNTER
Xavier Chakraborty-    I did review the FCE findings. They did a great job of simulating your work experiences, it sounds like. I feel that it has given me much insight in to how to recommend future restrictions, since we do not do so without a FCE which is objective, more life-like experiences.     We can discuss this more in person at your upcoming 12/17/2019 appointment.     See you then.  Take Care!  Melanie STEVENS, RN CNP, FNP  [facility]    I have sent the above OROSt message to the patient.     Melanie STEVENS RN CNP, FNP  [facility]

## 2019-12-09 ENCOUNTER — OFFICE VISIT (OUTPATIENT)
Dept: FAMILY MEDICINE | Facility: CLINIC | Age: 59
End: 2019-12-09
Payer: COMMERCIAL

## 2019-12-09 ENCOUNTER — TELEPHONE (OUTPATIENT)
Dept: FAMILY MEDICINE | Facility: CLINIC | Age: 59
End: 2019-12-09

## 2019-12-09 VITALS
TEMPERATURE: 97.9 F | DIASTOLIC BLOOD PRESSURE: 90 MMHG | SYSTOLIC BLOOD PRESSURE: 130 MMHG | OXYGEN SATURATION: 96 % | BODY MASS INDEX: 25.23 KG/M2 | WEIGHT: 157 LBS | HEART RATE: 79 BPM | HEIGHT: 66 IN

## 2019-12-09 DIAGNOSIS — M50.30 DDD (DEGENERATIVE DISC DISEASE), CERVICAL: ICD-10-CM

## 2019-12-09 DIAGNOSIS — I77.810 ASCENDING AORTA DILATATION (H): ICD-10-CM

## 2019-12-09 DIAGNOSIS — I73.9 PAD (PERIPHERAL ARTERY DISEASE) (H): ICD-10-CM

## 2019-12-09 DIAGNOSIS — I25.10 MILD CAD: Primary | ICD-10-CM

## 2019-12-09 DIAGNOSIS — M54.2 CHRONIC NECK PAIN: ICD-10-CM

## 2019-12-09 DIAGNOSIS — Z00.00 ROUTINE GENERAL MEDICAL EXAMINATION AT A HEALTH CARE FACILITY: ICD-10-CM

## 2019-12-09 DIAGNOSIS — G89.29 CHRONIC NECK PAIN: ICD-10-CM

## 2019-12-09 DIAGNOSIS — Z87.891 HISTORY OF SMOKING 30 OR MORE PACK YEARS: ICD-10-CM

## 2019-12-09 DIAGNOSIS — I10 BENIGN ESSENTIAL HYPERTENSION: ICD-10-CM

## 2019-12-09 DIAGNOSIS — G44.209 TENSION-TYPE HEADACHE, NOT INTRACTABLE, UNSPECIFIED CHRONICITY PATTERN: ICD-10-CM

## 2019-12-09 PROCEDURE — 99214 OFFICE O/P EST MOD 30 MIN: CPT | Mod: 25 | Performed by: FAMILY MEDICINE

## 2019-12-09 PROCEDURE — 99396 PREV VISIT EST AGE 40-64: CPT | Performed by: FAMILY MEDICINE

## 2019-12-09 RX ORDER — METOPROLOL SUCCINATE 25 MG/1
25 TABLET, EXTENDED RELEASE ORAL DAILY
Qty: 90 TABLET | Refills: 3 | Status: SHIPPED | OUTPATIENT
Start: 2019-12-09 | End: 2020-12-29

## 2019-12-09 RX ORDER — ATORVASTATIN CALCIUM 20 MG/1
20 TABLET, FILM COATED ORAL DAILY
Qty: 90 TABLET | Refills: 0 | Status: SHIPPED | OUTPATIENT
Start: 2019-12-09 | End: 2019-12-09

## 2019-12-09 RX ORDER — ATORVASTATIN CALCIUM 10 MG/1
10 TABLET, FILM COATED ORAL DAILY
Qty: 90 TABLET | Refills: 0 | Status: SHIPPED | OUTPATIENT
Start: 2019-12-09 | End: 2020-03-12

## 2019-12-09 ASSESSMENT — ENCOUNTER SYMPTOMS
PARESTHESIAS: 0
ARTHRALGIAS: 1
ABDOMINAL PAIN: 0
HEARTBURN: 0
CONSTIPATION: 0
HEMATURIA: 0
COUGH: 0
CHILLS: 0
DIARRHEA: 0
MYALGIAS: 1
NAUSEA: 0
SHORTNESS OF BREATH: 0
DIZZINESS: 0
PALPITATIONS: 0
FREQUENCY: 0
WEAKNESS: 1
JOINT SWELLING: 0
HEADACHES: 1
HEMATOCHEZIA: 0
DYSURIA: 0
SORE THROAT: 0
NERVOUS/ANXIOUS: 0
EYE PAIN: 0
FEVER: 0

## 2019-12-09 ASSESSMENT — MIFFLIN-ST. JEOR: SCORE: 1469.9

## 2019-12-09 NOTE — TELEPHONE ENCOUNTER
Reason for Call:  Other     Detailed comments: please call pharmacy to discuss atorvastatin (LIPITOR) they said that they got two prescriptions with different MG     Phone Number Patient can be reached at: Other phone number:  [phone]    Best Time: any    Can we leave a detailed message on this number? YES    Call taken on 12/9/2019 at 2:21 PM by Steph Brar

## 2019-12-09 NOTE — PATIENT INSTRUCTIONS
The 10-year ASCVD risk score (Jordyn LAY Jr., et al., 2013) is: 7.4%    Values used to calculate the score:      Age: 59 years      Sex: Male      Is Non- : No      Diabetic: No      Tobacco smoker: No      Systolic Blood Pressure: 130 mmHg      Is BP treated: Yes      HDL Cholesterol: 55 mg/dL      Total Cholesterol: 165 mg/dL    Advise Chest CT, I have placed a referral on your behave. You can get this done any time this year.     I would like us to consider going on baby asprin    I would like to start you on statin 10mg and propanolol     I would like placed an order for a blood pressure cuff, please check with insurance to see if they cover it.     Please schedule a follow-up appointment in 8 weeks to get your FASTING blood work done in the near future. Please make sure you fast anywhere between 8-12 hours prior to your scheduled appointment      Preventive Health Recommendations  Male Ages 50 - 64    Yearly exam:             See your health care provider every year in order to  o   Review health changes.   o   Discuss preventive care.    o   Review your medicines if your doctor has prescribed any.     Have a cholesterol test every 5 years, or more frequently if you are at risk for high cholesterol/heart disease.     Have a diabetes test (fasting glucose) every three years. If you are at risk for diabetes, you should have this test more often.     Have a colonoscopy at age 50, or have a yearly FIT test (stool test). These exams will check for colon cancer.      Talk with your health care provider about whether or not a prostate cancer screening test (PSA) is right for you.    You should be tested each year for STDs (sexually transmitted diseases), if you re at risk.     Shots: Get a flu shot each year. Get a tetanus shot every 10 years.     Nutrition:    Eat at least 5 servings of fruits and vegetables daily.     Eat whole-grain bread, whole-wheat pasta and brown rice instead of white  grains and rice.     Get adequate Calcium and Vitamin D.     Lifestyle    Exercise for at least 150 minutes a week (30 minutes a day, 5 days a week). This will help you control your weight and prevent disease.     Limit alcohol to one drink per day.     No smoking.     Wear sunscreen to prevent skin cancer.     See your dentist every six months for an exam and cleaning.     See your eye doctor every 1 to 2 years.

## 2019-12-09 NOTE — PROGRESS NOTES
SUBJECTIVE:   CC: Truman Suero is an 59 year old male who presents for preventative health visit.     Healthy Habits:     Getting at least 3 servings of Calcium per day:  NO    Bi-annual eye exam:  NO    Dental care twice a year:  Yes    Sleep apnea or symptoms of sleep apnea:  None    Diet:  Regular (no restrictions)    Frequency of exercise:  2-3 days/week    Duration of exercise:  15-30 minutes    Taking medications regularly:  Yes    Medication side effects:  None    PHQ-2 Total Score: 2    Additional concerns today:  No      Pain follow-up    Select anterior cervical discectomy and fusion C5/6, right with Dr. Harkins on 10/29/18)  Sees pain clinic about every 6 weeks for steroid injections in the spin.   His boss turned surgery into workers compensation and they paid for it. The surgery went well, but they saw  a alot of degeneration.  Denies any Periferal artery lesion. He does not think his blood pressure is due to pain because it has constantly been this high for the last previous visits. Saw Functional Capacity Evaluation (FCA) post surgery. Discuss starting a low dose blood pressure medication and statin today. Wellbutrin, gabapentin and Topamax have been helpful for pain and in reducing tobacco craving.     No signs of bloody sputum or weight loss  Discuss use of baby Asprin today    Hypertension follow-up-   High blood pressure averages have been a constant for a couple years. Providers were initally unable to do surgery because his blood pressure was a little above 160 systolic. He returned the next day and  Does not have a monitor. Gets it checked at the pharmacy or with his pain Doctor. Works at [employer] as in [place].  BP Readings from Last 6 Encounters:   12/09/19 (!) 130/90   11/29/19 (!) 144/90   10/22/19 (!) 149/97   10/03/19 (!) 160/103   08/16/19 137/86   06/27/19 (!) 171/98     History tobacco abuse  Quit March 2018  Chest CT for lung cancer screening  History of aortic dilation and mild  arthrosclerosis   Denies shortness of breath and other breathing issues, pulse oximeter: 96/99   Appetite has improved since quitting   Has not seen an eye specialist in a while, but he does admit to needing glasses.     The 10-year ASCVD risk score (Jordyn LAY Jr., et al., 2013) is: 7.4%    Values used to calculate the score:      Age: 59 years      Sex: Male      Is Non- : No      Diabetic: No      Tobacco smoker: No      Systolic Blood Pressure: 130 mmHg      Is BP treated: Yes      HDL Cholesterol: 55 mg/dL      Total Cholesterol: 165 mg/dL    Health maintenance   He does have an advance directive  Colon cancer screening, done 2017, follow-up in 10 years  Prostate cancer screening, done 2018, normal results PSA was 1.7 ng/mL    Today's PHQ-2 Score:   PHQ-2 (  [employer]) 2019   Q1: Little interest or pleasure in doing things 1   Q2: Feeling down, depressed or hopeless 1   PHQ-2 Score 2   Q1: Little interest or pleasure in doing things Several days   Q2: Feeling down, depressed or hopeless Several days   PHQ-2 Score 2       Abuse: Current or Past(Physical, Sexual or Emotional)- No  Do you feel safe in your environment? Yes    Have you ever done Advance Care Planning? (For example, a Health Directive, POLST, or a discussion with a medical provider or your loved ones about your wishes): No, advance care planning information given to patient to review.  Patient declined advance care planning discussion at this time.    Social History     Tobacco Use     Smoking status: Former Smoker     Packs/day: 1.00     Years: 30.00     Pack years: 30.00     Types: Cigarettes     Last attempt to quit: 3/20/2018     Years since quittin.7     Smokeless tobacco: Never Used   Substance Use Topics     Alcohol use: No     Alcohol/week: 0.0 standard drinks     Comment: Quit drinking 10 years ago     If you drink alcohol do you typically have >3 drinks per day or >7 drinks per week? Not applicable    Alcohol  Use 12/9/2019   Prescreen: >3 drinks/day or >7 drinks/week? Not Applicable   Prescreen: >3 drinks/day or >7 drinks/week? -   No flowsheet data found.    Last PSA:   PSA   Date Value Ref Range Status   04/30/2018 1.07 0 - 4 ug/L Final     Comment:     Assay Method:  Chemiluminescence using Siemens Vista analyzer       Reviewed orders with patient. Reviewed health maintenance and updated orders accordingly - Yes  Labs reviewed in EPIC  Current Outpatient Medications   Medication Sig Dispense Refill     buPROPion (WELLBUTRIN SR) 150 MG 12 hr tablet Take 1 tablet (150 mg) by mouth 2 times daily 60 tablet 0     cyclobenzaprine (FLEXERIL) 5 MG tablet Take 1-2 tablets (5-10 mg) by mouth nightly as needed for muscle spasms Need 6 hours between this and methocarbamol 45 tablet 2     fluticasone (FLONASE) 50 MCG/ACT nasal spray Spray 2 sprays into both nostrils 2 times daily Needs appointment for further refills (Patient taking differently: Spray 2 sprays into both nostrils 2 times daily as needed Needs appointment for further refills) 32 mL 11     gabapentin (NEURONTIN) 600 MG tablet Take 1.5 tablets (900 mg) by mouth 3 times daily 135 tablet 3     methocarbamol (ROBAXIN) 500 MG tablet Take 1 tablet (500 mg) by mouth 3 times daily as needed for muscle spasms Should be 6 hours between this and cyclobenzaprine at night 90 tablet 2     topiramate (TOPAMAX) 50 MG tablet Take 50mg (1tablet) every morning and 100mg (2 tablets) every evening.  Drink plenty of water and no alcohol. 90 tablet 2     traMADol (ULTRAM) 50 MG tablet Take 1 tablet (50 mg) by mouth every 6 hours as needed for severe pain Max 3/day. Fill 11/26/19; start 11/28/19 90 tablet 0     traMADol (ULTRAM-ER) 200 MG 24 hr tablet Take 1 tablet (200 mg) by mouth every 24 hours fill 11/27/19 to start 11/28/2019 30 tablet 0     metoprolol succinate (TOPROL-XL) 25 MG 24 hr tablet Take 1 tablet (25 mg) by mouth daily (Patient not taking: Reported on 10/22/2019) 30 tablet 1        Reviewed and updated as needed this visit by clinical staff  Tobacco  Allergies  Meds  Med Hx  Surg Hx  Fam Hx  Soc Hx        Reviewed and updated as needed this visit by Provider        Past Medical History:   Diagnosis Date     Ascending aorta dilatation (H) 2/13/2018     Cervical spondylosis without myelopathy 10/3/2017     Chronic bilateral low back pain with right-sided sciatica 5/16/2018     Hypertension      Mild CAD 2/13/2018     Neck pain     MRI positive on cervical vertebrea.     PAD (peripheral artery disease) (H) 2/27/2018     Tobacco abuse 8/16/2017      Past Surgical History:   Procedure Laterality Date     COLONOSCOPY       DISCECTOMY, FUSION CERVICAL ANTERIOR ONE LEVEL, COMBINED Right 10/29/2018    Procedure: Right Anterior Cervical 5-6 Discectomy And Fusion;  Surgeon: Jud Harkins MD;  Location: [facility] OR       Review of Systems   Constitutional: Negative for chills and fever.   HENT: Negative for congestion, ear pain, hearing loss and sore throat.    Eyes: Negative for pain and visual disturbance.   Respiratory: Negative for cough and shortness of breath.    Cardiovascular: Positive for chest pain. Negative for palpitations and peripheral edema.   Gastrointestinal: Negative for abdominal pain, constipation, diarrhea, heartburn, hematochezia and nausea.   Genitourinary: Negative for discharge, dysuria, frequency, genital sores, hematuria, impotence and urgency.   Musculoskeletal: Positive for arthralgias and myalgias. Negative for joint swelling.   Skin: Negative for rash.   Neurological: Positive for weakness and headaches. Negative for dizziness and paresthesias.   Psychiatric/Behavioral: Negative for mood changes. The patient is not nervous/anxious.      This document serves as a record of the services and decisions personally performed and made by Humberto Trinidad DO. It was created on her behalf by Rufus Verde, a trained medical scribe. The creation of this document is  "based on the provider's statements to the medical scribe.  Rufus Verde 1:16 PM December 9, 2019    OBJECTIVE:   BP (!) 130/90 (Patient Position: Sitting, Cuff Size: Adult Regular)   Pulse 79   Temp 97.9  F (36.6  C) (Oral)   Ht 1.676 m (5' 6\")   Wt 71.2 kg (157 lb)   SpO2 96%   BMI 25.34 kg/m      Physical Exam  GENERAL: healthy, alert and no distress  EYES: Eyes grossly normal to inspection, PERRL and conjunctivae and sclerae normal  HENT: ear canals and TM's normal, nose and mouth without ulcers or lesions  NECK: no adenopathy, no asymmetry, masses, or scars and thyroid normal to palpation  RESP: lungs clear to auscultation - no rales, rhonchi or wheezes  CV: regular rate and rhythm, normal S1 S2, no S3 or S4, no murmur, click or rub, no peripheral edema and peripheral pulses strong  ABDOMEN: soft, nontender, no hepatosplenomegaly, no masses and bowel sounds normal  MS: no gross musculoskeletal defects noted, no edema  SKIN: Simple skin tag on the lower lid of the left eye. no suspicious lesions or rashes  NEURO: Normal strength and tone, mentation intact and speech normal  PSYCH: mentation appears normal, affect normal/bright    Diagnostic Test Results:  Labs reviewed in Epic  No results found for this or any previous visit (from the past 24 hour(s)).    ASSESSMENT/PLAN:   1. Routine general medical examination at a health care facility  Today's routine screening physical exam showed normal findings.     2. Mild CAD  Ongoing. Found on a CT with ascending aortic dilation cardiology initially recommended putting him on toprol, but patient stopped due to needing refill. Advised strongly restarting blood pressure medication. Add Asprin and low dose statin for secondary prevention.   - metoprolol succinate ER (TOPROL-XL) 25 MG 24 hr tablet; Take 1 tablet (25 mg) by mouth daily  Dispense: 90 tablet; Refill: 3  - aspirin (ASA) 81 MG EC tablet; Take 1 tablet (81 mg) by mouth daily  Dispense: 90 tablet; Refill: 0  - " atorvastatin (LIPITOR) 10 MG tablet; Take 1 tablet (10 mg) by mouth daily  Dispense: 90 tablet; Refill: 0  - CT Chest Lung Cancer Scrn Low Dose wo; Future    3. Ascending aorta dilatation (H)  See above.  - metoprolol succinate ER (TOPROL-XL) 25 MG 24 hr tablet; Take 1 tablet (25 mg) by mouth daily  Dispense: 90 tablet; Refill: 3  - order for DME; BP cuff, brand as covered by insurance.  Dx: HTN  Dispense: 1 each; Refill: 0  - CT Chest Lung Cancer Scrn Low Dose wo; Future    4. PAD (peripheral artery disease) (H)  See above. Start Lipitor as advised.   - atorvastatin (LIPITOR) 10 MG tablet; Take 1 tablet (10 mg) by mouth daily  Dispense: 90 tablet; Refill: 0  - CT Chest Lung Cancer Scrn Low Dose wo; Future    5. DDD (degenerative disc disease), cervical  Had anterior cervical discectomy and fusion C5/6 right with Dr. Harkins on 10/29/18. S/P surgery, patient is doing well, but reports some pain and discomfort. Follows-up with pain clinic every 6 months for steroid injections, continue as discussed.    6. Chronic neck pain  As above. Follow-up with pain clinic for steroid injections.    7. Tension-type headache, not intractable, unspecified chronicity pattern  Ongoing. Resolving with injection of spine. Closely monitor symptoms and let me know if worsening or more frequent.     8. Benign essential hypertension  Ongoing. Patient reports that his blood pressure has consistently averaged in the 140 systolic range for the past couple of months. He only checks blood pressure at clinics of pharmacy. Recommended he get a home blood pressure cuff and track blood pressure at home. Check with insurance for coverage. Let me know if blood pressure constantly average above 130/80 after starting new medications and implementing the lifestyle changes discussed.  Follow-up in 8 weeks for blood pressure recheck.   - order for DME; BP cuff, brand as covered by insurance.  Dx: HTN  Dispense: 1 each; Refill: 0    9. History of smoking 30  "or more pack years  Quit March 2018. Recommend Lung CT rescreening. Follow-up with radiology in near future.  - CT Chest Lung Cancer Scrn Low Dose wo; Future    I have discussed any lab or imaging results, the patient's diagnosis, and my plan of treatment with the patient and/or family. Patient is aware to come back in if with worsening symptoms or if no relief despite treatment plan.  Patient voiced understanding and had no further questions.       COUNSELING:   Reviewed preventive health counseling, as reflected in patient instructions  Special attention given to:        Consider AAA screening for ages 65-75 and smoking history       Regular exercise       Healthy diet/nutrition       Vision screening       Hearing screening       Aspirin Prophylaxsis       Alcohol Use       Colon cancer screening       Prostate cancer screening       Consider lung cancer screening for ages 55-80 years and 30 pack-year smoking history      Estimated body mass index is 25.34 kg/m  as calculated from the following:    Height as of this encounter: 1.676 m (5' 6\").    Weight as of this encounter: 71.2 kg (157 lb).     Weight management plan: Discussed healthy diet and exercise guidelines     reports that he quit smoking about 20 months ago. His smoking use included cigarettes. He has a 30.00 pack-year smoking history. He has never used smokeless tobacco.      Counseling Resources:  ATP IV Guidelines  Pooled Cohorts Equation Calculator  FRAX Risk Assessment  ICSI Preventive Guidelines  Dietary Guidelines for Americans, 2010  USDA's MyPlate  ASA Prophylaxis  Lung CA Screening    The information in this document, created by the medical scribe for me, accurately reflects the services I personally performed and the decisions made by me. I have reviewed and approved this document for accuracy prior to leaving the patient care area.  December 9, 2019 1:28 PM    Humberto Trinidad DO  [facility]"

## 2019-12-12 ENCOUNTER — ANCILLARY PROCEDURE (OUTPATIENT)
Dept: CT IMAGING | Facility: CLINIC | Age: 59
End: 2019-12-12
Attending: FAMILY MEDICINE
Payer: COMMERCIAL

## 2019-12-12 DIAGNOSIS — Z87.891 HISTORY OF SMOKING 30 OR MORE PACK YEARS: ICD-10-CM

## 2019-12-12 DIAGNOSIS — I25.10 MILD CAD: ICD-10-CM

## 2019-12-12 DIAGNOSIS — I73.9 PAD (PERIPHERAL ARTERY DISEASE) (H): ICD-10-CM

## 2019-12-12 DIAGNOSIS — I77.810 ASCENDING AORTA DILATATION (H): ICD-10-CM

## 2019-12-12 PROCEDURE — G0297 LDCT FOR LUNG CA SCREEN: HCPCS | Mod: TC

## 2019-12-17 ENCOUNTER — OFFICE VISIT (OUTPATIENT)
Dept: PALLIATIVE MEDICINE | Facility: CLINIC | Age: 59
End: 2019-12-17
Payer: OTHER MISCELLANEOUS

## 2019-12-17 VITALS
HEART RATE: 55 BPM | WEIGHT: 157 LBS | SYSTOLIC BLOOD PRESSURE: 166 MMHG | BODY MASS INDEX: 25.34 KG/M2 | DIASTOLIC BLOOD PRESSURE: 95 MMHG

## 2019-12-17 DIAGNOSIS — G89.29 CHRONIC NECK PAIN: ICD-10-CM

## 2019-12-17 DIAGNOSIS — M79.18 MYOFASCIAL PAIN: ICD-10-CM

## 2019-12-17 DIAGNOSIS — M54.2 CHRONIC NECK PAIN: ICD-10-CM

## 2019-12-17 DIAGNOSIS — M54.59 LUMBAR FACET JOINT PAIN: ICD-10-CM

## 2019-12-17 DIAGNOSIS — M25.521 RIGHT ELBOW PAIN: Primary | ICD-10-CM

## 2019-12-17 DIAGNOSIS — M48.02 CERVICAL STENOSIS OF SPINAL CANAL: ICD-10-CM

## 2019-12-17 DIAGNOSIS — M54.50 CHRONIC BILATERAL LOW BACK PAIN WITHOUT SCIATICA: ICD-10-CM

## 2019-12-17 DIAGNOSIS — M47.812 CERVICAL SPONDYLOSIS WITHOUT MYELOPATHY: ICD-10-CM

## 2019-12-17 DIAGNOSIS — G89.29 CHRONIC BILATERAL LOW BACK PAIN WITHOUT SCIATICA: ICD-10-CM

## 2019-12-17 DIAGNOSIS — M47.816 SPONDYLOSIS OF LUMBAR REGION WITHOUT MYELOPATHY OR RADICULOPATHY: ICD-10-CM

## 2019-12-17 DIAGNOSIS — Z98.1 S/P CERVICAL SPINAL FUSION: ICD-10-CM

## 2019-12-17 DIAGNOSIS — M50.30 DDD (DEGENERATIVE DISC DISEASE), CERVICAL: ICD-10-CM

## 2019-12-17 PROCEDURE — 99214 OFFICE O/P EST MOD 30 MIN: CPT | Performed by: NURSE PRACTITIONER

## 2019-12-17 PROCEDURE — 99207 ZZC CDG-MDM COMPONENT: MEETS LOW - DOWN CODED: CPT | Performed by: NURSE PRACTITIONER

## 2019-12-17 RX ORDER — TRAMADOL HYDROCHLORIDE 200 MG/1
200 TABLET, EXTENDED RELEASE ORAL EVERY 24 HOURS
Qty: 30 TABLET | Refills: 0 | Status: SHIPPED | OUTPATIENT
Start: 2019-12-17 | End: 2020-01-30

## 2019-12-17 RX ORDER — TRAMADOL HYDROCHLORIDE 50 MG/1
50 TABLET ORAL EVERY 6 HOURS PRN
Qty: 90 TABLET | Refills: 0 | Status: SHIPPED | OUTPATIENT
Start: 2019-12-17 | End: 2020-01-30

## 2019-12-17 RX ORDER — TOPIRAMATE 50 MG/1
TABLET, FILM COATED ORAL
Qty: 90 TABLET | Refills: 2 | Status: SHIPPED | OUTPATIENT
Start: 2019-12-17 | End: 2020-04-14

## 2019-12-17 ASSESSMENT — PAIN SCALES - GENERAL: PAINLEVEL: SEVERE PAIN (7)

## 2019-12-17 NOTE — LETTER
2019        To Whom It May Concern,      This is in regard to Truman Suero  1960 and his work restrictions.   At this time, there are no changes to his restrictions, keep at 4 hours per day.     I will review and will update with future restrictions in the next 30 days.       Respectfully,        Melanie STEVENS, RN CNP, FNP  [facility]  [facility] [place]

## 2019-12-17 NOTE — PROGRESS NOTES
[facility]    12/17/2019       Chief complaint: neck and low back pain    Interval history:  Truman Suero is a 59 year old male is known to me for   Chronic neck pain  Cervical DDD  Cervical spondylosis (pain worse with extension/rotation indicating facetogenic component to pain)  Axial low back pain  Myofascial pain/muscle spasms  Remote history of ETOH overuse, attended AA for awhile. Sober for 10 years  ---PMHx includes: neck pain  ---PSHx includes: none listed      Recommendations/plan at the last visit on 10/22/2019 included:  1. Physical Therapy: not at present  2. Clinical Health Psychologist: none  3. Diagnostic Studies:  None  4. Medication Management:    1. Continue tramadol ER 200mg once daily  2. Continue tramadol 50mg Q 6-8 hours PRN, max of 3/day.   3. Continue gabapentin 900 mg TID   4. Continue Topamax 50 mg BID   5. Continue methocarbamol 500-1000 mg TID PRN muscle spasms   6. Start Aspercreme with 4% Lidocaine PRN  5. Further procedures recommended:   1. Patient to schedule repeat right cervical epidural steroid injection, office to contact patient.  2. Pt to consider follow-up with Dr. Tejada for further evaluation for SCS, handout provided.  6. Recommendations to PCP: see above  7. Follow up: 8-12 weeks    ++Palmer Suero is here today with ++    Since his last visit, Truman Suero reports:  -he took a functional capacity test.  -Discussed possible work restrictions with the patient and his  such as limiting cervical flexion, changing positions frequently, and stretching. The patient is optimistic about making these changes, his company has been very flexible. He is working half days. However, he does think that he might eventually change jobs because of chronic pain.  -Prior to surgery his job was more physically demanding. Now he's doing more prep work.  -Painful symptoms are located over the bilateral wrists, right elbow, posterior neck, and low  "back. His wrist pain is related to carpal tunnel.      At this point, the patient's participation with our multidisciplinary team includes:  The patient has been compliant with the program.  PT - attended non-pain management PHYSICAL THERAPY   Health Psych - has seen Kentrell Castañeda x4  Acupuncture: worked with Dr. Bereket LAY      Pain scores:    Pain intensity on average is 7  on a scale of 0-10.    Range is 4-10 /10.   Pain right now is 7/10.  Pain is described as \"aching, numb, unbearable, burning, shooting, throbbing, penetrating, stabbing, gnawing, miserable, and nagging.\"    Current pain relevant medications:     -Tramadol 200mg ER in the evening (helpful)  -Tramadol 50mg take 1 tablet  Q 6 hours PRN (using 2-3 tabs per day, helpful)  -ibuprofen 600mg PRN (helpful)  -gabapentin 900mg TID (somewhat helpful)  -methocarbamol 500-1000mg TID PRN muscle spasms (takes 1000 mg BID, somewhat helpful)  -Topamax 50 mg BID (helpful)    Other pertinent medications:  None    Previous Medications:  OPIATES: Tramdol (somewhat helpful)  NSAIDS: ibuprofen (helpful), Aleve (helpful)  MUSCLE RELAXANTS: none  ANTI-MIGRAINE MEDS: none  ANTI-DEPRESSANTS: none  SLEEP AIDS: none  ANTI-CONVULSANTS: gabapentin (helpful)  TOPICALS: lidocaine ointment (somewhat helpful)  Other meds: Tylenol (not helpful)        Other treatments have included:  Truman Suero has not been seen at a pain clinic in the past.    PT: tried, somewhat helpful  Chiropractic: helpful  Acupuncture: none  TENs Unit: none     Injections:    cervical radiofrequency nerve ablation at [facility] in December 2016 (did get good relief, got 70% relief of his typical neck pain)  -6/29/2017 Cervical facet joint steroid injections at C4-5 and C5-6 on the right with Dr. Nicole Harding (not helpful)  -3/8/2018 C7-T1 interlaminar DEMARCUS with Dr. Hugo Corrigan (not helpful)  -5/23/2018 right L4-5 transforaminal epidural steroid injection with Dr. Osorio (not helpful for back " pain but did help leg pain)  -8/7/2018 bilateral SI joint injection with Dr Hugo Corrigan (helpful for one month)  -10/11/2018 l3-4 and L4-5 facet joint injections bilaterally with Dr. Hugo Corrigan (this has been helpful, more helpful than any other lumbar injections thus far)  -1/28/2019 bilateral L3-5 medial branch blocks #1 with Dr. Osorio (somewhat helpful)   -2/25/2019 LMBB #2 with Dr. Osorio (somewhat effective, but not enough to proceed to RFA)  -5/6/2019 bilateral L4/L5 and L5/S1 facet joint injections with Dr. Osorio (helpful)  -11/29/2019 C7-T1 interlaminar epidural steroid injection with Dr. Corrigan (helpful temporarily)    Surgeries:  10/29/2018 right anterior cervical C5-6 discectomy and fusion by Dr. Jud Harkins (somewhat helpful)      UDS last completed on 3/5/2019  CSA last signed on 3/14/2019 with Melanie STEVENS, RN CNP, FNP  [facility]     THE 4 A's OF OPIOID MAINTENANCE ANALGESIA    Analgesia: long acting Tramadol with some short acting tramadol does give some relief    Activity: ADLs    Adverse effects: none    Adherence to Rx protocol: yes      Side Effects: none  Patient is using the medication as prescribed: yes    Medications:  Current Outpatient Medications   Medication Sig Dispense Refill     aspirin (ASA) 81 MG EC tablet Take 1 tablet (81 mg) by mouth daily 90 tablet 0     atorvastatin (LIPITOR) 10 MG tablet Take 1 tablet (10 mg) by mouth daily 90 tablet 0     buPROPion (WELLBUTRIN SR) 150 MG 12 hr tablet Take 1 tablet (150 mg) by mouth 2 times daily 60 tablet 0     cyclobenzaprine (FLEXERIL) 5 MG tablet Take 1-2 tablets (5-10 mg) by mouth nightly as needed for muscle spasms Need 6 hours between this and methocarbamol 45 tablet 2     fluticasone (FLONASE) 50 MCG/ACT nasal spray Spray 2 sprays into both nostrils 2 times daily Needs appointment for further refills (Patient taking differently: Spray 2 sprays into both nostrils 2 times daily as needed  Needs appointment for further refills) 32 mL 11     gabapentin (NEURONTIN) 600 MG tablet Take 1.5 tablets (900 mg) by mouth 3 times daily 135 tablet 3     methocarbamol (ROBAXIN) 500 MG tablet Take 1 tablet (500 mg) by mouth 3 times daily as needed for muscle spasms Should be 6 hours between this and cyclobenzaprine at night 90 tablet 2     metoprolol succinate ER (TOPROL-XL) 25 MG 24 hr tablet Take 1 tablet (25 mg) by mouth daily 90 tablet 3     topiramate (TOPAMAX) 50 MG tablet Take 50mg (1tablet) every morning and 100mg (2 tablets) every evening.  Drink plenty of water and no alcohol. 90 tablet 2     traMADol (ULTRAM) 50 MG tablet Take 1 tablet (50 mg) by mouth every 6 hours as needed for severe pain Max 3/day. Fill 11/26/19; start 11/28/19 90 tablet 0     traMADol (ULTRAM-ER) 200 MG 24 hr tablet Take 1 tablet (200 mg) by mouth every 24 hours fill 11/27/19 to start 11/28/2019 30 tablet 0     order for DME BP cuff, brand as covered by insurance.  Dx: HTN 1 each 0       Medical History: any changes in medical history since they were last seen? none    Social History:   Home situation: , has 4 kids, 2 at home, one in college and one launched.   Occupation/Schooling:  full time in [employer]  Tobacco use: former smoker, quit on 3/20/2018  Alcohol use: sober for nearly 10 years. He used to work a sobriety program, used to go to   Drug use: none  History of chemical dependency treatment: no formal treatment, did     Is patient a current smoker or tobacco user?  QUIT on 3/20/2018  If yes, was cessation counseling offered?  no        Review of Systems:  ROS is positive as per HPI as well as for HTN, joint pain, stiffness, back pain, neck pain, weakness, numbness/tingling, anxiety, stress, and is negative for fevers, sweats, or constipation, diarrhea.    This document serves as a record of the services and decisions personally performed and made by Melanie STEVENS. It was created on her behalf by  Moncho Tariq, a trained medical scribe. The creation of this document is based on the provider's statements to the medical scribe.  Moncho Tariq 2:34 PM December 17, 2019          Physical Exam:     Vital signs: BP (!) 166/95   Pulse 55   Wt 71.2 kg (157 lb)   BMI 25.34 kg/m       Behavioral observations:  Awake, alert, cooperative    Gait:  normal    Musculoskeletal exam:    Moves well in exam room  Strength grossly equal throughout  Good lumbar flexion  Painful lumbar extension   Painful lumbar extension and rotation to the right and to the left    Neuro exam:  SILT in all extremities     Skin/vascular/autonomic:  No suspicious lesions on exposed skin.      Other:  NA    Is the patient hypertensive today? Yes  Hypertensive on recheck of BP?   Yes  If yes, was patient recommended to see Primary Care Provider in follow up for management of HTN?  Yes          IMAGING:    MR CERVICAL SPINE WITHOUT CONTRAST 9/5/2017 2:32 PM      HISTORY: Neck pain, worsening numbness in arms and lower extremities.  Radiculopathy, cervical region.     TECHNIQUE: Multiplanar multisequence images were obtained through the  cervical spine without contrast.     COMPARISON: 2/22/2017.     FINDINGS: Sagittal images demonstrate some minimal gentle cervical  kyphosis, otherwise normal posterior alignment. There is no evidence  for craniovertebral or cervicomedullary junction abnormality. The  cervical cord is minimally indented anteriorly at C4-C5 and C5-C6,  otherwise is normal in morphology and signal characteristics. Disc  space narrowing and discogenic marrow changes are present C3-C4,  C4-C5, C5-C6 and C6-C7.     C2-C3: Minimal facet hypertrophy. No stenosis.     C3-C4: Broad-based posterior osteophyte formation is present causing  some mild bilateral neural foraminal stenosis, but no central canal  stenosis.     C4-C5: Broad-based posterior osteophyte formation and mild facet  hypertrophy is present causing some mild to moderate  central canal  stenosis, mild cord deformity, and mild-to-moderate bilateral neural  foraminal stenosis.     C5-C6: Broad-based posterior osteophyte formation and disc bulging is  present along with some facet hypertrophy. There is moderate central  canal stenosis and moderate bilateral neural foraminal stenosis.  Findings have progressed since the prior exam.     C6-C7: Broad-based disc bulging is present along with some minimal  posterior osteophyte formation. There is borderline to mild central  canal stenosis, but no neural foraminal stenosis.     C7-T1: Moderate facet hypertrophy. No significant stenosis.         IMPRESSION: Moderate multilevel degenerative disc and facet disease  with some progression at C5-C6 where there is moderate central canal  and bilateral neural foraminal stenosis along with cord deformity.          MR CERVICAL SPINE WITHOUT CONTRAST  2/22/2017 9:59 AM     HISTORY:  Bilateral neck and arm pain for three years.     COMPARISON: None.     TECHNIQUE: Routine MR cervical spine extended through T2.     FINDINGS: There is some degenerative bone marrow signal change C3-C6.  No malignant or destructive lesions. Normal alignment through T2.  Reversed cervical adenosis C3-C6.     The cervical and upper thoracic spinal cord appear intrinsically  normal. The craniocervical junction region is normal. No paraspinous  soft tissue abnormality.     Findings by level as follows:     C2-C3: Negative. No disc protrusion. No central or lateral stenosis.     C3-C4: Mild disc space narrowing. Mild diffuse annular bulge. Small  uncinate spur on the right. No significant central or left-sided  stenosis. Mild right-sided foraminal stenosis.     C4-C5: Moderate degenerative narrowing of the interspace. Mild ventral  disc osteophyte complex with minimal central stenosis. Small uncinate  spurs bilaterally with mild bilateral foraminal stenosis.     C6-C7: Moderate disc space narrowing. Mild broad-based disc  osteophyte  complex with minimal central stenosis. Minimal uncinate spurs with  mild bilateral foraminal stenosis.     C6-C7: Moderate disc space narrowing. Mild ventral disc osteophyte  complex. No significant central or lateral stenosis.     C7-T1: No disc protrusion. No central or lateral stenosis. Moderate  bilateral facet joint disease.         IMPRESSION:  1. Degenerative changes as described C3-C4 through C7-T1. There is  only mild central and foraminal stenosis as described.  2. No intrinsic spinal cord abnormality through T2    [place] Prescription Monitoring Program:  Reviewed [facility] 12/17/2019, no concerning scripts      DIRE Score for selecting candidates for long term opioid analgesia for chronic pain:  Diagnosis  2  Intractablility  2  Risk    Psychological health  2    Chemical health  2    Reliability  2    Social support  3  Efficacy  2    Total DIRE Score = 15. Note that   7-13 predicts poor outcome (compliance and efficacy) from opioid prescribing; 14-21 predicts good outcome (compliance and efficacy)  from opioid prescribing.      Assessment:   1. Right elbow pain  2. Lumbar facet joint pain  3. Lumbar spondylosis without myelopathy or radiculopathy  4. Chronic low back pain without sciatica  5. Chronic neck pain   6. Cervical DDD  7. Cervical spondylosis without myelopathy  8. Myofacial pain  9. S/p cervical spinal fusion  10. Cervical stenosis of spinal canal  11. Remote history of ETOH overuse, attended AA for awhile. Sober for >10 years  12. PMHx includes: neck pain  13. PSHx includes: none listed        Plan:   1. Physical Therapy: not at present  2. Clinical Health Psychologist: none  3. Diagnostic Studies:  None  4. Medication Management:    1. Continue tramadol ER 200mg once daily  2. Continue tramadol 50mg Q 6-8 hours PRN, max of 3/day.   3. Continue gabapentin 900 mg TID   4. Continue Topamax 50 mg BID   5. Continue methocarbamol 500-1000 mg TID PRN muscle spasms   5. Further  procedures recommended: none at present  6. I provided the patient with a referral to see [facility] re: right elbow pain.  7. Palmer to follow up with Primary Care provider regarding elevated blood pressure.  8. I provided the patient and his  with a letter re: work restrictions.   9. Recommendations to PCP: see above  10. Follow up: 8-12 weeks      Total time spent face to face was 40 minutes and more than 50% of face to face time was spent in counseling and/or coordination of care regarding the diagnosis and recommendations above.    The information in this document, created by the medical scribe for me, accurately reflects the services I personally performed and the decisions made by me. I have reviewed and approved this document for accuracy prior to leaving the patient care area.  December 17, 2019     Melanie STEVENS, TIMBO CNP, FNP  [facility]  [facility] [place]

## 2019-12-17 NOTE — PATIENT INSTRUCTIONS
PLAN  1. Medications:   1. Continue Tramadol ER 200mg daily.  2. Continue Tramadol 50mg every 6-8 hours as needed, max of 3 tabs/day.  3. Continue Gabapentin 900mg three times daily.  4. Continue Topamax 50mg twice daily.  5. Continue Methocarbamol 500-1,000mg three times daily as needed.  2. Procedures: None at present  3. Follow-up with  Sports Medicine re: right elbow pain.  4. Palmer to follow up with Primary Care provider regarding elevated blood pressure.  5. Follow-up with me in 8-12 weeks        ----------------------------------------------------------------  Clinic Number:  [phone]     Call with any questions about your care and for scheduling assistance.     Calls are returned Monday through Friday between 8 AM and 4:30 PM. We usually get back to you within 2 business days depending on the issue/request.    If we are prescribing your medications:    For opioid medication refills, call the clinic or send a [employer] message 7 days in advance.  Please include:    Name of requested medication    Name of the pharmacy.    For non-opioid medications, call your pharmacy directly to request a refill. Please allow 3-4 days to be processed.     Per [place] State Law:    All controlled substance prescriptions must be filled within 30 days of being written.      For those controlled substances allowing refills, pickup must occur within 30 days of last fill.      We believe regular attendance is key to your success in our program!      Any time you are unable to keep your appointment we ask that you call us at least 24 hours in advance to cancel.This will allow us to offer the appointment time to another patient.     Multiple missed appointments may lead to dismissal from the clinic.

## 2019-12-27 ENCOUNTER — OFFICE VISIT (OUTPATIENT)
Dept: ORTHOPEDICS | Facility: CLINIC | Age: 59
End: 2019-12-27
Payer: COMMERCIAL

## 2019-12-27 ENCOUNTER — ANCILLARY PROCEDURE (OUTPATIENT)
Dept: GENERAL RADIOLOGY | Facility: CLINIC | Age: 59
End: 2019-12-27
Attending: FAMILY MEDICINE
Payer: COMMERCIAL

## 2019-12-27 VITALS — SYSTOLIC BLOOD PRESSURE: 126 MMHG | DIASTOLIC BLOOD PRESSURE: 78 MMHG

## 2019-12-27 DIAGNOSIS — G56.21 CUBITAL TUNNEL SYNDROME ON RIGHT: ICD-10-CM

## 2019-12-27 DIAGNOSIS — M25.521 RIGHT ELBOW PAIN: Primary | ICD-10-CM

## 2019-12-27 DIAGNOSIS — M77.11 LATERAL EPICONDYLITIS OF RIGHT ELBOW: ICD-10-CM

## 2019-12-27 DIAGNOSIS — M25.521 RIGHT ELBOW PAIN: ICD-10-CM

## 2019-12-27 DIAGNOSIS — M77.01 MEDIAL EPICONDYLITIS OF RIGHT ELBOW: ICD-10-CM

## 2019-12-27 PROCEDURE — 99214 OFFICE O/P EST MOD 30 MIN: CPT | Performed by: FAMILY MEDICINE

## 2019-12-27 PROCEDURE — 73080 X-RAY EXAM OF ELBOW: CPT | Mod: RT

## 2019-12-27 NOTE — LETTER
12/27/2019         RE: Truman Suero  [address] [address]        Dear Colleague,    Thank you for referring your patient, Truman Suero, to the [facility] [place]. Please see a copy of my visit note below.    ASSESSMENT & PLAN  Truman was seen today for pain.    Diagnoses and all orders for this visit:    Right elbow pain  -     XR Elbow RT G/E 3 vw; Future    Lateral epicondylitis of right elbow    Medial epicondylitis of right elbow    Cubital tunnel syndrome on right      Patient is a 59 year old male presenting for evaluation of   Chief Complaint   Patient presents with     Right Elbow - Pain      # Right Elbow Pain: Notable TTP over medial and lateral epicondyles and pos cubital tunnel Tinel's over the past 5-6 mon.  Pt likely has combined Tennis/Golfer's elbow and cubital tunnel syndrome.  Counseled patient on nature of condition and treatment options.  Given this plan as below, follow-up as needed.    Treatment: Activities as tolerated, night brace for cubital tunnel syndrome  Physical Therapy HEP given, if not improved can refer for formal PT  Injection defer for now can consider steroid injection if pain limiting sleep or work  Medications  Limited NSAIDs/Tylenol    Concerning signs/sx that would warrant urgent evaluation were discussed.  All questions were answered, patient understands and agrees with plan.      Return if symptoms worsen or fail to improve.    -----    SUBJECTIVE  Truman Suero is a/an 59 year old Left handed male who is seen in consultation at the request of  Melanie Thomas C.N.P. for evaluation of right elbow pain. The patient is seen by themselves.    Onset: 5-6 month(s) ago. Reports insidious onset without acute precipitating event.Repetitive motions   Location of Pain: right elbow   Rating of Pain at worst: 9/10  Rating of Pain Currently: 5/10  Worsened by: repetitive motions, sleeping, morning pain, working   Better  with: heat   Treatments tried: heat  Associated symptoms: numbness and tingling known carpal tunnel   Orthopedic history: YES - fractured both arms in high school   Relevant surgical history: Cervical fusion   Social History: Works as    Past Medical History:   Diagnosis Date     Ascending aorta dilatation (H) 2018     Cervical spondylosis without myelopathy 10/3/2017     Chronic bilateral low back pain with right-sided sciatica 2018     Hypertension      Mild CAD 2018     Neck pain     MRI positive on cervical vertebrea.     PAD (peripheral artery disease) (H) 2018     Tobacco abuse 2017     Social History     Socioeconomic History     Marital status:      Spouse name: Not on file     Number of children: Not on file     Years of education: Not on file     Highest education level: Not on file   Occupational History     Occupation:    Social Needs     Financial resource strain: Not on file     Food insecurity:     Worry: Not on file     Inability: Not on file     Transportation needs:     Medical: Not on file     Non-medical: Not on file   Tobacco Use     Smoking status: Former Smoker     Packs/day: 1.00     Years: 30.00     Pack years: 30.00     Types: Cigarettes     Last attempt to quit: 3/20/2018     Years since quittin.7     Smokeless tobacco: Never Used   Substance and Sexual Activity     Alcohol use: No     Alcohol/week: 0.0 standard drinks     Comment: Quit drinking 10 years ago     Drug use: No     Sexual activity: Yes     Partners: Female     Birth control/protection: None   Lifestyle     Physical activity:     Days per week: Not on file     Minutes per session: Not on file     Stress: Not on file   Relationships     Social connections:     Talks on phone: Not on file     Gets together: Not on file     Attends Taoist service: Not on file     Active member of club or organization: Not on file     Attends meetings of clubs or organizations: Not on file      Relationship status: Not on file     Intimate partner violence:     Fear of current or ex partner: Not on file     Emotionally abused: Not on file     Physically abused: Not on file     Forced sexual activity: Not on file   Other Topics Concern     Parent/sibling w/ CABG, MI or angioplasty before 65F 55M? Not Asked   Social History Narrative     Not on file       Patient's past medical, surgical, social, and family histories were reviewed today and no changes are noted.  No family history pertinent to the patient's problem today      REVIEW OF SYSTEMS:  10 point ROS is negative other than symptoms noted above in HPI, Past Medical History or as stated below  Constitutional: NEGATIVE for fever, chills, change in weight  Skin: NEGATIVE for worrisome rashes, moles or lesions  GI/: NEGATIVE for bowel or bladder changes  Neuro: NEGATIVE for weakness, dizziness or paresthesias    OBJECTIVE:  /78    General: healthy, alert and in no distress  HEENT: no scleral icterus or conjunctival erythema  Skin: no suspicious lesions or rash. No jaundice.  CV: regular rhythm by palpation  Resp: normal respiratory effort without conversational dyspnea   Psych: normal mood and affect  Gait: normal steady gait with appropriate coordination and balance  Neuro: Normal sensory exam of bilateral hands.   MSK:  RIGHT ELBOW  Inspection:    No swelling, bruising, discoloration, or obvious deformity or asymmetry  Palpation:    Tender about the lateral epicondyle, medial epicondyle. Remainder of bony, ligamentous and tendinous landmarks are nontender.    Crepitus is Absent  Range of Motion:     Extension full / flexion full / pronation full / supination full  Strength:    Flexion full extension full pronation limited slightly by pain supination limited slightly by pain  Special Tests:    Positive: Pain with resisted wrist extension, pain with resisted middle finger extension, pain with resisted wrist flexion, pain with resisted supination,  pain with resisted pronation, cubital tunnel (ulnar Tinel's)    Negative: passive valgus stress, milking manuever, varus stress    Independent visualization of the below image:  No results found for this or any previous visit (from the past 24 hour(s)).  XR ELBOW RT G/E 3 VW 12/27/2019 2:46 PM      HISTORY: Right elbow pain                                                                      IMPRESSION: Negative exam.     DARRYL COLEMAN MD    I  ordered, visualized and reviewed these images with the patient  No fracture or arthritis    Patient's conditions were thoroughly discussed during today's visit with greater than 50% of the visit spent counseling the patient with total time spent face-to-face with the patient being 30 minutes.    Raymond Thomas MD, [facility]        Again, thank you for allowing me to participate in the care of your patient.        Sincerely,        Raymond Thomas MD

## 2019-12-27 NOTE — PROGRESS NOTES
ASSESSMENT & PLAN  Truman was seen today for pain.    Diagnoses and all orders for this visit:    Right elbow pain  -     XR Elbow RT G/E 3 vw; Future    Lateral epicondylitis of right elbow    Medial epicondylitis of right elbow    Cubital tunnel syndrome on right      Patient is a 59 year old male presenting for evaluation of   Chief Complaint   Patient presents with     Right Elbow - Pain      # Right Elbow Pain: Notable TTP over medial and lateral epicondyles and pos cubital tunnel Tinel's over the past 5-6 mon.  Pt likely has combined Tennis/Golfer's elbow and cubital tunnel syndrome.  Counseled patient on nature of condition and treatment options.  Given this plan as below, follow-up as needed.    Treatment: Activities as tolerated, night brace for cubital tunnel syndrome  Physical Therapy HEP given, if not improved can refer for formal PT  Injection defer for now can consider steroid injection if pain limiting sleep or work  Medications  Limited NSAIDs/Tylenol    Concerning signs/sx that would warrant urgent evaluation were discussed.  All questions were answered, patient understands and agrees with plan.      Return if symptoms worsen or fail to improve.    -----    SUBJECTIVE  Truman Suero is a/an 59 year old Left handed male who is seen in consultation at the request of  Melanie Thomas C.N.P. for evaluation of right elbow pain. The patient is seen by themselves.    Onset: 5-6 month(s) ago. Reports insidious onset without acute precipitating event.Repetitive motions   Location of Pain: right elbow   Rating of Pain at worst: 9/10  Rating of Pain Currently: 5/10  Worsened by: repetitive motions, sleeping, morning pain, working   Better with: heat   Treatments tried: heat  Associated symptoms: numbness and tingling known carpal tunnel   Orthopedic history: YES - fractured both arms in high school   Relevant surgical history: Cervical fusion   Social History: Works as    Past Medical History:    Diagnosis Date     Ascending aorta dilatation (H) 2018     Cervical spondylosis without myelopathy 10/3/2017     Chronic bilateral low back pain with right-sided sciatica 2018     Hypertension      Mild CAD 2018     Neck pain     MRI positive on cervical vertebrea.     PAD (peripheral artery disease) (H) 2018     Tobacco abuse 2017     Social History     Socioeconomic History     Marital status:      Spouse name: Not on file     Number of children: Not on file     Years of education: Not on file     Highest education level: Not on file   Occupational History     Occupation: [employer]   Social Needs     Financial resource strain: Not on file     Food insecurity:     Worry: Not on file     Inability: Not on file     Transportation needs:     Medical: Not on file     Non-medical: Not on file   Tobacco Use     Smoking status: Former Smoker     Packs/day: 1.00     Years: 30.00     Pack years: 30.00     Types: Cigarettes     Last attempt to quit: 3/20/2018     Years since quittin.7     Smokeless tobacco: Never Used   Substance and Sexual Activity     Alcohol use: No     Alcohol/week: 0.0 standard drinks     Comment: Quit drinking 10 years ago     Drug use: No     Sexual activity: Yes     Partners: Female     Birth control/protection: None   Lifestyle     Physical activity:     Days per week: Not on file     Minutes per session: Not on file     Stress: Not on file   Relationships     Social connections:     Talks on phone: Not on file     Gets together: Not on file     Attends Scientologist service: Not on file     Active member of club or organization: Not on file     Attends meetings of clubs or organizations: Not on file     Relationship status: Not on file     Intimate partner violence:     Fear of current or ex partner: Not on file     Emotionally abused: Not on file     Physically abused: Not on file     Forced sexual activity: Not on file   Other Topics Concern     Parent/sibling w/  CABG, MI or angioplasty before 65F 55M? Not Asked   Social History Narrative     Not on file       Patient's past medical, surgical, social, and family histories were reviewed today and no changes are noted.  No family history pertinent to the patient's problem today      REVIEW OF SYSTEMS:  10 point ROS is negative other than symptoms noted above in HPI, Past Medical History or as stated below  Constitutional: NEGATIVE for fever, chills, change in weight  Skin: NEGATIVE for worrisome rashes, moles or lesions  GI/: NEGATIVE for bowel or bladder changes  Neuro: NEGATIVE for weakness, dizziness or paresthesias    OBJECTIVE:  /78    General: healthy, alert and in no distress  HEENT: no scleral icterus or conjunctival erythema  Skin: no suspicious lesions or rash. No jaundice.  CV: regular rhythm by palpation  Resp: normal respiratory effort without conversational dyspnea   Psych: normal mood and affect  Gait: normal steady gait with appropriate coordination and balance  Neuro: Normal sensory exam of bilateral hands.   MSK:  RIGHT ELBOW  Inspection:    No swelling, bruising, discoloration, or obvious deformity or asymmetry  Palpation:    Tender about the lateral epicondyle, medial epicondyle. Remainder of bony, ligamentous and tendinous landmarks are nontender.    Crepitus is Absent  Range of Motion:     Extension full / flexion full / pronation full / supination full  Strength:    Flexion full extension full pronation limited slightly by pain supination limited slightly by pain  Special Tests:    Positive: Pain with resisted wrist extension, pain with resisted middle finger extension, pain with resisted wrist flexion, pain with resisted supination, pain with resisted pronation, cubital tunnel (ulnar Tinel's)    Negative: passive valgus stress, milking manuever, varus stress    Independent visualization of the below image:  No results found for this or any previous visit (from the past 24 hour(s)).  XR ELBOW RT  G/E 3 VW 12/27/2019 2:46 PM      HISTORY: Right elbow pain                                                                      IMPRESSION: Negative exam.     DARRYL COLEMAN MD    I  ordered, visualized and reviewed these images with the patient  No fracture or arthritis    Patient's conditions were thoroughly discussed during today's visit with greater than 50% of the visit spent counseling the patient with total time spent face-to-face with the patient being 30 minutes.    Raymond Thomas MD, [facility]

## 2020-01-14 ENCOUNTER — TELEPHONE (OUTPATIENT)
Dept: PALLIATIVE MEDICINE | Facility: CLINIC | Age: 60
End: 2020-01-14

## 2020-01-14 NOTE — TELEPHONE ENCOUNTER
Received fax from [employer]. Asking for updated work restrictions. Placed in Melanie Thomas's basket, [place].

## 2020-01-20 NOTE — TELEPHONE ENCOUNTER
Faxed forms to [employer]. RightFax confirmed. Placed forms into area to be scan to Epic.     Mike Bergeron MA

## 2020-01-22 ENCOUNTER — MYC REFILL (OUTPATIENT)
Dept: PALLIATIVE MEDICINE | Facility: CLINIC | Age: 60
End: 2020-01-22

## 2020-01-22 ENCOUNTER — MYC REFILL (OUTPATIENT)
Dept: FAMILY MEDICINE | Facility: CLINIC | Age: 60
End: 2020-01-22

## 2020-01-22 DIAGNOSIS — M54.50 CHRONIC BILATERAL LOW BACK PAIN WITHOUT SCIATICA: ICD-10-CM

## 2020-01-22 DIAGNOSIS — G89.29 CHRONIC BILATERAL LOW BACK PAIN WITHOUT SCIATICA: ICD-10-CM

## 2020-01-22 DIAGNOSIS — F43.22 ADJUSTMENT DISORDER WITH ANXIOUS MOOD: ICD-10-CM

## 2020-01-22 DIAGNOSIS — M50.30 DDD (DEGENERATIVE DISC DISEASE), CERVICAL: ICD-10-CM

## 2020-01-22 DIAGNOSIS — G89.29 CHRONIC NECK PAIN: ICD-10-CM

## 2020-01-22 DIAGNOSIS — M47.812 CERVICAL SPONDYLOSIS WITHOUT MYELOPATHY: ICD-10-CM

## 2020-01-22 DIAGNOSIS — Z72.0 TOBACCO ABUSE: ICD-10-CM

## 2020-01-22 DIAGNOSIS — M54.2 CHRONIC NECK PAIN: ICD-10-CM

## 2020-01-22 DIAGNOSIS — M79.18 MYOFASCIAL PAIN: ICD-10-CM

## 2020-01-22 RX ORDER — METHOCARBAMOL 500 MG/1
500 TABLET, FILM COATED ORAL 3 TIMES DAILY PRN
Qty: 90 TABLET | Refills: 2 | Status: CANCELLED | OUTPATIENT
Start: 2020-01-22

## 2020-01-22 RX ORDER — METHOCARBAMOL 500 MG/1
500 TABLET, FILM COATED ORAL 3 TIMES DAILY PRN
Qty: 90 TABLET | Refills: 2 | Status: SHIPPED | OUTPATIENT
Start: 2020-01-22 | End: 2020-07-21

## 2020-01-22 NOTE — TELEPHONE ENCOUNTER
"Requested Prescriptions   Pending Prescriptions Disp Refills     buPROPion (WELLBUTRIN SR) 150 MG 12 hr tablet  Last Written Prescription Date:  11/22/2019  Last Fill Quantity: 60 tablet,  # refills: 0   Last office visit: 12/9/2019 with prescribing provider:  CHAD Trinidad   Future Office Visit:   Next 5 appointments (look out 90 days)    Feb 28, 2020  3:00 PM CST  Return Visit with RODNEY Vargas CNP  [facility]) [address] [facility] [address]  [phone]        60 tablet 0     Sig: Take 1 tablet (150 mg) by mouth 2 times daily       SSRIs Protocol Passed - 1/22/2020  1:35 PM  PHQ-9 SCORE 3/28/2017 12/19/2017   PHQ-9 Total Score 13 2     DONOVAN-7 SCORE 12/19/2017   Total Score 5             Passed - Recent (12 mo) or future (30 days) visit within the authorizing provider's specialty     Patient has had an office visit with the authorizing provider or a provider within the authorizing providers department within the previous 12 mos or has a future within next 30 days. See \"Patient Info\" tab in inbasket, or \"Choose Columns\" in Meds & Orders section of the refill encounter.              Passed - Medication is Bupropion     If the medication is Bupropion (Wellbutrin), and the patient is taking for smoking cessation; OK to refill.          Passed - Medication is active on med list        Passed - Patient is age 18 or older        "

## 2020-01-22 NOTE — TELEPHONE ENCOUNTER
Received  request from patient requesting refill(s) for methocarbamol (ROBAXIN) 500 MG tablet    Last refilled on 07/25/19    Pt last seen on 12/17/19  Next appt scheduled for 02/28/20    Will facilitate refill.

## 2020-01-23 NOTE — TELEPHONE ENCOUNTER
Signed Prescriptions:                        Disp   Refills    methocarbamol (ROBAXIN) 500 MG tablet      90 tab*2        Sig: Take 1 tablet (500 mg) by mouth 3 times daily as           needed for muscle spasms Should be 6 hours           between this and cyclobenzaprine at night  Authorizing Provider: CANDACE BENSON, RN CNP, FNP  [facility]  [facility] [place] [facility]

## 2020-01-24 RX ORDER — BUPROPION HYDROCHLORIDE 150 MG/1
150 TABLET, EXTENDED RELEASE ORAL 2 TIMES DAILY
Qty: 60 TABLET | Refills: 0 | Status: SHIPPED | OUTPATIENT
Start: 2020-01-24 | End: 2020-03-12

## 2020-01-24 NOTE — TELEPHONE ENCOUNTER
Prescription approved per [facility] Refill Protocol.    Dalia Flynn, RN, BSN, PHN  [facility] [place]

## 2020-01-30 ENCOUNTER — MYC REFILL (OUTPATIENT)
Dept: PALLIATIVE MEDICINE | Facility: CLINIC | Age: 60
End: 2020-01-30

## 2020-01-30 DIAGNOSIS — M54.2 CHRONIC NECK PAIN: ICD-10-CM

## 2020-01-30 DIAGNOSIS — M54.50 CHRONIC BILATERAL LOW BACK PAIN WITHOUT SCIATICA: ICD-10-CM

## 2020-01-30 DIAGNOSIS — M47.812 CERVICAL SPONDYLOSIS WITHOUT MYELOPATHY: ICD-10-CM

## 2020-01-30 DIAGNOSIS — G89.29 CHRONIC BILATERAL LOW BACK PAIN WITHOUT SCIATICA: ICD-10-CM

## 2020-01-30 DIAGNOSIS — M50.30 DDD (DEGENERATIVE DISC DISEASE), CERVICAL: ICD-10-CM

## 2020-01-30 DIAGNOSIS — M54.59 LUMBAR FACET JOINT PAIN: ICD-10-CM

## 2020-01-30 DIAGNOSIS — M79.18 MYOFASCIAL PAIN: ICD-10-CM

## 2020-01-30 DIAGNOSIS — Z98.1 S/P CERVICAL SPINAL FUSION: ICD-10-CM

## 2020-01-30 DIAGNOSIS — M47.816 SPONDYLOSIS OF LUMBAR REGION WITHOUT MYELOPATHY OR RADICULOPATHY: ICD-10-CM

## 2020-01-30 DIAGNOSIS — G89.29 CHRONIC NECK PAIN: ICD-10-CM

## 2020-01-30 RX ORDER — TRAMADOL HYDROCHLORIDE 50 MG/1
50 TABLET ORAL EVERY 6 HOURS PRN
Qty: 90 TABLET | Refills: 0 | Status: CANCELLED | OUTPATIENT
Start: 2020-01-30

## 2020-01-30 RX ORDER — TRAMADOL HYDROCHLORIDE 200 MG/1
200 TABLET, EXTENDED RELEASE ORAL EVERY 24 HOURS
Qty: 30 TABLET | Refills: 0 | Status: CANCELLED | OUTPATIENT
Start: 2020-01-30

## 2020-01-30 NOTE — TELEPHONE ENCOUNTER
Medication refill information reviewed.     Last due for both:  Fill 12/26/19; start 12/28/19     Due date:    Tramadol ER:  1/31/20  Tramadol 50mg:  anytime    Weaning instructions:  N/A    Prescriptions prepped for review.     Estelita Wetzel, RN-BSN  [facility]

## 2020-01-30 NOTE — TELEPHONE ENCOUNTER
Received call from patient requesting refill(s) of traMADol (ULTRAM) 50 MG tablet and traMADol (ULTRAM-ER) 200 MG 24 hr tablet (patient requested tramadol ER under another MyChart encounter.  Put both into one encounter and closed the other)    Last dispensed from pharmacy on  12/26/19-50 mg       01/01/20-200mg    Pt last seen by prescribing provider on 12/17/19  Next appt scheduled for 02/28/20     checked in the past 6 months? Yes If no, print current report and give to RN    Last urine drug screen date 03/05/19  Current opioid agreement on file (completed within the last year) Yes Date of opioid agreement: 03/05/19    Processing (pick one and delete the others):      E-prescribe to  [facility] [address] [place] [address]     Will route to nursing [place] for review and preparation of prescription(s).

## 2020-01-31 RX ORDER — TRAMADOL HYDROCHLORIDE 50 MG/1
50 TABLET ORAL EVERY 6 HOURS PRN
Qty: 90 TABLET | Refills: 0 | Status: SHIPPED | OUTPATIENT
Start: 2020-01-31 | End: 2020-02-28

## 2020-01-31 RX ORDER — TRAMADOL HYDROCHLORIDE 200 MG/1
200 TABLET, EXTENDED RELEASE ORAL EVERY 24 HOURS
Qty: 30 TABLET | Refills: 0 | Status: SHIPPED | OUTPATIENT
Start: 2020-01-31 | End: 2020-02-28

## 2020-01-31 NOTE — TELEPHONE ENCOUNTER
Signed Prescriptions:                        Disp   Refills    traMADol (ULTRAM) 50 MG tablet             90 tab*0        Sig: Take 1 tablet (50 mg) by mouth every 6 hours as           needed for severe pain Max 3/day. Fill now, start           now.  Authorizing Provider: CANDACE BENSON    traMADol (ULTRAM-ER) 200 MG 24 hr tablet   30 tab*0        Sig: Take 1 tablet (200 mg) by mouth every 24 hours fill            1/30/20. Start 1/31/20  Authorizing Provider: CANADCE BENSON, RN CNP, FNP  [facility]  [facility]

## 2020-02-10 DIAGNOSIS — M50.30 DDD (DEGENERATIVE DISC DISEASE), CERVICAL: ICD-10-CM

## 2020-02-10 DIAGNOSIS — M48.02 CERVICAL STENOSIS OF SPINAL CANAL: ICD-10-CM

## 2020-02-10 DIAGNOSIS — M47.812 CERVICAL SPONDYLOSIS WITHOUT MYELOPATHY: ICD-10-CM

## 2020-02-10 DIAGNOSIS — M54.2 CHRONIC NECK AND BACK PAIN: ICD-10-CM

## 2020-02-10 DIAGNOSIS — M54.9 CHRONIC NECK AND BACK PAIN: ICD-10-CM

## 2020-02-10 DIAGNOSIS — G89.29 CHRONIC NECK AND BACK PAIN: ICD-10-CM

## 2020-02-10 NOTE — TELEPHONE ENCOUNTER
Received fax request from   [facility] - [place], [place] - [address]  [address] [place] [id number]  Phone: [phone] Fax: [phone]     pharmacy requesting refill(s) for gabapentin (NEURONTIN) 600 MG tablet    Last refilled on 01/13/20    Pt last seen on 12/17/19    Next appointment scheduled on 02/28/20    Will facilitate refill.    Ekaterina Hatch CMA  [facility]  [place]

## 2020-02-11 RX ORDER — GABAPENTIN 600 MG/1
900 TABLET ORAL 3 TIMES DAILY
Qty: 135 TABLET | Refills: 3 | Status: SHIPPED | OUTPATIENT
Start: 2020-02-11 | End: 2020-05-15

## 2020-02-11 NOTE — TELEPHONE ENCOUNTER
Signed Prescriptions:                        Disp   Refills    gabapentin (NEURONTIN) 600 MG tablet       135 ta*3        Sig: Take 1.5 tablets (900 mg) by mouth 3 times daily  Authorizing Provider: CANDACE BENSON, RN CNP, FNP  [facility]

## 2020-02-25 ENCOUNTER — OFFICE VISIT (OUTPATIENT)
Dept: ORTHOPEDICS | Facility: CLINIC | Age: 60
End: 2020-02-25
Payer: COMMERCIAL

## 2020-02-25 ENCOUNTER — MYC MEDICAL ADVICE (OUTPATIENT)
Dept: ORTHOPEDICS | Facility: CLINIC | Age: 60
End: 2020-02-25

## 2020-02-25 VITALS — BODY MASS INDEX: 25.34 KG/M2 | HEIGHT: 66 IN | DIASTOLIC BLOOD PRESSURE: 91 MMHG | SYSTOLIC BLOOD PRESSURE: 135 MMHG

## 2020-02-25 DIAGNOSIS — G56.21 CUBITAL TUNNEL SYNDROME ON RIGHT: Primary | ICD-10-CM

## 2020-02-25 PROCEDURE — 99213 OFFICE O/P EST LOW 20 MIN: CPT | Performed by: FAMILY MEDICINE

## 2020-02-25 NOTE — LETTER
2/25/2020         RE: Truman Suero  [address] [address]        Dear Colleague,    Thank you for referring your patient, Truman Suero, to the [facility] [place]. Please see a copy of my visit note below.    ASSESSMENT & PLAN  Truman was seen today for pain.    Diagnoses and all orders for this visit:    Cubital tunnel syndrome on right  -     [facility]; Future      Patient is a 59 year old male presenting for evaluation of   Chief Complaint   Patient presents with     Right Elbow - Pain      # Right Cubital Tunnel Syndrome: Sx of medial/lateral epicondylitis improved with HEP since last visit with N/T in 4-5th digits worsening despite night splint.  Pt has subluxing ulnar nerve with cubital tunnel syndrome likely the cause.  Given this pt can work as tolerated, plan to refer to hand therapy for manual tx and possible functional brace.  Plan to refer to ortho hand if not improved with this.  Pt understands and agrees with plan.      Treatment: Activities as tolerated, night brace for cubital tunnel syndrome  Physical Therapy Hand therapy for cubital tunnel syndrome  Injection None  Medications  Limited NSAIDs/Tylenol    Concerning signs/sx that would warrant urgent evaluation were discussed.  All questions were answered, patient understands and agrees with plan.      Return if symptoms worsen or fail to improve.    -----    SUBJECTIVE  Truman Suero is a/an 59 year old Left handed male who is seen in consultation at the request of  Melanie Thomas C.N.P. for evaluation of right elbow pain. The patient is seen by themselves.    Onset: 5-6 month(s) ago. Reports insidious onset without acute precipitating event.Repetitive motions   Location of Pain: right elbow   Rating of Pain at worst: 9/10  Rating of Pain Currently: 5/10  Worsened by: repetitive motions, sleeping, morning pain, working   Better with: heat   Treatments  tried: heat  Associated symptoms: numbness and tingling known carpal tunnel   Orthopedic history: YES - fractured both arms in high school   Relevant surgical history: Cervical fusion   Social History: Works as [employer]     Interim History - 2020  Since last visit on 2019 patient has worsening pain in elbow.  Pain is now radiating to hand.  He does have numbness and tingling with known carpal tunnel   No interim injury.   Pt has pain N/T on 4/5th digits.  Pt works at [employer].     Past Medical History:   Diagnosis Date     Ascending aorta dilatation (H) 2018     Cervical spondylosis without myelopathy 10/3/2017     Chronic bilateral low back pain with right-sided sciatica 2018     Hypertension      Mild CAD 2018     Neck pain     MRI positive on cervical vertebrea.     PAD (peripheral artery disease) (H) 2018     Tobacco abuse 2017     Social History     Socioeconomic History     Marital status:      Spouse name: Not on file     Number of children: Not on file     Years of education: Not on file     Highest education level: Not on file   Occupational History     Occupation:    Social Needs     Financial resource strain: Not on file     Food insecurity:     Worry: Not on file     Inability: Not on file     Transportation needs:     Medical: Not on file     Non-medical: Not on file   Tobacco Use     Smoking status: Former Smoker     Packs/day: 1.00     Years: 30.00     Pack years: 30.00     Types: Cigarettes     Last attempt to quit: 3/20/2018     Years since quittin.7     Smokeless tobacco: Never Used   Substance and Sexual Activity     Alcohol use: No     Alcohol/week: 0.0 standard drinks     Comment: Quit drinking 10 years ago     Drug use: No     Sexual activity: Yes     Partners: Female     Birth control/protection: None   Lifestyle     Physical activity:     Days per week: Not on file     Minutes per session: Not on file     Stress: Not on file  "  Relationships     Social connections:     Talks on phone: Not on file     Gets together: Not on file     Attends Scientology service: Not on file     Active member of club or organization: Not on file     Attends meetings of clubs or organizations: Not on file     Relationship status: Not on file     Intimate partner violence:     Fear of current or ex partner: Not on file     Emotionally abused: Not on file     Physically abused: Not on file     Forced sexual activity: Not on file   Other Topics Concern     Parent/sibling w/ CABG, MI or angioplasty before 65F 55M? Not Asked   Social History Narrative     Not on file       Patient's past medical, surgical, social, and family histories were reviewed today and no changes are noted.  No family history pertinent to the patient's problem today      REVIEW OF SYSTEMS:  10 point ROS is negative other than symptoms noted above in HPI, Past Medical History or as stated below  Constitutional: NEGATIVE for fever, chills, change in weight  Skin: NEGATIVE for worrisome rashes, moles or lesions  GI/: NEGATIVE for bowel or bladder changes  Neuro: NEGATIVE for weakness, dizziness or paresthesias    OBJECTIVE:  BP (!) 135/91   Ht 1.676 m (5' 6\")   BMI 25.34 kg/m      General: healthy, alert and in no distress  HEENT: no scleral icterus or conjunctival erythema  Skin: no suspicious lesions or rash. No jaundice.  CV: regular rhythm by palpation  Resp: normal respiratory effort without conversational dyspnea   Psych: normal mood and affect  Gait: normal steady gait with appropriate coordination and balance  Neuro: Normal sensory exam of bilateral hands.   MSK:  RIGHT ELBOW  Inspection:    No swelling, bruising, discoloration, or obvious deformity or asymmetry  Palpation:    Mild TTP about the lateral epicondyle, medial epicondyle. Remainder of bony, ligamentous and tendinous landmarks are nontender.    Crepitus is Absent  Range of Motion:     Extension full / flexion full / " pronation full / supination full  Strength:    Flexion full extension full pronation limited slightly by pain supination limited slightly by pain  Special Tests:    Positive: Subluxing ulnar nerve, cubital tunnel (ulnar Tinel's), mild pain with resisted pronation    Negative: passive valgus stress, milking manuever, varus stress, no pain with resisted wrist flex/ext/supination    Independent visualization of the below image:  No results found for this or any previous visit (from the past 24 hour(s)).  XR ELBOW RT G/E 3 VW 12/27/2019 2:46 PM      HISTORY: Right elbow pain                                                                      IMPRESSION: Negative exam.     DARRYL COLEMAN MD    I  ordered, visualized and reviewed these images with the patient  No fracture or arthritis     Raymond Thomas MD, [facility]      Again, thank you for allowing me to participate in the care of your patient.        Sincerely,        Raymond Thomas MD

## 2020-02-25 NOTE — PROGRESS NOTES
ASSESSMENT & PLAN  Truman was seen today for pain.    Diagnoses and all orders for this visit:    Cubital tunnel syndrome on right  -     [facility] REFERRAL; Future      Patient is a 59 year old male presenting for evaluation of   Chief Complaint   Patient presents with     Right Elbow - Pain      # Right Cubital Tunnel Syndrome: Sx of medial/lateral epicondylitis improved with HEP since last visit with N/T in 4-5th digits worsening despite night splint.  Pt has subluxing ulnar nerve with cubital tunnel syndrome likely the cause.  Given this pt can work as tolerated, plan to refer to hand therapy for manual tx and possible functional brace.  Plan to refer to ortho hand if not improved with this.  Pt understands and agrees with plan.      Treatment: Activities as tolerated, night brace for cubital tunnel syndrome  Physical Therapy Hand therapy for cubital tunnel syndrome  Injection None  Medications  Limited NSAIDs/Tylenol    Concerning signs/sx that would warrant urgent evaluation were discussed.  All questions were answered, patient understands and agrees with plan.      Return if symptoms worsen or fail to improve.    -----    SUBJECTIVE  Truman Suero is a/an 59 year old Left handed male who is seen in consultation at the request of  Melanie Thomas C.N.P. for evaluation of right elbow pain. The patient is seen by themselves.    Onset: 5-6 month(s) ago. Reports insidious onset without acute precipitating event.Repetitive motions   Location of Pain: right elbow   Rating of Pain at worst: 9/10  Rating of Pain Currently: 5/10  Worsened by: repetitive motions, sleeping, morning pain, working   Better with: heat   Treatments tried: heat  Associated symptoms: numbness and tingling known carpal tunnel   Orthopedic history: YES - fractured both arms in high school   Relevant surgical history: Cervical fusion   Social History: Works as [employer]     Interim History - February 25, 2020  Since last  visit on 2019 patient has worsening pain in elbow.  Pain is now radiating to hand.  He does have numbness and tingling with known carpal tunnel   No interim injury.   Pt has pain N/T on 4/5th digits.  Pt works at [employer].     Past Medical History:   Diagnosis Date     Ascending aorta dilatation (H) 2018     Cervical spondylosis without myelopathy 10/3/2017     Chronic bilateral low back pain with right-sided sciatica 2018     Hypertension      Mild CAD 2018     Neck pain     MRI positive on cervical vertebrea.     PAD (peripheral artery disease) (H) 2018     Tobacco abuse 2017     Social History     Socioeconomic History     Marital status:      Spouse name: Not on file     Number of children: Not on file     Years of education: Not on file     Highest education level: Not on file   Occupational History     Occupation:    Social Needs     Financial resource strain: Not on file     Food insecurity:     Worry: Not on file     Inability: Not on file     Transportation needs:     Medical: Not on file     Non-medical: Not on file   Tobacco Use     Smoking status: Former Smoker     Packs/day: 1.00     Years: 30.00     Pack years: 30.00     Types: Cigarettes     Last attempt to quit: 3/20/2018     Years since quittin.7     Smokeless tobacco: Never Used   Substance and Sexual Activity     Alcohol use: No     Alcohol/week: 0.0 standard drinks     Comment: Quit drinking 10 years ago     Drug use: No     Sexual activity: Yes     Partners: Female     Birth control/protection: None   Lifestyle     Physical activity:     Days per week: Not on file     Minutes per session: Not on file     Stress: Not on file   Relationships     Social connections:     Talks on phone: Not on file     Gets together: Not on file     Attends Sikh service: Not on file     Active member of club or organization: Not on file     Attends meetings of clubs or organizations: Not on file      "Relationship status: Not on file     Intimate partner violence:     Fear of current or ex partner: Not on file     Emotionally abused: Not on file     Physically abused: Not on file     Forced sexual activity: Not on file   Other Topics Concern     Parent/sibling w/ CABG, MI or angioplasty before 65F 55M? Not Asked   Social History Narrative     Not on file       Patient's past medical, surgical, social, and family histories were reviewed today and no changes are noted.  No family history pertinent to the patient's problem today      REVIEW OF SYSTEMS:  10 point ROS is negative other than symptoms noted above in HPI, Past Medical History or as stated below  Constitutional: NEGATIVE for fever, chills, change in weight  Skin: NEGATIVE for worrisome rashes, moles or lesions  GI/: NEGATIVE for bowel or bladder changes  Neuro: NEGATIVE for weakness, dizziness or paresthesias    OBJECTIVE:  BP (!) 135/91   Ht 1.676 m (5' 6\")   BMI 25.34 kg/m     General: healthy, alert and in no distress  HEENT: no scleral icterus or conjunctival erythema  Skin: no suspicious lesions or rash. No jaundice.  CV: regular rhythm by palpation  Resp: normal respiratory effort without conversational dyspnea   Psych: normal mood and affect  Gait: normal steady gait with appropriate coordination and balance  Neuro: Normal sensory exam of bilateral hands.   MSK:  RIGHT ELBOW  Inspection:    No swelling, bruising, discoloration, or obvious deformity or asymmetry  Palpation:    Mild TTP about the lateral epicondyle, medial epicondyle. Remainder of bony, ligamentous and tendinous landmarks are nontender.    Crepitus is Absent  Range of Motion:     Extension full / flexion full / pronation full / supination full  Strength:    Flexion full extension full pronation limited slightly by pain supination limited slightly by pain  Special Tests:    Positive: Subluxing ulnar nerve, cubital tunnel (ulnar Tinel's), mild pain with resisted pronation    " Negative: passive valgus stress, milking manuever, varus stress, no pain with resisted wrist flex/ext/supination    Independent visualization of the below image:  No results found for this or any previous visit (from the past 24 hour(s)).  XR ELBOW RT G/E 3 VW 12/27/2019 2:46 PM      HISTORY: Right elbow pain                                                                      IMPRESSION: Negative exam.     DARRYL COLEMAN MD    I  ordered, visualized and reviewed these images with the patient  No fracture or arthritis     Raymond Thomas MD, [facility]

## 2020-02-25 NOTE — PATIENT INSTRUCTIONS
Palmer to follow up with Primary Care provider regarding elevated blood pressure.    Diagnosis: Cubital tunnel syndrome  Image Findings: none  Treatment: Referral to hand therapy for nerve gliding and possible functional brace  Job: Activities as tolerated  Medications: Limited tylenol/ibuprofen for pain for 1-2 weeks  Follow-up: As needed can consider referral to hand surgeon    It was great seeing you again today!    Raymond Thomas

## 2020-02-28 ENCOUNTER — OFFICE VISIT (OUTPATIENT)
Dept: PALLIATIVE MEDICINE | Facility: CLINIC | Age: 60
End: 2020-02-28
Payer: OTHER MISCELLANEOUS

## 2020-02-28 VITALS
SYSTOLIC BLOOD PRESSURE: 130 MMHG | BODY MASS INDEX: 26.31 KG/M2 | HEART RATE: 50 BPM | WEIGHT: 163 LBS | DIASTOLIC BLOOD PRESSURE: 82 MMHG

## 2020-02-28 DIAGNOSIS — Z79.891 ENCOUNTER FOR LONG-TERM USE OF OPIATE ANALGESIC: ICD-10-CM

## 2020-02-28 DIAGNOSIS — M79.18 MYOFASCIAL PAIN: ICD-10-CM

## 2020-02-28 DIAGNOSIS — G89.29 CHRONIC BILATERAL LOW BACK PAIN WITHOUT SCIATICA: ICD-10-CM

## 2020-02-28 DIAGNOSIS — M47.816 SPONDYLOSIS OF LUMBAR REGION WITHOUT MYELOPATHY OR RADICULOPATHY: ICD-10-CM

## 2020-02-28 DIAGNOSIS — M54.2 CHRONIC NECK PAIN: ICD-10-CM

## 2020-02-28 DIAGNOSIS — M54.59 LUMBAR FACET JOINT PAIN: Primary | ICD-10-CM

## 2020-02-28 DIAGNOSIS — M47.812 CERVICAL SPONDYLOSIS WITHOUT MYELOPATHY: ICD-10-CM

## 2020-02-28 DIAGNOSIS — G89.29 CHRONIC NECK PAIN: ICD-10-CM

## 2020-02-28 DIAGNOSIS — M54.50 CHRONIC BILATERAL LOW BACK PAIN WITHOUT SCIATICA: ICD-10-CM

## 2020-02-28 DIAGNOSIS — Z98.1 S/P CERVICAL SPINAL FUSION: ICD-10-CM

## 2020-02-28 DIAGNOSIS — M50.30 DDD (DEGENERATIVE DISC DISEASE), CERVICAL: ICD-10-CM

## 2020-02-28 PROCEDURE — 99000 SPECIMEN HANDLING OFFICE-LAB: CPT | Performed by: NURSE PRACTITIONER

## 2020-02-28 PROCEDURE — 99213 OFFICE O/P EST LOW 20 MIN: CPT | Performed by: NURSE PRACTITIONER

## 2020-02-28 PROCEDURE — 80307 DRUG TEST PRSMV CHEM ANLYZR: CPT | Mod: 90 | Performed by: NURSE PRACTITIONER

## 2020-02-28 RX ORDER — TRAMADOL HYDROCHLORIDE 50 MG/1
50 TABLET ORAL EVERY 6 HOURS PRN
Qty: 90 TABLET | Refills: 0 | Status: SHIPPED | OUTPATIENT
Start: 2020-02-28 | End: 2020-03-30

## 2020-02-28 RX ORDER — TRAMADOL HYDROCHLORIDE 200 MG/1
200 TABLET, EXTENDED RELEASE ORAL EVERY 24 HOURS
Qty: 30 TABLET | Refills: 0 | Status: SHIPPED | OUTPATIENT
Start: 2020-02-28 | End: 2020-03-30

## 2020-02-28 ASSESSMENT — PAIN SCALES - GENERAL: PAINLEVEL: SEVERE PAIN (7)

## 2020-02-28 NOTE — LETTER
[facility]  02/28/20    Patient: Truman Suero  YOB: 1960  Medical Record Number: [id number]                                                                  Opioid / Opioid Plus Controlled Substance Agreement    I understand that my care provider has prescribed an opioid (narcotic) controlled substance to help manage my condition(s). I am taking this medicine to help me function or work. I know this is strong medicine, and that it can cause serious side effects. Opioid medicine can be sedating, addicting and may cause a dependency on the drug. They can affect my ability to drive or think, and cause depression. They need to be taken exactly as prescribed. Combining opioids with certain medicines or chemicals (such as cocaine, sedatives and tranquilizers, sleeping pills, meth) can be dangerous or even fatal. Also, if I stop opioids suddenly, I may have severe withdrawal symptoms. Last, I understand that opioids do not work for all types of pain nor for all patients. If not helpful, I may be asked to stop them.        The risks, benefits, and side effects of these medicine(s) were explained to me. I agree that:    1. I will take part in other treatments as advised by my care team. This may be psychiatry or counseling, physical therapy, behavioral therapy, group treatment or a referral to a pain clinic. I will reduce or stop my medicine when my care team tells me to do so.  2. I will take my medicines as prescribed. I will not change the dose or schedule unless my care team tells me to. There will be no refills if I  run out early.   I may be contactedwithout warning and asked to complete a urine drug test or pill count at any time.   3. I will keep all my appointments, and understand this is part of the monitoring of opioids. My care team may require an office visit for EVERY opioid/controlled substance refill. If I miss appointments or don t follow instructions, my care team  may stop my medicine.  4. I will not ask other providers to prescribe controlled substances, and I will not accept controlled substances from other people. If I need another prescribed controlled substance for a new reason, I will tell my care team within 1 business day.  5. I will use one pharmacy to fill all of my controlled substance prescriptions, and it is up to me to make sure that I do not run out of my medicines on weekends or holidays. If my care team is willing to refill my opioid prescription without a visit, I must request refills only during office hours, refills may take up to 3 days to process, and it may take up to 5 to 7 days for my medicine to be mailed and ready at my pharmacy. Prescriptions will not be mailed anywhere except my pharmacy.        [id number]  Rev 12/18         Registration to scan to EHR                             Page 1 of 2               Controlled Substance Agreement Opioid        [facility]  02/28/20  Patient: Truman Suero  YOB: 1960  Medical Record Number: [id number]                                                                  6. I am responsible for my prescriptions. If the medicine/prescription is lost or stolen, it will not be replaced. I also agree not to share controlled substance medicines with anyone.  7. I agree to not use ANY illegal or recreational drugs. This includes marijuana, cocaine, bath salts or other drugs. I agree not to use alcohol unless my care team says I may.          I agree to give urine samples whenever asked. If I don t give a urine sample, the care team may stop my medicine.    8. If I enroll in the [place] Medical Marijuana program, I will tell my care team. I will also sign an agreement to share my medical records with my care team.   9. I will bring in my list of medicines (or my medicine bottles) each time I come to the clinic.   10. I will tell my care team right away if I become pregnant or have a  new medical problem treated outside of my regular clinic.  11. I understand that this medicine can affect my thinking and judgment. It may be unsafe for me to drive, use machinery and do dangerous tasks. I will not do any of these things until I know how the medicine affects me. If my dose changes, I will wait to see how it affects me. I will contact my care team if I have concerns about medicine side effects.    I understand that if I do not follow any of the conditions above, my prescriptions or treatment may be stopped.      I agree that my provider, clinic care team, and pharmacy may work with any city, state or federal law enforcement agency that investigates the misuse, sale, or other diversion of my controlled medicine. I will allow my provider to discuss my care with or share a copy of this agreement with any other treating provider, pharmacy or emergency room where I receive care. I agree to give up (waive) any right of privacy or confidentiality with respect to these consents.     I have read this agreement and have asked questions about anything I did not understand.      ________________________________________________________________________  Patient signature - Date/Time -  Truman Suero                                      ________________________________________________________________________  Witness signature                                                            ________________________________________________________________________  Provider signature - RODNEY Vargas CNP      [id number]  Rev 12/18         Registration to scan to EHR                         Page 2 of 2                   Controlled Substance Agreement Opioid           Page 1 of 2  Opioid Pain Medicines (also known as Narcotics)  What You Need to Know    What are opioids?   Opioids are pain medicines that must be prescribed by a doctor.  They are also known as narcotics.    Examples are:     morphine (MS Contin,  Alyce)    oxycodone (Oxycontin)    oxycodone and acetaminophen (Percocet)    hydrocodone and acetaminophen (Vicodin, Norco)     fentanyl patch (Duragesic)     hydromorphone (Dilaudid)     methadone     What do opioids do well?   Opioids are best for short-term pain after a surgery or injury. They also work well for cancer pain. Unlike other pain medicines, they do not cause liver or kidney failure or ulcers. They may help some people with long-lasting (chronic) pain.     What do opioids NOT do well?   Opioids never get rid of pain entirely, and they do not work well for most patients with chronic pain. Opioids do not reduce swelling, one of the causes of pain. They also don t work well for nerve pain.                           For informational purposes only.  Not to replace the advice of your care provider.  Copyright 201 [facility]. All right reserved. [employer] 631100-[place] 02/18.      Page 2 of 2    Risks and side effects   Talk to your doctor before you start or decide to keep taking one of these medicines. Side effects include:    Lowering your breathing rate enough to cause death    Overdose, including death, especially if taking higher than prescribed doses    Long-term opioid use    Worse depression symptoms; less pleasure in things you usually enjoy    Feeling tired or sluggish    Slower thoughts or cloudy thinking    Being more sensitive to pain over time; pain is harder to control    Trouble sleeping or restless sleep    Changes in hormone levels (for example, less testosterone)    Changes in sex drive or ability to have sex    Constipation    Unsafe driving    Itching and sweating    Feeling dizzy    Nausea, vomiting and dry mouth    What else should I know about opioids?  When someone takes opioids for too long or too often, they become dependent. This means that if you stop or reduce the medicine too quickly, you will have withdrawal symptoms.    Dependence is not the same as addiction.  Addiction is when people keep using a substance that harms their body, their mind or their relations with others. If you have a history of drug or alcohol abuse, taking opioids can cause a relapse.    Over time, opioids don t work as well. Most people will need higher and higher doses. The higher the dose, the more serious the side effects. We don t know the long-term effects of opioids.      Prescribed opioids aren't the best way to manage chronic pain    Other ways to manage pain include:      Ibuprofen or acetaminophen.  You should always try this first.      Treat health problems that may be causing pain.      acupuncture or massage, deep breathing, meditation, visual imagery, aromatherapy.      Use heat or ice at the pain site      Physical therapy and exercise      Stop smoking      See a counselor or therapist                                                  People who have used opioids for a long time may have a lower quality of life, worse depression, higher levels of pain and more visits to doctors.    Never share your opioids with others. Be sure to store opioids in a secure place, locked if possible.Young children can easily swallow them and overdose.     You can overdose on opioids.  Signs of overdose include decrease or loss of consciousness, slowed breathing, trouble waking and blue lips.  If someone is worried about overdose, they should call 911.    If you are at risk for overdose, you may get naloxone (Narcan, a medicine that reverses the effects of opioids.  If you overdose, a friend or family member can give you Narcan while waiting for the ambulance.  They need to know the signs of overdose and how to give Narcan.    While you're taking opioids:    Don't use alcohol or street drugs. Taking them together can cause death.    Don't take any of these medicines unless your doctor says its okay.  Taking these with opioids can cause death.    Benzodiazepines (such as lorazepam         or  diazepam)    Muscle relaxers (such as cyclobenzaprine)    sleeping pills    other opioids    Safe disposal of opioids  Find your area drug take-back program, your pharmacy mail-back program, buy a special disposal bag (such as Deterra) from your pharmacy or flush them down the toilet.  Use the guidelines at:  www.fda.gov/drugs/resourcesforyou

## 2020-02-28 NOTE — PROGRESS NOTES
[facility]    2/28/2020       Chief complaint: neck and low back pain    Interval history:  Truman Suero is a 59 year old male is known to me for   Chronic neck pain  Cervical DDD  Cervical spondylosis (pain worse with extension/rotation indicating facetogenic component to pain)  Axial low back pain  Myofascial pain/muscle spasms  Remote history of ETOH overuse, attended AA for awhile. Sober for 10 years  ---PMHx includes: neck pain  ---PSHx includes: none listed      Recommendations/plan at the last visit on 12/17/2019 included:  1. Physical Therapy: not at present  2. Clinical Health Psychologist: none  3. Diagnostic Studies:  None  4. Medication Management:    1. Continue tramadol ER 200mg once daily  2. Continue tramadol 50mg Q 6-8 hours PRN, max of 3/day.   3. Continue gabapentin 900 mg TID   4. Continue Topamax 50 mg BID   5. Continue methocarbamol 500-1000 mg TID PRN muscle spasms   5. Further procedures recommended: none at present  6. I provided the patient with a referral to see [facility] re: right elbow pain.  7. Palmer to follow up with Primary Care provider regarding elevated blood pressure.  8. I provided the patient and his  with a letter re: work restrictions.   9. Recommendations to PCP: see above  10. Follow up: 8-12 weeks    ++Palmer Suero is here today with ++    Since his last visit, Truman Suero reports:  -He was referred to hand therapy for right wrist pain by Dr. Thomas and the right wrist/hand pain is distinct from cervical stenosis (radicular pain).  -Posterior neck pain that starts at the base of the skull and extends into the right shoulder.  -Notes some low back pain.  -Frequently dropping things from right hand, luckily the patient's dominant hand is the left. Painful symptoms are tolerable on Monday at the start of the work week, but he notes some overexertion by Friday.       At this point, the patient's participation with  "our multidisciplinary team includes:  The patient has been compliant with the program.  PT - attended non-pain management PHYSICAL THERAPY   Health Psych - has seen Kentrell Sierra Dony x4  Acupuncture: worked with Dr. Bereket LAY      Pain scores:    Pain intensity on average is 7 on a scale of 0-10.    Range is 6-10 /10.   Pain right now is 7/10.  Pain is described as \"aching, numb, unbearable, burning, shooting, throbbing, penetrating, stabbing, gnawing, miserable, and nagging.\"    Current pain relevant medications:      -Tramadol 200mg ER in the evening (helpful)  -Tramadol 50mg take 1 tablet  Q 6 hours PRN (using 2-3 tabs per day, helpful)  -ibuprofen 600mg PRN (helpful)  -gabapentin 900mg TID (somewhat helpful)  -methocarbamol 500-1000mg TID PRN muscle spasms (takes 1000 mg BID, somewhat helpful)  -Topamax 50 mg BID (helpful)    Other pertinent medications:  None    Previous Medications:  OPIATES: Tramdol (somewhat helpful)  NSAIDS: ibuprofen (helpful), Aleve (helpful)  MUSCLE RELAXANTS: none  ANTI-MIGRAINE MEDS: none  ANTI-DEPRESSANTS: none  SLEEP AIDS: none  ANTI-CONVULSANTS: gabapentin (helpful)  TOPICALS: lidocaine ointment (somewhat helpful)  Other meds: Tylenol (not helpful)        Other treatments have included:  Truman Suero has not been seen at a pain clinic in the past.    PT: tried, somewhat helpful  Chiropractic: helpful  Acupuncture: none  TENs Unit: none     Injections:    cervical radiofrequency nerve ablation at [facility] in December 2016 (did get good relief, got 70% relief of his typical neck pain)  -6/29/2017 Cervical facet joint steroid injections at C4-5 and C5-6 on the right with Dr. Nicole Harding (not helpful)  -3/8/2018 C7-T1 interlaminar DEMARCUS with Dr. Hugo Corrigan (not helpful)  -5/23/2018 right L4-5 transforaminal epidural steroid injection with Dr. Osroio (not helpful for back pain but did help leg pain)  -8/7/2018 bilateral SI joint injection with Dr Hugo Corrigan (helpful " for one month)  -10/11/2018 l3-4 and L4-5 facet joint injections bilaterally with Dr. Hugo Corrigan (this has been helpful, more helpful than any other lumbar injections thus far)  -1/28/2019 bilateral L3-5 medial branch blocks #1 with Dr. Osorio (somewhat helpful)   -2/25/2019 LMBB #2 with Dr. Osorio (somewhat effective, but not enough to proceed to RFA)  -5/6/2019 bilateral L4/L5 and L5/S1 facet joint injections with Dr. Osorio (helpful)  -11/29/2019 C7-T1 interlaminar epidural steroid injection with Dr. Corrigan (helpful temporarily)    Surgeries:  10/29/2018 right anterior cervical C5-6 discectomy and fusion by Dr. Jud Harkins (somewhat helpful)      UDS last completed on 3/5/2019  CSA last signed on 3/14/2019 with Melanie STEVENS, RN CNP, FNP  [facility]     THE 4 A's OF OPIOID MAINTENANCE ANALGESIA    Analgesia: long acting Tramadol with some short acting tramadol does give some relief    Activity: ADLs    Adverse effects: none    Adherence to Rx protocol: yes      Side Effects: none  Patient is using the medication as prescribed: yes    Medications:  Current Outpatient Medications   Medication Sig Dispense Refill     aspirin (ASA) 81 MG EC tablet Take 1 tablet (81 mg) by mouth daily 90 tablet 0     atorvastatin (LIPITOR) 10 MG tablet Take 1 tablet (10 mg) by mouth daily 90 tablet 0     buPROPion (WELLBUTRIN SR) 150 MG 12 hr tablet Take 1 tablet (150 mg) by mouth 2 times daily 60 tablet 0     cyclobenzaprine (FLEXERIL) 5 MG tablet Take 1-2 tablets (5-10 mg) by mouth nightly as needed for muscle spasms Need 6 hours between this and methocarbamol 45 tablet 2     fluticasone (FLONASE) 50 MCG/ACT nasal spray Spray 2 sprays into both nostrils 2 times daily Needs appointment for further refills (Patient taking differently: Spray 2 sprays into both nostrils 2 times daily as needed Needs appointment for further refills) 32 mL 11     gabapentin (NEURONTIN) 600 MG tablet Take 1.5 tablets  (900 mg) by mouth 3 times daily 135 tablet 3     methocarbamol (ROBAXIN) 500 MG tablet Take 1 tablet (500 mg) by mouth 3 times daily as needed for muscle spasms Should be 6 hours between this and cyclobenzaprine at night 90 tablet 2     metoprolol succinate ER (TOPROL-XL) 25 MG 24 hr tablet Take 1 tablet (25 mg) by mouth daily 90 tablet 3     order for DME BP cuff, brand as covered by insurance.  Dx: HTN 1 each 0     topiramate (TOPAMAX) 50 MG tablet Take 50mg (1tablet) every morning and 100mg (2 tablets) every evening.  Drink plenty of water and no alcohol. 90 tablet 2     traMADol (ULTRAM) 50 MG tablet Take 1 tablet (50 mg) by mouth every 6 hours as needed for severe pain Max 3/day. Fill now, start now. 90 tablet 0     traMADol (ULTRAM-ER) 200 MG 24 hr tablet Take 1 tablet (200 mg) by mouth every 24 hours fill  1/30/20. Start 1/31/20 30 tablet 0       Medical History: any changes in medical history since they were last seen? none    Social History:   Home situation: , has 4 kids, 2 at home, one in college and one launched.   Occupation/Schooling: [employer] full time in [employer]  Tobacco use: former smoker, quit on 3/20/2018  Alcohol use: sober for nearly 10 years. He used to work a sobriety program, used to go to   Drug use: none  History of chemical dependency treatment: no formal treatment, did AA    Is patient a current smoker or tobacco user?  QUIT on 3/20/2018  If yes, was cessation counseling offered?  no        Review of Systems:   ROS is positive as per HPI as well as for HA, nosebleeds, HTN, joint pain, stiffness, back pain, neck pain, weakness, numbness/tingling, strees and is negative for fevers, sweats, or constipation, diarrhea.    This document serves as a record of the services and decisions personally performed and made by Melanie STEVENS. It was created on her behalf by Moncho Tariq, a trained medical scribe. The creation of this document is based on the provider's statements to  the medical scribe.  Moncho Tariq 3:04 PM February 28, 2020            Physical Exam:     Vital signs: /82   Pulse 50   Wt 73.9 kg (163 lb)   BMI 26.31 kg/m       Behavioral observations:  Awake, alert, cooperative    Gait:  normal    Musculoskeletal exam:    Moves well in exam room  Strength grossly equal throughout  Good lumbar flexion  Painful lumbar extension   Painful lumbar extension and rotation to the right and to the left    Neuro exam:  SILT in all extremities     Skin/vascular/autonomic:  No suspicious lesions on exposed skin.      Other:  NA    Is the patient hypertensive today? Yes  Hypertensive on recheck of BP?   No  If yes, was patient recommended to see Primary Care Provider in follow up for management of HTN?  No            IMAGING:    MR CERVICAL SPINE WITHOUT CONTRAST 9/5/2017 2:32 PM      HISTORY: Neck pain, worsening numbness in arms and lower extremities.  Radiculopathy, cervical region.     TECHNIQUE: Multiplanar multisequence images were obtained through the  cervical spine without contrast.     COMPARISON: 2/22/2017.     FINDINGS: Sagittal images demonstrate some minimal gentle cervical  kyphosis, otherwise normal posterior alignment. There is no evidence  for craniovertebral or cervicomedullary junction abnormality. The  cervical cord is minimally indented anteriorly at C4-C5 and C5-C6,  otherwise is normal in morphology and signal characteristics. Disc  space narrowing and discogenic marrow changes are present C3-C4,  C4-C5, C5-C6 and C6-C7.     C2-C3: Minimal facet hypertrophy. No stenosis.     C3-C4: Broad-based posterior osteophyte formation is present causing  some mild bilateral neural foraminal stenosis, but no central canal  stenosis.     C4-C5: Broad-based posterior osteophyte formation and mild facet  hypertrophy is present causing some mild to moderate central canal  stenosis, mild cord deformity, and mild-to-moderate bilateral neural  foraminal stenosis.     C5-C6:  Broad-based posterior osteophyte formation and disc bulging is  present along with some facet hypertrophy. There is moderate central  canal stenosis and moderate bilateral neural foraminal stenosis.  Findings have progressed since the prior exam.     C6-C7: Broad-based disc bulging is present along with some minimal  posterior osteophyte formation. There is borderline to mild central  canal stenosis, but no neural foraminal stenosis.     C7-T1: Moderate facet hypertrophy. No significant stenosis.         IMPRESSION: Moderate multilevel degenerative disc and facet disease  with some progression at C5-C6 where there is moderate central canal  and bilateral neural foraminal stenosis along with cord deformity.          MR CERVICAL SPINE WITHOUT CONTRAST  2/22/2017 9:59 AM     HISTORY:  Bilateral neck and arm pain for three years.     COMPARISON: None.     TECHNIQUE: Routine MR cervical spine extended through T2.     FINDINGS: There is some degenerative bone marrow signal change C3-C6.  No malignant or destructive lesions. Normal alignment through T2.  Reversed cervical adenosis C3-C6.     The cervical and upper thoracic spinal cord appear intrinsically  normal. The craniocervical junction region is normal. No paraspinous  soft tissue abnormality.     Findings by level as follows:     C2-C3: Negative. No disc protrusion. No central or lateral stenosis.     C3-C4: Mild disc space narrowing. Mild diffuse annular bulge. Small  uncinate spur on the right. No significant central or left-sided  stenosis. Mild right-sided foraminal stenosis.     C4-C5: Moderate degenerative narrowing of the interspace. Mild ventral  disc osteophyte complex with minimal central stenosis. Small uncinate  spurs bilaterally with mild bilateral foraminal stenosis.     C6-C7: Moderate disc space narrowing. Mild broad-based disc osteophyte  complex with minimal central stenosis. Minimal uncinate spurs with  mild bilateral foraminal  stenosis.     C6-C7: Moderate disc space narrowing. Mild ventral disc osteophyte  complex. No significant central or lateral stenosis.     C7-T1: No disc protrusion. No central or lateral stenosis. Moderate  bilateral facet joint disease.         IMPRESSION:  1. Degenerative changes as described C3-C4 through C7-T1. There is  only mild central and foraminal stenosis as described.  2. No intrinsic spinal cord abnormality through T2    [place] Prescription Monitoring Program:  Reviewed [facility] 2/28/2020, no concerning scripts      DIRE Score for selecting candidates for long term opioid analgesia for chronic pain:  Diagnosis  2  Intractablility  2  Risk    Psychological health  2    Chemical health  2    Reliability  2    Social support  3  Efficacy  2    Total DIRE Score = 15. Note that   7-13 predicts poor outcome (compliance and efficacy) from opioid prescribing; 14-21 predicts good outcome (compliance and efficacy)  from opioid prescribing.      Assessment:   1. Lumbar facet joint pain  2. Spondylosis of lumbar region without myelopathy or radiculopathy  3. Chronic bilateral low back pain without sciatica  4. Chronic neck pain   5. Cervical DDD  6. Cervical spondylosis without myelopathy   7. Myofacial pain  8. S/p cervical spinal fusion  9. Encounter for long-term use of opiate analgesic  10. Remote history of ETOH overuse, attended AA for awhile. Sober for >10 years  11. PMHx includes: neck pain  12. PSHx includes: none listed        Plan:   1. Physical Therapy: not at present  2. Clinical Health Psychologist: none  3. Diagnostic Studies:  None  4. Medication Management:    1. Continue tramadol ER 200mg once daily  2. Continue tramadol 50mg Q 6-8 hours PRN, max of 3/day.   3. Continue gabapentin 900 mg TID   4. Continue Topamax 50 mg BID   5. Continue methocarbamol 500-1000 mg TID PRN muscle spasms   5. Further procedures recommended: none at present  6. The patient will follow-up with hand therapy as  scheduled.  7. Recommendations to PCP: see above  8. Follow up: 8-12 weeks  9. UDS, last took Tramadol 50mg in the AM and Tramadol ER 200mg around noon.  10. Re-signed opiate agreement form today.      Total time spent face to face was 20 minutes and more than 50% of face to face time was spent in counseling and/or coordination of care regarding the diagnosis and recommendations above.    The information in this document, created by the medical scribe for me, accurately reflects the services I personally performed and the decisions made by me. I have reviewed and approved this document for accuracy prior to leaving the patient care area.  February 28, 2020       Melanie STEVENS, RN CNP, FNP  [facility]  [facility]

## 2020-02-28 NOTE — PATIENT INSTRUCTIONS
PLAN  1. Follow up with hand therapy as scheduled.  2. Medications:   1. Continue Tramadol ER 200mg/day.  2. Continue Tramadol 50mg every 6-8 hours as needed.  3. Continue gabapentin 900mg three times daily.  4. Continue methocarbamol 500-1,000mg three times daily as needed.  5. Continue Flexeril 5-10mg at bedtime as needed for muscle spasm. Need 6 hours between this and methocarbamol.  6. Continue Topamax 50mg twice daily.  3. Procedures: none at present  4. Follow-up with me in 8-12 weeks  5. Urine drug screen, last took Tramadol 50mg this morning and Tramdol ER 200mg around noon.  6. Re-signed opiate agreement form today.

## 2020-03-05 LAB — PAIN DRUG SCR UR W RPTD MEDS: NORMAL

## 2020-03-12 ENCOUNTER — MYC REFILL (OUTPATIENT)
Dept: FAMILY MEDICINE | Facility: CLINIC | Age: 60
End: 2020-03-12

## 2020-03-12 ENCOUNTER — THERAPY VISIT (OUTPATIENT)
Dept: OCCUPATIONAL THERAPY | Facility: CLINIC | Age: 60
End: 2020-03-12
Attending: FAMILY MEDICINE
Payer: COMMERCIAL

## 2020-03-12 DIAGNOSIS — I25.10 MILD CAD: ICD-10-CM

## 2020-03-12 DIAGNOSIS — G56.21 CUBITAL TUNNEL SYNDROME ON RIGHT: ICD-10-CM

## 2020-03-12 DIAGNOSIS — M25.521 RIGHT ELBOW PAIN: Primary | ICD-10-CM

## 2020-03-12 DIAGNOSIS — I73.9 PAD (PERIPHERAL ARTERY DISEASE) (H): ICD-10-CM

## 2020-03-12 PROCEDURE — 97110 THERAPEUTIC EXERCISES: CPT | Mod: GO | Performed by: OCCUPATIONAL THERAPIST

## 2020-03-12 PROCEDURE — 97165 OT EVAL LOW COMPLEX 30 MIN: CPT | Mod: GO | Performed by: OCCUPATIONAL THERAPIST

## 2020-03-12 PROCEDURE — 97112 NEUROMUSCULAR REEDUCATION: CPT | Mod: GO | Performed by: OCCUPATIONAL THERAPIST

## 2020-03-12 NOTE — PROGRESS NOTES
[facility] Initial Evaluation     Current Date: 3/12/2020    Diagnosis: Right elbow/arm pain and paresthesias/cubital tunnel   Symptom onset 6 months ago, increased in past 2 months  DOI: 2/25/2020 (therapy referral)    Subjective:  Patient Health History  Truman Suero being seen for Right arm pain.     Problem began: 6/1/2016.   Problem occurred: Gradual over time, I am a  so use arms and hands all day long     General health as reported by patient is fair.  Pertinent medical history includes: calf pain-swelling-warmth, chemical dependency, high blood pressure, migraines/headaches, numbness/tingling, pain at night/rest, smoking, weakness and other.   Red flags:  None as reported by patient.     Surgeries include:  Orthopedic surgery. Other surgery history details: neck fusion.    Current medications:  Anti-inflammatory, high blood pressure medication, muscle relaxants and pain medication.    Current occupation is .                   Occupational Profile Information:  Left hand dominant  Prior functional level:  independent-shared household chores  Patient reports symptoms of pain, stiffness/loss of motion, weakness/loss of strength, numbness and tingling   Barriers include:none  Mobility: No difficulty  Transportation: drives    Functional Outcome Measure:  Upper Extremity Functional Index  SCORE:   Column Totals: 37/80  (A lower score indicates greater disability.)    Objective:  Pain Level (Scale 0-10)   3/12/2020   Least 3-4   With Use 7-10     Pain Description  Date 3/12/2020   Location Elbow lateral > medial   Pain Quality Dull   Frequency constant     Pain is worst  daytime or nighttime   Exacerbated by  overuse, work as    Relieved by Stretching, cold, heat and medications   Progression Gradually getting worse     Edema  None on observation     Sensation  Decreased constantly in Median < Ulnar Nerve distribution per pt report    ROM    WNL's of hand, wrist and elbow.  No intrinsic  atrophy and no clawing of ulnar digits.    Strength   (Measured in pounds)  Pain Report: - none  + mild    ++ moderate    +++ severe     3/12/2020 3/12/2020   Trials Left Right   1  2  3 80  78  70 53-  42-  42-   Average 76 46     Lat Pinch 3/12/2020 3/12/2020   Trials Left Right   1  2  3 22  21  20 21-  19-  19-   Average 21 20     3 Pt Pinch 3/12/2020 3/12/2020   Trials Left Right   1  2  3 18  18  18 11+ slight  10+ slight  13+ slight   Average 18 12     Resisted Testing  Pain Report:  - none    + mild    ++ moderate    +++ severe    3/12/2020   Supination  -   Pronation -   Wrist Ext with RD, Elbow Ext + slight   Wrist Ext with UD, Elbow Ext -   Wrist Flex with RD, Elbow Ext -   Wrist Flex with UD, Elbow Ext -   EDC with Elbow Ext -   FDS + R, S finger pain     Special Tests   - none    + mild    ++ moderate    +++ severe       3/12/2020   Elbow Flexion Test + 20 secs   Ulnar nerve subluxation at elbow +   Tinels Cubital Tunnel +   Tinels [place] -   Tinels at Carpal Tunnel -   [place] -     Palpation   3/12/2020   Cubital tunnel +   [place]  -   MEP +   Flexors -   LEP + slight   Extensors -     Assessment:  Patient presents with symptoms consistent with diagnosis of cubital tunnel, with conservative intervention.     Patient's limitations or Problem List includes:  Pain, Weakness, Sensory disturbance, Decreased , Decreased pinch and Tightness in musculature of the right elbow and hand which interferes with the patient's ability to perform Self Care Tasks (dressing, eating, bathing, hygiene), Work Tasks, Sleep Patterns, Recreational Activities and Household Chores as compared to previous level of function.    Rehab Potential:  Good - Return to full activity, some limitations    Patient will benefit from skilled Occupational Therapy to increase flexibility, overall strength,  strength, pinch strength and sensation and decrease pain to return to previous activity level and resume  normal daily tasks and to reach their rehab potential.    Barriers to Learning:  No barrier    Communication Issues:  Patient appears to be able to clearly communicate and understand verbal and written communication and follow directions correctly.    Chart Review: Chart Review and Simple history review with patient    Identified Performance Deficits: bathing/showering, dressing, hygiene and grooming, home establishment and management, meal preparation and cleanup, sleep, work and leisure activities    Assessment of Occupational Performance:  5 or more Performance Deficits     Clinical Decision Making (Complexity): Low complexity    Treatment Explanation:  The following has been discussed with the patient:  RX ordered/plan of care  Anticipated outcomes  Possible risks and side effects    Plan:  Frequency:  1 X week, once daily  Duration:  for 6 weeks  Treatment Plan:    Modalities:    US   Therapeutic Exercise:    AROM, PROM, Tendon Gliding, Isotonics and Isometrics  Neuromuscular re-ed:   Nerve Gliding, Posture, Kinesiotaping and Luekotaping  Manual Techniques:   Friction massage and Myofascial release  Orthotic Fabrication:    Elbow flexion block orthosis  Self Care:    Self Care Tasks and Ergonomic Considerations    Discharge Plan:  Achieve all LTG.  Independent in home treatment program.  Reach maximal therapeutic benefit.    Home Exercise Program:  Warmth for stiffness to forearm extensors, flexors and tricep  Ice to lateral and medial elbow after activity for pain  TFM to LEP and MEP  MFR to tricep and forearm extensors and flexors with tennis ball  PROM with stretch to forearm extensors and flexors  Diagnostic education for protection of ulnar nerve  Avoid prolonged elbow flexion and leaning on elbow  OTC flexion block splint sleeping  Kinesiotape to ulnar nerve to prevent subluxation    Next Visit:  See in 1 week  Assess response to kinesiotaping, transition to luekotaping as needed  Progress to eccentric  wrist E/F strengthening and ulnar nerve flossing  Assess OTC night splint, fit with custom flexion block as indicated

## 2020-03-13 NOTE — TELEPHONE ENCOUNTER
"Requested Prescriptions   Pending Prescriptions Disp Refills     atorvastatin (LIPITOR) 10 MG tablet  Last Written Prescription Date:  12/9/2019  Last Fill Quantity: 90 tablet,  # refills: 0   Last office visit: 12/9/2019 with prescribing provider:  CHAD Trinidad   Future Office Visit:   Next 5 appointments (look out 90 days)    May 15, 2020  3:30 PM CDT  Return Visit with RODNEY Vargas CNP  [facility] [address] [facility] [id number] [phone]        90 tablet 0     Sig: Take 1 tablet (10 mg) by mouth daily       Statins Protocol Failed - 3/13/2020  7:53 AM        Failed - LDL on file in past 12 months     Recent Labs   Lab Test 08/23/17  1404   LDL 97             Passed - No abnormal creatine kinase in past 12 months     No lab results found.             Passed - Recent (12 mo) or future (30 days) visit within the authorizing provider's specialty     Patient has had an office visit with the authorizing provider or a provider within the authorizing providers department within the previous 12 mos or has a future within next 30 days. See \"Patient Info\" tab in inbasket, or \"Choose Columns\" in Meds & Orders section of the refill encounter.              Passed - Medication is active on med list        Passed - Patient is age 18 or older           "

## 2020-03-17 RX ORDER — ATORVASTATIN CALCIUM 10 MG/1
10 TABLET, FILM COATED ORAL DAILY
Qty: 90 TABLET | Refills: 0 | Status: SHIPPED | OUTPATIENT
Start: 2020-03-17 | End: 2020-06-23

## 2020-03-23 PROBLEM — Z98.1 S/P CERVICAL SPINAL FUSION: Status: RESOLVED | Noted: 2018-10-29 | Resolved: 2020-03-23

## 2020-03-23 PROBLEM — G44.209 TENSION TYPE HEADACHE: Status: RESOLVED | Noted: 2019-02-08 | Resolved: 2020-03-23

## 2020-03-23 PROBLEM — G89.29 CHRONIC BILATERAL LOW BACK PAIN: Status: RESOLVED | Noted: 2017-10-03 | Resolved: 2020-03-23

## 2020-03-23 PROBLEM — M54.50 CHRONIC BILATERAL LOW BACK PAIN: Status: RESOLVED | Noted: 2017-10-03 | Resolved: 2020-03-23

## 2020-03-23 NOTE — PROGRESS NOTES
Subjective:  HPI  Physical Exam                    Objective:  System    Physical Exam    General     ROS    Assessment/Plan:    DISCHARGE REPORT    Progress reporting period is from 12.27.18 to 3.23.20.     SUBJECTIVE  Subjective: LBP is worse, pain in Rt side/ribs, central LBP, pain trying to raise left LE getting into car. Neck is about the same, have FCE next week    Current Pain level: 9/10(LBP)   Initial Pain level: 8/10   Changes in function: Yes, see goal flow sheet for change in function   Adverse reactions: Activity:;   ,     Patient has failed to return to therapy so current objective findings are unknown.    OBJECTIVE  Objective: SI dysf stnding, pain w/ contra WB/lifting, +end range LS pain, guarding of ant abdom RT       ASSESSMENT/PLAN  Updated problem list and treatment plan: Diagnosis 1:  S/p cerv fusion/neck pain     Diagnosis 2:  LBP      STG/LTGs have been met or progress has been made towards goals:  Yes, see last PN   Assessment of Progress: Patient has not returned to therapy.  Current status is unknown and discharge G code cannot be reported.  Self Management Plans:  Patient has been instructed in a home treatment program.  Patient  has been instructed in self management of symptoms.    Truman continues to require the following intervention to meet STG and LTG's:   The patient failed to complete scheduled/ordered appointments so current information is unknown.  We will discharge this patient from PT.    Recommendations:      Please refer to the daily flowsheet for treatment today, total treatment time and time spent performing 1:1 timed codes.

## 2020-03-26 DIAGNOSIS — H10.13 ALLERGIC CONJUNCTIVITIS OF BOTH EYES: ICD-10-CM

## 2020-03-26 DIAGNOSIS — J30.89 ALLERGIC RHINITIS DUE TO DUST MITE: ICD-10-CM

## 2020-03-26 DIAGNOSIS — J30.1 CHRONIC SEASONAL ALLERGIC RHINITIS DUE TO POLLEN: ICD-10-CM

## 2020-03-26 DIAGNOSIS — J30.81 CHRONIC ALLERGIC RHINITIS DUE TO ANIMAL HAIR AND DANDER: ICD-10-CM

## 2020-03-26 RX ORDER — FLUTICASONE PROPIONATE 50 MCG
2 SPRAY, SUSPENSION (ML) NASAL 2 TIMES DAILY
Qty: 32 ML | Refills: 0 | Status: SHIPPED | OUTPATIENT
Start: 2020-03-26 | End: 2021-06-30

## 2020-03-26 NOTE — TELEPHONE ENCOUNTER
Rx filled for 30 day supply. Patient is overdue for follow-up visit and will need to be set up for a telephone visit for additional refills. Alternatively he can purchase OTC or request refills through his PCP.

## 2020-03-27 NOTE — TELEPHONE ENCOUNTER
Sent [employer] message to patient notifying him of provider's message.    Candace Porter RN

## 2020-03-30 ENCOUNTER — MYC REFILL (OUTPATIENT)
Dept: PALLIATIVE MEDICINE | Facility: CLINIC | Age: 60
End: 2020-03-30

## 2020-03-30 DIAGNOSIS — G89.29 CHRONIC NECK PAIN: ICD-10-CM

## 2020-03-30 DIAGNOSIS — M47.816 SPONDYLOSIS OF LUMBAR REGION WITHOUT MYELOPATHY OR RADICULOPATHY: ICD-10-CM

## 2020-03-30 DIAGNOSIS — Z98.1 S/P CERVICAL SPINAL FUSION: ICD-10-CM

## 2020-03-30 DIAGNOSIS — M79.18 MYOFASCIAL PAIN: ICD-10-CM

## 2020-03-30 DIAGNOSIS — M54.50 CHRONIC BILATERAL LOW BACK PAIN WITHOUT SCIATICA: ICD-10-CM

## 2020-03-30 DIAGNOSIS — G89.29 CHRONIC BILATERAL LOW BACK PAIN WITHOUT SCIATICA: ICD-10-CM

## 2020-03-30 DIAGNOSIS — M50.30 DDD (DEGENERATIVE DISC DISEASE), CERVICAL: ICD-10-CM

## 2020-03-30 DIAGNOSIS — M54.59 LUMBAR FACET JOINT PAIN: ICD-10-CM

## 2020-03-30 DIAGNOSIS — M47.812 CERVICAL SPONDYLOSIS WITHOUT MYELOPATHY: ICD-10-CM

## 2020-03-30 DIAGNOSIS — M54.2 CHRONIC NECK PAIN: ICD-10-CM

## 2020-03-30 RX ORDER — TRAMADOL HYDROCHLORIDE 50 MG/1
50 TABLET ORAL EVERY 6 HOURS PRN
Qty: 90 TABLET | Refills: 0 | Status: CANCELLED | OUTPATIENT
Start: 2020-03-30

## 2020-03-30 RX ORDER — TRAMADOL HYDROCHLORIDE 200 MG/1
200 TABLET, EXTENDED RELEASE ORAL EVERY 24 HOURS
Qty: 30 TABLET | Refills: 0 | Status: CANCELLED | OUTPATIENT
Start: 2020-03-30

## 2020-03-30 NOTE — TELEPHONE ENCOUNTER
Received call from patient requesting refill(s) of traMADol (ULTRAM) 50 MG tablet    Last dispensed from pharmacy on 03/01/20    Pt last seen by prescribing provider on 02/28/20  Next appt scheduled for 05/15/20     checked in the past 6 months? Yes If no, print current report and give to RN    Last urine drug screen date 02/28/20  Current opioid agreement on file (completed within the last year) Yes Date of opioid agreement: 02/28/20    Processing (pick one and delete the others):      E-prescribe to pharmacy-[facility] [address] [place] [address]       Will route to nursing [place] for review and preparation of prescription(s).

## 2020-03-30 NOTE — TELEPHONE ENCOUNTER
Received call from patient requesting refill(s) of traMADol (ULTRAM-ER) 200 MG     Last dispensed from pharmacy on 3/1/2020    Pt last seen by prescribing provider on 2/28/2020  Next appt scheduled for 5/15/2020     checked in the past 6 months? Yes If no, print current report and give to RN    Last urine drug screen date 2/28/2020  Current opioid agreement on file (completed within the last year) Yes Date of opioid agreement: 2/28/2020    Processing (pick one and delete the others):      E-prescribe to   [facility] [address] [place] [id number]  Phone: [phone] Fax: [phone]       Will route to nursing pool for review and preparation of prescription(s).     Lashonda Lyles MA  [facility]

## 2020-03-31 RX ORDER — TRAMADOL HYDROCHLORIDE 200 MG/1
200 TABLET, EXTENDED RELEASE ORAL EVERY 24 HOURS
Qty: 30 TABLET | Refills: 0 | Status: SHIPPED | OUTPATIENT
Start: 2020-03-31 | End: 2020-04-29

## 2020-03-31 RX ORDER — TRAMADOL HYDROCHLORIDE 50 MG/1
50 TABLET ORAL EVERY 6 HOURS PRN
Qty: 90 TABLET | Refills: 0 | Status: SHIPPED | OUTPATIENT
Start: 2020-03-31 | End: 2020-04-29

## 2020-03-31 NOTE — TELEPHONE ENCOUNTER
Signed Prescriptions:                        Disp   Refills    traMADol (ULTRAM-ER) 200 MG 24 hr tablet   30 tab*0        Sig: Take 1 tablet (200 mg) by mouth every 24 hours fill           now. Start 3/31/2020  Authorizing Provider: MELANIE BENSON    traMADol (ULTRAM) 50 MG tablet             90 tab*0        Sig: Take 1 tablet (50 mg) by mouth every 6 hours as           needed for severe pain Max 3/day. Fill Fill now,           start 3/31/2020  Authorizing Provider: MELANIE BENSON    Reviewed [place]  March 31, 2020- no concerning fills.    Melanie STEVENS, RN CNP, FNP  [facility]  [facility]

## 2020-04-07 ENCOUNTER — TELEPHONE (OUTPATIENT)
Dept: OCCUPATIONAL THERAPY | Facility: CLINIC | Age: 60
End: 2020-04-07

## 2020-04-07 DIAGNOSIS — M25.521 RIGHT ELBOW PAIN: ICD-10-CM

## 2020-04-07 DIAGNOSIS — G56.21 CUBITAL TUNNEL SYNDROME ON RIGHT: ICD-10-CM

## 2020-04-07 NOTE — TELEPHONE ENCOUNTER
TC and LM to discuss possible virtual visit 4/15/2020 or rescheduling to week of May 4th for in clinic visit. AP

## 2020-04-13 ENCOUNTER — MYC REFILL (OUTPATIENT)
Dept: PALLIATIVE MEDICINE | Facility: CLINIC | Age: 60
End: 2020-04-13

## 2020-04-13 DIAGNOSIS — M48.02 CERVICAL STENOSIS OF SPINAL CANAL: ICD-10-CM

## 2020-04-13 DIAGNOSIS — M47.812 CERVICAL SPONDYLOSIS WITHOUT MYELOPATHY: ICD-10-CM

## 2020-04-13 DIAGNOSIS — M50.30 DDD (DEGENERATIVE DISC DISEASE), CERVICAL: ICD-10-CM

## 2020-04-13 DIAGNOSIS — Z98.1 S/P CERVICAL SPINAL FUSION: ICD-10-CM

## 2020-04-13 RX ORDER — CYCLOBENZAPRINE HCL 5 MG
5-10 TABLET ORAL
Qty: 45 TABLET | Refills: 2 | Status: CANCELLED | OUTPATIENT
Start: 2020-04-13

## 2020-04-13 RX ORDER — TOPIRAMATE 50 MG/1
TABLET, FILM COATED ORAL
Qty: 90 TABLET | Refills: 2 | Status: CANCELLED | OUTPATIENT
Start: 2020-04-13

## 2020-04-13 NOTE — TELEPHONE ENCOUNTER
Received fax request from [facility] pharmacy requesting refill(s) for flexeril    Last refilled on 12/12/2019    Pt last seen on 2/28/2020  Next appt scheduled for 5/15/2020    Will facilitate refill.

## 2020-04-13 NOTE — TELEPHONE ENCOUNTER
Received fax request from [facility] pharmacy requesting refill(s) for topamax    Last refilled on 2/10/2020    Pt last seen on 2/28/2020  Next appt scheduled for 5/15/2020    Will facilitate refill.

## 2020-04-14 RX ORDER — TOPIRAMATE 50 MG/1
TABLET, FILM COATED ORAL
Qty: 90 TABLET | Refills: 2 | Status: SHIPPED | OUTPATIENT
Start: 2020-04-14 | End: 2020-07-21

## 2020-04-14 RX ORDER — CYCLOBENZAPRINE HCL 5 MG
5-10 TABLET ORAL
Qty: 45 TABLET | Refills: 2 | Status: SHIPPED | OUTPATIENT
Start: 2020-04-14 | End: 2020-12-01

## 2020-04-14 NOTE — TELEPHONE ENCOUNTER
Signed Prescriptions:                        Disp   Refills    cyclobenzaprine (FLEXERIL) 5 MG tablet     45 tab*2        Sig: Take 1-2 tablets (5-10 mg) by mouth nightly as needed           for muscle spasms Need 6 hours between this and           methocarbamol  Authorizing Provider: CANDACE BENSON, RN CNP, FNP  [facility]  [facility] [place] [facility]

## 2020-04-14 NOTE — TELEPHONE ENCOUNTER
Signed Prescriptions:                        Disp   Refills    topiramate (TOPAMAX) 50 MG tablet          90 tab*2        Sig: Take 50mg (1tablet) every morning and 100mg (2           tablets) every evening.  Drink plenty of water           and no alcohol.  Authorizing Provider: CANDACE BENSON, RN CNP, FNP  [facility]  [facility]

## 2020-04-28 ENCOUNTER — TELEPHONE (OUTPATIENT)
Dept: OCCUPATIONAL THERAPY | Facility: CLINIC | Age: 60
End: 2020-04-28

## 2020-04-28 DIAGNOSIS — M25.521 RIGHT ELBOW PAIN: ICD-10-CM

## 2020-04-28 DIAGNOSIS — G56.21 CUBITAL TUNNEL SYNDROME ON RIGHT: ICD-10-CM

## 2020-04-29 ENCOUNTER — MYC REFILL (OUTPATIENT)
Dept: PALLIATIVE MEDICINE | Facility: CLINIC | Age: 60
End: 2020-04-29

## 2020-04-29 DIAGNOSIS — M50.30 DDD (DEGENERATIVE DISC DISEASE), CERVICAL: ICD-10-CM

## 2020-04-29 DIAGNOSIS — M47.812 CERVICAL SPONDYLOSIS WITHOUT MYELOPATHY: ICD-10-CM

## 2020-04-29 DIAGNOSIS — G89.29 CHRONIC BILATERAL LOW BACK PAIN WITHOUT SCIATICA: ICD-10-CM

## 2020-04-29 DIAGNOSIS — M54.2 CHRONIC NECK PAIN: ICD-10-CM

## 2020-04-29 DIAGNOSIS — M54.59 LUMBAR FACET JOINT PAIN: ICD-10-CM

## 2020-04-29 DIAGNOSIS — M54.50 CHRONIC BILATERAL LOW BACK PAIN WITHOUT SCIATICA: ICD-10-CM

## 2020-04-29 DIAGNOSIS — M79.18 MYOFASCIAL PAIN: ICD-10-CM

## 2020-04-29 DIAGNOSIS — G89.29 CHRONIC NECK PAIN: ICD-10-CM

## 2020-04-29 DIAGNOSIS — Z98.1 S/P CERVICAL SPINAL FUSION: ICD-10-CM

## 2020-04-29 DIAGNOSIS — M47.816 SPONDYLOSIS OF LUMBAR REGION WITHOUT MYELOPATHY OR RADICULOPATHY: ICD-10-CM

## 2020-04-29 RX ORDER — TRAMADOL HYDROCHLORIDE 50 MG/1
50 TABLET ORAL EVERY 6 HOURS PRN
Qty: 90 TABLET | Refills: 0 | Status: CANCELLED | OUTPATIENT
Start: 2020-04-29

## 2020-04-29 RX ORDER — TRAMADOL HYDROCHLORIDE 50 MG/1
50 TABLET ORAL EVERY 6 HOURS PRN
Qty: 90 TABLET | Refills: 0 | Status: SHIPPED | OUTPATIENT
Start: 2020-04-29 | End: 2020-05-15

## 2020-04-29 RX ORDER — TRAMADOL HYDROCHLORIDE 200 MG/1
200 TABLET, EXTENDED RELEASE ORAL EVERY 24 HOURS
Qty: 30 TABLET | Refills: 0 | Status: SHIPPED | OUTPATIENT
Start: 2020-04-29 | End: 2020-05-15

## 2020-04-29 RX ORDER — TRAMADOL HYDROCHLORIDE 200 MG/1
200 TABLET, EXTENDED RELEASE ORAL EVERY 24 HOURS
Qty: 30 TABLET | Refills: 0 | Status: CANCELLED | OUTPATIENT
Start: 2020-04-29

## 2020-04-29 NOTE — TELEPHONE ENCOUNTER
Medication refill information reviewed.     Due date for traMADol (ULTRAM-ER) 200 MG 24 hr tablet is 4/30/2020    Due date for traMADol (ULTRAM) 50 MG tablet is 4/30/2020    Prescriptions prepped for review.     Will route to provider.     Van Oquendo, RN  Care Coordinator   [facility] [place]

## 2020-04-29 NOTE — TELEPHONE ENCOUNTER
Patient requesting refill(s) of   traMADol (ULTRAM-ER) 200 MG 24 hr tablet     Last dispensed from pharmacy on 3/31/2020    Pt last seen by prescribing provider on 2/28/2020  Next appt scheduled for 5/15/2020     checked in the past 6 months? Yes If no, print current report and give to RN    Last urine drug screen date 2/28/2020  Current opioid agreement on file (completed within the last year) Yes Date of opioid agreement: 2/28/2020    Processing (pick one and delete the others):      E-prescribe to [facility] [address] [place], [place]  pharmacy    Will route to nursing [place] for review and preparation of prescription(s).

## 2020-04-29 NOTE — TELEPHONE ENCOUNTER
This refill request will be addressed in another encounter.    Van Oquendo, RN  Care Coordinator   [facility] [place]

## 2020-04-29 NOTE — TELEPHONE ENCOUNTER
Signed Prescriptions:                        Disp   Refills    traMADol (ULTRAM-ER) 200 MG 24 hr tablet   30 tab*0        Sig: Take 1 tablet (200 mg) by mouth every 24 hours fill           now. Start 4/30/2020  Authorizing Provider: MELANIE BENSON    traMADol (ULTRAM) 50 MG tablet             90 tab*0        Sig: Take 1 tablet (50 mg) by mouth every 6 hours as           needed for severe pain Max 3/day. Fill Fill now,           start 4/30/2020  Authorizing Provider: MELANIE BENSON    Reviewed [place]  April 29, 2020- no concerning fills.    Melanie STEVENS, RN CNP, FNP  [facility]  [facility] [place]

## 2020-05-07 ENCOUNTER — MYC MEDICAL ADVICE (OUTPATIENT)
Dept: PALLIATIVE MEDICINE | Facility: CLINIC | Age: 60
End: 2020-05-07

## 2020-05-07 NOTE — TELEPHONE ENCOUNTER
From: Palmer Suero      Created: 5/7/2020 1:14 PM        *-*-*This message has not been handled.*-*-*    Melanie- Hope all  is well with you and the family. I set up the phone visit for next friday 5/15 at 3:30. Mya the [facility] was originally planning on attending the appt. so she was hoping to get in on the phone call. I told her I may have one of the oldest phones known to [facility] left so I don't think I can do it but not sure about your end. She said its not the end of the world if she cannot. She is just interested in updates on health, work restrictions etc. Of course I have not worked since 3/17. I would give you all my savings to say that has fixed everything but keita you will still have to work.  We can talk further next Friday.  If you can let Mya in let me know and I can pass along.     Palmer        Will forward to melanie to review and advise.    Van Oquendo, RN  Care Coordinator   [facility]

## 2020-05-08 ENCOUNTER — MYC REFILL (OUTPATIENT)
Dept: FAMILY MEDICINE | Facility: CLINIC | Age: 60
End: 2020-05-08

## 2020-05-08 DIAGNOSIS — Z72.0 TOBACCO ABUSE: ICD-10-CM

## 2020-05-08 DIAGNOSIS — F43.22 ADJUSTMENT DISORDER WITH ANXIOUS MOOD: ICD-10-CM

## 2020-05-08 RX ORDER — BUPROPION HYDROCHLORIDE 150 MG/1
150 TABLET, EXTENDED RELEASE ORAL 2 TIMES DAILY
Qty: 60 TABLET | Refills: 0 | Status: SHIPPED | OUTPATIENT
Start: 2020-05-08 | End: 2020-06-08

## 2020-05-08 NOTE — TELEPHONE ENCOUNTER
Prescription approved per [facility] Refill Protocol.    Shila Saucedo, Pharm.D., [facility]  Medication Therapy Management Pharmacist  [phone]

## 2020-05-13 ENCOUNTER — MYC MEDICAL ADVICE (OUTPATIENT)
Dept: PALLIATIVE MEDICINE | Facility: CLINIC | Age: 60
End: 2020-05-13

## 2020-05-13 NOTE — TELEPHONE ENCOUNTER
From: Palmer Suero      Created: 5/13/2020 12:16 PM        *-*-*This message has not been handled.*-*-*    Just following up on last weeks message about [facility] getting in on the phone appt. I'm ok with it, I just am not able with my old phone to do it.      Palmer Navarro,   Are you able to conference another person on your phone appt?    Van Oquendo, RN  Care Coordinator   [facility] [place]

## 2020-05-13 NOTE — TELEPHONE ENCOUNTER
Xavier Chakraborty-     I am so sorry, I thought I had replied...     No, I don't think I am able to set up a conference call with the [facility].  Can they attend the next appointment (that we hope) will be in person?     Thanks.  Melanie STEVENS RN NIKOS, P  [facility] [place]    I have sent the above HEROZt message to the patient.     Melanie STEVENS RN CNP, P  [facility] [place]

## 2020-05-14 NOTE — TELEPHONE ENCOUNTER
Alejandrina message from patient on 5/13 at 1610:    Oh no worries. I am sure you have a lot of things going on, weird times we have.   Mya ([facility]) was fine with that too, she just wanted me to make sure notes would get faxed to her. Especially work restriction updates. I do have some things to talk about but save those for Friday.      Palmer  -------------  Will route to Melanie Thomas CNP for review.     ANALISA ArtisN, RN-BC  Patient Care Supervisor  [facility] [place]

## 2020-05-15 ENCOUNTER — VIRTUAL VISIT (OUTPATIENT)
Dept: PALLIATIVE MEDICINE | Facility: CLINIC | Age: 60
End: 2020-05-15
Payer: OTHER MISCELLANEOUS

## 2020-05-15 DIAGNOSIS — G89.29 CHRONIC BILATERAL LOW BACK PAIN WITHOUT SCIATICA: ICD-10-CM

## 2020-05-15 DIAGNOSIS — Z98.1 S/P CERVICAL SPINAL FUSION: ICD-10-CM

## 2020-05-15 DIAGNOSIS — M50.30 DDD (DEGENERATIVE DISC DISEASE), CERVICAL: ICD-10-CM

## 2020-05-15 DIAGNOSIS — M79.18 MYOFASCIAL PAIN: ICD-10-CM

## 2020-05-15 DIAGNOSIS — G89.29 CHRONIC NECK PAIN: ICD-10-CM

## 2020-05-15 DIAGNOSIS — M47.816 SPONDYLOSIS OF LUMBAR REGION WITHOUT MYELOPATHY OR RADICULOPATHY: ICD-10-CM

## 2020-05-15 DIAGNOSIS — M54.2 CHRONIC NECK AND BACK PAIN: ICD-10-CM

## 2020-05-15 DIAGNOSIS — G89.29 CHRONIC NECK AND BACK PAIN: ICD-10-CM

## 2020-05-15 DIAGNOSIS — M54.2 CHRONIC NECK PAIN: ICD-10-CM

## 2020-05-15 DIAGNOSIS — M54.59 LUMBAR FACET JOINT PAIN: ICD-10-CM

## 2020-05-15 DIAGNOSIS — M48.02 CERVICAL STENOSIS OF SPINAL CANAL: ICD-10-CM

## 2020-05-15 DIAGNOSIS — M47.812 CERVICAL SPONDYLOSIS WITHOUT MYELOPATHY: ICD-10-CM

## 2020-05-15 DIAGNOSIS — M54.9 CHRONIC NECK AND BACK PAIN: ICD-10-CM

## 2020-05-15 DIAGNOSIS — M54.50 CHRONIC BILATERAL LOW BACK PAIN WITHOUT SCIATICA: ICD-10-CM

## 2020-05-15 PROCEDURE — 99214 OFFICE O/P EST MOD 30 MIN: CPT | Mod: 95 | Performed by: NURSE PRACTITIONER

## 2020-05-15 RX ORDER — GABAPENTIN 600 MG/1
900 TABLET ORAL 3 TIMES DAILY
Qty: 135 TABLET | Refills: 3 | Status: SHIPPED | OUTPATIENT
Start: 2020-05-15 | End: 2020-08-26

## 2020-05-15 RX ORDER — TRAMADOL HYDROCHLORIDE 50 MG/1
50 TABLET ORAL EVERY 6 HOURS PRN
Qty: 90 TABLET | Refills: 0 | Status: SHIPPED | OUTPATIENT
Start: 2020-05-15 | End: 2020-06-29

## 2020-05-15 RX ORDER — TRAMADOL HYDROCHLORIDE 200 MG/1
200 TABLET, EXTENDED RELEASE ORAL EVERY 24 HOURS
Qty: 30 TABLET | Refills: 0 | Status: SHIPPED | OUTPATIENT
Start: 2020-05-15 | End: 2020-06-29

## 2020-05-15 ASSESSMENT — PAIN SCALES - GENERAL: PAINLEVEL: SEVERE PAIN (6)

## 2020-05-15 NOTE — TELEPHONE ENCOUNTER
I have addressed this in another mychart message.   Melanie STEVENS RN CNP, FNP  [facility]

## 2020-05-15 NOTE — PATIENT INSTRUCTIONS
Plan:   1. Physical Therapy: not at present  2. Clinical Health Psychologist: none  3. Diagnostic Studies:  None  4. Medication Management:    1. Continue tramadol ER 200mg once daily fill 5/26, sgart 5/30  2. Continue tramadol 50mg Q 6-8 hours PRN, max of 3/day. Fill 5/26, start 5/30  3. Continue gabapentin 900 mg TID   4. Continue Topamax 50 mg BID   5. Continue methocarbamol 500-1000 mg TID PRN muscle spasms   5. Further procedures recommended: none at present  6. The patient will follow-up with hand therapy as scheduled.  7. With regard to work restrictions, we will not make any changes at the present time. 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 Gov. [place] has stated restaurants are able to re-open on June 1. 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 We will adjust his work restrictions based on what he is expected to do and how he is able to adjust after being off of work for the past 8 weeks.   8. Recommendations to PCP: see above  9. Follow up: 8 weeks in person with low threshold for conversion to telephone visit if needed due to COVID-19 threat at time of scheduled visit.

## 2020-05-15 NOTE — PROGRESS NOTES
The patient has been notified of following:     This telephone visit will be conducted via a call between you and your provider. We have found that certain health care needs can be provided without the need for a physical exam.  This service lets us provide the care you need with a phone conversation.  If a prescription is necessary we can send it directly to your pharmacy.  If lab work is needed we can place an order for that and you can then stop by our lab to have the test done at a later time. This is a billable service but we do not know the cost at this time.     Patient has given verbal consent for Telephone visit?  Yes    DONTE Szymanski [facility]    5/15/2020      Truman Suero is a 59 year old male who is being evaluated via a billable telephone visit.        Chief complaint: neck and low back pain    Interval history:  Truman Suero is a 59 year old male is known to me for   Chronic neck pain  Cervical DDD  Cervical spondylosis (pain worse with extension/rotation indicating facetogenic component to pain)  Axial low back pain  Myofascial pain/muscle spasms  Remote history of ETOH overuse, attended AA for awhile. Sober for 10 years  ---PMHx includes: neck pain  ---PSHx includes: none listed      Recommendations/plan at the last visit on 2/28/2020 included:  1. Physical Therapy: not at present  2. Clinical Health Psychologist: none  3. Diagnostic Studies:  None  4. Medication Management:    1. Continue tramadol ER 200mg once daily  2. Continue tramadol 50mg Q 6-8 hours PRN, max of 3/day.   3. Continue gabapentin 900 mg TID   4. Continue Topamax 50 mg BID   5. Continue methocarbamol 500-1000 mg TID PRN muscle spasms   5. Further procedures recommended: none at present  6. The patient will follow-up with hand therapy as scheduled.  7. Recommendations to PCP: see above  8. Follow up: 8-12 weeks  9. UDS, last took Tramadol 50mg in the AM and Tramadol ER 200mg around  noon.  10. Re-signed opiate agreement form today.        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  -his wife is working from home.   -he tries doing some projects at home, he has tried painting and he can do this for about 10 minutes at a time   -he does not like to walk for exercise, but he does like to bicycle and he can do this for exercise and he enjoys it.   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     -Hand therapy has been pushed off a few times due to COVID-19 threat, but he should be seeing her in the next few weeks.     -he is not certain how things will go when work opens.       Interval history 2/28/2020  -He was referred to hand therapy for right wrist pain by Dr. Thomas and the right wrist/hand pain is distinct from cervical stenosis (radicular pain).  -Posterior neck pain that starts at the base of the skull and extends into the right shoulder.  -Notes some low back pain.  -Frequently dropping things from right hand, luckily the patient's dominant hand is the left. Painful symptoms are tolerable on Monday at the start of the work week, but he notes some overexertion by Friday.       At this point, the patient's participation with our multidisciplinary team includes:  The patient has been compliant with the program.  PT - attended non-pain management PHYSICAL THERAPY   Health Psych - has seen Kentrell Castañeda x4  Acupuncture: worked with Dr. Bereket LAY      Pain scores:    Pain intensity on average is 6 on a scale of 0-10.    Range is 5-9/10.   Pain right now is  "6/10.  Pain is described as \"aching, numb, unbearable, burning, shooting, throbbing, penetrating, stabbing, gnawing, miserable, and nagging.\"    Current pain relevant medications:      -Tramadol 200mg ER in the evening (helpful)  -Tramadol 50mg take 1 tablet  Q 6 hours PRN (using 2-3 tabs per day, helpful)  -ibuprofen 600mg PRN (helpful)  -gabapentin 900mg TID (somewhat helpful)  -methocarbamol 500-1000mg TID PRN muscle spasms (takes 1000 mg BID, somewhat helpful)  -Topamax 50 mg BID (helpful)    Other pertinent medications:  None    Previous Medications:  OPIATES: Tramdol (somewhat helpful)  NSAIDS: ibuprofen (helpful), Aleve (helpful)  MUSCLE RELAXANTS: none  ANTI-MIGRAINE MEDS: none  ANTI-DEPRESSANTS: none  SLEEP AIDS: none  ANTI-CONVULSANTS: gabapentin (helpful)  TOPICALS: lidocaine ointment (somewhat helpful)  Other meds: Tylenol (not helpful)        Other treatments have included:  Truman REI Suero has not been seen at a pain clinic in the past.    PT: tried, somewhat helpful  Chiropractic: helpful  Acupuncture: none  TENs Unit: none     Injections:    cervical radiofrequency nerve ablation at [facility] in December 2016 (did get good relief, got 70% relief of his typical neck pain)  -6/29/2017 Cervical facet joint steroid injections at C4-5 and C5-6 on the right with Dr. Nicole Harding (not helpful)  -3/8/2018 C7-T1 interlaminar DEMARCUS with Dr. Hugo Corrigan (not helpful)  -5/23/2018 right L4-5 transforaminal epidural steroid injection with Dr. Osorio (not helpful for back pain but did help leg pain)  -8/7/2018 bilateral SI joint injection with Dr Hugo Corrigan (helpful for one month)  -10/11/2018 l3-4 and L4-5 facet joint injections bilaterally with Dr. Hugo Corrigan (this has been helpful, more helpful than any other lumbar injections thus far)  -1/28/2019 bilateral L3-5 medial branch blocks #1 with Dr. Osorio (somewhat helpful)   -2/25/2019 LMBB #2 with Dr. Osorio (somewhat effective, but not enough " to proceed to RFA)  -5/6/2019 bilateral L4/L5 and L5/S1 facet joint injections with Dr. Stevens (helpful)  -11/29/2019 C7-T1 interlaminar epidural steroid injection with Dr. Corrigan (helpful temporarily)    Surgeries:  10/29/2018 right anterior cervical C5-6 discectomy and fusion by Dr. Jud Harkins (somewhat helpful)      UDS last completed on 2/28/2020  CSA last signed on 2/28/2020 with Melanie STEVENS RN CNP, P  [facility]     THE 4 A's OF OPIOID MAINTENANCE ANALGESIA    Analgesia: long acting Tramadol with some short acting tramadol does give some relief    Activity: ADLs    Adverse effects: none    Adherence to Rx protocol: yes      Side Effects: none  Patient is using the medication as prescribed: yes    Medications:  Current Outpatient Medications   Medication Sig Dispense Refill     aspirin (ASA) 81 MG EC tablet Take 1 tablet (81 mg) by mouth daily 90 tablet 0     atorvastatin (LIPITOR) 10 MG tablet Take 1 tablet (10 mg) by mouth daily 90 tablet 0     buPROPion (WELLBUTRIN SR) 150 MG 12 hr tablet Take 1 tablet (150 mg) by mouth 2 times daily 60 tablet 0     cyclobenzaprine (FLEXERIL) 5 MG tablet Take 1-2 tablets (5-10 mg) by mouth nightly as needed for muscle spasms Need 6 hours between this and methocarbamol 45 tablet 2     fluticasone (FLONASE) 50 MCG/ACT nasal spray Spray 2 sprays into both nostrils 2 times daily Needs appointment for further refills 32 mL 0     gabapentin (NEURONTIN) 600 MG tablet Take 1.5 tablets (900 mg) by mouth 3 times daily 135 tablet 3     methocarbamol (ROBAXIN) 500 MG tablet Take 1 tablet (500 mg) by mouth 3 times daily as needed for muscle spasms Should be 6 hours between this and cyclobenzaprine at night 90 tablet 2     metoprolol succinate ER (TOPROL-XL) 25 MG 24 hr tablet Take 1 tablet (25 mg) by mouth daily 90 tablet 3     order for DME BP cuff, brand as covered by insurance.  Dx: HTN 1 each 0     topiramate (TOPAMAX) 50 MG tablet Take 50mg  (1tablet) every morning and 100mg (2 tablets) every evening.  Drink plenty of water and no alcohol. 90 tablet 2     traMADol (ULTRAM) 50 MG tablet Take 1 tablet (50 mg) by mouth every 6 hours as needed for severe pain Max 3/day. Fill Fill now, start 4/30/2020 90 tablet 0     traMADol (ULTRAM-ER) 200 MG 24 hr tablet Take 1 tablet (200 mg) by mouth every 24 hours fill now. Start 4/30/2020 30 tablet 0       Medical History: any changes in medical history since they were last seen? none    Social History:   Home situation: , has 4 kids, 2 at home, one in college and one launched.   Occupation/Schooling: [employer] full time in [place]  Tobacco use: former smoker, quit on 3/20/2018  Alcohol use: sober for nearly 10 years. He used to work a sobriety program, used to go to   Drug use: none  History of chemical dependency treatment: no formal treatment, did AA    Is patient a current smoker or tobacco user?  QUIT on 3/20/2018  If yes, was cessation counseling offered?  no        Review of Systems:   The 14 system ROS was reviewed from with patient and is positive for:  Constitutional: fever/chills, fatigue, weight gain, weight loss  Eyes/Head: headache, dizziness  ENT: ringing in ears  Allergy/Immune: allergies  Skin: itching, rash, hives  Hematologic: easy bruising  Respiratory: cough, wheezing, shortness of breath  Cardiovascular: swelling in feet, fainting, palpitations, chest pain  GI: abdominal pain, nausea, vomiting, diarrhea, constipation  Endocrine: steroid use  Musculoskeletal:  joint pain, arthritis, stiffness, gout, back pain, neck pain  Urinary: frequency, urgency, incontinence, hesitancy  Neurologic: weakness, numbness/tingling, seizure, stroke, memory loss  Mental health: depression, anxiety, stress, suicidal ideation              Physical Exam:     Vital signs: There were no vitals taken for this visit.     Behavioral observations:  Awake, alert, cooperative                IMAGING:    MR CERVICAL  SPINE WITHOUT CONTRAST 9/5/2017 2:32 PM      HISTORY: Neck pain, worsening numbness in arms and lower extremities.  Radiculopathy, cervical region.     TECHNIQUE: Multiplanar multisequence images were obtained through the  cervical spine without contrast.     COMPARISON: 2/22/2017.     FINDINGS: Sagittal images demonstrate some minimal gentle cervical  kyphosis, otherwise normal posterior alignment. There is no evidence  for craniovertebral or cervicomedullary junction abnormality. The  cervical cord is minimally indented anteriorly at C4-C5 and C5-C6,  otherwise is normal in morphology and signal characteristics. Disc  space narrowing and discogenic marrow changes are present C3-C4,  C4-C5, C5-C6 and C6-C7.     C2-C3: Minimal facet hypertrophy. No stenosis.     C3-C4: Broad-based posterior osteophyte formation is present causing  some mild bilateral neural foraminal stenosis, but no central canal  stenosis.     C4-C5: Broad-based posterior osteophyte formation and mild facet  hypertrophy is present causing some mild to moderate central canal  stenosis, mild cord deformity, and mild-to-moderate bilateral neural  foraminal stenosis.     C5-C6: Broad-based posterior osteophyte formation and disc bulging is  present along with some facet hypertrophy. There is moderate central  canal stenosis and moderate bilateral neural foraminal stenosis.  Findings have progressed since the prior exam.     C6-C7: Broad-based disc bulging is present along with some minimal  posterior osteophyte formation. There is borderline to mild central  canal stenosis, but no neural foraminal stenosis.     C7-T1: Moderate facet hypertrophy. No significant stenosis.         IMPRESSION: Moderate multilevel degenerative disc and facet disease  with some progression at C5-C6 where there is moderate central canal  and bilateral neural foraminal stenosis along with cord deformity.          MR CERVICAL SPINE WITHOUT CONTRAST  2/22/2017 9:59  AM     HISTORY:  Bilateral neck and arm pain for three years.     COMPARISON: None.     TECHNIQUE: Routine MR cervical spine extended through T2.     FINDINGS: There is some degenerative bone marrow signal change C3-C6.  No malignant or destructive lesions. Normal alignment through T2.  Reversed cervical adenosis C3-C6.     The cervical and upper thoracic spinal cord appear intrinsically  normal. The craniocervical junction region is normal. No paraspinous  soft tissue abnormality.     Findings by level as follows:     C2-C3: Negative. No disc protrusion. No central or lateral stenosis.     C3-C4: Mild disc space narrowing. Mild diffuse annular bulge. Small  uncinate spur on the right. No significant central or left-sided  stenosis. Mild right-sided foraminal stenosis.     C4-C5: Moderate degenerative narrowing of the interspace. Mild ventral  disc osteophyte complex with minimal central stenosis. Small uncinate  spurs bilaterally with mild bilateral foraminal stenosis.     C6-C7: Moderate disc space narrowing. Mild broad-based disc osteophyte  complex with minimal central stenosis. Minimal uncinate spurs with  mild bilateral foraminal stenosis.     C6-C7: Moderate disc space narrowing. Mild ventral disc osteophyte  complex. No significant central or lateral stenosis.     C7-T1: No disc protrusion. No central or lateral stenosis. Moderate  bilateral facet joint disease.         IMPRESSION:  1. Degenerative changes as described C3-C4 through C7-T1. There is  only mild central and foraminal stenosis as described.  2. No intrinsic spinal cord abnormality through T2    [place] Prescription Monitoring Program:  Reviewed [facility] 5/15/2020, no concerning scripts      DIRE Score for selecting candidates for long term opioid analgesia for chronic pain:  Diagnosis  2  Intractablility  2  Risk    Psychological health  2    Chemical health  2    Reliability  2    Social support  3  Efficacy  2    Total DIRE Score = 15. Note  that   7-13 predicts poor outcome (compliance and efficacy) from opioid prescribing; 14-21 predicts good outcome (compliance and efficacy)  from opioid prescribing.      Assessment:   1. Cervical spondylosis without myelopathy  2. Cervical DDD  3. Chronic neck and back pain  4. Cervical stenosis of spinal canal  5. S/p cervical spinal fusion  6. Chronic neck pain   7. Lumbar facet joint pain  8. 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 Chronic bilateral low back pain without sciatica  10. Myofacial pain  11. Encounter for long-term use of opiate analgesic  12. Remote history of ETOH overuse, attended AA for awhile. Sober for >10 years  13. PMHx includes: neck pain  14. PSHx includes: none listed        Plan:   1. Physical Therapy: not at present  2. Clinical Health Psychologist: none  3. Diagnostic Studies:  None  4. Medication Management:    1. Continue tramadol ER 200mg once daily fill 5/26, sgart 5/30  2. Continue tramadol 50mg Q 6-8 hours PRN, max of 3/day. Fill 5/26, start 5/30  3. Continue gabapentin 900 mg TID   4. Continue Topamax 50 mg BID   5. Continue methocarbamol 500-1000 mg TID PRN muscle spasms   5. Further procedures recommended: none at present  6. The patient will follow-up with hand therapy as scheduled.  7. With regard to work restrictions, we will not make any changes at the present time. 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 Gov. [place] has stated restaurants are able to re-open on June 1. 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 We will adjust his work restrictions based on what he is expected to do and how he is able to adjust after being off of work for the past 8 weeks.   8. Recommendations to PCP: see above  9. Follow up: 8 weeks in person with low threshold for conversion to telephone visit if needed due to COVID-19 threat at time of scheduled visit.        Phone call duration: 40 minutes        Melanie STEVENS, RN CNP, FNP  [facility]

## 2020-05-18 ENCOUNTER — TELEPHONE (OUTPATIENT)
Dept: PALLIATIVE MEDICINE | Facility: CLINIC | Age: 60
End: 2020-05-18

## 2020-05-18 NOTE — TELEPHONE ENCOUNTER
LVM to schedule 8 week in person visit with low threshold for conversion to phone if needed due to FLORENCIO Peters    [facility]

## 2020-06-08 ENCOUNTER — THERAPY VISIT (OUTPATIENT)
Dept: OCCUPATIONAL THERAPY | Facility: CLINIC | Age: 60
End: 2020-06-08
Payer: COMMERCIAL

## 2020-06-08 DIAGNOSIS — G56.21 CUBITAL TUNNEL SYNDROME ON RIGHT: ICD-10-CM

## 2020-06-08 DIAGNOSIS — M25.521 RIGHT ELBOW PAIN: ICD-10-CM

## 2020-06-08 PROCEDURE — 97110 THERAPEUTIC EXERCISES: CPT | Mod: GO | Performed by: OCCUPATIONAL THERAPIST

## 2020-06-08 NOTE — PROGRESS NOTES
Hand Therapy Progress Note    Current Date:  6/8/2020    Reporting period is 3/12/2020 to 6/8/2020    Diagnosis: Right elbow/arm pain and paresthesias/cubital tunnel   DOI: 2/25/2020 (therapy referral)    Subjective:   Subjective changes noted by patient:  Arm is about the same. Still painful some days and some tingling with overuse at home. Has not yet RTW due to COVID 19.  Functional changes noted by patient:  No Change to Work Tasks  Patient has noted adverse reaction to:  None    Objective:  Pain Level (Scale 0-10)   3/12/2020 6/8/2020   Least 3-4 6   With Use 7-10 9     Pain Description  Date 3/12/2020 6/8/2020   Location Elbow lateral > medial Elbow lateral > medial   Pain Quality Dull Sharp, jabbing, aching   Frequency constant   constant   Pain is worst  daytime or nighttime Daytime and nighttime   Exacerbated by  overuse, work as  Overuse, lifting, reaching   Relieved by Stretching, cold, heat and medications Stretching, cold, heat   Progression Gradually getting worse Unchanged     Edema  None on observation     Sensation  Continues to be decreased intermitently in Median < Ulnar Nerve distribution per pt report    ROM    WNL's of hand, wrist and elbow.  No intrinsic atrophy and no clawing of ulnar digits.    Strength   (Measured in pounds)  Pain Report: - none  + mild    ++ moderate    +++ severe     3/12/2020 3/12/2020 6/8/2020   Trials Left Right Right   1  2  3 80  78  70 53-  42-  42- 50-  47-  52-   Average 76 46 50     Lat Pinch 3/12/2020 3/12/2020 6/8/2020   Trials Left Right Right   1  2  3 22  21  20 21-  19-  19- 17-  18-  18-   Average 21 20 18     3 Pt Pinch 3/12/2020 3/12/2020 6/8/2020   Trials Left Right Right   1  2  3 18  18  18 11+ slight  10+ slight  13+ slight 11+ slight  12+ slight  13+ slight   Average 18 12 12     Resisted Testing  Pain Report:  - none    + mild    ++ moderate    +++ severe    3/12/2020 6/8/2020   Supination  - -   Pronation - -   Wrist Ext with RD, Elbow Ext  + slight -   Wrist Ext with UD, Elbow Ext - -   Wrist Flex with RD, Elbow Ext - -   Wrist Flex with UD, Elbow Ext - -   EDC with Elbow Ext - -   FDS + R, S finger pain + R, S finger pain     Special Tests   - none    + mild    ++ moderate    +++ severe       3/12/2020 6/8/2020   Elbow Flexion Test + 20 secs + slight   Ulnar nerve subluxation at elbow + + slight   Tinels Cubital Tunnel + +   Tinels [place] - -   Tinels at Carpal Tunnel - ++   [place] - + slight     Palpation   3/12/2020 6/8/2020   Cubital tunnel + + slight   [place]  - -   MEP + +   Flexors - -   LEP + slight + slight   Extensors - -     Please refer to the daily flowsheet for treatment provided today.     Assessment:  Response to therapy has been improvement to:  Flexibility:  less tightness in involved muscles  Response to therapy has been lack of progress in:  Strength:   and pinch  Paresthesias:  Median and Ulnar nerve - continued numbness and tingling  Pain:  intensity of pain has not changed    Overall Assessment:  No change of symptoms has been noted.  Patient would benefit from continued therapy to achieve rehab potential  Patient would benefit from further evaluation of ongoing pain and paraesthesia if no change in next 4-6 weeks.  STG/LTG:  STGoals have been reviewed and no progress has been made;  see goal sheet for details and changes.  I have re-evaluated this patient and find that the nature, scope, duration and intensity of the therapy is appropriate for the medical condition of the patient.    Plan:  Frequency/Duration:  Recommend continuing with the current treatment plan. 2 X a month, once daily  for 2 months    Recommendations for Continued Therapy  Treatment Plan:    Modalities:    US   Therapeutic Exercise:    AROM, PROM, Tendon Gliding, Isotonics and Isometrics  Neuromuscular re-ed:   Nerve Gliding, Posture, Kinesiotaping and Luekotaping  Manual Techniques:   Friction massage and Myofascial release  Orthotic  Fabrication:    Elbow flexion block orthosis  Self Care:    Self Care Tasks and Ergonomic Considerations    Home Exercise Program:  Warmth for stiffness to forearm extensors, flexors and tricep  Ice to lateral and medial elbow after activity for pain  TFM to LEP and MEP  MFR to tricep and forearm extensors and flexors with tennis ball  PROM with stretch to forearm extensors and flexors  Eccentric wrist E/F strengthening    strengthening  Diagnostic education for protection of ulnar nerve  Avoid prolonged elbow flexion and leaning on elbow  OTC flexion block splint and wrist splint sleeping    Next Visit:  See in 2-4 weeks  Assess response to eccentric and  strengthening  Resume kinesiotaping for ulnar nerve subluxation as indicated   Add tendon gliding and nerve flossing  Recommend MD f/u if no change

## 2020-06-23 ENCOUNTER — MYC REFILL (OUTPATIENT)
Dept: FAMILY MEDICINE | Facility: CLINIC | Age: 60
End: 2020-06-23

## 2020-06-23 DIAGNOSIS — I25.10 MILD CAD: ICD-10-CM

## 2020-06-23 DIAGNOSIS — I73.9 PAD (PERIPHERAL ARTERY DISEASE) (H): ICD-10-CM

## 2020-06-25 RX ORDER — ATORVASTATIN CALCIUM 10 MG/1
10 TABLET, FILM COATED ORAL DAILY
Qty: 90 TABLET | Refills: 0 | Status: SHIPPED | OUTPATIENT
Start: 2020-06-25 | End: 2020-09-21

## 2020-06-25 NOTE — TELEPHONE ENCOUNTER
Routing refill request to provider for review/approval because:  Labs not current:  LDL      Pending Prescriptions:                       Disp   Refills    atorvastatin (LIPITOR) 10 MG tablet        90 tab*0        Sig: Take 1 tablet (10 mg) by mouth daily      Radha Figueroa RN

## 2020-06-29 ENCOUNTER — MYC REFILL (OUTPATIENT)
Dept: PALLIATIVE MEDICINE | Facility: CLINIC | Age: 60
End: 2020-06-29

## 2020-06-29 DIAGNOSIS — M47.816 SPONDYLOSIS OF LUMBAR REGION WITHOUT MYELOPATHY OR RADICULOPATHY: ICD-10-CM

## 2020-06-29 DIAGNOSIS — M50.30 DDD (DEGENERATIVE DISC DISEASE), CERVICAL: ICD-10-CM

## 2020-06-29 DIAGNOSIS — M54.50 CHRONIC BILATERAL LOW BACK PAIN WITHOUT SCIATICA: ICD-10-CM

## 2020-06-29 DIAGNOSIS — G89.29 CHRONIC BILATERAL LOW BACK PAIN WITHOUT SCIATICA: ICD-10-CM

## 2020-06-29 DIAGNOSIS — M79.18 MYOFASCIAL PAIN: ICD-10-CM

## 2020-06-29 DIAGNOSIS — M47.812 CERVICAL SPONDYLOSIS WITHOUT MYELOPATHY: ICD-10-CM

## 2020-06-29 DIAGNOSIS — Z98.1 S/P CERVICAL SPINAL FUSION: ICD-10-CM

## 2020-06-29 DIAGNOSIS — M54.2 CHRONIC NECK PAIN: ICD-10-CM

## 2020-06-29 DIAGNOSIS — G89.29 CHRONIC NECK PAIN: ICD-10-CM

## 2020-06-29 DIAGNOSIS — M54.59 LUMBAR FACET JOINT PAIN: ICD-10-CM

## 2020-06-29 RX ORDER — TRAMADOL HYDROCHLORIDE 200 MG/1
200 TABLET, EXTENDED RELEASE ORAL EVERY 24 HOURS
Qty: 30 TABLET | Refills: 0 | Status: CANCELLED | OUTPATIENT
Start: 2020-06-29

## 2020-06-29 RX ORDER — TRAMADOL HYDROCHLORIDE 50 MG/1
50 TABLET ORAL EVERY 6 HOURS PRN
Qty: 90 TABLET | Refills: 0 | Status: SHIPPED | OUTPATIENT
Start: 2020-06-29 | End: 2020-07-21

## 2020-06-29 RX ORDER — TRAMADOL HYDROCHLORIDE 50 MG/1
50 TABLET ORAL EVERY 6 HOURS PRN
Qty: 90 TABLET | Refills: 0 | Status: CANCELLED | OUTPATIENT
Start: 2020-06-29

## 2020-06-29 RX ORDER — TRAMADOL HYDROCHLORIDE 200 MG/1
200 TABLET, EXTENDED RELEASE ORAL EVERY 24 HOURS
Qty: 30 TABLET | Refills: 0 | Status: SHIPPED | OUTPATIENT
Start: 2020-06-29 | End: 2020-07-21

## 2020-06-29 NOTE — TELEPHONE ENCOUNTER
Medication refill information reviewed.     Due date for traMADol (ULTRAM-ER) 200 MG 24 hr tablet is 6/29/2020     Prescriptions prepped for review.     Will route to provider.     Van Oquendo, RN  Care Coordinator   [facility] [place]

## 2020-06-29 NOTE — TELEPHONE ENCOUNTER
Received call from patient requesting refill(s) of traMADol (ULTRAM-ER) 200 MG 24 hr tablet    Last dispensed from pharmacy on 05/26/20    Pt last seen by prescribing provider on 05/15/20  Next appt scheduled for 07/10/20     checked in the past 6 months? Yes If no, print current report and give to RN    Last urine drug screen date 02/28/20  Current opioid agreement on file (completed within the last year) Yes Date of opioid agreement: 02/28/20    Processing (pick one and delete the others):      E-prescribe to pharmacy-[facility] [address] [place] [address]    Will route to nursing [place] for review and preparation of prescription(s).

## 2020-06-29 NOTE — TELEPHONE ENCOUNTER
Medication refill information reviewed.     Due date for traMADol (ULTRAM) 50 MG tablet is 5/29/2020     Prescriptions prepped for review.     Will route to provider.     Van Oquendo, RN  Care Coordinator   [facility] [place]

## 2020-06-29 NOTE — TELEPHONE ENCOUNTER
Received call from patient requesting refill on traMADol (ULTRAM) 50 MG tablet    Last dispensed from pharmacy on 05/26/20    Pt last seen by prescribing provider on 05/15/20  Next appt scheduled for 07/10/20     checked in the past 6 months? Yes If no, print current report and give to RN    Last urine drug screen date 02/28/20  Current opioid agreement on file (completed within the last year) Yes Date of opioid agreement: 02/28/20    Processing (pick one and delete the others):      [facility] [address] [place] [address], [place] - [address]  [facility]    Will route to nursing [place] for review and preparation of prescription(s).

## 2020-06-30 NOTE — TELEPHONE ENCOUNTER
Signed Prescriptions:                        Disp   Refills    traMADol (ULTRAM) 50 MG tablet             90 tab*0        Sig: Take 1 tablet (50 mg) by mouth every 6 hours as           needed for severe pain Max 3/day. Fill 6/26/20,           start 6/29/2020  Authorizing Provider: MELANIE BENSON    Reviewed [facility] June 29, 2020- no concerning fills.    Melanie STEVENS, RN CNP, FNP  [facility]  [facility]

## 2020-07-01 ENCOUNTER — TELEPHONE (OUTPATIENT)
Dept: PALLIATIVE MEDICINE | Facility: CLINIC | Age: 60
End: 2020-07-01

## 2020-07-01 NOTE — TELEPHONE ENCOUNTER
LVM visit on 7/10 should be Doximity video OR pt can re-schedule in person on Tuesdays on/after 7/21/2020           Pooja Peters    [facility]

## 2020-07-07 ENCOUNTER — THERAPY VISIT (OUTPATIENT)
Dept: OCCUPATIONAL THERAPY | Facility: CLINIC | Age: 60
End: 2020-07-07
Payer: OTHER MISCELLANEOUS

## 2020-07-07 DIAGNOSIS — G56.21 CUBITAL TUNNEL SYNDROME ON RIGHT: ICD-10-CM

## 2020-07-07 DIAGNOSIS — M25.521 RIGHT ELBOW PAIN: ICD-10-CM

## 2020-07-07 PROCEDURE — 97112 NEUROMUSCULAR REEDUCATION: CPT | Mod: GO | Performed by: OCCUPATIONAL THERAPIST

## 2020-07-07 PROCEDURE — 97110 THERAPEUTIC EXERCISES: CPT | Mod: GO | Performed by: OCCUPATIONAL THERAPIST

## 2020-07-07 NOTE — PROGRESS NOTES
SOAP note objective information for 7/7/2020:    Diagnosis: Right elbow/arm pain and paresthesias/cubital tunnel   DOI: 2/25/2020 (therapy referral)    Pain Level (Scale 0-10)   3/12/2020 6/8/2020 7/7/2020   Least 3-4 6 3-4   With Use 7-10 9 7-8     Strength   (Measured in pounds)  Pain Report: - none  + mild    ++ moderate    +++ severe     3/12/2020 3/12/2020 6/8/2020 7/7/2020   Trials Left Right Right Right   1  2  3 80  78  70 53-  42-  42- 50-  47-  52- 66-  59-  64-   Average 76 46 50 63     Palpation   3/12/2020 6/8/2020 7/7/2020   Cubital tunnel + + slight + slight   [place]  - - + slight   MEP + + + slight   Flexors - - -   LEP + slight + slight + slight   Extensors - - -     Home Exercise Program:  Warmth for stiffness to forearm extensors, flexors and tricep  Ice to lateral and medial elbow after activity for pain  TFM to LEP and MEP  MFR to tricep and forearm extensors and flexors with tennis ball  PROM with stretch to forearm extensors and flexors  Eccentric wrist E/F strengthening    strengthening  Tendon gliding with 3 fists and FDS  Partial proximal median nerve gliding  Diagnostic education for protection of ulnar nerve  Avoid prolonged elbow flexion and leaning on elbow  OTC flexion block splint and wrist splint sleeping    Next Visit:  See in 2-4 weeks  Assess response to tendon and nerve gliding  Resume kinesiotaping for ulnar nerve subluxation as indicated   Add pec stretches and postural exercises  Recommend MD f/u if continue nerve symptoms     Please refer to the daily flowsheet for treatment today, total treatment time and time spent performing 1:1 timed codes.

## 2020-07-21 ENCOUNTER — OFFICE VISIT (OUTPATIENT)
Dept: PALLIATIVE MEDICINE | Facility: CLINIC | Age: 60
End: 2020-07-21
Payer: COMMERCIAL

## 2020-07-21 VITALS
DIASTOLIC BLOOD PRESSURE: 82 MMHG | SYSTOLIC BLOOD PRESSURE: 122 MMHG | BODY MASS INDEX: 26.31 KG/M2 | WEIGHT: 163 LBS | HEART RATE: 58 BPM

## 2020-07-21 DIAGNOSIS — M50.30 DDD (DEGENERATIVE DISC DISEASE), CERVICAL: ICD-10-CM

## 2020-07-21 DIAGNOSIS — M54.59 LUMBAR FACET JOINT PAIN: ICD-10-CM

## 2020-07-21 DIAGNOSIS — M54.50 CHRONIC BILATERAL LOW BACK PAIN WITHOUT SCIATICA: ICD-10-CM

## 2020-07-21 DIAGNOSIS — G89.29 CHRONIC BILATERAL LOW BACK PAIN WITHOUT SCIATICA: ICD-10-CM

## 2020-07-21 DIAGNOSIS — M79.18 MYOFASCIAL PAIN: ICD-10-CM

## 2020-07-21 DIAGNOSIS — M54.12 CERVICAL RADICULOPATHY: Primary | ICD-10-CM

## 2020-07-21 DIAGNOSIS — M47.812 CERVICAL SPONDYLOSIS WITHOUT MYELOPATHY: ICD-10-CM

## 2020-07-21 DIAGNOSIS — M54.2 CHRONIC NECK PAIN: ICD-10-CM

## 2020-07-21 DIAGNOSIS — M47.816 SPONDYLOSIS OF LUMBAR REGION WITHOUT MYELOPATHY OR RADICULOPATHY: ICD-10-CM

## 2020-07-21 DIAGNOSIS — G89.29 CHRONIC NECK PAIN: ICD-10-CM

## 2020-07-21 DIAGNOSIS — Z98.1 S/P CERVICAL SPINAL FUSION: ICD-10-CM

## 2020-07-21 PROCEDURE — 99214 OFFICE O/P EST MOD 30 MIN: CPT | Performed by: NURSE PRACTITIONER

## 2020-07-21 RX ORDER — TRAMADOL HYDROCHLORIDE 50 MG/1
50 TABLET ORAL EVERY 6 HOURS PRN
Qty: 90 TABLET | Refills: 0 | Status: SHIPPED | OUTPATIENT
Start: 2020-07-21 | End: 2020-08-26

## 2020-07-21 RX ORDER — TOPIRAMATE 50 MG/1
TABLET, FILM COATED ORAL
Qty: 90 TABLET | Refills: 2 | Status: SHIPPED | OUTPATIENT
Start: 2020-07-21 | End: 2020-10-09

## 2020-07-21 RX ORDER — METHOCARBAMOL 500 MG/1
500 TABLET, FILM COATED ORAL 3 TIMES DAILY PRN
Qty: 90 TABLET | Refills: 2 | Status: SHIPPED | OUTPATIENT
Start: 2020-07-21 | End: 2021-05-27

## 2020-07-21 RX ORDER — TRAMADOL HYDROCHLORIDE 200 MG/1
200 TABLET, EXTENDED RELEASE ORAL EVERY 24 HOURS
Qty: 30 TABLET | Refills: 0 | Status: SHIPPED | OUTPATIENT
Start: 2020-07-21 | End: 2020-08-26

## 2020-07-21 ASSESSMENT — PAIN SCALES - GENERAL: PAINLEVEL: SEVERE PAIN (7)

## 2020-07-21 NOTE — PATIENT INSTRUCTIONS
PLAN:  1. Continue hand therapy as recommended  2. Medications:   1. Continue tramadol ER and Tramadol IR without change  2. Continue Flexeril at night and methocarbamol as needed for muscle spasms  3. Continue topamax without change  3. Procedures: none, may consider cervical facet joint injections  4. Obtain cervical MRI at [place], call [phone] to schedule. I will send a [employer] with results a few days after this is completed  5. Follow-up with me in 4-6 weeks video visit.   6. No changes to current work restrictions, will wait for results from cervical MRI before considering any changes.       ----------------------------------------------------------------  Clinic Number:  [phone]     Call with any questions about your care and for scheduling assistance.     Calls are returned Monday through Friday between 8 AM and 4:30 PM. We usually get back to you within 2 business days depending on the issue/request.    If we are prescribing your medications:    For opioid medication refills, call the clinic or send a [employer]hart message 7 days in advance.  Please include:    Name of requested medication    Name of the pharmacy.    For non-opioid medications, call your pharmacy directly to request a refill. Please allow 3-4 days to be processed.     Per [place] State Law:    All controlled substance prescriptions must be filled within 30 days of being written.      For those controlled substances allowing refills, pickup must occur within 30 days of last fill.      We believe regular attendance is key to your success in our program!      Any time you are unable to keep your appointment we ask that you call us at least 24 hours in advance to cancel.This will allow us to offer the appointment time to another patient.     Multiple missed appointments may lead to dismissal from the clinic.

## 2020-07-21 NOTE — PROGRESS NOTES
[facility]    7/21/2020       Chief complaint: neck and right arm pain and axial low back pain    Interval history:  Truman Suero is a 59 year old male is known to me for   Chronic neck pain  Cervical DDD  Cervical spondylosis (pain worse with extension/rotation indicating facetogenic component to pain)  Axial low back pain  Myofascial pain/muscle spasms  Remote history of ETOH overuse, attended AA for awhile. Sober for 10 years  ---PMHx includes: neck pain  ---PSHx includes: none listed      Recommendations/plan at the last visit on 5/15/2020 included:  1. Physical Therapy: not at present  2. Clinical Health Psychologist: none  3. Diagnostic Studies:  None  4. Medication Management:    1. Continue tramadol ER 200mg once daily fill 5/26, sgart 5/30  2. Continue tramadol 50mg Q 6-8 hours PRN, max of 3/day. Fill 5/26, start 5/30  3. Continue gabapentin 900 mg TID   4. Continue Topamax 50 mg BID   5. Continue methocarbamol 500-1000 mg TID PRN muscle spasms   5. Further procedures recommended: none at present  6. The patient will follow-up with hand therapy as scheduled.  7. With regard to work restrictions, we will not make any changes at the present time. 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 Gov. [place] has stated restaurants are able to re-open on June 1. 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 We will adjust his work restrictions based on what he is expected to do and how he is able to adjust after being off of work for the past 8 weeks.   8. Recommendations to PCP: see above  9. Follow up: 8 weeks in person with low threshold for conversion to telephone visit if needed due to COVID-19 threat at time of scheduled visit.           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 They have reservations only and following the state guidelines for being open.  -he has not yet been called back to work as they are below the capacity  -has ongoing neck pain that radiates into his right arm as well as axial low back pain  -he has not heard what will happen with his return to work  -he is done doing the painting he had to do at home, had to do this in small chunks and pace himself  -he continues to work on projects at home slowly  -he is taking some on-line course-work and is really enjoying this  -he is working with Najma in OT on hand therapy and this is going well  -no recent imaging of his cervical spine, still having some radicular pain on the right side and some potential facet pain in the posterior neck to the shoulder region, worse with cervical extension and extension/rotation        Interval history 5/15/2020  -he has not worked since 3/16/2020 as the restaurant he works for shut down due to Safe at Home order in [place] was instituted on 3/17/2020 due to COVID-19 viral threat.  -his wife is working from home.   -he tries doing some projects at home, he has tried painting and he can do this for about 10 minutes at a time   -he does not like to walk for exercise, but he does like to bicycle and he can do this for exercise and he enjoys it.   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      -Hand therapy has been pushed off a few times due to COVID-19 threat, but he should be seeing her in the next few weeks.      -he is not certain how things will go when work opens.     Interval history 2/28/2020  -He was referred to hand therapy for right wrist pain by Dr. Thomas and the right wrist/hand pain is distinct from cervical stenosis (radicular pain).  -Posterior  "neck pain that starts at the base of the skull and extends into the right shoulder.  -Notes some low back pain.  -Frequently dropping things from right hand, luckily the patient's dominant hand is the left. Painful symptoms are tolerable on Monday at the start of the work week, but he notes some overexertion by Friday.       At this point, the patient's participation with our multidisciplinary team includes:  The patient has been compliant with the program.  PT - attended non-pain management PHYSICAL THERAPY   Health Psych - has seen Kentrell Castañeda x4  Acupuncture: worked with Dr. Bereket LAY      Pain scores:    Pain intensity on average is 7 on a scale of 0-10.    Range is 5-10/10.   Pain right now is 7/10.  Pain is described as \"burning, shooting, throbbing, stabbing, miserable, numb, tiring, exhausting, penetrating, nagging, unbearable.\"    Current pain relevant medications:      -Tramadol 200mg ER in the evening (helpful)  -Tramadol 50mg take 1 tablet  Q 6 hours PRN (using 2-3 tabs per day, helpful)  -ibuprofen 600mg PRN (helpful)  -gabapentin 900mg TID (somewhat helpful)  -methocarbamol 500-1000mg TID PRN muscle spasms (takes 1000 mg BID, somewhat helpful)  -Topamax 50 mg BID (helpful)    Other pertinent medications:  None    Previous Medications:  OPIATES: Tramdol (somewhat helpful)  NSAIDS: ibuprofen (helpful), Aleve (helpful)  MUSCLE RELAXANTS: none  ANTI-MIGRAINE MEDS: none  ANTI-DEPRESSANTS: none  SLEEP AIDS: none  ANTI-CONVULSANTS: gabapentin (helpful)  TOPICALS: lidocaine ointment (somewhat helpful)  Other meds: Tylenol (not helpful)        Other treatments have included:  Truman Suero has not been seen at a pain clinic in the past.    PT: tried, somewhat helpful  Chiropractic: helpful  Acupuncture: none  TENs Unit: none     Injections:    cervical radiofrequency nerve ablation at [facility] in December 2016 (did get good relief, got 70% relief of his typical neck pain)  -6/29/2017 Cervical " facet joint steroid injections at C4-5 and C5-6 on the right with Dr. Nicole Harding (not helpful)  -3/8/2018 C7-T1 interlaminar DEMARCUS with Dr. Hugo Corrigan (not helpful)  -5/23/2018 right L4-5 transforaminal epidural steroid injection with Dr. Stevens (not helpful for back pain but did help leg pain)  -8/7/2018 bilateral SI joint injection with Dr Hugo Corrigan (helpful for one month)  -10/11/2018 l3-4 and L4-5 facet joint injections bilaterally with Dr. Hugo Corrigan (this has been helpful, more helpful than any other lumbar injections thus far)  -1/28/2019 bilateral L3-5 medial branch blocks #1 with Dr. Stevens (somewhat helpful)   -2/25/2019 LMBB #2 with Dr. Stevens (somewhat effective, but not enough to proceed to RFA)  -5/6/2019 bilateral L4/L5 and L5/S1 facet joint injections with Dr. Stevens (helpful)  -11/29/2019 C7-T1 interlaminar epidural steroid injection with Dr. Corrigan (helpful temporarily)    Surgeries:  10/29/2018 right anterior cervical C5-6 discectomy and fusion by Dr. Jud Harkins (somewhat helpful)          THE 4 A's OF OPIOID MAINTENANCE ANALGESIA    Analgesia: long acting Tramadol with some short acting tramadol does give some relief    Activity: ADLs    Adverse effects: none    Adherence to Rx protocol: yes      Side Effects: none  Patient is using the medication as prescribed: yes    Medications:  Current Outpatient Medications   Medication Sig Dispense Refill     atorvastatin (LIPITOR) 10 MG tablet Take 1 tablet (10 mg) by mouth daily 90 tablet 0     buPROPion (WELLBUTRIN SR) 150 MG 12 hr tablet Take 1 tablet (150 mg) by mouth 2 times daily. 60 tablet 0     cyclobenzaprine (FLEXERIL) 5 MG tablet Take 1-2 tablets (5-10 mg) by mouth nightly as needed for muscle spasms Need 6 hours between this and methocarbamol 45 tablet 2     gabapentin (NEURONTIN) 600 MG tablet Take 1.5 tablets (900 mg) by mouth 3 times daily 135 tablet 3     methocarbamol (ROBAXIN) 500 MG tablet Take 1 tablet (500 mg) by  mouth 3 times daily as needed for muscle spasms Should be 6 hours between this and cyclobenzaprine at night 90 tablet 2     metoprolol succinate ER (TOPROL-XL) 25 MG 24 hr tablet Take 1 tablet (25 mg) by mouth daily 90 tablet 3     topiramate (TOPAMAX) 50 MG tablet Take 50mg (1tablet) every morning and 100mg (2 tablets) every evening.  Drink plenty of water and no alcohol. 90 tablet 2     traMADol (ULTRAM) 50 MG tablet Take 1 tablet (50 mg) by mouth every 6 hours as needed for severe pain Max 3/day. Fill 6/26/20, start 6/29/2020 90 tablet 0     traMADol (ULTRAM-ER) 200 MG 24 hr tablet Take 1 tablet (200 mg) by mouth every 24 hours Fill 6/29/20 Start 6/29/2020 30 tablet 0     aspirin (ASA) 81 MG EC tablet Take 1 tablet (81 mg) by mouth daily (Patient not taking: Reported on 7/21/2020) 90 tablet 0     fluticasone (FLONASE) 50 MCG/ACT nasal spray Spray 2 sprays into both nostrils 2 times daily Needs appointment for further refills (Patient not taking: Reported on 7/21/2020) 32 mL 0     order for DME BP cuff, brand as covered by insurance.  Dx: HTN 1 each 0       Medical History: any changes in medical history since they were last seen? none    Social History:   Home situation: , has 4 kids, 2 at home, one in college and one launched.   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 He used to work a sobriety program, used to go to   Drug use: none  History of chemical dependency treatment: no formal treatment, did     Is patient a current smoker or tobacco user?  QUIT on 3/20/2018  If yes, was cessation counseling offered?  no        Review of Systems:   ROS is positive as per HPI as well as for headache, allergies, high blood pressure, stiffness, back pain, neck pain, weakness, numbness/tingling, anxiety and is negative for fevers, sweats, or constipation, diarrhea.          Physical Exam:     Vital signs: BP  122/82   Pulse 58   Wt 73.9 kg (163 lb)   BMI 26.31 kg/m       Behavioral observations:  Awake, alert, cooperative    Gait:  normal    Musculoskeletal exam:    Moves well in exam room  Cervical exam:  Cervical flexion to 30 degrees  Cervical extension to 20 degress, stiff and sore  Cervical extension/rotation to the right is painful  Cervical extension/rotation to the left is painful    Lumbar exam:  Lumbar flexion to 90 degrees  Lumbar extension to 20 degrees  Lumbar extension/rotation to the right is mildly painful  Lumbar extension/rotation to the left is mildly painful  SI joints non-tender  Piriformis region non-tender bilaterally    Gross motor testing of bilateral shoulder abduction, elbow flexion, elbow extension, finger abduction, hip flexion, knee flexion/extension, and ankle dorsiflexion/plantarflexion is normal and symmetric at (5/5) bilaterally.         Neuro exam:  SILT in all extremities     Skin/vascular/autonomic:  No suspicious lesions on exposed skin.      Other:  NA    Is the patient hypertensive today? no  Hypertensive on recheck of BP?   No  If yes, was patient recommended to see Primary Care Provider in follow up for management of HTN?  No            IMAGING:    MR CERVICAL SPINE WITHOUT CONTRAST 9/5/2017 2:32 PM      HISTORY: Neck pain, worsening numbness in arms and lower extremities.  Radiculopathy, cervical region.     TECHNIQUE: Multiplanar multisequence images were obtained through the  cervical spine without contrast.     COMPARISON: 2/22/2017.     FINDINGS: Sagittal images demonstrate some minimal gentle cervical  kyphosis, otherwise normal posterior alignment. There is no evidence  for craniovertebral or cervicomedullary junction abnormality. The  cervical cord is minimally indented anteriorly at C4-C5 and C5-C6,  otherwise is normal in morphology and signal characteristics. Disc  space narrowing and discogenic marrow changes are present C3-C4,  C4-C5, C5-C6 and C6-C7.     C2-C3:  Minimal facet hypertrophy. No stenosis.     C3-C4: Broad-based posterior osteophyte formation is present causing  some mild bilateral neural foraminal stenosis, but no central canal  stenosis.     C4-C5: Broad-based posterior osteophyte formation and mild facet  hypertrophy is present causing some mild to moderate central canal  stenosis, mild cord deformity, and mild-to-moderate bilateral neural  foraminal stenosis.     C5-C6: Broad-based posterior osteophyte formation and disc bulging is  present along with some facet hypertrophy. There is moderate central  canal stenosis and moderate bilateral neural foraminal stenosis.  Findings have progressed since the prior exam.     C6-C7: Broad-based disc bulging is present along with some minimal  posterior osteophyte formation. There is borderline to mild central  canal stenosis, but no neural foraminal stenosis.     C7-T1: Moderate facet hypertrophy. No significant stenosis.         IMPRESSION: Moderate multilevel degenerative disc and facet disease  with some progression at C5-C6 where there is moderate central canal  and bilateral neural foraminal stenosis along with cord deformity.          MR CERVICAL SPINE WITHOUT CONTRAST  2/22/2017 9:59 AM     HISTORY:  Bilateral neck and arm pain for three years.     COMPARISON: None.     TECHNIQUE: Routine MR cervical spine extended through T2.     FINDINGS: There is some degenerative bone marrow signal change C3-C6.  No malignant or destructive lesions. Normal alignment through T2.  Reversed cervical adenosis C3-C6.     The cervical and upper thoracic spinal cord appear intrinsically  normal. The craniocervical junction region is normal. No paraspinous  soft tissue abnormality.     Findings by level as follows:     C2-C3: Negative. No disc protrusion. No central or lateral stenosis.     C3-C4: Mild disc space narrowing. Mild diffuse annular bulge. Small  uncinate spur on the right. No significant central or  left-sided  stenosis. Mild right-sided foraminal stenosis.     C4-C5: Moderate degenerative narrowing of the interspace. Mild ventral  disc osteophyte complex with minimal central stenosis. Small uncinate  spurs bilaterally with mild bilateral foraminal stenosis.     C6-C7: Moderate disc space narrowing. Mild broad-based disc osteophyte  complex with minimal central stenosis. Minimal uncinate spurs with  mild bilateral foraminal stenosis.     C6-C7: Moderate disc space narrowing. Mild ventral disc osteophyte  complex. No significant central or lateral stenosis.     C7-T1: No disc protrusion. No central or lateral stenosis. Moderate  bilateral facet joint disease.         IMPRESSION:  1. Degenerative changes as described C3-C4 through C7-T1. There is  only mild central and foraminal stenosis as described.  2. No intrinsic spinal cord abnormality through T2    [place] Prescription Monitoring Program:  Reviewed [facility] July 21, 2020- no concerning fills.  Melanie STEVENS, RN CNP, FNP  [facility]  [facility]          DIRE Score for selecting candidates for long term opioid analgesia for chronic pain:  Diagnosis  2  Intractablility  2  Risk    Psychological health  2    Chemical health  2    Reliability  2    Social support  3  Efficacy  2    Total DIRE Score = 15. Note that   7-13 predicts poor outcome (compliance and efficacy) from opioid prescribing; 14-21 predicts good outcome (compliance and efficacy)  from opioid prescribing.      Assessment:   1. Cervical radiculopathy on the right side  2. Cervical spondylosis without myelopathy  3. Cervical DDD  4. Chronic bilateral low back pain without sciatica  5. Myofascial pain  6. S/p cervical spinal fusion  7. Chronic neck pain   8. Lumbar facet joint pain  9. 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 Encounter for long-term use of opiate analgesic  11. Urine drug screen last done  2/28/2020  12. Controlled substance agreement signed 2/28/2020  13. Remote history of ETOH overuse, attended AA for awhile. Sober for >10 years  14. PMHx includes: neck pain  15. PSHx includes: none listed      Plan:   1. Physical Therapy: not at present  2. Clinical Health Psychologist: none  3. Diagnostic Studies:  None  4. Medication Management:    1. Continue tramadol ER 200mg once daily fill 5/26, sgart 5/30  2. Continue tramadol 50mg Q 6-8 hours PRN, max of 3/day. Fill 5/26, start 5/30  3. Continue gabapentin 900 mg TID   4. Continue Topamax 50 mg BID   5. Continue methocarbamol 500-1000 mg TID PRN muscle spasms   5. Further procedures recommended: none at present  6. The patient will follow-up with hand therapy as scheduled.  7. With regard to work restrictions, we will not make any changes at the present time. 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 We will adjust his work restrictions based on what he is expected to do and how he is able to adjust after being off of work for the past ~16 weeks.   8. Recommendations to PCP: see above  9. Follow up: 4-6 weeks video visit.     Total time spent face to face was 35 minutes and more than 50% of face to face time was spent in counseling and/or coordination of care regarding the diagnosis and recommendations above. Discussed no changes to work restrictions at present, may change in future. Also, recommended updated cervical MRI due to ongoing radicular symptoms and cervical facet joint pain.       Melanie STEVENS, RN CNP, FNP  [facility]  [facility]

## 2020-07-22 ENCOUNTER — TELEPHONE (OUTPATIENT)
Dept: PALLIATIVE MEDICINE | Facility: CLINIC | Age: 60
End: 2020-07-22

## 2020-07-31 ENCOUNTER — ANCILLARY PROCEDURE (OUTPATIENT)
Dept: MRI IMAGING | Facility: CLINIC | Age: 60
End: 2020-07-31
Attending: NURSE PRACTITIONER
Payer: COMMERCIAL

## 2020-07-31 DIAGNOSIS — M50.30 DDD (DEGENERATIVE DISC DISEASE), CERVICAL: ICD-10-CM

## 2020-07-31 DIAGNOSIS — M47.812 CERVICAL SPONDYLOSIS WITHOUT MYELOPATHY: ICD-10-CM

## 2020-07-31 DIAGNOSIS — G89.29 CHRONIC NECK PAIN: ICD-10-CM

## 2020-07-31 DIAGNOSIS — M54.12 CERVICAL RADICULOPATHY: ICD-10-CM

## 2020-07-31 DIAGNOSIS — M54.2 CHRONIC NECK PAIN: ICD-10-CM

## 2020-07-31 PROCEDURE — A9585 GADOBUTROL INJECTION: HCPCS

## 2020-07-31 PROCEDURE — 72156 MRI NECK SPINE W/O & W/DYE: CPT | Mod: TC

## 2020-07-31 RX ORDER — GADOBUTROL 604.72 MG/ML
8 INJECTION INTRAVENOUS ONCE
Status: COMPLETED | OUTPATIENT
Start: 2020-07-31 | End: 2020-07-31

## 2020-07-31 RX ADMIN — GADOBUTROL 8 ML: 604.72 INJECTION INTRAVENOUS at 10:31

## 2020-08-19 ENCOUNTER — THERAPY VISIT (OUTPATIENT)
Dept: OCCUPATIONAL THERAPY | Facility: CLINIC | Age: 60
End: 2020-08-19
Payer: OTHER MISCELLANEOUS

## 2020-08-19 DIAGNOSIS — G56.21 CUBITAL TUNNEL SYNDROME ON RIGHT: ICD-10-CM

## 2020-08-19 DIAGNOSIS — M25.521 RIGHT ELBOW PAIN: ICD-10-CM

## 2020-08-19 PROCEDURE — 97110 THERAPEUTIC EXERCISES: CPT | Mod: GO | Performed by: OCCUPATIONAL THERAPIST

## 2020-08-19 PROCEDURE — 97112 NEUROMUSCULAR REEDUCATION: CPT | Mod: GO | Performed by: OCCUPATIONAL THERAPIST

## 2020-08-19 NOTE — PROGRESS NOTES
Hand Therapy Progress/Discharge Note    Current Date:  8/19/2020    Reporting period is 6/8/2020 to 8/19/2020    Diagnosis: Right elbow/arm pain and paresthesias/cubital tunnel   DOI: 2/25/2020 (therapy referral)    Subjective:   Subjective changes noted by patient:  The elbow is a little better, still soreness in hand and finger joints. I am retraining right now.  Functional changes noted by patient:  No Change to Work Tasks       Improvement to Household chores   Patient has noted adverse reaction to:  None    Functional Outcome Measure:  Upper Extremity Functional Index  SCORE:   Column Totals: 63/80  (A lower score indicates greater disability.)    Objective:  Pain Level (Scale 0-10)   3/12/2020 6/8/2020 8/19/2020   Least 3-4 6 0-3   With Use 7-10 9 8     Pain Description  Date 3/12/2020 6/8/2020 8/19/2020   Location Elbow lateral > medial Elbow lateral > medial Elbow and hand   Pain Quality Dull Sharp, jabbing, aching Dull aching   Frequency constant   constant intermittent   Pain is worst  daytime or nighttime Daytime and nighttime daytime   Exacerbated by  overuse, work as  Overuse, lifting, reaching overuse   Relieved by Stretching, cold, heat and medications Stretching, cold, heat stretching   Progression Gradually getting worse Unchanged Improving     Edema  None on observation     Sensation  Continues to be decreased intermitently in Median < Ulnar Nerve distribution per pt report     ROM    WNL's of hand, wrist and elbow.  No intrinsic atrophy and no clawing of ulnar digits.    Strength   (Measured in pounds)  Pain Report: - none  + mild    ++ moderate    +++ severe     3/12/2020 3/12/2020 6/8/2020 8/19/2020   Trials Left Right Right Right   1  2  3 80  78  70 53-  42-  42- 50-  47-  52- 60-  53-  52-   Average 76 46 50 55     Lat Pinch 3/12/2020 3/12/2020 6/8/2020 8/19/2020   Trials Left Right Right Right   1  2  3 22  21  20 21-  19-  19- 17-  18-  18- 22-  20-  21-   Average 21 20 18 21     3  Pt Pinch 3/12/2020 3/12/2020 6/8/2020 8/19/2020   Trials Left Right Right Right   1  2  3 18  18  18 11+ slight  10+ slight  13+ slight 11+ slight  12+ slight  13+ slight 13-  17-  19-   Average 18 12 12 16     Resisted Testing  Pain Report:  - none    + mild    ++ moderate    +++ severe    3/12/2020 6/8/2020 8/19/2020   Supination  - - -   Pronation - - -   Wrist Ext with RD, Elbow Ext + slight - -   Wrist Ext with UD, Elbow Ext - - -   Wrist Flex with RD, Elbow Ext - - -   Wrist Flex with UD, Elbow Ext - - -   EDC with Elbow Ext - - -   FDS + R, S finger pain + R, S finger pain + slight     Special Tests   - none    + mild    ++ moderate    +++ severe       3/12/2020 6/8/2020 8/19/2020   Elbow Flexion Test + 20 secs + slight -   Ulnar nerve subluxation at elbow + + slight -   Tinels Cubital Tunnel + + +   Tinels [place] - - -   Tinels at Carpal Tunnel - ++ ++   [place] - + slight -     Palpation   3/12/2020 6/8/2020 8/19/2020   Cubital tunnel + + slight -   [place]  - - -   MEP + + -   Flexors - - -   LEP + slight + slight -   Extensors - - -     Please refer to the daily flowsheet for treatment provided today.     Assessment:  Response to therapy has been improvement to:  Flexibility:  less tightness in involved muscles  Strength:   and pinch  Pain:  less tender over affected area  Paresthesias:  Median and ulnar nerve - less intense numbness and tingling    Overall Assessment:  Patient is progressing well and is independent in home exercise program  STG/LTG:  STGoals have been reviewed and progress or achievement has occurred;  see goal sheet for details and updates.  I have re-evaluated this patient and find that the nature, scope, duration and intensity of the therapy is appropriate for the medical condition of the patient.    Plan:  Frequency/Duration:  Discharge from Hand Therapy; continue home program.    Recommendations for Continued Therapy  Home Exercise Program:  Warmth for  stiffness to forearm extensors, flexors and tricep  Ice to lateral and medial elbow after activity for pain  TFM to LEP and MEP  MFR to tricep and forearm extensors and flexors with tennis ball  PROM with stretch to forearm extensors and flexors  Eccentric wrist E/F strengthening    strengthening  Tendon gliding with 3 fists and FDS  Partial proximal median nerve gliding  Pec stretch in doorway  Postural exercises with chin tuck and scapular retraction   Diagnostic education for protection of ulnar nerve  Avoid prolonged elbow flexion and leaning on elbow  OTC flexion block splint and wrist splint sleeping  Recommend MD f/u if progress does not continue or pain or numbness/tingling worsens

## 2020-08-26 ENCOUNTER — VIRTUAL VISIT (OUTPATIENT)
Dept: PALLIATIVE MEDICINE | Facility: CLINIC | Age: 60
End: 2020-08-26
Payer: COMMERCIAL

## 2020-08-26 DIAGNOSIS — M48.02 CERVICAL STENOSIS OF SPINAL CANAL: ICD-10-CM

## 2020-08-26 DIAGNOSIS — M47.812 CERVICAL SPONDYLOSIS WITHOUT MYELOPATHY: ICD-10-CM

## 2020-08-26 DIAGNOSIS — Z98.1 S/P CERVICAL SPINAL FUSION: ICD-10-CM

## 2020-08-26 DIAGNOSIS — M54.59 LUMBAR FACET JOINT PAIN: ICD-10-CM

## 2020-08-26 DIAGNOSIS — M79.18 MYOFASCIAL PAIN: ICD-10-CM

## 2020-08-26 DIAGNOSIS — G89.29 CHRONIC BILATERAL LOW BACK PAIN WITHOUT SCIATICA: ICD-10-CM

## 2020-08-26 DIAGNOSIS — M47.816 SPONDYLOSIS OF LUMBAR REGION WITHOUT MYELOPATHY OR RADICULOPATHY: ICD-10-CM

## 2020-08-26 DIAGNOSIS — M54.50 CHRONIC BILATERAL LOW BACK PAIN WITHOUT SCIATICA: ICD-10-CM

## 2020-08-26 DIAGNOSIS — M50.30 DDD (DEGENERATIVE DISC DISEASE), CERVICAL: ICD-10-CM

## 2020-08-26 DIAGNOSIS — M54.12 CERVICAL RADICULAR PAIN: Primary | ICD-10-CM

## 2020-08-26 PROCEDURE — 99214 OFFICE O/P EST MOD 30 MIN: CPT | Mod: GT | Performed by: NURSE PRACTITIONER

## 2020-08-26 RX ORDER — GABAPENTIN 600 MG/1
900 TABLET ORAL 3 TIMES DAILY
Qty: 135 TABLET | Refills: 3 | Status: SHIPPED | OUTPATIENT
Start: 2020-08-26 | End: 2020-12-04

## 2020-08-26 RX ORDER — TRAMADOL HYDROCHLORIDE 200 MG/1
200 TABLET, EXTENDED RELEASE ORAL EVERY 24 HOURS
Qty: 30 TABLET | Refills: 0 | Status: SHIPPED | OUTPATIENT
Start: 2020-08-26 | End: 2020-09-28

## 2020-08-26 RX ORDER — TRAMADOL HYDROCHLORIDE 50 MG/1
50 TABLET ORAL EVERY 6 HOURS PRN
Qty: 90 TABLET | Refills: 0 | Status: SHIPPED | OUTPATIENT
Start: 2020-08-26 | End: 2020-09-28

## 2020-08-26 ASSESSMENT — PAIN SCALES - GENERAL: PAINLEVEL: SEVERE PAIN (6)

## 2020-08-26 NOTE — PROGRESS NOTES
"Truman Suero is a 59 year old male who is being evaluated via a billable video visit.      The patient has been notified of following:     \"This video visit will be conducted via a call between you and your physician/provider. We have found that certain health care needs can be provided without the need for an in-person physical exam.  This service lets us provide the care you need with a video conversation.  If a prescription is necessary we can send it directly to your pharmacy.  If lab work is needed we can place an order for that and you can then stop by our lab to have the test done at a later time.    Video visits are billed at different rates depending on your insurance coverage.  Please reach out to your insurance provider with any questions.    If during the course of the call the physician/provider feels a video visit is not appropriate, you will not be charged for this service.\"    Patient has given verbal consent for Video visit? Yes  How would you like to obtain your AVS? MyChart  If you are dropped from the video visit, the video invite should be resent to: Text to cell phone: [phone]  Will anyone else be joining your video visit? No        Video-Visit Details    Type of service:  Video Visit    Video Start Time: unable to do video visit  Video End Time: unable to do video visit    Originating Location (pt. Location): Home    Distant Location (provider location):  [facility]     Platform used for Video Visit: Unable to complete video visit    RODNEY Vargas [facility]    8/26/2020       Chief complaint: neck and right arm pain and axial low back pain    Interval history:  Truman Suero is a 59 year old male is known to me for   Chronic neck pain  Cervical DDD  Cervical spondylosis (pain worse with extension/rotation indicating facetogenic component to pain)  Axial low back pain  Myofascial pain/muscle spasms  Remote history of " ETOH overuse, attended AA for awhile. Sober for 10 years  ---PMHx includes: neck pain  ---PSHx includes: none listed      Recommendations/plan at the last visit on 7/21/2020 included:  1. Physical Therapy: not at present  2. Clinical Health Psychologist: none  3. Diagnostic Studies:  None  4. Medication Management:    1. Continue tramadol ER 200mg once daily fill 5/26, sgart 5/30  2. Continue tramadol 50mg Q 6-8 hours PRN, max of 3/day. Fill 5/26, start 5/30  3. Continue gabapentin 900 mg TID   4. Continue Topamax 50 mg BID   5. Continue methocarbamol 500-1000 mg TID PRN muscle spasms   5. Further procedures recommended: none at present  6. The patient will follow-up with hand therapy as scheduled.  7. With regard to work restrictions, we will not make any changes at the present time. 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 We will adjust his work restrictions based on what he is expected to do and how he is able to adjust after being off of work for the past ~16 weeks.   8. Recommendations to PCP: see above  9. Follow up: 4-6 weeks video visit.           Since his last visit, Truman Suero reports:  Interval history 8/26/2020  -he is maintaining  -ongoing posterior neck pain and right arm pain  -ongoing axial back pain  -he is done with occupational therapy  -his arm pain radicular pain is worst on the right side.   -he is working with a work re-training work, he will be going to a computer class for free for job re-training.   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 They have reservations  only and following the state guidelines for being open.  -he has not yet been called back to work as they are below the capacity  -has ongoing neck pain that radiates into his right arm as well as axial low back pain  -he has not heard what will happen with his return to work  -he is done doing the painting he had to do at home, had to do this in small chunks and pace himself  -he continues to work on projects at home slowly  -he is taking some on-line course-work and is really enjoying this  -he is working with Najma in OT on hand therapy and this is going well  -no recent imaging of his cervical spine, still having some radicular pain on the right side and some potential facet pain in the posterior neck to the shoulder region, worse with cervical extension and extension/rotation        Interval history 5/15/2020  -he has not worked since 3/16/2020 as the restaurant he works for shut down due to Safe at Home order in [place] was instituted on 3/17/2020 due to COVID-19 viral threat.  -his wife is working from home.   -he tries doing some projects at home, he has tried painting and he can do this for about 10 minutes at a time   -he does not like to walk for exercise, but he does like to bicycle and he can do this for exercise and he enjoys it.   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      -Hand therapy has been pushed off a few times due to COVID-19 threat, but he should be seeing her in the next few weeks.      -he is not certain how things will go when work opens.     Interval history 2/28/2020  -He was referred to hand therapy for right wrist pain by Dr. Thomas and the right wrist/hand pain is distinct from cervical stenosis (radicular pain).  -Posterior neck pain that starts at the base  "of the skull and extends into the right shoulder.  -Notes some low back pain.  -Frequently dropping things from right hand, luckily the patient's dominant hand is the left. Painful symptoms are tolerable on Monday at the start of the work week, but he notes some overexertion by Friday.       At this point, the patient's participation with our multidisciplinary team includes:  The patient has been compliant with the program.  PT - attended non-pain management PHYSICAL THERAPY   Health Psych - has seen Kentrell Castañeda x4  Acupuncture: worked with Dr. Bereket LAY      Pain scores:    Pain intensity on average is 7-8 on a scale of 0-10.    Range is 5-9/10.   Pain right now is 6/10.  Pain is described as \"burning, shooting, throbbing, stabbing, miserable, numb, tiring, exhausting, penetrating, nagging, unbearable.\"    Current pain relevant medications:      -Tramadol 200mg ER in the evening (helpful)  -Tramadol 50mg take 1 tablet  Q 6 hours PRN (using 2-3 tabs per day, helpful)  -ibuprofen 600mg PRN (helpful)  -gabapentin 900mg TID (somewhat helpful)  -methocarbamol 500-1000mg TID PRN muscle spasms (takes 1000 mg BID, somewhat helpful)  -Topamax 50 mg BID (helpful)    Other pertinent medications:  None    Previous Medications:  OPIATES: Tramdol (somewhat helpful)  NSAIDS: ibuprofen (helpful), Aleve (helpful)  MUSCLE RELAXANTS: none  ANTI-MIGRAINE MEDS: none  ANTI-DEPRESSANTS: none  SLEEP AIDS: none  ANTI-CONVULSANTS: gabapentin (helpful)  TOPICALS: lidocaine ointment (somewhat helpful)  Other meds: Tylenol (not helpful)        Other treatments have included:  Truman Suero has not been seen at a pain clinic in the past.    PT: tried, somewhat helpful  Chiropractic: helpful  Acupuncture: none  TENs Unit: none     Injections:    cervical radiofrequency nerve ablation at [facility] in December 2016 (did get good relief, got 70% relief of his typical neck pain)  -6/29/2017 Cervical facet joint steroid injections at " C4-5 and C5-6 on the right with Dr. Nicole Harding (not helpful)  -3/8/2018 C7-T1 interlaminar DEMARCUS with Dr. Hugo Corrigan (not helpful)  -5/23/2018 right L4-5 transforaminal epidural steroid injection with Dr. Stevens (not helpful for back pain but did help leg pain)  -8/7/2018 bilateral SI joint injection with Dr Hugo Corrigan (helpful for one month)  -10/11/2018 l3-4 and L4-5 facet joint injections bilaterally with Dr. Hugo Corrigan (this has been helpful, more helpful than any other lumbar injections thus far)  -1/28/2019 bilateral L3-5 medial branch blocks #1 with Dr. Stevens (somewhat helpful)   -2/25/2019 LMBB #2 with Dr. Stevens (somewhat effective, but not enough to proceed to RFA)  -5/6/2019 bilateral L4/L5 and L5/S1 facet joint injections with Dr. Stevens (helpful)  -11/29/2019 C7-T1 interlaminar epidural steroid injection with Dr. Corrigan (helpful temporarily)    Surgeries:  10/29/2018 right anterior cervical C5-6 discectomy and fusion by Dr. Jud Harkins (somewhat helpful)          THE 4 A's OF OPIOID MAINTENANCE ANALGESIA    Analgesia: long acting Tramadol with some short acting tramadol does give some relief    Activity: ADLs    Adverse effects: none    Adherence to Rx protocol: yes      Side Effects: none  Patient is using the medication as prescribed: yes    Medications:  Current Outpatient Medications   Medication Sig Dispense Refill     atorvastatin (LIPITOR) 10 MG tablet Take 1 tablet (10 mg) by mouth daily 90 tablet 0     buPROPion (WELLBUTRIN SR) 150 MG 12 hr tablet Take 1 tablet (150 mg) by mouth 2 times daily 180 tablet 0     cyclobenzaprine (FLEXERIL) 5 MG tablet Take 1-2 tablets (5-10 mg) by mouth nightly as needed for muscle spasms Need 6 hours between this and methocarbamol 45 tablet 2     gabapentin (NEURONTIN) 600 MG tablet Take 1.5 tablets (900 mg) by mouth 3 times daily 135 tablet 3     methocarbamol (ROBAXIN) 500 MG tablet Take 1 tablet (500 mg) by mouth 3 times daily as needed for  muscle spasms Should be 6 hours between this and cyclobenzaprine at night 90 tablet 2     metoprolol succinate ER (TOPROL-XL) 25 MG 24 hr tablet Take 1 tablet (25 mg) by mouth daily 90 tablet 3     order for DME BP cuff, brand as covered by insurance.  Dx: HTN 1 each 0     topiramate (TOPAMAX) 50 MG tablet Take 50mg (1tablet) every morning and 100mg (2 tablets) every evening.  Drink plenty of water and no alcohol. 90 tablet 2     traMADol (ULTRAM) 50 MG tablet Take 1 tablet (50 mg) by mouth every 6 hours as needed for severe pain Max 3/day. Fill 7/25/20, start 7/29/2020 90 tablet 0     traMADol (ULTRAM-ER) 200 MG 24 hr tablet Take 1 tablet (200 mg) by mouth every 24 hours Fill 7/25/20 Start 7/29/2020 30 tablet 0     aspirin (ASA) 81 MG EC tablet Take 1 tablet (81 mg) by mouth daily (Patient not taking: Reported on 7/21/2020) 90 tablet 0     fluticasone (FLONASE) 50 MCG/ACT nasal spray Spray 2 sprays into both nostrils 2 times daily Needs appointment for further refills (Patient not taking: Reported on 7/21/2020) 32 mL 0       Medical History: any changes in medical history since they were last seen? none    Social History:   Home situation: , has 4 kids, 2 at home, one in college and one launched.   Occupation/Schooling: used to be  full time in [employer], currently off of work due to COVID and restaurant is less busy. 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 He used to work a sobriety program, used to go to   Drug use: none  History of chemical dependency treatment: no formal treatment, did AA    Is patient a current smoker or tobacco user?  QUIT on 3/20/2018  If yes, was cessation counseling offered?  no        Review of Systems:   The 14 system ROS was reviewed with the patient and is positive for:  Constitutional: fever/chills, fatigue, weight gain, weight loss  Eyes/Head: headache, dizziness  ENT: ringing in  ears  Allergy/Immune: allergies  Skin: itching, rash, hives  Hematologic: easy bruising  Respiratory: cough, wheezing, shortness of breath  Cardiovascular: swelling in feet, fainting, palpitations, chest pain  GI: abdominal pain, nausea, vomiting, diarrhea, constipation  Endocrine: steroid use  Musculoskeletal:  joint pain, arthritis, stiffness, gout, back pain, neck pain  Urinary: frequency, urgency, incontinence, hesitancy  Neurologic: weakness, numbness/tingling (bilateral hands and right arm), seizure, stroke, memory loss  Mental health: depression, anxiety, stress, suicidal ideation            Physical Exam:     Vital signs: There were no vitals taken for this visit.     Behavioral observations:  Awake, alert, cooperative  Pulm: respirations easy and unlabored. Able to speak in full sentences without SOB or cough noted.              IMAGING:    MR CERVICAL SPINE WITHOUT CONTRAST 9/5/2017 2:32 PM      HISTORY: Neck pain, worsening numbness in arms and lower extremities.  Radiculopathy, cervical region.     TECHNIQUE: Multiplanar multisequence images were obtained through the  cervical spine without contrast.     COMPARISON: 2/22/2017.     FINDINGS: Sagittal images demonstrate some minimal gentle cervical  kyphosis, otherwise normal posterior alignment. There is no evidence  for craniovertebral or cervicomedullary junction abnormality. The  cervical cord is minimally indented anteriorly at C4-C5 and C5-C6,  otherwise is normal in morphology and signal characteristics. Disc  space narrowing and discogenic marrow changes are present C3-C4,  C4-C5, C5-C6 and C6-C7.     C2-C3: Minimal facet hypertrophy. No stenosis.     C3-C4: Broad-based posterior osteophyte formation is present causing  some mild bilateral neural foraminal stenosis, but no central canal  stenosis.     C4-C5: Broad-based posterior osteophyte formation and mild facet  hypertrophy is present causing some mild to moderate central canal  stenosis, mild  cord deformity, and mild-to-moderate bilateral neural  foraminal stenosis.     C5-C6: Broad-based posterior osteophyte formation and disc bulging is  present along with some facet hypertrophy. There is moderate central  canal stenosis and moderate bilateral neural foraminal stenosis.  Findings have progressed since the prior exam.     C6-C7: Broad-based disc bulging is present along with some minimal  posterior osteophyte formation. There is borderline to mild central  canal stenosis, but no neural foraminal stenosis.     C7-T1: Moderate facet hypertrophy. No significant stenosis.         IMPRESSION: Moderate multilevel degenerative disc and facet disease  with some progression at C5-C6 where there is moderate central canal  and bilateral neural foraminal stenosis along with cord deformity.          MR CERVICAL SPINE WITHOUT CONTRAST  2/22/2017 9:59 AM     HISTORY:  Bilateral neck and arm pain for three years.     COMPARISON: None.     TECHNIQUE: Routine MR cervical spine extended through T2.     FINDINGS: There is some degenerative bone marrow signal change C3-C6.  No malignant or destructive lesions. Normal alignment through T2.  Reversed cervical adenosis C3-C6.     The cervical and upper thoracic spinal cord appear intrinsically  normal. The craniocervical junction region is normal. No paraspinous  soft tissue abnormality.     Findings by level as follows:     C2-C3: Negative. No disc protrusion. No central or lateral stenosis.     C3-C4: Mild disc space narrowing. Mild diffuse annular bulge. Small  uncinate spur on the right. No significant central or left-sided  stenosis. Mild right-sided foraminal stenosis.     C4-C5: Moderate degenerative narrowing of the interspace. Mild ventral  disc osteophyte complex with minimal central stenosis. Small uncinate  spurs bilaterally with mild bilateral foraminal stenosis.     C6-C7: Moderate disc space narrowing. Mild broad-based disc osteophyte  complex with minimal  central stenosis. Minimal uncinate spurs with  mild bilateral foraminal stenosis.     C6-C7: Moderate disc space narrowing. Mild ventral disc osteophyte  complex. No significant central or lateral stenosis.     C7-T1: No disc protrusion. No central or lateral stenosis. Moderate  bilateral facet joint disease.         IMPRESSION:  1. Degenerative changes as described C3-C4 through C7-T1. There is  only mild central and foraminal stenosis as described.  2. No intrinsic spinal cord abnormality through T2    [place] Prescription Monitoring Program:  Reviewed [place] [facility] 8/26/2020- no concerning fills.  Melanie STEVENS, RN CNP, FNP  [facility]  [facility] [place]          DIRE Score for selecting candidates for long term opioid analgesia for chronic pain:  Diagnosis  2  Intractablility  2  Risk    Psychological health  2    Chemical health  2    Reliability  2    Social support  3  Efficacy  2    Total DIRE Score = 15. Note that   7-13 predicts poor outcome (compliance and efficacy) from opioid prescribing; 14-21 predicts good outcome (compliance and efficacy)  from opioid prescribing.      Assessment:   1. Cervical radiculopathy on the right side  2. Cervical DDD  3. Cervical spondylosis without myelopathy  4. Cervical stenosis of spinal canal  5. Lumbar facet joint pain  6. 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 Chronic bilateral low back pain without sciatica  8. Myofascial pain  9. S/p cervical spinal fusion    10. Encounter for long-term use of opiate analgesic  11. Urine drug screen last done 2/28/2020  12. Controlled substance agreement signed 2/28/2020  13. Remote history of ETOH overuse, attended AA for awhile. Sober for >10 years  14. PMHx includes: neck pain  15. PSHx includes: none listed       Plan:   1. Physical Therapy: not at present  2. Clinical Health Psychologist: none  3. Diagnostic Studies:  None  4. Medication Management:    1. Continue  tramadol ER 200mg once daily   2. Continue tramadol 50mg Q 6-8 hours PRN, max of 3/day.   3. Continue gabapentin 900 mg TID   4. Continue Topamax 50 mg BID   5. Continue methocarbamol 500-1000 mg TID PRN muscle spasms   5. Further procedures recommended: cervical epidural steroid injection for right sided cervical radicular symptoms.   6. He has completed occupational therapy  7. With regard to work restrictions, we will  make minor changes at the present time. 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 We will adjust his work restrictions based on deconditioning (not being employed or as active) for the past ~20 weeks as it would likely take Palmer a bit of time to get back into the pace at work  8. Recommendations to PCP: see above  9. Follow up: 6-8 weeks video visit.       Phone call duration: 30 minutes      Melanie STEVENS RN CNP, FNP  [facility]  [facility] [place]

## 2020-08-27 ENCOUNTER — TELEPHONE (OUTPATIENT)
Dept: PALLIATIVE MEDICINE | Facility: CLINIC | Age: 60
End: 2020-08-27

## 2020-08-27 NOTE — TELEPHONE ENCOUNTER
"Screening Questions for Radiology Injections:    Injection to be done at which interventional clinic site? [facility] [place]    Instruct patient to arrive as directed prior to the scheduled appointment time:    [place] minutes before      [place]: 30 minutes before; if IV needed 1 hour before     Dr. Xiao-no IV needed for Cervical DEMARCUS; please instruct to arrive 30\" early    Procedure ordered by Melanie Thomas     Procedure ordered? cervical epidural injection, consider at C4-5 or C6-7,    As a reminder, receiving steroids can decrease your body's ability to fight infection.   Would you still like to move forward with scheduling the injection?  Yes      Transforaminal Cervical DEMARCUS - no pain provider currently performing    What insurance would patient like us to bill for this procedure? WC      Worker's comp or MVA (motor vehicle accident) -Any injection DO NOT SCHEDULE and route to Monalisa Alberto.      [employer] insurance - For SI joint injections, DO NOT SCHEDULE and route Monalisa Alberto.       [employer] - Any injection besides hip/shoulder/knee joint DO NOT SCHEDULE and route to Monalisa Alberto. She will obtain PA and call pt back to schedule procedure or notify pt of denial.       [employer]-Route to [place] for review      **[employer]- ALL need to be routed to [place] for review if a PA is needed**      IF SCHEDULING IN [place] AND NEEDS A PA, IT IS OKAY TO SCHEDULE. [place] HANDLES THEIR OWN PA'S AFTER THE PATIENT IS SCHEDULED. PLEASE SCHEDULE AT LEAST 1 WEEK OUT SO A PA CAN BE OBTAINED.    Any chance of pregnancy? Not Applicable   If YES, do NOT schedule and route to RN pool    Is an  needed? No     Patient has a drive home? (mandatory) YES: informed     Is patient taking any blood thinners (i.e. plavix, coumadin, jantoven, warfarin, heparin, pradaxa or dabigatran, etc)? No   If hold needed, do NOT schedule, route to RN pool     Is patient taking any aspirin products (includes Excedrin " and Fiorinal)? No     If more than 325mg/day, OK to schedule; Instruct pt to decrease to less than 325 mg for 7 days AND route to RN pool    For CERVICAL procedures, hold all aspirin products for 6 days.     Tell pt that if aspirin product is not held for 6 days, the procedure WILL BE cancelled.      Does the patient have a bleeding or clotting disorder? No     If YES, okay to schedule AND route to RN nurse pool    For any patients with platelet count <100, must be forwarded to provider    Is patient diabetic?  No  If YES, instruct them to bring their glucometer.    Does patient have an active infection or treated for one within the past week? No     Is patient currently taking any antibiotics?  No     For patients on chronic, preventative, or prophylactic antibiotics, procedures may be scheduled.     For patients on antibiotics for active or recent infection:antibiotic course must have been completed for 4 days    Is patient currently taking any steroid medications? (i.e. Prednisone, Medrol)  No     For patients on steroid medications, course must have been completed for 4 days    Is patient actively being treated for cancer or immunocompromised? No  If YES, do NOT schedule and route to RN pool     Are you able to get on and off an exam table with minimal or no assistance? Yes  If NO, do NOT schedule and route to RN pool    Are you able to roll over and lay on your stomach with minimal or no assistance? Yes  If NO, do NOT schedule and route to RN pool     Any allergies to contrast dye, iodine, shellfish, or numbing and steroid medications? No  If YES, route to RN pool AND add allergy information to appointment notes    Allergies: Patient has no known allergies.      Has the patient had a flu shot or any other vaccinations within 7 days before or after the procedure.  No     Does patient have an MRI/CT?  YES: 2020  Check Procedure Scheduling Grid to see if required.      Was the MRI done within the last 3 years?   Yes    If yes, where was the MRI done i.e.[facility], [facility], [facility], [facility] etc? [facility] [place]     If no, do not schedule and route to RN pool    If MRI was not done at [facility], [facility] or [facility] do NOT schedule and route to RN pool.      If pt has an imaging disc, the injection MAY be scheduled but pt has to bring disc to appt.     If they show up without the disc the injection cannot be done    Procedure Specific Instructions:      If celiac plexus block, informed patient NPO for 6 hours and that it is okay to take medications with sips of water, especially blood pressure medications  NO         If this is for a cervical procedure, informed patient that aspirin needs to be held for 6 days.   Not Applicable      If IV needed:    Do not schedule procedures requiring IV placement in the first appointment of the day or first appointment after lunch. Do NOT schedule at 0745, 0815 or 1245.     Instructed pt to arrive 30 minutes early for IV start if required. (Check Procedure Scheduling Grid)  Not Applicable    Reminders:      If you are started on any steroids or antibiotics between now and your appointment, you must contact us because the procedure may need to be cancelled.  No      For all procedures except radiofrequency ablations (RFAs) and spinal cord stimulator (SCS) trials, informed patient:    IV sedation is not provided for this procedure.  If you feel that an oral anti-anxiety medication is needed, you can discuss this further with your referring provider or primary care provider.  The Pain Clinic provider will discuss specifics of what the procedure includes at your appointment.  Most procedures last 10-20 minutes.  We use numbing medications to help with any discomfort during the procedure.  Not Applicable      For patients 85 or older we recommend having an adult stay w/ them for the remainder of the day.       Does the patient have any questions?  NO  [facility]

## 2020-08-31 NOTE — TELEPHONE ENCOUNTER
PA pending additional information -office note to be completed and signed by Melanie Greer as NIKKI LUZ    [facility]

## 2020-09-17 ENCOUNTER — MYC MEDICAL ADVICE (OUTPATIENT)
Dept: PALLIATIVE MEDICINE | Facility: CLINIC | Age: 60
End: 2020-09-17

## 2020-09-17 NOTE — TELEPHONE ENCOUNTER
Per patient myChart message:  From: Palmer Suero      Created: 9/17/2020 2:37 PM      Melanie- Hope all is well! Mya([facility]) was checking with me and want to follow up on last visit we had. I had mentioned that maybe you had considered consulting with Dr. Harkins about any other options after reviewing MRI. She was checking to see if we had heard anything. I still have to schedule the injections that you ordered. Still job hunting and taking computer classes hope you are well, just let me know any ideas?  Palmer        Routed to provider.    Estelita Wetzel, RN-BSN  [facility]

## 2020-09-21 ENCOUNTER — MYC REFILL (OUTPATIENT)
Dept: FAMILY MEDICINE | Facility: CLINIC | Age: 60
End: 2020-09-21

## 2020-09-21 DIAGNOSIS — I25.10 MILD CAD: ICD-10-CM

## 2020-09-21 DIAGNOSIS — I77.810 ASCENDING AORTA DILATATION (H): ICD-10-CM

## 2020-09-21 DIAGNOSIS — I73.9 PAD (PERIPHERAL ARTERY DISEASE) (H): ICD-10-CM

## 2020-09-22 ENCOUNTER — MYC MEDICAL ADVICE (OUTPATIENT)
Dept: FAMILY MEDICINE | Facility: CLINIC | Age: 60
End: 2020-09-22

## 2020-09-23 RX ORDER — METOPROLOL SUCCINATE 25 MG/1
25 TABLET, EXTENDED RELEASE ORAL DAILY
Qty: 90 TABLET | Refills: 3 | OUTPATIENT
Start: 2020-09-23

## 2020-09-23 RX ORDER — ATORVASTATIN CALCIUM 10 MG/1
10 TABLET, FILM COATED ORAL DAILY
Qty: 90 TABLET | Refills: 0 | Status: SHIPPED | OUTPATIENT
Start: 2020-09-23 | End: 2020-12-26

## 2020-09-23 NOTE — TELEPHONE ENCOUNTER
Forward to PCP for review as I have not seen this patient and may have filled as a covering provider only.    Kentrell Curry MD

## 2020-09-23 NOTE — TELEPHONE ENCOUNTER
90 day supply with 3 refill sent 12/9/19. Refill request too soon. Refused.     Dalia Flynn, RN, BSN, PHN  [facility] [place]

## 2020-09-23 NOTE — TELEPHONE ENCOUNTER
Routing refill request to provider for review/approval because:  Labs not current:  LILIANE Mahan RN

## 2020-09-28 ENCOUNTER — MYC REFILL (OUTPATIENT)
Dept: PALLIATIVE MEDICINE | Facility: CLINIC | Age: 60
End: 2020-09-28

## 2020-09-28 DIAGNOSIS — M54.59 LUMBAR FACET JOINT PAIN: ICD-10-CM

## 2020-09-28 DIAGNOSIS — M50.30 DDD (DEGENERATIVE DISC DISEASE), CERVICAL: ICD-10-CM

## 2020-09-28 DIAGNOSIS — Z98.1 S/P CERVICAL SPINAL FUSION: ICD-10-CM

## 2020-09-28 DIAGNOSIS — G89.29 CHRONIC BILATERAL LOW BACK PAIN WITHOUT SCIATICA: ICD-10-CM

## 2020-09-28 DIAGNOSIS — M79.18 MYOFASCIAL PAIN: ICD-10-CM

## 2020-09-28 DIAGNOSIS — M54.50 CHRONIC BILATERAL LOW BACK PAIN WITHOUT SCIATICA: ICD-10-CM

## 2020-09-28 DIAGNOSIS — M47.816 SPONDYLOSIS OF LUMBAR REGION WITHOUT MYELOPATHY OR RADICULOPATHY: ICD-10-CM

## 2020-09-28 DIAGNOSIS — M47.812 CERVICAL SPONDYLOSIS WITHOUT MYELOPATHY: ICD-10-CM

## 2020-09-28 RX ORDER — TRAMADOL HYDROCHLORIDE 200 MG/1
200 TABLET, EXTENDED RELEASE ORAL EVERY 24 HOURS
Qty: 30 TABLET | Refills: 0 | Status: SHIPPED | OUTPATIENT
Start: 2020-09-28 | End: 2020-10-09

## 2020-09-28 RX ORDER — TRAMADOL HYDROCHLORIDE 200 MG/1
200 TABLET, EXTENDED RELEASE ORAL EVERY 24 HOURS
Qty: 30 TABLET | Refills: 0 | Status: CANCELLED | OUTPATIENT
Start: 2020-09-28

## 2020-09-28 RX ORDER — TRAMADOL HYDROCHLORIDE 50 MG/1
50 TABLET ORAL EVERY 6 HOURS PRN
Qty: 90 TABLET | Refills: 0 | Status: SHIPPED | OUTPATIENT
Start: 2020-09-28 | End: 2020-10-09

## 2020-09-28 RX ORDER — TRAMADOL HYDROCHLORIDE 50 MG/1
50 TABLET ORAL EVERY 6 HOURS PRN
Qty: 90 TABLET | Refills: 0 | Status: CANCELLED | OUTPATIENT
Start: 2020-09-28

## 2020-09-28 NOTE — TELEPHONE ENCOUNTER
Faxed completed PA form and supporting documentation for cervical facet joint injections to .  Right fax confirmation          Monalisa LUZ    [facility]

## 2020-09-28 NOTE — TELEPHONE ENCOUNTER
See the other 9/28/20 mychart encounter for more information.    Estelita Wetzel RN-BSN  [facility]

## 2020-09-28 NOTE — TELEPHONE ENCOUNTER
Note is done     Melanie STEVENS, RN CNP, FNP  [facility]

## 2020-09-28 NOTE — TELEPHONE ENCOUNTER
Signed Prescriptions:                        Disp   Refills    traMADol (ULTRAM) 50 MG tablet             90 tab*0        Sig: Take 1 tablet (50 mg) by mouth every 6 hours as           needed for severe pain Max 3/day. Fill/begin           9/28/2020 to last 30 days for chronic pain  Authorizing Provider: MELANIE BENSON    traMADol (ULTRAM-ER) 200 MG 24 hr tablet   30 tab*0        Sig: Take 1 tablet (200 mg) by mouth every 24 hours           Fill/begin 9/28/2020 to last 30 days for chronic           pain  Authorizing Provider: MELANIE BENSON    Reviewed [place]  September 28, 2020- no concerning fills.    Melanie STEVENS, RN CNP, FNP  [facility]  [facility]

## 2020-09-28 NOTE — TELEPHONE ENCOUNTER
Medication refill information reviewed.     Last due:  Fill 8/26/20, start 8/28/2020     Due date:  9/27/20    Weaning instructions:  N/A    Prescriptions prepped for review.     Estelita Wetzel RN-BSN  [facility]

## 2020-09-28 NOTE — TELEPHONE ENCOUNTER
Received call from patient requesting refill(s) of traMADol (ULTRAM-ER) 200 MG 24 hr tablet and traMADol (ULTRAM) 50 MG tablet (patient did not request in this [facility] messsage, but both are due at the same time)    Last dispensed from pharmacy on 08/26/20 for both    Pt last seen by prescribing provider on 08/26/20-virtual visit  Next appt scheduled for 10/09/20     checked in the past 6 months? Yes If no, print current report and give to RN    Last urine drug screen date 02/28/20  Current opioid agreement on file (completed within the last year) Yes Date of opioid agreement: 02/28/20    E-prescribe to pharmacy-[facility] [address] [place] [address] - [address] [place] [address]     Will route to nursing [place] for review and preparation of prescription(s).

## 2020-09-30 NOTE — TELEPHONE ENCOUNTER
Patient called and stated he will find a  first then call us back to schedule         Pooja Peters    [facility]

## 2020-09-30 NOTE — TELEPHONE ENCOUNTER
Incoming fax from , they are approving ISMAEL.       They want documentation of patients pain level:   immediately before;   immediatly after the procedure AND;   one week post procedure.    Please also provide documentation of objective functional improvement, if any, at one week post injection. To further assist in evaluating efficacy, particularly if steroid is included in the injection, documentation of pain relief and functional improvement in the 2-4 weeks post injection.      The letter also states that they will not cover the cervical DEMARCUS if the patient takes any medication causing deep sedation.       Routing to nursing as FYI    Routing to  to schedule        Monalisa LUZ    [facility]

## 2020-10-09 ENCOUNTER — VIRTUAL VISIT (OUTPATIENT)
Dept: PALLIATIVE MEDICINE | Facility: CLINIC | Age: 60
End: 2020-10-09
Payer: COMMERCIAL

## 2020-10-09 DIAGNOSIS — M47.816 SPONDYLOSIS OF LUMBAR REGION WITHOUT MYELOPATHY OR RADICULOPATHY: ICD-10-CM

## 2020-10-09 DIAGNOSIS — M47.812 CERVICAL SPONDYLOSIS WITHOUT MYELOPATHY: ICD-10-CM

## 2020-10-09 DIAGNOSIS — Z98.1 S/P CERVICAL SPINAL FUSION: ICD-10-CM

## 2020-10-09 DIAGNOSIS — G89.29 CHRONIC BILATERAL LOW BACK PAIN WITHOUT SCIATICA: ICD-10-CM

## 2020-10-09 DIAGNOSIS — M50.30 DDD (DEGENERATIVE DISC DISEASE), CERVICAL: ICD-10-CM

## 2020-10-09 DIAGNOSIS — M79.18 MYOFASCIAL PAIN: ICD-10-CM

## 2020-10-09 DIAGNOSIS — M54.50 CHRONIC BILATERAL LOW BACK PAIN WITHOUT SCIATICA: ICD-10-CM

## 2020-10-09 DIAGNOSIS — M54.59 LUMBAR FACET JOINT PAIN: ICD-10-CM

## 2020-10-09 DIAGNOSIS — M54.12 CERVICAL RADICULAR PAIN: Primary | ICD-10-CM

## 2020-10-09 PROCEDURE — 99213 OFFICE O/P EST LOW 20 MIN: CPT | Mod: GT | Performed by: NURSE PRACTITIONER

## 2020-10-09 RX ORDER — TRAMADOL HYDROCHLORIDE 50 MG/1
50 TABLET ORAL EVERY 6 HOURS PRN
Qty: 90 TABLET | Refills: 0 | Status: SHIPPED | OUTPATIENT
Start: 2020-10-09 | End: 2020-12-01

## 2020-10-09 RX ORDER — TRAMADOL HYDROCHLORIDE 200 MG/1
200 TABLET, EXTENDED RELEASE ORAL EVERY 24 HOURS
Qty: 30 TABLET | Refills: 0 | Status: SHIPPED | OUTPATIENT
Start: 2020-10-09 | End: 2020-12-01

## 2020-10-09 RX ORDER — TOPIRAMATE 50 MG/1
TABLET, FILM COATED ORAL
Qty: 90 TABLET | Refills: 2 | Status: SHIPPED | OUTPATIENT
Start: 2020-10-09 | End: 2021-01-06

## 2020-10-09 ASSESSMENT — PAIN SCALES - GENERAL: PAINLEVEL: SEVERE PAIN (6)

## 2020-10-09 NOTE — TELEPHONE ENCOUNTER
This was discussed in his video visit encounter with me today in clinic.   I recommend he have the Cervical DEMARCUS that has been recommended before I reach out to Dr. Harkins.     Palmer is in agreement with this plan.     Melanie STEVENS RN CNP, FNP  [facility]

## 2020-10-09 NOTE — PATIENT INSTRUCTIONS
----------------------------------------------------------------  [facility] Number:  [phone]     Call with any questions about your care and for scheduling assistance.     Calls are returned Monday through Friday between 8 AM and 4:30 PM. We usually get back to you within 2 business days depending on the issue/request.    If we are prescribing your medications:    For opioid medication refills, call the clinic or send a [email or web address] message 7 days in advance.  Please include:    Name of requested medication    Name of the pharmacy.    For non-opioid medications, call your pharmacy directly to request a refill. Please allow 3-4 days to be processed.     Per [place] State Law:    All controlled substance prescriptions must be filled within 30 days of being written.      For those controlled substances allowing refills, pickup must occur within 30 days of last fill.      We believe regular attendance is key to your success in our program!      Any time you are unable to keep your appointment we ask that you call us at least 24 hours in advance to cancel.This will allow us to offer the appointment time to another patient.     Multiple missed appointments may lead to dismissal from the clinic.

## 2020-10-09 NOTE — PROGRESS NOTES
"Truman Suero is a 60 year old male who is being evaluated via a billable video visit.      The patient has been notified of following:     \"This video visit will be conducted via a call between you and your physician/provider. We have found that certain health care needs can be provided without the need for an in-person physical exam.  This service lets us provide the care you need with a video conversation.  If a prescription is necessary we can send it directly to your pharmacy.  If lab work is needed we can place an order for that and you can then stop by our lab to have the test done at a later time.    Video visits are billed at different rates depending on your insurance coverage.  Please reach out to your insurance provider with any questions.    If during the course of the call the physician/provider feels a video visit is not appropriate, you will not be charged for this service.\"    Patient has given verbal consent for Video visit? Yes  How would you like to obtain your AVS? MyChart  If you are dropped from the video visit, the video invite should be resent to: Text to cell phone: [phone]  Will anyone else be joining your video visit? No       Mike Bergeron MA        Video-Visit Details    Type of service:  Video Visit    Video Start Time: 2:25 PM  Video End Time: 2:46 PM    Originating Location (pt. Location): Home    Distant Location (provider location):  [facility]     Platform used for Video Visit: RODNEY Cho CNP                [facility]    10/9/2020       Chief complaint: neck and right arm pain and axial low back pain     Interval history:  Truman Suero is a 60 year old male is known to me for   Chronic neck pain  Cervical DDD  Cervical spondylosis (pain worse with extension/rotation indicating facetogenic component to pain)  Axial low back pain  Myofascial pain/muscle spasms  Remote history of ETOH overuse, " "attended AA for awhile. Sober for 10 years  ---PMHx includes: neck pain  ---PSHx includes: none listed      Recommendations/plan at the last visit on 8/26/2020 included:  1. Physical Therapy: not at present  2. Clinical Health Psychologist: none  3. Diagnostic Studies:  None  4. Medication Management:    1. Continue tramadol ER 200mg once daily   2. Continue tramadol 50mg Q 6-8 hours PRN, max of 3/day.   3. Continue gabapentin 900 mg TID   4. Continue Topamax 50 mg BID   5. Continue methocarbamol 500-1000 mg TID PRN muscle spasms   5. Further procedures recommended: cervical epidural steroid injection for right sided cervical radicular symptoms.   6. He has completed occupational therapy  7. With regard to work restrictions, we will  make minor changes at the present time. Palmer has been off of work at the [employer] due to COVID-19  It is unclear how the [employer] business that Palmer works for will handle his return to work, thus far, he has not been called back. We will adjust his work restrictions based on deconditioning (not being employed or as active) for the past ~20 weeks as it would likely take Palmer a bit of time to get back into the pace at work  8. Recommendations to PCP: see above  9. Follow up: 6-8 weeks video visit.           Since his last visit, Truman Suero reports:    Interval history 10/9/2020  -he is \"maintaining,\" feels he is doing \"OK\"  -he still has neck pain that radiates into the upper back and down right arm to the hand and fingers. 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   -he is working with a work re-training work, " he will be going to a computer class for free for job re-training.   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 They have reservations only and following the state guidelines for being open.  -he has not yet been called back to work as they are below the capacity  -has ongoing neck pain that radiates into his right arm as well as axial low back pain  -he has not heard what will happen with his return to work  -he is done doing the painting he had to do at home, had to do this in small chunks and pace himself  -he continues to work on projects at home slowly  -he is taking some on-line course-work and is really enjoying this  -he is working with Najma in OT on hand therapy and this is going well  -no recent imaging of his cervical spine, still having some radicular pain on the right side and some potential facet pain in the posterior neck to the shoulder region, worse with cervical extension and extension/rotation        Interval history 5/15/2020  -he has not worked since 3/16/2020 as the restaurant he works for shut down due to Safe at Home order in [place] was instituted on 3/17/2020 due to COVID-19 viral threat.  -his wife is working from home.   -he tries doing some projects at home, he has tried painting and he can do this for about 10 minutes at a time   -he does not like to walk for exercise, but he does like to bicycle and he can do this for exercise and he enjoys it.   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      -Hand  "therapy has been pushed off a few times due to COVID-19 threat, but he should be seeing her in the next few weeks.      -he is not certain how things will go when work opens.     Interval history 2/28/2020  -He was referred to hand therapy for right wrist pain by Dr. Thomas and the right wrist/hand pain is distinct from cervical stenosis (radicular pain).  -Posterior neck pain that starts at the base of the skull and extends into the right shoulder.  -Notes some low back pain.  -Frequently dropping things from right hand, luckily the patient's dominant hand is the left. Painful symptoms are tolerable on Monday at the start of the work week, but he notes some overexertion by Friday.       At this point, the patient's participation with our multidisciplinary team includes:  The patient has been compliant with the program.  PT - attended non-pain management PHYSICAL THERAPY   Health Psych - has seen Kentrell Castañeda x4  Acupuncture: worked with Dr. Bereket LAY      Pain scores:    Pain intensity on average is 5-6 on a scale of 0-10.    Range is 5-9/10.   Pain right now is 6/10.  Pain is described as \"burning, shooting, throbbing, stabbing, miserable, numb, tiring, exhausting, penetrating, nagging, unbearable.\"    Current pain relevant medications:      -Tramadol 200mg ER in the evening (helpful)  -Tramadol 50mg take 1 tablet  Q 6 hours PRN (using 2-3 tabs per day, helpful)  -ibuprofen 600mg PRN (helpful)  -gabapentin 900mg TID (somewhat helpful)  -methocarbamol 500-1000mg TID PRN muscle spasms (takes 1000 mg BID, somewhat helpful)  -Topamax 50 mg BID (helpful)    Other pertinent medications:  None    Previous Medications:  OPIATES: Tramdol (somewhat helpful)  NSAIDS: ibuprofen (helpful), Aleve (helpful)  MUSCLE RELAXANTS: none  ANTI-MIGRAINE MEDS: none  ANTI-DEPRESSANTS: none  SLEEP AIDS: none  ANTI-CONVULSANTS: gabapentin (helpful)  TOPICALS: lidocaine ointment (somewhat helpful)  Other meds: Tylenol (not " helpful)        Other treatments have included:  Truman Suero has not been seen at a pain clinic in the past.    PT: tried, somewhat helpful  Chiropractic: helpful  Acupuncture: none  TENs Unit: none     Injections:    cervical radiofrequency nerve ablation at [facility] in December 2016 (did get good relief, got 70% relief of his typical neck pain)  -6/29/2017 Cervical facet joint steroid injections at C4-5 and C5-6 on the right with Dr. Nicole Harding (not helpful)  -3/8/2018 C7-T1 interlaminar DEMARCUS with Dr. Hugo Corrigan (not helpful)  -5/23/2018 right L4-5 transforaminal epidural steroid injection with Dr. Osorio (not helpful for back pain but did help leg pain)  -8/7/2018 bilateral SI joint injection with Dr Hugo Corrigan (helpful for one month)  -10/11/2018 l3-4 and L4-5 facet joint injections bilaterally with Dr. Hugo Corrigan (this has been helpful, more helpful than any other lumbar injections thus far)  -1/28/2019 bilateral L3-5 medial branch blocks #1 with Dr. Osorio (somewhat helpful)   -2/25/2019 LMBB #2 with Dr. Osorio (somewhat effective, but not enough to proceed to RFA)  -5/6/2019 bilateral L4/L5 and L5/S1 facet joint injections with Dr. Osorio (helpful)  -11/29/2019 C7-T1 interlaminar epidural steroid injection with Dr. Corrigan (helpful temporarily)    Surgeries:  10/29/2018 right anterior cervical C5-6 discectomy and fusion by Dr. Jud Harkins (somewhat helpful)          THE 4 A's OF OPIOID MAINTENANCE ANALGESIA    Analgesia: long acting Tramadol with some short acting tramadol does give some relief    Activity: ADLs    Adverse effects: none    Adherence to Rx protocol: yes      Side Effects: none  Patient is using the medication as prescribed: yes    Medications:  Current Outpatient Medications   Medication Sig Dispense Refill     aspirin (ASA) 81 MG EC tablet Take 1 tablet (81 mg) by mouth daily (Patient not taking: Reported on 7/21/2020) 90 tablet 0     atorvastatin (LIPITOR) 10 MG  tablet Take 1 tablet (10 mg) by mouth daily 90 tablet 0     buPROPion (WELLBUTRIN SR) 150 MG 12 hr tablet Take 1 tablet (150 mg) by mouth 2 times daily 180 tablet 0     cyclobenzaprine (FLEXERIL) 5 MG tablet Take 1-2 tablets (5-10 mg) by mouth nightly as needed for muscle spasms Need 6 hours between this and methocarbamol 45 tablet 2     fluticasone (FLONASE) 50 MCG/ACT nasal spray Spray 2 sprays into both nostrils 2 times daily Needs appointment for further refills (Patient not taking: Reported on 7/21/2020) 32 mL 0     gabapentin (NEURONTIN) 600 MG tablet Take 1.5 tablets (900 mg) by mouth 3 times daily 135 tablet 3     methocarbamol (ROBAXIN) 500 MG tablet Take 1 tablet (500 mg) by mouth 3 times daily as needed for muscle spasms Should be 6 hours between this and cyclobenzaprine at night 90 tablet 2     metoprolol succinate ER (TOPROL-XL) 25 MG 24 hr tablet Take 1 tablet (25 mg) by mouth daily 90 tablet 3     order for DME BP cuff, brand as covered by insurance.  Dx: HTN 1 each 0     topiramate (TOPAMAX) 50 MG tablet Take 50mg (1tablet) every morning and 100mg (2 tablets) every evening.  Drink plenty of water and no alcohol. 90 tablet 2     traMADol (ULTRAM) 50 MG tablet Take 1 tablet (50 mg) by mouth every 6 hours as needed for severe pain Max 3/day. Fill/begin 9/28/2020 to last 30 days for chronic pain 90 tablet 0     traMADol (ULTRAM-ER) 200 MG 24 hr tablet Take 1 tablet (200 mg) by mouth every 24 hours Fill/begin 9/28/2020 to last 30 days for chronic pain 30 tablet 0       Medical History: any changes in medical history since they were last seen? none    Social History:   Home situation: , has 4 kids, 2 at home, one in college and one launched.   Occupation/Schooling: used to be  full time in [employer], currently off of work due to COVID and restaurant is less busy. 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 He  used to work a sobriety program, used to go to   Drug use: none  History of chemical dependency treatment: no formal treatment, did AA    Is patient a current smoker or tobacco user?  QUIT on 3/20/2018  If yes, was cessation counseling offered?  no        Review of Systems:   The 14 system ROS was reviewed with the patient and is positive for:  Constitutional: fever/chills, fatigue, weight gain, weight loss  Eyes/Head: headache, dizziness  ENT: ringing in ears  Allergy/Immune: allergies  Skin: itching, rash, hives  Hematologic: easy bruising  Respiratory: cough, wheezing, shortness of breath  Cardiovascular: swelling in feet, fainting, palpitations, chest pain  GI: abdominal pain, nausea, vomiting, diarrhea, constipation  Endocrine: steroid use  Musculoskeletal:  joint pain, arthritis, stiffness, gout, back pain, neck pain  Urinary: frequency, urgency, incontinence, hesitancy  Neurologic: weakness, numbness/tingling (bilateral hands and right arm), seizure, stroke, memory loss  Mental health: depression, anxiety, stress, suicidal ideation            Physical Exam:     Vital signs: There were no vitals taken for this visit.     Behavioral observations:  Awake, alert, cooperative  Pulm: respirations easy and unlabored. Able to speak in full sentences without SOB or cough noted.              IMAGING:    MR CERVICAL SPINE WITHOUT CONTRAST 9/5/2017 2:32 PM      HISTORY: Neck pain, worsening numbness in arms and lower extremities.  Radiculopathy, cervical region.     TECHNIQUE: Multiplanar multisequence images were obtained through the  cervical spine without contrast.     COMPARISON: 2/22/2017.     FINDINGS: Sagittal images demonstrate some minimal gentle cervical  kyphosis, otherwise normal posterior alignment. There is no evidence  for craniovertebral or cervicomedullary junction abnormality. The  cervical cord is minimally indented anteriorly at C4-C5 and C5-C6,  otherwise is normal in morphology and signal  characteristics. Disc  space narrowing and discogenic marrow changes are present C3-C4,  C4-C5, C5-C6 and C6-C7.     C2-C3: Minimal facet hypertrophy. No stenosis.     C3-C4: Broad-based posterior osteophyte formation is present causing  some mild bilateral neural foraminal stenosis, but no central canal  stenosis.     C4-C5: Broad-based posterior osteophyte formation and mild facet  hypertrophy is present causing some mild to moderate central canal  stenosis, mild cord deformity, and mild-to-moderate bilateral neural  foraminal stenosis.     C5-C6: Broad-based posterior osteophyte formation and disc bulging is  present along with some facet hypertrophy. There is moderate central  canal stenosis and moderate bilateral neural foraminal stenosis.  Findings have progressed since the prior exam.     C6-C7: Broad-based disc bulging is present along with some minimal  posterior osteophyte formation. There is borderline to mild central  canal stenosis, but no neural foraminal stenosis.     C7-T1: Moderate facet hypertrophy. No significant stenosis.         IMPRESSION: Moderate multilevel degenerative disc and facet disease  with some progression at C5-C6 where there is moderate central canal  and bilateral neural foraminal stenosis along with cord deformity.          MR CERVICAL SPINE WITHOUT CONTRAST  2/22/2017 9:59 AM     HISTORY:  Bilateral neck and arm pain for three years.     COMPARISON: None.     TECHNIQUE: Routine MR cervical spine extended through T2.     FINDINGS: There is some degenerative bone marrow signal change C3-C6.  No malignant or destructive lesions. Normal alignment through T2.  Reversed cervical adenosis C3-C6.     The cervical and upper thoracic spinal cord appear intrinsically  normal. The craniocervical junction region is normal. No paraspinous  soft tissue abnormality.     Findings by level as follows:     C2-C3: Negative. No disc protrusion. No central or lateral stenosis.     C3-C4: Mild disc  space narrowing. Mild diffuse annular bulge. Small  uncinate spur on the right. No significant central or left-sided  stenosis. Mild right-sided foraminal stenosis.     C4-C5: Moderate degenerative narrowing of the interspace. Mild ventral  disc osteophyte complex with minimal central stenosis. Small uncinate  spurs bilaterally with mild bilateral foraminal stenosis.     C6-C7: Moderate disc space narrowing. Mild broad-based disc osteophyte  complex with minimal central stenosis. Minimal uncinate spurs with  mild bilateral foraminal stenosis.     C6-C7: Moderate disc space narrowing. Mild ventral disc osteophyte  complex. No significant central or lateral stenosis.     C7-T1: No disc protrusion. No central or lateral stenosis. Moderate  bilateral facet joint disease.         IMPRESSION:  1. Degenerative changes as described C3-C4 through C7-T1. There is  only mild central and foraminal stenosis as described.  2. No intrinsic spinal cord abnormality through T2        [place] Prescription Monitoring Program:  Reviewed [facility] 10/9/2020- no concerning fills.  Melanie STEVENS, RN CNP, FNP  [facility]  [facility]          DIRE Score for selecting candidates for long term opioid analgesia for chronic pain:  Diagnosis  2  Intractablility  2  Risk    Psychological health  2    Chemical health  2    Reliability  2    Social support  3  Efficacy  2    Total DIRE Score = 15. Note that   7-13 predicts poor outcome (compliance and efficacy) from opioid prescribing; 14-21 predicts good outcome (compliance and efficacy)  from opioid prescribing.      Assessment:   1. Cervical radicular pain  on the right side  2. Cervical DDD  3. Cervical spondylosis without myelopathy  4. S/p cervical spinal fusion  5. Lumbar facet joint pain  6. 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 Chronic bilateral low back pain without sciatica  8. Myofascial pain      9. Encounter for  long-term use of opiate analgesic  10. Urine drug screen last done 2/28/2020  11. Controlled substance agreement signed 2/28/2020  12. Remote history of ETOH overuse, attended AA for awhile. Sober for >10 years  13. PMHx includes: neck pain  14. PSHx includes: none listed       Plan:   1. Physical Therapy: not at present  2. Clinical Health Psychologist: none  3. Diagnostic Studies:  None  4. Medication Management:    1. Continue tramadol ER 200mg once daily   2. Continue tramadol 50mg Q 6-8 hours PRN, max of 3/day.   3. Continue gabapentin 900 mg TID   4. Continue Topamax 50 mg BID   5. Continue methocarbamol 500-1000 mg TID PRN muscle spasms   5. Further procedures recommended: cervical epidural steroid injection for right sided cervical radicular symptoms.   6. He has completed occupational therapy  7. With regard to work restrictions, we will  make minor changes at the present time. 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 We will adjust his work restrictions based on deconditioning (not being employed or as active) for the past ~20 weeks as it would likely take Palmer a bit of time to get back into the pace at work  8. Recommendations to PCP: see above  9. Follow up: 8 weeks video visit.             Melanie STEVENS, RN CNP, FNP  [facility]  [facility]

## 2020-10-12 ENCOUNTER — ALLIED HEALTH/NURSE VISIT (OUTPATIENT)
Dept: NURSING | Facility: CLINIC | Age: 60
End: 2020-10-12
Payer: COMMERCIAL

## 2020-10-12 DIAGNOSIS — Z23 NEED FOR PROPHYLACTIC VACCINATION AND INOCULATION AGAINST INFLUENZA: Primary | ICD-10-CM

## 2020-10-12 PROCEDURE — 90682 RIV4 VACC RECOMBINANT DNA IM: CPT

## 2020-10-12 PROCEDURE — 90471 IMMUNIZATION ADMIN: CPT

## 2020-10-12 NOTE — PROGRESS NOTES
Prior to immunization administration, verified patients identity using patient s name and date of birth. Please see Immunization Activity for additional information.     Screening Questionnaire for Adult Immunization    Are you sick today?   No   Do you have allergies to medications, food, a vaccine component or latex?   No   Have you ever had a serious reaction after receiving a vaccination?   No   Do you have a long-term health problem with heart, lung, kidney, or metabolic disease (e.g., diabetes), asthma, a blood disorder, no spleen, complement component deficiency, a cochlear implant, or a spinal fluid leak?  Are you on long-term aspirin therapy?   No   Do you have cancer, leukemia, HIV/AIDS, or any other immune system problem?   No   Do you have a parent, brother, or sister with an immune system problem?   No   In the past 3 months, have you taken medications that affect  your immune system, such as prednisone, other steroids, or anticancer drugs; drugs for the treatment of rheumatoid arthritis, Crohn s disease, or psoriasis; or have you had radiation treatments?   No   Have you had a seizure, or a brain or other nervous system problem?   No   During the past year, have you received a transfusion of blood or blood    products, or been given immune (gamma) globulin or antiviral drug?   No   For women: Are you pregnant or is there a chance you could become       pregnant during the next month?   No   Have you received any vaccinations in the past 4 weeks?   No     Immunization questionnaire answers were all negative.        Per orders of Dr. Trinidad, , injection of Flu shot  given by Heather Cabrera MA. Patient instructed to remain in clinic for 15 minutes afterwards, and to report any adverse reaction to me immediately.       Screening performed by Heather Cabrera MA on 10/12/2020 at 11:22 AM.

## 2020-10-22 NOTE — TELEPHONE ENCOUNTER
LVM to schedule cervical epidural injection, consider at C4-5 or C6-7,      Pooja Peters    [facility]

## 2020-10-28 ENCOUNTER — MYC MEDICAL ADVICE (OUTPATIENT)
Dept: PALLIATIVE MEDICINE | Facility: CLINIC | Age: 60
End: 2020-10-28

## 2020-10-29 NOTE — TELEPHONE ENCOUNTER
The tramadol was sent to [facility] on 10/9/20. To fill on 10/26. Start 10/28.    Called [facility].  They found it and will fill it.    Pt notified.    Estelita RN-BSN  [facility]

## 2020-10-30 ENCOUNTER — ANCILLARY PROCEDURE (OUTPATIENT)
Dept: RADIOLOGY | Facility: CLINIC | Age: 60
End: 2020-10-30
Attending: PAIN MEDICINE
Payer: OTHER MISCELLANEOUS

## 2020-10-30 ENCOUNTER — RADIOLOGY INJECTION OFFICE VISIT (OUTPATIENT)
Dept: PALLIATIVE MEDICINE | Facility: CLINIC | Age: 60
End: 2020-10-30
Payer: OTHER MISCELLANEOUS

## 2020-10-30 VITALS
OXYGEN SATURATION: 95 % | RESPIRATION RATE: 16 BRPM | HEART RATE: 62 BPM | SYSTOLIC BLOOD PRESSURE: 148 MMHG | DIASTOLIC BLOOD PRESSURE: 102 MMHG

## 2020-10-30 DIAGNOSIS — M54.12 CERVICAL RADICULOPATHY: ICD-10-CM

## 2020-10-30 DIAGNOSIS — M54.12 CERVICAL RADICULAR PAIN: Primary | ICD-10-CM

## 2020-10-30 PROCEDURE — 62321 NJX INTERLAMINAR CRV/THRC: CPT | Performed by: PAIN MEDICINE

## 2020-10-30 ASSESSMENT — PAIN SCALES - GENERAL: PAINLEVEL: MODERATE PAIN (5)

## 2020-10-30 NOTE — PATIENT INSTRUCTIONS
[facility]   Procedure Discharge Instructions    Today you saw:  Dr. Hugo Corrigan     You had an:  Cervical Epidural steroid injection         If you were holding your blood thinning medication, please restart taking it: N/A    Be cautious Numbness and/or weakness may occur for up to 6-8 hours after the procedure due to effect of the local anesthetic    Do not drive for 6 hours. The effect of the local anesthetic could slow your reflexes.     You may resume your regular activities after 24 hours    Avoid strenuous activity for the first 24 hours    You may shower, however avoid swimming, tub baths or hot tubs for 24 hours following your procedure    You may have a mild to moderate increase in pain for several days following the injection.    It may take up to 14 days for the steroid medication to start working although you may feel the effect as early as a few days after the procedure.       You may use ice packs for 10-15 minutes, 3 to 4 times a day at the injection site for comfort    Do not use heat to painful areas for 6 to 8 hours. This will give the local anesthetic time to wear off and prevent you from accidentally burning your skin.     Unless you have been directed to avoid the use of anti-inflammatory medications (NSAIDS), you may use medications such as ibuprofen, Aleve or Tylenol for pain control if needed.     Possible side effects of steroids that you may experience include flushing, elevated blood pressure, increased appetite, mild headaches and restlessness.  All of these symptoms will get better with time.    If you experience any of the following, call the Pain Clinic during work hours (Mon-Friday 8-4:30 pm) at [phone] or the Provider Line after hours at [phone]:  -Fever over 100 degree F  -Swelling, bleeding, redness, drainage, warmth at the injection site  -Progressive weakness or numbness in your arms  -Unusual headache that is not relieved by Tylenol  or other pain reliever  -Unusual new onset of pain that is not improving

## 2020-10-30 NOTE — NURSING NOTE
Discharge Information    IV Discontiued Time:  NA    Amount of Fluid Infused:  NA    Discharge Criteria = When patient returns to baseline or as per MD order    Consciousness:  Pt is fully awake    Circulation:  BP +/- 20% of pre-procedure level    Respiration:  Patient is able to breathe deeply    O2 Sat:  Patient is able to maintain O2 Sat >92% on room air    Activity:  Moves 4 extremities on command    Ambulation:  Patient is able to stand and walk or stand and pivot into wheelchair    Dressing:  Clean/dry or No Dressing    Notes:   Discharge instructions and AVS given to patient    Patient meets criteria for discharge?  YES    Admitted to PCU?  No    Responsible adult present to accompany patient home?  Yes    Signature/Title:    Van Oquendo RN  RN Care Coordinator  [facility]

## 2020-10-30 NOTE — PROGRESS NOTES
[facility] - Procedure Note    Date of Visit: 11/29/2019    Procedure performed: C7-T1 interlaminar epidural steroid injection with fluoroscopic guidance  Diagnosis: Cervical spondylosis; Cervical radiculitis/radiculopathy  : Hugo Corrigan MD  Anesthesia: none    Indications: Truman Suero is a 59 year old male who is seen  for cervical epidural steroid injection. The patient describes neck pain radiating to his upper extremity. The patient has been exhibiting symptoms consistent with cervical intraspinal inflammation and radiculopathy. Symptoms have been persistent, disabling, and intermittently severe. The patient reports minimal improvement with conservative treatment, including meds/PT.    Cervical MRI   C2-3:  There is bilateral uncinate and facet hypertrophy without  significant spinal canal stenosis. Minimal bilateral foraminal  stenosis.     C3-4:  Posterior osteophyte with facet arthropathy bilaterally,  causing moderate left and right foraminal stenoses with mild spinal  canal stenosis.     C4-5:  Central disc osteophyte complex, with mild bilateral foraminal  stenoses and mild spinal canal stenosis.     C5-6:  Posterior disc as a complex, bilateral facet and uncinate  hypertrophy, causing mild to moderate bilateral foraminal and mild  spinal canal stenosis.     C6-7:  Posterior disc osteophyte complex, bilateral uncinate  hypertrophy and minimal bilateral facet hypertrophy, causing moderate  bilateral foraminal stenosis. Mild spinal canal stenosis     C7-T1: There is no focal abnormality.     No definite abnormality is noted of the visualized paraspinous  tissues.                                                                       Impression:    1. Multilevel moderate to severe degenerative disease, most prominent  findings are the bilateral foraminal stenoses at C6-7 and on the right  at C3-4 with mild multilevel spinal canal stenosis. Anterior C5-C6  fusion appears  stable without hardware loosening.  2. Mild grade 1 anterolisthesis of C2 on C3 again present.    Allergies:    No Known Allergies     Vitals:  There were no vitals taken for this visit.    Review of Systems: The patient denies recent fever, chills, illness, use of antibiotics or anticoagulants. All other 10-point review of systems negative.     Procedure: The procedure and risks were explained, and informed written consent was obtained from the patient. Risks include but are not limited to: infection, bleeding, increased pain, and damage to soft tissue, nerve, muscle, and vasculature structures. After getting informed consent, patient was brought into the procedure suite and was placed in a prone position on the procedure table. A Pause for the Cause was performed. Patient was prepped and draped in sterile fashion.     The C7-T1 interspace was identified with use of fluoroscopy in AP view. A 25-gauge, 1.5 inch needle was used to anesthetize the skin and subcutaneous tissue entry site with a total of 2 ml of 1% lidocaine. Under fluoroscopic visualization, a 22-gauge, 3.5 inch Tuohy epidural needle was slowly advanced towards the epidural space a few millimeters right of midline. The latter part of the needle advancement was guided with fluoroscopy in the lateral view. The epidural space was identified using loss of resistance technique. After negative aspiration for heme and cerebrospinal fluid, a total of 1 mL of Omnipaque was injected to confirm needle placement. 9 mL of contrast was wasted. Epidurogram confirmed spread within the posterior epidural space. 2 ml of 10mg/ml of dexamethasone and 1 ml of preservative free 0.25% bupivacaine was injected. The needle was removed.  Images were saved to PACS.    The patient tolerated the procedure well, and there was no evidence of procedural complications. No new sensory or motor deficits were noted following the procedure. The patient was stable and able to ambulate on  discharge home. Post-procedure instructions were provided.     Pre-procedure pain score: 7/10 in the neck, 7/10 in the arm  Post-procedure pain score: 2/10 in the neck, 2/10 in the arm    Assessment/Plan: Truman Suero is a 59 year old male s/p cervical interlaminar epidural steroid injection today for cervical spondylosis and radiculitis/radiculopathy.     1. Following today's procedure, the patient was advised to contact the [facility] for any of the following:   Fever, chills, or night sweats   New onset of pain, numbness, or weakness   Any questions/concerns regarding the procedure  If unable to contact the Pain Center, the patient was instructed to go to a local Emergency Room for any complications.   2. The patient will receive a follow-up call in 1 week.   3. Follow-up with referring provider in 2 weeks for post-procedure evaluation.    Hugo Corrigan MD Pain Management

## 2020-11-06 DIAGNOSIS — F43.22 ADJUSTMENT DISORDER WITH ANXIOUS MOOD: ICD-10-CM

## 2020-11-06 DIAGNOSIS — Z72.0 TOBACCO ABUSE: ICD-10-CM

## 2020-11-10 RX ORDER — BUPROPION HYDROCHLORIDE 150 MG/1
150 TABLET, EXTENDED RELEASE ORAL 2 TIMES DAILY
Qty: 180 TABLET | Refills: 0 | Status: SHIPPED | OUTPATIENT
Start: 2020-11-10 | End: 2021-02-19

## 2020-11-30 ENCOUNTER — MYC REFILL (OUTPATIENT)
Dept: PALLIATIVE MEDICINE | Facility: CLINIC | Age: 60
End: 2020-11-30

## 2020-11-30 DIAGNOSIS — M79.18 MYOFASCIAL PAIN: ICD-10-CM

## 2020-11-30 DIAGNOSIS — G89.29 CHRONIC BILATERAL LOW BACK PAIN WITHOUT SCIATICA: ICD-10-CM

## 2020-11-30 DIAGNOSIS — M50.30 DDD (DEGENERATIVE DISC DISEASE), CERVICAL: ICD-10-CM

## 2020-11-30 DIAGNOSIS — M47.812 CERVICAL SPONDYLOSIS WITHOUT MYELOPATHY: ICD-10-CM

## 2020-11-30 DIAGNOSIS — M47.816 SPONDYLOSIS OF LUMBAR REGION WITHOUT MYELOPATHY OR RADICULOPATHY: ICD-10-CM

## 2020-11-30 DIAGNOSIS — M54.59 LUMBAR FACET JOINT PAIN: ICD-10-CM

## 2020-11-30 DIAGNOSIS — Z98.1 S/P CERVICAL SPINAL FUSION: ICD-10-CM

## 2020-11-30 DIAGNOSIS — M54.50 CHRONIC BILATERAL LOW BACK PAIN WITHOUT SCIATICA: ICD-10-CM

## 2020-11-30 RX ORDER — TRAMADOL HYDROCHLORIDE 50 MG/1
50 TABLET ORAL EVERY 6 HOURS PRN
Qty: 90 TABLET | Refills: 0 | Status: CANCELLED | OUTPATIENT
Start: 2020-11-30

## 2020-11-30 RX ORDER — TRAMADOL HYDROCHLORIDE 200 MG/1
200 TABLET, EXTENDED RELEASE ORAL EVERY 24 HOURS
Qty: 30 TABLET | Refills: 0 | Status: CANCELLED | OUTPATIENT
Start: 2020-11-30

## 2020-11-30 RX ORDER — CYCLOBENZAPRINE HCL 5 MG
5-10 TABLET ORAL
Qty: 45 TABLET | Refills: 2 | Status: CANCELLED | OUTPATIENT
Start: 2020-11-30

## 2020-11-30 NOTE — TELEPHONE ENCOUNTER
Received call from patient requesting refill(s) of cyclobenzaprine (FLEXERIL) 5 MG tablet, traMADol (ULTRAM) 50 MG tablet, and traMADol (ULTRAM-ER) 200 MG 24 hr tablet    Last dispensed from pharmacy on 10/30/20-flexeril and tramadol 50mg       10/28/20-tramadol 200mg    Pt last seen by prescribing provider on 10/09/20  Next appt scheduled for 12/04/20     checked in the past 6 months? Yes If no, print current report and give to RN    Last urine drug screen date 02/28/20  Current opioid agreement on file (completed within the last year) Yes Date of opioid agreement: 02/28/20    E-prescribe to pharmacy-[facility] [address] [place] [address]     Will route to nursing [place] for review and preparation of prescription(s).

## 2020-12-01 RX ORDER — TRAMADOL HYDROCHLORIDE 200 MG/1
200 TABLET, EXTENDED RELEASE ORAL EVERY 24 HOURS
Qty: 30 TABLET | Refills: 0 | Status: SHIPPED | OUTPATIENT
Start: 2020-12-01 | End: 2020-12-04

## 2020-12-01 RX ORDER — TRAMADOL HYDROCHLORIDE 50 MG/1
50 TABLET ORAL EVERY 6 HOURS PRN
Qty: 90 TABLET | Refills: 0 | Status: SHIPPED | OUTPATIENT
Start: 2020-12-01 | End: 2020-12-04

## 2020-12-01 RX ORDER — CYCLOBENZAPRINE HCL 5 MG
5-10 TABLET ORAL
Qty: 45 TABLET | Refills: 2 | Status: SHIPPED | OUTPATIENT
Start: 2020-12-01 | End: 2021-10-19

## 2020-12-01 NOTE — TELEPHONE ENCOUNTER
Medication refill information reviewed.     Due date for Tramadol 200 mg and tramadol 50 mg is anytime after 11/29     Prescriptions prepped for review.     Will route to provider.     ANALISA ArtisN, RN-BC  Patient Care Supervisor  [facility] [place]

## 2020-12-01 NOTE — TELEPHONE ENCOUNTER
Signed Prescriptions:                        Disp   Refills    cyclobenzaprine (FLEXERIL) 5 MG tablet     45 tab*2        Sig: Take 1-2 tablets (5-10 mg) by mouth nightly as needed           for muscle spasms Need 6 hours between this and           methocarbamol  Authorizing Provider: MELANIE BENSON    traMADol (ULTRAM) 50 MG tablet             90 tab*0        Sig: Take 1 tablet (50 mg) by mouth every 6 hours as           needed for severe pain Max 3/day. May fill and           begin on 12/1/20; to last 30 days for chronic           pain  Authorizing Provider: MELANIE BENSON    traMADol (ULTRAM-ER) 200 MG 24 hr tablet   30 tab*0        Sig: Take 1 tablet (200 mg) by mouth every 24 hours Fill           and begin on 12/1/20; to last 30 days for chronic           pain  Authorizing Provider: MELANIE BENSON    Reviewed [place]  December 1, 2020- no concerning fills.    Melanie STEVENS, RN CNP, FNP  [facility]  [facility] [place]

## 2020-12-04 ENCOUNTER — TELEPHONE (OUTPATIENT)
Dept: PALLIATIVE MEDICINE | Facility: CLINIC | Age: 60
End: 2020-12-04

## 2020-12-04 ENCOUNTER — VIRTUAL VISIT (OUTPATIENT)
Dept: PALLIATIVE MEDICINE | Facility: CLINIC | Age: 60
End: 2020-12-04
Payer: OTHER MISCELLANEOUS

## 2020-12-04 DIAGNOSIS — M54.50 CHRONIC BILATERAL LOW BACK PAIN WITHOUT SCIATICA: ICD-10-CM

## 2020-12-04 DIAGNOSIS — M48.02 CERVICAL STENOSIS OF SPINAL CANAL: ICD-10-CM

## 2020-12-04 DIAGNOSIS — M79.18 MYOFASCIAL PAIN: ICD-10-CM

## 2020-12-04 DIAGNOSIS — M54.59 LUMBAR FACET JOINT PAIN: ICD-10-CM

## 2020-12-04 DIAGNOSIS — M50.30 DDD (DEGENERATIVE DISC DISEASE), CERVICAL: ICD-10-CM

## 2020-12-04 DIAGNOSIS — M47.812 CERVICAL SPONDYLOSIS WITHOUT MYELOPATHY: ICD-10-CM

## 2020-12-04 DIAGNOSIS — Z98.1 S/P CERVICAL SPINAL FUSION: ICD-10-CM

## 2020-12-04 DIAGNOSIS — G89.29 CHRONIC BILATERAL LOW BACK PAIN WITHOUT SCIATICA: ICD-10-CM

## 2020-12-04 DIAGNOSIS — M47.816 SPONDYLOSIS OF LUMBAR REGION WITHOUT MYELOPATHY OR RADICULOPATHY: ICD-10-CM

## 2020-12-04 PROCEDURE — 99214 OFFICE O/P EST MOD 30 MIN: CPT | Mod: 95 | Performed by: NURSE PRACTITIONER

## 2020-12-04 RX ORDER — GABAPENTIN 600 MG/1
900 TABLET ORAL 3 TIMES DAILY
Qty: 135 TABLET | Refills: 3 | Status: SHIPPED | OUTPATIENT
Start: 2020-12-04 | End: 2021-03-17

## 2020-12-04 RX ORDER — TRAMADOL HYDROCHLORIDE 50 MG/1
50 TABLET ORAL EVERY 6 HOURS PRN
Qty: 90 TABLET | Refills: 0 | Status: SHIPPED | OUTPATIENT
Start: 2020-12-04 | End: 2021-01-29

## 2020-12-04 RX ORDER — TRAMADOL HYDROCHLORIDE 200 MG/1
200 TABLET, EXTENDED RELEASE ORAL EVERY 24 HOURS
Qty: 30 TABLET | Refills: 0 | Status: SHIPPED | OUTPATIENT
Start: 2020-12-04 | End: 2021-01-29

## 2020-12-04 ASSESSMENT — PAIN SCALES - GENERAL: PAINLEVEL: SEVERE PAIN (6)

## 2020-12-04 NOTE — PROGRESS NOTES
"Truman Suero is a 60 year old male who is being evaluated via a billable video visit.      The patient has been notified of following:     \"This video visit will be conducted via a call between you and your physician/provider. We have found that certain health care needs can be provided without the need for an in-person physical exam.  This service lets us provide the care you need with a video conversation.  If a prescription is necessary we can send it directly to your pharmacy.  If lab work is needed we can place an order for that and you can then stop by our lab to have the test done at a later time.    Video visits are billed at different rates depending on your insurance coverage.  Please reach out to your insurance provider with any questions.    If during the course of the call the physician/provider feels a video visit is not appropriate, you will not be charged for this service.\"    Patient has given verbal consent for Video visit? Yes  How would you like to obtain your AVS? MyChart  If you are dropped from the video visit, the video invite should be resent to: Text to cell phone: [phone]  Will anyone else be joining your video visit? No       Mike Bergeron MA        Video-Visit Details    Type of service:  Video Visit    Video Start Time: 1:04 PM  Video End Time: 1:36 PM    Originating Location (pt. Location): Home    Distant Location (provider location):  [facility]     Platform used for Video Visit: RODNEY Cho CNP                [facility]    12/4/2020       Chief complaint: neck and right arm pain    axial low back pain has not been bad    Interval history:  Truman Suero is a 60 year old male is known to me for   Chronic neck pain  Cervical DDD  Cervical spondylosis (pain worse with extension/rotation indicating facetogenic component to pain)  Axial low back pain  Myofascial pain/muscle spasms  Remote history of " ETOH overuse, attended AA for awhile. Sober for 10 years  ---PMHx includes: neck pain  ---PSHx includes: none listed      Recommendations/plan at the last visit on 10/9/2020 included:  1. Physical Therapy: not at present  2. Clinical Health Psychologist: none  3. Diagnostic Studies:  None  4. Medication Management:    1. Continue tramadol ER 200mg once daily   2. Continue tramadol 50mg Q 6-8 hours PRN, max of 3/day.   3. Continue gabapentin 900 mg TID   4. Continue Topamax 50 mg BID   5. Continue methocarbamol 500-1000 mg TID PRN muscle spasms   5. Further procedures recommended: cervical epidural steroid injection for right sided cervical radicular symptoms.   6. He has completed occupational therapy  7. With regard to work restrictions, we will  make minor changes at the present time. 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 We will adjust his work restrictions based on deconditioning (not being employed or as active) for the past ~20 weeks as it would likely take Palmer a bit of time to get back into the pace at work  8. Recommendations to PCP: see above  9. Follow up: 8 weeks video visit.           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   -having some chronic axial low back pain, no radiation into the legs.   -discussed possible future spinal cord stimulator (SCS)  with patient. Our office does not place cervical spinal cord stimulators, this would be done with Dr. Kentrell Tejada, neurosurgeon at the [facility]  "DEBBIE Will mail patient SCS information for his review.       Interval history 10/9/2020  -he is \"maintaining,\" feels he is doing \"OK\"  -he still has neck pain that radiates into the upper back and down right arm to the hand and fingers. 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   -he is working with a work re-training work, he will be going to a computer class for free for job re-training.   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 They have reservations only and following the state guidelines for being open.  -he has not yet been called back to work as they are below the capacity  -has ongoing neck pain that radiates into his right arm as well as axial low back pain  -he has not heard what will happen with his return to work  -he is done doing the painting he had to do at home, had to do this in small chunks and pace himself  -he continues to work on projects at home slowly  -he is taking some on-line course-work and is really enjoying this  -he is working with Najma in OT on hand therapy and this is going well  -no recent imaging of his cervical spine, still having some radicular pain on the right side and some potential facet pain in the posterior neck to the shoulder region, worse with cervical extension and extension/rotation        Interval history 5/15/2020  -he has not worked since " "3/16/2020 as the restaurant he works for shut down due to Safe at Home order in [place] was instituted on 3/17/2020 due to COVID-19 viral threat.  -his wife is working from home.   -he tries doing some projects at home, he has tried painting and he can do this for about 10 minutes at a time   -he does not like to walk for exercise, but he does like to bicycle and he can do this for exercise and he enjoys it.   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      -Hand therapy has been pushed off a few times due to COVID-19 threat, but he should be seeing her in the next few weeks.      -he is not certain how things will go when work opens.     Interval history 2/28/2020  -He was referred to hand therapy for right wrist pain by Dr. Thomas and the right wrist/hand pain is distinct from cervical stenosis (radicular pain).  -Posterior neck pain that starts at the base of the skull and extends into the right shoulder.  -Notes some low back pain.  -Frequently dropping things from right hand, luckily the patient's dominant hand is the left. Painful symptoms are tolerable on Monday at the start of the work week, but he notes some overexertion by Friday.       At this point, the patient's participation with our multidisciplinary team includes:  The patient has been compliant with the program.  PT - attended non-pain management PHYSICAL THERAPY   Health Psych - has seen Kentrell Castañeda x4  Acupuncture: worked with Dr. Bereket LAY      Pain scores:    Pain intensity on average is 6 on a scale of 0-10.    Range is 5-9/10.   Pain right now is 6/10.  Pain is described as \"burning, shooting, throbbing, stabbing, miserable, numb, tiring, exhausting, penetrating, nagging, unbearable.\"    Current pain relevant " medications:      -Tramadol 200mg ER in the evening (helpful)  -Tramadol 50mg take 1 tablet  Q 6 hours PRN (using 2-3 tabs per day, helpful)  -ibuprofen 600mg PRN (helpful)  -gabapentin 900mg TID (somewhat helpful)  -methocarbamol 500-1000mg TID PRN muscle spasms (takes 1000 mg BID, somewhat helpful)  -Topamax 50 mg BID (helpful)    Other pertinent medications:  None    Previous Medications:  OPIATES: Tramdol (somewhat helpful)  NSAIDS: ibuprofen (helpful), Aleve (helpful)  MUSCLE RELAXANTS: none  ANTI-MIGRAINE MEDS: none  ANTI-DEPRESSANTS: none  SLEEP AIDS: none  ANTI-CONVULSANTS: gabapentin (helpful)  TOPICALS: lidocaine ointment (somewhat helpful)  Other meds: Tylenol (not helpful)        Other treatments have included:  Truman Suero has not been seen at a pain clinic in the past.    PT: tried, somewhat helpful  Chiropractic: helpful  Acupuncture: none  TENs Unit: none     Injections:    cervical radiofrequency nerve ablation at [facility] in December 2016 (did get good relief, got 70% relief of his typical neck pain)  -6/29/2017 Cervical facet joint steroid injections at C4-5 and C5-6 on the right with Dr. Nicole Harding (not helpful)  -3/8/2018 C7-T1 interlaminar DEMARCUS with Dr. Hugo Corrigan (not helpful)  -5/23/2018 right L4-5 transforaminal epidural steroid injection with Dr. Osorio (not helpful for back pain but did help leg pain)  -8/7/2018 bilateral SI joint injection with Dr Hugo Corrigan (helpful for one month)  -10/11/2018 l3-4 and L4-5 facet joint injections bilaterally with Dr. Hugo Corrigan (this has been helpful, more helpful than any other lumbar injections thus far)  -1/28/2019 bilateral L3-5 medial branch blocks #1 with Dr. Osorio (somewhat helpful)   -2/25/2019 LMBB #2 with Dr. Osorio (somewhat effective, but not enough to proceed to RFA)  -5/6/2019 bilateral L4/L5 and L5/S1 facet joint injections with Dr. Osorio (helpful)  -11/29/2019 C7-T1 interlaminar epidural steroid injection  with Dr. Corrigan (helpful temporarily)  -10/30/2020 ISMAEL with Dr. Hugo Corrigan (temporarily helpful)    Surgeries:  10/29/2018 right anterior cervical C5-6 discectomy and fusion by Dr. Jud Harkins (somewhat helpful)          THE 4 A's OF OPIOID MAINTENANCE ANALGESIA    Analgesia: long acting Tramadol with some short acting tramadol does give some relief    Activity: ADLs    Adverse effects: none    Adherence to Rx protocol: yes      Side Effects: none  Patient is using the medication as prescribed: yes    Medications:  Current Outpatient Medications   Medication Sig Dispense Refill     aspirin (ASA) 81 MG EC tablet Take 1 tablet (81 mg) by mouth daily (Patient not taking: Reported on 7/21/2020) 90 tablet 0     atorvastatin (LIPITOR) 10 MG tablet Take 1 tablet (10 mg) by mouth daily 90 tablet 0     buPROPion (WELLBUTRIN SR) 150 MG 12 hr tablet Take 1 tablet (150 mg) by mouth 2 times daily 180 tablet 0     cyclobenzaprine (FLEXERIL) 5 MG tablet Take 1-2 tablets (5-10 mg) by mouth nightly as needed for muscle spasms Need 6 hours between this and methocarbamol 45 tablet 2     fluticasone (FLONASE) 50 MCG/ACT nasal spray Spray 2 sprays into both nostrils 2 times daily Needs appointment for further refills (Patient not taking: Reported on 7/21/2020) 32 mL 0     gabapentin (NEURONTIN) 600 MG tablet Take 1.5 tablets (900 mg) by mouth 3 times daily 135 tablet 3     methocarbamol (ROBAXIN) 500 MG tablet Take 1 tablet (500 mg) by mouth 3 times daily as needed for muscle spasms Should be 6 hours between this and cyclobenzaprine at night 90 tablet 2     metoprolol succinate ER (TOPROL-XL) 25 MG 24 hr tablet Take 1 tablet (25 mg) by mouth daily 90 tablet 3     order for DME BP cuff, brand as covered by insurance.  Dx: HTN 1 each 0     topiramate (TOPAMAX) 50 MG tablet Take 50mg (1tablet) every morning and 100mg (2 tablets) every evening.  Drink plenty of water and no alcohol. 90 tablet 2     traMADol (ULTRAM) 50 MG tablet Take 1  tablet (50 mg) by mouth every 6 hours as needed for severe pain Max 3/day. May fill and begin on 12/1/20; to last 30 days for chronic pain 90 tablet 0     traMADol (ULTRAM-ER) 200 MG 24 hr tablet Take 1 tablet (200 mg) by mouth every 24 hours Fill and begin on 12/1/20; to last 30 days for chronic pain 30 tablet 0       Medical History: any changes in medical history since they were last seen? none    Social History:   Home situation: , has 4 kids, 2 at home, one in college and one launched.   Occupation/Schooling: used to be  full time in [employer], currently off of work due to COVID and restaurant is less busy. 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 He used to work a sobriety program, used to go to   Drug use: none  History of chemical dependency treatment: no formal treatment, did AA    Is patient a current smoker or tobacco user?  QUIT on 3/20/2018  If yes, was cessation counseling offered?  no        Review of Systems:   The 14 system ROS was reviewed with the patient and is positive for:  Constitutional: fever/chills, fatigue, weight gain, weight loss  Eyes/Head: headache, dizziness  ENT: ringing in ears  Allergy/Immune: allergies  Skin: itching, rash, hives  Hematologic: easy bruising  Respiratory: cough, wheezing, shortness of breath  Cardiovascular: swelling in feet, fainting, palpitations, chest pain  GI: abdominal pain, nausea, vomiting, diarrhea, constipation  Endocrine: steroid use  Musculoskeletal:  joint pain, arthritis, stiffness, gout, back pain, neck pain  Urinary: frequency, urgency, incontinence, hesitancy  Neurologic: weakness, numbness/tingling (bilateral hands and right arm), seizure, stroke, memory loss  Mental health: depression, anxiety, stress, suicidal ideation            Physical Exam:     Vital signs: There were no vitals taken for this visit.     Behavioral observations:  Awake,  alert, cooperative  Pulm: respirations easy and unlabored. Able to speak in full sentences without SOB or cough noted.              IMAGING:    MR CERVICAL SPINE WITHOUT CONTRAST 9/5/2017 2:32 PM      HISTORY: Neck pain, worsening numbness in arms and lower extremities.  Radiculopathy, cervical region.     TECHNIQUE: Multiplanar multisequence images were obtained through the  cervical spine without contrast.     COMPARISON: 2/22/2017.     FINDINGS: Sagittal images demonstrate some minimal gentle cervical  kyphosis, otherwise normal posterior alignment. There is no evidence  for craniovertebral or cervicomedullary junction abnormality. The  cervical cord is minimally indented anteriorly at C4-C5 and C5-C6,  otherwise is normal in morphology and signal characteristics. Disc  space narrowing and discogenic marrow changes are present C3-C4,  C4-C5, C5-C6 and C6-C7.     C2-C3: Minimal facet hypertrophy. No stenosis.     C3-C4: Broad-based posterior osteophyte formation is present causing  some mild bilateral neural foraminal stenosis, but no central canal  stenosis.     C4-C5: Broad-based posterior osteophyte formation and mild facet  hypertrophy is present causing some mild to moderate central canal  stenosis, mild cord deformity, and mild-to-moderate bilateral neural  foraminal stenosis.     C5-C6: Broad-based posterior osteophyte formation and disc bulging is  present along with some facet hypertrophy. There is moderate central  canal stenosis and moderate bilateral neural foraminal stenosis.  Findings have progressed since the prior exam.     C6-C7: Broad-based disc bulging is present along with some minimal  posterior osteophyte formation. There is borderline to mild central  canal stenosis, but no neural foraminal stenosis.     C7-T1: Moderate facet hypertrophy. No significant stenosis.         IMPRESSION: Moderate multilevel degenerative disc and facet disease  with some progression at C5-C6 where there is  moderate central canal  and bilateral neural foraminal stenosis along with cord deformity.          MR CERVICAL SPINE WITHOUT CONTRAST  2/22/2017 9:59 AM     HISTORY:  Bilateral neck and arm pain for three years.     COMPARISON: None.     TECHNIQUE: Routine MR cervical spine extended through T2.     FINDINGS: There is some degenerative bone marrow signal change C3-C6.  No malignant or destructive lesions. Normal alignment through T2.  Reversed cervical adenosis C3-C6.     The cervical and upper thoracic spinal cord appear intrinsically  normal. The craniocervical junction region is normal. No paraspinous  soft tissue abnormality.     Findings by level as follows:     C2-C3: Negative. No disc protrusion. No central or lateral stenosis.     C3-C4: Mild disc space narrowing. Mild diffuse annular bulge. Small  uncinate spur on the right. No significant central or left-sided  stenosis. Mild right-sided foraminal stenosis.     C4-C5: Moderate degenerative narrowing of the interspace. Mild ventral  disc osteophyte complex with minimal central stenosis. Small uncinate  spurs bilaterally with mild bilateral foraminal stenosis.     C6-C7: Moderate disc space narrowing. Mild broad-based disc osteophyte  complex with minimal central stenosis. Minimal uncinate spurs with  mild bilateral foraminal stenosis.     C6-C7: Moderate disc space narrowing. Mild ventral disc osteophyte  complex. No significant central or lateral stenosis.     C7-T1: No disc protrusion. No central or lateral stenosis. Moderate  bilateral facet joint disease.         IMPRESSION:  1. Degenerative changes as described C3-C4 through C7-T1. There is  only mild central and foraminal stenosis as described.  2. No intrinsic spinal cord abnormality through T2        [place] Prescription Monitoring Program:  Reviewed [facility] 12/4/2020- no concerning fills.  Melanie STEVENS, TIMBO CNP, FNP  [facility]  [facility]          DIRE  Score for selecting candidates for long term opioid analgesia for chronic pain:  Diagnosis  2  Intractablility  2  Risk    Psychological health  2    Chemical health  2    Reliability  2    Social support  3  Efficacy  2    Total DIRE Score = 15. Note that   7-13 predicts poor outcome (compliance and efficacy) from opioid prescribing; 14-21 predicts good outcome (compliance and efficacy)  from opioid prescribing.      Assessment:   1. Cervical spondylosis without myelopathy  2. Cervical DDD  3. Cervical stenosis of spinal canal  4. S/p cervical spinal fusion  5. Lumbar facet joint pain  6. 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 Chronic bilateral low back pain without sciatica  8. Myofascial pain    9. Encounter for long-term use of opiate analgesic  10. Urine drug screen last done 2/28/2020  11. Controlled substance agreement signed 2/28/2020  12. Remote history of ETOH overuse, attended AA for awhile. Sober for >10 years  13. PMHx includes: neck pain  14. PSHx includes: none listed       Plan:   1. Physical Therapy: not at present  2. Clinical Health Psychologist: none  3. Diagnostic Studies:  None  4. Medication Management:    1. Continue tramadol ER 200mg once daily   2. Continue tramadol 50mg Q 6-8 hours PRN, max of 3/day.   3. Continue gabapentin 900 mg TID   4. Continue Topamax 50 mg BID   5. Continue methocarbamol 500-1000 mg TID PRN muscle spasms   5. Further procedures recommended: none at present   6. With regard to work restrictions, we will  make minimal changes at the present time. Palmer has been off of work at the [employer] due to COVID-19  It is unclear how the [employer] business that Palmer works for will handle his return to work, thus far, he has not been called back. We will adjust his work restrictions based on deconditioning (not being employed or as active) for the past 8-9 months as it would likely take Palmer a bit of time to get back into the pace at  work  7. Mail patient information on Spinal Cord Stimulation from [employer]. Patient could see Dr. Kentrell Tejada, neurosurgeon at the [facility] for consideration of this therapy.   8. Recommendations to PCP: see above  9. Follow up: 8 weeks video visit due to COVID-19 viral threat            Melanie STEVENS, RN CNP, FNP  [facility]

## 2020-12-04 NOTE — PATIENT INSTRUCTIONS
Plan:   1. Physical Therapy: not at present  2. Clinical Health Psychologist: none  3. Diagnostic Studies:  None  4. Medication Management:    1. Continue tramadol ER 200mg once daily   2. Continue tramadol 50mg Q 6-8 hours PRN, max of 3/day.   3. Continue gabapentin 900 mg TID   4. Continue Topamax 50 mg BID   5. Continue methocarbamol 500-1000 mg TID PRN muscle spasms   5. Further procedures recommended: none at present   6. With regard to work restrictions, we will  make minimal changes at the present time. Palmer has been off of work at the [employer] due to COVID-19  It is unclear how the [employer] business that Palmer works for will handle his return to work, thus far, he has not been called back. We will adjust his work restrictions based on deconditioning (not being employed or as active) for the past 8-9 months as it would likely take Palmer a bit of time to get back into the pace at work  7. Mail patient information on Spinal Cord Stimulation from [employer]. Patient could see Dr. Kentrell Tejada, neurosurgeon at the [facility] for consideration of this therapy.   8. Recommendations to PCP: see above  9. Follow up: 8 weeks video visit due to COVID-19 viral threat      ----------------------------------------------------------------  Clinic Number:  [phone]     Call with any questions about your care and for scheduling assistance.     Calls are returned Monday through Friday between 8 AM and 4:30 PM. We usually get back to you within 2 business days depending on the issue/request.    If we are prescribing your medications:    For opioid medication refills, call the clinic or send a [employer] message 7 days in advance.  Please include:    Name of requested medication    Name of the pharmacy.    For non-opioid medications, call your pharmacy directly to request a refill. Please allow 3-4 days to be processed.     Per [place] State Law:    All controlled substance prescriptions must be filled within 30 days of being  written.      For those controlled substances allowing refills, pickup must occur within 30 days of last fill.      We believe regular attendance is key to your success in our program!      Any time you are unable to keep your appointment we ask that you call us at least 24 hours in advance to cancel.This will allow us to offer the appointment time to another patient.     Multiple missed appointments may lead to dismissal from the clinic.

## 2020-12-07 ENCOUNTER — TELEPHONE (OUTPATIENT)
Dept: PALLIATIVE MEDICINE | Facility: CLINIC | Age: 60
End: 2020-12-07

## 2020-12-07 NOTE — TELEPHONE ENCOUNTER
Mya Barahona  Author  Share Everywhere  Source  [phone]  Author's Contact Information     Pio 12/4/20 visit    Hi There!  I tried to join the video call today, with no luck. I would appreciate if you can send me the appt notes when they are completed, a new workability report, and any referrals or orders if indicated. My fax is [phone]. Thank you!

## 2020-12-08 NOTE — TELEPHONE ENCOUNTER
Will forward to Melanie for workability report.    Writer would be able to send Pt AVS and referral orders, unsure about visit notes without DACIA.    Melanie Please advise.    Van Oquendo, RN  Care Coordinator   [facility]

## 2020-12-26 ENCOUNTER — MYC REFILL (OUTPATIENT)
Dept: FAMILY MEDICINE | Facility: CLINIC | Age: 60
End: 2020-12-26

## 2020-12-26 DIAGNOSIS — I25.10 MILD CAD: ICD-10-CM

## 2020-12-26 DIAGNOSIS — I73.9 PAD (PERIPHERAL ARTERY DISEASE) (H): ICD-10-CM

## 2020-12-27 DIAGNOSIS — I25.10 MILD CAD: ICD-10-CM

## 2020-12-27 DIAGNOSIS — I77.810 ASCENDING AORTA DILATATION (H): ICD-10-CM

## 2020-12-28 NOTE — TELEPHONE ENCOUNTER
Routing refill request to provider for review/approval because:  BP Readings from Last 3 Encounters:   10/30/20 (!) 148/102   07/21/20 122/82   02/28/20 130/82

## 2020-12-29 RX ORDER — METOPROLOL SUCCINATE 25 MG/1
25 TABLET, EXTENDED RELEASE ORAL DAILY
Qty: 90 TABLET | Refills: 0 | Status: SHIPPED | OUTPATIENT
Start: 2020-12-29 | End: 2021-03-24

## 2020-12-29 NOTE — TELEPHONE ENCOUNTER
Routing refill request to provider for review/approval because:  Patient needs to be seen because it has been more than 1 year since last office visit.  Lab Results   Component Value Date    CHOL 165 08/23/2017     Lab Results   Component Value Date    HDL 55 08/23/2017     Lab Results   Component Value Date    LDL 97 08/23/2017     Lab Results   Component Value Date    TRIG 65 08/23/2017     No results found for: ALFONSO

## 2020-12-30 RX ORDER — ATORVASTATIN CALCIUM 10 MG/1
10 TABLET, FILM COATED ORAL DAILY
Qty: 90 TABLET | Refills: 0 | Status: SHIPPED | OUTPATIENT
Start: 2020-12-30 | End: 2022-05-11

## 2021-01-06 DIAGNOSIS — M50.30 DDD (DEGENERATIVE DISC DISEASE), CERVICAL: ICD-10-CM

## 2021-01-06 DIAGNOSIS — M47.812 CERVICAL SPONDYLOSIS WITHOUT MYELOPATHY: ICD-10-CM

## 2021-01-06 RX ORDER — TOPIRAMATE 50 MG/1
TABLET, FILM COATED ORAL
Qty: 90 TABLET | Refills: 2 | Status: SHIPPED | OUTPATIENT
Start: 2021-01-06 | End: 2021-06-30

## 2021-01-06 NOTE — TELEPHONE ENCOUNTER
Signed Prescriptions:                        Disp   Refills    topiramate (TOPAMAX) 50 MG tablet          90 tab*2        Sig: Take 50mg (1tablet) every morning and 100mg (2           tablets) every evening.  Drink plenty of water           and no alcohol.  Authorizing Provider: CANDACE BENSON, RN CNP, FNP  [facility]  [facility]

## 2021-01-06 NOTE — TELEPHONE ENCOUNTER
Received fax request from   [id number] - [place], [place] - [address]  [address] [address]  Phone: [phone] Fax: [phone]    pharmacy requesting refill(s) for topiramate (TOPAMAX) 50 MG tablet    Last refilled on 12/31/20    Pt last seen on 12/04/20  Next appt scheduled for 01/29/21    Will facilitate refill.    Ekaterina Hatch [facility]  [place]

## 2021-01-15 ENCOUNTER — HEALTH MAINTENANCE LETTER (OUTPATIENT)
Age: 61
End: 2021-01-15

## 2021-01-29 ENCOUNTER — VIRTUAL VISIT (OUTPATIENT)
Dept: PALLIATIVE MEDICINE | Facility: CLINIC | Age: 61
End: 2021-01-29
Payer: COMMERCIAL

## 2021-01-29 DIAGNOSIS — G89.29 CHRONIC BILATERAL LOW BACK PAIN WITHOUT SCIATICA: ICD-10-CM

## 2021-01-29 DIAGNOSIS — M47.812 CERVICAL SPONDYLOSIS WITHOUT MYELOPATHY: ICD-10-CM

## 2021-01-29 DIAGNOSIS — M50.30 DDD (DEGENERATIVE DISC DISEASE), CERVICAL: ICD-10-CM

## 2021-01-29 DIAGNOSIS — M48.02 CERVICAL STENOSIS OF SPINAL CANAL: ICD-10-CM

## 2021-01-29 DIAGNOSIS — M47.816 SPONDYLOSIS OF LUMBAR REGION WITHOUT MYELOPATHY OR RADICULOPATHY: ICD-10-CM

## 2021-01-29 DIAGNOSIS — Z98.1 S/P CERVICAL SPINAL FUSION: ICD-10-CM

## 2021-01-29 DIAGNOSIS — M54.50 CHRONIC BILATERAL LOW BACK PAIN WITHOUT SCIATICA: ICD-10-CM

## 2021-01-29 DIAGNOSIS — M54.59 LUMBAR FACET JOINT PAIN: ICD-10-CM

## 2021-01-29 DIAGNOSIS — M79.18 MYOFASCIAL PAIN: ICD-10-CM

## 2021-01-29 DIAGNOSIS — M54.12 CERVICAL RADICULOPATHY: Primary | ICD-10-CM

## 2021-01-29 PROCEDURE — 99214 OFFICE O/P EST MOD 30 MIN: CPT | Mod: GT | Performed by: NURSE PRACTITIONER

## 2021-01-29 RX ORDER — TRAMADOL HYDROCHLORIDE 50 MG/1
50 TABLET ORAL EVERY 6 HOURS PRN
Qty: 90 TABLET | Refills: 0 | Status: SHIPPED | OUTPATIENT
Start: 2021-01-29 | End: 2021-02-25

## 2021-01-29 RX ORDER — TRAMADOL HYDROCHLORIDE 200 MG/1
200 TABLET, EXTENDED RELEASE ORAL EVERY 24 HOURS
Qty: 30 TABLET | Refills: 0 | Status: SHIPPED | OUTPATIENT
Start: 2021-01-29 | End: 2021-02-26

## 2021-01-29 ASSESSMENT — PAIN SCALES - GENERAL: PAINLEVEL: SEVERE PAIN (6)

## 2021-01-29 NOTE — PROGRESS NOTES
Palmer is a 60 year old who is being evaluated via a billable video visit.      How would you like to obtain your AVS? SSP [place] 7  If the video visit is dropped, the invitation should be resent by: Emigdio waiting room  Will anyone else be joining your video visit? No     Latisha Pham CMA ([facility])        Video-Visit Details    Type of service:  Video Visit  Video Start Time: 3:10 PM    Video End Time:3:42 PM    Originating Location (pt. Location): Home    Distant Location (provider location):  [facility]     Platform used for Video Visit: [facility]    1/29/2021       Chief complaint: neck and right arm pain    axial low back pain has not been bad    Interval history:  Truman Suero is a 60 year old male is known to me for   Chronic neck pain  Cervical DDD  Cervical spondylosis (pain worse with extension/rotation indicating facetogenic component to pain)  Axial low back pain  Myofascial pain/muscle spasms  Remote history of ETOH overuse, attended AA for awhile. Sober for 10 years  ---PMHx includes: neck pain  ---PSHx includes: none listed      Recommendations/plan at the last visit on 12/4/2020 included:  1. Physical Therapy: not at present  2. Clinical Health Psychologist: none  3. Diagnostic Studies:  None  4. Medication Management:    1. Continue tramadol ER 200mg once daily   2. Continue tramadol 50mg Q 6-8 hours PRN, max of 3/day.   3. Continue gabapentin 900 mg TID   4. Continue Topamax 50 mg BID   5. Continue methocarbamol 500-1000 mg TID PRN muscle spasms   5. Further procedures recommended: none at present   6. With regard to work restrictions, we will  make minimal changes at the present time. 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 We will adjust his work restrictions based on  "deconditioning (not being employed or as active) for the past 8-9 months as it would likely take Palmer a bit of time to get back into the pace at work  7. Mail patient information on Spinal Cord Stimulation from [employer]. Patient could see Dr. Kentrell Tejada, neurosurgeon at the [facility] for consideration of this therapy.   8. Recommendations to PCP: see above  9. 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 He has worked with this company before as   -updated letter for work restrictions done, will sign when I am at the clinic next week.  -he received information re: cervical SCS and he and his wife have reviewed this. Palmer would like to have a referral to see Dr. Kentrell Tejada at the [facility] to discuss this option.         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   -having some chronic axial low back pain, no radiation into the legs.   -discussed possible future spinal cord stimulator (SCS)  with patient. Our office does not place cervical spinal cord stimulators, this would be done with Dr. Kentrell Tejada, neurosurgeon at the [facility]. Will mail patient SCS information for his review.       Interval history 10/9/2020  -he is \"maintaining,\" feels he is doing \"OK\"  -he still has neck pain that radiates into the upper back and down right arm to the hand and fingers. 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   -he is working with a work re-training work, he will be going to a computer class for free for job re-training.   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 They have reservations only and following the state guidelines for being open.  -he has not yet been called back to work as they are below the capacity  -has ongoing neck pain that radiates into his right arm as well as axial low back pain  -he has not heard what will happen with his return to work  -he is done doing the painting he had to do at home, had to do this in small chunks and pace himself  -he continues to work on projects at home slowly  -he is taking some on-line course-work and is really enjoying this  -he is working with Najma in OT on hand therapy and this is going well  -no recent imaging of his cervical spine, still having some radicular pain on the right side and some potential facet pain in the posterior neck to the shoulder region, worse with cervical extension and extension/rotation        Interval history 5/15/2020  -he has not worked since 3/16/2020 as the restaurant he works for shut down due to Safe at Home order in [place] was instituted on 3/17/2020 due to COVID-19 viral threat.  -his wife is working from home.   -he tries doing some projects at home, he has tried painting and he can do this for about 10 minutes at a time   -he does not like to walk for exercise, but he does like to bicycle and he can do this for exercise and he enjoys it.  "  -he has ongoing neck and low back pain  -the restaurant he works at has been doing very minimal curbside pick-up. 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      -Hand therapy has been pushed off a few times due to COVID-19 threat, but he should be seeing her in the next few weeks.      -he is not certain how things will go when work opens.     Interval history 2/28/2020  -He was referred to hand therapy for right wrist pain by Dr. Thomas and the right wrist/hand pain is distinct from cervical stenosis (radicular pain).  -Posterior neck pain that starts at the base of the skull and extends into the right shoulder.  -Notes some low back pain.  -Frequently dropping things from right hand, luckily the patient's dominant hand is the left. Painful symptoms are tolerable on Monday at the start of the work week, but he notes some overexertion by Friday.       At this point, the patient's participation with our multidisciplinary team includes:  The patient has been compliant with the program.  PT - attended non-pain management PHYSICAL THERAPY   Health Psych - has seen Kentrell Castañeda x4  Acupuncture: worked with Dr. Bereket LAY      Pain scores:    Pain intensity on average is 6 on a scale of 0-10.    Range is 4-8/10.   Pain right now is 6/10.  Pain is described as \"burning, shooting, throbbing, stabbing, miserable, numb, tiring, exhausting, penetrating, nagging, unbearable.\"    Current pain relevant medications:      -Tramadol 200mg ER in the evening (helpful)  -Tramadol 50mg take 1 tablet  Q 6 hours PRN (using 2-3 tabs per day, helpful)  -ibuprofen 600mg PRN (helpful)  -gabapentin 900mg TID (somewhat helpful)  -methocarbamol 500-1000mg TID PRN muscle spasms (takes 1000 mg BID, somewhat helpful)  -Topamax 50 mg BID (helpful)    Other pertinent medications:  None    Previous Medications:  OPIATES: " Tramdol (somewhat helpful)  NSAIDS: ibuprofen (helpful), Aleve (helpful)  MUSCLE RELAXANTS: none  ANTI-MIGRAINE MEDS: none  ANTI-DEPRESSANTS: none  SLEEP AIDS: none  ANTI-CONVULSANTS: gabapentin (helpful)  TOPICALS: lidocaine ointment (somewhat helpful)  Other meds: Tylenol (not helpful)        Other treatments have included:  Truman Suero has not been seen at a pain clinic in the past.    PT: tried, somewhat helpful  Chiropractic: helpful  Acupuncture: none  TENs Unit: none     Injections:    cervical radiofrequency nerve ablation at [facility] in December 2016 (did get good relief, got 70% relief of his typical neck pain)  -6/29/2017 Cervical facet joint steroid injections at C4-5 and C5-6 on the right with Dr. Nicole Harding (not helpful)  -3/8/2018 C7-T1 interlaminar DEMARCUS with Dr. Hugo Corrigan (not helpful)  -5/23/2018 right L4-5 transforaminal epidural steroid injection with Dr. Osorio (not helpful for back pain but did help leg pain)  -8/7/2018 bilateral SI joint injection with Dr Hugo Corrigan (helpful for one month)  -10/11/2018 l3-4 and L4-5 facet joint injections bilaterally with Dr. Hugo Corrigan (this has been helpful, more helpful than any other lumbar injections thus far)  -1/28/2019 bilateral L3-5 medial branch blocks #1 with Dr. Osorio (somewhat helpful)   -2/25/2019 LMBB #2 with Dr. Osorio (somewhat effective, but not enough to proceed to RFA)  -5/6/2019 bilateral L4/L5 and L5/S1 facet joint injections with Dr. Osorio (helpful)  -11/29/2019 C7-T1 interlaminar epidural steroid injection with Dr. Corrigan (helpful temporarily)  -10/30/2020 ISMAEL with Dr. Hugo Corrigan (temporarily helpful)    Surgeries:  10/29/2018 right anterior cervical C5-6 discectomy and fusion by Dr. Jud Harkins (somewhat helpful)          THE 4 A's OF OPIOID MAINTENANCE ANALGESIA    Analgesia: long acting Tramadol with some short acting tramadol does give some relief    Activity: ADLs    Adverse effects:  none    Adherence to Rx protocol: yes      Side Effects: none  Patient is using the medication as prescribed: yes    Medications:  Current Outpatient Medications   Medication Sig Dispense Refill     atorvastatin (LIPITOR) 10 MG tablet Take 1 tablet (10 mg) by mouth daily 90 tablet 0     buPROPion (WELLBUTRIN SR) 150 MG 12 hr tablet Take 1 tablet (150 mg) by mouth 2 times daily 180 tablet 0     cyclobenzaprine (FLEXERIL) 5 MG tablet Take 1-2 tablets (5-10 mg) by mouth nightly as needed for muscle spasms Need 6 hours between this and methocarbamol 45 tablet 2     methocarbamol (ROBAXIN) 500 MG tablet Take 1 tablet (500 mg) by mouth 3 times daily as needed for muscle spasms Should be 6 hours between this and cyclobenzaprine at night 90 tablet 2     metoprolol succinate ER (TOPROL-XL) 25 MG 24 hr tablet Take 1 tablet (25 mg) by mouth daily 90 tablet 0     topiramate (TOPAMAX) 50 MG tablet Take 50mg (1tablet) every morning and 100mg (2 tablets) every evening.  Drink plenty of water and no alcohol. 90 tablet 2     traMADol (ULTRAM) 50 MG tablet Take 1 tablet (50 mg) by mouth every 6 hours as needed for severe pain Max 3/day. May fill 12/28/2020 and begin on 12/31/20; to last 30 days for chronic pain 90 tablet 0     traMADol (ULTRAM-ER) 200 MG 24 hr tablet Take 1 tablet (200 mg) by mouth every 24 hours Fill 12/28/2020 begin on 12/31/20; to last 30 days for chronic pain 30 tablet 0     aspirin (ASA) 81 MG EC tablet Take 1 tablet (81 mg) by mouth daily (Patient not taking: Reported on 7/21/2020) 90 tablet 0     fluticasone (FLONASE) 50 MCG/ACT nasal spray Spray 2 sprays into both nostrils 2 times daily Needs appointment for further refills (Patient not taking: Reported on 7/21/2020) 32 mL 0     gabapentin (NEURONTIN) 600 MG tablet Take 1.5 tablets (900 mg) by mouth 3 times daily (Patient not taking: Reported on 1/29/2021) 135 tablet 3     order for DME BP cuff, brand as covered by insurance.  Dx: HTN (Patient not taking:  Reported on 12/4/2020) 1 each 0       Medical History: any changes in medical history since they were last seen? none    Social History:   Home situation: , has 4 kids, 2 at home, one in college and one launched.   Occupation/Schooling: used to be  full time in [employer], currently off of work due to COVID and restaurant is less busy. 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 He used to work a sobriety program, used to go to   Drug use: none  History of chemical dependency treatment: no formal treatment, did AA    Is patient a current smoker or tobacco user?  QUIT on 3/20/2018  If yes, was cessation counseling offered?  no        Review of Systems:   The 14 system ROS was reviewed with the patient and is positive for:  Constitutional: fever/chills, fatigue, weight gain, weight loss  Eyes/Head: headache, dizziness  ENT: ringing in ears  Allergy/Immune: allergies  Skin: itching, rash, hives  Hematologic: easy bruising  Respiratory: cough, wheezing, shortness of breath  Cardiovascular: swelling in feet, fainting, palpitations, chest pain  GI: abdominal pain, nausea, vomiting, diarrhea, constipation  Endocrine: steroid use  Musculoskeletal:  joint pain, arthritis, stiffness, gout, back pain, neck pain  Urinary: frequency, urgency, incontinence, hesitancy  Neurologic: weakness, numbness/tingling (bilateral hands and right arm), seizure, stroke, memory loss  Mental health: depression, anxiety, stress, suicidal ideation            Physical Exam:     Vital signs: There were no vitals taken for this visit.     Behavioral observations:  Awake, alert, cooperative  Pulm: respirations easy and unlabored. Able to speak in full sentences without SOB or cough noted.              IMAGING:    MR CERVICAL SPINE WITHOUT CONTRAST 9/5/2017 2:32 PM      HISTORY: Neck pain, worsening numbness in arms and lower extremities.  Radiculopathy,  cervical region.     TECHNIQUE: Multiplanar multisequence images were obtained through the  cervical spine without contrast.     COMPARISON: 2/22/2017.     FINDINGS: Sagittal images demonstrate some minimal gentle cervical  kyphosis, otherwise normal posterior alignment. There is no evidence  for craniovertebral or cervicomedullary junction abnormality. The  cervical cord is minimally indented anteriorly at C4-C5 and C5-C6,  otherwise is normal in morphology and signal characteristics. Disc  space narrowing and discogenic marrow changes are present C3-C4,  C4-C5, C5-C6 and C6-C7.     C2-C3: Minimal facet hypertrophy. No stenosis.     C3-C4: Broad-based posterior osteophyte formation is present causing  some mild bilateral neural foraminal stenosis, but no central canal  stenosis.     C4-C5: Broad-based posterior osteophyte formation and mild facet  hypertrophy is present causing some mild to moderate central canal  stenosis, mild cord deformity, and mild-to-moderate bilateral neural  foraminal stenosis.     C5-C6: Broad-based posterior osteophyte formation and disc bulging is  present along with some facet hypertrophy. There is moderate central  canal stenosis and moderate bilateral neural foraminal stenosis.  Findings have progressed since the prior exam.     C6-C7: Broad-based disc bulging is present along with some minimal  posterior osteophyte formation. There is borderline to mild central  canal stenosis, but no neural foraminal stenosis.     C7-T1: Moderate facet hypertrophy. No significant stenosis.         IMPRESSION: Moderate multilevel degenerative disc and facet disease  with some progression at C5-C6 where there is moderate central canal  and bilateral neural foraminal stenosis along with cord deformity.          MR CERVICAL SPINE WITHOUT CONTRAST  2/22/2017 9:59 AM     HISTORY:  Bilateral neck and arm pain for three years.     COMPARISON: None.     TECHNIQUE: Routine MR cervical spine extended through  T2.     FINDINGS: There is some degenerative bone marrow signal change C3-C6.  No malignant or destructive lesions. Normal alignment through T2.  Reversed cervical adenosis C3-C6.     The cervical and upper thoracic spinal cord appear intrinsically  normal. The craniocervical junction region is normal. No paraspinous  soft tissue abnormality.     Findings by level as follows:     C2-C3: Negative. No disc protrusion. No central or lateral stenosis.     C3-C4: Mild disc space narrowing. Mild diffuse annular bulge. Small  uncinate spur on the right. No significant central or left-sided  stenosis. Mild right-sided foraminal stenosis.     C4-C5: Moderate degenerative narrowing of the interspace. Mild ventral  disc osteophyte complex with minimal central stenosis. Small uncinate  spurs bilaterally with mild bilateral foraminal stenosis.     C6-C7: Moderate disc space narrowing. Mild broad-based disc osteophyte  complex with minimal central stenosis. Minimal uncinate spurs with  mild bilateral foraminal stenosis.     C6-C7: Moderate disc space narrowing. Mild ventral disc osteophyte  complex. No significant central or lateral stenosis.     C7-T1: No disc protrusion. No central or lateral stenosis. Moderate  bilateral facet joint disease.         IMPRESSION:  1. Degenerative changes as described C3-C4 through C7-T1. There is  only mild central and foraminal stenosis as described.  2. No intrinsic spinal cord abnormality through T2        [place] Prescription Monitoring Program:  Reviewed [facility] 1/29/2021- no concerning fills.  Melanie STEVENS, RN CNP, FNP  [facility]  [facility]          DIRE Score for selecting candidates for long term opioid analgesia for chronic pain:  Diagnosis  2  Intractablility  2  Risk    Psychological health  2    Chemical health  2    Reliability  2    Social support  3  Efficacy  2    Total DIRE Score = 15. Note that   7-13 predicts poor outcome (compliance  and efficacy) from opioid prescribing; 14-21 predicts good outcome (compliance and efficacy)  from opioid prescribing.      Assessment:   1. Cervical radiculopathy  2. Cervical DDD  3. S/p cervical spinal fusion  4. Cervical stenosis of spinal canal  5. Cervical spondylosis without myelopathy  6. Lumbar facet joint pain  7. 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 Chronic bilateral low back pain without sciatica  9. Myofascial pain    10. Encounter for long-term use of opiate analgesic  11. Urine drug screen last done 2/28/2020  12. Controlled substance agreement signed 2/28/2020  13. Remote history of ETOH overuse, attended AA for awhile. Sober for >10 years  14. PMHx includes: neck pain  15. PSHx includes: none listed       Plan:  1. Physical Therapy: not at presen  2. Clinical Health Psychologist: non  3. Diagnostic Studies:  Non  4. Medication Management:   1. Continue tramadol ER 200mg once daily  2. Continue tramadol 50mg Q 6-8 hours PRN, max of 3/day.  3. Continue gabapentin 900 mg TID  4. Continue Topamax 50 mg BID  5. Continue methocarbamol 500-1000 mg TID PRN muscle spasms  5. Further procedures recommended: none at present  6. Wrote updated letter for patient's workability  7. Referral placed for patient to  see Dr. Kentrell Tejada, neurosurgeon at the [facility] for consideration of cervical spinal cord stimulator for treatment of cervical radicular carl  8. Recommendations to PCP: see above  9. Follow up: 8 weeks video visit due to COVID-19 viral threat. Please call [phone] to make your follow-up appointment with me.       BILLING TIME DOCUMENTATION:   The total TIME spent on this patient on the day of the appointment was 32 minutes.      TOTAL TIME includes:   Time spent preparing to see the patient: 0 minutes (reviewing records and tests)  Time spend face to face with the patient: 32 minutes via video due to COVID-19 viral threat   Time spent ordering tests, medications,  procedures and referrals: 0 minutes  Time spent Referring and communicating with other healthcare professionals: 0 minutes  Documenting clinical information in Epic: 0 minutes          Melanie STEVENS RN CNP, FNP  [facility]

## 2021-01-29 NOTE — LETTER
"2021        To Whom It May Concern:    This letter is for Truman VALLEJO 1960 regarding his work restrictions.  He has undergone formal Functional Capacity Evaluation Testing (FCE testing).     The FCE had recommended the following:     \"the client demonstrated the abilities to perform work a full-time basis that correlates with the 'medium' level of physical demand (DOT Physical Demands work category) with additional specific restrictions as follows: static sitting or standing with cervical flexion limited to 50% of an 8 hour day, no more than 1 hour at a time. Without sustained cervical flexion, there are no other positional restrictions in sitting, standing or walking.\"      Using the FAF and the recommendations from the FCE and my knowledge of Truman VALLEJO 1960 I am making the following recommendations.    -It is my belief that Truman VALLEJO 1960 can perform full-time employment as long as he is given opportunity to change positions such that he is not in cervical flexion for more than 60 minutes at a time.    --Without sustained cervical flexion, there are no other positional restrictions in sitting, standing or walking.    The above restrictions noted should stay the same unless specifically amended.        Respectfully,        Melanie STEVENS RN CNP, FNP  [facility]  [place] location                  "

## 2021-01-29 NOTE — PATIENT INSTRUCTIONS
Plan:  1. Physical Therapy: not at [facility]  2. Clinical Health Psychologist: non  3. Diagnostic Studies:  Non  4. Medication Management:   1. Continue tramadol ER 200mg once daily  2. Continue tramadol 50mg Q 6-8 hours PRN, max of 3/day.  3. Continue gabapentin 900 mg TID  4. Continue Topamax 50 mg BID  5. Continue methocarbamol 500-1000 mg TID PRN muscle spasms  5. Further procedures recommended: none at present  6. Wrote updated letter for patient's workability  7. Referral placed for patient to  see Dr. Kentrell Tejada, neurosurgeon at the [facility] for consideration of cervical spinal cord stimulator for treatment of cervical radicular carl  8. Recommendations to PCP: see abov  9. Follow up: 8 weeks video visit due to COVID-19 viral threat. Please call [phone] to make your follow-up appointment with me.     ----------------------------------------------------------------  Clinic Number:  [phone]     Call with any questions about your care and for scheduling assistance.     Calls are returned Monday through Friday between 8 AM and 4:30 PM. We usually get back to you within 2 business days depending on the issue/request.    If we are prescribing your medications:    For opioid medication refills, call the clinic or send a [employer] message 7 days in advance.  Please include:    Name of requested medication    Name of the pharmacy.    For non-opioid medications, call your pharmacy directly to request a refill. Please allow 3-4 days to be processed.     Per [place] State Law:    All controlled substance prescriptions must be filled within 30 days of being written.      For those controlled substances allowing refills, pickup must occur within 30 days of last fill.      We believe regular attendance is key to your success in our program!      Any time you are unable to keep your appointment we ask that you call us at least 24 hours in advance to cancel.This will allow us to offer the appointment time to another patient.      Multiple missed appointments may lead to dismissal from the clinic.

## 2021-02-03 ENCOUNTER — TELEPHONE (OUTPATIENT)
Dept: PALLIATIVE MEDICINE | Facility: CLINIC | Age: 61
End: 2021-02-03

## 2021-02-03 ENCOUNTER — TELEPHONE (OUTPATIENT)
Dept: NEUROSURGERY | Facility: CLINIC | Age: 61
End: 2021-02-03

## 2021-02-03 ASSESSMENT — ENCOUNTER SYMPTOMS
DECREASED APPETITE: 0
MYALGIAS: 1
MUSCLE CRAMPS: 1
ARTHRALGIAS: 1
ORTHOPNEA: 0
TINGLING: 1
FATIGUE: 1
MEMORY LOSS: 0
STIFFNESS: 1
DISTURBANCES IN COORDINATION: 0
PARALYSIS: 0
TREMORS: 0
BLOATING: 0
DIZZINESS: 1
LOSS OF CONSCIOUSNESS: 0
SINUS CONGESTION: 0
INCREASED ENERGY: 0
LEG PAIN: 1
DIARRHEA: 0
BLOOD IN STOOL: 0
HYPERTENSION: 1
NAUSEA: 0
FLANK PAIN: 0
POLYPHAGIA: 0
HYPOTENSION: 0
CONSTIPATION: 1
NECK PAIN: 1
HEARTBURN: 1
SEIZURES: 0
CHILLS: 0
SMELL DISTURBANCE: 0
SORE THROAT: 0
HALLUCINATIONS: 0
WEIGHT GAIN: 1
SINUS PAIN: 1
NECK MASS: 0
PALPITATIONS: 0
JAUNDICE: 0
TASTE DISTURBANCE: 0
EXERCISE INTOLERANCE: 0
LIGHT-HEADEDNESS: 1
HOARSE VOICE: 1
POLYDIPSIA: 1
ALTERED TEMPERATURE REGULATION: 1
HEMATURIA: 0
HEADACHES: 1
DYSURIA: 0
VOMITING: 0
WEIGHT LOSS: 0
MUSCLE WEAKNESS: 1
ABDOMINAL PAIN: 0
SLEEP DISTURBANCES DUE TO BREATHING: 0
BACK PAIN: 1
RECTAL PAIN: 0
NIGHT SWEATS: 0
DIFFICULTY URINATING: 1
SPEECH CHANGE: 0
WEAKNESS: 1
JOINT SWELLING: 1
BOWEL INCONTINENCE: 0
TROUBLE SWALLOWING: 0
FEVER: 0
SYNCOPE: 0
NUMBNESS: 1

## 2021-02-04 ENCOUNTER — PRE VISIT (OUTPATIENT)
Dept: NEUROSURGERY | Facility: CLINIC | Age: 61
End: 2021-02-04

## 2021-02-04 NOTE — TELEPHONE ENCOUNTER
FUTURE VISIT INFORMATION      FUTURE VISIT INFORMATION:    Date: 2/8/2021    Time: 745am    Location: [facility] [place]  REFERRAL INFORMATION:    Referring provider:  Melanie STEVENS CNP    Referring providers clinic:  [facility]    Reason for visit/diagnosis  DDD, Cervical Radiculopathy    RECORDS REQUESTED FROM:       Clinic name Comments Records Status Imaging Status   Internal [facility]-1/29/2021, 12/4/2020, 10/9/2020    MR Cervical Spine-7/31/2020    CT Cervical Spine-10/3/2019 Epic  PACS

## 2021-02-04 NOTE — TELEPHONE ENCOUNTER
Printed and signed letter for updated workability. Placed in MA touchdown for it to be mailed to the patient.    Melanie STEVENS, RN CNP, FNP  [facility]  [facility] [place]

## 2021-02-08 ENCOUNTER — OFFICE VISIT (OUTPATIENT)
Dept: NEUROSURGERY | Facility: CLINIC | Age: 61
End: 2021-02-08
Payer: OTHER MISCELLANEOUS

## 2021-02-08 VITALS
BODY MASS INDEX: 27.44 KG/M2 | OXYGEN SATURATION: 96 % | SYSTOLIC BLOOD PRESSURE: 115 MMHG | HEART RATE: 67 BPM | RESPIRATION RATE: 16 BRPM | DIASTOLIC BLOOD PRESSURE: 81 MMHG | WEIGHT: 170 LBS

## 2021-02-08 DIAGNOSIS — M50.30 DDD (DEGENERATIVE DISC DISEASE), CERVICAL: ICD-10-CM

## 2021-02-08 DIAGNOSIS — G56.03 BILATERAL CARPAL TUNNEL SYNDROME: ICD-10-CM

## 2021-02-08 DIAGNOSIS — M47.812 CERVICAL SPONDYLOSIS WITHOUT MYELOPATHY: ICD-10-CM

## 2021-02-08 DIAGNOSIS — G89.29 CHRONIC NECK PAIN: ICD-10-CM

## 2021-02-08 DIAGNOSIS — M54.2 CHRONIC NECK PAIN: ICD-10-CM

## 2021-02-08 DIAGNOSIS — Z98.1 S/P CERVICAL SPINAL FUSION: Primary | ICD-10-CM

## 2021-02-08 DIAGNOSIS — M79.602 PAIN IN BOTH UPPER EXTREMITIES: ICD-10-CM

## 2021-02-08 DIAGNOSIS — M48.02 CERVICAL STENOSIS OF SPINAL CANAL: ICD-10-CM

## 2021-02-08 DIAGNOSIS — M79.601 PAIN IN BOTH UPPER EXTREMITIES: ICD-10-CM

## 2021-02-08 PROCEDURE — 99215 OFFICE O/P EST HI 40 MIN: CPT | Performed by: NEUROLOGICAL SURGERY

## 2021-02-08 PROCEDURE — 99417 PROLNG OP E/M EACH 15 MIN: CPT | Performed by: NEUROLOGICAL SURGERY

## 2021-02-08 ASSESSMENT — PAIN SCALES - GENERAL: PAINLEVEL: SEVERE PAIN (6)

## 2021-02-08 NOTE — PROGRESS NOTES
"NEUROSURGERY    HISTORY AND PHYSICAL EXAM    Chief Complaint   Patient presents with     Consult     [facility] NEW, SCS CONSULTATION, NECK PAIN AND RIGHT ARM PAIN       HISTORY OF PRESENT ILLNESS  Mr. Truman Suero is a 60 year old left handed male who presents for SCS placement consultation at the request of his pain management provider RODNEY Vargas at the [facility].  Patient has a complaint of neck pain which goes down his right shoulder and right arm.  He states that in a way it is a blessing because his left side is not affected, as he is left handed.  He could not say which is worse but the neck pain bothers him slightly more.  If we could address one of the areas of the pain, he would choose the neck.  The neck pain acts up usually in the morning and in the evening.  He has stabbing to burning/spasm pain.  He has to change his position \"a lot\".  Sitting makes it worse and standing can help.  Pain comes and goes and physical therapy helped.  He is a .    He also has a diagnosis of bilateral carpel tunnel, confirmed with EMG/NCS.  He did not get his carpel tunnel release surgeries.  He states that his left hand is worse.    For his pain, he takes tramadol, gabapentin, flexeril, topamax and methocarbamol.      He did undergo C5/6 ACDF on 10/29/2018 with Dr. Harkins for his neck pain and right arm pain.  However, the pain did not get better.  His pain and numbness is still there in his right arm.  He was seen by Dr. Harkins with the last visit being 10/3/2019.  Other than carpel tunnel release, no other surgical intervention was recommended for his neck.      Past Medical History:   Diagnosis Date     Ascending aorta dilatation (H) 2/13/2018     Cervical spondylosis without myelopathy 10/3/2017     Chronic bilateral low back pain with right-sided sciatica 5/16/2018     Hypertension      Mild CAD 2/13/2018     Neck pain     MRI positive on cervical vertebrea.     PAD " (peripheral artery disease) (H) 2018     Tobacco abuse 2017       Past Surgical History:   Procedure Laterality Date     COLONOSCOPY       DISCECTOMY, FUSION CERVICAL ANTERIOR ONE LEVEL, COMBINED Right 10/29/2018    Procedure: Right Anterior Cervical 5-6 Discectomy And Fusion;  Surgeon: Jud Harkins MD;  Location:  OR       Family History   Problem Relation Age of Onset     Prostate Cancer Father      Breast Cancer Maternal Grandmother      Prostate Cancer Other        Social History     Socioeconomic History     Marital status:      Spouse name: Not on file     Number of children: Not on file     Years of education: Not on file     Highest education level: Not on file   Occupational History     Occupation:    Social Needs     Financial resource strain: Not on file     Food insecurity     Worry: Not on file     Inability: Not on file     Transportation needs     Medical: Not on file     Non-medical: Not on file   Tobacco Use     Smoking status: Former Smoker     Packs/day: 1.00     Years: 30.00     Pack years: 30.00     Types: Cigarettes     Quit date: 3/20/2018     Years since quittin.8     Smokeless tobacco: Never Used   Substance and Sexual Activity     Alcohol use: No     Alcohol/week: 0.0 standard drinks     Comment: Quit drinking 10 years ago     Drug use: No     Sexual activity: Yes     Partners: Female     Birth control/protection: None   Lifestyle     Physical activity     Days per week: Not on file     Minutes per session: Not on file     Stress: Not on file   Relationships     Social connections     Talks on phone: Not on file     Gets together: Not on file     Attends Mosque service: Not on file     Active member of club or organization: Not on file     Attends meetings of clubs or organizations: Not on file     Relationship status: Not on file     Intimate partner violence     Fear of current or ex partner: Not on file     Emotionally abused: Not on file      Physically abused: Not on file     Forced sexual activity: Not on file   Other Topics Concern     Parent/sibling w/ CABG, MI or angioplasty before 65F 55M? Not Asked   Social History Narrative     Not on file        No Known Allergies    Current Outpatient Medications   Medication     atorvastatin (LIPITOR) 10 MG tablet     buPROPion (WELLBUTRIN SR) 150 MG 12 hr tablet     cyclobenzaprine (FLEXERIL) 5 MG tablet     methocarbamol (ROBAXIN) 500 MG tablet     metoprolol succinate ER (TOPROL-XL) 25 MG 24 hr tablet     topiramate (TOPAMAX) 50 MG tablet     traMADol (ULTRAM) 50 MG tablet     traMADol (ULTRAM-ER) 200 MG 24 hr tablet     aspirin (ASA) 81 MG EC tablet     fluticasone (FLONASE) 50 MCG/ACT nasal spray     gabapentin (NEURONTIN) 600 MG tablet     order for DME     No current facility-administered medications for this visit.        Answers for HPI/ROS submitted by the patient on 2/3/2021   General Symptoms: Yes  Skin Symptoms: No  HENT Symptoms: Yes  EYE SYMPTOMS: No  HEART SYMPTOMS: Yes  LUNG SYMPTOMS: No  INTESTINAL SYMPTOMS: Yes  URINARY SYMPTOMS: Yes  REPRODUCTIVE SYMPTOMS: No  SKELETAL SYMPTOMS: Yes  BLOOD SYMPTOMS: No  NERVOUS SYSTEM SYMPTOMS: Yes  MENTAL HEALTH SYMPTOMS: No  Fever: No  Loss of appetite: No  Weight loss: No  Weight gain: Yes  Fatigue: Yes  Night sweats: No  Chills: No  Increased stress: Yes  Excessive hunger: No  Excessive thirst: Yes  Feeling hot or cold when others believe the temperature is normal: Yes  Loss of height: No  Post-operative complications: No  Surgical site pain: No  Hallucinations: No  Change in or Loss of Energy: No  Hyperactivity: No  Confusion: No  Ear pain: No  Ear discharge: No  Hearing loss: No  Tinnitus: No  Nosebleeds: No  Congestion: No  Sinus pain: Yes  Trouble swallowing: No   Voice hoarseness: Yes  Mouth sores: No  Sore throat: No  Tooth pain: Yes  Gum tenderness: No  Bleeding gums: No  Change in taste: No  Change in sense of smell: No  Dry mouth: Yes  Hearing  aid used: No  Neck lump: No  Chest pain or pressure: No  Fast or irregular heartbeat: No  Pain in legs with walking: Yes  Trouble breathing while lying down: No  Fingers or toes appear blue: No  High blood pressure: Yes  Low blood pressure: No  Fainting: No  Murmurs: No  Pacemaker: No  Varicose veins: No  Edema or swelling: No  Wake up at night with shortness of breath: No  Light-headedness: Yes  Exercise intolerance: No  Heart burn or indigestion: Yes  Nausea: No  Vomiting: No  Abdominal pain: No  Bloating: No  Constipation: Yes  Diarrhea: No  Blood in stool: No  Black stools: No  Rectal or Anal pain: No  Fecal incontinence: No  Yellowing of skin or eyes: No  Vomit with blood: No  Change in stools: No  Trouble holding urine or incontinence: No  Pain or burning: No  Trouble starting or stopping: No  Blood in urine: No  Decreased frequency of urination: No  Frequent nighttime urination: Yes  Flank pain: No  Difficulty emptying bladder: Yes  Back pain: Yes  Muscle aches: Yes  Neck pain: Yes  Swollen joints: Yes  Joint pain: Yes  Bone pain: Yes  Muscle cramps: Yes  Muscle weakness: Yes  Joint stiffness: Yes  Bone fracture: No  Trouble with coordination: No  Dizziness or trouble with balance: Yes  Fainting or black-out spells: No  Memory loss: No  Headache: Yes  Seizures: No  Speech problems: No  Tingling: Yes  Tremor: No  Weakness: Yes  Difficulty walking: Yes  Paralysis: No  Numbness: Yes      PHYSICAL EXAM  /81   Pulse 67   Resp 16   Wt 77.1 kg (170 lb)   SpO2 96%   BMI 27.44 kg/m      General: Awake, alert, oriented. Well nourished, well developed, he is not in any acute distress.  HEENT: Head normocephalic, atraumatic. No carotid bruit. Neck supple. Decreased range of motion. No palpable thyroid mass.  Heart: Regular rhythm and rate. No murmurs.  Lungs: Clear to auscultation and percussion bilaterally. No rhonchi, rales or wheeze.  Abdomen: Soft, non-tender, non-distended. No  hepatosplenomegaly.  Extremity: Warm with no clubbing or cyanosis. No lower extremity edema.    Neurological  Awake, alert and oriented to date, time, place and person. Speech fluent.   Pupils equal, round, reactive to light.  Extraocular movement intact.  Visual fields are full on confrontation.  Hearing is grossly normal to finger rub.   Facial sensation intact.  Face symmetric.  Tongue midline.  Uvula elevates equally.    Motor: full strength throughout.  Sensation: intact to light touch and pinpoint.  Deep tendon reflexes: 1+ throughout. Negative for clonus. Negative for Garrido's sign. No dysmetria.      IMAGING  MRI cervical spine without/with gadolinium 7/31/2020  There is abnormal curvature of the cervical spine with S curvature.  There is focal narrowing of the canal measuring 9.5 mm at C5 level and 9.5 mm at C5/6 level  IMPRESSION:  1. Postoperative change of C5-6 anterior fusion.  2. Severe foraminal stenosis at multiple levels.  3. No high-grade spinal canal stenosis.  4. Severe facet arthropathy on the right at C2-3 and bilaterally at C7-T1.    CT cervical spine without contrast 10/3/2019  Impression:    1. Multilevel moderate to severe degenerative disease, most prominent findings are the bilateral foraminal stenoses at C6-7 and on the right at C3-4 with mild multilevel spinal canal stenosis. Anterior C5-C6 fusion appears stable without hardware loosening.  2. Mild grade 1 anterolisthesis of C2 on C3 again present.      ASSESSMENT   60 year old male  Cervical degenerative disc disease  Cervical spondylosis without myelopathy  Cervical stenosis of the spinal canal  Chronic neck pain  Pain in both upper extremities  Bilateral carpel tunnel syndrome  Status post C5/6 ACDF on 10/29/2018     Patient presents for consideration of spinal cord stimulator placement in his cervical spine for his neck pain and bilateral arm pain.  We did discuss the SCS therapy in general and how it can treat regionalized  "neuropathic pain.       We discussed the spinal cord stimulator options for pain management.  First, we discussed the phase I and II of the spinal cord stimulator implantation surgery.  I explained that the patient would first undergo implantation of a trial spinal cord stimulator as a \"buried\" trial, going from trial to permanent implant.  If he has a successful trial and obtains good relief of his pain, such as at least 50% improvement in his pain and improvement in his activities of daily living, he would then return for implantation of the permanent spinal cord stimulator.  Risks, benefits, alternative therapies were discussed with the patient, including but not limited to infection and bleeding, cerebrospinal fluid leak, injury to the nerve, injury to the spinal cord, possible worsening pain, mechanical and electrical failure and need or additional surgery.  Risks of anesthesia was also discussed, including but not limited to stroke, heart attack, pneumonia and death.  All questions were answered, and he expressed understanding.     I did raise the following questions in terms of the SCS therapy being appropriate.  First, he has both neck pain and arm pain.  To complicate the matter, he has bilateral carpel tunnel syndrome which compounds possible radicular or neuropathic pain.  Although SCS can address regionalized pain arising from nerve injury/damage or radiculopathy, it is less effective, to some degree, for neck pain.  It also may not work well for carpel tunnel syndrome and it may mask the worsening condition of his median nerves.  He may not get good coverage of his neck pain which is one of the goals of his SCS surgery.    Another concern is his cervical spine in general.  First, he has an S curve and has several areas of mild/moderate spinal canal stenosis.  It is narrow to the point that there is very little CSF around the spinal cord at this level.  With narrowing of the canal being caused by both " anterior and posterior elements and the presence of abnormal curvature, placement of the spinal cord stimulator electrodes, even the percutaneous type electrode, would be difficult and risky.  In addition, the curvature may be an indication of his poor overall spinal alignment.      With respect to his cervical spine, he may benefit from an evaluation by Dr. Ricardo Gaines for possible surgical intervention to his cervical spine, more specifically the correction of his spinal curvature or overall spinal balance.    I did speak to Dr. Gaines who was running in to the OR.  However, he did get back to me later in the day.  He would like to see the patient for further evaluation of his cervical degenerative disease.  I will put in a formal referral for Dr. Ricardo Gaines.      PLAN  1.  No recommendation for spinal cord stimulator at this time as a therapy option  2.  Referral to Dr. Ricardo Gaines for evaluation of cervical spondylosis      50 minutes were spent face to face with the patient of which more than 50% of the time was spent counseling and discussing the above issues regarding diagnosis, differential, treatment options, and steps for further evaluation.  30 minutes were spent reviewing the patient's chart, notes from Dr. Harkins, reviewing his MRI cervical spine and CT cervical spine and discussing/coordinating care with Dr. Ricardo Gaines on 2/8/2021, as well as documenting for this encounter.  80 minutes total were spent on this encounter.

## 2021-02-08 NOTE — LETTER
"2/8/2021       RE: Truman Suero  [address] [facility] [address]     Dear Colleague,    Thank you for referring your patient, Truman Suero, to the [facility] NEUROSURGERY CLINIC [place] at [facility]. Please see a copy of my visit note below.    NEUROSURGERY    HISTORY AND PHYSICAL EXAM    Chief Complaint   Patient presents with     Consult     P NEW, SCS CONSULTATION, NECK PAIN AND RIGHT ARM PAIN       HISTORY OF PRESENT ILLNESS  Mr. Truman Suero is a 60 year old left handed male who presents for SCS placement consultation at the request of his pain management provider RODNEY Vargas at the [facility].  Patient has a complaint of neck pain which goes down his right shoulder and right arm.  He states that in a way it is a blessing because his left side is not affected, as he is left handed.  He could not say which is worse but the neck pain bothers him slightly more.  If we could address one of the areas of the pain, he would choose the neck.  The neck pain acts up usually in the morning and in the evening.  He has stabbing to burning/spasm pain.  He has to change his position \"a lot\".  Sitting makes it worse and standing can help.  Pain comes and goes and physical therapy helped.  He is a .    He also has a diagnosis of bilateral carpel tunnel, confirmed with EMG/NCS.  He did not get his carpel tunnel release surgeries.  He states that his left hand is worse.    For his pain, he takes tramadol, gabapentin, flexeril, topamax and methocarbamol.      He did undergo C5/6 ACDF on 10/29/2018 with Dr. Harkins for his neck pain and right arm pain.  However, the pain did not get better.  His pain and numbness is still there in his right arm.  He was seen by Dr. Harkins with the last visit being 10/3/2019.  Other than carpel tunnel release, no other surgical intervention was recommended for his " neck.      Past Medical History:   Diagnosis Date     Ascending aorta dilatation (H) 2018     Cervical spondylosis without myelopathy 10/3/2017     Chronic bilateral low back pain with right-sided sciatica 2018     Hypertension      Mild CAD 2018     Neck pain     MRI positive on cervical vertebrea.     PAD (peripheral artery disease) (H) 2018     Tobacco abuse 2017       Past Surgical History:   Procedure Laterality Date     COLONOSCOPY       DISCECTOMY, FUSION CERVICAL ANTERIOR ONE LEVEL, COMBINED Right 10/29/2018    Procedure: Right Anterior Cervical 5-6 Discectomy And Fusion;  Surgeon: Jud Harkins MD;  Location:  OR       Family History   Problem Relation Age of Onset     Prostate Cancer Father      Breast Cancer Maternal Grandmother      Prostate Cancer Other        Social History     Socioeconomic History     Marital status:      Spouse name: Not on file     Number of children: Not on file     Years of education: Not on file     Highest education level: Not on file   Occupational History     Occupation:    Social Needs     Financial resource strain: Not on file     Food insecurity     Worry: Not on file     Inability: Not on file     Transportation needs     Medical: Not on file     Non-medical: Not on file   Tobacco Use     Smoking status: Former Smoker     Packs/day: 1.00     Years: 30.00     Pack years: 30.00     Types: Cigarettes     Quit date: 3/20/2018     Years since quittin.8     Smokeless tobacco: Never Used   Substance and Sexual Activity     Alcohol use: No     Alcohol/week: 0.0 standard drinks     Comment: Quit drinking 10 years ago     Drug use: No     Sexual activity: Yes     Partners: Female     Birth control/protection: None   Lifestyle     Physical activity     Days per week: Not on file     Minutes per session: Not on file     Stress: Not on file   Relationships     Social connections     Talks on phone: Not on file     Gets together: Not on  file     Attends [employer] service: Not on file     Active member of club or organization: Not on file     Attends meetings of clubs or organizations: Not on file     Relationship status: Not on file     Intimate partner violence     Fear of current or ex partner: Not on file     Emotionally abused: Not on file     Physically abused: Not on file     Forced sexual activity: Not on file   Other Topics Concern     Parent/sibling w/ CABG, MI or angioplasty before 65F 55M? Not Asked   Social History Narrative     Not on file        No Known Allergies    Current Outpatient Medications   Medication     atorvastatin (LIPITOR) 10 MG tablet     buPROPion (WELLBUTRIN SR) 150 MG 12 hr tablet     cyclobenzaprine (FLEXERIL) 5 MG tablet     methocarbamol (ROBAXIN) 500 MG tablet     metoprolol succinate ER (TOPROL-XL) 25 MG 24 hr tablet     topiramate (TOPAMAX) 50 MG tablet     traMADol (ULTRAM) 50 MG tablet     traMADol (ULTRAM-ER) 200 MG 24 hr tablet     aspirin (ASA) 81 MG EC tablet     fluticasone (FLONASE) 50 MCG/ACT nasal spray     gabapentin (NEURONTIN) 600 MG tablet     order for DME     No current facility-administered medications for this visit.          PHYSICAL EXAM  /81   Pulse 67   Resp 16   Wt 77.1 kg (170 lb)   SpO2 96%   BMI 27.44 kg/m      General: Awake, alert, oriented. Well nourished, well developed, he is not in any acute distress.  HEENT: Head normocephalic, atraumatic. No carotid bruit. Neck supple. Decreased range of motion. No palpable thyroid mass.  Heart: Regular rhythm and rate. No murmurs.  Lungs: Clear to auscultation and percussion bilaterally. No rhonchi, rales or wheeze.  Abdomen: Soft, non-tender, non-distended. No hepatosplenomegaly.  Extremity: Warm with no clubbing or cyanosis. No lower extremity edema.    Neurological  Awake, alert and oriented to date, time, place and person. Speech fluent.   Pupils equal, round, reactive to light.  Extraocular movement intact.  Visual fields are  full on confrontation.  Hearing is grossly normal to finger rub.   Facial sensation intact.  Face symmetric.  Tongue midline.  Uvula elevates equally.    Motor: full strength throughout.  Sensation: intact to light touch and pinpoint.  Deep tendon reflexes: 1+ throughout. Negative for clonus. Negative for Garrido's sign. No dysmetria.      IMAGING  MRI cervical spine without/with gadolinium 7/31/2020  There is abnormal curvature of the cervical spine with S curvature.  There is focal narrowing of the canal measuring 9.5 mm at C5 level and 9.5 mm at C5/6 level  IMPRESSION:  1. Postoperative change of C5-6 anterior fusion.  2. Severe foraminal stenosis at multiple levels.  3. No high-grade spinal canal stenosis.  4. Severe facet arthropathy on the right at C2-3 and bilaterally at C7-T1.    CT cervical spine without contrast 10/3/2019  Impression:    1. Multilevel moderate to severe degenerative disease, most prominent findings are the bilateral foraminal stenoses at C6-7 and on the right at C3-4 with mild multilevel spinal canal stenosis. Anterior C5-C6 fusion appears stable without hardware loosening.  2. Mild grade 1 anterolisthesis of C2 on C3 again present.      ASSESSMENT   60 year old male  Cervical degenerative disc disease  Cervical spondylosis without myelopathy  Cervical stenosis of the spinal canal  Chronic neck pain  Pain in both upper extremities  Bilateral carpel tunnel syndrome  Status post C5/6 ACDF on 10/29/2018     Patient presents for consideration of spinal cord stimulator placement in his cervical spine for his neck pain and bilateral arm pain.  We did discuss the SCS therapy in general and how it can treat regionalized neuropathic pain.       We discussed the spinal cord stimulator options for pain management.  First, we discussed the phase I and II of the spinal cord stimulator implantation surgery.  I explained that the patient would first undergo implantation of a trial spinal cord stimulator  "as a \"buried\" trial, going from trial to permanent implant.  If he has a successful trial and obtains good relief of his pain, such as at least 50% improvement in his pain and improvement in his activities of daily living, he would then return for implantation of the permanent spinal cord stimulator.  Risks, benefits, alternative therapies were discussed with the patient, including but not limited to infection and bleeding, cerebrospinal fluid leak, injury to the nerve, injury to the spinal cord, possible worsening pain, mechanical and electrical failure and need or additional surgery.  Risks of anesthesia was also discussed, including but not limited to stroke, heart attack, pneumonia and death.  All questions were answered, and he expressed understanding.     I did raise the following questions in terms of the SCS therapy being appropriate.  First, he has both neck pain and arm pain.  To complicate the matter, he has bilateral carpel tunnel syndrome which compounds possible radicular or neuropathic pain.  Although SCS can address regionalized pain arising from nerve injury/damage or radiculopathy, it is less effective, to some degree, for neck pain.  It also may not work well for carpel tunnel syndrome and it may mask the worsening condition of his median nerves.  He may not get good coverage of his neck pain which is one of the goals of his SCS surgery.    Another concern is his cervical spine in general.  First, he has an S curve and has several areas of mild/moderate spinal canal stenosis.  It is narrow to the point that there is very little CSF around the spinal cord at this level.  With narrowing of the canal being caused by both anterior and posterior elements and the presence of abnormal curvature, placement of the spinal cord stimulator electrodes, even the percutaneous type electrode, would be difficult and risky.  In addition, the curvature may be an indication of his poor overall spinal alignment.  "     With respect to his cervical spine, he may benefit from an evaluation by Dr. Riacrdo Gaines for possible surgical intervention to his cervical spine, more specifically the correction of his spinal curvature or overall spinal balance.    I did speak to Dr. Gaines who was running in to the OR.  However, he did get back to me later in the day.  He would like to see the patient for further evaluation of his cervical degenerative disease.  I will put in a formal referral for Dr. Ricardo Gaines.      PLAN  1.  No recommendation for spinal cord stimulator at this time as a therapy option  2.  Referral to Dr. Ricardo Gaines for evaluation of cervical spondylosis      50 minutes were spent face to face with the patient of which more than 50% of the time was spent counseling and discussing the above issues regarding diagnosis, differential, treatment options, and steps for further evaluation.  30 minutes were spent reviewing the patient's chart, notes from Dr. Harkins, reviewing his MRI cervical spine and CT cervical spine and discussing/coordinating care with Dr. Ricardo Gaines on 2/8/2021, as well as documenting for this encounter.  80 minutes total were spent on this encounter.      Again, thank you for allowing me to participate in the care of your patient.      Sincerely,    Kentrell Tejada MD

## 2021-02-19 ENCOUNTER — MYC REFILL (OUTPATIENT)
Dept: FAMILY MEDICINE | Facility: CLINIC | Age: 61
End: 2021-02-19

## 2021-02-19 DIAGNOSIS — F43.22 ADJUSTMENT DISORDER WITH ANXIOUS MOOD: ICD-10-CM

## 2021-02-19 DIAGNOSIS — Z72.0 TOBACCO ABUSE: ICD-10-CM

## 2021-02-23 RX ORDER — BUPROPION HYDROCHLORIDE 150 MG/1
150 TABLET, EXTENDED RELEASE ORAL 2 TIMES DAILY
Qty: 180 TABLET | Refills: 0 | Status: SHIPPED | OUTPATIENT
Start: 2021-02-23 | End: 2022-05-11

## 2021-02-23 NOTE — TELEPHONE ENCOUNTER
Routing refill request to provider for review/approval because:  Patient needs to be seen because it has been more than 1 year since last office visit. Last OV 12/9/19    Rdoy Mercado RN

## 2021-02-24 NOTE — TELEPHONE ENCOUNTER
Patient needs a visit, already overdue for preventive visit  Can do video visitl if he is not comfortable coming in  Humberto Trinidad D.O.

## 2021-02-24 NOTE — TELEPHONE ENCOUNTER
Called and left voicemail for patient to call clinic for scheduling assistance.   Informed of refill sent to pharmacy and need for visit for further refills.   Informed that he is overdue for physical but if uncomfortable coming into clinic he can do a Video or telephone visit in regards to medications.     Shila Olson MA

## 2021-02-25 ENCOUNTER — MYC MEDICAL ADVICE (OUTPATIENT)
Dept: PALLIATIVE MEDICINE | Facility: CLINIC | Age: 61
End: 2021-02-25

## 2021-02-25 ENCOUNTER — MYC REFILL (OUTPATIENT)
Dept: PALLIATIVE MEDICINE | Facility: CLINIC | Age: 61
End: 2021-02-25

## 2021-02-25 ENCOUNTER — MYC MEDICAL ADVICE (OUTPATIENT)
Dept: FAMILY MEDICINE | Facility: CLINIC | Age: 61
End: 2021-02-25

## 2021-02-25 DIAGNOSIS — M47.812 CERVICAL SPONDYLOSIS WITHOUT MYELOPATHY: ICD-10-CM

## 2021-02-25 DIAGNOSIS — M47.816 SPONDYLOSIS OF LUMBAR REGION WITHOUT MYELOPATHY OR RADICULOPATHY: ICD-10-CM

## 2021-02-25 DIAGNOSIS — M54.59 LUMBAR FACET JOINT PAIN: ICD-10-CM

## 2021-02-25 DIAGNOSIS — M50.30 DDD (DEGENERATIVE DISC DISEASE), CERVICAL: ICD-10-CM

## 2021-02-25 DIAGNOSIS — M54.50 CHRONIC BILATERAL LOW BACK PAIN WITHOUT SCIATICA: ICD-10-CM

## 2021-02-25 DIAGNOSIS — M79.18 MYOFASCIAL PAIN: ICD-10-CM

## 2021-02-25 DIAGNOSIS — G89.29 CHRONIC BILATERAL LOW BACK PAIN WITHOUT SCIATICA: ICD-10-CM

## 2021-02-25 DIAGNOSIS — Z98.1 S/P CERVICAL SPINAL FUSION: ICD-10-CM

## 2021-02-25 RX ORDER — TRAMADOL HYDROCHLORIDE 200 MG/1
200 TABLET, EXTENDED RELEASE ORAL EVERY 24 HOURS
Qty: 30 TABLET | Refills: 0 | Status: CANCELLED | OUTPATIENT
Start: 2021-02-25

## 2021-02-25 NOTE — TELEPHONE ENCOUNTER
Refills have been requested for the following medications:         traMADol (ULTRAM-ER) 200 MG 24 hr tablet [Melanie Thomas, APRN CNP]      Patient Comment: Planning ahead, good until Monday     Preferred pharmacy: [id number] -  [facility] [address] [facility]

## 2021-02-25 NOTE — TELEPHONE ENCOUNTER
Refills have been requested for the following medications:         traMADol (ULTRAM) 50 MG tablet [Melanie Thomas, APRN CNP]      Patient Comment: Planning ahead, good until Monday     Preferred pharmacy: [facility] -  [facility] [address] [place] [address]

## 2021-02-25 NOTE — TELEPHONE ENCOUNTER
Received call from patient requesting refill(s) of  TraMADol (ULTRAM) 50 MG tablet  And Tramadol (ULTRAM-ER) 200mg    Last dispensed from pharmacy on  Both 1/30/21    Patient's last office/virtual visit by prescribing provider on 1/29/21  Next office/virtual appointment scheduled for 3/17/21    Last urine drug screen date 2/28/20  Current opioid agreement on file (completed within the last year) No Date of opioid agreement: 2/28/20    E-prescribe to [facility] [address] [place] [address] pharmacy    Will route to [facility] for review and preparation of prescription(s).

## 2021-02-25 NOTE — TELEPHONE ENCOUNTER
Medication refill information reviewed.     Due date for TraMADol (ULTRAM) 50 MG tablet  And Tramadol (ULTRAM-ER) 200mg is      Prescriptions prepped for review.     Will route to provider.     Van Oquendo, RN  Care Coordinator   [facility] [place]

## 2021-02-25 NOTE — TELEPHONE ENCOUNTER
Other refill request for same patient, both medications included in other refill encounter. Will disregard this encounter.   [facility] [place]

## 2021-02-26 RX ORDER — TRAMADOL HYDROCHLORIDE 50 MG/1
50 TABLET ORAL EVERY 6 HOURS PRN
Qty: 90 TABLET | Refills: 0 | Status: SHIPPED | OUTPATIENT
Start: 2021-02-26 | End: 2021-03-17

## 2021-02-26 RX ORDER — TRAMADOL HYDROCHLORIDE 200 MG/1
200 TABLET, EXTENDED RELEASE ORAL EVERY 24 HOURS
Qty: 30 TABLET | Refills: 0 | Status: SHIPPED | OUTPATIENT
Start: 2021-02-26 | End: 2021-03-17

## 2021-02-26 NOTE — TELEPHONE ENCOUNTER
[email or web address] message sent with Rx approval from provider.  Zhou  Pain Clinic Management Team

## 2021-02-26 NOTE — TELEPHONE ENCOUNTER
Patient has an appointment scheduled with PCP on 3/24/2021. Will close this encounter.  Yoon Lopez CMA ([facility])

## 2021-02-26 NOTE — TELEPHONE ENCOUNTER
Medication refill information reviewed.      Due date for TraMADol (ULTRAM) 50 MG tablet  And Tramadol (ULTRAM-ER) 200mg is  03/01/21     Prescriptions prepped for review.      Will route to provider.

## 2021-02-26 NOTE — TELEPHONE ENCOUNTER
Signed Prescriptions:                        Disp   Refills    traMADol (ULTRAM) 50 MG tablet             90 tab*0        Sig: Take 1 tablet (50 mg) by mouth every 6 hours as           needed for severe pain Max 3/day. May fill           02/27/21 and begin on 03/01/21; to last 30 days           for chronic pain  Authorizing Provider: MELANIE BENSON    traMADol (ULTRAM-ER) 200 MG 24 hr tablet   30 tab*0        Sig: Take 1 tablet (200 mg) by mouth every 24 hours Fill           02/27/21 begin on 3/01/21; to last 30 days for           chronic pain  Authorizing Provider: MELANIE BENSON    Reviewed [place]  February 26, 2021- no concerning fills.    Melanie Benson APRN, RN CNP, FNP  [facility]  [facility]

## 2021-03-02 ENCOUNTER — TELEPHONE (OUTPATIENT)
Dept: NEUROSURGERY | Facility: CLINIC | Age: 61
End: 2021-03-02

## 2021-03-02 ENCOUNTER — MYC MEDICAL ADVICE (OUTPATIENT)
Dept: PALLIATIVE MEDICINE | Facility: CLINIC | Age: 61
End: 2021-03-02

## 2021-03-03 NOTE — TELEPHONE ENCOUNTER
Per patient [employer] message:  From: Palmer Suero      Created: 3/2/2021 4:00 PM      Melanie- Hope all is well with you and the fam. Just a quick update/question. When I went to see Dr. Tejada few weeks back for implant, we decided that was NOT a good idea. He looked closely at the MRI and his opinion was that there was too little space to insert the needles or electrodes whatever they are.  He was going to talk to colleague and possible see him with other solutions. Maybe other structural issues etc.  I meant to tell you and forgot all about it. Today they called. Was the first I heard about it so thought better touch base in case I need a referral or anything like that. Not sure if there is anything in his notes that may help you.  Thank you     Palmer        Routed to provider.    Estelita, RN-BSN  [facility]

## 2021-03-03 NOTE — TELEPHONE ENCOUNTER
Xavier Chakraborty-     Thank you for this update. I appreciate it. I have reviewed Dr. Tejada's very through note. He feels that the spinal cord stimulator is not the best option for you to get relief of the pain in your neck and it would be technically very difficult given the abnormal curve in your neck and the spinal canal narrowing. Dr. Tejada discussed your case with Dr. Gaines and Dr. Tejada has placed a referral for you to be scheduled with Dr. Gaines to evaluate your structural curvature of your cervical spine and to determine if any further surgery may be appropriate for you. Dr. Gaines is a specialist in this type of surgery.      Melanie STEVENS RN CNP, ABRAM  [facility]    I have sent the above [place] message to the patient.     Melanie STEVENS RN CNP, [facility]

## 2021-03-04 NOTE — TELEPHONE ENCOUNTER
SPINE PATIENTS - NEW PROTOCOL PREVISIT    RECORDS RECEIVED FROM: Internal   Date of Appt: 3/12/21   NOTES (FOR ALL VISITS) STATUS DETAILS   OFFICE NOTE from referring provider Internal Dr Tejada @ [facility] Neurosurgery:  2/8/21   OFFICE NOTE from other specialist N/A    DISCHARGE SUMMARY from hospital N/A    DISCHARGE REPORT from ER N/A    EMG REPORT internal [facility]:  11/7/17   MEDICATION LIST Internal    IMAGING  (FOR ALL VISITS)     MRI (HEAD, NECK, SPINE) Internal [facility]:  MRI Cervical Spine 7/31/20   XRAY (SPINE) *NEUROSURGERY* Internal [facility]:  XR Cervical Spine 6/13/19   CT (HEAD, NECK, SPINE) Internal [facility]:  XR Cervical Spine 10/3/19

## 2021-03-09 ASSESSMENT — ENCOUNTER SYMPTOMS
MYALGIAS: 1
TINGLING: 1
MUSCLE WEAKNESS: 0
PARALYSIS: 0
LOSS OF CONSCIOUSNESS: 0
NUMBNESS: 1
MUSCLE CRAMPS: 0
MEMORY LOSS: 1
HEADACHES: 1
JOINT SWELLING: 1
NECK PAIN: 1
STIFFNESS: 1
ARTHRALGIAS: 1
DIZZINESS: 0
BACK PAIN: 1
SPEECH CHANGE: 0
TREMORS: 0
DISTURBANCES IN COORDINATION: 1
WEAKNESS: 1
SEIZURES: 0

## 2021-03-12 ENCOUNTER — PRE VISIT (OUTPATIENT)
Dept: NEUROSURGERY | Facility: CLINIC | Age: 61
End: 2021-03-12

## 2021-03-12 ENCOUNTER — OFFICE VISIT (OUTPATIENT)
Dept: NEUROSURGERY | Facility: CLINIC | Age: 61
End: 2021-03-12
Payer: COMMERCIAL

## 2021-03-12 VITALS
RESPIRATION RATE: 16 BRPM | DIASTOLIC BLOOD PRESSURE: 88 MMHG | BODY MASS INDEX: 27.28 KG/M2 | WEIGHT: 169 LBS | SYSTOLIC BLOOD PRESSURE: 132 MMHG | OXYGEN SATURATION: 100 % | HEART RATE: 87 BPM

## 2021-03-12 DIAGNOSIS — M47.812 CERVICAL SPONDYLOSIS WITHOUT MYELOPATHY: Primary | ICD-10-CM

## 2021-03-12 PROCEDURE — 99215 OFFICE O/P EST HI 40 MIN: CPT | Performed by: NEUROLOGICAL SURGERY

## 2021-03-12 ASSESSMENT — PAIN SCALES - GENERAL: PAINLEVEL: NO PAIN (0)

## 2021-03-12 NOTE — LETTER
3/12/2021       RE: Truman Suero  [address] [facility] [address]     Dear Colleague,    Thank you for referring your patient, Truman Suero, to the [facility] NEUROSURGERY CLINIC [place] at [facility]. Please see a copy of my visit note below.    Date of Service: 3/12/2021    HISTORY OF PRESENT ILLNESS:  Mr. Truman Suero is a 60 year old who presents to our clinic for a consultation requested by Dr. Kentrell Tejada for evaluation for axial neck pain and intermittent right arm pain.  He was considered for SCS placement but Dr. Tejada fel that   With the narrowing of the canal, the placement of the spinal cord stimulator electrodes, even the percutaneous type electrode, would be difficult and risky.  Thus he is being referred for surgical consultation.    He has had chronic neck pain and right arm radiculopathy and is being followed at the [facility].  Patient has a complaint of neck pain which goes down his right shoulder and right arm.  He could not say which is worse but the neck pain bothers him slightly more.  He has stabbing to burning/spasm pain in the arm but he has also been diagnosed with carpal tunnel syndrome which complicates the matter.   Sitting makes it worse and standing can help.  Pain comes and goes and physical therapy helped.   His pain level is anywhere from 5-8 out of 10.   He has not noticed any rosales weakness, gait difficulty or bowel/bladder dysfunction.      For his pain, he takes tramadol, gabapentin, flexeril, topamax and methocarbamol.       He had C5/6 ACDF on 10/29/2018 for his neck pain and right arm pain.  However, the pain did not get better.  His pain and numbness is still there in his right arm.  He was seen by Dr. Harkins with the last visit being 10/3/2019.  Other than carpel tunnel release, no other surgical intervention was recommended for his neck.    Past Medical History:    Diagnosis Date     Ascending aorta dilatation (H) 2/13/2018     Cervical spondylosis without myelopathy 10/3/2017     Chronic bilateral low back pain with right-sided sciatica 5/16/2018     Hypertension      Mild CAD 2/13/2018     Neck pain     MRI positive on cervical vertebrea.     PAD (peripheral artery disease) (H) 2/27/2018     Tobacco abuse 8/16/2017     Past Surgical History:   Procedure Laterality Date     COLONOSCOPY       DISCECTOMY, FUSION CERVICAL ANTERIOR ONE LEVEL, COMBINED Right 10/29/2018    Procedure: Right Anterior Cervical 5-6 Discectomy And Fusion;  Surgeon: Jud Harkins MD;  Location: [facility] OR     HEAD & NECK SURGERY  [id number]    Follow-up to cervical surgery 10-29-18     Current Outpatient Medications   Medication     atorvastatin (LIPITOR) 10 MG tablet     buPROPion (WELLBUTRIN SR) 150 MG 12 hr tablet     cyclobenzaprine (FLEXERIL) 5 MG tablet     methocarbamol (ROBAXIN) 500 MG tablet     topiramate (TOPAMAX) 50 MG tablet     aspirin (ASA) 81 MG EC tablet     atorvastatin (LIPITOR) 20 MG tablet     fluticasone (FLONASE) 50 MCG/ACT nasal spray     gabapentin (NEURONTIN) 600 MG tablet     metoprolol succinate ER (TOPROL-XL) 25 MG 24 hr tablet     order for DME     traMADol (ULTRAM) 50 MG tablet     traMADol (ULTRAM-ER) 200 MG 24 hr tablet     No current facility-administered medications for this visit.       No Known Allergies    He works as a .  Has history of smoking.    Review of Systems:  Constitutional: Some occipital headaches, fever, chills.  Eye: No blurry visions.  CV: No chest pain  Resp: No SOB  GI: No abd pain or tenderness  : No bladder retention or incontinence  Skin: No skin lesions  Neuro: bilateral hand paresthesia/pain from carpal tunnel   MSK: Chronic neck pain mike with movement, some back pain  Psych: No psych issue    Physical exam: He is AO x 3.  He is in no acute distress.  Strength is 5/5 bilaterally.  No gait or balance difficulties.  DTRs are 2+ throughout.    Some numbness in right arm/hand.      IMAGING STUDIES REVIEWED:  I have personally reviewed the cervical spine MRI which showed multi-level degenerative disk disease at C3-4, C4-5, C6-7 with some foraminal stenosis at C4-5.  There is no rosales stenosis at C5-6 or C6-7 bilaterally.  There is an anterior cervical plate at C5-6.  No evidence of cord compression or central canal stenosis.  There is evidence of multilevel facet arthropathy.    IMPRESSION AND PLAN:  Mr. Suero presents with chronic neck pain with intermittent right arm pain.  He also has a history of bilateral carpal tunnel syndrome on EMG and has had surgery in the past.  At this time, he has multilevel degenerative disks throughout but no significant compression at C5-6 or C6-7 or C7-T1 to explain his radiculopathy.   Unfortunately, it is difficult to recommend surgery for multilevel degenerative disk disease and facet arthropathy in the absence of cord compression or nerve compression.   He should continue to pursue conservative treatments.   He may benefit from a PM&R consult with Dr. Hoffman for non-surgical management.    I spent 45 minutes face to face visit today counseling the patient regarding his diagnosis and the plan of care.      Again, thank you for allowing me to participate in the care of your patient.      Sincerely,    Ricardo Gaines MD

## 2021-03-13 NOTE — TELEPHONE ENCOUNTER
[facility]    Medicine Progress Note - Hospitalist Service       Date of Admission:  9/9/2020  Assessment & Plan             57 year old male with PMHx of schizophrenia, hx of TBI, alcohol use disorder, COPD, hyperlipidemia and hypothyroidism who was admitted on 9/9/2020 for evaluation of aggressive behavior at his group home.      Neurocognitive disorder dt hx of TBI and alcohol use disorder  Schizophrenia  Under commitment  Had been residing in group home prior to admission.  Brought to hospital for evaluation of aggressive behaviors. Group home now unwilling to take him back. Has had numerous CODE 21s called this stay. Seen by psych, meds adjusted. Is currently under civil commitment and court hold through [facility] until 7/31/21. Earlier on in stay, psych had recommended geripsych placement but per evaluation on 12/18, no longer felt this was needed and recommended to continue current meds. QTc 428 on EKG on 12/18. Seen again by psych on 1/5/21 and again on 2/15, not felt to need inpatient psych stay.   - Continues on Haldol 10mg TID (timed for when patient tends to be most agitated), memantine 5mg daily, venlafaxine 75mg daily, benztropine 0.5mg BID   - prns available for agitation (including Haldol, Zyprexa and Ativan)  - Door alarm in place due to high risk for elopement (recent attempt 3/8 evening)  - SW following for placement  - Psychiatry consulted 3/12 - appreciate follow up, no med changes for now     Back cellulitis in 1/2021: Resolved  Noted on 1/24, initiated on cephalexin at that time. Received local wound care and completed 7-day course of antibiotics on 1/30     Baseline Hypotension  BP 90s systolic on average. Asymptomatic. No concerns     Hyperlipidemia  Chronic and stable on atorvastatin     COPD  Not O2 dependent. Chronic and stable on salmeterol, albuterol prn     Hypothyroidism  Chronic and stable on levothyroxine     Tobacco use disorder  Using nicotine patch  Xavier Chakraborty!    I chatted with my partners. It is OK to do lumbar medial branch blocks as there is not any steroids for those. You will need to complete 4 visits of PHYSICAL THERAPY that also focus on your back pain prior to being able to move forward with the lumbar radiofrequency ablation if you have great results with the blocks.     Do you want me to place orders for the lumbar medial branch blocks?    Thanks, and have a very fine Holiday Season!  Melanie STEVENS RN CNP, MELISSAP  [facility]    I have sent the above Rent[employer] message to the patient.     Melanie STEVENS RN CNP, MELISSAP  [facility]     and PRN gum     Resting tremor of upper extremities  No acute issues      Asymptomatic COVID19 screening: negative on 12/6/20, 2/21/21, 3/7/21         Diet: Room Service  Diet  Combination Diet Regular Diet Adult; Safe Tray - with utensils    DVT Prophylaxis: Ambulate every shift  Valentin Catheter: not present  Code Status: Full Code           Disposition Plan   Expected discharge: Pending guardianship and placement-appreciate social work ongoing assistance  Entered: Sanjay Cantu MD 03/13/2021, 12:50 PM       The patient's care was discussed with the Bedside Nurse and Patient.    Sanjay Cantu MD  Hospitalist Service  [facility]  Contact information available via [facility] Paging/Directory    ______________________________________________________________________    Interval History   No acute events that I am aware of.  Patient calm and comfortable-resting and watching TV.  Denies shortness of breath or pain.  No fevers or chills.    Data reviewed today: I reviewed all medications, new labs and imaging results over the last 24 hours. I personally reviewed no images or EKG's today.    Physical Exam   Vital Signs: Temp: 96.9  F (36.1  C) Temp src: Oral BP: 92/58 Pulse: 78   Resp: 16 SpO2: 96 % O2 Device: None (Room air)    Weight: 182 lbs 0 oz    Gen: NAD, pleasant  HEENT: Normocephalic, EOMI, MMM  Resp: no crackles,  no wheezes, no increased work of resp  CV: S1S2 heard, reg rhythm, reg rate, no pedal edema  Abdo: soft, nontender, nondistended, bowel sounds present  Ext: calves nontender, well perfused  Neuro: AAOxself, CN grossly intact, no facial asymmetry, no focal deficits noted, moving all 4  Flat affect ongoing, calm and cooperative today    Data   No lab results found in last 7 days.    No results found for this or any previous visit (from the past 24 hour(s)).

## 2021-03-17 ENCOUNTER — VIRTUAL VISIT (OUTPATIENT)
Dept: PALLIATIVE MEDICINE | Facility: CLINIC | Age: 61
End: 2021-03-17
Payer: COMMERCIAL

## 2021-03-17 DIAGNOSIS — M79.18 MYOFASCIAL PAIN: ICD-10-CM

## 2021-03-17 DIAGNOSIS — M54.50 CHRONIC BILATERAL LOW BACK PAIN WITHOUT SCIATICA: ICD-10-CM

## 2021-03-17 DIAGNOSIS — M47.816 SPONDYLOSIS OF LUMBAR REGION WITHOUT MYELOPATHY OR RADICULOPATHY: ICD-10-CM

## 2021-03-17 DIAGNOSIS — M47.812 CERVICAL SPONDYLOSIS WITHOUT MYELOPATHY: ICD-10-CM

## 2021-03-17 DIAGNOSIS — G89.29 CHRONIC BILATERAL LOW BACK PAIN WITHOUT SCIATICA: ICD-10-CM

## 2021-03-17 DIAGNOSIS — M50.30 DDD (DEGENERATIVE DISC DISEASE), CERVICAL: ICD-10-CM

## 2021-03-17 DIAGNOSIS — M48.02 CERVICAL STENOSIS OF SPINAL CANAL: ICD-10-CM

## 2021-03-17 DIAGNOSIS — M54.59 LUMBAR FACET JOINT PAIN: ICD-10-CM

## 2021-03-17 DIAGNOSIS — Z98.1 S/P CERVICAL SPINAL FUSION: ICD-10-CM

## 2021-03-17 DIAGNOSIS — M54.12 CERVICAL RADICULOPATHY: Primary | ICD-10-CM

## 2021-03-17 PROCEDURE — 99213 OFFICE O/P EST LOW 20 MIN: CPT | Mod: GT | Performed by: NURSE PRACTITIONER

## 2021-03-17 RX ORDER — TRAMADOL HYDROCHLORIDE 50 MG/1
50 TABLET ORAL EVERY 6 HOURS PRN
Qty: 90 TABLET | Refills: 0 | Status: SHIPPED | OUTPATIENT
Start: 2021-03-17 | End: 2021-04-28

## 2021-03-17 RX ORDER — GABAPENTIN 600 MG/1
900 TABLET ORAL 3 TIMES DAILY
Qty: 135 TABLET | Refills: 3 | Status: SHIPPED | OUTPATIENT
Start: 2021-03-17 | End: 2021-06-30

## 2021-03-17 RX ORDER — TRAMADOL HYDROCHLORIDE 200 MG/1
200 TABLET, EXTENDED RELEASE ORAL EVERY 24 HOURS
Qty: 30 TABLET | Refills: 0 | Status: SHIPPED | OUTPATIENT
Start: 2021-03-17 | End: 2021-04-28

## 2021-03-17 NOTE — PROGRESS NOTES
Palmer is a 60 year old who is being evaluated via a billable video visit.      How would you like to obtain your AVS? MyChart  If the video visit is dropped, the invitation should be resent by: Text to cell phone: [phone]   Will anyone else be joining your video visit? No        Video-Visit Details    Type of service:  Video Visit  Video Start Time: 3:12 PM  Video End Time:3:33 PM    Originating Location (pt. Location): Home    Distant Location (provider location):  [facility]     Platform used for Video Visit: [facility]    3/17/2021       Chief complaint:   neck and right arm pain    axial low back pain has not been bad      Interval history:  Truman Suero is a 60 year old male is known to me for   Chronic neck pain  Cervical DDD  Cervical spondylosis (pain worse with extension/rotation indicating facetogenic component to pain)  Axial low back pain  Myofascial pain/muscle spasms  Remote history of ETOH overuse, attended AA for awhile. Sober for 10 years  ---PMHx includes: neck pain  ---PSHx includes: none listed      Recommendations/plan at the last visit on 1/29/2021 included:  1. Physical Therapy: not at present  2. Clinical Health Psychologist: none  3. Diagnostic Studies:  None  4. Medication Management:   1. Continue tramadol ER 200mg once daily  2. Continue tramadol 50mg Q 6-8 hours PRN, max of 3/day.  3. Continue gabapentin 900 mg TID  4. Continue Topamax 50 mg BID  5. Continue methocarbamol 500-1000 mg TID PRN muscle spasms  5. Further procedures recommended: none at present  6. Wrote updated letter for patient's workability  7. Referral placed for patient to  see Dr. Kentrell Tejada, neurosurgeon at the [facility] for consideration of cervical spinal cord stimulator for treatment of cervical radicular carl  8. Recommendations to PCP: see above  9. Follow up: 8 weeks video visit due to COVID-19 viral threat. Please call [phone] to make  "your follow-up appointment with me.             Since his last visit, Truman Suero reports:    Interval history March 17, 2021  -he is still hunting for a job  -he has had several 2nd interviews, but no job offers yet  -he has seen Dr. Kentrell Tejada re: possible cervical SCS, Dr. Tejada  does not feel that SCS is a good fit due to not much space in the cervical spinal canal for the leads  -he has seen Dr. Gaines (neurosurgery) who does not feel he is a candidate for further cervical surgery.  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 He has worked with this company before as   -updated letter for work restrictions done, will sign when I am at the clinic next week.  -he received information re: cervical SCS and he and his wife have reviewed this. Palmer would like to have a referral to see Dr. Kentrell Tejada at the [facility] to discuss this option.         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   -having some chronic axial low back pain, no radiation into the legs.   -discussed possible future spinal cord stimulator (SCS)  with patient. Our office does not place cervical spinal cord stimulators, this would be done with Dr. Kentrell Tejada, neurosurgeon at the [facility]. Will mail patient SCS information for his review.       Interval history 10/9/2020  -he is \"maintaining,\" feels he is doing \"OK\"  -he still has neck pain that radiates into the upper back and down right arm to the hand and fingers. 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   -he is working with a work re-training work, he will be going to a computer class for free for job re-training.   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 They have reservations only and following the state guidelines for being open.  -he has not yet been called back to work as they are below the capacity  -has ongoing neck pain that radiates into his right arm as well as axial low back pain  -he has not heard what will happen with his return to work  -he is done doing the painting he had to do at home, had to do this in small chunks and pace himself  -he continues to work on projects at home slowly  -he is taking some on-line course-work and is really enjoying this  -he is working with Najma in OT on hand therapy and this is going well  -no recent imaging of his cervical spine, still having some radicular pain on the right side and some potential facet pain in the posterior neck to the shoulder region, worse with cervical extension and extension/rotation        Interval history 5/15/2020  -he has not worked since 3/16/2020 as the restaurant he works for shut down due to Safe at Home order in [place] was instituted on 3/17/2020 due to COVID-19 viral threat.  -his wife is working from home.   -he tries doing some projects at home, he has tried painting and he can do this for about 10 minutes at a time   -he does not like to walk for exercise, but he  "does like to bicycle and he can do this for exercise and he enjoys it.   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      -Hand therapy has been pushed off a few times due to COVID-19 threat, but he should be seeing her in the next few weeks.      -he is not certain how things will go when work opens.     Interval history 2/28/2020  -He was referred to hand therapy for right wrist pain by Dr. Thomas and the right wrist/hand pain is distinct from cervical stenosis (radicular pain).  -Posterior neck pain that starts at the base of the skull and extends into the right shoulder.  -Notes some low back pain.  -Frequently dropping things from right hand, luckily the patient's dominant hand is the left. Painful symptoms are tolerable on Monday at the start of the work week, but he notes some overexertion by Friday.       At this point, the patient's participation with our multidisciplinary team includes:  The patient has been compliant with the program.  PT - attended non-pain management PHYSICAL THERAPY   Health Psych - has seen Kentrell Castañeda x4  Acupuncture: worked with Dr. Bereket LAY      Pain scores:    Pain intensity on average is 6 on a scale of 0-10.    Range is 4-8/10.   Pain right now is 5/10.  Pain is described as \"burning, shooting, throbbing, stabbing, miserable, numb, tiring, exhausting, penetrating, nagging, unbearable.\"    Current pain relevant medications:      -Tramadol 200mg ER in the evening (helpful)  -Tramadol 50mg take 1 tablet  Q 6 hours PRN (using 2-3 tabs per day, helpful)  -ibuprofen 600mg PRN (helpful)  -gabapentin 900mg TID (somewhat helpful)  -methocarbamol 500-1000mg TID PRN muscle spasms (takes 1000 mg BID, somewhat helpful)  -Topamax 50 mg BID " (helpful)    Other pertinent medications:  None    Previous Medications:  OPIATES: Tramdol (somewhat helpful)  NSAIDS: ibuprofen (helpful), Aleve (helpful)  MUSCLE RELAXANTS: none  ANTI-MIGRAINE MEDS: none  ANTI-DEPRESSANTS: none  SLEEP AIDS: none  ANTI-CONVULSANTS: gabapentin (helpful)  TOPICALS: lidocaine ointment (somewhat helpful)  Other meds: Tylenol (not helpful)        Other treatments have included:  Truman Suero has not been seen at a pain clinic in the past.    PT: tried, somewhat helpful  Chiropractic: helpful  Acupuncture: none  TENs Unit: none     Injections:    cervical radiofrequency nerve ablation at [facility] in December 2016 (did get good relief, got 70% relief of his typical neck pain)  -6/29/2017 Cervical facet joint steroid injections at C4-5 and C5-6 on the right with Dr. Nicole Harding (not helpful)  -3/8/2018 C7-T1 interlaminar DEMARCUS with Dr. Hugo Corrigan (not helpful)  -5/23/2018 right L4-5 transforaminal epidural steroid injection with Dr. Osorio (not helpful for back pain but did help leg pain)  -8/7/2018 bilateral SI joint injection with Dr Hugo Corrigan (helpful for one month)  -10/11/2018 l3-4 and L4-5 facet joint injections bilaterally with Dr. Hugo Corrigan (this has been helpful, more helpful than any other lumbar injections thus far)  -1/28/2019 bilateral L3-5 medial branch blocks #1 with Dr. Osorio (somewhat helpful)   -2/25/2019 LMBB #2 with Dr. Osorio (somewhat effective, but not enough to proceed to RFA)  -5/6/2019 bilateral L4/L5 and L5/S1 facet joint injections with Dr. Osorio (helpful)  -11/29/2019 C7-T1 interlaminar epidural steroid injection with Dr. Corrigan (helpful temporarily)  -10/30/2020 ISMAEL with Dr. Hugo Corrigan (temporarily helpful)    Surgeries:  10/29/2018 right anterior cervical C5-6 discectomy and fusion by Dr. Jud Harkins (somewhat helpful)          THE 4 A's OF OPIOID MAINTENANCE ANALGESIA    Analgesia: long acting Tramadol with some short  acting tramadol does give some relief    Activity: ADLs    Adverse effects: none    Adherence to Rx protocol: yes      Side Effects: none  Patient is using the medication as prescribed: yes    Medications:  Current Outpatient Medications   Medication Sig Dispense Refill     aspirin (ASA) 81 MG EC tablet Take 1 tablet (81 mg) by mouth daily (Patient not taking: Reported on 7/21/2020) 90 tablet 0     atorvastatin (LIPITOR) 10 MG tablet Take 1 tablet (10 mg) by mouth daily 90 tablet 0     buPROPion (WELLBUTRIN SR) 150 MG 12 hr tablet Take 1 tablet (150 mg) by mouth 2 times daily 180 tablet 0     cyclobenzaprine (FLEXERIL) 5 MG tablet Take 1-2 tablets (5-10 mg) by mouth nightly as needed for muscle spasms Need 6 hours between this and methocarbamol 45 tablet 2     fluticasone (FLONASE) 50 MCG/ACT nasal spray Spray 2 sprays into both nostrils 2 times daily Needs appointment for further refills (Patient not taking: Reported on 7/21/2020) 32 mL 0     gabapentin (NEURONTIN) 600 MG tablet Take 1.5 tablets (900 mg) by mouth 3 times daily (Patient not taking: Reported on 1/29/2021) 135 tablet 3     methocarbamol (ROBAXIN) 500 MG tablet Take 1 tablet (500 mg) by mouth 3 times daily as needed for muscle spasms Should be 6 hours between this and cyclobenzaprine at night 90 tablet 2     metoprolol succinate ER (TOPROL-XL) 25 MG 24 hr tablet Take 1 tablet (25 mg) by mouth daily 90 tablet 0     order for DME BP cuff, brand as covered by insurance.  Dx: HTN (Patient not taking: Reported on 12/4/2020) 1 each 0     topiramate (TOPAMAX) 50 MG tablet Take 50mg (1tablet) every morning and 100mg (2 tablets) every evening.  Drink plenty of water and no alcohol. 90 tablet 2     traMADol (ULTRAM) 50 MG tablet Take 1 tablet (50 mg) by mouth every 6 hours as needed for severe pain Max 3/day. May fill 02/27/21 and begin on 03/01/21; to last 30 days for chronic pain 90 tablet 0     traMADol (ULTRAM-ER) 200 MG 24 hr tablet Take 1 tablet (200 mg)  by mouth every 24 hours Fill 02/27/21 begin on 3/01/21; to last 30 days for chronic pain 30 tablet 0       Medical History: any changes in medical history since they were last seen? none    Social History:   Home situation: , has 4 kids, 2 at home, one in college and one launched.   Occupation/Schooling: used to be  full time in [employer], currently off of work due to COVID and restaurant is less busy. 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 He used to work a sobriety program, used to go to   Drug use: none  History of chemical dependency treatment: no formal treatment, did AA    Is patient a current smoker or tobacco user?  QUIT on 3/20/2018  If yes, was cessation counseling offered?  no        Review of Systems:   The 14 system ROS was reviewed with the patient and is positive for:  Constitutional: fever/chills, fatigue, weight gain, weight loss  Eyes/Head: headache, dizziness  ENT: ringing in ears  Allergy/Immune: allergies  Skin: itching, rash, hives  Hematologic: easy bruising  Respiratory: cough, wheezing, shortness of breath  Cardiovascular: swelling in feet, fainting, palpitations, chest pain  GI: abdominal pain, nausea, vomiting, diarrhea, constipation  Endocrine: steroid use  Musculoskeletal:  joint pain, arthritis, stiffness, gout, back pain, neck pain  Urinary: frequency, urgency, incontinence, hesitancy  Neurologic: weakness, numbness/tingling (bilateral hands and right arm), seizure, stroke, memory loss  Mental health: depression, anxiety, stress, suicidal ideation            Physical Exam:     Vital signs: There were no vitals taken for this visit.     Behavioral observations:  Awake, alert, cooperative  Pulm: respirations easy and unlabored. Able to speak in full sentences without SOB or cough noted.              IMAGING:    MR CERVICAL SPINE WITHOUT CONTRAST 9/5/2017 2:32 PM      HISTORY: Neck  pain, worsening numbness in arms and lower extremities.  Radiculopathy, cervical region.     TECHNIQUE: Multiplanar multisequence images were obtained through the  cervical spine without contrast.     COMPARISON: 2/22/2017.     FINDINGS: Sagittal images demonstrate some minimal gentle cervical  kyphosis, otherwise normal posterior alignment. There is no evidence  for craniovertebral or cervicomedullary junction abnormality. The  cervical cord is minimally indented anteriorly at C4-C5 and C5-C6,  otherwise is normal in morphology and signal characteristics. Disc  space narrowing and discogenic marrow changes are present C3-C4,  C4-C5, C5-C6 and C6-C7.     C2-C3: Minimal facet hypertrophy. No stenosis.     C3-C4: Broad-based posterior osteophyte formation is present causing  some mild bilateral neural foraminal stenosis, but no central canal  stenosis.     C4-C5: Broad-based posterior osteophyte formation and mild facet  hypertrophy is present causing some mild to moderate central canal  stenosis, mild cord deformity, and mild-to-moderate bilateral neural  foraminal stenosis.     C5-C6: Broad-based posterior osteophyte formation and disc bulging is  present along with some facet hypertrophy. There is moderate central  canal stenosis and moderate bilateral neural foraminal stenosis.  Findings have progressed since the prior exam.     C6-C7: Broad-based disc bulging is present along with some minimal  posterior osteophyte formation. There is borderline to mild central  canal stenosis, but no neural foraminal stenosis.     C7-T1: Moderate facet hypertrophy. No significant stenosis.         IMPRESSION: Moderate multilevel degenerative disc and facet disease  with some progression at C5-C6 where there is moderate central canal  and bilateral neural foraminal stenosis along with cord deformity.          MR CERVICAL SPINE WITHOUT CONTRAST  2/22/2017 9:59 AM     HISTORY:  Bilateral neck and arm pain for three  years.     COMPARISON: None.     TECHNIQUE: Routine MR cervical spine extended through T2.     FINDINGS: There is some degenerative bone marrow signal change C3-C6.  No malignant or destructive lesions. Normal alignment through T2.  Reversed cervical adenosis C3-C6.     The cervical and upper thoracic spinal cord appear intrinsically  normal. The craniocervical junction region is normal. No paraspinous  soft tissue abnormality.     Findings by level as follows:     C2-C3: Negative. No disc protrusion. No central or lateral stenosis.     C3-C4: Mild disc space narrowing. Mild diffuse annular bulge. Small  uncinate spur on the right. No significant central or left-sided  stenosis. Mild right-sided foraminal stenosis.     C4-C5: Moderate degenerative narrowing of the interspace. Mild ventral  disc osteophyte complex with minimal central stenosis. Small uncinate  spurs bilaterally with mild bilateral foraminal stenosis.     C6-C7: Moderate disc space narrowing. Mild broad-based disc osteophyte  complex with minimal central stenosis. Minimal uncinate spurs with  mild bilateral foraminal stenosis.     C6-C7: Moderate disc space narrowing. Mild ventral disc osteophyte  complex. No significant central or lateral stenosis.     C7-T1: No disc protrusion. No central or lateral stenosis. Moderate  bilateral facet joint disease.         IMPRESSION:  1. Degenerative changes as described C3-C4 through C7-T1. There is  only mild central and foraminal stenosis as described.  2. No intrinsic spinal cord abnormality through T2        [place] Prescription Monitoring Program:  Reviewed [place] [facility] 3/17/2021- no concerning fills.  Melanie STEVENS, RN CNP, FNP  [facility]  [facility]          DIRE Score for selecting candidates for long term opioid analgesia for chronic pain:  Diagnosis  2  Intractablility  2  Risk    Psychological health  2    Chemical health  2    Reliability  2    Social support   3  Efficacy  2    Total DIRE Score = 15. Note that   7-13 predicts poor outcome (compliance and efficacy) from opioid prescribing; 14-21 predicts good outcome (compliance and efficacy)  from opioid prescribing.      Assessment:   1. Cervical radiculopathy  2. Cervical DDD  3. S/p cervical spinal fusion  4. Cervical stenosis of spinal canal  5. Cervical spondylosis without myelopathy  6. Lumbar facet joint pain  7. 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 Chronic bilateral low back pain without sciatica  9. Myofascial pain    10. Encounter for long-term use of opiate analgesic  11. Urine drug screen last done 2/28/2020  12. Controlled substance agreement signed 2/28/2020  13. Remote history of ETOH overuse, attended AA for awhile. Sober for >10 years  14. PMHx includes: neck pain  15. PSHx includes: none listed       Plan:  1. Physical Therapy: not at present  2. Clinical Health Psychologist: none  3. Diagnostic Studies:  None  4. Medication Management:   1. Continue tramadol ER 200mg once daily  2. Continue tramadol 50mg Q 6-8 hours PRN, max of 3/day.  3. Continue gabapentin 900 mg TID  4. Continue Topamax 50 mg BID  5. Continue methocarbamol 500-1000 mg TID PRN muscle spasms  5. Further procedures recommended: none at present  6. Recommendations to PCP: see above  7. Follow up: 8-12 weeks video visit due to COVID-19 viral threat. Please call [phone] to make your follow-up appointment with me.           Melanie STEVENS, RN CNP, FNP  [facility]

## 2021-03-18 ASSESSMENT — ENCOUNTER SYMPTOMS
EYE PAIN: 0
DIARRHEA: 0
PARESTHESIAS: 0
HEMATURIA: 0
CHILLS: 0
NAUSEA: 0
DIZZINESS: 0
FEVER: 0
SORE THROAT: 0
ARTHRALGIAS: 1
NERVOUS/ANXIOUS: 0
HEADACHES: 1
HEMATOCHEZIA: 0
SHORTNESS OF BREATH: 0
JOINT SWELLING: 1
ABDOMINAL PAIN: 0
HEARTBURN: 1
WEAKNESS: 1
FREQUENCY: 0
PALPITATIONS: 0
DYSURIA: 0
COUGH: 0
CONSTIPATION: 1
MYALGIAS: 1

## 2021-03-24 ENCOUNTER — OFFICE VISIT (OUTPATIENT)
Dept: FAMILY MEDICINE | Facility: CLINIC | Age: 61
End: 2021-03-24
Payer: COMMERCIAL

## 2021-03-24 VITALS
BODY MASS INDEX: 26.52 KG/M2 | HEIGHT: 66 IN | WEIGHT: 165 LBS | SYSTOLIC BLOOD PRESSURE: 130 MMHG | OXYGEN SATURATION: 100 % | DIASTOLIC BLOOD PRESSURE: 78 MMHG | TEMPERATURE: 98 F | HEART RATE: 70 BPM

## 2021-03-24 DIAGNOSIS — M47.816 SPONDYLOSIS OF LUMBAR REGION WITHOUT MYELOPATHY OR RADICULOPATHY: ICD-10-CM

## 2021-03-24 DIAGNOSIS — I77.810 ASCENDING AORTA DILATATION (H): ICD-10-CM

## 2021-03-24 DIAGNOSIS — M48.02 CERVICAL STENOSIS OF SPINAL CANAL: ICD-10-CM

## 2021-03-24 DIAGNOSIS — M79.18 MYOFASCIAL PAIN: ICD-10-CM

## 2021-03-24 DIAGNOSIS — M54.59 LUMBAR FACET JOINT PAIN: ICD-10-CM

## 2021-03-24 DIAGNOSIS — F43.22 ADJUSTMENT DISORDER WITH ANXIOUS MOOD: ICD-10-CM

## 2021-03-24 DIAGNOSIS — Z87.891 PERSONAL HISTORY OF TOBACCO USE: ICD-10-CM

## 2021-03-24 DIAGNOSIS — Z13.6 SCREENING FOR AAA (ABDOMINAL AORTIC ANEURYSM): ICD-10-CM

## 2021-03-24 DIAGNOSIS — G89.29 CHRONIC BILATERAL LOW BACK PAIN WITHOUT SCIATICA: ICD-10-CM

## 2021-03-24 DIAGNOSIS — Z87.891 HISTORY OF SMOKING 30 OR MORE PACK YEARS: ICD-10-CM

## 2021-03-24 DIAGNOSIS — Z00.00 ROUTINE GENERAL MEDICAL EXAMINATION AT A HEALTH CARE FACILITY: Primary | ICD-10-CM

## 2021-03-24 DIAGNOSIS — Z98.1 S/P CERVICAL SPINAL FUSION: ICD-10-CM

## 2021-03-24 DIAGNOSIS — I73.9 PAD (PERIPHERAL ARTERY DISEASE) (H): ICD-10-CM

## 2021-03-24 DIAGNOSIS — Z12.5 SCREENING FOR PROSTATE CANCER: ICD-10-CM

## 2021-03-24 DIAGNOSIS — M54.50 CHRONIC BILATERAL LOW BACK PAIN WITHOUT SCIATICA: ICD-10-CM

## 2021-03-24 DIAGNOSIS — M47.812 CERVICAL SPONDYLOSIS WITHOUT MYELOPATHY: ICD-10-CM

## 2021-03-24 DIAGNOSIS — I25.10 MILD CAD: ICD-10-CM

## 2021-03-24 DIAGNOSIS — M50.30 DDD (DEGENERATIVE DISC DISEASE), CERVICAL: ICD-10-CM

## 2021-03-24 DIAGNOSIS — Z12.5 SCREENING PSA (PROSTATE SPECIFIC ANTIGEN): ICD-10-CM

## 2021-03-24 LAB
BASOPHILS # BLD AUTO: 0 10E9/L (ref 0–0.2)
BASOPHILS NFR BLD AUTO: 0.5 %
DIFFERENTIAL METHOD BLD: NORMAL
EOSINOPHIL # BLD AUTO: 0.4 10E9/L (ref 0–0.7)
EOSINOPHIL NFR BLD AUTO: 4.8 %
ERYTHROCYTE [DISTWIDTH] IN BLOOD BY AUTOMATED COUNT: 13.4 % (ref 10–15)
HCT VFR BLD AUTO: 45.3 % (ref 40–53)
HGB BLD-MCNC: 15.5 G/DL (ref 13.3–17.7)
LYMPHOCYTES # BLD AUTO: 3.7 10E9/L (ref 0.8–5.3)
LYMPHOCYTES NFR BLD AUTO: 48.7 %
MCH RBC QN AUTO: 29.9 PG (ref 26.5–33)
MCHC RBC AUTO-ENTMCNC: 34.2 G/DL (ref 31.5–36.5)
MCV RBC AUTO: 88 FL (ref 78–100)
MONOCYTES # BLD AUTO: 0.8 10E9/L (ref 0–1.3)
MONOCYTES NFR BLD AUTO: 10.1 %
NEUTROPHILS # BLD AUTO: 2.7 10E9/L (ref 1.6–8.3)
NEUTROPHILS NFR BLD AUTO: 35.9 %
PLATELET # BLD AUTO: 257 10E9/L (ref 150–450)
RBC # BLD AUTO: 5.18 10E12/L (ref 4.4–5.9)
WBC # BLD AUTO: 7.6 10E9/L (ref 4–11)

## 2021-03-24 PROCEDURE — G0296 VISIT TO DETERM LDCT ELIG: HCPCS | Performed by: FAMILY MEDICINE

## 2021-03-24 PROCEDURE — 85025 COMPLETE CBC W/AUTO DIFF WBC: CPT | Performed by: FAMILY MEDICINE

## 2021-03-24 PROCEDURE — 80053 COMPREHEN METABOLIC PANEL: CPT | Performed by: FAMILY MEDICINE

## 2021-03-24 PROCEDURE — G0103 PSA SCREENING: HCPCS | Performed by: FAMILY MEDICINE

## 2021-03-24 PROCEDURE — 99214 OFFICE O/P EST MOD 30 MIN: CPT | Mod: 25 | Performed by: FAMILY MEDICINE

## 2021-03-24 PROCEDURE — 36415 COLL VENOUS BLD VENIPUNCTURE: CPT | Performed by: FAMILY MEDICINE

## 2021-03-24 PROCEDURE — 80061 LIPID PANEL: CPT | Performed by: FAMILY MEDICINE

## 2021-03-24 PROCEDURE — 99396 PREV VISIT EST AGE 40-64: CPT | Performed by: FAMILY MEDICINE

## 2021-03-24 RX ORDER — ATORVASTATIN CALCIUM 20 MG/1
20 TABLET, FILM COATED ORAL DAILY
Qty: 90 TABLET | Refills: 0 | Status: SHIPPED | OUTPATIENT
Start: 2021-03-24 | End: 2021-07-18

## 2021-03-24 RX ORDER — METOPROLOL SUCCINATE 25 MG/1
25 TABLET, EXTENDED RELEASE ORAL DAILY
Qty: 90 TABLET | Refills: 0 | Status: SHIPPED | OUTPATIENT
Start: 2021-03-24 | End: 2021-07-18

## 2021-03-24 RX ORDER — GABAPENTIN 600 MG/1
900 TABLET ORAL 3 TIMES DAILY
Qty: 135 TABLET | Refills: 3 | Status: CANCELLED | OUTPATIENT
Start: 2021-03-24

## 2021-03-24 RX ORDER — TRAMADOL HYDROCHLORIDE 50 MG/1
50 TABLET ORAL EVERY 6 HOURS PRN
Qty: 90 TABLET | Refills: 0 | Status: CANCELLED | OUTPATIENT
Start: 2021-03-24

## 2021-03-24 RX ORDER — BUPROPION HYDROCHLORIDE 150 MG/1
150 TABLET, EXTENDED RELEASE ORAL 2 TIMES DAILY
Qty: 180 TABLET | Refills: 0 | Status: CANCELLED | OUTPATIENT
Start: 2021-03-24

## 2021-03-24 RX ORDER — TRAMADOL HYDROCHLORIDE 200 MG/1
200 TABLET, EXTENDED RELEASE ORAL EVERY 24 HOURS
Qty: 30 TABLET | Refills: 0 | Status: CANCELLED | OUTPATIENT
Start: 2021-03-24

## 2021-03-24 RX ORDER — ATORVASTATIN CALCIUM 10 MG/1
10 TABLET, FILM COATED ORAL DAILY
Qty: 90 TABLET | Refills: 0 | Status: CANCELLED | OUTPATIENT
Start: 2021-03-24

## 2021-03-24 ASSESSMENT — ENCOUNTER SYMPTOMS
MYALGIAS: 1
DIARRHEA: 0
PALPITATIONS: 0
HEMATURIA: 0
JOINT SWELLING: 1
FEVER: 0
CHILLS: 0
DYSURIA: 0
ARTHRALGIAS: 1
CONSTIPATION: 1
SHORTNESS OF BREATH: 0
PARESTHESIAS: 0
DIZZINESS: 0
NERVOUS/ANXIOUS: 0
HEMATOCHEZIA: 0
WEAKNESS: 1
EYE PAIN: 0
SORE THROAT: 0
COUGH: 0
HEADACHES: 1
NAUSEA: 0
ABDOMINAL PAIN: 0
FREQUENCY: 0
HEARTBURN: 1

## 2021-03-24 ASSESSMENT — MIFFLIN-ST. JEOR: SCORE: 1501.19

## 2021-03-24 NOTE — PATIENT INSTRUCTIONS
Finish covid shots  CT scan for lung cancer screening  Vit D supplements  Continue on current meds    Abdominal ultrasound and echo as planned, call insurance prior to test for cost      Stop wellbutrin -take every other day for a 4-7 days then stop  Monitor mood and also craving for smoking  By 8 weeks if doing well then follow up otherwise we need to restart it    ECHO scheduling [phone]    COVID-19 Vaccine scheduling through [facility] [phone]    Patient Education       Preventive Health Recommendations  Male Ages 50 - 64    Yearly exam:             See your health care provider every year in order to  o   Review health changes.   o   Discuss preventive care.    o   Review your medicines if your doctor has prescribed any.     Have a cholesterol test every 5 years, or more frequently if you are at risk for high cholesterol/heart disease.     Have a diabetes test (fasting glucose) every three years. If you are at risk for diabetes, you should have this test more often.     Have a colonoscopy at age 50, or have a yearly FIT test (stool test). These exams will check for colon cancer.      Talk with your health care provider about whether or not a prostate cancer screening test (PSA) is right for you.    You should be tested each year for STDs (sexually transmitted diseases), if you re at risk.     Shots: Get a flu shot each year. Get a tetanus shot every 10 years.     Nutrition:    Eat at least 5 servings of fruits and vegetables daily.     Eat whole-grain bread, whole-wheat pasta and brown rice instead of white grains and rice.     Get adequate Calcium and Vitamin D.     Lifestyle    Exercise for at least 150 minutes a week (30 minutes a day, 5 days a week). This will help you control your weight and prevent disease.     Limit alcohol to one drink per day.     No smoking.     Wear sunscreen to prevent skin cancer.     See your dentist every six months for an exam and cleaning.     See your eye  doctor every 1 to 2 years.    Lung Cancer Screening   Frequently Asked Questions  If you are at high-risk for lung cancer, getting screened with low-dose computed tomography (LDCT) every year can help save your life. This handout offers answers to some of the most common questions about lung cancer screening. If you have other questions, please call [phone] ([phone]).     What is it?  Lung cancer screening uses special X-ray technology to create an image of your lung tissue. The exam is quick and easy and takes less than 10 seconds. We don t give you any medicine or use any needles. You can eat before and after the exam. You don t need to change your clothes as long as the clothing on your chest doesn t contain metal. But, you do need to be able to hold your breath for at least 6 seconds during the exam.    What is the goal of lung cancer screening?  The goal of lung cancer screening is to save lives. Many times, lung cancer is not found until a person starts having physical symptoms. Lung cancer screening can help detect lung cancer in the earliest stages when it may be easier to treat.    Who should be screened for lung cancer?  We suggest lung cancer screening for anyone who is at high-risk for lung cancer. You are in the high-risk group if you:      are between the ages of 55 and 79, and    have smoked at least 1 pack of cigarettes a day for 30 or more years, and    still smoke or have quit within the past 15 years.    However, if you have a new cough or shortness of breath, you should talk to your doctor before being screened.    Some national lung health advocacy groups also recommend screening for people ages 50 to 79 who have smoked an average of 1 pack of cigarettes a day for 20 years. They must also have at least 1 other risk factor for lung cancer, not including exposure to secondhand smoke. Other risk factors are having had cancer in the past, emphysema, pulmonary fibrosis, COPD, a  family history of lung cancer, or exposure to certain materials such as arsenic, asbestos, beryllium, cadmium, chromium, diesel fumes, nickel, radon or silica. Your care team can help you know if you have one of these risk factors.     Why does it matter if I have symptoms?  Certain symptoms can be a sign that you have a condition in your lungs that should be checked and treated by your doctor. These symptoms include fever, chest pain, a new or changing cough, shortness of breath that you have never felt before, coughing up blood or unexplained weight loss. Having any of these symptoms can greatly affect the results of lung cancer screening.       Should all smokers get an LDCT lung cancer screening exam?  It depends. Lung cancer screening is for a very specific group of men and women who have a history of heavy smoking over a long period of time (see  Who should be screened for lung cancer  above).  I am in the high-risk group, but have been diagnosed with cancer in the past. Is LDCT lung cancer screening right for me?  In some cases, you should not have LDCT lung screening, such as when your doctor is already following your cancer with CT scan studies. Your doctor will help you decide if LDCT lung screening is right for you.  Do I need to have a screening exam every year?  Yes. If you are in the high-risk group described earlier, you should get an LDCT lung cancer screening exam every year until you are 79, or are no longer willing or able to undergo screening and possible procedures to diagnose and treat lung cancer.  How effective is LDCT at preventing death from lung cancer?  Studies have shown that LDCT lung cancer screening can lower the risk of death from lung cancer by 20 percent in people who are at high-risk.  What are the risks?  There are some risks and limitations of LDCT lung cancer screening. We want to make sure you understand the risks and benefits, so please let us know if you have any questions.  Your doctor may want to talk with you more about these risks.    Radiation exposure: As with any exam that uses radiation, there is a very small increased risk of cancer. The amount of radiation in LDCT is small--about the same amount a person would get from a mammogram. Your doctor orders the exam when he or she feels the potential benefits outweigh the risks.    False negatives: No test is perfect, including LDCT. It is possible that you may have a medical condition, including lung cancer, that is not found during your exam. This is called a false negative result.    False positives and more testing: LDCT very often finds something in the lung that could be cancer, but in fact is not. This is called a false positive result. False positive tests often cause anxiety. To make sure these findings are not cancer, you may need to have more tests. These tests will be done only if you give us permission. Sometimes patients need a treatment that can have side effects, such as a biopsy. For more information on false positives, see  What can I expect from the results?     Findings not related to lung cancer: Your LDCT exam also takes pictures of areas of your body next to your lungs. In a very small number of cases, the CT scan will show an abnormal finding in one of these areas, such as your kidneys, adrenal glands, liver or thyroid. This finding may not be serious, but you may need more tests. Your doctor can help you decide what other tests you may need, if any.  What can I expect from the results?  About 1 out of 4 LDCT exams will find something that may need more tests. Most of the time, these findings are lung nodules. Lung nodules are very small collections of tissue in the lung. These nodules are very common, and the vast majority--more than 97 percent--are not cancer (benign). Most are normal lymph nodes or small areas of scarring from past infections.  But, if a small lung nodule is found to be cancer, the cancer  can be cured more than 90 percent of the time. To know if the nodule is cancer, we may need to get more images before your next yearly screening exam. If the nodule has suspicious features (for example, it is large, has an odd shape or grows over time), we will refer you to a specialist for further testing.  Will my doctor also get the results?  Yes. Your doctor will get a copy of your results.  Is it okay to keep smoking now that there s a cancer screening exam?  No. Tobacco is one of the strongest cancer-causing agents. It causes not only lung cancer, but other cancers and cardiovascular (heart) diseases as well. The damage caused by smoking builds over time. This means that the longer you smoke, the higher your risk of disease. While it is never too late to quit, the sooner you quit, the better.  Where can I find help to quit smoking?  The best way to prevent lung cancer is to stop smoking. If you have already quit smoking, congratulations and keep it up! For help on quitting smoking, please call [email or web address] at 6-131-176-KKXP ([phone]) or the American Cancer Society at 1-802.895.8999 to find local resources near you.  One-on-one health coaching:  If you d prefer to work individually with a health care provider on tobacco cessation, we offer:      Medication Therapy Management:  Our specially trained pharmacists work closely with you and your doctor to help you quit smoking.  Call [phone] or [phone] (toll free).     Can Do: Health coaching offered by [facility].  [email or web address]

## 2021-03-24 NOTE — PROGRESS NOTES
SUBJECTIVE:   CC: Truman Suero is an 60 year old male who presents for preventative health visit.       Patient has been advised of split billing requirements and indicates understanding: Yes  Healthy Habits:     Getting at least 3 servings of Calcium per day:  NO    Bi-annual eye exam:  NO    Dental care twice a year:  Yes    Sleep apnea or symptoms of sleep apnea:  None    Diet:  Regular (no restrictions)    Frequency of exercise:  2-3 days/week    Duration of exercise:  Less than 15 minutes    Taking medications regularly:  Yes    Medication side effects:  None    PHQ-2 Total Score: 2      Cervical spodylosis without myeloathy-considered pain stim but lots of narrowing then the decided not to.  Not a surgical canidate  physical therapy , home exercises and pain is controlled  1/5tabs of neurontin 3 times a day, tramadol 200 ED in the am , 50 am and lunch time, no side effects    Walking is hard with hip and back pain, biking is ok       50mg topamax for headaches, does not take them as needed  Muscle relaxants at night as needed      Smoking 3 years, Wellbutrin daily now for smoking cessation    Out of work now with covid hit    Cad-      Twisted right Ankle in September and seems to not be healed yet.             Today's PHQ-2 Score:   PHQ-2 ( 1999 Pfizer) 3/18/2021   Q1: Little interest or pleasure in doing things 1   Q2: Feeling down, depressed or hopeless 1   PHQ-2 Score 2   Q1: Little interest or pleasure in doing things Several days   Q2: Feeling down, depressed or hopeless Several days   PHQ-2 Score 2       Abuse: Current or Past(Physical, Sexual or Emotional)- No  Do you feel safe in your environment? Yes        Social History     Tobacco Use     Smoking status: Former Smoker     Packs/day: 0.50     Years: 38.00     Pack years: 19.00     Types: Cigarettes     Start date: 1/1/1979     Quit date: 3/20/2018     Years since quitting: 3.0     Smokeless tobacco: Former User   Substance Use Topics     Alcohol  use: Not Currently     Alcohol/week: 0.0 standard drinks     Comment: Quit drinking 10 years ago         Alcohol Use 3/18/2021   Prescreen: >3 drinks/day or >7 drinks/week? Not Applicable   Prescreen: >3 drinks/day or >7 drinks/week? -       Last PSA:   PSA   Date Value Ref Range Status   04/30/2018 1.07 0 - 4 ug/L Final     Comment:     Assay Method:  Chemiluminescence using Siemens Vista analyzer       Reviewed orders with patient. Reviewed health maintenance and updated orders accordingly - Yes  BP Readings from Last 3 Encounters:   03/24/21 130/78   03/12/21 132/88   02/08/21 115/81    Wt Readings from Last 3 Encounters:   03/24/21 74.8 kg (165 lb)   03/12/21 76.7 kg (169 lb)   02/08/21 77.1 kg (170 lb)                    Reviewed and updated as needed this visit by clinical staff  Tobacco  Allergies  Meds              Reviewed and updated as needed this visit by Provider                Past Medical History:   Diagnosis Date     Ascending aorta dilatation (H) 2/13/2018     Cervical spondylosis without myelopathy 10/3/2017     Chronic bilateral low back pain with right-sided sciatica 5/16/2018     Hypertension      Mild CAD 2/13/2018     Neck pain     MRI positive on cervical vertebrea.     PAD (peripheral artery disease) (H) 2/27/2018     Tobacco abuse 8/16/2017      Past Surgical History:   Procedure Laterality Date     COLONOSCOPY       DISCECTOMY, FUSION CERVICAL ANTERIOR ONE LEVEL, COMBINED Right 10/29/2018    Procedure: Right Anterior Cervical 5-6 Discectomy And Fusion;  Surgeon: Jud Harkins MD;  Location:  OR     HEAD & NECK SURGERY  [id number]    Follow-up to cervical surgery 10-29-18     OB History   No obstetric history on file.       Review of Systems   Constitutional: Negative for chills and fever.   HENT: Positive for congestion. Negative for ear pain, hearing loss and sore throat.    Eyes: Negative for pain and visual disturbance.   Respiratory: Negative for cough and shortness of breath.  "   Cardiovascular: Negative for chest pain, palpitations and peripheral edema.   Gastrointestinal: Positive for constipation and heartburn. Negative for abdominal pain, diarrhea, hematochezia and nausea.   Genitourinary: Negative for discharge, dysuria, frequency, genital sores, hematuria, impotence and urgency.   Musculoskeletal: Positive for arthralgias, joint swelling and myalgias.   Skin: Negative for rash.   Neurological: Positive for weakness and headaches. Negative for dizziness and paresthesias.   Psychiatric/Behavioral: Negative for mood changes. The patient is not nervous/anxious.      CONSTITUTIONAL: NEGATIVE for fever, chills, change in weight  INTEGUMENTARY/SKIN: NEGATIVE for worrisome rashes, moles or lesions  EYES: NEGATIVE for vision changes or irritation  ENT: NEGATIVE for ear, mouth and throat problems  RESP: NEGATIVE for significant cough or SOB  CV: NEGATIVE for chest pain, palpitations or peripheral edema  GI: NEGATIVE for nausea, abdominal pain, heartburn, or change in bowel habits   male: negative for dysuria, hematuria, decreased urinary stream, erectile dysfunction, urethral discharge  MUSCULOSKELETAL: NEGATIVE for significant arthralgias or myalgia  NEURO: NEGATIVE for weakness, dizziness or paresthesias  PSYCHIATRIC: NEGATIVE for changes in mood or affect    OBJECTIVE:   /78   Pulse 70   Temp 98  F (36.7  C) (Oral)   Ht 1.676 m (5' 6\")   Wt 74.8 kg (165 lb)   SpO2 100%   BMI 26.63 kg/m      Physical Exam  GENERAL: healthy, alert and no distress  EYES: Eyes grossly normal to inspection, PERRL and conjunctivae and sclerae normal  HENT: ear canals and TM's normal, nose and mouth without ulcers or lesions  NECK: no adenopathy, no asymmetry, masses, or scars and thyroid normal to palpation  RESP: lungs clear to auscultation - no rales, rhonchi or wheezes  CV: regular rate and rhythm, normal S1 S2, no S3 or S4, no murmur, click or rub, no peripheral edema and peripheral pulses " strong  ABDOMEN: soft, nontender, no hepatosplenomegaly, no masses and bowel sounds normal  MS: no gross musculoskeletal defects noted, no edema  SKIN: no suspicious lesions or rashes  NEURO: Normal strength and tone, mentation intact and speech normal  PSYCH: mentation appears normal, affect normal/bright    Diagnostic Test Results:  Labs reviewed in Epic    ASSESSMENT/PLAN:       ICD-10-CM    1. Routine general medical examination at a health care facility  Z00.00    2. Mild CAD  I25.10 metoprolol succinate ER (TOPROL-XL) 25 MG 24 hr tablet     atorvastatin (LIPITOR) 20 MG tablet     Lipid panel reflex to direct LDL Non-fasting     Comprehensive metabolic panel     CBC with platelets and differential     OFFICE/OUTPT VISIT,EST,LEVL III   3. PAD (peripheral artery disease) (H)  I73.9 Lipid panel reflex to direct LDL Non-fasting     OFFICE/OUTPT VISIT,EST,LEVL III   4. Adjustment disorder with anxious mood  F43.22 OFFICE/OUTPT VISIT,EST,LEVL III   5. Cervical spondylosis without myelopathy  M47.812 OFFICE/OUTPT VISIT,EST,LEVL III   6. DDD (degenerative disc disease), cervical  M50.30 OFFICE/OUTPT VISIT,EST,LEVL III   7. Cervical stenosis of spinal canal  M48.02 OFFICE/OUTPT VISIT,EST,LEVL III   8. Ascending aorta dilatation (H)  I77.810 metoprolol succinate ER (TOPROL-XL) 25 MG 24 hr tablet     Echocardiogram Complete     [place] Aorta Medicare AAA Screening     OFFICE/OUTPT VISIT,EST,LEVL III   9. Lumbar facet joint pain  M54.5    10. Spondylosis of lumbar region without myelopathy or radiculopathy  M47.816    11. Chronic bilateral low back pain without sciatica  M54.5     G89.29    12. Myofascial pain  M79.18    13. S/P cervical spinal fusion  Z98.1    14. Screening PSA (prostate specific antigen)  Z12.5    15. History of smoking 30 or more pack years  Z87.891 Prof Fee: Shared Decision Making Visit for Lung Cancer Screening     CT Chest Lung Cancer Scrn Low Dose wo   16. Personal history of tobacco use  Z87.891    17.  "Screening for prostate cancer  Z12.5 PSA, screen   18. Screening for AAA (abdominal aortic aneurysm)  Z13.6 US Aorta Medicare AAA Screening     History of cervical stenosis.djd/back pain-he has had surgeries and still having pain, followed with neurosurgery and pain clinic, not a canidite for more surgeries, on pain medication and is stable    History of smoking > 30 years, quit 3 years ago, doing well, cut out Wellbutrin now and see if having any sx    Pad/Cad-continue statin, and asa, no symptoms    History of ascending aorta dilation-history of smoking on BB now, advised reassessing,Abdominal ultrasound and echo as planned, call insurance prior to test for cost    Adjustment disorder-stable mood now, no real sx, he will monitor      CT scan for lung cancer screening  Vit D supplements  Continue on current meds    Doing non fasting labs today        Patient has been advised of split billing requirements and indicates understanding: Yes  COUNSELING:   Reviewed preventive health counseling, as reflected in patient instructions    Estimated body mass index is 26.63 kg/m  as calculated from the following:    Height as of this encounter: 1.676 m (5' 6\").    Weight as of this encounter: 74.8 kg (165 lb).         He reports that he quit smoking about 3 years ago. His smoking use included cigarettes. He started smoking about 42 years ago. He has a 19.00 pack-year smoking history. He has quit using smokeless tobacco.      Counseling Resources:  ATP IV Guidelines  Pooled Cohorts Equation Calculator  FRAX Risk Assessment  ICSI Preventive Guidelines  Dietary Guidelines for Americans, 2010  USDA's MyPlate  ASA Prophylaxis  Lung CA Screening    Humberto Trinidad DO  [facility]  Lung Cancer Screening Shared Decision Making Visit     Truman REI Suero is eligible for lung cancer screening on the basis of the information provided in my signed lung cancer screening order.     I have discussed with " patient the risks and benefits of screening for lung cancer with low-dose CT.     The risks include:  radiation exposure: one low dose chest CT has as much ionizing radiation as about 15 chest x-rays or 6 months of background radiation living in [place]    false positives: 96% of positive findings/nodules are NOT cancer, but some might still require additional diagnostic evaluation, including biopsy  over-diagnosis: some slow growing cancers that might never have been clinically significant will be detected and treated unnecessarily     The benefit of early detection of lung cancer is contingent upon adherence to annual screening or more frequent follow up if indicated.     Furthermore, reaping the benefits of screening requires Truman Suero to be willing and physically able to undergo diagnostic procedures, if indicated. Although no specific guide is available for determining severity of comorbidities, it is reasonable to withhold screening in patients who have greater mortality risk from other diseases.     We did discuss that the only way to prevent lung cancer is to not smoke. Smoking cessation counseling was not given.      I did offer risk estimation using a calculator such as this one:    ShouldIScreen

## 2021-03-25 ENCOUNTER — IMMUNIZATION (OUTPATIENT)
Dept: NURSING | Facility: CLINIC | Age: 61
End: 2021-03-25
Payer: COMMERCIAL

## 2021-03-25 PROCEDURE — 91300 PR COVID VAC PFIZER DIL RECON 30 MCG/0.3 ML IM: CPT

## 2021-03-25 PROCEDURE — 0001A PR COVID VAC PFIZER DIL RECON 30 MCG/0.3 ML IM: CPT

## 2021-03-26 LAB
ALBUMIN SERPL-MCNC: 4.1 G/DL (ref 3.4–5)
ALP SERPL-CCNC: 71 U/L (ref 40–150)
ALT SERPL W P-5'-P-CCNC: 34 U/L (ref 0–70)
ANION GAP SERPL CALCULATED.3IONS-SCNC: 6 MMOL/L (ref 3–14)
AST SERPL W P-5'-P-CCNC: 18 U/L (ref 0–45)
BILIRUB SERPL-MCNC: 0.5 MG/DL (ref 0.2–1.3)
BUN SERPL-MCNC: 14 MG/DL (ref 7–30)
CALCIUM SERPL-MCNC: 9.3 MG/DL (ref 8.5–10.1)
CHLORIDE SERPL-SCNC: 114 MMOL/L (ref 94–109)
CHOLEST SERPL-MCNC: 166 MG/DL
CO2 SERPL-SCNC: 22 MMOL/L (ref 20–32)
CREAT SERPL-MCNC: 1.18 MG/DL (ref 0.66–1.25)
GFR SERPL CREATININE-BSD FRML MDRD: 66 ML/MIN/{1.73_M2}
GLUCOSE SERPL-MCNC: 76 MG/DL (ref 70–99)
HDLC SERPL-MCNC: 48 MG/DL
LDLC SERPL CALC-MCNC: 94 MG/DL
NONHDLC SERPL-MCNC: 118 MG/DL
POTASSIUM SERPL-SCNC: 4 MMOL/L (ref 3.4–5.3)
PROT SERPL-MCNC: 7.3 G/DL (ref 6.8–8.8)
PSA SERPL-ACNC: 0.91 UG/L (ref 0–4)
SODIUM SERPL-SCNC: 142 MMOL/L (ref 133–144)
TRIGL SERPL-MCNC: 118 MG/DL

## 2021-03-29 NOTE — PROGRESS NOTES
Date of Service: 3/12/2021    HISTORY OF PRESENT ILLNESS:  Mr. Truman Suero is a 60 year old who presents to our clinic for a consultation requested by Dr. Kentrell Tejada for evaluation for axial neck pain and intermittent right arm pain.  He was considered for SCS placement but Dr. Tejada fel that   With the narrowing of the canal, the placement of the spinal cord stimulator electrodes, even the percutaneous type electrode, would be difficult and risky.  Thus he is being referred for surgical consultation.    He has had chronic neck pain and right arm radiculopathy and is being followed at the [facility].  Patient has a complaint of neck pain which goes down his right shoulder and right arm.  He could not say which is worse but the neck pain bothers him slightly more.  He has stabbing to burning/spasm pain in the arm but he has also been diagnosed with carpal tunnel syndrome which complicates the matter.   Sitting makes it worse and standing can help.  Pain comes and goes and physical therapy helped.   His pain level is anywhere from 5-8 out of 10.   He has not noticed any rosales weakness, gait difficulty or bowel/bladder dysfunction.      For his pain, he takes tramadol, gabapentin, flexeril, topamax and methocarbamol.       He had C5/6 ACDF on 10/29/2018 for his neck pain and right arm pain.  However, the pain did not get better.  His pain and numbness is still there in his right arm.  He was seen by Dr. Harkins with the last visit being 10/3/2019.  Other than carpel tunnel release, no other surgical intervention was recommended for his neck.    Past Medical History:   Diagnosis Date     Ascending aorta dilatation (H) 2/13/2018     Cervical spondylosis without myelopathy 10/3/2017     Chronic bilateral low back pain with right-sided sciatica 5/16/2018     Hypertension      Mild CAD 2/13/2018     Neck pain     MRI positive on cervical vertebrea.     PAD (peripheral artery disease) (H)  2/27/2018     Tobacco abuse 8/16/2017     Past Surgical History:   Procedure Laterality Date     COLONOSCOPY       DISCECTOMY, FUSION CERVICAL ANTERIOR ONE LEVEL, COMBINED Right 10/29/2018    Procedure: Right Anterior Cervical 5-6 Discectomy And Fusion;  Surgeon: Jud Harkins MD;  Location:  OR     HEAD & NECK SURGERY  [id number]    Follow-up to cervical surgery 10-29-18     Current Outpatient Medications   Medication     atorvastatin (LIPITOR) 10 MG tablet     buPROPion (WELLBUTRIN SR) 150 MG 12 hr tablet     cyclobenzaprine (FLEXERIL) 5 MG tablet     methocarbamol (ROBAXIN) 500 MG tablet     topiramate (TOPAMAX) 50 MG tablet     aspirin (ASA) 81 MG EC tablet     atorvastatin (LIPITOR) 20 MG tablet     fluticasone (FLONASE) 50 MCG/ACT nasal spray     gabapentin (NEURONTIN) 600 MG tablet     metoprolol succinate ER (TOPROL-XL) 25 MG 24 hr tablet     order for DME     traMADol (ULTRAM) 50 MG tablet     traMADol (ULTRAM-ER) 200 MG 24 hr tablet     No current facility-administered medications for this visit.       No Known Allergies    He works as a .  Has history of smoking.    Review of Systems:  Constitutional: Some occipital headaches, fever, chills.  Eye: No blurry visions.  CV: No chest pain  Resp: No SOB  GI: No abd pain or tenderness  : No bladder retention or incontinence  Skin: No skin lesions  Neuro: bilateral hand paresthesia/pain from carpal tunnel   MSK: Chronic neck pain mike with movement, some back pain  Psych: No psych issue    Physical exam: He is AO x 3.  He is in no acute distress.  Strength is 5/5 bilaterally.  No gait or balance difficulties.  DTRs are 2+ throughout.   Some numbness in right arm/hand.      IMAGING STUDIES REVIEWED:  I have personally reviewed the cervical spine MRI which showed multi-level degenerative disk disease at C3-4, C4-5, C6-7 with some foraminal stenosis at C4-5.  There is no rosales stenosis at C5-6 or C6-7 bilaterally.  There is an anterior cervical plate at  C5-6.  No evidence of cord compression or central canal stenosis.  There is evidence of multilevel facet arthropathy.    IMPRESSION AND PLAN:  Mr. Suero presents with chronic neck pain with intermittent right arm pain.  He also has a history of bilateral carpal tunnel syndrome on EMG and has had surgery in the past.  At this time, he has multilevel degenerative disks throughout but no significant compression at C5-6 or C6-7 or C7-T1 to explain his radiculopathy.   Unfortunately, it is difficult to recommend surgery for multilevel degenerative disk disease and facet arthropathy in the absence of cord compression or nerve compression.   He should continue to pursue conservative treatments.   He may benefit from a PM&R consult with Dr. Hoffman for non-surgical management.    I spent 45 minutes face to face visit today counseling the patient regarding his diagnosis and the plan of care.

## 2021-04-08 ENCOUNTER — ANCILLARY PROCEDURE (OUTPATIENT)
Dept: ULTRASOUND IMAGING | Facility: CLINIC | Age: 61
End: 2021-04-08
Attending: FAMILY MEDICINE
Payer: COMMERCIAL

## 2021-04-08 ENCOUNTER — ANCILLARY PROCEDURE (OUTPATIENT)
Dept: CT IMAGING | Facility: CLINIC | Age: 61
End: 2021-04-08
Attending: FAMILY MEDICINE
Payer: COMMERCIAL

## 2021-04-08 DIAGNOSIS — Z87.891 HISTORY OF SMOKING 30 OR MORE PACK YEARS: ICD-10-CM

## 2021-04-08 DIAGNOSIS — I77.810 ASCENDING AORTA DILATATION (H): ICD-10-CM

## 2021-04-08 DIAGNOSIS — Z13.6 SCREENING FOR AAA (ABDOMINAL AORTIC ANEURYSM): ICD-10-CM

## 2021-04-08 PROCEDURE — 71271 CT THORAX LUNG CANCER SCR C-: CPT | Mod: TC | Performed by: RADIOLOGY

## 2021-04-08 PROCEDURE — 76706 US ABDL AORTA SCREEN AAA: CPT | Performed by: RADIOLOGY

## 2021-04-12 ENCOUNTER — ANCILLARY PROCEDURE (OUTPATIENT)
Dept: CARDIOLOGY | Facility: CLINIC | Age: 61
End: 2021-04-12
Attending: FAMILY MEDICINE
Payer: COMMERCIAL

## 2021-04-12 DIAGNOSIS — I77.810 ASCENDING AORTA DILATATION (H): ICD-10-CM

## 2021-04-12 PROCEDURE — 93306 TTE W/DOPPLER COMPLETE: CPT | Performed by: STUDENT IN AN ORGANIZED HEALTH CARE EDUCATION/TRAINING PROGRAM

## 2021-04-13 NOTE — PATIENT INSTRUCTIONS
Plan:  1. Physical Therapy: not at present  2. Clinical Health Psychologist: none  3. Diagnostic Studies:  None  4. Medication Management:   1. Continue tramadol ER 200mg once daily  2. Continue tramadol 50mg Q 6-8 hours PRN, max of 3/day.  3. Continue gabapentin 900 mg TID  4. Continue Topamax 50 mg BID  5. Continue methocarbamol 500-1000 mg TID PRN muscle spasms  5. Further procedures recommended: none at present  6. Recommendations to PCP: see above  7. Follow up: 8-12 weeks video visit due to COVID-19 viral threat. Please call [phone] to make your follow-up appointment with me.   ==============================    Clinic Number:  [phone]     Call with any questions about your care and for scheduling assistance.     Calls are returned Monday through Friday between 8 AM and 4:30 PM. We usually get back to you within 2 business days depending on the issue/request.    If we are prescribing your medications:    For opioid medication refills, call the clinic or send a EXPO message 7 days in advance.  Please include:    Name of requested medication    Name of the pharmacy.    For non-opioid medications, call your pharmacy directly to request a refill. Please allow 3-4 days to be processed.     Per [place] State Law:    All controlled substance prescriptions must be filled within 30 days of being written.      For those controlled substances allowing refills, pickup must occur within 30 days of last fill.      We believe regular attendance is key to your success in our program!      Any time you are unable to keep your appointment we ask that you call us at least 24 hours in advance to cancel.This will allow us to offer the appointment time to another patient.     Multiple missed appointments may lead to dismissal from the clinic.

## 2021-04-15 ENCOUNTER — IMMUNIZATION (OUTPATIENT)
Dept: NURSING | Facility: CLINIC | Age: 61
End: 2021-04-15
Attending: FAMILY MEDICINE
Payer: COMMERCIAL

## 2021-04-15 PROCEDURE — 0002A PR COVID VAC PFIZER DIL RECON 30 MCG/0.3 ML IM: CPT

## 2021-04-15 PROCEDURE — 91300 PR COVID VAC PFIZER DIL RECON 30 MCG/0.3 ML IM: CPT

## 2021-04-28 ENCOUNTER — MYC REFILL (OUTPATIENT)
Dept: PALLIATIVE MEDICINE | Facility: CLINIC | Age: 61
End: 2021-04-28

## 2021-04-28 DIAGNOSIS — M54.59 LUMBAR FACET JOINT PAIN: ICD-10-CM

## 2021-04-28 DIAGNOSIS — M54.50 CHRONIC BILATERAL LOW BACK PAIN WITHOUT SCIATICA: ICD-10-CM

## 2021-04-28 DIAGNOSIS — G89.29 CHRONIC BILATERAL LOW BACK PAIN WITHOUT SCIATICA: ICD-10-CM

## 2021-04-28 DIAGNOSIS — M47.812 CERVICAL SPONDYLOSIS WITHOUT MYELOPATHY: ICD-10-CM

## 2021-04-28 DIAGNOSIS — M50.30 DDD (DEGENERATIVE DISC DISEASE), CERVICAL: ICD-10-CM

## 2021-04-28 DIAGNOSIS — Z98.1 S/P CERVICAL SPINAL FUSION: ICD-10-CM

## 2021-04-28 DIAGNOSIS — M79.18 MYOFASCIAL PAIN: ICD-10-CM

## 2021-04-28 DIAGNOSIS — M47.816 SPONDYLOSIS OF LUMBAR REGION WITHOUT MYELOPATHY OR RADICULOPATHY: ICD-10-CM

## 2021-04-28 RX ORDER — TRAMADOL HYDROCHLORIDE 50 MG/1
50 TABLET ORAL EVERY 6 HOURS PRN
Qty: 90 TABLET | Refills: 0 | Status: SHIPPED | OUTPATIENT
Start: 2021-04-28 | End: 2021-05-31

## 2021-04-28 RX ORDER — TRAMADOL HYDROCHLORIDE 200 MG/1
200 TABLET, EXTENDED RELEASE ORAL EVERY 24 HOURS
Qty: 30 TABLET | Refills: 0 | Status: SHIPPED | OUTPATIENT
Start: 2021-04-28 | End: 2021-05-31

## 2021-04-28 NOTE — TELEPHONE ENCOUNTER
Alejandrina message from patient on 4/28 at 1039:    Refills have been requested for the following medications:         traMADol (ULTRAM) 50 MG tablet [RODNEY Vargas CNP]       Patient Comment: good until Saturday 4/30         traMADol (ULTRAM-ER) 200 MG 24 hr tablet [RODNEY Vargas CNP]       Patient Comment: Good until Saturday 4/30     Preferred pharmacy: [facility] [address] [place] [address] [facility] [address] [place] [address]   --------------    Will route to MA pool for assistance with gathering opioid refill information.    ANALISA ArtisN, RN-BC  Patient Care Supervisor/Care Coordinator  [facility] [place]

## 2021-04-28 NOTE — TELEPHONE ENCOUNTER
Received [employer] message from patient requesting refill(s) for     tramadol ER and regular tramadol     Last dispensed from pharmacy both on 3/31/21    Patient's last office/virtual visit by prescribing provider on 3/17/21  Next office/virtual appointment scheduled for 5/12/21    Last urine drug screen date 2/28/20  Current opioid agreement on file? No Date of opioid agreement: 2/28/20    E-prescribe to:    [facility] [address] [place] [address] [facility] [address] [place] [address]    Will route to [facility] for review and preparation of prescription(s).

## 2021-04-28 NOTE — TELEPHONE ENCOUNTER
Medication refill information reviewed.     Last due for both meds:  Fill 3/29/21 begin on 3/31/21    Due date:  4/30/21    Weaning instructions:  N/A    Prescriptions prepped for review.     _________________________________    Mychart sent to pt:  From: Estelita Wetzel RN      Created: 4/28/2021 2:06 PM      Xavier Chakraborty,  You are due for a urine drug screen and to resign your controlled substance agremeement.   Please call the appointment line ([phone]) to schedule a nurse visit:  Nurse appointment hours:  M-R [phone] or 130p -330pm;  Fridays 130p-330p.     We will process your refills once this appointment is made so let us know.           Estelita Wetzel RN-BSN  [facility]

## 2021-04-28 NOTE — TELEPHONE ENCOUNTER
Per patient myChart message:  From: Palmer Suero      Created: 4/28/2021 2:21 PM      Estelita- Thanks for the update. That was very simple. Appt is for tomorrow Thurs 4/29 1:30  at your place.  Thanks  Palmer        Routed to provider.    Estelita RN-BSN  [facility]

## 2021-04-29 ENCOUNTER — ALLIED HEALTH/NURSE VISIT (OUTPATIENT)
Dept: PALLIATIVE MEDICINE | Facility: CLINIC | Age: 61
End: 2021-04-29
Payer: COMMERCIAL

## 2021-04-29 DIAGNOSIS — Z79.891 ENCOUNTER FOR LONG-TERM USE OF OPIATE ANALGESIC: Primary | ICD-10-CM

## 2021-04-29 DIAGNOSIS — Z79.891 ENCOUNTER FOR LONG-TERM USE OF OPIATE ANALGESIC: ICD-10-CM

## 2021-04-29 PROCEDURE — 80307 DRUG TEST PRSMV CHEM ANLYZR: CPT | Performed by: NURSE PRACTITIONER

## 2021-04-29 PROCEDURE — 99207 PR NO CHARGE NURSE ONLY: CPT

## 2021-04-29 NOTE — TELEPHONE ENCOUNTER
Signed Prescriptions:                        Disp   Refills    traMADol (ULTRAM) 50 MG tablet             90 tab*0        Sig: Take 1 tablet (50 mg) by mouth every 6 hours as           needed for severe pain Max 3/day. Fill now. Start           4/30/21; to last 30 days for chronic pain  Authorizing Provider: MELANIE BENSON    traMADol (ULTRAM-ER) 200 MG 24 hr tablet   30 tab*0        Sig: Take 1 tablet (200 mg) by mouth every 24 hours Fill           now begin on 4/30/21; to last 30 days for chronic           pain  Authorizing Provider: MELANIE BENSON    Reviewed [place]  April 28, 2021- no concerning fills.    Melanie STEVENS, RN CNP, FNP  [facility]  [facility]

## 2021-04-29 NOTE — PROGRESS NOTES
CSA completed, UDS done today.    Van Oquendo, RN  Care Coordinator   [facility] [place]

## 2021-04-29 NOTE — TELEPHONE ENCOUNTER
[employer] message sent with Rx approval from provider.  [place]  Pain Clinic Management Team

## 2021-04-29 NOTE — LETTER
Opioid / Opioid Plus Controlled Substance Agreement    This is an agreement between you and your provider about the safe and appropriate use of controlled substance/opioids prescribed by your care team. Controlled substances are medicines that can cause physical and mental dependence (abuse).    There are strict laws about having and using these medicines. We here at [facility] are committing to working with you in your efforts to get better. To support you in this work, we ll help you schedule regular office appointments for medicine refills. If we must cancel or change your appointment for any reason, we ll make sure you have enough medicine to last until your next appointment.     As a Provider, I will:    Listen carefully to your concerns and treat you with respect.     Recommend a treatment plan that I believe is in your best interest. This plan may involve therapies other than opioid pain medication.     Talk with you often about the possible benefits, and the risk of harm of any medicine that we prescribe for you.     Provide a plan on how to taper (discontinue or go off) using this medicine if the decision is made to stop its use.    As a Patient, I understand that opioid(s):     Are a controlled substance prescribed by my care team to help me function or work and manage my condition(s).     Are strong medicines and can cause serious side effects such as:    Drowsiness, which can seriously affect my driving ability    A lower breathing rate, enough to cause death    Harm to my thinking ability     Depression     Abuse of and addiction to this medicine    Need to be taken exactly as prescribed. Combining opioids with certain medicines or chemicals (such as illegal drugs, sedatives, sleeping pills, and benzodiazepines) can be dangerous or even fatal. If I stop opioids suddenly, I may have severe withdrawal symptoms.    Do not work for all types of pain nor for all patients. If they re not helpful, I may  be asked to stop them.      The risks, benefits and side effects of these medicine(s) were explained to me. I agree that:  1. I will take part in other treatments as advised by my care team. This may be psychiatry or counseling, physical therapy, behavioral therapy, group treatment or a referral to a specialist.     2. I will keep all my appointments. I understand that this is part of the monitoring of opioids. My care team may require an office visit for EVERY opioid/controlled substance refill. If I miss appointments or don t follow instructions, my care team may stop my medicine.    3. I will take my medicines as prescribed. I will not change the dose or schedule unless my care team tells me to. There will be no refills if I run out early.     4. I may be asked to come to the clinic and complete a urine drug test or complete a pill count at any time. If I don t give a urine sample or participate in a pill count, the care team may stop my medicine.    5. I will only receive prescriptions from this clinic for chronic pain. If I am treated by another provider for acute pain issues, I will tell them that I am taking opioid pain medication for chronic pain and that I have a treatment agreement with this provider. I will inform my [facility] care team within one business day if I am given a prescription for any pain medication by another healthcare provider. My [facility] care team can contact other providers and pharmacists about my use of any medicines.    6. It is up to me to make sure that I don t run out of my medicines on weekends or holidays. If my care team is willing to refill my opioid prescription without a visit, I must request refills only during office hours. Refills may take up to 3 business days to process. I will use one pharmacy to fill all my opioid and other controlled substance prescriptions. I will notify the clinic about any changes to my insurance or medication  availability.    7. I am responsible for my prescriptions. If the medicine/prescription is lost, stolen or destroyed, it will not be replaced. I also agree not to share controlled substance medicines with anyone.    8. I am aware I should not use any illegal or recreational drugs. I agree not to drink alcohol unless my care team says I can.       9. If I enroll in the [place] [facility] Cannabis program, I will tell my care team prior to my next refill.     10. I will tell my care team right away if I become pregnant, have a new medical problem treated outside of my regular clinic, or have a change in my medications.    11. I understand that this medicine can affect my thinking, judgment and reaction time. Alcohol and drugs affect the brain and body, which can affect the safety of my driving. Being under the influence of alcohol or drugs can affect my decision-making, behaviors, personal safety, and the safety of others. Driving while impaired (DWI) can occur if a person is driving, operating, or in physical control of a car, motorcycle, boat, snowmobile, ATV, motorbike, off-road vehicle, or any other motor vehicle ([place] Statute 169A.20). I understand the risk if I choose to drive or operate any vehicle or machinery.    I understand that if I do not follow any of the conditions above, my prescriptions or treatment may be stopped or changed.          Opioids  What You Need to Know    What are opioids?   Opioids are pain medicines that must be prescribed by a doctor. They are also known as narcotics.     Examples are:   1. morphine (MS Contin, Alyce)  2. oxycodone (Oxycontin)  3. oxycodone and acetaminophen (Percocet)  4. hydrocodone and acetaminophen (Vicodin, Norco)   5. fentanyl patch (Duragesic)   6. hydromorphone (Dilaudid)   7. methadone  8. codeine (Tylenol #3)     What do opioids do well?   Opioids are best for severe short-term pain such as after a surgery or injury. They may work well for cancer pain. They may  help some people with long-lasting (chronic) pain.     What do opioids NOT do well?   Opioids never get rid of pain entirely, and they don t work well for most patients with chronic pain. Opioids don t reduce swelling, one of the causes of pain.                                    Other ways to manage chronic pain and improve function include:       Treat the health problem that may be causing pain    Anti-inflammation medicines, which reduce swelling and tenderness, such as ibuprofen (Advil, Motrin) or naproxen (Aleve)    Acetaminophen (Tylenol)    Antidepressants and anti-seizure medicines, especially for nerve pain    Topical treatments such as patches or creams    Injections or nerve blocks    Chiropractic or osteopathic treatment    Acupuncture, massage, deep breathing, meditation, visual imagery, aromatherapy    Use heat or ice at the pain site    Physical therapy     Exercise    Stop smoking    Take part in therapy       Risks and side effects     Talk to your doctor before you start or decide to keep taking opioids. Possible side effects include:      Lowering your breathing rate enough to cause death    Overdose, including death, especially if taking higher than prescribed doses    Worse depression symptoms; less pleasure in things you usually enjoy    Feeling tired or sluggish    Slower thoughts or cloudy thinking    Being more sensitive to pain over time; pain is harder to control    Trouble sleeping or restless sleep    Changes in hormone levels (for example, less testosterone)    Changes in sex drive or ability to have sex    Constipation    Unsafe driving    Itching and sweating    Dizziness    Nausea, throwing up and dry mouth    What else should I know about opioids?    Opioids may lead to dependence, tolerance, or addiction.      Dependence means that if you stop or reduce the medicine too quickly, you will have withdrawal symptoms. These include loose poop (diarrhea), jitters, flu-like symptoms,  nervousness and tremors. Dependence is not the same as addiction.                       Tolerance means needing higher doses over time to get the same effect. This may increase the chance of serious side effects.      Addiction is when people improperly use a substance that harms their body, their mind or their relations with others. Use of opiates can cause a relapse of addiction if you have a history of drug or alcohol abuse.      People who have used opioids for a long time may have a lower quality of life, worse depression, higher levels of pain and more visits to doctors.    You can overdose on opioids. Take these steps to lower your risk of overdose:    1. Recognize the signs:  Signs of overdose include decrease or loss of consciousness (blackout), slowed breathing, trouble waking up and blue lips. If someone is worried about overdose, they should call 911.    2. Talk to your doctor about Narcan (naloxone).   If you are at risk for overdose, you may be given a prescription for Narcan. This medicine very quickly reverses the effects of opioids.   If you overdose, a friend or family member can give you Narcan while waiting for the ambulance. They need to know the signs of overdose and how to give Narcan.     3. Don't use alcohol or street drugs.   Taking them with opioids can cause death.    4. Do not take any of these medicines unless your doctor says it s OK. Taking these with opioids can cause death:    Benzodiazepines, such as lorazepam (Ativan), alprazolam (Xanax) or diazepam (Valium)    Muscle relaxers, such as cyclobenzaprine (Flexeril)    Sleeping pills like zolpidem (Ambien)     Other opioids      How to keep you and other people safe while taking opioids:    1. Never share your opioids with others.  Opioid medicines are regulated by the Drug Enforcement Agency (ISAURA). Selling or sharing medications is a criminal act.    2. Be sure to store opioids in a secure place, locked up if possible. Young children  can easily swallow them and overdose.    3. When you are traveling with your medicines, keep them in the original bottles. If you use a pill box, be sure you also carry a copy of your medicine list from your clinic or pharmacy.    4. Safe disposal of opioids    Most pharmacies have places to get rid of medicine, called disposal kiosks. Medicine disposal options are also available in every [facility]. Search your county and  medication disposal  to find more options. You can find more details at:  [email or web address].[place]./living-green/managing-unwanted-medications     I agree that my provider, clinic care team, and pharmacy may work with any city, state or federal law enforcement agency that investigates the misuse, sale, or other diversion of my controlled medicine. I will allow my provider to discuss my care with, or share a copy of, this agreement with any other treating provider, pharmacy or emergency room where I receive care.    I have read this agreement and have asked questions about anything I did not understand.    _______________________________________________________  Patient Signature - Truman Suero _____________________                   Date     _______________________________________________________  Provider Signature - [facility] PAIN NURSE   _____________________                   Date     _______________________________________________________  Witness Signature (required if provider not present while patient signing)   _____________________                   Date

## 2021-05-01 LAB
CREAT UR-MCNC: 104 MG/DL
GABAPENTIN UR QL CFM: PRESENT
N-NORTRAMADOL/CREAT UR CFM: ABNORMAL NG/MG{CREAT}
O-NORTRAMADOL UR CFM-MCNC: ABNORMAL NG/ML
RPT COMMENT: ABNORMAL
TRAMADOL CTO UR CFM-MCNC: ABNORMAL NG/ML
TRAMADOL/CREAT UR: ABNORMAL NG/MG{CREAT}

## 2021-05-04 ENCOUNTER — TELEPHONE (OUTPATIENT)
Dept: PALLIATIVE MEDICINE | Facility: CLINIC | Age: 61
End: 2021-05-04

## 2021-05-04 NOTE — TELEPHONE ENCOUNTER
Prior Authorization Retail Medication Request    Medication/Dose: traMADol (ULTRAM-ER) 200 MG 24 hr tablet  ICD code (if different than what is on RX):    Previously Tried and Failed:    Rationale:      Insurance Name:  [employer]  Insurance ID:  [id number]      Pharmacy Information (if different than what is on RX)  Name:  [id number]   Phone:  [phone]

## 2021-05-04 NOTE — TELEPHONE ENCOUNTER
PA Initiation    Medication: traMADol (ULTRAM-ER) 200 MG 24 hr tablet  Insurance Company: [employer] - Phone [phone] Fax [phone]  Pharmacy Filling the Rx: [facility] [address] [place] [address]  Filling Pharmacy Phone: [phone]  Filling Pharmacy Fax: [phone]  Start Date: 5/4/2021    Initiated PA over the phone.

## 2021-05-04 NOTE — TELEPHONE ENCOUNTER
Prior Authorization Approval    Authorization Effective Date: 5/4/2021  Authorization Expiration Date: 11/4/2021  Medication: traMADol (ULTRAM-ER) 200 MG 24 hr tablet  Approved Dose/Quantity:   Reference #:     Insurance Company: [employer] [phone] Fax [phone]  Expected CoPay:       CoPay Card Available:      Foundation Assistance Needed:    Which Pharmacy is filling the prescription (Not needed for infusion/clinic administered): [facility] [address] [place] [address]  Pharmacy Notified: Yes  Patient Notified: Yes, **Instructed pharmacy to notify patient when script is ready to /ship.**

## 2021-05-07 ENCOUNTER — MYC MEDICAL ADVICE (OUTPATIENT)
Dept: PALLIATIVE MEDICINE | Facility: CLINIC | Age: 61
End: 2021-05-07

## 2021-05-07 NOTE — TELEPHONE ENCOUNTER
[employer] message from patient on 5/7 at 1149:    Melanie- Just an FYI- I had to cancel our upcoming appt. For good reason however. I found new job. Back in the [employer] as . I did this way before I was a . Anyhow, I start this upcoming Wed 5/12 and we were scheduled for appt that day. Once I get a schedule nailed down I will call and re-schedule with you. Thanks     Palmer   ---------------    Will route to Melanie Thomas CNP as fyi.     Shanita Briones, ANALISAN, RN-BC  Patient Care Supervisor  [facility] [place]

## 2021-05-07 NOTE — TELEPHONE ENCOUNTER
Palmer,     YABORA!!!! This is the best news!!!  You re-schedule whenever it works into your schedule. I am THRILLED you have a new job!!! Congratulations!!!     Melanie STEVENS RN CNP, ABRAM  [facility]    I have sent the above [employer] message to the patient.     Melanie STEVENS RN CNP, ABRAM  [facility]

## 2021-05-11 NOTE — RESULT ENCOUNTER NOTE
All your results are essentially stable, no changes from previous study. Please contact me if you have any questions.  Take care,  Humberto Trinidad D.O.

## 2021-05-31 ENCOUNTER — MYC REFILL (OUTPATIENT)
Dept: PALLIATIVE MEDICINE | Facility: CLINIC | Age: 61
End: 2021-05-31

## 2021-05-31 DIAGNOSIS — M50.30 DDD (DEGENERATIVE DISC DISEASE), CERVICAL: ICD-10-CM

## 2021-05-31 DIAGNOSIS — M79.18 MYOFASCIAL PAIN: ICD-10-CM

## 2021-05-31 DIAGNOSIS — Z98.1 S/P CERVICAL SPINAL FUSION: ICD-10-CM

## 2021-05-31 DIAGNOSIS — M47.816 SPONDYLOSIS OF LUMBAR REGION WITHOUT MYELOPATHY OR RADICULOPATHY: ICD-10-CM

## 2021-05-31 DIAGNOSIS — G89.29 CHRONIC BILATERAL LOW BACK PAIN WITHOUT SCIATICA: ICD-10-CM

## 2021-05-31 DIAGNOSIS — M54.59 LUMBAR FACET JOINT PAIN: ICD-10-CM

## 2021-05-31 DIAGNOSIS — M54.50 CHRONIC BILATERAL LOW BACK PAIN WITHOUT SCIATICA: ICD-10-CM

## 2021-05-31 DIAGNOSIS — M47.812 CERVICAL SPONDYLOSIS WITHOUT MYELOPATHY: ICD-10-CM

## 2021-06-01 NOTE — TELEPHONE ENCOUNTER
Refills have been requested for the following medications:         traMADol (ULTRAM) 50 MG tablet [RODNEY Vargas CNP]         traMADol (ULTRAM-ER) 200 MG 24 hr tablet [RODNEY Vargas CNP]     Preferred pharmacy: [facility] [address] [place] [address] [address] [place] [address]

## 2021-06-02 RX ORDER — TRAMADOL HYDROCHLORIDE 50 MG/1
50 TABLET ORAL EVERY 6 HOURS PRN
Qty: 90 TABLET | Refills: 0 | Status: SHIPPED | OUTPATIENT
Start: 2021-06-02 | End: 2021-06-30

## 2021-06-02 RX ORDER — TRAMADOL HYDROCHLORIDE 200 MG/1
200 TABLET, EXTENDED RELEASE ORAL EVERY 24 HOURS
Qty: 30 TABLET | Refills: 0 | Status: SHIPPED | OUTPATIENT
Start: 2021-06-02 | End: 2021-06-30

## 2021-06-02 NOTE — TELEPHONE ENCOUNTER
Signed Prescriptions:                        Disp   Refills    traMADol (ULTRAM) 50 MG tablet             90 tab*0        Sig: Take 1 tablet (50 mg) by mouth every 6 hours as           needed for severe pain Max 3/day. Fill now. Start           6/2/2021; to last 30 days for chronic pain  Authorizing Provider: MELANIE BENSON    traMADol (ULTRAM-ER) 200 MG 24 hr tablet   30 tab*0        Sig: Take 1 tablet (200 mg) by mouth every 24 hours Fill           now. Start 6/4/21. to last 30 days for chronic           pain  Authorizing Provider: MELANIE BENSON    Reviewed [place]  June 2, 2021- no concerning fills.  Patient needs to make a follow-up appt. This can be in-person, video or telephone encounter.   Melanie STEVENS, RN CNP, FNP  [facility]  [facility]

## 2021-06-02 NOTE — TELEPHONE ENCOUNTER
Medication refill information reviewed.     Last due for both:  Fill now begin on 4/30/21; to last 30 days for chronic pain     Due date:    Tramadol ER: 6/4/21  Tramadol: anytime      Weaning instructions:  N/A    Prescriptions prepped for review.     Estelita Wetzel RN-BSN  [facility]

## 2021-06-30 ENCOUNTER — VIRTUAL VISIT (OUTPATIENT)
Dept: PALLIATIVE MEDICINE | Facility: CLINIC | Age: 61
End: 2021-06-30
Payer: COMMERCIAL

## 2021-06-30 DIAGNOSIS — M54.59 LUMBAR FACET JOINT PAIN: ICD-10-CM

## 2021-06-30 DIAGNOSIS — M54.12 CERVICAL RADICULAR PAIN: Primary | ICD-10-CM

## 2021-06-30 DIAGNOSIS — M48.02 CERVICAL STENOSIS OF SPINAL CANAL: ICD-10-CM

## 2021-06-30 DIAGNOSIS — G89.29 CHRONIC BILATERAL LOW BACK PAIN WITHOUT SCIATICA: ICD-10-CM

## 2021-06-30 DIAGNOSIS — Z98.1 S/P CERVICAL SPINAL FUSION: ICD-10-CM

## 2021-06-30 DIAGNOSIS — M47.812 CERVICAL SPONDYLOSIS WITHOUT MYELOPATHY: ICD-10-CM

## 2021-06-30 DIAGNOSIS — M79.18 MYOFASCIAL PAIN: ICD-10-CM

## 2021-06-30 DIAGNOSIS — M50.30 DDD (DEGENERATIVE DISC DISEASE), CERVICAL: ICD-10-CM

## 2021-06-30 DIAGNOSIS — M54.50 CHRONIC BILATERAL LOW BACK PAIN WITHOUT SCIATICA: ICD-10-CM

## 2021-06-30 DIAGNOSIS — M47.816 SPONDYLOSIS OF LUMBAR REGION WITHOUT MYELOPATHY OR RADICULOPATHY: ICD-10-CM

## 2021-06-30 PROCEDURE — 99213 OFFICE O/P EST LOW 20 MIN: CPT | Mod: GT | Performed by: NURSE PRACTITIONER

## 2021-06-30 RX ORDER — TRAMADOL HYDROCHLORIDE 50 MG/1
50 TABLET ORAL EVERY 6 HOURS PRN
Qty: 90 TABLET | Refills: 0 | Status: SHIPPED | OUTPATIENT
Start: 2021-06-30 | End: 2021-07-29

## 2021-06-30 RX ORDER — TOPIRAMATE 50 MG/1
TABLET, FILM COATED ORAL
Qty: 90 TABLET | Refills: 2 | Status: SHIPPED | OUTPATIENT
Start: 2021-06-30 | End: 2021-11-09

## 2021-06-30 RX ORDER — GABAPENTIN 600 MG/1
900 TABLET ORAL 3 TIMES DAILY
Qty: 135 TABLET | Refills: 3 | Status: SHIPPED | OUTPATIENT
Start: 2021-06-30 | End: 2021-08-23

## 2021-06-30 RX ORDER — TRAMADOL HYDROCHLORIDE 200 MG/1
200 TABLET, EXTENDED RELEASE ORAL EVERY 24 HOURS
Qty: 30 TABLET | Refills: 0 | Status: SHIPPED | OUTPATIENT
Start: 2021-06-30 | End: 2021-07-29

## 2021-06-30 ASSESSMENT — PAIN SCALES - GENERAL: PAINLEVEL: SEVERE PAIN (6)

## 2021-06-30 NOTE — PROGRESS NOTES
Is Pt currently in [place]? Yes  Palmer is a 60 year old who is being evaluated via a billable video visit.      How would you like to obtain your AVS? [place] FFT  If the video visit is dropped, the invitation should be resent by: [facility] waiting room  Will anyone else be joining your video visit? No       Latisha Pham CMA ([facility])      Video-Visit Details    Type of service:  Video Visit  Video Start Time: 15:57 PM  Video End Time:4:26 PM    Originating Location (pt. Location): Home    Distant Location (provider location):  [facility]     Platform used for Video Visit: [facility]    6/30/2021       Chief complaint:   neck and right arm pain    axial low back pain has not been bad      Interval history:  Truman Suero is a 60 year old male is known to me for   Chronic neck pain  Cervical DDD  Cervical spondylosis (pain worse with extension/rotation indicating facetogenic component to pain)  Axial low back pain  Myofascial pain/muscle spasms  Remote history of ETOH overuse, attended AA for awhile. Sober for 10 years  ---PMHx includes: neck pain  ---PSHx includes: none listed      Recommendations/plan at the last visit on 3/17/2021 included:  1. Physical Therapy: not at present  2. Clinical Health Psychologist: none  3. Diagnostic Studies:  None  4. Medication Management:   1. Continue tramadol ER 200mg once daily  2. Continue tramadol 50mg Q 6-8 hours PRN, max of 3/day.  3. Continue gabapentin 900 mg TID  4. Continue Topamax 50 mg BID  5. Continue methocarbamol 500-1000 mg TID PRN muscle spasms  5. Further procedures recommended: none at present  6. Recommendations to PCP: see above  7. Follow up: 8-12 weeks video visit due to COVID-19 viral threat. Please call [phone] to make your follow-up appointment with me.           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   -he likes his new job.   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   -had some deconditioning pain, but has \"not been out of comisson due to pain.\"   -still has posterior neck pain that radiates down his right arm  -axial low back pain has picked up a bit, he is doing some yoga stretches before bed.       Interval history March 17, 2021  -he is still hunting for a job  -he has had several 2nd interviews, but no job offers yet  -he has seen Dr. Kentrell Tejada re: possible cervical SCS, Dr. Tejada  does not feel that SCS is a good fit due to not much space in the cervical spinal canal for the leads  -he has seen Dr. Gaines (neurosurgery) who does not feel he is a candidate for further cervical surgery.  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 He has worked with this company before as   -updated letter for work restrictions done, will sign when I am at the clinic next week.  -he received information re: cervical SCS and he and his wife have reviewed this. Palmer would like to have a referral to see Dr. Kentrell Tejada at the [facility] to discuss this option.         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   -having some chronic axial low back pain, no radiation into the legs.   -discussed possible future spinal cord stimulator (SCS)  with patient. Our office does not place cervical spinal cord " "stimulators, this would be done with Dr. Kentrell Tejada, neurosurgeon at the [facility]. Will mail patient SCS information for his review.       Interval history 10/9/2020  -he is \"maintaining,\" feels he is doing \"OK\"  -he still has neck pain that radiates into the upper back and down right arm to the hand and fingers. 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   -he is working with a work re-training work, he will be going to a computer class for free for job re-training.   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 They have reservations only and following the state guidelines for being open.  -he has not yet been called back to work as they are below the capacity  -has ongoing neck pain that radiates into his right arm as well as axial low back pain  -he has not heard what will happen with his return to work  -he is done doing the painting he had to do at home, had to do this in small chunks and pace himself  -he continues to work on projects at home slowly  -he is taking some on-line course-work and is really enjoying this  -he is working with Najma in OT on hand therapy and this is going well  -no recent imaging of his cervical spine, still having some radicular pain on the right side and some potential facet pain in the posterior neck to the shoulder region, worse with cervical extension and " "extension/rotation        Interval history 5/15/2020  -he has not worked since 3/16/2020 as the restaurant he works for shut down due to Safe at Home order in [place] was instituted on 3/17/2020 due to COVID-19 viral threat.  -his wife is working from home.   -he tries doing some projects at home, he has tried painting and he can do this for about 10 minutes at a time   -he does not like to walk for exercise, but he does like to bicycle and he can do this for exercise and he enjoys it.   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      -Hand therapy has been pushed off a few times due to COVID-19 threat, but he should be seeing her in the next few weeks.      -he is not certain how things will go when work opens.     Interval history 2/28/2020  -He was referred to hand therapy for right wrist pain by Dr. Thomas and the right wrist/hand pain is distinct from cervical stenosis (radicular pain).  -Posterior neck pain that starts at the base of the skull and extends into the right shoulder.  -Notes some low back pain.  -Frequently dropping things from right hand, luckily the patient's dominant hand is the left. Painful symptoms are tolerable on Monday at the start of the work week, but he notes some overexertion by Friday.       At this point, the patient's participation with our multidisciplinary team includes:  The patient has been compliant with the program.  PT - attended non-pain management PHYSICAL THERAPY   Health Psych - has seen Kentrell Castañeda x4  Acupuncture: worked with Dr. Bereket LAY      Pain scores:    Pain intensity on average is 5-6 on a scale of 0-10.    Range is 4-8/10.   Pain right now is 6/10.  Pain is described as \"burning, shooting, throbbing, stabbing, miserable, numb, " "tiring, exhausting, penetrating, nagging, unbearable.\"    Current pain relevant medications:      -Tramadol 200mg ER in the evening (helpful)  -Tramadol 50mg take 1 tablet  Q 6 hours PRN (using 2-3 tabs per day, helpful)  -ibuprofen 600mg PRN (helpful)  -gabapentin 900mg TID (somewhat helpful)  -methocarbamol 500-1000mg TID PRN muscle spasms (takes 1000 mg BID, somewhat helpful)  -Topamax 50 mg BID (helpful)    Other pertinent medications:  None    Previous Medications:  OPIATES: Tramdol (somewhat helpful)  NSAIDS: ibuprofen (helpful), Aleve (helpful)  MUSCLE RELAXANTS: none  ANTI-MIGRAINE MEDS: none  ANTI-DEPRESSANTS: none  SLEEP AIDS: none  ANTI-CONVULSANTS: gabapentin (helpful)  TOPICALS: lidocaine ointment (somewhat helpful)  Other meds: Tylenol (not helpful)        Other treatments have included:  Truman Suero has not been seen at a pain clinic in the past.    PT: tried, somewhat helpful  Chiropractic: helpful  Acupuncture: none  TENs Unit: none     Injections:    cervical radiofrequency nerve ablation at [facility] in December 2016 (did get good relief, got 70% relief of his typical neck pain)  -6/29/2017 Cervical facet joint steroid injections at C4-5 and C5-6 on the right with Dr. Nicole Harding (not helpful)  -3/8/2018 C7-T1 interlaminar DEMARCUS with Dr. Hugo Corrigan (not helpful)  -5/23/2018 right L4-5 transforaminal epidural steroid injection with Dr. Osorio (not helpful for back pain but did help leg pain)  -8/7/2018 bilateral SI joint injection with Dr Hugo Corrigan (helpful for one month)  -10/11/2018 l3-4 and L4-5 facet joint injections bilaterally with Dr. Hugo Corrigan (this has been helpful, more helpful than any other lumbar injections thus far)  -1/28/2019 bilateral L3-5 medial branch blocks #1 with Dr. Osorio (somewhat helpful)   -2/25/2019 LMBB #2 with Dr. Osorio (somewhat effective, but not enough to proceed to RFA)  -5/6/2019 bilateral L4/L5 and L5/S1 facet joint injections with Dr." DARRICKxthong (helpful)  -11/29/2019 C7-T1 interlaminar epidural steroid injection with Dr. Corrigan (helpful temporarily)  -10/30/2020 ISMAEL with Dr. Hugo Corrigan (temporarily helpful)    Surgeries:  10/29/2018 right anterior cervical C5-6 discectomy and fusion by Dr. Jud Harkins (somewhat helpful)          THE 4 A's OF OPIOID MAINTENANCE ANALGESIA    Analgesia: long acting Tramadol with some short acting tramadol does give some relief    Activity: ADLs, now employed at [employer]    Adverse effects: none    Adherence to Rx protocol: yes      Side Effects: none  Patient is using the medication as prescribed: yes    Medications:  Current Outpatient Medications   Medication Sig Dispense Refill     aspirin (ASA) 81 MG EC tablet Take 1 tablet (81 mg) by mouth daily (Patient not taking: Reported on 7/21/2020) 90 tablet 0     atorvastatin (LIPITOR) 10 MG tablet Take 1 tablet (10 mg) by mouth daily 90 tablet 0     atorvastatin (LIPITOR) 20 MG tablet Take 1 tablet (20 mg) by mouth daily 90 tablet 0     buPROPion (WELLBUTRIN SR) 150 MG 12 hr tablet Take 1 tablet (150 mg) by mouth 2 times daily 180 tablet 0     cyclobenzaprine (FLEXERIL) 5 MG tablet Take 1-2 tablets (5-10 mg) by mouth nightly as needed for muscle spasms Need 6 hours between this and methocarbamol 45 tablet 2     fluticasone (FLONASE) 50 MCG/ACT nasal spray Spray 2 sprays into both nostrils 2 times daily Needs appointment for further refills (Patient not taking: Reported on 7/21/2020) 32 mL 0     gabapentin (NEURONTIN) 600 MG tablet Take 1.5 tablets (900 mg) by mouth 3 times daily 135 tablet 3     methocarbamol (ROBAXIN) 500 MG tablet Take 1 tablet (500 mg) by mouth 3 times daily as needed for muscle spasms Should be 6 hours between this and cyclobenzaprine at night 90 tablet 2     metoprolol succinate ER (TOPROL-XL) 25 MG 24 hr tablet Take 1 tablet (25 mg) by mouth daily 90 tablet 0     order for DME BP cuff, brand as covered by insurance.  Dx: HTN 1 each 0      topiramate (TOPAMAX) 50 MG tablet Take 50mg (1tablet) every morning and 100mg (2 tablets) every evening.  Drink plenty of water and no alcohol. 90 tablet 2     traMADol (ULTRAM) 50 MG tablet Take 1 tablet (50 mg) by mouth every 6 hours as needed for severe pain Max 3/day. Fill now. Start 6/2/2021; to last 30 days for chronic pain 90 tablet 0     traMADol (ULTRAM-ER) 200 MG 24 hr tablet Take 1 tablet (200 mg) by mouth every 24 hours Fill now. Start 6/4/21. to last 30 days for chronic pain 30 tablet 0       Medical History: any changes in medical history since they were last seen? none    Social History:   Home situation: , has 4 kids, 2 at home, one in college and one launched.   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 He used to work a sobriety program, used to go to   Drug use: none  History of chemical dependency treatment: no formal treatment, did AA    Is patient a current smoker or tobacco user?  QUIT on 3/20/2018  If yes, was cessation counseling offered?  no            Physical Exam:     Vital signs: There were no vitals taken for this visit.     Behavioral observations:  Awake, alert, cooperative  Pulm: respirations easy and unlabored. Able to speak in full sentences without SOB or cough noted.              IMAGING:    MR CERVICAL SPINE WITHOUT CONTRAST 9/5/2017 2:32 PM      HISTORY: Neck pain, worsening numbness in arms and lower extremities.  Radiculopathy, cervical region.     TECHNIQUE: Multiplanar multisequence images were obtained through the  cervical spine without contrast.     COMPARISON: 2/22/2017.     FINDINGS: Sagittal images demonstrate some minimal gentle cervical  kyphosis, otherwise normal posterior alignment. There is no evidence  for craniovertebral or  cervicomedullary junction abnormality. The  cervical cord is minimally indented anteriorly at C4-C5 and C5-C6,  otherwise is normal in morphology and signal characteristics. Disc  space narrowing and discogenic marrow changes are present C3-C4,  C4-C5, C5-C6 and C6-C7.     C2-C3: Minimal facet hypertrophy. No stenosis.     C3-C4: Broad-based posterior osteophyte formation is present causing  some mild bilateral neural foraminal stenosis, but no central canal  stenosis.     C4-C5: Broad-based posterior osteophyte formation and mild facet  hypertrophy is present causing some mild to moderate central canal  stenosis, mild cord deformity, and mild-to-moderate bilateral neural  foraminal stenosis.     C5-C6: Broad-based posterior osteophyte formation and disc bulging is  present along with some facet hypertrophy. There is moderate central  canal stenosis and moderate bilateral neural foraminal stenosis.  Findings have progressed since the prior exam.     C6-C7: Broad-based disc bulging is present along with some minimal  posterior osteophyte formation. There is borderline to mild central  canal stenosis, but no neural foraminal stenosis.     C7-T1: Moderate facet hypertrophy. No significant stenosis.         IMPRESSION: Moderate multilevel degenerative disc and facet disease  with some progression at C5-C6 where there is moderate central canal  and bilateral neural foraminal stenosis along with cord deformity.          MR CERVICAL SPINE WITHOUT CONTRAST  2/22/2017 9:59 AM     HISTORY:  Bilateral neck and arm pain for three years.     COMPARISON: None.     TECHNIQUE: Routine MR cervical spine extended through T2.     FINDINGS: There is some degenerative bone marrow signal change C3-C6.  No malignant or destructive lesions. Normal alignment through T2.  Reversed cervical adenosis C3-C6.     The cervical and upper thoracic spinal cord appear intrinsically  normal. The craniocervical junction region is normal. No  paraspinous  soft tissue abnormality.     Findings by level as follows:     C2-C3: Negative. No disc protrusion. No central or lateral stenosis.     C3-C4: Mild disc space narrowing. Mild diffuse annular bulge. Small  uncinate spur on the right. No significant central or left-sided  stenosis. Mild right-sided foraminal stenosis.     C4-C5: Moderate degenerative narrowing of the interspace. Mild ventral  disc osteophyte complex with minimal central stenosis. Small uncinate  spurs bilaterally with mild bilateral foraminal stenosis.     C6-C7: Moderate disc space narrowing. Mild broad-based disc osteophyte  complex with minimal central stenosis. Minimal uncinate spurs with  mild bilateral foraminal stenosis.     C6-C7: Moderate disc space narrowing. Mild ventral disc osteophyte  complex. No significant central or lateral stenosis.     C7-T1: No disc protrusion. No central or lateral stenosis. Moderate  bilateral facet joint disease.         IMPRESSION:  1. Degenerative changes as described C3-C4 through C7-T1. There is  only mild central and foraminal stenosis as described.  2. No intrinsic spinal cord abnormality through T2        [place] Prescription Monitoring Program:  Reviewed [facility] 6/30/2021- no concerning fills.  Melanie STEVENS, RN CNP, FNP  [facility]  [facility]          DIRE Score for selecting candidates for long term opioid analgesia for chronic pain:  Diagnosis  2  Intractablility  2  Risk    Psychological health  2    Chemical health  2    Reliability  2    Social support  3  Efficacy  2    Total DIRE Score = 15. Note that   7-13 predicts poor outcome (compliance and efficacy) from opioid prescribing; 14-21 predicts good outcome (compliance and efficacy)  from opioid prescribing.      Assessment:   1. Cervical radicular pain (right arm)  2. Cervical spondylosis without myelopathy  3. Cervical DDD  4. Cervical stenosis of spinal canal  5. S/p cervical spinal  fusion  6. Lumbar facet joint pain  7. 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 Chronic bilateral low back pain without sciatica  9. Myofascial pain    10. Encounter for long-term use of opiate analgesic  11. Urine drug screen last done 4/29/2021  12. Controlled substance agreement signed 4/29/2021  13. Remote history of ETOH overuse, attended AA for awhile. Sober for >10 years  14. PMHx includes: neck pain  15. PSHx includes: none listed       Plan:   1. Physical Therapy: not at present  2. Clinical Health Psychologist: none  3. Diagnostic Studies:  None  4. Medication Management:   1. Continue tramadol ER 200mg once daily  2. Continue tramadol 50mg Q 6-8 hours PRN, max of 3/day.  3. Continue gabapentin 900 mg TID  4. Continue Topamax 50 mg BID  5. Continue methocarbamol 500-1000 mg TID PRN muscle spasms  5. Further procedures recommended: none at present  6. Recommendations to PCP: see above  7. Follow up: 8-12 weeks video visit due to COVID-19 viral threat. Please call [phone] to make your follow-up appointment with me.           Melanie STEVENS, RN CNP, FNP  [facility]  [facility]

## 2021-06-30 NOTE — PATIENT INSTRUCTIONS
----------------------------------------------------------------  [facility] Number:  [phone]     Call with any questions about your care and for scheduling assistance.     Calls are returned Monday through Friday between 8 AM and 4:30 PM. We usually get back to you within 2 business days depending on the issue/request.    If we are prescribing your medications:    For opioid medication refills, call the clinic or send a [employer] message 7 days in advance.  Please include:    Name of requested medication    Name of the pharmacy.    For non-opioid medications, call your pharmacy directly to request a refill. Please allow 3-4 days to be processed.     Per [place] State Law:    All controlled substance prescriptions must be filled within 30 days of being written.      For those controlled substances allowing refills, pickup must occur within 30 days of last fill.      We believe regular attendance is key to your success in our program!      Any time you are unable to keep your appointment we ask that you call us at least 24 hours in advance to cancel.This will allow us to offer the appointment time to another patient.     Multiple missed appointments may lead to dismissal from the clinic.

## 2021-07-18 ENCOUNTER — MYC REFILL (OUTPATIENT)
Dept: FAMILY MEDICINE | Facility: CLINIC | Age: 61
End: 2021-07-18

## 2021-07-18 DIAGNOSIS — I25.10 MILD CAD: ICD-10-CM

## 2021-07-18 DIAGNOSIS — I77.810 ASCENDING AORTA DILATATION (H): ICD-10-CM

## 2021-07-19 RX ORDER — METOPROLOL SUCCINATE 25 MG/1
25 TABLET, EXTENDED RELEASE ORAL DAILY
Qty: 90 TABLET | Refills: 0 | Status: SHIPPED | OUTPATIENT
Start: 2021-07-19 | End: 2021-11-02

## 2021-07-19 RX ORDER — ATORVASTATIN CALCIUM 20 MG/1
20 TABLET, FILM COATED ORAL DAILY
Qty: 90 TABLET | Refills: 0 | Status: SHIPPED | OUTPATIENT
Start: 2021-07-19 | End: 2021-11-02

## 2021-07-29 ENCOUNTER — MYC REFILL (OUTPATIENT)
Dept: PALLIATIVE MEDICINE | Facility: CLINIC | Age: 61
End: 2021-07-29

## 2021-07-29 DIAGNOSIS — M47.816 SPONDYLOSIS OF LUMBAR REGION WITHOUT MYELOPATHY OR RADICULOPATHY: ICD-10-CM

## 2021-07-29 DIAGNOSIS — M50.30 DDD (DEGENERATIVE DISC DISEASE), CERVICAL: ICD-10-CM

## 2021-07-29 DIAGNOSIS — M54.59 LUMBAR FACET JOINT PAIN: ICD-10-CM

## 2021-07-29 DIAGNOSIS — M47.812 CERVICAL SPONDYLOSIS WITHOUT MYELOPATHY: ICD-10-CM

## 2021-07-29 DIAGNOSIS — M54.50 CHRONIC BILATERAL LOW BACK PAIN WITHOUT SCIATICA: ICD-10-CM

## 2021-07-29 DIAGNOSIS — G89.29 CHRONIC BILATERAL LOW BACK PAIN WITHOUT SCIATICA: ICD-10-CM

## 2021-07-29 DIAGNOSIS — M79.18 MYOFASCIAL PAIN: ICD-10-CM

## 2021-07-29 DIAGNOSIS — Z98.1 S/P CERVICAL SPINAL FUSION: ICD-10-CM

## 2021-07-29 RX ORDER — TRAMADOL HYDROCHLORIDE 50 MG/1
50 TABLET ORAL EVERY 6 HOURS PRN
Qty: 90 TABLET | Refills: 0 | Status: SHIPPED | OUTPATIENT
Start: 2021-07-29 | End: 2021-08-30

## 2021-07-29 RX ORDER — TRAMADOL HYDROCHLORIDE 200 MG/1
200 TABLET, EXTENDED RELEASE ORAL EVERY 24 HOURS
Qty: 30 TABLET | Refills: 0 | Status: SHIPPED | OUTPATIENT
Start: 2021-07-29 | End: 2021-08-30

## 2021-07-29 NOTE — TELEPHONE ENCOUNTER
Signed Prescriptions:                        Disp   Refills    traMADol (ULTRAM) 50 MG tablet             90 tab*0        Sig: Take 1 tablet (50 mg) by mouth every 6 hours as           needed for severe pain Max 3/day. Fill 7/30/2021            Start 8/1/2021; to last 30 days for chronic pain  Authorizing Provider: MELANIE BENSON    traMADol (ULTRAM-ER) 200 MG 24 hr tablet   30 tab*0        Sig: Take 1 tablet (200 mg) by mouth every 24 hours Fill           8/1/2021. Start 8/3/21. to last 30 days for           chronic pain  Authorizing Provider: MELANIE BENSON    Reviewed [place]  July 29, 2021- no concerning fills.    Melanie Benson APRN, RN CNP, FNP  [facility]  [facility]

## 2021-07-29 NOTE — TELEPHONE ENCOUNTER
Medication refill information reviewed.     Last due:  Tramadol ER: Fill 2021. Start 21. to last 30 days   Tramadol 50mg: . Fill 2021  Start 2021; to last 30 days     Due date:    tramadol ER: 8/3/21  Tramadol 50m21      Prescriptions prepped for review.     Estelita RN-BSN  [facility]

## 2021-07-29 NOTE — TELEPHONE ENCOUNTER
Refills have been requested for the following medications:         traMADol (ULTRAM) 50 MG tablet [RODNEY Vargas CNP]         traMADol (ULTRAM-ER) 200 MG 24 hr tablet [RODNEY Vargas CNP]     Preferred pharmacy: [facility] [address] [place] [address] [address] [place] [address]

## 2021-07-29 NOTE — TELEPHONE ENCOUNTER
Called pt and LVM stating that prescriptions were sent to [id number] - [place], [place]  pharmacy     8/1/2021. Start 8/3/21.     Mike Bergeron MA

## 2021-07-29 NOTE — TELEPHONE ENCOUNTER
Received call from patient requesting refill(s) of traMADol (ULTRAM-ER) 200 MG 24 hr tablet   traMADol (ULTRAM) 50 MG tablet    Last dispensed from pharmacy on 7/1/21    Patient's last office/virtual visit by prescribing provider on 6/30/21  Next office/virtual appointment scheduled for none    Last urine drug screen date 4/29/21  Current opioid agreement on file (completed within the last year) Yes Date of opioid agreement: 5/6/21    E-prescribe to [facility] - [place], [place]  pharmacy    Will route to nursing [place] for review and preparation of prescription(s).

## 2021-08-21 ENCOUNTER — MYC MEDICAL ADVICE (OUTPATIENT)
Dept: PALLIATIVE MEDICINE | Facility: CLINIC | Age: 61
End: 2021-08-21

## 2021-08-21 DIAGNOSIS — M48.02 CERVICAL STENOSIS OF SPINAL CANAL: ICD-10-CM

## 2021-08-21 DIAGNOSIS — M50.30 DDD (DEGENERATIVE DISC DISEASE), CERVICAL: ICD-10-CM

## 2021-08-21 DIAGNOSIS — M47.812 CERVICAL SPONDYLOSIS WITHOUT MYELOPATHY: ICD-10-CM

## 2021-08-23 RX ORDER — GABAPENTIN 600 MG/1
900 TABLET ORAL 3 TIMES DAILY
Qty: 135 TABLET | Refills: 3 | Status: SHIPPED | OUTPATIENT
Start: 2021-08-23 | End: 2022-02-01

## 2021-08-23 NOTE — TELEPHONE ENCOUNTER
Per patient myChart message:  From: Palmer Suero      Created: 8/21/2021 8:05 AM      Melanie- Ashleigh this finds you well. There seems to have been mix up at pharmacy when gabapentin script was filled last. I went to fill over the phone and it said no refills avail. That is when noticed last one filled had no remaining refills. I know at out last visit you updated that. So they have an updated version of the gabapentin version over there they just didn't use it for some reason. # I have for prescript is [id number] and that is the outdated one. I know the new one you sent had 3 refills and that was end of June, and I only used 1 end of July, and that was when this script # was used.     Palmer      _________________________________    Per RODNEY Carpenter CNP's 6/30/21 plan:  1. Continue gabapentin 900 mg TID  _________________________________    Mychart sent to pt:  From: Estelita Wetzel RN      Created: 8/23/2021 11:52 AM      Xavier Chakraborty,  Do you want the refill sent to [facility]?     Estelita RN-BSN  [facility]

## 2021-08-24 NOTE — TELEPHONE ENCOUNTER
Signed Prescriptions:                        Disp   Refills    gabapentin (NEURONTIN) 600 MG tablet       135 ta*3        Sig: Take 1.5 tablets (900 mg) by mouth 3 times daily  Authorizing Provider: CANDACE BENSON, RN CNP, FNP  [facility]

## 2021-08-30 ENCOUNTER — MYC REFILL (OUTPATIENT)
Dept: PALLIATIVE MEDICINE | Facility: CLINIC | Age: 61
End: 2021-08-30

## 2021-08-30 DIAGNOSIS — M47.816 SPONDYLOSIS OF LUMBAR REGION WITHOUT MYELOPATHY OR RADICULOPATHY: ICD-10-CM

## 2021-08-30 DIAGNOSIS — M54.59 LUMBAR FACET JOINT PAIN: ICD-10-CM

## 2021-08-30 DIAGNOSIS — M79.18 MYOFASCIAL PAIN: ICD-10-CM

## 2021-08-30 DIAGNOSIS — M50.30 DDD (DEGENERATIVE DISC DISEASE), CERVICAL: ICD-10-CM

## 2021-08-30 DIAGNOSIS — Z98.1 S/P CERVICAL SPINAL FUSION: ICD-10-CM

## 2021-08-30 DIAGNOSIS — G89.29 CHRONIC BILATERAL LOW BACK PAIN WITHOUT SCIATICA: ICD-10-CM

## 2021-08-30 DIAGNOSIS — M54.50 CHRONIC BILATERAL LOW BACK PAIN WITHOUT SCIATICA: ICD-10-CM

## 2021-08-30 DIAGNOSIS — M47.812 CERVICAL SPONDYLOSIS WITHOUT MYELOPATHY: ICD-10-CM

## 2021-08-30 NOTE — TELEPHONE ENCOUNTER
Received call from patient requesting refill(s) of traMADol (ULTRAM-ER) 200 MG 24 hr tablet   traMADol (ULTRAM) 50 MG tablet    Last dispensed from pharmacy on 8/1/21    Patient's last office/virtual visit by prescribing provider on 6/30/21  Next office/virtual appointment scheduled for none    Last urine drug screen date 4/29/21  Current opioid agreement on file (completed within the last year) Yes Date of opioid agreement: 5/6/21    E-prescribe to [facility] [place]  pharmacy    Will route to nursing [place] for review and preparation of prescription(s).

## 2021-08-30 NOTE — TELEPHONE ENCOUNTER
Refills have been requested for the following medications:         traMADol (ULTRAM) 50 MG tablet [RODNEY Vargas CNP]         traMADol (ULTRAM-ER) 200 MG 24 hr tablet [RODNEY Vargas CNP]     Preferred pharmacy: [facility] [address] [place] [address] [address] [place] [address]

## 2021-08-30 NOTE — TELEPHONE ENCOUNTER
Medication refill information reviewed.     Due date for traMADol (ULTRAM-ER) 200 MG 24 hr tablet is 9/2/21     Due date for traMADol (ULTRAM) 50 MG tablet is 8/31/21     Prescriptions prepped for review.     Will route to provider.     Van Oquendo RN  Patient Care Supervisor   [facility]

## 2021-08-31 RX ORDER — TRAMADOL HYDROCHLORIDE 200 MG/1
200 TABLET, EXTENDED RELEASE ORAL EVERY 24 HOURS
Qty: 30 TABLET | Refills: 0 | Status: SHIPPED | OUTPATIENT
Start: 2021-08-31 | End: 2021-09-29

## 2021-08-31 RX ORDER — TRAMADOL HYDROCHLORIDE 50 MG/1
50 TABLET ORAL EVERY 6 HOURS PRN
Qty: 90 TABLET | Refills: 0 | Status: SHIPPED | OUTPATIENT
Start: 2021-08-31 | End: 2021-09-29

## 2021-08-31 NOTE — TELEPHONE ENCOUNTER
Signed Prescriptions:                        Disp   Refills    traMADol (ULTRAM) 50 MG tablet             90 tab*0        Sig: Take 1 tablet (50 mg) by mouth every 6 hours as           needed for severe pain Max 3/day. Fill 8/29/2021            Start 8/31/2021; to last 30 days for chronic pain  Authorizing Provider: MELANIE BENSON    traMADol (ULTRAM-ER) 200 MG 24 hr tablet   30 tab*0        Sig: Take 1 tablet (200 mg) by mouth every 24 hours Fill           8/31/2021. Start 9/2/21. to last 30 days for           chronic pain  Authorizing Provider: MELANIE BENSON    Reviewed [place]  August 31, 2021- no concerning fills.    Melanie Benson APRN, RN CNP, FNP  [facility]  [facility]

## 2021-09-01 NOTE — TELEPHONE ENCOUNTER
[employer] message sent with Rx approval from provider.  [place]  Pain Clinic Management Team

## 2021-09-10 NOTE — TELEPHONE ENCOUNTER
Alejandrina sent to pt:  From: Estelita Wetzel RN      Created: 12/20/2018 9:09 AM        Xavier Chakraborty.  Normally having PT on different areas has to be done separately but your case may be different as you have workers comp.  We can place the PT order for the low back.  Can you contact your WC or is your low back covered under your normal insurance?    Estelita Wetzel RN-BSN  [facility]             operating room

## 2021-09-13 ENCOUNTER — MYC MEDICAL ADVICE (OUTPATIENT)
Dept: PALLIATIVE MEDICINE | Facility: CLINIC | Age: 61
End: 2021-09-13

## 2021-09-14 ENCOUNTER — OFFICE VISIT (OUTPATIENT)
Dept: URGENT CARE | Facility: URGENT CARE | Age: 61
End: 2021-09-14
Payer: COMMERCIAL

## 2021-09-14 ENCOUNTER — E-VISIT (OUTPATIENT)
Dept: URGENT CARE | Facility: CLINIC | Age: 61
End: 2021-09-14
Payer: COMMERCIAL

## 2021-09-14 VITALS
WEIGHT: 154.25 LBS | DIASTOLIC BLOOD PRESSURE: 69 MMHG | RESPIRATION RATE: 20 BRPM | BODY MASS INDEX: 24.9 KG/M2 | HEART RATE: 83 BPM | SYSTOLIC BLOOD PRESSURE: 108 MMHG | OXYGEN SATURATION: 94 %

## 2021-09-14 DIAGNOSIS — U07.1 2019 NOVEL CORONAVIRUS DISEASE (COVID-19): Primary | ICD-10-CM

## 2021-09-14 DIAGNOSIS — R06.02 SOB (SHORTNESS OF BREATH): Primary | ICD-10-CM

## 2021-09-14 PROCEDURE — 99213 OFFICE O/P EST LOW 20 MIN: CPT | Performed by: NURSE PRACTITIONER

## 2021-09-14 PROCEDURE — 99207 PR NO CHARGE LOS: CPT | Performed by: INTERNAL MEDICINE

## 2021-09-14 NOTE — PROGRESS NOTES
SUBJECTIVE:   Truman Suero is a 61 year old male presenting with a chief complaint of   Chief Complaint   Patient presents with     Oxygen level     pt states had a positive covid over the weekend, pt tested his oxygen saturation level at and it was low. pt called his HCP and was informed to come into urgent care for further evaluation.        He is a new patient of [facility].    subjective  .Truman Suero is presenting to Urgent Care because of concern for his oxygen level. He checked his oxygen saturation at home and it was 64%.  He tested positive for COVID 3 days ago. He does not have symptoms.     Review of Systems   All other systems reviewed and are negative.      Past Medical History:   Diagnosis Date     Ascending aorta dilatation (H) 2/13/2018     Cervical spondylosis without myelopathy 10/3/2017     Chronic bilateral low back pain with right-sided sciatica 5/16/2018     Hypertension      Mild CAD 2/13/2018     Neck pain     MRI positive on cervical vertebrea.     PAD (peripheral artery disease) (H) 2/27/2018     Tobacco abuse 8/16/2017     Family History   Problem Relation Age of Onset     Prostate Cancer Father      Breast Cancer Maternal Grandmother      Diabetes Maternal Grandmother      Prostate Cancer Other      Breast Cancer Mother      Breast Cancer Cousin      Breast Cancer Sister      Current Outpatient Medications   Medication Sig Dispense Refill     atorvastatin (LIPITOR) 10 MG tablet Take 1 tablet (10 mg) by mouth daily 90 tablet 0     atorvastatin (LIPITOR) 20 MG tablet Take 1 tablet (20 mg) by mouth daily 90 tablet 0     buPROPion (WELLBUTRIN SR) 150 MG 12 hr tablet Take 1 tablet (150 mg) by mouth 2 times daily 180 tablet 0     cyclobenzaprine (FLEXERIL) 5 MG tablet Take 1-2 tablets (5-10 mg) by mouth nightly as needed for muscle spasms Need 6 hours between this and methocarbamol 45 tablet 2     gabapentin (NEURONTIN) 600 MG tablet Take 1.5 tablets (900 mg) by mouth 3 times daily 135  tablet 3     methocarbamol (ROBAXIN) 500 MG tablet Take 1 tablet (500 mg) by mouth 3 times daily as needed for muscle spasms Should be 6 hours between this and cyclobenzaprine at night 90 tablet 2     metoprolol succinate ER (TOPROL-XL) 25 MG 24 hr tablet Take 1 tablet (25 mg) by mouth daily 90 tablet 0     order for DME BP cuff, brand as covered by insurance.  Dx: HTN 1 each 0     topiramate (TOPAMAX) 50 MG tablet Take 50mg (1tablet) every morning and 100mg (2 tablets) every evening.  Drink plenty of water and no alcohol. 90 tablet 2     traMADol (ULTRAM) 50 MG tablet Take 1 tablet (50 mg) by mouth every 6 hours as needed for severe pain Max 3/day. Fill 8/29/2021  Start 8/31/2021; to last 30 days for chronic pain 90 tablet 0     traMADol (ULTRAM-ER) 200 MG 24 hr tablet Take 1 tablet (200 mg) by mouth every 24 hours Fill 8/31/2021. Start 9/2/21. to last 30 days for chronic pain 30 tablet 0     Social History     Tobacco Use     Smoking status: Former Smoker     Packs/day: 0.50     Years: 38.00     Pack years: 19.00     Types: Cigarettes     Start date: 1/1/1979     Quit date: 3/20/2018     Years since quitting: 3.4     Smokeless tobacco: Former User   Substance Use Topics     Alcohol use: Not Currently     Alcohol/week: 0.0 standard drinks     Comment: Quit drinking 10 years ago       OBJECTIVE  /69 (BP Location: Left arm, Patient Position: Sitting, Cuff Size: Adult Regular)   Pulse 83   Resp 20   Wt 70 kg (154 lb 4 oz)   SpO2 94%   BMI 24.90 kg/m      Physical Exam  Vitals and nursing note reviewed.   Constitutional:       General: He is not in acute distress.     Appearance: He is well-developed. He is not diaphoretic.   HENT:      Head: Normocephalic and atraumatic.      Right Ear: Tympanic membrane and external ear normal.      Left Ear: Tympanic membrane and external ear normal.   Eyes:      Pupils: Pupils are equal, round, and reactive to light.   Pulmonary:      Effort: Pulmonary effort is normal.  No respiratory distress.      Breath sounds: Normal breath sounds.   Musculoskeletal:      Cervical back: Normal range of motion and neck supple.   Lymphadenopathy:      Cervical: No cervical adenopathy.   Skin:     General: Skin is warm and dry.   Neurological:      General: No focal deficit present.      Mental Status: He is alert.      Cranial Nerves: No cranial nerve deficit.   Psychiatric:         Mood and Affect: Mood normal.         ASSESSMENT:      ICD-10-CM    1. 2019 novel coronavirus disease (COVID-19)  U07.1         PLAN:  Vital signs are reviewed with patient. He is not sick today. He denies symptoms.   Patient educational/instructional material provided including reasons for follow-up    The patient indicates understanding of these issues and agrees with the plan.

## 2021-09-14 NOTE — TELEPHONE ENCOUNTER
Oh NO!  I am so sorry to hear this, Palmer.  I would try to eat healthy. Stay well hydrated. And maybe make a virtual appt with Dr. Trinidad. She may decide to give you an inhaler if you are having difficulty breathing. Sitting upright and propping your head up in bed can help you breathe easier as well.      Hoping for your very speedy recovery!     Melanie STEVENS RN CNP, MELISSAP  [facility]    I have sent the above [employer] message to the patient.     Melanie STEVENS RN CNP, P  [facility]

## 2021-09-14 NOTE — PATIENT INSTRUCTIONS
Dear Truman Suero,    We are sorry you are not feeling well. Given the range of your oxygent levels, which are sometimes fairly low, it is recommended that you be seen in-person in urgent care so we can better evaluate your symptoms. Please click here to find the nearest urgent care location to you.   You will not be charged for this Visit. Thank you for trusting us with your care.    Najma Bertrand MD

## 2021-09-17 ENCOUNTER — MYC MEDICAL ADVICE (OUTPATIENT)
Dept: PALLIATIVE MEDICINE | Facility: CLINIC | Age: 61
End: 2021-09-17

## 2021-09-17 NOTE — TELEPHONE ENCOUNTER
From: Palmer Suero      Created: 9/17/2021 9:31 AM        *-*-*This message has not been handled.*-*-*    Melanie thanks for response. Boy this is a bitch. Don't know what it would be like without the vacc., or if that even makes difference. Really kicked me in the ass. Baby steps right now. Trying to get some strength back. I have barely been able to stand without being so dizzy. Anyway I finally had an appt to see face to face on the books but I will have reschedule that for ? Mya wants me to discuss somethings etc etc.      Regards  Palmer        Will forward to Melanie Thomas as NIKKI Oquendo RN  Patient Care Supervisor   [facility]

## 2021-09-17 NOTE — TELEPHONE ENCOUNTER
Hang in there, Palmer. You got this. You can reschedule with me in-person when you are feeling stronger. Let yourself heal from having COVID!!!  Sending healing vibes to you!     Melanie STEVENS RN CNP, MELISSAP  [facility]    I have sent the above [employer] message to the patient.     Melanie STEVENS RN CNP, MELISSAP  [facility]

## 2021-09-29 ENCOUNTER — MYC REFILL (OUTPATIENT)
Dept: PALLIATIVE MEDICINE | Facility: CLINIC | Age: 61
End: 2021-09-29

## 2021-09-29 DIAGNOSIS — M54.50 CHRONIC BILATERAL LOW BACK PAIN WITHOUT SCIATICA: ICD-10-CM

## 2021-09-29 DIAGNOSIS — M54.59 LUMBAR FACET JOINT PAIN: ICD-10-CM

## 2021-09-29 DIAGNOSIS — Z98.1 S/P CERVICAL SPINAL FUSION: ICD-10-CM

## 2021-09-29 DIAGNOSIS — M50.30 DDD (DEGENERATIVE DISC DISEASE), CERVICAL: ICD-10-CM

## 2021-09-29 DIAGNOSIS — G89.29 CHRONIC BILATERAL LOW BACK PAIN WITHOUT SCIATICA: ICD-10-CM

## 2021-09-29 DIAGNOSIS — M79.18 MYOFASCIAL PAIN: ICD-10-CM

## 2021-09-29 DIAGNOSIS — M47.812 CERVICAL SPONDYLOSIS WITHOUT MYELOPATHY: ICD-10-CM

## 2021-09-29 DIAGNOSIS — M47.816 SPONDYLOSIS OF LUMBAR REGION WITHOUT MYELOPATHY OR RADICULOPATHY: ICD-10-CM

## 2021-09-30 RX ORDER — TRAMADOL HYDROCHLORIDE 200 MG/1
200 TABLET, EXTENDED RELEASE ORAL EVERY 24 HOURS
Qty: 30 TABLET | Refills: 0 | Status: SHIPPED | OUTPATIENT
Start: 2021-09-30 | End: 2021-10-19

## 2021-09-30 RX ORDER — TRAMADOL HYDROCHLORIDE 50 MG/1
50 TABLET ORAL EVERY 6 HOURS PRN
Qty: 90 TABLET | Refills: 0 | Status: SHIPPED | OUTPATIENT
Start: 2021-09-30 | End: 2021-10-19

## 2021-09-30 NOTE — TELEPHONE ENCOUNTER
Medication refill information reviewed.     Last due:    Tramadol ER: Start 9/2/21. to last 30 days for chronic pain   Tramadol:  Start 8/31/2021; to last 30 days for chronic pain     Due date:    Tramadol ER: 10/2/21  Tramadol: 9/30/21      Prescriptions prepped for review.     TIMBO Capone-BSN  [facility]

## 2021-09-30 NOTE — TELEPHONE ENCOUNTER
Received call from patient requesting refill(s) of traMADol (ULTRAM-ER) 200 MG 24 hr tablet   traMADol (ULTRAM) 50 MG tablet    Last dispensed from pharmacy on both on 8/31/21    Patient's last office/virtual visit by prescribing provider on 6/30/21  Next office/virtual appointment scheduled for none    Last urine drug screen date 4/29/21  Current opioid agreement on file (completed within the last year) Yes Date of opioid agreement: 5/6/21    E-prescribe to [facility] [place]  [place]acy    Will route to nursing [place] for review and preparation of prescription(s).

## 2021-10-01 NOTE — TELEPHONE ENCOUNTER
[employer] message sent with Rx approval from provider.  [place]  Pain Clinic Management Team

## 2021-10-01 NOTE — TELEPHONE ENCOUNTER
Signed Prescriptions:                        Disp   Refills    traMADol (ULTRAM) 50 MG tablet             90 tab*0        Sig: Take 1 tablet (50 mg) by mouth every 6 hours as           needed for severe pain Max 3/day. Fill now. Start           10/1/2021; to last 30 days for chronic pain  Authorizing Provider: MELANIE BENSON    traMADol (ULTRAM-ER) 200 MG 24 hr tablet   30 tab*0        Sig: Take 1 tablet (200 mg) by mouth every 24 hours Fill           now. Start 10/02/21. to last 30 days for chronic           pain  Authorizing Provider: MELANIE BENSON    Reviewed [place]  September 30, 2021- no concerning fills.  Patient needs to schedule an appointment with me prior to next refill. This can be either in-person or a video visit.   Melanie STEVENS, RN CNP, FNP  [facility]  [facility]

## 2021-10-03 ENCOUNTER — HEALTH MAINTENANCE LETTER (OUTPATIENT)
Age: 61
End: 2021-10-03

## 2021-10-19 ENCOUNTER — OFFICE VISIT (OUTPATIENT)
Dept: PALLIATIVE MEDICINE | Facility: CLINIC | Age: 61
End: 2021-10-19
Payer: COMMERCIAL

## 2021-10-19 VITALS — SYSTOLIC BLOOD PRESSURE: 108 MMHG | HEART RATE: 65 BPM | DIASTOLIC BLOOD PRESSURE: 73 MMHG

## 2021-10-19 DIAGNOSIS — M54.41 CHRONIC BILATERAL LOW BACK PAIN WITH RIGHT-SIDED SCIATICA: ICD-10-CM

## 2021-10-19 DIAGNOSIS — M47.812 CERVICAL SPONDYLOSIS WITHOUT MYELOPATHY: ICD-10-CM

## 2021-10-19 DIAGNOSIS — M79.18 MYOFASCIAL PAIN: ICD-10-CM

## 2021-10-19 DIAGNOSIS — M54.16 LUMBAR RADICULOPATHY: Primary | ICD-10-CM

## 2021-10-19 DIAGNOSIS — M51.369 DDD (DEGENERATIVE DISC DISEASE), LUMBAR: ICD-10-CM

## 2021-10-19 DIAGNOSIS — Z98.1 S/P CERVICAL SPINAL FUSION: ICD-10-CM

## 2021-10-19 DIAGNOSIS — M54.2 CHRONIC NECK PAIN: ICD-10-CM

## 2021-10-19 DIAGNOSIS — M50.30 DDD (DEGENERATIVE DISC DISEASE), CERVICAL: ICD-10-CM

## 2021-10-19 DIAGNOSIS — G89.29 CHRONIC NECK PAIN: ICD-10-CM

## 2021-10-19 DIAGNOSIS — G89.29 CHRONIC BILATERAL LOW BACK PAIN WITH RIGHT-SIDED SCIATICA: ICD-10-CM

## 2021-10-19 PROCEDURE — 99214 OFFICE O/P EST MOD 30 MIN: CPT | Performed by: NURSE PRACTITIONER

## 2021-10-19 RX ORDER — CYCLOBENZAPRINE HCL 5 MG
5-10 TABLET ORAL
Qty: 45 TABLET | Refills: 2 | Status: SHIPPED | OUTPATIENT
Start: 2021-10-19 | End: 2022-04-13

## 2021-10-19 RX ORDER — METHOCARBAMOL 500 MG/1
500 TABLET, FILM COATED ORAL 3 TIMES DAILY PRN
Qty: 90 TABLET | Refills: 2 | Status: SHIPPED | OUTPATIENT
Start: 2021-10-19 | End: 2022-04-13

## 2021-10-19 RX ORDER — TRAMADOL HYDROCHLORIDE 50 MG/1
50 TABLET ORAL EVERY 6 HOURS PRN
Qty: 90 TABLET | Refills: 0 | Status: SHIPPED | OUTPATIENT
Start: 2021-10-19 | End: 2021-12-01

## 2021-10-19 RX ORDER — TRAMADOL HYDROCHLORIDE 200 MG/1
200 TABLET, EXTENDED RELEASE ORAL EVERY 24 HOURS
Qty: 30 TABLET | Refills: 0 | Status: SHIPPED | OUTPATIENT
Start: 2021-10-19 | End: 2021-12-01

## 2021-10-19 ASSESSMENT — PAIN SCALES - GENERAL: PAINLEVEL: SEVERE PAIN (6)

## 2021-10-19 NOTE — PATIENT INSTRUCTIONS
Plan:   1. Physical Therapy: not at present  2. Clinical Health Psychologist: none  3. Diagnostic Studies:  obtain updated lumbar MRI at [facility]. Call [phone] to schedule.   4. Medication Management:   1. Continue tramadol ER 200mg once daily  2. Continue tramadol 50mg Q 6-8 hours PRN, max of 3/day.  3. Continue gabapentin 900 mg TID  4. Continue Topamax 50 mg BID  5. Continue methocarbamol 500-1000 mg TID PRN muscle spasms  5. Further procedures recommended: none at present  6. Recommendations to PCP: see above  7. Follow up: 8-12 weeks video visit due to COVID-19 viral threat. Please call [phone] to make your follow-up appointment with me.     ----------------------------------------------------------------  Clinic Number:  [phone]     Call with any questions about your care and for scheduling assistance.     Calls are returned Monday through Friday between 8 AM and 4:30 PM. We usually get back to you within 2 business days depending on the issue/request.    If we are prescribing your medications:    For opioid medication refills, call the clinic or send a [employer] message 7 days in advance.  Please include:    Name of requested medication    Name of the pharmacy.    For non-opioid medications, call your pharmacy directly to request a refill. Please allow 3-4 days to be processed.     Per [place] State Law:    All controlled substance prescriptions must be filled within 30 days of being written.      For those controlled substances allowing refills, pickup must occur within 30 days of last fill.      We believe regular attendance is key to your success in our program!      Any time you are unable to keep your appointment we ask that you call us at least 24 hours in advance to cancel.This will allow us to offer the appointment time to another patient.     Multiple missed appointments may lead to dismissal from the clinic.
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
## 2021-10-25 ENCOUNTER — ANCILLARY PROCEDURE (OUTPATIENT)
Dept: MRI IMAGING | Facility: CLINIC | Age: 61
End: 2021-10-25
Attending: NURSE PRACTITIONER
Payer: COMMERCIAL

## 2021-10-25 DIAGNOSIS — M51.369 DDD (DEGENERATIVE DISC DISEASE), LUMBAR: ICD-10-CM

## 2021-10-25 DIAGNOSIS — M54.41 CHRONIC BILATERAL LOW BACK PAIN WITH RIGHT-SIDED SCIATICA: ICD-10-CM

## 2021-10-25 DIAGNOSIS — G89.29 CHRONIC BILATERAL LOW BACK PAIN WITH RIGHT-SIDED SCIATICA: ICD-10-CM

## 2021-10-25 DIAGNOSIS — M54.16 LUMBAR RADICULOPATHY: ICD-10-CM

## 2021-10-25 PROCEDURE — 72148 MRI LUMBAR SPINE W/O DYE: CPT | Mod: TC | Performed by: RADIOLOGY

## 2021-10-31 ENCOUNTER — MYC MEDICAL ADVICE (OUTPATIENT)
Dept: PALLIATIVE MEDICINE | Facility: CLINIC | Age: 61
End: 2021-10-31
Payer: COMMERCIAL

## 2021-11-01 NOTE — TELEPHONE ENCOUNTER
From: Palmer Suero      Created: 10/31/2021 4:15 PM        *-*-*This message has not been handled.*-*-*    Melanie and Staff- Hope you had a great Halloween. Melanie valderrama Went to pharm to  Tram prescriptions for beginning of the month. They had the 50mg ready but she said the prior authorization of the 200 ER  . So she is sending script back. Not sure what all this means but you will be getting  back from Pharm. Just wanted to update that I did not receive on date you 1st prescribed. Before I forget got message about MRI. Good news.  Just have to give in to aging. Will let you know about injections.      Thanks as always,  Palmer        Forwarded to Melanie Thomas

## 2021-11-04 ENCOUNTER — MYC MEDICAL ADVICE (OUTPATIENT)
Dept: PALLIATIVE MEDICINE | Facility: CLINIC | Age: 61
End: 2021-11-04
Payer: COMMERCIAL

## 2021-11-05 NOTE — TELEPHONE ENCOUNTER
Hi Palmer-  This week has been super busy. I just called your pharmacy as I have nothing in your chart that indicates that a PA is being worked on. The pharmacy states that they submitted a PA to our office through CoverMyMeds on 10/31/2021 even though your prior authorization didn't  until 2021 but the script would not clear the insurance company when you tried to fill it. Pharmacy just re-submitted the prior authorization now, but our PA department does not work on the weekends.      How are you doing since you are off of the Tramadol ER 200mg tablet? How is your pain control?   You could ask the pharmacy if they would fill a 7 day script for you and you could pay cash and then if that is done, send me a Slidebean message and I will submit another script for the monthly amount (they cannot fill a partial amount of opiates as it voids the rest of the script, usually).      Please reach out and let me know how you are.      Thanks.  Melanie STEVENS RN CNP, ABRAM  [facility]    I have sent the above Slidebean message to the patient.     Melanie STEVENS RN CNP, ABRAM  [facility] location

## 2021-11-08 NOTE — TELEPHONE ENCOUNTER
Patient replied to provider with message below, routing to provider       To: PAIN NURSE      From: Palmer Suero      Created: 11/5/2021 7:35 PM        *-*-*This message has not been handled.*-*-*    I think let's just keep things the way they are rather than fill temp 7 day one and disrupt the system. Whenever the PA dept comes back on duty we can move forward. I had little bout couple days, but just used an took a 50 later in the day when it was bothering most. It was little different. Felt the pain back in my neck again, has been awhile since that has bothered me. Nothing to concern just all those daily issues (HA) As always thanks for your time in my matters.     Happy Thanksgiving if I don't speak to you before.  Palmer.  JOO.S thinking of the injections . I'll let you know        Emani Sadler RN, BSN, CMSRN  RN Care Coordinator  [facility]

## 2021-11-08 NOTE — TELEPHONE ENCOUNTER
See the 10/31/21 mychart encounter for more information.    Estelita Wetzel RN-BSN  [facility]

## 2021-11-09 ENCOUNTER — MYC MEDICAL ADVICE (OUTPATIENT)
Dept: PALLIATIVE MEDICINE | Facility: CLINIC | Age: 61
End: 2021-11-09
Payer: COMMERCIAL

## 2021-11-09 ENCOUNTER — TELEPHONE (OUTPATIENT)
Dept: PALLIATIVE MEDICINE | Facility: CLINIC | Age: 61
End: 2021-11-09
Payer: COMMERCIAL

## 2021-11-09 NOTE — TELEPHONE ENCOUNTER
PA needed on Tramadol ER 200mg tabs. Pharmacy states they sent this to us via CoverMyMeds on 10/31/2021, 11/5/2021 and today 11/9/2021.     Please expidite this. Patient is out of his meds for over a week.     Melanie STEVENS, RN CNP, FNP  [facility]  [facility]

## 2021-11-09 NOTE — TELEPHONE ENCOUNTER
Prior Authorization Approval    Authorization Effective Date: 11/9/2021  Authorization Expiration Date: 5/8/2022  Medication: traMADol (ULTRAM-ER) 200 MG 24 hr tablet- APPROVED   Approved Dose/Quantity:   Reference #:     Insurance Company: [employer] - Phone [phone] Fax [phone]  Expected CoPay:       CoPay Card Available:      Foundation Assistance Needed:    Which Pharmacy is filling the prescription (Not needed for infusion/clinic administered): [id number] [address]  Pharmacy Notified: Yes  Patient Notified:  Pharmacy staff will give pt a call once med is ready for

## 2021-11-09 NOTE — TELEPHONE ENCOUNTER
Central Prior Authorization Team  Phone: [phone]    PA Initiation    Medication: traMADol (ULTRAM-ER) 200 MG 24 hr tablet  Insurance Company: [employer] - Phone [phone] Fax [phone]  Pharmacy Filling the Rx: [facility] [address] [place] [address]  Filling Pharmacy Phone: [phone]  Filling Pharmacy Fax:    Start Date: 11/9/2021

## 2021-11-09 NOTE — TELEPHONE ENCOUNTER
Xavier Chakraborty-     I called the pharmacy again, they submitted a PA on 10/31, 11/5 and again today 11/9/2021. On their end, they say it is still tied up in insurance as it needs a PA. I have reached out to our PA department as I cannot see anything that looks like a PA claim on our end (but I don't always know where to look).   I sent the PA team a message and asked them to expedite this process since your medication has been out for over a week. I am so sorry about this.  How are you doing?      Melanie STEVENS RN NIKOS, MELISSAP  [facility]  [facility]    I have sent the above [employer] message to the patient.     Melanie STEVENS RN CNP, ABRAM  [facility]

## 2021-11-10 NOTE — TELEPHONE ENCOUNTER
Palmer-     Call the pharmacy. I think that the Tramadol ER has been prior authorized...     Thanks.  Melanie STEVENS RN CNP, MELISSAP  [facility]    I have sent the above [employer] message to the patient.     Melanie STEVENS RN CNP, MELISSAP  [facility]

## 2021-11-10 NOTE — TELEPHONE ENCOUNTER
Per patient Dahlia message:    Created: 11/9/2021 7:26 PM        *-*-*This message has not been handled.*-*-*    Just an FYI I got  message tonight from pharm that prescrip came thru.  Thanks for all your help   Palmer

## 2021-11-10 NOTE — TELEPHONE ENCOUNTER
Information noted.     Please call patient and ensure he knows that the Tramadol ER is now approved.     Thanks.   Melanie STEVENS, RN CNP, FNP  [facility]

## 2021-11-10 NOTE — TELEPHONE ENCOUNTER
Information noted.   Melanie STEVENS, RN CNP, FNP  [facility]  [facility]

## 2021-11-10 NOTE — TELEPHONE ENCOUNTER
LVM for patient that tramadol was approved.     Closing.     Blanca Bonilla [facility] [place]

## 2021-11-10 NOTE — TELEPHONE ENCOUNTER
See the 10/31/21 mychart encounter for more information.    Estelita Wetzel RN-BSN  [facility]

## 2021-11-30 ENCOUNTER — MYC MEDICAL ADVICE (OUTPATIENT)
Dept: PALLIATIVE MEDICINE | Facility: CLINIC | Age: 61
End: 2021-11-30
Payer: COMMERCIAL

## 2021-11-30 DIAGNOSIS — M47.812 CERVICAL SPONDYLOSIS WITHOUT MYELOPATHY: ICD-10-CM

## 2021-11-30 DIAGNOSIS — M50.30 DDD (DEGENERATIVE DISC DISEASE), CERVICAL: ICD-10-CM

## 2021-12-01 ENCOUNTER — MYC REFILL (OUTPATIENT)
Dept: PALLIATIVE MEDICINE | Facility: CLINIC | Age: 61
End: 2021-12-01
Payer: COMMERCIAL

## 2021-12-01 DIAGNOSIS — M47.812 CERVICAL SPONDYLOSIS WITHOUT MYELOPATHY: ICD-10-CM

## 2021-12-01 DIAGNOSIS — Z98.1 S/P CERVICAL SPINAL FUSION: ICD-10-CM

## 2021-12-01 DIAGNOSIS — M79.18 MYOFASCIAL PAIN: ICD-10-CM

## 2021-12-01 DIAGNOSIS — M50.30 DDD (DEGENERATIVE DISC DISEASE), CERVICAL: ICD-10-CM

## 2021-12-02 RX ORDER — TRAMADOL HYDROCHLORIDE 200 MG/1
200 TABLET, EXTENDED RELEASE ORAL EVERY 24 HOURS
Qty: 30 TABLET | Refills: 0 | Status: SHIPPED | OUTPATIENT
Start: 2021-12-02 | End: 2022-01-04

## 2021-12-02 RX ORDER — TRAMADOL HYDROCHLORIDE 50 MG/1
50 TABLET ORAL EVERY 6 HOURS PRN
Qty: 90 TABLET | Refills: 0 | Status: SHIPPED | OUTPATIENT
Start: 2021-12-02 | End: 2022-01-04

## 2021-12-02 RX ORDER — TOPIRAMATE 50 MG/1
50 TABLET, FILM COATED ORAL 2 TIMES DAILY
Qty: 180 TABLET | Refills: 1 | Status: SHIPPED | OUTPATIENT
Start: 2021-12-02 | End: 2022-08-07

## 2021-12-02 NOTE — TELEPHONE ENCOUNTER
Medication refill information reviewed.     Last due:    Tramadol ER: Start 11/1/21. to last 30 days  Tramadol: . Start 10/31/2021; to last 30 days     Due date:    Tramadol ER: 12/1 anytime  Tramadol: anytime      Prescriptions prepped for review.     Estelita RN-BSN  [facility]

## 2021-12-02 NOTE — TELEPHONE ENCOUNTER
Signed Prescriptions:                        Disp   Refills    topiramate (TOPAMAX) 50 MG tablet          180 ta*1        Sig: Take 1 tablet (50 mg) by mouth 2 times daily Drink           plenty of water and no alcohol with this           medication. 3 month script  Authorizing Provider: CANDACE BENSON, RN CNP, FNP  [facility]  [facility]

## 2021-12-02 NOTE — TELEPHONE ENCOUNTER
Received request from patient for refill(s) of topiramate (TOPAMAX) 50 MG tablet (Discontinued)    Start 06/30/21  End 11/09/21    Pt last seen on 10/19/21    Next appt scheduled for None    E-prescribe to:  [facility] [id number] [facility] [address]  Phone: [phone] Fax: [phone]    Patient comment: I tried to call in a refill for the Topiramate that was issued 10/13/21 #[id number]. The auto service at [facility] said my phone and the prescrip # did not match. I went in to the pharm and they said they would be calling the  for new one.     Unable to facilitate refill, Rx discontinued.      Blanca Bonilla [facility] [place]

## 2021-12-02 NOTE — TELEPHONE ENCOUNTER
Received call from patient requesting refill(s) of traMADol (ULTRAM-ER) 200 MG 24 hr tablet   traMADol (ULTRAM) 50 MG tablet    Last dispensed from pharmacy on both on 10/31/21    Patient's last office/virtual visit by prescribing provider on 10/19/21  Next office/virtual appointment scheduled for none    Last urine drug screen date 4/29/21  Current opioid agreement on file (completed within the last year) Yes Date of opioid agreement: 5/6/21    E-prescribe to [facility] [address] [facility] pharmacy    Will route to nursing [place] for review and preparation of prescription(s).

## 2021-12-02 NOTE — TELEPHONE ENCOUNTER
Signed Prescriptions:                        Disp   Refills    traMADol (ULTRAM) 50 MG tablet             90 tab*0        Sig: Take 1 tablet (50 mg) by mouth every 6 hours as           needed for severe pain Max 3/day. Fill/begin           12/2/2021; to last 30 days for chronic pain  Authorizing Provider: MELANIE BENSON    traMADol (ULTRAM-ER) 200 MG 24 hr tablet   30 tab*0        Sig: Take 1 tablet (200 mg) by mouth every 24 hours           Fill/begin 12/2/2021. to last 30 days for chronic           pain  Authorizing Provider: MELANIE BENSON    Reviewed [place]  December 2, 2021- no concerning fills.    Melanie STEVENS, RN CNP, FNP  [facility]  [facility]

## 2021-12-02 NOTE — TELEPHONE ENCOUNTER
Routed to the nursing pool and to the MA pool to process refill(s).    Estelita Wetzel, RN-BSN  [facility]

## 2022-01-09 NOTE — TELEPHONE ENCOUNTER
Alejandrina sent to pt:    From: Estelita Wetzel RN      Created: 3/18/2019 10:36 AM        Xavier Chakraborty.  Sorry I am confused.  Looks like you have tramadol ER and tramadol 50mg current.  The tramadol ER is not due again until 4/2/19 and the tramadol 50mg is due to start on 4/14/19.  Let us now more on what is needed.    Estelita Wetzel RN-BSN  [facility]           1 pair

## 2022-01-10 ENCOUNTER — TELEPHONE (OUTPATIENT)
Dept: PALLIATIVE MEDICINE | Facility: CLINIC | Age: 62
End: 2022-01-10
Payer: COMMERCIAL

## 2022-01-10 NOTE — TELEPHONE ENCOUNTER
Fax received from [employer] asking for document ation. Placed in Melanie montero.     Mike Bergeron MA

## 2022-01-14 NOTE — TELEPHONE ENCOUNTER
Reviewed, filled out, placed in MA touchdown for fax back.     Thanks.   Melanie STEVENS RN CNP, FNP  [facility]

## 2022-01-18 NOTE — TELEPHONE ENCOUNTER
Faxed letter to [facility]. RightFax confirmed. Placed letter into area to be scan to Epic.     Mike Bergeron MA

## 2022-01-18 NOTE — TELEPHONE ENCOUNTER
Duplicate encounter.  Can be closed.    Liana MINAYA, Certified Medical Assistant (AAMA)September 14, 2017 1:41 PM     Yes

## 2022-02-01 ENCOUNTER — VIRTUAL VISIT (OUTPATIENT)
Dept: PALLIATIVE MEDICINE | Facility: CLINIC | Age: 62
End: 2022-02-01
Payer: COMMERCIAL

## 2022-02-01 DIAGNOSIS — M54.50 CHRONIC BILATERAL LOW BACK PAIN WITHOUT SCIATICA: ICD-10-CM

## 2022-02-01 DIAGNOSIS — M48.02 CERVICAL STENOSIS OF SPINAL CANAL: ICD-10-CM

## 2022-02-01 DIAGNOSIS — F11.90 CHRONIC, CONTINUOUS USE OF OPIOIDS: ICD-10-CM

## 2022-02-01 DIAGNOSIS — M50.30 DDD (DEGENERATIVE DISC DISEASE), CERVICAL: ICD-10-CM

## 2022-02-01 DIAGNOSIS — Z98.1 S/P CERVICAL SPINAL FUSION: ICD-10-CM

## 2022-02-01 DIAGNOSIS — M47.812 CERVICAL SPONDYLOSIS WITHOUT MYELOPATHY: Primary | ICD-10-CM

## 2022-02-01 DIAGNOSIS — G89.29 CHRONIC BILATERAL LOW BACK PAIN WITHOUT SCIATICA: ICD-10-CM

## 2022-02-01 DIAGNOSIS — M79.18 MYOFASCIAL PAIN: ICD-10-CM

## 2022-02-01 PROCEDURE — 99212 OFFICE O/P EST SF 10 MIN: CPT | Mod: GT | Performed by: NURSE PRACTITIONER

## 2022-02-01 RX ORDER — TRAMADOL HYDROCHLORIDE 200 MG/1
200 TABLET, EXTENDED RELEASE ORAL EVERY 24 HOURS
Qty: 30 TABLET | Refills: 0 | Status: SHIPPED | OUTPATIENT
Start: 2022-02-01 | End: 2022-03-04

## 2022-02-01 RX ORDER — GABAPENTIN 600 MG/1
900 TABLET ORAL 3 TIMES DAILY
Qty: 135 TABLET | Refills: 3 | Status: SHIPPED | OUTPATIENT
Start: 2022-02-01 | End: 2022-06-13

## 2022-02-01 RX ORDER — TRAMADOL HYDROCHLORIDE 50 MG/1
50 TABLET ORAL EVERY 6 HOURS PRN
Qty: 90 TABLET | Refills: 0 | Status: SHIPPED | OUTPATIENT
Start: 2022-02-01 | End: 2022-03-04

## 2022-02-01 ASSESSMENT — PAIN SCALES - GENERAL: PAINLEVEL: SEVERE PAIN (6)

## 2022-02-01 NOTE — PATIENT INSTRUCTIONS
Plan:   1. Physical Therapy: not at present  2. Clinical Health Psychologist: none  3. Diagnostic Studies: none  4. Medication Management:   1. Continue tramadol ER 200mg once daily  2. Continue tramadol 50mg Q 6-8 hours PRN, max of 3/day.  3. Continue gabapentin 900 mg TID  4. Continue Topamax 50 mg BID  5. Continue methocarbamol 500-1000 mg TID PRN muscle spasms  5. Further procedures recommended: none at present  6. Recommendations to PCP: see above  7. Follow up: 8-12 weeks video or in-person visit, your choice. Please call [phone] to make your follow-up appointment with me.         ----------------------------------------------------------------  Clinic Number:  [phone]     Call with any questions about your care and for scheduling assistance.     Calls are returned Monday through Friday between 8 AM and 4:30 PM. We usually get back to you within 2 business days depending on the issue/request.    If we are prescribing your medications:    For opioid medication refills, call the clinic or send a [employer] message 7 days in advance.  Please include:    Name of requested medication    Name of the pharmacy.    For non-opioid medications, call your pharmacy directly to request a refill. Please allow 3-4 days to be processed.     Per [place] State Law:    All controlled substance prescriptions must be filled within 30 days of being written.      For those controlled substances allowing refills, pickup must occur within 30 days of last fill.      We believe regular attendance is key to your success in our program!      Any time you are unable to keep your appointment we ask that you call us at least 24 hours in advance to cancel.This will allow us to offer the appointment time to another patient.     Multiple missed appointments may lead to dismissal from the clinic.
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
## 2022-02-01 NOTE — PROGRESS NOTES

## 2022-03-04 ENCOUNTER — MYC REFILL (OUTPATIENT)
Dept: PALLIATIVE MEDICINE | Facility: CLINIC | Age: 62
End: 2022-03-04
Payer: COMMERCIAL

## 2022-03-04 DIAGNOSIS — M48.02 CERVICAL STENOSIS OF SPINAL CANAL: ICD-10-CM

## 2022-03-04 DIAGNOSIS — M79.18 MYOFASCIAL PAIN: ICD-10-CM

## 2022-03-04 DIAGNOSIS — Z98.1 S/P CERVICAL SPINAL FUSION: ICD-10-CM

## 2022-03-04 DIAGNOSIS — M50.30 DDD (DEGENERATIVE DISC DISEASE), CERVICAL: ICD-10-CM

## 2022-03-04 DIAGNOSIS — F11.90 CHRONIC, CONTINUOUS USE OF OPIOIDS: ICD-10-CM

## 2022-03-04 DIAGNOSIS — M47.812 CERVICAL SPONDYLOSIS WITHOUT MYELOPATHY: ICD-10-CM

## 2022-03-04 RX ORDER — TRAMADOL HYDROCHLORIDE 200 MG/1
200 TABLET, EXTENDED RELEASE ORAL EVERY 24 HOURS
Qty: 30 TABLET | Refills: 0 | Status: SHIPPED | OUTPATIENT
Start: 2022-03-04 | End: 2022-04-05

## 2022-03-04 RX ORDER — TRAMADOL HYDROCHLORIDE 50 MG/1
50 TABLET ORAL EVERY 6 HOURS PRN
Qty: 90 TABLET | Refills: 0 | Status: SHIPPED | OUTPATIENT
Start: 2022-03-04 | End: 2022-04-05

## 2022-03-04 NOTE — TELEPHONE ENCOUNTER
Signed Prescriptions:                        Disp   Refills    traMADol (ULTRAM) 50 MG tablet             90 tab*0        Sig: Take 1 tablet (50 mg) by mouth every 6 hours as           needed for severe pain Max 3/day. Fill 3/6 and           start 3/8/22; to last 30 days for chronic pain  Authorizing Provider: MELANIE BENSON    traMADol (ULTRAM-ER) 200 MG 24 hr tablet   30 tab*0        Sig: Take 1 tablet (200 mg) by mouth every 24 hours Fill           3/6/22. Begin 3/8/22 to last 30 days for chronic           pain  Authorizing Provider: MELANIE BENSON    Reviewed [place]  March 4, 2022- no concerning fills.    Melanie Benson APRN, RN CNP, FNP  [facility]  [facility]

## 2022-03-04 NOTE — TELEPHONE ENCOUNTER
Received I-Shaket message from patient requesting refill(s) for    traMADol (ULTRAM-ER) 200 MG 24 hr tablet   traMADol (ULTRAM) 50 MG      Both last dispensed from pharmacy on 2/6/22    Patient's last office/virtual visit by prescribing provider on 2/1/22  Next office/virtual appointment scheduled for none    Last urine drug screen date 4/29/21  Current opioid agreement on file? Yes Date of opioid agreement: 4/29/21    E-prescribe to:    [facility] [address] [place] [address]    Will route to [facility] for review and preparation of prescription(s).

## 2022-03-04 NOTE — TELEPHONE ENCOUNTER
Refills have been requested for the following medications:         traMADol (ULTRAM) 50 MG tablet [RODNEY Vargas CNP]         traMADol (ULTRAM-ER) 200 MG 24 hr tablet [RODNEY Vargas CNP]     Preferred pharmacy: [facility] [address] [place] [address] [address] [place] [address]

## 2022-03-04 NOTE — PATIENT INSTRUCTIONS
DIAGNOSIS:  Encounter Diagnosis   Name Primary?     Bilateral leg paresthesia/pain         PLAN:     MRI Lumbar Spine     Further treatment predicated on findings.          HPI:  About a month ago started having eloise in the hips bilat.  Got a massage that didn't help.  Seemed to get very bad (11/10).  Seeing a chiro for two weeks without improvement.  Right foot and left shin are numb.  Some left foot numbness.  Pain down the back of both legs with standing.  Never had anything like this in the past.  A few years ago had the C5 disk replaced.   No weakness in the legs.  Shoes feel heavier.  No loss of continence of bowel or bladder.     Xray of the lumbar spine was personally read (degen arthritis and degen disc disease.      Current Outpatient Medications   Medication     allopurinol (ZYLOPRIM) 300 MG tablet     amLODIPine (NORVASC) 5 MG tablet     aspirin (ASA) 81 MG chewable tablet     blood glucose monitoring (FREESTYLE) lancets     Cholecalciferol 100 MCG (4000 UT) CAPS     clopidogrel (PLAVIX) 75 MG tablet     divalproex sodium extended-release (DEPAKOTE ER) 500 MG 24 hr tablet     FREESTYLE LITE test strip     levothyroxine (SYNTHROID/LEVOTHROID) 125 MCG tablet     lisinopril (ZESTRIL) 20 MG tablet     metFORMIN (GLUCOPHAGE-XR) 500 MG 24 hr tablet     rosuvastatin (CRESTOR) 5 MG tablet     No current facility-administered medications for this visit.       Pmh: reviewed  Psh: reviewed  Allergy:  reviewed      EXAM:    /71 (BP Location: Left arm, Patient Position: Sitting, Cuff Size: Adult Regular)   Pulse 63   Resp 16   Wt 106.4 kg (234 lb 9.6 oz)   BMI 31.82 kg/m    GEN:   ALERT, NAD, ORIENTED TIMES THREE  LUNGS: CTA  COR: RRR WITHOUT MURMUR  MS: normal strength of the LE with negative SLR bilat  NEURO: normal patellar DTR bilat; absent achilles bilat  EXT: WITHOUT EDEMA/SWELLING    Answers for HPI/ROS submitted by the patient on 3/4/2022  Your back pain is: new  What do you think is the original  PLAN  1. Medications:   1. Continue Tramadol ER 200mg daily, fill 6/27, start 7/1  2. Continue Tramadol 50mg every 6-8 hours as needed, max of 3 tabs/day. Fill 6/27, start 7/1  3. Continue Gabapentin 900mg take 3 times daily.  4. Continue Topamax 50mg twice daily.  5. Continue Robaxin 500-1000mg take 3 times daily as needed.  6. Start to use Aspercreme with 4% lidocaine cream; this is found over-the-counter and may be used according to package instructions for topical pain relief re: left hand pain.  2. Procedures: None at present  3. Send me a [email or web address] message after you follow-up with Dr. Harkins  4. Consider the purchase of a paraffin wax bath from [employer] re: hand pain.   5. Follow-up with me in 8-10 weeks       cause of your back pain?: other  When did you first notice your back pain? : 1-4 weeks ago  How would you describe your back pain? : stabbing  How often do you feel your back pain? : comes and goes  Where is your back pain located? : right lower back, left lower back  Where does your back pain spread? : right buttocks, left buttocks, right thigh, left thigh, right foot, left foot  Since you noticed your back pain, how has it changed? : gradually improving  Does your back pain interfere with your job?: Not applicable  On a scale of 1-10 (10 being the worst), how strong is your back pain?: 9  What makes your back pain worse? : certain positions  Acupuncture:: not tried  Acetaminophen: not tried  Activity or Exercise: helpful  Chiropractor: helpful  Cold: helpful  Heat: helpful  Massage: not tried  Muscle relaxants : not tried  NSAIDS (Ibuprofen, Naproxen) : not tried  Opioids: not tried  Physical Therapy: not tried  Rest: helpful  Steroid Injection: not tried  Stretching : helpful  Surgery: not tried  TENS Unit: not tried  Topical pain relievers : not tried  Do you see any other healthcare providers for your back pain? : Chiropractor  How many servings of fruits and vegetables do you eat daily?: 0-1  On average, how many sweetened beverages do you drink each day (Examples: soda, juice, sweet tea, etc.  Do NOT count diet or artificially sweetened beverages)?: 1  How many minutes a day do you exercise enough to make your heart beat faster?: 9 or less  How many days a week do you exercise enough to make your heart beat faster?: 3 or less  How many days per week do you miss taking your medication?: 0

## 2022-03-04 NOTE — TELEPHONE ENCOUNTER
Medication refill information reviewed.     Due date for traMADol (ULTRAM-ER) 200 MG 24 hr tablet   traMADol (ULTRAM) 50 MG is 3/8/22     Prescriptions prepped for review.     Will route to provider.     Van Oquendo RN  Patient Care Supervisor   [facility] [place]

## 2022-03-11 NOTE — TELEPHONE ENCOUNTER
See the other 3/30/2020 mychart encounter for this refill information.    Estelita Wetzel RN-BSN  [facility]                 No

## 2022-03-15 ENCOUNTER — TELEPHONE (OUTPATIENT)
Dept: FAMILY MEDICINE | Facility: CLINIC | Age: 62
End: 2022-03-15
Payer: COMMERCIAL

## 2022-03-15 NOTE — TELEPHONE ENCOUNTER
Patient Quality Outreach    Patient is due for the following:   Physical  - Due after 3/24/22    NEXT STEPS:   Schedule a yearly physical    Type of outreach:    Sent [employer] message.      Questions for provider review:    None     Shila Olson CMA

## 2022-04-05 ENCOUNTER — MYC REFILL (OUTPATIENT)
Dept: PALLIATIVE MEDICINE | Facility: CLINIC | Age: 62
End: 2022-04-05
Payer: COMMERCIAL

## 2022-04-05 DIAGNOSIS — Z98.1 S/P CERVICAL SPINAL FUSION: ICD-10-CM

## 2022-04-05 DIAGNOSIS — M47.812 CERVICAL SPONDYLOSIS WITHOUT MYELOPATHY: ICD-10-CM

## 2022-04-05 DIAGNOSIS — M48.02 CERVICAL STENOSIS OF SPINAL CANAL: ICD-10-CM

## 2022-04-05 DIAGNOSIS — M79.18 MYOFASCIAL PAIN: ICD-10-CM

## 2022-04-05 DIAGNOSIS — F11.90 CHRONIC, CONTINUOUS USE OF OPIOIDS: ICD-10-CM

## 2022-04-05 DIAGNOSIS — M50.30 DDD (DEGENERATIVE DISC DISEASE), CERVICAL: ICD-10-CM

## 2022-04-05 NOTE — TELEPHONE ENCOUNTER
Refills have been requested for the following medications:         traMADol (ULTRAM) 50 MG tablet [RODNEY Vargas CNP]         traMADol (ULTRAM-ER) 200 MG 24 hr tablet [RODNEY Vargas CNP]     Preferred pharmacy: [facility] [address] [place] [address] [address] [place] [address]

## 2022-04-05 NOTE — TELEPHONE ENCOUNTER
Received call from patient requesting refill(s) of      traMADol (ULTRAM) 50 MG tablet   Last dispensed from pharmacy on 03/07/22    traMADol (ULTRAM-ER) 200 MG 24 hr tablet   Last dispensed from pharmacy on 03/07/22    Patient's last office/virtual visit by prescribing provider on 02/01/22  Next office/virtual appointment scheduled for 04/13/22    Last urine drug screen date 04/29/21  Current opioid agreement on file (completed within the last year) Yes Date of opioid agreement: 04/29/21    E-prescribe to   [address] [address]  Phone: [phone] Fax: [phone]    Will route to nursing [place] for review and preparation of prescription(s).       Lashonda Lyles MA  [facility] [place]

## 2022-04-06 RX ORDER — TRAMADOL HYDROCHLORIDE 200 MG/1
200 TABLET, EXTENDED RELEASE ORAL EVERY 24 HOURS
Qty: 30 TABLET | Refills: 0 | Status: SHIPPED | OUTPATIENT
Start: 2022-04-06 | End: 2022-04-13

## 2022-04-06 RX ORDER — TRAMADOL HYDROCHLORIDE 50 MG/1
50 TABLET ORAL EVERY 6 HOURS PRN
Qty: 90 TABLET | Refills: 0 | Status: SHIPPED | OUTPATIENT
Start: 2022-04-06 | End: 2022-04-13

## 2022-04-06 NOTE — TELEPHONE ENCOUNTER
Signed Prescriptions:                        Disp   Refills    traMADol (ULTRAM) 50 MG tablet             90 tab*0        Sig: Take 1 tablet (50 mg) by mouth every 6 hours as           needed for severe pain Max 3/day. Fill 4/6/22 and           start 4/7/22; to last 30 days for chronic pain  Authorizing Provider: MELANIE BENSON    traMADol (ULTRAM-ER) 200 MG 24 hr tablet   30 tab*0        Sig: Take 1 tablet (200 mg) by mouth every 24 hours Fill           4/6/22. Begin 4/7/22 to last 30 days for chronic           pain  Authorizing Provider: MELANIE BENSON    Reviewed [place]  April 6, 2022- no concerning fills.    Melanie Benson APRN, RN CNP, FNP  [facility]  [facility]

## 2022-04-06 NOTE — TELEPHONE ENCOUNTER
[employer] message sent with Rx approval from provider.  [facility] Clinic Management Team

## 2022-04-06 NOTE — TELEPHONE ENCOUNTER
Medication refill information reviewed.     Due date for traMADol (ULTRAM) 50 MG tablet &  traMADol (ULTRAM-ER) 200 MG 24 hr tablet   Are both 4/7/22     Prescriptions prepped for review.     Will route to provider.       Emani Sadler RN, BSN, CMSRN  RN Care Coordinator  [facility]

## 2022-04-13 ENCOUNTER — MYC MEDICAL ADVICE (OUTPATIENT)
Dept: PALLIATIVE MEDICINE | Facility: CLINIC | Age: 62
End: 2022-04-13

## 2022-04-13 ENCOUNTER — OFFICE VISIT (OUTPATIENT)
Dept: PALLIATIVE MEDICINE | Facility: CLINIC | Age: 62
End: 2022-04-13
Payer: COMMERCIAL

## 2022-04-13 VITALS — OXYGEN SATURATION: 94 % | SYSTOLIC BLOOD PRESSURE: 138 MMHG | DIASTOLIC BLOOD PRESSURE: 82 MMHG | HEART RATE: 63 BPM

## 2022-04-13 DIAGNOSIS — M54.16 LUMBAR RADICULAR PAIN: Primary | ICD-10-CM

## 2022-04-13 DIAGNOSIS — M54.2 CHRONIC NECK PAIN: ICD-10-CM

## 2022-04-13 DIAGNOSIS — G89.29 CHRONIC NECK PAIN: ICD-10-CM

## 2022-04-13 DIAGNOSIS — Z79.891 ENCOUNTER FOR LONG-TERM USE OF OPIATE ANALGESIC: ICD-10-CM

## 2022-04-13 DIAGNOSIS — M47.812 CERVICAL SPONDYLOSIS WITHOUT MYELOPATHY: ICD-10-CM

## 2022-04-13 DIAGNOSIS — M50.30 DDD (DEGENERATIVE DISC DISEASE), CERVICAL: ICD-10-CM

## 2022-04-13 DIAGNOSIS — M51.369 DDD (DEGENERATIVE DISC DISEASE), LUMBAR: ICD-10-CM

## 2022-04-13 DIAGNOSIS — Z98.1 S/P CERVICAL SPINAL FUSION: ICD-10-CM

## 2022-04-13 DIAGNOSIS — M79.18 MYOFASCIAL PAIN: ICD-10-CM

## 2022-04-13 DIAGNOSIS — M48.02 CERVICAL STENOSIS OF SPINAL CANAL: ICD-10-CM

## 2022-04-13 DIAGNOSIS — F11.90 CHRONIC, CONTINUOUS USE OF OPIOIDS: ICD-10-CM

## 2022-04-13 PROCEDURE — 99215 OFFICE O/P EST HI 40 MIN: CPT | Performed by: NURSE PRACTITIONER

## 2022-04-13 RX ORDER — TRAMADOL HYDROCHLORIDE 50 MG/1
50 TABLET ORAL EVERY 6 HOURS PRN
Qty: 90 TABLET | Refills: 0 | Status: SHIPPED | OUTPATIENT
Start: 2022-04-13 | End: 2022-06-01

## 2022-04-13 RX ORDER — METHOCARBAMOL 500 MG/1
500 TABLET, FILM COATED ORAL 3 TIMES DAILY PRN
Qty: 90 TABLET | Refills: 2 | Status: SHIPPED | OUTPATIENT
Start: 2022-04-13 | End: 2022-09-07

## 2022-04-13 RX ORDER — TRAMADOL HYDROCHLORIDE 200 MG/1
200 TABLET, EXTENDED RELEASE ORAL EVERY 24 HOURS
Qty: 30 TABLET | Refills: 0 | Status: SHIPPED | OUTPATIENT
Start: 2022-04-13 | End: 2022-06-01

## 2022-04-13 RX ORDER — CYCLOBENZAPRINE HCL 5 MG
5-10 TABLET ORAL
Qty: 45 TABLET | Refills: 2 | Status: SHIPPED | OUTPATIENT
Start: 2022-04-13 | End: 2022-09-07

## 2022-04-13 ASSESSMENT — PAIN SCALES - GENERAL: PAINLEVEL: SEVERE PAIN (6)

## 2022-04-13 NOTE — NURSING NOTE
PEG Score 2/1/2022 4/13/2022   PEG Total Score 5.33 6.33     Linda Logan MA  [facility] [place]

## 2022-04-13 NOTE — PATIENT INSTRUCTIONS
Plan:   Physical Therapy: not at present  Clinical Health Psychologist: none  Diagnostic Studies: none  Medication Management:   Continue tramadol ER 200mg once daily  Continue tramadol 50mg Q 6-8 hours PRN, max of 3/day.  Continue gabapentin 900 mg TID  Continue Topamax 50 mg BID  Continue methocarbamol 500-1000 mg TID PRN muscle spasms  Continue cyclobenzaprine at bedtime  Further procedures recommended: schedule lumbar epidural steroid injection, our office to contact you.   Urine drug screen today, last took Tramadol ER last night and Tramadol IR this morning  Re-sign controlled substance agreement  Recommend you see Primary Care Provider or Sports Med re: likely right index mallet finger. Splint for now (popcicle sticks and tape work well)  Recommendations to PCP: see above  Follow up: 8-12 weeks video or in-person visit, your choice. Please call [phone] to make your follow-up appointment with me.     ----------------------------------------------------------------  Clinic Number:  [phone]   Call with any questions about your care and for scheduling assistance.   Calls are returned Monday through Friday between 8 AM and 4:30 PM. We usually get back to you within 2 business days depending on the issue/request.    If we are prescribing your medications:  For opioid medication refills, call the clinic or send a [employer] message 7 days in advance.  Please include:  Name of requested medication  Name of the pharmacy.  For non-opioid medications, call your pharmacy directly to request a refill. Please allow 3-4 days to be processed.   Per [place] State Law:  All controlled substance prescriptions must be filled within 30 days of being written.    For those controlled substances allowing refills, pickup must occur within 30 days of last fill.      We believe regular attendance is key to your success in our program!    Any time you are unable to keep your appointment we ask that you call us at least 24 hours in  advance to cancel.This will allow us to offer the appointment time to another patient.   Multiple missed appointments may lead to dismissal from the clinic.
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
## 2022-04-14 ENCOUNTER — TELEPHONE (OUTPATIENT)
Dept: PALLIATIVE MEDICINE | Facility: CLINIC | Age: 62
End: 2022-04-14
Payer: COMMERCIAL

## 2022-04-14 ENCOUNTER — MYC MEDICAL ADVICE (OUTPATIENT)
Dept: PALLIATIVE MEDICINE | Facility: CLINIC | Age: 62
End: 2022-04-14
Payer: COMMERCIAL

## 2022-04-14 NOTE — TELEPHONE ENCOUNTER
See the 4/14/22 mychart encounter for more information.    Estelita Wetzel RN-BSN  [facility]

## 2022-04-14 NOTE — TELEPHONE ENCOUNTER
Per patient myChart message:  From: Palmer Suero      Created: 4/13/2022 4:54 PM      Melanie- I  apologize. I was talking with Mya ([facility]) as we were walking out and totally forgot about the urine sample until I got home. I can reschedule when necessary. I am sorry, my head was somewhere else. I can drive out again tomorrow after work.  Or whenever       And  (Brought over from the 4/14/22 encounter):  From: Palmer Suero      Created: 4/14/2022 3:00 PM      Melanie- follow up to yesterdays message. I have tomorrow (Friday) off. I would be available to visit lab for urine sample if that works at all. Not sure if need to make appt or how that works. Again sorry for mix-up  Palmer      _______________________________    Per Melanie Thomas, RODNEY CNP's 4/13/22 AVS:  5. Urine drug screen today, last took Tramadol ER last night and Tramadol IR this morning  _________________________________    Mychart sent to pt:  From: Estelita Wetzel RN      Created: 4/14/2022 3:27 PM      Xavier Chakraborty,  I have made you a lab appointment tomorrow in [place].  I have it scheduled for 1100.  I hope that works.  If not just let me know a time and I can change it for you.  Anytime tomorrow between 0830 and 315pm. Let me know.        Estelita RN-BSN  [facility]

## 2022-04-15 ENCOUNTER — LAB (OUTPATIENT)
Dept: LAB | Facility: CLINIC | Age: 62
End: 2022-04-15
Payer: COMMERCIAL

## 2022-04-15 DIAGNOSIS — F11.90 CHRONIC, CONTINUOUS USE OF OPIOIDS: ICD-10-CM

## 2022-04-15 DIAGNOSIS — Z79.891 ENCOUNTER FOR LONG-TERM USE OF OPIATE ANALGESIC: ICD-10-CM

## 2022-04-15 LAB
CREAT UR-MCNC: 91 MG/DL
ETHANOL UR QL SCN: NORMAL

## 2022-04-15 PROCEDURE — 99000 SPECIMEN HANDLING OFFICE-LAB: CPT

## 2022-04-15 PROCEDURE — 80307 DRUG TEST PRSMV CHEM ANLYZR: CPT

## 2022-04-15 PROCEDURE — 80320 DRUG SCREEN QUANTALCOHOLS: CPT

## 2022-04-15 PROCEDURE — 80307 DRUG TEST PRSMV CHEM ANLYZR: CPT | Mod: 90

## 2022-04-15 NOTE — TELEPHONE ENCOUNTER
LM on vm to get updated WC info. Please put info in notes      Sumi Blankenship    [facility] [place]

## 2022-04-15 NOTE — TELEPHONE ENCOUNTER
Information noted   Melanie STEVENS, RN CNP, FNP  [facility]

## 2022-04-15 NOTE — TELEPHONE ENCOUNTER
[employer]  Claim #[id number]  [address] [place]  Fax [phone]  DOI 6/1/2018    Marleny DOCKERY    [facility]

## 2022-04-16 LAB — ETHYL GLUCURONIDE UR QL SCN: NEGATIVE NG/ML

## 2022-04-19 NOTE — TELEPHONE ENCOUNTER
Faxed referral and supporting documentation for  lumbar epidural steroid injection, patient with right sided radicular symptoms in L3 distribution  to W/C .        Sumi Blankenship    [facility] [place]

## 2022-04-20 LAB
GABAPENTIN UR QL CFM: PRESENT
N-NORTRAMADOL/CREAT UR CFM: ABNORMAL NG/MG{CREAT}
O-NORTRAMADOL UR CFM-MCNC: ABNORMAL NG/ML
TRAMADOL CTO UR CFM-MCNC: ABNORMAL NG/ML
TRAMADOL/CREAT UR: ABNORMAL

## 2022-04-22 NOTE — TELEPHONE ENCOUNTER
Received a fax from [employer] ([employer]) in response to clinic's request.    Forwarded fax to Hernandez Britton  Patient Representative  [facility] [place]

## 2022-04-27 NOTE — TELEPHONE ENCOUNTER
Received approval for Lumbar DEMARCUS lvm to schedule.      Sumi Blankenship    [facility] [place]

## 2022-05-06 ASSESSMENT — ENCOUNTER SYMPTOMS
PALPITATIONS: 0
NERVOUS/ANXIOUS: 1
ARTHRALGIAS: 1
MYALGIAS: 1
HEMATOCHEZIA: 0
SORE THROAT: 0
WEAKNESS: 0
FEVER: 0
DYSURIA: 0
EYE PAIN: 0
SHORTNESS OF BREATH: 0
JOINT SWELLING: 0
HEMATURIA: 0
DIARRHEA: 0
PARESTHESIAS: 0
NAUSEA: 0
ABDOMINAL PAIN: 0
HEARTBURN: 1
HEADACHES: 1
FREQUENCY: 1
COUGH: 0
CONSTIPATION: 0
CHILLS: 0
DIZZINESS: 0

## 2022-05-11 ENCOUNTER — OFFICE VISIT (OUTPATIENT)
Dept: FAMILY MEDICINE | Facility: CLINIC | Age: 62
End: 2022-05-11
Payer: COMMERCIAL

## 2022-05-11 VITALS
SYSTOLIC BLOOD PRESSURE: 128 MMHG | RESPIRATION RATE: 16 BRPM | WEIGHT: 164 LBS | TEMPERATURE: 98 F | HEART RATE: 68 BPM | OXYGEN SATURATION: 98 % | DIASTOLIC BLOOD PRESSURE: 76 MMHG | BODY MASS INDEX: 26.36 KG/M2 | HEIGHT: 66 IN

## 2022-05-11 DIAGNOSIS — Z00.00 ROUTINE GENERAL MEDICAL EXAMINATION AT A HEALTH CARE FACILITY: Primary | ICD-10-CM

## 2022-05-11 DIAGNOSIS — Z72.0 TOBACCO ABUSE: ICD-10-CM

## 2022-05-11 DIAGNOSIS — E78.00 HIGH CHOLESTEROL: ICD-10-CM

## 2022-05-11 DIAGNOSIS — Z12.5 SCREENING FOR PROSTATE CANCER: ICD-10-CM

## 2022-05-11 DIAGNOSIS — Z87.891 HISTORY OF SMOKING: ICD-10-CM

## 2022-05-11 DIAGNOSIS — I73.9 PAD (PERIPHERAL ARTERY DISEASE) (H): ICD-10-CM

## 2022-05-11 DIAGNOSIS — N18.2 CKD (CHRONIC KIDNEY DISEASE) STAGE 2, GFR 60-89 ML/MIN: ICD-10-CM

## 2022-05-11 DIAGNOSIS — I77.810 ASCENDING AORTA DILATATION (H): ICD-10-CM

## 2022-05-11 DIAGNOSIS — I25.10 MILD CAD: ICD-10-CM

## 2022-05-11 DIAGNOSIS — Z12.2 ENCOUNTER FOR SCREENING FOR LUNG CANCER: ICD-10-CM

## 2022-05-11 LAB
BASOPHILS # BLD AUTO: 0 10E3/UL (ref 0–0.2)
BASOPHILS NFR BLD AUTO: 0 %
CREAT UR-MCNC: 66 MG/DL
EOSINOPHIL # BLD AUTO: 0.3 10E3/UL (ref 0–0.7)
EOSINOPHIL NFR BLD AUTO: 4 %
ERYTHROCYTE [DISTWIDTH] IN BLOOD BY AUTOMATED COUNT: 13.5 % (ref 10–15)
HCT VFR BLD AUTO: 42.7 % (ref 40–53)
HGB BLD-MCNC: 14.3 G/DL (ref 13.3–17.7)
LYMPHOCYTES # BLD AUTO: 3.1 10E3/UL (ref 0.8–5.3)
LYMPHOCYTES NFR BLD AUTO: 45 %
MCH RBC QN AUTO: 30.8 PG (ref 26.5–33)
MCHC RBC AUTO-ENTMCNC: 33.5 G/DL (ref 31.5–36.5)
MCV RBC AUTO: 92 FL (ref 78–100)
MICROALBUMIN UR-MCNC: <5 MG/L
MICROALBUMIN/CREAT UR: NORMAL MG/G{CREAT}
MONOCYTES # BLD AUTO: 0.6 10E3/UL (ref 0–1.3)
MONOCYTES NFR BLD AUTO: 9 %
NEUTROPHILS # BLD AUTO: 2.9 10E3/UL (ref 1.6–8.3)
NEUTROPHILS NFR BLD AUTO: 42 %
PLATELET # BLD AUTO: 239 10E3/UL (ref 150–450)
RBC # BLD AUTO: 4.64 10E6/UL (ref 4.4–5.9)
WBC # BLD AUTO: 6.8 10E3/UL (ref 4–11)

## 2022-05-11 PROCEDURE — 99214 OFFICE O/P EST MOD 30 MIN: CPT | Mod: 25 | Performed by: FAMILY MEDICINE

## 2022-05-11 PROCEDURE — 82043 UR ALBUMIN QUANTITATIVE: CPT | Performed by: FAMILY MEDICINE

## 2022-05-11 PROCEDURE — 80061 LIPID PANEL: CPT | Performed by: FAMILY MEDICINE

## 2022-05-11 PROCEDURE — 99396 PREV VISIT EST AGE 40-64: CPT | Mod: 25 | Performed by: FAMILY MEDICINE

## 2022-05-11 PROCEDURE — 90732 PPSV23 VACC 2 YRS+ SUBQ/IM: CPT | Performed by: FAMILY MEDICINE

## 2022-05-11 PROCEDURE — 90471 IMMUNIZATION ADMIN: CPT | Performed by: FAMILY MEDICINE

## 2022-05-11 PROCEDURE — 85025 COMPLETE CBC W/AUTO DIFF WBC: CPT | Performed by: FAMILY MEDICINE

## 2022-05-11 PROCEDURE — 80053 COMPREHEN METABOLIC PANEL: CPT | Performed by: FAMILY MEDICINE

## 2022-05-11 PROCEDURE — G0103 PSA SCREENING: HCPCS | Performed by: FAMILY MEDICINE

## 2022-05-11 PROCEDURE — 36415 COLL VENOUS BLD VENIPUNCTURE: CPT | Performed by: FAMILY MEDICINE

## 2022-05-11 RX ORDER — METOPROLOL SUCCINATE 25 MG/1
25 TABLET, EXTENDED RELEASE ORAL DAILY
Qty: 90 TABLET | Refills: 1 | Status: SHIPPED | OUTPATIENT
Start: 2022-05-11 | End: 2023-04-28

## 2022-05-11 RX ORDER — METOPROLOL SUCCINATE 25 MG/1
25 TABLET, EXTENDED RELEASE ORAL DAILY
Qty: 90 TABLET | Refills: 1 | Status: SHIPPED | OUTPATIENT
Start: 2022-05-11 | End: 2022-05-11

## 2022-05-11 RX ORDER — BUPROPION HYDROCHLORIDE 150 MG/1
150 TABLET, EXTENDED RELEASE ORAL 2 TIMES DAILY
Qty: 180 TABLET | Refills: 0 | Status: CANCELLED | OUTPATIENT
Start: 2022-05-11

## 2022-05-11 RX ORDER — ATORVASTATIN CALCIUM 20 MG/1
20 TABLET, FILM COATED ORAL DAILY
Qty: 90 TABLET | Refills: 1 | Status: SHIPPED | OUTPATIENT
Start: 2022-05-11 | End: 2022-11-03

## 2022-05-11 ASSESSMENT — ENCOUNTER SYMPTOMS
PALPITATIONS: 0
COUGH: 0
HEMATOCHEZIA: 0
CONSTIPATION: 0
JOINT SWELLING: 0
FEVER: 0
DYSURIA: 0
CHILLS: 0
DIARRHEA: 0
FREQUENCY: 1
HEARTBURN: 1
EYE PAIN: 0
HEMATURIA: 0
ABDOMINAL PAIN: 0
DIZZINESS: 0
NAUSEA: 0
NERVOUS/ANXIOUS: 1
HEADACHES: 1
ARTHRALGIAS: 1
WEAKNESS: 0
SORE THROAT: 0
SHORTNESS OF BREATH: 0
MYALGIAS: 1
PARESTHESIAS: 0

## 2022-05-11 ASSESSMENT — PAIN SCALES - GENERAL: PAINLEVEL: NO PAIN (0)

## 2022-05-11 NOTE — PROGRESS NOTES
SUBJECTIVE:   CC: Truman Suero is an 61 year old male who presents for preventative health visit.       Patient has been advised of split billing requirements and indicates understanding: Yes  Healthy Habits:     Getting at least 3 servings of Calcium per day:  Yes    Bi-annual eye exam:  NO    Dental care twice a year:  Yes    Sleep apnea or symptoms of sleep apnea:  None    Diet:  Regular (no restrictions)    Frequency of exercise:  1 day/week    Taking medications regularly:  Yes    Medication side effects:  None    PHQ-2 Total Score: 2    Additional concerns today:  No    CAD-on statin and he is not on asa, no MI history , cad on CT, no sx, medical management advised    History of spinal pain-sees pain clinic, has had surgeries, doing well    He is a cook at [employer]    Not current smoker but smoked for years    gerd-off and on but not recently    History of aortic aneurysm, advised close follow up     Hyperlipidemia Follow-Up      Are you regularly taking any medication or supplement to lower your cholesterol?   Yes- statin therapy    Are you having muscle aches or other side effects that you think could be caused by your cholesterol lowering medication?  No    Hypertension Follow-up      Do you check your blood pressure regularly outside of the clinic? No ,does not have a machine. Will use pharmacy at times.     Are you following a low salt diet? Yes    Are your blood pressures ever more than 140 on the top number (systolic) OR more   than 90 on the bottom number (diastolic), for example 140/90? No      Today's PHQ-2 Score:   PHQ-2 ( 1999 [employer]) 5/6/2022   Q1: Little interest or pleasure in doing things 1   Q2: Feeling down, depressed or hopeless 1   PHQ-2 Score 2   PHQ-2 Total Score (12-17 Years)- Positive if 3 or more points; Administer PHQ-A if positive -   Q1: Little interest or pleasure in doing things Several days   Q2: Feeling down, depressed or hopeless Several days   PHQ-2  Score 2       Abuse: Current or Past(Physical, Sexual or Emotional)- No  Do you feel safe in your environment? Yes        Social History     Tobacco Use     Smoking status: Former Smoker     Packs/day: 0.50     Years: 38.00     Pack years: 19.00     Types: Cigarettes     Start date: 1979     Quit date: 3/20/2018     Years since quittin.1     Smokeless tobacco: Former User   Substance Use Topics     Alcohol use: Not Currently     Alcohol/week: 0.0 standard drinks     Comment: Quit drinking 10 years ago         Alcohol Use 2022   Prescreen: >3 drinks/day or >7 drinks/week? Not Applicable   Prescreen: >3 drinks/day or >7 drinks/week? -       Last PSA:   PSA   Date Value Ref Range Status   2021 0.91 0 - 4 ug/L Final     Comment:     Assay Method:  Chemiluminescence using Siemens Vista analyzer       Reviewed orders with patient. Reviewed health maintenance and updated orders accordingly - Yes  BP Readings from Last 3 Encounters:   22 128/76   22 138/82   10/19/21 108/73    Wt Readings from Last 3 Encounters:   22 74.4 kg (164 lb)   21 70 kg (154 lb 4 oz)   21 74.8 kg (165 lb)                    Reviewed and updated as needed this visit by clinical staff   Tobacco  Allergies    Med Hx  Surg Hx  Fam Hx  Soc Hx          Reviewed and updated as needed this visit by Provider                   Past Medical History:   Diagnosis Date     Ascending aorta dilatation (H) 2018     Cervical spondylosis without myelopathy 10/3/2017     Chronic bilateral low back pain with right-sided sciatica 2018     Hypertension      Mild CAD 2018     Neck pain     MRI positive on cervical vertebrea.     PAD (peripheral artery disease) (H) 2018     Tobacco abuse 2017      Past Surgical History:   Procedure Laterality Date     COLONOSCOPY       DISCECTOMY, FUSION CERVICAL ANTERIOR ONE LEVEL, COMBINED Right 10/29/2018    Procedure: Right Anterior Cervical 5-6 Discectomy And  "Fusion;  Surgeon: Jud Harkins MD;  Location: [facility] OR     HEAD & NECK SURGERY  [id number]    Follow-up to cervical surgery 10-29-18     OB History   No obstetric history on file.       Review of Systems   Constitutional: Negative for chills and fever.   HENT: Negative for congestion, ear pain, hearing loss and sore throat.    Eyes: Negative for pain and visual disturbance.   Respiratory: Negative for cough and shortness of breath.    Cardiovascular: Negative for chest pain, palpitations and peripheral edema.   Gastrointestinal: Positive for heartburn. Negative for abdominal pain, constipation, diarrhea, hematochezia and nausea.   Genitourinary: Positive for frequency and impotence. Negative for dysuria, genital sores, hematuria, penile discharge and urgency.   Musculoskeletal: Positive for arthralgias and myalgias. Negative for joint swelling.   Skin: Negative for rash.   Neurological: Positive for headaches. Negative for dizziness, weakness and paresthesias.   Psychiatric/Behavioral: Negative for mood changes. The patient is nervous/anxious.      CONSTITUTIONAL: NEGATIVE for fever, chills, change in weight  INTEGUMENTARY/SKIN: NEGATIVE for worrisome rashes, moles or lesions  EYES: NEGATIVE for vision changes or irritation  ENT: NEGATIVE for ear, mouth and throat problems  RESP: NEGATIVE for significant cough or SOB  CV: NEGATIVE for chest pain, palpitations or peripheral edema  GI: NEGATIVE for nausea, abdominal pain, heartburn, or change in bowel habits   male: negative for dysuria, hematuria, decreased urinary stream, erectile dysfunction, urethral discharge  MUSCULOSKELETAL: NEGATIVE for significant arthralgias or myalgia  NEURO: NEGATIVE for weakness, dizziness or paresthesias  PSYCHIATRIC: NEGATIVE for changes in mood or affect    OBJECTIVE:   /76   Pulse 68   Temp 98  F (36.7  C) (Oral)   Resp 16   Ht 1.676 m (5' 6\")   Wt 74.4 kg (164 lb)   SpO2 98%   BMI 26.47 kg/m      Physical " Exam  GENERAL: healthy, alert and no distress  EYES: Eyes grossly normal to inspection, PERRL and conjunctivae and sclerae normal  HENT: ear canals and TM's normal, nose and mouth without ulcers or lesions  NECK: no adenopathy, no asymmetry, masses, or scars and thyroid normal to palpation  RESP: lungs clear to auscultation - no rales, rhonchi or wheezes  CV: regular rate and rhythm, normal S1 S2, no S3 or S4, no murmur, click or rub, no peripheral edema and peripheral pulses strong  ABDOMEN: soft, nontender, no hepatosplenomegaly, no masses and bowel sounds normal  MS: no gross musculoskeletal defects noted, no edema  SKIN: no suspicious lesions or rashes  NEURO: Normal strength and tone, mentation intact and speech normal  PSYCH: mentation appears normal, affect normal/bright    Diagnostic Test Results:  Labs reviewed in Epic    ASSESSMENT/PLAN:       ICD-10-CM    1. Routine general medical examination at a health care facility  Z00.00    2. Mild CAD  I25.10 atorvastatin (LIPITOR) 20 MG tablet     metoprolol succinate ER (TOPROL XL) 25 MG 24 hr tablet     Lipid panel reflex to direct LDL Fasting     Comprehensive metabolic panel (BMP + Alb, Alk Phos, ALT, AST, Total. Bili, TP)     CBC with platelets and differential     aspirin (ASA) 81 MG EC tablet     Lipid panel reflex to direct LDL Fasting     Comprehensive metabolic panel (BMP + Alb, Alk Phos, ALT, AST, Total. Bili, TP)     CBC with platelets and differential     OFFICE/OUTPT VISIT,EST,LEVL IV   3. Ascending aorta dilatation (H)  I77.810 metoprolol succinate ER (TOPROL XL) 25 MG 24 hr tablet     CT Chest Lung Cancer Scrn Low Dose wo     OFFICE/OUTPT VISIT,EST,LEVL IV   4. Tobacco abuse  Z72.0 US Abdominal Aorta Imaging   5. CKD (chronic kidney disease) stage 2, GFR 60-89 ml/min  N18.2 Albumin Random Urine Quantitative with Creat Ratio     Albumin Random Urine Quantitative with Creat Ratio     OFFICE/OUTPT VISIT,EST,LEVL IV   6. PAD (peripheral artery disease)  "(H)  I73.9 CT Chest Lung Cancer Scrn Low Dose wo     US Abdominal Aorta Imaging     OFFICE/OUTPT VISIT,EST,LEVL IV   7. History of smoking  Z87.891 OFFICE/OUTPT VISIT,EST,LEVL IV   8. Encounter for screening for lung cancer  Z12.2 CT Chest Lung Cancer Scrn Low Dose wo     US Abdominal Aorta Imaging   9. Screening for prostate cancer  Z12.5 PSA, screen     PSA, screen   10. High cholesterol  E78.00 Comprehensive metabolic panel (BMP + Alb, Alk Phos, ALT, AST, Total. Bili, TP)     Comprehensive metabolic panel (BMP + Alb, Alk Phos, ALT, AST, Total. Bili, TP)     OFFICE/OUTPT VISIT,EST,LEVL IV     Previous smoker-quit few years ago, not on meds now  CAD/PAD-statin, asa, no symptoms  High cholesterol -on statin, cont med  ckd-stable, cont med, recheck in 6 months    Ascending aorta dilation-stable,receck this year with US    Chronic pain-sees pain clinic , stable  Patient has been advised of split billing requirements and indicates understanding: Yes    COUNSELING:   Reviewed preventive health counseling, as reflected in patient instructions    Estimated body mass index is 26.47 kg/m  as calculated from the following:    Height as of this encounter: 1.676 m (5' 6\").    Weight as of this encounter: 74.4 kg (164 lb).     Weight management plan: Discussed healthy diet and exercise guidelines    He reports that he quit smoking about 4 years ago. His smoking use included cigarettes. He started smoking about 43 years ago. He has a 19.00 pack-year smoking history. He has quit using smokeless tobacco.      Counseling Resources:  ATP IV Guidelines  Pooled Cohorts Equation Calculator  FRAX Risk Assessment  ICSI Preventive Guidelines  Dietary Guidelines for Americans, 2010  USDA's MyPlate  ASA Prophylaxis  Lung CA Screening    DO NIC [facility]  "

## 2022-05-11 NOTE — PATIENT INSTRUCTIONS
If hearburn is persisting advised EGD    Add asprin as discussed    Get CT and ultrasound done as discussed  Follow up in 6 months  Humberto Trinidad D.O.      Imaging Center  MRI - CT - Ultrasound  Scheduling Line [phone]    Patient Education     Preventive Health Recommendations  Male Ages 50 - 64    Yearly exam:             See your health care provider every year in order to  o   Review health changes.   o   Discuss preventive care.    o   Review your medicines if your doctor has prescribed any.   Have a cholesterol test every 5 years, or more frequently if you are at risk for high cholesterol/heart disease.   Have a diabetes test (fasting glucose) every three years. If you are at risk for diabetes, you should have this test more often.   Have a colonoscopy at age 50, or have a yearly FIT test (stool test). These exams will check for colon cancer.    Talk with your health care provider about whether or not a prostate cancer screening test (PSA) is right for you.  You should be tested each year for STDs (sexually transmitted diseases), if you re at risk.     Shots: Get a flu shot each year. Get a tetanus shot every 10 years.     Nutrition:  Eat at least 5 servings of fruits and vegetables daily.   Eat whole-grain bread, whole-wheat pasta and brown rice instead of white grains and rice.   Get adequate Calcium and Vitamin D.     Lifestyle  Exercise for at least 150 minutes a week (30 minutes a day, 5 days a week). This will help you control your weight and prevent disease.   Limit alcohol to one drink per day.   No smoking.   Wear sunscreen to prevent skin cancer.   See your dentist every six months for an exam and cleaning.   See your eye doctor every 1 to 2 years.

## 2022-05-12 LAB
ALBUMIN SERPL-MCNC: 3.8 G/DL (ref 3.4–5)
ALP SERPL-CCNC: 60 U/L (ref 40–150)
ALT SERPL W P-5'-P-CCNC: 36 U/L (ref 0–70)
ANION GAP SERPL CALCULATED.3IONS-SCNC: 6 MMOL/L (ref 3–14)
AST SERPL W P-5'-P-CCNC: 19 U/L (ref 0–45)
BILIRUB SERPL-MCNC: 0.4 MG/DL (ref 0.2–1.3)
BUN SERPL-MCNC: 19 MG/DL (ref 7–30)
CALCIUM SERPL-MCNC: 9.2 MG/DL (ref 8.5–10.1)
CHLORIDE BLD-SCNC: 112 MMOL/L (ref 94–109)
CHOLEST SERPL-MCNC: 148 MG/DL
CO2 SERPL-SCNC: 23 MMOL/L (ref 20–32)
CREAT SERPL-MCNC: 1.11 MG/DL (ref 0.66–1.25)
FASTING STATUS PATIENT QL REPORTED: NO
GFR SERPL CREATININE-BSD FRML MDRD: 76 ML/MIN/1.73M2
GLUCOSE BLD-MCNC: 109 MG/DL (ref 70–99)
HDLC SERPL-MCNC: 59 MG/DL
LDLC SERPL CALC-MCNC: 68 MG/DL
NONHDLC SERPL-MCNC: 89 MG/DL
POTASSIUM BLD-SCNC: 4 MMOL/L (ref 3.4–5.3)
PROT SERPL-MCNC: 7 G/DL (ref 6.8–8.8)
PSA SERPL-MCNC: 0.69 UG/L (ref 0–4)
SODIUM SERPL-SCNC: 141 MMOL/L (ref 133–144)
TRIGL SERPL-MCNC: 103 MG/DL

## 2022-05-17 ENCOUNTER — RADIOLOGY INJECTION OFFICE VISIT (OUTPATIENT)
Dept: PALLIATIVE MEDICINE | Facility: CLINIC | Age: 62
End: 2022-05-17
Attending: NURSE PRACTITIONER
Payer: OTHER MISCELLANEOUS

## 2022-05-17 VITALS — SYSTOLIC BLOOD PRESSURE: 132 MMHG | DIASTOLIC BLOOD PRESSURE: 86 MMHG | HEART RATE: 56 BPM | OXYGEN SATURATION: 97 %

## 2022-05-17 DIAGNOSIS — M54.16 LUMBAR RADICULAR PAIN: ICD-10-CM

## 2022-05-17 DIAGNOSIS — M54.16 LUMBAR RADICULOPATHY: ICD-10-CM

## 2022-05-17 DIAGNOSIS — M51.369 DDD (DEGENERATIVE DISC DISEASE), LUMBAR: ICD-10-CM

## 2022-05-17 PROCEDURE — 62323 NJX INTERLAMINAR LMBR/SAC: CPT | Performed by: PAIN MEDICINE

## 2022-05-17 RX ADMIN — TRIAMCINOLONE ACETONIDE 40 MG: 40 INJECTION, SUSPENSION INTRA-ARTICULAR; INTRAMUSCULAR at 11:25

## 2022-05-17 ASSESSMENT — PAIN SCALES - GENERAL: PAINLEVEL: SEVERE PAIN (6)

## 2022-05-17 NOTE — NURSING NOTE
Discharge Information    IV Discontiued Time:  NA    Amount of Fluid Infused:  NA    Discharge Criteria = When patient returns to baseline or as per MD order    Consciousness:  Pt is fully awake    Circulation:  BP +/- 20% of pre-procedure level    Respiration:  Patient is able to breathe deeply    O2 Sat:  Patient is able to maintain O2 Sat >92% on room air    Activity:  Moves 4 extremities on command    Ambulation:  Patient is able to stand and walk or stand and pivot into wheelchair    Dressing:  Clean/dry or No Dressing    Notes:   Discharge instructions and AVS given to patient    Patient meets criteria for discharge?  YES    Admitted to PCU?  No    Responsible adult present to accompany patient home?  Yes    Signature/Title:    leslie peter RN  RN Care Coordinator  [facility] [place]

## 2022-05-17 NOTE — NURSING NOTE
Pre-procedure Intake  If YES to any questions or NO to having a   Please complete laminated checklist and leave on the computer keyboard for Provider, verbally inform provider if able.    For SCS Trial, RFA's or any sedation procedure:  Have you been fasting? NA    If yes, for how long?     Are you taking any any blood thinners such as Coumadin, Warfarin, Jantoven, Pradaxa Xarelto, Eliquis, Edoxaban, Enoxaparin, Lovenox, Heparin, Arixtra, Fondaparinux, or Fragmin? OR Antiplatelet medication such as Plavix, Brilinta, or Effient?   No     If yes, when did you take your last dose?     Do you take aspirin?      If cervical procedure, have you held aspirin for 6 days?   No     Do you have any allergies to contrast dye, iodine, steroid and/or numbing medications?  NO    Are you currently taking antibiotics or have an active infection?  NO    Have you had a fever/elevated temperature within the past week? No     Are you currently taking oral steroids? NO    Do you have a ? Yes    Are you pregnant or breastfeeding?  Not Applicable    Have you received the COVID-19 vaccine? Yes    If yes, was it your 1st, 2nd or only dose needed?     Date of most recent vaccine: 11/20/21    Notify provider and RNs if systolic BP >170, diastolic BP >100, P >100 or O2 sats < 90%

## 2022-05-17 NOTE — PATIENT INSTRUCTIONS
[facility]   Procedure Discharge Instructions    Today you saw:  Dr. Hugo Corrigan    You had an:  Epidural steroid injection   -lumbar     Be cautious when walking. Numbness and/or weakness in the lower extremities may occur for up to 6-8 hours after the procedure due to effect of the local anesthetic  Do not drive for 6 hours. The effect of the local anesthetic could slow your reflexes.   You may resume your regular activities after 24 hours  Avoid strenuous activity for the first 24 hours  You may shower, however avoid swimming, tub baths or hot tubs for 24 hours following your procedure  You may have a mild to moderate increase in pain for several days following the injection.  It may take up to 14 days for the steroid medication to start working although you may feel the effect as early as a few days after the procedure.     You may use ice packs for 10-15 minutes, 3 to 4 times a day at the injection site for comfort  Do not use heat to painful areas for 6 to 8 hours. This will give the local anesthetic time to wear off and prevent you from accidentally burning your skin.   Unless you have been directed to avoid the use of anti-inflammatory medications (NSAIDS), you may use medications such as ibuprofen, Aleve or Tylenol for pain control if needed.   If you were fasting, you may resume your normal diet and medications after the procedure  If you have diabetes, check your blood sugar more frequently than usual as your blood sugar may be higher than normal for 10-14 days following a steroid injection. Contact your doctor who manages your diabetes if your blood sugar is higher than usual  Possible side effects of steroids that you may experience include flushing, elevated blood pressure, increased appetite, mild headaches and restlessness.  All of these symptoms will get better with time.  If you experience any of the following, call the Pain Clinic during work hours (Mon-Friday  8-4:30 pm) at [phone] or the Provider Line after hours at [phone]:  -Fever over 100 degree F  -Swelling, bleeding, redness, drainage, warmth at the injection site  -Progressive weakness or numbness in your legs or arms  -Loss of bowel or bladder function  -Unusual headache that is not relieved by Tylenol or other pain reliever  -Unusual new onset of pain that is not improving

## 2022-05-19 RX ORDER — TRIAMCINOLONE ACETONIDE 40 MG/ML
40 INJECTION, SUSPENSION INTRA-ARTICULAR; INTRAMUSCULAR ONCE
Status: COMPLETED | OUTPATIENT
Start: 2022-05-19 | End: 2022-05-19

## 2022-05-19 RX ADMIN — TRIAMCINOLONE ACETONIDE 40 MG: 40 INJECTION, SUSPENSION INTRA-ARTICULAR; INTRAMUSCULAR at 13:07

## 2022-05-19 NOTE — PROGRESS NOTES
Pre procedure Diagnosis: Lumbar radiculopathy  Post procedure Diagnosis: Same  Procedure performed: L3-4 interlaminar epidural steroid injection   Anesthesia:none  Complications: none  Operators: Hugo Corrigan MD     Indications:   Truman Suero is a 61 year old male.  The patient has a history of right>L lbp radiating to ant leg.  Examination shows neg slump.  he has tried conservative treatment including medspt.    MRI reviewed  Options/alternatives, benefits and risks were discussed with the patient including but not limited to bleeding, infection, no pain relief, tissue trauma, exposure to radiation, reaction to medications, spinal cord injury, dural puncture, weakness, numbness and headache.  Questions were answered to his satisfaction and he wishes to proceed. Voluntary informed consent was obtained and signed.     Vitals were reviewed: Yes  Allergies were reviewed:  Yes   Medications were reviewed:  Yes   Pre-procedure pain score: 8/10    Procedure:  The patient's medical history, medications, and allergies were reviewed and reconciled.  After obtaining signed informed consent, the patient was brought into the procedure suite and was placed in a prone position on the procedure table.   A Pause for the Cause was performed.  Patient was prepped and draped in the usual sterile fashion.     The L3-4 interspace was identified with AP fluoroscopy.  A total of 5ml of 1% lidocaine was used to anesthetize the skin and subcutaneous tissues for a  midline approach.    A 20gauge 3.5inch Touhy needle was advanced utilizing intermittent AP and Lateral fluoroscopy and air for loss of resistance.  The epidural space was encountered on the first pass without difficulties.  Aspiration for blood and CSF was negative.  Needle position was verified by injecting 1 ml of Omnipaque utilizing real-time fluoroscopy that showed good needle placement and epidural spread without signs of intravascular or intrathecal uptake.   Omnipaque wasted:  9 ml.    Then, after repeated negative aspiration for blood or CSF, a combination of Kenalog 40 mg, Bupivicaine 0.25% 2 ml, diluted with 3 ml of normal saline to a total injectate volume of 6 ml was injected into the epidural space in a slow and incremental fashion and the needle was restyletted and withdrawn.  All injected medications were preservative free.    The injection site was cleaned and a sterile dressing was applied.    The patient tolerated the procedure well without complications and was taken to the recovery room for continued observation.    Images were saved to PACS.    Post-procedure pain score: 4/10  Follow-up includes:   -f/u with referring provider     Hugo Corrigan MD  [facility] [place]

## 2022-05-23 ENCOUNTER — ANCILLARY PROCEDURE (OUTPATIENT)
Dept: ULTRASOUND IMAGING | Facility: CLINIC | Age: 62
End: 2022-05-23
Attending: FAMILY MEDICINE
Payer: COMMERCIAL

## 2022-05-23 DIAGNOSIS — I73.9 PAD (PERIPHERAL ARTERY DISEASE) (H): ICD-10-CM

## 2022-05-23 DIAGNOSIS — Z12.2 ENCOUNTER FOR SCREENING FOR LUNG CANCER: ICD-10-CM

## 2022-05-23 DIAGNOSIS — Z72.0 TOBACCO ABUSE: ICD-10-CM

## 2022-05-23 PROCEDURE — 76775 US EXAM ABDO BACK WALL LIM: CPT | Mod: TC | Performed by: RADIOLOGY

## 2022-05-31 NOTE — RESULT ENCOUNTER NOTE
Stable ultrasound, . Please contact me if you have any questions.  Take care,  Humberto Trinidad D.O.

## 2022-06-01 ENCOUNTER — MYC REFILL (OUTPATIENT)
Dept: PALLIATIVE MEDICINE | Facility: CLINIC | Age: 62
End: 2022-06-01
Payer: COMMERCIAL

## 2022-06-01 DIAGNOSIS — M48.02 CERVICAL STENOSIS OF SPINAL CANAL: ICD-10-CM

## 2022-06-01 DIAGNOSIS — M50.30 DDD (DEGENERATIVE DISC DISEASE), CERVICAL: ICD-10-CM

## 2022-06-01 DIAGNOSIS — F11.90 CHRONIC, CONTINUOUS USE OF OPIOIDS: ICD-10-CM

## 2022-06-01 DIAGNOSIS — M47.812 CERVICAL SPONDYLOSIS WITHOUT MYELOPATHY: ICD-10-CM

## 2022-06-01 DIAGNOSIS — Z98.1 S/P CERVICAL SPINAL FUSION: ICD-10-CM

## 2022-06-01 DIAGNOSIS — M79.18 MYOFASCIAL PAIN: ICD-10-CM

## 2022-06-01 NOTE — LETTER
Opioid / Opioid Plus Controlled Substance Agreement    This is an agreement between you and your provider about the safe and appropriate use of controlled substance/opioids prescribed by your care team. Controlled substances are medicines that can cause physical and mental dependence (abuse).    There are strict laws about having and using these medicines. We here at [facility] are committing to working with you in your efforts to get better. To support you in this work, we ll help you schedule regular office appointments for medicine refills. If we must cancel or change your appointment for any reason, we ll make sure you have enough medicine to last until your next appointment.     As a Provider, I will:    Listen carefully to your concerns and treat you with respect.     Recommend a treatment plan that I believe is in your best interest. This plan may involve therapies other than opioid pain medication.     Talk with you often about the possible benefits, and the risk of harm of any medicine that we prescribe for you.     Provide a plan on how to taper (discontinue or go off) using this medicine if the decision is made to stop its use.    As a Patient, I understand that opioid(s):     Are a controlled substance prescribed by my care team to help me function or work and manage my condition(s).     Are strong medicines and can cause serious side effects such as:    Drowsiness, which can seriously affect my driving ability    A lower breathing rate, enough to cause death    Harm to my thinking ability     Depression     Abuse of and addiction to this medicine    Need to be taken exactly as prescribed. Combining opioids with certain medicines or chemicals (such as illegal drugs, sedatives, sleeping pills, and benzodiazepines) can be dangerous or even fatal. If I stop opioids suddenly, I may have severe withdrawal symptoms.    Do not work for all types of pain nor for all patients. If they re not helpful, I may  be asked to stop them.        The risks, benefits and side effects of these medicine(s) were explained to me. I agree that:  1. I will take part in other treatments as advised by my care team. This may be psychiatry or counseling, physical therapy, behavioral therapy, group treatment or a referral to a specialist.     2. I will keep all my appointments. I understand that this is part of the monitoring of opioids. My care team may require an office visit for EVERY opioid/controlled substance refill. If I miss appointments or don t follow instructions, my care team may stop my medicine.    3. I will take my medicines as prescribed. I will not change the dose or schedule unless my care team tells me to. There will be no refills if I run out early.     4. I may be asked to come to the clinic and complete a urine drug test or complete a pill count at any time. If I don t give a urine sample or participate in a pill count, the care team may stop my medicine.    5. I will only receive prescriptions from this clinic for chronic pain. If I am treated by another provider for acute pain issues, I will tell them that I am taking opioid pain medication for chronic pain and that I have a treatment agreement with this provider. I will inform my [facility] care team within one business day if I am given a prescription for any pain medication by another healthcare provider. My [facility] care team can contact other providers and pharmacists about my use of any medicines.    6. It is up to me to make sure that I don t run out of my medicines on weekends or holidays. If my care team is willing to refill my opioid prescription without a visit, I must request refills only during office hours. Refills may take up to 3 business days to process. I will use one pharmacy to fill all my opioid and other controlled substance prescriptions. I will notify the clinic about any changes to my insurance or medication  availability.    7. I am responsible for my prescriptions. If the medicine/prescription is lost, stolen or destroyed, it will not be replaced. I also agree not to share controlled substance medicines with anyone.    8. I am aware I should not use any illegal or recreational drugs. I agree not to drink alcohol unless my care team says I can.       9. If I enroll in the [place] Medical Cannabis program, I will tell my care team prior to my next refill.     10. I will tell my care team right away if I become pregnant, have a new medical problem treated outside of my regular clinic, or have a change in my medications.    11. I understand that this medicine can affect my thinking, judgment and reaction time. Alcohol and drugs affect the brain and body, which can affect the safety of my driving. Being under the influence of alcohol or drugs can affect my decision-making, behaviors, personal safety, and the safety of others. Driving while impaired (DWI) can occur if a person is driving, operating, or in physical control of a car, motorcycle, boat, snowmobile, ATV, motorbike, off-road vehicle, or any other motor vehicle ([place] Statute 169A.20). I understand the risk if I choose to drive or operate any vehicle or machinery.    I understand that if I do not follow any of the conditions above, my prescriptions or treatment may be stopped or changed.          Opioids  What You Need to Know    What are opioids?   Opioids are pain medicines that must be prescribed by a doctor. They are also known as narcotics.     Examples are:   1. morphine (MS Contin, Layce)  2. oxycodone (Oxycontin)  3. oxycodone and acetaminophen (Percocet)  4. hydrocodone and acetaminophen (Vicodin, Norco)   5. fentanyl patch (Duragesic)   6. hydromorphone (Dilaudid)   7. methadone  8. codeine (Tylenol #3)     What do opioids do well?   Opioids are best for severe short-term pain such as after a surgery or injury. They may work well for cancer pain. They may  help some people with long-lasting (chronic) pain.     What do opioids NOT do well?   Opioids never get rid of pain entirely, and they don t work well for most patients with chronic pain. Opioids don t reduce swelling, one of the causes of pain.                                    Other ways to manage chronic pain and improve function include:       Treat the health problem that may be causing pain    Anti-inflammation medicines, which reduce swelling and tenderness, such as ibuprofen (Advil, Motrin) or naproxen (Aleve)    Acetaminophen (Tylenol)    Antidepressants and anti-seizure medicines, especially for nerve pain    Topical treatments such as patches or creams    Injections or nerve blocks    Chiropractic or osteopathic treatment    Acupuncture, massage, deep breathing, meditation, visual imagery, aromatherapy    Use heat or ice at the pain site    Physical therapy     Exercise    Stop smoking    Take part in therapy       Risks and side effects     Talk to your doctor before you start or decide to keep taking opioids. Possible side effects include:      Lowering your breathing rate enough to cause death    Overdose, including death, especially if taking higher than prescribed doses    Worse depression symptoms; less pleasure in things you usually enjoy    Feeling tired or sluggish    Slower thoughts or cloudy thinking    Being more sensitive to pain over time; pain is harder to control    Trouble sleeping or restless sleep    Changes in hormone levels (for example, less testosterone)    Changes in sex drive or ability to have sex    Constipation    Unsafe driving    Itching and sweating    Dizziness    Nausea, throwing up and dry mouth    What else should I know about opioids?    Opioids may lead to dependence, tolerance, or addiction.      Dependence means that if you stop or reduce the medicine too quickly, you will have withdrawal symptoms. These include loose poop (diarrhea), jitters, flu-like symptoms,  nervousness and tremors. Dependence is not the same as addiction.                       Tolerance means needing higher doses over time to get the same effect. This may increase the chance of serious side effects.      Addiction is when people improperly use a substance that harms their body, their mind or their relations with others. Use of opiates can cause a relapse of addiction if you have a history of drug or alcohol abuse.      People who have used opioids for a long time may have a lower quality of life, worse depression, higher levels of pain and more visits to doctors.    You can overdose on opioids. Take these steps to lower your risk of overdose:    1. Recognize the signs:  Signs of overdose include decrease or loss of consciousness (blackout), slowed breathing, trouble waking up and blue lips. If someone is worried about overdose, they should call 911.    2. Talk to your doctor about Narcan (naloxone).   If you are at risk for overdose, you may be given a prescription for Narcan. This medicine very quickly reverses the effects of opioids.   If you overdose, a friend or family member can give you Narcan while waiting for the ambulance. They need to know the signs of overdose and how to give Narcan.     3. Don't use alcohol or street drugs.   Taking them with opioids can cause death.    4. Do not take any of these medicines unless your doctor says it s OK. Taking these with opioids can cause death:    Benzodiazepines, such as lorazepam (Ativan), alprazolam (Xanax) or diazepam (Valium)    Muscle relaxers, such as cyclobenzaprine (Flexeril)    Sleeping pills like zolpidem (Ambien)     Other opioids      How to keep you and other people safe while taking opioids:    1. Never share your opioids with others.  Opioid medicines are regulated by the Drug Enforcement Agency (ISAURA). Selling or sharing medications is a criminal act.    2. Be sure to store opioids in a secure place, locked up if possible. Young children  can easily swallow them and overdose.    3. When you are traveling with your medicines, keep them in the original bottles. If you use a pill box, be sure you also carry a copy of your medicine list from your clinic or pharmacy.    4. Safe disposal of opioids    Most pharmacies have places to get rid of medicine, called disposal kiosks. Medicine disposal options are also available in every [facility]. Search your county and  medication disposal  to find more options. You can find more details at:  [email or web address].[place]./living-green/managing-unwanted-medications     I agree that my provider, clinic care team, and pharmacy may work with any city, state or federal law enforcement agency that investigates the misuse, sale, or other diversion of my controlled medicine. I will allow my provider to discuss my care with, or share a copy of, this agreement with any other treating provider, pharmacy or emergency room where I receive care.    I have read this agreement and have asked questions about anything I did not understand.    _______________________________________________________  Patient Signature - Truman Suero _____________________                   Date     _______________________________________________________  Provider Signature - RODNEY Vargas CNP   _____________________                   Date     _______________________________________________________  Witness Signature (required if provider not present while patient signing)   _____________________                   Date

## 2022-06-02 RX ORDER — TRAMADOL HYDROCHLORIDE 200 MG/1
200 TABLET, EXTENDED RELEASE ORAL EVERY 24 HOURS
Qty: 30 TABLET | Refills: 0 | Status: SHIPPED | OUTPATIENT
Start: 2022-06-02 | End: 2022-07-04

## 2022-06-02 RX ORDER — TRAMADOL HYDROCHLORIDE 50 MG/1
50 TABLET ORAL EVERY 6 HOURS PRN
Qty: 90 TABLET | Refills: 0 | Status: SHIPPED | OUTPATIENT
Start: 2022-06-02 | End: 2022-07-04

## 2022-06-02 NOTE — TELEPHONE ENCOUNTER
Refills have been requested for the following medications:         traMADol (ULTRAM) 50 MG tablet [Melanie N]         traMADol (ULTRAM-ER) 200 MG 24 hr tablet [Melanie N]     Preferred pharmacy: [facility] - [address]

## 2022-06-02 NOTE — TELEPHONE ENCOUNTER
Writer signed controlled substance agreement.  It was placed in the bin to be scanned into pt's chart.    TIMBO Capone-BSN  [facility]

## 2022-06-02 NOTE — TELEPHONE ENCOUNTER
Medication refill information reviewed.     Last due:  Begin 5/7/22 to last 30 days  Due date:  6/6/22      Prescriptions prepped for review.     Estelita RN-BSN  [facility]

## 2022-06-02 NOTE — TELEPHONE ENCOUNTER
Signed Prescriptions:                        Disp   Refills    traMADol (ULTRAM) 50 MG tablet             90 tab*0        Sig: Take 1 tablet (50 mg) by mouth every 6 hours as           needed for severe pain Max 3/day. Fill 6/4/22 and           start 6/6/22; to last 30 days for chronic pain  Authorizing Provider: MELANIE BENSON    traMADol (ULTRAM-ER) 200 MG 24 hr tablet   30 tab*0        Sig: Take 1 tablet (200 mg) by mouth every 24 hours Fill           6/4/22. Begin 6/6/22 to last 30 days for chronic           pain  Authorizing Provider: MELANIE BENSON    Reviewed [place]  June 2, 2022- no concerning fills.    Melanie Benson APRN, RN CNP, FNP  [facility]  [place] location    Please ask nursing to call patient and complete updated CSA. Thanks.

## 2022-06-07 ENCOUNTER — TELEPHONE (OUTPATIENT)
Dept: PALLIATIVE MEDICINE | Facility: CLINIC | Age: 62
End: 2022-06-07
Payer: COMMERCIAL

## 2022-06-07 NOTE — TELEPHONE ENCOUNTER
Prior Authorization Retail Medication Request    Medication/Dose: traMADol (ULTRAM-ER) 200 MG 24 hr tablet      Coverage information:     Subscriber: [id number] CHIP [id number]     Rel to sub: 02 - Spouse     Member ID: [id number]     Payor: [employer] Ph: [phone]     Benefit plan: [id number] [employer] Ph: [phone]     Group number: [id number]     Member effective dates: from 03/05/18       [facility] [address] [place] [address]  Phone: [phone] Fax: [phone]

## 2022-06-07 NOTE — TELEPHONE ENCOUNTER
Central Prior Authorization Team   Phone: [phone]      PA Initiation    Medication: traMADol (ULTRAM-ER) 200 MG 24 hr tablet--INITIATED  Insurance Company: [employer] - Phone [phone] Fax [phone]  Pharmacy Filling the Rx: [facility] [address] [place], [place] - [address] [place] [address]  Filling Pharmacy Phone: [phone]  Filling Pharmacy Fax:    Start Date: 6/7/2022

## 2022-06-07 NOTE — TELEPHONE ENCOUNTER
Central Prior Authorization Team   Phone: [phone]      Prior Authorization Approval    Authorization Effective Date: 6/7/2022  Authorization Expiration Date: 12/4/2022  Medication: traMADol (ULTRAM-ER) 200 MG 24 hr tablet--APPROVED  Approved Dose/Quantity:    Reference #:     Insurance Company: [employer] - Phone [phone] Fax [phone]  Expected CoPay:       CoPay Card Available:      Foundation Assistance Needed:    Which Pharmacy is filling the prescription (Not needed for infusion/clinic administered): [facility] [address] [place] [address]  Pharmacy Notified: Yes  Patient Notified: Yes PHARMACY WILL CONTACT WHEN FILLED

## 2022-06-13 ENCOUNTER — MYC MEDICAL ADVICE (OUTPATIENT)
Dept: PALLIATIVE MEDICINE | Facility: CLINIC | Age: 62
End: 2022-06-13
Payer: COMMERCIAL

## 2022-06-13 DIAGNOSIS — M47.812 CERVICAL SPONDYLOSIS WITHOUT MYELOPATHY: ICD-10-CM

## 2022-06-13 DIAGNOSIS — Z98.1 S/P CERVICAL SPINAL FUSION: ICD-10-CM

## 2022-06-13 DIAGNOSIS — M48.02 CERVICAL STENOSIS OF SPINAL CANAL: ICD-10-CM

## 2022-06-13 DIAGNOSIS — M50.30 DDD (DEGENERATIVE DISC DISEASE), CERVICAL: ICD-10-CM

## 2022-06-13 RX ORDER — GABAPENTIN 600 MG/1
900 TABLET ORAL 3 TIMES DAILY
Qty: 135 TABLET | Refills: 3 | Status: SHIPPED | OUTPATIENT
Start: 2022-06-13 | End: 2022-09-07

## 2022-06-13 NOTE — TELEPHONE ENCOUNTER
Please process a refill of gabapentin (NEURONTIN) 600 MG tablet to     [facility] [address] [facility], [place] - [address] [facility]  [address] [facility] [address]  Phone: [phone] Fax: [phone]

## 2022-06-13 NOTE — TELEPHONE ENCOUNTER
Chief Complaint   Patient presents with     Refill Request     gabapentin      Refill request received via [facility] by patient or caregiver   [facility] [address] [place] [address]       Pending Prescriptions:                       Disp   Refills    gabapentin (NEURONTIN) 600 MG tablet      135 ta*3            Sig: Take 1.5 tablets (900 mg) by mouth 3 times daily         Last Written Prescription Date:  2/1  Last Fill Quantity: 135,   # refills: 3  Last Office Visit with prescribing provider: 4/13/22  Future Office visit: none

## 2022-06-13 NOTE — TELEPHONE ENCOUNTER
Signed Prescriptions:                        Disp   Refills    gabapentin (NEURONTIN) 600 MG tablet       135 ta*3        Sig: Take 1.5 tablets (900 mg) by mouth 3 times daily  Authorizing Provider: CANDACE BENSON, RN CNP, FNP  [facility]

## 2022-07-04 ENCOUNTER — MYC REFILL (OUTPATIENT)
Dept: PALLIATIVE MEDICINE | Facility: CLINIC | Age: 62
End: 2022-07-04

## 2022-07-04 DIAGNOSIS — M48.02 CERVICAL STENOSIS OF SPINAL CANAL: ICD-10-CM

## 2022-07-04 DIAGNOSIS — Z98.1 S/P CERVICAL SPINAL FUSION: ICD-10-CM

## 2022-07-04 DIAGNOSIS — M79.18 MYOFASCIAL PAIN: ICD-10-CM

## 2022-07-04 DIAGNOSIS — M50.30 DDD (DEGENERATIVE DISC DISEASE), CERVICAL: ICD-10-CM

## 2022-07-04 DIAGNOSIS — M47.812 CERVICAL SPONDYLOSIS WITHOUT MYELOPATHY: ICD-10-CM

## 2022-07-04 DIAGNOSIS — F11.90 CHRONIC, CONTINUOUS USE OF OPIOIDS: ICD-10-CM

## 2022-07-05 RX ORDER — TRAMADOL HYDROCHLORIDE 200 MG/1
200 TABLET, EXTENDED RELEASE ORAL EVERY 24 HOURS
Qty: 30 TABLET | Refills: 0 | Status: SHIPPED | OUTPATIENT
Start: 2022-07-05 | End: 2022-08-03

## 2022-07-05 RX ORDER — TRIAMCINOLONE ACETONIDE 40 MG/ML
40 INJECTION, SUSPENSION INTRA-ARTICULAR; INTRAMUSCULAR ONCE
Status: COMPLETED | OUTPATIENT
Start: 2022-05-17 | End: 2022-05-17

## 2022-07-05 RX ORDER — TRAMADOL HYDROCHLORIDE 50 MG/1
50 TABLET ORAL EVERY 6 HOURS PRN
Qty: 90 TABLET | Refills: 0 | Status: SHIPPED | OUTPATIENT
Start: 2022-07-05 | End: 2022-08-03

## 2022-07-05 NOTE — TELEPHONE ENCOUNTER
Signed Prescriptions:                        Disp   Refills    traMADol (ULTRAM) 50 MG tablet             90 tab*0        Sig: Take 1 tablet (50 mg) by mouth every 6 hours as           needed for severe pain Max 3/day. Fill 07/05/22           and 07/06/22; to last 30 days for chronic pain  Authorizing Provider: MELANIE BENSON    traMADol (ULTRAM-ER) 200 MG 24 hr tablet   30 tab*0        Sig: Take 1 tablet (200 mg) by mouth every 24 hours Fill           07/05/22. Begin 07/06/22 to last 30 days for           chronic pain  Authorizing Provider: MELANIE BENSON    Reviewed [place]  July 5, 2022- no concerning fills.    Melanie Benson APRN, RN CNP, FNP  [facility]  [facility]

## 2022-07-05 NOTE — TELEPHONE ENCOUNTER
Received call from patient requesting refill(s) of      traMADol (ULTRAM) 50 MG tablet  traMADol (ULTRAM-ER) 200 MG 24 hr tablet   Last dispensed from pharmacy on 06/05/22    Patient's last office/virtual visit by prescribing provider on 04/13/22  Next office/virtual appointment scheduled for None    Last urine drug screen date 04/15/22  Current opioid agreement on file (completed within the last year) Yes Date of opioid agreement: 06/02/22    E-prescribe to   [address] [address]  Phone: [phone] Fax: [phone]    Will route to nursing [place] for review and preparation of prescription(s).       Lashonda Lyles MA  [facility]

## 2022-07-05 NOTE — TELEPHONE ENCOUNTER
Refills have been requested for the following medications:         traMADol (ULTRAM) 50 MG tablet [Melanie N]         traMADol (ULTRAM-ER) 200 MG 24 hr tablet [Melanie N]     Preferred pharmacy: [facility] - [address]

## 2022-07-05 NOTE — TELEPHONE ENCOUNTER
Medication refill information reviewed.     Due date for traMADol (ULTRAM) 50 MG tablet and traMADol (ULTRAM-ER) 200 MG 24 hr tablet  is 07/06/22     Prescriptions prepped for review.     Will route to provider.

## 2022-07-05 NOTE — TELEPHONE ENCOUNTER
Scheduled 09/07/22.    Emeli Moon, ANALISAN, RN  Care Coordinator  [facility] [place]

## 2022-08-03 ENCOUNTER — MYC REFILL (OUTPATIENT)
Dept: PALLIATIVE MEDICINE | Facility: CLINIC | Age: 62
End: 2022-08-03

## 2022-08-03 DIAGNOSIS — M47.812 CERVICAL SPONDYLOSIS WITHOUT MYELOPATHY: ICD-10-CM

## 2022-08-03 DIAGNOSIS — M79.18 MYOFASCIAL PAIN: ICD-10-CM

## 2022-08-03 DIAGNOSIS — M50.30 DDD (DEGENERATIVE DISC DISEASE), CERVICAL: ICD-10-CM

## 2022-08-03 DIAGNOSIS — M48.02 CERVICAL STENOSIS OF SPINAL CANAL: ICD-10-CM

## 2022-08-03 DIAGNOSIS — Z98.1 S/P CERVICAL SPINAL FUSION: ICD-10-CM

## 2022-08-03 DIAGNOSIS — F11.90 CHRONIC, CONTINUOUS USE OF OPIOIDS: ICD-10-CM

## 2022-08-03 RX ORDER — TRAMADOL HYDROCHLORIDE 50 MG/1
50 TABLET ORAL EVERY 6 HOURS PRN
Qty: 90 TABLET | Refills: 0 | Status: SHIPPED | OUTPATIENT
Start: 2022-08-03 | End: 2022-09-02

## 2022-08-03 RX ORDER — TRAMADOL HYDROCHLORIDE 200 MG/1
200 TABLET, EXTENDED RELEASE ORAL EVERY 24 HOURS
Qty: 30 TABLET | Refills: 0 | Status: SHIPPED | OUTPATIENT
Start: 2022-08-03 | End: 2022-09-02

## 2022-08-03 NOTE — TELEPHONE ENCOUNTER
Patient requesting refill(s) of traMADol (ULTRAM) 50 MG tablet  Last dispensed from pharmacy on 7/5/22       traMADol (ULTRAM-ER) 200 MG 24 hr tablet  Last dispensed from pharmacy on 7/5/22    Patient's last office/virtual visit by prescribing provider on 4/13/22  Next office/virtual appointment scheduled for 9/7/22    Last urine drug screen date 4/15/22  Current opioid agreement on file (completed within the last year) Yes Date of opioid agreement: 6/10/22    E-prescribe to [facility] [address]  NICOLE Cintron    Will route to nursing [place] for review and preparation of prescription(s).

## 2022-08-03 NOTE — TELEPHONE ENCOUNTER
Routed to the nursing pool and to the MA pool to process refill(s) of Tramadol ER and tramadol IR.    Estelita Wetzel, RN-BSN  [facility]

## 2022-08-03 NOTE — TELEPHONE ENCOUNTER
Medication refill information reviewed.     Due date for traMADol (ULTRAM) 50 MG tablet and traMADol (ULTRAM-ER) 200 MG 24 hr tablet is 08/05/22     Prescriptions prepped for review.     Will route to provider.

## 2022-08-04 ENCOUNTER — MYC MEDICAL ADVICE (OUTPATIENT)
Dept: PALLIATIVE MEDICINE | Facility: CLINIC | Age: 62
End: 2022-08-04

## 2022-08-04 DIAGNOSIS — M50.30 DDD (DEGENERATIVE DISC DISEASE), CERVICAL: ICD-10-CM

## 2022-08-04 DIAGNOSIS — M47.812 CERVICAL SPONDYLOSIS WITHOUT MYELOPATHY: ICD-10-CM

## 2022-08-05 NOTE — TELEPHONE ENCOUNTER
Received request from patient requesting refill(s) for topiramate (TOPAMAX) 50 MG tablet    Last refilled on 04/23/22    Pt last seen on 04/13/22  Next appt scheduled for 09/07/22    Will facilitate refill.

## 2022-08-07 RX ORDER — TOPIRAMATE 50 MG/1
50 TABLET, FILM COATED ORAL 2 TIMES DAILY
Qty: 180 TABLET | Refills: 1 | Status: SHIPPED | OUTPATIENT
Start: 2022-08-07 | End: 2022-11-22

## 2022-09-02 ENCOUNTER — MYC REFILL (OUTPATIENT)
Dept: PALLIATIVE MEDICINE | Facility: CLINIC | Age: 62
End: 2022-09-02

## 2022-09-02 DIAGNOSIS — M48.02 CERVICAL STENOSIS OF SPINAL CANAL: ICD-10-CM

## 2022-09-02 DIAGNOSIS — M79.18 MYOFASCIAL PAIN: ICD-10-CM

## 2022-09-02 DIAGNOSIS — M47.812 CERVICAL SPONDYLOSIS WITHOUT MYELOPATHY: ICD-10-CM

## 2022-09-02 DIAGNOSIS — Z98.1 S/P CERVICAL SPINAL FUSION: ICD-10-CM

## 2022-09-02 DIAGNOSIS — M50.30 DDD (DEGENERATIVE DISC DISEASE), CERVICAL: ICD-10-CM

## 2022-09-02 DIAGNOSIS — F11.90 CHRONIC, CONTINUOUS USE OF OPIOIDS: ICD-10-CM

## 2022-09-02 NOTE — TELEPHONE ENCOUNTER
Will route to MA pool for assistance with gathering opioid refill information.    Cielo GAVIRIA RN Care Coordinator  [facility]      Medication Renewal Request  9/2/2022 7:21 AM Reply    To: PAIN NURSE      From: Palmer Suero      Created: 9/2/2022 7:21 AM        *-*-*This message has not been handled.*-*-*    Refills have been requested for the following medications:         traMADol (ULTRAM) 50 MG tablet [Hugo Corrigan]         traMADol (ULTRAM-ER) 200 MG 24 hr tablet [Hugo Corrigan]     Preferred pharmacy: [facility] [address] [place] [address]

## 2022-09-02 NOTE — TELEPHONE ENCOUNTER
Received call from patient requesting refill(s) of traMADol (ULTRAM) 50 MG tablet    Last dispensed from pharmacy on 8/4/22    traMADol (ULTRAM-ER) 200 MG 24 hr tablet   Last dispensed from pharmacy on 8/4/22    Patient's last office/virtual visit by prescribing provider on 5/17/22  Next office/virtual appointment scheduled for 9/7/22    Last urine drug screen date 4/15/22  Current opioid agreement on file (completed within the last year) Yes Date of opioid agreement: 6/10/22    E-prescribe to [facility] [address]  NICOLE Cintron     Will route to nursing [place] for review and preparation of prescription(s).

## 2022-09-06 RX ORDER — TRAMADOL HYDROCHLORIDE 200 MG/1
200 TABLET, EXTENDED RELEASE ORAL EVERY 24 HOURS
Qty: 30 TABLET | Refills: 0 | Status: SHIPPED | OUTPATIENT
Start: 2022-09-06 | End: 2022-10-04

## 2022-09-06 RX ORDER — TRAMADOL HYDROCHLORIDE 50 MG/1
50 TABLET ORAL EVERY 6 HOURS PRN
Qty: 90 TABLET | Refills: 0 | Status: SHIPPED | OUTPATIENT
Start: 2022-09-06 | End: 2022-10-04
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
## 2022-09-06 NOTE — TELEPHONE ENCOUNTER
Signed Prescriptions:                        Disp   Refills    traMADol (ULTRAM) 50 MG tablet             90 tab*0        Sig: Take 1 tablet (50 mg) by mouth every 6 hours as           needed for severe pain Max 3/day. Fill/Start            09/06/22; to last 30 days for chronic pain  Authorizing Provider: MELANIE BENSON    traMADol (ULTRAM-ER) 200 MG 24 hr tablet   30 tab*0        Sig: Take 1 tablet (200 mg) by mouth every 24 hours           Fill/Start 09/06/22 to last 30 days for chronic           pain  Authorizing Provider: MELANIE BENSON    Reviewed [place]  September 6, 2022- no concerning fills.    Melanie Benson APRN, RN CNP, FNP  [facility]  [facility] [place]

## 2022-09-06 NOTE — TELEPHONE ENCOUNTER
Medication refill information reviewed.     Due date for traMADol (ULTRAM) 50 MG tablet is 09/06/22     Prescriptions prepped for review.     Will route to provider.

## 2022-09-07 ENCOUNTER — OFFICE VISIT (OUTPATIENT)
Dept: PALLIATIVE MEDICINE | Facility: CLINIC | Age: 62
End: 2022-09-07
Payer: OTHER MISCELLANEOUS

## 2022-09-07 VITALS — HEART RATE: 60 BPM | SYSTOLIC BLOOD PRESSURE: 136 MMHG | DIASTOLIC BLOOD PRESSURE: 88 MMHG

## 2022-09-07 DIAGNOSIS — M79.18 MYOFASCIAL PAIN: ICD-10-CM

## 2022-09-07 DIAGNOSIS — M54.2 CHRONIC NECK PAIN: ICD-10-CM

## 2022-09-07 DIAGNOSIS — M48.02 CERVICAL STENOSIS OF SPINAL CANAL: ICD-10-CM

## 2022-09-07 DIAGNOSIS — M50.30 DDD (DEGENERATIVE DISC DISEASE), CERVICAL: ICD-10-CM

## 2022-09-07 DIAGNOSIS — F11.90 CHRONIC, CONTINUOUS USE OF OPIOIDS: ICD-10-CM

## 2022-09-07 DIAGNOSIS — M47.812 CERVICAL SPONDYLOSIS WITHOUT MYELOPATHY: ICD-10-CM

## 2022-09-07 DIAGNOSIS — Z79.891 ENCOUNTER FOR LONG-TERM USE OF OPIATE ANALGESIC: ICD-10-CM

## 2022-09-07 DIAGNOSIS — G89.29 CHRONIC NECK PAIN: ICD-10-CM

## 2022-09-07 DIAGNOSIS — M25.532 LEFT WRIST PAIN: Primary | ICD-10-CM

## 2022-09-07 DIAGNOSIS — Z98.1 S/P CERVICAL SPINAL FUSION: ICD-10-CM

## 2022-09-07 PROCEDURE — 99214 OFFICE O/P EST MOD 30 MIN: CPT | Performed by: NURSE PRACTITIONER

## 2022-09-07 RX ORDER — CYCLOBENZAPRINE HCL 5 MG
5-10 TABLET ORAL
Qty: 45 TABLET | Refills: 2 | Status: SHIPPED | OUTPATIENT
Start: 2022-09-07 | End: 2022-11-22

## 2022-09-07 RX ORDER — METHOCARBAMOL 500 MG/1
500 TABLET, FILM COATED ORAL 3 TIMES DAILY PRN
Qty: 90 TABLET | Refills: 2 | Status: SHIPPED | OUTPATIENT
Start: 2022-09-07 | End: 2022-11-22

## 2022-09-07 RX ORDER — GABAPENTIN 600 MG/1
900 TABLET ORAL 3 TIMES DAILY
Qty: 135 TABLET | Refills: 3 | Status: SHIPPED | OUTPATIENT
Start: 2022-09-07 | End: 2022-11-22

## 2022-09-07 ASSESSMENT — PAIN SCALES - GENERAL: PAINLEVEL: SEVERE PAIN (6)

## 2022-09-07 NOTE — PATIENT INSTRUCTIONS
Plan:   Physical Therapy: not at present  Clinical Health Psychologist: none  Diagnostic Studies: none  Medication Management:   Continue tramadol ER 200mg once daily  Continue tramadol 50mg Q 6-8 hours PRN, max of 3/day.  Continue gabapentin 900 mg TID  Continue Topamax 50 mg BID  Continue methocarbamol 500-1000 mg TID PRN muscle spasms  Continue cyclobenzaprine at bedtime  Further procedures recommended: none  Recommendations to PCP: see above  Follow up: 12 weeks video or in-person visit, your choice. Please call [phone] to make your follow-up appointment with me.   Let me know if the left wrist pain doesn't calm down with using Voltaren gel and/or Aspercreme with 4% lidocaine. Would then start with hand therapy.     ----------------------------------------------------------------  Clinic Number:  [phone]   Call with any questions about your care and for scheduling assistance.   Calls are returned Monday through Friday between 8 AM and 4:30 PM. We usually get back to you within 2 business days depending on the issue/request.    If we are prescribing your medications:  For opioid medication refills, call the clinic or send a Play[employer] message 7 days in advance.  Please include:  Name of requested medication  Name of the pharmacy.  For non-opioid medications, call your pharmacy directly to request a refill. Please allow 3-4 days to be processed.   Per [place] State Law:  All controlled substance prescriptions must be filled within 30 days of being written.    For those controlled substances allowing refills, pickup must occur within 30 days of last fill.      We believe regular attendance is key to your success in our program!    Any time you are unable to keep your appointment we ask that you call us at least 24 hours in advance to cancel.This will allow us to offer the appointment time to another patient.   Multiple missed appointments may lead to dismissal from the clinic.

## 2022-09-10 ENCOUNTER — HEALTH MAINTENANCE LETTER (OUTPATIENT)
Age: 62
End: 2022-09-10

## 2022-10-04 ENCOUNTER — MYC REFILL (OUTPATIENT)
Dept: PALLIATIVE MEDICINE | Facility: CLINIC | Age: 62
End: 2022-10-04

## 2022-10-04 DIAGNOSIS — M50.30 DDD (DEGENERATIVE DISC DISEASE), CERVICAL: ICD-10-CM

## 2022-10-04 DIAGNOSIS — M47.812 CERVICAL SPONDYLOSIS WITHOUT MYELOPATHY: ICD-10-CM

## 2022-10-04 DIAGNOSIS — M48.02 CERVICAL STENOSIS OF SPINAL CANAL: ICD-10-CM

## 2022-10-04 DIAGNOSIS — F11.90 CHRONIC, CONTINUOUS USE OF OPIOIDS: ICD-10-CM

## 2022-10-04 DIAGNOSIS — M79.18 MYOFASCIAL PAIN: ICD-10-CM

## 2022-10-04 DIAGNOSIS — Z98.1 S/P CERVICAL SPINAL FUSION: ICD-10-CM

## 2022-10-04 RX ORDER — TRAMADOL HYDROCHLORIDE 200 MG/1
200 TABLET, EXTENDED RELEASE ORAL EVERY 24 HOURS
Qty: 30 TABLET | Refills: 0 | Status: SHIPPED | OUTPATIENT
Start: 2022-10-04 | End: 2022-11-03

## 2022-10-04 RX ORDER — TRAMADOL HYDROCHLORIDE 50 MG/1
50 TABLET ORAL EVERY 6 HOURS PRN
Qty: 90 TABLET | Refills: 0 | Status: SHIPPED | OUTPATIENT
Start: 2022-10-04 | End: 2022-11-03

## 2022-10-04 NOTE — TELEPHONE ENCOUNTER
Received call from patient requesting refill(s) of      traMADol (ULTRAM) 50 MG tablet    traMADol (ULTRAM-ER) 200 MG 24 hr tablet     Last dispensed from pharmacy on 09/06/22    Patient's last office/virtual visit by prescribing provider on 09/07/22  Next office/virtual appointment scheduled for None    Last urine drug screen date 04/15/22  Current opioid agreement on file (completed within the last year) Yes Date of opioid agreement: 06/02/22    E-prescribe to   [address] [address]  Phone: [phone] Fax: [phone]    Will route to nursing [place] for review and preparation of prescription(s).       Lashonda Lyles MA  [facility]

## 2022-10-04 NOTE — TELEPHONE ENCOUNTER
Refills have been requested for the following medications:         traMADol (ULTRAM) 50 MG tablet [Melanie N]         traMADol (ULTRAM-ER) 200 MG 24 hr tablet [Melanie N]     Preferred pharmacy: [facility] - [address]

## 2022-10-04 NOTE — TELEPHONE ENCOUNTER
Medication refill information reviewed.     Due date for traMADol (ULTRAM) 50 MG tablet and traMADol (ULTRAM-ER) 200 MG 24 hr tablet  is 10/06/22     Prescriptions prepped for review.     Will route to provider.

## 2022-10-04 NOTE — TELEPHONE ENCOUNTER
Signed Prescriptions:                        Disp   Refills    traMADol (ULTRAM) 50 MG tablet             90 tab*0        Sig: Take 1 tablet (50 mg) by mouth every 6 hours as           needed for severe pain Max 3/day. Fill 10/05/22           Start 10/06/22; to last 30 days for chronic pain  Authorizing Provider: MELANIE BENSON    traMADol (ULTRAM-ER) 200 MG 24 hr tablet   30 tab*0        Sig: Take 1 tablet (200 mg) by mouth every 24 hours Fill           10/05/22 Start 10/06/22; to last 30 days for           chronic pain  Authorizing Provider: MELANIE BENSON    Reviewed [place]  October 4, 2022- no concerning fills.    Melanie Benson APRN, RN CNP, FNP  [facility]  [facility] [place]

## 2022-11-03 ENCOUNTER — MYC REFILL (OUTPATIENT)
Dept: PALLIATIVE MEDICINE | Facility: CLINIC | Age: 62
End: 2022-11-03

## 2022-11-03 DIAGNOSIS — M50.30 DDD (DEGENERATIVE DISC DISEASE), CERVICAL: ICD-10-CM

## 2022-11-03 DIAGNOSIS — Z98.1 S/P CERVICAL SPINAL FUSION: ICD-10-CM

## 2022-11-03 DIAGNOSIS — M79.18 MYOFASCIAL PAIN: ICD-10-CM

## 2022-11-03 DIAGNOSIS — M48.02 CERVICAL STENOSIS OF SPINAL CANAL: ICD-10-CM

## 2022-11-03 DIAGNOSIS — M47.812 CERVICAL SPONDYLOSIS WITHOUT MYELOPATHY: ICD-10-CM

## 2022-11-03 DIAGNOSIS — F11.90 CHRONIC, CONTINUOUS USE OF OPIOIDS: ICD-10-CM

## 2022-11-03 DIAGNOSIS — I25.10 MILD CAD: ICD-10-CM

## 2022-11-03 RX ORDER — ATORVASTATIN CALCIUM 20 MG/1
20 TABLET, FILM COATED ORAL DAILY
Qty: 90 TABLET | Refills: 0 | Status: SHIPPED | OUTPATIENT
Start: 2022-11-03 | End: 2023-03-11

## 2022-11-03 NOTE — TELEPHONE ENCOUNTER
Received call from patient requesting refill(s) of     traMADol (ULTRAM) 50 MG tablet         traMADol (ULTRAM-ER) 200 MG 24 hr tablet    Last dispensed from pharmacy on 10/06/22 for both    Patient's last office/virtual visit by prescribing provider on 09/07/22  Next office/virtual appointment scheduled for: none    Last urine drug screen date 04/15/22  Current opioid agreement on file (completed within the last year) Yes Date of opioid agreement: 06/02/22    E-prescribe to pharmacy-[facility] [address] [place] [address]     Will route to [facility] for review and preparation of prescription(s).

## 2022-11-03 NOTE — TELEPHONE ENCOUNTER
Refills have been requested for the following medications:         traMADol (ULTRAM) 50 MG tablet [Melanie N]         traMADol (ULTRAM-ER) 200 MG 24 hr tablet [Melanie N]     Preferred pharmacy: [facility] - [address]

## 2022-11-04 RX ORDER — TRAMADOL HYDROCHLORIDE 200 MG/1
200 TABLET, EXTENDED RELEASE ORAL EVERY 24 HOURS
Qty: 30 TABLET | Refills: 0 | Status: SHIPPED | OUTPATIENT
Start: 2022-11-04 | End: 2022-11-22

## 2022-11-04 RX ORDER — TRAMADOL HYDROCHLORIDE 50 MG/1
50 TABLET ORAL EVERY 6 HOURS PRN
Qty: 90 TABLET | Refills: 0 | Status: SHIPPED | OUTPATIENT
Start: 2022-11-04 | End: 2022-11-22

## 2022-11-04 NOTE — TELEPHONE ENCOUNTER
Medication refill information reviewed.     Due date for  traMADol (ULTRAM) 50 MG tablet  and  traMADol (ULTRAM-ER) 200 MG 24 hr tablet is 11/05/22     Prescriptions prepped for review.     Will route to provider.

## 2022-11-04 NOTE — TELEPHONE ENCOUNTER
Signed Prescriptions:                        Disp   Refills    traMADol (ULTRAM) 50 MG tablet             90 tab*0        Sig: Take 1 tablet (50 mg) by mouth every 6 hours as           needed for severe pain Max 3/day. Fill 11/04/22           Start 11/05/22; to last 30 days for chronic pain  Authorizing Provider: MELANIE BENSON    traMADol (ULTRAM-ER) 200 MG 24 hr tablet   30 tab*0        Sig: Take 1 tablet (200 mg) by mouth every 24 hours Fill           11/04/22 Start 11/05/22; to last 30 days for           chronic pain  Authorizing Provider: MELANIE BENSON    Reviewed [place]  November 4, 2022- no concerning fills.  Please remind patient to make an appt to see me in December. Thanks.   Melanie STEVENS, RN CNP, FNP  [facility]  [facility] [place]       [Negative] : Heme/Lymph

## 2022-11-07 ENCOUNTER — IMMUNIZATION (OUTPATIENT)
Dept: FAMILY MEDICINE | Facility: CLINIC | Age: 62
End: 2022-11-07
Payer: COMMERCIAL

## 2022-11-07 DIAGNOSIS — Z23 NEED FOR PROPHYLACTIC VACCINATION AND INOCULATION AGAINST INFLUENZA: Primary | ICD-10-CM

## 2022-11-07 PROCEDURE — 99207 PR NO CHARGE NURSE ONLY: CPT

## 2022-11-07 PROCEDURE — 90682 RIV4 VACC RECOMBINANT DNA IM: CPT

## 2022-11-07 PROCEDURE — 90471 IMMUNIZATION ADMIN: CPT

## 2022-11-15 ASSESSMENT — PAIN SCALES - PAIN ENJOYMENT GENERAL ACTIVITY SCALE (PEG)
PEG_TOTALSCORE: 5.33
INTERFERED_ENJOYMENT_LIFE: 5
AVG_PAIN_PASTWEEK: 6
PEG_TOTALSCORE: 5.33
INTERFERED_GENERAL_ACTIVITY: 5
INTERFERED_GENERAL_ACTIVITY: 5
AVG_PAIN_PASTWEEK: 6
INTERFERED_ENJOYMENT_LIFE: 5

## 2022-11-15 ASSESSMENT — ANXIETY QUESTIONNAIRES
2. NOT BEING ABLE TO STOP OR CONTROL WORRYING: SEVERAL DAYS
8. IF YOU CHECKED OFF ANY PROBLEMS, HOW DIFFICULT HAVE THESE MADE IT FOR YOU TO DO YOUR WORK, TAKE CARE OF THINGS AT HOME, OR GET ALONG WITH OTHER PEOPLE?: NOT DIFFICULT AT ALL
5. BEING SO RESTLESS THAT IT IS HARD TO SIT STILL: NOT AT ALL
4. TROUBLE RELAXING: SEVERAL DAYS
7. FEELING AFRAID AS IF SOMETHING AWFUL MIGHT HAPPEN: NOT AT ALL
6. BECOMING EASILY ANNOYED OR IRRITABLE: MORE THAN HALF THE DAYS
1. FEELING NERVOUS, ANXIOUS, OR ON EDGE: SEVERAL DAYS
7. FEELING AFRAID AS IF SOMETHING AWFUL MIGHT HAPPEN: NOT AT ALL
GAD7 TOTAL SCORE: 6
GAD7 TOTAL SCORE: 6
IF YOU CHECKED OFF ANY PROBLEMS ON THIS QUESTIONNAIRE, HOW DIFFICULT HAVE THESE PROBLEMS MADE IT FOR YOU TO DO YOUR WORK, TAKE CARE OF THINGS AT HOME, OR GET ALONG WITH OTHER PEOPLE: NOT DIFFICULT AT ALL
3. WORRYING TOO MUCH ABOUT DIFFERENT THINGS: SEVERAL DAYS

## 2022-11-22 ENCOUNTER — OFFICE VISIT (OUTPATIENT)
Dept: PALLIATIVE MEDICINE | Facility: CLINIC | Age: 62
End: 2022-11-22
Payer: OTHER MISCELLANEOUS

## 2022-11-22 VITALS — HEART RATE: 58 BPM | SYSTOLIC BLOOD PRESSURE: 158 MMHG | DIASTOLIC BLOOD PRESSURE: 95 MMHG

## 2022-11-22 DIAGNOSIS — M79.645 PAIN OF FINGER OF LEFT HAND: ICD-10-CM

## 2022-11-22 DIAGNOSIS — M48.02 CERVICAL STENOSIS OF SPINAL CANAL: ICD-10-CM

## 2022-11-22 DIAGNOSIS — G89.29 CHRONIC NECK PAIN: ICD-10-CM

## 2022-11-22 DIAGNOSIS — M79.632 PAIN OF LEFT FOREARM: Primary | ICD-10-CM

## 2022-11-22 DIAGNOSIS — M79.18 MYOFASCIAL PAIN: ICD-10-CM

## 2022-11-22 DIAGNOSIS — F11.90 CHRONIC, CONTINUOUS USE OF OPIOIDS: ICD-10-CM

## 2022-11-22 DIAGNOSIS — M47.812 CERVICAL SPONDYLOSIS WITHOUT MYELOPATHY: ICD-10-CM

## 2022-11-22 DIAGNOSIS — Z98.1 S/P CERVICAL SPINAL FUSION: ICD-10-CM

## 2022-11-22 DIAGNOSIS — M50.30 DDD (DEGENERATIVE DISC DISEASE), CERVICAL: ICD-10-CM

## 2022-11-22 DIAGNOSIS — M54.2 CHRONIC NECK PAIN: ICD-10-CM

## 2022-11-22 PROCEDURE — 99214 OFFICE O/P EST MOD 30 MIN: CPT | Performed by: NURSE PRACTITIONER

## 2022-11-22 RX ORDER — TRAMADOL HYDROCHLORIDE 200 MG/1
200 TABLET, EXTENDED RELEASE ORAL EVERY 24 HOURS
Qty: 30 TABLET | Refills: 0 | Status: SHIPPED | OUTPATIENT
Start: 2022-11-22 | End: 2023-01-02

## 2022-11-22 RX ORDER — TOPIRAMATE 50 MG/1
50 TABLET, FILM COATED ORAL 2 TIMES DAILY
Qty: 180 TABLET | Refills: 3 | Status: SHIPPED | OUTPATIENT
Start: 2022-11-22 | End: 2023-05-08

## 2022-11-22 RX ORDER — CYCLOBENZAPRINE HCL 5 MG
5-10 TABLET ORAL
Qty: 45 TABLET | Refills: 11 | Status: SHIPPED | OUTPATIENT
Start: 2022-11-22 | End: 2023-11-20

## 2022-11-22 RX ORDER — TRAMADOL HYDROCHLORIDE 50 MG/1
50 TABLET ORAL EVERY 6 HOURS PRN
Qty: 90 TABLET | Refills: 0 | Status: SHIPPED | OUTPATIENT
Start: 2022-11-22 | End: 2023-01-02

## 2022-11-22 RX ORDER — METHOCARBAMOL 500 MG/1
500 TABLET, FILM COATED ORAL 3 TIMES DAILY PRN
Qty: 90 TABLET | Refills: 11 | Status: SHIPPED | OUTPATIENT
Start: 2022-11-22 | End: 2023-08-16

## 2022-11-22 RX ORDER — GABAPENTIN 600 MG/1
900 TABLET ORAL 3 TIMES DAILY
Qty: 135 TABLET | Refills: 11 | Status: SHIPPED | OUTPATIENT
Start: 2022-11-22 | End: 2023-11-20

## 2022-11-22 ASSESSMENT — PAIN SCALES - GENERAL: PAINLEVEL: SEVERE PAIN (7)

## 2022-11-22 NOTE — PROGRESS NOTES
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 elbow brace at work. We might be able to try some hand therapy for this.        Face to face time: 32 minutes                Melanie STEVENS RN CNP, FNP  [facility]  [facility]

## 2022-11-22 NOTE — PATIENT INSTRUCTIONS
Plan:   Physical Therapy: not at present  Clinical Health Psychologist: none  Diagnostic Studies: none  Medication Management:   Continue tramadol ER 200mg once daily fill 12/2 and start 12/5  Continue tramadol 50mg Q 6-8 hours PRN, max of 3/day. Fill 12/2 and start 12/5  Continue gabapentin 900 mg TID  Continue Topamax 50 mg BID  Continue methocarbamol 500-1000 mg TID PRN muscle spasms  Continue cyclobenzaprine at bedtime  Further procedures recommended: none  Recommendations to PCP: see above  Follow up: 12 weeks video or in-person visit, your choice. Please call [phone] to make your follow-up appointment with me.   Let me know if the left forearm pain doesn't calm down with using Voltaren gel and/or Aspercreme with 4% lidocaine and using a tennis elbow brace at work. We might be able to try some hand therapy for this.      ----------------------------------------------------------------  Clinic Number:  [phone]   Call with any questions about your care and for scheduling assistance.   Calls are returned Monday through Friday between 8 AM and 4:30 PM. We usually get back to you within 2 business days depending on the issue/request.    If we are prescribing your medications:  For opioid medication refills, call the clinic or send a [facility]arm message 7 days in advance.  Please include:  Name of requested medication  Name of the pharmacy.  For non-opioid medications, call your pharmacy directly to request a refill. Please allow 3-4 days to be processed.   Per [place] State Law:  All controlled substance prescriptions must be filled within 30 days of being written.    For those controlled substances allowing refills, pickup must occur within 30 days of last fill.      We believe regular attendance is key to your success in our program!    Any time you are unable to keep your appointment we ask that you call us at least 24 hours in advance to cancel.This will allow us to offer the appointment time to another  patient.   Multiple missed appointments may lead to dismissal from the clinic.

## 2022-12-29 NOTE — TELEPHONE ENCOUNTER
Screening Questions for Radiology Injections:    Injection to be done at which interventional clinic site? [facility] [place]    Remind [place] Pt's that Covid test is no longer required except for sedation procedure Pt's.    Instruct patient to arrive as directed prior to the scheduled appointment time:    [facility]      [place]: 30 minutes before; if IV needed 1 hour before     Procedure ordered by Melanie Thomas     Procedure ordered? lumbar epidural steroid injection, patient with right sided radicular symptoms in L3 distribution       Transforaminal Cervical DEMARCUS -  [facility] does NOT have providers that do these- [facility] and [facility] do have providers that do    As a reminder, receiving steroids can decrease your body's ability to fight infection.   Would you still like to move forward with scheduling the injection?  Yes    What insurance would patient like us to bill for this procedure? WC       Worker's comp or MVA (motor vehicle accident) -Any injection DO NOT SCHEDULE and route to Sumi Blankenship.      [employer] insurance - For SI joint injections, DO NOT SCHEDULE and route Monalisa Dominguez.       ALL BCBS, [employer] and [employer]-Route to Monalisa for review DO NOT SCHEDULE      IF SCHEDULING IN [place] AND NEEDS A PA, IT IS OKAY TO SCHEDULE. [place] HANDLES THEIR OWN PA'S AFTER THE PATIENT IS SCHEDULED. PLEASE SCHEDULE AT LEAST 1 WEEK OUT SO A PA CAN BE OBTAINED.    Any chance of pregnancy? Not Applicable   If YES, do NOT schedule and route to RN pool    Is an  needed? No     Patient has a drive home? (mandatory) YES: Informed     Is patient taking any blood thinners (That is: Coumadin, Warfarin, Jantoven, Pradaxa Xarelto, Eliquis, Edoxaban, Enoxaparin, Lovenox, Heparin, Arixtra, Fondaparinux, or Fragmin? OR Antiplatelet medication such as Plavix, Brilinta, or Effient? )? No   If hold needed, do NOT schedule, route to RN pool     Is patient taking any aspirin  products (includes Excedrin and Fiorinal)? No     If more than 325mg/day, OK to schedule; Instruct pt to decrease to less than 325 mg for 7 days AND route to RN pool    For CERVICAL procedures, hold all aspirin products for 6 days.     Tell pt that if aspirin product is not held for 6 days, the procedure WILL BE cancelled.      Does the patient have a bleeding or clotting disorder? No     If YES, okay to schedule AND route to RN nurse pool    For any patients with platelet count <100, must be forwarded to provider    Any allergies to contrast dye, iodine, shellfish, or numbing and steroid medications? No    If YES, add allergy information to appointment notes AND route to the RN pool     If DEMARCUS and Contrast Dye / Iodine Allergy? DO NOT SCHEDULE, route to RN pool    Allergies: Patient has no known allergies.     Is patient diabetic?  No  If YES, instruct them to bring their glucometer.    Does patient have an active infection or treated for one within the past week? No     Is patient currently taking any antibiotics?  No     For patients on chronic, preventative, or prophylactic antibiotics, procedures may be scheduled.     For patients on antibiotics for active or recent infection:antibiotic course must have been completed for 4 days    Is patient currently taking any steroid medications? (i.e. Prednisone, Medrol)  No     For patients on steroid medications, course must have been completed for 4 days    Is patient actively being treated for cancer or immunocompromised? No  If YES, do NOT schedule and route to RN pool     Are you able to get on and off an exam table with minimal or no assistance? Yes  If NO, do NOT schedule and route to RN pool    Are you able to roll over and lay on your stomach with minimal or no assistance? Yes  If NO, do NOT schedule and route to RN pool     Has the patient had a flu shot or any other vaccinations within 7 days before or after the procedure.  No     Have you recently had a COVID  vaccine or have plans to get it in the near future? No    If yes, explain that for the vaccine to work best they need to:       wait 1 week before and 1 week after getting Vaccine #1    wait 1 week before and 2 weeks after getting Vaccine #2    wait 1 week before and 2 weeks after getting Vaccine BOOSTER    If patient has concerns about the timing, send to RN pool     Does patient have an MRI/CT?  YES: 2021  Check Procedure Scheduling Grid to see if required.      Was the MRI done within the last 3 years?  Yes    If yes, where was the MRI done i.e.[facility], [facility], [facility], [facility] etc? [facility]     If no, do not schedule and route to RN pool    If MRI was not done at [facility], [facility] or [facility] do NOT schedule and route to RN pool.      If pt has an imaging disc, the injection MAY be scheduled but pt has to bring disc to appt.     If they show up without the disc the injection cannot be done    Procedure Specific Instructions:      If celiac plexus block, informed patient NPO for 6 hours and that it is okay to take medications with sips of water, especially blood pressure medications  Not Applicable         If this is for a cervical procedure, informed patient that aspirin needs to be held for 6 days.   Not Applicable      If IV needed:    Do not schedule procedures requiring IV placement in the first appointment of the day or first appointment after lunch. Do NOT schedule at 0745, 0815 or 1245.     Instructed pt to arrive 30 minutes early for IV start if required. (Check Procedure Scheduling Grid)  Not Applicable    Reminders:      If you are started on any steroids or antibiotics between now and your appointment, you must contact us because the procedure may need to be cancelled.  No      For all procedures except radiofrequency ablations (RFAs) and spinal cord stimulator (SCS) trials, informed patient:    IV sedation is not provided for this procedure.  If you feel that an oral anti-anxiety  medication is needed, you can discuss this further with your referring provider or primary care provider.  The Pain Clinic provider will discuss specifics of what the procedure includes at your appointment.  Most procedures last 10-20 minutes.  We use numbing medications to help with any discomfort during the procedure.  Not Applicable      For patients 85 or older we recommend having an adult stay w/ them for the remainder of the day.       Does the patient have any questions?  NO  Pooja Peters  [facility]    significant other

## 2023-01-02 ENCOUNTER — MYC REFILL (OUTPATIENT)
Dept: PALLIATIVE MEDICINE | Facility: CLINIC | Age: 63
End: 2023-01-02

## 2023-01-02 ENCOUNTER — TELEPHONE (OUTPATIENT)
Dept: PALLIATIVE MEDICINE | Facility: CLINIC | Age: 63
End: 2023-01-02

## 2023-01-02 DIAGNOSIS — F11.90 CHRONIC, CONTINUOUS USE OF OPIOIDS: ICD-10-CM

## 2023-01-02 DIAGNOSIS — M47.812 CERVICAL SPONDYLOSIS WITHOUT MYELOPATHY: ICD-10-CM

## 2023-01-02 DIAGNOSIS — Z98.1 S/P CERVICAL SPINAL FUSION: ICD-10-CM

## 2023-01-02 DIAGNOSIS — M48.02 CERVICAL STENOSIS OF SPINAL CANAL: ICD-10-CM

## 2023-01-02 DIAGNOSIS — M50.30 DDD (DEGENERATIVE DISC DISEASE), CERVICAL: ICD-10-CM

## 2023-01-02 DIAGNOSIS — M79.18 MYOFASCIAL PAIN: ICD-10-CM

## 2023-01-02 RX ORDER — TRAMADOL HYDROCHLORIDE 50 MG/1
50 TABLET ORAL EVERY 6 HOURS PRN
Qty: 90 TABLET | Refills: 0 | Status: SHIPPED | OUTPATIENT
Start: 2023-01-02 | End: 2023-02-01

## 2023-01-02 RX ORDER — TRAMADOL HYDROCHLORIDE 200 MG/1
200 TABLET, EXTENDED RELEASE ORAL EVERY 24 HOURS
Qty: 30 TABLET | Refills: 0 | Status: SHIPPED | OUTPATIENT
Start: 2023-01-02 | End: 2023-02-01

## 2023-01-02 NOTE — TELEPHONE ENCOUNTER
Medication refill information reviewed.     Due date for traMADol (ULTRAM-ER) 200 MG 24 hr tablet and traMADol (ULTRAM) 50 MG tablet is 01/04/23     Prescriptions prepped for review.     Will route to provider.

## 2023-01-02 NOTE — TELEPHONE ENCOUNTER
Signed Prescriptions:                        Disp   Refills    traMADol (ULTRAM-ER) 200 MG 24 hr tablet   30 tab*0        Sig: Take 1 tablet (200 mg) by mouth every 24 hours Fill           01/02/23 Start 01/04/23; to last 30 days for           chronic pain  Authorizing Provider: MELANIE BENSON    traMADol (ULTRAM) 50 MG tablet             90 tab*0        Sig: Take 1 tablet (50 mg) by mouth every 6 hours as           needed for severe pain (7-10) Max 3/day. Fill           01/02/23 Start 01/04/23; to last 30 days for           chronic pain  Authorizing Provider: MELANIE BENSON    Reviewed [place]  January 2, 2023- no concerning fills.    Patient needs to make an appointment to see me in February, I am booking into February right now. Thanks.   Melanie STEVENS, RN CNP, FNP  [facility]  [facility]

## 2023-01-02 NOTE — TELEPHONE ENCOUNTER
Refills have been requested for the following medications:         traMADol (ULTRAM-ER) 200 MG 24 hr tablet [Melanie N]         traMADol (ULTRAM) 50 MG tablet [Melanie N]     Preferred pharmacy: [facility] - [address]

## 2023-01-02 NOTE — TELEPHONE ENCOUNTER
PA Initiation : 1/2/2023--Renewal  Medication:   Insurance Company: Tramadol HCl  mg tables  Pharmacy Filling the Rx: [facility] [address] [place] [address],  Filling Pharmacy Phone: [phone]  Filling Pharmacy Fax: [phone]  Start Date: 1/2/2023

## 2023-01-02 NOTE — TELEPHONE ENCOUNTER
Received call from patient requesting refill(s) of traMADol (ULTRAM-ER) 200 MG 24 hr tablet    Last filled 12.02.2022 and     traMADol (ULTRAM) 50 MG tablet    Last dispensed from pharmacy on 12.02.2022    Patient's last office/virtual visit by prescribing provider on 11.22.2022  Next office/virtual appointment scheduled for NS    .15.2022  CSA 06.02.2022    E-prescribe to [facility] [address] [place] [address],pharmacy    Will route to [facility] for review and preparation of prescription(s).

## 2023-01-02 NOTE — TELEPHONE ENCOUNTER
PA Initiation    Medication: Tramadol HCl  mg tablets  Insurance Company: [employer] - Phone [phone] Fax [phone]  Pharmacy Filling the Rx: [facility] [address] [place] [address]  Filling Pharmacy Phone: [phone]  Filling Pharmacy Fax: [phone]  Start Date: 1/2/2023    ANISH ZHONG (Resendiz: [id number])

## 2023-01-17 ENCOUNTER — TELEPHONE (OUTPATIENT)
Dept: PALLIATIVE MEDICINE | Facility: CLINIC | Age: 63
End: 2023-01-17
Payer: COMMERCIAL

## 2023-01-17 NOTE — TELEPHONE ENCOUNTER
Received forms from employer and placed on providers bin for review.     Linda Stephenson MA  [facility] [place]

## 2023-02-01 ENCOUNTER — MYC REFILL (OUTPATIENT)
Dept: PALLIATIVE MEDICINE | Facility: CLINIC | Age: 63
End: 2023-02-01
Payer: COMMERCIAL

## 2023-02-01 DIAGNOSIS — F11.90 CHRONIC, CONTINUOUS USE OF OPIOIDS: ICD-10-CM

## 2023-02-01 DIAGNOSIS — M48.02 CERVICAL STENOSIS OF SPINAL CANAL: ICD-10-CM

## 2023-02-01 DIAGNOSIS — M50.30 DDD (DEGENERATIVE DISC DISEASE), CERVICAL: ICD-10-CM

## 2023-02-01 DIAGNOSIS — M79.18 MYOFASCIAL PAIN: ICD-10-CM

## 2023-02-01 DIAGNOSIS — Z98.1 S/P CERVICAL SPINAL FUSION: ICD-10-CM

## 2023-02-01 DIAGNOSIS — M47.812 CERVICAL SPONDYLOSIS WITHOUT MYELOPATHY: ICD-10-CM

## 2023-02-01 RX ORDER — TRAMADOL HYDROCHLORIDE 200 MG/1
200 TABLET, EXTENDED RELEASE ORAL EVERY 24 HOURS
Qty: 30 TABLET | Refills: 0 | Status: SHIPPED | OUTPATIENT
Start: 2023-02-01 | End: 2023-02-13

## 2023-02-01 RX ORDER — TRAMADOL HYDROCHLORIDE 50 MG/1
50 TABLET ORAL EVERY 6 HOURS PRN
Qty: 90 TABLET | Refills: 0 | Status: SHIPPED | OUTPATIENT
Start: 2023-02-01 | End: 2023-02-13

## 2023-02-01 NOTE — TELEPHONE ENCOUNTER
Refills have been requested for the following medications:         traMADol (ULTRAM-ER) 200 MG 24 hr tablet [Melanie STEVENS CNP]         traMADol (ULTRAM) 50 MG tablet [Melanie STEVENS CNP]     Preferred pharmacy: [facility] [address] [place] [address]

## 2023-02-01 NOTE — TELEPHONE ENCOUNTER
Received call from patient requesting refill(s) of      traMADol (ULTRAM-ER) 200 MG 24 hr tablet    traMADol (ULTRAM) 50 MG tablet    Last dispensed from pharmacy on 01/02/23    Patient's last office/virtual visit by prescribing provider on 11/22/22  Next office/virtual appointment scheduled for 02/13/23    Last urine drug screen date 04/15/22  Current opioid agreement on file (completed within the last year) Yes Date of opioid agreement: 06/02/22    E-prescribe to   [address] [address]  Phone: [phone] Fax: [phone]    Will route to nursing pool for review and preparation of prescription(s).       Lashonda Lyles MA  [facility]

## 2023-02-01 NOTE — TELEPHONE ENCOUNTER
Signed Prescriptions:                        Disp   Refills    traMADol (ULTRAM-ER) 200 MG 24 hr tablet   30 tab*0        Sig: Take 1 tablet (200 mg) by mouth every 24 hours Fill           02/01/23 Start 02/03/23; to last 30 days for           chronic pain  Authorizing Provider: MELANIE BENSON    traMADol (ULTRAM) 50 MG tablet             90 tab*0        Sig: Take 1 tablet (50 mg) by mouth every 6 hours as           needed for severe pain (7-10) Max 3/day. Fill           02/01/23 Start 02/03/23; to last 30 days for           chronic pain  Authorizing Provider: MELANIE BENSON        Reviewed [place]  February 1, 2023- no concerning fills.    Melanie Benson APRN, RN CNP, FNP  [facility]  [facility]

## 2023-02-01 NOTE — TELEPHONE ENCOUNTER
Medication refill information reviewed.     Due date for traMADol (ULTRAM-ER) 200 MG 24 hr tablet and traMADol (ULTRAM) 50 MG tablet is 02/03/23     Prescriptions prepped for review.     Will route to provider.

## 2023-02-02 NOTE — TELEPHONE ENCOUNTER
Called and notified patient the requested refills have been fulfilled.Left a message Yes    Sent a message through [employer] letting patient know as well and when to fill and start and where to  at what pharmacy

## 2023-02-06 ASSESSMENT — ANXIETY QUESTIONNAIRES
GAD7 TOTAL SCORE: 1
GAD7 TOTAL SCORE: 1
7. FEELING AFRAID AS IF SOMETHING AWFUL MIGHT HAPPEN: NOT AT ALL
5. BEING SO RESTLESS THAT IT IS HARD TO SIT STILL: NOT AT ALL
2. NOT BEING ABLE TO STOP OR CONTROL WORRYING: NOT AT ALL
IF YOU CHECKED OFF ANY PROBLEMS ON THIS QUESTIONNAIRE, HOW DIFFICULT HAVE THESE PROBLEMS MADE IT FOR YOU TO DO YOUR WORK, TAKE CARE OF THINGS AT HOME, OR GET ALONG WITH OTHER PEOPLE: NOT DIFFICULT AT ALL
6. BECOMING EASILY ANNOYED OR IRRITABLE: NOT AT ALL
3. WORRYING TOO MUCH ABOUT DIFFERENT THINGS: NOT AT ALL
8. IF YOU CHECKED OFF ANY PROBLEMS, HOW DIFFICULT HAVE THESE MADE IT FOR YOU TO DO YOUR WORK, TAKE CARE OF THINGS AT HOME, OR GET ALONG WITH OTHER PEOPLE?: NOT DIFFICULT AT ALL
1. FEELING NERVOUS, ANXIOUS, OR ON EDGE: SEVERAL DAYS
7. FEELING AFRAID AS IF SOMETHING AWFUL MIGHT HAPPEN: NOT AT ALL
4. TROUBLE RELAXING: NOT AT ALL

## 2023-02-06 ASSESSMENT — PAIN SCALES - PAIN ENJOYMENT GENERAL ACTIVITY SCALE (PEG)
PEG_TOTALSCORE: 5.67
AVG_PAIN_PASTWEEK: 6
PEG_TOTALSCORE: 5.67
INTERFERED_ENJOYMENT_LIFE: 6
INTERFERED_GENERAL_ACTIVITY: 5
AVG_PAIN_PASTWEEK: 6
INTERFERED_ENJOYMENT_LIFE: 6
INTERFERED_GENERAL_ACTIVITY: 5

## 2023-02-13 ENCOUNTER — OFFICE VISIT (OUTPATIENT)
Dept: PALLIATIVE MEDICINE | Facility: CLINIC | Age: 63
End: 2023-02-13
Payer: COMMERCIAL

## 2023-02-13 VITALS — HEART RATE: 53 BPM | SYSTOLIC BLOOD PRESSURE: 127 MMHG | DIASTOLIC BLOOD PRESSURE: 85 MMHG

## 2023-02-13 DIAGNOSIS — F11.90 CHRONIC, CONTINUOUS USE OF OPIOIDS: ICD-10-CM

## 2023-02-13 DIAGNOSIS — M48.02 CERVICAL STENOSIS OF SPINAL CANAL: ICD-10-CM

## 2023-02-13 DIAGNOSIS — Z98.1 S/P CERVICAL SPINAL FUSION: ICD-10-CM

## 2023-02-13 DIAGNOSIS — M79.18 MYOFASCIAL PAIN: ICD-10-CM

## 2023-02-13 DIAGNOSIS — M47.812 CERVICAL SPONDYLOSIS WITHOUT MYELOPATHY: ICD-10-CM

## 2023-02-13 DIAGNOSIS — M70.832: Primary | ICD-10-CM

## 2023-02-13 DIAGNOSIS — M25.542 PAIN INVOLVING JOINT OF FINGER OF LEFT HAND: ICD-10-CM

## 2023-02-13 DIAGNOSIS — M50.30 DDD (DEGENERATIVE DISC DISEASE), CERVICAL: ICD-10-CM

## 2023-02-13 PROCEDURE — 99214 OFFICE O/P EST MOD 30 MIN: CPT | Performed by: NURSE PRACTITIONER

## 2023-02-13 RX ORDER — TRAMADOL HYDROCHLORIDE 200 MG/1
200 TABLET, EXTENDED RELEASE ORAL EVERY 24 HOURS
Qty: 30 TABLET | Refills: 0 | Status: SHIPPED | OUTPATIENT
Start: 2023-02-13 | End: 2023-04-03

## 2023-02-13 RX ORDER — TRAMADOL HYDROCHLORIDE 50 MG/1
50 TABLET ORAL EVERY 6 HOURS PRN
Qty: 90 TABLET | Refills: 0 | Status: SHIPPED | OUTPATIENT
Start: 2023-02-13 | End: 2023-04-03

## 2023-02-13 ASSESSMENT — PAIN SCALES - GENERAL: PAINLEVEL: SEVERE PAIN (7)

## 2023-02-13 NOTE — PATIENT INSTRUCTIONS
Plan:   Physical Therapy: not at present  Clinical Health Psychologist: none  Diagnostic Studies: none  Medication Management:   Continue tramadol ER 200mg once daily fill 3/3 and start 3/5  Continue tramadol 50mg Q 6-8 hours PRN, max of 3/day. Fill 3/3 and start 3/5  Continue gabapentin 900 mg TID  Continue Topamax 50 mg BID  Continue methocarbamol 500-1000 mg TID PRN muscle spasms  Continue cyclobenzaprine at bedtime  Further procedures recommended: none  Recommendations to PCP: see above  Follow up: 12 weeks video or in-person visit, your choice. Please call [phone] to make your follow-up appointment with me.   Referral to OT for hand therapy. Try this and if not helpful, let me know and I will refer to Sports Med     ----------------------------------------------------------------  Clinic Number:  [phone]   Call with any questions about your care and for scheduling assistance.   Calls are returned Monday through Friday between 8 AM and 4:30 PM. We usually get back to you within 2 business days depending on the issue/request.    If we are prescribing your medications:  For opioid medication refills, call the clinic or send a [employer] message 7 days in advance.  Please include:  Name of requested medication  Name of the pharmacy.  For non-opioid medications, call your pharmacy directly to request a refill. Please allow 3-4 days to be processed.   Per [place] State Law:  All controlled substance prescriptions must be filled within 30 days of being written.    For those controlled substances allowing refills, pickup must occur within 30 days of last fill.      We believe regular attendance is key to your success in our program!    Any time you are unable to keep your appointment we ask that you call us at least 24 hours in advance to cancel.This will allow us to offer the appointment time to another patient.   Multiple missed appointments may lead to dismissal from the clinic.

## 2023-02-13 NOTE — PROGRESS NOTES
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 50mg Q 6-8 hours PRN, max of 3/day. Fill 3/3 and start 3/5  3. Continue gabapentin 900 mg TID  4. Continue Topamax 50 mg BID  5. Continue methocarbamol 500-1000 mg TID PRN muscle spasms  6. Continue cyclobenzaprine at bedtime  5. Further procedures recommended: none  6. Recommendations to PCP: see above  7. Follow up: 12 weeks video or in-person visit, your choice. Please call [phone] to make your follow-up appointment with me.   8. Referral to OT for hand therapy. Try this and if not helpful, let me know and I will refer to Sports Med       BILLING TIME DOCUMENTATION:   TOTAL TIME includes:   Time spent preparing to see the patient: 0 minutes (reviewing records and tests)  Time spend face to face with the patient: 32 minutes  Time spent ordering tests, medications, procedures and referrals: 0 minutes  Time spent Referring and communicating with other healthcare professionals: 0 minutes  Documenting clinical information in Epic: 6 minutes    The total TIME spent on this patient on the day of the appointment was 38 minutes.       Melanie STEVENS, RN CNP, FNP  [facility]  [facility] [place]

## 2023-03-05 NOTE — TELEPHONE ENCOUNTER
[place] to schedule 8 weeks video visit    Marleny DOCKERY    [facility]     1 Principal Discharge DX:	Urinary retention  Secondary Diagnosis:	Cystitis, acute hemorrhagic

## 2023-03-11 ENCOUNTER — MYC REFILL (OUTPATIENT)
Dept: FAMILY MEDICINE | Facility: CLINIC | Age: 63
End: 2023-03-11
Payer: COMMERCIAL

## 2023-03-11 DIAGNOSIS — I25.10 MILD CAD: ICD-10-CM

## 2023-03-13 ENCOUNTER — THERAPY VISIT (OUTPATIENT)
Dept: OCCUPATIONAL THERAPY | Facility: CLINIC | Age: 63
End: 2023-03-13
Attending: NURSE PRACTITIONER
Payer: OTHER MISCELLANEOUS

## 2023-03-13 DIAGNOSIS — M25.542 PAIN INVOLVING JOINT OF FINGER OF LEFT HAND: ICD-10-CM

## 2023-03-13 DIAGNOSIS — M70.832: ICD-10-CM

## 2023-03-13 DIAGNOSIS — M25.522 LEFT ELBOW PAIN: Primary | ICD-10-CM

## 2023-03-13 PROCEDURE — 97110 THERAPEUTIC EXERCISES: CPT | Mod: GO | Performed by: OCCUPATIONAL THERAPIST

## 2023-03-13 PROCEDURE — 97112 NEUROMUSCULAR REEDUCATION: CPT | Mod: GO | Performed by: OCCUPATIONAL THERAPIST

## 2023-03-13 PROCEDURE — 97165 OT EVAL LOW COMPLEX 30 MIN: CPT | Mod: GO | Performed by: OCCUPATIONAL THERAPIST

## 2023-03-13 RX ORDER — ATORVASTATIN CALCIUM 20 MG/1
20 TABLET, FILM COATED ORAL DAILY
Qty: 90 TABLET | Refills: 0 | Status: SHIPPED | OUTPATIENT
Start: 2023-03-13 | End: 2023-06-29

## 2023-03-13 NOTE — PROGRESS NOTES
Hand Therapy Initial Evaluation     Current Date: 3/13/2023    Diagnosis: Left forearm pain/LEP and index finger/hand pain   DOI: 2/13/2023 (therapy referral)    Subjective:  Patient Health History  Truman Suero being seen for Arm/Hand pain and weakness. Starts in elbow and travels down to fingers. Joint at index finger (left hand) is very painful at times makes hard to  anything..     Problem began: 6/1/2018.   Problem occurred: . Just repetitive work   Pain is reported as 4/10 on pain scale.  General health as reported by patient is fair.  Pertinent medical history includes: none.     Medical allergies: none.   Surgeries include:  Other. Other surgery history details: Neck fusion.    Current medications:  Anti-seizure medication, high blood pressure medication, muscle relaxants and pain medication.    Current occupation is .   Primary job tasks include:  Lifting/carrying, prolonged standing and repetitive tasks.                Occupational Profile Information:  Left hand dominant  Prior functional level:  independent-shared household chores  Patient reports symptoms of pain, stiffness/loss of motion, weakness/loss of strength, numbness and tingling   Special tests:  none.    Previous treatment: self treatment with heat and icing, sometimes stretching   Barriers include:none  Mobility: No difficulty  Transportation: drives  Currently working in normal job with restrictions    Functional Outcome Measure:  Upper Extremity Functional Index  SCORE:   Column Totals: 48/80  (A lower score indicates greater disability.)    Objective:  Pain Level (Scale 0-10)   3/13/2023   At Rest 3-4   With Use 8-9     Pain Description  Date 3/13/2023   Location elbow, hand and forearm   Pain Quality Aching, Sharp, Tender and Throbbing   Frequency constant     Pain is worst  daytime and nighttime   Exacerbated by  repetitive use, gripping, scooping   Relieved by cold and heat   Progression Gradually  worsening      Posture  Forward Neck Posture and Rounded Forward Shoulders    Sensation  Decreased intermittently Median and Ulnar Nerve distribution per pt report    ROM  WNL's elbow, forearm and wrist, pain with elbow and wrist extension    Strength   (Measured in pounds)  Pain Report: - none  + mild    ++ moderate    +++ severe    3/13/2023 3/13/2023   Trials Right Left   1  2  3 75  66  67 44+  55+  45+   Average 69 48       3/13/2023 3/13/2023    Right Left   Elbow Ext 79 25++     Resisted Testing  Pain Report:  - none    + mild    ++ moderate    +++ severe    3/13/2023   Elbow Extension -   Elbow Flexion -   Supination  -   Pronation + slight   Wrist Ext with RD, Elbow Ext +   Wrist Ext with UD, Elbow Ext -   Wrist Flex with RD, Elbow Ext -   Wrist Flex with UD, Elbow Ext -   EDC with Elbow Ext ++   Long Finger Test ++     Neural Tension Testing  RNT: Radial Neurodynamic Test (based on LORIE Fernandez's ULNT)   3/13/2023   0-5 Scale S1 4/5  S2 5-/5   0/5: Arm across abdomen in coronal plane  1/5: Depress shoulder, ER to neutral, Abd shoulder to 45 degrees  2/5: ER shoulder to end range, keep elbow at 90 degrees  3/5: Extend elbow to 0 degrees  4/5: Fully rotate forearm  5/5: Flex wrist and fingers with UD  Notes:    (+) indicates beyond grade level but less than custodial to next level    (-) indicates over custodial to level    S1 onset/change of patient's symptoms    S2 definite stop point based on patient's discomfort level    Palpation  Pain Report:  - none    + mild    ++ moderate    +++ severe    3/13/2023   Pec Major -   Proximal Triceps + slight   Spiral Groove + slight   Distal Triceps +   Anconeus + slight   ECRB at LEP ++   ECU at LEP +   EDC at LEP ++   Radial Head + slight   Extensor Wad +   PIN Site + slight     Assessment:  Patient presents with symptoms consistent with diagnosis of left elbow and forearm pain/LEP, with conservative intervention.     Patient's limitations or Problem List includes:   Pain, Weakness, Decreased  and Tightness in musculature of the left elbow and forearm which interferes with the patient's ability to perform Self Care Tasks (dressing, eating), Work Tasks, Sleep Patterns, Recreational Activities and Household Chores as compared to previous level of function.    Rehab Potential:  Excellent - Return to full activity, no limitations    Patient will benefit from skilled Occupational Therapy to increase flexibility, overall strength and  strength and decrease pain to return to previous activity level and resume normal daily tasks and to reach their rehab potential.    Barriers to Learning:  No barrier    Communication Issues:  Patient appears to be able to clearly communicate and understand verbal and written communication and follow directions correctly.    Chart Review: Chart Review and Simple history review with patient    Identified Performance Deficits: bathing/showering, dressing, hygiene and grooming, home establishment and management, meal preparation and cleanup, sleep, work and leisure activities    Assessment of Occupational Performance:  5 or more Performance Deficits    Clinical Decision Making (Complexity): Low complexity    Treatment Explanation:  The following has been discussed with the patient:  RX ordered/plan of care  Anticipated outcomes  Possible risks and side effects    Plan:  Frequency:  1 X week, once daily  Duration:  for 2 months    Treatment Plan:    Modalities:    US   Therapeutic Exercise:    AROM, PROM, Tendon Gliding, Isotonics and Isometrics  Neuromuscular re-ed:   Nerve Gliding, Posture and Kinesiotaping  Manual Techniques:   Friction massage and Myofascial release  Orthotic Fabrication:    Static forearm based  Self Care:    Self Care Tasks and Ergonomic Considerations    Discharge Plan:  Achieve all LTG.  Independent in home treatment program.  Reach maximal therapeutic benefit.    Home Program:   Warmth for stiffness to forearm  extensors  Ice to lateral elbow after activity for pain  TFM to LEP  MFR to forearm extensors with tennis ball, avoid PIN site  PROM with stretch to forearm extensors and flexors  Trial Kinesiotaping to LEP and PIN site  Elbow strap/arm band as needed with activities, monitor for PIN site irritation  Wear OTC wrist splint sleeping  Avoid activities that exacerbate pain in the elbow  Lift with elbows at sides and palms up    Next Visit:  See in 1 weeks  Assess response to HEP and kinesiotaping   Consider US to LEP if continued tenderness  Add radial nerve gliding  Add postural exercises and pec stretches   Progress to eccentric wrist extension strengthening and 3 position  strengthening

## 2023-03-20 ENCOUNTER — THERAPY VISIT (OUTPATIENT)
Dept: OCCUPATIONAL THERAPY | Facility: CLINIC | Age: 63
End: 2023-03-20
Attending: NURSE PRACTITIONER
Payer: OTHER MISCELLANEOUS

## 2023-03-20 DIAGNOSIS — M25.542 PAIN INVOLVING JOINT OF FINGER OF LEFT HAND: ICD-10-CM

## 2023-03-20 DIAGNOSIS — M25.522 LEFT ELBOW PAIN: Primary | ICD-10-CM

## 2023-03-20 DIAGNOSIS — M70.832: ICD-10-CM

## 2023-03-20 PROCEDURE — 97110 THERAPEUTIC EXERCISES: CPT | Mod: GO | Performed by: OCCUPATIONAL THERAPIST

## 2023-03-20 PROCEDURE — 97140 MANUAL THERAPY 1/> REGIONS: CPT | Mod: GO | Performed by: OCCUPATIONAL THERAPIST

## 2023-03-20 PROCEDURE — 97035 APP MDLTY 1+ULTRASOUND EA 15: CPT | Mod: GO | Performed by: OCCUPATIONAL THERAPIST

## 2023-03-20 NOTE — PROGRESS NOTES
SOAP note objective information for 3/20/2023:    Diagnosis: Left forearm pain/LEP and index finger/hand pain   DOI: 2/13/2023 (therapy referral)    Pain Level (Scale 0-10)   3/13/2023 3/20/2023   At Rest 3-4 4   With Use 8-9 7     Palpation  Pain Report:  - none    + mild    ++ moderate    +++ severe    3/13/2023 3/20/2023   Pec Major - NT   Proximal Triceps + slight NT   Spiral Groove + slight NT   Distal Triceps + -   Anconeus + slight + slight   ECRB at LEP ++ +   ECU at LEP + -   EDC at LEP ++ + slight   Radial Head + slight -   Extensor Wad + + tightness   PIN Site + slight + minimal     Home Program:   Warmth for stiffness to forearm extensors  Ice to lateral elbow after activity for pain  TFM to LEP  MFR to forearm extensors with tennis ball, avoid PIN site  PROM with stretch to forearm extensors and flexors  Radial nerve gliding   Kinesiotaping to LEP  Elbow strap/arm band as needed with activities, monitor for PIN site irritation  Wear OTC wrist splint sleeping  Avoid activities that exacerbate pain in the elbow  Lift with elbows at sides and palms up    Next Visit:  See in 1 week  Assess response to radial nerve gliding  Continue US to LEP, MFR and TFM  Add postural exercises and pec stretches   Progress to eccentric wrist extension strengthening and 3 position  strengthening     Please refer to the daily flowsheet for treatment today, total treatment time and time spent performing 1:1 timed codes.

## 2023-03-29 ENCOUNTER — THERAPY VISIT (OUTPATIENT)
Dept: OCCUPATIONAL THERAPY | Facility: CLINIC | Age: 63
End: 2023-03-29
Payer: OTHER MISCELLANEOUS

## 2023-03-29 DIAGNOSIS — M25.542 PAIN INVOLVING JOINT OF FINGER OF LEFT HAND: ICD-10-CM

## 2023-03-29 DIAGNOSIS — M25.522 LEFT ELBOW PAIN: Primary | ICD-10-CM

## 2023-03-29 DIAGNOSIS — M70.832: ICD-10-CM

## 2023-03-29 PROCEDURE — 97035 APP MDLTY 1+ULTRASOUND EA 15: CPT | Mod: GO | Performed by: OCCUPATIONAL THERAPIST

## 2023-03-29 PROCEDURE — 97110 THERAPEUTIC EXERCISES: CPT | Mod: GO | Performed by: OCCUPATIONAL THERAPIST

## 2023-03-29 PROCEDURE — 97140 MANUAL THERAPY 1/> REGIONS: CPT | Mod: GO | Performed by: OCCUPATIONAL THERAPIST

## 2023-03-29 NOTE — PROGRESS NOTES
SOAP note objective information for 3/29/2023:    Diagnosis: Left forearm pain/LEP and index finger/hand pain   DOI: 2/13/2023 (therapy referral)    Pain Level (Scale 0-10)   3/13/2023 3/20/2023 3/29/2023   At Rest 3-4 4 3-4   With Use 8-9 7 7-8     Palpation  Pain Report:  - none    + mild    ++ moderate    +++ severe    3/13/2023 3/20/2023 3/29/2023   Pec Major - NT NT   Proximal Triceps + slight NT NT   Spiral Groove + slight NT NT   Distal Triceps + - -   Anconeus + slight + slight + slight   ECRB at LEP ++ + +   ECU at LEP + - -   EDC at LEP ++ + slight + slight   Radial Head + slight - -   Extensor Wad + + tightness -   PIN Site + slight + minimal + slight     Home Program:   Warmth for stiffness to forearm extensors  Ice to lateral elbow after activity for pain  TFM to LEP  MFR to forearm extensors with tennis ball, avoid PIN site  PROM with stretch to forearm extensors and flexors  Radial nerve gliding   Eccentric wrist extension strengthening   Kinesiotaping to LEP  Elbow strap/arm band as needed with activities, monitor for PIN site irritation  Wear OTC wrist splint sleeping  Avoid activities that exacerbate pain in the elbow  Lift with elbows at sides and palms up    Next Visit:  See in 1 week  Assess response to eccentric wrist extension strengthening   Continue US to LEP, MFR and TFM  Add postural exercises and pec stretches   Progress to 3 position  strengthening     Please refer to the daily flowsheet for treatment today, total treatment time and time spent performing 1:1 timed codes.

## 2023-04-03 ENCOUNTER — MYC REFILL (OUTPATIENT)
Dept: PALLIATIVE MEDICINE | Facility: CLINIC | Age: 63
End: 2023-04-03
Payer: COMMERCIAL

## 2023-04-03 DIAGNOSIS — M48.02 CERVICAL STENOSIS OF SPINAL CANAL: ICD-10-CM

## 2023-04-03 DIAGNOSIS — M50.30 DDD (DEGENERATIVE DISC DISEASE), CERVICAL: ICD-10-CM

## 2023-04-03 DIAGNOSIS — M47.812 CERVICAL SPONDYLOSIS WITHOUT MYELOPATHY: ICD-10-CM

## 2023-04-03 DIAGNOSIS — F11.90 CHRONIC, CONTINUOUS USE OF OPIOIDS: ICD-10-CM

## 2023-04-03 DIAGNOSIS — M79.18 MYOFASCIAL PAIN: ICD-10-CM

## 2023-04-03 DIAGNOSIS — Z98.1 S/P CERVICAL SPINAL FUSION: ICD-10-CM

## 2023-04-03 RX ORDER — TRAMADOL HYDROCHLORIDE 200 MG/1
200 TABLET, EXTENDED RELEASE ORAL EVERY 24 HOURS
Qty: 30 TABLET | Refills: 0 | Status: SHIPPED | OUTPATIENT
Start: 2023-04-03 | End: 2023-05-01

## 2023-04-03 RX ORDER — TRAMADOL HYDROCHLORIDE 50 MG/1
50 TABLET ORAL EVERY 6 HOURS PRN
Qty: 90 TABLET | Refills: 0 | Status: SHIPPED | OUTPATIENT
Start: 2023-04-03 | End: 2023-05-01

## 2023-04-03 NOTE — TELEPHONE ENCOUNTER
Refills have been requested for the following medications:         traMADol (ULTRAM) 50 MG tablet [Melanie STEVENS CNP]         traMADol (ULTRAM-ER) 200 MG 24 hr tablet [Melanie STEVENS CNP]     Preferred pharmacy: [facility] [address] [place] [address] [address] [place] [address]

## 2023-04-03 NOTE — TELEPHONE ENCOUNTER
Patient requesting refill(s) of  traMADol (ULTRAM) 50 MG tablet   Last dispensed from pharmacy on 3/4/23    traMADol (ULTRAM-ER) 200 MG 24 hr tablet   Last dispensed from pharmacy on 3/4/23    Patient's last office/virtual visit by prescribing provider on 2/13/23  Next office/virtual appointment scheduled for 05/08/23    Last urine drug screen date 4/15/22  Current opioid agreement on file (completed within the last year) Yes Date of opioid agreement: 06/10/22    E-prescribe to : [id number] - NICOLE BERGER     Will route to nursing [place] for review and preparation of prescription(s).

## 2023-04-03 NOTE — TELEPHONE ENCOUNTER
Signed Prescriptions:                        Disp   Refills    traMADol (ULTRAM) 50 MG tablet             90 tab*0        Sig: Take 1 tablet (50 mg) by mouth every 6 hours as           needed for severe pain Max 3/day. Fill 04/03/23           start 04/04/23; to last 30 days for chronic pain  Authorizing Provider: MELANIE BENSON    traMADol (ULTRAM-ER) 200 MG 24 hr tablet   30 tab*0        Sig: Take 1 tablet (200 mg) by mouth every 24 hours Fill           04/03/23 Start 04/04/23; to last 30 days for           chronic pain  Authorizing Provider: MELANIE BENSON        Reviewed [place]  April 3, 2023- no concerning fills.    Melanie Benson APRN, RN CNP, FNP  [facility]  [facility] [place]

## 2023-04-03 NOTE — TELEPHONE ENCOUNTER
Medication refill information reviewed.     Due date for  traMADol (ULTRAM) 50 MG tablet  And traMADol (ULTRAM-ER) 200 MG 24 hr tablet is 04/04/23     Prescriptions prepped for review.     Will route to provider.

## 2023-04-05 ENCOUNTER — THERAPY VISIT (OUTPATIENT)
Dept: OCCUPATIONAL THERAPY | Facility: CLINIC | Age: 63
End: 2023-04-05
Payer: OTHER MISCELLANEOUS

## 2023-04-05 DIAGNOSIS — M25.522 LEFT ELBOW PAIN: Primary | ICD-10-CM

## 2023-04-05 DIAGNOSIS — M70.832: ICD-10-CM

## 2023-04-05 DIAGNOSIS — M25.542 PAIN INVOLVING JOINT OF FINGER OF LEFT HAND: ICD-10-CM

## 2023-04-05 PROCEDURE — 97110 THERAPEUTIC EXERCISES: CPT | Mod: GO | Performed by: OCCUPATIONAL THERAPIST

## 2023-04-05 PROCEDURE — 97035 APP MDLTY 1+ULTRASOUND EA 15: CPT | Mod: GO | Performed by: OCCUPATIONAL THERAPIST

## 2023-04-05 PROCEDURE — 97140 MANUAL THERAPY 1/> REGIONS: CPT | Mod: GO | Performed by: OCCUPATIONAL THERAPIST

## 2023-04-05 NOTE — PROGRESS NOTES
SOAP note objective information for 4/5/2023:    Diagnosis: Left forearm pain/LEP and index finger/hand pain   DOI: 2/13/2023 (therapy referral)    Pain Level (Scale 0-10)   3/13/2023 3/20/2023 3/29/2023 4/5/2032   At Rest 3-4 4 3-4 2-3   With Use 8-9 7 7-8 5     Palpation  Pain Report:  - none    + mild    ++ moderate    +++ severe    3/13/2023 3/20/2023 3/29/2023 4/5/2032   Pec Major - NT NT    Proximal Triceps + slight NT NT    Spiral Groove + slight NT NT    Distal Triceps + - -    Anconeus + slight + slight + slight + slight   ECRB at LEP ++ + + +   ECU at LEP + - - -   EDC at LEP ++ + slight + slight + slight   Radial Head + slight - - -   Extensor Wad + + tightness - -   PIN Site + slight + minimal + slight -     Home Program:   Warmth for stiffness to forearm extensors  Ice to lateral elbow after activity for pain  TFM to LEP  MFR to forearm extensors with tennis ball, avoid PIN site  PROM with stretch to forearm extensors and flexors  Radial nerve gliding   Eccentric wrist extension strengthening   3 position gripping, start with elbow at side  Kinesiotaping to LEP  Elbow strap/arm band as needed with activities, monitor for PIN site irritation  Wear OTC wrist splint sleeping  Avoid activities that exacerbate pain in the elbow  Lift with elbows at sides and palms up    Next Visit:  See in 1 week  Continue US to LEP, MFR and TFM x 2-4 more sessions   Assess response to 3 position  strengthening, progress to elbow extended  Add postural exercises and pec stretches     Please refer to the daily flowsheet for treatment today, total treatment time and time spent performing 1:1 timed codes.

## 2023-04-17 ENCOUNTER — THERAPY VISIT (OUTPATIENT)
Dept: OCCUPATIONAL THERAPY | Facility: CLINIC | Age: 63
End: 2023-04-17
Payer: OTHER MISCELLANEOUS

## 2023-04-17 DIAGNOSIS — M70.832: ICD-10-CM

## 2023-04-17 DIAGNOSIS — M25.522 LEFT ELBOW PAIN: Primary | ICD-10-CM

## 2023-04-17 DIAGNOSIS — M25.542 PAIN INVOLVING JOINT OF FINGER OF LEFT HAND: ICD-10-CM

## 2023-04-17 PROCEDURE — 97035 APP MDLTY 1+ULTRASOUND EA 15: CPT | Mod: GO | Performed by: OCCUPATIONAL THERAPIST

## 2023-04-17 PROCEDURE — 97110 THERAPEUTIC EXERCISES: CPT | Mod: GO | Performed by: OCCUPATIONAL THERAPIST

## 2023-04-17 PROCEDURE — 97140 MANUAL THERAPY 1/> REGIONS: CPT | Mod: GO | Performed by: OCCUPATIONAL THERAPIST

## 2023-04-17 NOTE — PROGRESS NOTES
SOAP note objective information for 4/17/2023:    Diagnosis: Left forearm pain/LEP and index finger/hand pain   DOI: 2/13/2023 (therapy referral)    Pain Level (Scale 0-10)   3/13/2023 3/20/2023 3/29/2023 4/5/2032 4/17/2023   At Rest 3-4 4 3-4 2-3 0   With Use 8-9 7 7-8 5 5     Palpation  Pain Report:  - none    + mild    ++ moderate    +++ severe    3/13/2023 3/20/2023 3/29/2023 4/5/2032 4/17/2023   Pec Major - NT NT     Proximal Triceps + slight NT NT     Spiral Groove + slight NT NT     Distal Triceps + - -  -   Anconeus + slight + slight + slight + slight + slight   ECRB at LEP ++ + + + + slight   ECU at LEP + - - - -   EDC at LEP ++ + slight + slight + slight + slight   Radial Head + slight - - - + slight   Extensor Wad + + tightness - - + slight   PIN Site + slight + minimal + slight - -     Home Program:   Warmth for stiffness to forearm extensors  Ice to lateral elbow after activity for pain  TFM to LEP  MFR to forearm extensors with tennis ball, avoid PIN site  PROM with stretch to forearm extensors and flexors  Radial nerve gliding   Eccentric wrist extension strengthening   3 position gripping, start with elbow at side, progress to elbow extended as able  Postural exercises with chin tucks and scapular retraction   Pec stretches in doorway  Kinesiotaping to LEP  Elbow strap/arm band as needed with activities, monitor for PIN site irritation  Wear OTC wrist splint sleeping  Avoid activities that exacerbate pain in the elbow  Lift with elbows at sides and palms up    Next Visit:  See in 1 week  Continue US to LEP, MFR and TFM x 1-3 more sessions   Assess response to 3 position  strengthening with elbow extended, postural exercises and pec stretches   Progress report and UEFI to determine POC    Please refer to the daily flowsheet for treatment today, total treatment time and time spent performing 1:1 timed codes.

## 2023-04-20 ENCOUNTER — PATIENT OUTREACH (OUTPATIENT)
Dept: CARE COORDINATION | Facility: CLINIC | Age: 63
End: 2023-04-20
Payer: COMMERCIAL

## 2023-04-24 ENCOUNTER — THERAPY VISIT (OUTPATIENT)
Dept: OCCUPATIONAL THERAPY | Facility: CLINIC | Age: 63
End: 2023-04-24
Payer: OTHER MISCELLANEOUS

## 2023-04-24 DIAGNOSIS — M70.832: ICD-10-CM

## 2023-04-24 DIAGNOSIS — M25.542 PAIN INVOLVING JOINT OF FINGER OF LEFT HAND: ICD-10-CM

## 2023-04-24 DIAGNOSIS — M25.522 LEFT ELBOW PAIN: Primary | ICD-10-CM

## 2023-04-24 PROCEDURE — 97140 MANUAL THERAPY 1/> REGIONS: CPT | Mod: GO | Performed by: OCCUPATIONAL THERAPIST

## 2023-04-24 PROCEDURE — 97035 APP MDLTY 1+ULTRASOUND EA 15: CPT | Mod: GO | Performed by: OCCUPATIONAL THERAPIST

## 2023-04-24 PROCEDURE — 97110 THERAPEUTIC EXERCISES: CPT | Mod: GO | Performed by: OCCUPATIONAL THERAPIST

## 2023-04-24 NOTE — PROGRESS NOTES
Hand Therapy Progress/Discharge Note    Current Date:  4/24/2023    Reporting period is 3/13/2023 to 4/24/2023    Diagnosis: Left forearm pain/LEP and index finger/hand pain   DOI: 2/13/2023 (therapy referral)    Subjective:   Subjective changes noted by patient:  The elbow is feeling pretty good and I feel like I know to calm it down and I have better ergonomics.  Functional changes noted by patient:  Improvement in Work Tasks  Patient has noted adverse reaction to:  None    Functional Outcome Measure:  Upper Extremity Functional Index  SCORE:   Column Totals: 59/80  (A lower score indicates greater disability.)    Objective:  Pain Level (Scale 0-10)   3/13/2023 4/24/2023   At Rest 3-4 3   With Use 8-9 4-5  7 at worst     Pain Description  Date 3/13/2023 4/24/2023   Location elbow, hand and forearm Elbow and forearm into hand   Pain Quality Aching, Sharp, Tender and Throbbing Aching, throbbing   Frequency constant   constant   Pain is worst  daytime and nighttime daytime   Exacerbated by  repetitive use, gripping, scooping Repetitive use, gripping, scooping   Relieved by cold and heat Massage, cold, ice, taping   Progression Gradually worsening  Overall improved     Posture  Forward Neck Posture and Rounded Forward Shoulders    Sensation  Continues to be decreased intermittently Median and Ulnar Nerve distribution per pt report    ROM  WNL's elbow, forearm and wrist,continued pain with elbow and wrist extension    Strength   (Measured in pounds)  Pain Report: - none  + mild    ++ moderate    +++ severe    3/13/2023 3/13/2023 4/24/2023   Trials Right Left Left   1  2  3 75  66  67 44+  55+  45+ 89-  72-  67-   Average 69 48 76       3/13/2023 3/13/2023 4/24/2023    Right Left Left   Elbow Ext 79 25++ 27+     Resisted Testing  Pain Report:  - none    + mild    ++ moderate    +++ severe    3/13/2023 4/24/2023   Elbow Extension - -   Elbow Flexion - -   Supination  - -   Pronation + slight -   Wrist Ext with  RD, Elbow Ext + -   Wrist Ext with UD, Elbow Ext - -   Wrist Flex with RD, Elbow Ext - -   Wrist Flex with UD, Elbow Ext - -   EDC with Elbow Ext ++ + slight   Long Finger Test ++ -     Neural Tension Testing  RNT: Radial Neurodynamic Test (based on DS Rebecca's ULNT)   3/13/2023 4/24/2023   0-5 Scale S1 4/5  S2 5-/5 5/5 no pain   0/5: Arm across abdomen in coronal plane  1/5: Depress shoulder, ER to neutral, Abd shoulder to 45 degrees  2/5: ER shoulder to end range, keep elbow at 90 degrees  3/5: Extend elbow to 0 degrees  4/5: Fully rotate forearm  5/5: Flex wrist and fingers with UD  Notes:    (+) indicates beyond grade level but less than retirement to next level    (-) indicates over retirement to level    S1 onset/change of patient's symptoms    S2 definite stop point based on patient's discomfort level    Palpation  Pain Report:  - none    + mild    ++ moderate    +++ severe    3/13/2023 4/24/2023   Pec Major - -   Proximal Triceps + slight -   Spiral Groove + slight -   Distal Triceps + -   Anconeus + slight + slight   ECRB at LEP ++ +   ECU at LEP + -   EDC at LEP ++ -   Radial Head + slight -   Extensor Wad + -   PIN Site + slight + slight     Please refer to the daily flowsheet for treatment provided today.     Assessment:  Response to therapy has been improvement to:  Flexibility:  less tightness in involved muscles  Strength:    Pain:  intensity of pain is decreased and less tender over affected area    Overall Assessment:  Patient's symptoms are resolving and patient is independent in home exercise program  STG/LTG:  STGoals have been reviewed and progress or achievement has occurred;  see goal sheet for details and updates.  I have re-evaluated this patient and find that the nature, scope, duration and intensity of the therapy is appropriate for the medical condition of the patient.    Plan:  Frequency/Duration:  Discharge from Hand Therapy; continue home program.    Recommendations for Continued  Therapy  Home Program:   Warmth for stiffness to forearm extensors  Ice to lateral elbow after activity for pain  TFM to LEP  MFR to forearm extensors with tennis ball, avoid PIN site  PROM with stretch to forearm extensors and flexors  Radial nerve gliding   Eccentric wrist extension strengthening   3 position gripping, start with elbow at side, progress to elbow extended as able  Postural exercises with chin tucks and scapular retraction   Pec stretches in doorway  Kinesiotaping to LEP  Elbow strap/arm band as needed with activities, monitor for PIN site irritation  Wear OTC wrist splint sleeping  Avoid activities that exacerbate pain in the elbow  Lift with elbows at sides and palms up

## 2023-04-28 DIAGNOSIS — I77.810 ASCENDING AORTA DILATATION (H): ICD-10-CM

## 2023-04-28 DIAGNOSIS — I25.10 MILD CAD: ICD-10-CM

## 2023-04-28 RX ORDER — METOPROLOL SUCCINATE 25 MG/1
25 TABLET, EXTENDED RELEASE ORAL DAILY
Qty: 90 TABLET | Refills: 0 | Status: SHIPPED | OUTPATIENT
Start: 2023-04-28 | End: 2023-08-16

## 2023-05-01 ENCOUNTER — MYC REFILL (OUTPATIENT)
Dept: PALLIATIVE MEDICINE | Facility: CLINIC | Age: 63
End: 2023-05-01
Payer: COMMERCIAL

## 2023-05-01 DIAGNOSIS — M48.02 CERVICAL STENOSIS OF SPINAL CANAL: ICD-10-CM

## 2023-05-01 DIAGNOSIS — M50.30 DDD (DEGENERATIVE DISC DISEASE), CERVICAL: ICD-10-CM

## 2023-05-01 DIAGNOSIS — Z98.1 S/P CERVICAL SPINAL FUSION: ICD-10-CM

## 2023-05-01 DIAGNOSIS — M79.18 MYOFASCIAL PAIN: ICD-10-CM

## 2023-05-01 DIAGNOSIS — M47.812 CERVICAL SPONDYLOSIS WITHOUT MYELOPATHY: ICD-10-CM

## 2023-05-01 DIAGNOSIS — F11.90 CHRONIC, CONTINUOUS USE OF OPIOIDS: ICD-10-CM

## 2023-05-01 RX ORDER — TRAMADOL HYDROCHLORIDE 50 MG/1
50 TABLET ORAL EVERY 6 HOURS PRN
Qty: 90 TABLET | Refills: 0 | Status: SHIPPED | OUTPATIENT
Start: 2023-05-01 | End: 2023-05-08

## 2023-05-01 RX ORDER — TRAMADOL HYDROCHLORIDE 200 MG/1
200 TABLET, EXTENDED RELEASE ORAL EVERY 24 HOURS
Qty: 30 TABLET | Refills: 0 | Status: SHIPPED | OUTPATIENT
Start: 2023-05-01 | End: 2023-05-08

## 2023-05-01 NOTE — TELEPHONE ENCOUNTER
Patient requesting refill(s) of   traMADol (ULTRAM) 50 MG tablet   Last dispensed from pharmacy on 4/4/23     traMADol (ULTRAM-ER) 200 MG 24 hr tablet   Last dispensed from pharmacy on 4/4/23    Patient's last office/virtual visit by prescribing provider on 2/13/23  Next office/virtual appointment scheduled for 5/8/23    Last urine drug screen date 4/15/22  Current opioid agreement on file (completed within the last year) Yes Date of opioid agreement: 6/10/22    E-prescribe to [id number]  NICOLE Cintron     Will route to nursing [place] for review and preparation of prescription(s).

## 2023-05-01 NOTE — TELEPHONE ENCOUNTER
Refills have been requested for the following medications:         traMADol (ULTRAM) 50 MG tablet [Melanie STEVENS CNP]         traMADol (ULTRAM-ER) 200 MG 24 hr tablet [Melanie STEVENS CNP]     Preferred pharmacy: [facility] [address] [place] [address] [address] [place] [address]

## 2023-05-01 NOTE — TELEPHONE ENCOUNTER
Signed Prescriptions:                        Disp   Refills    traMADol (ULTRAM) 50 MG tablet             90 tab*0        Sig: Take 1 tablet (50 mg) by mouth every 6 hours as           needed for severe pain Max 3/day. Fill 05/02/23           start 05/04/23; to last 30 days for chronic pain  Authorizing Provider: MELANIE BENSON    traMADol (ULTRAM-ER) 200 MG 24 hr tablet   30 tab*0        Sig: Take 1 tablet (200 mg) by mouth every 24 hours Fill           05/02/23 Start 05/04/23; to last 30 days for           chronic pain  Authorizing Provider: MELANIE BENSON        Reviewed [place]  May 1, 2023- no concerning fills.    Melanie Benson APRN, RN CNP, FNP  [facility]  [facility] [place]

## 2023-05-01 NOTE — TELEPHONE ENCOUNTER
Medication refill information reviewed.     Due date for   traMADol (ULTRAM) 50 MG tablet   Last dispensed from pharmacy on 4/4/23      traMADol (ULTRAM-ER) 200 MG 24 hr tablet   Last dispensed from pharmacy on 4/4/23     are 5/4/2023     Prescriptions prepped for review.     Will route to provider.     Marleny Edmond RN  [facility]  [phone]

## 2023-05-04 ENCOUNTER — PATIENT OUTREACH (OUTPATIENT)
Dept: CARE COORDINATION | Facility: CLINIC | Age: 63
End: 2023-05-04
Payer: COMMERCIAL

## 2023-05-08 ENCOUNTER — OFFICE VISIT (OUTPATIENT)
Dept: PALLIATIVE MEDICINE | Facility: CLINIC | Age: 63
End: 2023-05-08
Payer: COMMERCIAL

## 2023-05-08 ENCOUNTER — LAB (OUTPATIENT)
Dept: LAB | Facility: CLINIC | Age: 63
End: 2023-05-08
Payer: COMMERCIAL

## 2023-05-08 VITALS — DIASTOLIC BLOOD PRESSURE: 74 MMHG | SYSTOLIC BLOOD PRESSURE: 114 MMHG | HEART RATE: 50 BPM

## 2023-05-08 DIAGNOSIS — R51.9 CHRONIC INTRACTABLE HEADACHE, UNSPECIFIED HEADACHE TYPE: Primary | ICD-10-CM

## 2023-05-08 DIAGNOSIS — Z98.1 S/P CERVICAL SPINAL FUSION: ICD-10-CM

## 2023-05-08 DIAGNOSIS — M79.18 MYOFASCIAL PAIN: ICD-10-CM

## 2023-05-08 DIAGNOSIS — F11.90 CHRONIC, CONTINUOUS USE OF OPIOIDS: ICD-10-CM

## 2023-05-08 DIAGNOSIS — Z79.891 ENCOUNTER FOR LONG-TERM USE OF OPIATE ANALGESIC: ICD-10-CM

## 2023-05-08 DIAGNOSIS — M50.30 DDD (DEGENERATIVE DISC DISEASE), CERVICAL: ICD-10-CM

## 2023-05-08 DIAGNOSIS — M48.02 CERVICAL STENOSIS OF SPINAL CANAL: ICD-10-CM

## 2023-05-08 DIAGNOSIS — M47.812 CERVICAL SPONDYLOSIS WITHOUT MYELOPATHY: ICD-10-CM

## 2023-05-08 DIAGNOSIS — G89.29 CHRONIC INTRACTABLE HEADACHE, UNSPECIFIED HEADACHE TYPE: Primary | ICD-10-CM

## 2023-05-08 PROCEDURE — 99214 OFFICE O/P EST MOD 30 MIN: CPT | Performed by: NURSE PRACTITIONER

## 2023-05-08 PROCEDURE — 80320 DRUG SCREEN QUANTALCOHOLS: CPT

## 2023-05-08 RX ORDER — TOPIRAMATE 50 MG/1
TABLET, FILM COATED ORAL
Qty: 270 TABLET | Refills: 1 | Status: SHIPPED | OUTPATIENT
Start: 2023-05-08 | End: 2023-08-16

## 2023-05-08 RX ORDER — TRAMADOL HYDROCHLORIDE 50 MG/1
50 TABLET ORAL EVERY 6 HOURS PRN
Qty: 90 TABLET | Refills: 0 | Status: SHIPPED | OUTPATIENT
Start: 2023-05-08 | End: 2023-06-30

## 2023-05-08 RX ORDER — TRAMADOL HYDROCHLORIDE 200 MG/1
200 TABLET, EXTENDED RELEASE ORAL EVERY 24 HOURS
Qty: 30 TABLET | Refills: 0 | Status: SHIPPED | OUTPATIENT
Start: 2023-05-08 | End: 2023-06-30

## 2023-05-08 ASSESSMENT — PAIN SCALES - GENERAL: PAINLEVEL: MODERATE PAIN (5)

## 2023-05-08 NOTE — LETTER
Opioid / Opioid Plus Controlled Substance Agreement    This is an agreement between you and your provider about the safe and appropriate use of controlled substance/opioids prescribed by your care team. Controlled substances are medicines that can cause physical and mental dependence (abuse).    There are strict laws about having and using these medicines. We here at [facility] are committing to working with you in your efforts to get better. To support you in this work, we ll help you schedule regular office appointments for medicine refills. If we must cancel or change your appointment for any reason, we ll make sure you have enough medicine to last until your next appointment.     As a Provider, I will:  Listen carefully to your concerns and treat you with respect.   Recommend a treatment plan that I believe is in your best interest. This plan may involve therapies other than opioid pain medication.   Talk with you often about the possible benefits, and the risk of harm of any medicine that we prescribe for you.   Provide a plan on how to taper (discontinue or go off) using this medicine if the decision is made to stop its use.    As a Patient, I understand that opioid(s):   Are a controlled substance prescribed by my care team to help me function or work and manage my condition(s).   Are strong medicines and can cause serious side effects such as:  Drowsiness, which can seriously affect my driving ability  A lower breathing rate, enough to cause death  Harm to my thinking ability   Depression   Abuse of and addiction to this medicine  Need to be taken exactly as prescribed. Combining opioids with certain medicines or chemicals (such as illegal drugs, sedatives, sleeping pills, and benzodiazepines) can be dangerous or even fatal. If I stop opioids suddenly, I may have severe withdrawal symptoms.  Do not work for all types of pain nor for all patients. If they re not helpful, I may be asked to stop  them.        The risks, benefits and side effects of these medicine(s) were explained to me. I agree that:  I will take part in other treatments as advised by my care team. This may be psychiatry or counseling, physical therapy, behavioral therapy, group treatment or a referral to a specialist.     I will keep all my appointments. I understand that this is part of the monitoring of opioids. My care team may require an office visit for EVERY opioid/controlled substance refill. If I miss appointments or don t follow instructions, my care team may stop my medicine.    I will take my medicines as prescribed. I will not change the dose or schedule unless my care team tells me to. There will be no refills if I run out early.     I may be asked to come to the clinic and complete a urine drug test or complete a pill count at any time. If I don t give a urine sample or participate in a pill count, the care team may stop my medicine.    I will only receive prescriptions from this clinic for chronic pain. If I am treated by another provider for acute pain issues, I will tell them that I am taking opioid pain medication for chronic pain and that I have a treatment agreement with this provider. I will inform my [facility] care team within one business day if I am given a prescription for any pain medication by another healthcare provider. My [facility] care team can contact other providers and pharmacists about my use of any medicines.    It is up to me to make sure that I don t run out of my medicines on weekends or holidays. If my care team is willing to refill my opioid prescription without a visit, I must request refills only during office hours. Refills may take up to 3 business days to process. I will use one pharmacy to fill all my opioid and other controlled substance prescriptions. I will notify the clinic about any changes to my insurance or medication availability.    I am responsible for my  prescriptions. If the medicine/prescription is lost, stolen or destroyed, it will not be replaced. I also agree not to share controlled substance medicines with anyone.    I am aware I should not use any illegal or recreational drugs. I agree not to drink alcohol unless my care team says I can.       If I enroll in the [place] [facility] program, I will tell my care team prior to my next refill.     I will tell my care team right away if I become pregnant, have a new medical problem treated outside of my regular clinic, or have a change in my medications.    I understand that this medicine can affect my thinking, judgment and reaction time. Alcohol and drugs affect the brain and body, which can affect the safety of my driving. Being under the influence of alcohol or drugs can affect my decision-making, behaviors, personal safety, and the safety of others. Driving while impaired (DWI) can occur if a person is driving, operating, or in physical control of a car, motorcycle, boat, snowmobile, ATV, motorbike, off-road vehicle, or any other motor vehicle ([place] Statute 169A.20). I understand the risk if I choose to drive or operate any vehicle or machinery.    I understand that if I do not follow any of the conditions above, my prescriptions or treatment may be stopped or changed.          Opioids  What You Need to Know    What are opioids?   Opioids are pain medicines that must be prescribed by a doctor. They are also known as narcotics.     Examples are:   morphine (MS Contin, Alyce)  oxycodone (Oxycontin)  oxycodone and acetaminophen (Percocet)  hydrocodone and acetaminophen (Vicodin, Norco)   fentanyl patch (Duragesic)   hydromorphone (Dilaudid)   methadone  codeine (Tylenol #3)     What do opioids do well?   Opioids are best for severe short-term pain such as after a surgery or injury. They may work well for cancer pain. They may help some people with long-lasting (chronic) pain.     What do opioids NOT do  well?   Opioids never get rid of pain entirely, and they don t work well for most patients with chronic pain. Opioids don t reduce swelling, one of the causes of pain.                                    Other ways to manage chronic pain and improve function include:     Treat the health problem that may be causing pain  Anti-inflammation medicines, which reduce swelling and tenderness, such as ibuprofen (Advil, Motrin) or naproxen (Aleve)  Acetaminophen (Tylenol)  Antidepressants and anti-seizure medicines, especially for nerve pain  Topical treatments such as patches or creams  Injections or nerve blocks  Chiropractic or osteopathic treatment  Acupuncture, massage, deep breathing, meditation, visual imagery, aromatherapy  Use heat or ice at the pain site  Physical therapy   Exercise  Stop smoking  Take part in therapy       Risks and side effects     Talk to your doctor before you start or decide to keep taking opioids. Possible side effects include:    Lowering your breathing rate enough to cause death  Overdose, including death, especially if taking higher than prescribed doses  Worse depression symptoms; less pleasure in things you usually enjoy  Feeling tired or sluggish  Slower thoughts or cloudy thinking  Being more sensitive to pain over time; pain is harder to control  Trouble sleeping or restless sleep  Changes in hormone levels (for example, less testosterone)  Changes in sex drive or ability to have sex  Constipation  Unsafe driving  Itching and sweating  Dizziness  Nausea, throwing up and dry mouth    What else should I know about opioids?    Opioids may lead to dependence, tolerance, or addiction.    Dependence means that if you stop or reduce the medicine too quickly, you will have withdrawal symptoms. These include loose poop (diarrhea), jitters, flu-like symptoms, nervousness and tremors. Dependence is not the same as addiction.                     Tolerance means needing higher doses over time to  get the same effect. This may increase the chance of serious side effects.    Addiction is when people improperly use a substance that harms their body, their mind or their relations with others. Use of opiates can cause a relapse of addiction if you have a history of drug or alcohol abuse.    People who have used opioids for a long time may have a lower quality of life, worse depression, higher levels of pain and more visits to doctors.    You can overdose on opioids. Take these steps to lower your risk of overdose:    Recognize the signs:  Signs of overdose include decrease or loss of consciousness (blackout), slowed breathing, trouble waking up and blue lips. If someone is worried about overdose, they should call 911.    Talk to your doctor about Narcan (naloxone).   If you are at risk for overdose, you may be given a prescription for Narcan. This medicine very quickly reverses the effects of opioids.   If you overdose, a friend or family member can give you Narcan while waiting for the ambulance. They need to know the signs of overdose and how to give Narcan.     Don't use alcohol or street drugs.   Taking them with opioids can cause death.    Do not take any of these medicines unless your doctor says it s OK. Taking these with opioids can cause death:  Benzodiazepines, such as lorazepam (Ativan), alprazolam (Xanax) or diazepam (Valium)  Muscle relaxers, such as cyclobenzaprine (Flexeril)  Sleeping pills like zolpidem (Ambien)   Other opioids      How to keep you and other people safe while taking opioids:    Never share your opioids with others.  Opioid medicines are regulated by the Drug Enforcement Agency ([employer]). Selling or sharing medications is a criminal act.    2. Be sure to store opioids in a secure place, locked up if possible. Young children can easily swallow them and overdose.    3. When you are traveling with your medicines, keep them in the original bottles. If you use a pill box, be sure you also  carry a copy of your medicine list from your clinic or pharmacy.    4. Safe disposal of opioids    Most pharmacies have places to get rid of medicine, called disposal kiosks. Medicine disposal options are also available in every [facility]. Search your county and  medication disposal  to find more options. You can find more details at:  [email or web address]://[email or web address]-green/managing-unwanted-medications     I agree that my provider, clinic care team, and pharmacy may work with any city, state or federal law enforcement agency that investigates the misuse, sale, or other diversion of my controlled medicine. I will allow my provider to discuss my care with, or share a copy of, this agreement with any other treating provider, pharmacy or emergency room where I receive care.    I have read this agreement and have asked questions about anything I did not understand.    _______________________________________________________  Patient Signature - Truman Suero _____________________                   Date     _______________________________________________________  Provider Signature - RODNEY Vargas CNP   _____________________                   Date     _______________________________________________________  Witness Signature (required if provider not present while patient signing)   _____________________                   Date

## 2023-05-08 NOTE — PROGRESS NOTES
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 PMHx includes: neck pain  13. PSHx includes: none listed       Plan:   1. Physical Therapy: not at present  2. Clinical Health Psychologist: none  3. Diagnostic Studies: none  4. Medication Management:   5. Continue tramadol ER 200mg once daily fill 6/1 and start 6/3  6. Continue tramadol 50mg Q 6-8 hours PRN, max of 3/day. Fill 6/1and start 6/3  7. Continue gabapentin 900 mg TID  8. INCREASE Topamax 50 mg in the AM and 100mg at bedtime  9. Continue methocarbamol 500-1000 mg TID PRN muscle spasms  10. Continue cyclobenzaprine at bedtime  11. Further procedures recommended: none  12. Recommendations to PCP: see above  13. Re-sign CSA  14. UDT today, last took Tramadol ER last night and Tramadol IR around noon today  15. Follow up: 12 weeks video or in-person visit, your choice. Please call [phone] to make your follow-up appointment with me.         ASSESSMENT AND PLAN:  (R51.9,  G89.29) Chronic intractable headache, unspecified headache type  (primary encounter diagnosis)  Plan: topiramate (TOPAMAX) 50 MG tablet, Adult Pain         Clinic Follow-Up Order (Blank)            (M47.812) Cervical spondylosis without myelopathy  Plan: topiramate (TOPAMAX) 50 MG tablet, traMADol         (ULTRAM) 50 MG tablet, traMADol (ULTRAM-ER)  200        MG 24 hr tablet, Adult Pain Clinic Follow-Up         Order (Blank)            (M50.30) DDD (degenerative disc disease), cervical  Plan: topiramate (TOPAMAX) 50 MG tablet, traMADol         (ULTRAM) 50 MG tablet, traMADol (ULTRAM-ER) 200        MG 24 hr tablet, Adult Pain Clinic Follow-Up         Order (Blank)            (Z98.1) S/P cervical spinal fusion  Plan: traMADol (ULTRAM) 50 MG tablet, traMADol         (ULTRAM-ER) 200 MG 24 hr tablet, Adult Pain         Clinic Follow-Up Order (Blank)            (M48.02) Cervical stenosis of spinal canal  Plan: traMADol (ULTRAM) 50 MG tablet, traMADol         (ULTRAM-ER) 200 MG 24 hr tablet, Adult Pain         Clinic Follow-Up Order (Blank)            (M79.18) Myofascial pain  Plan: traMADol (ULTRAM) 50 MG tablet, Adult Pain         Clinic Follow-Up Order (Blank)            (F11.90) Chronic, continuous use of opioids  Plan: Drug Confirmation Panel Urine with Creat,         Ethanol urine, traMADol (ULTRAM) 50 MG tablet,         traMADol (ULTRAM-ER) 200 MG 24 hr tablet, Adult        Pain Clinic Follow-Up Order (Blank)            (Z79.891) Encounter for long-term use of opiate analgesic  Plan: Drug Confirmation Panel Urine with Creat,         Ethanol urine, Adult Pain Clinic Follow-Up         Order (Blank)              Face to face time: 31 minutes      Melanie STEVENS RN CNP, FNP  [facility]  [facility]

## 2023-05-08 NOTE — PATIENT INSTRUCTIONS
Plan:   Physical Therapy: not at present  Clinical Health Psychologist: none  Diagnostic Studies: none  Medication Management:   Continue tramadol ER 200mg once daily fill 6/1 and start 6/3  Continue tramadol 50mg Q 6-8 hours PRN, max of 3/day. Fill 6/1and start 6/3  Continue gabapentin 900 mg TID  INCREASE Topamax 50 mg in the AM and 100mg at bedtime  Continue methocarbamol 500-1000 mg TID PRN muscle spasms  Continue cyclobenzaprine at bedtime  Further procedures recommended: none  Recommendations to PCP: see above  Re-sign CSA  UDT today, last took Tramadol ER last night and Tramadol IR around noon today  Follow up: 12 weeks video or in-person visit, your choice. Please call [phone] to make your follow-up appointment with me.   ~~~~~~~~~~~~~~~~~~~~~~~~~~~~~~~~~~~~~~~~~~  Clinic Number:  [phone]   Call with any questions about your care and for scheduling assistance.   Calls are returned Monday through Friday between 8 AM and 4:30 PM. We usually get back to you within 2 business days depending on the issue/request.    If we are prescribing your medications:  For opioid medication refills, call the clinic or send a Access Media 3 message 7 days in advance.  Please include:  Name of requested medication  Name of the pharmacy.  For non-opioid medications, call your pharmacy directly to request a refill. Please allow 3-4 days to be processed.   Per [place] State Law:  All controlled substance prescriptions must be filled within 30 days of being written.    For those controlled substances allowing refills, pickup must occur within 30 days of last fill.      We believe regular attendance is key to your success in our program!    Any time you are unable to keep your appointment we ask that you call us at least 24 hours in advance to cancel.This will allow us to offer the appointment time to another patient.   Multiple missed appointments may lead to dismissal from the clinic.

## 2023-05-09 LAB
CREAT UR-MCNC: 130 MG/DL
ETHANOL UR QL SCN: NORMAL

## 2023-05-10 NOTE — RESULT ENCOUNTER NOTE
No evidence for recent alcohol ingestion while on opiate medication. This is as expected.   Melanie STEVENS, RN CNP, FNP  [facility]

## 2023-05-11 NOTE — RESULT ENCOUNTER NOTE
Urine drug testing as expected. No illicit medication or alcohol noted. Continue standard monitoring while on opiates.   Melaine STEVENS RN CNP, FNP  [facility] [place]

## 2023-06-30 ENCOUNTER — MYC REFILL (OUTPATIENT)
Dept: PALLIATIVE MEDICINE | Facility: CLINIC | Age: 63
End: 2023-06-30
Payer: COMMERCIAL

## 2023-06-30 DIAGNOSIS — M50.30 DDD (DEGENERATIVE DISC DISEASE), CERVICAL: ICD-10-CM

## 2023-06-30 DIAGNOSIS — Z98.1 S/P CERVICAL SPINAL FUSION: ICD-10-CM

## 2023-06-30 DIAGNOSIS — F11.90 CHRONIC, CONTINUOUS USE OF OPIOIDS: ICD-10-CM

## 2023-06-30 DIAGNOSIS — M48.02 CERVICAL STENOSIS OF SPINAL CANAL: ICD-10-CM

## 2023-06-30 DIAGNOSIS — M79.18 MYOFASCIAL PAIN: ICD-10-CM

## 2023-06-30 DIAGNOSIS — M47.812 CERVICAL SPONDYLOSIS WITHOUT MYELOPATHY: ICD-10-CM

## 2023-06-30 RX ORDER — TRAMADOL HYDROCHLORIDE 50 MG/1
50 TABLET ORAL EVERY 6 HOURS PRN
Qty: 90 TABLET | Refills: 0 | Status: SHIPPED | OUTPATIENT
Start: 2023-06-30 | End: 2023-08-01

## 2023-06-30 RX ORDER — TRAMADOL HYDROCHLORIDE 200 MG/1
200 TABLET, EXTENDED RELEASE ORAL EVERY 24 HOURS
Qty: 30 TABLET | Refills: 0 | Status: SHIPPED | OUTPATIENT
Start: 2023-06-30 | End: 2023-08-01

## 2023-06-30 NOTE — TELEPHONE ENCOUNTER
Received refill request for:    traMADol (ULTRAM) 50 MG tablet  traMADol (ULTRAM-ER) 200 MG 24 hr tablet      Last dispensed from pharmacy on 6/3/2023 per pharmacy.    Patient's last office/virtual visit by prescribing provider on 5/8/2023.    Next office/virtual appointment scheduled for 8/16/2023.    Last urine drug screen date 5/8/2023.    Current opioid agreement on file? Yes Date of opioid agreement: 5/8/2023.    E-prescribe to:    [facility] [address]    Will route to [facility] for review and preparation of prescription(s).

## 2023-06-30 NOTE — TELEPHONE ENCOUNTER
Medication refill information reviewed.     Due date for traMADol (ULTRAM) 50 MG tablet and traMADol (ULTRAM-ER) 200 MG 24 hr tablet is 07/03/23     Prescriptions prepped for review.     Will route to provider.

## 2023-06-30 NOTE — TELEPHONE ENCOUNTER
Signed Prescriptions:                        Disp   Refills    traMADol (ULTRAM) 50 MG tablet             90 tab*0        Sig: Take 1 tablet (50 mg) by mouth every 6 hours as           needed for severe pain Max 3/day. Fill 07/01/23           Start 07/03/23; to last 30 days for chronic pain  Authorizing Provider: MELANIE BENSON    traMADol (ULTRAM-ER) 200 MG 24 hr tablet   30 tab*0        Sig: Take 1 tablet (200 mg) by mouth every 24 hours Fill           07/01/23 Start 07/03/23; to last 30 days for           chronic pain  Authorizing Provider: MELANIE BENSON        Reviewed [place]  June 30, 2023- no concerning fills.    Melanie Benson APRN, RN CNP, FNP  [facility]  [facility]

## 2023-07-07 NOTE — PATIENT INSTRUCTIONS
Diagnosis: Lateral and medial epicondylitis, cubital tunnel syndrome  Image Findings: no fracture or arthritis  Treatment: home exercises  Job: no restrictions   Medications: Limited tylenol/ibuprofen for pain for 1-2 weeks  Follow-up: As needed if not improving in 1-2 months, sooner if worsening    It was great seeing you today!    Raymond Thomas      Tennis Elbow (Lateral Epicondylitis)    Tennis elbow, or lateral epicondylitis, is a painful condition of the elbow caused by overuse. Not surprisingly, playing tennis or other racquet sports can cause this condition. However, several other sports and activities can also put you at risk.  Tennis elbow is an inflammation of the tendons that join the forearm muscles on the outside of the elbow. The forearm muscles and tendons become damaged from overuse -- repeating the same motions again and again. This leads to pain and tenderness on the outside of the elbow.  There are many treatment options for tennis elbow. In most cases, treatment involves a team approach. Primary doctors, physical therapists, and, in some cases, surgeons work together to provide the most effective care.  Anatomy  Your elbow joint is a joint made up of three bones: your upper arm bone (humerus) and the two bones in your forearm (radius and ulna). There are bony bumps at the bottom of the humerus called epicondyles. The bony bump on the outside (lateral side) of the elbow is called the lateral epicondyle.  The ECRB muscle and tendon is usually involved in tennis elbow.   Reproduced and modified from [employer].   American Academy of Orthopaedic Surgeons, 2003.   Muscles, ligaments, and tendons hold the elbow joint together.  Lateral epicondylitis, or tennis elbow, involves the muscles and tendons of your forearm. Your forearm muscles extend your wrist and fingers. Your forearm tendons -- often called extensors -- attach the muscles to bone. They attach on the lateral epicondyle. The  Please advise, thank you.     "tendon usually involved in tennis elbow is called the Extensor Carpi Radialis Brevis (ECRB).  Cause  Overuse  [employer]   2015.  Recent studies show that tennis elbow is often due to damage to a specific forearm muscle. The extensor carpi radialis brevis (ECRB) muscle helps stabilize the wrist when the elbow is straight. This occurs during a tennis groundstroke, for example. When the ECRB is weakened from overuse, microscopic tears form in the tendon where it attaches to the lateral epicondyle. This leads to inflammation and pain.  The ECRB may also be at increased risk for damage because of its position. As the elbow bends and straightens, the muscle rubs against bony bumps. This can cause gradual wear and tear of the muscle over time.  Activities  Athletes are not the only people who get tennis elbow. Many people with tennis elbow participate in work or recreational activities that require repetitive and vigorous use of the forearm muscle.  Painters, plumbers, and  are particularly prone to developing tennis elbow. Studies have shown that auto workers, cooks, and even butchers get tennis elbow more often than the rest of the population. It is thought that the repetition and weight lifting required in these occupations leads to injury.  Age  Most people who get tennis elbow are between the ages of 30 and 50, although anyone can get tennis elbow if they have the risk factors. In racquet sports like tennis, improper stroke technique and improper equipment may be risk factors.  Unknown  Lateral epicondylitis can occur without any recognized repetitive injury. This occurence is called \"insidious\" or of an unknown cause.  Symptoms  Location of pain in lateral epicondylitis.   Reproduced from Justus MONDRAGON (ed): Essentials of Musculoskeletal Care, Third Edition.   American Academy of Orthopaedic Surgeons, 2005.   The symptoms of tennis elbow develop gradually. In most cases, the pain begins as mild and slowly " worsens over weeks and months. There is usually no specific injury associated with the start of symptoms.  Common signs and symptoms of tennis elbow include:  Pain or burning on the outer part of your elbow   Weak  strength  The symptoms are often worsened with forearm activity, such as holding a racquet, turning a wrench, or shaking hands. Your dominant arm is most often affected; however both arms can be affected.  Related Articles   Diseases & Conditions  Elbow Injuries in the Throwing Athlete   Treatment  Platelet-Rich Plasma (PRP)   Staying Healthy  Warm Up, Cool Down and Be Flexible     Doctor Examination  Your doctor will consider many factors in making a diagnosis. These include how your symptoms developed, any occupational risk factors, and recreational sports participation.  Your doctor will talk to you about what activities cause symptoms and where on your arm the symptoms occur. Be sure to tell your doctor if you have ever injured your elbow. If you have a history of rheumatoid arthritis or nerve disease, tell your doctor.  During the examination, your doctor will use a variety of tests to pinpoint the diagnosis. For example, your doctor may ask you to try to straighten your wrist and fingers against resistance with your arm fully straight to see if this causes pain. If the tests are positive, it tells your doctor that those muscles may not be healthy.  Tests  Your doctor may recommend additional tests to rule out other causes of your problem.  X-rays. These tests provide clear images of dense structures like bone. They may be taken to rule out arthritis of the elbow.   Magnetic resonance imaging (MRI) scan. If your doctor thinks your symptoms are related to a neck problem, an MRI scan may be ordered. MRIs scans show details of soft tissues, and will help your doctor see if you have a possible herniated disk or arthritis in your neck. Both of these conditions often produce arm pain.  "  Electromyography (EMG). Your doctor may order an EMG to rule out nerve compression. Many nerves travel around the elbow, and the symptoms of nerve compression are similar to those of tennis elbow.  Treatment  Nonsurgical Treatment  Approximately 80% to 95% of patients have success with nonsurgical treatment.  Rest. The first step toward recovery is to give your arm proper rest. This means that you will have to stop participation in sports or heavy work activities for several weeks.  Non-steroidal anti-inflammatory medicines. Drugs like aspirin or ibuprofen reduce pain and swelling.  Wrist stretching exercise with elbow extended.  Physical therapy. Specific exercises are helpful for strengthening the muscles of the forearm. Your therapist may also perform ultrasound, ice massage, or muscle-stimulating techniques to improve muscle healing.  Brace. Using a brace centered over the back of your forearm may also help relieve symptoms of tennis elbow. This can reduce symptoms by resting the muscles and tendons.  Steroid injections. Steroids, such as cortisone, are very effective anti-inflammatory medicines. Your doctor may decide to inject your damaged muscle with a steroid to relieve your symptoms.  Counterforce brace.  Extracorporeal shock wave therapy. Shock wave therapy sends sound waves to the elbow. These sound waves create \"microtrauma\" that promote the body's natural healing processes. Shock wave therapy is considered experimental by many doctors, but some sources show it can be effective.  Equipment check. If you participate in a racquet sport, your doctor may encourage you to have your equipment checked for proper fit. Stiffer racquets and looser-strung racquets often can reduce the stress on the forearm, which means that the forearm muscles do not have to work as hard. If you use an oversized racquet, changing to a smaller head may help prevent symptoms from recurring.  Surgical Treatment  If your symptoms do " not respond after 6 to 12 months of nonsurgical treatments, your doctor may recommend surgery.  Most surgical procedures for tennis elbow involve removing diseased muscle and reattaching healthy muscle back to bone.  The right surgical approach for you will depend on a range of factors. These include the scope of your injury, your general health, and your personal needs. Talk with your doctor about the options. Discuss the results your doctor has had, and any risks associated with each procedure.  Open surgery. The most common approach to tennis elbow repair is open surgery. This involves making an incision over the elbow.  Open surgery is usually performed as an outpatient surgery. It rarely requires an overnight stay at the hospital.  Arthroscopic surgery. Tennis elbow can also be repaired using miniature instruments and small incisions. Like open surgery, this is a same-day or outpatient procedure.  Surgical risks. As with any surgery, there are risks with tennis elbow surgery. The most common things to consider include:  Infection   Nerve and blood vessel damage   Possible prolonged rehabilitation   Loss of strength   Loss of flexibility   The need for further surgery  Rehabilitation. Following surgery, your arm may be immobilized temporarily with a splint. About 1 week later, the sutures and splint are removed.  After the splint is removed, exercises are started to stretch the elbow and restore flexibility. Light, gradual strengthening exercises are started about 2 months after surgery.  Your doctor will tell you when you can return to athletic activity. This is usually 4 to 6 months after surgery. Tennis elbow surgery is considered successful in 80% to 90% of patients. However, it is not uncommon to see a loss of strength.  New Developments  Platelet-rich plasma (PRP) is currently being investigated for its effectiveness in speeding the healing of a variety of tendon injuries. PRP is a preparation developed  from a patient's own blood. It contains a high concentration of proteins called growth factors that are very important in the healing of injuries.        Patient Education     What is Cubital Tunnel Syndrome?  Cubital tunnel syndrome is a set of symptoms that may occur if the ulnar nerve in your elbow gets pinched. This may happen if you bend or lean on your elbows often.    Your cubital tunnel  The cubital tunnel is a groove in a bone near your elbow. This narrow groove provides a passage for the ulnar nerve, one of the main nerves in your arm. The ulnar nerve can cause  funny bone  pain if your elbow gets bumped. Your cubital tunnel helps protect this nerve as it passes through your elbow and down to your fingers.  Compressing the ulnar nerve  Bending your elbow compresses the ulnar nerve inside the cubital tunnel. The nerve can get inflamed (irritated) after constant bending and pinching or after getting hurt. Over time, this can lead to pain or numbness. The pain is often felt in your ring fingers and little fingers.     Bending your elbow as you hold a phone can cause problems over time.      What are its symptoms?    Numbness or tingling in the ring fingers and little fingers    Loss of finger or hand strength    Inability to straighten fingers    Sharp, sudden pain when elbow is touched    Shrinking of hand muscles  The road to healing  You can keep cubital tunnel syndrome from flaring up. Keep your arm straight as much as you can, even while sleeping, to prevent pinching of the ulnar nerve. And use phone headsets and elbow pads. If you still have pain, tell your doctor.   Date Last Reviewed: 5/1/2018 2000-2018 [employer]. [address] [place] [address], [place] [id number]. All rights reserved. This information is not intended as a substitute for professional medical care. Always follow your healthcare professional's instructions.           Premium Adult Elbow Immobilizer Stabilizer Support  Brace/Splint - Large

## 2023-07-23 ENCOUNTER — HEALTH MAINTENANCE LETTER (OUTPATIENT)
Age: 63
End: 2023-07-23

## 2023-08-01 ENCOUNTER — MYC REFILL (OUTPATIENT)
Dept: PALLIATIVE MEDICINE | Facility: CLINIC | Age: 63
End: 2023-08-01
Payer: COMMERCIAL

## 2023-08-01 DIAGNOSIS — Z98.1 S/P CERVICAL SPINAL FUSION: ICD-10-CM

## 2023-08-01 DIAGNOSIS — M50.30 DDD (DEGENERATIVE DISC DISEASE), CERVICAL: ICD-10-CM

## 2023-08-01 DIAGNOSIS — M48.02 CERVICAL STENOSIS OF SPINAL CANAL: ICD-10-CM

## 2023-08-01 DIAGNOSIS — F11.90 CHRONIC, CONTINUOUS USE OF OPIOIDS: ICD-10-CM

## 2023-08-01 DIAGNOSIS — M79.18 MYOFASCIAL PAIN: ICD-10-CM

## 2023-08-01 DIAGNOSIS — M47.812 CERVICAL SPONDYLOSIS WITHOUT MYELOPATHY: ICD-10-CM

## 2023-08-01 RX ORDER — TRAMADOL HYDROCHLORIDE 200 MG/1
200 TABLET, EXTENDED RELEASE ORAL EVERY 24 HOURS
Qty: 30 TABLET | Refills: 0 | Status: SHIPPED | OUTPATIENT
Start: 2023-08-01 | End: 2023-08-16

## 2023-08-01 RX ORDER — TRAMADOL HYDROCHLORIDE 50 MG/1
50 TABLET ORAL EVERY 6 HOURS PRN
Qty: 90 TABLET | Refills: 0 | Status: SHIPPED | OUTPATIENT
Start: 2023-08-01 | End: 2023-08-16

## 2023-08-01 NOTE — TELEPHONE ENCOUNTER
[facility] Call Center    Phone Message    May a detailed message be left on voicemail: yes     Reason for Call: Medication Question or concern regarding medication   Prescription Clarification  Name of Medication: [facility] [address] [place] [address]   Prescribing Provider: Melanie Thomas   Pharmacy: [facility] [address] [place] [address]   What on the order needs clarification?   Pharmacy is calling to let us know that the PA for this medication has . A new PA will need to be started.       Action Taken: Message routed to:  Other: [facility]    Travel Screening: Not Applicable

## 2023-08-01 NOTE — TELEPHONE ENCOUNTER
Medication refill information reviewed.     Due date for traMADol (ULTRAM) 50 MG tablet and traMADol (ULTRAM-ER) 200 MG 24 hr tablet  is 08/02/23     Prescriptions prepped for review.     Will route to provider.

## 2023-08-01 NOTE — TELEPHONE ENCOUNTER
Received call from patient requesting refill(s) of     traMADol (ULTRAM) 50 MG tablet    traMADol (ULTRAM-ER) 200 MG 24 hr tablet     Medications last dispensed from pharmacy on 07/01/23    Patient's last office/virtual visit by prescribing provider on 05/08/23  Next office/virtual appointment scheduled for 08/16/23    Last urine drug screen date 05/08/23  Current opioid agreement on file (completed within the last year) Yes Date of opioid agreement: 05/08/23    E-prescribe to     [address] [address]  Phone: [phone] Fax: [phone]    Will route to nursing [place] for review and preparation of prescription(s).       Lashonda Lyles MA  [facility]

## 2023-08-01 NOTE — TELEPHONE ENCOUNTER
Refills have been requested for the following medications:         traMADol (ULTRAM) 50 MG tablet [Melanie STEVENS CNP]         traMADol (ULTRAM-ER) 200 MG 24 hr tablet [Melanie STEVENS CNP]     Preferred pharmacy: [facility] [address] [place] [address] [address] [place] [address]

## 2023-08-01 NOTE — TELEPHONE ENCOUNTER
Signed Prescriptions:                        Disp   Refills    traMADol (ULTRAM) 50 MG tablet             90 tab*0        Sig: Take 1 tablet (50 mg) by mouth every 6 hours as           needed for severe pain Max 3/day. Fill 07/31/23           Start 08/02/23; to last 30 days for chronic pain  Authorizing Provider: MELANIE BENSON    traMADol (ULTRAM-ER) 200 MG 24 hr tablet   30 tab*0        Sig: Take 1 tablet (200 mg) by mouth every 24 hours Fill           07/31/23 Start 08/02/23; to last 30 days for           chronic pain  Authorizing Provider: MELANIE BENSON        Reviewed [place]  August 1, 2023- no concerning fills.    Melanie Benson APRN, RN CNP, FNP  [facility]  [facility]

## 2023-08-02 ENCOUNTER — TELEPHONE (OUTPATIENT)
Dept: PALLIATIVE MEDICINE | Facility: CLINIC | Age: 63
End: 2023-08-02
Payer: COMMERCIAL

## 2023-08-02 NOTE — TELEPHONE ENCOUNTER
Prior Authorization Retail Medication Request    Medication/Dose: traMADol (ULTRAM-ER) 200 MG 24 hr tablet  ICD code (if different than what is on RX):

## 2023-08-02 NOTE — TELEPHONE ENCOUNTER
Central Prior Authorization Team  Phone: [phone]    PA Initiation    Medication: TRAMADOL HCL  MG PO TB24  Insurance Company: [employer] - Phone [phone] Fax [phone]  Pharmacy Filling the Rx: [facility] [address] [place] [address]  Filling Pharmacy Phone: [phone]  Filling Pharmacy Fax:    Start Date: 8/2/2023

## 2023-08-03 NOTE — TELEPHONE ENCOUNTER
Prior Authorization Approval    Medication: TRAMADOL HCL  MG PO TB24  Authorization Effective Date: 8/2/2023  Authorization Expiration Date: 1/29/2024  Approved Dose/Quantity:   Reference #:     Insurance Company: [employer] - Phone [phone] Fax [phone]  Expected CoPay:       CoPay Card Available:      Financial Assistance Needed:   Which Pharmacy is filling the prescription: [facility] [address] [place] [address]  Pharmacy Notified: Yes  Patient Notified:  **Instructed pharmacy to notify patient when script is ready to /ship.**

## 2023-08-16 ENCOUNTER — MYC REFILL (OUTPATIENT)
Dept: FAMILY MEDICINE | Facility: CLINIC | Age: 63
End: 2023-08-16

## 2023-08-16 ENCOUNTER — OFFICE VISIT (OUTPATIENT)
Dept: PALLIATIVE MEDICINE | Facility: CLINIC | Age: 63
End: 2023-08-16
Attending: NURSE PRACTITIONER
Payer: COMMERCIAL

## 2023-08-16 VITALS — DIASTOLIC BLOOD PRESSURE: 84 MMHG | HEART RATE: 54 BPM | SYSTOLIC BLOOD PRESSURE: 148 MMHG

## 2023-08-16 DIAGNOSIS — G89.29 CHRONIC INTRACTABLE HEADACHE, UNSPECIFIED HEADACHE TYPE: Primary | ICD-10-CM

## 2023-08-16 DIAGNOSIS — M48.02 CERVICAL STENOSIS OF SPINAL CANAL: ICD-10-CM

## 2023-08-16 DIAGNOSIS — M54.50 CHRONIC BILATERAL LOW BACK PAIN WITHOUT SCIATICA: ICD-10-CM

## 2023-08-16 DIAGNOSIS — M54.2 CHRONIC NECK PAIN: ICD-10-CM

## 2023-08-16 DIAGNOSIS — I25.10 MILD CAD: ICD-10-CM

## 2023-08-16 DIAGNOSIS — F11.90 CHRONIC, CONTINUOUS USE OF OPIOIDS: ICD-10-CM

## 2023-08-16 DIAGNOSIS — Z98.1 S/P CERVICAL SPINAL FUSION: ICD-10-CM

## 2023-08-16 DIAGNOSIS — R51.9 CHRONIC INTRACTABLE HEADACHE, UNSPECIFIED HEADACHE TYPE: Primary | ICD-10-CM

## 2023-08-16 DIAGNOSIS — I10 ESSENTIAL HYPERTENSION: ICD-10-CM

## 2023-08-16 DIAGNOSIS — I77.810 ASCENDING AORTA DILATATION (H): ICD-10-CM

## 2023-08-16 DIAGNOSIS — G89.29 CHRONIC NECK PAIN: ICD-10-CM

## 2023-08-16 DIAGNOSIS — M79.18 MYOFASCIAL PAIN: ICD-10-CM

## 2023-08-16 DIAGNOSIS — M47.812 CERVICAL SPONDYLOSIS WITHOUT MYELOPATHY: ICD-10-CM

## 2023-08-16 DIAGNOSIS — G89.29 CHRONIC BILATERAL LOW BACK PAIN WITHOUT SCIATICA: ICD-10-CM

## 2023-08-16 DIAGNOSIS — M50.30 DDD (DEGENERATIVE DISC DISEASE), CERVICAL: ICD-10-CM

## 2023-08-16 PROCEDURE — 99213 OFFICE O/P EST LOW 20 MIN: CPT | Performed by: NURSE PRACTITIONER

## 2023-08-16 RX ORDER — TOPIRAMATE 50 MG/1
TABLET, FILM COATED ORAL
Qty: 270 TABLET | Refills: 1 | Status: SHIPPED | OUTPATIENT
Start: 2023-08-16 | End: 2023-11-20

## 2023-08-16 RX ORDER — METHOCARBAMOL 500 MG/1
500 TABLET, FILM COATED ORAL 3 TIMES DAILY PRN
Qty: 90 TABLET | Refills: 11 | Status: SHIPPED | OUTPATIENT
Start: 2023-08-16 | End: 2024-08-06

## 2023-08-16 RX ORDER — TRAMADOL HYDROCHLORIDE 200 MG/1
200 TABLET, EXTENDED RELEASE ORAL EVERY 24 HOURS
Qty: 30 TABLET | Refills: 0 | Status: SHIPPED | OUTPATIENT
Start: 2023-08-16 | End: 2023-09-30

## 2023-08-16 RX ORDER — METOPROLOL SUCCINATE 25 MG/1
25 TABLET, EXTENDED RELEASE ORAL DAILY
Qty: 90 TABLET | Refills: 0 | Status: SHIPPED | OUTPATIENT
Start: 2023-08-16 | End: 2023-12-18

## 2023-08-16 RX ORDER — TRAMADOL HYDROCHLORIDE 50 MG/1
50 TABLET ORAL EVERY 6 HOURS PRN
Qty: 90 TABLET | Refills: 0 | Status: SHIPPED | OUTPATIENT
Start: 2023-08-16 | End: 2023-09-30

## 2023-08-16 RX ORDER — ATORVASTATIN CALCIUM 20 MG/1
20 TABLET, FILM COATED ORAL DAILY
Qty: 30 TABLET | Refills: 0 | Status: SHIPPED | OUTPATIENT
Start: 2023-08-16 | End: 2023-10-30

## 2023-08-16 ASSESSMENT — PAIN SCALES - GENERAL: PAINLEVEL: SEVERE PAIN (7)

## 2023-08-16 NOTE — PROGRESS NOTES
[facility]    8/16/2023      Chief complaint:   Left forearm pain with overuse, tennis elbow symptoms.   Left 2nd digit knuckle pain  axial low back pain, radiates into the anterior thigh to the knee intermittently--pretty good  neck pain with intermittent right arm pain--pretty good  -having more headaches from time to time, pain behind the eyes, almost daily, last 3-4 hours.       Interval history:  Truman Suero is a 62 year old male is known to me for   Chronic neck pain  Cervical DDD  Cervical spondylosis (pain worse with extension/rotation indicating facetogenic component to pain)  Axial low back pain  Myofascial pain/muscle spasms  Remote history of ETOH overuse, attended AA for awhile. Sober for 10 years  ---PMHx includes: neck pain  ---PSHx includes: none listed      Recommendations/plan at the last visit on 5/8/2023 included:  Physical Therapy: not at present  Clinical Health Psychologist: none  Diagnostic Studies: none  Medication Management:   Continue tramadol ER 200mg once daily fill 6/1 and start 6/3  Continue tramadol 50mg Q 6-8 hours PRN, max of 3/day. Fill 6/1and start 6/3  Continue gabapentin 900 mg TID  INCREASE Topamax 50 mg in the AM and 100mg at bedtime  Continue methocarbamol 500-1000 mg TID PRN muscle spasms  Continue cyclobenzaprine at bedtime  Further procedures recommended: none  Recommendations to PCP: see above  Re-sign CSA  UDT today, last took Tramadol ER last night and Tramadol IR around noon today  Follow up: 12 weeks video or in-person visit, your choice. Please call [phone] to make your follow-up appointment with me.            Since his last visit, Truman Suero reports:    Interval history August 16, 2023 accompanied by Mya burrows QRC throughout appt  -still having headaches, does not use Topamax daily, encouraged to use daily to prevent headaches and to get eyes tested.   -chronic neck pain, intermittent right arm pain  -axial low back  "pain  -gearing up for school's start again, he works in the dietary department.       Interval history May 8, 2023  -he did hand therapy for his left forearm pain, has an aresenal of things he can use for his overuse issues from tennis elbow.   -right foot and ankle pain, pain between the 2nd and 3rd digits, burning pain. Wears tennis shoes at work, starts to bother him when he has about an hour left of work.  -headaches have been a little bit worse, almost daily. Will last 3-4 hours at a time.     Interval history February 13, 2023  -Palmer states his neck pain is under good control  -he is struggling with left forearm pain from overuse and pain of the left 2nd digit proximal interphalangeal/metacarpal joint. This is slightly edematous and erythematous but not warm to touch.   -has tried a tennis elbow strap with some relief, but it must be cinched quite tight.   -axial low back pain is under good control at present.     Interval history November 22, 2022, accompanied by Mya, his QRC throughout visit  -has pain in the left forearm. 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 Seems like an overuse pain.   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   -Lumbar DEMARCUS in May quite helpful for 2-3 months at about 80 % relief.   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 No radicular component at present time.     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 This is more hours and better pay.   -he has been adjusting to getting back into the kitchen, at his current position, not much ability to change position, but with new position this should be better.     -his right index finger DIP joint is permanently in partial flexion. 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 This occurred 2 month ago. Looks like a mallet finger. Recommended he splint it and be seen by [facility].     -he has ongoing neck pain with intermittent right arm radiation. The arm radiation is present daily but it typically comes on over time, this not constant. It seems to be worst in the morning. He still uses the braces for the carpal tunnel on most nights. He has more symptoms if he forgets to use the splints.     -chronic low back pain, has intermittent pain that radiates to the anterior thigh. 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 His job is 90% driving right now  -he will likely work at the [employer] as a  in the kitchen, and his wages should be pretty comparable.   -he does the exercises learned in PHYSICAL THERAPY before bed.         At this point, the patient's participation with our multidisciplinary team includes:  The patient has been compliant with the program.  PT - attended non-pain management PHYSICAL THERAPY   Health Psych - has seen Kentrell Castañeda x4  Acupuncture: worked with Dr. Bereket LAY      Pain scores:    Pain intensity on average is 5-6 on a scale of 0-10.    Range is 3-8/10.   Pain right now is 7/10.  Pain  "is described as \"burning, shooting, throbbing, stabbing, miserable, numb, tiring, exhausting, penetrating, nagging, unbearable.\"      Current pain relevant medications:      -Tramadol 200mg ER in the evening (helpful)  -Tramadol 50mg take 1 tablet  Q 6 hours PRN (using 2-3 tabs per day, helpful)  -ibuprofen 600mg PRN (helpful)  -gabapentin 900mg TID (somewhat helpful)  -methocarbamol 500-1000mg TID PRN muscle spasms (takes 1000 mg BID, somewhat helpful)  -Topamax 50 mg BID (using PRN, advised to use regularly to prevent headaches)    Other pertinent medications:  None    Previous Medications:  OPIATES: Tramdol (somewhat helpful)  NSAIDS: ibuprofen (helpful), Aleve (helpful)  MUSCLE RELAXANTS: none  ANTI-MIGRAINE MEDS: none  ANTI-DEPRESSANTS: none  SLEEP AIDS: none  ANTI-CONVULSANTS: gabapentin (helpful)  TOPICALS: lidocaine ointment (somewhat helpful)  Other meds: Tylenol (not helpful)        Other treatments have included:  Truman REI Suero has not been seen at a pain clinic in the past.    PT: tried, somewhat helpful  Chiropractic: helpful  Acupuncture: none  TENs Unit: none       Injections:    cervical radiofrequency nerve ablation at [facility] in December 2016 (did get good relief, got 70% relief of his typical neck pain)  -6/29/2017 Cervical facet joint steroid injections at C4-5 and C5-6 on the right with Dr. Nicole Harding (not helpful)  -3/8/2018 C7-T1 interlaminar DEMARCUS with Dr. Hugo Corrigan (not helpful)  -5/23/2018 right L4-5 transforaminal epidural steroid injection with Dr. Osorio (not helpful for back pain but did help leg pain)  -8/7/2018 bilateral SI joint injection with Dr Hugo Corrigan (helpful for one month)  -10/11/2018 l3-4 and L4-5 facet joint injections bilaterally with Dr. Hugo Corrigan (this has been helpful, more helpful than any other lumbar injections thus far)  -1/28/2019 bilateral L3-5 medial branch blocks #1 with Dr. Osorio (somewhat helpful)   -2/25/2019 LMBB #2 with Dr." Jo (somewhat effective, but not enough to proceed to RFA)  -5/6/2019 bilateral L4/L5 and L5/S1 facet joint injections with Dr. Stevens (helpful)  -11/29/2019 C7-T1 interlaminar epidural steroid injection with Dr. Corrigan (helpful temporarily)  -10/30/2020 ISMAEL with Dr. Hugo Corrigan (temporarily helpful)  -5/17/2022 L3-4 interlaminar DEMRACUS with Dr. Hugo Corrigan (2-3 months of about 80% relief)    Surgeries:  10/29/2018 right anterior cervical C5-6 discectomy and fusion by Dr. Jud Harkins (somewhat helpful)      THE 4 A's OF OPIOID MAINTENANCE ANALGESIA    Analgesia: long acting Tramadol with some short acting tramadol does give some relief    Activity: ADLs, working at the [employer] as a  at present time. Off until school starts in September    Adverse effects: none    Adherence to Rx protocol: yes      Side Effects: None  Patient is using the medication as prescribed: Yes    Medications:  Current Outpatient Medications   Medication Sig Dispense Refill    cyclobenzaprine (FLEXERIL) 5 MG tablet Take 1-2 tablets (5-10 mg) by mouth nightly as needed for muscle spasms Need 6 hours between this and methocarbamol 45 tablet 11    gabapentin (NEURONTIN) 600 MG tablet Take 1.5 tablets (900 mg) by mouth 3 times daily 135 tablet 11    methocarbamol (ROBAXIN) 500 MG tablet Take 1 tablet (500 mg) by mouth 3 times daily as needed for muscle spasms Should be 6 hours between this and cyclobenzaprine at night 90 tablet 11    topiramate (TOPAMAX) 50 MG tablet Take 50mg in the morning and 100mg at bedtime. Drink plenty of water and no alcohol with this medication. 3 month script 270 tablet 1    traMADol (ULTRAM) 50 MG tablet Take 1 tablet (50 mg) by mouth every 6 hours as needed for severe pain Max 3/day. Fill 08/28/23 Start 09/01/23; to last 30 days for chronic pain 90 tablet 0    traMADol (ULTRAM-ER) 200 MG 24 hr tablet Take 1 tablet (200 mg) by mouth every 24 hours Fill 08/28/23 Start 09/01/23; to  last 30 days for chronic pain 30 tablet 0    aspirin (ASA) 81 MG EC tablet Take 1 tablet (81 mg) by mouth daily (Patient not taking: Reported on 11/22/2022) 90 tablet 1    atorvastatin (LIPITOR) 20 MG tablet Take 1 tablet (20 mg) by mouth daily Needs appointment and labs 30 tablet 0    metoprolol succinate ER (TOPROL XL) 25 MG 24 hr tablet Take 1 tablet (25 mg) by mouth daily 90 tablet 0    order for DME BP cuff, brand as covered by insurance.  Dx: HTN (Patient not taking: Reported on 8/16/2023) 1 each 0       Medical History: any changes in medical history since they were last seen? none    Social History:   Home situation: , has 4 kids, 2 at home, one in college and one launched.   Occupation/Schooling: used to be  full time in 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 He used to work a sobriety program, used to go to   Drug use: none  History of chemical dependency treatment: no formal treatment, did AA    Is patient a current smoker or tobacco user?  QUIT on 3/20/2018  If yes, was cessation counseling offered?  no            Physical Exam:     Vital signs: BP (!) 148/84   Pulse 54      Behavioral observations:  Awake, alert and cooperative    Gait:  Normal    Musculoskeletal exam:  Moves well in exam room  Strength grossly equal throughout     Neuro exam:  Deferred    Skin/vascular/autonomic:  No suspicious lesions on exposed skin.    Other:  na    Is the patient hypertensive today? Slightly elevated  Hypertensive on recheck of BP?  Not rechecked  If yes, was patient recommended to see Primary Care Provider in follow up for management of HTN?  Yes, also ordered BP cuff for home use            IMAGING:    MR CERVICAL SPINE WITHOUT CONTRAST 9/5/2017 2:32 PM      HISTORY: Neck pain, worsening numbness in arms and lower  extremities.  Radiculopathy, cervical region.     TECHNIQUE: Multiplanar multisequence images were obtained through the  cervical spine without contrast.     COMPARISON: 2/22/2017.     FINDINGS: Sagittal images demonstrate some minimal gentle cervical  kyphosis, otherwise normal posterior alignment. There is no evidence  for craniovertebral or cervicomedullary junction abnormality. The  cervical cord is minimally indented anteriorly at C4-C5 and C5-C6,  otherwise is normal in morphology and signal characteristics. Disc  space narrowing and discogenic marrow changes are present C3-C4,  C4-C5, C5-C6 and C6-C7.     C2-C3: Minimal facet hypertrophy. No stenosis.     C3-C4: Broad-based posterior osteophyte formation is present causing  some mild bilateral neural foraminal stenosis, but no central canal  stenosis.     C4-C5: Broad-based posterior osteophyte formation and mild facet  hypertrophy is present causing some mild to moderate central canal  stenosis, mild cord deformity, and mild-to-moderate bilateral neural  foraminal stenosis.     C5-C6: Broad-based posterior osteophyte formation and disc bulging is  present along with some facet hypertrophy. There is moderate central  canal stenosis and moderate bilateral neural foraminal stenosis.  Findings have progressed since the prior exam.     C6-C7: Broad-based disc bulging is present along with some minimal  posterior osteophyte formation. There is borderline to mild central  canal stenosis, but no neural foraminal stenosis.     C7-T1: Moderate facet hypertrophy. No significant stenosis.         IMPRESSION: Moderate multilevel degenerative disc and facet disease  with some progression at C5-C6 where there is moderate central canal  and bilateral neural foraminal stenosis along with cord deformity.          MR CERVICAL SPINE WITHOUT CONTRAST  2/22/2017 9:59 AM     HISTORY:  Bilateral neck and arm pain for three years.     COMPARISON: None.     TECHNIQUE: Routine MR  cervical spine extended through T2.     FINDINGS: There is some degenerative bone marrow signal change C3-C6.  No malignant or destructive lesions. Normal alignment through T2.  Reversed cervical adenosis C3-C6.     The cervical and upper thoracic spinal cord appear intrinsically  normal. The craniocervical junction region is normal. No paraspinous  soft tissue abnormality.     Findings by level as follows:     C2-C3: Negative. No disc protrusion. No central or lateral stenosis.     C3-C4: Mild disc space narrowing. Mild diffuse annular bulge. Small  uncinate spur on the right. No significant central or left-sided  stenosis. Mild right-sided foraminal stenosis.     C4-C5: Moderate degenerative narrowing of the interspace. Mild ventral  disc osteophyte complex with minimal central stenosis. Small uncinate  spurs bilaterally with mild bilateral foraminal stenosis.     C6-C7: Moderate disc space narrowing. Mild broad-based disc osteophyte  complex with minimal central stenosis. Minimal uncinate spurs with  mild bilateral foraminal stenosis.     C6-C7: Moderate disc space narrowing. Mild ventral disc osteophyte  complex. No significant central or lateral stenosis.     C7-T1: No disc protrusion. No central or lateral stenosis. Moderate  bilateral facet joint disease.         IMPRESSION:  1. Degenerative changes as described C3-C4 through C7-T1. There is  only mild central and foraminal stenosis as described.  2. No intrinsic spinal cord abnormality through T2        EXAM: MR LUMBAR SPINE W/O CONTRAST  LOCATION: [facility]  DATE/TIME: 10/25/2021 7:51 PM     INDICATION: Lumbar radiculopathy, no red flags.  COMPARISON: None.  TECHNIQUE: Routine Lumbar Spine MRI without IV contrast.     FINDINGS:   Nomenclature is based on 5 lumbar type vertebral bodies. Normal vertebral body heights, alignment and marrow signal. Normal distal spinal cord and cauda equina with conus medullaris at L1-L2. No extraspinal  abnormality. Unremarkable visualized bony   pelvis.     T12-L1: Normal disc height and signal. No herniation. Mild facet arthropathy bilaterally. No spinal canal or neural foraminal stenosis.      L1-L2: There is loss of disc height, disc desiccation and mild circumferential disc bulging. No herniation. Normal facets. No spinal canal or neural foraminal stenosis.     L2-L3: There is loss of disc height, disc desiccation and mild circumferential disc bulging. No herniation. Mild facet arthropathy bilaterally. No spinal canal stenosis. Mild bilateral neural foraminal narrowing. No change from the comparison study.     L3-L4: There is loss of disc height, disc desiccation and mild circumferential disc bulging with a superimposed tiny left paracentral disc herniation (extrusion). Moderate facet arthropathy bilaterally. No spinal canal stenosis. Moderate left foraminal   stenosis. Mild right foraminal narrowing. No change from the comparison study.     L4-L5: There is loss of disc height, disc desiccation and mild circumferential disc bulging. No herniation. Moderate facet arthropathy bilaterally. No spinal canal stenosis. Moderate right foraminal stenosis. Mild left foraminal narrowing. No change from   the comparison study.     L5-S1: Normal disc height and signal. No herniation. Moderate facet arthropathy bilaterally. No spinal canal or neural foraminal stenosis. No change from the comparison study.                                                                      IMPRESSION:  1.  Diffuse degenerative change of the lumbar spine as detailed above without appreciable change from the comparison study.  2.  No significant spinal canal stenosis at any level of the lumbar spine.  3.  Moderate neural foraminal stenosis on the left at L3-L4 and on the right at L4-L5.        [place] Prescription Monitoring Program:  Reviewed [facility] 8/16/2023- no concerning fills.  Melanie STEVENS, RN CNP, FNP  [facility]  [facility]          DIRE Score for selecting candidates for long term opioid analgesia for chronic pain:  Diagnosis  2  Intractablility  2  Risk    Psychological health  2    Chemical health  2    Reliability  2    Social support  3  Efficacy  2    Total DIRE Score = 15. Note that   7-13 predicts poor outcome (compliance and efficacy) from opioid prescribing; 14-21 predicts good outcome (compliance and efficacy)  from opioid prescribing.      Assessment:   Chronic intractable headache, unspecified headache type  Cervical spondylosis without myelopathy  Cervical DDD  Cervical stenosis of spinal canal  Chronic neck pain  S/p cervical spinal fusion  Chronic bilateral low back pain without sciatica  Myofascial pain  Chronic continuous use of opioids  Encounter for long-term use of opiate analgesic  Essential hypertension  Urine drug screen last done 5/8/2023  Controlled substance agreement signed 5/8/2023  Remote history of ETOH overuse, attended AA for awhile. Sober for >10 years  PMHx includes: neck pain  PSHx includes: none listed       Plan:   Physical Therapy: not at present  Clinical Health Psychologist: none  Diagnostic Studies: none  Medication Management:   Continue tramadol ER 200mg once daily fill 8/28 and start 9/1  Continue tramadol 50mg Q 6-8 hours PRN, max of 3/day. Fill 8/28 and start 9/1  Continue gabapentin 900 mg TID  Restart Topamax 50mg at bedtime for a week, then take 50mg twice daily and if headaches don't improve, can increase to 50mg in the morning and 100mg at bedtime  Continue methocarbamol 500-1000 mg TID PRN muscle spasms  Continue cyclobenzaprine at bedtime  Further procedures recommended: none  Recommendations to PCP: see above  Follow up: 12 weeks video or in-person visit, your choice. Please call [phone] to make your follow-up appointment with me.          ASSESSMENT AND PLAN:     (R51.9,  G89.29) Chronic intractable headache, unspecified headache type   (primary encounter diagnosis)  Plan: topiramate (TOPAMAX) 50 MG tablet            (M47.812) Cervical spondylosis without myelopathy  Plan: methocarbamol (ROBAXIN) 500 MG tablet,         topiramate (TOPAMAX) 50 MG tablet, traMADol         (ULTRAM) 50 MG tablet, traMADol (ULTRAM-ER) 200        MG 24 hr tablet            (M50.30) DDD (degenerative disc disease), cervical  Plan: methocarbamol (ROBAXIN) 500 MG tablet,         topiramate (TOPAMAX) 50 MG tablet, traMADol         (ULTRAM) 50 MG tablet, traMADol (ULTRAM-ER) 200        MG 24 hr tablet            (M48.02) Cervical stenosis of spinal canal  Plan: traMADol (ULTRAM) 50 MG tablet, traMADol         (ULTRAM-ER) 200 MG 24 hr tablet            (M54.2,  G89.29) Chronic neck pain  Plan: methocarbamol (ROBAXIN) 500 MG tablet            (Z98.1) S/P cervical spinal fusion  Plan: traMADol (ULTRAM) 50 MG tablet, traMADol         (ULTRAM-ER) 200 MG 24 hr tablet            (M54.50,  G89.29) Chronic bilateral low back pain without sciatica  Plan: continue Tramadol ER and IR, methocarbamol    (M79.18) Myofascial pain  Plan: methocarbamol (ROBAXIN) 500 MG tablet, traMADol        (ULTRAM) 50 MG tablet            (F11.90) Chronic, continuous use of opioids  Plan: traMADol (ULTRAM) 50 MG tablet, traMADol         (ULTRAM-ER) 200 MG 24 hr tablet            (I10) Essential hypertension  Plan: Home Blood Pressure Monitor Order for DME -         ONLY FOR DME              Face to face time: 27 minutes                 Melanie STEVENS, RN CNP, FNP  [facility]  [facility]

## 2023-08-16 NOTE — PATIENT INSTRUCTIONS
Plan:   Physical Therapy: not at present  Clinical Health Psychologist: none  Diagnostic Studies: none  Medication Management:   Continue tramadol ER 200mg once daily fill 8/28 and start 9/1  Continue tramadol 50mg Q 6-8 hours PRN, max of 3/day. Fill 8/28 and start 9/1  Continue gabapentin 900 mg TID  Restart Topamax 50mg at bedtime for a week, then take 50mg twice daily and if headaches don't improve, can increase to 50mg in the morning and 100mg at bedtime  Continue methocarbamol 500-1000 mg TID PRN muscle spasms  Continue cyclobenzaprine at bedtime  Further procedures recommended: none  Recommendations to PCP: see above  Follow up: 12 weeks video or in-person visit, your choice. Please call [phone] to make your follow-up appointment with me.     ----------------------------------------------------------------  Clinic Number:  [phone]   Call with any questions about your care and for scheduling assistance.   Calls are returned Monday through Friday between 8 AM and 4:30 PM. We usually get back to you within 2 business days depending on the issue/request.    If we are prescribing your medications:  For opioid medication refills, call the clinic or send a [employer] message 7 days in advance.  Please include:  Name of requested medication  Name of the pharmacy.  For non-opioid medications, call your pharmacy directly to request a refill. Please allow 3-4 days to be processed.   Per [place] State Law:  All controlled substance prescriptions must be filled within 30 days of being written.    For those controlled substances allowing refills, pickup must occur within 30 days of last fill.      We believe regular attendance is key to your success in our program!    Any time you are unable to keep your appointment we ask that you call us at least 24 hours in advance to cancel.This will allow us to offer the appointment time to another patient.   Multiple missed appointments may lead to dismissal from the clinic.

## 2023-08-16 NOTE — NURSING NOTE
4/13/2022     3:08 PM 9/7/2022     3:40 PM 8/16/2023     9:20 AM   PEG Score   PEG Total Score 6.33 5.67 5

## 2023-09-30 ENCOUNTER — MYC REFILL (OUTPATIENT)
Dept: PALLIATIVE MEDICINE | Facility: CLINIC | Age: 63
End: 2023-09-30
Payer: COMMERCIAL

## 2023-09-30 DIAGNOSIS — Z98.1 S/P CERVICAL SPINAL FUSION: ICD-10-CM

## 2023-09-30 DIAGNOSIS — M50.30 DDD (DEGENERATIVE DISC DISEASE), CERVICAL: ICD-10-CM

## 2023-09-30 DIAGNOSIS — M79.18 MYOFASCIAL PAIN: ICD-10-CM

## 2023-09-30 DIAGNOSIS — F11.90 CHRONIC, CONTINUOUS USE OF OPIOIDS: ICD-10-CM

## 2023-09-30 DIAGNOSIS — M48.02 CERVICAL STENOSIS OF SPINAL CANAL: ICD-10-CM

## 2023-09-30 DIAGNOSIS — M47.812 CERVICAL SPONDYLOSIS WITHOUT MYELOPATHY: ICD-10-CM

## 2023-10-02 RX ORDER — TRAMADOL HYDROCHLORIDE 50 MG/1
50 TABLET ORAL EVERY 6 HOURS PRN
Qty: 90 TABLET | Refills: 0 | Status: SHIPPED | OUTPATIENT
Start: 2023-10-02 | End: 2023-10-31

## 2023-10-02 RX ORDER — TRAMADOL HYDROCHLORIDE 200 MG/1
200 TABLET, EXTENDED RELEASE ORAL EVERY 24 HOURS
Qty: 30 TABLET | Refills: 0 | Status: SHIPPED | OUTPATIENT
Start: 2023-10-02 | End: 2023-10-31

## 2023-10-02 NOTE — TELEPHONE ENCOUNTER
Medication refill information reviewed.     Due date for traMADol (ULTRAM) 50 MG tablet and traMADol (ULTRAM-ER) 200 MG 24 hr tablet  is 10/02/23     Prescriptions prepped for review.     Will route to provider.

## 2023-10-02 NOTE — TELEPHONE ENCOUNTER
Refills have been requested for the following medications:         traMADol (ULTRAM) 50 MG tablet [Melanie STEVENS CNP]         traMADol (ULTRAM-ER) 200 MG 24 hr tablet [Melanie STEVENS CNP]     Preferred pharmacy: [facility] [address] [place] [address] [address] [place] [address]

## 2023-10-02 NOTE — TELEPHONE ENCOUNTER
Received refill request for:    traMADol (ULTRAM) 50 MG tablet  traMADol (ULTRAM-ER) 200 MG 24 hr tablet      Last dispensed from pharmacy on:    Patient's last office/virtual visit by prescribing provider on 8/16/2023.  Next office/virtual appointment scheduled for 11/20/2023.    Last urine drug screen date 5/8/2023.    Current opioid agreement on file? Yes Date of opioid agreement: 5/8/2023.    E-prescribe to:  [facility] [address]    Will route to [facility] for review and preparation of prescription(s).

## 2023-10-02 NOTE — TELEPHONE ENCOUNTER
Signed Prescriptions:                        Disp   Refills    traMADol (ULTRAM) 50 MG tablet             90 tab*0        Sig: Take 1 tablet (50 mg) by mouth every 6 hours as           needed for severe pain Max 3/day. Fill/Start           10/02/23; to last 30 days for chronic pain  Authorizing Provider: MELANIE BENSON    traMADol (ULTRAM-ER) 200 MG 24 hr tablet   30 tab*0        Sig: Take 1 tablet (200 mg) by mouth every 24 hours           Fill/Start 10/02/23; to last 30 days for chronic           pain  Authorizing Provider: MELANIE BENSON        Reviewed [place]  October 2, 2023- no concerning fills.    Melanie Benson APRN, RN CNP, FNP  [facility]  [facility] [place]

## 2023-10-30 ENCOUNTER — MYC REFILL (OUTPATIENT)
Dept: FAMILY MEDICINE | Facility: CLINIC | Age: 63
End: 2023-10-30
Payer: COMMERCIAL

## 2023-10-30 DIAGNOSIS — I25.10 MILD CAD: ICD-10-CM

## 2023-10-30 RX ORDER — ATORVASTATIN CALCIUM 20 MG/1
20 TABLET, FILM COATED ORAL DAILY
Qty: 30 TABLET | Refills: 0 | Status: SHIPPED | OUTPATIENT
Start: 2023-10-30 | End: 2023-12-18

## 2023-10-31 ENCOUNTER — MYC REFILL (OUTPATIENT)
Dept: PALLIATIVE MEDICINE | Facility: CLINIC | Age: 63
End: 2023-10-31
Payer: COMMERCIAL

## 2023-10-31 DIAGNOSIS — M50.30 DDD (DEGENERATIVE DISC DISEASE), CERVICAL: ICD-10-CM

## 2023-10-31 DIAGNOSIS — M47.812 CERVICAL SPONDYLOSIS WITHOUT MYELOPATHY: ICD-10-CM

## 2023-10-31 DIAGNOSIS — M48.02 CERVICAL STENOSIS OF SPINAL CANAL: ICD-10-CM

## 2023-10-31 DIAGNOSIS — F11.90 CHRONIC, CONTINUOUS USE OF OPIOIDS: ICD-10-CM

## 2023-10-31 DIAGNOSIS — M79.18 MYOFASCIAL PAIN: ICD-10-CM

## 2023-10-31 DIAGNOSIS — Z98.1 S/P CERVICAL SPINAL FUSION: ICD-10-CM

## 2023-10-31 RX ORDER — TRAMADOL HYDROCHLORIDE 200 MG/1
200 TABLET, EXTENDED RELEASE ORAL EVERY 24 HOURS
Qty: 30 TABLET | Refills: 0 | Status: SHIPPED | OUTPATIENT
Start: 2023-10-31 | End: 2023-11-20

## 2023-10-31 RX ORDER — TRAMADOL HYDROCHLORIDE 50 MG/1
50 TABLET ORAL EVERY 6 HOURS PRN
Qty: 90 TABLET | Refills: 0 | Status: SHIPPED | OUTPATIENT
Start: 2023-10-31 | End: 2023-11-20

## 2023-10-31 NOTE — TELEPHONE ENCOUNTER
Signed Prescriptions:                        Disp   Refills    traMADol (ULTRAM) 50 MG tablet             90 tab*0        Sig: Take 1 tablet (50 mg) by mouth every 6 hours as           needed for severe pain Max 3/day. To last 30 days           for chronic pain. OK to fill 10/31/23 and start           11/1/23  Authorizing Provider: MELANIE BENSON    traMADol (ULTRAM-ER) 200 MG 24 hr tablet   30 tab*0        Sig: Take 1 tablet (200 mg) by mouth every 24 hours To           last 30 days for chronic pain. OK to fill           10/31/23 and start 11/1/23  Authorizing Provider: MELANIE BENSON        Reviewed [place]  October 31, 2023- no concerning fills.    Melanie Benson APRN, RN CNP, FNP  [facility]  [facility] [place]

## 2023-10-31 NOTE — TELEPHONE ENCOUNTER
Refills have been requested for the following medications:         traMADol (ULTRAM) 50 MG tablet [Melanie Thomas]         traMADol (ULTRAM-ER) 200 MG 24 hr tablet [Melanie Thomas]     Preferred pharmacy: [facility] - [address]

## 2023-10-31 NOTE — TELEPHONE ENCOUNTER
Routing to provider to review medication prepped per below    traMADol (ULTRAM) 50 MG tablet , #90, Refill:0  Sig:Take 1 tablet (50 mg) by mouth every 6 hours as needed for severe pain Max 3/day.   Last picked up 10/2/23 with start on 10/2/23  Due: OK to fill 10/31/23 and start 11/1/23    traMADol (ULTRAM-ER) 200 MG 24 hr tablet , #30, Refill:0  Sig:Take 1 tablet (200 mg) by mouth every 24 hours   Last picked up 10/2/23 with start on 10/2/23  Due: OK to fill 10/31/23 and start 11/1/23    Per last OV note 8/16/23Continue tramadol ER 200mg once daily fill 6/1 and start 6/3 Continue tramadol 50mg Q 6-8 hours PRN, max of 3/day. Fill 6/1and start 6/3      [facility] - [place], [place] - [address] [address]  Phone: [phone] Fax: [phone]    Cielo GAVIRIA RN Care Coordinator  [facility]

## 2023-10-31 NOTE — TELEPHONE ENCOUNTER
Patient requesting refill(s) of traMADol (ULTRAM) 50 MG tablet   Last dispensed from pharmacy on 10/02/23    traMADol (ULTRAM-ER) 200 MG 24 hr tablet   Last dispensed from pharmacy on 10/02/23    Patient's last office/virtual visit by prescribing provider on 8/16/23  Next office/virtual appointment scheduled for 11/20/223    Last urine drug screen date 5/8/23  Current opioid agreement on file (completed within the last year) Yes Date of opioid agreement: 5/9/23    E-prescribe to [facility] [id number]     Will route to [facility] for review and preparation of prescription(s).

## 2023-11-13 ASSESSMENT — PAIN SCALES - PAIN ENJOYMENT GENERAL ACTIVITY SCALE (PEG)
INTERFERED_ENJOYMENT_LIFE: 7
AVG_PAIN_PASTWEEK: 7
PEG_TOTALSCORE: 7
INTERFERED_GENERAL_ACTIVITY: 7

## 2023-11-20 ENCOUNTER — OFFICE VISIT (OUTPATIENT)
Dept: PALLIATIVE MEDICINE | Facility: CLINIC | Age: 63
End: 2023-11-20
Attending: NURSE PRACTITIONER
Payer: OTHER MISCELLANEOUS

## 2023-11-20 VITALS — HEART RATE: 57 BPM | DIASTOLIC BLOOD PRESSURE: 87 MMHG | SYSTOLIC BLOOD PRESSURE: 133 MMHG

## 2023-11-20 DIAGNOSIS — Z98.1 S/P CERVICAL SPINAL FUSION: ICD-10-CM

## 2023-11-20 DIAGNOSIS — R51.9 CHRONIC INTRACTABLE HEADACHE, UNSPECIFIED HEADACHE TYPE: ICD-10-CM

## 2023-11-20 DIAGNOSIS — G57.01 PIRIFORMIS SYNDROME, RIGHT: Primary | ICD-10-CM

## 2023-11-20 DIAGNOSIS — M48.02 CERVICAL STENOSIS OF SPINAL CANAL: ICD-10-CM

## 2023-11-20 DIAGNOSIS — G89.29 CHRONIC INTRACTABLE HEADACHE, UNSPECIFIED HEADACHE TYPE: ICD-10-CM

## 2023-11-20 DIAGNOSIS — M47.812 CERVICAL SPONDYLOSIS WITHOUT MYELOPATHY: ICD-10-CM

## 2023-11-20 DIAGNOSIS — M50.30 DDD (DEGENERATIVE DISC DISEASE), CERVICAL: ICD-10-CM

## 2023-11-20 DIAGNOSIS — G43.719 INTRACTABLE CHRONIC MIGRAINE WITHOUT AURA AND WITHOUT STATUS MIGRAINOSUS: ICD-10-CM

## 2023-11-20 DIAGNOSIS — F11.90 CHRONIC, CONTINUOUS USE OF OPIOIDS: ICD-10-CM

## 2023-11-20 DIAGNOSIS — M62.838 MUSCLE SPASM: ICD-10-CM

## 2023-11-20 DIAGNOSIS — M79.18 MYOFASCIAL PAIN: ICD-10-CM

## 2023-11-20 PROCEDURE — 99214 OFFICE O/P EST MOD 30 MIN: CPT | Performed by: NURSE PRACTITIONER

## 2023-11-20 RX ORDER — TOPIRAMATE 50 MG/1
150 TABLET, FILM COATED ORAL AT BEDTIME
Qty: 270 TABLET | Refills: 1 | Status: SHIPPED | OUTPATIENT
Start: 2023-11-20 | End: 2024-04-30

## 2023-11-20 RX ORDER — TRAMADOL HYDROCHLORIDE 50 MG/1
50 TABLET ORAL EVERY 6 HOURS PRN
Qty: 90 TABLET | Refills: 0 | Status: SHIPPED | OUTPATIENT
Start: 2023-11-20 | End: 2023-12-29

## 2023-11-20 RX ORDER — TRAMADOL HYDROCHLORIDE 200 MG/1
200 TABLET, EXTENDED RELEASE ORAL EVERY 24 HOURS
Qty: 30 TABLET | Refills: 0 | Status: SHIPPED | OUTPATIENT
Start: 2023-11-20 | End: 2023-12-29

## 2023-11-20 RX ORDER — CYCLOBENZAPRINE HCL 5 MG
5-10 TABLET ORAL
Qty: 135 TABLET | Refills: 3 | Status: SHIPPED | OUTPATIENT
Start: 2023-11-20

## 2023-11-20 RX ORDER — GABAPENTIN 600 MG/1
900 TABLET ORAL 3 TIMES DAILY
Qty: 405 TABLET | Refills: 3 | Status: SHIPPED | OUTPATIENT
Start: 2023-11-20

## 2023-11-20 ASSESSMENT — PAIN SCALES - GENERAL: PAINLEVEL: SEVERE PAIN (6)

## 2023-11-20 NOTE — PROGRESS NOTES
[facility]    11/20/2023      Chief complaint:   Low back pain now with right leg radicular symptoms.   Left forearm pain with overuse, tennis elbow symptoms.   Left 2nd digit knuckle pain  axial low back pain, radiates into the anterior thigh to the knee intermittently--pretty good  neck pain with intermittent right arm pain--pretty good  -having more headaches from time to time, pain behind the eyes, almost daily, last 3-4 hours.       Interval history:  Truman Suero is a 63 year old male is known to me for   Chronic neck pain  Cervical DDD  Cervical spondylosis (pain worse with extension/rotation indicating facetogenic component to pain)  Axial low back pain  Myofascial pain/muscle spasms  Remote history of ETOH overuse, attended AA for awhile. Sober for 10 years  ---PMHx includes: neck pain  ---PSHx includes: none listed      Recommendations/plan at the last visit on 8/16/2023 included:  Physical Therapy: not at present  Clinical Health Psychologist: none  Diagnostic Studies: none  Medication Management:   Continue tramadol ER 200mg once daily fill 8/28 and start 9/1  Continue tramadol 50mg Q 6-8 hours PRN, max of 3/day. Fill 8/28 and start 9/1  Continue gabapentin 900 mg TID  Restart Topamax 50mg at bedtime for a week, then take 50mg twice daily and if headaches don't improve, can increase to 50mg in the morning and 100mg at bedtime  Continue methocarbamol 500-1000 mg TID PRN muscle spasms  Continue cyclobenzaprine at bedtime  Further procedures recommended: none  Recommendations to PCP: see above  Follow up: 12 weeks video or in-person visit, your choice. Please call [phone] to make your follow-up appointment with me.          Since his last visit, Truman Suero reports:    Interval history November 20, 2023 accompanied by Mya burrows QRC throughout appt  -having low back pain that radiates into the right buttock and down the right leg past the knee level  -Left forearm  pain with overuse, tennis elbow symptoms.   Left 2nd digit knuckle pain  axial low back pain, radiates into the anterior thigh to the knee intermittently--pretty good  neck pain with intermittent right arm pain--pretty good  -having more headaches from time to time, pain behind the eyes, almost daily, last 3-4 hours.   -tramadol ER and IR are helpful.   -gabapentin helpful, will have him restart the Topamax as this may offer some improved pain relief.        Interval history August 16, 2023 accompanied by Mya his QRC throughout appt  -still having headaches, does not use Topamax daily, encouraged to use daily to prevent headaches and to get eyes tested.   -chronic neck pain, intermittent right arm pain  -axial low back pain  -gearing up for school's start again, he works in the dietary department.       Interval history May 8, 2023  -he did hand therapy for his left forearm pain, has an aresenal of things he can use for his overuse issues from tennis elbow.   -right foot and ankle pain, pain between the 2nd and 3rd digits, burning pain. Wears tennis shoes at work, starts to bother him when he has about an hour left of work.  -headaches have been a little bit worse, almost daily. Will last 3-4 hours at a time.     Interval history February 13, 2023  -Palmer states his neck pain is under good control  -he is struggling with left forearm pain from overuse and pain of the left 2nd digit proximal interphalangeal/metacarpal joint. This is slightly edematous and erythematous but not warm to touch.   -has tried a tennis elbow strap with some relief, but it must be cinched quite tight.   -axial low back pain is under good control at present.     Interval history November 22, 2022, accompanied by Mya, his QRC throughout visit  -has pain in the left forearm. 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 Seems like an overuse pain.   -additionally, he has pain in the left hand 2nd digit pain, again  "from using the scoop at his work at school in the kitchen.   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   -Lumbar DEMARCUS in May quite helpful for 2-3 months at about 80 % relief.   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 No radicular component at present time.     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 This is more hours and better pay.   -he has been adjusting to getting back into the kitchen, at his current position, not much ability to change position, but with new position this should be better.     -his right index finger DIP joint is permanently in partial flexion. 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 This occurred 2 month ago. Looks like a mallet finger. Recommended he splint it and be seen by [facility].     -he has ongoing neck pain with intermittent right arm radiation. The arm radiation is present daily but it typically comes on over time, this not constant. It seems to be worst in the morning. He still uses the braces for the carpal tunnel on most nights. He has more symptoms if he forgets to use the splints.     -chronic low back pain, has intermittent pain that radiates to the anterior thigh. 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 His job is 90% driving right now  -he will likely work at the [employer] as a  in the kitchen, and his wages should be pretty comparable.   -he does the exercises learned in PHYSICAL THERAPY before bed.         At this point, the patient's participation with our multidisciplinary team includes:  The patient has been compliant with the program.  PT - attended non-pain management PHYSICAL THERAPY   Health Psych - has seen Kentrell Castañeda x4  Acupuncture: worked with Dr. Bereket LAY      Pain scores:    Pain intensity on average is 6 on a scale of 0-10.    Range is 4-8/10.   Pain right now is 6/10.  Pain is described as \"burning, shooting, throbbing, stabbing, miserable, numb, tiring, exhausting, penetrating, nagging, unbearable.\"  Pain is constant.       Current pain relevant medications:      -Tramadol 200mg ER in the evening (helpful)  -Tramadol 50mg take 1 tablet  Q 6 hours PRN (using 2-3 tabs per day, helpful)  -ibuprofen 600mg PRN (helpful)  -gabapentin 900mg TID (somewhat helpful)  -methocarbamol 500-1000mg TID PRN muscle spasms (takes 1000 mg BID, somewhat helpful)  -Topamax 50 mg BID (using PRN, advised to use regularly to prevent headaches)    Other pertinent medications:  None    Previous Medications:  OPIATES: Tramdol (somewhat helpful)  NSAIDS: ibuprofen (helpful), Aleve (helpful)  MUSCLE RELAXANTS: none  ANTI-MIGRAINE MEDS: none  ANTI-DEPRESSANTS: none  SLEEP AIDS: none  ANTI-CONVULSANTS: gabapentin (helpful)  TOPICALS: lidocaine ointment (somewhat helpful)  Other meds: Tylenol (not helpful)        Other treatments have included:  Truman REI Suero has not been seen at a pain clinic in the past.    PT: tried, somewhat helpful  Chiropractic: helpful  Acupuncture: none  TENs Unit: none       Injections:    cervical radiofrequency nerve ablation at [facility] in December 2016 (did get good " relief, got 70% relief of his typical neck pain)  -6/29/2017 Cervical facet joint steroid injections at C4-5 and C5-6 on the right with Dr. Nicole Harding (not helpful)  -3/8/2018 C7-T1 interlaminar DEMARCUS with Dr. Hugo Corrigan (not helpful)  -5/23/2018 right L4-5 transforaminal epidural steroid injection with Dr. Stevens (not helpful for back pain but did help leg pain)  -8/7/2018 bilateral SI joint injection with Dr Hugo Corrigan (helpful for one month)  -10/11/2018 l3-4 and L4-5 facet joint injections bilaterally with Dr. Hugo Corrigan (this has been helpful, more helpful than any other lumbar injections thus far)  -1/28/2019 bilateral L3-5 medial branch blocks #1 with Dr. Stevens (somewhat helpful)   -2/25/2019 LMBB #2 with Dr. Stevens (somewhat effective, but not enough to proceed to RFA)  -5/6/2019 bilateral L4/L5 and L5/S1 facet joint injections with Dr. Stevens (helpful)  -11/29/2019 C7-T1 interlaminar epidural steroid injection with Dr. Corrigan (helpful temporarily)  -10/30/2020 ISMAEL with Dr. Hugo Corrigan (temporarily helpful)  -5/17/2022 L3-4 interlaminar DEMARCUS with Dr. Hugo Corrigan (2-3 months of about 80% relief)    Surgeries:  10/29/2018 right anterior cervical C5-6 discectomy and fusion by Dr. Jud Harkins (somewhat helpful)      THE 4 A's OF OPIOID MAINTENANCE ANALGESIA    Analgesia: long acting Tramadol with some short acting tramadol does give some relief    Activity: ADLs, working at the "[employer] as a  at present time.     Adverse effects: none    Adherence to Rx protocol: yes      Side Effects: None  Patient is using the medication as prescribed: Yes    Medications:  Current Outpatient Medications   Medication Sig Dispense Refill    atorvastatin (LIPITOR) 20 MG tablet Take 1 tablet (20 mg) by mouth daily Needs appointment and labs 30 tablet 0    cyclobenzaprine (FLEXERIL) 5 MG tablet Take 1-2 tablets (5-10 mg) by mouth nightly as needed for muscle spasms Need 6  hours between this and methocarbamol 45 tablet 11    gabapentin (NEURONTIN) 600 MG tablet Take 1.5 tablets (900 mg) by mouth 3 times daily 135 tablet 11    methocarbamol (ROBAXIN) 500 MG tablet Take 1 tablet (500 mg) by mouth 3 times daily as needed for muscle spasms Should be 6 hours between this and cyclobenzaprine at night 90 tablet 11    metoprolol succinate ER (TOPROL XL) 25 MG 24 hr tablet Take 1 tablet (25 mg) by mouth daily 90 tablet 0    topiramate (TOPAMAX) 50 MG tablet Take 50mg in the morning and 100mg at bedtime. Drink plenty of water and no alcohol with this medication. 3 month script 270 tablet 1    traMADol (ULTRAM) 50 MG tablet Take 1 tablet (50 mg) by mouth every 6 hours as needed for severe pain Max 3/day. To last 30 days for chronic pain. OK to fill 10/31/23 and start 11/1/23 90 tablet 0    traMADol (ULTRAM-ER) 200 MG 24 hr tablet Take 1 tablet (200 mg) by mouth every 24 hours To last 30 days for chronic pain. OK to fill 10/31/23 and start 11/1/23 30 tablet 0    aspirin (ASA) 81 MG EC tablet Take 1 tablet (81 mg) by mouth daily (Patient not taking: Reported on 11/22/2022) 90 tablet 1    order for DME BP cuff, brand as covered by insurance.  Dx: HTN (Patient not taking: Reported on 8/16/2023) 1 each 0       Medical History: any changes in medical history since they were last seen? none    Social History:   Home situation: , has 4 kids, 2 at home, one in college and one launched.   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 He used to work a sobriety program, used to go to   Drug use: none  History of chemical dependency treatment: no formal treatment, did AA    Is patient a current smoker or tobacco user?  QUIT on 3/20/2018  If yes, was cessation counseling offered?   no            Physical Exam:     Vital signs: BP (!) 160/85   Pulse 60      Behavioral observations:  Awake, alert and cooperative    Gait:  Normal    Musculoskeletal exam:  Moves well in exam room  Strength grossly equal throughout   Lumbar ROM slightly reduced due to pain  Piriformis very tender on the right side, reproduces his low back, buttock and right leg pain   SLR positive right and negative left.   Patient states pain is worst with piriformis tenderness that reproduces his pain vs comparing to SLR testing.   GTs Non-tender bilaterally      Neuro exam:  Deferred    Skin/vascular/autonomic:  No suspicious lesions on exposed skin.    Other:  na            IMAGING:    MR CERVICAL SPINE WITHOUT CONTRAST 9/5/2017 2:32 PM      HISTORY: Neck pain, worsening numbness in arms and lower extremities.  Radiculopathy, cervical region.     TECHNIQUE: Multiplanar multisequence images were obtained through the  cervical spine without contrast.     COMPARISON: 2/22/2017.     FINDINGS: Sagittal images demonstrate some minimal gentle cervical  kyphosis, otherwise normal posterior alignment. There is no evidence  for craniovertebral or cervicomedullary junction abnormality. The  cervical cord is minimally indented anteriorly at C4-C5 and C5-C6,  otherwise is normal in morphology and signal characteristics. Disc  space narrowing and discogenic marrow changes are present C3-C4,  C4-C5, C5-C6 and C6-C7.     C2-C3: Minimal facet hypertrophy. No stenosis.     C3-C4: Broad-based posterior osteophyte formation is present causing  some mild bilateral neural foraminal stenosis, but no central canal  stenosis.     C4-C5: Broad-based posterior osteophyte formation and mild facet  hypertrophy is present causing some mild to moderate central canal  stenosis, mild cord deformity, and mild-to-moderate bilateral neural  foraminal stenosis.     C5-C6: Broad-based posterior osteophyte formation and disc bulging is  present along with some  facet hypertrophy. There is moderate central  canal stenosis and moderate bilateral neural foraminal stenosis.  Findings have progressed since the prior exam.     C6-C7: Broad-based disc bulging is present along with some minimal  posterior osteophyte formation. There is borderline to mild central  canal stenosis, but no neural foraminal stenosis.     C7-T1: Moderate facet hypertrophy. No significant stenosis.         IMPRESSION: Moderate multilevel degenerative disc and facet disease  with some progression at C5-C6 where there is moderate central canal  and bilateral neural foraminal stenosis along with cord deformity.          MR CERVICAL SPINE WITHOUT CONTRAST  2/22/2017 9:59 AM     HISTORY:  Bilateral neck and arm pain for three years.     COMPARISON: None.     TECHNIQUE: Routine MR cervical spine extended through T2.     FINDINGS: There is some degenerative bone marrow signal change C3-C6.  No malignant or destructive lesions. Normal alignment through T2.  Reversed cervical adenosis C3-C6.     The cervical and upper thoracic spinal cord appear intrinsically  normal. The craniocervical junction region is normal. No paraspinous  soft tissue abnormality.     Findings by level as follows:     C2-C3: Negative. No disc protrusion. No central or lateral stenosis.     C3-C4: Mild disc space narrowing. Mild diffuse annular bulge. Small  uncinate spur on the right. No significant central or left-sided  stenosis. Mild right-sided foraminal stenosis.     C4-C5: Moderate degenerative narrowing of the interspace. Mild ventral  disc osteophyte complex with minimal central stenosis. Small uncinate  spurs bilaterally with mild bilateral foraminal stenosis.     C6-C7: Moderate disc space narrowing. Mild broad-based disc osteophyte  complex with minimal central stenosis. Minimal uncinate spurs with  mild bilateral foraminal stenosis.     C6-C7: Moderate disc space narrowing. Mild ventral disc osteophyte  complex. No significant  central or lateral stenosis.     C7-T1: No disc protrusion. No central or lateral stenosis. Moderate  bilateral facet joint disease.         IMPRESSION:  1. Degenerative changes as described C3-C4 through C7-T1. There is  only mild central and foraminal stenosis as described.  2. No intrinsic spinal cord abnormality through T2        EXAM: MR LUMBAR SPINE W/O CONTRAST  LOCATION: [facility]  DATE/TIME: 10/25/2021 7:51 PM     INDICATION: Lumbar radiculopathy, no red flags.  COMPARISON: None.  TECHNIQUE: Routine Lumbar Spine MRI without IV contrast.     FINDINGS:   Nomenclature is based on 5 lumbar type vertebral bodies. Normal vertebral body heights, alignment and marrow signal. Normal distal spinal cord and cauda equina with conus medullaris at L1-L2. No extraspinal abnormality. Unremarkable visualized bony   pelvis.     T12-L1: Normal disc height and signal. No herniation. Mild facet arthropathy bilaterally. No spinal canal or neural foraminal stenosis.      L1-L2: There is loss of disc height, disc desiccation and mild circumferential disc bulging. No herniation. Normal facets. No spinal canal or neural foraminal stenosis.     L2-L3: There is loss of disc height, disc desiccation and mild circumferential disc bulging. No herniation. Mild facet arthropathy bilaterally. No spinal canal stenosis. Mild bilateral neural foraminal narrowing. No change from the comparison study.     L3-L4: There is loss of disc height, disc desiccation and mild circumferential disc bulging with a superimposed tiny left paracentral disc herniation (extrusion). Moderate facet arthropathy bilaterally. No spinal canal stenosis. Moderate left foraminal   stenosis. Mild right foraminal narrowing. No change from the comparison study.     L4-L5: There is loss of disc height, disc desiccation and mild circumferential disc bulging. No herniation. Moderate facet arthropathy bilaterally. No spinal canal stenosis. Moderate right  foraminal stenosis. Mild left foraminal narrowing. No change from   the comparison study.     L5-S1: Normal disc height and signal. No herniation. Moderate facet arthropathy bilaterally. No spinal canal or neural foraminal stenosis. No change from the comparison study.                                                                      IMPRESSION:  1.  Diffuse degenerative change of the lumbar spine as detailed above without appreciable change from the comparison study.  2.  No significant spinal canal stenosis at any level of the lumbar spine.  3.  Moderate neural foraminal stenosis on the left at L3-L4 and on the right at L4-L5.        [place] Prescription Monitoring Program:  Reviewed [facility] 11/20/2023- no concerning fills.  Melanie STEVENS, RN CNP, FNP  [facility]  [facility] [place] [facility] to the [facility]          DIRE Score for selecting candidates for long term opioid analgesia for chronic pain:  Diagnosis  2  Intractablility  2  Risk    Psychological health  2    Chemical health  2    Reliability  2    Social support  3  Efficacy  2    Total DIRE Score = 15. Note that   7-13 predicts poor outcome (compliance and efficacy) from opioid prescribing; 14-21 predicts good outcome (compliance and efficacy)  from opioid prescribing.      Assessment:   Right piriformis syndrome  Muscle spasms  Myofascial pain  Cervical spondylosis without myelopathy  Cervical DDD  Cervical stenosis of spinal canal  Chronic neck pain  S/p cervical spinal fusion  Intractable chronic migraine without aura and without status migrainosus  Chronic intractable headache, unspecified headache type  Chronic continuous use of opioids    Encounter for long-term use of opiate analgesic  Essential hypertension  Urine drug screen last done 5/8/2023  Controlled substance agreement signed 5/8/2023  Remote history of ETOH overuse, attended AA for awhile. Sober for >10 years  PMHx includes: neck pain  PSHx includes: none listed       Plan:    Physical Therapy: not at present  Clinical Health Psychologist: none  Diagnostic Studies: none  Medication Management:   Continue tramadol ER 200mg once daily fill 11/29 and start 12/1  Continue tramadol 50mg Q 6-8 hours PRN, max of 3/day. Fill 11/29 and start 12/1  Continue gabapentin 900 mg TID  CHANGE Topamax, take 150mg (3 of the 50mg tabs) at bedtime. Reduce to 100mg if you do not feel well.  Continue methocarbamol 500-1000 mg TID PRN muscle spasms  Continue cyclobenzaprine at bedtime  START Aimovig (erunimab-aooe) 70mg subcutaneous injection once every 30 days (back of arm, abdomen or front of thigh)  Further procedures recommended: schedule right piriformis injection.  Could consider lumbar epidural injection if the piriformis is not helpful enough  Recommendations to PCP: see above  Follow up: 8 weeks video or in-person visit, your choice. Please call [phone] to make your follow-up appointment with me.         ASSESSMENT AND PLAN:  (G57.01) Piriformis syndrome, right  (primary encounter diagnosis)  Plan: PAIN INJECTION EVAL/TREAT/FOLLOW UP, Adult Pain        Clinic Follow-Up Order            (M62.838) Muscle spasm  Plan: PAIN INJECTION EVAL/TREAT/FOLLOW UP, Adult Pain        Clinic Follow-Up Order            (M79.18) Myofascial pain  Plan: traMADol (ULTRAM) 50 MG tablet, PAIN INJECTION         EVAL/TREAT/FOLLOW UP, Adult Pain Clinic         Follow-Up Order            (M47.812) Cervical spondylosis without myelopathy  Plan: gabapentin (NEURONTIN) 600 MG tablet,         topiramate (TOPAMAX) 50 MG tablet, traMADol         (ULTRAM) 50 MG tablet, traMADol (ULTRAM-ER) 200        MG 24 hr tablet, Adult Pain Clinic Follow-Up         Order            (M50.30) DDD (degenerative disc disease), cervical  Plan: gabapentin (NEURONTIN) 600 MG tablet,         topiramate (TOPAMAX) 50 MG tablet, traMADol         (ULTRAM) 50 MG tablet, traMADol (ULTRAM-ER) 200        MG 24 hr tablet, Adult Pain Clinic Follow-Up          Order            (M48.02) Cervical stenosis of spinal canal  Plan: gabapentin (NEURONTIN) 600 MG tablet, traMADol         (ULTRAM) 50 MG tablet, traMADol (ULTRAM-ER) 200        MG 24 hr tablet, Adult Pain Clinic Follow-Up         Order            (Z98.1) S/P cervical spinal fusion  Plan: cyclobenzaprine (FLEXERIL) 5 MG tablet,         gabapentin (NEURONTIN) 600 MG tablet, traMADol         (ULTRAM) 50 MG tablet, traMADol (ULTRAM-ER) 200        MG 24 hr tablet, Adult Pain Clinic Follow-Up         Order            (G43.719) Intractable chronic migraine without aura and without status migrainosus  Plan: erenumab-aooe (AIMOVIG) 70 MG/ML injection,         Adult Pain Clinic Follow-Up Order            (R51.9,  G89.29) Chronic intractable headache, unspecified headache type  Plan: topiramate (TOPAMAX) 50 MG tablet,         erenumab-aooe (AIMOVIG) 70 MG/ML injection,         Adult Pain Clinic Follow-Up Order            (F11.90) Chronic, continuous use of opioids  Plan: traMADol (ULTRAM) 50 MG tablet, traMADol         (ULTRAM-ER) 200 MG 24 hr tablet, Adult Pain         Clinic Follow-Up Order              Face to face time: 38 minutes                   Melanie STEVENS, RN CNP, FNP  [facility]  [facility] [facility] [place]

## 2023-11-20 NOTE — PATIENT INSTRUCTIONS
Plan:   Physical Therapy: not at present  Clinical Health Psychologist: none  Diagnostic Studies: none  Medication Management:   Continue tramadol ER 200mg once daily fill 11/29 and start 12/1  Continue tramadol 50mg Q 6-8 hours PRN, max of 3/day. Fill 11/29 and start 12/1  Continue gabapentin 900 mg TID  CHANGE Topamax, take 150mg (3 of the 50mg tabs) at bedtime. Reduce to 100mg if you do not feel well.  Continue methocarbamol 500-1000 mg TID PRN muscle spasms  Continue cyclobenzaprine at bedtime  START Aimovig (erunimab-aooe) 70mg subcutaneous injection once every 30 days (back of arm, abdomen or front of thigh)  Further procedures recommended: schedule right piriformis injection.  Could consider lumbar epidural injection if the piriformis is not helpful enough  Recommendations to PCP: see above  Follow up: 8 weeks video or in-person visit, your choice. Please call [phone] to make your follow-up appointment with me.       ----------------------------------------------------------------  Clinic Number:  [phone]   Call with any questions about your care and for scheduling assistance.   Calls are returned Monday through Friday between 8 AM and 4:30 PM. We usually get back to you within 2 business days depending on the issue/request.    If we are prescribing your medications:  For opioid medication refills, call the clinic or send a Zvooq message 7 days in advance.  Please include:  Name of requested medication  Name of the pharmacy.  For non-opioid medications, call your pharmacy directly to request a refill. Please allow 3-4 days to be processed.   Per [place] State Law:  All controlled substance prescriptions must be filled within 30 days of being written.    For those controlled substances allowing refills, pickup must occur within 30 days of last fill.      We believe regular attendance is key to your success in our program!    Any time you are unable to keep your appointment we ask that you call us at  least 24 hours in advance to cancel.This will allow us to offer the appointment time to another patient.   Multiple missed appointments may lead to dismissal from the clinic.

## 2023-11-24 ENCOUNTER — TELEPHONE (OUTPATIENT)
Dept: PALLIATIVE MEDICINE | Facility: CLINIC | Age: 63
End: 2023-11-24
Payer: COMMERCIAL

## 2023-11-24 DIAGNOSIS — M79.18 MYOFASCIAL PAIN: Primary | ICD-10-CM

## 2023-11-24 NOTE — TELEPHONE ENCOUNTER
"Screening Questions for Radiology Injections:    Injection to be done at which interventional clinic site? [facility] [place]    If choosing [facility] for location, please inform patient:  \"[facility] is a Hospital based clinic. Before your visit, you should check with your insurance about how it covers the charges for facility services in a hospital-based clinic.     Procedure ordered by Melanie Thomas     Procedure ordered? right piriformis injection     Transforaminal Cervical DEMARCUS - [facility] to [facility] - No [facility] providers perform this procedure    What insurance would patient like us to bill for this procedure? [employer]     IF SCHEDULING IN [place] PAIN OR SPINE PLEASE SCHEDULE AT LEAST 7-10 BUSINESS DAYS OUT SO A PA CAN BE OBTAINED    Worker's comp or MVA (motor vehicle accident) -Any injection DO NOT SCHEDULE and route to Sumi Blankenship.      Unified Social insurance - For SI joint injections, DO NOT SCHEDULE and route to Monalisa Dominguez.       ALL BCBS, [employer] and [place] [employer] - DO NOT SCHEDULE and route to Monalisa Dominguez    MEDICA- facet joint injections, route to Monalisa Alberto    Is patient scheduled at [facility]? NO    If YES, route every encounter to [facility] SPINE CENTER CARE NAVIGATION POOL [[phone]]    Is an  needed? No     Patient has a  home? (Review Grid) YES: OK    Any chance of pregnancy? Not Applicable   If YES, do NOT schedule and route to RN pool  - Dr. Alex route to Linda Ibarra and PM&R Nurse  [[id number]]      Is patient actively being treated for cancer or immunocompromised? No  If YES, do NOT schedule and route to RN pool/ Dr. Alex's Team    Does the patient have a bleeding or clotting disorder? No     If YES, okay to schedule AND route to RN nurse / Dr. Alex's Team     (For any patients with platelet count <100, RN must forward to provider)    Is patient taking any Blood Thinners OR Antiplatelet medication?  No   If hold " needed, do NOT schedule, route to RN pool/ Dr. Alex's Team    Examples:   o Blood Thinners: (Coumadin, Warfarin, Jantoven, Pradaxa, Xarelto, Eliquis, Edoxaban, Enoxaparin, Lovenox, Heparin, Arixtra, Fondaparinux or Fragmin)  o Antiplatelet Medications: (Plavix, Brilinta or Effient)     Is patient taking any aspirin products (includes Excedrin and Fiorinal)? No     If more than 325mg/day, OK to schedule; Instruct Pt to decrease to less than 325 mg for 7 days AND route to RN pool/ Dr. Alex's Team     For CERVICAL procedures, hold all aspirin products for 6 days.     Tell Pt that if aspirin product is not held for 6 days, the procedure WILL BE cancelled.     Any allergies to contrast dye, iodine, shellfish, or numbing and steroid medications? No    If YES, schedule and add allergy information to appointment notes AND route to the RN pool/ Dr. Alex's Team    If DEMARCUS and Contrast Dye / Iodine Allergy? DO NOT SCHEDULE, route to RN pool/ Dr. Alex's Team    Allergies: Patient has no known allergies.     Does patient have an active infection or treated for one within the past week? No    Is patient currently taking any antibiotics or steroid medications?  No     For patients on chronic, preventative, or prophylactic antibiotics, procedures may be scheduled.     For patients on antibiotics for active or recent infection, schedule 4 days after completed.    For patients on steroid medications, schedule 4 days after completed.     Has the patient had a flu shot or any other vaccinations within the past 7 days? No  If yes, explain that for the vaccine to work best they need to:       wait 1 week before and 1 week after getting any Vaccine    wait 1 week before and 2 weeks after getting any Covid Vaccine     If patient has concerns about the timing, send to RN pool/ Dr. Alex's Team    Does patient have an MRI/CT?  Not Applicable Include Date and Check Procedure Scheduling Grid to see if required.    Was the MRI/CT done  within the last 3 years?  NA     If no route to RN Pool/ Dr. Alex's Team    If yes, where was the MRI/CT done?      Refer to PACS Transmissions list for approved external locations and route to RN Pool [facility]/ Dr. Kennedys Team    If MRI was not done at approved external location do NOT schedule and route to RN pool/ Dr. Alex's Team      If patient has an imaging disc, the injection MAY be scheduled but patient must bring disc to appt or appt will be cancelled.    Is patient able to transfer to a procedure table with minimal or no assistance? Yes     If no, do NOT schedule and route to RN Pool/ Dr. Alex's Team    Procedure Specific Instructions:    If celiac plexus block, informed patient NPO for 6 hours and that it is okay to take medications with sips of water, especially blood pressure medications Not Applicable         If this is for a cervical procedure, informed patient that aspirin needs to be held for 6 days.   Not Applicable      Sedation, If Sedation is ordered for any procedure, patient must be NPO for 6 hours prior to procedure Not Applicable      If IV needed:    Do not schedule procedures requiring IV placement in the first appointment of the day or first appointment after lunch. Do NOT schedule at 0745, 0815 or 1245.       Instructed patient to arrive 30 minutes early for IV start if required. (Check Procedure Scheduling Grid)  Not Applicable    Reminders:    If you are started on any steroids or antibiotics between now and your appointment, you must contact us because the procedure may need to be cancelled.  Yes      As a reminder, receiving steroids can decrease your body's ability to fight infection.   Would you still like to move forward with scheduling the injection?  Yes      IV Sedation is not provided for procedures. If oral anti-anxiety medication is needed, the patient should request this from their referring provider.      Instruct patient to arrive as directed prior to the  scheduled appointment time:  If IV needed 30 minutes before appointment time       For patients 85 or older we recommend having an adult stay w/ them for the remainder of the day.       If the patient is Diabetic, remind them to bring their glucometer.      Does the patient have any questions?  NO    Augusta Martines  [facility]

## 2023-12-18 ENCOUNTER — MYC REFILL (OUTPATIENT)
Dept: FAMILY MEDICINE | Facility: CLINIC | Age: 63
End: 2023-12-18
Payer: COMMERCIAL

## 2023-12-18 DIAGNOSIS — I77.810 ASCENDING AORTA DILATATION (H): ICD-10-CM

## 2023-12-18 DIAGNOSIS — I25.10 MILD CAD: ICD-10-CM

## 2023-12-18 RX ORDER — ATORVASTATIN CALCIUM 20 MG/1
20 TABLET, FILM COATED ORAL DAILY
Qty: 30 TABLET | Refills: 0 | Status: SHIPPED | OUTPATIENT
Start: 2023-12-18 | End: 2024-01-10

## 2023-12-18 RX ORDER — METOPROLOL SUCCINATE 25 MG/1
25 TABLET, EXTENDED RELEASE ORAL DAILY
Qty: 30 TABLET | Refills: 0 | Status: SHIPPED | OUTPATIENT
Start: 2023-12-18 | End: 2024-01-10

## 2023-12-29 ENCOUNTER — MYC REFILL (OUTPATIENT)
Dept: PALLIATIVE MEDICINE | Facility: CLINIC | Age: 63
End: 2023-12-29
Payer: COMMERCIAL

## 2023-12-29 DIAGNOSIS — Z98.1 S/P CERVICAL SPINAL FUSION: ICD-10-CM

## 2023-12-29 DIAGNOSIS — M48.02 CERVICAL STENOSIS OF SPINAL CANAL: ICD-10-CM

## 2023-12-29 DIAGNOSIS — M50.30 DDD (DEGENERATIVE DISC DISEASE), CERVICAL: ICD-10-CM

## 2023-12-29 DIAGNOSIS — F11.90 CHRONIC, CONTINUOUS USE OF OPIOIDS: ICD-10-CM

## 2023-12-29 DIAGNOSIS — M47.812 CERVICAL SPONDYLOSIS WITHOUT MYELOPATHY: ICD-10-CM

## 2023-12-29 DIAGNOSIS — M79.18 MYOFASCIAL PAIN: ICD-10-CM

## 2023-12-29 RX ORDER — TRAMADOL HYDROCHLORIDE 200 MG/1
200 TABLET, EXTENDED RELEASE ORAL EVERY 24 HOURS
Qty: 30 TABLET | Refills: 0 | Status: SHIPPED | OUTPATIENT
Start: 2023-12-29 | End: 2024-01-30

## 2023-12-29 RX ORDER — TRAMADOL HYDROCHLORIDE 50 MG/1
50 TABLET ORAL EVERY 6 HOURS PRN
Qty: 90 TABLET | Refills: 0 | Status: SHIPPED | OUTPATIENT
Start: 2023-12-29 | End: 2024-01-30

## 2023-12-29 NOTE — TELEPHONE ENCOUNTER
Medication refill information reviewed.     Due date for  traMADol (ULTRAM) 50 MG tablet is 12/31/23     Prescriptions prepped for review.     Will route to provider.

## 2023-12-29 NOTE — TELEPHONE ENCOUNTER
Received call from patient requesting refill(s) of traMADol (ULTRAM) 50 MG tablet   and   traMADol (ULTRAM-ER) 200 MG 24 hr tablet     Last dispensed from pharmacy on 12/02/23 for both    Patient's last office/virtual visit by prescribing provider on 11/20/23  Next office/virtual appointment scheduled for 01/30/24    Last urine drug screen date 05/08/23  Current opioid agreement on file (completed within the last year) Yes Date of opioid agreement: 05/08/23    E-prescribe to pharmacy-[facility] [address] [place] [address]     Will route to [facility] for review and preparation of prescription(s).

## 2023-12-29 NOTE — TELEPHONE ENCOUNTER
Signed Prescriptions:                        Disp   Refills    traMADol (ULTRAM) 50 MG tablet             90 tab*0        Sig: Take 1 tablet (50 mg) by mouth every 6 hours as           needed for severe pain Max 3/day. To last 30 days           for chronic pain. OK to fill 12/29/23 and start           12/31/23  Authorizing Provider: MELANIE BENSON    traMADol (ULTRAM-ER) 200 MG 24 hr tablet   30 tab*0        Sig: Take 1 tablet (200 mg) by mouth every 24 hours To           last 30 days for chronic pain. OK to fill           12/29/23 and start 12/31/23  Authorizing Provider: MELANIE BENSON        Reviewed [place]  December 29, 2023- no concerning fills.    Melanie Benson APRN, RN CNP, FNP  [facility]  [facility]

## 2024-01-04 ENCOUNTER — RADIOLOGY INJECTION OFFICE VISIT (OUTPATIENT)
Dept: PALLIATIVE MEDICINE | Facility: CLINIC | Age: 64
End: 2024-01-04
Attending: NURSE PRACTITIONER
Payer: OTHER MISCELLANEOUS

## 2024-01-04 VITALS — SYSTOLIC BLOOD PRESSURE: 135 MMHG | HEART RATE: 54 BPM | OXYGEN SATURATION: 95 % | DIASTOLIC BLOOD PRESSURE: 80 MMHG

## 2024-01-04 DIAGNOSIS — G57.01 PIRIFORMIS SYNDROME, RIGHT: ICD-10-CM

## 2024-01-04 DIAGNOSIS — M62.838 MUSCLE SPASM: ICD-10-CM

## 2024-01-04 DIAGNOSIS — M79.18 MYOFASCIAL PAIN: Primary | ICD-10-CM

## 2024-01-04 PROCEDURE — 20552 NJX 1/MLT TRIGGER POINT 1/2: CPT | Performed by: PAIN MEDICINE

## 2024-01-04 PROCEDURE — 77002 NEEDLE LOCALIZATION BY XRAY: CPT | Performed by: PAIN MEDICINE

## 2024-01-04 RX ORDER — TRIAMCINOLONE ACETONIDE 40 MG/ML
40 INJECTION, SUSPENSION INTRA-ARTICULAR; INTRAMUSCULAR ONCE
Status: COMPLETED | OUTPATIENT
Start: 2024-01-04 | End: 2024-01-04

## 2024-01-04 RX ADMIN — TRIAMCINOLONE ACETONIDE 40 MG: 40 INJECTION, SUSPENSION INTRA-ARTICULAR; INTRAMUSCULAR at 15:31

## 2024-01-04 ASSESSMENT — PAIN SCALES - GENERAL
PAINLEVEL: MODERATE PAIN (4)
PAINLEVEL: SEVERE PAIN (6)

## 2024-01-04 NOTE — PATIENT INSTRUCTIONS
[facility]   Procedure Discharge Instructions    Today you saw:   Dr. Hugo Corrigan         You had an:    piriformis injection         Be cautious when walking. Numbness and/or weakness in the lower extremities may occur for up to 6-8 hours after the procedure due to effect of the local anesthetic  Do not drive for 6 hours. The effect of the local anesthetic could slow your reflexes.   You may resume your regular activities after 24 hours  Avoid strenuous activity for the first 24 hours  You may shower, however avoid swimming, tub baths or hot tubs for 24 hours following your procedure  You may have a mild to moderate increase in pain for several days following the injection.  It may take up to 14 days for the steroid medication to start working although you may feel the effect as early as a few days after the procedure.     You may use ice packs for 10-15 minutes, 3 to 4 times a day at the injection site for comfort  Do not use heat to painful areas for 6 to 8 hours. This will give the local anesthetic time to wear off and prevent you from accidentally burning your skin.   Unless you have been directed to avoid the use of anti-inflammatory medications (NSAIDS), you may use medications such as ibuprofen, Aleve or Tylenol for pain control if needed.   If you were fasting, you may resume your normal diet and medications after the procedure  If you have diabetes, check your blood sugar more frequently than usual as your blood sugar may be higher than normal for 10-14 days following a steroid injection. Contact your doctor who manages your diabetes if your blood sugar is higher than usual  Possible side effects of steroids that you may experience include flushing, elevated blood pressure, increased appetite, mild headaches and restlessness.  All of these symptoms will get better with time.  If you experience any of the following, call the Pain Clinic during work hours (Mon-Friday 8-4:30  pm) at [phone] or the Provider Line after hours at [phone]:  -Fever over 100 degree F  -Swelling, bleeding, redness, drainage, warmth at the injection site  -Progressive weakness or numbness in your legs or arms  -Loss of bowel or bladder function  -Unusual headache that is not relieved by Tylenol or other pain reliever  -Unusual new onset of pain that is not improving

## 2024-01-04 NOTE — NURSING NOTE
Pre-procedure Intake  If YES to any questions or NO to having a   Please complete laminated checklist and leave on the computer keyboard for Provider, verbally inform provider if able.    For SCS Trial, RFA's or any sedation procedure:  Have you been fasting? NA  If yes, for how long?     Are you taking any any blood thinners such as Coumadin, Warfarin, Jantoven, Pradaxa Xarelto, Eliquis, Edoxaban, Enoxaparin, Lovenox, Heparin, Arixtra, Fondaparinux, or Fragmin? OR Antiplatelet medication such as Plavix, Brilinta, or Effient?   No   If yes, when did you take your last dose?     Do you take aspirin?  No  If cervical procedure, have you held aspirin for 6 days?       Do you have any allergies to contrast dye, iodine, steroid and/or numbing medications?  NO    Are you currently taking antibiotics or have an active infection?  NO    Have you had a fever/elevated temperature within the past week? NO    Are you currently taking oral steroids? NO    Do you have a ? Yes    Are you pregnant or breastfeeding?  Not Applicable    Have you received the COVID-19 vaccine? Yes  If yes, was it your 1st, 2nd or only dose needed?   Date of most recent vaccine: 10/16/23    Notify provider and RNs if systolic BP >170, diastolic BP >100, P >100 or O2 sats < 90%

## 2024-01-04 NOTE — NURSING NOTE
Discharge Information    IV Discontiued Time:  NA    Amount of Fluid Infused:  NA    Discharge Criteria = When patient returns to baseline or as per MD order    Consciousness:  Pt is fully awake    Circulation:  BP +/- 20% of pre-procedure level    Respiration:  Patient is able to breathe deeply    O2 Sat:  Patient is able to maintain O2 Sat >92% on room air    Activity:  Moves 4 extremities on command    Ambulation:  Patient is able to stand and walk or stand and pivot into wheelchair    Dressing:  Clean/dry or No Dressing    Notes:   Discharge instructions and AVS given to patient.  Pt was advised to stay to be assesses as he has significant weakness w/ dorsal and plantar flexion.  He left w/out being advised.  See Dr. Corrigan's for more information.    Patient meets criteria for discharge?  YES    Admitted to PCU?  No    Responsible adult present to accompany patient home?  Yes    Signature/Title:    leslie peter, RN  RN Care Coordinator  [facility] [place]

## 2024-01-05 ENCOUNTER — TELEPHONE (OUTPATIENT)
Dept: PALLIATIVE MEDICINE | Facility: CLINIC | Age: 64
End: 2024-01-05

## 2024-01-05 NOTE — TELEPHONE ENCOUNTER
"\"Much better\"  \"In fact I just got off of work.  It was a little numb this morning but by the end of the day it has worked it self out.\"       \"It's much better.\"    Patient notified that provider has been attempting to contact and request that patient answer if he reattempts.   Patient states he is home from work now and is able to answer phone and hold conversation.     Routing to provider as NIKKI.     Marleny Edmond RN  [facility]  [phone]    "

## 2024-01-05 NOTE — TELEPHONE ENCOUNTER
Attempted to contact patient in follow-up regarding injection appointment yesterday 1/4/2024.      LVM requesting return call to clinic.     Marleny Edmond RN  [facility]  [phone]

## 2024-01-05 NOTE — PROGRESS NOTES
Pre procedure Diagnosis: myofascial pain syndrome, piriformis syndrome    Post procedure Diagnosis: Same  Procedure performed: Right piriformis injection, fluoroscopically guided, contrast controlled  Anesthesia: none  Complications: none  Of note pt had numbness in LE after injection in dist of the sciatic nerve. Gradually resolve(Of note called pt multiple times to check in. Unable to get in touch no answer  Operators: Hugo Corrigan MD     Indications:   .  The patient has a history of chronic lower back pain across the lower back and buttocks into the hip on the right.  Exam shows tenderness across the buttocks b positive piriformis stretch.  Other conservative treatments prior to injection include physical therapy, medications, and previous interventional procedures.   Options/alternatives, benefits and risks were discussed with the patient including bleeding, infection, tissue trauma, exposure to radiation, reaction to medications including seizure, nerve injury, weakness, and numbness.  Questions were answered to her satisfaction and she agrees to proceed. Voluntary informed consent was obtained and signed.     Vitals were reviewed: Yes  /88  Pulse 85  Allergies were reviewed:  Yes   Medications were reviewed:  Yes   Pre-procedure pain score: 6/10    Procedure:  After obtaining signed informed consent, the patient was brought into the procedure suite and was placed in a prone position on the procedure table.   A Pause for the Cause was performed.  The patient was prepped and draped in the usual sterile fashion.       A fluoroscopic view including the SI joint and the superiolateral border of the right acetabulum was obtained.  A location 1/3 of the distance medial from the acetabulum to the SI joint, overlying the ileum, was marked.  1 ml of Lidocaine 1% was used to anesthetize the skin.  A 3.5 inch 22 GA inch needle was advanced towards the marked location.   At this point, 1 mL of Omnipaque was  injected, with a flow consistent with piriformis muscle spread. A solution containing 4.5 ml of 0.2% bupivacaine and 20 mg of kenalog was injected.  The needle was removed.      Then, a fluoroscopic view including the SI joint and the superiolateral border of the left acetabulum was obtained.  A location 1/3 of the distance medial from the acetabulum to the SI joint, overlying the ileum, was marked.  1 ml of Lidocaine 1% was used to anesthetize the skin.   A 3.5 inch 22 GA inch needle was advanced towards the marked location.   At this point, 1 mL of Omnipaque was injected, with a flow consistent with piriformis muscle spread. 8 ml was wasted.  A solution containing 4.5 ml of 0.2% bupivacaine and 20 mg of kenalog was injected.  The needle was removed.      The injection sites were cleaned and bandaids placed when needed.  The patient tolerated the procedure well without complications.  She was recovered and discharged to home in good condition.    Post-procedure pain:  4/10    Follow-up includes:  -follow up with referring provider    Hugo Corrigan MD  [facility] [place]

## 2024-01-10 ENCOUNTER — OFFICE VISIT (OUTPATIENT)
Dept: FAMILY MEDICINE | Facility: CLINIC | Age: 64
End: 2024-01-10
Payer: COMMERCIAL

## 2024-01-10 VITALS
OXYGEN SATURATION: 98 % | TEMPERATURE: 98 F | WEIGHT: 152 LBS | HEIGHT: 66 IN | BODY MASS INDEX: 24.43 KG/M2 | SYSTOLIC BLOOD PRESSURE: 132 MMHG | HEART RATE: 68 BPM | DIASTOLIC BLOOD PRESSURE: 76 MMHG | RESPIRATION RATE: 16 BRPM

## 2024-01-10 DIAGNOSIS — Z87.891 HISTORY OF SMOKING 25-50 PACK YEARS: ICD-10-CM

## 2024-01-10 DIAGNOSIS — Z12.5 SCREENING FOR PROSTATE CANCER: ICD-10-CM

## 2024-01-10 DIAGNOSIS — Z00.00 ROUTINE GENERAL MEDICAL EXAMINATION AT A HEALTH CARE FACILITY: Primary | ICD-10-CM

## 2024-01-10 DIAGNOSIS — I25.10 MILD CAD: ICD-10-CM

## 2024-01-10 DIAGNOSIS — N18.2 CKD (CHRONIC KIDNEY DISEASE) STAGE 2, GFR 60-89 ML/MIN: ICD-10-CM

## 2024-01-10 DIAGNOSIS — Z13.220 LIPID SCREENING: ICD-10-CM

## 2024-01-10 DIAGNOSIS — I73.9 PAD (PERIPHERAL ARTERY DISEASE) (H): ICD-10-CM

## 2024-01-10 DIAGNOSIS — I77.810 ASCENDING AORTA DILATATION (H): ICD-10-CM

## 2024-01-10 DIAGNOSIS — R73.9 ELEVATED BLOOD SUGAR: ICD-10-CM

## 2024-01-10 DIAGNOSIS — M50.30 DDD (DEGENERATIVE DISC DISEASE), CERVICAL: ICD-10-CM

## 2024-01-10 LAB
ALBUMIN UR-MCNC: NEGATIVE MG/DL
APPEARANCE UR: CLEAR
BASOPHILS # BLD AUTO: 0 10E3/UL (ref 0–0.2)
BASOPHILS NFR BLD AUTO: 1 %
BILIRUB UR QL STRIP: NEGATIVE
COLOR UR AUTO: YELLOW
EOSINOPHIL # BLD AUTO: 0.1 10E3/UL (ref 0–0.7)
EOSINOPHIL NFR BLD AUTO: 1 %
ERYTHROCYTE [DISTWIDTH] IN BLOOD BY AUTOMATED COUNT: 12.4 % (ref 10–15)
GLUCOSE UR STRIP-MCNC: NEGATIVE MG/DL
HBA1C MFR BLD: 5.5 % (ref 0–5.6)
HCT VFR BLD AUTO: 47.4 % (ref 40–53)
HGB BLD-MCNC: 15.8 G/DL (ref 13.3–17.7)
HGB UR QL STRIP: NEGATIVE
IMM GRANULOCYTES # BLD: 0 10E3/UL
IMM GRANULOCYTES NFR BLD: 0 %
KETONES UR STRIP-MCNC: NEGATIVE MG/DL
LEUKOCYTE ESTERASE UR QL STRIP: NEGATIVE
LYMPHOCYTES # BLD AUTO: 2.4 10E3/UL (ref 0.8–5.3)
LYMPHOCYTES NFR BLD AUTO: 28 %
MCH RBC QN AUTO: 30.2 PG (ref 26.5–33)
MCHC RBC AUTO-ENTMCNC: 33.3 G/DL (ref 31.5–36.5)
MCV RBC AUTO: 91 FL (ref 78–100)
MONOCYTES # BLD AUTO: 0.6 10E3/UL (ref 0–1.3)
MONOCYTES NFR BLD AUTO: 7 %
NEUTROPHILS # BLD AUTO: 5.5 10E3/UL (ref 1.6–8.3)
NEUTROPHILS NFR BLD AUTO: 64 %
NITRATE UR QL: NEGATIVE
PH UR STRIP: 5.5 [PH] (ref 5–7)
PLATELET # BLD AUTO: 281 10E3/UL (ref 150–450)
RBC # BLD AUTO: 5.23 10E6/UL (ref 4.4–5.9)
SP GR UR STRIP: >=1.03 (ref 1–1.03)
UROBILINOGEN UR STRIP-ACNC: 0.2 E.U./DL
WBC # BLD AUTO: 8.6 10E3/UL (ref 4–11)

## 2024-01-10 PROCEDURE — 90471 IMMUNIZATION ADMIN: CPT | Performed by: FAMILY MEDICINE

## 2024-01-10 PROCEDURE — 90677 PCV20 VACCINE IM: CPT | Performed by: FAMILY MEDICINE

## 2024-01-10 PROCEDURE — 80053 COMPREHEN METABOLIC PANEL: CPT | Performed by: FAMILY MEDICINE

## 2024-01-10 PROCEDURE — 81003 URINALYSIS AUTO W/O SCOPE: CPT | Performed by: FAMILY MEDICINE

## 2024-01-10 PROCEDURE — 80061 LIPID PANEL: CPT | Performed by: FAMILY MEDICINE

## 2024-01-10 PROCEDURE — 85025 COMPLETE CBC W/AUTO DIFF WBC: CPT | Performed by: FAMILY MEDICINE

## 2024-01-10 PROCEDURE — 83036 HEMOGLOBIN GLYCOSYLATED A1C: CPT | Performed by: FAMILY MEDICINE

## 2024-01-10 PROCEDURE — 36415 COLL VENOUS BLD VENIPUNCTURE: CPT | Performed by: FAMILY MEDICINE

## 2024-01-10 PROCEDURE — 99214 OFFICE O/P EST MOD 30 MIN: CPT | Mod: 25 | Performed by: FAMILY MEDICINE

## 2024-01-10 PROCEDURE — 99396 PREV VISIT EST AGE 40-64: CPT | Mod: 25 | Performed by: FAMILY MEDICINE

## 2024-01-10 PROCEDURE — G0103 PSA SCREENING: HCPCS | Performed by: FAMILY MEDICINE

## 2024-01-10 RX ORDER — METOPROLOL SUCCINATE 25 MG/1
25 TABLET, EXTENDED RELEASE ORAL DAILY
Qty: 30 TABLET | Refills: 0 | Status: SHIPPED | OUTPATIENT
Start: 2024-01-10 | End: 2024-02-23

## 2024-01-10 RX ORDER — ATORVASTATIN CALCIUM 20 MG/1
20 TABLET, FILM COATED ORAL DAILY
Qty: 30 TABLET | Refills: 0 | Status: SHIPPED | OUTPATIENT
Start: 2024-01-10 | End: 2024-02-23

## 2024-01-10 ASSESSMENT — ENCOUNTER SYMPTOMS
EYE PAIN: 1
PALPITATIONS: 0
HEADACHES: 1
JOINT SWELLING: 0
COUGH: 0
FEVER: 0
PARESTHESIAS: 0
DYSURIA: 0
ARTHRALGIAS: 1
HEMATOCHEZIA: 0
NERVOUS/ANXIOUS: 0
WEAKNESS: 0
SHORTNESS OF BREATH: 0
CHILLS: 0
FREQUENCY: 0
ABDOMINAL PAIN: 0
HEARTBURN: 0
DIZZINESS: 0
SORE THROAT: 0
CONSTIPATION: 1
DIARRHEA: 0
MYALGIAS: 1
HEMATURIA: 0
NAUSEA: 0

## 2024-01-10 NOTE — PATIENT INSTRUCTIONS
RSV vaccine in pharamcy  labs    Your initial tests are normal  Get your ct and ultrasound this summer as planned  Awaiting other labs for you   Take care  Nice to see you  Humberto Trinidad D.O.    Patient Education     Preventive Health Recommendations  Male Ages 50 - 64    Yearly exam:             See your health care provider every year in order to  o   Review health changes.   o   Discuss preventive care.    o   Review your medicines if your doctor has prescribed any.   Have a cholesterol test every 5 years, or more frequently if you are at risk for high cholesterol/heart disease.   Have a diabetes test (fasting glucose) every three years. If you are at risk for diabetes, you should have this test more often.   Have a colonoscopy at age 45, or have a yearly FIT test (stool test). These exams will check for colon cancer.    Talk with your health care provider about whether or not a prostate cancer screening test (PSA) is right for you.  You should be tested each year for STDs (sexually transmitted diseases), if you re at risk.     Shots: Get a flu shot each year. Get a tetanus shot every 10 years.     Nutrition:  Eat at least 5 servings of fruits and vegetables daily.   Eat whole-grain bread, whole-wheat pasta and brown rice instead of white grains and rice.   Get adequate Calcium and Vitamin D.     Lifestyle  Exercise for at least 150 minutes a week (30 minutes a day, 5 days a week). This will help you control your weight and prevent disease.   Limit alcohol to one drink per day.   No smoking.   Wear sunscreen to prevent skin cancer.   See your dentist every six months for an exam and cleaning.   See your eye doctor every 1 to 2 years.

## 2024-01-10 NOTE — PROGRESS NOTES
SUBJECTIVE:   Palmer is a 63 year old, presenting for the following:  Physical        1/10/2024     7:34 AM   Additional Questions   Roomed by anuj       Healthy Habits:     Getting at least 3 servings of Calcium per day:  Yes    Bi-annual eye exam:  NO    Dental care twice a year:  Yes    Sleep apnea or symptoms of sleep apnea:  None    Diet:  Regular (no restrictions)    Frequency of exercise:  1 day/week    Taking medications regularly:  Yes    Medication side effects:  None    Additional concerns today:  No    Tobacco free for few years    Cad/pad-no symptoms     CKD stable,    Smoker 30 years did it in may       Hyperlipidemia Follow-Up    Are you regularly taking any medication or supplement to lower your cholesterol?   Yes- statin therapy  Are you having muscle aches or other side effects that you think could be caused by your cholesterol lowering medication?  No    Hypertension Follow-up    Do you check your blood pressure regularly outside of the clinic? No, but will check at the [facility] pharmacy at times  Are you following a low salt diet? No  Are your blood pressures ever more than 140 on the top number (systolic) OR more   than 90 on the bottom number (diastolic), for example 140/90? No      Social History     Tobacco Use    Smoking status: Former     Packs/day: 0.50     Years: 38.00     Additional pack years: 0.00     Total pack years: 19.00     Types: Cigarettes     Start date: 1979     Quit date: 3/20/2018     Years since quittin.8    Smokeless tobacco: Former   Substance Use Topics    Alcohol use: Not Currently     Alcohol/week: 0.0 standard drinks of alcohol     Comment: Quit drinking 10 years ago             2022     2:53 PM   Alcohol Use   Prescreen: >3 drinks/day or >7 drinks/week? Not Applicable       Last PSA:   PSA   Date Value Ref Range Status   2021 0.91 0 - 4 ug/L Final     Comment:     Assay Method:  Chemiluminescence using Siemens Vista analyzer     Prostate Specific  Antigen Screen   Date Value Ref Range Status   05/11/2022 0.69 0.00 - 4.00 ug/L Final       Reviewed orders with patient. Reviewed health maintenance and updated orders accordingly - Yes  BP Readings from Last 3 Encounters:   01/10/24 132/76   01/04/24 135/80   11/20/23 133/87    Wt Readings from Last 3 Encounters:   01/10/24 68.9 kg (152 lb)   05/11/22 74.4 kg (164 lb)   09/14/21 70 kg (154 lb 4 oz)                    Reviewed and updated as needed this visit by clinical staff                  Reviewed and updated as needed this visit by Provider                 Past Medical History:   Diagnosis Date    Ascending aorta dilatation (H24) 2/13/2018    Cervical spondylosis without myelopathy 10/3/2017    Chronic bilateral low back pain with right-sided sciatica 5/16/2018    Hypertension     Mild CAD 2/13/2018    Neck pain     MRI positive on cervical vertebrea.    PAD (peripheral artery disease) (H24) 2/27/2018    Tobacco abuse 8/16/2017      Past Surgical History:   Procedure Laterality Date    COLONOSCOPY      DISCECTOMY, FUSION CERVICAL ANTERIOR ONE LEVEL, COMBINED Right 10/29/2018    Procedure: Right Anterior Cervical 5-6 Discectomy And Fusion;  Surgeon: Jud Harkins MD;  Location: [facility]    HEAD & NECK SURGERY  [id number]    Follow-up to cervical surgery 10-29-18     OB History   No obstetric history on file.       Review of Systems   Constitutional:  Negative for chills and fever.   HENT:  Negative for congestion, ear pain, hearing loss and sore throat.    Eyes:  Positive for pain. Negative for visual disturbance.   Respiratory:  Negative for cough and shortness of breath.    Cardiovascular:  Negative for chest pain, palpitations and peripheral edema.   Gastrointestinal:  Positive for constipation. Negative for abdominal pain, diarrhea, heartburn, hematochezia and nausea.   Genitourinary:  Negative for dysuria, frequency, genital sores, hematuria, impotence, penile discharge and urgency.   Musculoskeletal:   Positive for arthralgias and myalgias. Negative for joint swelling.   Skin:  Negative for rash.   Neurological:  Positive for headaches. Negative for dizziness, weakness and paresthesias.   Psychiatric/Behavioral:  Negative for mood changes. The patient is not nervous/anxious.      CONSTITUTIONAL: NEGATIVE for fever, chills, change in weight  INTEGUMENTARY/SKIN: NEGATIVE for worrisome rashes, moles or lesions  EYES: NEGATIVE for vision changes or irritation  ENT: NEGATIVE for ear, mouth and throat problems  RESP: NEGATIVE for significant cough or SOB  CV: NEGATIVE for chest pain, palpitations or peripheral edema  GI: NEGATIVE for nausea, abdominal pain, heartburn, or change in bowel habits   male: negative for dysuria, hematuria, decreased urinary stream, erectile dysfunction, urethral discharge  MUSCULOSKELETAL: NEGATIVE for significant arthralgias or myalgia  NEURO: NEGATIVE for weakness, dizziness or paresthesias  PSYCHIATRIC: NEGATIVE for changes in mood or affect    OBJECTIVE:   There were no vitals taken for this visit.    Physical Exam  GENERAL: healthy, alert and no distress  EYES: Eyes grossly normal to inspection, PERRL and conjunctivae and sclerae normal  HENT: ear canals and TM's normal, nose and mouth without ulcers or lesions  NECK: no adenopathy, no asymmetry, masses, or scars and thyroid normal to palpation  RESP: lungs clear to auscultation - no rales, rhonchi or wheezes  CV: regular rate and rhythm, normal S1 S2, no S3 or S4, no murmur, click or rub, no peripheral edema and peripheral pulses strong  ABDOMEN: soft, nontender, no hepatosplenomegaly, no masses and bowel sounds normal  MS: no gross musculoskeletal defects noted, no edema  SKIN: no suspicious lesions or rashes  NEURO: Normal strength and tone, mentation intact and speech normal  PSYCH: mentation appears normal, affect normal/bright    Results for orders placed or performed in visit on 01/10/24   Hemoglobin A1c     Status: Normal    Result Value Ref Range    Hemoglobin A1C 5.5 0.0 - 5.6 %   CBC with platelets and differential     Status: None   Result Value Ref Range    WBC Count 8.6 4.0 - 11.0 10e3/uL    RBC Count 5.23 4.40 - 5.90 10e6/uL    Hemoglobin 15.8 13.3 - 17.7 g/dL    Hematocrit 47.4 40.0 - 53.0 %    MCV 91 78 - 100 fL    MCH 30.2 26.5 - 33.0 pg    MCHC 33.3 31.5 - 36.5 g/dL    RDW 12.4 10.0 - 15.0 %    Platelet Count 281 150 - 450 10e3/uL    % Neutrophils 64 %    % Lymphocytes 28 %    % Monocytes 7 %    % Eosinophils 1 %    % Basophils 1 %    % Immature Granulocytes 0 %    Absolute Neutrophils 5.5 1.6 - 8.3 10e3/uL    Absolute Lymphocytes 2.4 0.8 - 5.3 10e3/uL    Absolute Monocytes 0.6 0.0 - 1.3 10e3/uL    Absolute Eosinophils 0.1 0.0 - 0.7 10e3/uL    Absolute Basophils 0.0 0.0 - 0.2 10e3/uL    Absolute Immature Granulocytes 0.0 <=0.4 10e3/uL   CBC with platelets and differential     Status: None    Narrative    The following orders were created for panel order CBC with platelets and differential.  Procedure                               Abnormality         Status                     ---------                               -----------         ------                     CBC with platelets and d...[[id number]]                      Final result                 Please view results for these tests on the individual orders.         ASSESSMENT/PLAN:   1. Routine general medical examination at a health care facility  Doing well, goes to pain clinic for meds  - CBC with platelets and differential; Future  - Comprehensive metabolic panel (BMP + Alb, Alk Phos, ALT, AST, Total. Bili, TP); Future  - UA Macroscopic with reflex to Microscopic and Culture - Lab Collect; Future  - CBC with platelets and differential  - Comprehensive metabolic panel (BMP + Alb, Alk Phos, ALT, AST, Total. Bili, TP)  - UA Macroscopic with reflex to Microscopic and Culture - Lab Collect    2. Mild CAD  Continue statin.  No symptoms  - atorvastatin (LIPITOR) 20 MG  tablet; Take 1 tablet (20 mg) by mouth daily Needs appointment and labs  Dispense: 30 tablet; Refill: 0  - metoprolol succinate ER (TOPROL XL) 25 MG 24 hr tablet; Take 1 tablet (25 mg) by mouth daily  Dispense: 30 tablet; Refill: 0    3. Ascending aorta dilatation (H24)  Stable over the last 2 years, imaging ordered to monitor, continue BB  - metoprolol succinate ER (TOPROL XL) 25 MG 24 hr tablet; Take 1 tablet (25 mg) by mouth daily  Dispense: 30 tablet; Refill: 0  - US Abdominal Aorta Imaging; Future    4. CKD (chronic kidney disease) stage 2, GFR 60-89 ml/min  Stable, recheck toda  - CBC with platelets and differential; Future  - Comprehensive metabolic panel (BMP + Alb, Alk Phos, ALT, AST, Total. Bili, TP); Future  - CBC with platelets and differential  - Comprehensive metabolic panel (BMP + Alb, Alk Phos, ALT, AST, Total. Bili, TP)    5. History of smoking 25-50 pack years  Quit in 2018  - CT Chest Lung Cancer Scrn Low Dose wo; Future    6. Elevated blood sugar  stable  - Hemoglobin A1c; Future  - Hemoglobin A1c    7. Lipid screening    - Lipid panel reflex to direct LDL Fasting; Future  - Lipid panel reflex to direct LDL Fasting    8. Screening for prostate cancer    - PSA, screen; Future  - PSA, screen    Djd-chronic pain-sees pain clinic, stable on med        Patient has been advised of split billing requirements and indicates understanding: Yes      COUNSELING:   Reviewed preventive health counseling, as reflected in patient instructions       Regular exercise       Healthy diet/nutrition       Vision screening       Hearing screening        He reports that he quit smoking about 5 years ago. His smoking use included cigarettes. He started smoking about 45 years ago. He has a 19 pack-year smoking history. He has quit using smokeless tobacco.          DO NIC [facility]

## 2024-01-11 LAB
ALBUMIN SERPL BCG-MCNC: 4.8 G/DL (ref 3.5–5.2)
ALP SERPL-CCNC: 51 U/L (ref 40–150)
ALT SERPL W P-5'-P-CCNC: 24 U/L (ref 0–70)
ANION GAP SERPL CALCULATED.3IONS-SCNC: 11 MMOL/L (ref 7–15)
AST SERPL W P-5'-P-CCNC: 23 U/L (ref 0–45)
BILIRUB SERPL-MCNC: 0.5 MG/DL
BUN SERPL-MCNC: 18.5 MG/DL (ref 8–23)
CALCIUM SERPL-MCNC: 10 MG/DL (ref 8.8–10.2)
CHLORIDE SERPL-SCNC: 109 MMOL/L (ref 98–107)
CHOLEST SERPL-MCNC: 154 MG/DL
CREAT SERPL-MCNC: 1.17 MG/DL (ref 0.67–1.17)
DEPRECATED HCO3 PLAS-SCNC: 22 MMOL/L (ref 22–29)
EGFRCR SERPLBLD CKD-EPI 2021: 70 ML/MIN/1.73M2
FASTING STATUS PATIENT QL REPORTED: YES
GLUCOSE SERPL-MCNC: 81 MG/DL (ref 70–99)
HDLC SERPL-MCNC: 61 MG/DL
LDLC SERPL CALC-MCNC: 78 MG/DL
NONHDLC SERPL-MCNC: 93 MG/DL
POTASSIUM SERPL-SCNC: 4.4 MMOL/L (ref 3.4–5.3)
PROT SERPL-MCNC: 7.4 G/DL (ref 6.4–8.3)
PSA SERPL DL<=0.01 NG/ML-MCNC: 0.62 NG/ML (ref 0–4.5)
SODIUM SERPL-SCNC: 142 MMOL/L (ref 135–145)
TRIGL SERPL-MCNC: 75 MG/DL

## 2024-01-21 ENCOUNTER — MYC REFILL (OUTPATIENT)
Dept: PALLIATIVE MEDICINE | Facility: CLINIC | Age: 64
End: 2024-01-21
Payer: COMMERCIAL

## 2024-01-21 DIAGNOSIS — G43.719 INTRACTABLE CHRONIC MIGRAINE WITHOUT AURA AND WITHOUT STATUS MIGRAINOSUS: ICD-10-CM

## 2024-01-21 DIAGNOSIS — G89.29 CHRONIC INTRACTABLE HEADACHE, UNSPECIFIED HEADACHE TYPE: ICD-10-CM

## 2024-01-21 DIAGNOSIS — R51.9 CHRONIC INTRACTABLE HEADACHE, UNSPECIFIED HEADACHE TYPE: ICD-10-CM

## 2024-01-22 ENCOUNTER — ANCILLARY PROCEDURE (OUTPATIENT)
Dept: CT IMAGING | Facility: CLINIC | Age: 64
End: 2024-01-22
Attending: FAMILY MEDICINE
Payer: COMMERCIAL

## 2024-01-22 ENCOUNTER — ANCILLARY PROCEDURE (OUTPATIENT)
Dept: ULTRASOUND IMAGING | Facility: CLINIC | Age: 64
End: 2024-01-22
Attending: FAMILY MEDICINE
Payer: COMMERCIAL

## 2024-01-22 DIAGNOSIS — I77.810 ASCENDING AORTA DILATATION (H): ICD-10-CM

## 2024-01-22 DIAGNOSIS — Z87.891 HISTORY OF SMOKING 25-50 PACK YEARS: ICD-10-CM

## 2024-01-22 PROCEDURE — 76775 US EXAM ABDO BACK WALL LIM: CPT | Mod: TC | Performed by: RADIOLOGY

## 2024-01-22 PROCEDURE — 71271 CT THORAX LUNG CANCER SCR C-: CPT | Mod: TC | Performed by: RADIOLOGY

## 2024-01-22 NOTE — TELEPHONE ENCOUNTER
Refills have been requested for the following medications:         erenumab-aooe (AIMOVIG) 70 MG/ML injection [Melanie Thomas]     Preferred pharmacy: [facility] - [address]

## 2024-01-22 NOTE — TELEPHONE ENCOUNTER
Signed Prescriptions:                        Disp   Refills    erenumab-aooe (AIMOVIG) 70 MG/ML injection 1 mL   2        Sig: Inject 1 mL (70 mg) Subcutaneous every 30 days  Authorizing Provider: CANDACE BENSON, RN CNP, FNP  [facility]

## 2024-01-22 NOTE — TELEPHONE ENCOUNTER
Patient requesting refill(s) of erenumab-aooe (AIMOVIG) 70 MG/ML injection     Last dispensed from pharmacy on 12/26/23    Patient's last office/virtual visit by prescribing provider on 1/4/24  Next office/virtual appointment scheduled for 01/30/24    E-prescribe to [facility] - [address]     Will route to nursing [place] for review and preparation of prescription(s).

## 2024-01-23 ENCOUNTER — TELEPHONE (OUTPATIENT)
Dept: PALLIATIVE MEDICINE | Facility: CLINIC | Age: 64
End: 2024-01-23
Payer: COMMERCIAL

## 2024-01-23 ASSESSMENT — PAIN SCALES - PAIN ENJOYMENT GENERAL ACTIVITY SCALE (PEG)
PEG_TOTALSCORE: 5.67
AVG_PAIN_PASTWEEK: 6
INTERFERED_ENJOYMENT_LIFE: 5
INTERFERED_GENERAL_ACTIVITY: 6

## 2024-01-23 NOTE — TELEPHONE ENCOUNTER
Prior Authorization Retail Medication Request    Medication/Dose: erenumab-aooe (AIMOVIG) 70 MG/ML injection   Diagnosis and ICD code (if different than what is on RX):    Chronic intractable headache, unspecified headache type [R51.9, G89.29]      Intractable chronic migraine without aura and without status migrainosus [G43.719]        New/renewal/insurance change PA/secondary ins. PA:  Previously Tried and Failed:    Rationale:      KEY: [id number]

## 2024-01-30 ENCOUNTER — VIRTUAL VISIT (OUTPATIENT)
Dept: PALLIATIVE MEDICINE | Facility: CLINIC | Age: 64
End: 2024-01-30
Attending: NURSE PRACTITIONER
Payer: COMMERCIAL

## 2024-01-30 DIAGNOSIS — M62.838 MUSCLE SPASM: ICD-10-CM

## 2024-01-30 DIAGNOSIS — M48.02 CERVICAL STENOSIS OF SPINAL CANAL: ICD-10-CM

## 2024-01-30 DIAGNOSIS — G43.719 INTRACTABLE CHRONIC MIGRAINE WITHOUT AURA AND WITHOUT STATUS MIGRAINOSUS: ICD-10-CM

## 2024-01-30 DIAGNOSIS — M50.30 DDD (DEGENERATIVE DISC DISEASE), CERVICAL: ICD-10-CM

## 2024-01-30 DIAGNOSIS — F11.90 CHRONIC, CONTINUOUS USE OF OPIOIDS: ICD-10-CM

## 2024-01-30 DIAGNOSIS — M47.812 CERVICAL SPONDYLOSIS WITHOUT MYELOPATHY: ICD-10-CM

## 2024-01-30 DIAGNOSIS — M79.18 MYOFASCIAL PAIN: ICD-10-CM

## 2024-01-30 DIAGNOSIS — Z98.1 S/P CERVICAL SPINAL FUSION: ICD-10-CM

## 2024-01-30 PROCEDURE — 99214 OFFICE O/P EST MOD 30 MIN: CPT | Mod: 95 | Performed by: NURSE PRACTITIONER

## 2024-01-30 RX ORDER — TRAMADOL HYDROCHLORIDE 200 MG/1
200 TABLET, EXTENDED RELEASE ORAL EVERY 24 HOURS
Qty: 30 TABLET | Refills: 0 | Status: SHIPPED | OUTPATIENT
Start: 2024-01-30 | End: 2024-02-29

## 2024-01-30 RX ORDER — TRAMADOL HYDROCHLORIDE 50 MG/1
50 TABLET ORAL EVERY 6 HOURS PRN
Qty: 90 TABLET | Refills: 0 | Status: SHIPPED | OUTPATIENT
Start: 2024-01-30 | End: 2024-02-29

## 2024-01-30 ASSESSMENT — PAIN SCALES - GENERAL: PAINLEVEL: MODERATE PAIN (5)

## 2024-01-30 NOTE — PATIENT INSTRUCTIONS
Plan:   Physical Therapy: not at present  Clinical Health Psychologist: none  Diagnostic Studies: none  Medication Management:   Continue tramadol ER 200mg once daily fill/begin 1/30/2024  Continue tramadol 50mg Q 6-8 hours PRN, max of 3/day. Fill and begin 1/30  Continue gabapentin 900 mg TID  continueTopamax, take 150mg (3 of the 50mg tabs) at bedtime. Reduce to 100mg if you do not feel well.  Continue methocarbamol 500-1000 mg TID PRN muscle spasms  Continue cyclobenzaprine at bedtime  INCREASE Aimovig (erunimab-aooe) 140mg subcutaneous injection once every 30 days (back of arm, abdomen or front of thigh)  Further procedures recommended: none  Recommendations to PCP: see above  Follow up: 12 weeks in-person visit, your choice. Please call [phone] to make your follow-up appointment with me.     ----------------------------------------------------------------  Clinic Number:  [phone]   Call with any questions about your care and for scheduling assistance.   Calls are returned Monday through Friday between 8 AM and 4:30 PM. We usually get back to you within 2 business days depending on the issue/request.    If we are prescribing your medications:  For opioid medication refills, call the clinic or send a [employer] message 7 days in advance.  Please include:  Name of requested medication  Name of the pharmacy.  For non-opioid medications, call your pharmacy directly to request a refill. Please allow 3-4 days to be processed.   Per [place] State Law:  All controlled substance prescriptions must be filled within 30 days of being written.    For those controlled substances allowing refills, pickup must occur within 30 days of last fill.      We believe regular attendance is key to your success in our program!    Any time you are unable to keep your appointment we ask that you call us at least 24 hours in advance to cancel.This will allow us to offer the appointment time to another patient.   Multiple missed  appointments may lead to dismissal from the clinic.

## 2024-01-30 NOTE — PROGRESS NOTES
Palmer is a 63 year old who is being evaluated via a billable video visit.      How would you like to obtain your AVS? [facility]  If the video visit is dropped, the invitation should be resent by: Text to cell phone: Pathfire waiting room   Will anyone else be joining your video visit? No  Is Pt currently in [place]? Yes    Mike Bergeron MA      NOTE:  If Pt is not in [place], Appointment needs to be canceled and rescheduled.          Video-Visit Details    Type of service:  Video Visit     Originating Location (pt. Location): Home    Distant Location (provider location):  On-site  Platform used for Video Visit: GSOUND     Addendum:   Video start time ~ 1610  Video stop time ~ 25215  Total video time 19 minutes.       [facility]    1/30/2024      Chief complaint:   -Low back pain now with right leg radicular symptom--piriformis injection was helpful  -Left forearm pain with overuse, tennis elbow symptoms. --improved  -axial low back pain, radiates into the anterior thigh to the knee intermittently--pretty good  -neck pain with intermittent right arm pain--pretty good  -still gets headaches but a little bit less       Interval history:  Truman Suero is a 63 year old male is known to me for   Chronic neck pain  Cervical DDD  Cervical spondylosis (pain worse with extension/rotation indicating facetogenic component to pain)  Axial low back pain  Myofascial pain/muscle spasms  Remote history of ETOH overuse, attended AA for awhile. Sober for 10 years  ---PMHx includes: neck pain  ---PSHx includes: none listed      Recommendations/plan at the last visit on 11/20/2023 included:  Physical Therapy: not at present  Clinical Health Psychologist: none  Diagnostic Studies: none  Medication Management:   Continue tramadol ER 200mg once daily fill 11/29 and start 12/1  Continue tramadol 50mg Q 6-8 hours PRN, max of 3/day. Fill 11/29 and start 12/1  Continue gabapentin 900 mg TID  CHANGE Topamax, take  150mg (3 of the 50mg tabs) at bedtime. Reduce to 100mg if you do not feel well.  Continue methocarbamol 500-1000 mg TID PRN muscle spasms  Continue cyclobenzaprine at bedtime  START Aimovig (erunimab-aooe) 70mg subcutaneous injection once every 30 days (back of arm, abdomen or front of thigh)  Further procedures recommended: schedule right piriformis injection.  Could consider lumbar epidural injection if the piriformis is not helpful enough  Recommendations to PCP: see above  Follow up: 8 weeks video or in-person visit, your choice. Please call [phone] to make your follow-up appointment with me.        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 --improved  -axial low back pain, radiates into the anterior thigh to the knee intermittently--pretty good  -neck pain with intermittent right arm pain--pretty good  -still gets headaches but a little bit less, has used Aimovig x 2, tolerating well but still has frequent headaches. Discussed increasing Aimovig dosing to 140mg Q 30 days. Patient interested.     Interval history November 20, 2023 accompanied by Mya burrows QRC throughout appt  -having low back pain that radiates into the right buttock and down the right leg past the knee level  -Left forearm pain with overuse, tennis elbow symptoms.   Left 2nd digit knuckle pain  axial low back pain, radiates into the anterior thigh to the knee intermittently--pretty good  neck pain with intermittent right arm pain--pretty good  -having more headaches from time to time, pain behind the eyes, almost daily, last 3-4 hours.  "  -tramadol ER and IR are helpful.   -gabapentin helpful, will have him restart the Topamax as this may offer some improved pain relief.      Interval history August 16, 2023 accompanied by Mya burrows QRC throughout appt  -still having headaches, does not use Topamax daily, encouraged to use daily to prevent headaches and to get eyes tested.   -chronic neck pain, intermittent right arm pain  -axial low back pain  -gearing up for school's start again, he works in the dietary department.     Interval history May 8, 2023  -he did hand therapy for his left forearm pain, has an aresenal of things he can use for his overuse issues from tennis elbow.   -right foot and ankle pain, pain between the 2nd and 3rd digits, burning pain. Wears tennis shoes at work, starts to bother him when he has about an hour left of work.  -headaches have been a little bit worse, almost daily. Will last 3-4 hours at a time.     Interval history February 13, 2023  -Palmer states his neck pain is under good control  -he is struggling with left forearm pain from overuse and pain of the left 2nd digit proximal interphalangeal/metacarpal joint. This is slightly edematous and erythematous but not warm to touch.   -has tried a tennis elbow strap with some relief, but it must be cinched quite tight.   -axial low back pain is under good control at present.             At this point, the patient's participation with our multidisciplinary team includes:  The patient has been compliant with the program.  PT - attended non-pain management PHYSICAL THERAPY   Health Psych - has seen Kentrell Castañeda x4  Acupuncture: worked with Dr. Bereket LAY      Pain scores:    Pain intensity on average is 5-6 on a scale of 0-10.    Range is 3-7/10.   Pain right now is 5/10.  Pain is described as \"burning, shooting, throbbing, stabbing, miserable, numb, tiring, exhausting, penetrating, nagging, unbearable.\"  Pain is constant.       Current pain relevant medications:    "   -Tramadol 200mg ER in the evening (helpful)  -Tramadol 50mg take 1 tablet  Q 6 hours PRN (using 2-3 tabs per day, helpful)  -ibuprofen 600mg PRN (helpful)  -gabapentin 900mg TID (somewhat helpful)  -methocarbamol 500-1000mg TID PRN muscle spasms (takes 1000 mg BID, somewhat helpful)  -Topamax 50 mg BID (using PRN, advised to use regularly to prevent headaches)    Other pertinent medications:  None    Previous Medications:  OPIATES: Tramdol (somewhat helpful)  NSAIDS: ibuprofen (helpful), Aleve (helpful)  MUSCLE RELAXANTS: none  ANTI-MIGRAINE MEDS: none  ANTI-DEPRESSANTS: none  SLEEP AIDS: none  ANTI-CONVULSANTS: gabapentin (helpful)  TOPICALS: lidocaine ointment (somewhat helpful)  Other meds: Tylenol (not helpful)        Other treatments have included:  Truman Suero has not been seen at a pain clinic in the past.    PT: tried, somewhat helpful  Chiropractic: helpful  Acupuncture: none  TENs Unit: none       Injections:    cervical radiofrequency nerve ablation at [facility] in December 2016 (did get good relief, got 70% relief of his typical neck pain)  -6/29/2017 Cervical facet joint steroid injections at C4-5 and C5-6 on the right with Dr. Nicole Harding (not helpful)  -3/8/2018 C7-T1 interlaminar DEMARCUS with Dr. Hugo Corrigan (not helpful)  -5/23/2018 right L4-5 transforaminal epidural steroid injection with Dr. Osorio (not helpful for back pain but did help leg pain)  -8/7/2018 bilateral SI joint injection with Dr Hugo Corrigan (helpful for one month)  -10/11/2018 l3-4 and L4-5 facet joint injections bilaterally with Dr. Hugo Corrigan (this has been helpful, more helpful than any other lumbar injections thus far)  -1/28/2019 bilateral L3-5 medial branch blocks #1 with Dr. Osorio (somewhat helpful)   -2/25/2019 LMBB #2 with Dr. Osorio (somewhat effective, but not enough to proceed to RFA)  -5/6/2019 bilateral L4/L5 and L5/S1 facet joint injections with Dr. Osorio (helpful)  -11/29/2019 C7-T1  interlaminar epidural steroid injection with Dr. Corrigan (helpful temporarily)  -10/30/2020 ISMAEL with Dr. Hugo Corrigan (temporarily helpful)  -5/17/2022 L3-4 interlaminar DEMARCUS with Dr. Hugo Corrigan (2-3 months of about 80% relief)  -1/4/2024 right piriformis injection with Dr. Hugo Corrigan (right leg numbness for several hours after injection, helpful for pain)    Surgeries:  10/29/2018 right anterior cervical C5-6 discectomy and fusion by Dr. 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     Adverse effects: none    Adherence to Rx protocol: yes      Side Effects: None  Patient is using the medication as prescribed: Yes    Medications:  Current Outpatient Medications   Medication Sig Dispense Refill    aspirin (ASA) 81 MG EC tablet Take 1 tablet (81 mg) by mouth daily (Patient not taking: Reported on 11/22/2022) 90 tablet 1    atorvastatin (LIPITOR) 20 MG tablet Take 1 tablet (20 mg) by mouth daily Needs appointment and labs 30 tablet 0    cyclobenzaprine (FLEXERIL) 5 MG tablet Take 1-2 tablets (5-10 mg) by mouth nightly as needed for muscle spasms Need 6 hours between this and methocarbamol. 3 month supply 135 tablet 3    erenumab-aooe (AIMOVIG) 70 MG/ML injection Inject 1 mL (70 mg) Subcutaneous every 30 days 1 mL 2    gabapentin (NEURONTIN) 600 MG tablet Take 1.5 tablets (900 mg) by mouth 3 times daily 3  month supply 405 tablet 3    methocarbamol (ROBAXIN) 500 MG tablet Take 1 tablet (500 mg) by mouth 3 times daily as needed for muscle spasms Should be 6 hours between this and cyclobenzaprine at night 90 tablet 11    metoprolol succinate ER (TOPROL XL) 25 MG 24 hr tablet Take 1 tablet (25 mg) by mouth daily 30 tablet 0    order for DME BP cuff, brand as covered by  insurance.  Dx: HTN (Patient not taking: Reported on 8/16/2023) 1 each 0    topiramate (TOPAMAX) 50 MG tablet Take 3 tablets (150 mg) by mouth at bedtime 3 month script. If you don't like how you feel, reduce back to 100mg at bedtime. 270 tablet 1    traMADol (ULTRAM) 50 MG tablet Take 1 tablet (50 mg) by mouth every 6 hours as needed for severe pain Max 3/day. To last 30 days for chronic pain. OK to fill 12/29/23 and start 12/31/23 90 tablet 0    traMADol (ULTRAM-ER) 200 MG 24 hr tablet Take 1 tablet (200 mg) by mouth every 24 hours To last 30 days for chronic pain. OK to fill 12/29/23 and start 12/31/23 30 tablet 0       Medical History: any changes in medical history since they were last seen? none    Social History:   Home situation: , has 4 kids, 2 at home, one in college and one launched.   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 He used to work a sobriety program, used to go to   Drug use: none  History of chemical dependency treatment: no formal treatment, did AA    Is patient a current smoker or tobacco user?  QUIT on 3/20/2018  If yes, was cessation counseling offered?  no            Physical Exam:     Vital signs: There were no vitals taken for this visit.     Behavioral observations:  Awake, alert. Cooperative.   Pulm: respirations easy and unlabored. Able to speak in full sentences without SOB or cough noted.            IMAGING:    MR CERVICAL SPINE WITHOUT CONTRAST 9/5/2017 2:32 PM      HISTORY: Neck pain, worsening numbness in arms and lower extremities.  Radiculopathy, cervical region.     TECHNIQUE: Multiplanar multisequence images were obtained through the  cervical spine without contrast.     COMPARISON: 2/22/2017.     FINDINGS: Sagittal images demonstrate some minimal gentle  cervical  kyphosis, otherwise normal posterior alignment. There is no evidence  for craniovertebral or cervicomedullary junction abnormality. The  cervical cord is minimally indented anteriorly at C4-C5 and C5-C6,  otherwise is normal in morphology and signal characteristics. Disc  space narrowing and discogenic marrow changes are present C3-C4,  C4-C5, C5-C6 and C6-C7.     C2-C3: Minimal facet hypertrophy. No stenosis.     C3-C4: Broad-based posterior osteophyte formation is present causing  some mild bilateral neural foraminal stenosis, but no central canal  stenosis.     C4-C5: Broad-based posterior osteophyte formation and mild facet  hypertrophy is present causing some mild to moderate central canal  stenosis, mild cord deformity, and mild-to-moderate bilateral neural  foraminal stenosis.     C5-C6: Broad-based posterior osteophyte formation and disc bulging is  present along with some facet hypertrophy. There is moderate central  canal stenosis and moderate bilateral neural foraminal stenosis.  Findings have progressed since the prior exam.     C6-C7: Broad-based disc bulging is present along with some minimal  posterior osteophyte formation. There is borderline to mild central  canal stenosis, but no neural foraminal stenosis.     C7-T1: Moderate facet hypertrophy. No significant stenosis.         IMPRESSION: Moderate multilevel degenerative disc and facet disease  with some progression at C5-C6 where there is moderate central canal  and bilateral neural foraminal stenosis along with cord deformity.          MR CERVICAL SPINE WITHOUT CONTRAST  2/22/2017 9:59 AM     HISTORY:  Bilateral neck and arm pain for three years.     COMPARISON: None.     TECHNIQUE: Routine MR cervical spine extended through T2.     FINDINGS: There is some degenerative bone marrow signal change C3-C6.  No malignant or destructive lesions. Normal alignment through T2.  Reversed cervical adenosis C3-C6.     The cervical and upper thoracic  spinal cord appear intrinsically  normal. The craniocervical junction region is normal. No paraspinous  soft tissue abnormality.     Findings by level as follows:     C2-C3: Negative. No disc protrusion. No central or lateral stenosis.     C3-C4: Mild disc space narrowing. Mild diffuse annular bulge. Small  uncinate spur on the right. No significant central or left-sided  stenosis. Mild right-sided foraminal stenosis.     C4-C5: Moderate degenerative narrowing of the interspace. Mild ventral  disc osteophyte complex with minimal central stenosis. Small uncinate  spurs bilaterally with mild bilateral foraminal stenosis.     C6-C7: Moderate disc space narrowing. Mild broad-based disc osteophyte  complex with minimal central stenosis. Minimal uncinate spurs with  mild bilateral foraminal stenosis.     C6-C7: Moderate disc space narrowing. Mild ventral disc osteophyte  complex. No significant central or lateral stenosis.     C7-T1: No disc protrusion. No central or lateral stenosis. Moderate  bilateral facet joint disease.         IMPRESSION:  1. Degenerative changes as described C3-C4 through C7-T1. There is  only mild central and foraminal stenosis as described.  2. No intrinsic spinal cord abnormality through T2        EXAM: MR LUMBAR SPINE W/O CONTRAST  LOCATION: [facility]  DATE/TIME: 10/25/2021 7:51 PM     INDICATION: Lumbar radiculopathy, no red flags.  COMPARISON: None.  TECHNIQUE: Routine Lumbar Spine MRI without IV contrast.     FINDINGS:   Nomenclature is based on 5 lumbar type vertebral bodies. Normal vertebral body heights, alignment and marrow signal. Normal distal spinal cord and cauda equina with conus medullaris at L1-L2. No extraspinal abnormality. Unremarkable visualized bony   pelvis.     T12-L1: Normal disc height and signal. No herniation. Mild facet arthropathy bilaterally. No spinal canal or neural foraminal stenosis.      L1-L2: There is loss of disc height, disc desiccation  and mild circumferential disc bulging. No herniation. Normal facets. No spinal canal or neural foraminal stenosis.     L2-L3: There is loss of disc height, disc desiccation and mild circumferential disc bulging. No herniation. Mild facet arthropathy bilaterally. No spinal canal stenosis. Mild bilateral neural foraminal narrowing. No change from the comparison study.     L3-L4: There is loss of disc height, disc desiccation and mild circumferential disc bulging with a superimposed tiny left paracentral disc herniation (extrusion). Moderate facet arthropathy bilaterally. No spinal canal stenosis. Moderate left foraminal   stenosis. Mild right foraminal narrowing. No change from the comparison study.     L4-L5: There is loss of disc height, disc desiccation and mild circumferential disc bulging. No herniation. Moderate facet arthropathy bilaterally. No spinal canal stenosis. Moderate right foraminal stenosis. Mild left foraminal narrowing. No change from   the comparison study.     L5-S1: Normal disc height and signal. No herniation. Moderate facet arthropathy bilaterally. No spinal canal or neural foraminal stenosis. No change from the comparison study.                                                                      IMPRESSION:  1.  Diffuse degenerative change of the lumbar spine as detailed above without appreciable change from the comparison study.  2.  No significant spinal canal stenosis at any level of the lumbar spine.  3.  Moderate neural foraminal stenosis on the left at L3-L4 and on the right at L4-L5.        [place] Prescription Monitoring Program:  [place]ed [place]  1/30/2024- no concerning fills.  Melanie STEVENS, RN CNP, FNP  [facility]  [facility]          DIRE Score for selecting candidates for long term opioid analgesia for chronic pain:  Diagnosis  2  Intractablility  2  Risk    Psychological health  2    Chemical health  2    Reliability  2    Social support   3  Efficacy  2    Total DIRE Score = 15. Note that   7-13 predicts poor outcome (compliance and efficacy) from opioid prescribing; 14-21 predicts good outcome (compliance and efficacy)  from opioid prescribing.      Assessment:   Cervical spondylosis without myelopathy  Cervical degenerative disc disease  Status post cervical spinal fusion  Cervical stenosis of spinal canal  Intractable chronic migraine without aura and without status migrainosus  Myofascial pain  Muscle spasm  Chronic continuous use of opioids    Encounter for long-term use of opiate analgesic  Essential hypertension  Urine drug screen last done 5/8/2023  Controlled substance agreement signed 5/8/2023  Remote history of ETOH overuse, attended AA for awhile. Sober for >10 years  PMHx includes: neck pain  PSHx includes: none listed       Plan:   Physical Therapy: not at present  Clinical Health Psychologist: none  Diagnostic Studies: none  Medication Management:   Continue tramadol ER 200mg once daily fill/begin 1/30/2024  Continue tramadol 50mg Q 6-8 hours PRN, max of 3/day. Fill and begin 1/30  Continue gabapentin 900 mg TID  continueTopamax, take 150mg (3 of the 50mg tabs) at bedtime. Reduce to 100mg if you do not feel well.  Continue methocarbamol 500-1000 mg TID PRN muscle spasms  Continue cyclobenzaprine at bedtime  INCREASE Aimovig (erunimab-aooe) 140mg subcutaneous injection once every 30 days (back of arm, abdomen or front of thigh)  Further procedures recommended: none  Recommendations to PCP: see above  Follow up: 12 weeks in-person visit, your choice. Please call [phone] to make your follow-up appointment with me.         ASSESSMENT AND PLAN:  (M47.812) Cervical spondylosis without myelopathy  Comment:   Plan: traMADol (ULTRAM) 50 MG tablet, traMADol         (ULTRAM-ER) 200 MG 24 hr tablet, Adult Pain         Clinic Follow-Up Order            (M50.30) DDD (degenerative disc disease), cervical  Comment:   Plan: traMADol (ULTRAM) 50  MG tablet, traMADol         (ULTRAM-ER) 200 MG 24 hr tablet, Adult Pain         Clinic Follow-Up Order            (Z98.1) S/P cervical spinal fusion  Comment:   Plan: traMADol (ULTRAM) 50 MG tablet, traMADol         (ULTRAM-ER) 200 MG 24 hr tablet, Adult Pain         Clinic Follow-Up Order            (M48.02) Cervical stenosis of spinal canal  Comment:   Plan: traMADol (ULTRAM) 50 MG tablet, traMADol         (ULTRAM-ER) 200 MG 24 hr tablet, Adult Pain         Clinic Follow-Up Order            (G43.719) Intractable chronic migraine without aura and without status migrainosus  Comment:   Plan: erenumab-aooe (AIMOVIG) 140 MG/ML injection,         Adult Pain Clinic Follow-Up Order            (M79.18) Myofascial pain  Comment:   Plan: traMADol (ULTRAM) 50 MG tablet, Adult Pain         Clinic Follow-Up Order            (M62.838) Muscle spasm  Comment:   Plan: Adult Pain Clinic Follow-Up Order            (F11.90) Chronic, continuous use of opioids  Comment:   Plan: traMADol (ULTRAM) 50 MG tablet, traMADol         (ULTRAM-ER) 200 MG 24 hr tablet, Adult Pain         Clinic Follow-Up Order            BILLING TIME DOCUMENTATION:   TOTAL TIME includes:   Time spent preparing to see the patient: 5 minutes (reviewing records and tests)  Time spend face to face with the patient: 19 minutes  Time spent ordering tests, medications, procedures and referrals: 0 minutes  Time spent Referring and communicating with other healthcare professionals: 0 minutes  Documenting clinical information in Epic: 5 minutes    The total TIME spent on this patient on the day of the appointment was 29 minutes.                             Melanie STEVENS, RN CNP, FNP  [facility]

## 2024-01-31 ENCOUNTER — TELEPHONE (OUTPATIENT)
Dept: PALLIATIVE MEDICINE | Facility: CLINIC | Age: 64
End: 2024-01-31
Payer: COMMERCIAL

## 2024-01-31 ENCOUNTER — TELEPHONE (OUTPATIENT)
Dept: PALLIATIVE MEDICINE | Facility: CLINIC | Age: 64
End: 2024-01-31

## 2024-01-31 NOTE — TELEPHONE ENCOUNTER
Prior Authorization Retail Medication Request    Medication/Dose: traMADol (ULTRAM-ER) 200 MG 24 hr tablet  Diagnosis and ICD code (if different than what is on RX):    S/P cervical spinal fusion [Z98.1]      Cervical spondylosis without myelopathy [M47.812]      DDD (degenerative disc disease), cervical [M50.30]      Cervical stenosis of spinal canal [M48.02]      Chronic, continuous use of opioids [F11.90]        New/renewal/insurance change PA/secondary ins. PA:      BL[place]  Subscriber:  Lianne Suero  Subscriber ID:[id number]  Relationship:Spouse  Member:Palmer REI Suero  Member ID:[id number]  LOB:None  Plan year:  1/1/2024 - [place]  Effective dates:  1/1/2024 - [place]  Group number:  [id number]      Pharmacy Information (if different than what is on RX)  Name:    [facility] [address] [place] [address]     Phone:  [phone]   Fax:    [phone]

## 2024-01-31 NOTE — TELEPHONE ENCOUNTER
Prior Authorization Specialty Medication Request    Medication/Dose: erenumab-aooe (AIMOVIG) 140 MG/ML injection  Diagnosis and ICD code (if different than what is on RX):  Intractable chronic migraine without aura and without status migrainosus [G43.719]   New/renewal/insurance change PA/secondary ins. PA:  Previously Tried and Failed:  ...    Important Lab Values: ...  Rationale: ...    Insurance   Primary: [place]  Subscriber:  Lianne Suero  Subscriber ID:[id number]  Relationship:Spouse  Member:Palmer Suero  Member ID:[id number]  LOB:None  Plan year:  1/1/2024 - [place]  Effective dates:  1/1/2024 - [place]    Pharmacy Information (if different than what is on RX)  Name:  ...  Phone:  ...  Fax:...

## 2024-02-01 NOTE — TELEPHONE ENCOUNTER
Note: Due to record-high volumes, our turn-around is time taking longer than normal . We are currently 8 days behind in the pools.   We are working diligently to submit all requests in a timely manner and in the order they are received. Please only flag true urgent requests as high priority to the pool at this time.   If you have questions - please send a note/message in the active PA encounter and send back to the RPPA (Retail Pharmacy Prior Authorization) team [[id number]].    If you have more specific questions about our process please reach out to our supervisor Toya Portillo.   Thank you!     PA Initiation    Medication: ERENUMAB-AOOE 140 MG/ML SC SOAJ  Insurance Company: [employer] - Phone [phone] Fax [phone]  Pharmacy Filling the Rx: [facility] [address] [place] [address]  Filling Pharmacy Phone: [phone]  Filling Pharmacy Fax: [phone]  Start Date: 2/1/2024

## 2024-02-01 NOTE — TELEPHONE ENCOUNTER
PRIOR AUTHORIZATION DENIED    Medication: ERENUMAB-AOOE 140 MG/ML SC SOAJ  Insurance Company: [employer] - Phone [phone] Fax [phone]  Denial Date: 2/1/2024  Denial Reason(s):     Appeal Information:     Patient Notified: No, care team must notify on denials

## 2024-02-01 NOTE — TELEPHONE ENCOUNTER
Note: Due to record-high volumes, our turn-around is time taking longer than normal . We are currently 8 days behind in the pools.   We are working diligently to submit all requests in a timely manner and in the order they are received. Please only flag true urgent requests as high priority to the pool at this time.   If you have questions - please send a note/message in the active PA encounter and send back to the RPPA (Retail Pharmacy Prior Authorization) team [[id number]].    If you have more specific questions about our process please reach out to our supervisor Toya Portillo.   Thank you!     PA Initiation    Medication: TRAMADOL HCL  MG PO TB24  Insurance Company: [employer] - Phone [phone] Fax [phone]  Pharmacy Filling the Rx: [facility] [address] [place] [address]  Filling Pharmacy Phone: [phone]  Filling Pharmacy Fax: [phone]  Start Date: 2/1/2024

## 2024-02-01 NOTE — TELEPHONE ENCOUNTER
Prior Authorization Approval    Medication: TRAMADOL HCL  MG PO TB24  Authorization Effective Date: 2/1/2024  Authorization Expiration Date: 7/30/2024  Approved Dose/Quantity:   Reference #: [id number]   Insurance Company: [employer] - Phone [phone] Fax [phone]  Which Pharmacy is filling the prescription: [facility] [address] [place] [address]  Pharmacy Notified: y  Patient Notified: y - pharmacy to notify

## 2024-02-02 ENCOUNTER — MYC MEDICAL ADVICE (OUTPATIENT)
Dept: PALLIATIVE MEDICINE | Facility: CLINIC | Age: 64
End: 2024-02-02
Payer: COMMERCIAL

## 2024-02-02 DIAGNOSIS — G43.719 INTRACTABLE CHRONIC MIGRAINE WITHOUT AURA AND WITHOUT STATUS MIGRAINOSUS: Primary | ICD-10-CM

## 2024-02-02 NOTE — TELEPHONE ENCOUNTER
Sent myChart to patient re: formulary alternatives.     Melanie STEVENS, RN CNP, FNP  [facility]

## 2024-02-07 NOTE — TELEPHONE ENCOUNTER
Signed Prescriptions:                        Disp   Refills    Atogepant 30 MG TABS                       30 tab*0        Sig: Take 30 mg by mouth daily  Authorizing Provider: CANDACE BENSON, RN CNP, FNP  [facility]

## 2024-02-08 NOTE — TELEPHONE ENCOUNTER
Nerve Block    Date/Time: 2/8/2024 9:54 AM    Performed by: Moses Serrano CRNA  Authorized by: Moses Serrano CRNA    Block Type: femoral  Laterality:  Left  Patient Location:  OR  Indication: post-op pain management at surgeon's request and at surgeon's request    Surgeon:  Clay Irwin Patient Identified (2 criteria), Block Plan Confirmed, Resuscitation Equipment Available, Supplemental O2 (if needed), Allergies Confirmed, Block Site Marked (if applicable), Monitors Applied, Aseptic Technique, Coagulation Status, Necessary Block Equipment Present, Timeout Performed, IV Access Functioning, Consent Verified, Drugs/Solutions Labeled and Sedation Given (if needed)    Patient Position:  Supine  Prep:  Chlorhexidine gluconate (CHG)  Max Sterile Barrier Technique:  Hand washing and cap/mask  Monitoring:  Blood pressure, continuous pulse oximetry and EKG  Injection Technique:  Single-shot  Procedures: nerve stimulator    Current (mA):  0.4  Needle Type:  Stimulating  Needle Gauge:  22 G  Needle Length:  2 in  Needle Localization:  Anatomical landmarks and nerve stimulator  Physical status during block:  Sedated  Injection Assessment:  Negative aspiration for heme, no paresthesia on injection and incremental injection  Patient Condition:  Tolerated well, no immediate complications  Type of Motor Response: quadriceps    Paresthesia Pain:  None  Heart Rate Change: No    Slowly Injected: Yes    Start Time:2/8/2024 9:54 AM  Stop Time: 2/8/2024 9:56 AM Order placed and signed as a written order from Dr. Trinidad.     Jessika Flannery RN, [facility]  Pain Clinic Care Coordinator

## 2024-02-23 ENCOUNTER — MYC REFILL (OUTPATIENT)
Dept: FAMILY MEDICINE | Facility: CLINIC | Age: 64
End: 2024-02-23
Payer: COMMERCIAL

## 2024-02-23 DIAGNOSIS — I77.810 ASCENDING AORTA DILATATION (H): ICD-10-CM

## 2024-02-23 DIAGNOSIS — I25.10 MILD CAD: ICD-10-CM

## 2024-02-26 RX ORDER — METOPROLOL SUCCINATE 25 MG/1
25 TABLET, EXTENDED RELEASE ORAL DAILY
Qty: 90 TABLET | Refills: 2 | Status: SHIPPED | OUTPATIENT
Start: 2024-02-26 | End: 2024-03-04

## 2024-02-26 RX ORDER — ATORVASTATIN CALCIUM 20 MG/1
20 TABLET, FILM COATED ORAL DAILY
Qty: 90 TABLET | Refills: 2 | Status: SHIPPED | OUTPATIENT
Start: 2024-02-26 | End: 2024-08-26

## 2024-02-29 ENCOUNTER — MYC REFILL (OUTPATIENT)
Dept: PALLIATIVE MEDICINE | Facility: CLINIC | Age: 64
End: 2024-02-29
Payer: COMMERCIAL

## 2024-02-29 DIAGNOSIS — Z98.1 S/P CERVICAL SPINAL FUSION: ICD-10-CM

## 2024-02-29 DIAGNOSIS — M48.02 CERVICAL STENOSIS OF SPINAL CANAL: ICD-10-CM

## 2024-02-29 DIAGNOSIS — M79.18 MYOFASCIAL PAIN: ICD-10-CM

## 2024-02-29 DIAGNOSIS — F11.90 CHRONIC, CONTINUOUS USE OF OPIOIDS: ICD-10-CM

## 2024-02-29 DIAGNOSIS — M50.30 DDD (DEGENERATIVE DISC DISEASE), CERVICAL: ICD-10-CM

## 2024-02-29 DIAGNOSIS — M47.812 CERVICAL SPONDYLOSIS WITHOUT MYELOPATHY: ICD-10-CM

## 2024-02-29 NOTE — TELEPHONE ENCOUNTER
Please send to     [id number] - [place], [place] - [address] AT [facility] [address]  [address]  [place] [address]  Phone: [phone] Fax: [phone]

## 2024-02-29 NOTE — TELEPHONE ENCOUNTER
Medication refill information reviewed. He needs to select a different pharmacy. Outreach X1. Left a  requesting call or MyChart back with new pharmacy details. Provided call back number. See MyChart below.      Due date for TRAMADOL HCL  MG PO TB24  last filled 02/03/24 due 03/04/24   TRAMADOL HCL 50 MG PO TABS  last filled 01/31/24 due 03/01/24  Prescriptions prepped for review.     Will route to provider.

## 2024-02-29 NOTE — TELEPHONE ENCOUNTER
Received call from patient requesting refill(s) of     TRAMADOL HCL 50 MG PO TABS  Dispense: Jan. 30, 2024         TRAMADOL HCL  MG PO TB24  Dispense: Dec. 29, 2023         Patient's last office/virtual visit by prescribing provider on: Jan. 30, 2024   Next office/virtual appointment scheduled for: May. 6, 2024     UDT: May. 8, 2023   CSA: May. 9, 2023     E-prescribe to   [facility] [address] [place] [address],    [facility] route to [facility] for review and preparation of prescription(s).     Valerie Moon, Clinic Facilitator  [facility] [place]

## 2024-03-01 ENCOUNTER — MYC MEDICAL ADVICE (OUTPATIENT)
Dept: PALLIATIVE MEDICINE | Facility: CLINIC | Age: 64
End: 2024-03-01
Payer: COMMERCIAL

## 2024-03-01 DIAGNOSIS — M50.30 DDD (DEGENERATIVE DISC DISEASE), CERVICAL: ICD-10-CM

## 2024-03-01 DIAGNOSIS — F11.90 CHRONIC, CONTINUOUS USE OF OPIOIDS: ICD-10-CM

## 2024-03-01 DIAGNOSIS — M48.02 CERVICAL STENOSIS OF SPINAL CANAL: ICD-10-CM

## 2024-03-01 DIAGNOSIS — M47.812 CERVICAL SPONDYLOSIS WITHOUT MYELOPATHY: ICD-10-CM

## 2024-03-01 DIAGNOSIS — Z98.1 S/P CERVICAL SPINAL FUSION: ICD-10-CM

## 2024-03-01 RX ORDER — TRAMADOL HYDROCHLORIDE 200 MG/1
200 TABLET, EXTENDED RELEASE ORAL EVERY 24 HOURS
Qty: 30 TABLET | Refills: 0 | Status: SHIPPED | OUTPATIENT
Start: 2024-03-01 | End: 2024-03-01

## 2024-03-01 RX ORDER — TRAMADOL HYDROCHLORIDE 50 MG/1
50 TABLET ORAL EVERY 6 HOURS PRN
Qty: 90 TABLET | Refills: 0 | Status: SHIPPED | OUTPATIENT
Start: 2024-03-01 | End: 2024-03-29

## 2024-03-01 NOTE — TELEPHONE ENCOUNTER
Routing to provider for sig on rx, needs new pharmacy    Cielo GAVIRIA RN Care Coordinator  [facility]        You  Palmer Navasst now (4:23 PM)     KHALIDA Chakraborty,     Ok, we will try there!     Thanks much,   Cielo GAVIRIA RN Care Coordinator  [facility]    This MyChart message has not been read.     Palmer GE Pain Nurse (supporting RODNEY Vragas CNP)4 minutes ago (4:19 PM)       There is a [facility] at the [facility], I just remembered.  That is not that far away either       You  Palmer Suero37 minutes ago (3:46 PM)     KHALIDA Chakraborty,     [facility] is still not able to accept electronic prescriptions, did you want to try another [facility] or pharmacy?     Thanks much,  Cielo GAVIRIA RN Care Coordinator  [facility]             Palmer GE Pain Nurse (supporting RODNEY Vargas CNP)54 minutes ago (3:29 PM)       I went to [facility] to p/u the prescriptions. They do not Stock the Tram ER only the 50mg. Can we try sending that over to [facility] again?

## 2024-03-01 NOTE — TELEPHONE ENCOUNTER
You  Palmer Costa now (3:46 PM)     KHALIDA Chakraborty,     [facility] is still not able to accept electronic prescriptions, did you want to try another [facility] or pharmacy?     Thanks much,  Cielo GAVIRIA RN Care Coordinator  [facility]          This MyChart message has not been read.     Palmer GE Pain Nurse (supporting Melanie Thomas, APRN CNP)16 minutes ago (3:29 PM)       I went to [facility] to p/u the prescriptions. They do not Stock the Tram ER only the 50mg. Can we try sending that over to [facility] again?

## 2024-03-01 NOTE — TELEPHONE ENCOUNTER
Signed Prescriptions:                        Disp   Refills    traMADol (ULTRAM) 50 MG tablet             90 tab*0        Sig: Take 1 tablet (50 mg) by mouth every 6 hours as           needed for severe pain Max 3/day. To last 30 days           for chronic pain. OK to fill  02/29/24 to start           03/01/24.  Authorizing Provider: MELANIE BENSON    traMADol (ULTRAM-ER) 200 MG 24 hr tablet   30 tab*0        Sig: Take 1 tablet (200 mg) by mouth every 24 hours To           last 30 days for chronic pain. OK to fill           03/02/24 and start 03/04/24  Authorizing Provider: MELANIE BENSON        Reviewed [place]  March 1, 2024- no concerning fills.    Melanie Benson APRN, RN CNP, FNP  [facility]  [facility]

## 2024-03-03 DIAGNOSIS — I25.10 MILD CAD: ICD-10-CM

## 2024-03-03 DIAGNOSIS — I77.810 ASCENDING AORTA DILATATION (H): ICD-10-CM

## 2024-03-04 RX ORDER — TRAMADOL HYDROCHLORIDE 200 MG/1
200 TABLET, EXTENDED RELEASE ORAL EVERY 24 HOURS
Qty: 30 TABLET | Refills: 0 | Status: SHIPPED | OUTPATIENT
Start: 2024-03-04 | End: 2024-03-29

## 2024-03-04 RX ORDER — METOPROLOL SUCCINATE 25 MG/1
25 TABLET, EXTENDED RELEASE ORAL DAILY
Qty: 90 TABLET | Refills: 2 | Status: SHIPPED | OUTPATIENT
Start: 2024-03-04 | End: 2024-08-26

## 2024-03-04 NOTE — TELEPHONE ENCOUNTER
Signed Prescriptions:                        Disp   Refills    traMADol (ULTRAM-ER) 200 MG 24 hr tablet   30 tab*0        Sig: Take 1 tablet (200 mg) by mouth every 24 hours To           last 30 days for chronic pain. OK to fill           03/04/24 and start 03/04/24  Authorizing Provider: MELANIE BENSON        Reviewed [place]  March 4, 2024- no concerning fills.    Melanie Benson APRN, RN CNP, FNP  [facility]

## 2024-03-07 ENCOUNTER — MYC REFILL (OUTPATIENT)
Dept: PALLIATIVE MEDICINE | Facility: CLINIC | Age: 64
End: 2024-03-07
Payer: COMMERCIAL

## 2024-03-07 DIAGNOSIS — G43.719 INTRACTABLE CHRONIC MIGRAINE WITHOUT AURA AND WITHOUT STATUS MIGRAINOSUS: ICD-10-CM

## 2024-03-07 NOTE — TELEPHONE ENCOUNTER
Received Canvitahart message from patient requesting refill(s) for     ATOGEPANT 30 MG PO TABS  Last refilled on: Feb. 7, 2024       Patient last seen on: Jan. 30, 2024   Next appt scheduled for: May. 6, 2024     E-prescribe to:     [facility] [address] [place] [address]    Will facilitate refill.      Valerie Moon, Clinic Facilitator  [facility] [place]

## 2024-03-07 NOTE — TELEPHONE ENCOUNTER
Refills have been requested for the following medications:         Atogepant 30 MG TABS [Melanie Thomas]     Preferred pharmacy: [facility] [address]

## 2024-03-07 NOTE — TELEPHONE ENCOUNTER
Signed Prescriptions:                        Disp   Refills    Atogepant 30 MG TABS                       30 tab*0        Sig: Take 30 mg by mouth daily  Authorizing Provider: CANDACE BENSON, RN CNP, FNP  [facility]

## 2024-03-29 ENCOUNTER — MYC REFILL (OUTPATIENT)
Dept: PALLIATIVE MEDICINE | Facility: CLINIC | Age: 64
End: 2024-03-29
Payer: COMMERCIAL

## 2024-03-29 DIAGNOSIS — M47.812 CERVICAL SPONDYLOSIS WITHOUT MYELOPATHY: ICD-10-CM

## 2024-03-29 DIAGNOSIS — M48.02 CERVICAL STENOSIS OF SPINAL CANAL: ICD-10-CM

## 2024-03-29 DIAGNOSIS — F11.90 CHRONIC, CONTINUOUS USE OF OPIOIDS: ICD-10-CM

## 2024-03-29 DIAGNOSIS — Z98.1 S/P CERVICAL SPINAL FUSION: ICD-10-CM

## 2024-03-29 DIAGNOSIS — M50.30 DDD (DEGENERATIVE DISC DISEASE), CERVICAL: ICD-10-CM

## 2024-03-29 DIAGNOSIS — M79.18 MYOFASCIAL PAIN: ICD-10-CM

## 2024-03-29 RX ORDER — TRAMADOL HYDROCHLORIDE 200 MG/1
200 TABLET, EXTENDED RELEASE ORAL EVERY 24 HOURS
Qty: 30 TABLET | Refills: 0 | Status: SHIPPED | OUTPATIENT
Start: 2024-03-29 | End: 2024-04-30

## 2024-03-29 RX ORDER — TRAMADOL HYDROCHLORIDE 50 MG/1
50 TABLET ORAL EVERY 6 HOURS PRN
Qty: 90 TABLET | Refills: 0 | Status: SHIPPED | OUTPATIENT
Start: 2024-03-29 | End: 2024-04-30

## 2024-03-29 NOTE — TELEPHONE ENCOUNTER
Medication refill information reviewed.     Due date for Tramadol 50 mg 3/31/2024     tramadol 200mg ER is 4/3/2024     Prescriptions prepped for review.     Will route to provider.     Marleny Edmond RN  [facility]  [phone]

## 2024-03-29 NOTE — TELEPHONE ENCOUNTER
Signed Prescriptions:                        Disp   Refills    traMADol (ULTRAM) 50 MG tablet             90 tab*0        Sig: Take 1 tablet (50 mg) by mouth every 6 hours as           needed for severe pain Max 3/day. To last 30 days           for chronic pain. OK to fill  03/29/24 to start           03/31/24.  Authorizing Provider: MELANIE BENSON    traMADol (ULTRAM-ER) 200 MG 24 hr tablet   30 tab*0        Sig: Take 1 tablet (200 mg) by mouth every 24 hours To           last 30 days for chronic pain. OK to fill           04/01/24 and start 04/03/24  Authorizing Provider: MELANIE BENSON        Reviewed [place]  March 29, 2024- no concerning fills.    Melanie Benson APRN, RN CNP, FNP  [facility]  [facility] [place]

## 2024-03-29 NOTE — TELEPHONE ENCOUNTER
Received call from patient requesting refill(s) of   Tramadol 50 mg   Dispense: Mar. 1, 2024     tramadol 200mg ER  Dispense: Feb. 1, 2024         Patient's last office/virtual visit by prescribing provider on: Jan. 30, 2024   Next office/virtual appointment scheduled for: May. 6, 2024     UDT: May. 8, 2023   CSA: May. 9, 2023     E-prescribe to    [facility] [address] [place] [address]      Will route to [facility] for review and preparation of prescription(s).

## 2024-03-29 NOTE — TELEPHONE ENCOUNTER
Refills have been requested for the following medications:         traMADol (ULTRAM) 50 MG tablet [Melanie Thomas]      Patient Comment: have enough for weekend         traMADol (ULTRAM-ER) 200 MG 24 hr tablet [Melanie Thomas]      Patient Comment: have enough for weekend     Preferred pharmacy: [facility] [address] [place] [address] [address] [place] [address]

## 2024-04-08 ENCOUNTER — MYC REFILL (OUTPATIENT)
Dept: PALLIATIVE MEDICINE | Facility: CLINIC | Age: 64
End: 2024-04-08
Payer: COMMERCIAL

## 2024-04-08 DIAGNOSIS — G43.719 INTRACTABLE CHRONIC MIGRAINE WITHOUT AURA AND WITHOUT STATUS MIGRAINOSUS: ICD-10-CM

## 2024-04-08 NOTE — TELEPHONE ENCOUNTER
Signed Prescriptions:                        Disp   Refills    Atogepant 30 MG TABS                       30 tab*0        Sig: Take 30 mg by mouth daily  Authorizing Provider: CANDACE BENSON, RN CNP, FNP  [facility]

## 2024-04-08 NOTE — TELEPHONE ENCOUNTER
Received request from patient requesting refill(s) for Atogepant 30 MG TABS     Last refilled on 03/07/24    Pt last seen on 01/30/24  Next appt scheduled for 05/06/24    Will facilitate refill.

## 2024-04-22 NOTE — TELEPHONE ENCOUNTER
Occupational Therapy  4EF  ACUTE OT Evaluation     Therapy Evaluation: OT triaged in due to a recent decline in one or more of the following: ADL independence, safety, cognition, functional mobility and strength.                                                                   Visit Type: initial evaluation  Treatment diagnosis: weakness  SUBJECTIVE  Patient agreed to participate in therapy this date.  RN in agreement to work with patient for therapy session.  Pt states \"My family can help me\" citing good support from family for discharge.   Pt denies concern for physical ability to return home.  Patient has not been hospitalized, in a skilled nursing facility, or seen by home health in the last 30 days.  Patient / Family Goal: maximize function and return home    Pain   Patient denies pain.    OBJECTIVE     Cognitive Status   Orientation    - Oriented to: person, place and time    Patient Activity Tolerance: 1 to 1 activity to rest and 1 to 2 activity to rest (SOB with activity, POx stable in 90s on RA)      Strength  (out of 5 unless noted, standard test position unless noted)   WFL: LUE, RUE  WFL: LLE, RLE      Sitting Balance  (CECI = base of support)  Static      - Trial 1 details: independent  Dynamic      - Trial 1 details: independent    Standing Balance  (CECI = base of support)  Firm Surface: Double Leg      - Static, Eyes Open       - Trial 1 details: modified independent     - Dynamic, Eyes Open       - Trial 1 details: modified independent       Bed Mobility  - Supine to sit: modified independent  - Sit to supine: modified independent  Transfers  Assistive devices: gait belt, 2-wheeled walker  - Sit to stand: modified independent  - Stand to sit: modified independent  - Toilet: modified independent      Functional Ambulation  - Assistance: supervision  - Assistive device: gait belt and 2-wheeled walker  - Surface: even  Pt ambulated room/bathroom and then lane distances with 2ww at supervision assist for  Per patient myChart message:  From: Truman Suero      Created: 8/15/2017 2:31 PM      Sesar Collazo it was just a week since our visit, but need your advice/help.  Guess I just need a plan. Last few days have been worse than I have ever experienced. Not sure if aggravated at work/sleep or whatever, but neck around throat area severe to point of can't look down. Feels like someone constantly stabbing me. Starting to lose feeling in more areas than before in hips and lower legs.trying to self stretch etc. but cannot seem to get it to open up. Any thoughts/ideas that may help would be appreciated. Thank you for your time  Palmer Pio        Routed to the nursing pool to triage.    Estelita Wetzel, RN-BSN  [facility]     decreased endurance.  Education provided on ww safety, will need reinforcement for carry over.      Activities of Daily Living (ADLs)  Eating:   - Assist: independent  - Position: edge of bed  Grooming/Oral Hygiene:   - Grooming assist: patient refused       - Oral hygiene assist: patient refused  Upper Body Dressing:  - Assist: modified independent (don/doff robe)  - Position: edge of bed  Lower Body Dressing:   - Assist: modified independent (adjusts street skirt)  - Footwear:       - Assistance: modified independent       - Position: edge of bed       - Type: socks  Lower body dressing deficit: correct orientation of socks.  Toileting:   - Toilet transfer:        - Assist: modified independent       - Equipment: grab bar use  - Assist: modified independent  - Equipment: grab bar use  Shower Transfer:   - Assist: supervision  - Pattern: forward stepping  - Equipment: grab bar  Bathing:   Bathing assist: completed prior to session, reports having chair at home and family assist for bathing.  Pt reports independence with ADLs excluding assist for bathing. Pt demonstrated baseline self care ability today, no AD needs identified. Reviewed EC with ADLs.   Instrumental Activities of Daily Living:  IA- family completes all IADLs at baseline  Interventions    Treatment provided: activity tolerance, balance retraining, bed mobility training, body mechanics, breathing/relaxation, compensatory techniques, functional ambulation, positioning, transfer training, safety training and ADL training  Skilled input: verbal instruction/cues and tactile instruction/cues  Verbal Consent: Writer verbally educated and received verbal consent for hand placement, positioning of patient, and techniques to be performed today from patient for therapist position for techniques and hand placement and palpation for techniques as described above and how they are pertinent to the patient's plan of care.         Education:   - Present and ready to  learn: patient  Education provided during session:  - Results of above outlined education: Verbalizes understanding, Demonstrates understanding and Needs reinforcement    ASSESSMENT   Patient will benefit from inpatient skilled therapy to address current assessed functional limitations and impairments.  Interferring components: decreased activity tolerance, decreased insight into deficit, lines/equipment, medical status limitations and surgical precautions    Discharge needs based on today's assessment:  - Current level of function: at baseline level of function  - Therapy needs at discharge: does not require ongoing therapy  - Activities of daily living (ADLs) requiring support at discharge: ambulation  OT IADL DC SUPPORT: family completes all.  - Impairments that require further therapy intervention: activity tolerance  AM-PAC  - Prior Level of Function: Needs a little help ([facility] 12-21)       Key: MOD A=moderate assistance, IND/MOD I=independent/modified independent  - Generalized Current Level of Function     - Current Self-Cares: 23       Scoring Key= >21 Modified Independent; 20-21 Supervision; 18-19 Minimal assist; 13-17 Moderate assist; 9-12 Max assist; <9 Total assist        Personal Occupations Profile Affected: bathing/showering, functional mobility/transfers, personal hygiene/grooming, toileting/toilet hygiene, lower body dressing, upper body dressingIADL: family assists with all at baseline.      Clinical decision making: Low - Patient has few limitations (1-3), comorbidities and/or complexities, as noted in problem focused assessment noted above, that impact their occupational profile.  Resulting in few treatment options and no task modification consistent with low clinical decision making complexity.    PLAN (while hospitalized)  Suggestions for next session as indicated:     OT Frequency: DC OT      PT/OT ADL Equipment for Discharge: no needs, has shower chair  Interventions: ADL retraining,  functional transfer training, activity tolerance training, patient/family training, compensatory technique education, balance, bed mobility training, energy conservation, positioning, safety training, transfer training and patient education  Agreement to plan and goals: patient agrees with goals and treatment plan      GOALS  Review Date: 4/22/2024  Long Term Goals: (to be met by time of discharge from hospital)  Grooming: Patient will complete grooming tasks in standing modified independent.  Status: met   Lower body dressing: Patient will complete lower body dressing modified independent.  Status: met   Toileting: Patient will complete toileting modified independent.  Status: met   Home setting transfer: Patient will complete home setting transfers with modified independent and independent.   Status: met     Documented in the chart in the following areas:  Services. Assessment/Plan.    Admitting diagnosis: Dizziness [R42];Difficulty walking [R26.2]     Co-morbidities and problem list:   Patient Active Problem List:     Nausea and vomiting, unspecified vomiting type     Elevated liver enzymes     Epigastric abdominal pain     Hyponatremia     Sepsis without acute organ dysfunction, due to unspecified organism (CMD)     ARF (acute renal failure) (CMD)     Bacteremia due to Escherichia coli     Migraine headache     Influenza A     Acute bronchiolitis due to respiratory syncytial virus (RSV)     Diarrhea, unspecified type     Type 2 diabetes mellitus with hyperglycemia, without long-term current use of insulin (CMD)     Chronic hepatitis C virus infection (CMD)     Diverticulitis of intestine with perforation and abscess without bleeding     Essential hypertension     Hyperandrogenism     Knee joint replacement status     Mixed hyperlipidemia     Multinodular goiter     Osteoarthrosis     Pulmonary embolism (CMD)     Sleep apnea     Low TSH level     Type 2 diabetes mellitus treated without insulin (CMD)      Urinary tract infection without hematuria, site unspecified     Septic shock (CMD)     Dizziness      Treatment Diagnosis: weakness    The referring provider's electronic signature on the evaluation authorizes the therapy plan of care and certifies the need for these services, furnished under this plan of care while under their care.    Patient at End of Session:   Location: in bed  Safety measures: call light within reach, equipment intact and lines intact      Assistant to continue with current plan of care, current goals are appropriate, patient progressing towards set goals        Therapy procedure time and total treatment time can be found documented on the Time Entry flowsheet

## 2024-04-29 ASSESSMENT — PAIN SCALES - PAIN ENJOYMENT GENERAL ACTIVITY SCALE (PEG)
PEG_TOTALSCORE: 6.67
INTERFERED_ENJOYMENT_LIFE: 7
AVG_PAIN_PASTWEEK: 6
INTERFERED_GENERAL_ACTIVITY: 7

## 2024-04-30 ENCOUNTER — MYC REFILL (OUTPATIENT)
Dept: PALLIATIVE MEDICINE | Facility: CLINIC | Age: 64
End: 2024-04-30
Payer: COMMERCIAL

## 2024-04-30 DIAGNOSIS — F11.90 CHRONIC, CONTINUOUS USE OF OPIOIDS: ICD-10-CM

## 2024-04-30 DIAGNOSIS — M48.02 CERVICAL STENOSIS OF SPINAL CANAL: ICD-10-CM

## 2024-04-30 DIAGNOSIS — R51.9 CHRONIC INTRACTABLE HEADACHE, UNSPECIFIED HEADACHE TYPE: ICD-10-CM

## 2024-04-30 DIAGNOSIS — M47.812 CERVICAL SPONDYLOSIS WITHOUT MYELOPATHY: ICD-10-CM

## 2024-04-30 DIAGNOSIS — Z98.1 S/P CERVICAL SPINAL FUSION: ICD-10-CM

## 2024-04-30 DIAGNOSIS — M79.18 MYOFASCIAL PAIN: ICD-10-CM

## 2024-04-30 DIAGNOSIS — M50.30 DDD (DEGENERATIVE DISC DISEASE), CERVICAL: ICD-10-CM

## 2024-04-30 DIAGNOSIS — G89.29 CHRONIC INTRACTABLE HEADACHE, UNSPECIFIED HEADACHE TYPE: ICD-10-CM

## 2024-04-30 RX ORDER — TRAMADOL HYDROCHLORIDE 200 MG/1
200 TABLET, EXTENDED RELEASE ORAL EVERY 24 HOURS
Qty: 30 TABLET | Refills: 0 | Status: SHIPPED | OUTPATIENT
Start: 2024-04-30 | End: 2024-05-06

## 2024-04-30 RX ORDER — TOPIRAMATE 50 MG/1
150 TABLET, FILM COATED ORAL AT BEDTIME
Qty: 270 TABLET | Refills: 1 | Status: SHIPPED | OUTPATIENT
Start: 2024-04-30

## 2024-04-30 RX ORDER — TRAMADOL HYDROCHLORIDE 50 MG/1
50 TABLET ORAL EVERY 6 HOURS PRN
Qty: 90 TABLET | Refills: 0 | Status: SHIPPED | OUTPATIENT
Start: 2024-04-30 | End: 2024-05-06

## 2024-04-30 NOTE — TELEPHONE ENCOUNTER
Medication refill information reviewed.     Due date for traMADol (ULTRAM-ER) 200 MG 24 hr tablet is 05/03/24   and traMADol (ULTRAM) 50 MG tablet is 05/01/24     Prescriptions prepped for review.     Will route to provider.

## 2024-04-30 NOTE — TELEPHONE ENCOUNTER
Received call from patient requesting refill(s) of   traMADol (ULTRAM-ER) 200 MG 24 hr tablet   -Last dispensed from pharmacy on 4/1/2024.  traMADol (ULTRAM) 50 MG tablet   -Last dispensed from pharmacy on 3/29/2024.  topiramate (TOPAMAX) 50 MG tablet  -Last dispensed from pharmacy on 1/27/2024.    Patient's last office/virtual visit by prescribing provider on 1/30/2024.  Next office/virtual appointment scheduled for 5/6/2024.    Last urine drug screen date 5/8/2023.  Current opioid agreement on file (completed within the last year) Yes Date of opioid agreement: 5/8/2023.    E-prescribe to:    [facility] [address] [place] [address]    Will route to [facility] for review and preparation of prescription(s).

## 2024-04-30 NOTE — TELEPHONE ENCOUNTER
Signed Prescriptions:                        Disp   Refills    topiramate (TOPAMAX) 50 MG tablet          270 ta*1        Sig: Take 3 tablets (150 mg) by mouth at bedtime 3 month           script.  Authorizing Provider: MELANIE BENSON    traMADol (ULTRAM) 50 MG tablet             90 tab*0        Sig: Take 1 tablet (50 mg) by mouth every 6 hours as           needed for severe pain Max 3/day. To last 30 days           for chronic pain. OK to fill  04/29/24 to start           05/01/24.  Authorizing Provider: MELANIE BENSON    traMADol (ULTRAM-ER) 200 MG 24 hr tablet   30 tab*0        Sig: Take 1 tablet (200 mg) by mouth every 24 hours To           last 30 days for chronic pain. OK to fill           05/01/24 and start 05/03/24  Authorizing Provider: MELANIE BENSON        Reviewed [place]  April 30, 2024- no concerning fills.    Melanie STEVENS, RN CNP, FNP  [facility]  [facility] [place] [place]

## 2024-04-30 NOTE — TELEPHONE ENCOUNTER
Refills have been requested for the following medications:         topiramate (TOPAMAX) 50 MG tablet [Melanie Katerinamaisha Thomas]         traMADol (ULTRAM) 50 MG tablet [Melanie Katerinamaisha Thomas]         traMADol (ULTRAM-ER) 200 MG 24 hr tablet [Melanie Thomas]     Preferred pharmacy: [facility] [address] [place] [address] [address] [place] [address]

## 2024-05-06 ENCOUNTER — OFFICE VISIT (OUTPATIENT)
Dept: PALLIATIVE MEDICINE | Facility: CLINIC | Age: 64
End: 2024-05-06
Attending: NURSE PRACTITIONER
Payer: OTHER MISCELLANEOUS

## 2024-05-06 ENCOUNTER — LAB (OUTPATIENT)
Dept: LAB | Facility: CLINIC | Age: 64
End: 2024-05-06
Payer: COMMERCIAL

## 2024-05-06 VITALS — HEART RATE: 56 BPM | DIASTOLIC BLOOD PRESSURE: 90 MMHG | SYSTOLIC BLOOD PRESSURE: 152 MMHG

## 2024-05-06 DIAGNOSIS — F11.90 CHRONIC, CONTINUOUS USE OF OPIOIDS: ICD-10-CM

## 2024-05-06 DIAGNOSIS — Z98.1 S/P CERVICAL SPINAL FUSION: ICD-10-CM

## 2024-05-06 DIAGNOSIS — M62.838 MUSCLE SPASM: ICD-10-CM

## 2024-05-06 DIAGNOSIS — M79.18 MYOFASCIAL PAIN: ICD-10-CM

## 2024-05-06 DIAGNOSIS — M50.30 DDD (DEGENERATIVE DISC DISEASE), CERVICAL: ICD-10-CM

## 2024-05-06 DIAGNOSIS — M47.812 CERVICAL SPONDYLOSIS WITHOUT MYELOPATHY: Primary | ICD-10-CM

## 2024-05-06 DIAGNOSIS — M48.02 CERVICAL STENOSIS OF SPINAL CANAL: ICD-10-CM

## 2024-05-06 DIAGNOSIS — G43.719 INTRACTABLE CHRONIC MIGRAINE WITHOUT AURA AND WITHOUT STATUS MIGRAINOSUS: ICD-10-CM

## 2024-05-06 PROCEDURE — 99214 OFFICE O/P EST MOD 30 MIN: CPT | Performed by: NURSE PRACTITIONER

## 2024-05-06 PROCEDURE — 80307 DRUG TEST PRSMV CHEM ANLYZR: CPT | Mod: 90

## 2024-05-06 PROCEDURE — 99000 SPECIMEN HANDLING OFFICE-LAB: CPT

## 2024-05-06 PROCEDURE — 80307 DRUG TEST PRSMV CHEM ANLYZR: CPT

## 2024-05-06 PROCEDURE — G0481 DRUG TEST DEF 8-14 CLASSES: HCPCS | Mod: 59

## 2024-05-06 RX ORDER — TRAMADOL HYDROCHLORIDE 200 MG/1
200 TABLET, EXTENDED RELEASE ORAL EVERY 24 HOURS
Qty: 30 TABLET | Refills: 0 | Status: SHIPPED | OUTPATIENT
Start: 2024-05-06 | End: 2024-06-28

## 2024-05-06 RX ORDER — TRAMADOL HYDROCHLORIDE 50 MG/1
50 TABLET ORAL EVERY 6 HOURS PRN
Qty: 90 TABLET | Refills: 0 | Status: SHIPPED | OUTPATIENT
Start: 2024-05-06 | End: 2024-06-28

## 2024-05-06 ASSESSMENT — PAIN SCALES - GENERAL: PAINLEVEL: MODERATE PAIN (5)

## 2024-05-06 NOTE — PROGRESS NOTES
"[facility]    5/6/2024      Chief complaint:   -Low back pain now with right leg radicular ---improved  -Left forearm pain with overuse, tennis elbow symptoms. - \"not bad\"  -axial low back pain, radiates into the anterior thigh to the knee intermittently--pretty good  -neck pain with intermittent right arm pain--pretty good- about the same or a little better  -still gets headaches but about the same  -overall, doing pretty well        Interval history:  Truman Suero is a 63 year old male is known to me for   Chronic neck pain  Cervical DDD  Cervical spondylosis (pain worse with extension/rotation indicating facetogenic component to pain)  Axial low back pain  Myofascial pain/muscle spasms  Remote history of ETOH overuse, attended AA for awhile. Sober for 10 years  ---PMHx includes: neck pain  ---PSHx includes: none listed      Recommendations/plan at the last visit on 1/30/2024 included:  Physical Therapy: not at present  Clinical Health Psychologist: none  Diagnostic Studies: none  Medication Management:   Continue tramadol ER 200mg once daily fill/begin 1/30/2024  Continue tramadol 50mg Q 6-8 hours PRN, max of 3/day. Fill and begin 1/30  Continue gabapentin 900 mg TID  continueTopamax, take 150mg (3 of the 50mg tabs) at bedtime. Reduce to 100mg if you do not feel well.  Continue methocarbamol 500-1000 mg TID PRN muscle spasms  Continue cyclobenzaprine at bedtime  INCREASE Aimovig (erunimab-aooe) 140mg subcutaneous injection once every 30 days (back of arm, abdomen or front of thigh)  Further procedures recommended: none  Recommendations to PCP: see above  Follow up: 12 weeks in-person visit, your choice. Please call [phone] to make your follow-up appointment with me.          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  Headaches are about the same.   -low back pain " remains axial, doing pretty well   -neck pain and right arm pain is stable.   -he has numbness in both hands present for a long time, sometimes the numbness is painful. This has not changed  -not having radicular symptoms in the legs at present.     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 --improved  -axial low back pain, radiates into the anterior thigh to the knee intermittently--pretty good  -neck pain with intermittent right arm pain--pretty good  -still gets headaches but a little bit less, has used Aimovig x 2, tolerating well but still has frequent headaches. Discussed increasing Aimovig dosing to 140mg Q 30 days. Patient interested.     Interval history November 20, 2023 accompanied by Mya his QRC throughout appt  -having low back pain that radiates into the right buttock and down the right leg past the knee level  -Left forearm pain with overuse, tennis elbow symptoms.   Left 2nd digit knuckle pain  axial low back pain, radiates into the anterior thigh to the knee intermittently--pretty good  neck pain with intermittent right arm pain--pretty good  -having more headaches from time to time, pain behind the eyes, almost daily, last 3-4 hours.   -tramadol ER and IR are helpful.   -gabapentin helpful, will have him restart the Topamax as this may offer some improved pain relief.      Interval history August 16, 2023 accompanied by Mya his QRC throughout appt  -still having headaches, does not use Topamax daily, encouraged to use daily to prevent headaches and to get eyes tested.   -chronic neck pain, intermittent right arm pain  -axial low back pain  -gearing  "up for school's start again, he works in the dietary department.     Interval history May 8, 2023  -he did hand therapy for his left forearm pain, has an aresenal of things he can use for his overuse issues from tennis elbow.   -right foot and ankle pain, pain between the 2nd and 3rd digits, burning pain. Wears tennis shoes at work, starts to bother him when he has about an hour left of work.  -headaches have been a little bit worse, almost daily. Will last 3-4 hours at a time.     Interval history February 13, 2023  -Palmer states his neck pain is under good control  -he is struggling with left forearm pain from overuse and pain of the left 2nd digit proximal interphalangeal/metacarpal joint. This is slightly edematous and erythematous but not warm to touch.   -has tried a tennis elbow strap with some relief, but it must be cinched quite tight.   -axial low back pain is under good control at present.             At this point, the patient's participation with our multidisciplinary team includes:  The patient has been compliant with the program.  PT - attended non-pain management PHYSICAL THERAPY   Health Psych - has seen Kentrell Castañeda x4  Acupuncture: worked with Dr. Bereket LAY      Pain scores:    Pain intensity on average is 5 on a scale of 0-10.    Range is 4-8/10.   Pain right now is 5/10.  Pain is described as \"burning, shooting, throbbing, stabbing, miserable, numb, tiring, exhausting, penetrating, nagging, unbearable.\"  Pain is constant.       Current pain relevant medications:      -Tramadol 200mg ER in the evening (helpful)  -Tramadol 50mg take 1 tablet  Q 6 hours PRN (using 2-3 tabs per day, helpful)  -ibuprofen 600mg PRN (helpful)  -gabapentin 900mg TID (somewhat helpful)  -methocarbamol 500-1000mg TID PRN muscle spasms (takes 1000 mg BID, somewhat helpful)  -Topamax 50 mg BID (using PRN, advised to use regularly to prevent headaches)    Other pertinent medications:  None    Previous " Medications:  OPIATES: Tramdol (somewhat helpful)  NSAIDS: ibuprofen (helpful), Aleve (helpful)  MUSCLE RELAXANTS: none  ANTI-MIGRAINE MEDS: none  ANTI-DEPRESSANTS: none  SLEEP AIDS: none  ANTI-CONVULSANTS: gabapentin (helpful)  TOPICALS: lidocaine ointment (somewhat helpful)  Other meds: Tylenol (not helpful)        Other treatments have included:  Truman Suero has not been seen at a pain clinic in the past.    PT: tried, somewhat helpful  Chiropractic: helpful  Acupuncture: none  TENs Unit: none       Injections:    cervical radiofrequency nerve ablation at [facility] in December 2016 (did get good relief, got 70% relief of his typical neck pain)  -6/29/2017 Cervical facet joint steroid injections at C4-5 and C5-6 on the right with Dr. Nicole Harding (not helpful)  -3/8/2018 C7-T1 interlaminar DEMARCUS with Dr. Hugo Corrigan (not helpful)  -5/23/2018 right L4-5 transforaminal epidural steroid injection with Dr. Osorio (not helpful for back pain but did help leg pain)  -8/7/2018 bilateral SI joint injection with Dr Hugo Corrigan (helpful for one month)  -10/11/2018 l3-4 and L4-5 facet joint injections bilaterally with Dr. Hugo Corrigan (this has been helpful, more helpful than any other lumbar injections thus far)  -1/28/2019 bilateral L3-5 medial branch blocks #1 with Dr. Osorio (somewhat helpful)   -2/25/2019 LMBB #2 with Dr. Osorio (somewhat effective, but not enough to proceed to RFA)  -5/6/2019 bilateral L4/L5 and L5/S1 facet joint injections with Dr. Osorio (helpful)  -11/29/2019 C7-T1 interlaminar epidural steroid injection with Dr. Corrigan (helpful temporarily)  -10/30/2020 ISMAEL with Dr. Hugo Corrigan (temporarily helpful)  -5/17/2022 L3-4 interlaminar DEMARCUS with Dr. Hugo Corrigan (2-3 months of about 80% relief)  -1/4/2024 right piriformis injection with Dr. Hugo Corrigan (right leg numbness for several hours after injection, helpful for pain)    Surgeries:  10/29/2018 right anterior  cervical C5-6 discectomy and fusion by Dr. 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     Adverse effects: none    Adherence to Rx protocol: yes      Side Effects: None  Patient is using the medication as prescribed: Yes    Medications:  Current Outpatient Medications   Medication Sig Dispense Refill    Atogepant 30 MG TABS Take 30 mg by mouth daily 30 tablet 0    atorvastatin (LIPITOR) 20 MG tablet Take 1 tablet (20 mg) by mouth daily Needs appointment and labs 90 tablet 2    cyclobenzaprine (FLEXERIL) 5 MG tablet Take 1-2 tablets (5-10 mg) by mouth nightly as needed for muscle spasms Need 6 hours between this and methocarbamol. 3 month supply 135 tablet 3    erenumab-aooe (AIMOVIG) 140 MG/ML injection Inject 1 mL (140 mg) Subcutaneous every 30 days 1 mL 3    gabapentin (NEURONTIN) 600 MG tablet Take 1.5 tablets (900 mg) by mouth 3 times daily 3  month supply 405 tablet 3    methocarbamol (ROBAXIN) 500 MG tablet Take 1 tablet (500 mg) by mouth 3 times daily as needed for muscle spasms Should be 6 hours between this and cyclobenzaprine at night 90 tablet 11    metoprolol succinate ER (TOPROL XL) 25 MG 24 hr tablet TAKE ONE TABLET BY MOUTH ONE TIME DAILY 90 tablet 2    topiramate (TOPAMAX) 50 MG tablet Take 3 tablets (150 mg) by mouth at bedtime 3 month script. 270 tablet 1    traMADol (ULTRAM) 50 MG tablet Take 1 tablet (50 mg) by mouth every 6 hours as needed for severe pain Max 3/day. To last 30 days for chronic pain. OK to fill  04/29/24 to start 05/01/24. 90 tablet 0    traMADol (ULTRAM-ER) 200 MG 24 hr tablet Take 1 tablet (200 mg) by mouth every 24 hours To last 30 days for chronic pain. OK to fill 05/01/24 and start 05/03/24 30 tablet 0    aspirin (ASA) 81 MG EC tablet Take 1  tablet (81 mg) by mouth daily (Patient not taking: Reported on 11/22/2022) 90 tablet 1    order for DME BP cuff, brand as covered by insurance.  Dx: HTN (Patient not taking: Reported on 8/16/2023) 1 each 0       Medical History: any changes in medical history since they were last seen? none    Social History:   Home situation: , has 4 kids, 2 at home, one in college and one launched.   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 He used to work a sobriety program, used to go to   Drug use: none  History of chemical dependency treatment: no formal treatment, did AA    Is patient a current smoker or tobacco user?  QUIT on 3/20/2018  If yes, was cessation counseling offered?  no            Physical Exam:     Vital signs: BP (!) 152/90   Pulse 56      Behavioral observations:  Awake, alert, conversant and cooperative     Gait:  normal    Musculoskeletal exam:  strength grossly equal throughout    Neuro exam:  deferred    Skin/vascular/autonomic:  No suspicious lesions on exposed skin.     Other:  na        IMAGING:    MR CERVICAL SPINE WITHOUT CONTRAST 9/5/2017 2:32 PM      HISTORY: Neck pain, worsening numbness in arms and lower extremities.  Radiculopathy, cervical region.     TECHNIQUE: Multiplanar multisequence images were obtained through the  cervical spine without contrast.     COMPARISON: 2/22/2017.     FINDINGS: Sagittal images demonstrate some minimal gentle cervical  kyphosis, otherwise normal posterior alignment. There is no evidence  for craniovertebral or cervicomedullary junction abnormality. The  cervical cord is minimally indented anteriorly at C4-C5 and C5-C6,  otherwise is normal in morphology and signal characteristics. Disc  space narrowing and discogenic marrow changes are present  C3-C4,  C4-C5, C5-C6 and C6-C7.     C2-C3: Minimal facet hypertrophy. No stenosis.     C3-C4: Broad-based posterior osteophyte formation is present causing  some mild bilateral neural foraminal stenosis, but no central canal  stenosis.     C4-C5: Broad-based posterior osteophyte formation and mild facet  hypertrophy is present causing some mild to moderate central canal  stenosis, mild cord deformity, and mild-to-moderate bilateral neural  foraminal stenosis.     C5-C6: Broad-based posterior osteophyte formation and disc bulging is  present along with some facet hypertrophy. There is moderate central  canal stenosis and moderate bilateral neural foraminal stenosis.  Findings have progressed since the prior exam.     C6-C7: Broad-based disc bulging is present along with some minimal  posterior osteophyte formation. There is borderline to mild central  canal stenosis, but no neural foraminal stenosis.     C7-T1: Moderate facet hypertrophy. No significant stenosis.         IMPRESSION: Moderate multilevel degenerative disc and facet disease  with some progression at C5-C6 where there is moderate central canal  and bilateral neural foraminal stenosis along with cord deformity.          MR CERVICAL SPINE WITHOUT CONTRAST  2/22/2017 9:59 AM     HISTORY:  Bilateral neck and arm pain for three years.     COMPARISON: None.     TECHNIQUE: Routine MR cervical spine extended through T2.     FINDINGS: There is some degenerative bone marrow signal change C3-C6.  No malignant or destructive lesions. Normal alignment through T2.  Reversed cervical adenosis C3-C6.     The cervical and upper thoracic spinal cord appear intrinsically  normal. The craniocervical junction region is normal. No paraspinous  soft tissue abnormality.     Findings by level as follows:     C2-C3: Negative. No disc protrusion. No central or lateral stenosis.     C3-C4: Mild disc space narrowing. Mild diffuse annular bulge. Small  uncinate spur on the right. No  significant central or left-sided  stenosis. Mild right-sided foraminal stenosis.     C4-C5: Moderate degenerative narrowing of the interspace. Mild ventral  disc osteophyte complex with minimal central stenosis. Small uncinate  spurs bilaterally with mild bilateral foraminal stenosis.     C6-C7: Moderate disc space narrowing. Mild broad-based disc osteophyte  complex with minimal central stenosis. Minimal uncinate spurs with  mild bilateral foraminal stenosis.     C6-C7: Moderate disc space narrowing. Mild ventral disc osteophyte  complex. No significant central or lateral stenosis.     C7-T1: No disc protrusion. No central or lateral stenosis. Moderate  bilateral facet joint disease.         IMPRESSION:  1. Degenerative changes as described C3-C4 through C7-T1. There is  only mild central and foraminal stenosis as described.  2. No intrinsic spinal cord abnormality through T2        EXAM: MR LUMBAR SPINE W/O CONTRAST  LOCATION: [facility]  DATE/TIME: 10/25/2021 7:51 PM     INDICATION: Lumbar radiculopathy, no red flags.  COMPARISON: None.  TECHNIQUE: Routine Lumbar Spine MRI without IV contrast.     FINDINGS:   Nomenclature is based on 5 lumbar type vertebral bodies. Normal vertebral body heights, alignment and marrow signal. Normal distal spinal cord and cauda equina with conus medullaris at L1-L2. No extraspinal abnormality. Unremarkable visualized bony   pelvis.     T12-L1: Normal disc height and signal. No herniation. Mild facet arthropathy bilaterally. No spinal canal or neural foraminal stenosis.      L1-L2: There is loss of disc height, disc desiccation and mild circumferential disc bulging. No herniation. Normal facets. No spinal canal or neural foraminal stenosis.     L2-L3: There is loss of disc height, disc desiccation and mild circumferential disc bulging. No herniation. Mild facet arthropathy bilaterally. No spinal canal stenosis. Mild bilateral neural foraminal narrowing. No  change from the comparison study.     L3-L4: There is loss of disc height, disc desiccation and mild circumferential disc bulging with a superimposed tiny left paracentral disc herniation (extrusion). Moderate facet arthropathy bilaterally. No spinal canal stenosis. Moderate left foraminal   stenosis. Mild right foraminal narrowing. No change from the comparison study.     L4-L5: There is loss of disc height, disc desiccation and mild circumferential disc bulging. No herniation. Moderate facet arthropathy bilaterally. No spinal canal stenosis. Moderate right foraminal stenosis. Mild left foraminal narrowing. No change from   the comparison study.     L5-S1: Normal disc height and signal. No herniation. Moderate facet arthropathy bilaterally. No spinal canal or neural foraminal stenosis. No change from the comparison study.                                                                      IMPRESSION:  1.  Diffuse degenerative change of the lumbar spine as detailed above without appreciable change from the comparison study.  2.  No significant spinal canal stenosis at any level of the lumbar spine.  3.  Moderate neural foraminal stenosis on the left at L3-L4 and on the right at L4-L5.        [place] Prescription Monitoring Program:  Reviewed [facility] 5/6/2024- no concerning fills.  Melanie STEVENS, RN CNP, FNP  [facility]  [facility]          DIRE Score for selecting candidates for long term opioid analgesia for chronic pain:  Diagnosis  2  Intractablility  2  Risk    Psychological health  2    Chemical health  2    Reliability  2    Social support  3  Efficacy  2    Total DIRE Score = 15. Note that   7-13 predicts poor outcome (compliance and efficacy) from opioid prescribing; 14-21 predicts good outcome (compliance and efficacy)  from opioid prescribing.      Assessment:   Cervical spondylosis without myelopathy  Cervical degenerative disc disease  Status post cervical spinal  fusion  Cervical stenosis of spinal canal  Intractable chronic migraine without aura and without status migrainosus  Myofascial pain  Muscle spasm  Chronic continuous use of opioids    Encounter for long-term use of opiate analgesic  Essential hypertension  Urine drug screen last done 5/6/2024  Controlled substance agreement signed 5/6/2024  Remote history of ETOH overuse, attended AA for awhile. Sober for >10 years  PMHx includes: neck pain  PSHx includes: none listed       Plan:   Physical Therapy: not at present  Clinical Health Psychologist: none  Diagnostic Studies: none  Medication Management:   Continue tramadol ER 200mg once daily fill 6/1 and begin 6/3/2024  Continue tramadol 50mg Q 6-8 hours PRN, max of 3/day. Fill 5/29 and begin 6/1  Continue gabapentin 900 mg TID  continueTopamax, take 150mg (3 of the 50mg tabs) at bedtime. Reduce to 100mg if you do not feel well.  Continue methocarbamol 500-1000 mg TID PRN muscle spasms  Continue cyclobenzaprine at bedtime  Continue atogepant for migraine prevention  Further procedures recommended: none  Recommendations to PCP: see above  UDT today, last took Tramadol this morning  CSA resigned today  Follow up: 12 weeks in-person visit, your choice. Please call [phone] to make your follow-up appointment with me.         ASSESSMENT AND PLAN:  (M47.812) Cervical spondylosis without myelopathy  (primary encounter diagnosis)  Comment:   Plan: traMADol (ULTRAM) 50 MG tablet, traMADol         (ULTRAM-ER) 200 MG 24 hr tablet, Adult Pain         Clinic Follow-Up Order            (M50.30) DDD (degenerative disc disease), cervical  Comment:   Plan: traMADol (ULTRAM) 50 MG tablet, traMADol         (ULTRAM-ER) 200 MG 24 hr tablet, Adult Pain         Clinic Follow-Up Order            (Z98.1) S/P cervical spinal fusion  Comment:   Plan: Ethanol urine, Ethyl Glucuronide Screen with         Reflex to Confirmation, Urine, Drug         Confirmation Panel Urine with Creatinine,          traMADol (ULTRAM) 50 MG tablet, traMADol         (ULTRAM-ER) 200 MG 24 hr tablet, Adult Pain         Clinic Follow-Up Order            (M48.02) Cervical stenosis of spinal canal  Comment:   Plan: traMADol (ULTRAM) 50 MG tablet, traMADol         (ULTRAM-ER) 200 MG 24 hr tablet, Adult Pain         Clinic Follow-Up Order            (G43.719) Intractable chronic migraine without aura and without status migrainosus  Comment:   Plan: Atogepant 30 MG TABS, Adult Pain Clinic         Follow-Up Order            (M79.18) Myofascial pain  Comment:   Plan: traMADol (ULTRAM) 50 MG tablet, Adult Pain         Clinic Follow-Up Order            (M62.838) Muscle spasm  Comment:   Plan: Adult Pain Clinic Follow-Up Order            (F11.90) Chronic, continuous use of opioids  Comment:   Plan: traMADol (ULTRAM) 50 MG tablet, traMADol         (ULTRAM-ER) 200 MG 24 hr tablet, Adult Pain         Clinic Follow-Up Order              BILLING TIME DOCUMENTATION:   TOTAL TIME includes:   Time spent preparing to see the patient: 0 minutes (reviewing records and tests)  Time spend face to face with the patient: 25 minutes  Time spent ordering tests, medications, procedures and referrals: 0 minutes  Time spent Referring and communicating with other healthcare professionals: 0 minutes  Documenting clinical information in Epic: 5 minutes    The total TIME spent on this patient on the day of the appointment was 30 minutes.                                Melanie STEVENS, RN CNP, FNP  [facility]  [facility]

## 2024-05-06 NOTE — PATIENT INSTRUCTIONS
Plan:   Physical Therapy: not at present  Clinical Health Psychologist: none  Diagnostic Studies: none  Medication Management:   Continue tramadol ER 200mg once daily fill 6/1 and begin 6/3/2024  Continue tramadol 50mg Q 6-8 hours PRN, max of 3/day. Fill 5/29 and begin 6/1  Continue gabapentin 900 mg TID  continueTopamax, take 150mg (3 of the 50mg tabs) at bedtime. Reduce to 100mg if you do not feel well.  Continue methocarbamol 500-1000 mg TID PRN muscle spasms  Continue cyclobenzaprine at bedtime  Continue atogepant for migraine prevention  Further procedures recommended: none  Recommendations to PCP: see above  UDT today, last took Tramadol this morning  CSA resigned today  Follow up: 12 weeks in-person visit, your choice. Please call [phone] to make your follow-up appointment with me.     ----------------------------------------------------------------  Clinic Number:  [phone]   Call with any questions about your care and for scheduling assistance.   Calls are returned Monday through Friday between 8 AM and 4:30 PM. We usually get back to you within 2 business days depending on the issue/request.    If we are prescribing your medications:  For opioid medication refills, call the clinic or send a DRESSBOOM message 7 days in advance.  Please include:  Name of requested medication  Name of the pharmacy.  For non-opioid medications, call your pharmacy directly to request a refill. Please allow 3-4 days to be processed.   Per [place] State Law:  All controlled substance prescriptions must be filled within 30 days of being written.    For those controlled substances allowing refills, pickup must occur within 30 days of last fill.      We believe regular attendance is key to your success in our program!    Any time you are unable to keep your appointment we ask that you call us at least 24 hours in advance to cancel.This will allow us to offer the appointment time to another patient.   Multiple missed appointments  may lead to dismissal from the clinic.

## 2024-05-06 NOTE — LETTER

## 2024-05-07 LAB
CREAT UR-MCNC: 101 MG/DL
ETHANOL UR QL SCN: NORMAL

## 2024-05-08 LAB — ETHYL GLUCURONIDE UR QL SCN: NEGATIVE NG/ML

## 2024-05-09 NOTE — RESULT ENCOUNTER NOTE
No evidence for recent alcohol ingestion while on opiate medication. This is as expected.   Melanie STEVENS, RN CNP, FNP  [facility]

## 2024-05-09 NOTE — RESULT ENCOUNTER NOTE
Urine drug testing as expected. No illicit medication or alcohol noted. Continue standard monitoring while on opiates.   Melanie STEVENS RN CNP, FNP  [facility] [place]

## 2024-05-31 NOTE — TELEPHONE ENCOUNTER
Panel Management Review          Composite cancer screening  Chart review shows that this patient is due/due soon for the following Colonoscopy  Summary:    Patient is due/failing the following:   COLONOSCOPY    Action needed:   Patient needs office visit for  Colonoscopy.    Type of outreach:    Sent Loomia message.    Questions for provider review:    None                                                                                                                                      Renae Martins MA       Chart routed to Care Team .          
Letter sent    Renae Martins MA      
Poor

## 2024-06-28 ENCOUNTER — MYC REFILL (OUTPATIENT)
Dept: PALLIATIVE MEDICINE | Facility: CLINIC | Age: 64
End: 2024-06-28
Payer: COMMERCIAL

## 2024-06-28 DIAGNOSIS — M48.02 CERVICAL STENOSIS OF SPINAL CANAL: ICD-10-CM

## 2024-06-28 DIAGNOSIS — M50.30 DDD (DEGENERATIVE DISC DISEASE), CERVICAL: ICD-10-CM

## 2024-06-28 DIAGNOSIS — F11.90 CHRONIC, CONTINUOUS USE OF OPIOIDS: ICD-10-CM

## 2024-06-28 DIAGNOSIS — Z98.1 S/P CERVICAL SPINAL FUSION: ICD-10-CM

## 2024-06-28 DIAGNOSIS — M47.812 CERVICAL SPONDYLOSIS WITHOUT MYELOPATHY: ICD-10-CM

## 2024-06-28 DIAGNOSIS — M79.18 MYOFASCIAL PAIN: ICD-10-CM

## 2024-06-28 RX ORDER — TRAMADOL HYDROCHLORIDE 50 MG/1
50 TABLET ORAL EVERY 6 HOURS PRN
Qty: 90 TABLET | Refills: 0 | Status: SHIPPED | OUTPATIENT
Start: 2024-06-28 | End: 2024-07-30

## 2024-06-28 RX ORDER — TRAMADOL HYDROCHLORIDE 200 MG/1
200 TABLET, EXTENDED RELEASE ORAL EVERY 24 HOURS
Qty: 30 TABLET | Refills: 0 | Status: SHIPPED | OUTPATIENT
Start: 2024-06-28 | End: 2024-07-30

## 2024-06-28 NOTE — TELEPHONE ENCOUNTER
Medication refill information reviewed.     Due date for  traMADol (ULTRAM) 50 MG tablet  07/01/24  and traMADol (ULTRAM-ER) 200 MG 24 hr tablet  is 07/03/24     Prescriptions prepped for review.     Will route to provider.

## 2024-06-28 NOTE — TELEPHONE ENCOUNTER
Received request for a refill(s) of traMADol (ULTRAM) 50 MG tablet  And  traMADol (ULTRAM-ER) 200 MG 24 hr tablet     Last dispensed from pharmacy on 05/30/24-50 mg  06/01/ mg    Patient's last office/virtual visit by prescribing provider on 05/06/24  Next office/virtual appointment scheduled for 08/06/24    Last urine drug screen date 05/06/24  Current opioid agreement on file (completed within the last year) Yes Date of opioid agreement: 05/06/24    E-prescribe to pharmacy-[facility] [address] [place] [address]     Will route to nursing [place] for review and preparation of prescription(s).

## 2024-06-28 NOTE — TELEPHONE ENCOUNTER
Signed Prescriptions:                        Disp   Refills    traMADol (ULTRAM) 50 MG tablet             90 tab*0        Sig: Take 1 tablet (50 mg) by mouth every 6 hours as           needed for severe pain Max 3/day. To last 30 days           for chronic pain. OK to fill  06/29/24 to start           07/01/24.  Authorizing Provider: MELANIE BENSON    traMADol (ULTRAM-ER) 200 MG 24 hr tablet   30 tab*0        Sig: Take 1 tablet (200 mg) by mouth every 24 hours To           last 30 days for chronic pain. OK to fill           07/01/24 and start 07/03/24  Authorizing Provider: MELANIE BENSON        Reviewed [place]  June 28, 2024- no concerning fills.    Melanie Benson APRN, RN CNP, FNP  [facility]  [facility] [place]

## 2024-07-30 ENCOUNTER — TELEPHONE (OUTPATIENT)
Dept: PALLIATIVE MEDICINE | Facility: CLINIC | Age: 64
End: 2024-07-30
Payer: COMMERCIAL

## 2024-07-30 ENCOUNTER — MYC REFILL (OUTPATIENT)
Dept: PALLIATIVE MEDICINE | Facility: CLINIC | Age: 64
End: 2024-07-30
Payer: COMMERCIAL

## 2024-07-30 DIAGNOSIS — M47.812 CERVICAL SPONDYLOSIS WITHOUT MYELOPATHY: ICD-10-CM

## 2024-07-30 DIAGNOSIS — Z98.1 S/P CERVICAL SPINAL FUSION: ICD-10-CM

## 2024-07-30 DIAGNOSIS — F11.90 CHRONIC, CONTINUOUS USE OF OPIOIDS: ICD-10-CM

## 2024-07-30 DIAGNOSIS — M48.02 CERVICAL STENOSIS OF SPINAL CANAL: ICD-10-CM

## 2024-07-30 DIAGNOSIS — M79.18 MYOFASCIAL PAIN: ICD-10-CM

## 2024-07-30 DIAGNOSIS — M50.30 DDD (DEGENERATIVE DISC DISEASE), CERVICAL: ICD-10-CM

## 2024-07-30 RX ORDER — TRAMADOL HYDROCHLORIDE 200 MG/1
200 TABLET, EXTENDED RELEASE ORAL EVERY 24 HOURS
Qty: 30 TABLET | Refills: 0 | Status: SHIPPED | OUTPATIENT
Start: 2024-07-30 | End: 2024-08-06

## 2024-07-30 RX ORDER — TRAMADOL HYDROCHLORIDE 50 MG/1
50 TABLET ORAL EVERY 6 HOURS PRN
Qty: 90 TABLET | Refills: 0 | Status: SHIPPED | OUTPATIENT
Start: 2024-07-30 | End: 2024-08-06

## 2024-07-30 NOTE — TELEPHONE ENCOUNTER
Medication refill information reviewed.     Due date for traMADol (ULTRAM) 50 MG tablet 07/31/24  and traMADol (ULTRAM-ER) 200 MG 24 hr tablet  is 08/02/24     Prescriptions prepped for review.     Will route to provider.

## 2024-07-30 NOTE — TELEPHONE ENCOUNTER
Received request for a refill(s) of traMADol (ULTRAM) 50 MG tablet  And  traMADol (ULTRAM-ER) 200 MG 24 hr tablet   Patient noted that this medication prior authorization will  on 24. PA started in separate encounter.    Last dispensed from pharmacy on 24-50 mg   mg    Patient's last office/virtual visit by prescribing provider on 24  Next office/virtual appointment scheduled for 24    Last urine drug screen date 24  Current opioid agreement on file (completed within the last year) Yes Date of opioid agreement: 24    E-prescribe to pharmacy-[facility] [address] [place] [address]     Will route to nursing [place] for review and preparation of prescription(s).

## 2024-07-30 NOTE — TELEPHONE ENCOUNTER
Central Prior Authorization Team  Phone: [phone]    PA Initiation    Medication: TRAMADOL HCL  MG PO TB24  Insurance Company: [employer] - Phone [phone] Fax [phone]  Pharmacy Filling the Rx: [facility] [address] [place] [address]  Filling Pharmacy Phone: [phone]  Filling Pharmacy Fax:    Start Date: 7/30/2024

## 2024-07-30 NOTE — TELEPHONE ENCOUNTER
Prior Authorization Specialty Medication Request    Medication/Dose: traMADol (ULTRAM-ER) 200 MG 24 hr tablet  Diagnosis and ICD code (if different than what is on RX):    S/P cervical spinal fusion [Z98.1]      Cervical spondylosis without myelopathy [M47.812]      DDD (degenerative disc disease), cervical [M50.30]      Cervical stenosis of spinal canal [M48.02]      Chronic, continuous use of opioids [F11.90]        New/renewal/insurance change PA/secondary ins. PA:  Previously Tried and Failed:  ...    Important Lab Values: ...  Rationale: ...    Insurance Unsure which insurance pharmacy is billing under.  Primary:   Insurance ID:  WORK COMP - [facility] [employer]  Subscriber:  [employer]  Subscriber ID:[id number]  Relationship:Employee  Member:Palmer Suero  Member ID:[id number]  LOB:None  Plan year:  1/1/2024 - [place]  Effective dates:  6/21/2018 - [place]    Secondary (if applicable):  Insurance ID:  BLUE [employer]US - [employer] [employer]  Subscriber:  Lianne Suero  Subscriber ID:[id number]  Relationship:Spouse  Member:Palmer Suero  Member ID:[id number]  LOB:None  Plan year:  1/1/2024 - [place]  Effective dates:  1/1/2024 - [place]  Group number:  [id number]      Pharmacy Information (if different than what is on RX)  Name:  ...  Phone:  ...  Fax:...

## 2024-07-30 NOTE — TELEPHONE ENCOUNTER
Signed Prescriptions:                        Disp   Refills    traMADol (ULTRAM) 50 MG tablet             90 tab*0        Sig: Take 1 tablet (50 mg) by mouth every 6 hours as           needed for severe pain Max 3/day. To last 30 days           for chronic pain. OK to fill  07/30/24 to start           07/31/24.  Authorizing Provider: MELANIE BENSON    traMADol (ULTRAM-ER) 200 MG 24 hr tablet   30 tab*0        Sig: Take 1 tablet (200 mg) by mouth every 24 hours To           last 30 days for chronic pain. OK to fill           07/31/24 and start 08/02/24  Authorizing Provider: MELANIE BENSON        Reviewed [place]  July 30, 2024- no concerning fills.    Melanie Benson APRN, RN CNP, FNP  [facility]  [facility] [place]

## 2024-07-30 NOTE — TELEPHONE ENCOUNTER
Prior Authorization Approval    Medication: TRAMADOL HCL  MG PO TB24  Authorization Effective Date: 7/30/2024  Authorization Expiration Date: 1/26/2025  Approved Dose/Quantity:   Reference #:     Insurance Company: [employer] - Phone [phone] Fax [phone]  Expected CoPay: $    CoPay Card Available:      Financial Assistance Needed:   Which Pharmacy is filling the prescription: [facility] [address] [place] [address]  Pharmacy Notified: Yes  Patient Notified: No

## 2024-07-31 NOTE — TELEPHONE ENCOUNTER
Information noted.     Melanie STEVENS, RN CNP, FNP  [facility]  [facility] [place]

## 2024-08-01 ASSESSMENT — PAIN SCALES - PAIN ENJOYMENT GENERAL ACTIVITY SCALE (PEG)
INTERFERED_GENERAL_ACTIVITY: 7
INTERFERED_ENJOYMENT_LIFE: 6
INTERFERED_GENERAL_ACTIVITY: 7
INTERFERED_ENJOYMENT_LIFE: 6
PEG_TOTALSCORE: INCOMPLETE

## 2024-08-06 ENCOUNTER — OFFICE VISIT (OUTPATIENT)
Dept: PALLIATIVE MEDICINE | Facility: CLINIC | Age: 64
End: 2024-08-06
Attending: NURSE PRACTITIONER
Payer: COMMERCIAL

## 2024-08-06 VITALS — DIASTOLIC BLOOD PRESSURE: 81 MMHG | HEART RATE: 52 BPM | SYSTOLIC BLOOD PRESSURE: 121 MMHG

## 2024-08-06 DIAGNOSIS — M79.18 MYOFASCIAL PAIN: ICD-10-CM

## 2024-08-06 DIAGNOSIS — M47.812 CERVICAL SPONDYLOSIS WITHOUT MYELOPATHY: Primary | ICD-10-CM

## 2024-08-06 DIAGNOSIS — M50.30 DDD (DEGENERATIVE DISC DISEASE), CERVICAL: ICD-10-CM

## 2024-08-06 DIAGNOSIS — M54.2 CHRONIC NECK PAIN: ICD-10-CM

## 2024-08-06 DIAGNOSIS — G89.29 CHRONIC NECK PAIN: ICD-10-CM

## 2024-08-06 DIAGNOSIS — M62.838 MUSCLE SPASM: ICD-10-CM

## 2024-08-06 DIAGNOSIS — F11.90 CHRONIC, CONTINUOUS USE OF OPIOIDS: ICD-10-CM

## 2024-08-06 DIAGNOSIS — Z98.1 S/P CERVICAL SPINAL FUSION: ICD-10-CM

## 2024-08-06 DIAGNOSIS — G43.719 INTRACTABLE CHRONIC MIGRAINE WITHOUT AURA AND WITHOUT STATUS MIGRAINOSUS: ICD-10-CM

## 2024-08-06 DIAGNOSIS — M48.02 CERVICAL STENOSIS OF SPINAL CANAL: ICD-10-CM

## 2024-08-06 PROCEDURE — G2211 COMPLEX E/M VISIT ADD ON: HCPCS | Performed by: NURSE PRACTITIONER

## 2024-08-06 PROCEDURE — 99213 OFFICE O/P EST LOW 20 MIN: CPT | Performed by: NURSE PRACTITIONER

## 2024-08-06 RX ORDER — TRAMADOL HYDROCHLORIDE 200 MG/1
200 TABLET, EXTENDED RELEASE ORAL EVERY 24 HOURS
Qty: 30 TABLET | Refills: 0 | Status: SHIPPED | OUTPATIENT
Start: 2024-08-06 | End: 2024-09-30

## 2024-08-06 RX ORDER — METHOCARBAMOL 500 MG/1
500 TABLET, FILM COATED ORAL 3 TIMES DAILY PRN
Qty: 90 TABLET | Refills: 11 | Status: SHIPPED | OUTPATIENT
Start: 2024-08-06

## 2024-08-06 RX ORDER — TRAMADOL HYDROCHLORIDE 50 MG/1
50 TABLET ORAL EVERY 6 HOURS PRN
Qty: 90 TABLET | Refills: 0 | Status: SHIPPED | OUTPATIENT
Start: 2024-08-06 | End: 2024-09-30

## 2024-08-06 ASSESSMENT — ANXIETY QUESTIONNAIRES
GAD7 TOTAL SCORE: 4
1. FEELING NERVOUS, ANXIOUS, OR ON EDGE: NOT AT ALL
4. TROUBLE RELAXING: SEVERAL DAYS
7. FEELING AFRAID AS IF SOMETHING AWFUL MIGHT HAPPEN: SEVERAL DAYS
2. NOT BEING ABLE TO STOP OR CONTROL WORRYING: NOT AT ALL
3. WORRYING TOO MUCH ABOUT DIFFERENT THINGS: SEVERAL DAYS
6. BECOMING EASILY ANNOYED OR IRRITABLE: NOT AT ALL
5. BEING SO RESTLESS THAT IT IS HARD TO SIT STILL: SEVERAL DAYS
GAD7 TOTAL SCORE: 4

## 2024-08-06 ASSESSMENT — PAIN SCALES - GENERAL: PAINLEVEL: MODERATE PAIN (5)

## 2024-08-06 NOTE — PATIENT INSTRUCTIONS
Plan:   Physical Therapy: not at present  Clinical Health Psychologist: none  Diagnostic Studies: none  Medication Management:   Continue tramadol ER 200mg once daily fill 8/30 and begin 9/1/2024  Continue tramadol 50mg Q 6-8 hours PRN, max of 3/day. Fill 08/28 and begin 8/30  Continue gabapentin 900 mg TID  continueTopamax, take 150mg (3 of the 50mg tabs) at bedtime. Reduce to 100mg if you do not feel well.  Continue methocarbamol 500-1000 mg TID PRN muscle spasms  Continue cyclobenzaprine at bedtime  Continue atogepant for migraine prevention  Further procedures recommended: none  Recommendations to PCP: see above  Follow up: 12 weeks in-person or video visit, your choice. Please call [phone] to make your follow-up appointment with me.     ----------------------------------------------------------------  Clinic Number:  [phone]   Call with any questions about your care and for scheduling assistance.   Calls are returned Monday through Friday between 8 AM and 4:30 PM. We usually get back to you within 2 business days depending on the issue/request.    If we are prescribing your medications:  For opioid medication refills, call the clinic or send a apomio message 7 days in advance.  Please include:  Name of requested medication  Name of the pharmacy.  For non-opioid medications, call your pharmacy directly to request a refill. Please allow 3-4 days to be processed.   Per [place] State Law:  All controlled substance prescriptions must be filled within 30 days of being written.    For those controlled substances allowing refills, pickup must occur within 30 days of last fill.      We believe regular attendance is key to your success in our program!    Any time you are unable to keep your appointment we ask that you call us at least 24 hours in advance to cancel.This will allow us to offer the appointment time to another patient.   Multiple missed appointments may lead to dismissal from the clinic.

## 2024-08-06 NOTE — PROGRESS NOTES
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 More stiffness when he rises in the morning  -still gets headaches but about the same, usually gets about 1-4 per week and depends on if his neck pain is stirred up          Interval history:  Truman Suero is a 63 year old male is known to me for   Chronic neck pain  Cervical DDD  Cervical spondylosis (pain worse with extension/rotation indicating facetogenic component to pain)  Axial low back pain  Myofascial pain/muscle spasms  Remote history of ETOH overuse, attended AA for awhile. Sober for 10 years  ---PMHx includes: neck pain  ---PSHx includes: none listed      Recommendations/plan at the last visit on 5/6/2024 included:  Physical Therapy: not at present  Clinical Health Psychologist: none  Diagnostic Studies: none  Medication Management:   Continue tramadol ER 200mg once daily fill 6/1 and begin 6/3/2024  Continue tramadol 50mg Q 6-8 hours PRN, max of 3/day. Fill 5/29 and begin 6/1  Continue gabapentin 900 mg TID  continueTopamax, take 150mg (3 of the 50mg tabs) at bedtime. Reduce to 100mg if you do not feel well.  Continue methocarbamol 500-1000 mg TID PRN muscle spasms  Continue cyclobenzaprine at bedtime  Continue atogepant for migraine prevention  Further procedures recommended: none  Recommendations to PCP: see above  UDT today, last took Tramadol this morning  CSA resigned today  Follow up: 12 weeks in-person visit, your choice. Please call [phone] to make your follow-up  appointment with me.        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        Interval history May 6, 2024  -works as a para or . Likes this. More mental stress.  Headaches are about the same.   -low back pain remains axial, doing pretty well   -neck pain and right arm pain is stable.   -he has numbness in both hands present for a long time, sometimes the numbness is painful. This has not changed  -not having radicular symptoms in the legs at present.     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 --improved  -axial low back pain, radiates into the anterior thigh to the knee intermittently--pretty good  -neck pain with intermittent right arm pain--pretty good  -still  gets headaches but a little bit less, has used Aimovig x 2, tolerating well but still has frequent headaches. Discussed increasing Aimovig dosing to 140mg Q 30 days. Patient interested.     Interval history November 20, 2023 accompanied by Mya his QRC throughout appt  -having low back pain that radiates into the right buttock and down the right leg past the knee level  -Left forearm pain with overuse, tennis elbow symptoms.   Left 2nd digit knuckle pain  axial low back pain, radiates into the anterior thigh to the knee intermittently--pretty good  neck pain with intermittent right arm pain--pretty good  -having more headaches from time to time, pain behind the eyes, almost daily, last 3-4 hours.   -tramadol ER and IR are helpful.   -gabapentin helpful, will have him restart the Topamax as this may offer some improved pain relief.      Interval history August 16, 2023 accompanied by Mya his QRC throughout appt  -still having headaches, does not use Topamax daily, encouraged to use daily to prevent headaches and to get eyes tested.   -chronic neck pain, intermittent right arm pain  -axial low back pain  -gearing up for school's start again, he works in the dietary department.     Interval history May 8, 2023  -he did hand therapy for his left forearm pain, has an aresenal of things he can use for his overuse issues from tennis elbow.   -right foot and ankle pain, pain between the 2nd and 3rd digits, burning pain. Wears tennis shoes at work, starts to bother him when he has about an hour left of work.  -headaches have been a little bit worse, almost daily. Will last 3-4 hours at a time.     Interval history February 13, 2023  -Palmer states his neck pain is under good control  -he is struggling with left forearm pain from overuse and pain of the left 2nd digit proximal interphalangeal/metacarpal joint. This is slightly edematous and erythematous but not warm to touch.   -has tried a tennis elbow strap with some  "relief, but it must be cinched quite tight.   -axial low back pain is under good control at present.             At this point, the patient's participation with our multidisciplinary team includes:  The patient has been compliant with the program.  PT - attended non-pain management PHYSICAL THERAPY   Health Psych - has seen Kentrell Castañeda x4  Acupuncture: worked with Dr. Bereket LAY      Pain scores:    Pain intensity on average is 5-6 on a scale of 0-10.    Range is 4-8/10.   Pain right now is 5/10.  Pain is described as \"burning, shooting, throbbing, stabbing, miserable, numb, tiring, exhausting, penetrating, nagging, unbearable.\"  Pain is constant.       Current pain relevant medications:      -Tramadol 200mg ER in the evening (helpful)  -Tramadol 50mg take 1 tablet  Q 6 hours PRN (using 2-3 tabs per day, helpful)  -ibuprofen 600mg PRN (helpful)  -gabapentin 900mg TID (somewhat helpful)  -methocarbamol 500-1000mg TID PRN muscle spasms (takes 1000 mg BID, somewhat helpful)  -Topamax 50 mg BID (using PRN, advised to use regularly to prevent headaches)    Other pertinent medications:  None    Previous Medications:  OPIATES: Tramdol (somewhat helpful)  NSAIDS: ibuprofen (helpful), Aleve (helpful)  MUSCLE RELAXANTS: none  ANTI-MIGRAINE MEDS: none  ANTI-DEPRESSANTS: none  SLEEP AIDS: none  ANTI-CONVULSANTS: gabapentin (helpful)  TOPICALS: lidocaine ointment (somewhat helpful)  Other meds: Tylenol (not helpful)        Other treatments have included:  Truman REI Suero has not been seen at a pain clinic in the past.    PT: tried, somewhat helpful  Chiropractic: helpful  Acupuncture: none  TENs Unit: none       Injections:    cervical radiofrequency nerve ablation at [facility] in December 2016 (did get good relief, got 70% relief of his typical neck pain)  -6/29/2017 Cervical facet joint steroid injections at C4-5 and C5-6 on the right with Dr. Nicole Harding (not helpful)  -3/8/2018 C7-T1 interlaminar DMEARCUS with Dr." Hugo Corrigan (not helpful)  -5/23/2018 right L4-5 transforaminal epidural steroid injection with Dr. Osorio (not helpful for back pain but did help leg pain)  -8/7/2018 bilateral SI joint injection with Dr Hugo Corrigan (helpful for one month)  -10/11/2018 l3-4 and L4-5 facet joint injections bilaterally with Dr. Hugo Corrigan (this has been helpful, more helpful than any other lumbar injections thus far)  -1/28/2019 bilateral L3-5 medial branch blocks #1 with Dr. Osorio (somewhat helpful)   -2/25/2019 LMBB #2 with Dr. Osorio (somewhat effective, but not enough to proceed to RFA)  -5/6/2019 bilateral L4/L5 and L5/S1 facet joint injections with Dr. Osorio (helpful)  -11/29/2019 C7-T1 interlaminar epidural steroid injection with Dr. Corrigan (helpful temporarily)  -10/30/2020 ISMAEL with Dr. Hugo Corrigan (temporarily helpful)  -5/17/2022 L3-4 interlaminar DEMARCUS with Dr. Hugo Corrigan (2-3 months of about 80% relief)  -1/4/2024 right piriformis injection with Dr. Hugo Corrigan (right leg numbness for several hours after injection, helpful for pain)    Surgeries:  10/29/2018 right anterior cervical C5-6 discectomy and fusion by Dr. Jud Harkins (somewhat helpful)      THE 4 A's OF OPIOID MAINTENANCE ANALGESIA    Analgesia: long acting Tramadol with some short acting tramadol does give some relief    Activity: ADLs, working at the [employer] as a para/  at present time.     Adverse effects: none    Adherence to Rx protocol: yes      Side Effects: None  Patient is using the medication as prescribed: Yes    Medications:  Current Outpatient Medications   Medication Sig Dispense Refill    Atogepant 30 MG TABS Take 30 mg by mouth daily 3 month supply 90 tablet 3    atorvastatin (LIPITOR) 20 MG tablet Take 1 tablet (20 mg) by mouth daily Needs appointment and labs 90 tablet 2    cyclobenzaprine (FLEXERIL) 5 MG tablet Take 1-2 tablets (5-10 mg) by mouth nightly as needed  for muscle spasms Need 6 hours between this and methocarbamol. 3 month supply 135 tablet 3    gabapentin (NEURONTIN) 600 MG tablet Take 1.5 tablets (900 mg) by mouth 3 times daily 3  month supply 405 tablet 3    methocarbamol (ROBAXIN) 500 MG tablet Take 1 tablet (500 mg) by mouth 3 times daily as needed for muscle spasms Should be 6 hours between this and cyclobenzaprine at night 90 tablet 11    metoprolol succinate ER (TOPROL XL) 25 MG 24 hr tablet TAKE ONE TABLET BY MOUTH ONE TIME DAILY 90 tablet 2    topiramate (TOPAMAX) 50 MG tablet Take 3 tablets (150 mg) by mouth at bedtime 3 month script. 270 tablet 1    traMADol (ULTRAM) 50 MG tablet Take 1 tablet (50 mg) by mouth every 6 hours as needed for severe pain Max 3/day. To last 30 days for chronic pain. OK to fill  07/30/24 to start 07/31/24. 90 tablet 0    traMADol (ULTRAM-ER) 200 MG 24 hr tablet Take 1 tablet (200 mg) by mouth every 24 hours To last 30 days for chronic pain. OK to fill 07/31/24 and start 08/02/24 30 tablet 0    order for DME BP cuff, brand as covered by insurance.  Dx: HTN (Patient not taking: Reported on 8/16/2023) 1 each 0       Medical History: any changes in medical history since they were last seen? none    Social History:   Home situation: , has 4 kids, 2 at home, one in college and one launched.   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 He used to work a sobriety program, used to go to   Drug use: none  History of chemical dependency treatment: no formal treatment, did AA    Is patient a current smoker or tobacco user?  QUIT on 3/20/2018  If yes, was cessation counseling offered?  no            Physical Exam:     Vital signs: /73   Pulse (!) 48      Behavioral observations:  Awake, alert, conversant and  cooperative     Gait:  normal    Musculoskeletal exam:  strength grossly equal throughout    Neuro exam:  deferred    Skin/vascular/autonomic:  No suspicious lesions on exposed skin.     Other:  na        IMAGING:    MR CERVICAL SPINE WITHOUT CONTRAST 9/5/2017 2:32 PM      HISTORY: Neck pain, worsening numbness in arms and lower extremities.  Radiculopathy, cervical region.     TECHNIQUE: Multiplanar multisequence images were obtained through the  cervical spine without contrast.     COMPARISON: 2/22/2017.     FINDINGS: Sagittal images demonstrate some minimal gentle cervical  kyphosis, otherwise normal posterior alignment. There is no evidence  for craniovertebral or cervicomedullary junction abnormality. The  cervical cord is minimally indented anteriorly at C4-C5 and C5-C6,  otherwise is normal in morphology and signal characteristics. Disc  space narrowing and discogenic marrow changes are present C3-C4,  C4-C5, C5-C6 and C6-C7.     C2-C3: Minimal facet hypertrophy. No stenosis.     C3-C4: Broad-based posterior osteophyte formation is present causing  some mild bilateral neural foraminal stenosis, but no central canal  stenosis.     C4-C5: Broad-based posterior osteophyte formation and mild facet  hypertrophy is present causing some mild to moderate central canal  stenosis, mild cord deformity, and mild-to-moderate bilateral neural  foraminal stenosis.     C5-C6: Broad-based posterior osteophyte formation and disc bulging is  present along with some facet hypertrophy. There is moderate central  canal stenosis and moderate bilateral neural foraminal stenosis.  Findings have progressed since the prior exam.     C6-C7: Broad-based disc bulging is present along with some minimal  posterior osteophyte formation. There is borderline to mild central  canal stenosis, but no neural foraminal stenosis.     C7-T1: Moderate facet hypertrophy. No significant stenosis.         IMPRESSION: Moderate multilevel degenerative disc  and facet disease  with some progression at C5-C6 where there is moderate central canal  and bilateral neural foraminal stenosis along with cord deformity.          MR CERVICAL SPINE WITHOUT CONTRAST  2/22/2017 9:59 AM     HISTORY:  Bilateral neck and arm pain for three years.     COMPARISON: None.     TECHNIQUE: Routine MR cervical spine extended through T2.     FINDINGS: There is some degenerative bone marrow signal change C3-C6.  No malignant or destructive lesions. Normal alignment through T2.  Reversed cervical adenosis C3-C6.     The cervical and upper thoracic spinal cord appear intrinsically  normal. The craniocervical junction region is normal. No paraspinous  soft tissue abnormality.     Findings by level as follows:     C2-C3: Negative. No disc protrusion. No central or lateral stenosis.     C3-C4: Mild disc space narrowing. Mild diffuse annular bulge. Small  uncinate spur on the right. No significant central or left-sided  stenosis. Mild right-sided foraminal stenosis.     C4-C5: Moderate degenerative narrowing of the interspace. Mild ventral  disc osteophyte complex with minimal central stenosis. Small uncinate  spurs bilaterally with mild bilateral foraminal stenosis.     C6-C7: Moderate disc space narrowing. Mild broad-based disc osteophyte  complex with minimal central stenosis. Minimal uncinate spurs with  mild bilateral foraminal stenosis.     C6-C7: Moderate disc space narrowing. Mild ventral disc osteophyte  complex. No significant central or lateral stenosis.     C7-T1: No disc protrusion. No central or lateral stenosis. Moderate  bilateral facet joint disease.         IMPRESSION:  1. Degenerative changes as described C3-C4 through C7-T1. There is  only mild central and foraminal stenosis as described.  2. No intrinsic spinal cord abnormality through T2        EXAM: MR LUMBAR SPINE W/O CONTRAST  LOCATION: [facility]  DATE/TIME: 10/25/2021 7:51 PM     INDICATION: Lumbar  radiculopathy, no red flags.  COMPARISON: None.  TECHNIQUE: Routine Lumbar Spine MRI without IV contrast.     FINDINGS:   Nomenclature is based on 5 lumbar type vertebral bodies. Normal vertebral body heights, alignment and marrow signal. Normal distal spinal cord and cauda equina with conus medullaris at L1-L2. No extraspinal abnormality. Unremarkable visualized bony   pelvis.     T12-L1: Normal disc height and signal. No herniation. Mild facet arthropathy bilaterally. No spinal canal or neural foraminal stenosis.      L1-L2: There is loss of disc height, disc desiccation and mild circumferential disc bulging. No herniation. Normal facets. No spinal canal or neural foraminal stenosis.     L2-L3: There is loss of disc height, disc desiccation and mild circumferential disc bulging. No herniation. Mild facet arthropathy bilaterally. No spinal canal stenosis. Mild bilateral neural foraminal narrowing. No change from the comparison study.     L3-L4: There is loss of disc height, disc desiccation and mild circumferential disc bulging with a superimposed tiny left paracentral disc herniation (extrusion). Moderate facet arthropathy bilaterally. No spinal canal stenosis. Moderate left foraminal   stenosis. Mild right foraminal narrowing. No change from the comparison study.     L4-L5: There is loss of disc height, disc desiccation and mild circumferential disc bulging. No herniation. Moderate facet arthropathy bilaterally. No spinal canal stenosis. Moderate right foraminal stenosis. Mild left foraminal narrowing. No change from   the comparison study.     L5-S1: Normal disc height and signal. No herniation. Moderate facet arthropathy bilaterally. No spinal canal or neural foraminal stenosis. No change from the comparison study.                                                                      IMPRESSION:  1.  Diffuse degenerative change of the lumbar spine as detailed above without appreciable change from the comparison  study.  2.  No significant spinal canal stenosis at any level of the lumbar spine.  3.  Moderate neural foraminal stenosis on the left at L3-L4 and on the right at L4-L5.        [place] Prescription Monitoring Program:  [place]ed [place]  8/6/2024- no concerning fills.  Melanie STEVENS, RN CNP, FNP  [facility]  [facility]          DIRE Score for selecting candidates for long term opioid analgesia for chronic pain:  Diagnosis  2  Intractablility  2  Risk    Psychological health  2    Chemical health  2    Reliability  2    Social support  3  Efficacy  2    Total DIRE Score = 15. Note that   7-13 predicts poor outcome (compliance and efficacy) from opioid prescribing; 14-21 predicts good outcome (compliance and efficacy)  from opioid prescribing.      Assessment:   Cervical spondylosis without myelopathy  Cervical degenerative disc disease  Status post cervical spinal fusion  Cervical stenosis of spinal canal  Chronic neck pain  Intractable chronic migraine without aura and without status migrainosus  Myofascial pain  Muscle spasm  Chronic continuous use of opioids    Encounter for long-term use of opiate analgesic  Essential hypertension  Urine drug screen last done 5/6/2024  Controlled substance agreement signed 5/6/2024  Remote history of ETOH overuse, attended AA for awhile. Sober for >10 years  PMHx includes: neck pain  PSHx includes: none listed       Plan:   Physical Therapy: not at present  Clinical Health Psychologist: none  Diagnostic Studies: none  Medication Management:   Continue tramadol ER 200mg once daily fill 8/30 and begin 9/1/2024  Continue tramadol 50mg Q 6-8 hours PRN, max of 3/day. Fill 08/28 and begin 8/30  Continue gabapentin 900 mg TID  continueTopamax, take 150mg (3 of the 50mg tabs) at bedtime. Reduce to 100mg if you do not feel well.  Continue methocarbamol 500-1000 mg TID PRN muscle spasms  Continue cyclobenzaprine at bedtime  Continue atogepant for  migraine prevention  Further procedures recommended: none  Recommendations to PCP: see above  Follow up: 12 weeks in-person or video visit, your choice. Please call [phone] to make your follow-up appointment with me.         ASSESSMENT AND PLAN:  (M47.812) Cervical spondylosis without myelopathy  (primary encounter diagnosis)  Comment:   Plan: methocarbamol (ROBAXIN) 500 MG tablet, traMADol        (ULTRAM) 50 MG tablet, traMADol (ULTRAM-ER) 200        MG 24 hr tablet, Adult Pain Clinic Follow-Up         Order            (M50.30) DDD (degenerative disc disease), cervical  Comment:   Plan: methocarbamol (ROBAXIN) 500 MG tablet, traMADol        (ULTRAM) 50 MG tablet, traMADol (ULTRAM-ER) 200        MG 24 hr tablet, Adult Pain Clinic Follow-Up         Order            (Z98.1) S/P cervical spinal fusion  Comment:   Plan: traMADol (ULTRAM) 50 MG tablet, traMADol         (ULTRAM-ER) 200 MG 24 hr tablet, Adult Pain         Clinic Follow-Up Order            (M48.02) Cervical stenosis of spinal canal  Comment:   Plan: traMADol (ULTRAM) 50 MG tablet, traMADol         (ULTRAM-ER) 200 MG 24 hr tablet, Adult Pain         Clinic Follow-Up Order            (M54.2,  G89.29) Chronic neck pain  Comment:   Plan: methocarbamol (ROBAXIN) 500 MG tablet, Adult         Pain Clinic Follow-Up Order            (G43.719) Intractable chronic migraine without aura and without status migrainosus  Comment:   Plan: Adult Pain Clinic Follow-Up Order            (M79.18) Myofascial pain  Comment:   Plan: methocarbamol (ROBAXIN) 500 MG tablet, traMADol        (ULTRAM) 50 MG tablet, Adult Pain Clinic         Follow-Up Order            (M62.838) Muscle spasm  Comment:   Plan: Adult Pain Clinic Follow-Up Order            (F11.90) Chronic, continuous use of opioids  Comment:   Plan: traMADol (ULTRAM) 50 MG tablet, traMADol         (ULTRAM-ER) 200 MG 24 hr tablet, Adult Pain         Clinic Follow-Up Order              BILLING TIME DOCUMENTATION:   TOTAL  TIME includes:   Time spent preparing to see the patient: 3 minutes (reviewing records and tests)  Time spend face to face with the patient: 21 minutes  Time spent ordering tests, medications, procedures and referrals: 0 minutes  Time spent Referring and communicating with other healthcare professionals: 0 minutes  Documenting clinical information in Epic: 2 minutes    The total TIME spent on this patient on the day of the appointment was 26 minutes.                 Melanie STEVENS RN CNP, FNP  [facility]  [facility]

## 2024-08-19 ENCOUNTER — OFFICE VISIT (OUTPATIENT)
Dept: FAMILY MEDICINE | Facility: CLINIC | Age: 64
End: 2024-08-19
Payer: COMMERCIAL

## 2024-08-19 ENCOUNTER — ANCILLARY PROCEDURE (OUTPATIENT)
Dept: GENERAL RADIOLOGY | Facility: CLINIC | Age: 64
End: 2024-08-19
Attending: FAMILY MEDICINE
Payer: COMMERCIAL

## 2024-08-19 VITALS
WEIGHT: 155 LBS | HEIGHT: 66 IN | RESPIRATION RATE: 16 BRPM | HEART RATE: 60 BPM | TEMPERATURE: 98 F | SYSTOLIC BLOOD PRESSURE: 120 MMHG | DIASTOLIC BLOOD PRESSURE: 74 MMHG | OXYGEN SATURATION: 100 % | BODY MASS INDEX: 24.91 KG/M2

## 2024-08-19 DIAGNOSIS — M79.671 RIGHT FOOT PAIN: Primary | ICD-10-CM

## 2024-08-19 DIAGNOSIS — R25.2 LEG CRAMPING: ICD-10-CM

## 2024-08-19 DIAGNOSIS — I77.810 ASCENDING AORTA DILATATION (H): ICD-10-CM

## 2024-08-19 DIAGNOSIS — M79.671 RIGHT FOOT PAIN: ICD-10-CM

## 2024-08-19 LAB
ANION GAP SERPL CALCULATED.3IONS-SCNC: 11 MMOL/L (ref 7–15)
BASOPHILS # BLD AUTO: 0.1 10E3/UL (ref 0–0.2)
BASOPHILS NFR BLD AUTO: 1 %
BUN SERPL-MCNC: 16.4 MG/DL (ref 8–23)
CALCIUM SERPL-MCNC: 9.9 MG/DL (ref 8.8–10.4)
CHLORIDE SERPL-SCNC: 110 MMOL/L (ref 98–107)
CREAT SERPL-MCNC: 1.11 MG/DL (ref 0.67–1.17)
EGFRCR SERPLBLD CKD-EPI 2021: 75 ML/MIN/1.73M2
EOSINOPHIL # BLD AUTO: 0.2 10E3/UL (ref 0–0.7)
EOSINOPHIL NFR BLD AUTO: 3 %
ERYTHROCYTE [DISTWIDTH] IN BLOOD BY AUTOMATED COUNT: 12.1 % (ref 10–15)
FERRITIN SERPL-MCNC: 57 NG/ML (ref 31–409)
GLUCOSE SERPL-MCNC: 94 MG/DL (ref 70–99)
HCO3 SERPL-SCNC: 23 MMOL/L (ref 22–29)
HCT VFR BLD AUTO: 45 % (ref 40–53)
HGB BLD-MCNC: 15.1 G/DL (ref 13.3–17.7)
IMM GRANULOCYTES # BLD: 0 10E3/UL
IMM GRANULOCYTES NFR BLD: 0 %
IRON BINDING CAPACITY (ROCHE): 320 UG/DL (ref 240–430)
IRON SATN MFR SERPL: 30 % (ref 15–46)
IRON SERPL-MCNC: 96 UG/DL (ref 61–157)
LYMPHOCYTES # BLD AUTO: 2.2 10E3/UL (ref 0.8–5.3)
LYMPHOCYTES NFR BLD AUTO: 38 %
MCH RBC QN AUTO: 30.6 PG (ref 26.5–33)
MCHC RBC AUTO-ENTMCNC: 33.6 G/DL (ref 31.5–36.5)
MCV RBC AUTO: 91 FL (ref 78–100)
MONOCYTES # BLD AUTO: 0.5 10E3/UL (ref 0–1.3)
MONOCYTES NFR BLD AUTO: 9 %
NEUTROPHILS # BLD AUTO: 2.9 10E3/UL (ref 1.6–8.3)
NEUTROPHILS NFR BLD AUTO: 49 %
PLATELET # BLD AUTO: 246 10E3/UL (ref 150–450)
POTASSIUM SERPL-SCNC: 4.4 MMOL/L (ref 3.4–5.3)
RBC # BLD AUTO: 4.94 10E6/UL (ref 4.4–5.9)
SODIUM SERPL-SCNC: 144 MMOL/L (ref 135–145)
WBC # BLD AUTO: 5.9 10E3/UL (ref 4–11)

## 2024-08-19 PROCEDURE — 80048 BASIC METABOLIC PNL TOTAL CA: CPT | Performed by: FAMILY MEDICINE

## 2024-08-19 PROCEDURE — G2211 COMPLEX E/M VISIT ADD ON: HCPCS | Performed by: FAMILY MEDICINE

## 2024-08-19 PROCEDURE — 83550 IRON BINDING TEST: CPT | Performed by: FAMILY MEDICINE

## 2024-08-19 PROCEDURE — 83540 ASSAY OF IRON: CPT | Performed by: FAMILY MEDICINE

## 2024-08-19 PROCEDURE — 99214 OFFICE O/P EST MOD 30 MIN: CPT | Performed by: FAMILY MEDICINE

## 2024-08-19 PROCEDURE — 82728 ASSAY OF FERRITIN: CPT | Performed by: FAMILY MEDICINE

## 2024-08-19 PROCEDURE — 36415 COLL VENOUS BLD VENIPUNCTURE: CPT | Performed by: FAMILY MEDICINE

## 2024-08-19 PROCEDURE — 85025 COMPLETE CBC W/AUTO DIFF WBC: CPT | Performed by: FAMILY MEDICINE

## 2024-08-19 PROCEDURE — 73630 X-RAY EXAM OF FOOT: CPT | Mod: TC | Performed by: RADIOLOGY

## 2024-08-19 NOTE — PROGRESS NOTES
"  Assessment & Plan     Right foot pain  He is endorsing that it may be radicular from his back but he is unsure, it is the burning pain.  Tender on exam on toes.  Xray is normal today. Advised good supportive shoes, consider podiatry if not resolved.  Discuss with pain clinic potentially increasing Neurontin.   - Ferritin; Future  - Iron and iron binding capacity; Future  - Basic metabolic panel  (Ca, Cl, CO2, Creat, Gluc, K, Na, BUN); Future  - XR Foot Right G/E 3 Views; Future  - CBC with platelets and differential; Future  - Orthopedic  Referral; Future  - Ferritin  - Iron and iron binding capacity  - Basic metabolic panel  (Ca, Cl, CO2, Creat, Gluc, K, Na, BUN)  - CBC with platelets and differential    Leg cramping  At night mostly,sounds maybe like RLS.  Dicussed medication and supplements that may help.  Check iron levels today.  History of cad/pad, but symptoms are not sounding like claudication to me  - Ferritin; Future  - Iron and iron binding capacity; Future  - Basic metabolic panel  (Ca, Cl, CO2, Creat, Gluc, K, Na, BUN); Future  - XR Foot Right G/E 3 Views; Future  - CBC with platelets and differential; Future  - Orthopedic  Referral; Future  - Ferritin  - Iron and iron binding capacity  - Basic metabolic panel  (Ca, Cl, CO2, Creat, Gluc, K, Na, BUN)  - CBC with platelets and differential    Ascending aorta dilatation (H24)  Previously stable, check labs  - Echocardiogram Complete; Future    Follow up in 6 months  The longitudinal plan of care for the diagnosis(es)/condition(s) as documented were addressed during this visit. Due to the added complexity in care, I will continue to support Palmer in the subsequent management and with ongoing continuity of care.        BMI  Estimated body mass index is 25.02 kg/m  as calculated from the following:    Height as of this encounter: 1.676 m (5' 6\").    Weight as of this encounter: 70.3 kg (155 lb).           Subjective   Palmer is a 63 year " "old, presenting for the following health issues:  Pain        8/19/2024     7:22 AM   Additional Questions   Roomed by Shila     History of Present Illness       Reason for visit:  Foot/ankle issues. cannot stand long without getting a burning in toes in right foot. Referral?  Symptom onset:  More than a month  Symptoms include:  Toes on right foot and right ankle throb and burn after standing  Symptom intensity:  Severe  Symptom progression:  Worsening  Had these symptoms before:  Yes  Has tried/received treatment for these symptoms:  No  What makes it worse:  Prolonged standing or walking  What makes it better:  Keeping foot elevated    He eats 0-1 servings of fruits and vegetables daily.He consumes 1 sweetened beverage(s) daily.He exercises with enough effort to increase his heart rate 10 to 19 minutes per day.  He exercises with enough effort to increase his heart rate 3 or less days per week.   He is taking medications regularly.   He is on his feet all day    Right side of pain, burning pain, when he takes shoes off it is better  No insugury  Progressive for years , daily   Standing on it on makes it worse  In the am it is ok  Better with elevation  Cramping calf pain at night  Low back pain , with right radicular, sees pain clinic        Review of Systems  Constitutional, HEENT, cardiovascular, pulmonary, GI, , musculoskeletal, neuro, skin, endocrine and psych systems are negative, except as otherwise noted.      Objective    /74   Pulse 60   Temp 98  F (36.7  C) (Oral)   Resp 16   Ht 1.676 m (5' 6\")   Wt 70.3 kg (155 lb)   SpO2 100%   BMI 25.02 kg/m    Body mass index is 25.02 kg/m .  Physical Exam   GENERAL: alert and no distress  NECK: no adenopathy, no asymmetry, masses, or scars  RESP: lungs clear to auscultation - no rales, rhonchi or wheezes  CV: regular rate and rhythm, normal S1 S2, no S3 or S4, no murmur, click or rub, no peripheral edema  ABDOMEN: soft, nontender, no " hepatosplenomegaly, no masses and bowel sounds normal  MS: no gross musculoskeletal defects noted, no edema    Results for orders placed or performed in visit on 08/19/24   CBC with platelets and differential     Status: None   Result Value Ref Range    WBC Count 5.9 4.0 - 11.0 10e3/uL    RBC Count 4.94 4.40 - 5.90 10e6/uL    Hemoglobin 15.1 13.3 - 17.7 g/dL    Hematocrit 45.0 40.0 - 53.0 %    MCV 91 78 - 100 fL    MCH 30.6 26.5 - 33.0 pg    MCHC 33.6 31.5 - 36.5 g/dL    RDW 12.1 10.0 - 15.0 %    Platelet Count 246 150 - 450 10e3/uL    % Neutrophils 49 %    % Lymphocytes 38 %    % Monocytes 9 %    % Eosinophils 3 %    % Basophils 1 %    % Immature Granulocytes 0 %    Absolute Neutrophils 2.9 1.6 - 8.3 10e3/uL    Absolute Lymphocytes 2.2 0.8 - 5.3 10e3/uL    Absolute Monocytes 0.5 0.0 - 1.3 10e3/uL    Absolute Eosinophils 0.2 0.0 - 0.7 10e3/uL    Absolute Basophils 0.1 0.0 - 0.2 10e3/uL    Absolute Immature Granulocytes 0.0 <=0.4 10e3/uL   CBC with platelets and differential     Status: None    Narrative    The following orders were created for panel order CBC with platelets and differential.  Procedure                               Abnormality         Status                     ---------                               -----------         ------                     CBC with platelets and d...[[id number]]                      Final result                 Please view results for these tests on the individual orders.             Signed Electronically by: Humberto Trinidad DO

## 2024-08-19 NOTE — PATIENT INSTRUCTIONS
Your xray looks good  Your initial labs are ok  Consider going to podiatry if not better  Consider increasing Neurontin  We are checking iron level as well for the cramping pain    Discuss with you pain specilaist if this could be related as I suspect to your back issues  See you in january  Take care  Humberto Trinidad D.O.      Patient Education

## 2024-08-26 ENCOUNTER — MYC REFILL (OUTPATIENT)
Dept: FAMILY MEDICINE | Facility: CLINIC | Age: 64
End: 2024-08-26
Payer: COMMERCIAL

## 2024-08-26 DIAGNOSIS — I77.810 ASCENDING AORTA DILATATION (H): ICD-10-CM

## 2024-08-26 DIAGNOSIS — I25.10 MILD CAD: ICD-10-CM

## 2024-08-27 RX ORDER — METOPROLOL SUCCINATE 25 MG/1
25 TABLET, EXTENDED RELEASE ORAL DAILY
Qty: 90 TABLET | Refills: 2 | Status: SHIPPED | OUTPATIENT
Start: 2024-08-27

## 2024-08-27 RX ORDER — ATORVASTATIN CALCIUM 20 MG/1
20 TABLET, FILM COATED ORAL DAILY
Qty: 90 TABLET | Refills: 2 | Status: SHIPPED | OUTPATIENT
Start: 2024-08-27

## 2024-09-30 ENCOUNTER — MYC REFILL (OUTPATIENT)
Dept: PALLIATIVE MEDICINE | Facility: CLINIC | Age: 64
End: 2024-09-30
Payer: COMMERCIAL

## 2024-09-30 DIAGNOSIS — M79.18 MYOFASCIAL PAIN: ICD-10-CM

## 2024-09-30 DIAGNOSIS — M47.812 CERVICAL SPONDYLOSIS WITHOUT MYELOPATHY: ICD-10-CM

## 2024-09-30 DIAGNOSIS — M50.30 DDD (DEGENERATIVE DISC DISEASE), CERVICAL: ICD-10-CM

## 2024-09-30 DIAGNOSIS — Z98.1 S/P CERVICAL SPINAL FUSION: ICD-10-CM

## 2024-09-30 DIAGNOSIS — M48.02 CERVICAL STENOSIS OF SPINAL CANAL: ICD-10-CM

## 2024-09-30 DIAGNOSIS — F11.90 CHRONIC, CONTINUOUS USE OF OPIOIDS: ICD-10-CM

## 2024-09-30 RX ORDER — TRAMADOL HYDROCHLORIDE 50 MG/1
50 TABLET ORAL EVERY 6 HOURS PRN
Qty: 90 TABLET | Refills: 0 | Status: SHIPPED | OUTPATIENT
Start: 2024-09-30

## 2024-09-30 RX ORDER — TRAMADOL HYDROCHLORIDE 200 MG/1
200 TABLET, EXTENDED RELEASE ORAL EVERY 24 HOURS
Qty: 30 TABLET | Refills: 0 | Status: SHIPPED | OUTPATIENT
Start: 2024-09-30

## 2024-09-30 NOTE — TELEPHONE ENCOUNTER
Signed Prescriptions:                        Disp   Refills    traMADol (ULTRAM) 50 MG tablet             90 tab*0        Sig: Take 1 tablet (50 mg) by mouth every 6 hours as           needed for severe pain. Max 3/day. To last 30           days for chronic pain. OK to fill 09/30/24 and           start 10/01/24  Authorizing Provider: MELANIE BENSON    traMADol (ULTRAM-ER) 200 MG 24 hr tablet   30 tab*0        Sig: Take 1 tablet (200 mg) by mouth every 24 hours. To           last 30 days for chronic pain. OK to fill           09/30/24 and start 10/01/24  Authorizing Provider: MELANIE BENSON        Reviewed [place]  September 30, 2024- no concerning fills.    Melanie Benson APRN, RN CNP, FNP  [facility]  [facility] [place]

## 2024-09-30 NOTE — TELEPHONE ENCOUNTER
Received call from patient requesting refill(s) traMADol (ULTRAM) 50 MG tablet,   Last dispensed from pharmacy on 08/30/2024    traMADol (ULTRAM-ER) 200 MG 24 hr tablet  Last dispensed from pharmacy on 08/30/2024    Patient's last office/virtual visit by prescribing provider on 08/06/2024  Next office/virtual appointment scheduled for 11/18/2024    Last urine drug screen date 05/06/2024  Current opioid agreement on file (completed within the last year) Yes Date of opioid agreement: 05/06/2024    E-prescribe to      [facility] [address] [place] [address]    Will route to nursing [place] for review and preparation of prescription(s).     Linda Stephenson MA  [facility]

## 2024-09-30 NOTE — TELEPHONE ENCOUNTER
Medication refill information reviewed.     Due date for traMADol (ULTRAM) 50 MG tablet and traMADol (ULTRAM-ER) 200 MG hr tablet is 10/01/24     Prescriptions prepped for review.     Will route to provider.

## 2024-09-30 NOTE — TELEPHONE ENCOUNTER
Refills have been requested for the following medications:         traMADol (ULTRAM) 50 MG tablet [Melanie Thomas]         traMADol (ULTRAM-ER) 200 MG 24 hr tablet [Melanie Thomas]     Preferred pharmacy: [facility] - [address]

## 2024-10-31 ENCOUNTER — MYC REFILL (OUTPATIENT)
Dept: PALLIATIVE MEDICINE | Facility: CLINIC | Age: 64
End: 2024-10-31
Payer: COMMERCIAL

## 2024-10-31 DIAGNOSIS — M50.30 DDD (DEGENERATIVE DISC DISEASE), CERVICAL: ICD-10-CM

## 2024-10-31 DIAGNOSIS — Z98.1 S/P CERVICAL SPINAL FUSION: ICD-10-CM

## 2024-10-31 DIAGNOSIS — M48.02 CERVICAL STENOSIS OF SPINAL CANAL: ICD-10-CM

## 2024-10-31 DIAGNOSIS — M47.812 CERVICAL SPONDYLOSIS WITHOUT MYELOPATHY: ICD-10-CM

## 2024-10-31 DIAGNOSIS — M79.18 MYOFASCIAL PAIN: ICD-10-CM

## 2024-10-31 DIAGNOSIS — F11.90 CHRONIC, CONTINUOUS USE OF OPIOIDS: ICD-10-CM

## 2024-10-31 RX ORDER — TRAMADOL HYDROCHLORIDE 200 MG/1
200 TABLET, EXTENDED RELEASE ORAL EVERY 24 HOURS
Qty: 30 TABLET | Refills: 0 | Status: SHIPPED | OUTPATIENT
Start: 2024-10-31

## 2024-10-31 RX ORDER — TRAMADOL HYDROCHLORIDE 50 MG/1
50 TABLET ORAL EVERY 6 HOURS PRN
Qty: 90 TABLET | Refills: 0 | Status: SHIPPED | OUTPATIENT
Start: 2024-10-31

## 2024-10-31 NOTE — TELEPHONE ENCOUNTER
Medication refill information reviewed.     Both meds last due:  OK to fill 09/30/24 and start 10/01/24,   Due date:  today for both      Prescriptions prepped for review.     Estelita RN-BSN  [facility] [place]

## 2024-10-31 NOTE — TELEPHONE ENCOUNTER
Signed Prescriptions:                        Disp   Refills    traMADol (ULTRAM) 50 MG tablet             90 tab*0        Sig: Take 1 tablet (50 mg) by mouth every 6 hours as           needed for severe pain. Max 3/day. To last 30           days for chronic pain. Fill/start 10/31/24  Authorizing Provider: MELANIE BENSON    traMADol (ULTRAM-ER) 200 MG 24 hr tablet   30 tab*0        Sig: Take 1 tablet (200 mg) by mouth every 24 hours. To           last 30 days for chronic pain. Fill/start           10/31/24  Authorizing Provider: MELANIE BENSON        Reviewed [place]  October 31, 2024- no concerning fills.    Melanie STEVENS, RN CNP, FNP  [facility]  [facility] [place]

## 2024-10-31 NOTE — TELEPHONE ENCOUNTER
Patient request for a refill(s) of traMADol (ULTRAM) 50 MG tablet   Last dispensed from pharmacy on 09/30/24    traMADol (ULTRAM-ER) 200 MG 24 hr tablet   Last dispensed from pharmacy on 9/30/24    Patient's last office/virtual visit by prescribing provider on 8/6/24  Next office/virtual appointment scheduled for 11/18/24    Last urine drug screen date 5/6/24  Current opioid agreement on file (completed within the last year) Yes Date of opioid agreement: 5/7/24    E-prescribe to [facility] [id number]     Will route to nursing [place] for review and preparation of prescription(s).

## 2024-10-31 NOTE — TELEPHONE ENCOUNTER
Refills have been requested for the following medications:         traMADol (ULTRAM) 50 MG tablet [Melanie Thomas]         traMADol (ULTRAM-ER) 200 MG 24 hr tablet [Melanie Thomas]     Preferred pharmacy: [facility] - [address]

## 2024-11-13 ASSESSMENT — PAIN SCALES - PAIN ENJOYMENT GENERAL ACTIVITY SCALE (PEG)
INTERFERED_GENERAL_ACTIVITY: 6
PEG_TOTALSCORE: 6.33
INTERFERED_ENJOYMENT_LIFE: 6
AVG_PAIN_PASTWEEK: 7

## 2024-11-18 ENCOUNTER — VIRTUAL VISIT (OUTPATIENT)
Dept: PALLIATIVE MEDICINE | Facility: CLINIC | Age: 64
End: 2024-11-18
Attending: NURSE PRACTITIONER
Payer: COMMERCIAL

## 2024-11-18 DIAGNOSIS — G43.719 INTRACTABLE CHRONIC MIGRAINE WITHOUT AURA AND WITHOUT STATUS MIGRAINOSUS: ICD-10-CM

## 2024-11-18 DIAGNOSIS — F11.90 CHRONIC, CONTINUOUS USE OF OPIOIDS: ICD-10-CM

## 2024-11-18 DIAGNOSIS — M54.2 CHRONIC NECK PAIN: ICD-10-CM

## 2024-11-18 DIAGNOSIS — M54.12 CERVICAL RADICULAR PAIN: Primary | ICD-10-CM

## 2024-11-18 DIAGNOSIS — G89.29 CHRONIC NECK PAIN: ICD-10-CM

## 2024-11-18 DIAGNOSIS — M79.18 MYOFASCIAL PAIN: ICD-10-CM

## 2024-11-18 DIAGNOSIS — M62.838 MUSCLE SPASM: ICD-10-CM

## 2024-11-18 DIAGNOSIS — M47.812 CERVICAL SPONDYLOSIS WITHOUT MYELOPATHY: ICD-10-CM

## 2024-11-18 DIAGNOSIS — Z98.1 S/P CERVICAL SPINAL FUSION: ICD-10-CM

## 2024-11-18 DIAGNOSIS — M50.30 DDD (DEGENERATIVE DISC DISEASE), CERVICAL: ICD-10-CM

## 2024-11-18 DIAGNOSIS — M48.02 CERVICAL STENOSIS OF SPINAL CANAL: ICD-10-CM

## 2024-11-18 RX ORDER — GABAPENTIN 600 MG/1
1200 TABLET ORAL 3 TIMES DAILY
Qty: 540 TABLET | Refills: 1 | Status: SHIPPED | OUTPATIENT
Start: 2024-11-18

## 2024-11-18 RX ORDER — TRAMADOL HYDROCHLORIDE 50 MG/1
50 TABLET ORAL EVERY 6 HOURS PRN
Qty: 90 TABLET | Refills: 0 | Status: SHIPPED | OUTPATIENT
Start: 2024-11-18

## 2024-11-18 RX ORDER — TRAMADOL HYDROCHLORIDE 200 MG/1
200 TABLET, EXTENDED RELEASE ORAL EVERY 24 HOURS
Qty: 30 TABLET | Refills: 0 | Status: SHIPPED | OUTPATIENT
Start: 2024-11-18

## 2024-11-18 ASSESSMENT — PAIN SCALES - GENERAL: PAINLEVEL_OUTOF10: SEVERE PAIN (7)

## 2024-11-18 NOTE — PROGRESS NOTES
Palmer is a 64 year old who is being evaluated via a billable video visit.    How would you like to obtain your AVS? [employer]  If the video visit is dropped, the invitation should be resent by: [facility] waiting room   Will anyone else be joining your video visit? No  Is Pt currently in [place]? Yes        5/6/2024     3:55 PM 8/6/2024     1:23 PM 11/18/2024     3:54 PM   PEG Score   PEG Total Score 4.33 5 5.33          Video-Visit Details    Type of service:  Video Visit   Video start: 1613  Video end: 1635    Originating Location (pt. 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 More stiffness when he rises in the morning  -still gets headaches but about the same, near daily occurance        Interval history:  Truman Suero is a 64 year old male is known to me for   Chronic neck pain  Cervical DDD  Cervical spondylosis (pain worse with extension/rotation indicating facetogenic component to pain)  Axial low back pain  Myofascial pain/muscle spasms  Remote history of ETOH overuse, attended AA for awhile. Sober for 10 years  ---PMHx includes: neck pain  ---PSHx includes: none listed      Recommendations/plan at the last visit on 8/6/2024 included:  Physical Therapy: not at present  Clinical Health Psychologist: none  Diagnostic Studies: none  Medication Management:   Continue tramadol ER 200mg once daily fill 8/30 and begin 9/1/2024  Continue  tramadol 50mg Q 6-8 hours PRN, max of 3/day. Fill 08/28 and begin 8/30  Continue gabapentin 900 mg TID  continueTopamax, take 150mg (3 of the 50mg tabs) at bedtime. Reduce to 100mg if you do not feel well.  Continue methocarbamol 500-1000 mg TID PRN muscle spasms  Continue cyclobenzaprine at bedtime  Continue atogepant for migraine prevention  Further procedures recommended: none  Recommendations to PCP: see above  Follow up: 12 weeks in-person or video visit, your choice. Please call [phone] to make your follow-up appointment with me.             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 He thinks these symptoms are worsening. No recent imaging of his neck. Previously had neck surgery with Dr Harkins.         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      Interval history May 6, 2024  -works as a para or . Likes this. More mental stress.  Headaches are about the same.   -low back pain remains axial, doing pretty well   -neck pain and right arm pain is stable.   -he has numbness in both hands present for a long time, sometimes the numbness is painful. This has not changed  -not having radicular symptoms in the legs at present.     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 --improved  -axial low back pain, radiates into the anterior thigh to the knee intermittently--pretty good  -neck pain with intermittent right arm pain--pretty good  -still gets headaches but a little bit less, has used Aimovig x 2, tolerating well but still has frequent headaches. Discussed increasing Aimovig dosing to 140mg Q 30 days. Patient interested.     Interval history November 20, 2023 accompanied by Mya burrows QRC throughout appt  -having low back pain that radiates into the right buttock and down the right leg past the knee level  -Left forearm pain with overuse, tennis elbow symptoms.   Left 2nd digit knuckle pain  axial low back pain, radiates into the anterior thigh to the knee intermittently--pretty good  neck  "pain with intermittent right arm pain--pretty good  -having more headaches from time to time, pain behind the eyes, almost daily, last 3-4 hours.   -tramadol ER and IR are helpful.   -gabapentin helpful, will have him restart the Topamax as this may offer some improved pain relief.            At this point, the patient's participation with our multidisciplinary team includes:  The patient has been compliant with the program.  PT - attended non-pain management PHYSICAL THERAPY   Health Psych - has seen Kentrell Castañeda x4 in the remote past  Acupuncture: worked with Dr. Bereket LAY      Pain scores:    Pain intensity on average is 5.5 on a scale of 0-10.    Range is 4-7/10.   Pain right now is 7/10.  Pain is described as \"burning, shooting, throbbing, stabbing, miserable, numb, tiring, exhausting, penetrating, nagging, unbearable.\"  Pain is constant.       Current pain relevant medications:      -Tramadol 200mg ER in the evening (helpful)  -Tramadol 50mg take 1 tablet  Q 6 hours PRN (using 2-3 tabs per day, helpful)  -ibuprofen 600mg PRN (helpful)  -gabapentin 900mg TID (somewhat helpful)  -methocarbamol 500-1000mg TID PRN muscle spasms (takes 1000 mg BID, somewhat helpful)  -Topamax 50 mg BID (using PRN, advised to use regularly to prevent headaches)    Other pertinent medications:  None    Previous Medications:  OPIATES: Tramdol (somewhat helpful)  NSAIDS: ibuprofen (helpful), Aleve (helpful)  MUSCLE RELAXANTS: none  ANTI-MIGRAINE MEDS: none  ANTI-DEPRESSANTS: none  SLEEP AIDS: none  ANTI-CONVULSANTS: gabapentin (helpful)  TOPICALS: lidocaine ointment (somewhat helpful)  Other meds: Tylenol (not helpful)        Other treatments have included:  Truman Suero has not been seen at a pain clinic in the past.    PT: tried, somewhat helpful  Chiropractic: helpful  Acupuncture: none  TENs Unit: none       Injections:    cervical radiofrequency nerve ablation at [facility] in December 2016 (did get good relief, got " 70% relief of his typical neck pain)  -6/29/2017 Cervical facet joint steroid injections at C4-5 and C5-6 on the right with Dr. Nicole Harding (not helpful)  -3/8/2018 C7-T1 interlaminar DEMARCUS with Dr. Hugo Corrigan (not helpful)  -5/23/2018 right L4-5 transforaminal epidural steroid injection with Dr. Stevens (not helpful for back pain but did help leg pain)  -8/7/2018 bilateral SI joint injection with Dr Hugo Corrigan (helpful for one month)  -10/11/2018 l3-4 and L4-5 facet joint injections bilaterally with Dr. Hugo Corrigan (this has been helpful, more helpful than any other lumbar injections thus far)  -1/28/2019 bilateral L3-5 medial branch blocks #1 with Dr. Stevens (somewhat helpful)   -2/25/2019 LMBB #2 with Dr. Stevens (somewhat effective, but not enough to proceed to RFA)  -5/6/2019 bilateral L4/L5 and L5/S1 facet joint injections with Dr. Stevens (helpful)  -11/29/2019 C7-T1 interlaminar epidural steroid injection with Dr. Corrigan (helpful temporarily)  -10/30/2020 ISMAEL with Dr. Hugo Corrigan (temporarily helpful)  -5/17/2022 L3-4 interlaminar DEMARCSU with Dr. Hugo Corrigan (2-3 months of about 80% relief)  -1/4/2024 right piriformis injection with Dr. Hugo Corrigan (right leg numbness for several hours after injection, helpful for pain)    Surgeries:  10/29/2018 right anterior cervical C5-6 discectomy and fusion by Dr. Jud Harkins (somewhat helpful)      THE 4 A's OF OPIOID MAINTENANCE ANALGESIA    Analgesia: long acting Tramadol with some short acting tramadol does give some relief    Activity: ADLs, working at the [employer] as a para/  at present time.     Adverse effects: none    Adherence to Rx protocol: yes      Side Effects: None  Patient is using the medication as prescribed: Yes    Medications:  Current Outpatient Medications   Medication Sig Dispense Refill    Atogepant 30 MG TABS Take 30 mg by mouth daily 3 month supply 90 tablet 3    atorvastatin  (LIPITOR) 20 MG tablet Take 1 tablet (20 mg) by mouth daily. Needs appointment and labs 90 tablet 2    cyclobenzaprine (FLEXERIL) 5 MG tablet Take 1-2 tablets (5-10 mg) by mouth nightly as needed for muscle spasms Need 6 hours between this and methocarbamol. 3 month supply 135 tablet 3    gabapentin (NEURONTIN) 600 MG tablet Take 1.5 tablets (900 mg) by mouth 3 times daily 3  month supply 405 tablet 3    methocarbamol (ROBAXIN) 500 MG tablet Take 1 tablet (500 mg) by mouth 3 times daily as needed for muscle spasms Should be 6 hours between this and cyclobenzaprine at night 90 tablet 11    metoprolol succinate ER (TOPROL XL) 25 MG 24 hr tablet Take 1 tablet (25 mg) by mouth daily. 90 tablet 2    order for DME BP cuff, brand as covered by insurance.  Dx: HTN 1 each 0    topiramate (TOPAMAX) 50 MG tablet Take 3 tablets (150 mg) by mouth at bedtime 3 month script. 270 tablet 1    traMADol (ULTRAM) 50 MG tablet Take 1 tablet (50 mg) by mouth every 6 hours as needed for severe pain. Max 3/day. To last 30 days for chronic pain. Fill/start 10/31/24 90 tablet 0    traMADol (ULTRAM-ER) 200 MG 24 hr tablet Take 1 tablet (200 mg) by mouth every 24 hours. To last 30 days for chronic pain. Fill/start 10/31/24 30 tablet 0       Medical History: any changes in medical history since they were last seen? none    Social History:   Home situation: , has 4 kids, 2 at home, one in college and one launched.   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 He used to work a sobriety program, used to go to   Drug use: none  History of chemical dependency treatment: no formal treatment, did AA    Is patient a current smoker or tobacco user?  QUIT on 3/20/2018  If yes, was cessation counseling offered?  no            Physical  Exam:     Vital signs: There were no vitals taken for this visit.     Behavioral observations:  Awake, alert. Cooperative.   Pulm: respirations easy and unlabored. Able to speak in full sentences without SOB or cough noted.          IMAGING:    MR CERVICAL SPINE WITHOUT CONTRAST 9/5/2017 2:32 PM      HISTORY: Neck pain, worsening numbness in arms and lower extremities.  Radiculopathy, cervical region.     TECHNIQUE: Multiplanar multisequence images were obtained through the  cervical spine without contrast.     COMPARISON: 2/22/2017.     FINDINGS: Sagittal images demonstrate some minimal gentle cervical  kyphosis, otherwise normal posterior alignment. There is no evidence  for craniovertebral or cervicomedullary junction abnormality. The  cervical cord is minimally indented anteriorly at C4-C5 and C5-C6,  otherwise is normal in morphology and signal characteristics. Disc  space narrowing and discogenic marrow changes are present C3-C4,  C4-C5, C5-C6 and C6-C7.     C2-C3: Minimal facet hypertrophy. No stenosis.     C3-C4: Broad-based posterior osteophyte formation is present causing  some mild bilateral neural foraminal stenosis, but no central canal  stenosis.     C4-C5: Broad-based posterior osteophyte formation and mild facet  hypertrophy is present causing some mild to moderate central canal  stenosis, mild cord deformity, and mild-to-moderate bilateral neural  foraminal stenosis.     C5-C6: Broad-based posterior osteophyte formation and disc bulging is  present along with some facet hypertrophy. There is moderate central  canal stenosis and moderate bilateral neural foraminal stenosis.  Findings have progressed since the prior exam.     C6-C7: Broad-based disc bulging is present along with some minimal  posterior osteophyte formation. There is borderline to mild central  canal stenosis, but no neural foraminal stenosis.     C7-T1: Moderate facet hypertrophy. No significant stenosis.         IMPRESSION: Moderate  multilevel degenerative disc and facet disease  with some progression at C5-C6 where there is moderate central canal  and bilateral neural foraminal stenosis along with cord deformity.          MR CERVICAL SPINE WITHOUT CONTRAST  2/22/2017 9:59 AM     HISTORY:  Bilateral neck and arm pain for three years.     COMPARISON: None.     TECHNIQUE: Routine MR cervical spine extended through T2.     FINDINGS: There is some degenerative bone marrow signal change C3-C6.  No malignant or destructive lesions. Normal alignment through T2.  Reversed cervical adenosis C3-C6.     The cervical and upper thoracic spinal cord appear intrinsically  normal. The craniocervical junction region is normal. No paraspinous  soft tissue abnormality.     Findings by level as follows:     C2-C3: Negative. No disc protrusion. No central or lateral stenosis.     C3-C4: Mild disc space narrowing. Mild diffuse annular bulge. Small  uncinate spur on the right. No significant central or left-sided  stenosis. Mild right-sided foraminal stenosis.     C4-C5: Moderate degenerative narrowing of the interspace. Mild ventral  disc osteophyte complex with minimal central stenosis. Small uncinate  spurs bilaterally with mild bilateral foraminal stenosis.     C6-C7: Moderate disc space narrowing. Mild broad-based disc osteophyte  complex with minimal central stenosis. Minimal uncinate spurs with  mild bilateral foraminal stenosis.     C6-C7: Moderate disc space narrowing. Mild ventral disc osteophyte  complex. No significant central or lateral stenosis.     C7-T1: No disc protrusion. No central or lateral stenosis. Moderate  bilateral facet joint disease.         IMPRESSION:  1. Degenerative changes as described C3-C4 through C7-T1. There is  only mild central and foraminal stenosis as described.  2. No intrinsic spinal cord abnormality through T2        EXAM: MR LUMBAR SPINE W/O CONTRAST  LOCATION: [facility]  DATE/TIME: 10/25/2021 7:51  PM     INDICATION: Lumbar radiculopathy, no red flags.  COMPARISON: None.  TECHNIQUE: Routine Lumbar Spine MRI without IV contrast.     FINDINGS:   Nomenclature is based on 5 lumbar type vertebral bodies. Normal vertebral body heights, alignment and marrow signal. Normal distal spinal cord and cauda equina with conus medullaris at L1-L2. No extraspinal abnormality. Unremarkable visualized bony   pelvis.     T12-L1: Normal disc height and signal. No herniation. Mild facet arthropathy bilaterally. No spinal canal or neural foraminal stenosis.      L1-L2: There is loss of disc height, disc desiccation and mild circumferential disc bulging. No herniation. Normal facets. No spinal canal or neural foraminal stenosis.     L2-L3: There is loss of disc height, disc desiccation and mild circumferential disc bulging. No herniation. Mild facet arthropathy bilaterally. No spinal canal stenosis. Mild bilateral neural foraminal narrowing. No change from the comparison study.     L3-L4: There is loss of disc height, disc desiccation and mild circumferential disc bulging with a superimposed tiny left paracentral disc herniation (extrusion). Moderate facet arthropathy bilaterally. No spinal canal stenosis. Moderate left foraminal   stenosis. Mild right foraminal narrowing. No change from the comparison study.     L4-L5: There is loss of disc height, disc desiccation and mild circumferential disc bulging. No herniation. Moderate facet arthropathy bilaterally. No spinal canal stenosis. Moderate right foraminal stenosis. Mild left foraminal narrowing. No change from   the comparison study.     L5-S1: Normal disc height and signal. No herniation. Moderate facet arthropathy bilaterally. No spinal canal or neural foraminal stenosis. No change from the comparison study.                                                                      IMPRESSION:  1.  Diffuse degenerative change of the lumbar spine as detailed above without appreciable  change from the comparison study.  2.  No significant spinal canal stenosis at any level of the lumbar spine.  3.  Moderate neural foraminal stenosis on the left at L3-L4 and on the right at L4-L5.        [place] Prescription Monitoring Program:  Reviewed [facility] 11/18/2024- no concerning fills.  Melanie STEVENS, RN CNP, FNP  [facility]  [facility]          DIRE Score for selecting candidates for long term opioid analgesia for chronic pain:  Diagnosis  2  Intractablility  2  Risk    Psychological health  2    Chemical health  2    Reliability  2    Social support  3  Efficacy  2    Total DIRE Score = 15. Note that   7-13 predicts poor outcome (compliance and efficacy) from opioid prescribing; 14-21 predicts good outcome (compliance and efficacy)  from opioid prescribing.      Assessment:   Cervical radicular pain  Cervical spondylosis without myelopathy  Cervical degenerative disc disease  Status post cervical spinal fusion  Cervical stenosis of spinal canal  Chronic neck pain  Intractable chronic migraine without aura and without status migrainosus  Myofascial pain  Muscle spasm  Chronic continuous use of opioids    Encounter for long-term use of opiate analgesic  Essential hypertension  Urine drug screen last done 5/6/2024  Controlled substance agreement signed 5/6/2024  Remote history of ETOH overuse, attended AA for awhile. Sober for >10 years  PMHx includes: neck pain  PSHx includes: none listed       Plan:   Physical Therapy: ordered PHYSICAL THERAPY  Clinical Health Psychologist: none  Diagnostic Studies: cervical MRI, call to schedule this test  [facility] Scheduling:  [facility] [place]: [phone]  [facility]: [phone]  [facility] (including [place]): [phone]  Medication Management:   Continue tramadol ER 200mg once daily fill 11/27 and begin 11/30/2024  Continue tramadol 50mg Q 6-8 hours PRN, max of 3/day. Fill 11/27 and begin  11/30  Continue gabapentin 600mg tabs,  take 2 tabs three times daily for pain  continueTopamax, take 150mg (3 of the 50mg tabs) at bedtime. Reduce to 100mg if you do not feel well.  Continue methocarbamol 500-1000 mg TID PRN muscle spasms  Continue cyclobenzaprine at bedtime  Increase atogepant to 60mg/day for migraine prevention  Further procedures recommended: none  Recommendations to PCP: see above  Follow up: 12 weeks in-person or video visit, your choice. Please call [phone] to make your follow-up appointment with me.       ASSESSMENT AND PLAN:  (M54.12) Cervical radicular pain  (primary encounter diagnosis)  Comment:   Plan: MR Cervical Spine w/o & w Contrast, Physical         Therapy  Referral, Adult Pain Clinic         Follow-Up Order            (M47.812) Cervical spondylosis without myelopathy  Comment:   Plan: gabapentin (NEURONTIN) 600 MG tablet, traMADol         (ULTRAM) 50 MG tablet, traMADol (ULTRAM-ER) 200        MG 24 hr tablet, MR Cervical Spine w/o & w         Contrast, Physical Therapy  Referral,         Adult Pain Clinic Follow-Up Order            (M50.30) DDD (degenerative disc disease), cervical  Comment:   Plan: gabapentin (NEURONTIN) 600 MG tablet, traMADol         (ULTRAM) 50 MG tablet, traMADol (ULTRAM-ER) 200        MG 24 hr tablet, MR Cervical Spine w/o & w         Contrast, Physical Therapy  Referral,         Adult Pain Clinic Follow-Up Order            (Z98.1) S/P cervical spinal fusion  Comment:   Plan: gabapentin (NEURONTIN) 600 MG tablet, traMADol         (ULTRAM) 50 MG tablet, traMADol (ULTRAM-ER) 200        MG 24 hr tablet, MR Cervical Spine w/o & w         Contrast, Physical Therapy  Referral,         Adult Pain Clinic Follow-Up Order            (M48.02) Cervical stenosis of spinal canal  Comment:   Plan: gabapentin (NEURONTIN) 600 MG tablet, traMADol         (ULTRAM) 50 MG tablet, traMADol (ULTRAM-ER) 200        MG 24 hr tablet, MR  Cervical Spine w/o & w         Contrast, Physical Therapy  Referral,         Adult Pain Clinic Follow-Up Order            (M54.2,  G89.29) Chronic neck pain  Comment:   Plan: MR Cervical Spine w/o & w Contrast, Physical         Therapy  Referral, Adult Pain Clinic         Follow-Up Order            (G43.719) Intractable chronic migraine without aura and without status migrainosus  Comment:   Plan: Atogepant 60 MG TABS, Physical Therapy          Referral, Adult Pain Clinic Follow-Up        Order            (M79.18) Myofascial pain  Comment:   Plan: traMADol (ULTRAM) 50 MG tablet, Physical         Therapy  Referral, Adult Pain Clinic         Follow-Up Order            (M62.838) Muscle spasm  Comment:   Plan: Physical Therapy  Referral, Adult Pain        Clinic Follow-Up Order            (F11.90) Chronic, continuous use of opioids  Comment:   Plan: traMADol (ULTRAM) 50 MG tablet, traMADol         (ULTRAM-ER) 200 MG 24 hr tablet, Adult Pain         Clinic Follow-Up Order              BILLING TIME DOCUMENTATION:   TOTAL TIME includes:   Time spent preparing to see the patient: 3 minutes (reviewing records and tests)  Time spend face to face with the patient: 22 minutes  Time spent ordering tests, medications, procedures and referrals: 0 minutes  Time spent Referring and communicating with other healthcare professionals: 0 minutes  Documenting clinical information in Epic: 7 minutes    The total TIME spent on this patient on the day of the appointment was 32 minutes.                   Melanie STEVENS, RN CNP, FNP  [facility]  [facility]

## 2024-11-18 NOTE — PATIENT INSTRUCTIONS
Plan:   Physical Therapy: ordered PHYSICAL THERAPY  Clinical Health Psychologist: none  Diagnostic Studies: cervical MRI, call to schedule this test  [facility] Scheduling:  Jac Malhotra [place]: [phone]  Harlan Vera: [phone]  [facility] (including Breana Posey): [phone]  Medication Management:   Continue tramadol ER 200mg once daily fill 11/27 and begin 11/30/2024  Continue tramadol 50mg Q 6-8 hours PRN, max of 3/day. Fill 11/27 and begin 11/30  Continue gabapentin 600mg tabs,  take 2 tabs three times daily for pain  continueTopamax, take 150mg (3 of the 50mg tabs) at bedtime. Reduce to 100mg if you do not feel well.  Continue methocarbamol 500-1000 mg TID PRN muscle spasms  Continue cyclobenzaprine at bedtime  Increase atogepant to 60mg/day for migraine prevention  Further procedures recommended: none  Recommendations to PCP: see above  Follow up: 12 weeks in-person or video visit, your choice. Please call [phone] to make your follow-up appointment with me.     ----------------------------------------------------------------  Clinic Number:  [phone]   Call with any questions about your care and for scheduling assistance.   Calls are returned Monday through Friday between 8 AM and 4:30 PM. We usually get back to you within 2 business days depending on the issue/request.    If we are prescribing your medications:  For opioid medication refills, call the clinic or send a FanBread message 7 days in advance.  Please include:  Name of requested medication  Name of the pharmacy.  For non-opioid medications, call your pharmacy directly to request a refill. Please allow 3-4 days to be processed.   Per [place] State Law:  All controlled substance prescriptions must be filled within 30 days of being written.    For those controlled substances allowing refills, pickup must occur within 30 days of last fill.      We believe regular attendance is key to your success in our program!    Any  time you are unable to keep your appointment we ask that you call us at least 24 hours in advance to cancel.This will allow us to offer the appointment time to another patient.   Multiple missed appointments may lead to dismissal from the clinic.

## 2024-11-27 ENCOUNTER — MYC REFILL (OUTPATIENT)
Dept: FAMILY MEDICINE | Facility: CLINIC | Age: 64
End: 2024-11-27
Payer: COMMERCIAL

## 2024-11-27 DIAGNOSIS — I77.810 ASCENDING AORTA DILATATION (H): ICD-10-CM

## 2024-11-27 DIAGNOSIS — I25.10 MILD CAD: ICD-10-CM

## 2024-11-27 RX ORDER — METOPROLOL SUCCINATE 25 MG/1
25 TABLET, EXTENDED RELEASE ORAL DAILY
Qty: 90 TABLET | Refills: 2 | Status: SHIPPED | OUTPATIENT
Start: 2024-11-27

## 2024-12-16 ENCOUNTER — ANCILLARY PROCEDURE (OUTPATIENT)
Dept: MRI IMAGING | Facility: CLINIC | Age: 64
End: 2024-12-16
Attending: NURSE PRACTITIONER
Payer: COMMERCIAL

## 2024-12-16 DIAGNOSIS — G89.29 CHRONIC NECK PAIN: ICD-10-CM

## 2024-12-16 DIAGNOSIS — M48.02 CERVICAL STENOSIS OF SPINAL CANAL: ICD-10-CM

## 2024-12-16 DIAGNOSIS — M47.812 CERVICAL SPONDYLOSIS WITHOUT MYELOPATHY: ICD-10-CM

## 2024-12-16 DIAGNOSIS — M54.2 CHRONIC NECK PAIN: ICD-10-CM

## 2024-12-16 DIAGNOSIS — Z98.1 S/P CERVICAL SPINAL FUSION: ICD-10-CM

## 2024-12-16 DIAGNOSIS — M50.30 DDD (DEGENERATIVE DISC DISEASE), CERVICAL: ICD-10-CM

## 2024-12-16 DIAGNOSIS — M54.12 CERVICAL RADICULAR PAIN: ICD-10-CM

## 2024-12-16 PROCEDURE — 72141 MRI NECK SPINE W/O DYE: CPT | Mod: TC | Performed by: RADIOLOGY

## 2024-12-26 ENCOUNTER — THERAPY VISIT (OUTPATIENT)
Dept: PHYSICAL THERAPY | Facility: CLINIC | Age: 64
End: 2024-12-26
Attending: NURSE PRACTITIONER
Payer: COMMERCIAL

## 2024-12-26 DIAGNOSIS — M47.812 CERVICAL SPONDYLOSIS WITHOUT MYELOPATHY: ICD-10-CM

## 2024-12-26 DIAGNOSIS — M62.838 MUSCLE SPASM: ICD-10-CM

## 2024-12-26 DIAGNOSIS — M48.02 CERVICAL STENOSIS OF SPINAL CANAL: ICD-10-CM

## 2024-12-26 DIAGNOSIS — M54.12 CERVICAL RADICULAR PAIN: ICD-10-CM

## 2024-12-26 DIAGNOSIS — M54.2 CHRONIC NECK PAIN: ICD-10-CM

## 2024-12-26 DIAGNOSIS — Z98.1 S/P CERVICAL SPINAL FUSION: ICD-10-CM

## 2024-12-26 DIAGNOSIS — M50.30 DDD (DEGENERATIVE DISC DISEASE), CERVICAL: ICD-10-CM

## 2024-12-26 DIAGNOSIS — G43.719 INTRACTABLE CHRONIC MIGRAINE WITHOUT AURA AND WITHOUT STATUS MIGRAINOSUS: ICD-10-CM

## 2024-12-26 DIAGNOSIS — G89.29 CHRONIC NECK PAIN: ICD-10-CM

## 2024-12-26 DIAGNOSIS — M79.18 MYOFASCIAL PAIN: ICD-10-CM

## 2024-12-26 NOTE — PROGRESS NOTES
PHYSICAL THERAPY EVALUATION  Type of Visit: Evaluation              Subjective   Pt reports worsening of his neck pain, UE sxs-N/T, weakness. He wants to try PT to avoid injections.        Presenting condition or subjective complaint: (Patient-Rptd) constant neck,lower back and leg stiffness and weakness  Date of onset: 24    Relevant medical history: (Patient-Rptd) Smoking   Dates & types of surgery: (Patient-Rptd) 2018 neck fusion    Prior diagnostic imaging/testing results: (Patient-Rptd) MRI; X-ray; Other (Patient-Rptd) steroid injections and neck surgery   Prior therapy history for the same diagnosis, illness or injury: (Patient-Rptd) Yes (Patient-Rptd) approx. 2020?-    Prior Level of Function  Transfers: Independent  Ambulation: Independent  ADL: Independent  IADL: Driving, Finances, Housekeeping, Laundry, Meal preparation, Medication management, Work    Living Environment  Social support: (Patient-Rptd) With a significant other or spouse   Type of home: (Patient-Rptd) House   Stairs to enter the home: (Patient-Rptd) Yes (Patient-Rptd) 4 Is there a railing: (Patient-Rptd) Yes     Ramp: (Patient-Rptd) No   Stairs inside the home: (Patient-Rptd) Yes (Patient-Rptd) 10 Is there a railing: (Patient-Rptd) Yes     Help at home: (Patient-Rptd) None  Equipment owned:       Employment: (Patient-Rptd) Yes (Patient-Rptd) EA-    Hobbies/Interests: (Patient-Rptd) sports,cooking,pets,history    Patient goals for therapy: (Patient-Rptd) walk, sit and stand without soreness    Pain assessment: Location: Neck, arms/shoulders /Ratin/10      Objective   CERVICAL SPINE EVALUATION  PAIN: Pain is Exacerbated By: sitting, computer, turning/lifting   INTEGUMENTARY (edema, incisions):   POSTURE: WFL   GAIT:   Weightbearing Status:   Assistive Device(s):   Gait Deviations:   BALANCE/PROPRIOCEPTION:   WEIGHTBEARING ALIGNMENT:   ROM:   CS mod loss  retxn, min loss CS rotation Rt>left,  min loss extension    MYOTOMES: WFL   DTR S:   CORD SIGNS:   DERMATOMES:   NEURAL TENSION: neg   FLEXIBILITY:    SPECIAL TESTS: +Conrado bilat, neg spurlings bilat   PALPATION: ++TTP subscaps, pects, cyndi, traps, SO   SPINAL SEGMENTAL CONCLUSIONS:       Assessment & Plan   CLINICAL IMPRESSIONS  Medical Diagnosis: Cerv Radic    Treatment Diagnosis: cerv radic   Impression/Assessment: Patient is a 64 year old male with neck, UE, hand  complaints.  The following significant findings have been identified: Pain, Decreased ROM/flexibility, Decreased joint mobility, Impaired muscle performance, Decreased activity tolerance, and Impaired posture. These impairments interfere with their ability to perform self care tasks, work tasks, recreational activities, household chores, and driving  as compared to previous level of function.   Pt presents w/ bilat cervical pain, LOM and numbness/tingling in both hands of an infrequent nature. He will benefit from a course of PT to increase CS ROM, improve posture, UE ROM and decrease UE sx.     Clinical Decision Making (Complexity):  Clinical Presentation: Stable/Uncomplicated  Clinical Presentation Rationale: based on medical and personal factors listed in PT evaluation  Clinical Decision Making (Complexity): Low complexity    PLAN OF CARE  Treatment Interventions:  Modalities: Mechanical Traction  Interventions: Manual Therapy, Neuromuscular Re-education, Therapeutic Exercise, Self-Care/Home Management    Long Term Goals     PT Goal 1  Goal Identifier: computer/phone  Goal Description: 2 hrs w/o UE sxs  Rationale: to maximize safety and independence with performance of ADLs and functional tasks;to maximize safety and independence within the home;to maximize safety and independence with self cares  Target Date: 03/20/25      Frequency of Treatment: q 2 weeks  Duration of Treatment: 12 weeks    Recommended Referrals to Other Professionals: Physical Therapy  Education Assessment:        Risks and benefits of  evaluation/treatment have been explained.   Patient/Family/caregiver agrees with Plan of Care.     Evaluation Time:     PT Eval, Low Complexity Minutes (13075): 16       Signing Clinician: TANISHA Nayak [facility]                                                                                   OUTPATIENT PHYSICAL THERAPY    PLAN OF TREATMENT FOR OUTPATIENT REHABILITATION   Patient's Last Name, First Name, Truman Spain YOB: 1960   Provider's Name   [facility]   Medical Record No.  [id number]     Onset Date: 11/18/24  Start of Care Date: 12/26/24     Medical Diagnosis:  Cerv Radic      PT Treatment Diagnosis:  cerv radic Plan of Treatment  Frequency/Duration: q 2 weeks/ 12 weeks    Certification date from 12/26/24 to 03/20/25         See note for plan of treatment details and functional goals     Wesley Catherine, PT                         I CERTIFY THE NEED FOR THESE SERVICES FURNISHED UNDER        THIS PLAN OF TREATMENT AND WHILE UNDER MY CARE     (Physician attestation of this document indicates review and certification of the therapy plan).              Referring Provider:  Melanie Thomas    Initial Assessment  See Epic Evaluation- Start of Care Date: 12/26/24

## 2024-12-27 ENCOUNTER — MYC REFILL (OUTPATIENT)
Dept: PALLIATIVE MEDICINE | Facility: CLINIC | Age: 64
End: 2024-12-27
Payer: COMMERCIAL

## 2024-12-27 DIAGNOSIS — M50.30 DDD (DEGENERATIVE DISC DISEASE), CERVICAL: ICD-10-CM

## 2024-12-27 DIAGNOSIS — F11.90 CHRONIC, CONTINUOUS USE OF OPIOIDS: ICD-10-CM

## 2024-12-27 DIAGNOSIS — Z98.1 S/P CERVICAL SPINAL FUSION: ICD-10-CM

## 2024-12-27 DIAGNOSIS — M47.812 CERVICAL SPONDYLOSIS WITHOUT MYELOPATHY: ICD-10-CM

## 2024-12-27 DIAGNOSIS — M48.02 CERVICAL STENOSIS OF SPINAL CANAL: ICD-10-CM

## 2024-12-27 DIAGNOSIS — M79.18 MYOFASCIAL PAIN: ICD-10-CM

## 2024-12-27 NOTE — TELEPHONE ENCOUNTER
"Patient request for a refill(s) of     Last dispensed from pharmacy on ***    Patient's last office/virtual visit by prescribing provider on ***  Next office/virtual appointment scheduled for ***    Last urine drug screen date ***  Current opioid agreement on file (completed within the last year) {YES / NO:[id number]::\"Yes\"} Date of opioid agreement: ***    E-prescribe to *** pharmacy    Will route to nursing pool for review and preparation of prescription(s).      "

## 2024-12-27 NOTE — TELEPHONE ENCOUNTER
Medication refill information reviewed.     Due date for traMADol (ULTRAM) 50 MG tablet   traMADol (ULTRAM-ER) 200 MG 24 hr tablet  is 12/30/2024 for both     Prescriptions prepped for review.     Will route to provider.

## 2024-12-27 NOTE — TELEPHONE ENCOUNTER
Patient request for a refill(s) of traMADol (ULTRAM) 50 MG tablet   Last dispensed from pharmacy on 11/27/24    traMADol (ULTRAM-ER) 200 MG 24 hr tablet   Last dispensed from pharmacy on 11/27/24    Patient's last office/virtual visit by prescribing provider on 11/18/24  Next office/virtual appointment scheduled for 02/24/25    Last urine drug screen date 05/06/24  Current opioid agreement on file (completed within the last year) Yes Date of opioid agreement: 05/07/24    E-prescribe to   [id number]  NICOLE Cintron   Will route to nursing [place] for review and preparation of prescription(s).

## 2024-12-28 RX ORDER — TRAMADOL HYDROCHLORIDE 200 MG/1
200 TABLET, EXTENDED RELEASE ORAL EVERY 24 HOURS
Qty: 30 TABLET | Refills: 0 | Status: SHIPPED | OUTPATIENT
Start: 2024-12-28

## 2024-12-28 RX ORDER — TRAMADOL HYDROCHLORIDE 50 MG/1
50 TABLET ORAL EVERY 6 HOURS PRN
Qty: 90 TABLET | Refills: 0 | Status: SHIPPED | OUTPATIENT
Start: 2024-12-28

## 2024-12-28 NOTE — TELEPHONE ENCOUNTER
Signed Prescriptions:                        Disp   Refills    traMADol (ULTRAM) 50 MG tablet             90 tab*0        Sig: Take 1 tablet (50 mg) by mouth every 6 hours as           needed for severe pain. Max 3/day. To last 30           days for chronic pain. Fill 12/28/2024 start           12/30/24  Authorizing Provider: MELANIE BENSON    traMADol (ULTRAM-ER) 200 MG 24 hr tablet   30 tab*0        Sig: Take 1 tablet (200 mg) by mouth every 24 hours. To           last 30 days for chronic pain. Michael 12/28/2024            and start 12/30/24  Authorizing Provider: MELANIE BENSON        Reviewed [place]  December 28, 2024- no concerning fills.    Melanie Benson APRN, RN CNP, FNP  [facility]  [facility]

## 2025-01-17 SDOH — HEALTH STABILITY: PHYSICAL HEALTH: ON AVERAGE, HOW MANY DAYS PER WEEK DO YOU ENGAGE IN MODERATE TO STRENUOUS EXERCISE (LIKE A BRISK WALK)?: 3 DAYS

## 2025-01-17 SDOH — HEALTH STABILITY: PHYSICAL HEALTH: ON AVERAGE, HOW MANY MINUTES DO YOU ENGAGE IN EXERCISE AT THIS LEVEL?: 10 MIN

## 2025-01-17 ASSESSMENT — SOCIAL DETERMINANTS OF HEALTH (SDOH): HOW OFTEN DO YOU GET TOGETHER WITH FRIENDS OR RELATIVES?: ONCE A WEEK

## 2025-01-21 ASSESSMENT — PATIENT HEALTH QUESTIONNAIRE - PHQ9
SUM OF ALL RESPONSES TO PHQ QUESTIONS 1-9: 6
SUM OF ALL RESPONSES TO PHQ QUESTIONS 1-9: 6
10. IF YOU CHECKED OFF ANY PROBLEMS, HOW DIFFICULT HAVE THESE PROBLEMS MADE IT FOR YOU TO DO YOUR WORK, TAKE CARE OF THINGS AT HOME, OR GET ALONG WITH OTHER PEOPLE: NOT DIFFICULT AT ALL

## 2025-01-22 ENCOUNTER — OFFICE VISIT (OUTPATIENT)
Dept: FAMILY MEDICINE | Facility: CLINIC | Age: 65
End: 2025-01-22
Payer: COMMERCIAL

## 2025-01-22 VITALS
DIASTOLIC BLOOD PRESSURE: 72 MMHG | WEIGHT: 164 LBS | OXYGEN SATURATION: 100 % | RESPIRATION RATE: 16 BRPM | HEIGHT: 66 IN | SYSTOLIC BLOOD PRESSURE: 120 MMHG | TEMPERATURE: 98 F | HEART RATE: 70 BPM | BODY MASS INDEX: 26.36 KG/M2

## 2025-01-22 DIAGNOSIS — M50.30 DDD (DEGENERATIVE DISC DISEASE), CERVICAL: ICD-10-CM

## 2025-01-22 DIAGNOSIS — Z87.891 HISTORY OF SMOKING 25-50 PACK YEARS: ICD-10-CM

## 2025-01-22 DIAGNOSIS — Z13.1 SCREENING FOR DIABETES MELLITUS: ICD-10-CM

## 2025-01-22 DIAGNOSIS — I25.10 MILD CAD: ICD-10-CM

## 2025-01-22 DIAGNOSIS — I77.810 ASCENDING AORTA DILATATION: ICD-10-CM

## 2025-01-22 DIAGNOSIS — R73.9 ELEVATED BLOOD SUGAR: ICD-10-CM

## 2025-01-22 DIAGNOSIS — Z12.2 SCREENING FOR LUNG CANCER: ICD-10-CM

## 2025-01-22 DIAGNOSIS — Z13.29 SCREENING FOR THYROID DISORDER: ICD-10-CM

## 2025-01-22 DIAGNOSIS — M54.12 CERVICAL RADICULOPATHY: ICD-10-CM

## 2025-01-22 DIAGNOSIS — E78.00 HIGH CHOLESTEROL: ICD-10-CM

## 2025-01-22 DIAGNOSIS — Z00.00 ROUTINE GENERAL MEDICAL EXAMINATION AT A HEALTH CARE FACILITY: Primary | ICD-10-CM

## 2025-01-22 DIAGNOSIS — Z12.5 SCREENING FOR PROSTATE CANCER: ICD-10-CM

## 2025-01-22 DIAGNOSIS — I73.9 PAD (PERIPHERAL ARTERY DISEASE): ICD-10-CM

## 2025-01-22 LAB
ALBUMIN UR-MCNC: NEGATIVE MG/DL
APPEARANCE UR: CLEAR
BASOPHILS # BLD AUTO: 0 10E3/UL (ref 0–0.2)
BASOPHILS NFR BLD AUTO: 1 %
BILIRUB UR QL STRIP: NEGATIVE
COLOR UR AUTO: YELLOW
EOSINOPHIL # BLD AUTO: 0.4 10E3/UL (ref 0–0.7)
EOSINOPHIL NFR BLD AUTO: 6 %
ERYTHROCYTE [DISTWIDTH] IN BLOOD BY AUTOMATED COUNT: 12.8 % (ref 10–15)
EST. AVERAGE GLUCOSE BLD GHB EST-MCNC: 111 MG/DL
GLUCOSE UR STRIP-MCNC: NEGATIVE MG/DL
HBA1C MFR BLD: 5.5 % (ref 0–5.6)
HCT VFR BLD AUTO: 43.4 % (ref 40–53)
HGB BLD-MCNC: 14.4 G/DL (ref 13.3–17.7)
HGB UR QL STRIP: NEGATIVE
IMM GRANULOCYTES # BLD: 0 10E3/UL
IMM GRANULOCYTES NFR BLD: 0 %
KETONES UR STRIP-MCNC: NEGATIVE MG/DL
LEUKOCYTE ESTERASE UR QL STRIP: NEGATIVE
LYMPHOCYTES # BLD AUTO: 2.7 10E3/UL (ref 0.8–5.3)
LYMPHOCYTES NFR BLD AUTO: 40 %
MCH RBC QN AUTO: 30.7 PG (ref 26.5–33)
MCHC RBC AUTO-ENTMCNC: 33.2 G/DL (ref 31.5–36.5)
MCV RBC AUTO: 93 FL (ref 78–100)
MONOCYTES # BLD AUTO: 0.5 10E3/UL (ref 0–1.3)
MONOCYTES NFR BLD AUTO: 8 %
NEUTROPHILS # BLD AUTO: 3.1 10E3/UL (ref 1.6–8.3)
NEUTROPHILS NFR BLD AUTO: 46 %
NITRATE UR QL: NEGATIVE
PH UR STRIP: 6.5 [PH] (ref 5–7)
PLATELET # BLD AUTO: 230 10E3/UL (ref 150–450)
RBC # BLD AUTO: 4.69 10E6/UL (ref 4.4–5.9)
SP GR UR STRIP: 1.02 (ref 1–1.03)
UROBILINOGEN UR STRIP-ACNC: 0.2 E.U./DL
WBC # BLD AUTO: 6.7 10E3/UL (ref 4–11)

## 2025-01-22 PROCEDURE — 81003 URINALYSIS AUTO W/O SCOPE: CPT | Performed by: FAMILY MEDICINE

## 2025-01-22 PROCEDURE — 99396 PREV VISIT EST AGE 40-64: CPT | Mod: 25 | Performed by: FAMILY MEDICINE

## 2025-01-22 PROCEDURE — 90678 RSV VACC PREF BIVALENT IM: CPT | Performed by: FAMILY MEDICINE

## 2025-01-22 PROCEDURE — 99214 OFFICE O/P EST MOD 30 MIN: CPT | Mod: 25 | Performed by: FAMILY MEDICINE

## 2025-01-22 PROCEDURE — G0103 PSA SCREENING: HCPCS | Performed by: FAMILY MEDICINE

## 2025-01-22 PROCEDURE — 80053 COMPREHEN METABOLIC PANEL: CPT | Performed by: FAMILY MEDICINE

## 2025-01-22 PROCEDURE — 84443 ASSAY THYROID STIM HORMONE: CPT | Performed by: FAMILY MEDICINE

## 2025-01-22 PROCEDURE — 80061 LIPID PANEL: CPT | Performed by: FAMILY MEDICINE

## 2025-01-22 PROCEDURE — 90471 IMMUNIZATION ADMIN: CPT | Performed by: FAMILY MEDICINE

## 2025-01-22 PROCEDURE — 83036 HEMOGLOBIN GLYCOSYLATED A1C: CPT | Performed by: FAMILY MEDICINE

## 2025-01-22 PROCEDURE — 36415 COLL VENOUS BLD VENIPUNCTURE: CPT | Performed by: FAMILY MEDICINE

## 2025-01-22 PROCEDURE — 85025 COMPLETE CBC W/AUTO DIFF WBC: CPT | Performed by: FAMILY MEDICINE

## 2025-01-22 PROCEDURE — G2211 COMPLEX E/M VISIT ADD ON: HCPCS | Performed by: FAMILY MEDICINE

## 2025-01-22 ASSESSMENT — PAIN SCALES - GENERAL: PAINLEVEL_OUTOF10: NO PAIN (0)

## 2025-01-22 NOTE — PATIENT INSTRUCTIONS
Get CT of lung done  Ultrasound done as well  Eye care visit and dermatology    Continue current meds as advised  Initial labs are ok  Awaiting other labs  Follow up in 6 months   Take care  Humberto Trinidad D.O.      Patient Education   Preventive Care Advice   This is general advice given by our system to help you stay healthy. However, your care team may have specific advice just for you. Please talk to your care team about your preventive care needs.  Nutrition  Eat 5 or more servings of fruits and vegetables each day.  Try wheat bread, brown rice and whole grain pasta (instead of white bread, rice, and pasta).  Get enough calcium and vitamin D. Check the label on foods and aim for 100% of the RDA (recommended daily allowance).  Lifestyle  Exercise at least 150 minutes each week  (30 minutes a day, 5 days a week).  Do muscle strengthening activities 2 days a week. These help control your weight and prevent disease.  No smoking.  Wear sunscreen to prevent skin cancer.  Have a dental exam and cleaning every 6 months.  Yearly exams  See your health care team every year to talk about:  Any changes in your health.  Any medicines your care team has prescribed.  Preventive care, family planning, and ways to prevent chronic diseases.  Shots (vaccines)   HPV shots (up to age 26), if you've never had them before.  Hepatitis B shots (up to age 59), if you've never had them before.  COVID-19 shot: Get this shot when it's due.  Flu shot: Get a flu shot every year.  Tetanus shot: Get a tetanus shot every 10 years.  Pneumococcal, hepatitis A, and RSV shots: Ask your care team if you need these based on your risk.  Shingles shot (for age 50 and up)  General health tests  Diabetes screening:  Starting at age 35, Get screened for diabetes at least every 3 years.  If you are younger than age 35, ask your care team if you should be screened for diabetes.  Cholesterol test: At age 39, start having a cholesterol test every 5 years, or  more often if advised.  Bone density scan (DEXA): At age 50, ask your care team if you should have this scan for osteoporosis (brittle bones).  Hepatitis C: Get tested at least once in your life.  STIs (sexually transmitted infections)  Before age 24: Ask your care team if you should be screened for STIs.  After age 24: Get screened for STIs if you're at risk. You are at risk for STIs (including HIV) if:  You are sexually active with more than one person.  You don't use condoms every time.  You or a partner was diagnosed with a sexually transmitted infection.  If you are at risk for HIV, ask about PrEP medicine to prevent HIV.  Get tested for HIV at least once in your life, whether you are at risk for HIV or not.  Cancer screening tests  Cervical cancer screening: If you have a cervix, begin getting regular cervical cancer screening tests starting at age 21.  Breast cancer scan (mammogram): If you've ever had breasts, begin having regular mammograms starting at age 40. This is a scan to check for breast cancer.  Colon cancer screening: It is important to start screening for colon cancer at age 45.  Have a colonoscopy test every 10 years (or more often if you're at risk) Or, ask your provider about stool tests like a FIT test every year or Cologuard test every 3 years.  To learn more about your testing options, visit:   .  For help making a decision, visit:   https://bit.ly/ff04968.  Prostate cancer screening test: If you have a prostate, ask your care team if a prostate cancer screening test (PSA) at age 55 is right for you.  Lung cancer screening: If you are a current or former smoker ages 50 to 80, ask your care team if ongoing lung cancer screenings are right for you.  For informational purposes only. Not to replace the advice of your health care provider. Copyright   2023 [employer]. All rights reserved. Clinically reviewed by the [facility]. [employer] [id number] - REV  01/24.  Preventing Falls: Care Instructions  Injuries and health problems such as trouble walking or poor eyesight can increase your risk of falling. So can some medicines. But there are things you can do to help prevent falls. You can exercise to get stronger. You can also arrange your home to make it safer.    Talk to your doctor about the medicines you take. Ask if any of them increase the risk of falls and whether they can be changed or stopped.   Try to exercise regularly. It can help improve your strength and balance. This can help lower your risk of falling.         Practice fall safety and prevention.   Wear low-heeled shoes that fit well and give your feet good support. Talk to your doctor if you have foot problems that make this hard.  Carry a cellphone or wear a medical alert device that you can use to call for help.  Use stepladders instead of chairs to reach high objects. Don't climb if you're at risk for falls. Ask for help, if needed.  Wear the correct eyeglasses, if you need them.        Make your home safer.   Remove rugs, cords, clutter, and furniture from walkways.  Keep your house well lit. Use night-lights in hallways and bathrooms.  Install and use sturdy handrails on stairways.  Wear nonskid footwear, even inside. Don't walk barefoot or in socks without shoes.        Be safe outside.   Use handrails, curb cuts, and ramps whenever possible.  Keep your hands free by using a shoulder bag or backpack.  Try to walk in well-lit areas. Watch out for uneven ground, changes in pavement, and debris.  Be careful in the winter. Walk on the grass or gravel when sidewalks are slippery. Use de-icer on steps and walkways. Add non-slip devices to shoes.    Put grab bars and nonskid mats in your shower or tub and near the toilet. Try to use a shower chair or bath bench when bathing.   Get into a tub or shower by putting in your weaker leg first. Get out with your strong side first. Have a phone or medical alert  "device in the bathroom with you.   Where can you learn more?  Go to https://www.Shopistan.net/patiented  Enter G117 in the search box to learn more about \"Preventing Falls: Care Instructions.\"  Current as of: July 31, 2024  Content Version: 14.3    2024 [employer].   Care instructions adapted under license by your healthcare professional. If you have questions about a medical condition or this instruction, always ask your healthcare professional. [employer] disclaims any warranty or liability for your use of this information.    Learning About Stress  What is stress?     Stress is your body's response to a hard situation. Your body can have a physical, emotional, or mental response. Stress is a fact of life for most people, and it affects everyone differently. What causes stress for you may not be stressful for someone else.  A lot of things can cause stress. You may feel stress when you go on a job interview, take a test, or run a race. This kind of short-term stress is normal and even useful. It can help you if you need to work hard or react quickly. For example, stress can help you finish an important job on time.  Long-term stress is caused by ongoing stressful situations or events. Examples of long-term stress include long-term health problems, ongoing problems at work, or conflicts in your family. Long-term stress can harm your health.  How does stress affect your health?  When you are stressed, your body responds as though you are in danger. It makes hormones that speed up your heart, make you breathe faster, and give you a burst of energy. This is called the fight-or-flight stress response. If the stress is over quickly, your body goes back to normal and no harm is done.  But if stress happens too often or lasts too long, it can have bad effects. Long-term stress can make you more likely to get sick, and it can make symptoms of some diseases worse. If you tense up when you are " stressed, you may develop neck, shoulder, or low back pain. Stress is linked to high blood pressure and heart disease.  Stress also harms your emotional health. It can make you rowe, tense, or depressed. Your relationships may suffer, and you may not do well at work or school.  What can you do to manage stress?  You can try these things to help manage stress:   Do something active. Exercise or activity can help reduce stress. Walking is a great way to get started. Even everyday activities such as housecleaning or yard work can help.  Try yoga or justin chi. These techniques combine exercise and meditation. You may need some training at first to learn them.  Do something you enjoy. For example, listen to music or go to a movie. Practice your hobby or do volunteer work.  Meditate. This can help you relax, because you are not worrying about what happened before or what may happen in the future.  Do guided imagery. Imagine yourself in any setting that helps you feel calm. You can use online videos, books, or a teacher to guide you.  Do breathing exercises. For example:  From a standing position, bend forward from the waist with your knees slightly bent. Let your arms dangle close to the floor.  Breathe in slowly and deeply as you return to a standing position. Roll up slowly and lift your head last.  Hold your breath for just a few seconds in the standing position.  Breathe out slowly and bend forward from the waist.  Let your feelings out. Talk, laugh, cry, and express anger when you need to. Talking with supportive friends or family, a counselor, or a pablo leader about your feelings is a healthy way to relieve stress. Avoid discussing your feelings with people who make you feel worse.  Write. It may help to write about things that are bothering you. This helps you find out how much stress you feel and what is causing it. When you know this, you can find better ways to cope.  What can you do to prevent stress?  You  "might try some of these things to help prevent stress:  Manage your time. This helps you find time to do the things you want and need to do.  Get enough sleep. Your body recovers from the stresses of the day while you are sleeping.  Get support. Your family, friends, and community can make a difference in how you experience stress.  Limit your news feed. Avoid or limit time on social media or news that may make you feel stressed.  Do something active. Exercise or activity can help reduce stress. Walking is a great way to get started.  Where can you learn more?  Go to https://www.WeVideo.net/patiented  Enter N032 in the search box to learn more about \"Learning About Stress.\"  Current as of: October 24, 2023  Content Version: 14.3    2024 [employer].   Care instructions adapted under license by your healthcare professional. If you have questions about a medical condition or this instruction, always ask your healthcare professional. [employer] disclaims any warranty or liability for your use of this information.    Learning About Depression Screening  What is depression screening?  Depression screening is a way to see if you have depression symptoms. It may be done by a doctor or counselor. It's often part of a routine checkup. That's because your mental health is just as important as your physical health.  Depression is a mental health condition that affects how you feel, think, and act. You may:  Have less energy.  Lose interest in your daily activities.  Feel sad and grouchy for a long time.  Depression is very common. It affects people of all ages.  Many things can lead to depression. Some people become depressed after they have a stroke or find out they have a major illness like cancer or heart disease. The death of a loved one or a breakup may lead to depression. It can run in families. Most experts believe that a combination of inherited genes and stressful life events can cause " "it.  What happens during screening?  You may be asked to fill out a form about your depression symptoms. You and the doctor will discuss your answers. The doctor may ask you more questions to learn more about how you think, act, and feel.  What happens after screening?  If you have symptoms of depression, your doctor will talk to you about your options.  Doctors usually treat depression with medicines or counseling. Often, combining the two works best. Many people don't get help because they think that they'll get over the depression on their own. But people with depression may not get better unless they get treatment.  The cause of depression is not well understood. There may be many factors involved. But if you have depression, it's not your fault.  A serious symptom of depression is thinking about death or suicide. If you or someone you care about talks about this or about feeling hopeless, get help right away.  It's important to know that depression can be treated. Medicine, counseling, and self-care may help.  Where can you learn more?  Go to https://www.Taggify.net/patiented  Enter T185 in the search box to learn more about \"Learning About Depression Screening.\"  Current as of: July 31, 2024  Content Version: 14.3    2024 [employer].   Care instructions adapted under license by your healthcare professional. If you have questions about a medical condition or this instruction, always ask your healthcare professional. [employer] disclaims any warranty or liability for your use of this information.       "

## 2025-01-22 NOTE — PROGRESS NOTES
Preventive Care Visit  [facility]  Humberto Boltonmichaeljessica DO Am[facility], Family Medicine  Jan 22, 2025      Assessment & Plan     1. Routine general medical examination at a health care facility (Primary)  Doing well, sees pain clinic for pain management , is stable   - Adult Dermatology  Referral; Future  - Adult Eye  Referral; Future    2. High cholesterol  Stable labs, cont meds  - CT Chest Lung Cancer Scrn Low Dose wo; Future  - US Abdominal Aorta Imaging; Future  - CBC with platelets and differential; Future  - Comprehensive metabolic panel (BMP + Alb, Alk Phos, ALT, AST, Total. Bili, TP); Future  - CBC with platelets and differential  - Comprehensive metabolic panel (BMP + Alb, Alk Phos, ALT, AST, Total. Bili, TP)    3. Mild CAD  No symptoms, continue statin, decline asa  - Lipid panel reflex to direct LDL Non-fasting; Future  - Lipid panel reflex to direct LDL Non-fasting    4. History of smoking 25-50 pack years  Quit in 2018 , no symptoms , continue screening   - CT Chest Lung Cancer Scrn Low Dose wo; Future  - US Abdominal Aorta Imaging; Future  - Comprehensive metabolic panel (BMP + Alb, Alk Phos, ALT, AST, Total. Bili, TP); Future  - PSA, screen; Future  - UA Macroscopic with reflex to Microscopic and Culture - Lab Collect; Future  - Comprehensive metabolic panel (BMP + Alb, Alk Phos, ALT, AST, Total. Bili, TP)  - PSA, screen  - UA Macroscopic with reflex to Microscopic and Culture - Lab Collect    5. Ascending aorta dilatation  IMPRESSION:  Fusiform distal aortic aneurysm has increased from prior, currently 3.4 x 3.6 cm, previously 3.2 x 3.3 cm.  BP controlled well with BB   - US Abdominal Aorta Imaging; Future    6. Elevated blood sugar  Normal diabetes mellitus test   - Hemoglobin A1c; Future  - Hemoglobin A1c    7. Cervical radiculopathy  Managed by pain clinic     8. PAD (peripheral artery disease)  No symptoms , statin to be continued  - CT Chest Lung Cancer Scrn  Low Dose wo; Future  - US Abdominal Aorta Imaging; Future  - CBC with platelets and differential; Future  - Comprehensive metabolic panel (BMP + Alb, Alk Phos, ALT, AST, Total. Bili, TP); Future  - PSA, screen; Future  - CBC with platelets and differential  - Comprehensive metabolic panel (BMP + Alb, Alk Phos, ALT, AST, Total. Bili, TP)  - PSA, screen    9. Screening for prostate cancer    - CT Chest Lung Cancer Scrn Low Dose wo; Future  - US Abdominal Aorta Imaging; Future  - CBC with platelets and differential; Future  - Comprehensive metabolic panel (BMP + Alb, Alk Phos, ALT, AST, Total. Bili, TP); Future  - PSA, screen; Future  - CBC with platelets and differential  - Comprehensive metabolic panel (BMP + Alb, Alk Phos, ALT, AST, Total. Bili, TP)  - PSA, screen    10. Screening for diabetes mellitus    - CT Chest Lung Cancer Scrn Low Dose wo; Future  - US Abdominal Aorta Imaging; Future  - CBC with platelets and differential; Future  - Comprehensive metabolic panel (BMP + Alb, Alk Phos, ALT, AST, Total. Bili, TP); Future  - CBC with platelets and differential  - Comprehensive metabolic panel (BMP + Alb, Alk Phos, ALT, AST, Total. Bili, TP)    11. Screening for thyroid disorder    - TSH with free T4 reflex; Future  - TSH with free T4 reflex    12. Screening for lung cancer    - CT Chest Lung Cancer Scrn Low Dose wo; Future    13. DDD (degenerative disc disease), cervical  Managed by pain clinic, doing well     The longitudinal plan of care for the diagnosis(es)/condition(s) as documented were addressed during this visit. Due to the added complexity in care, I will continue to support Palmer in the subsequent management and with ongoing continuity of care.    Patient has been advised of split billing requirements and indicates understanding: Yes        Counseling  Appropriate preventive services were addressed with this patient via screening, questionnaire, or discussion as appropriate for fall prevention,  nutrition, physical activity, Tobacco-use cessation, social engagement, weight loss and cognition.  Checklist reviewing preventive services available has been given to the patient.  Reviewed patient's diet, addressing concerns and/or questions.   He is at risk for lack of exercise and has been provided with information to increase physical activity for the benefit of his well-being.   He is at risk for psychosocial distress and has been provided with information to reduce risk.   The patient's PHQ-9 score is consistent with mild depression. He was provided with information regarding depression.       See Patient Instructions    New Chakraborty is a 64 year old, presenting for the following:  Wellness Visit        1/22/2025     6:37 AM   Additional Questions   Roomed by Shila URIBE      Hyperlipidemia Follow-Up    Are you regularly taking any medication or supplement to lower your cholesterol?   Yes- statin therapy  Are you having muscle aches or other side effects that you think could be caused by your cholesterol lowering medication?  No    Hypertension Follow-up    Do you check your blood pressure regularly outside of the clinic? Not regualrly, sometimes at [facility]  Are you following a low salt diet? Yes  Are your blood pressures ever more than 140 on the top number (systolic) OR more   than 90 on the bottom number (diastolic), for example 140/90? 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      1/17/2025   General Health   How would you rate your overall physical health? (!) FAIR   Feel stress (tense, anxious, or unable to sleep) To some extent   (!) STRESS CONCERN      1/17/2025   Nutrition   Three or more servings of calcium each day? (!) I DON'T KNOW   Diet: Regular (no restrictions)    How many servings of fruit and vegetables per day? (!) 0-1   How many sweetened beverages each day? 0-1         2025   Exercise   Days per week of moderate/strenous exercise 3 days   Average minutes spent exercising at this level 10 min         2025   Social Factors   Frequency of gathering with friends or relatives Once a week   Worry food won't last until get money to buy more No   Food not last or not have enough money for food? No   Do you have housing? (Housing is defined as stable permanent housing and does not include staying ouside in a car, in a tent, in an abandoned building, in an overnight shelter, or couch-surfing.) Yes   Are you worried about losing your housing? No   Lack of transportation? No   Unable to get utilities (heat,electricity)? No         2025   Fall Risk   Fallen 2 or more times in the past year? Yes   Trouble with walking or balance? Yes           2025   Dental   Dentist two times every year? Yes         2025   TB Screening   Were you born outside of the [place]? No       Today's PHQ-9 Score:       2025     5:32 PM   PHQ-9 SCORE   PHQ-9 Total Score MyChart 6 (Mild depression)   PHQ-9 Total Score 6        Patient-reported         2025   Substance Use   Alcohol more than 3/day or more than 7/wk Not Applicable   Do you use any other substances recreationally? No     Social History     Tobacco Use    Smoking status: Former     Current packs/day: 0.00     Average packs/day: 0.5 packs/day for 39.2 years (19.6 ttl pk-yrs)     Types: Cigarettes     Start date: 1979     Quit date: 3/20/2018     Years since quittin.8    Smokeless tobacco: Former   Vaping Use    Vaping status: Never Used   Substance Use Topics    Alcohol use: Not Currently     Alcohol/week: 0.0 standard drinks of alcohol     Comment: Quit drinking 10 years ago    Drug use: No           2025   STI Screening   New sexual partner(s) since last STI/HIV test? No   Last PSA:   PSA   Date Value  Ref Range Status   2021 0.91 0 - 4 ug/L Final     Comment:     Assay Method:  Chemiluminescence using Siemens Vista analyzer     Prostate Specific Antigen Screen   Date Value Ref Range Status   01/10/2024 0.62 0.00 - 4.50 ng/mL Final   2022 0.69 0.00 - 4.00 ug/L Final     ASCVD Risk   The 10-year ASCVD risk score (Teresa CHOW, et al., 2019) is: 9%    Values used to calculate the score:      Age: 64 years      Sex: Male      Is Non- : No      Diabetic: No      Tobacco smoker: No      Systolic Blood Pressure: 120 mmHg      Is BP treated: Yes      HDL Cholesterol: 61 mg/dL      Total Cholesterol: 154 mg/dL    Fracture Risk Assessment Tool  Link to Frax Calculator  Use the information below to complete the Frax calculator  : 1960  Sex: male  Weight (kg): 74.4 kg (actual weight)  Height (cm): 167.6 cm  Previous Fragility Fracture:  No  History of parent with fractured hip:  No  Current Smoking:  No  Patient has been on glucocorticoids for more than 3 months (5mg/day or more): No  Rheumatoid Arthritis on Problem List:  No  Secondary Osteoporosis on Problem List:  No  Consumes 3 or more units of alcohol per day: Yes  Femoral Neck BMD (g/cm2)           Reviewed and updated as needed this visit by Provider                    Past Medical History:   Diagnosis Date    Ascending aorta dilatation 2018    Cervical spondylosis without myelopathy 10/3/2017    Chronic bilateral low back pain with right-sided sciatica 2018    Hypertension     Mild CAD 2018    Neck pain     MRI positive on cervical vertebrea.    PAD (peripheral artery disease) 2018    Tobacco abuse 2017     Past Surgical History:   Procedure Laterality Date    COLONOSCOPY      DISCECTOMY, FUSION CERVICAL ANTERIOR ONE LEVEL, COMBINED Right 10/29/2018    Procedure: Right Anterior Cervical 5-6 Discectomy And Fusion;  Surgeon: Jud Harkins MD;  Location: [facility] OR    HEAD & NECK SURGERY   "[id number]    Follow-up to cervical surgery 10-29-18     OB History   No obstetric history on file.         Review of Systems  Constitutional, HEENT, cardiovascular, pulmonary, GI, , musculoskeletal, neuro, skin, endocrine and psych systems are negative, except as otherwise noted.     Objective    Exam  There were no vitals taken for this visit.   Estimated body mass index is 25.02 kg/m  as calculated from the following:    Height as of 8/19/24: 1.676 m (5' 6\").    Weight as of 8/19/24: 70.3 kg (155 lb).    Physical Exam  GENERAL: alert and no distress  EYES: Eyes grossly normal to inspection, PERRL and conjunctivae and sclerae normal  HENT: ear canals and TM's normal, nose and mouth without ulcers or lesions  NECK: no adenopathy, no asymmetry, masses, or scars  RESP: lungs clear to auscultation - no rales, rhonchi or wheezes  CV: regular rate and rhythm, normal S1 S2, no S3 or S4, no murmur, click or rub, no peripheral edema  ABDOMEN: soft, nontender, no hepatosplenomegaly, no masses and bowel sounds normal  MS: no gross musculoskeletal defects noted, no edema  SKIN: no suspicious lesions or rashes  NEURO: Normal strength and tone, mentation intact and speech normal  PSYCH: mentation appears normal, affect normal/bright        Signed Electronically by: Humberto Trinidad DO    Answers submitted by the patient for this visit:  Patient Health Questionnaire (Submitted on 1/21/2025)  If you checked off any problems, how difficult have these problems made it for you to do your work, take care of things at home, or get along with other people?: Not difficult at all  PHQ9 TOTAL SCORE: 6    "

## 2025-01-23 LAB
ALBUMIN SERPL BCG-MCNC: 4.5 G/DL (ref 3.5–5.2)
ALP SERPL-CCNC: 58 U/L (ref 40–150)
ALT SERPL W P-5'-P-CCNC: 19 U/L (ref 0–70)
ANION GAP SERPL CALCULATED.3IONS-SCNC: 10 MMOL/L (ref 7–15)
AST SERPL W P-5'-P-CCNC: 26 U/L (ref 0–45)
BILIRUB SERPL-MCNC: 0.3 MG/DL
BUN SERPL-MCNC: 12.6 MG/DL (ref 8–23)
CALCIUM SERPL-MCNC: 10 MG/DL (ref 8.8–10.4)
CHLORIDE SERPL-SCNC: 109 MMOL/L (ref 98–107)
CHOLEST SERPL-MCNC: 143 MG/DL
CREAT SERPL-MCNC: 1.24 MG/DL (ref 0.67–1.17)
EGFRCR SERPLBLD CKD-EPI 2021: 65 ML/MIN/1.73M2
FASTING STATUS PATIENT QL REPORTED: YES
FASTING STATUS PATIENT QL REPORTED: YES
GLUCOSE SERPL-MCNC: 77 MG/DL (ref 70–99)
HCO3 SERPL-SCNC: 25 MMOL/L (ref 22–29)
HDLC SERPL-MCNC: 51 MG/DL
LDLC SERPL CALC-MCNC: 60 MG/DL
NONHDLC SERPL-MCNC: 92 MG/DL
POTASSIUM SERPL-SCNC: 4.5 MMOL/L (ref 3.4–5.3)
PROT SERPL-MCNC: 7.1 G/DL (ref 6.4–8.3)
PSA SERPL DL<=0.01 NG/ML-MCNC: 1.04 NG/ML (ref 0–4.5)
SODIUM SERPL-SCNC: 144 MMOL/L (ref 135–145)
TRIGL SERPL-MCNC: 162 MG/DL
TSH SERPL DL<=0.005 MIU/L-ACNC: 2.29 UIU/ML (ref 0.3–4.2)

## 2025-01-28 ENCOUNTER — MYC REFILL (OUTPATIENT)
Dept: PALLIATIVE MEDICINE | Facility: CLINIC | Age: 65
End: 2025-01-28
Payer: COMMERCIAL

## 2025-01-28 DIAGNOSIS — M47.812 CERVICAL SPONDYLOSIS WITHOUT MYELOPATHY: ICD-10-CM

## 2025-01-28 DIAGNOSIS — Z98.1 S/P CERVICAL SPINAL FUSION: ICD-10-CM

## 2025-01-28 DIAGNOSIS — F11.90 CHRONIC, CONTINUOUS USE OF OPIOIDS: ICD-10-CM

## 2025-01-28 DIAGNOSIS — M79.18 MYOFASCIAL PAIN: ICD-10-CM

## 2025-01-28 DIAGNOSIS — M48.02 CERVICAL STENOSIS OF SPINAL CANAL: ICD-10-CM

## 2025-01-28 DIAGNOSIS — M50.30 DDD (DEGENERATIVE DISC DISEASE), CERVICAL: ICD-10-CM

## 2025-01-28 RX ORDER — TRAMADOL HYDROCHLORIDE 200 MG/1
200 TABLET, EXTENDED RELEASE ORAL EVERY 24 HOURS
Qty: 30 TABLET | Refills: 0 | Status: SHIPPED | OUTPATIENT
Start: 2025-01-29

## 2025-01-28 RX ORDER — TRAMADOL HYDROCHLORIDE 50 MG/1
50 TABLET ORAL EVERY 6 HOURS PRN
Qty: 90 TABLET | Refills: 0 | Status: SHIPPED | OUTPATIENT
Start: 2025-01-29

## 2025-01-28 NOTE — TELEPHONE ENCOUNTER
Routing to provider to review medication prepped per below      traMADOL (ULTRAM) 50MG TAB, #90, Refill:0  Sig: Fill 1/28/2025 start 1/29/25  Last picked up 12/30/24 with start on 12/30/24  Due: 1/29/25      traMADOL (ULTRAM-ER) 200mg tab, #30, Refill:0  Sig: Michael 1/28/2025  and start 1/29/25  Last picked up 12/30/24 with start on 12/30/24  Due: 1/29/25    Per last OV note 11/18/24:     Current pain relevant medications:      -Tramadol 200mg ER in the evening (helpful)  -Tramadol 50mg take 1 tablet  Q 6 hours PRN (using 2-3 tabs per day, helpful)

## 2025-01-28 NOTE — TELEPHONE ENCOUNTER
Signed Prescriptions:                        Disp   Refills    traMADol (ULTRAM) 50 MG tablet             90 tab*0        Sig: Take 1 tablet (50 mg) by mouth every 6 hours as           needed for severe pain. Max 3/day. To last 30           days for chronic pain. Fill 1/28/2025 start           1/29/25  Authorizing Provider: MELANIE BENSON    traMADol (ULTRAM-ER) 200 MG 24 hr tablet   30 tab*0        Sig: Take 1 tablet (200 mg) by mouth every 24 hours. To           last 30 days for chronic pain. Michael 1/28/2025  and           start 1/29/25  Authorizing Provider: MELANIE BENSON        Reviewed [place]  January 28, 2025- no concerning fills.    Melanie Benson APRN, RN CNP, FNP  [facility]  [facility]

## 2025-01-28 NOTE — TELEPHONE ENCOUNTER
Refills have been requested for the following medications:         traMADol (ULTRAM) 50 MG tablet [Melanie Thomas]         traMADol (ULTRAM-ER) 200 MG 24 hr tablet [Melanie Thomas]      Patient Comment: I am  submitting day early as  I got letter stating that my pre-approval  and needs to be re-submitted     Preferred pharmacy: [facility] [address] [place] [address]

## 2025-01-28 NOTE — TELEPHONE ENCOUNTER
Received fax from pharmacy requesting refill(s) for     traMADol (ULTRAM) 50 MG tablet,  Date last filled 12/28/2024       traMADol (ULTRAM-ER) 200 MG 24 hr table  Date last filled 12/28/2024    Last Appt Date:11/18/2024    Next Appt scheduled: 02/24/2025    Pharmacy:      [facility] [address] [address] [place], [place] - [address] [place] [address] route for processing    Linda Stephenson MA  [facility] [place]

## 2025-01-30 ENCOUNTER — TELEPHONE (OUTPATIENT)
Dept: FAMILY MEDICINE | Facility: CLINIC | Age: 65
End: 2025-01-30
Payer: COMMERCIAL

## 2025-01-30 NOTE — TELEPHONE ENCOUNTER
Called and left message for patient to return call to clinic.      Radha, RN    Triage Nurse  [facility]        ----- Message from Humberto Trinidad sent at 1/30/2025  2:51 PM CST -----  Renal function is slightly elevated , follow up in 6 months  Please make appoint for visit with patient   Humberto Trinidad D.O.

## 2025-02-03 NOTE — TELEPHONE ENCOUNTER
Patient reached out via [email or web address].    Relayed provider message thru his account.    Christine M Klisch, RN

## 2025-02-05 ENCOUNTER — ANCILLARY PROCEDURE (OUTPATIENT)
Dept: CT IMAGING | Facility: CLINIC | Age: 65
End: 2025-02-05
Attending: FAMILY MEDICINE
Payer: COMMERCIAL

## 2025-02-05 DIAGNOSIS — Z12.5 SCREENING FOR PROSTATE CANCER: ICD-10-CM

## 2025-02-05 DIAGNOSIS — E78.00 HIGH CHOLESTEROL: ICD-10-CM

## 2025-02-05 DIAGNOSIS — Z87.891 HISTORY OF SMOKING 25-50 PACK YEARS: ICD-10-CM

## 2025-02-05 DIAGNOSIS — Z12.2 SCREENING FOR LUNG CANCER: ICD-10-CM

## 2025-02-05 DIAGNOSIS — I73.9 PAD (PERIPHERAL ARTERY DISEASE): ICD-10-CM

## 2025-02-05 DIAGNOSIS — Z13.1 SCREENING FOR DIABETES MELLITUS: ICD-10-CM

## 2025-02-05 PROCEDURE — 71271 CT THORAX LUNG CANCER SCR C-: CPT | Mod: TC | Performed by: RADIOLOGY

## 2025-02-06 ENCOUNTER — TELEPHONE (OUTPATIENT)
Dept: FAMILY MEDICINE | Facility: CLINIC | Age: 65
End: 2025-02-06
Payer: COMMERCIAL

## 2025-02-06 DIAGNOSIS — I77.810 ASCENDING AORTA DILATATION: Primary | ICD-10-CM

## 2025-02-06 NOTE — TELEPHONE ENCOUNTER
Nikole with imaging calling regarding and incidental finding on the  CT chest lung cancer low dose done 25.    Incident findin. Significant Incidental Finding(s): Category S: Yes. Stable enlargement of the ascending thoracic aorta that is approximately 4 cm.     Thank you,  Toya Mahan RN

## 2025-02-11 ENCOUNTER — MYC MEDICAL ADVICE (OUTPATIENT)
Dept: FAMILY MEDICINE | Facility: CLINIC | Age: 65
End: 2025-02-11
Payer: COMMERCIAL

## 2025-02-11 NOTE — TELEPHONE ENCOUNTER
Please advise getting echo as planned to follow up for dilation aorta  Thanks  Humberto Trinidad D.O.

## 2025-02-11 NOTE — TELEPHONE ENCOUNTER
Called patient and discussed result of CT scan and need for Echocardiogram.  Order was placed, number given for patient to call and schedule      TIMBO Garcia    Triage Nurse  [facility]

## 2025-02-11 NOTE — TELEPHONE ENCOUNTER
Discussed mychart message over phone    Radha RN    Triage Nurse  [facility]

## 2025-02-11 NOTE — TELEPHONE ENCOUNTER
RN left  for patient requesting call back to clinic.    RN called to relay providers message.    Jalen Martin RN, BSN, PHN  [facility]

## 2025-02-12 ENCOUNTER — ANCILLARY PROCEDURE (OUTPATIENT)
Dept: ULTRASOUND IMAGING | Facility: CLINIC | Age: 65
End: 2025-02-12
Attending: FAMILY MEDICINE
Payer: COMMERCIAL

## 2025-02-12 DIAGNOSIS — Z13.1 SCREENING FOR DIABETES MELLITUS: ICD-10-CM

## 2025-02-12 DIAGNOSIS — I77.810 ASCENDING AORTA DILATATION: ICD-10-CM

## 2025-02-12 DIAGNOSIS — Z87.891 HISTORY OF SMOKING 25-50 PACK YEARS: ICD-10-CM

## 2025-02-12 DIAGNOSIS — Z12.5 SCREENING FOR PROSTATE CANCER: ICD-10-CM

## 2025-02-12 DIAGNOSIS — E78.00 HIGH CHOLESTEROL: ICD-10-CM

## 2025-02-12 DIAGNOSIS — I73.9 PAD (PERIPHERAL ARTERY DISEASE): ICD-10-CM

## 2025-02-12 PROCEDURE — 76775 US EXAM ABDO BACK WALL LIM: CPT | Mod: TC | Performed by: RADIOLOGY

## 2025-02-16 ENCOUNTER — MYC REFILL (OUTPATIENT)
Dept: PALLIATIVE MEDICINE | Facility: CLINIC | Age: 65
End: 2025-02-16
Payer: COMMERCIAL

## 2025-02-16 DIAGNOSIS — Z98.1 S/P CERVICAL SPINAL FUSION: ICD-10-CM

## 2025-02-17 RX ORDER — CYCLOBENZAPRINE HCL 5 MG
5-10 TABLET ORAL
Qty: 135 TABLET | Refills: 3 | Status: SHIPPED | OUTPATIENT
Start: 2025-02-17

## 2025-02-17 NOTE — TELEPHONE ENCOUNTER
[facility] Pain Nurse (supporting Melanie Thomas, APRN CNP)8 hours ago (11:23 PM)       Refills have been requested for the following medications:         cyclobenzaprine (FLEXERIL) 5 MG tablet [Melanie Thomas]     Preferred pharmacy: [facility] [address] [place] [address] [address] [place] [address]

## 2025-02-17 NOTE — TELEPHONE ENCOUNTER
Drug: cyclobenzaprine (FLEXERIL) 5 MG tablet   Qty: 135 tablet   Last filled 10/30/24  Last seen: 11/18/24  Next appointment 4/21/25

## 2025-02-18 NOTE — TELEPHONE ENCOUNTER
Signed Prescriptions:                        Disp   Refills    cyclobenzaprine (FLEXERIL) 5 MG tablet     135 ta*3        Sig: Take 1-2 tablets (5-10 mg) by mouth nightly as needed           for muscle spasms. Need 6 hours between this and           methocarbamol. 3 month supply  Authorizing Provider: CANDACE BENSON, RN CNP, FNP  [facility]  [facility]

## 2025-02-24 ENCOUNTER — ANCILLARY PROCEDURE (OUTPATIENT)
Dept: CARDIOLOGY | Facility: CLINIC | Age: 65
End: 2025-02-24
Attending: FAMILY MEDICINE
Payer: COMMERCIAL

## 2025-02-24 DIAGNOSIS — I77.810 ASCENDING AORTA DILATATION: ICD-10-CM

## 2025-02-24 LAB — LVEF ECHO: NORMAL

## 2025-02-24 PROCEDURE — 93306 TTE W/DOPPLER COMPLETE: CPT | Performed by: INTERNAL MEDICINE

## 2025-02-26 ENCOUNTER — MYC REFILL (OUTPATIENT)
Dept: PALLIATIVE MEDICINE | Facility: CLINIC | Age: 65
End: 2025-02-26

## 2025-02-26 DIAGNOSIS — M50.30 DDD (DEGENERATIVE DISC DISEASE), CERVICAL: ICD-10-CM

## 2025-02-26 DIAGNOSIS — Z98.1 S/P CERVICAL SPINAL FUSION: ICD-10-CM

## 2025-02-26 DIAGNOSIS — F11.90 CHRONIC, CONTINUOUS USE OF OPIOIDS: ICD-10-CM

## 2025-02-26 DIAGNOSIS — M47.812 CERVICAL SPONDYLOSIS WITHOUT MYELOPATHY: ICD-10-CM

## 2025-02-26 DIAGNOSIS — M48.02 CERVICAL STENOSIS OF SPINAL CANAL: ICD-10-CM

## 2025-02-26 DIAGNOSIS — M79.18 MYOFASCIAL PAIN: ICD-10-CM

## 2025-02-26 RX ORDER — TRAMADOL HYDROCHLORIDE 200 MG/1
200 TABLET, EXTENDED RELEASE ORAL EVERY 24 HOURS
Qty: 30 TABLET | Refills: 0 | Status: SHIPPED | OUTPATIENT
Start: 2025-02-26

## 2025-02-26 RX ORDER — TRAMADOL HYDROCHLORIDE 50 MG/1
50 TABLET ORAL EVERY 6 HOURS PRN
Qty: 90 TABLET | Refills: 0 | Status: SHIPPED | OUTPATIENT
Start: 2025-02-26

## 2025-02-26 NOTE — TELEPHONE ENCOUNTER
[facility] Pain Nurse (supporting Melanie Thomas, APRN CNP)32 minutes ago (7:05 AM)       Refills have been requested for the following medications:         traMADol (ULTRAM) 50 MG tablet [Melanie Thomas]         traMADol (ULTRAM-ER) 200 MG 24 hr tablet [Melanie Thomas]     Preferred pharmacy: [facility] [address] [place] [address]

## 2025-02-26 NOTE — TELEPHONE ENCOUNTER
Medication refill information reviewed.     Due date for  traMADol (ULTRAM) 50 MG tablet and traMADol (ULTRAM-ER) 200 MG 24 hr tablet  is 02/28/25     Prescriptions prepped for review.     Will route to provider.

## 2025-02-26 NOTE — TELEPHONE ENCOUNTER
Signed Prescriptions:                        Disp   Refills    traMADol (ULTRAM) 50 MG tablet             90 tab*0        Sig: Take 1 tablet (50 mg) by mouth every 6 hours as           needed for severe pain. Max 3/day. To last 30           days for chronic pain. Fill 02/26/25 to start           02/28/25  Authorizing Provider: MELANIE BENSON    traMADol (ULTRAM-ER) 200 MG 24 hr tablet   30 tab*0        Sig: Take 1 tablet (200 mg) by mouth every 24 hours. To           last 30 days for chronic pain.Fill 02/26/25 to           start 02/28/25  Authorizing Provider: MELANIE BENSON        Reviewed [place]  February 26, 2025- no concerning fills.    Melanie Benson APRN, RN CNP, FNP  [facility]  [facility]

## 2025-02-26 NOTE — TELEPHONE ENCOUNTER
Patient request for a refill(s) of traMADol (ULTRAM) 50 MG tablet   Last dispensed from pharmacy on 1/29/25    traMADol (ULTRAM-ER) 200 MG 24 hr tablet   Last dispensed from pharmacy on 1/29/25    Patient's last office/virtual visit by prescribing provider on 11/18/24  Next office/virtual appointment scheduled for 4/21/25    Last urine drug screen date 5/06/24  Current opioid agreement on file (completed within the last year) Yes Date of opioid agreement: 5/7/24    E-prescribe to [facility] [id number]     Will route to nursing [place] for review and preparation of prescription(s).

## 2025-03-19 PROBLEM — M48.02 CERVICAL STENOSIS OF SPINAL CANAL: Status: RESOLVED | Noted: 2017-10-03 | Resolved: 2025-03-19

## 2025-03-28 ENCOUNTER — MYC REFILL (OUTPATIENT)
Dept: PALLIATIVE MEDICINE | Facility: CLINIC | Age: 65
End: 2025-03-28
Payer: COMMERCIAL

## 2025-03-28 DIAGNOSIS — G89.29 CHRONIC INTRACTABLE HEADACHE, UNSPECIFIED HEADACHE TYPE: ICD-10-CM

## 2025-03-28 DIAGNOSIS — M50.30 DDD (DEGENERATIVE DISC DISEASE), CERVICAL: ICD-10-CM

## 2025-03-28 DIAGNOSIS — M47.812 CERVICAL SPONDYLOSIS WITHOUT MYELOPATHY: ICD-10-CM

## 2025-03-28 DIAGNOSIS — Z98.1 S/P CERVICAL SPINAL FUSION: ICD-10-CM

## 2025-03-28 DIAGNOSIS — R51.9 CHRONIC INTRACTABLE HEADACHE, UNSPECIFIED HEADACHE TYPE: ICD-10-CM

## 2025-03-28 DIAGNOSIS — M79.18 MYOFASCIAL PAIN: ICD-10-CM

## 2025-03-28 DIAGNOSIS — F11.90 CHRONIC, CONTINUOUS USE OF OPIOIDS: ICD-10-CM

## 2025-03-28 DIAGNOSIS — M48.02 CERVICAL STENOSIS OF SPINAL CANAL: ICD-10-CM

## 2025-03-28 NOTE — TELEPHONE ENCOUNTER
Refills have been requested for the following medications:         traMADol (ULTRAM) 50 MG tablet [Melanie Thomas]         traMADol (ULTRAM-ER) 200 MG 24 hr tablet [Melanie Thomas]     Preferred pharmacy: [facility] - [address]

## 2025-03-28 NOTE — TELEPHONE ENCOUNTER
Medication refill information reviewed.     Due date for traMADol (ULTRAM) 50 MG tablet      traMADol (ULTRAM-ER) 200 MG 24 hr tablet is 3/30/2025 for both      Prescriptions prepped for review.     Will route to provider.

## 2025-03-28 NOTE — TELEPHONE ENCOUNTER
Received call from patient requesting refill(s) traMADol (ULTRAM) 50 MG tablet,   Last dispensed from pharmacy on 02/26/2025     traMADol (ULTRAM-ER) 200 MG 24 hr tablet  Last dispensed from pharmacy on 02/26/2025    Patient's last office/virtual visit by prescribing provider on 11/18/2025  Next office/virtual appointment scheduled for 04/21/2025    Last urine drug screen date 05/06/2024  Current opioid agreement on file (completed within the last year) Yes Date of opioid agreement: 05/06/2024    E-prescribe to      [facility] [address] [place] [address]    Will route to nursing [place] for review and preparation of prescription(s).     Linda Stephenson MA  [facility]

## 2025-03-30 RX ORDER — TRAMADOL HYDROCHLORIDE 50 MG/1
50 TABLET ORAL EVERY 6 HOURS PRN
Qty: 90 TABLET | Refills: 0 | Status: SHIPPED | OUTPATIENT
Start: 2025-03-30

## 2025-03-30 RX ORDER — TRAMADOL HYDROCHLORIDE 200 MG/1
200 TABLET, EXTENDED RELEASE ORAL EVERY 24 HOURS
Qty: 30 TABLET | Refills: 0 | Status: SHIPPED | OUTPATIENT
Start: 2025-03-30

## 2025-03-31 RX ORDER — TOPIRAMATE 50 MG/1
150 TABLET, FILM COATED ORAL AT BEDTIME
Qty: 270 TABLET | Refills: 1 | Status: SHIPPED | OUTPATIENT
Start: 2025-03-31

## 2025-03-31 NOTE — TELEPHONE ENCOUNTER
See [employer] message sent to patient.     Melanie STEVENS RN CNP, FNP  [facility]

## 2025-03-31 NOTE — TELEPHONE ENCOUNTER
Received MyChart message from patient requesting refill(s) for Topiramate 50 mg tablet       Last refilled on: Aug. 27, 2024     Patient last seen on: Nov. 18, 2024   Next appt scheduled for: Apr. 21, 2025    E-prescribe to:     [facility] [address] [place] [address]       Will facilitate refill.      Valerie Moon, Clinic Facilitator  [facility] [place]

## 2025-03-31 NOTE — TELEPHONE ENCOUNTER
Signed Prescriptions:                        Disp   Refills    topiramate (TOPAMAX) 50 MG tablet          270 ta*1        Sig: Take 3 tablets (150 mg) by mouth at bedtime. 3 month           script.  Authorizing Provider: CANDACE BENSON, RN CNP, FNP  [facility]  [place]

## 2025-03-31 NOTE — TELEPHONE ENCOUNTER
Refills have been requested for the following medications:         topiramate (TOPAMAX) 50 MG tablet [Melanie Thomas]     Preferred pharmacy: [facility] [address]

## 2025-03-31 NOTE — TELEPHONE ENCOUNTER
Signed Prescriptions:                        Disp   Refills    traMADol (ULTRAM) 50 MG tablet             90 tab*0        Sig: Take 1 tablet (50 mg) by mouth every 6 hours as           needed for severe pain. Max 3/day. To last 30           days for chronic pain. Fill 03/31/25 to start           03/31/25  Authorizing Provider: MELANIE BENSON    traMADol (ULTRAM-ER) 200 MG 24 hr tablet   30 tab*0        Sig: Take 1 tablet (200 mg) by mouth every 24 hours. To           last 30 days for chronic pain.Fill 03/31/25 to           start 03/31/25  Authorizing Provider: MELANIE BENSON        Reviewed [place]  March 30, 2025- no concerning fills.    Melanie Benson APRN, RN CNP, FNP  [facility]  [facility]

## 2025-04-28 ENCOUNTER — MYC REFILL (OUTPATIENT)
Dept: PALLIATIVE MEDICINE | Facility: CLINIC | Age: 65
End: 2025-04-28

## 2025-04-28 DIAGNOSIS — F11.90 CHRONIC, CONTINUOUS USE OF OPIOIDS: ICD-10-CM

## 2025-04-28 DIAGNOSIS — Z98.1 S/P CERVICAL SPINAL FUSION: ICD-10-CM

## 2025-04-28 DIAGNOSIS — M47.812 CERVICAL SPONDYLOSIS WITHOUT MYELOPATHY: ICD-10-CM

## 2025-04-28 DIAGNOSIS — M50.30 DDD (DEGENERATIVE DISC DISEASE), CERVICAL: ICD-10-CM

## 2025-04-28 DIAGNOSIS — M79.18 MYOFASCIAL PAIN: ICD-10-CM

## 2025-04-28 DIAGNOSIS — M48.02 CERVICAL STENOSIS OF SPINAL CANAL: ICD-10-CM

## 2025-04-28 NOTE — TELEPHONE ENCOUNTER
Refills have been requested for the following medications:         traMADol (ULTRAM) 50 MG tablet [Melanie Thomas]         traMADol (ULTRAM-ER) 200 MG 24 hr tablet [Melanie Thomas]     Preferred pharmacy: [facility] - [address]

## 2025-04-28 NOTE — TELEPHONE ENCOUNTER
Medication refill information reviewed.     Due date for traMADol (ULTRAM) 50 MG tablet and traMADol (ULTRAM-ER) 200 MG 24 hr tablet is 04/30/25     Prescriptions prepped for review.     Will route to provider.

## 2025-04-28 NOTE — TELEPHONE ENCOUNTER
Received call from patient requesting refill(s) of   Tramadol 50mg   Mar. 31, 2025      Tramadol 200mg ER  Mar. 31, 2025       Patient's last office/virtual visit by prescribing provider on: Nov. 18, 2024   Next office/virtual appointment scheduled for: June. 9, 2025         UDT: May. 6, 2024   CSA: May. 7, 2024     E-prescribe to    [facility] [address] [place] [address]      Will route to nursing [place] for review and preparation of prescription(s).

## 2025-04-29 RX ORDER — TRAMADOL HYDROCHLORIDE 200 MG/1
200 TABLET, EXTENDED RELEASE ORAL EVERY 24 HOURS
Qty: 30 TABLET | Refills: 0 | Status: SHIPPED | OUTPATIENT
Start: 2025-04-29

## 2025-04-29 RX ORDER — TRAMADOL HYDROCHLORIDE 50 MG/1
50 TABLET ORAL EVERY 6 HOURS PRN
Qty: 90 TABLET | Refills: 0 | Status: SHIPPED | OUTPATIENT
Start: 2025-04-29

## 2025-04-29 NOTE — TELEPHONE ENCOUNTER
Signed Prescriptions:                        Disp   Refills    traMADol (ULTRAM) 50 MG tablet             90 tab*0        Sig: Take 1 tablet (50 mg) by mouth every 6 hours as           needed for severe pain. Max 3/day. To last 30           days for chronic pain. Fill 04/29/25 to start           04/30/25  Authorizing Provider: MELANIE BENSON    traMADol (ULTRAM-ER) 200 MG 24 hr tablet   30 tab*0        Sig: Take 1 tablet (200 mg) by mouth every 24 hours. To           last 30 days for chronic pain. Fill 04/29/25 to           start 04/30/25  Authorizing Provider: MELANIE BENSON        Reviewed [place]  April 29, 2025- no concerning fills.    Melanie Benson APRN, RN CNP, FNP  [facility]  [facility]

## 2025-05-15 ENCOUNTER — MYC REFILL (OUTPATIENT)
Dept: PALLIATIVE MEDICINE | Facility: CLINIC | Age: 65
End: 2025-05-15
Payer: COMMERCIAL

## 2025-05-15 DIAGNOSIS — M48.02 CERVICAL STENOSIS OF SPINAL CANAL: ICD-10-CM

## 2025-05-15 DIAGNOSIS — Z98.1 S/P CERVICAL SPINAL FUSION: ICD-10-CM

## 2025-05-15 DIAGNOSIS — M50.30 DDD (DEGENERATIVE DISC DISEASE), CERVICAL: ICD-10-CM

## 2025-05-15 DIAGNOSIS — M47.812 CERVICAL SPONDYLOSIS WITHOUT MYELOPATHY: ICD-10-CM

## 2025-05-15 RX ORDER — GABAPENTIN 600 MG/1
1200 TABLET ORAL 3 TIMES DAILY
Qty: 540 TABLET | Refills: 3 | Status: SHIPPED | OUTPATIENT
Start: 2025-05-15

## 2025-05-15 NOTE — TELEPHONE ENCOUNTER
Refills have been requested for the following medications:         gabapentin (NEURONTIN) 600 MG tablet [Melanie Thomas]     Preferred pharmacy: [id number] - [address]

## 2025-05-15 NOTE — TELEPHONE ENCOUNTER
Received request from patient requesting refill(s) for gabapentin (NEURONTIN) 600 MG tablet    Last refilled on 02/17/25    Pt last seen on 11/18/24  Next appt scheduled for 06/09/25    Will facilitate refill.

## 2025-05-15 NOTE — TELEPHONE ENCOUNTER
Signed Prescriptions:                        Disp   Refills    gabapentin (NEURONTIN) 600 MG tablet       540 ta*3        Sig: Take 2 tablets (1,200 mg) by mouth 3 times daily. 3            month supply  Authorizing Provider: CANDACE BENSON, RN CNP, FNP  [facility]  [facility]

## 2025-06-04 ASSESSMENT — PAIN SCALES - PAIN ENJOYMENT GENERAL ACTIVITY SCALE (PEG)
INTERFERED_ENJOYMENT_LIFE: 7
INTERFERED_GENERAL_ACTIVITY: 6
AVG_PAIN_PASTWEEK: 7
PEG_TOTALSCORE: 6.67

## 2025-06-09 ENCOUNTER — LAB (OUTPATIENT)
Dept: LAB | Facility: CLINIC | Age: 65
End: 2025-06-09
Payer: COMMERCIAL

## 2025-06-09 ENCOUNTER — OFFICE VISIT (OUTPATIENT)
Dept: PALLIATIVE MEDICINE | Facility: CLINIC | Age: 65
End: 2025-06-09
Payer: COMMERCIAL

## 2025-06-09 VITALS — SYSTOLIC BLOOD PRESSURE: 144 MMHG | DIASTOLIC BLOOD PRESSURE: 89 MMHG | HEART RATE: 58 BPM

## 2025-06-09 DIAGNOSIS — M54.41 CHRONIC BILATERAL LOW BACK PAIN WITH RIGHT-SIDED SCIATICA: ICD-10-CM

## 2025-06-09 DIAGNOSIS — Z79.899 ENCOUNTER FOR LONG-TERM (CURRENT) USE OF MEDICATIONS: ICD-10-CM

## 2025-06-09 DIAGNOSIS — M47.812 CERVICAL SPONDYLOSIS WITHOUT MYELOPATHY: Primary | ICD-10-CM

## 2025-06-09 DIAGNOSIS — Z98.1 S/P CERVICAL SPINAL FUSION: ICD-10-CM

## 2025-06-09 DIAGNOSIS — F11.90 CHRONIC, CONTINUOUS USE OF OPIOIDS: ICD-10-CM

## 2025-06-09 DIAGNOSIS — M48.02 CERVICAL STENOSIS OF SPINAL CANAL: ICD-10-CM

## 2025-06-09 DIAGNOSIS — G43.719 INTRACTABLE CHRONIC MIGRAINE WITHOUT AURA AND WITHOUT STATUS MIGRAINOSUS: ICD-10-CM

## 2025-06-09 DIAGNOSIS — M50.30 DDD (DEGENERATIVE DISC DISEASE), CERVICAL: ICD-10-CM

## 2025-06-09 DIAGNOSIS — G89.29 CHRONIC BILATERAL LOW BACK PAIN WITH RIGHT-SIDED SCIATICA: ICD-10-CM

## 2025-06-09 DIAGNOSIS — M79.18 MYOFASCIAL PAIN: ICD-10-CM

## 2025-06-09 PROCEDURE — 80307 DRUG TEST PRSMV CHEM ANLYZR: CPT

## 2025-06-09 PROCEDURE — 80307 DRUG TEST PRSMV CHEM ANLYZR: CPT | Mod: 90

## 2025-06-09 PROCEDURE — 99000 SPECIMEN HANDLING OFFICE-LAB: CPT

## 2025-06-09 PROCEDURE — G0482 DRUG TEST DEF 15-21 CLASSES: HCPCS

## 2025-06-09 PROCEDURE — 99214 OFFICE O/P EST MOD 30 MIN: CPT | Performed by: NURSE PRACTITIONER

## 2025-06-09 RX ORDER — TRAMADOL HYDROCHLORIDE 200 MG/1
200 TABLET, EXTENDED RELEASE ORAL EVERY 24 HOURS
Qty: 30 TABLET | Refills: 0 | Status: SHIPPED | OUTPATIENT
Start: 2025-06-09 | End: 2025-06-10

## 2025-06-09 RX ORDER — TRAMADOL HYDROCHLORIDE 200 MG/1
200 TABLET, EXTENDED RELEASE ORAL EVERY 24 HOURS
Qty: 30 TABLET | Refills: 0 | Status: SHIPPED | OUTPATIENT
Start: 2025-06-09 | End: 2025-06-09

## 2025-06-09 RX ORDER — TRAMADOL HYDROCHLORIDE 50 MG/1
50 TABLET ORAL EVERY 6 HOURS PRN
Qty: 90 TABLET | Refills: 0 | Status: SHIPPED | OUTPATIENT
Start: 2025-06-09 | End: 2025-06-09

## 2025-06-09 RX ORDER — TRAMADOL HYDROCHLORIDE 50 MG/1
50 TABLET ORAL EVERY 6 HOURS PRN
Qty: 90 TABLET | Refills: 0 | Status: SHIPPED | OUTPATIENT
Start: 2025-06-09 | End: 2025-06-10

## 2025-06-09 ASSESSMENT — PAIN SCALES - GENERAL: PAINLEVEL_OUTOF10: MODERATE PAIN (5)

## 2025-06-09 NOTE — LETTER

## 2025-06-09 NOTE — PATIENT INSTRUCTIONS
Plan:   Physical Therapy: none  Check out Ken Johnson online,  based in [place]. Gentle short purposeful movement exercises to build into your day. [employer], [employer], [employer] and [employer] are all free. He has his own website and an ramiro.   Clinical Health Psychologist: none  Diagnostic Studies:   Medication Management:   Continue tramadol ER 200mg once daily fill 6/27 and begin 6/29/2025  Continue tramadol 50mg Q 6-8 hours PRN, max of 3/day. Fill 6/27 and begin 6/29/2025  Continue gabapentin 600mg tabs,  take 2 tabs three times daily for pain  continueTopamax, take 150mg (3 of the 50mg tabs) at bedtime. Reduce to 100mg if you do not feel well.  Continue methocarbamol 500-1000 mg TID PRN muscle spasms  Continue cyclobenzaprine at bedtime  Increase atogepant to 60mg/day for migraine prevention  Further procedures recommended: none  UDT today, last took Tramadol ER last night about 1130 and Tramadol IR about noon  Signed CSA today  Recommendations to PCP: see above  Follow up: 12 weeks in-person or video visit, your choice. Please call [phone] to make your follow-up appointment with me.     ----------------------------------------------------------------  Clinic Number:  [phone]   Call with any questions about your care and for scheduling assistance.   Calls are returned Monday through Friday between 8 AM and 4:30 PM. We usually get back to you within 2 business days depending on the issue/request.    If we are prescribing your medications:  For opioid medication refills, call the clinic or send a Pump! message 7 days in advance.  Please include:  Name of requested medication  Name of the pharmacy.  For non-opioid medications, call your pharmacy directly to request a refill. Please allow 3-4 days to be processed.   Per [place] State Law:  All controlled substance prescriptions must be filled within 30 days of being written.    For those controlled substances allowing refills, pickup must occur  within 30 days of last fill.      We believe regular attendance is key to your success in our program!    Any time you are unable to keep your appointment we ask that you call us at least 24 hours in advance to cancel.This will allow us to offer the appointment time to another patient.   Multiple missed appointments may lead to dismissal from the clinic.

## 2025-06-09 NOTE — PROGRESS NOTES
"[facility]        6/9/2025      Chief complaint:   \"Not too bad.\"  -his mother diagnosed with cancer several months ago  -low back pain with radicular symptoms on the right side, present 75% of the time  -neck pain with intermittent right arm  -headaches come and go      Interval history:  Truman Suero is a 64 year old male is known to me for   Chronic neck pain  Cervical DDD  Cervical spondylosis (pain worse with extension/rotation indicating facetogenic component to pain)  Axial low back pain  Myofascial pain/muscle spasms  Remote history of ETOH overuse, attended AA for awhile. Sober for 10 years  ---PMHx includes: neck pain  ---PSHx includes: none listed          Recommendations/plan at the last visit on 11/18/2024 included:  Physical Therapy: ordered PHYSICAL THERAPY  Clinical Health Psychologist: none  Diagnostic Studies: cervical MRI, call to schedule this test  [facility] Scheduling:  [facility]: [phone]  [facility] [phone]  [facility] ([facility] [place]): [phone]  Medication Management:   Continue tramadol ER 200mg once daily fill 11/27 and begin 11/30/2024  Continue tramadol 50mg Q 6-8 hours PRN, max of 3/day. Fill 11/27 and begin 11/30  Continue gabapentin 600mg tabs,  take 2 tabs three times daily for pain  continueTopamax, take 150mg (3 of the 50mg tabs) at bedtime. Reduce to 100mg if you do not feel well.  Continue methocarbamol 500-1000 mg TID PRN muscle spasms  Continue cyclobenzaprine at bedtime  Increase atogepant to 60mg/day for migraine prevention  Further procedures recommended: none  Recommendations to PCP: see above  Follow up: 12 weeks in-person or video visit, your choice. Please call [phone] to make your follow-up appointment with me.           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 He thinks these symptoms are worsening. No recent imaging of his neck. Previously had neck surgery with Dr Harkins.     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              At this point, the patient's participation with our multidisciplinary team includes:  The patient has been compliant with the program.  PT - attended non-pain management PHYSICAL THERAPY   Health Psych - has seen Kentrell Sierra Dony xCarlos in the remote past  Acupuncture: worked with Dr. Bereket LAY      Pain scores:    Pain intensity on average is 6 on a scale of 0-10.    Range is 3-9/10.   Pain right now is 5/10.  Pain is described as \"burning, shooting, throbbing, stabbing, miserable, numb, tiring, exhausting, penetrating, nagging, unbearable.\"  Pain is constant.       Current pain relevant medications:      -Tramadol 200mg ER in the evening (helpful)  -Tramadol 50mg take 1 tablet  Q 6 hours PRN (using 2-3 tabs per day, helpful)  -ibuprofen 600mg PRN (helpful)  -gabapentin 900mg TID (somewhat helpful)  -methocarbamol 500-1000mg TID PRN muscle spasms (takes 1000 mg BID, somewhat helpful)  -Topamax 50 mg BID (using PRN, advised to use regularly to prevent headaches)  -atogepant 30 daily for migraine prevention  (helpful)    Other pertinent medications:  None    Previous Medications:  OPIATES: Tramdol (somewhat helpful)  NSAIDS: ibuprofen (helpful), Aleve (helpful)  MUSCLE RELAXANTS: none  ANTI-MIGRAINE MEDS: none  ANTI-DEPRESSANTS: none  SLEEP AIDS: none  ANTI-CONVULSANTS: gabapentin (helpful)  TOPICALS: lidocaine ointment (somewhat helpful)  Other meds: Tylenol (not helpful)        Other treatments have included:  Truman REI Suero has not been seen at a pain clinic in the past.    PT: tried, somewhat helpful  Chiropractic: helpful  Acupuncture: none  TENs Unit: none       Injections:    cervical radiofrequency nerve ablation at [facility] in December 2016 (did get good relief, got 70% relief of his typical neck pain)  -6/29/2017 Cervical facet joint steroid injections at C4-5 and C5-6 on the right with Dr. Nicole Harding (not helpful)  -3/8/2018 C7-T1 " interlaminar DEMARCUS with Dr. Hugo Corrigan (not helpful)  -5/23/2018 right L4-5 transforaminal epidural steroid injection with Dr. Osorio (not helpful for back pain but did help leg pain)  -8/7/2018 bilateral SI joint injection with Dr Hugo Corrigan (helpful for one month)  -10/11/2018 l3-4 and L4-5 facet joint injections bilaterally with Dr. Hugo Corrigan (this has been helpful, more helpful than any other lumbar injections thus far)  -1/28/2019 bilateral L3-5 medial branch blocks #1 with Dr. Osorio (somewhat helpful)   -2/25/2019 LMBB #2 with Dr. Osorio (somewhat effective, but not enough to proceed to RFA)  -5/6/2019 bilateral L4/L5 and L5/S1 facet joint injections with Dr. Osorio (helpful)  -11/29/2019 C7-T1 interlaminar epidural steroid injection with Dr. Corrigan (helpful temporarily)  -10/30/2020 ISMAEL with Dr. Hugo Corrigan (temporarily helpful)  -5/17/2022 L3-4 interlaminar DEMARCUS with Dr. Hugo Corrigan (2-3 months of about 80% relief)  -1/4/2024 right piriformis injection with Dr. Hugo Corrigan (right leg numbness for several hours after injection, helpful for pain)    Surgeries:  10/29/2018 right anterior cervical C5-6 discectomy and fusion by Dr. 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     Adverse effects: none    Adherence to Rx protocol: yes      Side Effects: None  Patient is using the medication as prescribed: Yes    Medications:  Current Outpatient Medications   Medication Sig Dispense Refill    Atogepant 30 MG TABS Take 30 mg by mouth daily 3 month supply 90 tablet 3    Atogepant 60 MG TABS Take 60 mg by mouth daily. 90 day script 90 tablet 1    atorvastatin (LIPITOR) 20 MG tablet Take 1 tablet (20 mg) by mouth daily. Needs appointment and labs 90  tablet 2    cyclobenzaprine (FLEXERIL) 5 MG tablet Take 1-2 tablets (5-10 mg) by mouth nightly as needed for muscle spasms. Need 6 hours between this and methocarbamol. 3 month supply 135 tablet 3    gabapentin (NEURONTIN) 600 MG tablet Take 2 tablets (1,200 mg) by mouth 3 times daily. 3  month supply 540 tablet 3    methocarbamol (ROBAXIN) 500 MG tablet Take 1 tablet (500 mg) by mouth 3 times daily as needed for muscle spasms Should be 6 hours between this and cyclobenzaprine at night 90 tablet 11    metoprolol succinate ER (TOPROL XL) 25 MG 24 hr tablet Take 1 tablet (25 mg) by mouth daily. 90 tablet 2    order for DME BP cuff, brand as covered by insurance.  Dx: HTN 1 each 0    topiramate (TOPAMAX) 50 MG tablet Take 3 tablets (150 mg) by mouth at bedtime. 3 month script. 270 tablet 1    traMADol (ULTRAM) 50 MG tablet Take 1 tablet (50 mg) by mouth every 6 hours as needed for severe pain. Max 3/day. To last 30 days for chronic pain. Fill 05/29/25 to start 05/30/25 90 tablet 0    traMADol (ULTRAM-ER) 200 MG 24 hr tablet Take 1 tablet (200 mg) by mouth every 24 hours. To last 30 days for chronic pain.  Fill 05/29/25 to start 05/30/25 30 tablet 0       Medical History: any changes in medical history since they were last seen? none    Social History:   Home situation: , has 4 kids, 2 at home, one in college and one launched.   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 He used to work a sobriety program, used to go to   Drug use: none  History of chemical dependency treatment: no formal treatment, did     Is patient a current smoker or tobacco user?  QUIT on 3/20/2018  If yes, was cessation counseling offered?  no            Physical Exam:     Vital signs: BP (!) 144/89   Pulse 58      Behavioral  observations:  Awake, alert, conversant and cooperative     Gait:  normal     Musculoskeletal exam:  strength grossly equal throughout    Neuro exam:  deferred    Skin/vascular/autonomic:  No suspicious lesions on exposed skin.     Other:  na          IMAGING:    MR CERVICAL SPINE WITHOUT CONTRAST 9/5/2017 2:32 PM      HISTORY: Neck pain, worsening numbness in arms and lower extremities.  Radiculopathy, cervical region.     TECHNIQUE: Multiplanar multisequence images were obtained through the  cervical spine without contrast.     COMPARISON: 2/22/2017.     FINDINGS: Sagittal images demonstrate some minimal gentle cervical  kyphosis, otherwise normal posterior alignment. There is no evidence  for craniovertebral or cervicomedullary junction abnormality. The  cervical cord is minimally indented anteriorly at C4-C5 and C5-C6,  otherwise is normal in morphology and signal characteristics. Disc  space narrowing and discogenic marrow changes are present C3-C4,  C4-C5, C5-C6 and C6-C7.     C2-C3: Minimal facet hypertrophy. No stenosis.     C3-C4: Broad-based posterior osteophyte formation is present causing  some mild bilateral neural foraminal stenosis, but no central canal  stenosis.     C4-C5: Broad-based posterior osteophyte formation and mild facet  hypertrophy is present causing some mild to moderate central canal  stenosis, mild cord deformity, and mild-to-moderate bilateral neural  foraminal stenosis.     C5-C6: Broad-based posterior osteophyte formation and disc bulging is  present along with some facet hypertrophy. There is moderate central  canal stenosis and moderate bilateral neural foraminal stenosis.  Findings have progressed since the prior exam.     C6-C7: Broad-based disc bulging is present along with some minimal  posterior osteophyte formation. There is borderline to mild central  canal stenosis, but no neural foraminal stenosis.     C7-T1: Moderate facet hypertrophy. No significant stenosis.          IMPRESSION: Moderate multilevel degenerative disc and facet disease  with some progression at C5-C6 where there is moderate central canal  and bilateral neural foraminal stenosis along with cord deformity.          MR CERVICAL SPINE WITHOUT CONTRAST  2/22/2017 9:59 AM     HISTORY:  Bilateral neck and arm pain for three years.     COMPARISON: None.     TECHNIQUE: Routine MR cervical spine extended through T2.     FINDINGS: There is some degenerative bone marrow signal change C3-C6.  No malignant or destructive lesions. Normal alignment through T2.  Reversed cervical adenosis C3-C6.     The cervical and upper thoracic spinal cord appear intrinsically  normal. The craniocervical junction region is normal. No paraspinous  soft tissue abnormality.     Findings by level as follows:     C2-C3: Negative. No disc protrusion. No central or lateral stenosis.     C3-C4: Mild disc space narrowing. Mild diffuse annular bulge. Small  uncinate spur on the right. No significant central or left-sided  stenosis. Mild right-sided foraminal stenosis.     C4-C5: Moderate degenerative narrowing of the interspace. Mild ventral  disc osteophyte complex with minimal central stenosis. Small uncinate  spurs bilaterally with mild bilateral foraminal stenosis.     C6-C7: Moderate disc space narrowing. Mild broad-based disc osteophyte  complex with minimal central stenosis. Minimal uncinate spurs with  mild bilateral foraminal stenosis.     C6-C7: Moderate disc space narrowing. Mild ventral disc osteophyte  complex. No significant central or lateral stenosis.     C7-T1: No disc protrusion. No central or lateral stenosis. Moderate  bilateral facet joint disease.         IMPRESSION:  1. Degenerative changes as described C3-C4 through C7-T1. There is  only mild central and foraminal stenosis as described.  2. No intrinsic spinal cord abnormality through T2        EXAM: MR LUMBAR SPINE W/O CONTRAST  LOCATION: [facility]  [facility]  DATE/TIME: 10/25/2021 7:51 PM     INDICATION: Lumbar radiculopathy, no red flags.  COMPARISON: None.  TECHNIQUE: Routine Lumbar Spine MRI without IV contrast.     FINDINGS:   Nomenclature is based on 5 lumbar type vertebral bodies. Normal vertebral body heights, alignment and marrow signal. Normal distal spinal cord and cauda equina with conus medullaris at L1-L2. No extraspinal abnormality. Unremarkable visualized bony   pelvis.     T12-L1: Normal disc height and signal. No herniation. Mild facet arthropathy bilaterally. No spinal canal or neural foraminal stenosis.      L1-L2: There is loss of disc height, disc desiccation and mild circumferential disc bulging. No herniation. Normal facets. No spinal canal or neural foraminal stenosis.     L2-L3: There is loss of disc height, disc desiccation and mild circumferential disc bulging. No herniation. Mild facet arthropathy bilaterally. No spinal canal stenosis. Mild bilateral neural foraminal narrowing. No change from the comparison study.     L3-L4: There is loss of disc height, disc desiccation and mild circumferential disc bulging with a superimposed tiny left paracentral disc herniation (extrusion). Moderate facet arthropathy bilaterally. No spinal canal stenosis. Moderate left foraminal   stenosis. Mild right foraminal narrowing. No change from the comparison study.     L4-L5: There is loss of disc height, disc desiccation and mild circumferential disc bulging. No herniation. Moderate facet arthropathy bilaterally. No spinal canal stenosis. Moderate right foraminal stenosis. Mild left foraminal narrowing. No change from   the comparison study.     L5-S1: Normal disc height and signal. No herniation. Moderate facet arthropathy bilaterally. No spinal canal or neural foraminal stenosis. No change from the comparison study.                                                                      IMPRESSION:  1.  Diffuse degenerative change of the lumbar spine as  detailed above without appreciable change from the comparison study.  2.  No significant spinal canal stenosis at any level of the lumbar spine.  3.  Moderate neural foraminal stenosis on the left at L3-L4 and on the right at L4-L5.        EXAM: MR CERVICAL SPINE W/O CONTRAST  LOCATION: [facility]  DATE: 12/16/2024 (reviewed with patient in clinic on 6/9/2025)     INDICATION:  Cervical spondylosis without myelopathy, Cervical radicular pain, DDD (degenerative disc disease), cervical, S/P cervical spinal fusion, Cervical stenosis of spinal canal, Chronic neck pain, Chronic neck pain  COMPARISON: None.  TECHNIQUE: MRI Cervical Spine without IV contrast.     FINDINGS:   Reversal of the upper cervical lordosis with grade 1 anterolisthesis at C2-C3 and C7-T1. Postsurgical changes related to C5-C6 ACDF. Large anterior vertebral body osteophyte formation. Type I Modic changes at C3-C4, C4-C5, and C6-C7. Type II Modic   changes at C3-C4 and C4-C5. Vertebral body heights within normal limits. No acute fracture.     Spinal cord signal within normal limits. No intrathecal mass or epidural fluid collection.     Paraspinal soft tissue within normal limits.     C2-C3: Uncovering of the disc. Moderate facet arthropathy. No spinal canal stenosis. Moderate right and mild left foraminal stenosis.     C3-C4: Disc bulge with osteophytic ridging. Moderate facet arthropathy. Mild spinal canal stenosis. Severe bilateral foraminal stenosis.     C4-C5: Disc bulge with osteophytic ridging. Moderate facet arthropathy. Moderate spinal canal stenosis. Severe bilateral foraminal stenosis.     C5-C6: ACDF. Moderate facet arthropathy. No spinal canal stenosis. Moderate left and mild right foraminal stenosis.     C6-C7: Disc bulge with osteophytic ridging. Moderate facet arthropathy. Mild spinal canal stenosis. Mild right bilateral foraminal stenosis.     C7-T1: Slight uncovering of the disc. Moderate facet arthropathy. No  spinal canal stenosis. Moderate bilateral foraminal stenosis.                                                                         IMPRESSION:  1.  Marked degenerative changes throughout the cervical spine with multilevel discogenic endplate changes.  2.  Moderate spinal canal stenosis at C4-C5. Mild spinal canal stenosis at C3-C4 and C6-C7.  3.  Bilateral foraminal stenosis, severe at multiple levels.            [place] Pres[place] Monitoring [place]:  [place]ed [place]  6/9/2025- no concerning fills.  Melanie STEVENS, RN CNP, FNP  [facility]  [facility] [place]RE [place] for selecting candidates for long term opioid analgesia for chronic pain:  Diagnosis  2  Intractablility  2  Risk    Psychological health  2    Chemical health  2    Reliability  2    Social support  3  Efficacy  2    Total DIRE Score = 15. Note that   7-13 predicts poor outcome (compliance and efficacy) from opioid prescribing; 14-21 predicts good outcome (compliance and efficacy)  from opioid prescribing.      Assessment:   Cervical spondylosis without myelopathy  Cervical degenerative disc disease  Status post cervical spinal fusion  Cervical stenosis of spinal canal  Chronic bilateral low back pain with right sided sciatica  Intractable chronic migraine without aura and without status migrainosus  Myofascial pain  Chronic continuous use of opioids  Encounter for long-term use of opiate analgesic    Essential hypertension  Urine drug screen last done 6/9/2025  Controlled substance agreement signed 6/9/2025  Remote history of ETOH overuse, attended AA for awhile. Sober for >10 years  PMHx includes: neck pain  PSHx includes: none listed       Plan:   Physical Therapy: none  Check out Ken Johnson online,  based in [place]. Gentle short purposeful movement exercises to build into your day. [employer], [employer], [employer] and [employer] are all free. He has his own website and an ramiro.    Clinical Health Psychologist: none  Diagnostic Studies:   Medication Management:   Continue tramadol ER 200mg once daily fill 6/27 and begin 6/29/2025  Continue tramadol 50mg Q 6-8 hours PRN, max of 3/day. Fill 6/27 and begin 6/29/2025  Continue gabapentin 600mg tabs,  take 2 tabs three times daily for pain  continueTopamax, take 150mg (3 of the 50mg tabs) at bedtime. Reduce to 100mg if you do not feel well.  Continue methocarbamol 500-1000 mg TID PRN muscle spasms  Continue cyclobenzaprine at bedtime  Increase atogepant to 60mg/day for migraine prevention  Further procedures recommended: none  UDT today, last took Tramadol ER last night about 1130 and Tramadol IR about noon  Signed CSA today  Recommendations to PCP: see above  Follow up: 12 weeks in-person or video visit, your choice. Please call [phone] to make your follow-up appointment with me.           ASSESSMENT AND PLAN:  (M47.812) Cervical spondylosis without myelopathy  (primary encounter diagnosis)  Comment:   Plan: Adult Pain Clinic Follow-Up Order, traMADol         (ULTRAM) 50 MG tablet, traMADol (ULTRAM-ER) 200        MG 24 hr tablet, DISCONTINUED: traMADol         (ULTRAM) 50 MG tablet, DISCONTINUED: traMADol         (ULTRAM-ER) 200 MG 24 hr tablet            (M50.30) DDD (degenerative disc disease), cervical  Comment:   Plan: Adult Pain Clinic Follow-Up Order, traMADol         (ULTRAM) 50 MG tablet, traMADol (ULTRAM-ER) 200        MG 24 hr tablet, DISCONTINUED: traMADol         (ULTRAM) 50 MG tablet, DISCONTINUED: traMADol         (ULTRAM-ER) 200 MG 24 hr tablet            (Z98.1) S/P cervical spinal fusion  Comment:   Plan: Adult Pain Clinic Follow-Up Order, traMADol         (ULTRAM) 50 MG tablet, traMADol (ULTRAM-ER) 200        MG 24 hr tablet, DISCONTINUED: traMADol         (ULTRAM) 50 MG tablet, DISCONTINUED: traMADol         (ULTRAM-ER) 200 MG 24 hr tablet            (M48.02) Cervical stenosis of spinal canal  Comment:   Plan: Adult Pain  Clinic Follow-Up Order, traMADol         (ULTRAM) 50 MG tablet, traMADol (ULTRAM-ER) 200        MG 24 hr tablet, DISCONTINUED: traMADol         (ULTRAM) 50 MG tablet, DISCONTINUED: traMADol         (ULTRAM-ER) 200 MG 24 hr tablet            (G89.29,  M54.41) Chronic bilateral low back pain with right-sided sciatica  Comment:   Plan:     (G43.719) Intractable chronic migraine without aura and without status migrainosus  Comment:   Plan: Atogepant (QULIPTA) 60 MG tablet, Adult Pain         Clinic Follow-Up Order            (M79.18) Myofascial pain  Comment:   Plan: Adult Pain Clinic Follow-Up Order, traMADol         (ULTRAM) 50 MG tablet, DISCONTINUED: traMADol         (ULTRAM) 50 MG tablet            (F11.90) Chronic, continuous use of opioids  Comment:   Plan: Ethanol urine, Ethyl Glucuronide Screen with         Reflex to Confirmation, Urine, Drug         Confirmation Panel Urine with Creatinine, Adult        Pain Clinic Follow-Up Order, traMADol (ULTRAM)         50 MG tablet, traMADol (ULTRAM-ER) 200 MG 24 hr        tablet, DISCONTINUED: traMADol (ULTRAM) 50 MG         tablet, DISCONTINUED: traMADol (ULTRAM-ER) 200         MG 24 hr tablet            (Z79.899) Encounter for long-term (current) use of medications  Comment:   Plan: Ethanol urine, Ethyl Glucuronide Screen with         Reflex to Confirmation, Urine, Drug         Confirmation Panel Urine with Creatinine, Adult        Pain Clinic Follow-Up Order              BILLING TIME DOCUMENTATION:   TOTAL TIME includes:   Time spent preparing to see the patient: 0 minutes (reviewing records and tests)  Time spend face to face with the patient: 32 minutes  Time spent ordering tests, medications, procedures and referrals: 0 minutes  Time spent Referring and communicating with other healthcare professionals: 0 minutes  Documenting clinical information in Epic: 5 minutes    The total TIME spent on this patient on the day of the appointment was 37 minutes.                    Melanie STEVENS, RN CNP, FNP  [facility]

## 2025-06-10 ENCOUNTER — RESULTS FOLLOW-UP (OUTPATIENT)
Dept: PALLIATIVE MEDICINE | Facility: CLINIC | Age: 65
End: 2025-06-10

## 2025-06-10 ENCOUNTER — MYC REFILL (OUTPATIENT)
Dept: FAMILY MEDICINE | Facility: CLINIC | Age: 65
End: 2025-06-10
Payer: COMMERCIAL

## 2025-06-10 DIAGNOSIS — I77.810 ASCENDING AORTA DILATATION: ICD-10-CM

## 2025-06-10 DIAGNOSIS — M79.18 MYOFASCIAL PAIN: ICD-10-CM

## 2025-06-10 DIAGNOSIS — M47.812 CERVICAL SPONDYLOSIS WITHOUT MYELOPATHY: ICD-10-CM

## 2025-06-10 DIAGNOSIS — F11.90 CHRONIC, CONTINUOUS USE OF OPIOIDS: ICD-10-CM

## 2025-06-10 DIAGNOSIS — I25.10 MILD CAD: ICD-10-CM

## 2025-06-10 DIAGNOSIS — Z98.1 S/P CERVICAL SPINAL FUSION: ICD-10-CM

## 2025-06-10 DIAGNOSIS — M50.30 DDD (DEGENERATIVE DISC DISEASE), CERVICAL: ICD-10-CM

## 2025-06-10 DIAGNOSIS — M48.02 CERVICAL STENOSIS OF SPINAL CANAL: ICD-10-CM

## 2025-06-10 LAB
CREAT UR-MCNC: 131 MG/DL
ETHANOL UR QL SCN: NORMAL

## 2025-06-10 RX ORDER — METOPROLOL SUCCINATE 25 MG/1
25 TABLET, EXTENDED RELEASE ORAL DAILY
Qty: 90 TABLET | Refills: 0 | Status: SHIPPED | OUTPATIENT
Start: 2025-06-10

## 2025-06-10 RX ORDER — TRAMADOL HYDROCHLORIDE 50 MG/1
50 TABLET ORAL EVERY 6 HOURS PRN
Qty: 90 TABLET | Refills: 0 | Status: SHIPPED | OUTPATIENT
Start: 2025-06-10

## 2025-06-10 RX ORDER — ATORVASTATIN CALCIUM 20 MG/1
20 TABLET, FILM COATED ORAL DAILY
Qty: 90 TABLET | Refills: 0 | Status: SHIPPED | OUTPATIENT
Start: 2025-06-10

## 2025-06-10 RX ORDER — TRAMADOL HYDROCHLORIDE 200 MG/1
200 TABLET, EXTENDED RELEASE ORAL EVERY 24 HOURS
Qty: 30 TABLET | Refills: 0 | Status: SHIPPED | OUTPATIENT
Start: 2025-06-10

## 2025-06-10 NOTE — TELEPHONE ENCOUNTER
Signed Prescriptions:                        Disp   Refills    traMADol (ULTRAM) 50 MG tablet             90 tab*0        Sig: Take 1 tablet (50 mg) by mouth every 6 hours as           needed for severe pain. Max 3/day. To last 30           days for chronic pain. Fill 06/27/25 to start           06/29/25  Authorizing Provider: MELANIE BENSON    traMADol (ULTRAM-ER) 200 MG 24 hr tablet   30 tab*0        Sig: Take 1 tablet (200 mg) by mouth every 24 hours. To           last 30 days for chronic pain.  Fill 06/27/25 to           start 06/29/25  Authorizing Provider: MELANIE BENSON        Reviewed [place]  Neel[place] 10, 2025- no concerning fills.    Melanie Benson APRN, RN CNP, FNP  [facility]  [facility] [place]

## 2025-06-10 NOTE — TELEPHONE ENCOUNTER
The electronic transmission failed for these prescriptions. Routing to provider to resend.     Emeli Moon, ANALISAN, RN  Care Coordinator  [facility]

## 2025-06-11 LAB
ETHYL GLUCURONIDE UR QL SCN: NEGATIVE NG/ML
GABAPENTIN UR QL CFM: PRESENT
N-NORTRAMADOL/CREAT UR CFM: ABNORMAL NG/MG{CREAT}
O-NORTRAMADOL UR CFM-MCNC: ABNORMAL NG/ML
TRAMADOL CTO UR CFM-MCNC: ABNORMAL NG/ML
TRAMADOL/CREAT UR: ABNORMAL

## 2025-06-17 ENCOUNTER — OFFICE VISIT (OUTPATIENT)
Dept: OPTOMETRY | Facility: CLINIC | Age: 65
End: 2025-06-17
Attending: FAMILY MEDICINE
Payer: COMMERCIAL

## 2025-06-17 DIAGNOSIS — Z01.00 ENCOUNTER FOR EXAMINATION OF EYES AND VISION WITHOUT ABNORMAL FINDINGS: Primary | ICD-10-CM

## 2025-06-17 DIAGNOSIS — H52.02 HYPERMETROPIA, LEFT: ICD-10-CM

## 2025-06-17 DIAGNOSIS — H52.4 PRESBYOPIA: ICD-10-CM

## 2025-06-17 DIAGNOSIS — H52.11 MYOPIA, RIGHT: ICD-10-CM

## 2025-06-17 PROCEDURE — 92004 COMPRE OPH EXAM NEW PT 1/>: CPT | Performed by: OPTOMETRIST

## 2025-06-17 ASSESSMENT — TONOMETRY
OS_IOP_MMHG: 11
IOP_METHOD: APPLANATION
OD_IOP_MMHG: 12

## 2025-06-17 ASSESSMENT — CONF VISUAL FIELD
OD_NORMAL: 1
OD_INFERIOR_NASAL_RESTRICTION: 0
OS_INFERIOR_TEMPORAL_RESTRICTION: 0
OS_SUPERIOR_NASAL_RESTRICTION: 0
OS_NORMAL: 1
OS_SUPERIOR_TEMPORAL_RESTRICTION: 0
OD_INFERIOR_TEMPORAL_RESTRICTION: 0
OD_SUPERIOR_TEMPORAL_RESTRICTION: 0
OS_INFERIOR_NASAL_RESTRICTION: 0
OD_SUPERIOR_NASAL_RESTRICTION: 0

## 2025-06-17 ASSESSMENT — CUP TO DISC RATIO
OS_RATIO: 0.2
OD_RATIO: 0.25

## 2025-06-17 ASSESSMENT — REFRACTION_MANIFEST
OS_CYLINDER: SPHERE
OD_SPHERE: -0.50
OD_CYLINDER: SPHERE
OD_ADD: +2.00
OS_SPHERE: +0.25
OS_ADD: +2.00

## 2025-06-17 ASSESSMENT — SLIT LAMP EXAM - LIDS: COMMENTS: NORMAL

## 2025-06-17 ASSESSMENT — EXTERNAL EXAM - LEFT EYE: OS_EXAM: NORMAL

## 2025-06-17 ASSESSMENT — VISUAL ACUITY
METHOD: SNELLEN - LINEAR
OD_SC: 20/25-
OS_SC: 20/25+

## 2025-06-17 ASSESSMENT — EXTERNAL EXAM - RIGHT EYE: OD_EXAM: NORMAL

## 2025-06-17 NOTE — PATIENT INSTRUCTIONS
Updated glasses prescription provided today. Optional to fill.   OK to continue use of OTC reading glasses as needed.     Return in 2 years for a comprehensive eye exam, or sooner if needed.      The effects of the dilating drops last for 4- 6 hours.  You will be more sensitive to light and vision will be blurry up close.  Mydriatic sunglasses were given if needed.     Rashad Gorman, OD  [facility]  [address] [place] [address]. [place]  [place]LE [place]ama  [address]    [phone]

## 2025-06-17 NOTE — LETTER
6/17/2025      Truman MINAYA Pio  [address] [id number]      Dear Colleague,    Thank you for referring your patient, Truman Suero, to the [facility]. Please see a copy of my visit note below.    Chief Complaint   Patient presents with     Annual Eye Exam     Chief Complaint(s) and History of Present Illness(es)       Annual Eye Exam                    Lab Results   Component Value Date    A1C 5.5 01/22/2025    A1C 5.5 01/10/2024       Last Eye Exam: 50+ years  Dilated Previously: No, side effects of dilation explained today    What are you currently using to see?  Readers - +1.50 as needed    Distance Vision Acuity: Satisfied with vision    Near Vision Acuity: Satisfied with vision while reading  with readers    Eye Comfort: good  Do you use eye drops? : No  Occupation or Hobbies: Teacher - [employer]       Medical, surgical and family histories reviewed and updated 6/17/2025.       OBJECTIVE: See Ophthalmology exam    ASSESSMENT:    ICD-10-CM    1. Encounter for examination of eyes and vision without abnormal findings  Z01.00       2. Myopia, right  H52.11       3. 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 Presbyopia  H52.4           PLAN:    Truman Suero aware  eye exam results will be sent to [facility].  Patient Instructions   Updated glasses prescription provided today. Optional to fill.   OK to continue use of OTC reading glasses as needed.     Return in 2 years for a comprehensive eye exam, or sooner if needed.      The effects of the dilating drops last for 4- 6 hours.  You will be more sensitive to light and vision will be blurry up close.  Mydriatic sunglasses were given if needed.     Rashad Gorman, EDUARDA  [facility]  [address] [place]  [address]    [phone]              Again, thank you for allowing me to participate in the care of your patient.        Sincerely,        Rashad Gorman OD    Electronically  signed

## 2025-06-17 NOTE — PROGRESS NOTES
Chief Complaint   Patient presents with    Annual Eye Exam     Chief Complaint(s) and History of Present Illness(es)       Annual Eye Exam                    Lab Results   Component Value Date    A1C 5.5 01/22/2025    A1C 5.5 01/10/2024       Last Eye Exam: 50+ years  Dilated Previously: No, side effects of dilation explained today    What are you currently using to see?  Readers - +1.50 as needed    Distance Vision Acuity: Satisfied with vision    Near Vision Acuity: Satisfied with vision while reading  with readers    Eye Comfort: good  Do you use eye drops? : No  Occupation or Hobbies: Teacher - Special Ed       Medical, surgical and family histories reviewed and updated 6/17/2025.       OBJECTIVE: See Ophthalmology exam    ASSESSMENT:    ICD-10-CM    1. Encounter for examination of eyes and vision without abnormal findings  Z01.00       2. Myopia, right  H52.11       3. 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 Presbyopia  H52.4           PLAN:    Truman Suero aware  eye exam results will be sent to [facility].  Patient Instructions   Updated glasses prescription provided today. Optional to fill.   OK to continue use of OTC reading glasses as needed.     Return in 2 years for a comprehensive eye exam, or sooner if needed.      The effects of the dilating drops last for 4- 6 hours.  You will be more sensitive to light and vision will be blurry up close.  Mydriatic sunglasses were given if needed.     Rashad Gorman, OD  [facility]  [address] [place] [address]. [place]  [address]    [phone]

## 2025-07-17 ENCOUNTER — PATIENT OUTREACH (OUTPATIENT)
Dept: CARE COORDINATION | Facility: CLINIC | Age: 65
End: 2025-07-17
Payer: COMMERCIAL

## 2025-07-17 ASSESSMENT — PAIN SCALES - PAIN ENJOYMENT GENERAL ACTIVITY SCALE (PEG)
INTERFERED_GENERAL_ACTIVITY: 6
INTERFERED_ENJOYMENT_LIFE: 5
AVG_PAIN_PASTWEEK: 7
PEG_TOTALSCORE: 6

## 2025-07-21 NOTE — PROGRESS NOTES
[facility]      7/22/2025      Chief complaint:   Posterior bilateral low back pain, points to over the SI joints, can radiate to the groin. Right side is worse than the left side. This is the worst pain  -neck pain with right arm pain        Interval history:  Truman Suero is a 64 year old male is known to me for   Chronic neck pain  Cervical DDD  Cervical spondylosis (pain worse with extension/rotation indicating facetogenic component to pain)  Axial low back pain  Myofascial pain/muscle spasms  Remote history of ETOH overuse, attended AA for awhile. Sober for 10 years  ---PMHx includes: neck pain  ---PSHx includes: none listed          Recommendations/plan at the last visit on 6/9/2025 included:  Physical Therapy: none  Check out Ken Johnson online,  based in [place]. Gentle short purposeful movement exercises to build into your day. Scheduling Employee Scheduling Software, [employer], [employer] and [employer] are all free. He has his own website and an ramiro.   Clinical Health Psychologist: none  Diagnostic Studies:   Medication Management:   Continue tramadol ER 200mg once daily fill 6/27 and begin 6/29/2025  Continue tramadol 50mg Q 6-8 hours PRN, max of 3/day. Fill 6/27 and begin 6/29/2025  Continue gabapentin 600mg tabs,  take 2 tabs three times daily for pain  continueTopamax, take 150mg (3 of the 50mg tabs) at bedtime. Reduce to 100mg if you do not feel well.  Continue methocarbamol 500-1000 mg TID PRN muscle spasms  Continue cyclobenzaprine at bedtime  Increase atogepant to 60mg/day for migraine prevention  Further procedures recommended: none  UDT today, last took Tramadol ER last night about 1130 and Tramadol IR about noon  Signed CSA today  Recommendations to PCP: see above  Follow up: 12 weeks in-person or video visit, your choice. Please call [phone] to make your follow-up appointment with me.           Since his last visit, Truman Suero reports:    Interval history July  "22, 2025  -Posterior bilateral low back pain, points to over the SI joints, can radiate to the groin. Right side is worse than the left side. This is the worst pain  -neck pain with right arm pain   -standing a lot bothers this low back pain  -he points to the bilateral SI joint region, this can wrap around to the groin.   -he has been more active this summer  -golfing, home projects.  -he does do the exercises with the [facility] series that is found on [place].   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 He thinks these symptoms are worsening. No recent imaging of his neck. Previously had neck surgery with Dr Harkins.     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              At this point, the patient's participation with our multidisciplinary team includes:  The patient has been compliant with the program.  PT - attended non-pain management PHYSICAL THERAPY   Health Psych - has seen Kentrell Castañeda xCarlos in the remote past  Acupuncture: worked with Dr. Bereket LAY      Pain scores:    Pain intensity on average is 5-6 on a scale of 0-10.    Range is 4-8/10.   Pain right now is 6/10.  Pain is described as \"burning, shooting, throbbing, stabbing, miserable, numb, tiring, exhausting, penetrating, nagging, unbearable.\"  Pain is constant.       Current pain relevant medications:      -Tramadol 200mg ER in the evening (helpful)  -Tramadol 50mg take 1 tablet  Q 6 hours PRN (using 2-3 tabs per day, helpful)  -ibuprofen 600mg PRN (helpful)  -gabapentin 900mg TID (somewhat helpful)  -methocarbamol 500-1000mg TID PRN muscle spasms (takes 1000 mg BID, somewhat helpful)  -Topamax 50 mg BID (using PRN, advised to use regularly to prevent headaches)  -atogepant 30 daily for migraine prevention  (helpful)    Other pertinent medications:  None    Previous Medications:  OPIATES: Tramdol (somewhat helpful)  NSAIDS: ibuprofen (helpful), Aleve " (helpful)  MUSCLE RELAXANTS: none  ANTI-MIGRAINE MEDS: none  ANTI-DEPRESSANTS: none  SLEEP AIDS: none  ANTI-CONVULSANTS: gabapentin (helpful)  TOPICALS: lidocaine ointment (somewhat helpful)  Other meds: Tylenol (not helpful)        Other treatments have included:  Truman Suero has not been seen at a pain clinic in the past.    PT: tried, somewhat helpful  Chiropractic: helpful  Acupuncture: none  TENs Unit: none       Injections:    cervical radiofrequency nerve ablation at [facility] in December 2016 (did get good relief, got 70% relief of his typical neck pain)  -6/29/2017 Cervical facet joint steroid injections at C4-5 and C5-6 on the right with Dr. Nicole Hardign (not helpful)  -3/8/2018 C7-T1 interlaminar DEMARCUS with Dr. Hugo Corrigan (not helpful)  -5/23/2018 right L4-5 transforaminal epidural steroid injection with Dr. Osorio (not helpful for back pain but did help leg pain)  -8/7/2018 bilateral SI joint injection with Dr Hugo Corrigan (helpful for one month)  -10/11/2018 l3-4 and L4-5 facet joint injections bilaterally with Dr. Hugo Corrigan (this has been helpful, more helpful than any other lumbar injections thus far)  -1/28/2019 bilateral L3-5 medial branch blocks #1 with Dr. Osorio (somewhat helpful)   -2/25/2019 LMBB #2 with Dr. Osorio (somewhat effective, but not enough to proceed to RFA)  -5/6/2019 bilateral L4/L5 and L5/S1 facet joint injections with Dr. Osorio (helpful)  -11/29/2019 C7-T1 interlaminar epidural steroid injection with Dr. Corrigan (helpful temporarily)  -10/30/2020 ISMAEL with Dr. Hugo Corrigan (temporarily helpful)  -5/17/2022 L3-4 interlaminar DEMARCUS with Dr. Hugo Corrigan (2-3 months of about 80% relief)  -1/4/2024 right piriformis injection with Dr. Hugo Corrigan (right leg numbness for several hours after injection, helpful for pain)      Surgeries:  10/29/2018 right anterior cervical C5-6 discectomy and fusion by Dr. Jud Harkins (somewhat helpful)      THE 4 A's OF  OPIOID MAINTENANCE ANALGESIA    Analgesia: long acting Tramadol with some short acting tramadol does give some relief    Activity: ADLs, working at the [employer] as a para/  at present time.     Adverse effects: none    Adherence to Rx protocol: yes      Side Effects: None  Patient is using the medication as prescribed: Yes    Medications:  Current Outpatient Medications   Medication Sig Dispense Refill    Atogepant (QULIPTA) 60 MG tablet Take 1 tablet (60 mg) by mouth daily. 90 day script 90 tablet 3    atorvastatin (LIPITOR) 20 MG tablet Take 1 tablet (20 mg) by mouth daily. Needs appointment and labs 90 tablet 0    cyclobenzaprine (FLEXERIL) 5 MG tablet Take 1-2 tablets (5-10 mg) by mouth nightly as needed for muscle spasms. Need 6 hours between this and methocarbamol. 3 month supply 135 tablet 3    gabapentin (NEURONTIN) 600 MG tablet Take 2 tablets (1,200 mg) by mouth 3 times daily. 3  month supply 540 tablet 3    methocarbamol (ROBAXIN) 500 MG tablet Take 1 tablet (500 mg) by mouth 3 times daily as needed for muscle spasms Should be 6 hours between this and cyclobenzaprine at night 90 tablet 11    metoprolol succinate ER (TOPROL XL) 25 MG 24 hr tablet Take 1 tablet (25 mg) by mouth daily. 90 tablet 0    order for DME BP cuff, brand as covered by insurance.  Dx: HTN 1 each 0    topiramate (TOPAMAX) 50 MG tablet Take 3 tablets (150 mg) by mouth at bedtime. 3 month script. 270 tablet 1    traMADol (ULTRAM) 50 MG tablet Take 1 tablet (50 mg) by mouth every 6 hours as needed for severe pain. Max 3/day. To last 30 days for chronic pain. Fill 06/27/25 to start 06/29/25 90 tablet 0    traMADol (ULTRAM-ER) 200 MG 24 hr tablet Take 1 tablet (200 mg) by mouth every 24 hours. To last 30 days for chronic pain.  Fill 06/27/25 to start 06/29/25 30 tablet 0       Medical History: any changes in medical history since they were last seen? none    Social History:   Home situation:  ", has 4 kids, 2 at home, one in college and one launched.   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 He used to work a sobriety program, used to go to   Drug use: none  History of chemical dependency treatment: no formal treatment, did AA    Is patient a current smoker or tobacco user?  QUIT on 3/20/2018  If yes, was cessation counseling offered?  no            Physical Exam:     Vital signs: /86   Pulse 51      Behavioral observations:  Awake, alert, conversant and cooperative     Gait:  normal     Musculoskeletal exam:    lumbar flexion 90 degrees  Lumbar flexion 20 degrees, \"tightness\"   Lumbar ext/rot to the right is painful  Lumbar ext/rot to the left is painful  SI joints mildly tender bilaterally right > Left   Piriformis Non-tender bilaterally   GTs Non-tender bilaterally   SAMARA positive on the right  Patricks positive on the right  SI joint distraction test negative  SI compression on the right is mildly tender  SI compression on the left is non-tender  Gaenslen's bilaterally negative  Hip joint internal and external rotation mildly painful on the right side  Hip flexion mildly tender on the right side    Neuro exam:  SILT in all extremities     Skin/vascular/autonomic:  No suspicious lesions on exposed skin.     Other:  na          IMAGING:    MR CERVICAL SPINE WITHOUT CONTRAST 9/5/2017 2:32 PM      HISTORY: Neck pain, worsening numbness in arms and lower extremities.  Radiculopathy, cervical region.     TECHNIQUE: Multiplanar multisequence images were obtained through the  cervical spine without contrast.     COMPARISON: 2/22/2017.     FINDINGS: Sagittal images demonstrate some minimal gentle cervical  kyphosis, otherwise normal posterior alignment. There is no evidence  for " craniovertebral or cervicomedullary junction abnormality. The  cervical cord is minimally indented anteriorly at C4-C5 and C5-C6,  otherwise is normal in morphology and signal characteristics. Disc  space narrowing and discogenic marrow changes are present C3-C4,  C4-C5, C5-C6 and C6-C7.     C2-C3: Minimal facet hypertrophy. No stenosis.     C3-C4: Broad-based posterior osteophyte formation is present causing  some mild bilateral neural foraminal stenosis, but no central canal  stenosis.     C4-C5: Broad-based posterior osteophyte formation and mild facet  hypertrophy is present causing some mild to moderate central canal  stenosis, mild cord deformity, and mild-to-moderate bilateral neural  foraminal stenosis.     C5-C6: Broad-based posterior osteophyte formation and disc bulging is  present along with some facet hypertrophy. There is moderate central  canal stenosis and moderate bilateral neural foraminal stenosis.  Findings have progressed since the prior exam.     C6-C7: Broad-based disc bulging is present along with some minimal  posterior osteophyte formation. There is borderline to mild central  canal stenosis, but no neural foraminal stenosis.     C7-T1: Moderate facet hypertrophy. No significant stenosis.         IMPRESSION: Moderate multilevel degenerative disc and facet disease  with some progression at C5-C6 where there is moderate central canal  and bilateral neural foraminal stenosis along with cord deformity.          MR CERVICAL SPINE WITHOUT CONTRAST  2/22/2017 9:59 AM     HISTORY:  Bilateral neck and arm pain for three years.     COMPARISON: None.     TECHNIQUE: Routine MR cervical spine extended through T2.     FINDINGS: There is some degenerative bone marrow signal change C3-C6.  No malignant or destructive lesions. Normal alignment through T2.  Reversed cervical adenosis C3-C6.     The cervical and upper thoracic spinal cord appear intrinsically  normal. The craniocervical junction region is  normal. No paraspinous  soft tissue abnormality.     Findings by level as follows:     C2-C3: Negative. No disc protrusion. No central or lateral stenosis.     C3-C4: Mild disc space narrowing. Mild diffuse annular bulge. Small  uncinate spur on the right. No significant central or left-sided  stenosis. Mild right-sided foraminal stenosis.     C4-C5: Moderate degenerative narrowing of the interspace. Mild ventral  disc osteophyte complex with minimal central stenosis. Small uncinate  spurs bilaterally with mild bilateral foraminal stenosis.     C6-C7: Moderate disc space narrowing. Mild broad-based disc osteophyte  complex with minimal central stenosis. Minimal uncinate spurs with  mild bilateral foraminal stenosis.     C6-C7: Moderate disc space narrowing. Mild ventral disc osteophyte  complex. No significant central or lateral stenosis.     C7-T1: No disc protrusion. No central or lateral stenosis. Moderate  bilateral facet joint disease.         IMPRESSION:  1. Degenerative changes as described C3-C4 through C7-T1. There is  only mild central and foraminal stenosis as described.  2. No intrinsic spinal cord abnormality through T2        EXAM: MR LUMBAR SPINE W/O CONTRAST  LOCATION: [facility]  DATE/TIME: 10/25/2021 7:51 PM     INDICATION: Lumbar radiculopathy, no red flags.  COMPARISON: None.  TECHNIQUE: Routine Lumbar Spine MRI without IV contrast.     FINDINGS:   Nomenclature is based on 5 lumbar type vertebral bodies. Normal vertebral body heights, alignment and marrow signal. Normal distal spinal cord and cauda equina with conus medullaris at L1-L2. No extraspinal abnormality. Unremarkable visualized bony   pelvis.     T12-L1: Normal disc height and signal. No herniation. Mild facet arthropathy bilaterally. No spinal canal or neural foraminal stenosis.      L1-L2: There is loss of disc height, disc desiccation and mild circumferential disc bulging. No herniation. Normal facets. No spinal  canal or neural foraminal stenosis.     L2-L3: There is loss of disc height, disc desiccation and mild circumferential disc bulging. No herniation. Mild facet arthropathy bilaterally. No spinal canal stenosis. Mild bilateral neural foraminal narrowing. No change from the comparison study.     L3-L4: There is loss of disc height, disc desiccation and mild circumferential disc bulging with a superimposed tiny left paracentral disc herniation (extrusion). Moderate facet arthropathy bilaterally. No spinal canal stenosis. Moderate left foraminal   stenosis. Mild right foraminal narrowing. No change from the comparison study.     L4-L5: There is loss of disc height, disc desiccation and mild circumferential disc bulging. No herniation. Moderate facet arthropathy bilaterally. No spinal canal stenosis. Moderate right foraminal stenosis. Mild left foraminal narrowing. No change from   the comparison study.     L5-S1: Normal disc height and signal. No herniation. Moderate facet arthropathy bilaterally. No spinal canal or neural foraminal stenosis. No change from the comparison study.                                                                      IMPRESSION:  1.  Diffuse degenerative change of the lumbar spine as detailed above without appreciable change from the comparison study.  2.  No significant spinal canal stenosis at any level of the lumbar spine.  3.  Moderate neural foraminal stenosis on the left at L3-L4 and on the right at L4-L5.        EXAM: MR CERVICAL SPINE W/O CONTRAST  LOCATION: [facility]  DATE: 12/16/2024 (reviewed with patient in clinic on 6/9/2025)     INDICATION:  Cervical spondylosis without myelopathy, Cervical radicular pain, DDD (degenerative disc disease), cervical, S/P cervical spinal fusion, Cervical stenosis of spinal canal, Chronic neck pain, Chronic neck pain  COMPARISON: None.  TECHNIQUE: MRI Cervical Spine without IV contrast.     FINDINGS:   Reversal of the upper  cervical lordosis with grade 1 anterolisthesis at C2-C3 and C7-T1. Postsurgical changes related to C5-C6 ACDF. Large anterior vertebral body osteophyte formation. Type I Modic changes at C3-C4, C4-C5, and C6-C7. Type II Modic   changes at C3-C4 and C4-C5. Vertebral body heights within normal limits. No acute fracture.     Spinal cord signal within normal limits. No intrathecal mass or epidural fluid collection.     Paraspinal soft tissue within normal limits.     C2-C3: Uncovering of the disc. Moderate facet arthropathy. No spinal canal stenosis. Moderate right and mild left foraminal stenosis.     C3-C4: Disc bulge with osteophytic ridging. Moderate facet arthropathy. Mild spinal canal stenosis. Severe bilateral foraminal stenosis.     C4-C5: Disc bulge with osteophytic ridging. Moderate facet arthropathy. Moderate spinal canal stenosis. Severe bilateral foraminal stenosis.     C5-C6: ACDF. Moderate facet arthropathy. No spinal canal stenosis. Moderate left and mild right foraminal stenosis.     C6-C7: Disc bulge with osteophytic ridging. Moderate facet arthropathy. Mild spinal canal stenosis. Mild right bilateral foraminal stenosis.     C7-T1: Slight uncovering of the disc. Moderate facet arthropathy. No spinal canal stenosis. Moderate bilateral foraminal stenosis.                                                                         IMPRESSION:  1.  Marked degenerative changes throughout the cervical spine with multilevel discogenic endplate changes.  2.  Moderate spinal canal stenosis at C4-C5. Mild spinal canal stenosis at C3-C4 and C6-C7.  3.  Bilateral foraminal stenosis, severe at multiple levels.            [place] Prescription Monitoring Program:  Reviewed [facility] 7/22/2025- no concerning fills.  Melanie STEVENS, RN CNP, FNP  [facility]  [facility] [place]          DIRE Score for selecting candidates for long term opioid analgesia for chronic pain:  Diagnosis   2  Intractablility  2  Risk    Psychological health  2    Chemical health  2    Reliability  2    Social support  3  Efficacy  2    Total DIRE Score = 15. Note that   7-13 predicts poor outcome (compliance and efficacy) from opioid prescribing; 14-21 predicts good outcome (compliance and efficacy)  from opioid prescribing.      Assessment:   Lumbar facet joint pain  Spondylosis of lumbar region without myelopathy or radiculopathy  Chronic bilateral low back pain without sciatica  Intractable chronic migraine without aura and without status migrainosus  Cervical spondylosis without myelopathy  Cervical degenerative disc disease  Status post cervical spinal fusion  Cervical stenosis of spinal canal  Myofascial pain  Chronic continuous use of opioids  Encounter for long-term use of opiate analgesic    Essential hypertension  Urine drug screen last done 6/9/2025  Controlled substance agreement signed 6/9/2025  Remote history of ETOH overuse, attended AA for awhile. Sober for >10 years  PMHx includes: neck pain  PSHx includes: none listed       Plan:   Physical Therapy: none  Check out Ken Johnson online,  based in [place]. Gentle short purposeful movement exercises to build into your day. [employer], [employer], [employer] and [employer] are all free. He has his own website and an ramiro.   Clinical Health Psychologist: none  Diagnostic Studies:   Medication Management:   Continue tramadol ER 200mg once daily fill 7/27 and begin 7/29/2025  Continue tramadol 50mg Q 6-8 hours PRN, max of 3/day. Fill 7/27 and begin 7/29/2025  Continue gabapentin 600mg tabs,  take 2 tabs three times daily for pain  continueTopamax, take 150mg (3 of the 50mg tabs) at bedtime. Reduce to 100mg if you do not feel well.  Continue methocarbamol 500-1000 mg TID PRN muscle spasms  Continue cyclobenzaprine at bedtime  Continue atogepant to 60mg/day for migraine prevention  Further procedures recommended: schedule repeat lumbar facet joint  steroid injections, our office will contact you.   Recommendations to PCP: see above  Follow up: 12 weeks in-person or video visit, your choice. Please call [phone] to make your follow-up appointment with me.           ASSESSMENT AND PLAN:  (M54.59) Lumbar facet joint pain  (primary encounter diagnosis)  Comment:   Plan: PAIN INJECTION EVAL/TREAT/FOLLOW UP            (M47.816) Spondylosis of lumbar region without myelopathy or radiculopathy  Comment:   Plan: PAIN INJECTION EVAL/TREAT/FOLLOW UP            (M54.50,  G89.29) Chronic bilateral low back pain without sciatica  Comment:   Plan: PAIN INJECTION EVAL/TREAT/FOLLOW UP            (G43.719) Intractable chronic migraine without aura and without status migrainosus  Comment:   Plan:     (M47.812) Cervical spondylosis without myelopathy  Comment:   Plan: topiramate (TOPAMAX) 50 MG tablet, traMADol         (ULTRAM) 50 MG tablet, traMADol (ULTRAM-ER) 200        MG 24 hr tablet            (M50.30) DDD (degenerative disc disease), cervical  Comment:   Plan: topiramate (TOPAMAX) 50 MG tablet, traMADol         (ULTRAM) 50 MG tablet, traMADol (ULTRAM-ER) 200        MG 24 hr tablet            (Z98.1) S/P cervical spinal fusion  Comment:   Plan: traMADol (ULTRAM) 50 MG tablet, traMADol         (ULTRAM-ER) 200 MG 24 hr tablet            (M48.02) Cervical stenosis of spinal canal  Comment:   Plan: traMADol (ULTRAM) 50 MG tablet, traMADol         (ULTRAM-ER) 200 MG 24 hr tablet            (M79.18) Myofascial pain  Comment:   Plan: traMADol (ULTRAM) 50 MG tablet            (F11.90) Chronic, continuous use of opioids  Comment:   Plan: traMADol (ULTRAM) 50 MG tablet, traMADol         (ULTRAM-ER) 200 MG 24 hr tablet              BILLING TIME DOCUMENTATION:   TOTAL TIME includes:   Time spent preparing to see the patient: 1 minutes (reviewing records and tests)  Time spend face to face with the patient: 25 minutes  Time spent ordering tests, medications, procedures and  referrals: 1 minutes  Time spent Referring and communicating with other healthcare professionals: 0 minutes  Documenting clinical information in Epic: 7 minutes    The total TIME spent on this patient on the day of the appointment was 33 minutes.                      Melanie STEVENS RN CNP, FNP  [facility]

## 2025-07-22 ENCOUNTER — OFFICE VISIT (OUTPATIENT)
Dept: PALLIATIVE MEDICINE | Facility: CLINIC | Age: 65
End: 2025-07-22
Attending: NURSE PRACTITIONER
Payer: COMMERCIAL

## 2025-07-22 VITALS — HEART RATE: 51 BPM | DIASTOLIC BLOOD PRESSURE: 86 MMHG | SYSTOLIC BLOOD PRESSURE: 133 MMHG

## 2025-07-22 DIAGNOSIS — M47.816 SPONDYLOSIS OF LUMBAR REGION WITHOUT MYELOPATHY OR RADICULOPATHY: ICD-10-CM

## 2025-07-22 DIAGNOSIS — M47.812 CERVICAL SPONDYLOSIS WITHOUT MYELOPATHY: ICD-10-CM

## 2025-07-22 DIAGNOSIS — G43.719 INTRACTABLE CHRONIC MIGRAINE WITHOUT AURA AND WITHOUT STATUS MIGRAINOSUS: ICD-10-CM

## 2025-07-22 DIAGNOSIS — G89.29 CHRONIC BILATERAL LOW BACK PAIN WITHOUT SCIATICA: ICD-10-CM

## 2025-07-22 DIAGNOSIS — F11.90 CHRONIC, CONTINUOUS USE OF OPIOIDS: ICD-10-CM

## 2025-07-22 DIAGNOSIS — Z98.1 S/P CERVICAL SPINAL FUSION: ICD-10-CM

## 2025-07-22 DIAGNOSIS — M54.50 CHRONIC BILATERAL LOW BACK PAIN WITHOUT SCIATICA: ICD-10-CM

## 2025-07-22 DIAGNOSIS — M79.18 MYOFASCIAL PAIN: ICD-10-CM

## 2025-07-22 DIAGNOSIS — M48.02 CERVICAL STENOSIS OF SPINAL CANAL: ICD-10-CM

## 2025-07-22 DIAGNOSIS — M50.30 DDD (DEGENERATIVE DISC DISEASE), CERVICAL: ICD-10-CM

## 2025-07-22 DIAGNOSIS — M54.59 LUMBAR FACET JOINT PAIN: Primary | ICD-10-CM

## 2025-07-22 PROCEDURE — 99214 OFFICE O/P EST MOD 30 MIN: CPT | Performed by: NURSE PRACTITIONER

## 2025-07-22 RX ORDER — TRAMADOL HYDROCHLORIDE 200 MG/1
200 TABLET, EXTENDED RELEASE ORAL EVERY 24 HOURS
Qty: 30 TABLET | Refills: 0 | Status: SHIPPED | OUTPATIENT
Start: 2025-07-22

## 2025-07-22 RX ORDER — TOPIRAMATE 50 MG/1
150 TABLET, FILM COATED ORAL AT BEDTIME
Qty: 270 TABLET | Refills: 1 | Status: SHIPPED | OUTPATIENT
Start: 2025-07-22

## 2025-07-22 RX ORDER — TRAMADOL HYDROCHLORIDE 50 MG/1
50 TABLET ORAL EVERY 6 HOURS PRN
Qty: 90 TABLET | Refills: 0 | Status: SHIPPED | OUTPATIENT
Start: 2025-07-22

## 2025-07-22 ASSESSMENT — PAIN SCALES - GENERAL: PAINLEVEL_OUTOF10: MODERATE PAIN (6)

## 2025-07-22 NOTE — PATIENT INSTRUCTIONS
Plan:   Physical Therapy: none  Check out Ken Johnson online,  based in [place]. Gentle short purposeful movement exercises to build into your day. [employer], [employer], [employer] and [employer] are all free. He has his own website and an ramiro.   Clinical Health Psychologist: none  Diagnostic Studies:   Medication Management:   Continue tramadol ER 200mg once daily fill 7/27 and begin 7/29/2025  Continue tramadol 50mg Q 6-8 hours PRN, max of 3/day. Fill 7/27 and begin 7/29/2025  Continue gabapentin 600mg tabs,  take 2 tabs three times daily for pain  continueTopamax, take 150mg (3 of the 50mg tabs) at bedtime. Reduce to 100mg if you do not feel well.  Continue methocarbamol 500-1000 mg TID PRN muscle spasms  Continue cyclobenzaprine at bedtime  Continue atogepant to 60mg/day for migraine prevention  Further procedures recommended: schedule repeat lumbar facet joint steroid injections, our office will contact you.   Recommendations to PCP: see above  Follow up: 12 weeks in-person or video visit, your choice. Please call [phone] to make your follow-up appointment with me.     ----------------------------------------------------------------  Clinic Number:  [phone]   Call with any questions about your care and for scheduling assistance.   Calls are returned Monday through Friday between 8 AM and 4:30 PM. We usually get back to you within 2 business days depending on the issue/request.    If we are prescribing your medications:  For opioid medication refills, call the clinic or send a [facility] message 7 days in advance.  Please include:  Name of requested medication  Name of the pharmacy.  For non-opioid medications, call your pharmacy directly to request a refill. Please allow 3-4 days to be processed.   Per [place] State Law:  All controlled substance prescriptions must be filled within 30 days of being written.    For those controlled substances allowing refills, pickup must occur within 30 days of  last fill.      We believe regular attendance is key to your success in our program!    Any time you are unable to keep your appointment we ask that you call us at least 24 hours in advance to cancel.This will allow us to offer the appointment time to another patient.   Multiple missed appointments may lead to dismissal from the clinic.

## 2025-07-23 SDOH — HEALTH STABILITY: PHYSICAL HEALTH: ON AVERAGE, HOW MANY DAYS PER WEEK DO YOU ENGAGE IN MODERATE TO STRENUOUS EXERCISE (LIKE A BRISK WALK)?: 1 DAY

## 2025-07-23 SDOH — HEALTH STABILITY: PHYSICAL HEALTH: ON AVERAGE, HOW MANY MINUTES DO YOU ENGAGE IN EXERCISE AT THIS LEVEL?: 10 MIN

## 2025-07-23 ASSESSMENT — SOCIAL DETERMINANTS OF HEALTH (SDOH): HOW OFTEN DO YOU GET TOGETHER WITH FRIENDS OR RELATIVES?: ONCE A WEEK

## 2025-07-24 SDOH — HEALTH STABILITY: PHYSICAL HEALTH: ON AVERAGE, HOW MANY DAYS PER WEEK DO YOU ENGAGE IN MODERATE TO STRENUOUS EXERCISE (LIKE A BRISK WALK)?: 1 DAY

## 2025-07-24 SDOH — HEALTH STABILITY: PHYSICAL HEALTH: ON AVERAGE, HOW MANY MINUTES DO YOU ENGAGE IN EXERCISE AT THIS LEVEL?: 10 MIN

## 2025-07-25 ENCOUNTER — ANCILLARY PROCEDURE (OUTPATIENT)
Dept: GENERAL RADIOLOGY | Facility: CLINIC | Age: 65
End: 2025-07-25
Attending: FAMILY MEDICINE
Payer: COMMERCIAL

## 2025-07-25 ENCOUNTER — OFFICE VISIT (OUTPATIENT)
Dept: ORTHOPEDICS | Facility: CLINIC | Age: 65
End: 2025-07-25
Attending: NURSE PRACTITIONER
Payer: COMMERCIAL

## 2025-07-25 DIAGNOSIS — M25.551 HIP PAIN, BILATERAL: ICD-10-CM

## 2025-07-25 DIAGNOSIS — M25.552 HIP PAIN, BILATERAL: ICD-10-CM

## 2025-07-25 DIAGNOSIS — M16.11 PRIMARY OSTEOARTHRITIS OF RIGHT HIP: Primary | ICD-10-CM

## 2025-07-25 PROCEDURE — 20611 DRAIN/INJ JOINT/BURSA W/US: CPT | Mod: RT | Performed by: FAMILY MEDICINE

## 2025-07-25 PROCEDURE — 73522 X-RAY EXAM HIPS BI 3-4 VIEWS: CPT | Mod: TC | Performed by: RADIOLOGY

## 2025-07-25 PROCEDURE — 99243 OFF/OP CNSLTJ NEW/EST LOW 30: CPT | Mod: 25 | Performed by: FAMILY MEDICINE

## 2025-07-25 RX ADMIN — ROPIVACAINE HYDROCHLORIDE 3 ML: 5 INJECTION, SOLUTION EPIDURAL; INFILTRATION; PERINEURAL at 14:31

## 2025-07-25 RX ADMIN — TRIAMCINOLONE ACETONIDE 40 MG: 40 INJECTION, SUSPENSION INTRA-ARTICULAR; INTRAMUSCULAR at 14:31

## 2025-07-25 NOTE — PROGRESS NOTES
Truman Suero  :  1960  DOS: 2025  MRN: [id number]    Sports Medicine Clinic Visit    PCP: Humberto Trinidad    Truman Suero is a 64 year old male who is seen in consultation at the request of  RODNEY Vargas presenting with chronic bilateral hip pain, right>>>left.    Injury: Gradual onset of pain over the past 3+ years that is worse over the past 2+ months after being more active with projects around his home.  Pain located over bilateral deep anterior hip, right SI joint, with radiating burning pain to right anterior thigh.  Additional Features:  Positive: weakness, burning/tingling pain.  Symptoms are better with activity avoidance, tramadol, gabapentin.  Symptoms are worse with: going from sit to stand, bending at waist, lifting, walking, hip flexion, lying supine.  Other evaluation and/or treatments so far consists of: Ice, Tylenol, Ibuprofen, Other medications: flexeril, gabapenin, tramadol, Rest, and HEP, Pain Mgmt consult (Melanie), lumbar DEMARCUS.  Recent imaging completed: No recent imaging completed.  Prior History of related problems: history of chronic neck & low back pain over past 5+ years that is being followed by Pain Mgmt team.  Most recent right piriformis trigger point injection completed in 2024 by Dr Corrigan provided relief for several months.    Social History: currently employed as teacher    Review of Systems  Musculoskeletal: as above  Remainder of review of systems is negative including constitutional, CV, pulmonary, GI, Skin and Neurologic except as noted in HPI or medical history.    Past Medical History:   Diagnosis Date    Ascending aorta dilatation 2018    Cervical spondylosis without myelopathy 10/3/2017    Chronic bilateral low back pain with right-sided sciatica 2018    Hypertension     Mild CAD 2018    Neck pain     MRI positive on cervical vertebrea.    PAD (peripheral artery disease) 2018    Tobacco abuse 2017     Past  "Surgical History:   Procedure Laterality Date    COLONOSCOPY      DISCECTOMY, FUSION CERVICAL ANTERIOR ONE LEVEL, COMBINED Right 10/29/2018    Procedure: Right Anterior Cervical 5-6 Discectomy And Fusion;  Surgeon: Jud Harkins MD;  Location:  OR    HEAD & NECK SURGERY  [id number]    Follow-up to cervical surgery 10-29-18     Family History   Problem Relation Age of Onset    Prostate Cancer Father         Uncle    Breast Cancer Maternal Grandmother     Diabetes Maternal Grandmother     Prostate Cancer Other     Breast Cancer Mother     Breast Cancer Cousin     Breast Cancer Sister        Objective  There were no vitals taken for this visit.    General: healthy, alert and in no distress    HEENT: no scleral icterus or conjunctival erythema   Skin: no suspicious lesions or rash. No jaundice.   CV: regular rhythm by palpation, 2+ distal pulses, no pedal edema    Resp: normal respiratory effort without conversational dyspnea   Psych: normal mood and affect    Gait: ***antalgic, appropriate coordination and balance   Neuro: normal light touch sensory exam of the extremities. Motor strength as noted below     {RIGHT/LEFT:810278::\"Bilateral\"} hip exam    Inspection:   {hip inspection:537312::\"     no edema or ecchymosis in hip area\"}    ROM:   {FSOC hip ROM:409138::\"    Full active and passive ROM \"}    Strength:   {hip strength:325693}    Tender:   {hip Tenderness:951103}    Non Tender:   {hip non Tenderness:471075::\"     remainder of hip area\"}    Sensation:   {hip sensation:482984::\"     grossly intact in hip and thigh\"}    Skin:  {skin tests:462836}    Special Tests:   {hip special tests:924719}    Radiology  ***    Assessment:  1. Hip pain, bilateral        Plan:  Discussed the assessment with the patient.  {FSOC Plan:744680::\"Follow up: ***\"}    Large Joint Injection/Arthocentesis: R hip joint    Date/Time: 7/25/2025 2:31 PM    Performed by: Van Abernathy,   Authorized by: Van Abernathy, " DO    Indications:  Pain and diagnostic evaluation  Needle Size:  22 G  Guidance: ultrasound    Approach:  Anterior  Location:  Hip      Site:  R hip joint  Medications:  40 mg triamcinolone 40 MG/ML; 3 mL ROPivacaine 5 MG/ML  Outcome:  Tolerated well, no immediate complications  Procedure discussed: discussed risks, benefits, and alternatives    Consent Given by:  Patient  Timeout: timeout called immediately prior to procedure    Prep: patient was prepped and draped in usual sterile fashion     4 ml's of 1% lidocaine was used as local anesthetic prior to injection  Ultrasound images of procedure were permanently stored.         Disclaimer: This note consists of symbols derived from keyboarding, dictation and/or voice recognition software. As a result, there may be errors in the script that have gone undetected. Please consider this when interpreting information found in this chart.   22 G  Guidance: ultrasound    Approach:  Anterior  Location:  Hip      Site:  R hip joint  Medications:  40 mg triamcinolone 40 MG/ML; 3 mL ROPivacaine 5 MG/ML  Outcome:  Tolerated well, no immediate complications  Procedure discussed: discussed risks, benefits, and alternatives    Consent Given by:  Patient  Timeout: timeout called immediately prior to procedure    Prep: patient was prepped and draped in usual sterile fashion     4 ml's of 1% lidocaine was used as local anesthetic prior to injection  Ultrasound images of procedure were permanently stored.         Disclaimer: This note consists of symbols derived from keyboarding, dictation and/or voice recognition software. As a result, there may be errors in the script that have gone undetected. Please consider this when interpreting information found in this chart.

## 2025-07-25 NOTE — Clinical Note
2025      Truman Suero  [address] [address]      Dear Colleague,    Thank you for referring your patient, Truman Suero, to the [facility]. Please see a copy of my visit note below.    Truman Suero  :  1960  DOS: 2025  MRN: [id number]    Sports Medicine Clinic Visit    PCP: Humberto Trinidad    Truman Suero is a 64 year old male who is seen in consultation at the request of  RODNEY Vargas presenting with chronic bilateral hip pain, right>>>left.    Injury: Gradual onset of pain over the past 3+ years that is worse over the past 2+ months after being more active with projects around his home.  Pain located over bilateral deep anterior hip, right SI joint, with radiating burning pain to right anterior thigh.  Additional Features:  Positive: weakness, burning/tingling pain.  Symptoms are better with activity avoidance, tramadol, gabapentin.  Symptoms are worse with: going from sit to stand, bending at waist, lifting, walking, hip flexion, lying supine.  Other evaluation and/or treatments so far consists of: Ice, Tylenol, Ibuprofen, Other medications: flexeril, gabapenin, tramadol, Rest, and HEP, Pain Mgmt consult (Melanie), lumbar DEMARCUS.  Recent imaging completed: No recent imaging completed.  Prior History of related problems: history of chronic neck & low back pain over past 5+ years that is being followed by Pain Mgmt team.  Most recent right piriformis trigger point injection completed in 2024 by Dr Corrigan provided relief for several months.    Social History: currently employed as teacher    Review of Systems  Musculoskeletal: as above  Remainder of review of systems is negative including constitutional, CV, pulmonary, GI, Skin and Neurologic except as noted in HPI or medical history.    Past Medical History:   Diagnosis Date    Ascending aorta dilatation 2018    Cervical spondylosis without  "myelopathy 10/3/2017    Chronic bilateral low back pain with right-sided sciatica 5/16/2018    Hypertension     Mild CAD 2/13/2018    Neck pain     MRI positive on cervical vertebrea.    PAD (peripheral artery disease) 2/27/2018    Tobacco abuse 8/16/2017     Past Surgical History:   Procedure Laterality Date    COLONOSCOPY      DISCECTOMY, FUSION CERVICAL ANTERIOR ONE LEVEL, COMBINED Right 10/29/2018    Procedure: Right Anterior Cervical 5-6 Discectomy And Fusion;  Surgeon: Jud Harkins MD;  Location:  OR    HEAD & NECK SURGERY  [id number]    Follow-up to cervical surgery 10-29-18     Family History   Problem Relation Age of Onset    Prostate Cancer Father         Uncle    Breast Cancer Maternal Grandmother     Diabetes Maternal Grandmother     Prostate Cancer Other     Breast Cancer Mother     Breast Cancer Cousin     Breast Cancer Sister        Objective  There were no vitals taken for this visit.    General: healthy, alert and in no distress    HEENT: no scleral icterus or conjunctival erythema   Skin: no suspicious lesions or rash. No jaundice.   CV: regular rhythm by palpation, 2+ distal pulses, no pedal edema    Resp: normal respiratory effort without conversational dyspnea   Psych: normal mood and affect    Gait: ***antalgic, appropriate coordination and balance   Neuro: normal light touch sensory exam of the extremities. Motor strength as noted below     {RIGHT/LEFT:616704::\"Bilateral\"} hip exam    Inspection:   {hip inspection:392686::\"     no edema or ecchymosis in hip area\"}    ROM:   {FSOC hip ROM:886058::\"    Full active and passive ROM \"}    Strength:   {hip strength:660964}    Tender:   {hip Tenderness:472250}    Non Tender:   {hip non Tenderness:907824::\"     remainder of hip area\"}    Sensation:   {hip sensation:797514::\"     grossly intact in hip and thigh\"}    Skin:  {skin tests:689063}    Special Tests:   {hip special tests:129865}    Radiology  ***    Assessment:  1. Hip pain, bilateral  " "      Plan:  Discussed the assessment with the patient.  {[facility] Plan:215202::\"Follow up: ***\"}        Disclaimer: This note consists of symbols derived from keyboarding, dictation and/or voice recognition software. As a result, there may be errors in the script that have gone undetected. Please consider this when interpreting information found in this chart.      Again, thank you for allowing me to participate in the care of your patient.        Sincerely,        Van Abernathy, DO    Electronically signed"

## 2025-07-25 NOTE — LETTER
Orthopedics and Sports Medicine      Large Joint Injection/Arthocentesis: R hip joint    Date/Time: 7/25/2025 2:31 PM    Performed by: Van Abernathy DO  Authorized by: Van Abernathy DO    Indications:  Pain and diagnostic evaluation  Needle Size:  22 G  Guidance: ultrasound    Approach:  Anterior  Location:  Hip      Site:  R hip joint  Medications:  40 mg triamcinolone 40 MG/ML; 3 mL ROPivacaine 5 MG/ML  Outcome:  Tolerated well, no immediate complications  Procedure discussed: discussed risks, benefits, and alternatives    Consent Given by:  Patient  Timeout: timeout called immediately prior to procedure    Prep: patient was prepped and draped in usual sterile fashion     4 ml's of 1% lidocaine was used as local anesthetic prior to injection  Ultrasound images of procedure were permanently stored.         Disclaimer: This note consists of symbols derived from keyboarding, dictation and/or voice recognition software. As a result, there may be errors in the script that have gone undetected. Please consider this when interpreting information found in this chart.    Again, thank you for allowing me to participate in the care of your patient.        Sincerely,        Van Abernathy DO    Electronically signed

## 2025-07-28 ENCOUNTER — OFFICE VISIT (OUTPATIENT)
Dept: FAMILY MEDICINE | Facility: CLINIC | Age: 65
End: 2025-07-28
Payer: COMMERCIAL

## 2025-07-28 VITALS
BODY MASS INDEX: 26.1 KG/M2 | HEART RATE: 57 BPM | DIASTOLIC BLOOD PRESSURE: 87 MMHG | WEIGHT: 162.4 LBS | SYSTOLIC BLOOD PRESSURE: 127 MMHG | OXYGEN SATURATION: 99 % | RESPIRATION RATE: 16 BRPM | HEIGHT: 66 IN | TEMPERATURE: 98 F

## 2025-07-28 DIAGNOSIS — R73.9 ELEVATED BLOOD SUGAR: ICD-10-CM

## 2025-07-28 DIAGNOSIS — I25.10 MILD CAD: ICD-10-CM

## 2025-07-28 DIAGNOSIS — I73.9 PAD (PERIPHERAL ARTERY DISEASE): ICD-10-CM

## 2025-07-28 DIAGNOSIS — I77.810 ASCENDING AORTA DILATATION: ICD-10-CM

## 2025-07-28 DIAGNOSIS — I71.40 ABDOMINAL AORTIC ANEURYSM (AAA) 3.0 CM TO 5.5 CM IN DIAMETER IN MALE: Primary | ICD-10-CM

## 2025-07-28 LAB
ANION GAP SERPL CALCULATED.3IONS-SCNC: 9 MMOL/L (ref 7–15)
BUN SERPL-MCNC: 18.1 MG/DL (ref 8–23)
CALCIUM SERPL-MCNC: 9.7 MG/DL (ref 8.8–10.4)
CHLORIDE SERPL-SCNC: 110 MMOL/L (ref 98–107)
CREAT SERPL-MCNC: 1.43 MG/DL (ref 0.67–1.17)
CREAT UR-MCNC: 187 MG/DL
EGFRCR SERPLBLD CKD-EPI 2021: 55 ML/MIN/1.73M2
EST. AVERAGE GLUCOSE BLD GHB EST-MCNC: 108 MG/DL
GLUCOSE SERPL-MCNC: 82 MG/DL (ref 70–99)
HBA1C MFR BLD: 5.4 % (ref 0–5.6)
HCO3 SERPL-SCNC: 22 MMOL/L (ref 22–29)
MICROALBUMIN UR-MCNC: <12 MG/L
MICROALBUMIN/CREAT UR: NORMAL MG/G{CREAT}
POTASSIUM SERPL-SCNC: 4.3 MMOL/L (ref 3.4–5.3)
SODIUM SERPL-SCNC: 141 MMOL/L (ref 135–145)

## 2025-07-28 PROCEDURE — 82570 ASSAY OF URINE CREATININE: CPT | Performed by: FAMILY MEDICINE

## 2025-07-28 PROCEDURE — 36415 COLL VENOUS BLD VENIPUNCTURE: CPT | Performed by: FAMILY MEDICINE

## 2025-07-28 PROCEDURE — 99214 OFFICE O/P EST MOD 30 MIN: CPT | Performed by: FAMILY MEDICINE

## 2025-07-28 PROCEDURE — 83036 HEMOGLOBIN GLYCOSYLATED A1C: CPT | Performed by: FAMILY MEDICINE

## 2025-07-28 PROCEDURE — G2211 COMPLEX E/M VISIT ADD ON: HCPCS | Performed by: FAMILY MEDICINE

## 2025-07-28 PROCEDURE — 82043 UR ALBUMIN QUANTITATIVE: CPT | Performed by: FAMILY MEDICINE

## 2025-07-28 PROCEDURE — 80048 BASIC METABOLIC PNL TOTAL CA: CPT | Performed by: FAMILY MEDICINE

## 2025-07-28 ASSESSMENT — PAIN SCALES - GENERAL: PAINLEVEL_OUTOF10: NO PAIN (0)

## 2025-07-28 NOTE — PATIENT INSTRUCTIONS
Initial labs are normal. Continue current meds  Get your echo and ultrasound as planned  See you next year  Take care  Humberto Trinidad D.O.

## 2025-07-28 NOTE — PROGRESS NOTES
"  Assessment & Plan     Abdominal aortic aneurysm (AAA) 3.0 cm to 5.5 cm in diameter in male  Stable, continue checking, monitor bp's, continue current meds, reordered imaging   - US Abdomen Complete; Future  - Echocardiogram Complete; Future    Ascending aorta dilatation  Stable, recheck   - US Abdomen Complete; Future    Mild CAD  Continue current meds, stopped smoking few years ago    PAD (peripheral artery disease)    - Basic metabolic panel  (Ca, Cl, CO2, Creat, Gluc, K, Na, BUN); Future  - Basic metabolic panel  (Ca, Cl, CO2, Creat, Gluc, K, Na, BUN)    Elevated blood sugar  Stable, cont meds  - Hemoglobin A1c; Future  - Albumin Random Urine Quantitative with Creat Ratio; Future  - Hemoglobin A1c  - Albumin Random Urine Quantitative with Creat Ratio  Patient has been advised of split billing requirements and indicates understanding: Yes    The longitudinal plan of care for the diagnosis(es)/condition(s) as documented were addressed during this visit. Due to the added complexity in care, I will continue to support Palmer in the subsequent management and with ongoing continuity of care.    BMI  Estimated body mass index is 26.21 kg/m  as calculated from the following:    Height as of this encounter: 1.676 m (5' 6\").    Weight as of this encounter: 73.7 kg (162 lb 6.4 oz).   Weight management plan: Discussed healthy diet and exercise guidelines    Counseling  Appropriate preventive services were addressed with this patient via screening, questionnaire, or discussion as appropriate for fall prevention, nutrition, physical activity, Tobacco-use cessation, social engagement, weight loss and cognition.  Checklist reviewing preventive services available has been given to the patient.  Reviewed patient's diet, addressing concerns and/or questions.   He is at risk for lack of exercise and has been provided with information to increase physical activity for the benefit of his well-being.   He is at risk for psychosocial " "distress and has been provided with information to reduce risk.   Reviewed preventive health counseling, as reflected in patient instructions       Regular exercise       Healthy diet/nutrition       Vision screening    Follow-up       New Chakraborty is a 64 year old, presenting for the following health issues:  Hypertension        7/28/2025     1:13 PM   Additional Questions   Roomed by Ayala   Accompanied by none         7/28/2025     1:13 PM   Patient Reported Additional Medications   Patient reports taking the following new medications none     History of Present Illness       Reason for visit:  Medication check, HTN and cholesterol         Hyperlipidemia Follow-Up    Are you regularly taking any medication or supplement to lower your cholesterol?   Yes-    Are you having muscle aches or other side effects that you think could be caused by your cholesterol lowering medication?  No    Hypertension Follow-up    Do you check your blood pressure regularly outside of the clinic? No   Are you following a low salt diet? No  Are your blood pressures ever more than 140 on the top number (systolic) OR more   than 90 on the bottom number (diastolic), for example 140/90? No    BP Readings from Last 2 Encounters:   07/28/25 127/87   07/22/25 133/86           Review of Systems  Constitutional, HEENT, cardiovascular, pulmonary, GI, , musculoskeletal, neuro, skin, endocrine and psych systems are negative, except as otherwise noted.      Objective    /87   Pulse 57   Temp 98  F (36.7  C) (Oral)   Resp 16   Ht 1.676 m (5' 6\")   Wt 73.7 kg (162 lb 6.4 oz)   SpO2 99%   BMI 26.21 kg/m    Body mass index is 26.21 kg/m .  Physical Exam   GENERAL: alert and no distress  NECK: no adenopathy, no asymmetry, masses, or scars  RESP: lungs clear to auscultation - no rales, rhonchi or wheezes  CV: regular rate and rhythm, normal S1 S2, no S3 or S4, no murmur, click or rub, no peripheral edema  ABDOMEN: soft, nontender, no " hepatosplenomegaly, no masses and bowel sounds normal  MS: no gross musculoskeletal defects noted, no edema            Signed Electronically by: Humberto Trinidad, DO

## 2025-08-04 ENCOUNTER — TELEPHONE (OUTPATIENT)
Dept: PALLIATIVE MEDICINE | Facility: CLINIC | Age: 65
End: 2025-08-04
Payer: COMMERCIAL

## 2025-08-04 RX ORDER — ROPIVACAINE HYDROCHLORIDE 5 MG/ML
3 INJECTION, SOLUTION EPIDURAL; INFILTRATION; PERINEURAL
Status: COMPLETED | OUTPATIENT
Start: 2025-07-25 | End: 2025-07-25

## 2025-08-04 RX ORDER — TRIAMCINOLONE ACETONIDE 40 MG/ML
40 INJECTION, SUSPENSION INTRA-ARTICULAR; INTRAMUSCULAR
Status: COMPLETED | OUTPATIENT
Start: 2025-07-25 | End: 2025-07-25

## 2025-08-10 ENCOUNTER — APPOINTMENT (OUTPATIENT)
Dept: GENERAL RADIOLOGY | Facility: CLINIC | Age: 65
End: 2025-08-10
Attending: FAMILY MEDICINE
Payer: COMMERCIAL

## 2025-08-10 ENCOUNTER — APPOINTMENT (OUTPATIENT)
Dept: CT IMAGING | Facility: CLINIC | Age: 65
End: 2025-08-10
Attending: FAMILY MEDICINE
Payer: COMMERCIAL

## 2025-08-10 ENCOUNTER — APPOINTMENT (OUTPATIENT)
Dept: GENERAL RADIOLOGY | Facility: CLINIC | Age: 65
End: 2025-08-10
Attending: SURGERY
Payer: COMMERCIAL

## 2025-08-10 ENCOUNTER — HOSPITAL ENCOUNTER (OUTPATIENT)
Facility: CLINIC | Age: 65
Setting detail: OBSERVATION
LOS: 1 days | Discharge: HOME | End: 2025-08-11
Attending: FAMILY MEDICINE | Admitting: SURGERY
Payer: COMMERCIAL

## 2025-08-10 DIAGNOSIS — S62.102A WRIST FRACTURE, LEFT, CLOSED, INITIAL ENCOUNTER: ICD-10-CM

## 2025-08-10 DIAGNOSIS — S22.42XA CLOSED FRACTURE OF MULTIPLE RIBS OF LEFT SIDE, INITIAL ENCOUNTER: ICD-10-CM

## 2025-08-10 DIAGNOSIS — M25.512 LEFT SHOULDER PAIN, UNSPECIFIED CHRONICITY: ICD-10-CM
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
## 2025-08-10 DIAGNOSIS — S20.212A CONTUSION OF LEFT CHEST WALL, INITIAL ENCOUNTER: ICD-10-CM
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
## 2025-08-10 LAB
ALBUMIN SERPL BCG-MCNC: 4.1 G/DL (ref 3.5–5.2)
ALP SERPL-CCNC: 61 U/L (ref 40–150)
ALT SERPL W P-5'-P-CCNC: 26 U/L (ref 0–70)
ANION GAP SERPL CALCULATED.3IONS-SCNC: 13 MMOL/L (ref 7–15)
AST SERPL W P-5'-P-CCNC: 28 U/L (ref 0–45)
BASOPHILS # BLD AUTO: 0.1 10E3/UL (ref 0–0.2)
BASOPHILS NFR BLD AUTO: 1 %
BILIRUB SERPL-MCNC: 0.4 MG/DL
BUN SERPL-MCNC: 13.7 MG/DL (ref 8–23)
CALCIUM SERPL-MCNC: 8.8 MG/DL (ref 8.8–10.4)
CHLORIDE SERPL-SCNC: 108 MMOL/L (ref 98–107)
CREAT BLD-MCNC: 1.7 MG/DL (ref 0.7–1.2)
CREAT SERPL-MCNC: 1.54 MG/DL (ref 0.67–1.17)
EGFRCR SERPLBLD CKD-EPI 2021: 44 ML/MIN/1.73M2
EGFRCR SERPLBLD CKD-EPI 2021: 50 ML/MIN/1.73M2
EOSINOPHIL # BLD AUTO: 0.3 10E3/UL (ref 0–0.7)
EOSINOPHIL NFR BLD AUTO: 3 %
ERYTHROCYTE [DISTWIDTH] IN BLOOD BY AUTOMATED COUNT: 12.3 % (ref 10–15)
GLUCOSE SERPL-MCNC: 136 MG/DL (ref 70–99)
HCO3 SERPL-SCNC: 19 MMOL/L (ref 22–29)
HCT VFR BLD AUTO: 44.3 % (ref 40–53)
HGB BLD-MCNC: 15.2 G/DL (ref 13.3–17.7)
IMM GRANULOCYTES # BLD: 0.1 10E3/UL
IMM GRANULOCYTES NFR BLD: 1 %
LYMPHOCYTES # BLD AUTO: 4.1 10E3/UL (ref 0.8–5.3)
LYMPHOCYTES NFR BLD AUTO: 48 %
MCH RBC QN AUTO: 31.1 PG (ref 26.5–33)
MCHC RBC AUTO-ENTMCNC: 34.3 G/DL (ref 31.5–36.5)
MCV RBC AUTO: 91 FL (ref 78–100)
MONOCYTES # BLD AUTO: 0.7 10E3/UL (ref 0–1.3)
MONOCYTES NFR BLD AUTO: 8 %
NEUTROPHILS # BLD AUTO: 3.4 10E3/UL (ref 1.6–8.3)
NEUTROPHILS NFR BLD AUTO: 40 %
NRBC # BLD AUTO: 0 10E3/UL
NRBC BLD AUTO-RTO: 0 /100
PLATELET # BLD AUTO: 264 10E3/UL (ref 150–450)
POTASSIUM SERPL-SCNC: 3.7 MMOL/L (ref 3.4–5.3)
PROT SERPL-MCNC: 6.8 G/DL (ref 6.4–8.3)
RBC # BLD AUTO: 4.88 10E6/UL (ref 4.4–5.9)
SODIUM SERPL-SCNC: 140 MMOL/L (ref 135–145)
WBC # BLD AUTO: 8.5 10E3/UL (ref 4–11)

## 2025-08-10 PROCEDURE — 250N000011 HC RX IP 250 OP 636: Performed by: SURGERY

## 2025-08-10 PROCEDURE — 250N000009 HC RX 250: Performed by: SURGERY

## 2025-08-10 PROCEDURE — 258N000003 HC RX IP 258 OP 636: Performed by: FAMILY MEDICINE

## 2025-08-10 PROCEDURE — 250N000009 HC RX 250: Performed by: FAMILY MEDICINE

## 2025-08-10 PROCEDURE — 99285 EMERGENCY DEPT VISIT HI MDM: CPT | Performed by: FAMILY MEDICINE

## 2025-08-10 PROCEDURE — 250N000013 HC RX MED GY IP 250 OP 250 PS 637

## 2025-08-10 PROCEDURE — 99285 EMERGENCY DEPT VISIT HI MDM: CPT | Mod: 25 | Performed by: FAMILY MEDICINE

## 2025-08-10 PROCEDURE — 250N000011 HC RX IP 250 OP 636: Performed by: FAMILY MEDICINE

## 2025-08-10 PROCEDURE — 84155 ASSAY OF PROTEIN SERUM: CPT | Performed by: FAMILY MEDICINE

## 2025-08-10 PROCEDURE — 999N000065 XR WRIST LEFT G/E 3 VIEWS: Mod: LT

## 2025-08-10 PROCEDURE — 82565 ASSAY OF CREATININE: CPT

## 2025-08-10 PROCEDURE — 120N000002 HC R&B MED SURG/OB UMMC

## 2025-08-10 PROCEDURE — 96376 TX/PRO/DX INJ SAME DRUG ADON: CPT

## 2025-08-10 PROCEDURE — 250N000013 HC RX MED GY IP 250 OP 250 PS 637: Performed by: FAMILY MEDICINE

## 2025-08-10 PROCEDURE — 96361 HYDRATE IV INFUSION ADD-ON: CPT

## 2025-08-10 PROCEDURE — 73110 X-RAY EXAM OF WRIST: CPT | Mod: LT

## 2025-08-10 PROCEDURE — 71260 CT THORAX DX C+: CPT

## 2025-08-10 PROCEDURE — 96374 THER/PROPH/DIAG INJ IV PUSH: CPT

## 2025-08-10 PROCEDURE — 999N000065 XR WRIST LEFT G/E 3 VIEWS

## 2025-08-10 PROCEDURE — 25605 CLTX DST RDL FX/EPHYS SEP W/: CPT | Mod: LT

## 2025-08-10 PROCEDURE — 85025 COMPLETE CBC W/AUTO DIFF WBC: CPT | Performed by: FAMILY MEDICINE

## 2025-08-10 PROCEDURE — 71045 X-RAY EXAM CHEST 1 VIEW: CPT

## 2025-08-10 PROCEDURE — 73110 X-RAY EXAM OF WRIST: CPT | Mod: 26 | Performed by: RADIOLOGY

## 2025-08-10 PROCEDURE — 96376 TX/PRO/DX INJ SAME DRUG ADON: CPT | Mod: 59

## 2025-08-10 PROCEDURE — 73030 X-RAY EXAM OF SHOULDER: CPT | Mod: LT

## 2025-08-10 PROCEDURE — 73070 X-RAY EXAM OF ELBOW: CPT | Mod: LT

## 2025-08-10 PROCEDURE — 36415 COLL VENOUS BLD VENIPUNCTURE: CPT | Performed by: FAMILY MEDICINE

## 2025-08-10 RX ORDER — HYDROMORPHONE HCL IN WATER/PF 6 MG/30 ML
0.2 PATIENT CONTROLLED ANALGESIA SYRINGE INTRAVENOUS EVERY 4 HOURS PRN
Status: DISCONTINUED | OUTPATIENT
Start: 2025-08-10 | End: 2025-08-11 | Stop reason: HOSPADM

## 2025-08-10 RX ORDER — HYDROMORPHONE HYDROCHLORIDE 1 MG/ML
0.5 INJECTION, SOLUTION INTRAMUSCULAR; INTRAVENOUS; SUBCUTANEOUS ONCE
Refills: 0 | Status: COMPLETED | OUTPATIENT
Start: 2025-08-10 | End: 2025-08-10

## 2025-08-10 RX ORDER — NALOXONE HYDROCHLORIDE 0.4 MG/ML
0.2 INJECTION, SOLUTION INTRAMUSCULAR; INTRAVENOUS; SUBCUTANEOUS
Status: DISCONTINUED | OUTPATIENT
Start: 2025-08-10 | End: 2025-08-11 | Stop reason: HOSPADM

## 2025-08-10 RX ORDER — METHOCARBAMOL 500 MG/1
500 TABLET, FILM COATED ORAL 3 TIMES DAILY PRN
Status: DISCONTINUED | OUTPATIENT
Start: 2025-08-10 | End: 2025-08-11

## 2025-08-10 RX ORDER — IOPAMIDOL 755 MG/ML
100 INJECTION, SOLUTION INTRAVASCULAR ONCE
Status: COMPLETED | OUTPATIENT
Start: 2025-08-10 | End: 2025-08-10

## 2025-08-10 RX ORDER — LIDOCAINE HYDROCHLORIDE 10 MG/ML
INJECTION, SOLUTION INFILTRATION; PERINEURAL
Status: COMPLETED
Start: 2025-08-10 | End: 2025-08-10

## 2025-08-10 RX ORDER — TOPIRAMATE 50 MG/1
150 TABLET, FILM COATED ORAL AT BEDTIME
Status: DISCONTINUED | OUTPATIENT
Start: 2025-08-10 | End: 2025-08-11 | Stop reason: HOSPADM

## 2025-08-10 RX ORDER — TRAMADOL HYDROCHLORIDE 50 MG/1
50 TABLET ORAL EVERY 6 HOURS PRN
Status: DISCONTINUED | OUTPATIENT
Start: 2025-08-10 | End: 2025-08-10 | Stop reason: DRUGHIGH

## 2025-08-10 RX ORDER — BUPIVACAINE HYDROCHLORIDE 5 MG/ML
INJECTION, SOLUTION EPIDURAL; INTRACAUDAL; PERINEURAL
Status: COMPLETED
Start: 2025-08-10 | End: 2025-08-10

## 2025-08-10 RX ORDER — LIDOCAINE 4 G/G
1 PATCH TOPICAL
Status: DISCONTINUED | OUTPATIENT
Start: 2025-08-11 | End: 2025-08-11 | Stop reason: HOSPADM

## 2025-08-10 RX ORDER — HYDROMORPHONE HCL IN WATER/PF 6 MG/30 ML
0.4 PATIENT CONTROLLED ANALGESIA SYRINGE INTRAVENOUS EVERY 4 HOURS PRN
Status: DISCONTINUED | OUTPATIENT
Start: 2025-08-10 | End: 2025-08-11 | Stop reason: HOSPADM

## 2025-08-10 RX ORDER — CYCLOBENZAPRINE HCL 5 MG
5-10 TABLET ORAL
Status: DISCONTINUED | OUTPATIENT
Start: 2025-08-10 | End: 2025-08-11 | Stop reason: HOSPADM

## 2025-08-10 RX ORDER — ATORVASTATIN CALCIUM 20 MG/1
20 TABLET, FILM COATED ORAL DAILY
Status: DISCONTINUED | OUTPATIENT
Start: 2025-08-11 | End: 2025-08-11 | Stop reason: HOSPADM

## 2025-08-10 RX ORDER — LIDOCAINE HYDROCHLORIDE 10 MG/ML
20 INJECTION, SOLUTION EPIDURAL; INFILTRATION; INTRACAUDAL; PERINEURAL ONCE
Status: COMPLETED | OUTPATIENT
Start: 2025-08-10 | End: 2025-08-10

## 2025-08-10 RX ORDER — NALOXONE HYDROCHLORIDE 0.4 MG/ML
0.4 INJECTION, SOLUTION INTRAMUSCULAR; INTRAVENOUS; SUBCUTANEOUS
Status: DISCONTINUED | OUTPATIENT
Start: 2025-08-10 | End: 2025-08-11 | Stop reason: HOSPADM

## 2025-08-10 RX ORDER — TRAMADOL HYDROCHLORIDE 50 MG/1
100 TABLET ORAL 2 TIMES DAILY
Status: DISCONTINUED | OUTPATIENT
Start: 2025-08-10 | End: 2025-08-11 | Stop reason: HOSPADM

## 2025-08-10 RX ORDER — POLYETHYLENE GLYCOL 3350 17 G/17G
17 POWDER, FOR SOLUTION ORAL DAILY PRN
Status: DISCONTINUED | OUTPATIENT
Start: 2025-08-10 | End: 2025-08-11 | Stop reason: HOSPADM

## 2025-08-10 RX ORDER — GABAPENTIN 600 MG/1
1200 TABLET ORAL 3 TIMES DAILY
Status: DISCONTINUED | OUTPATIENT
Start: 2025-08-10 | End: 2025-08-11 | Stop reason: HOSPADM

## 2025-08-10 RX ORDER — OXYCODONE HYDROCHLORIDE 5 MG/1
5 TABLET ORAL EVERY 4 HOURS PRN
Status: DISCONTINUED | OUTPATIENT
Start: 2025-08-10 | End: 2025-08-11 | Stop reason: HOSPADM

## 2025-08-10 RX ORDER — ACETAMINOPHEN 325 MG/1
975 TABLET ORAL EVERY 8 HOURS
Status: DISCONTINUED | OUTPATIENT
Start: 2025-08-10 | End: 2025-08-11 | Stop reason: HOSPADM

## 2025-08-10 RX ORDER — AMOXICILLIN 250 MG
1-2 CAPSULE ORAL 2 TIMES DAILY
Status: DISCONTINUED | OUTPATIENT
Start: 2025-08-10 | End: 2025-08-11 | Stop reason: HOSPADM

## 2025-08-10 RX ORDER — METOPROLOL SUCCINATE 25 MG/1
25 TABLET, EXTENDED RELEASE ORAL DAILY
Status: DISCONTINUED | OUTPATIENT
Start: 2025-08-11 | End: 2025-08-11 | Stop reason: HOSPADM

## 2025-08-10 RX ORDER — TRAMADOL HYDROCHLORIDE 200 MG/1
200 TABLET, EXTENDED RELEASE ORAL EVERY 24 HOURS
Status: DISCONTINUED | OUTPATIENT
Start: 2025-08-10 | End: 2025-08-10 | Stop reason: DRUGHIGH

## 2025-08-10 RX ADMIN — SENNOSIDES, DOCUSATE SODIUM 1 TABLET: 50; 8.6 TABLET, FILM COATED ORAL at 23:14

## 2025-08-10 RX ADMIN — GABAPENTIN 1200 MG: 600 TABLET, FILM COATED ORAL at 23:08

## 2025-08-10 RX ADMIN — ACETAMINOPHEN 975 MG: 325 TABLET ORAL at 23:09

## 2025-08-10 RX ADMIN — SODIUM CHLORIDE 1000 ML: 0.9 INJECTION, SOLUTION INTRAVENOUS at 15:48

## 2025-08-10 RX ADMIN — BUPIVACAINE HYDROCHLORIDE: 5 INJECTION, SOLUTION EPIDURAL; INTRACAUDAL; PERINEURAL at 18:50

## 2025-08-10 RX ADMIN — HYDROMORPHONE HYDROCHLORIDE 1 MG: 1 INJECTION, SOLUTION INTRAMUSCULAR; INTRAVENOUS; SUBCUTANEOUS at 22:50

## 2025-08-10 RX ADMIN — TOPIRAMATE 150 MG: 50 TABLET, FILM COATED ORAL at 23:12

## 2025-08-10 RX ADMIN — SODIUM CHLORIDE 40 ML: 9 INJECTION, SOLUTION INTRAVENOUS at 16:38

## 2025-08-10 RX ADMIN — HYDROMORPHONE HYDROCHLORIDE 0.5 MG: 1 INJECTION, SOLUTION INTRAMUSCULAR; INTRAVENOUS; SUBCUTANEOUS at 16:02

## 2025-08-10 RX ADMIN — OXYCODONE HYDROCHLORIDE 5 MG: 5 TABLET ORAL at 23:08

## 2025-08-10 RX ADMIN — IOPAMIDOL 79 ML: 755 INJECTION, SOLUTION INTRAVENOUS at 16:34

## 2025-08-10 RX ADMIN — LIDOCAINE HYDROCHLORIDE: 10 INJECTION, SOLUTION INFILTRATION; PERINEURAL at 18:51

## 2025-08-10 RX ADMIN — LIDOCAINE HYDROCHLORIDE 20 ML: 10 INJECTION, SOLUTION EPIDURAL; INFILTRATION; INTRACAUDAL; PERINEURAL at 23:09

## 2025-08-10 RX ADMIN — HYDROMORPHONE HYDROCHLORIDE 0.5 MG: 1 INJECTION, SOLUTION INTRAMUSCULAR; INTRAVENOUS; SUBCUTANEOUS at 17:40

## 2025-08-10 RX ADMIN — METHOCARBAMOL 500 MG: 500 TABLET ORAL at 23:09

## 2025-08-10 ASSESSMENT — ACTIVITIES OF DAILY LIVING (ADL)
ADLS_ACUITY_SCORE: 41
ADLS_ACUITY_SCORE: 41
ADLS_ACUITY_SCORE: 49
ADLS_ACUITY_SCORE: 41

## 2025-08-10 ASSESSMENT — COLUMBIA-SUICIDE SEVERITY RATING SCALE - C-SSRS
1. IN THE PAST MONTH, HAVE YOU WISHED YOU WERE DEAD OR WISHED YOU COULD GO TO SLEEP AND NOT WAKE UP?: NO
6. HAVE YOU EVER DONE ANYTHING, STARTED TO DO ANYTHING, OR PREPARED TO DO ANYTHING TO END YOUR LIFE?: NO
2. HAVE YOU ACTUALLY HAD ANY THOUGHTS OF KILLING YOURSELF IN THE PAST MONTH?: NO

## 2025-08-11 ENCOUNTER — APPOINTMENT (OUTPATIENT)
Dept: PHYSICAL THERAPY | Facility: CLINIC | Age: 65
End: 2025-08-11
Payer: COMMERCIAL

## 2025-08-11 ENCOUNTER — APPOINTMENT (OUTPATIENT)
Dept: GENERAL RADIOLOGY | Facility: CLINIC | Age: 65
End: 2025-08-11
Payer: COMMERCIAL

## 2025-08-11 VITALS
HEART RATE: 70 BPM | OXYGEN SATURATION: 95 % | TEMPERATURE: 98.3 F | BODY MASS INDEX: 25.47 KG/M2 | WEIGHT: 162.26 LBS | DIASTOLIC BLOOD PRESSURE: 85 MMHG | HEIGHT: 67 IN | RESPIRATION RATE: 18 BRPM | SYSTOLIC BLOOD PRESSURE: 125 MMHG

## 2025-08-11 PROBLEM — S22.42XA: Status: ACTIVE | Noted: 2025-08-11

## 2025-08-11 LAB
ANION GAP SERPL CALCULATED.3IONS-SCNC: 11 MMOL/L (ref 7–15)
BUN SERPL-MCNC: 12.9 MG/DL (ref 8–23)
CALCIUM SERPL-MCNC: 8.9 MG/DL (ref 8.8–10.4)
CHLORIDE SERPL-SCNC: 108 MMOL/L (ref 98–107)
CREAT SERPL-MCNC: 1.29 MG/DL (ref 0.67–1.17)
EGFRCR SERPLBLD CKD-EPI 2021: 62 ML/MIN/1.73M2
GLUCOSE SERPL-MCNC: 108 MG/DL (ref 70–99)
HCO3 SERPL-SCNC: 21 MMOL/L (ref 22–29)
HOLD SPECIMEN: NORMAL
MAGNESIUM SERPL-MCNC: 2.2 MG/DL (ref 1.7–2.3)
PHOSPHATE SERPL-MCNC: 3.1 MG/DL (ref 2.5–4.5)
POTASSIUM SERPL-SCNC: 4.3 MMOL/L (ref 3.4–5.3)
SODIUM SERPL-SCNC: 140 MMOL/L (ref 135–145)

## 2025-08-11 PROCEDURE — 96376 TX/PRO/DX INJ SAME DRUG ADON: CPT

## 2025-08-11 PROCEDURE — 99239 HOSP IP/OBS DSCHRG MGMT >30: CPT | Performed by: NURSE PRACTITIONER

## 2025-08-11 PROCEDURE — 250N000013 HC RX MED GY IP 250 OP 250 PS 637: Performed by: STUDENT IN AN ORGANIZED HEALTH CARE EDUCATION/TRAINING PROGRAM

## 2025-08-11 PROCEDURE — 71045 X-RAY EXAM CHEST 1 VIEW: CPT

## 2025-08-11 PROCEDURE — 97161 PT EVAL LOW COMPLEX 20 MIN: CPT | Mod: GP

## 2025-08-11 PROCEDURE — G0378 HOSPITAL OBSERVATION PER HR: HCPCS

## 2025-08-11 PROCEDURE — 94150 VITAL CAPACITY TEST: CPT

## 2025-08-11 PROCEDURE — 94799 UNLISTED PULMONARY SVC/PX: CPT

## 2025-08-11 PROCEDURE — 97530 THERAPEUTIC ACTIVITIES: CPT | Mod: GP

## 2025-08-11 PROCEDURE — 250N000011 HC RX IP 250 OP 636

## 2025-08-11 PROCEDURE — 71045 X-RAY EXAM CHEST 1 VIEW: CPT | Mod: 26 | Performed by: RADIOLOGY

## 2025-08-11 PROCEDURE — 250N000013 HC RX MED GY IP 250 OP 250 PS 637: Performed by: FAMILY MEDICINE

## 2025-08-11 PROCEDURE — 36415 COLL VENOUS BLD VENIPUNCTURE: CPT

## 2025-08-11 PROCEDURE — 250N000013 HC RX MED GY IP 250 OP 250 PS 637

## 2025-08-11 PROCEDURE — 84100 ASSAY OF PHOSPHORUS: CPT

## 2025-08-11 PROCEDURE — 250N000013 HC RX MED GY IP 250 OP 250 PS 637: Performed by: NURSE PRACTITIONER

## 2025-08-11 PROCEDURE — 83735 ASSAY OF MAGNESIUM: CPT

## 2025-08-11 PROCEDURE — 999N000111 HC STATISTIC OT IP EVAL DEFER

## 2025-08-11 PROCEDURE — 999N000157 HC STATISTIC RCP TIME EA 10 MIN

## 2025-08-11 PROCEDURE — 80048 BASIC METABOLIC PNL TOTAL CA: CPT

## 2025-08-11 RX ORDER — METHOCARBAMOL 750 MG/1
750 TABLET, FILM COATED ORAL 4 TIMES DAILY
Status: DISCONTINUED | OUTPATIENT
Start: 2025-08-11 | End: 2025-08-11 | Stop reason: HOSPADM

## 2025-08-11 RX ORDER — AMOXICILLIN 250 MG
1 CAPSULE ORAL 2 TIMES DAILY
COMMUNITY
Start: 2025-08-11

## 2025-08-11 RX ORDER — OXYCODONE HYDROCHLORIDE 5 MG/1
5 TABLET ORAL EVERY 6 HOURS PRN
Qty: 12 TABLET | Refills: 0 | Status: SHIPPED | OUTPATIENT
Start: 2025-08-11 | End: 2025-08-21

## 2025-08-11 RX ORDER — LIDOCAINE 4 G/G
1 PATCH TOPICAL EVERY 24 HOURS
Qty: 10 PATCH | Refills: 0 | Status: SHIPPED | OUTPATIENT
Start: 2025-08-11

## 2025-08-11 RX ORDER — ACETAMINOPHEN 325 MG/1
975 TABLET ORAL EVERY 8 HOURS
COMMUNITY
Start: 2025-08-11

## 2025-08-11 RX ADMIN — HYDROMORPHONE HYDROCHLORIDE 0.4 MG: 0.2 INJECTION, SOLUTION INTRAMUSCULAR; INTRAVENOUS; SUBCUTANEOUS at 02:50

## 2025-08-11 RX ADMIN — TRAMADOL HYDROCHLORIDE 100 MG: 50 TABLET, COATED ORAL at 08:48

## 2025-08-11 RX ADMIN — METOPROLOL SUCCINATE 25 MG: 25 TABLET, EXTENDED RELEASE ORAL at 08:48

## 2025-08-11 RX ADMIN — METHOCARBAMOL 750 MG: 750 TABLET ORAL at 08:48

## 2025-08-11 RX ADMIN — LIDOCAINE 1 PATCH: 4 PATCH TOPICAL at 08:48

## 2025-08-11 RX ADMIN — METHOCARBAMOL 750 MG: 750 TABLET ORAL at 11:23

## 2025-08-11 RX ADMIN — OXYCODONE HYDROCHLORIDE 5 MG: 5 TABLET ORAL at 13:13

## 2025-08-11 RX ADMIN — ACETAMINOPHEN 975 MG: 325 TABLET ORAL at 08:48

## 2025-08-11 RX ADMIN — SENNOSIDES, DOCUSATE SODIUM 1 TABLET: 50; 8.6 TABLET, FILM COATED ORAL at 08:48

## 2025-08-11 RX ADMIN — GABAPENTIN 1200 MG: 600 TABLET, FILM COATED ORAL at 13:13

## 2025-08-11 RX ADMIN — OXYCODONE HYDROCHLORIDE 5 MG: 5 TABLET ORAL at 05:29

## 2025-08-11 RX ADMIN — HYDROMORPHONE HYDROCHLORIDE 0.4 MG: 0.2 INJECTION, SOLUTION INTRAMUSCULAR; INTRAVENOUS; SUBCUTANEOUS at 06:29

## 2025-08-11 RX ADMIN — GABAPENTIN 1200 MG: 600 TABLET, FILM COATED ORAL at 08:48

## 2025-08-11 RX ADMIN — ATORVASTATIN CALCIUM 20 MG: 20 TABLET, FILM COATED ORAL at 08:48

## 2025-08-11 RX ADMIN — TRAMADOL HYDROCHLORIDE 100 MG: 50 TABLET, COATED ORAL at 00:11

## 2025-08-11 ASSESSMENT — ACTIVITIES OF DAILY LIVING (ADL)
ADLS_ACUITY_SCORE: 21
ADLS_ACUITY_SCORE: 24
ADLS_ACUITY_SCORE: 24
ADLS_ACUITY_SCORE: 21
ADLS_ACUITY_SCORE: 21
ADLS_ACUITY_SCORE: 24
ADLS_ACUITY_SCORE: 24
ADLS_ACUITY_SCORE: 23
ADLS_ACUITY_SCORE: 49
ADLS_ACUITY_SCORE: 21

## 2025-08-12 ENCOUNTER — ANCILLARY PROCEDURE (OUTPATIENT)
Dept: ULTRASOUND IMAGING | Facility: CLINIC | Age: 65
End: 2025-08-12
Attending: FAMILY MEDICINE
Payer: COMMERCIAL

## 2025-08-12 DIAGNOSIS — I71.40 ABDOMINAL AORTIC ANEURYSM (AAA) 3.0 CM TO 5.5 CM IN DIAMETER IN MALE: ICD-10-CM

## 2025-08-12 DIAGNOSIS — I77.810 ASCENDING AORTA DILATATION: ICD-10-CM

## 2025-08-12 DIAGNOSIS — M25.532 WRIST PAIN, LEFT: Primary | ICD-10-CM

## 2025-08-12 PROCEDURE — 76775 US EXAM ABDO BACK WALL LIM: CPT | Mod: TC | Performed by: RADIOLOGY

## 2025-08-13 ENCOUNTER — OFFICE VISIT (OUTPATIENT)
Dept: ORTHOPEDICS | Facility: CLINIC | Age: 65
End: 2025-08-13
Payer: COMMERCIAL

## 2025-08-13 ENCOUNTER — PRE VISIT (OUTPATIENT)
Dept: ORTHOPEDICS | Facility: CLINIC | Age: 65
End: 2025-08-13
Payer: COMMERCIAL

## 2025-08-13 ENCOUNTER — ANCILLARY PROCEDURE (OUTPATIENT)
Dept: CT IMAGING | Facility: CLINIC | Age: 65
End: 2025-08-13
Attending: STUDENT IN AN ORGANIZED HEALTH CARE EDUCATION/TRAINING PROGRAM
Payer: COMMERCIAL

## 2025-08-13 ENCOUNTER — ANCILLARY PROCEDURE (OUTPATIENT)
Dept: GENERAL RADIOLOGY | Facility: CLINIC | Age: 65
End: 2025-08-13
Attending: STUDENT IN AN ORGANIZED HEALTH CARE EDUCATION/TRAINING PROGRAM
Payer: COMMERCIAL

## 2025-08-13 VITALS — WEIGHT: 162 LBS | HEIGHT: 66 IN | BODY MASS INDEX: 26.03 KG/M2

## 2025-08-13 DIAGNOSIS — S52.572A OTHER CLOSED INTRA-ARTICULAR FRACTURE OF DISTAL END OF LEFT RADIUS, INITIAL ENCOUNTER: ICD-10-CM

## 2025-08-13 DIAGNOSIS — S52.572A OTHER CLOSED INTRA-ARTICULAR FRACTURE OF DISTAL END OF LEFT RADIUS, INITIAL ENCOUNTER: Primary | ICD-10-CM

## 2025-08-13 DIAGNOSIS — M25.532 WRIST PAIN, LEFT: ICD-10-CM

## 2025-08-13 PROCEDURE — 73110 X-RAY EXAM OF WRIST: CPT | Mod: LT | Performed by: RADIOLOGY

## 2025-08-14 ENCOUNTER — TELEPHONE (OUTPATIENT)
Dept: ORTHOPEDICS | Facility: CLINIC | Age: 65
End: 2025-08-14
Payer: COMMERCIAL

## 2025-08-14 ENCOUNTER — MYC MEDICAL ADVICE (OUTPATIENT)
Dept: PALLIATIVE MEDICINE | Facility: CLINIC | Age: 65
End: 2025-08-14
Payer: COMMERCIAL

## 2025-08-14 PROBLEM — S52.572A OTHER CLOSED INTRA-ARTICULAR FRACTURE OF DISTAL END OF LEFT RADIUS, INITIAL ENCOUNTER: Status: ACTIVE | Noted: 2025-08-13

## 2025-08-19 ENCOUNTER — OFFICE VISIT (OUTPATIENT)
Dept: FAMILY MEDICINE | Facility: CLINIC | Age: 65
End: 2025-08-19
Payer: COMMERCIAL

## 2025-08-19 VITALS
HEART RATE: 102 BPM | OXYGEN SATURATION: 100 % | DIASTOLIC BLOOD PRESSURE: 76 MMHG | HEIGHT: 66 IN | BODY MASS INDEX: 25.55 KG/M2 | SYSTOLIC BLOOD PRESSURE: 130 MMHG | WEIGHT: 159 LBS | TEMPERATURE: 98.2 F | RESPIRATION RATE: 16 BRPM

## 2025-08-19 DIAGNOSIS — J30.81 CHRONIC ALLERGIC RHINITIS DUE TO ANIMAL HAIR AND DANDER: ICD-10-CM

## 2025-08-19 DIAGNOSIS — I25.10 MILD CAD: ICD-10-CM

## 2025-08-19 DIAGNOSIS — Z01.818 PREOP GENERAL PHYSICAL EXAM: Primary | ICD-10-CM

## 2025-08-19 DIAGNOSIS — I77.810 ASCENDING AORTA DILATATION: ICD-10-CM

## 2025-08-19 DIAGNOSIS — M47.812 CERVICAL SPONDYLOSIS WITHOUT MYELOPATHY: ICD-10-CM

## 2025-08-19 DIAGNOSIS — S52.572A OTHER CLOSED INTRA-ARTICULAR FRACTURE OF DISTAL END OF LEFT RADIUS, INITIAL ENCOUNTER: ICD-10-CM

## 2025-08-19 DIAGNOSIS — S22.32XD CLOSED FRACTURE OF ONE RIB OF LEFT SIDE WITH ROUTINE HEALING, SUBSEQUENT ENCOUNTER: ICD-10-CM

## 2025-08-19 PROCEDURE — 99214 OFFICE O/P EST MOD 30 MIN: CPT | Performed by: FAMILY MEDICINE

## 2025-08-19 PROCEDURE — 93000 ELECTROCARDIOGRAM COMPLETE: CPT | Performed by: FAMILY MEDICINE

## 2025-08-19 PROCEDURE — G2211 COMPLEX E/M VISIT ADD ON: HCPCS | Performed by: FAMILY MEDICINE

## 2025-08-20 ENCOUNTER — ANESTHESIA EVENT (OUTPATIENT)
Dept: SURGERY | Facility: AMBULATORY SURGERY CENTER | Age: 65
End: 2025-08-20
Payer: COMMERCIAL

## 2025-08-21 ENCOUNTER — ANESTHESIA (OUTPATIENT)
Dept: SURGERY | Facility: AMBULATORY SURGERY CENTER | Age: 65
End: 2025-08-21
Payer: COMMERCIAL

## 2025-08-21 ENCOUNTER — HOSPITAL ENCOUNTER (OUTPATIENT)
Facility: AMBULATORY SURGERY CENTER | Age: 65
End: 2025-08-21
Attending: STUDENT IN AN ORGANIZED HEALTH CARE EDUCATION/TRAINING PROGRAM | Admitting: STUDENT IN AN ORGANIZED HEALTH CARE EDUCATION/TRAINING PROGRAM
Payer: COMMERCIAL

## 2025-08-21 ENCOUNTER — ANCILLARY PROCEDURE (OUTPATIENT)
Dept: RADIOLOGY | Facility: AMBULATORY SURGERY CENTER | Age: 65
End: 2025-08-21
Attending: STUDENT IN AN ORGANIZED HEALTH CARE EDUCATION/TRAINING PROGRAM
Payer: COMMERCIAL

## 2025-08-21 VITALS
OXYGEN SATURATION: 92 % | DIASTOLIC BLOOD PRESSURE: 78 MMHG | SYSTOLIC BLOOD PRESSURE: 109 MMHG | HEIGHT: 66 IN | HEART RATE: 73 BPM | BODY MASS INDEX: 25.71 KG/M2 | TEMPERATURE: 98.1 F | RESPIRATION RATE: 16 BRPM | WEIGHT: 160 LBS

## 2025-08-21 DIAGNOSIS — M25.532 WRIST PAIN, LEFT: Primary | ICD-10-CM

## 2025-08-21 DIAGNOSIS — S52.572A OTHER CLOSED INTRA-ARTICULAR FRACTURE OF DISTAL END OF LEFT RADIUS, INITIAL ENCOUNTER: Primary | ICD-10-CM

## 2025-08-21 DIAGNOSIS — Z98.890 POST-OPERATIVE STATE: ICD-10-CM

## 2025-08-21 RX ORDER — ONDANSETRON 2 MG/ML
INJECTION INTRAMUSCULAR; INTRAVENOUS PRN
Status: DISCONTINUED | OUTPATIENT
Start: 2025-08-21 | End: 2025-08-21

## 2025-08-21 RX ORDER — FLUMAZENIL 0.1 MG/ML
0.2 INJECTION, SOLUTION INTRAVENOUS
Status: ACTIVE | OUTPATIENT
Start: 2025-08-21

## 2025-08-21 RX ORDER — NALOXONE HYDROCHLORIDE 0.4 MG/ML
0.2 INJECTION, SOLUTION INTRAMUSCULAR; INTRAVENOUS; SUBCUTANEOUS
Status: ACTIVE | OUTPATIENT
Start: 2025-08-21

## 2025-08-21 RX ORDER — ONDANSETRON 4 MG/1
4 TABLET, ORALLY DISINTEGRATING ORAL EVERY 30 MIN PRN
Status: ACTIVE | OUTPATIENT
Start: 2025-08-21

## 2025-08-21 RX ORDER — DEXAMETHASONE SODIUM PHOSPHATE 10 MG/ML
4 INJECTION, SOLUTION INTRAMUSCULAR; INTRAVENOUS
Status: ACTIVE | OUTPATIENT
Start: 2025-08-21

## 2025-08-21 RX ORDER — OXYCODONE HYDROCHLORIDE 5 MG/1
5 TABLET ORAL EVERY 6 HOURS PRN
Qty: 10 TABLET | Refills: 0 | Status: SHIPPED | OUTPATIENT
Start: 2025-08-21 | End: 2025-08-24

## 2025-08-21 RX ORDER — BUPIVACAINE HYDROCHLORIDE 5 MG/ML
INJECTION, SOLUTION EPIDURAL; INTRACAUDAL; PERINEURAL
Status: DISCONTINUED | OUTPATIENT
Start: 2025-08-21 | End: 2025-08-21

## 2025-08-21 RX ORDER — LIDOCAINE 40 MG/G
CREAM TOPICAL
Status: ACTIVE | OUTPATIENT
Start: 2025-08-21

## 2025-08-21 RX ORDER — NALOXONE HYDROCHLORIDE 0.4 MG/ML
0.4 INJECTION, SOLUTION INTRAMUSCULAR; INTRAVENOUS; SUBCUTANEOUS
Status: ACTIVE | OUTPATIENT
Start: 2025-08-21

## 2025-08-21 RX ORDER — FENTANYL CITRATE 50 UG/ML
25-50 INJECTION, SOLUTION INTRAMUSCULAR; INTRAVENOUS
Status: ACTIVE | OUTPATIENT
Start: 2025-08-21

## 2025-08-21 RX ORDER — CEFAZOLIN SODIUM 2 G/50ML
2 SOLUTION INTRAVENOUS
Status: ACTIVE | OUTPATIENT
Start: 2025-08-21

## 2025-08-21 RX ORDER — LIDOCAINE HYDROCHLORIDE 20 MG/ML
INJECTION, SOLUTION INFILTRATION; PERINEURAL PRN
Status: DISCONTINUED | OUTPATIENT
Start: 2025-08-21 | End: 2025-08-21

## 2025-08-21 RX ORDER — PROPOFOL 10 MG/ML
INJECTION, EMULSION INTRAVENOUS PRN
Status: DISCONTINUED | OUTPATIENT
Start: 2025-08-21 | End: 2025-08-21

## 2025-08-21 RX ORDER — ACETAMINOPHEN 325 MG/1
975 TABLET ORAL ONCE
Status: COMPLETED | OUTPATIENT
Start: 2025-08-21 | End: 2025-08-21

## 2025-08-21 RX ORDER — FENTANYL CITRATE 50 UG/ML
25 INJECTION, SOLUTION INTRAMUSCULAR; INTRAVENOUS
Status: ACTIVE | OUTPATIENT
Start: 2025-08-21

## 2025-08-21 RX ORDER — PROPOFOL 10 MG/ML
INJECTION, EMULSION INTRAVENOUS CONTINUOUS PRN
Status: DISCONTINUED | OUTPATIENT
Start: 2025-08-21 | End: 2025-08-21

## 2025-08-21 RX ORDER — NALOXONE HYDROCHLORIDE 0.4 MG/ML
0.1 INJECTION, SOLUTION INTRAMUSCULAR; INTRAVENOUS; SUBCUTANEOUS
Status: ACTIVE | OUTPATIENT
Start: 2025-08-21

## 2025-08-21 RX ORDER — ONDANSETRON 2 MG/ML
4 INJECTION INTRAMUSCULAR; INTRAVENOUS EVERY 30 MIN PRN
Status: ACTIVE | OUTPATIENT
Start: 2025-08-21

## 2025-08-21 RX ORDER — SODIUM CHLORIDE, SODIUM LACTATE, POTASSIUM CHLORIDE, CALCIUM CHLORIDE 600; 310; 30; 20 MG/100ML; MG/100ML; MG/100ML; MG/100ML
INJECTION, SOLUTION INTRAVENOUS CONTINUOUS
Status: ACTIVE | OUTPATIENT
Start: 2025-08-21

## 2025-08-21 RX ORDER — OXYCODONE HYDROCHLORIDE 5 MG/1
5 TABLET ORAL
Status: ACTIVE | OUTPATIENT
Start: 2025-08-21

## 2025-08-21 RX ORDER — CEFAZOLIN SODIUM 2 G/50ML
2 SOLUTION INTRAVENOUS SEE ADMIN INSTRUCTIONS
Status: ACTIVE | OUTPATIENT
Start: 2025-08-21

## 2025-08-21 RX ORDER — OXYCODONE HYDROCHLORIDE 5 MG/1
10 TABLET ORAL
Status: ACTIVE | OUTPATIENT
Start: 2025-08-21

## 2025-08-21 RX ADMIN — SODIUM CHLORIDE, SODIUM LACTATE, POTASSIUM CHLORIDE, CALCIUM CHLORIDE: 600; 310; 30; 20 INJECTION, SOLUTION INTRAVENOUS at 09:28

## 2025-08-21 RX ADMIN — PROPOFOL 50 MG: 10 INJECTION, EMULSION INTRAVENOUS at 09:33

## 2025-08-21 RX ADMIN — PROPOFOL 125 MCG/KG/MIN: 10 INJECTION, EMULSION INTRAVENOUS at 09:33

## 2025-08-21 RX ADMIN — BUPIVACAINE HYDROCHLORIDE 15 ML: 5 INJECTION, SOLUTION EPIDURAL; INTRACAUDAL; PERINEURAL at 09:12

## 2025-08-21 RX ADMIN — ACETAMINOPHEN 975 MG: 325 TABLET ORAL at 08:55

## 2025-08-21 RX ADMIN — FENTANYL CITRATE 50 MCG: 50 INJECTION, SOLUTION INTRAMUSCULAR; INTRAVENOUS at 09:13

## 2025-08-21 RX ADMIN — ONDANSETRON 4 MG: 2 INJECTION INTRAMUSCULAR; INTRAVENOUS at 09:33

## 2025-08-21 RX ADMIN — LIDOCAINE HYDROCHLORIDE 40 MG: 20 INJECTION, SOLUTION INFILTRATION; PERINEURAL at 09:33

## 2025-08-27 ENCOUNTER — ANCILLARY PROCEDURE (OUTPATIENT)
Dept: GENERAL RADIOLOGY | Facility: CLINIC | Age: 65
End: 2025-08-27
Attending: STUDENT IN AN ORGANIZED HEALTH CARE EDUCATION/TRAINING PROGRAM
Payer: COMMERCIAL

## 2025-08-27 ENCOUNTER — OFFICE VISIT (OUTPATIENT)
Dept: ORTHOPEDICS | Facility: CLINIC | Age: 65
End: 2025-08-27
Payer: COMMERCIAL

## 2025-08-27 ENCOUNTER — THERAPY VISIT (OUTPATIENT)
Dept: OCCUPATIONAL THERAPY | Facility: CLINIC | Age: 65
End: 2025-08-27
Payer: COMMERCIAL

## 2025-08-27 ENCOUNTER — MYC REFILL (OUTPATIENT)
Dept: PALLIATIVE MEDICINE | Facility: CLINIC | Age: 65
End: 2025-08-27

## 2025-08-27 DIAGNOSIS — M50.30 DDD (DEGENERATIVE DISC DISEASE), CERVICAL: ICD-10-CM

## 2025-08-27 DIAGNOSIS — M25.532 WRIST PAIN, LEFT: ICD-10-CM

## 2025-08-27 DIAGNOSIS — F11.90 CHRONIC, CONTINUOUS USE OF OPIOIDS: ICD-10-CM

## 2025-08-27 DIAGNOSIS — M25.532 LEFT WRIST PAIN: Primary | ICD-10-CM

## 2025-08-27 DIAGNOSIS — Z98.1 S/P CERVICAL SPINAL FUSION: ICD-10-CM

## 2025-08-27 DIAGNOSIS — Z98.890 POST-OPERATIVE STATE: ICD-10-CM

## 2025-08-27 DIAGNOSIS — M47.812 CERVICAL SPONDYLOSIS WITHOUT MYELOPATHY: ICD-10-CM

## 2025-08-27 DIAGNOSIS — M48.02 CERVICAL STENOSIS OF SPINAL CANAL: ICD-10-CM

## 2025-08-27 DIAGNOSIS — M79.18 MYOFASCIAL PAIN: ICD-10-CM

## 2025-08-27 PROCEDURE — 97166 OT EVAL MOD COMPLEX 45 MIN: CPT | Mod: GO | Performed by: OCCUPATIONAL THERAPIST

## 2025-08-27 PROCEDURE — 97110 THERAPEUTIC EXERCISES: CPT | Mod: GO | Performed by: OCCUPATIONAL THERAPIST

## 2025-08-27 PROCEDURE — 73110 X-RAY EXAM OF WRIST: CPT | Mod: LT | Performed by: RADIOLOGY

## 2025-08-27 PROCEDURE — 97760 ORTHOTIC MGMT&TRAING 1ST ENC: CPT | Mod: GO | Performed by: OCCUPATIONAL THERAPIST

## 2025-08-27 RX ORDER — TRAMADOL HYDROCHLORIDE 50 MG/1
50 TABLET ORAL EVERY 6 HOURS PRN
Qty: 90 TABLET | Refills: 0 | Status: SHIPPED | OUTPATIENT
Start: 2025-08-27

## 2025-08-27 RX ORDER — TRAMADOL HYDROCHLORIDE 200 MG/1
200 TABLET, EXTENDED RELEASE ORAL EVERY 24 HOURS
Qty: 30 TABLET | Refills: 0 | Status: SHIPPED | OUTPATIENT
Start: 2025-08-27

## 2025-09-04 ENCOUNTER — RADIOLOGY INJECTION OFFICE VISIT (OUTPATIENT)
Dept: PALLIATIVE MEDICINE | Facility: CLINIC | Age: 65
End: 2025-09-04
Attending: NURSE PRACTITIONER
Payer: COMMERCIAL

## 2025-09-04 ENCOUNTER — TELEPHONE (OUTPATIENT)
Dept: ORTHOPEDICS | Facility: CLINIC | Age: 65
End: 2025-09-04

## 2025-09-04 VITALS — SYSTOLIC BLOOD PRESSURE: 146 MMHG | OXYGEN SATURATION: 97 % | DIASTOLIC BLOOD PRESSURE: 105 MMHG | HEART RATE: 51 BPM

## 2025-09-04 DIAGNOSIS — M54.59 LUMBAR FACET JOINT PAIN: ICD-10-CM

## 2025-09-04 DIAGNOSIS — M47.812 CERVICAL SPONDYLOSIS WITHOUT MYELOPATHY: ICD-10-CM

## 2025-09-04 DIAGNOSIS — M47.816 SPONDYLOSIS OF LUMBAR REGION WITHOUT MYELOPATHY OR RADICULOPATHY: Primary | ICD-10-CM

## 2025-09-04 DIAGNOSIS — M79.18 MYOFASCIAL PAIN: ICD-10-CM

## 2025-09-04 DIAGNOSIS — G89.29 CHRONIC BILATERAL LOW BACK PAIN WITHOUT SCIATICA: ICD-10-CM

## 2025-09-04 DIAGNOSIS — M50.30 DDD (DEGENERATIVE DISC DISEASE), CERVICAL: ICD-10-CM

## 2025-09-04 DIAGNOSIS — M54.50 CHRONIC BILATERAL LOW BACK PAIN WITHOUT SCIATICA: ICD-10-CM

## 2025-09-04 ASSESSMENT — PAIN SCALES - GENERAL
PAINLEVEL_OUTOF10: MODERATE PAIN (5)
PAINLEVEL_OUTOF10: MODERATE PAIN (6)

## (undated) DEVICE — SYR 30ML LL W/O NDL 302832

## (undated) DEVICE — SUCTION MANIFOLD DORNOCH ULTRA CART UL-CL500

## (undated) DEVICE — SPONGE SURGIFOAM 100 1974

## (undated) DEVICE — SOL WATER IRRIG 1000ML BOTTLE 2F7114

## (undated) DEVICE — SPONGE KITTNER 30-101

## (undated) DEVICE — Device

## (undated) DEVICE — ESU GROUND PAD ADULT W/CORD E7507

## (undated) DEVICE — SU VICRYL 2-0 CT-2 CR 8X18" J726D

## (undated) DEVICE — IOM MONITORING ----- 15 MIN

## (undated) DEVICE — PREP SKIN SCRUB TRAY 4461A

## (undated) DEVICE — DRILL BIT

## (undated) DEVICE — NDL 21GA 1.5"

## (undated) DEVICE — SU VICRYL 3-0 SH 8X18" UND J864D

## (undated) DEVICE — SU DERMABOND DHV12

## (undated) DEVICE — LINEN TOWEL PACK X6 WHITE 5487

## (undated) DEVICE — BUR MATCHSTICK 3MM ANSPACH L-8NS-G1

## (undated) DEVICE — NDL SPINAL 18GA 3.5" 405184

## (undated) DEVICE — PREP CHLORAPREP 26ML TINTED ORANGE  260815

## (undated) DEVICE — LINEN TOWEL PACK X30 5481

## (undated) DEVICE — PREP POVIDONE IODINE SOLUTION 10% 4OZ

## (undated) DEVICE — ESU ELEC BLADE 2.75" COATED/INSULATED E1455

## (undated) DEVICE — GLOVE PROTEXIS BLUE W/NEU-THERA 7.0  2D73EB70

## (undated) DEVICE — DRSG PRIMAPORE 03 1/8X6" 66000318

## (undated) DEVICE — DRAPE C-ARM W/STRAPS 42X72" 07-CA104

## (undated) DEVICE — TUBE ENDOTRACHEAL NIM TRIVANTAGE 7.0MM 8229707

## (undated) DEVICE — PREP POVIDONE IODINE SCRUB 7.5% 4OZ APL82212

## (undated) DEVICE — SU SILK 2-0 TIE 12X30" A305H

## (undated) DEVICE — DECANTER VIAL 2006S

## (undated) DEVICE — DRAPE MICROSCOPE LEICA 54X150" AR8033650

## (undated) DEVICE — PREP CHLORAPREP CLEAR 3ML 260400

## (undated) DEVICE — SU MONOCRYL 4-0 PS-2 18" UND Y496G

## (undated) DEVICE — SPONGE SURGIFOAM 01GM POWDER 1978

## (undated) DEVICE — IOM SUPPLIES

## (undated) DEVICE — GLOVE PROTEXIS MICRO 7.0  2D73PM70

## (undated) DEVICE — DRAPE MAYO STAND 23X54 8337

## (undated) DEVICE — SPONGE COTTONOID 1/2X1/2" 80-1400

## (undated) DEVICE — DRAPE STERI TOWEL SM 1000

## (undated) DEVICE — NDL BLUNT 17GA 1.5" 8881202330

## (undated) DEVICE — PACK NEURO MINOR UMMC SNE32MNMU4

## (undated) DEVICE — SYR 10ML LL W/O NDL 302995

## (undated) DEVICE — BRUSH SURGICAL SCRUB W/4% CHLORHEXIDINE GLUCONATE SOL 4458A

## (undated) RX ORDER — BACITRACIN 50000 [IU]/1
INJECTION, POWDER, FOR SOLUTION INTRAMUSCULAR
Status: DISPENSED
Start: 2018-10-29

## (undated) RX ORDER — FENTANYL CITRATE 50 UG/ML
INJECTION, SOLUTION INTRAMUSCULAR; INTRAVENOUS
Status: DISPENSED
Start: 2018-10-29

## (undated) RX ORDER — CEFAZOLIN SODIUM 2 G/50ML
SOLUTION INTRAVENOUS
Status: DISPENSED
Start: 2025-08-21

## (undated) RX ORDER — PROPOFOL 10 MG/ML
INJECTION, EMULSION INTRAVENOUS
Status: DISPENSED
Start: 2018-10-29

## (undated) RX ORDER — LABETALOL HYDROCHLORIDE 5 MG/ML
INJECTION, SOLUTION INTRAVENOUS
Status: DISPENSED
Start: 2018-10-29

## (undated) RX ORDER — HYDRALAZINE HYDROCHLORIDE 20 MG/ML
INJECTION INTRAMUSCULAR; INTRAVENOUS
Status: DISPENSED
Start: 2018-10-29

## (undated) RX ORDER — EPHEDRINE SULFATE 50 MG/ML
INJECTION, SOLUTION INTRAMUSCULAR; INTRAVENOUS; SUBCUTANEOUS
Status: DISPENSED
Start: 2018-10-29

## (undated) RX ORDER — DEXAMETHASONE SODIUM PHOSPHATE 4 MG/ML
INJECTION, SOLUTION INTRA-ARTICULAR; INTRALESIONAL; INTRAMUSCULAR; INTRAVENOUS; SOFT TISSUE
Status: DISPENSED
Start: 2018-10-29

## (undated) RX ORDER — ACETAMINOPHEN 325 MG/1
TABLET ORAL
Status: DISPENSED
Start: 2025-08-21

## (undated) RX ORDER — HYDROMORPHONE HYDROCHLORIDE 1 MG/ML
INJECTION, SOLUTION INTRAMUSCULAR; INTRAVENOUS; SUBCUTANEOUS
Status: DISPENSED
Start: 2018-10-29

## (undated) RX ORDER — PHENYLEPHRINE HCL IN 0.9% NACL 1 MG/10 ML
SYRINGE (ML) INTRAVENOUS
Status: DISPENSED
Start: 2018-10-29

## (undated) RX ORDER — LIDOCAINE HYDROCHLORIDE AND EPINEPHRINE 10; 10 MG/ML; UG/ML
INJECTION, SOLUTION INFILTRATION; PERINEURAL
Status: DISPENSED
Start: 2018-10-29

## (undated) RX ORDER — CEFAZOLIN SODIUM 1 G/3ML
INJECTION, POWDER, FOR SOLUTION INTRAMUSCULAR; INTRAVENOUS
Status: DISPENSED
Start: 2018-10-29

## (undated) RX ORDER — CEFAZOLIN SODIUM 2 G/100ML
INJECTION, SOLUTION INTRAVENOUS
Status: DISPENSED
Start: 2018-10-29

## (undated) RX ORDER — FENTANYL CITRATE 50 UG/ML
INJECTION, SOLUTION INTRAMUSCULAR; INTRAVENOUS
Status: DISPENSED
Start: 2025-08-21

## (undated) RX ORDER — OXYCODONE HYDROCHLORIDE 5 MG/1
TABLET ORAL
Status: DISPENSED
Start: 2018-10-29

## (undated) RX ORDER — LIDOCAINE HYDROCHLORIDE 20 MG/ML
INJECTION, SOLUTION EPIDURAL; INFILTRATION; INTRACAUDAL; PERINEURAL
Status: DISPENSED
Start: 2018-10-29

## (undated) RX ORDER — ONDANSETRON 2 MG/ML
INJECTION INTRAMUSCULAR; INTRAVENOUS
Status: DISPENSED
Start: 2018-10-29